# Patient Record
Sex: FEMALE | Race: BLACK OR AFRICAN AMERICAN | NOT HISPANIC OR LATINO | Employment: FULL TIME | URBAN - METROPOLITAN AREA
[De-identification: names, ages, dates, MRNs, and addresses within clinical notes are randomized per-mention and may not be internally consistent; named-entity substitution may affect disease eponyms.]

---

## 2019-12-23 ENCOUNTER — TELEPHONE (OUTPATIENT)
Dept: OBSTETRICS AND GYNECOLOGY | Facility: CLINIC | Age: 39
End: 2019-12-23

## 2019-12-23 DIAGNOSIS — Z36.89 ESTABLISH GESTATIONAL AGE, ULTRASOUND: Primary | ICD-10-CM

## 2019-12-23 NOTE — TELEPHONE ENCOUNTER
Patient stated that she was trying to see if she can be seen sooner due to brown discharge. Patient stated that she is at the urgent care right now and will call if there is anything that she needs.

## 2019-12-23 NOTE — TELEPHONE ENCOUNTER
----- Message from Willa Menchaca sent at 12/23/2019  9:44 AM CST -----  Contact: self  Patient is needing a dating order. LMP:11/10.

## 2019-12-23 NOTE — TELEPHONE ENCOUNTER
----- Message from Willa Menchaca sent at 12/23/2019  9:47 AM CST -----  Contact: self  Patient is currently 6 weeks pregnant. LMP:11/10. She states she is having brown discharge. The patient is scheduled on 01/07 for confirmation but is concerned. The patient can be reached at [phone].

## 2020-03-17 ENCOUNTER — TELEPHONE (OUTPATIENT)
Dept: GASTROENTEROLOGY | Facility: CLINIC | Age: 40
End: 2020-03-17

## 2020-03-17 NOTE — TELEPHONE ENCOUNTER
No answer 1st attempt  MA left detail message appt will be cancelled after 2nd attempt ( offer video visit ) per Dr. Terrazas

## 2020-03-18 ENCOUNTER — TELEPHONE (OUTPATIENT)
Dept: GASTROENTEROLOGY | Facility: CLINIC | Age: 40
End: 2020-03-18

## 2020-03-18 NOTE — TELEPHONE ENCOUNTER
Spoke to pt and informed her that in order to do the video visit she will need a smart phone or tablet, have to be in the [facility] during the visit, and will bill her insurance for the visit but if the insurance don't cover the visit then she will be billed for a $49 fee along with any non-covered fees such as copays. Pt expressed understanding and said she will still like to do the video visit.       Instructions on how to access the video visit was given          ----- Message from Neal Kwong sent at 3/18/2020 11:34 AM CDT -----  Contact: pt: [phone]  Pt returning call to clinic re appt, would like to be rescheduled to video visit       Please contact pt: [phone]

## 2020-03-20 ENCOUNTER — PATIENT MESSAGE (OUTPATIENT)
Dept: GASTROENTEROLOGY | Facility: CLINIC | Age: 40
End: 2020-03-20

## 2020-03-20 ENCOUNTER — TELEPHONE (OUTPATIENT)
Dept: GASTROENTEROLOGY | Facility: CLINIC | Age: 40
End: 2020-03-20

## 2020-03-20 ENCOUNTER — OFFICE VISIT (OUTPATIENT)
Dept: GASTROENTEROLOGY | Facility: CLINIC | Age: 40
End: 2020-03-20
Payer: COMMERCIAL

## 2020-03-20 DIAGNOSIS — A04.8 HELICOBACTER PYLORI (H. PYLORI) INFECTION: ICD-10-CM

## 2020-03-20 DIAGNOSIS — O98.912: ICD-10-CM

## 2020-03-20 DIAGNOSIS — K59.02 CONSTIPATION DUE TO OUTLET DYSFUNCTION: ICD-10-CM

## 2020-03-20 DIAGNOSIS — K21.9 GASTROESOPHAGEAL REFLUX DISEASE, ESOPHAGITIS PRESENCE NOT SPECIFIED: Primary | ICD-10-CM

## 2020-03-20 PROCEDURE — 99204 PR OFFICE/OUTPT VISIT, NEW, LEVL IV, 45-59 MIN: ICD-10-PCS | Mod: 95,,, | Performed by: INTERNAL MEDICINE

## 2020-03-20 PROCEDURE — 99204 OFFICE O/P NEW MOD 45 MIN: CPT | Mod: 95,,, | Performed by: INTERNAL MEDICINE

## 2020-03-20 RX ORDER — PANTOPRAZOLE SODIUM 40 MG/1
40 TABLET, DELAYED RELEASE ORAL DAILY
Qty: 30 TABLET | Refills: 3 | Status: SHIPPED | OUTPATIENT
Start: 2020-03-20 | End: 2020-09-04 | Stop reason: SDUPTHER

## 2020-03-20 NOTE — PROGRESS NOTES
[facility] Consultation Note    Reason for Consult:  The primary encounter diagnosis was Gastroesophageal reflux disease, esophagitis presence not specified. Diagnoses of Constipation due to outlet dysfunction, Pregnancy and infectious disease in second trimester, and Helicobacter pylori (H. pylori) infection were also pertinent to this visit.    PCP:   Primary Doctor No       Referring MD:  No referring provider defined for this encounter.    Initial History of Present Illness (HPI):  This is a 39 y.o. female here for evaluation of     2018 - living in [place] and saw Gi there who did an EGD and found an ulcer caused by H pylori. Took medications. Followup EGD showed gastritis. Was treated with omeprazole. Got worse with ibuprofen. Currently 4 months pregnant.    Taking tums and prilosec    Abdominal pain - feels like a consistent burn on left side of abdomen  Reflux - no  Dysphagia - no   Bowel habits - normal  GI bleeding - none  NSAID usage - none        ROS:  Constitutional: No fevers, chills, No weight loss  ENT: No allergies  CV: No chest pain  Pulm: No cough, No shortness of breath  Ophtho: No vision changes  GI: see HPI  Derm: No rash  Heme: No lymphadenopathy, No bruising  MSK: No arthritis  : No dysuria, No hematuria  Endo: No hot or cold intolerance  Neuro: No syncope, No seizure  Psych: No anxiety, No depression    Answers for HPI/ROS submitted by the patient on 3/20/2020   fever: No  chills: No  weight loss: No  eye pain: No  eye redness: No  trouble swallowing: No  chest pain: No  shortness of breath: No  cough: No  rash: No  abdominal pain: Yes  nausea: No  vomiting: Yes  Feeling depressed?: No  nervous/ anxious: No  heartburn: Yes  Joint pain? : No  dysuria: No  hematuria: No  back pain: No      Medical History:  has a past medical history of Normal IUP (intrauterine pregnancy) on prenatal ultrasound, second trimester and Umbilical hernia.    Surgical History:  has no past  surgical history on file.    Family History: family history is not on file..     Social History:  reports that she quit smoking about 3 months ago. Her smoking use included cigarettes. She does not have any smokeless tobacco history on file.    Review of patient's allergies indicates:  No Known Allergies      Objective Findings:  Video visit      Labs:  No results found for: WBC, HGB, HCT, PLT, CHOL, TRIG, HDL, LDLDIRECT, ALT, AST, NA, K, CL, CREATININE, BUN, CO2, TSH, PSA, INR, GLUF, HGBA1C, MICROALBUR    No results found for: HPYLORINTERP  No results found for: HPYLORIANTIG        Imaging:    Endoscopy:    EGD 2018 - ulcer followed by gastritis  Colon 2018 - wnl      Assessment:  1. Gastroesophageal reflux disease, esophagitis presence not specified    2. Constipation due to outlet dysfunction    3. Pregnancy and infectious disease in second trimester    4. Helicobacter pylori (H. pylori) infection           Recommendations:  1. Protonix for gastritis. Prilosec is Class C so would stop that  2. Pepcid OTC qhs  3. Check stool sample for H pylori at the end of pregnancy to try to avoid antibiotics during if we can  4. Colace daily for constipation    RTC after pregnancy completed      Order summary:  Orders Placed This Encounter    pantoprazole (PROTONIX) 40 MG tablet         Thank you so much for allowing me to participate in the care of Puja Terrazas MD

## 2020-05-08 ENCOUNTER — TELEPHONE (OUTPATIENT)
Dept: GASTROENTEROLOGY | Facility: CLINIC | Age: 40
End: 2020-05-08

## 2020-05-08 ENCOUNTER — PATIENT MESSAGE (OUTPATIENT)
Dept: GASTROENTEROLOGY | Facility: CLINIC | Age: 40
End: 2020-05-08

## 2020-05-08 NOTE — TELEPHONE ENCOUNTER
MA spoke to pt,     Pt in person appt r/s to virtual visit on 5/25/20 at 3:30 pm. Pt is aware we need your current insurance information before visit. Pt stated she will send picture of card to portal. Pt confirmed appt/terms and thank MA

## 2020-05-13 ENCOUNTER — PATIENT MESSAGE (OUTPATIENT)
Dept: GASTROENTEROLOGY | Facility: CLINIC | Age: 40
End: 2020-05-13

## 2020-05-21 ENCOUNTER — TELEPHONE (OUTPATIENT)
Dept: GASTROENTEROLOGY | Facility: CLINIC | Age: 40
End: 2020-05-21

## 2020-05-21 NOTE — TELEPHONE ENCOUNTER
MA spoke to pt,     Pt stated she would like to schedule change virtual visit on 5/25/20 to clinic visit instead.     Message sent to Delmy to schedule appt

## 2020-05-25 ENCOUNTER — OFFICE VISIT (OUTPATIENT)
Dept: GASTROENTEROLOGY | Facility: CLINIC | Age: 40
End: 2020-05-25
Payer: COMMERCIAL

## 2020-05-25 ENCOUNTER — TELEPHONE (OUTPATIENT)
Dept: GASTROENTEROLOGY | Facility: CLINIC | Age: 40
End: 2020-05-25

## 2020-05-25 VITALS
HEART RATE: 72 BPM | HEIGHT: 67 IN | BODY MASS INDEX: 30.38 KG/M2 | DIASTOLIC BLOOD PRESSURE: 69 MMHG | WEIGHT: 193.56 LBS | SYSTOLIC BLOOD PRESSURE: 116 MMHG

## 2020-05-25 DIAGNOSIS — K59.00 CONSTIPATION, UNSPECIFIED CONSTIPATION TYPE: ICD-10-CM

## 2020-05-25 DIAGNOSIS — A04.8 HELICOBACTER PYLORI (H. PYLORI) INFECTION: ICD-10-CM

## 2020-05-25 DIAGNOSIS — R10.13 ABDOMINAL PAIN, EPIGASTRIC: Primary | ICD-10-CM

## 2020-05-25 PROCEDURE — 3008F PR BODY MASS INDEX (BMI) DOCUMENTED: ICD-10-PCS | Mod: CPTII,S$GLB,, | Performed by: INTERNAL MEDICINE

## 2020-05-25 PROCEDURE — 99999 PR PBB SHADOW E&M-EST. PATIENT-LVL III: ICD-10-PCS | Mod: PBBFAC,,, | Performed by: INTERNAL MEDICINE

## 2020-05-25 PROCEDURE — 3008F BODY MASS INDEX DOCD: CPT | Mod: CPTII,S$GLB,, | Performed by: INTERNAL MEDICINE

## 2020-05-25 PROCEDURE — 99214 OFFICE O/P EST MOD 30 MIN: CPT | Mod: S$GLB,,, | Performed by: INTERNAL MEDICINE

## 2020-05-25 PROCEDURE — 99999 PR PBB SHADOW E&M-EST. PATIENT-LVL III: CPT | Mod: PBBFAC,,, | Performed by: INTERNAL MEDICINE

## 2020-05-25 PROCEDURE — 99214 PR OFFICE/OUTPT VISIT, EST, LEVL IV, 30-39 MIN: ICD-10-PCS | Mod: S$GLB,,, | Performed by: INTERNAL MEDICINE

## 2020-05-25 RX ORDER — AMOXICILLIN 250 MG
1 CAPSULE ORAL DAILY
COMMUNITY
End: 2020-10-27

## 2020-05-25 RX ORDER — DICYCLOMINE HYDROCHLORIDE 10 MG/1
10 CAPSULE ORAL
Qty: 90 CAPSULE | Refills: 3 | Status: ON HOLD | OUTPATIENT
Start: 2020-05-25 | End: 2020-10-09 | Stop reason: CLARIF

## 2020-05-25 RX ORDER — FAMOTIDINE 20 MG/1
20 TABLET, FILM COATED ORAL DAILY PRN
Status: ON HOLD | COMMUNITY
Start: 2020-03-20 | End: 2020-10-09 | Stop reason: CLARIF

## 2020-05-25 NOTE — PATIENT INSTRUCTIONS
"Diaphragmatic Breathing for Belching or Rumination Syndrome    Demonstration Video:  [email or web address]      The technique that I use for these patients, which I'd like to describe to you here, is called diaphragmatic breathing. It addresses the abdominal wall compression that induces some of the precipitating events for rumination. It's a technique that patients who know yoga will identify with very quickly.    I tell these patients that I want to do some breathing exercises with them where they put one hand on their chest and the other on their belly button. This allows them to get an abdominal reach with the open fist and a chest wall reach with their upper arm. Then I ask them to take a deep breath, which allows for the shoulders to go up and to have thoracic expansion. The chest rises, and you feel the hand move as a response to that. You can see it as I breathe in here. This is something you can do with your patients.    Now tell your patients to keep their hand on their chest wall so that it doesn't move, and then have them do what I call "belly breathing." They can take a deep breath, trying to expand their abdomen but doing it in such a way that their chest wall, or their hand upon it, doesn't move at all. The abdomen expands out as they're breathing down, using this "belly breathing" technique. This is a concept that requires practice. I ask them to do this over about 3 seconds, with slow pursed lip breathing on the same exit breath. So the technique goes: belly breathing in, hand on the upper chest not moving, and then belly breathing out.    I ask my patients with rumination syndrome to do this about 5 minutes into their meal. I also ask them to practice this several times before they get to their meal and to do it repetitively during the course of any rumination symptom onset, as well as briefly following a meal, given that rumination episodes are always meal " related.    I ask them to practice for 5 minutes, break, do it again for 5 minutes, break, and do it again for 5 minutes. The practice is meant to ultimately create desensitization of the abdominal wall contraction precipitating this upward pressure to the lower esophageal sphincter, with rumination syndrome evolving as a consequence.

## 2020-05-25 NOTE — TELEPHONE ENCOUNTER
"Printed 1 labels which include the pt's full name, , & MRN. Verified correct pt by having them state & spell their full name & ; compared against labels & lab requisition. Gathered the following supplies and placed in a bag for the pt's convenience: biohazard bag w/ pt's lab requisition in pocket, teal container w/ verified pt label placed on container's side, sterile tongue depressor, and clear collection "hat". Placed verified label onto teal topped container. Hand delivered the bag to pt. Explained that pt is to have a BM in the clear "hat" provided which is to placed inside the toilet. FORMED STOOL: (Using the wooden spatula provided, break of a piece of stool & place in the teal topped container.)  Securely fasten the top and place in the biohazard bag provided. Instructed pt to document date & time of stool collection on the stool collection slip. Stool sample is to be hand delivered to an [facility] lab appt desk for check-in. Explained that once the results are received, we will be in touch w/ the results.     Pt verbalize understanding and thank MA     Verify by 2nd eye Anoop      "

## 2020-05-25 NOTE — PROGRESS NOTES
[facility] Consultation Note    Reason for Consult:  The primary encounter diagnosis was Abdominal pain, epigastric. Diagnoses of Helicobacter pylori (H. pylori) infection and Constipation, unspecified constipation type were also pertinent to this visit.    PCP:   Primary Doctor No       Referring MD:  Aaareferral Self  No address on file    Initial History of Present Illness (HPI):  This is a 39 y.o. female here for evaluation of     2018 - living in [place] and saw Gi there who did an EGD and found an ulcer caused by H pylori. Took medications. Followup EGD showed gastritis. Was treated with omeprazole. Got worse with ibuprofen. Currently 4 months pregnant.    Taking tums and prilosec    Abdominal pain - feels like a consistent burn on left side of abdomen  Reflux - no  Dysphagia - no   Bowel habits - normal  GI bleeding - none  NSAID usage - none    Interval History 05/25/2020:  Currently at 28 weeks. This is her first pregnancy.  Drinking cold water in the morning makes her have a spasm and vomit  Some early satiety as well      ROS:  Answers for HPI/ROS submitted by the patient on 5/21/2020   fever: No  chills: No  weight loss: No  eye pain: No  eye redness: No  trouble swallowing: No  chest pain: No  shortness of breath: No  cough: No  rash: No  abdominal pain: No  nausea: Yes  vomiting: Yes  Feeling depressed?: No  nervous/ anxious: No  heartburn: No  Joint pain? : No  dysuria: No  hematuria: No  back pain: No      Medical History:  has a past medical history of Normal IUP (intrauterine pregnancy) on prenatal ultrasound, second trimester and Umbilical hernia.    Surgical History:  has no past surgical history on file.       Review of patient's allergies indicates:  No Known Allergies      Objective Findings:  Video visit      Labs:  No results found for: WBC, HGB, HCT, PLT, CHOL, TRIG, HDL, LDLDIRECT, ALT, AST, NA, K, CL, CREATININE, BUN, CO2, TSH, PSA, INR, GLUF, HGBA1C,  MICROALBUR    No results found for: HPYLORINTERP  No results found for: HPYLORIANTIG        Imaging:    Endoscopy:    EGD 2018 - ulcer followed by gastritis  Colon 2018 - wnl      Assessment:  1. Abdominal pain, epigastric    2. Helicobacter pylori (H. pylori) infection    3. Constipation, unspecified constipation type           Recommendations:  1. Protonix for gastritis. Hold prior to H pylori stool testing  2. Pepcid OTC qhs  3. Check stool sample for H pylori   4. Repeat EGD after August (after due date)      Order summary:  Orders Placed This Encounter    H. pylori antigen, stool    dicyclomine (BENTYL) 10 MG capsule         Thank you so much for allowing me to participate in the care of Puja Terrazas MD

## 2020-06-22 ENCOUNTER — LAB VISIT (OUTPATIENT)
Dept: LAB | Facility: HOSPITAL | Age: 40
End: 2020-06-22
Attending: INTERNAL MEDICINE
Payer: COMMERCIAL

## 2020-06-22 DIAGNOSIS — A04.8 HELICOBACTER PYLORI (H. PYLORI) INFECTION: ICD-10-CM

## 2020-06-22 PROCEDURE — 87338 HPYLORI STOOL AG IA: CPT

## 2020-06-27 LAB — H PYLORI AG STL QL IA: NOT DETECTED

## 2020-06-30 ENCOUNTER — TELEPHONE (OUTPATIENT)
Dept: GASTROENTEROLOGY | Facility: CLINIC | Age: 40
End: 2020-06-30

## 2020-06-30 NOTE — TELEPHONE ENCOUNTER
----- Message from Socrates Terrazas MD sent at 6/30/2020  1:45 PM CDT -----  Please let her know her stool sample is negative for H pylori

## 2020-08-18 ENCOUNTER — TELEPHONE (OUTPATIENT)
Dept: GASTROENTEROLOGY | Facility: CLINIC | Age: 40
End: 2020-08-18

## 2020-08-18 NOTE — TELEPHONE ENCOUNTER
MA spoke to pt,     Pt is requesting to change appt time for appt on 8/20 instead of 11 am need later time.     MA contact 1:30 pm pt to switch appt time. Pt's verbalize understanding, confirmed appt/terms and thank MA       ----- Message from Senait Cooper sent at 8/18/2020  9:42 AM CDT -----  Regarding: Appointment  Contact: [phone]  Calling to speak with nurse to reschedule virtual appointment to later that day, or within a month. Please call and advise

## 2020-08-20 ENCOUNTER — OFFICE VISIT (OUTPATIENT)
Dept: GASTROENTEROLOGY | Facility: CLINIC | Age: 40
End: 2020-08-20
Payer: COMMERCIAL

## 2020-08-20 ENCOUNTER — PATIENT MESSAGE (OUTPATIENT)
Dept: GASTROENTEROLOGY | Facility: CLINIC | Age: 40
End: 2020-08-20

## 2020-08-20 ENCOUNTER — TELEPHONE (OUTPATIENT)
Dept: GASTROENTEROLOGY | Facility: CLINIC | Age: 40
End: 2020-08-20

## 2020-08-20 VITALS — WEIGHT: 170 LBS | HEIGHT: 67 IN | BODY MASS INDEX: 26.68 KG/M2

## 2020-08-20 DIAGNOSIS — K21.9 GASTROESOPHAGEAL REFLUX DISEASE, ESOPHAGITIS PRESENCE NOT SPECIFIED: Primary | ICD-10-CM

## 2020-08-20 PROCEDURE — 99214 PR OFFICE/OUTPT VISIT, EST, LEVL IV, 30-39 MIN: ICD-10-PCS | Mod: 95,,, | Performed by: INTERNAL MEDICINE

## 2020-08-20 PROCEDURE — 99214 OFFICE O/P EST MOD 30 MIN: CPT | Mod: 95,,, | Performed by: INTERNAL MEDICINE

## 2020-08-20 PROCEDURE — 3008F PR BODY MASS INDEX (BMI) DOCUMENTED: ICD-10-PCS | Mod: CPTII,,, | Performed by: INTERNAL MEDICINE

## 2020-08-20 PROCEDURE — 3008F BODY MASS INDEX DOCD: CPT | Mod: CPTII,,, | Performed by: INTERNAL MEDICINE

## 2020-08-20 RX ORDER — HYDROGEN PEROXIDE 3 %
20 SOLUTION, NON-ORAL MISCELLANEOUS DAILY
Status: ON HOLD | COMMUNITY
End: 2020-09-28 | Stop reason: HOSPADM

## 2020-08-20 RX ORDER — ACETAMINOPHEN 500 MG
500 TABLET ORAL EVERY 6 HOURS PRN
Status: ON HOLD | COMMUNITY
End: 2022-06-20 | Stop reason: HOSPADM

## 2020-08-20 RX ORDER — NIFEDIPINE 30 MG/1
30 TABLET, EXTENDED RELEASE ORAL
COMMUNITY
Start: 2020-08-14 | End: 2020-10-06

## 2020-08-20 NOTE — TELEPHONE ENCOUNTER
Discharge summary: MA attempted to contact patient per Dr. Terrazas recommendations:  1. Repeat EGD   2. She will sign a release form to get records from her GI in [place], Dr. Ramírez who did some gallbladder testing.     Pt didn't answer left detail message to return call to our office.

## 2020-08-20 NOTE — PROGRESS NOTES
"    [facility] Consultation Note        The patient location is: home  The chief complaint leading to consultation is:   Visit type: audiovisual  Total time spent with patient:   minutes  Each patient to whom he or she provides medical services by telemedicine is:  (1) informed of the relationship between the physician and patient and the respective role of any other health care provider with respect to management of the patient; and (2) notified that he or she may decline to receive medical services by telemedicine and may withdraw from such care at any time.    Notes: none      Reason for Consult:  The encounter diagnosis was Gastroesophageal reflux disease, esophagitis presence not specified.    PCP:   Primary Doctor No       Referring MD:  No referring provider defined for this encounter.    Initial History of Present Illness (HPI):  This is a 39 y.o. female here for evaluation of     2018 - living in [place] and saw Gi there who did an EGD and found an ulcer caused by H pylori. Took medications. Followup EGD showed gastritis. Was treated with omeprazole. Got worse with ibuprofen. Currently 4 months pregnant.    Taking tums and prilosec    Abdominal pain - feels like a consistent burn on left side of abdomen  Reflux - no  Dysphagia - no   Bowel habits - normal  GI bleeding - none  NSAID usage - none    Interval History 08/20/2020:  Had baby 1 week ago, doing well. Did lose some weight during pregnancy  Still having some heartburn. Had a hard time getting off the protonix to turn in H pylori  Discomfort always under her left side  Apparently was told her gallbladder "was sluggish" in [place]      ROS:  Answers for HPI/ROS submitted by the patient on 5/21/2020   fever: No  chills: No  weight loss: No  eye pain: No  eye redness: No  trouble swallowing: No  chest pain: No  shortness of breath: No  cough: No  rash: No  abdominal pain: No  nausea: Yes  vomiting: Yes  Feeling depressed?: " No  nervous/ anxious: No  heartburn: No  Joint pain? : No  dysuria: No  hematuria: No  back pain: No      Medical History:  has a past medical history of Pregnancy (08/12/2020) and Umbilical hernia.    Surgical History:  has no past surgical history on file.       Review of patient's allergies indicates:  No Known Allergies      Objective Findings:  Video visit      Labs:  No results found for: WBC, HGB, HCT, PLT, CHOL, TRIG, HDL, LDLDIRECT, ALT, AST, NA, K, CL, CREATININE, BUN, CO2, TSH, PSA, INR, GLUF, HGBA1C, MICROALBUR    No results found for: HPYLORINTERP  Lab Results   Component Value Date    HPYLORIANTIG Not detected 06/22/2020           Imaging:    Endoscopy:    EGD 2018 - ulcer followed by gastritis  Colon 2018 - wnl      Assessment:  1. Gastroesophageal reflux disease, esophagitis presence not specified           Recommendations:  1. Protonix for gastritis.   2. Pepcid OTC qhs  3. Repeat EGD   4. Can repeat HIDA scan if EGD unrevealing  5. She will sign a release form to get records from her GI in [place], Dr. Ramírez who did some gallbladder testing    Order summary:  Orders Placed This Encounter    Case request GI: ESOPHAGOGASTRODUODENOSCOPY (EGD)         Thank you so much for allowing me to participate in the care of Puja Terrazas MD

## 2020-08-21 RX ORDER — HYDROCORTISONE 25 MG/G
CREAM TOPICAL 2 TIMES DAILY
Qty: 30 G | Refills: 1 | Status: SHIPPED | OUTPATIENT
Start: 2020-08-21 | End: 2021-01-27

## 2020-08-28 ENCOUNTER — TELEPHONE (OUTPATIENT)
Dept: GASTROENTEROLOGY | Facility: CLINIC | Age: 40
End: 2020-08-28

## 2020-08-28 NOTE — TELEPHONE ENCOUNTER
MA spoke to pt informed pt record release form w/o signature will be fax to Dr. Stewart Ramírez office per Dr. Terrazas for gallbladder report and if they require a sign form I will contact her.    Pt verbalize understanding and thank MA    ----- Message from Gwen Fan sent at 8/28/2020 12:23 PM CDT -----  Pt is calling to speak with Jesse and would like for Jesse to give her a call back [phone]

## 2020-09-16 ENCOUNTER — TELEPHONE (OUTPATIENT)
Dept: ENDOSCOPY | Facility: HOSPITAL | Age: 40
End: 2020-09-16

## 2020-09-16 DIAGNOSIS — K21.9 GASTROESOPHAGEAL REFLUX DISEASE, ESOPHAGITIS PRESENCE NOT SPECIFIED: Primary | ICD-10-CM

## 2020-09-24 ENCOUNTER — ANESTHESIA (OUTPATIENT)
Dept: SURGERY | Facility: HOSPITAL | Age: 40
DRG: 328 | End: 2020-09-24
Payer: COMMERCIAL

## 2020-09-24 ENCOUNTER — HOSPITAL ENCOUNTER (INPATIENT)
Facility: HOSPITAL | Age: 40
LOS: 4 days | Discharge: HOME | DRG: 328 | End: 2020-09-28
Attending: EMERGENCY MEDICINE | Admitting: SURGERY
Payer: COMMERCIAL

## 2020-09-24 ENCOUNTER — TELEPHONE (OUTPATIENT)
Dept: SURGERY | Facility: CLINIC | Age: 40
End: 2020-09-24

## 2020-09-24 ENCOUNTER — ANESTHESIA EVENT (OUTPATIENT)
Dept: SURGERY | Facility: HOSPITAL | Age: 40
DRG: 328 | End: 2020-09-24
Payer: COMMERCIAL

## 2020-09-24 DIAGNOSIS — R19.8 PERFORATED ABDOMINAL VISCUS: ICD-10-CM

## 2020-09-24 DIAGNOSIS — R10.9 ABDOMINAL PAIN: ICD-10-CM

## 2020-09-24 DIAGNOSIS — R10.9 ABDOMINAL PAIN, UNSPECIFIED ABDOMINAL LOCATION: Primary | ICD-10-CM

## 2020-09-24 DIAGNOSIS — R11.2 NAUSEA AND VOMITING, INTRACTABILITY OF VOMITING NOT SPECIFIED, UNSPECIFIED VOMITING TYPE: ICD-10-CM

## 2020-09-24 LAB
ABO + RH BLD: NORMAL
ALBUMIN SERPL BCP-MCNC: 3.7 G/DL (ref 3.5–5.2)
ALP SERPL-CCNC: 81 U/L (ref 55–135)
ALT SERPL W/O P-5'-P-CCNC: 7 U/L (ref 10–44)
ANION GAP SERPL CALC-SCNC: 12 MMOL/L (ref 8–16)
AST SERPL-CCNC: 15 U/L (ref 10–40)
B-HCG UR QL: NEGATIVE
BACTERIA #/AREA URNS HPF: ABNORMAL /HPF
BASOPHILS # BLD AUTO: 0.01 K/UL (ref 0–0.2)
BASOPHILS NFR BLD: 0.2 % (ref 0–1.9)
BILIRUB SERPL-MCNC: 0.7 MG/DL (ref 0.1–1)
BILIRUB UR QL STRIP: NEGATIVE
BLD GP AB SCN CELLS X3 SERPL QL: NORMAL
BUN SERPL-MCNC: 6 MG/DL (ref 6–20)
CALCIUM SERPL-MCNC: 9.1 MG/DL (ref 8.7–10.5)
CHLORIDE SERPL-SCNC: 105 MMOL/L (ref 95–110)
CLARITY UR: CLEAR
CO2 SERPL-SCNC: 26 MMOL/L (ref 23–29)
COLOR UR: YELLOW
CREAT SERPL-MCNC: 0.8 MG/DL (ref 0.5–1.4)
CTP QC/QA: YES
CTP QC/QA: YES
D DIMER PPP IA.FEU-MCNC: 0.55 MG/L FEU
DIFFERENTIAL METHOD: ABNORMAL
EOSINOPHIL # BLD AUTO: 0.1 K/UL (ref 0–0.5)
EOSINOPHIL NFR BLD: 1.9 % (ref 0–8)
ERYTHROCYTE [DISTWIDTH] IN BLOOD BY AUTOMATED COUNT: 13.1 % (ref 11.5–14.5)
EST. GFR  (AFRICAN AMERICAN): >60 ML/MIN/1.73 M^2
EST. GFR  (NON AFRICAN AMERICAN): >60 ML/MIN/1.73 M^2
GLUCOSE SERPL-MCNC: 158 MG/DL (ref 70–110)
GLUCOSE UR QL STRIP: NEGATIVE
HCG INTACT+B SERPL-ACNC: <1.2 MIU/ML
HCT VFR BLD AUTO: 39.5 % (ref 37–48.5)
HGB BLD-MCNC: 12.3 G/DL (ref 12–16)
HGB UR QL STRIP: NEGATIVE
HYALINE CASTS #/AREA URNS LPF: 0 /LPF
IMM GRANULOCYTES # BLD AUTO: 0.01 K/UL (ref 0–0.04)
IMM GRANULOCYTES NFR BLD AUTO: 0.2 % (ref 0–0.5)
KETONES UR QL STRIP: ABNORMAL
LEUKOCYTE ESTERASE UR QL STRIP: NEGATIVE
LIPASE SERPL-CCNC: 118 U/L (ref 4–60)
LYMPHOCYTES # BLD AUTO: 1.4 K/UL (ref 1–4.8)
LYMPHOCYTES NFR BLD: 31.4 % (ref 18–48)
MCH RBC QN AUTO: 28.9 PG (ref 27–31)
MCHC RBC AUTO-ENTMCNC: 31.1 G/DL (ref 32–36)
MCV RBC AUTO: 93 FL (ref 82–98)
MICROSCOPIC COMMENT: ABNORMAL
MONOCYTES # BLD AUTO: 0.2 K/UL (ref 0.3–1)
MONOCYTES NFR BLD: 4.9 % (ref 4–15)
NEUTROPHILS # BLD AUTO: 2.6 K/UL (ref 1.8–7.7)
NEUTROPHILS NFR BLD: 61.4 % (ref 38–73)
NITRITE UR QL STRIP: NEGATIVE
NRBC BLD-RTO: 0 /100 WBC
PH UR STRIP: 6 [PH] (ref 5–8)
PLATELET # BLD AUTO: 225 K/UL (ref 150–350)
PMV BLD AUTO: 9.5 FL (ref 9.2–12.9)
POTASSIUM SERPL-SCNC: 3 MMOL/L (ref 3.5–5.1)
PROT SERPL-MCNC: 6.9 G/DL (ref 6–8.4)
PROT UR QL STRIP: ABNORMAL
RBC # BLD AUTO: 4.26 M/UL (ref 4–5.4)
RBC #/AREA URNS HPF: 2 /HPF (ref 0–4)
SARS-COV-2 RDRP RESP QL NAA+PROBE: NEGATIVE
SODIUM SERPL-SCNC: 143 MMOL/L (ref 136–145)
SP GR UR STRIP: 1.02 (ref 1–1.03)
SQUAMOUS #/AREA URNS HPF: 6 /HPF
TROPONIN I SERPL DL<=0.01 NG/ML-MCNC: <0.006 NG/ML (ref 0–0.03)
URN SPEC COLLECT METH UR: ABNORMAL
UROBILINOGEN UR STRIP-ACNC: NEGATIVE EU/DL
WBC # BLD AUTO: 4.3 K/UL (ref 3.9–12.7)
WBC #/AREA URNS HPF: 3 /HPF (ref 0–5)

## 2020-09-24 PROCEDURE — 43840 GSTRRPHY SUTR DUOL/GSTR ULCR: CPT | Mod: ,,, | Performed by: SURGERY

## 2020-09-24 PROCEDURE — 36000706: Performed by: SURGERY

## 2020-09-24 PROCEDURE — 71000039 HC RECOVERY, EACH ADD'L HOUR: Performed by: SURGERY

## 2020-09-24 PROCEDURE — D9220A PRA ANESTHESIA: ICD-10-PCS | Mod: ANES,,, | Performed by: ANESTHESIOLOGY

## 2020-09-24 PROCEDURE — U0002 COVID-19 LAB TEST NON-CDC: HCPCS | Performed by: EMERGENCY MEDICINE

## 2020-09-24 PROCEDURE — 27201423 OPTIME MED/SURG SUP & DEVICES STERILE SUPPLY: Performed by: SURGERY

## 2020-09-24 PROCEDURE — C1729 CATH, DRAINAGE: HCPCS | Performed by: SURGERY

## 2020-09-24 PROCEDURE — 96376 TX/PRO/DX INJ SAME DRUG ADON: CPT

## 2020-09-24 PROCEDURE — D9220A PRA ANESTHESIA: Mod: CRNA,,, | Performed by: STUDENT IN AN ORGANIZED HEALTH CARE EDUCATION/TRAINING PROGRAM

## 2020-09-24 PROCEDURE — C9113 INJ PANTOPRAZOLE SODIUM, VIA: HCPCS | Performed by: STUDENT IN AN ORGANIZED HEALTH CARE EDUCATION/TRAINING PROGRAM

## 2020-09-24 PROCEDURE — 63600175 PHARM REV CODE 636 W HCPCS: Performed by: STUDENT IN AN ORGANIZED HEALTH CARE EDUCATION/TRAINING PROGRAM

## 2020-09-24 PROCEDURE — D9220A PRA ANESTHESIA: Mod: ANES,,, | Performed by: ANESTHESIOLOGY

## 2020-09-24 PROCEDURE — 25000003 PHARM REV CODE 250: Performed by: STUDENT IN AN ORGANIZED HEALTH CARE EDUCATION/TRAINING PROGRAM

## 2020-09-24 PROCEDURE — 99285 EMERGENCY DEPT VISIT HI MDM: CPT | Mod: 25

## 2020-09-24 PROCEDURE — 93005 ELECTROCARDIOGRAM TRACING: CPT

## 2020-09-24 PROCEDURE — 81025 URINE PREGNANCY TEST: CPT | Performed by: PHYSICIAN ASSISTANT

## 2020-09-24 PROCEDURE — 84702 CHORIONIC GONADOTROPIN TEST: CPT

## 2020-09-24 PROCEDURE — 63600175 PHARM REV CODE 636 W HCPCS: Performed by: EMERGENCY MEDICINE

## 2020-09-24 PROCEDURE — D9220A PRA ANESTHESIA: ICD-10-PCS | Mod: CRNA,,, | Performed by: STUDENT IN AN ORGANIZED HEALTH CARE EDUCATION/TRAINING PROGRAM

## 2020-09-24 PROCEDURE — 96361 HYDRATE IV INFUSION ADD-ON: CPT

## 2020-09-24 PROCEDURE — S0028 INJECTION, FAMOTIDINE, 20 MG: HCPCS | Performed by: EMERGENCY MEDICINE

## 2020-09-24 PROCEDURE — 37000008 HC ANESTHESIA 1ST 15 MINUTES: Performed by: SURGERY

## 2020-09-24 PROCEDURE — 80053 COMPREHEN METABOLIC PANEL: CPT

## 2020-09-24 PROCEDURE — 93010 ELECTROCARDIOGRAM REPORT: CPT | Mod: ,,, | Performed by: INTERNAL MEDICINE

## 2020-09-24 PROCEDURE — 96365 THER/PROPH/DIAG IV INF INIT: CPT

## 2020-09-24 PROCEDURE — 21400001 HC TELEMETRY ROOM

## 2020-09-24 PROCEDURE — 83690 ASSAY OF LIPASE: CPT

## 2020-09-24 PROCEDURE — 86901 BLOOD TYPING SEROLOGIC RH(D): CPT

## 2020-09-24 PROCEDURE — 81000 URINALYSIS NONAUTO W/SCOPE: CPT

## 2020-09-24 PROCEDURE — 25000003 PHARM REV CODE 250: Performed by: EMERGENCY MEDICINE

## 2020-09-24 PROCEDURE — 96374 THER/PROPH/DIAG INJ IV PUSH: CPT

## 2020-09-24 PROCEDURE — 63600175 PHARM REV CODE 636 W HCPCS: Performed by: ANESTHESIOLOGY

## 2020-09-24 PROCEDURE — 85379 FIBRIN DEGRADATION QUANT: CPT

## 2020-09-24 PROCEDURE — 36000707: Performed by: SURGERY

## 2020-09-24 PROCEDURE — 84484 ASSAY OF TROPONIN QUANT: CPT

## 2020-09-24 PROCEDURE — 96375 TX/PRO/DX INJ NEW DRUG ADDON: CPT

## 2020-09-24 PROCEDURE — 71000033 HC RECOVERY, INTIAL HOUR: Performed by: SURGERY

## 2020-09-24 PROCEDURE — 85025 COMPLETE CBC W/AUTO DIFF WBC: CPT

## 2020-09-24 PROCEDURE — 93010 EKG 12-LEAD: ICD-10-PCS | Mod: ,,, | Performed by: INTERNAL MEDICINE

## 2020-09-24 PROCEDURE — 43840 PR REPAIR, PERF DUOD/GAST ULC-WND/INJ: ICD-10-PCS | Mod: ,,, | Performed by: SURGERY

## 2020-09-24 PROCEDURE — 36415 COLL VENOUS BLD VENIPUNCTURE: CPT

## 2020-09-24 PROCEDURE — 37000009 HC ANESTHESIA EA ADD 15 MINS: Performed by: SURGERY

## 2020-09-24 PROCEDURE — 25500020 PHARM REV CODE 255: Performed by: EMERGENCY MEDICINE

## 2020-09-24 RX ORDER — SODIUM CHLORIDE, SODIUM LACTATE, POTASSIUM CHLORIDE, CALCIUM CHLORIDE 600; 310; 30; 20 MG/100ML; MG/100ML; MG/100ML; MG/100ML
INJECTION, SOLUTION INTRAVENOUS CONTINUOUS
Status: DISCONTINUED | OUTPATIENT
Start: 2020-09-24 | End: 2020-09-27

## 2020-09-24 RX ORDER — MIDAZOLAM HYDROCHLORIDE 1 MG/ML
INJECTION, SOLUTION INTRAMUSCULAR; INTRAVENOUS
Status: DISCONTINUED | OUTPATIENT
Start: 2020-09-24 | End: 2020-09-24

## 2020-09-24 RX ORDER — FENTANYL CITRATE 50 UG/ML
25 INJECTION, SOLUTION INTRAMUSCULAR; INTRAVENOUS EVERY 5 MIN PRN
Status: DISCONTINUED | OUTPATIENT
Start: 2020-09-24 | End: 2020-09-27

## 2020-09-24 RX ORDER — HYDROMORPHONE HYDROCHLORIDE 2 MG/ML
0.2 INJECTION, SOLUTION INTRAMUSCULAR; INTRAVENOUS; SUBCUTANEOUS EVERY 5 MIN PRN
Status: DISCONTINUED | OUTPATIENT
Start: 2020-09-24 | End: 2020-09-27

## 2020-09-24 RX ORDER — HYDROMORPHONE HYDROCHLORIDE 2 MG/ML
1 INJECTION, SOLUTION INTRAMUSCULAR; INTRAVENOUS; SUBCUTANEOUS
Status: COMPLETED | OUTPATIENT
Start: 2020-09-24 | End: 2020-09-24

## 2020-09-24 RX ORDER — PROPOFOL 10 MG/ML
VIAL (ML) INTRAVENOUS
Status: DISCONTINUED | OUTPATIENT
Start: 2020-09-24 | End: 2020-09-24

## 2020-09-24 RX ORDER — PHENYLEPHRINE HYDROCHLORIDE 10 MG/ML
INJECTION INTRAVENOUS
Status: DISCONTINUED | OUTPATIENT
Start: 2020-09-24 | End: 2020-09-24

## 2020-09-24 RX ORDER — MORPHINE SULFATE 10 MG/ML
6 INJECTION INTRAMUSCULAR; INTRAVENOUS; SUBCUTANEOUS
Status: COMPLETED | OUTPATIENT
Start: 2020-09-24 | End: 2020-09-24

## 2020-09-24 RX ORDER — ONDANSETRON 2 MG/ML
4 INJECTION INTRAMUSCULAR; INTRAVENOUS
Status: COMPLETED | OUTPATIENT
Start: 2020-09-24 | End: 2020-09-24

## 2020-09-24 RX ORDER — FENTANYL CITRATE 50 UG/ML
INJECTION, SOLUTION INTRAMUSCULAR; INTRAVENOUS
Status: DISCONTINUED | OUTPATIENT
Start: 2020-09-24 | End: 2020-09-24

## 2020-09-24 RX ORDER — SODIUM CHLORIDE, SODIUM LACTATE, POTASSIUM CHLORIDE, CALCIUM CHLORIDE 600; 310; 30; 20 MG/100ML; MG/100ML; MG/100ML; MG/100ML
INJECTION, SOLUTION INTRAVENOUS CONTINUOUS PRN
Status: DISCONTINUED | OUTPATIENT
Start: 2020-09-24 | End: 2020-09-24

## 2020-09-24 RX ORDER — FAMOTIDINE 10 MG/ML
20 INJECTION INTRAVENOUS
Status: COMPLETED | OUTPATIENT
Start: 2020-09-24 | End: 2020-09-24

## 2020-09-24 RX ORDER — ONDANSETRON 2 MG/ML
4 INJECTION INTRAMUSCULAR; INTRAVENOUS EVERY 4 HOURS
Status: DISCONTINUED | OUTPATIENT
Start: 2020-09-24 | End: 2020-09-28 | Stop reason: HOSPADM

## 2020-09-24 RX ORDER — SODIUM CHLORIDE 0.9 % (FLUSH) 0.9 %
3 SYRINGE (ML) INJECTION
Status: DISCONTINUED | OUTPATIENT
Start: 2020-09-24 | End: 2020-09-28 | Stop reason: HOSPADM

## 2020-09-24 RX ORDER — SUCCINYLCHOLINE CHLORIDE 20 MG/ML
INJECTION INTRAMUSCULAR; INTRAVENOUS
Status: DISCONTINUED | OUTPATIENT
Start: 2020-09-24 | End: 2020-09-24

## 2020-09-24 RX ORDER — DEXAMETHASONE SODIUM PHOSPHATE 4 MG/ML
INJECTION, SOLUTION INTRA-ARTICULAR; INTRALESIONAL; INTRAMUSCULAR; INTRAVENOUS; SOFT TISSUE
Status: DISCONTINUED | OUTPATIENT
Start: 2020-09-24 | End: 2020-09-24

## 2020-09-24 RX ORDER — NEOSTIGMINE METHYLSULFATE 1 MG/ML
INJECTION, SOLUTION INTRAVENOUS
Status: DISCONTINUED | OUTPATIENT
Start: 2020-09-24 | End: 2020-09-24

## 2020-09-24 RX ORDER — FLUCONAZOLE 2 MG/ML
100 INJECTION, SOLUTION INTRAVENOUS
Status: DISCONTINUED | OUTPATIENT
Start: 2020-09-24 | End: 2020-09-28

## 2020-09-24 RX ORDER — LIDOCAINE HYDROCHLORIDE 20 MG/ML
10 SOLUTION OROPHARYNGEAL
Status: COMPLETED | OUTPATIENT
Start: 2020-09-24 | End: 2020-09-24

## 2020-09-24 RX ORDER — ONDANSETRON 2 MG/ML
INJECTION INTRAMUSCULAR; INTRAVENOUS
Status: DISCONTINUED | OUTPATIENT
Start: 2020-09-24 | End: 2020-09-24

## 2020-09-24 RX ORDER — SODIUM CHLORIDE 0.9 % (FLUSH) 0.9 %
10 SYRINGE (ML) INJECTION
Status: DISCONTINUED | OUTPATIENT
Start: 2020-09-24 | End: 2020-09-28 | Stop reason: HOSPADM

## 2020-09-24 RX ORDER — HYDROMORPHONE HYDROCHLORIDE 2 MG/ML
1 INJECTION, SOLUTION INTRAMUSCULAR; INTRAVENOUS; SUBCUTANEOUS EVERY 4 HOURS PRN
Status: DISCONTINUED | OUTPATIENT
Start: 2020-09-24 | End: 2020-09-28 | Stop reason: HOSPADM

## 2020-09-24 RX ORDER — LIDOCAINE HYDROCHLORIDE 20 MG/ML
INJECTION INTRAVENOUS
Status: DISCONTINUED | OUTPATIENT
Start: 2020-09-24 | End: 2020-09-24

## 2020-09-24 RX ORDER — ACETAMINOPHEN 10 MG/ML
1000 INJECTION, SOLUTION INTRAVENOUS ONCE
Status: COMPLETED | OUTPATIENT
Start: 2020-09-24 | End: 2020-09-24

## 2020-09-24 RX ORDER — ROCURONIUM BROMIDE 10 MG/ML
INJECTION, SOLUTION INTRAVENOUS
Status: DISCONTINUED | OUTPATIENT
Start: 2020-09-24 | End: 2020-09-24

## 2020-09-24 RX ORDER — PANTOPRAZOLE SODIUM 40 MG/10ML
40 INJECTION, POWDER, LYOPHILIZED, FOR SOLUTION INTRAVENOUS 2 TIMES DAILY
Status: DISCONTINUED | OUTPATIENT
Start: 2020-09-24 | End: 2020-09-28

## 2020-09-24 RX ORDER — MAG HYDROX/ALUMINUM HYD/SIMETH 200-200-20
30 SUSPENSION, ORAL (FINAL DOSE FORM) ORAL
Status: COMPLETED | OUTPATIENT
Start: 2020-09-24 | End: 2020-09-24

## 2020-09-24 RX ADMIN — MIDAZOLAM HYDROCHLORIDE 2 MG: 1 INJECTION, SOLUTION INTRAMUSCULAR; INTRAVENOUS at 12:09

## 2020-09-24 RX ADMIN — PIPERACILLIN AND TAZOBACTAM 4.5 G: 4; .5 INJECTION, POWDER, LYOPHILIZED, FOR SOLUTION INTRAVENOUS; PARENTERAL at 05:09

## 2020-09-24 RX ADMIN — PHENYLEPHRINE HYDROCHLORIDE 100 MCG: 10 INJECTION INTRAVENOUS at 12:09

## 2020-09-24 RX ADMIN — SODIUM CHLORIDE, SODIUM LACTATE, POTASSIUM CHLORIDE, AND CALCIUM CHLORIDE: .6; .31; .03; .02 INJECTION, SOLUTION INTRAVENOUS at 12:09

## 2020-09-24 RX ADMIN — HYDROMORPHONE HYDROCHLORIDE 0.2 MG: 2 INJECTION, SOLUTION INTRAMUSCULAR; INTRAVENOUS; SUBCUTANEOUS at 02:09

## 2020-09-24 RX ADMIN — FENTANYL CITRATE 25 MCG: 50 INJECTION INTRAMUSCULAR; INTRAVENOUS at 12:09

## 2020-09-24 RX ADMIN — FENTANYL CITRATE 50 MCG: 50 INJECTION INTRAMUSCULAR; INTRAVENOUS at 12:09

## 2020-09-24 RX ADMIN — SODIUM CHLORIDE, SODIUM LACTATE, POTASSIUM CHLORIDE, AND CALCIUM CHLORIDE: .6; .31; .03; .02 INJECTION, SOLUTION INTRAVENOUS at 10:09

## 2020-09-24 RX ADMIN — HYDROMORPHONE HYDROCHLORIDE 1 MG: 2 INJECTION INTRAMUSCULAR; INTRAVENOUS; SUBCUTANEOUS at 11:09

## 2020-09-24 RX ADMIN — SODIUM CHLORIDE 1000 ML: 9 INJECTION, SOLUTION INTRAVENOUS at 04:09

## 2020-09-24 RX ADMIN — PANTOPRAZOLE SODIUM 40 MG: 40 INJECTION, POWDER, FOR SOLUTION INTRAVENOUS at 10:09

## 2020-09-24 RX ADMIN — SODIUM CHLORIDE, SODIUM LACTATE, POTASSIUM CHLORIDE, AND CALCIUM CHLORIDE: .6; .31; .03; .02 INJECTION, SOLUTION INTRAVENOUS at 01:09

## 2020-09-24 RX ADMIN — PROPOFOL 30 MG: 10 INJECTION, EMULSION INTRAVENOUS at 12:09

## 2020-09-24 RX ADMIN — HYDROMORPHONE HYDROCHLORIDE 1 MG: 2 INJECTION, SOLUTION INTRAMUSCULAR; INTRAVENOUS; SUBCUTANEOUS at 08:09

## 2020-09-24 RX ADMIN — Medication 80 MG: at 12:09

## 2020-09-24 RX ADMIN — FAMOTIDINE 20 MG: 10 INJECTION INTRAVENOUS at 07:09

## 2020-09-24 RX ADMIN — ALUMINUM HYDROXIDE, MAGNESIUM HYDROXIDE, AND SIMETHICONE 30 ML: 200; 200; 20 SUSPENSION ORAL at 07:09

## 2020-09-24 RX ADMIN — ACETAMINOPHEN 1000 MG: 10 INJECTION, SOLUTION INTRAVENOUS at 02:09

## 2020-09-24 RX ADMIN — PIPERACILLIN AND TAZOBACTAM 4.5 G: 4; .5 INJECTION, POWDER, FOR SOLUTION INTRAVENOUS at 08:09

## 2020-09-24 RX ADMIN — PHENYLEPHRINE HYDROCHLORIDE 100 MCG: 10 INJECTION INTRAVENOUS at 01:09

## 2020-09-24 RX ADMIN — DEXAMETHASONE SODIUM PHOSPHATE 4 MG: 4 INJECTION, SOLUTION INTRAMUSCULAR; INTRAVENOUS at 12:09

## 2020-09-24 RX ADMIN — NEOSTIGMINE METHYLSULFATE 5 MG: 1 INJECTION INTRAVENOUS at 01:09

## 2020-09-24 RX ADMIN — PROPOFOL 170 MG: 10 INJECTION, EMULSION INTRAVENOUS at 12:09

## 2020-09-24 RX ADMIN — FLUCONAZOLE 100 MG: 2 INJECTION INTRAVENOUS at 10:09

## 2020-09-24 RX ADMIN — ONDANSETRON 4 MG: 2 INJECTION INTRAMUSCULAR; INTRAVENOUS at 10:09

## 2020-09-24 RX ADMIN — PROPOFOL 50 MG: 10 INJECTION, EMULSION INTRAVENOUS at 01:09

## 2020-09-24 RX ADMIN — SUCCINYLCHOLINE CHLORIDE 80 MG: 20 INJECTION, SOLUTION INTRAMUSCULAR; INTRAVENOUS at 12:09

## 2020-09-24 RX ADMIN — DEXAMETHASONE SODIUM PHOSPHATE 8 MG: 4 INJECTION, SOLUTION INTRAMUSCULAR; INTRAVENOUS at 01:09

## 2020-09-24 RX ADMIN — MORPHINE SULFATE 6 MG: 10 INJECTION, SOLUTION INTRAMUSCULAR; INTRAVENOUS at 04:09

## 2020-09-24 RX ADMIN — HYDROMORPHONE HYDROCHLORIDE 0.2 MG: 2 INJECTION, SOLUTION INTRAMUSCULAR; INTRAVENOUS; SUBCUTANEOUS at 03:09

## 2020-09-24 RX ADMIN — HYDROMORPHONE HYDROCHLORIDE 1 MG: 2 INJECTION, SOLUTION INTRAMUSCULAR; INTRAVENOUS; SUBCUTANEOUS at 06:09

## 2020-09-24 RX ADMIN — ESMOLOL HYDROCHLORIDE 10 MG: 10 INJECTION INTRAVENOUS at 01:09

## 2020-09-24 RX ADMIN — ONDANSETRON 4 MG: 2 INJECTION, SOLUTION INTRAMUSCULAR; INTRAVENOUS at 12:09

## 2020-09-24 RX ADMIN — IOHEXOL 100 ML: 350 INJECTION, SOLUTION INTRAVENOUS at 07:09

## 2020-09-24 RX ADMIN — ROCURONIUM BROMIDE 20 MG: 10 INJECTION, SOLUTION INTRAVENOUS at 12:09

## 2020-09-24 RX ADMIN — ONDANSETRON 4 MG: 2 INJECTION INTRAMUSCULAR; INTRAVENOUS at 03:09

## 2020-09-24 RX ADMIN — LIDOCAINE HYDROCHLORIDE 10 ML: 20 SOLUTION ORAL; TOPICAL at 07:09

## 2020-09-24 RX ADMIN — ONDANSETRON 4 MG: 2 SOLUTION INTRAMUSCULAR; INTRAVENOUS at 04:09

## 2020-09-24 RX ADMIN — GLYCOPYRROLATE 0.3 MG: 0.2 INJECTION, SOLUTION INTRAMUSCULAR; INTRAVITREAL at 01:09

## 2020-09-24 NOTE — ANESTHESIA PREPROCEDURE EVALUATION
09/24/2020  Puja Quigley is a 39 y.o., female.    Anesthesia Evaluation     I have reviewed the Nursing Notes.       Review of Systems  Anesthesia Hx:  No problems with previous Anesthesia   Social:  Former Smoker    Cardiovascular:  Cardiovascular Normal Exercise tolerance: good     Pulmonary:  Pulmonary Normal    Renal/:  Renal/ Normal     Hepatic/GI:   No Bowel Prep. Denies Liver Disease. Denies Hepatitis. Perforated viscous   Neurological:  Neurology Normal    Endocrine:  Endocrine Normal        Physical Exam  General:  Well nourished    Airway/Jaw/Neck:  AIRWAY FINDINGS: Normal      Chest/Lungs:  Chest/Lungs Clear    Heart/Vascular:  Heart Findings: Normal       Mental Status:  Mental Status Findings: Normal        Anesthesia Plan  Type of Anesthesia, risks & benefits discussed:  Anesthesia Type:  general  Patient's Preference:   Intra-op Monitoring Plan: standard ASA monitors  Intra-op Monitoring Plan Comments:   Post Op Pain Control Plan:   Post Op Pain Control Plan Comments:   Induction:   IV  Beta Blocker:  Patient is not currently on a Beta-Blocker (No further documentation required).       Informed Consent: Patient understands risks and agrees with Anesthesia plan.  Questions answered. Anesthesia consent signed with patient.  ASA Score: 3  emergent   Day of Surgery Review of History & Physical:  There are no significant changes.  H&P update referred to the provider.         Ready For Surgery From Anesthesia Perspective.

## 2020-09-24 NOTE — ANESTHESIA POSTPROCEDURE EVALUATION
Anesthesia Post Evaluation    Patient: Puja Quigley    Procedure(s) Performed: Procedure(s) (LRB):  LAPAROTOMY, EXPLORATORY (N/A)    Final Anesthesia Type: general    Patient location during evaluation: PACU  Patient participation: Yes- Able to Participate  Level of consciousness: awake and alert and oriented  Post-procedure vital signs: reviewed and stable  Pain management: adequate  Airway patency: patent    PONV status at discharge: No PONV  Anesthetic complications: no      Cardiovascular status: blood pressure returned to baseline, hemodynamically stable and stable  Respiratory status: unassisted, spontaneous ventilation and room air  Hydration status: euvolemic  Follow-up not needed.          Vitals Value Taken Time   /86 09/24/20 1446   Temp 36.8 °C (98.2 °F) 09/24/20 1426   Pulse 72 09/24/20 1451   Resp 16 09/24/20 1451   SpO2 100 % 09/24/20 1451   Vitals shown include unvalidated device data.      No case tracking events are documented in the log.      Pain/Cristel Score: Pain Rating Prior to Med Admin: 6 (9/24/2020  2:35 PM)  Cristel Score: 7 (9/24/2020  2:20 PM)

## 2020-09-24 NOTE — FIRST PROVIDER EVALUATION
Emergency Department TeleTriage Encounter Note      CHIEF COMPLAINT    Chief Complaint   Patient presents with    Abdominal Pain     Patient reports that she was sleeping when having a sudden onset of abd pain, that she thought was gas, that radiated all the way into her shoulders.  S/P vaginal delivery x 6 weeks.  Denies chest pain, sob, n/v, constipation, or diarrhea.       VITAL SIGNS   Initial Vitals [09/24/20 0340]   BP Pulse Resp Temp SpO2   130/79 86 20 98.5 °F (36.9 °C) 98 %      MAP       --            ALLERGIES    Review of patient's allergies indicates:  No Known Allergies    PROVIDER TRIAGE NOTE  This is a teletriage evaluation of a 39 y.o. female presenting to the ED with c/o upper abdominal pain that started tonight. Initial orders will be placed and care will be transferred to an alternate provider when patient is roomed for a full evaluation. Any additional orders and the final disposition will be determined by that provider.         ORDERS  Labs Reviewed   CBC W/ AUTO DIFFERENTIAL   COMPREHENSIVE METABOLIC PANEL   LIPASE   URINALYSIS, REFLEX TO URINE CULTURE   POCT URINE PREGNANCY       ED Orders (720h ago, onward)    Start Ordered     Status Ordering Provider    09/24/20 0344 09/24/20 0343  Vital signs  Every 2 hours      Ordered CANDACE CHERY    09/24/20 0344 09/24/20 0343  Diet NPO  Diet effective now      Ordered CANDACE CHERY    09/24/20 0344 09/24/20 0343  Insert peripheral IV  Once      Ordered CANDACE CHERY    09/24/20 0344 09/24/20 0343  CBC W/ AUTO DIFFERENTIAL  STAT  Collect    Ordered CANDACE CHERY    09/24/20 0344 09/24/20 0343  Comp. Metabolic Panel  STAT  Collect    Ordered CANDACE CHERY    09/24/20 0344 09/24/20 0343  Lipase  STAT  Collect    Ordered CANDACE CHERY    09/24/20 0344 09/24/20 0343  Urinalysis, Reflex to Urine Culture Urine, Clean Catch  STAT      Ordered CANDACE CHERY    09/24/20 0344 09/24/20 0343  POCT urine pregnancy  Once      Ordered CANDACE CHERY             Virtual Visit Note: The provider triage portion of this emergency department evaluation and documentation was performed via [employer], a HIPAA-compliant telemedicine application, in concert with a tele-presenter in the room. A face to face patient evaluation with one of my colleagues will occur once the patient is placed in an emergency department room.      DISCLAIMER: This note was prepared with [employer] voice recognition transcription software. Garbled syntax, mangled pronouns, and other bizarre constructions may be attributed to that software system.

## 2020-09-24 NOTE — PLAN OF CARE
S/p ex lap. Cristel 8/10. Patient easily arouses to voice and her vitals are WNL. Incision site and JEAN CLAUDE drain dressings are dry and intact. Pain is controlled and she denies nausea at this time. Recovery care is complete.

## 2020-09-24 NOTE — ED NOTES
Pt states dilaudid helped a little but is still hurting. VSS, family at bs. No other needs at this time.

## 2020-09-24 NOTE — NURSING
Pt arrive to the unit by stretcher accompanied by transport.   Assisted pt to bed. Admit assessment initiated.  Pt is AAOx4.  NO distress noted.  Pt stating 10/10 abd pain.

## 2020-09-24 NOTE — ED PROVIDER NOTES
"Encounter Date: 2020       History     Chief Complaint   Patient presents with    Abdominal Pain     Patient reports that she was sleeping when having a sudden onset of abd pain, that she thought was gas, that radiated all the way into her shoulders.  S/P vaginal delivery x 6 weeks.  Denies chest pain, sob, n/v, constipation, or diarrhea.     39-year-old female, recently postpartum from spontaneous vaginal delivery (20), presents via  with acute severe constant upper abdominal pain radiating to left shoulder associated with nausea and belching.  Patient reports having intermittent vomiting as well as "heartburn" throughout her pregnancy.  Earlier tonight, around 8 or 9:00 p.m., she ate shrimp and okra.  Patient then developed nausea and vomiting as well as heartburn around 10:30 p.m..  She vomited up her supper.  Subsequently she went to sleep but this pain woke her around an hour ago.  Patient states the heartburn is a substernal burning is different than the pain she is currently experiencing, though she also is experiencing some heartburn as well.  No meds prior to arrival.    Patient had repair of septate uterus. No other abdominal surgeries.     PMH: umbilical hernia        Review of patient's allergies indicates:  No Known Allergies  Past Medical History:   Diagnosis Date    Pregnancy 2020    delivered on 2020    Umbilical hernia      History reviewed. No pertinent surgical history.  Family History   Problem Relation Age of Onset    Colon cancer Neg Hx     Esophageal cancer Neg Hx      Social History     Tobacco Use    Smoking status: Former Smoker     Types: Cigarettes     Quit date: 2019     Years since quittin.8   Substance Use Topics    Alcohol use: Yes    Drug use: Not Currently     Review of Systems   Constitutional: Negative for fever.   HENT: Negative for sore throat.    Eyes: Negative for photophobia.   Respiratory: Positive for shortness of breath.  "   Cardiovascular: Positive for chest pain (lower).   Gastrointestinal: Positive for abdominal pain, nausea and vomiting.   Genitourinary: Negative for dysuria.   Musculoskeletal: Positive for back pain (left upper). Negative for neck stiffness.   Skin: Negative for rash.   Neurological: Negative for syncope.       Physical Exam     Initial Vitals [09/24/20 0340]   BP Pulse Resp Temp SpO2   130/79 86 20 98.5 °F (36.9 °C) 98 %      MAP       --         Physical Exam    Nursing note and vitals reviewed.  Constitutional: She appears well-developed and well-nourished. She is not diaphoretic.   Awake, alert, nontoxic, speaking in complete sentences.   HENT:   Head: Normocephalic and atraumatic.   Eyes: Conjunctivae and EOM are normal. Pupils are equal, round, and reactive to light.   Neck: Normal range of motion. Neck supple.   Cardiovascular: Normal rate, regular rhythm, normal heart sounds and intact distal pulses.   No murmur heard.  Pulmonary/Chest: Breath sounds normal. No respiratory distress. She has no wheezes. She has no rhonchi. She has no rales.   Abdominal: Soft. There is abdominal tenderness. There is no rebound and no guarding.   Diffuse tenderness, exam limited by patient discomfort. Epigastrum most TTP.   Musculoskeletal: Normal range of motion. No tenderness or edema.   Neurological: She is alert and oriented to person, place, and time. She has normal strength.   Moving all extremities.   Skin: Skin is warm and dry. No erythema. No pallor.   Psychiatric: She has a normal mood and affect.         ED Course   Procedures  Labs Reviewed   CBC W/ AUTO DIFFERENTIAL - Abnormal; Notable for the following components:       Result Value    Mean Corpuscular Hemoglobin Conc 31.1 (*)     Mono # 0.2 (*)     All other components within normal limits   COMPREHENSIVE METABOLIC PANEL - Abnormal; Notable for the following components:    Potassium 3.0 (*)     Glucose 158 (*)     ALT 7 (*)     All other components within  normal limits   LIPASE - Abnormal; Notable for the following components:    Lipase 118 (*)     All other components within normal limits   URINALYSIS, REFLEX TO URINE CULTURE - Abnormal; Notable for the following components:    Protein, UA 1+ (*)     Ketones, UA 1+ (*)     All other components within normal limits    Narrative:     Specimen Source->Urine   D DIMER, QUANTITATIVE - Abnormal; Notable for the following components:    D-Dimer 0.55 (*)     All other components within normal limits   URINALYSIS MICROSCOPIC - Abnormal; Notable for the following components:    Bacteria Few (*)     All other components within normal limits    Narrative:     Specimen Source->Urine   HCG, QUANTITATIVE, PREGNANCY   TROPONIN I   POCT URINE PREGNANCY   SARS-COV-2 RDRP GENE     EKG Readings: (Independently Interpreted)   04:21: NSR, HR 85. Normal axis. No ectopy. No STEMI.        Imaging Results           CTA Chest Non-Coronary - PE Study (Final result)  Result time 09/24/20 08:34:03    Final result by Dennis Guerra DO (09/24/20 08:34:03)                 Impression:      1. Free intraperitoneal air with gastric wall thickening, predominantly in the region of the gastric antrum.  Overall findings are concerning for perforated gastric ulcer.  2. CTA chest: Examination is limited by poor contrast bolus timing and streak artifact.  No large central or lobar pulmonary embolism.  3. Multiple too small to characterize hepatic hypodensities, likely simple cysts.  4. Indeterminate hypodensity within the left kidney superior pole measuring 1.3 cm.  Consider further evaluation with nonemergent renal ultrasound.  This report was flagged in Epic as abnormal.    Dr. Guerra discussed critical findings with Dr. Majano by telephone at 08:31 on 09/24/2020.      Electronically signed by: Dennis Guerra  Date:    09/24/2020  Time:    08:34             Narrative:    EXAMINATION:  CT ABDOMEN PELVIS WITH CONTRAST; CTA CHEST NON CORONARY    CLINICAL  HISTORY:  Abdominal pain, acute, nonlocalized;; Chest pain, PE suspected, low/intermediate prob, positive D-dimer.    TECHNIQUE:  Axial CT images with sagittal and coronal reformats were obtained of the chest after the administration of 100 mL Omnipaque 350, with contrast timed for evaluation of the pulmonary arteries.  Additionally, postcontrast axial CT images with sagittal and coronal reformats were obtained of the abdomen and pelvis from the hemidiaphragms through the symphysis pubis, with contrast in the portal venous phase.    COMPARISON:  None available.    FINDINGS:  CTA chest:    Pulmonary vasculature: Examination is limited due to contrast bolus timing and streak artifact.  There is no large central or lobar pulmonary embolism.  The segmental to subsegmental pulmonary arteries are not well evaluated.    Aorta: Left-sided aortic arch.  No aneurysm and no significant atherosclerosis.    Base of Neck: No significant abnormality.    Thoracic soft tissues: Normal.    Heart: Normal size. No effusion.    Heather/Mediastinum: No pathologic gerry enlargement.    Airways: The large airways are patent. No foci of endobronchial filling.    Lungs/Pleura: There is mild subsegmental dependent atelectasis of the bilateral lower lobes.  No pleural effusion or thickening.    Esophagus: Normal.    Abdomen:    Liver: There are multiple too small to characterize hypodensities within the right and left lobes of the liver, likely simple cysts.  The portal vasculature is patent.    Biliary tract: No intrahepatic or extrahepatic biliary ductal dilatation.    Gallbladder: No radiodense gallstone. No wall thickening.  There is mild pericholecystic fluid, likely related to ascites.    Pancreas: Normal. No pancreatic ductal dilatation.    Spleen: Normal.    Adrenals: Normal.    Kidneys and urinary collecting systems: There is an indeterminate hypodensity within the left kidney superior pole measuring 1.3 cm (series 3, image 41),  statistically likely to be a hemorrhagic or proteinaceous cyst.  Too small to characterize hypodensity within the right kidney midpole.  No hydronephrosis or urolithiasis.    Lymph nodes: None enlarged.    Stomach and bowel: There is thickening of the stomach, most significant within the region of the gastric antrum and pylorus.  There is a probable perforated gastric ulcer (sagittal series 606, image 91).  Loops of small and large bowel are normal in caliber without evidence for inflammation or obstruction.    Peritoneum and mesentery: There is free intraperitoneal air, predominantly within the epigastric region.  There are several loculated foci of air within a umbilical hernia.  There is mild abdominal and pelvic ascites.    Vasculature: Normal.    Pelvis:    Urinary bladder: Normal.    Reproductive organs: The uterus is unremarkable.  There is no adnexal mass.    Body wall: There is a fat and air containing umbilical hernia.    Musculoskeletal: No aggressive osseous lesion.                                CT Abdomen Pelvis With Contrast (Final result)  Result time 09/24/20 08:34:03    Final result by Dennis Guerra DO (09/24/20 08:34:03)                 Impression:      1. Free intraperitoneal air with gastric wall thickening, predominantly in the region of the gastric antrum.  Overall findings are concerning for perforated gastric ulcer.  2. CTA chest: Examination is limited by poor contrast bolus timing and streak artifact.  No large central or lobar pulmonary embolism.  3. Multiple too small to characterize hepatic hypodensities, likely simple cysts.  4. Indeterminate hypodensity within the left kidney superior pole measuring 1.3 cm.  Consider further evaluation with nonemergent renal ultrasound.  This report was flagged in Epic as abnormal.    Dr. Guerra discussed critical findings with Dr. Majano by telephone at 08:31 on 09/24/2020.      Electronically signed by: Dennis  Mari  Date:    09/24/2020  Time:    08:34             Narrative:    EXAMINATION:  CT ABDOMEN PELVIS WITH CONTRAST; CTA CHEST NON CORONARY    CLINICAL HISTORY:  Abdominal pain, acute, nonlocalized;; Chest pain, PE suspected, low/intermediate prob, positive D-dimer.    TECHNIQUE:  Axial CT images with sagittal and coronal reformats were obtained of the chest after the administration of 100 mL Omnipaque 350, with contrast timed for evaluation of the pulmonary arteries.  Additionally, postcontrast axial CT images with sagittal and coronal reformats were obtained of the abdomen and pelvis from the hemidiaphragms through the symphysis pubis, with contrast in the portal venous phase.    COMPARISON:  None available.    FINDINGS:  CTA chest:    Pulmonary vasculature: Examination is limited due to contrast bolus timing and streak artifact.  There is no large central or lobar pulmonary embolism.  The segmental to subsegmental pulmonary arteries are not well evaluated.    Aorta: Left-sided aortic arch.  No aneurysm and no significant atherosclerosis.    Base of Neck: No significant abnormality.    Thoracic soft tissues: Normal.    Heart: Normal size. No effusion.    Heather/Mediastinum: No pathologic gerry enlargement.    Airways: The large airways are patent. No foci of endobronchial filling.    Lungs/Pleura: There is mild subsegmental dependent atelectasis of the bilateral lower lobes.  No pleural effusion or thickening.    Esophagus: Normal.    Abdomen:    Liver: There are multiple too small to characterize hypodensities within the right and left lobes of the liver, likely simple cysts.  The portal vasculature is patent.    Biliary tract: No intrahepatic or extrahepatic biliary ductal dilatation.    Gallbladder: No radiodense gallstone. No wall thickening.  There is mild pericholecystic fluid, likely related to ascites.    Pancreas: Normal. No pancreatic ductal dilatation.    Spleen: Normal.    Adrenals: Normal.    Kidneys  and urinary collecting systems: There is an indeterminate hypodensity within the left kidney superior pole measuring 1.3 cm (series 3, image 41), statistically likely to be a hemorrhagic or proteinaceous cyst.  Too small to characterize hypodensity within the right kidney midpole.  No hydronephrosis or urolithiasis.    Lymph nodes: None enlarged.    Stomach and bowel: There is thickening of the stomach, most significant within the region of the gastric antrum and pylorus.  There is a probable perforated gastric ulcer (sagittal series 606, image 91).  Loops of small and large bowel are normal in caliber without evidence for inflammation or obstruction.    Peritoneum and mesentery: There is free intraperitoneal air, predominantly within the epigastric region.  There are several loculated foci of air within a umbilical hernia.  There is mild abdominal and pelvic ascites.    Vasculature: Normal.    Pelvis:    Urinary bladder: Normal.    Reproductive organs: The uterus is unremarkable.  There is no adnexal mass.    Body wall: There is a fat and air containing umbilical hernia.    Musculoskeletal: No aggressive osseous lesion.                               US Abdomen Limited (Final result)  Result time 09/24/20 06:38:02    Final result by Dennis Guerra DO (09/24/20 06:38:02)                 Impression:      1. Gallbladder sludge.  No cholelithiasis or evidence of acute cholecystitis.  2. Mild abdominal ascites.      Electronically signed by: Dennis Guerra  Date:    09/24/2020  Time:    06:38             Narrative:    EXAMINATION:  US ABDOMEN LIMITED    CLINICAL HISTORY:  RUQ pain.    TECHNIQUE:  Limited ultrasound of the right upper quadrant of the abdomen (including pancreas, liver, gallbladder, common bile duct, and spleen) was performed.    COMPARISON:  None.    FINDINGS:  Liver: Normal in size, measuring 14.5 cm. The liver demonstrates homogeneous echotexture.  No focal hepatic lesions.    Gallbladder: There is  "gallbladder sludge.  No calculi, wall thickening, or pericholecystic fluid.  No sonographic Bledsoe's sign.    Biliary system: The common duct is upper limits of normal, measuring 6 mm.  No intrahepatic ductal dilatation.    Spleen: Normal in size and echotexture, measuring 8.4 x 3.4 cm.    Miscellaneous: There is mild abdominal ascites.                                 Medical Decision Making:   History:   Old Medical Records: I decided to obtain old medical records.  Old Records Summarized: records from clinic visits.  Initial Assessment:   39 y.o. female with upper abdominal pain radiating to L shoulder associated with nausea, vomiting, shortness of breath.   Differential Diagnosis:   Ddx includes GERD, PUD, biliary pathology (more likely postpartum), ACS, postpartum cardiomyopathy, other.  Independently Interpreted Test(s):   I have ordered and independently interpreted X-rays - see prior notes.  I have ordered and independently interpreted EKG Reading(s) - see prior notes  Clinical Tests:   Lab Tests: Ordered and Reviewed  Radiological Study: Ordered and Reviewed  Medical Tests: Ordered and Reviewed  ED Management:  EKG no STEMI.    CBC, CMP reassuring. Lipase 118, mildly elevated. HCG < 1.2. Troponin normal. Ddimer 0.55.    COVID-19 negative.    Patient had NS 1L bolus, morphine 6mg IV, zofran 4mg IV, then dilaudid 1mg IV for pain.     Ultrasound:  "1. Gallbladder sludge.  No cholelithiasis or evidence of acute cholecystitis.   2. Mild abdominal ascites."    Patient is pending UA and reevaluation. I have signed out her care to day shift Dr. Nadia Majano who will reassess and f/u UA.     Barb Ledesma  6:50 AM    Other:   I have discussed this case with another health care provider.              Dr. Majano Addendum:  Patient care taken over from Dr. Ledesma pending urinalysis and reassessment.  Patient presents with complaint of generalized abdominal pain beginning after eating shrimp and O group.  Vital " signs within acceptable ranges.  Patient is not toxic appearing.  She has generalized abdominal tenderness not have apparent rebound or guarding.  Labs reviewed and do not have leukocytosis or granulocytosis.  Troponin is negative.  D-dimer is mildly positive.  Patient with persistent pain and tenderness after analgesia.  Patient sent for imaging.  CTA does not demonstrate PE.  CT of the abdomen pelvis shows free intraperitoneal air with thickening of the gastric antrum suggesting perforated gastric ulcer.  Patient started on Zosyn in the emergency department.  She is to be admitted to General surgery for surgical intervention and further management perforated gastric ulcer  This chart was completed using dictation software, as a result there may be some transcription errors.                Clinical Impression:     ICD-10-CM ICD-9-CM   1. Abdominal pain, unspecified abdominal location  R10.9 789.00   2. Nausea and vomiting, intractability of vomiting not specified, unspecified vomiting type  R11.2 787.01   3. Abdominal pain  R10.9 789.00   4. Perforated abdominal viscus  R19.8 799.89                      Disposition:   Disposition: Admitted  Condition: Serious     ED Disposition Condition    Admit                             Barb Ledesma MD  09/24/20 0650       Nadia Majano MD  09/24/20 9470

## 2020-09-24 NOTE — NURSING
Patient returned to unit from scheduled procedure. Patient awake, alert, oriented. No apparent distress noted.  Incision to abd clean dry, and intact.  JEAN CLAUDE drain dressing clean, dry, and intact.  Will continue to monitor.

## 2020-09-24 NOTE — H&P
[facility]  History & Physical   Surgery    Subjective:     Chief Complaint/Reason for Admission: abdominal pain     History of Present Illness:  Patient is a 39 y.o. female presents with abdominal pain. Patient reports that the pain started yesterday evening and is diffuse. Pain continued to worsen requiring patient to present to the ED. She reports a long history of gastritis and a gastric ulcer. She had 2 EGDs performed previously in [place] and has one scheduled in October w/ her new GI doctor. Her first EGD revealed a gastric ulcer and she was started on carafate and protonix. Still takes protonix 40mg daily. Her last EGD revealed a healed ulcer. She reports symptoms of nausea and reflux w/ emesis after some meals. Last BM was yesterday after using a suppository. Still passing gas. Denies fever, chills, CP, SOB. Of note patient is 6 weeks post-partum from a vaginal delivery.       Patient Active Problem List    Diagnosis Date Noted    Abdominal pain 2020     Past Medical History:   Diagnosis Date    Pregnancy 2020    delivered on 2020    Umbilical hernia       History reviewed. No pertinent surgical history.   (Not in a hospital admission)    Review of patient's allergies indicates:  No Known Allergies   Social History     Tobacco Use    Smoking status: Former Smoker     Types: Cigarettes     Quit date: 2019     Years since quittin.8   Substance Use Topics    Alcohol use: Yes      Family History   Problem Relation Age of Onset    Colon cancer Neg Hx     Esophageal cancer Neg Hx         Review of Systems  Review of Systems   Constitutional: Negative for chills and fever.   HENT: Negative for hearing loss and sore throat.    Eyes: Negative for blurred vision and double vision.   Respiratory: Negative for cough and shortness of breath.    Cardiovascular: Negative for chest pain and palpitations.   Gastrointestinal: Positive for abdominal pain, constipation and  nausea. Negative for vomiting.   Genitourinary: Negative for dysuria and hematuria.   Musculoskeletal: Negative for myalgias and neck pain.   Skin: Negative for itching and rash.   Neurological: Negative for dizziness and headaches.   Endo/Heme/Allergies: Negative for environmental allergies. Does not bruise/bleed easily.         OBJECTIVE:     Vital signs in last 24 hours:  Temp:  [98.5 °F (36.9 °C)] 98.5 °F (36.9 °C)  Pulse:  [] 101  Resp:  [17-25] 18  SpO2:  [96 %-99 %] 96 %  BP: (119-152)/(75-93) 144/89    Physical Exam   Constitutional: She is oriented to person, place, and time and well-developed, well-nourished, and in no distress.   HENT:   Head: Normocephalic and atraumatic.   Eyes: Pupils are equal, round, and reactive to light. Conjunctivae are normal.   Neck: Normal range of motion. Neck supple.   Cardiovascular: Normal rate and regular rhythm.   Pulmonary/Chest: Effort normal. No respiratory distress.   Abdominal: Soft. She exhibits no distension. There is abdominal tenderness. There is rebound and guarding.   Musculoskeletal: Normal range of motion.   Neurological: She is alert and oriented to person, place, and time.   Skin: Skin is warm and dry.         Data Review:  CBC:   Recent Labs   Lab 09/24/20 0432   WBC 4.30   RBC 4.26   HGB 12.3   HCT 39.5      MCV 93   MCH 28.9   MCHC 31.1*     BMP:   Recent Labs   Lab 09/24/20  0432   *      K 3.0*      CO2 26   BUN 6   CREATININE 0.8   CALCIUM 9.1     CMP:   Recent Labs   Lab 09/24/20  0432   *   CALCIUM 9.1   ALBUMIN 3.7   PROT 6.9      K 3.0*   CO2 26      BUN 6   CREATININE 0.8   ALKPHOS 81   ALT 7*   AST 15   BILITOT 0.7     Coagulation: No results for input(s): LABPROT, INR, APTT in the last 168 hours.    ASSESSMENT/PLAN:     40 yo F presents w/ acute onset abdominal pain. She is HDS, no leukocytosis. CT scan revealing free air, gastric thickening, likely perforated gastric ulcer.     - Admit to  general surgery   - NPO, IVFs  - Zosyn   - Will take patient to the OR for an ex lap and repair of gastric perforation. Risks and benefits discussed w/ patient and her  and all questions answered   - Pain meds prn     Puja Coyne MD PGY5  General Surgery

## 2020-09-24 NOTE — OP NOTE
[facility]  Operative Note    SUMMARY     Surgery Date: 9/24/2020     Surgeon(s) and Role:     * Willie Magallon MD - Primary     * Puja Coyne MD - Resident - Assisting        Pre-op Diagnosis:  Perforated abdominal viscus [R19.8]    Post-op Diagnosis:  Post-Op Diagnosis Codes:     * Perforated abdominal viscus [R19.8]    Procedure(s) (LRB):  LAPAROTOMY, EXPLORATORY (N/A)   Linden Patch    Anesthesia: General    Indication for procedure: Puja Quigley is 39 y.o. female with perforated viscus. After discussion of disease process, we discussed options and have elected for operation to explore and repair perforation.    Description of Procedure: After consent was obtained, patient was taken to the OR. [place] was placed. The abdomen was prepped and draped in a standard sterile fashion after general anesthesia was started. Time out was performed. Antibiotics were started with zosyn. We began the procedure by making a midline incision, including an umbilical hernia. We found large amount of fluid and air in the peritoneum. We entered the fascia and peritoneum and washed out a large amount of gastric fluid which leaked from an anterior antral ulcer, this fluid had some turbulence and was foul smelling. We attempted to place an NGT but unable to do so despite the glidescope being used (be anesthesia). We placed a large piece of omentum over the ulcer which was approximately 1 cm and secured it with 3 separate 3-0 silk sutures in a Linden patch fashion. We placed a JEAN CLAUDE drain in the area through the RUQ through the skin. We then closed the fascia with #1 PDS and repaired the umbilical hernia with this closure. We closed the skin with 3-0 vicryl and 4-0 monocryl suture. This was covered with sterile dressing.  All counts were correct x2. Patient was awakened from anesthesia and taken to the recovery room in a stable condition having suffered no issues at this time.    Description of the findings of  the procedure: gastric perforation    Estimated Blood Loss: * No values recorded between 9/24/2020 12:48 PM and 9/24/2020  2:19 PM *         Specimens:   Specimen (12h ago, onward)    None          Drains/Implants: 10 Flat kusum drain

## 2020-09-24 NOTE — NURSING
Patient to scheduled procedure as ordered.  Patient awake, alert, oriented. No apparent distress noted.

## 2020-09-24 NOTE — TELEPHONE ENCOUNTER
Consult given to       ----- Message from Risa Bowman sent at 9/24/2020 12:17 PM CDT -----  Regarding: Brittany/ / [phone]  Consult    MRN:[id number]    Room#:  317    Reason:  Perforated Viscous     Doctor:  Dr. Majano    Name:  Rhonda

## 2020-09-24 NOTE — TRANSFER OF CARE
"Anesthesia Transfer of Care Note    Patient: Puja Quigley    Procedure(s) Performed: Procedure(s) (LRB):  LAPAROTOMY, EXPLORATORY (N/A)    Patient location: PACU    Anesthesia Type: general    Transport from OR: Transported from OR on room air with adequate spontaneous ventilation    Post pain: adequate analgesia    Post assessment: no apparent anesthetic complications and tolerated procedure well    Post vital signs: stable    Level of consciousness: awake    Nausea/Vomiting: no nausea/vomiting    Complications: none    Transfer of care protocol was followed      Last vitals:   Visit Vitals  /84 (BP Location: Left arm)   Pulse 93   Temp 36.8 °C (98.2 °F) (Oral)   Resp 20   Ht 5' 7" (1.702 m)   Wt 79.4 kg (175 lb)   SpO2 98%   Breastfeeding Yes   BMI 27.41 kg/m²     "

## 2020-09-25 LAB
ANION GAP SERPL CALC-SCNC: 6 MMOL/L (ref 8–16)
BASOPHILS # BLD AUTO: ABNORMAL K/UL (ref 0–0.2)
BASOPHILS NFR BLD: 0 % (ref 0–1.9)
BUN SERPL-MCNC: 9 MG/DL (ref 6–20)
CALCIUM SERPL-MCNC: 8.7 MG/DL (ref 8.7–10.5)
CHLORIDE SERPL-SCNC: 104 MMOL/L (ref 95–110)
CO2 SERPL-SCNC: 32 MMOL/L (ref 23–29)
CREAT SERPL-MCNC: 0.9 MG/DL (ref 0.5–1.4)
DIFFERENTIAL METHOD: ABNORMAL
EOSINOPHIL # BLD AUTO: ABNORMAL K/UL (ref 0–0.5)
EOSINOPHIL NFR BLD: 0 % (ref 0–8)
ERYTHROCYTE [DISTWIDTH] IN BLOOD BY AUTOMATED COUNT: 13 % (ref 11.5–14.5)
EST. GFR  (AFRICAN AMERICAN): >60 ML/MIN/1.73 M^2
EST. GFR  (NON AFRICAN AMERICAN): >60 ML/MIN/1.73 M^2
GLUCOSE SERPL-MCNC: 101 MG/DL (ref 70–110)
HCT VFR BLD AUTO: 35.6 % (ref 37–48.5)
HGB BLD-MCNC: 11.4 G/DL (ref 12–16)
IMM GRANULOCYTES # BLD AUTO: ABNORMAL K/UL (ref 0–0.04)
IMM GRANULOCYTES NFR BLD AUTO: ABNORMAL % (ref 0–0.5)
LYMPHOCYTES # BLD AUTO: ABNORMAL K/UL (ref 1–4.8)
LYMPHOCYTES NFR BLD: 7 % (ref 18–48)
MCH RBC QN AUTO: 28.9 PG (ref 27–31)
MCHC RBC AUTO-ENTMCNC: 32 G/DL (ref 32–36)
MCV RBC AUTO: 90 FL (ref 82–98)
MONOCYTES # BLD AUTO: ABNORMAL K/UL (ref 0.3–1)
MONOCYTES NFR BLD: 8 % (ref 4–15)
NEUTROPHILS NFR BLD: 59 % (ref 38–73)
NEUTS BAND NFR BLD MANUAL: 26 %
NRBC BLD-RTO: 0 /100 WBC
PLATELET # BLD AUTO: 197 K/UL (ref 150–350)
PMV BLD AUTO: 9.8 FL (ref 9.2–12.9)
POCT GLUCOSE: 85 MG/DL (ref 70–110)
POTASSIUM SERPL-SCNC: 4.5 MMOL/L (ref 3.5–5.1)
RBC # BLD AUTO: 3.95 M/UL (ref 4–5.4)
SODIUM SERPL-SCNC: 142 MMOL/L (ref 136–145)
WBC # BLD AUTO: 10.96 K/UL (ref 3.9–12.7)

## 2020-09-25 PROCEDURE — C9113 INJ PANTOPRAZOLE SODIUM, VIA: HCPCS | Performed by: STUDENT IN AN ORGANIZED HEALTH CARE EDUCATION/TRAINING PROGRAM

## 2020-09-25 PROCEDURE — 36415 COLL VENOUS BLD VENIPUNCTURE: CPT

## 2020-09-25 PROCEDURE — 80048 BASIC METABOLIC PNL TOTAL CA: CPT

## 2020-09-25 PROCEDURE — 63600175 PHARM REV CODE 636 W HCPCS: Performed by: STUDENT IN AN ORGANIZED HEALTH CARE EDUCATION/TRAINING PROGRAM

## 2020-09-25 PROCEDURE — 85027 COMPLETE CBC AUTOMATED: CPT

## 2020-09-25 PROCEDURE — 25000003 PHARM REV CODE 250: Performed by: STUDENT IN AN ORGANIZED HEALTH CARE EDUCATION/TRAINING PROGRAM

## 2020-09-25 PROCEDURE — 85007 BL SMEAR W/DIFF WBC COUNT: CPT

## 2020-09-25 PROCEDURE — 21400001 HC TELEMETRY ROOM

## 2020-09-25 PROCEDURE — 63600175 PHARM REV CODE 636 W HCPCS: Performed by: SURGERY

## 2020-09-25 RX ORDER — ENOXAPARIN SODIUM 100 MG/ML
40 INJECTION SUBCUTANEOUS EVERY 24 HOURS
Status: DISCONTINUED | OUTPATIENT
Start: 2020-09-25 | End: 2020-09-28 | Stop reason: HOSPADM

## 2020-09-25 RX ADMIN — PANTOPRAZOLE SODIUM 40 MG: 40 INJECTION, POWDER, FOR SOLUTION INTRAVENOUS at 09:09

## 2020-09-25 RX ADMIN — ONDANSETRON 4 MG: 2 INJECTION INTRAMUSCULAR; INTRAVENOUS at 06:09

## 2020-09-25 RX ADMIN — HYDROMORPHONE HYDROCHLORIDE 1 MG: 2 INJECTION INTRAMUSCULAR; INTRAVENOUS; SUBCUTANEOUS at 07:09

## 2020-09-25 RX ADMIN — PIPERACILLIN AND TAZOBACTAM 4.5 G: 4; .5 INJECTION, POWDER, LYOPHILIZED, FOR SOLUTION INTRAVENOUS; PARENTERAL at 09:09

## 2020-09-25 RX ADMIN — ONDANSETRON 4 MG: 2 INJECTION INTRAMUSCULAR; INTRAVENOUS at 02:09

## 2020-09-25 RX ADMIN — ONDANSETRON 4 MG: 2 INJECTION INTRAMUSCULAR; INTRAVENOUS at 11:09

## 2020-09-25 RX ADMIN — FLUCONAZOLE 100 MG: 2 INJECTION INTRAVENOUS at 09:09

## 2020-09-25 RX ADMIN — ONDANSETRON 4 MG: 2 INJECTION INTRAMUSCULAR; INTRAVENOUS at 09:09

## 2020-09-25 RX ADMIN — ENOXAPARIN SODIUM 40 MG: 40 INJECTION SUBCUTANEOUS at 06:09

## 2020-09-25 RX ADMIN — ONDANSETRON 4 MG: 2 INJECTION INTRAMUSCULAR; INTRAVENOUS at 01:09

## 2020-09-25 RX ADMIN — HYDROMORPHONE HYDROCHLORIDE 1 MG: 2 INJECTION INTRAMUSCULAR; INTRAVENOUS; SUBCUTANEOUS at 02:09

## 2020-09-25 RX ADMIN — PIPERACILLIN AND TAZOBACTAM 4.5 G: 4; .5 INJECTION, POWDER, LYOPHILIZED, FOR SOLUTION INTRAVENOUS; PARENTERAL at 06:09

## 2020-09-25 RX ADMIN — PIPERACILLIN AND TAZOBACTAM 4.5 G: 4; .5 INJECTION, POWDER, LYOPHILIZED, FOR SOLUTION INTRAVENOUS; PARENTERAL at 01:09

## 2020-09-25 RX ADMIN — HYDROMORPHONE HYDROCHLORIDE 1 MG: 2 INJECTION INTRAMUSCULAR; INTRAVENOUS; SUBCUTANEOUS at 09:09

## 2020-09-25 NOTE — PLAN OF CARE
Problem: Skin Injury Risk Increased  Goal: Skin Health and Integrity  Intervention: Optimize Skin Protection  Flowsheets (Taken 9/25/2020 5346)  Pressure Reduction Techniques: frequent weight shift encouraged  Pressure Reduction Devices: heel offloading device utilized

## 2020-09-25 NOTE — PLAN OF CARE
TN spoke with patient concerning discharge planning assessment. Patient lives with spouse Jesus. Patient states that her spouse Jesus [phone] helps her to manage her care. Pt's states that she does not have any equipment at home. Patient denies taking coumadin or receiving dialysis.        09/25/20 1534   Discharge Assessment   Assessment Type Discharge Planning Assessment   Confirmed/corrected address and phone number on facesheet? Yes   Assessment information obtained from? Patient   Expected Length of Stay (days) 2   Communicated expected length of stay with patient/caregiver yes   Prior to hospitilization cognitive status: Alert/Oriented   Prior to hospitalization functional status: Independent   Current cognitive status: Alert/Oriented   Current Functional Status: Independent   Lives With spouse   Able to Return to Prior Arrangements yes   Is patient able to care for self after discharge? Yes   Patient's perception of discharge disposition home or selfcare   Readmission Within the Last 30 Days no previous admission in last 30 days   Patient currently being followed by outpatient case management? No   Patient currently receives any other outside agency services? No   Equipment Currently Used at Home none   Do you have any problems affording any of your prescribed medications? No   Is the patient taking medications as prescribed? yes   Does the patient have transportation home? Yes   Transportation Anticipated family or friend will provide   Does the patient receive services at the [facility]? No   Discharge Plan A Home   DME Needed Upon Discharge  none   Patient/Family in Agreement with Plan yes

## 2020-09-25 NOTE — PROGRESS NOTES
"Surgery Progress Note    Puja Quigley is a 39 y.o. year old female in hospital for perforated gastric ulcer, POD#1 Linden Patch  Feels much better. No flatus. No n/v. JEAN CLAUDE drain with minimal serous output. Good uop via Kimbrough. She is eager to ambulate.  No f/c/n/v/sob/cp    ROS:  Negative except above    PE:  Vitals:    09/24/20 2303 09/25/20 0008 09/25/20 0512 09/25/20 0717   BP:  115/72 114/79 120/77   BP Location:  Left arm Left arm    Patient Position:  Lying Lying    Pulse:  68 81 79   Resp: 18 17 17 17   Temp:  98.4 °F (36.9 °C) 98.9 °F (37.2 °C) 98.6 °F (37 °C)   TempSrc:  Oral Axillary    SpO2:  96% 99% 99%   Weight:    82.9 kg (182 lb 12.2 oz)   Height:    5' 7" (1.702 m)     NAD  Ambulates well  No belabored breathing  No skin changes  Abd soft nd. Appropriately tender at midline incision  Incisions c/d/I    A/P:  Puja Quigley is a 39 y.o. year old female  with perforated gastric ulcer s/p laparotomy and Linden patch    -npo  -ivf  -contrast study tomorrow prior to advancing diet  -prn pain meds  -ambulate  -remove kimbrough  -daily labs  -lovenox    Willie Magallon  General Surgery - [facility]  9/25/2020  9:01 AM        "

## 2020-09-25 NOTE — ANESTHESIA POSTPROCEDURE EVALUATION
Anesthesia Post Evaluation    Patient: Puja Quigley    Procedure(s) Performed: Procedure(s) (LRB):  LAPAROTOMY, EXPLORATORY (N/A)    Final Anesthesia Type: general    Patient location during evaluation: PACU  Patient participation: Yes- Able to Participate  Level of consciousness: awake and alert and oriented  Post-procedure vital signs: reviewed and stable  Pain management: adequate  Airway patency: patent    PONV status at discharge: No PONV  Anesthetic complications: no      Cardiovascular status: blood pressure returned to baseline, hemodynamically stable and stable  Respiratory status: unassisted, spontaneous ventilation and room air  Hydration status: euvolemic  Follow-up not needed.          Vitals Value Taken Time   /89 09/25/20 1101   Temp 36.9 °C (98.5 °F) 09/25/20 1101   Pulse 91 09/25/20 1101   Resp 18 09/25/20 1101   SpO2 100 % 09/25/20 1101         Event Time   Out of Recovery 16:01:00         Pain/Cristel Score: Pain Rating Prior to Med Admin: 8 (9/25/2020  9:33 AM)  Pain Rating Post Med Admin: 0 (9/25/2020 12:00 AM)  Cristel Score: 8 (9/24/2020  4:00 PM)

## 2020-09-25 NOTE — PROGRESS NOTES
Received report from Brittany VILLARREAL RN. Patient AAOx4, resting quietly in bed, telemetry monitoring in place, no distress noted. Safety maintained, call light in reach.

## 2020-09-25 NOTE — LACTATION NOTE
Pt seen this Am -finishing pumping with double electric breast pump -discussed concerns that milk supply seems decreased and plan increased pumping today now that she is feeling better -at least 8 time sin 24 hours -milk to refrigerator now -has phone number to call as needed

## 2020-09-25 NOTE — NURSING
Report received from off going nurse, DEANA Dozier. Patient AAO. No signs of distress noted. Call light in reach. Bed low and locked. Will continue to monitor.

## 2020-09-25 NOTE — PROGRESS NOTES
"To patient's room to discuss patient managing her care at home.      TN Role Explained.  Patient identified by using 2 identifiers:  Name and date of birth    Patient stated that  Jesus her spouse WILL HELP AT HOME WITH her RECOVERY.       TN reviewed with patient contents of "[employer] Packet".      TN name and contact info placed on the communication board along with disposition, approximate date of DC, next steps and emergency contact    Preferred Pharmacy:  [id number] - [place]  [address] [facility] [place] [id number] [facility] [place] [address]  Phone: [phone] Fax: [phone]      Preferred appointment time: evening  "

## 2020-09-26 PROCEDURE — 21400001 HC TELEMETRY ROOM

## 2020-09-26 PROCEDURE — 63600175 PHARM REV CODE 636 W HCPCS: Performed by: SURGERY

## 2020-09-26 PROCEDURE — 94761 N-INVAS EAR/PLS OXIMETRY MLT: CPT

## 2020-09-26 PROCEDURE — 63600175 PHARM REV CODE 636 W HCPCS: Performed by: STUDENT IN AN ORGANIZED HEALTH CARE EDUCATION/TRAINING PROGRAM

## 2020-09-26 PROCEDURE — 25000003 PHARM REV CODE 250: Performed by: STUDENT IN AN ORGANIZED HEALTH CARE EDUCATION/TRAINING PROGRAM

## 2020-09-26 PROCEDURE — 25500020 PHARM REV CODE 255: Performed by: SURGERY

## 2020-09-26 PROCEDURE — C9113 INJ PANTOPRAZOLE SODIUM, VIA: HCPCS | Performed by: STUDENT IN AN ORGANIZED HEALTH CARE EDUCATION/TRAINING PROGRAM

## 2020-09-26 RX ORDER — DIPHENHYDRAMINE HYDROCHLORIDE 50 MG/ML
12.5 INJECTION INTRAMUSCULAR; INTRAVENOUS EVERY 6 HOURS PRN
Status: DISCONTINUED | OUTPATIENT
Start: 2020-09-26 | End: 2020-09-28 | Stop reason: HOSPADM

## 2020-09-26 RX ADMIN — DIATRIZOATE MEGLUMINE AND DIATRIZOATE SODIUM 120 ML: 660; 100 LIQUID ORAL; RECTAL at 12:09

## 2020-09-26 RX ADMIN — ONDANSETRON 4 MG: 2 INJECTION INTRAMUSCULAR; INTRAVENOUS at 06:09

## 2020-09-26 RX ADMIN — ENOXAPARIN SODIUM 40 MG: 40 INJECTION SUBCUTANEOUS at 04:09

## 2020-09-26 RX ADMIN — DIPHENHYDRAMINE HYDROCHLORIDE 12.5 MG: 50 INJECTION INTRAMUSCULAR; INTRAVENOUS at 11:09

## 2020-09-26 RX ADMIN — FLUCONAZOLE 100 MG: 2 INJECTION INTRAVENOUS at 10:09

## 2020-09-26 RX ADMIN — SODIUM CHLORIDE, SODIUM LACTATE, POTASSIUM CHLORIDE, AND CALCIUM CHLORIDE: .6; .31; .03; .02 INJECTION, SOLUTION INTRAVENOUS at 06:09

## 2020-09-26 RX ADMIN — HYDROMORPHONE HYDROCHLORIDE 1 MG: 2 INJECTION INTRAMUSCULAR; INTRAVENOUS; SUBCUTANEOUS at 06:09

## 2020-09-26 RX ADMIN — HYDROMORPHONE HYDROCHLORIDE 1 MG: 2 INJECTION INTRAMUSCULAR; INTRAVENOUS; SUBCUTANEOUS at 12:09

## 2020-09-26 RX ADMIN — HYDROMORPHONE HYDROCHLORIDE 1 MG: 2 INJECTION INTRAMUSCULAR; INTRAVENOUS; SUBCUTANEOUS at 02:09

## 2020-09-26 RX ADMIN — ONDANSETRON 4 MG: 2 INJECTION INTRAMUSCULAR; INTRAVENOUS at 02:09

## 2020-09-26 RX ADMIN — ONDANSETRON 4 MG: 2 INJECTION INTRAMUSCULAR; INTRAVENOUS at 09:09

## 2020-09-26 RX ADMIN — PANTOPRAZOLE SODIUM 40 MG: 40 INJECTION, POWDER, FOR SOLUTION INTRAVENOUS at 10:09

## 2020-09-26 RX ADMIN — PIPERACILLIN AND TAZOBACTAM 4.5 G: 4; .5 INJECTION, POWDER, LYOPHILIZED, FOR SOLUTION INTRAVENOUS; PARENTERAL at 04:09

## 2020-09-26 RX ADMIN — PIPERACILLIN AND TAZOBACTAM 4.5 G: 4; .5 INJECTION, POWDER, LYOPHILIZED, FOR SOLUTION INTRAVENOUS; PARENTERAL at 09:09

## 2020-09-26 RX ADMIN — ONDANSETRON 4 MG: 2 INJECTION INTRAMUSCULAR; INTRAVENOUS at 10:09

## 2020-09-26 RX ADMIN — DIPHENHYDRAMINE HYDROCHLORIDE 12.5 MG: 50 INJECTION INTRAMUSCULAR; INTRAVENOUS at 06:09

## 2020-09-26 RX ADMIN — PIPERACILLIN AND TAZOBACTAM 4.5 G: 4; .5 INJECTION, POWDER, LYOPHILIZED, FOR SOLUTION INTRAVENOUS; PARENTERAL at 12:09

## 2020-09-26 RX ADMIN — PANTOPRAZOLE SODIUM 40 MG: 40 INJECTION, POWDER, FOR SOLUTION INTRAVENOUS at 09:09

## 2020-09-26 RX ADMIN — HYDROMORPHONE HYDROCHLORIDE 1 MG: 2 INJECTION INTRAMUSCULAR; INTRAVENOUS; SUBCUTANEOUS at 10:09

## 2020-09-26 RX ADMIN — HYDROMORPHONE HYDROCHLORIDE 1 MG: 2 INJECTION INTRAMUSCULAR; INTRAVENOUS; SUBCUTANEOUS at 05:09

## 2020-09-26 NOTE — PROGRESS NOTES
[facility]  General Surgery  Progress Note    Subjective:     Interval History:   NAEO.   AF, VSS   Pain well controlled. Feels hungry.     Post-Op Info:  Procedure(s) (LRB):  LAPAROTOMY, EXPLORATORY (N/A)   2 Days Post-Op      Medications:  Continuous Infusions:   lactated ringers 100 mL/hr at 09/26/20 0605     Scheduled Meds:   enoxaparin  40 mg Subcutaneous Q24H    fluconazole (DIFLUCAN) IVPB  100 mg Intravenous Q24H    ondansetron  4 mg Intravenous Q4H    pantoprazole  40 mg Intravenous BID    piperacillin-tazobactam 4.5 g in sodium chloride 0.9% 100 mL IVPB (ready to mix system)  4.5 g Intravenous Q8H     PRN Meds:fentaNYL, HYDROmorphone, HYDROmorphone, sodium chloride 0.9%, sodium chloride 0.9%     Objective:     Vital Signs (Most Recent):  Temp: 96.3 °F (35.7 °C) (09/26/20 1115)  Pulse: 110 (09/26/20 1115)  Resp: 19 (09/26/20 1115)  BP: (!) 138/95 (09/26/20 1115)  SpO2: 97 % (09/26/20 1115) Vital Signs (24h Range):  Temp:  [96.3 °F (35.7 °C)-98.7 °F (37.1 °C)] 96.3 °F (35.7 °C)  Pulse:  [] 110  Resp:  [16-19] 19  SpO2:  [95 %-100 %] 97 %  BP: (124-142)/(78-95) 138/95       Intake/Output Summary (Last 24 hours) at 9/26/2020 1117  Last data filed at 9/26/2020 0419  Gross per 24 hour   Intake 180 ml   Output 50 ml   Net 130 ml       Physical Exam  Constitutional:       Appearance: Normal appearance.   Cardiovascular:      Rate and Rhythm: Normal rate and regular rhythm.   Pulmonary:      Effort: Pulmonary effort is normal. No respiratory distress.   Abdominal:      General: Abdomen is flat. There is no distension.      Palpations: Abdomen is soft.      Comments: Incision c/d/i  Appropriate TTP   JEAN CLAUDE w/ SS drainge      Neurological:      Mental Status: She is alert.         Significant Labs:  BMP:   Recent Labs   Lab 09/25/20  0648         K 4.5      CO2 32*   BUN 9   CREATININE 0.9   CALCIUM 8.7     CBC:   Recent Labs   Lab 09/25/20  0648   WBC 10.96   RBC 3.95*    HGB 11.4*   HCT 35.6*      MCV 90   MCH 28.9   MCHC 32.0     CMP:   Recent Labs   Lab 09/25/20  0648      CALCIUM 8.7      K 4.5   CO2 32*      BUN 9   CREATININE 0.9       Significant Diagnostics:  None    Assessment/Plan:     Active Diagnoses:    Diagnosis Date Noted POA    PRINCIPAL PROBLEM:  Abdominal pain [R10.9] 09/24/2020 Yes      Problems Resolved During this Admission:     38 yo F w/ perforated gastric ulcer s/p Linden patch 9/24/20.     - Plan for upper GI study today to eval for leak. If no leak seen, will start CLD   - NPO for now, will increase IVFs   - PRN pain meds   - PT eval and treat OOB, ambulate   - Lovenox     Puja Coyne MD  General Surgery  [facility] [place]

## 2020-09-26 NOTE — LACTATION NOTE
"Pt reports pumping well this am getting 2 oz of EBM without complication.  EBM transported to EBM refrigerator in [facility] on [address].  Pump parts sanitized with Medela microsteam bag.  Pt has concerns with decreased milk supply.  Encouraged to continue to pump breasts 8 - 10 times in 24 hours, only 1 power pumping session daily due to sore nipples and hand expression after pumping.  Discussed self care and recovery R/T milk supply.  Also discussed concerns with baby resuming breastfeeding, encouraged to continue to maintain milk supply now and will discuss tips for resuming breastfeeding when appropriate.  All parts and supplies available at bedside.  Telephone number on white board, encouraged to call for assist prn.  States "understand".  RN at bedside during discussion of plan of care.  "

## 2020-09-26 NOTE — PROGRESS NOTES
Received report from DENNY Latif. Patient AAOx4, resting quietly in bed, 2L O2 NC, no distress noted. Safety maintained, call light in reach.

## 2020-09-26 NOTE — NURSING
Patient awake, alert, oriented resting comfortably. No signs of distress observed. bed low and locked. Call light in reach. Report given to oncoming nurse, DEANA Dozier. 12hour chart check complete.

## 2020-09-26 NOTE — LACTATION NOTE
"Observed pt pumping with good technique.  Switched from 30mm flanges to 27 mm due to areola rubbing and tenderness.  Lanolin in use, also instructed to use EBM on nipples.  Pumped EBM brought to refrigerator in [place].  Questions answered and encouraged to call for assist prn.  States "understand".  "

## 2020-09-27 ENCOUNTER — PATIENT MESSAGE (OUTPATIENT)
Dept: ENDOSCOPY | Facility: HOSPITAL | Age: 40
End: 2020-09-27

## 2020-09-27 PROCEDURE — 94761 N-INVAS EAR/PLS OXIMETRY MLT: CPT

## 2020-09-27 PROCEDURE — 21400001 HC TELEMETRY ROOM

## 2020-09-27 PROCEDURE — 97530 THERAPEUTIC ACTIVITIES: CPT

## 2020-09-27 PROCEDURE — 97110 THERAPEUTIC EXERCISES: CPT

## 2020-09-27 PROCEDURE — 63600175 PHARM REV CODE 636 W HCPCS: Performed by: STUDENT IN AN ORGANIZED HEALTH CARE EDUCATION/TRAINING PROGRAM

## 2020-09-27 PROCEDURE — C9113 INJ PANTOPRAZOLE SODIUM, VIA: HCPCS | Performed by: STUDENT IN AN ORGANIZED HEALTH CARE EDUCATION/TRAINING PROGRAM

## 2020-09-27 PROCEDURE — S5010 5% DEXTROSE AND 0.45% SALINE: HCPCS | Performed by: STUDENT IN AN ORGANIZED HEALTH CARE EDUCATION/TRAINING PROGRAM

## 2020-09-27 PROCEDURE — 97161 PT EVAL LOW COMPLEX 20 MIN: CPT

## 2020-09-27 PROCEDURE — 25000003 PHARM REV CODE 250: Performed by: STUDENT IN AN ORGANIZED HEALTH CARE EDUCATION/TRAINING PROGRAM

## 2020-09-27 RX ORDER — DEXTROSE MONOHYDRATE AND SODIUM CHLORIDE 5; .45 G/100ML; G/100ML
INJECTION, SOLUTION INTRAVENOUS CONTINUOUS
Status: DISCONTINUED | OUTPATIENT
Start: 2020-09-27 | End: 2020-09-28

## 2020-09-27 RX ORDER — METOCLOPRAMIDE HYDROCHLORIDE 5 MG/ML
10 INJECTION INTRAMUSCULAR; INTRAVENOUS EVERY 6 HOURS
Status: DISCONTINUED | OUTPATIENT
Start: 2020-09-27 | End: 2020-09-28 | Stop reason: HOSPADM

## 2020-09-27 RX ADMIN — DIPHENHYDRAMINE HYDROCHLORIDE 12.5 MG: 50 INJECTION INTRAMUSCULAR; INTRAVENOUS at 05:09

## 2020-09-27 RX ADMIN — HYDROMORPHONE HYDROCHLORIDE 1 MG: 2 INJECTION INTRAMUSCULAR; INTRAVENOUS; SUBCUTANEOUS at 06:09

## 2020-09-27 RX ADMIN — PIPERACILLIN AND TAZOBACTAM 4.5 G: 4; .5 INJECTION, POWDER, LYOPHILIZED, FOR SOLUTION INTRAVENOUS; PARENTERAL at 06:09

## 2020-09-27 RX ADMIN — DEXTROSE AND SODIUM CHLORIDE: 5; .45 INJECTION, SOLUTION INTRAVENOUS at 01:09

## 2020-09-27 RX ADMIN — FLUCONAZOLE 100 MG: 2 INJECTION INTRAVENOUS at 10:09

## 2020-09-27 RX ADMIN — ONDANSETRON 4 MG: 2 INJECTION INTRAMUSCULAR; INTRAVENOUS at 05:09

## 2020-09-27 RX ADMIN — HYDROMORPHONE HYDROCHLORIDE 1 MG: 2 INJECTION INTRAMUSCULAR; INTRAVENOUS; SUBCUTANEOUS at 09:09

## 2020-09-27 RX ADMIN — HYDROMORPHONE HYDROCHLORIDE 1 MG: 2 INJECTION INTRAMUSCULAR; INTRAVENOUS; SUBCUTANEOUS at 02:09

## 2020-09-27 RX ADMIN — ONDANSETRON 4 MG: 2 INJECTION INTRAMUSCULAR; INTRAVENOUS at 06:09

## 2020-09-27 RX ADMIN — ONDANSETRON 4 MG: 2 INJECTION INTRAMUSCULAR; INTRAVENOUS at 10:09

## 2020-09-27 RX ADMIN — PIPERACILLIN AND TAZOBACTAM 4.5 G: 4; .5 INJECTION, POWDER, LYOPHILIZED, FOR SOLUTION INTRAVENOUS; PARENTERAL at 01:09

## 2020-09-27 RX ADMIN — ONDANSETRON 4 MG: 2 INJECTION INTRAMUSCULAR; INTRAVENOUS at 01:09

## 2020-09-27 RX ADMIN — HYDROMORPHONE HYDROCHLORIDE 1 MG: 2 INJECTION INTRAMUSCULAR; INTRAVENOUS; SUBCUTANEOUS at 05:09

## 2020-09-27 RX ADMIN — PANTOPRAZOLE SODIUM 40 MG: 40 INJECTION, POWDER, FOR SOLUTION INTRAVENOUS at 10:09

## 2020-09-27 RX ADMIN — METOCLOPRAMIDE 10 MG: 5 INJECTION, SOLUTION INTRAMUSCULAR; INTRAVENOUS at 06:09

## 2020-09-27 RX ADMIN — METOCLOPRAMIDE 10 MG: 5 INJECTION, SOLUTION INTRAMUSCULAR; INTRAVENOUS at 01:09

## 2020-09-27 RX ADMIN — HYDROMORPHONE HYDROCHLORIDE 1 MG: 2 INJECTION INTRAMUSCULAR; INTRAVENOUS; SUBCUTANEOUS at 11:09

## 2020-09-27 RX ADMIN — METOCLOPRAMIDE 10 MG: 5 INJECTION, SOLUTION INTRAMUSCULAR; INTRAVENOUS at 11:09

## 2020-09-27 RX ADMIN — PIPERACILLIN AND TAZOBACTAM 4.5 G: 4; .5 INJECTION, POWDER, LYOPHILIZED, FOR SOLUTION INTRAVENOUS; PARENTERAL at 09:09

## 2020-09-27 RX ADMIN — PANTOPRAZOLE SODIUM 40 MG: 40 INJECTION, POWDER, FOR SOLUTION INTRAVENOUS at 09:09

## 2020-09-27 RX ADMIN — ONDANSETRON 4 MG: 2 INJECTION INTRAMUSCULAR; INTRAVENOUS at 09:09

## 2020-09-27 NOTE — LACTATION NOTE
"   09/27/20 1140   Pain/Comfort/Sleep   Pain Body Location - Side Bilateral   Pain Body Location breast   Pain Rating (0-10): Activity 0   Breasts WDL   Breast WDL WDL   Breast Pumping   Breast Pumping Interventions frequent pumping encouraged   Maternal Feeding Assessment   Maternal Emotional State relaxed;independent     Spoke with pt at bedside.  States that she is only pumping drops today.  Has been using drops topically to nipples for tenderness.  Did not pump at all throughout the night.  Reinstructed on pumping 8 -10 times in 24 hours for optimal milk production.  Discussed risks of not pumping and decreasing milk supply.  Encouraged to continue pumping even with minimal results.  Denies any other c/o or concerns.  Supplies and telephone number at bedside.  Pump parts sanitized with Medela microsteam bag.  States "understand".  "

## 2020-09-27 NOTE — PT/OT/SLP EVAL
Physical Therapy Evaluation and Discharge Note    Patient Name:  Puja Quigley   MRN:  [id number]    Recommendations:     Discharge Recommendations:  home   Discharge Equipment Recommendations: none   Barriers to discharge: None    Assessment:     Puja Quigley is a 39 y.o. female admitted with a medical diagnosis of Abdominal pain.  At this time, patient is functioning at their prior level of function and does not require further acute PT services.     Recent Surgery: Procedure(s) (LRB):  LAPAROTOMY, EXPLORATORY (N/A) 3 Days Post-Op; Pt also s/p 6 weeks postpartum, currently lactating with breast pump available in the room.      Plan:     During this hospitalization, patient does not require further acute PT services.  Please re-consult if situation changes.      Subjective     Chief Complaint: minimal weakness from being in the bed  Patient/Family Comments/goals: Pt agreeable to therapy.  Pt reported that another PT did attempted to eval pt yesterday, however pt had to go to radiology for a test.     Pain/Comfort:  · Pain Rating 1: (Pt c/o minimal abdominal pain.)  · Pain Addressed 1: Pre-medicate for activity    Living Environment:  Pt lives with spouse and 6 weeks old infant in a [facility] with no concerns.  Prior to admission, patients level of function was independent, working, and driving.  Equipment used at home: none.  Upon discharge, patient will have assistance from spouse, mother, and brother.    Objective:     Patient found HOB elevated with bed alarm, peripheral IV, telemetry, and 1 JEAN CLAUDE drain upon PT entry to room.    General Precautions: Standard, fall   Orthopedic Precautions:(abdominal precautions)   Braces: N/A     Exams:  · Cognitive Exam:  Patient was able to follow multiple commands.   · Gross Motor Coordination:  WFL  · Postural Exam:  Patient presented with the following abnormalities:    · -       No postural abnormalities identified  · Sensation:    · -       Intact  light/touch  BUE/BLE  · Skin Integrity/Edema:      · -       Skin integrity: abdominal incision intact  · -       Edema: Mild abdomen  · BLE ROM: WNL  · BLE Strength: WNL    Functional Mobility:  · Bed Mobility:     · Rolling Left:  modified independence using bed rail  · Scooting: modified independence  · Supine to Sit: modified independence with HOB elevated   · Transfers:     · Sit to Stand:  modified independence with no AD  · Bed to Chair: modified independence with  no AD  using  Step Transfer  · Toilet transfer: mod I pushing IV pole to ambulate to the bathroom; Pt was independent with toileting and self-care at the sink.    · Gait: Pt ambulated ~500 ft x2 trials with mod I pushing IV pole.  Pt with decreased zachariah 2* minimal abdominal pain, otherwise no major gait deviations.    · Balance: Pt with fair+ dynamic standing balance.     AM-PAC 6 CLICK MOBILITY  Total Score:24       Therapeutic Activities and Exercises:  Pt encouraged to be OOB>chair for meals, ambulate to the bathroom, and ambulate in the hallway with face mask with nursing supervision while in the hospital.  Pt verbalized good understanding.      Pt also educated on abdominal precautions: log roll for bed mobility and no heavy lifting >5-10 lbs.  Handout for log roll attached to pt's chart.  Pt verbalized good understanding.        Patient left up in chair with all lines intact, call button in reach and nurse Cheyenne notified.  Lunch tray set-up for pt.     GOALS:   Multidisciplinary Problems     Physical Therapy Goals     Not on file          Multidisciplinary Problems (Resolved)        Problem: Physical Therapy Goal    Goal Priority Disciplines Outcome Goal Variances Interventions   Physical Therapy Goal   (Resolved)     PT, PT/OT Met                     History:     Past Medical History:   Diagnosis Date    Pregnancy 08/12/2020    delivered on 8/12/2020    Umbilical hernia        History reviewed. No pertinent surgical history.    Time Tracking:     PT  Received On: 09/27/20  PT Start Time: 1105     PT Stop Time: 1147  PT Total Time (min): 42 min     Billable Minutes: Evaluation 15 min, Therapeutic Activity 12 min and Therapeutic Exercise 15 min      Rosalinda Carlos, PT  09/27/2020

## 2020-09-27 NOTE — PLAN OF CARE
Encouraged pumping 8 - 12 times in 24 hours with Symphony pump.  Discussed appropriate labeling and storage of EBM noted.  Encouraged to call for assist prn.

## 2020-09-27 NOTE — PROGRESS NOTES
[facility]  General Surgery  Progress Note    Subjective:     Interval History:   NAEO.   AF, VSS   Upper GI study yesterday w/out evidence of leak. Did not tolerate CLD well. Had nausea and several episodes of emesis.     Post-Op Info:  Procedure(s) (LRB):  LAPAROTOMY, EXPLORATORY (N/A)   3 Days Post-Op      Medications:  Continuous Infusions:   lactated ringers 125 mL/hr at 09/26/20 1128     Scheduled Meds:   enoxaparin  40 mg Subcutaneous Q24H    fluconazole (DIFLUCAN) IVPB  100 mg Intravenous Q24H    metoclopramide HCl  10 mg Intravenous Q6H    ondansetron  4 mg Intravenous Q4H    pantoprazole  40 mg Intravenous BID    piperacillin-tazobactam 4.5 g in sodium chloride 0.9% 100 mL IVPB (ready to mix system)  4.5 g Intravenous Q8H     PRN Meds:diphenhydrAMINE, fentaNYL, HYDROmorphone, HYDROmorphone, sodium chloride 0.9%, sodium chloride 0.9%     Objective:     Vital Signs (Most Recent):  Temp: 97.8 °F (36.6 °C) (09/27/20 0858)  Pulse: 84 (09/27/20 0858)  Resp: 18 (09/27/20 0858)  BP: (!) 136/97 (09/27/20 0858)  SpO2: 99 % (09/27/20 0858) Vital Signs (24h Range):  Temp:  [96.3 °F (35.7 °C)-99 °F (37.2 °C)] 97.8 °F (36.6 °C)  Pulse:  [] 84  Resp:  [16-19] 18  SpO2:  [96 %-99 %] 99 %  BP: (135-152)/() 136/97       Intake/Output Summary (Last 24 hours) at 9/27/2020 0932  Last data filed at 9/27/2020 0116  Gross per 24 hour   Intake --   Output 175 ml   Net -175 ml       Physical Exam  Constitutional:       Appearance: Normal appearance.   Cardiovascular:      Rate and Rhythm: Normal rate and regular rhythm.   Pulmonary:      Effort: Pulmonary effort is normal. No respiratory distress.   Abdominal:      General: Abdomen is flat. There is no distension.      Palpations: Abdomen is soft.      Comments: Incision c/d/i  Appropriate TTP   JEAN CLAUDE w/ SS drainge      Neurological:      Mental Status: She is alert.         Significant Labs:  BMP:   No results for input(s): GLU, NA, K, CL, CO2,  BUN, CREATININE, CALCIUM, MG in the last 48 hours.  CBC:   No results for input(s): WBC, RBC, HGB, HCT, PLT, MCV, MCH, MCHC in the last 48 hours.  CMP:   No results for input(s): GLU, CALCIUM, ALBUMIN, PROT, NA, K, CO2, CL, BUN, CREATININE, ALKPHOS, ALT, AST, BILITOT in the last 48 hours.    Significant Diagnostics:  None    Assessment/Plan:     Active Diagnoses:    Diagnosis Date Noted POA    PRINCIPAL PROBLEM:  Abdominal pain [R10.9] 09/24/2020 Yes      Problems Resolved During this Admission:     38 yo F w/ perforated gastric ulcer s/p Linden patch 9/24/20. Upper GI study 9/26 w/o evidence of leak.     - Did not tolerate CLD yesterday. Will keep on clears today and patient encouraged to only take sips. Will start Reglan.    - Continue IVFs for now    - PRN pain meds   - PT eval and treat OOB, ambulate   - Lovenox     Puja Coyne MD  General Surgery  [facility]

## 2020-09-27 NOTE — NURSING
Report received from DEANA Dozier. Patient sitting up in bed, surgical drain in place to right mid abdomen. Gauze island dressing in place over incision. Patient currently lactating, all pump parts in reach. No acute distress noted. Will continue to monitor.

## 2020-09-27 NOTE — PROGRESS NOTES
Received report from DEANA Ray.Patient AAOx4, resting quietly in bed, 2L O2 NC & telemetry monitoring in place, no distress noted. Safety maintained, call light in reach.

## 2020-09-27 NOTE — PLAN OF CARE
Problem: Physical Therapy Goal  Goal: Physical Therapy Goal  Outcome: Met    Pt ambulated ~500 ft x2 trials mod I by pushing IV pole in the hallway.  Pt required no further acute skilled PT services.  Pt to continue ambulating in the hallway with nursing supervision while in the hospital.

## 2020-09-28 ENCOUNTER — TELEPHONE (OUTPATIENT)
Dept: ENDOSCOPY | Facility: HOSPITAL | Age: 40
End: 2020-09-28

## 2020-09-28 VITALS
OXYGEN SATURATION: 92 % | DIASTOLIC BLOOD PRESSURE: 94 MMHG | WEIGHT: 174.19 LBS | TEMPERATURE: 100 F | RESPIRATION RATE: 17 BRPM | BODY MASS INDEX: 27.34 KG/M2 | HEIGHT: 67 IN | HEART RATE: 110 BPM | SYSTOLIC BLOOD PRESSURE: 144 MMHG

## 2020-09-28 LAB
ANION GAP SERPL CALC-SCNC: 8 MMOL/L (ref 8–16)
BASOPHILS # BLD AUTO: 0.01 K/UL (ref 0–0.2)
BASOPHILS NFR BLD: 0.1 % (ref 0–1.9)
BUN SERPL-MCNC: 4 MG/DL (ref 6–20)
CALCIUM SERPL-MCNC: 8.1 MG/DL (ref 8.7–10.5)
CHLORIDE SERPL-SCNC: 103 MMOL/L (ref 95–110)
CO2 SERPL-SCNC: 25 MMOL/L (ref 23–29)
CREAT SERPL-MCNC: 0.6 MG/DL (ref 0.5–1.4)
DIFFERENTIAL METHOD: ABNORMAL
EOSINOPHIL # BLD AUTO: 0.1 K/UL (ref 0–0.5)
EOSINOPHIL NFR BLD: 0.7 % (ref 0–8)
ERYTHROCYTE [DISTWIDTH] IN BLOOD BY AUTOMATED COUNT: 13 % (ref 11.5–14.5)
EST. GFR  (AFRICAN AMERICAN): >60 ML/MIN/1.73 M^2
EST. GFR  (NON AFRICAN AMERICAN): >60 ML/MIN/1.73 M^2
GLUCOSE SERPL-MCNC: 103 MG/DL (ref 70–110)
HCT VFR BLD AUTO: 33 % (ref 37–48.5)
HGB BLD-MCNC: 10.5 G/DL (ref 12–16)
IMM GRANULOCYTES # BLD AUTO: 0.05 K/UL (ref 0–0.04)
IMM GRANULOCYTES NFR BLD AUTO: 0.7 % (ref 0–0.5)
LYMPHOCYTES # BLD AUTO: 0.7 K/UL (ref 1–4.8)
LYMPHOCYTES NFR BLD: 9.3 % (ref 18–48)
MCH RBC QN AUTO: 28.4 PG (ref 27–31)
MCHC RBC AUTO-ENTMCNC: 31.8 G/DL (ref 32–36)
MCV RBC AUTO: 89 FL (ref 82–98)
MONOCYTES # BLD AUTO: 0.8 K/UL (ref 0.3–1)
MONOCYTES NFR BLD: 10.1 % (ref 4–15)
NEUTROPHILS # BLD AUTO: 6 K/UL (ref 1.8–7.7)
NEUTROPHILS NFR BLD: 79.1 % (ref 38–73)
NRBC BLD-RTO: 0 /100 WBC
PLATELET # BLD AUTO: 170 K/UL (ref 150–350)
PMV BLD AUTO: 8.9 FL (ref 9.2–12.9)
POTASSIUM SERPL-SCNC: 2.9 MMOL/L (ref 3.5–5.1)
RBC # BLD AUTO: 3.7 M/UL (ref 4–5.4)
SODIUM SERPL-SCNC: 136 MMOL/L (ref 136–145)
WBC # BLD AUTO: 7.55 K/UL (ref 3.9–12.7)

## 2020-09-28 PROCEDURE — S5010 5% DEXTROSE AND 0.45% SALINE: HCPCS | Performed by: STUDENT IN AN ORGANIZED HEALTH CARE EDUCATION/TRAINING PROGRAM

## 2020-09-28 PROCEDURE — 63600175 PHARM REV CODE 636 W HCPCS: Performed by: STUDENT IN AN ORGANIZED HEALTH CARE EDUCATION/TRAINING PROGRAM

## 2020-09-28 PROCEDURE — 80048 BASIC METABOLIC PNL TOTAL CA: CPT

## 2020-09-28 PROCEDURE — 25000003 PHARM REV CODE 250: Performed by: STUDENT IN AN ORGANIZED HEALTH CARE EDUCATION/TRAINING PROGRAM

## 2020-09-28 PROCEDURE — 36415 COLL VENOUS BLD VENIPUNCTURE: CPT

## 2020-09-28 PROCEDURE — 85025 COMPLETE CBC W/AUTO DIFF WBC: CPT

## 2020-09-28 RX ORDER — AMOXICILLIN 250 MG/1
1000 CAPSULE ORAL EVERY 12 HOURS
Status: DISCONTINUED | OUTPATIENT
Start: 2020-09-28 | End: 2020-09-28 | Stop reason: HOSPADM

## 2020-09-28 RX ORDER — AMOXICILLIN 500 MG/1
1000 CAPSULE ORAL EVERY 12 HOURS
Qty: 56 CAPSULE | Refills: 0 | Status: ON HOLD | OUTPATIENT
Start: 2020-09-28 | End: 2020-10-21 | Stop reason: HOSPADM

## 2020-09-28 RX ORDER — CLARITHROMYCIN 250 MG/1
500 TABLET, FILM COATED ORAL EVERY 12 HOURS
Status: DISCONTINUED | OUTPATIENT
Start: 2020-09-28 | End: 2020-09-28 | Stop reason: HOSPADM

## 2020-09-28 RX ORDER — OXYCODONE AND ACETAMINOPHEN 5; 325 MG/1; MG/1
1 TABLET ORAL EVERY 4 HOURS PRN
Qty: 28 TABLET | Refills: 0 | Status: ON HOLD | OUTPATIENT
Start: 2020-09-28 | End: 2020-10-21 | Stop reason: HOSPADM

## 2020-09-28 RX ORDER — ONDANSETRON 4 MG/1
8 TABLET, ORALLY DISINTEGRATING ORAL EVERY 6 HOURS PRN
Qty: 30 TABLET | Refills: 0 | Status: SHIPPED | OUTPATIENT
Start: 2020-09-28 | End: 2020-10-27

## 2020-09-28 RX ORDER — CLARITHROMYCIN 500 MG/1
500 TABLET, FILM COATED ORAL EVERY 12 HOURS
Qty: 28 TABLET | Refills: 0 | Status: ON HOLD | OUTPATIENT
Start: 2020-09-28 | End: 2020-10-21 | Stop reason: HOSPADM

## 2020-09-28 RX ORDER — PANTOPRAZOLE SODIUM 40 MG/1
40 TABLET, DELAYED RELEASE ORAL 2 TIMES DAILY
Status: DISCONTINUED | OUTPATIENT
Start: 2020-09-28 | End: 2020-09-28 | Stop reason: HOSPADM

## 2020-09-28 RX ORDER — PANTOPRAZOLE SODIUM 40 MG/1
40 TABLET, DELAYED RELEASE ORAL 2 TIMES DAILY
Qty: 60 TABLET | Refills: 11 | Status: SHIPPED | OUTPATIENT
Start: 2020-09-28 | End: 2021-11-29

## 2020-09-28 RX ADMIN — HYDROMORPHONE HYDROCHLORIDE 1 MG: 2 INJECTION INTRAMUSCULAR; INTRAVENOUS; SUBCUTANEOUS at 06:09

## 2020-09-28 RX ADMIN — DEXTROSE AND SODIUM CHLORIDE: 5; .45 INJECTION, SOLUTION INTRAVENOUS at 02:09

## 2020-09-28 RX ADMIN — ONDANSETRON 4 MG: 2 INJECTION INTRAMUSCULAR; INTRAVENOUS at 02:09

## 2020-09-28 RX ADMIN — AMOXICILLIN 1000 MG: 250 CAPSULE ORAL at 09:09

## 2020-09-28 RX ADMIN — PANTOPRAZOLE SODIUM 40 MG: 40 TABLET, DELAYED RELEASE ORAL at 09:09

## 2020-09-28 RX ADMIN — METOCLOPRAMIDE 10 MG: 5 INJECTION, SOLUTION INTRAMUSCULAR; INTRAVENOUS at 11:09

## 2020-09-28 RX ADMIN — PIPERACILLIN AND TAZOBACTAM 4.5 G: 4; .5 INJECTION, POWDER, LYOPHILIZED, FOR SOLUTION INTRAVENOUS; PARENTERAL at 02:09

## 2020-09-28 RX ADMIN — CLARITHROMYCIN 500 MG: 250 TABLET, FILM COATED ORAL at 09:09

## 2020-09-28 RX ADMIN — ONDANSETRON 4 MG: 2 INJECTION INTRAMUSCULAR; INTRAVENOUS at 11:09

## 2020-09-28 RX ADMIN — METOCLOPRAMIDE 10 MG: 5 INJECTION, SOLUTION INTRAMUSCULAR; INTRAVENOUS at 06:09

## 2020-09-28 RX ADMIN — HYDROMORPHONE HYDROCHLORIDE 1 MG: 2 INJECTION INTRAMUSCULAR; INTRAVENOUS; SUBCUTANEOUS at 11:09

## 2020-09-28 RX ADMIN — ONDANSETRON 4 MG: 2 INJECTION INTRAMUSCULAR; INTRAVENOUS at 06:09

## 2020-09-28 NOTE — NURSING
Discharge instructions given to patient prior to discharge. Patient states understanding of information and scripts provided. IV removed. Tolerated well. All patient belongings with patient at the time of discharge. Transported to personal vehicle via hospital transport wheelchair.

## 2020-09-28 NOTE — DISCHARGE SUMMARY
[facility]  General Surgery  Discharge Summary      Patient Name: Puja Quigley  MRN: [id number]  Admission Date: 9/24/2020  Hospital Length of Stay: 4 days  Discharge Date and Time:  09/28/2020 1:49 PM  Attending Physician: Willie Magallon MD   Discharging Provider: Puja Coyne MD  Primary Care Provider: Primary Doctor Chacha     HPI: 38 yo F presented w/ abdominal pain and found to have a perforated gastric ulcer. She was admitted to the general surgery service and taken urgently to the OR for an exploratory laparotomy w/ Linden patch repair 9/24/20. Patient did well post-op and had improvement in her abdominal pain. On POD2 a swallow study was performed w/ no evidence of leak. She was started on a CLD. She had some emesis overnight and remained on a CLD. On POD 4 she was tolerating a regular diet, her pain was controlled, she was ambulating, and ready for discharge home. She has a follow-up appointment w/ GI already scheduled in mid October. She will be empirically treated for h pylori w/ triple therapy.     Procedure(s) (LRB):  LAPAROTOMY, EXPLORATORY (N/A)     Hospital Course: See above HPI     Consults:   Consults (From admission, onward)        Status Ordering Provider     Inpatient consult to General surgery  Once     Provider:  Willie Magallon MD    Completed PUJA COYNE          Significant Diagnostic Studies: CT abd/pelvis: perforated gastric ulcer w/ pneumoperitoneum     Pending Diagnostic Studies:     None        Final Active Diagnoses:    Diagnosis Date Noted POA    PRINCIPAL PROBLEM:  Abdominal pain [R10.9] 09/24/2020 Yes      Problems Resolved During this Admission:      Discharged Condition: good    Disposition: Home or Self Care    Follow Up:  Follow-up Information     Willie Magallon MD In 2 weeks.    Specialties: General Surgery, Surgery  Contact information:  [address]  [place] [address]  [phone]                 Patient Instructions:      Diet Adult Regular      Lifting restrictions   Order Comments: Do not lift > 10 lbs for 6 weeks     Notify your health care provider if you experience any of the following:  temperature >100.4     Notify your health care provider if you experience any of the following:  persistent nausea and vomiting or diarrhea     Notify your health care provider if you experience any of the following:  severe uncontrolled pain     Notify your health care provider if you experience any of the following:  redness, tenderness, or signs of infection (pain, swelling, redness, odor or green/yellow discharge around incision site)     No dressing needed   Order Comments: Ok to shower. Let regular soapy water run over incision. Do not scrub. Do not take baths or submerge incision in water for 2 weeks.     Medications:  Reconciled Home Medications:      Medication List      START taking these medications    amoxicillin 500 MG capsule  Commonly known as: AMOXIL  Take 2 capsules (1,000 mg total) by mouth every 12 (twelve) hours. for 14 days     clarithromycin 500 MG tablet  Commonly known as: BIAXIN  Take 1 tablet (500 mg total) by mouth every 12 (twelve) hours. for 14 days     ondansetron 4 MG Tbdl  Commonly known as: ZOFRAN-ODT  Take 2 tablets (8 mg total) by mouth every 6 (six) hours as needed.     oxyCODONE-acetaminophen 5-325 mg per tablet  Commonly known as: PERCOCET  Take 1 tablet by mouth every 4 (four) hours as needed for Pain.        CHANGE how you take these medications    pantoprazole 40 MG tablet  Commonly known as: PROTONIX  Take 1 tablet (40 mg total) by mouth 2 (two) times daily.  What changed: when to take this        CONTINUE taking these medications    acetaminophen 500 MG tablet  Commonly known as: TYLENOL  Take 500 mg by mouth every 6 (six) hours as needed for Pain.     dicyclomine 10 MG capsule  Commonly known as: BENTYL  Take 1 capsule (10 mg total) by mouth before meals as needed (TID PRN).     famotidine 20 MG tablet  Commonly known as:  PEPCID  Take 20 mg by mouth daily as needed.     hydrocortisone 2.5 % rectal cream  Commonly known as: ANUSOL-HC  Place rectally 2 (two) times daily.     NIFEdipine 30 MG (OSM) 24 hr tablet  Commonly known as: PROCARDIA-XL  Take 30 mg by mouth.     PNV,calcium 72-iron-folic acid 27 mg iron- 1 mg Tab  Commonly known as: PRENATAL VITAMIN PLUS LOW IRON  Take 1 tablet by mouth once daily.     senna-docusate 8.6-50 mg 8.6-50 mg per tablet  Commonly known as: PERICOLACE  Take 1 tablet by mouth once daily.        STOP taking these medications    esomeprazole 20 MG capsule  Commonly known as: NEXIUM            Puja Coyne MD  General Surgery  [facility] - [place], [place]

## 2020-09-28 NOTE — LACTATION NOTE
"   09/28/20 1013   Pain/Comfort/Sleep   Pain Body Location - Side Bilateral   Pain Body Location breast   Pain Rating (0-10): Activity 2   Breasts WDL   Breast WDL WDL   Maternal Feeding Assessment   Maternal Emotional State relaxed;independent   Reproductive Interventions   Breastfeeding Support encouragement provided;lactation counseling provided     Reports only pumping 1 time last night and pumped 1.5 oz.  Has not pumped this am.  Again discussed pumping 8 -10 times in 24 hours for maintaining milk supply.  States that she anticipates DC today and will resume breastfeeding.  Discussed decrease in milk supply and may need to supplement prn.  Pump parts sanitized with [employer] microsteam bag.  Breastpumping supplies and telephone # left at bedside.  Encouraged to call for assist prn.  States "understand".  "

## 2020-09-28 NOTE — TELEPHONE ENCOUNTER
MA spoke to pt informed pt per Dr. Terrazas - Please schedule clinic appt for ulcer followup. Offered pt appt at the [place] [facility] location on 10/23/20 at 10:30 am.    Pr verbalize understanding, confirmed appt and thank MA

## 2020-09-28 NOTE — PLAN OF CARE
Problem: Fall Injury Risk  Goal: Absence of Fall and Fall-Related Injury  Outcome: Ongoing, Progressing  Intervention: Identify and Manage Contributors to Fall Injury Risk  Flowsheets (Taken 9/27/2020 2017)  Self-Care Promotion: BADL personal objects within reach  Medication Review/Management: medications reviewed  Intervention: Promote Injury-Free Environment  Flowsheets (Taken 9/27/2020 2017)  Safety Promotion/Fall Prevention:   assistive device/personal item within reach   bed alarm set   Fall Risk reviewed with patient/family   lighting adjusted   medications reviewed   side rails raised x 2  Environmental Safety Modification:   assistive device/personal items within reach   clutter free environment maintained   lighting adjusted   room organization consistent   room near unit station

## 2020-09-28 NOTE — NURSING
Report given to DEANA Garza. Patient resting comfortably in bed, IV antibiotics infusing to left hand IV. Patient with 6/10 abdominal pain, PRN medication administered. No acute distress noted. Patient able to ambulate in hallway with standby assistance. Did have two watery bowel movements. Bulb suction drain in place put out 50ml serosanguinous fluid throughout shift. Incision to abdomen remains intact, no redness or swelling noted. Safety maintained throughout shift. No acute distress noted.

## 2020-09-28 NOTE — PROGRESS NOTES
[facility] CASE MANAGEMENT                  WRITTEN DISCHARGE INFORMATION      APPOINTMENTS AND RESOURCES TO HELP YOU MANAGE YOUR CARE AT HOME BASED ON YOUR PREFERENCES:  Follow-up Information     Willie Magallon MD On 10/13/2020.    Specialties: General Surgery, Surgery  Why: Outpatient Services: Hospital F/u with Surgeon at 10:15 am.   Contact information:  [address] [place] [address]  [phone]                       Healthy Living Instructions to HELP MANAGE YOUR CARE AT HOME:  Things You are responsible for:  1.    Getting your prescriptions filled   2.    Taking your medications as directed, DO NOT MISS ANY DOSES!  3.    Following the diet and exercise recommended by your doctor  4.    Going to your follow-up doctor appointment. This is important because it allows the doctor to monitor your progress and determine if any changes need to made to your treatment plan.  5. If you have any questions about MANAGING YOUR CARE AT HOME Call the Nurse Care Line for 24/7 Assistance [phone]       Please answer any calls you may receive from [facility]. We want to continue to support you as you manage your healthcare needs. [facility] is happy to have the opportunity to serve you.      Thank you for choosing [facility] for your healthcare needs!  Your [facility] Case Management Team,    Anastacia Hutchinson   II  Care Management  [phone]

## 2020-09-28 NOTE — DISCHARGE INSTRUCTIONS
Preparation and Hygiene:  1. Shower daily.  2. Wear a clean bra each day and wash daily in warm soapy water.  3. Change wet or moist breast pads frequently.  Moist pads can promote growth of germs.  Actively wash your hands, paying close attention to the area around and under your fingernails, thoroughly with soap and water for 15 seconds before pumping or handling your milk.  Re-wash your hands if you touch anything (scratching your nose, answering the phone, etc) while pumping or handling your milk.   4. Before pumping your breasts, assemble the pump collection kit and have ready the sterile container and labels.  Place these items on a clean surface next to the breast pump.  5. Each time after you have finished pumping, take apart all of the parts of the breast pump collection kit and place them in a separate cleaning container (do not place them in the sink).  Be sure to remove the yellow valve from the breast shield and separate the white membrane from the yellow valve.  Rinse all of these parts with cool water.  Then use a new sponge and/or bottle brush and dishwashing detergent to clean the parts.  Rinse off the soapy water with cool water and air dry on a clean towel covered with a clean cloth.  All parts may also be washed after each use in the top rack of a .  6. Once each day, sanitize all of the parts of the breast pump collection kit.  This can be done by boiling the kit parts for 10 minutes or by using a Quick Clean Micro-Steam Bag made by [employer]  7. If condensation appears in the tubing, continue to run the pump with the tubing attached for 1-2 minutes or until the tubing is dry.   8. Notify your babys nurse or doctor if you become ill or need to take any medication, even over-the-counter medicines.  Collection and Storage of Expressed Breast milk:         1. Pump your breasts at least 8 or more times every 24 hours.  Double pump (both breasts at the same time) for at least 15-20  minutes using the most suction that is comfortable.    2. Write the date and time of pumping and the name of any medications you are taking on the babys pre-printed hospital identification label.   3.    Do not touch the inside of the storage containers or lids.  4.        Tightly screw the lid onto the container and place immediately into the refrigerator for daily use.  5.    Expressed breast milk should be refrigerated or frozen within 4 hours of pumping.  6.        Do not store expressed breast milk on the door of your refrigerator or freezer             where the temperature is warmer.   7.        Refrigerated milk may be stored for up to 7 days.  At this point it can be moved to the freezer for 6 -12 months.  8.        Thaw frozen breast milk in overnight in the refrigerator.  Once milk is thawed it must be used within 24 hours.  9.        Refrigerated breast milk needs to be warmed to room temperature.  Warm by leaving unrefrigerated until it reached room temperature, or place sealed bottle into a cup of warm (not boiling) water or use a bottle warmer.               Never warm breast milk in a microwave or boiling water.    For any questions or concerns call the Lactation Department at [phone]

## 2020-09-28 NOTE — PLAN OF CARE
Problem: Adult Inpatient Plan of Care  Goal: Plan of Care Review  9/28/2020 0631 by Kayla Mart RN  Outcome: Ongoing, Not Progressing  9/28/2020 0630 by Kayla Mart RN  Outcome: Ongoing, Not Progressing  Goal: Patient-Specific Goal (Individualization)  9/28/2020 0631 by Kayla Mart RN  Outcome: Ongoing, Not Progressing  9/28/2020 0630 by Kayla Mart RN  Outcome: Ongoing, Not Progressing  Goal: Absence of Hospital-Acquired Illness or Injury  9/28/2020 0631 by Kayla Mart RN  Outcome: Ongoing, Not Progressing  9/28/2020 0630 by Kayla Mart RN  Outcome: Ongoing, Not Progressing  Goal: Optimal Comfort and Wellbeing  9/28/2020 0631 by Kayla Mart RN  Outcome: Ongoing, Not Progressing  9/28/2020 0630 by Kayla Mart RN  Outcome: Ongoing, Not Progressing  Goal: Readiness for Transition of Care  9/28/2020 0631 by Kayla Mart RN  Outcome: Ongoing, Not Progressing  9/28/2020 0630 by Kayla Mart RN  Outcome: Ongoing, Not Progressing  Goal: Rounds/Family Conference  Outcome: Ongoing, Not Progressing     Problem: Pain Acute  Goal: Optimal Pain Control  Outcome: Ongoing, Not Progressing  Intervention: Develop Pain Management Plan  Flowsheets (Taken 9/28/2020 0631)  Pain Management Interventions:   awakened for pain meds per patient request   care clustered   medication offered but refused   pillow support provided   quiet environment facilitated   prescribed exercises encouraged   breathing exercises   biofeedback utilized   around-the-clock dosing utilized  Intervention: Prevent or Manage Pain  Flowsheets (Taken 9/28/2020 0631)  Sleep/Rest Enhancement:   awakenings minimized   regular sleep/rest pattern promoted   relaxation techniques promoted

## 2020-09-28 NOTE — LACTATION NOTE
EBM from refrigerator packaged in transport bag and brought to mother's room.  DC to home at this time.

## 2020-09-28 NOTE — NURSING
Report received from DEANA Quinn. Patient resting comfortably in bed, awake and alert, oriented x4, no acute distress noted at this time. Abdominal wound clean intact, no dressing, JEAN CLAUDE drain at R upper abdomen.  Plan of care reviewed with patient. Instructed patient to call for assistance whenever needed, side rails up x2, bed alarm set, call light in reach, non skid socks in use. IV fluids infusing to Peripheral IV site, no redness or swelling noted. Patient verbalized understanding of instructions. Will continue to monitor.

## 2020-09-28 NOTE — PLAN OF CARE
"EDUCATION:  SW provided patient with educational information on Post Op.  Information was reviewed and placed in "My Healthcare Packet" to be brought home for use as a resource after discharge.  Information included: signs and symptoms to look for and call the doctor if experiencing, and symptoms that may indicate a medical emergency: CALL 911.  All questions answered.  Teach back method used. Patient stated that he/she will remember the following signs and symptoms: incision separates or comes apart SW gave patient follow up appointments for Surgeon. SALENA spoke with nurse Amor and informed her that patient was ready for discharge from  standpoint.        09/28/20 1436   Final Note   Assessment Type Final Discharge Note   Anticipated Discharge Disposition Home   What phone number can be called within the next 1-3 days to see how you are doing after discharge? [phone]   Hospital Follow Up  Appt(s) scheduled? Yes   Discharge plans and expectations educations in teach back method with documentation complete? Yes   Right Care Referral Info   Post Acute Recommendation No Care   Post-Acute Status   Post-Acute Authorization Other   Other Status No Post-Acute Service Needs   Discharge Delays None known at this time                                                 "

## 2020-09-30 ENCOUNTER — PATIENT OUTREACH (OUTPATIENT)
Dept: ADMINISTRATIVE | Facility: CLINIC | Age: 40
End: 2020-09-30

## 2020-09-30 DIAGNOSIS — R10.9 ABDOMINAL PAIN, UNSPECIFIED ABDOMINAL LOCATION: Primary | ICD-10-CM

## 2020-09-30 DIAGNOSIS — Z98.890 S/P EXPLORATORY LAPAROTOMY: ICD-10-CM

## 2020-09-30 NOTE — PATIENT INSTRUCTIONS
Exploratory Laparotomy     This is an example of the kind of incision the surgeon may use to perform exploratory laparotomy.   Exploratory laparotomy is surgery to open up the belly area (abdomen). This surgery is done to find the cause of problems (such as belly pain or bleeding) that testing could not diagnose. It is also used when an abdominal injury needs emergency medical care. This surgery uses one large cut (incision). The provider can then see and check the organs inside the abdomen. If the cause of the problem is found during the procedure, then treatment is often done at the same time. In some cases, a minimally invasive surgery called exploratory laparoscopy may be used instead. That method uses a tiny camera and several small incisions. But in many cases an exploratory laparotomy is preferred. Read on to learn more about this procedure.  Reasons for the surgery  Organs that may be looked at during exploratory laparotomy include:  · Liver  · Gallbladder  · Spleen  · Pancreas  · Kidneys  · Stomach  · Small intestine (small bowel)  · Large intestine (colon or large bowel)  · Appendix  · Ovaries, fallopian tubes, and uterus (in women)  · Lymph nodes   · Abdominal blood vessels   · Membranes that line the abdominal cavity  Getting ready for the surgery  The surgery takes place in a hospital. It is done by a surgeon. You will likely stay in the hospital for a few days or longer. To prepare for the surgery, do the following:  · Tell your provider about any medicines youre taking. This includes over-the-counter medicines, prescription medicines, herbs, street drugs, herbs, vitamins, and other supplements. You may need to stop taking some or all of them for a time before the surgery.  · Tell your provider if you drink alcohol. This is very important if you are a heavy drinker. Alcohol withdrawal can be life-threatening, so be honest with your provider.  · Also tell your provider if you have any allergies or  other health problems. This includes recent illnesses, especially any bleeding problems.  · Follow any directions you are given for not eating or drinking before surgery.  The day of the surgery  · Most exploratory laparotomies are done as emergency surgery after an injury or accident.   · You will be checked for risks of heart, lung, or other problems during surgery.   · You may need to change into a hospital gown.  · Before the surgery begins, an IV (intravenous) line is put into a vein in your arm or hand. This line supplies fluids and medicines.  · You will be given medicine (general anesthesia) to keep you free of pain. This medicine puts you in a state like deep sleep during the surgery.   · A tube may be placed through your mouth and into your throat to help with breathing. Also, monitors are attached to your body. These record your vital signs, such as heart rate and blood pressure, during the surgery.   · A thin tube (catheter) is placed into your bladder. This tube drains urine from your bladder during the surgery.  During the surgery  · The skin over your belly is cleaned.  · An incision is made in your belly.  · The tissue, blood vessels, and organs in your belly are carefully looked at and checked for problems.  · Tissue samples (biopsy) may be removed and sent to a lab for study.  · If the cause of the problem is found, treatment may be done then, if needed.  · When the surgery is done, the incision is closed with stitches (sutures) or staples. A drain may be placed in the abdomen to remove any extra fluids.  After the surgery  · You will be taken to a room to rest until you have recovered from the anesthesia. Nurses will closely watch your condition. When you are more awake and alert, you will be moved to another room.  · Medicines are given to help prevent infection and to manage pain, if needed.  · You will not be given food or drink until your bowels start to work normally again. This may take a  few days.  · You will need to get up and walk around as soon as you are able. This helps to prevent blood clots.  · Also, you may be given breathing exercises to do. These help prevent pneumonia.  · The tube to drain urine is usually removed within a few days.  · If a drain was used for your incision, this is also removed.  · You will be able to go home when the provider says there are no issues of concern.  · Arrange for an adult family member or friend to drive you home.  · Before leaving, make sure you have all the prescriptions and home care instructions you will need. Also make sure you have a contact number for your provider or the hospital. This is in case you have problems or questions after the surgery.  · Do not lift anything heavier than 5 pounds for about 6 weeks. This gives tissue time to heal, and can prevent a hernia.  When to call your provider  After you get home, call your healthcare provider if you have:  · Fever of 100.4°F (38.0°C) or higher, or as advised by your provider  · Increased pain, redness, swelling, bleeding, or drainage at the incision site  · Pain that cannot be controlled with medicines  · Swollen belly  · Diarrhea or constipation that does not get better within 2 days  · Bloody or black, tarry stools  · Problems or pain with urination  · Chest pain, shortness of breath, or cough that wont go away  · Nausea and vomiting  · Dizziness or fainting  · Swelling or pain in the leg   Follow-up  Recovery time will vary for each person. It may take as long as 4 to 6 weeks. You will need to see your provider for follow-up. This is to remove any sutures or staples and to check your healing progress.  Risks and possible complications  These vary depending on the reason for the surgery. The most common risks and possible complications include:  · Bleeding  · Infection  · Can't find the cause of the problem, so more surgery or other treatments may be needed  · Incision doesn't heal  well  · Damage, injury, or problems with the bowels  · Risks of anesthesia   Date Last Reviewed: 7/1/2016  © 2843-4921 [employer]. [address] [place] [address], [place] [address]. All rights reserved. This information is not intended as a substitute for professional medical care. Always follow your healthcare professional's instructions.

## 2020-10-01 ENCOUNTER — PATIENT MESSAGE (OUTPATIENT)
Dept: SURGERY | Facility: CLINIC | Age: 40
End: 2020-10-01

## 2020-10-06 ENCOUNTER — OFFICE VISIT (OUTPATIENT)
Dept: SURGERY | Facility: CLINIC | Age: 40
DRG: 856 | End: 2020-10-06
Payer: COMMERCIAL

## 2020-10-06 VITALS — HEART RATE: 108 BPM | DIASTOLIC BLOOD PRESSURE: 90 MMHG | SYSTOLIC BLOOD PRESSURE: 157 MMHG

## 2020-10-06 DIAGNOSIS — K25.1 ACUTE GASTRIC ULCER WITH PERFORATION: Primary | ICD-10-CM

## 2020-10-06 PROCEDURE — 99024 PR POST-OP FOLLOW-UP VISIT: ICD-10-PCS | Mod: S$GLB,,, | Performed by: SURGERY

## 2020-10-06 PROCEDURE — 99024 POSTOP FOLLOW-UP VISIT: CPT | Mod: S$GLB,,, | Performed by: SURGERY

## 2020-10-06 PROCEDURE — 99999 PR PBB SHADOW E&M-EST. PATIENT-LVL III: ICD-10-PCS | Mod: PBBFAC,,, | Performed by: SURGERY

## 2020-10-06 PROCEDURE — 99999 PR PBB SHADOW E&M-EST. PATIENT-LVL III: CPT | Mod: PBBFAC,,, | Performed by: SURGERY

## 2020-10-06 RX ORDER — OXYCODONE HYDROCHLORIDE 5 MG/1
5 TABLET ORAL EVERY 4 HOURS PRN
Qty: 20 TABLET | Refills: 0 | Status: ON HOLD | OUTPATIENT
Start: 2020-10-06 | End: 2020-10-21 | Stop reason: HOSPADM

## 2020-10-06 RX ORDER — SIMETHICONE
40 LIQUID (ML) MISCELLANEOUS 3 TIMES DAILY
Qty: 100 ML | Refills: 3 | Status: SHIPPED | OUTPATIENT
Start: 2020-10-06 | End: 2021-01-27

## 2020-10-06 RX ORDER — CYCLOBENZAPRINE HCL 5 MG
5 TABLET ORAL 3 TIMES DAILY PRN
Qty: 20 TABLET | Refills: 0 | Status: ON HOLD | OUTPATIENT
Start: 2020-10-06 | End: 2020-10-21

## 2020-10-06 NOTE — PROGRESS NOTES
Surgery Clinic Note    Puja Quigley is a 40 y.o. year old female in clinic today for follow up of perforated gastric ulcer s/p Linden patch 2 wk ago. Today follows up with some complaints of LUQ pain and 'muscle spasms' also with recent bilateral flank 'spasm' which is better. Eating and drinking well. Had vomited once but relieved with zofran. Passing flatus with normal BM. No fevers.  No f/c/sob/cp    ROS:  Negative except above    PE:  Vitals:    10/06/20 0951   BP: (!) 157/90   Pulse: 108     Appears uncomfortable ambulating  No belabored breathing  Midline upper abdominal incision and RUQ drain site: well healed. No erythema.  ttp in LUQ. She has some voluntary guarding. When distracted, tenderness minimal on exam. No peritonitis    A/P:  Puja Quigley is a 40 y.o. year old female s/p Linden Patch for perforated gastric ulcer    -continue empiric tx of presumptive H Pylori. Labs in hospital did not get sent.  -f/u with GI. Already scheduled, will need eventual EGD  -flexeril for spasms. Simethicone for 'gas pain'. Refill norco for bedtime, use sparingly  -rtc 2 wks    Willie Magallon  General Surgery - [facility]  10/6/2020  10:16 AM

## 2020-10-07 ENCOUNTER — PATIENT MESSAGE (OUTPATIENT)
Dept: SURGERY | Facility: CLINIC | Age: 40
End: 2020-10-07

## 2020-10-08 ENCOUNTER — HOSPITAL ENCOUNTER (INPATIENT)
Facility: HOSPITAL | Age: 40
LOS: 13 days | Discharge: HOME | DRG: 856 | End: 2020-10-21
Attending: EMERGENCY MEDICINE | Admitting: EMERGENCY MEDICINE
Payer: COMMERCIAL

## 2020-10-08 ENCOUNTER — TELEPHONE (OUTPATIENT)
Dept: SURGERY | Facility: CLINIC | Age: 40
End: 2020-10-08

## 2020-10-08 DIAGNOSIS — J18.9 HOSPITAL-ACQUIRED PNEUMONIA: ICD-10-CM

## 2020-10-08 DIAGNOSIS — K27.5 PERFORATED ULCER: ICD-10-CM

## 2020-10-08 DIAGNOSIS — R06.02 SOB (SHORTNESS OF BREATH): ICD-10-CM
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
## 2020-10-08 DIAGNOSIS — K91.89 GASTRIC LEAK: Primary | ICD-10-CM

## 2020-10-08 DIAGNOSIS — E87.6 HYPOKALEMIA: ICD-10-CM

## 2020-10-08 DIAGNOSIS — K65.1 INTRA-ABDOMINAL ABSCESS: ICD-10-CM

## 2020-10-08 DIAGNOSIS — R00.0 TACHYCARDIA: ICD-10-CM

## 2020-10-08 DIAGNOSIS — A41.9 SEPSIS, DUE TO UNSPECIFIED ORGANISM, UNSPECIFIED WHETHER ACUTE ORGAN DYSFUNCTION PRESENT: ICD-10-CM

## 2020-10-08 LAB
ALBUMIN SERPL BCP-MCNC: 1.8 G/DL (ref 3.5–5.2)
ALP SERPL-CCNC: 252 U/L (ref 55–135)
ALT SERPL W/O P-5'-P-CCNC: 30 U/L (ref 10–44)
ANION GAP SERPL CALC-SCNC: 13 MMOL/L (ref 8–16)
AST SERPL-CCNC: 45 U/L (ref 10–40)
B-HCG UR QL: NEGATIVE
BACTERIA #/AREA URNS HPF: ABNORMAL /HPF
BASOPHILS # BLD AUTO: 0.03 K/UL (ref 0–0.2)
BASOPHILS NFR BLD: 0.3 % (ref 0–1.9)
BILIRUB SERPL-MCNC: 0.6 MG/DL (ref 0.1–1)
BILIRUB UR QL STRIP: NEGATIVE
BNP SERPL-MCNC: 11 PG/ML (ref 0–99)
BUN SERPL-MCNC: 3 MG/DL (ref 6–20)
CALCIUM SERPL-MCNC: 8.4 MG/DL (ref 8.7–10.5)
CHLORIDE SERPL-SCNC: 95 MMOL/L (ref 95–110)
CLARITY UR: CLEAR
CO2 SERPL-SCNC: 29 MMOL/L (ref 23–29)
COLOR UR: YELLOW
CREAT SERPL-MCNC: 0.6 MG/DL (ref 0.5–1.4)
CTP QC/QA: YES
CTP QC/QA: YES
D DIMER PPP IA.FEU-MCNC: 5.44 MG/L FEU
DIFFERENTIAL METHOD: ABNORMAL
EOSINOPHIL # BLD AUTO: 0 K/UL (ref 0–0.5)
EOSINOPHIL NFR BLD: 0.1 % (ref 0–8)
ERYTHROCYTE [DISTWIDTH] IN BLOOD BY AUTOMATED COUNT: 14.8 % (ref 11.5–14.5)
EST. GFR  (AFRICAN AMERICAN): >60 ML/MIN/1.73 M^2
EST. GFR  (NON AFRICAN AMERICAN): >60 ML/MIN/1.73 M^2
GLUCOSE SERPL-MCNC: 104 MG/DL (ref 70–110)
GLUCOSE UR QL STRIP: NEGATIVE
HCT VFR BLD AUTO: 29.4 % (ref 37–48.5)
HGB BLD-MCNC: 9.5 G/DL (ref 12–16)
HGB UR QL STRIP: NEGATIVE
HYALINE CASTS #/AREA URNS LPF: 0 /LPF
IMM GRANULOCYTES # BLD AUTO: 0.22 K/UL (ref 0–0.04)
IMM GRANULOCYTES NFR BLD AUTO: 2.5 % (ref 0–0.5)
KETONES UR QL STRIP: NEGATIVE
LACTATE SERPL-SCNC: 1.3 MMOL/L (ref 0.5–2.2)
LEUKOCYTE ESTERASE UR QL STRIP: ABNORMAL
LIPASE SERPL-CCNC: 32 U/L (ref 4–60)
LYMPHOCYTES # BLD AUTO: 1.2 K/UL (ref 1–4.8)
LYMPHOCYTES NFR BLD: 13 % (ref 18–48)
MAGNESIUM SERPL-MCNC: 1.5 MG/DL (ref 1.6–2.6)
MCH RBC QN AUTO: 27.5 PG (ref 27–31)
MCHC RBC AUTO-ENTMCNC: 32.3 G/DL (ref 32–36)
MCV RBC AUTO: 85 FL (ref 82–98)
MICROSCOPIC COMMENT: ABNORMAL
MONOCYTES # BLD AUTO: 0.9 K/UL (ref 0.3–1)
MONOCYTES NFR BLD: 10.2 % (ref 4–15)
NEUTROPHILS # BLD AUTO: 6.5 K/UL (ref 1.8–7.7)
NEUTROPHILS NFR BLD: 73.9 % (ref 38–73)
NITRITE UR QL STRIP: NEGATIVE
NRBC BLD-RTO: 0 /100 WBC
PH UR STRIP: 7 [PH] (ref 5–8)
PHOSPHATE SERPL-MCNC: 2.6 MG/DL (ref 2.7–4.5)
PLATELET # BLD AUTO: 367 K/UL (ref 150–350)
PMV BLD AUTO: 8.4 FL (ref 9.2–12.9)
POTASSIUM SERPL-SCNC: 2.7 MMOL/L (ref 3.5–5.1)
PROT SERPL-MCNC: 6.6 G/DL (ref 6–8.4)
PROT UR QL STRIP: ABNORMAL
RBC # BLD AUTO: 3.46 M/UL (ref 4–5.4)
RBC #/AREA URNS HPF: 1 /HPF (ref 0–4)
SARS-COV-2 RDRP RESP QL NAA+PROBE: NEGATIVE
SODIUM SERPL-SCNC: 137 MMOL/L (ref 136–145)
SP GR UR STRIP: 1.01 (ref 1–1.03)
SQUAMOUS #/AREA URNS HPF: 4 /HPF
TROPONIN I SERPL DL<=0.01 NG/ML-MCNC: <0.006 NG/ML (ref 0–0.03)
URN SPEC COLLECT METH UR: ABNORMAL
UROBILINOGEN UR STRIP-ACNC: ABNORMAL EU/DL
WBC # BLD AUTO: 8.82 K/UL (ref 3.9–12.7)
WBC #/AREA URNS HPF: 20 /HPF (ref 0–5)

## 2020-10-08 PROCEDURE — 25000003 PHARM REV CODE 250: Performed by: EMERGENCY MEDICINE

## 2020-10-08 PROCEDURE — 63600175 PHARM REV CODE 636 W HCPCS: Performed by: STUDENT IN AN ORGANIZED HEALTH CARE EDUCATION/TRAINING PROGRAM

## 2020-10-08 PROCEDURE — 87040 BLOOD CULTURE FOR BACTERIA: CPT | Mod: 59

## 2020-10-08 PROCEDURE — 87186 SC STD MICRODIL/AGAR DIL: CPT | Mod: 59

## 2020-10-08 PROCEDURE — 25000003 PHARM REV CODE 250: Performed by: STUDENT IN AN ORGANIZED HEALTH CARE EDUCATION/TRAINING PROGRAM

## 2020-10-08 PROCEDURE — 96367 TX/PROPH/DG ADDL SEQ IV INF: CPT

## 2020-10-08 PROCEDURE — 21400001 HC TELEMETRY ROOM

## 2020-10-08 PROCEDURE — 93005 ELECTROCARDIOGRAM TRACING: CPT

## 2020-10-08 PROCEDURE — 87076 CULTURE ANAEROBE IDENT EACH: CPT

## 2020-10-08 PROCEDURE — 83880 ASSAY OF NATRIURETIC PEPTIDE: CPT

## 2020-10-08 PROCEDURE — 85379 FIBRIN DEGRADATION QUANT: CPT

## 2020-10-08 PROCEDURE — U0002 COVID-19 LAB TEST NON-CDC: HCPCS | Performed by: EMERGENCY MEDICINE

## 2020-10-08 PROCEDURE — 96375 TX/PRO/DX INJ NEW DRUG ADDON: CPT

## 2020-10-08 PROCEDURE — 87077 CULTURE AEROBIC IDENTIFY: CPT

## 2020-10-08 PROCEDURE — 25000003 PHARM REV CODE 250: Performed by: PHYSICIAN ASSISTANT

## 2020-10-08 PROCEDURE — 93010 ELECTROCARDIOGRAM REPORT: CPT | Mod: ,,, | Performed by: INTERNAL MEDICINE

## 2020-10-08 PROCEDURE — 96365 THER/PROPH/DIAG IV INF INIT: CPT

## 2020-10-08 PROCEDURE — 83605 ASSAY OF LACTIC ACID: CPT

## 2020-10-08 PROCEDURE — 93010 EKG 12-LEAD: ICD-10-PCS | Mod: ,,, | Performed by: INTERNAL MEDICINE

## 2020-10-08 PROCEDURE — 81025 URINE PREGNANCY TEST: CPT | Performed by: EMERGENCY MEDICINE

## 2020-10-08 PROCEDURE — 63600175 PHARM REV CODE 636 W HCPCS: Performed by: EMERGENCY MEDICINE

## 2020-10-08 PROCEDURE — 85025 COMPLETE CBC W/AUTO DIFF WBC: CPT

## 2020-10-08 PROCEDURE — 63600175 PHARM REV CODE 636 W HCPCS: Performed by: PHYSICIAN ASSISTANT

## 2020-10-08 PROCEDURE — 84484 ASSAY OF TROPONIN QUANT: CPT

## 2020-10-08 PROCEDURE — 99285 EMERGENCY DEPT VISIT HI MDM: CPT | Mod: 25

## 2020-10-08 PROCEDURE — 87086 URINE CULTURE/COLONY COUNT: CPT

## 2020-10-08 PROCEDURE — 80053 COMPREHEN METABOLIC PANEL: CPT

## 2020-10-08 PROCEDURE — 87088 URINE BACTERIA CULTURE: CPT

## 2020-10-08 PROCEDURE — 25500020 PHARM REV CODE 255: Performed by: EMERGENCY MEDICINE

## 2020-10-08 PROCEDURE — 87491 CHLMYD TRACH DNA AMP PROBE: CPT

## 2020-10-08 PROCEDURE — 83690 ASSAY OF LIPASE: CPT

## 2020-10-08 PROCEDURE — 81000 URINALYSIS NONAUTO W/SCOPE: CPT

## 2020-10-08 PROCEDURE — 83735 ASSAY OF MAGNESIUM: CPT

## 2020-10-08 PROCEDURE — 96366 THER/PROPH/DIAG IV INF ADDON: CPT

## 2020-10-08 PROCEDURE — 84100 ASSAY OF PHOSPHORUS: CPT

## 2020-10-08 RX ORDER — SODIUM CHLORIDE, SODIUM LACTATE, POTASSIUM CHLORIDE, CALCIUM CHLORIDE 600; 310; 30; 20 MG/100ML; MG/100ML; MG/100ML; MG/100ML
INJECTION, SOLUTION INTRAVENOUS CONTINUOUS
Status: DISCONTINUED | OUTPATIENT
Start: 2020-10-08 | End: 2020-10-10

## 2020-10-08 RX ORDER — ACETAMINOPHEN 500 MG
1000 TABLET ORAL
Status: COMPLETED | OUTPATIENT
Start: 2020-10-08 | End: 2020-10-08

## 2020-10-08 RX ORDER — LIDOCAINE HYDROCHLORIDE 10 MG/ML
1 INJECTION, SOLUTION EPIDURAL; INFILTRATION; INTRACAUDAL; PERINEURAL ONCE
Status: DISCONTINUED | OUTPATIENT
Start: 2020-10-08 | End: 2020-10-21 | Stop reason: HOSPADM

## 2020-10-08 RX ORDER — FLUCONAZOLE 2 MG/ML
800 INJECTION, SOLUTION INTRAVENOUS ONCE
Status: COMPLETED | OUTPATIENT
Start: 2020-10-08 | End: 2020-10-09

## 2020-10-08 RX ORDER — MORPHINE SULFATE 4 MG/ML
4 INJECTION, SOLUTION INTRAMUSCULAR; INTRAVENOUS
Status: COMPLETED | OUTPATIENT
Start: 2020-10-08 | End: 2020-10-08

## 2020-10-08 RX ORDER — SODIUM CHLORIDE 0.9 % (FLUSH) 0.9 %
10 SYRINGE (ML) INJECTION
Status: DISCONTINUED | OUTPATIENT
Start: 2020-10-08 | End: 2020-10-21 | Stop reason: HOSPADM

## 2020-10-08 RX ORDER — OXYCODONE HYDROCHLORIDE 5 MG/1
5 TABLET ORAL EVERY 4 HOURS PRN
Status: DISCONTINUED | OUTPATIENT
Start: 2020-10-08 | End: 2020-10-21 | Stop reason: HOSPADM

## 2020-10-08 RX ORDER — MORPHINE SULFATE 4 MG/ML
2 INJECTION, SOLUTION INTRAMUSCULAR; INTRAVENOUS EVERY 4 HOURS PRN
Status: DISCONTINUED | OUTPATIENT
Start: 2020-10-08 | End: 2020-10-21 | Stop reason: HOSPADM

## 2020-10-08 RX ORDER — POTASSIUM CHLORIDE 1.5 G/1.58G
60 POWDER, FOR SOLUTION ORAL
Status: COMPLETED | OUTPATIENT
Start: 2020-10-08 | End: 2020-10-08

## 2020-10-08 RX ORDER — MAGNESIUM SULFATE 1 G/100ML
1 INJECTION INTRAVENOUS
Status: COMPLETED | OUTPATIENT
Start: 2020-10-08 | End: 2020-10-08

## 2020-10-08 RX ORDER — FLUCONAZOLE 2 MG/ML
400 INJECTION, SOLUTION INTRAVENOUS
Status: COMPLETED | OUTPATIENT
Start: 2020-10-09 | End: 2020-10-13

## 2020-10-08 RX ORDER — ONDANSETRON 2 MG/ML
4 INJECTION INTRAMUSCULAR; INTRAVENOUS
Status: COMPLETED | OUTPATIENT
Start: 2020-10-08 | End: 2020-10-08

## 2020-10-08 RX ORDER — ONDANSETRON 8 MG/1
8 TABLET, ORALLY DISINTEGRATING ORAL EVERY 8 HOURS PRN
Status: DISCONTINUED | OUTPATIENT
Start: 2020-10-08 | End: 2020-10-21 | Stop reason: HOSPADM

## 2020-10-08 RX ORDER — ACETAMINOPHEN 325 MG/1
650 TABLET ORAL EVERY 8 HOURS PRN
Status: DISCONTINUED | OUTPATIENT
Start: 2020-10-08 | End: 2020-10-21 | Stop reason: HOSPADM

## 2020-10-08 RX ORDER — MORPHINE SULFATE 4 MG/ML
6 INJECTION, SOLUTION INTRAMUSCULAR; INTRAVENOUS
Status: COMPLETED | OUTPATIENT
Start: 2020-10-08 | End: 2020-10-08

## 2020-10-08 RX ADMIN — ACETAMINOPHEN 1000 MG: 500 TABLET ORAL at 03:10

## 2020-10-08 RX ADMIN — OXYCODONE 5 MG: 5 TABLET ORAL at 08:10

## 2020-10-08 RX ADMIN — MAGNESIUM SULFATE 1 G: 1 INJECTION INTRAVENOUS at 05:10

## 2020-10-08 RX ADMIN — SODIUM CHLORIDE, SODIUM LACTATE, POTASSIUM CHLORIDE, AND CALCIUM CHLORIDE: .6; .31; .03; .02 INJECTION, SOLUTION INTRAVENOUS at 08:10

## 2020-10-08 RX ADMIN — POTASSIUM CHLORIDE 60 MEQ: 1.5 POWDER, FOR SOLUTION ORAL at 04:10

## 2020-10-08 RX ADMIN — PIPERACILLIN SODIUM AND TAZOBACTAM SODIUM 4.5 G: 4; .5 INJECTION, POWDER, LYOPHILIZED, FOR SOLUTION INTRAVENOUS at 05:10

## 2020-10-08 RX ADMIN — MORPHINE SULFATE 6 MG: 4 INJECTION INTRAVENOUS at 06:10

## 2020-10-08 RX ADMIN — MORPHINE SULFATE 2 MG: 4 INJECTION INTRAVENOUS at 11:10

## 2020-10-08 RX ADMIN — FLUCONAZOLE 800 MG: 2 INJECTION, SOLUTION INTRAVENOUS at 09:10

## 2020-10-08 RX ADMIN — IOHEXOL 100 ML: 350 INJECTION, SOLUTION INTRAVENOUS at 04:10

## 2020-10-08 RX ADMIN — SODIUM CHLORIDE 2300 ML: 0.9 INJECTION, SOLUTION INTRAVENOUS at 03:10

## 2020-10-08 RX ADMIN — MORPHINE SULFATE 4 MG: 4 INJECTION INTRAVENOUS at 03:10

## 2020-10-08 RX ADMIN — VANCOMYCIN HYDROCHLORIDE 1500 MG: 1.5 INJECTION, POWDER, LYOPHILIZED, FOR SOLUTION INTRAVENOUS at 03:10

## 2020-10-08 RX ADMIN — ONDANSETRON 4 MG: 2 INJECTION INTRAMUSCULAR; INTRAVENOUS at 03:10

## 2020-10-08 NOTE — PROGRESS NOTES
Pharmacokinetic Initial Assessment: IV Vancomycin    Assessment/Plan:    Initiate intravenous vancomycin with loading dose of 1500 mg once followed by a maintenance dose of vancomycin 1250mg IV every 12 hours  Desired empiric serum trough concentration is 10 to 20 mcg/mL  Draw vancomycin trough level 60 min prior to fourth dose on 10/10 at approximately 02:30  Pharmacy will continue to follow and monitor vancomycin.      Please contact pharmacy at extension [phone] with any questions regarding this assessment.     Thank you for the consult,   Monique Whitten       Patient brief summary:  Puja Quigley is a 40 y.o. female initiated on antimicrobial therapy with IV Vancomycin for treatment of suspected intra-abdominal infection    Drug Allergies:   Review of patient's allergies indicates:  No Known Allergies    Actual Body Weight:   74.8 kg    Renal Function:   Estimated Creatinine Clearance: 134.3 mL/min (based on SCr of 0.6 mg/dL).,     Dialysis Method (if applicable):  N/A    CBC (last 72 hours):  Recent Labs   Lab Result Units 10/08/20  1440   WBC K/uL 8.82   Hemoglobin g/dL 9.5*   Hematocrit % 29.4*   Platelets K/uL 367*   Gran% % 73.9*   Lymph% % 13.0*   Mono% % 10.2   Eosinophil% % 0.1   Basophil% % 0.3   Differential Method  Automated       Metabolic Panel (last 72 hours):  Recent Labs   Lab Result Units 10/08/20  1440 10/08/20  1509   Sodium mmol/L 137  --    Potassium mmol/L 2.7*  --    Chloride mmol/L 95  --    CO2 mmol/L 29  --    Glucose mg/dL 104  --    Glucose, UA   --  Negative   BUN, Bld mg/dL 3*  --    Creatinine mg/dL 0.6  --    Albumin g/dL 1.8*  --    Total Bilirubin mg/dL 0.6  --    Alkaline Phosphatase U/L 252*  --    AST U/L 45*  --    ALT U/L 30  --    Magnesium mg/dL 1.5*  --    Phosphorus mg/dL 2.6*  --        Drug levels (last 3 results):  No results for input(s): VANCOMYCINRA, VANCOMYCINPE, VANCOMYCINTR in the last 72 hours.    Microbiologic Results:  Microbiology Results (last 7 days)        Procedure Component Value Units Date/Time    Urine culture [[id number]] Collected: 10/08/20 1509    Order Status: No result Specimen: Urine Updated: 10/08/20 1617    C. trachomatis/N. gonorrhoeae by CHRIS LONGORIA [facility]; Cervix [[id number]] Collected: 10/08/20 1559    Order Status: Sent Specimen: Genital Updated: 10/08/20 1612    Blood culture #1 **CANNOT BE ORDERED STAT** [[id number]] Collected: 10/08/20 1440    Order Status: Sent Specimen: Blood from Peripheral, Antecubital, Left Updated: 10/08/20 1504    Blood culture #2 **CANNOT BE ORDERED STAT** [[id number]] Collected: 10/08/20 1451    Order Status: Sent Specimen: Blood from Peripheral, Antecubital, Right Updated: 10/08/20 1451

## 2020-10-08 NOTE — ED PROVIDER NOTES
Encounter Date: 10/8/2020       History     Chief Complaint   Patient presents with    Abdominal Pain     s/p abd surgery 9-24    Back Pain     middle/lower back pain chronic x 1 week    Spasms     started  x 1 week     CC: Abdominal Pain; Back Pain    HPI:   41 y/o female with no pertinent past medical history 14 days s/p ex-lap guerrero patch for perforated gastric ulcer (Dr. Magallon) and 8 weeks s/p uncomplicated  at [facility] (Dr. Reyes Lowry) presenting for evaluation of 1 week history of severe, 10/10 stabbing bilateral flank/lumbar back spasms L>R worse with movement and inspiration. She reports she is having chills and SOB that is worse with exertion. Has not had bowel movement in 3 days. Previously was having bowel movements after hospital discharge. Has been having RUQ and LUQ abdominal pain since the surgery that is worse after eating. She states she noticed some swelling to the LUQ. She discussed this with her surgeon and has been compliant with oxycodone and flexeril without relief of symptoms. Denies fever, chest pain, recent travel, hemoptysis, HRT, lower extremity pain or swelling, history of DVT/PE. She did not have an epidural during her delivery. Denies bowel or bladder incontinence, saddle anesthesia. She was diagnosed with postpartum pre-eclampsia and was compliant with Nifedipine until 2 weeks ago when she was taken off meds by her OBGYN. Denies HA, visual disturbance, dizziness, lightheadedness.         Review of patient's allergies indicates:  No Known Allergies  Past Medical History:   Diagnosis Date    Pregnancy 2020    delivered on 2020    Umbilical hernia      Past Surgical History:   Procedure Laterality Date    CLOSURE OF PERFORATED ULCER OF DUODENUM USING OMENTAL PATCH       Family History   Problem Relation Age of Onset    Colon cancer Neg Hx     Esophageal cancer Neg Hx      Social History     Tobacco Use    Smoking status: Former Smoker     Types: Cigarettes      Quit date: 2019     Years since quittin.8   Substance Use Topics    Alcohol use: Yes    Drug use: Not Currently     Review of Systems   Constitutional: Positive for chills. Negative for fever.   HENT: Negative for congestion, ear pain, rhinorrhea, sore throat and trouble swallowing.    Eyes: Negative for redness.   Respiratory: Positive for shortness of breath. Negative for cough and stridor.    Cardiovascular: Negative for chest pain.   Gastrointestinal: Positive for abdominal pain and constipation. Negative for blood in stool, diarrhea, nausea and vomiting.   Genitourinary: Negative for difficulty urinating, dysuria, frequency, hematuria and urgency.   Musculoskeletal: Positive for back pain. Negative for neck pain.   Skin: Negative for rash.   Neurological: Negative for dizziness, speech difficulty, weakness, light-headedness, numbness and headaches.   Hematological: Does not bruise/bleed easily.   Psychiatric/Behavioral: Negative for confusion.       Physical Exam     Initial Vitals [10/08/20 1415]   BP Pulse Resp Temp SpO2   (!) 143/93 (S) (!) 125 18 99.9 °F (37.7 °C) 96 %      MAP       --         Physical Exam    Nursing note and vitals reviewed.  Constitutional: She appears well-developed and well-nourished.   HENT:   Head: Normocephalic.   Right Ear: External ear normal.   Left Ear: External ear normal.   Nose: Nose normal.   Eyes: Conjunctivae are normal.   Cardiovascular: Normal rate and regular rhythm. Exam reveals no gallop and no friction rub.    No murmur heard.  Pulmonary/Chest: No respiratory distress. She has decreased breath sounds in the left lower field. She has no wheezes. She has no rhonchi. She has no rales.   Abdominal: Soft. Bowel sounds are normal. She exhibits no distension. There is no hepatosplenomegaly, splenomegaly or hepatomegaly. There is abdominal tenderness in the right upper quadrant, epigastric area and left upper quadrant. There is guarding. There is no rigidity, no  rebound, no CVA tenderness, no tenderness at McBurney's point and negative Bledsoe's sign.   Genitourinary:    Genitourinary Comments: Pelvic Exam chaperoned by Dyia Carbajal RN:   Scant amount of thin white discharge in vaginal vault. Scant amount of bleeding from os. No blood in vaginal vault. No CMT or adnexal ttp or masses.      Musculoskeletal: Normal range of motion.      Comments: TTP of thoracic paraspinal musculature bilaterally. Pain worse with movement      Neurological: She is alert. She has normal strength. No cranial nerve deficit or sensory deficit.   Skin: Skin is warm and dry.   No lower extremity swelling      Psychiatric: She has a normal mood and affect.         ED Course   Procedures  Labs Reviewed   CBC W/ AUTO DIFFERENTIAL - Abnormal; Notable for the following components:       Result Value    RBC 3.46 (*)     Hemoglobin 9.5 (*)     Hematocrit 29.4 (*)     RDW 14.8 (*)     Platelets 367 (*)     MPV 8.4 (*)     Immature Granulocytes 2.5 (*)     Immature Grans (Abs) 0.22 (*)     Gran% 73.9 (*)     Lymph% 13.0 (*)     All other components within normal limits   COMPREHENSIVE METABOLIC PANEL - Abnormal; Notable for the following components:    Potassium 2.7 (*)     BUN, Bld 3 (*)     Calcium 8.4 (*)     Albumin 1.8 (*)     Alkaline Phosphatase 252 (*)     AST 45 (*)     All other components within normal limits    Narrative:     K   critical result(s) called and verbal readback obtained from DANUTA PERALTA. by LM1 10/08/2020 15:48   CULTURE, BLOOD   CULTURE, BLOOD   C. TRACHOMATIS/N. GONORRHOEAE BY AMP DNA   LIPASE   LACTIC ACID, PLASMA   TROPONIN I   B-TYPE NATRIURETIC PEPTIDE   MAGNESIUM   PHOSPHORUS   URINALYSIS, REFLEX TO URINE CULTURE   D DIMER, QUANTITATIVE   URINALYSIS MICROSCOPIC   VAGINAL SCREEN   MAGNESIUM   PHOSPHORUS   SARS-COV-2 RDRP GENE   POCT URINE PREGNANCY          Imaging Results           X-Ray Chest AP Portable (Final result)  Result time 10/08/20 15:00:21    Final result by Corky  AMILCAR Buck MD (10/08/20 15:00:21)                 Impression:      Findings suggesting small left pleural effusion and probable left basilar atelectasis/infiltrate.    Borderline enlarged cardiac silhouette without evidence of failure, which may be magnified by AP portable technique.    This report was flagged in Epic as abnormal.      Electronically signed by: Corky Buck MD  Date:    10/08/2020  Time:    15:00             Narrative:    EXAMINATION:  XR CHEST AP PORTABLE    CLINICAL HISTORY:  Shortness of breath    TECHNIQUE:  Single frontal view of the chest was performed.    COMPARISON:  CT abdomen and pelvis 2020    FINDINGS:  Cardiac silhouette is upper limits of normal in size.  Upper mediastinal contours are within normal limits.  Pulmonary vasculature and hilar contours are within normal limits.  Interval opacification of the left lung base obscuring the diaphragm and costophrenic angle suggesting combination of pleural effusion and probable atelectasis/infiltrate.  Left upper lung zone and right hemithorax are otherwise well expanded and clear.  No pneumothorax.  Trachea is midline.  Osseous structures are intact.  Included upper abdomen is within normal limits.                                 Medical Decision Making:   Initial Assessment:   41 y/o female 14 days s/p ex lap with guerrero patch for perforated ulcer and 8 weeks s/p  presenting for 1 week of chills and L>R bilateral flank pain, worse with movement and inspiration with associated SOB. Additionally reports having 2 week history of upper abdominal pain since surgery. Has not had bowel movement in 3 days. is passing flatus.  Differential Diagnosis:   PNA, PTX, PE, postpartum cardiomyopathy, COVID-19, respiratory failure, ACS, CHF, acute abdomen, peritonitis, intraabdominal abscess, perforation, cervicitis, PID, UTI, sepsis among others  ED Management:  Initially pt with temp 99.F and tachycardic in 120s. Not hypoxic. Sepsis fluid bolus and  orders placed. Tylenol PO ordered for pain and possible developing fever. Blood cultures ordered and pending. Vancomycin started in ED.     UPT negative. COVID 19 negative.   CXR with findings consistent with pneumonia, likely hospital acquired due to recent 4 day admission.   CMP with hypokalemia 2.7. PO potassium ordered. Mg and Phos ordered and pending. ALK PHOS and AST mildly elevated, likely post-op. Lipase normal.      1525- developed fever in ED. concern for sepsis. Additional antibiotic, Zosyn ordered.     Troponin and BNP negative. Lactic normal.     Pelvic exam without evidence of PID, TOA or concerning findings for pelvic etiology of fever or symptoms.   CTA and CT abdomen pelvis ordered and pending.   Discussed pt with Dr. Witt at shift change who will follow up final results and final disposition of pt. He evaluated pt face to face and he agrees with assessment and plan.                             Clinical Impression:       ICD-10-CM ICD-9-CM   1. Sepsis, due to unspecified organism, unspecified whether acute organ dysfunction present  A41.9 038.9     995.91   2. SOB (shortness of breath)  R06.02 786.05   3. Tachycardia  R00.0 785.0   4. Hypokalemia  E87.6 276.8   5. Hospital-acquired pneumonia  J18.9 486    Y95                                                Willa Mcclain PA-C  10/08/20 1616       Willa Mcclain PA-C  10/08/20 1618

## 2020-10-09 LAB
ANION GAP SERPL CALC-SCNC: 12 MMOL/L (ref 8–16)
APTT BLDCRRT: 42.9 SEC (ref 21–32)
BASOPHILS # BLD AUTO: ABNORMAL K/UL (ref 0–0.2)
BASOPHILS NFR BLD: 0 % (ref 0–1.9)
BUN SERPL-MCNC: 3 MG/DL (ref 6–20)
CALCIUM SERPL-MCNC: 7.6 MG/DL (ref 8.7–10.5)
CHLORIDE SERPL-SCNC: 101 MMOL/L (ref 95–110)
CO2 SERPL-SCNC: 27 MMOL/L (ref 23–29)
CREAT SERPL-MCNC: 0.6 MG/DL (ref 0.5–1.4)
DIFFERENTIAL METHOD: ABNORMAL
EOSINOPHIL # BLD AUTO: ABNORMAL K/UL (ref 0–0.5)
EOSINOPHIL NFR BLD: 0 % (ref 0–8)
ERYTHROCYTE [DISTWIDTH] IN BLOOD BY AUTOMATED COUNT: 15.2 % (ref 11.5–14.5)
EST. GFR  (AFRICAN AMERICAN): >60 ML/MIN/1.73 M^2
EST. GFR  (NON AFRICAN AMERICAN): >60 ML/MIN/1.73 M^2
GLUCOSE SERPL-MCNC: 84 MG/DL (ref 70–110)
HCT VFR BLD AUTO: 27.4 % (ref 37–48.5)
HGB BLD-MCNC: 8.7 G/DL (ref 12–16)
IMM GRANULOCYTES # BLD AUTO: ABNORMAL K/UL (ref 0–0.04)
IMM GRANULOCYTES NFR BLD AUTO: ABNORMAL % (ref 0–0.5)
INR PPP: 1.4 (ref 0.8–1.2)
LYMPHOCYTES # BLD AUTO: ABNORMAL K/UL (ref 1–4.8)
LYMPHOCYTES NFR BLD: 14 % (ref 18–48)
MAGNESIUM SERPL-MCNC: 1.7 MG/DL (ref 1.6–2.6)
MCH RBC QN AUTO: 27.7 PG (ref 27–31)
MCHC RBC AUTO-ENTMCNC: 31.8 G/DL (ref 32–36)
MCV RBC AUTO: 87 FL (ref 82–98)
MONOCYTES # BLD AUTO: ABNORMAL K/UL (ref 0.3–1)
MONOCYTES NFR BLD: 11 % (ref 4–15)
NEUTROPHILS NFR BLD: 56 % (ref 38–73)
NEUTS BAND NFR BLD MANUAL: 19 %
NRBC BLD-RTO: 0 /100 WBC
PHOSPHATE SERPL-MCNC: 3.1 MG/DL (ref 2.7–4.5)
PLATELET # BLD AUTO: 309 K/UL (ref 150–350)
PMV BLD AUTO: 8.7 FL (ref 9.2–12.9)
POTASSIUM SERPL-SCNC: 2.9 MMOL/L (ref 3.5–5.1)
PROTHROMBIN TIME: 15.4 SEC (ref 9–12.5)
RBC # BLD AUTO: 3.14 M/UL (ref 4–5.4)
SODIUM SERPL-SCNC: 140 MMOL/L (ref 136–145)
WBC # BLD AUTO: 7.66 K/UL (ref 3.9–12.7)

## 2020-10-09 PROCEDURE — 87070 CULTURE OTHR SPECIMN AEROBIC: CPT

## 2020-10-09 PROCEDURE — 63600175 PHARM REV CODE 636 W HCPCS: Performed by: STUDENT IN AN ORGANIZED HEALTH CARE EDUCATION/TRAINING PROGRAM

## 2020-10-09 PROCEDURE — 85610 PROTHROMBIN TIME: CPT

## 2020-10-09 PROCEDURE — 21400001 HC TELEMETRY ROOM

## 2020-10-09 PROCEDURE — 84100 ASSAY OF PHOSPHORUS: CPT

## 2020-10-09 PROCEDURE — 87205 SMEAR GRAM STAIN: CPT

## 2020-10-09 PROCEDURE — 87075 CULTR BACTERIA EXCEPT BLOOD: CPT

## 2020-10-09 PROCEDURE — 99000 SPECIMEN HANDLING OFFICE-LAB: CPT

## 2020-10-09 PROCEDURE — 63600175 PHARM REV CODE 636 W HCPCS: Performed by: EMERGENCY MEDICINE

## 2020-10-09 PROCEDURE — 25000003 PHARM REV CODE 250: Performed by: PHYSICIAN ASSISTANT

## 2020-10-09 PROCEDURE — 87102 FUNGUS ISOLATION CULTURE: CPT

## 2020-10-09 PROCEDURE — 83735 ASSAY OF MAGNESIUM: CPT

## 2020-10-09 PROCEDURE — 85730 THROMBOPLASTIN TIME PARTIAL: CPT

## 2020-10-09 PROCEDURE — 63600175 PHARM REV CODE 636 W HCPCS: Performed by: PHYSICIAN ASSISTANT

## 2020-10-09 PROCEDURE — 25000003 PHARM REV CODE 250: Performed by: EMERGENCY MEDICINE

## 2020-10-09 PROCEDURE — 36415 COLL VENOUS BLD VENIPUNCTURE: CPT

## 2020-10-09 PROCEDURE — 85027 COMPLETE CBC AUTOMATED: CPT

## 2020-10-09 PROCEDURE — 87076 CULTURE ANAEROBE IDENT EACH: CPT

## 2020-10-09 PROCEDURE — 87186 SC STD MICRODIL/AGAR DIL: CPT

## 2020-10-09 PROCEDURE — 85007 BL SMEAR W/DIFF WBC COUNT: CPT

## 2020-10-09 PROCEDURE — 63600175 PHARM REV CODE 636 W HCPCS: Performed by: RADIOLOGY

## 2020-10-09 PROCEDURE — 80048 BASIC METABOLIC PNL TOTAL CA: CPT

## 2020-10-09 PROCEDURE — 87077 CULTURE AEROBIC IDENTIFY: CPT

## 2020-10-09 PROCEDURE — 25000003 PHARM REV CODE 250: Performed by: STUDENT IN AN ORGANIZED HEALTH CARE EDUCATION/TRAINING PROGRAM

## 2020-10-09 PROCEDURE — 63600175 PHARM REV CODE 636 W HCPCS: Performed by: SURGERY

## 2020-10-09 RX ORDER — MIDAZOLAM HYDROCHLORIDE 1 MG/ML
INJECTION INTRAMUSCULAR; INTRAVENOUS CODE/TRAUMA/SEDATION MEDICATION
Status: COMPLETED | OUTPATIENT
Start: 2020-10-09 | End: 2020-10-09

## 2020-10-09 RX ORDER — FENTANYL CITRATE 50 UG/ML
INJECTION, SOLUTION INTRAMUSCULAR; INTRAVENOUS CODE/TRAUMA/SEDATION MEDICATION
Status: COMPLETED | OUTPATIENT
Start: 2020-10-09 | End: 2020-10-09

## 2020-10-09 RX ORDER — POTASSIUM CHLORIDE 14.9 MG/ML
40 INJECTION INTRAVENOUS ONCE
Status: DISCONTINUED | OUTPATIENT
Start: 2020-10-09 | End: 2020-10-09

## 2020-10-09 RX ORDER — POTASSIUM CHLORIDE 7.45 MG/ML
10 INJECTION INTRAVENOUS
Status: COMPLETED | OUTPATIENT
Start: 2020-10-09 | End: 2020-10-09

## 2020-10-09 RX ADMIN — POTASSIUM CHLORIDE 10 MEQ: 7.46 INJECTION, SOLUTION INTRAVENOUS at 02:10

## 2020-10-09 RX ADMIN — FENTANYL CITRATE 50 MCG: 50 INJECTION INTRAMUSCULAR; INTRAVENOUS at 09:10

## 2020-10-09 RX ADMIN — OXYCODONE 5 MG: 5 TABLET ORAL at 07:10

## 2020-10-09 RX ADMIN — POTASSIUM CHLORIDE 10 MEQ: 7.46 INJECTION, SOLUTION INTRAVENOUS at 03:10

## 2020-10-09 RX ADMIN — POTASSIUM CHLORIDE 10 MEQ: 7.46 INJECTION, SOLUTION INTRAVENOUS at 01:10

## 2020-10-09 RX ADMIN — PIPERACILLIN SODIUM AND TAZOBACTAM SODIUM 4.5 G: 4; .5 INJECTION, POWDER, LYOPHILIZED, FOR SOLUTION INTRAVENOUS at 05:10

## 2020-10-09 RX ADMIN — PIPERACILLIN SODIUM AND TAZOBACTAM SODIUM 4.5 G: 4; .5 INJECTION, POWDER, LYOPHILIZED, FOR SOLUTION INTRAVENOUS at 02:10

## 2020-10-09 RX ADMIN — SODIUM CHLORIDE, SODIUM LACTATE, POTASSIUM CHLORIDE, AND CALCIUM CHLORIDE 1000 ML: .6; .31; .03; .02 INJECTION, SOLUTION INTRAVENOUS at 02:10

## 2020-10-09 RX ADMIN — OXYCODONE 5 MG: 5 TABLET ORAL at 02:10

## 2020-10-09 RX ADMIN — MORPHINE SULFATE 2 MG: 4 INJECTION INTRAVENOUS at 11:10

## 2020-10-09 RX ADMIN — PIPERACILLIN SODIUM AND TAZOBACTAM SODIUM 4.5 G: 4; .5 INJECTION, POWDER, LYOPHILIZED, FOR SOLUTION INTRAVENOUS at 11:10

## 2020-10-09 RX ADMIN — SODIUM CHLORIDE, SODIUM LACTATE, POTASSIUM CHLORIDE, AND CALCIUM CHLORIDE: .6; .31; .03; .02 INJECTION, SOLUTION INTRAVENOUS at 06:10

## 2020-10-09 RX ADMIN — OXYCODONE 5 MG: 5 TABLET ORAL at 11:10

## 2020-10-09 RX ADMIN — ACETAMINOPHEN 650 MG: 325 TABLET ORAL at 10:10

## 2020-10-09 RX ADMIN — POTASSIUM CHLORIDE 10 MEQ: 7.46 INJECTION, SOLUTION INTRAVENOUS at 12:10

## 2020-10-09 RX ADMIN — FLUCONAZOLE 400 MG: 2 INJECTION, SOLUTION INTRAVENOUS at 08:10

## 2020-10-09 RX ADMIN — MIDAZOLAM HYDROCHLORIDE 1 MG: 1 INJECTION, SOLUTION INTRAMUSCULAR; INTRAVENOUS at 09:10

## 2020-10-09 RX ADMIN — ACETAMINOPHEN 650 MG: 325 TABLET ORAL at 11:10

## 2020-10-09 RX ADMIN — SODIUM CHLORIDE, SODIUM LACTATE, POTASSIUM CHLORIDE, AND CALCIUM CHLORIDE: .6; .31; .03; .02 INJECTION, SOLUTION INTRAVENOUS at 11:10

## 2020-10-09 RX ADMIN — OXYCODONE 5 MG: 5 TABLET ORAL at 06:10

## 2020-10-09 RX ADMIN — VANCOMYCIN HYDROCHLORIDE 1250 MG: 1.25 INJECTION, POWDER, LYOPHILIZED, FOR SOLUTION INTRAVENOUS at 02:10

## 2020-10-09 RX ADMIN — VANCOMYCIN HYDROCHLORIDE 1250 MG: 1.25 INJECTION, POWDER, LYOPHILIZED, FOR SOLUTION INTRAVENOUS at 03:10

## 2020-10-09 RX ADMIN — ACETAMINOPHEN 650 MG: 325 TABLET ORAL at 02:10

## 2020-10-09 NOTE — CONSULTS
Pt seen this morning -states has been breastfeeding some at home but is in so much pain at this time she does not wish to pump -states breast are comfortable and will call after surgery to have a pump brought in as needed

## 2020-10-09 NOTE — NURSING
Received report from DENNY Diaz. Pt AAOx4, RR even and unlabored, PERRL with c/o 10/10 aching pain w/ tightness. Telemetry monitor in place. Saline lock in place to site is clear. Pt  Admission assessment in progress, pt informed of md orders, oriented to environment and safety maintained with bed low side rails up x2 with nurse call bell within reach w/ family at bedside

## 2020-10-09 NOTE — CONSULTS
Inpatient Radiology Pre-procedure Note    History of Present Illness:  Puja Quigley is a 40 y.o. female with pertinent PMHx of perforated gastric ulcer s/p ex lap and guerrero patch repair 9/24/20 c/b large abscess in the gastrohepatic space requiring urgent decompression and fluid sampling/analysis for symptom relief and treatment planning.    A new inpatient IR consult received for CT-guided placement of a percutaneous intra-peritoneal abscess drainage catheter.    Admission H&P reviewed.  Past Medical History:   Diagnosis Date    Pregnancy 08/12/2020    delivered on 8/12/2020    Umbilical hernia      Past Surgical History:   Procedure Laterality Date    CLOSURE OF PERFORATED ULCER OF DUODENUM USING OMENTAL PATCH       Review of Systems:   As documented in primary team H&P    Home Meds:   Prior to Admission medications    Medication Sig Start Date End Date Taking? Authorizing Provider   amoxicillin (AMOXIL) 500 MG capsule Take 2 capsules (1,000 mg total) by mouth every 12 (twelve) hours. for 14 days 9/28/20 10/12/20 Yes Puja Coyne MD   clarithromycin (BIAXIN) 500 MG tablet Take 1 tablet (500 mg total) by mouth every 12 (twelve) hours. for 14 days 9/28/20 10/12/20 Yes Puja Coyne MD   cyclobenzaprine (FLEXERIL) 5 MG tablet Take 1 tablet (5 mg total) by mouth 3 (three) times daily as needed for Muscle spasms. 10/6/20 10/16/20 Yes Willie Magallon MD   hydrocortisone (ANUSOL-HC) 2.5 % rectal cream Place rectally 2 (two) times daily. 8/21/20  Yes Socrates Terrazas MD   oxyCODONE (ROXICODONE) 5 MG immediate release tablet Take 1 tablet (5 mg total) by mouth every 4 (four) hours as needed for Pain. 10/6/20  Yes Willie Magallon MD   oxyCODONE-acetaminophen (PERCOCET) 5-325 mg per tablet Take 1 tablet by mouth every 4 (four) hours as needed for Pain. 9/28/20  Yes Puja Coyne MD   pantoprazole (PROTONIX) 40 MG tablet Take 1 tablet (40 mg total) by mouth 2 (two) times daily. 9/28/20 9/28/21 Yes Puja Coyne MD    senna-docusate 8.6-50 mg (PERICOLACE) 8.6-50 mg per tablet Take 1 tablet by mouth once daily.   Yes Historical Provider   simethicone, bulk, Liqd 40 mg by Misc.(Non-Drug; Combo Route) route 3 (three) times daily. 10/6/20  Yes Willie Magallon MD   acetaminophen (TYLENOL) 500 MG tablet Take 500 mg by mouth every 6 (six) hours as needed for Pain.    Historical Provider   ondansetron (ZOFRAN-ODT) 4 MG TbDL Take 2 tablets (8 mg total) by mouth every 6 (six) hours as needed. 9/28/20   Puja Coyne MD   dicyclomine (BENTYL) 10 MG capsule Take 1 capsule (10 mg total) by mouth before meals as needed (TID PRN). 5/25/20 10/9/20  Socrates Terrazas MD   famotidine (PEPCID) 20 MG tablet Take 20 mg by mouth daily as needed. 3/20/20 10/9/20  Historical Provider     Scheduled Meds:    fluconazole (DIFLUCAN) IVPB  400 mg Intravenous Q24H    lidocaine (PF) 10 mg/ml (1%)  1 mL Other Once    piperacillin-tazobactam (ZOSYN) IVPB  4.5 g Intravenous Q8H    vancomycin (VANCOCIN) IVPB  1,250 mg Intravenous Q12H     Continuous Infusions:    lactated ringers 125 mL/hr at 10/09/20 0641     PRN Meds:acetaminophen, morphine, ondansetron, oxyCODONE, sodium chloride 0.9%, Pharmacy to dose Vancomycin consult **AND** vancomycin - pharmacy to dose     Anticoagulants/Antiplatelets: no anticoagulation    Allergies: Review of patient's allergies indicates:  No Known Allergies     Sedation Hx: have not been any systemic reactions    Labs:  Recent Labs   Lab 10/09/20  0525   INR 1.4*       Recent Labs   Lab 10/09/20  0525   WBC 7.66   HGB 8.7*   HCT 27.4*   MCV 87         Recent Labs   Lab 10/08/20  1440 10/09/20  0525    84    140   K 2.7* 2.9*   CL 95 101   CO2 29 27   BUN 3* 3*   CREATININE 0.6 0.6   CALCIUM 8.4* 7.6*   MG 1.5* 1.7   ALT 30  --    AST 45*  --    ALBUMIN 1.8*  --    BILITOT 0.6  --      Physical Exam:  ASA: III  Mallampati: II    General: no acute distress  Mental Status: alert and oriented to person, place and  time  HEENT: normocephalic, atraumatic  Chest: unlabored breathing  Heart: regular heart rate  Abdomen: +distended, TTP and guarding.  Extremity: moves all extremities    Vitals:  Temp: 99.3 °F (37.4 °C) (10/09/20 0341)  Pulse: 108 (10/09/20 0341)  Resp: 18 (10/09/20 0638)  BP: 133/80 (10/09/20 0341)  SpO2: 96 % (10/09/20 0341)     A/P:  40 y.o. female with large abscess in the gastrohepatic space requiring urgent decompression and fluid sampling/analysis for symptom relief and treatment planning.    1. Gastrohepatic space abscess - Will attempt CT-guided placement of a percutaneous intra-peritoneal abscess drainage catheter under moderate conscious sedation at approximately 9:30AM today.    Risks (including, but not limited to, pain, bleeding, infection, damage to nearby structures, failure to obtain sufficient material for a diagnosis, the need for additional procedures, and death), benefits, and alternatives were discussed with the patient. All questions were answered to the best of my abilities. The patient wishes to proceed with the procedure. Written informed consent was obtained.    Thank you for considering IR for the care of your patient.     Castro Bermudez MD  Interventional Radiology

## 2020-10-09 NOTE — H&P
[facility]  History & Physical   Surgery    Subjective:     Chief Complaint/Reason for Admission: abdominal pain/post-op abscess     History of Present Illness:  Patient is a 40 y.o. female w/ h/o perforated gastric ulcer s/p ex lap and guerrero patch repair 9/24/20. Patient did well post-op and on POD2 a swallow study was performed w/ no evidence of leak. Patient was seen in clinic a few days ago w/ complaint of muscle spasms. She re-presented today w/ continued complaint of b/l upper quadrant abdominal pain and muscle spasms. Denies fever, nausea, emesis. Reports constipation, and SOB on exertion.       Patient Active Problem List    Diagnosis Date Noted    Sepsis 10/08/2020    Abdominal pain 09/24/2020     Past Medical History:   Diagnosis Date    Pregnancy 08/12/2020    delivered on 8/12/2020    Umbilical hernia       Past Surgical History:   Procedure Laterality Date    CLOSURE OF PERFORATED ULCER OF DUODENUM USING OMENTAL PATCH        Medications Prior to Admission   Medication Sig Dispense Refill Last Dose    amoxicillin (AMOXIL) 500 MG capsule Take 2 capsules (1,000 mg total) by mouth every 12 (twelve) hours. for 14 days 56 capsule 0 Past Week at Unknown time    clarithromycin (BIAXIN) 500 MG tablet Take 1 tablet (500 mg total) by mouth every 12 (twelve) hours. for 14 days 28 tablet 0 Past Week at Unknown time    cyclobenzaprine (FLEXERIL) 5 MG tablet Take 1 tablet (5 mg total) by mouth 3 (three) times daily as needed for Muscle spasms. 20 tablet 0 10/8/2020 at Unknown time    hydrocortisone (ANUSOL-HC) 2.5 % rectal cream Place rectally 2 (two) times daily. 30 g 1 Past Week at Unknown time    oxyCODONE (ROXICODONE) 5 MG immediate release tablet Take 1 tablet (5 mg total) by mouth every 4 (four) hours as needed for Pain. 20 tablet 0 10/8/2020 at Unknown time    oxyCODONE-acetaminophen (PERCOCET) 5-325 mg per tablet Take 1 tablet by mouth every 4 (four) hours as needed for Pain. 28  tablet 0 Past Week at Unknown time    pantoprazole (PROTONIX) 40 MG tablet Take 1 tablet (40 mg total) by mouth 2 (two) times daily. 60 tablet 11 Past Week at Unknown time    senna-docusate 8.6-50 mg (PERICOLACE) 8.6-50 mg per tablet Take 1 tablet by mouth once daily.   Past Month at Unknown time    simethicone, bulk, Liqd 40 mg by Misc.(Non-Drug; Combo Route) route 3 (three) times daily. 100 mL 3 Past Week at Unknown time    acetaminophen (TYLENOL) 500 MG tablet Take 500 mg by mouth every 6 (six) hours as needed for Pain.       ondansetron (ZOFRAN-ODT) 4 MG TbDL Take 2 tablets (8 mg total) by mouth every 6 (six) hours as needed. 30 tablet 0      Review of patient's allergies indicates:  No Known Allergies   Social History     Tobacco Use    Smoking status: Former Smoker     Types: Cigarettes     Quit date: 2019     Years since quittin.8   Substance Use Topics    Alcohol use: Yes      Family History   Problem Relation Age of Onset    Colon cancer Neg Hx     Esophageal cancer Neg Hx         Review of Systems  Review of Systems   Constitutional: Positive for chills. Negative for fever.   HENT: Negative for hearing loss and sore throat.    Eyes: Negative for blurred vision and double vision.   Respiratory: Positive for shortness of breath. Negative for cough.    Cardiovascular: Negative for chest pain and palpitations.   Gastrointestinal: Positive for abdominal pain and constipation. Negative for nausea and vomiting.   Genitourinary: Negative for dysuria and hematuria.   Musculoskeletal: Negative for joint pain and myalgias.   Skin: Negative for itching and rash.   Neurological: Negative for dizziness and headaches.         OBJECTIVE:     Vital signs in last 24 hours:  Temp:  [98.7 °F (37.1 °C)-102.1 °F (38.9 °C)] 99.3 °F (37.4 °C)  Pulse:  [105-129] 108  Resp:  [13-35] 18  SpO2:  [95 %-99 %] 96 %  BP: (133-158)/() 133/80    Physical Exam   Constitutional: She is oriented to person, place, and  time and well-developed, well-nourished, and in no distress. No distress.   HENT:   Head: Normocephalic and atraumatic.   Eyes: Pupils are equal, round, and reactive to light. Conjunctivae are normal.   Neck: Normal range of motion. Neck supple.   Cardiovascular: Regular rhythm.   Tachycardic     Pulmonary/Chest: Effort normal. No respiratory distress.   Abdominal: Soft. She exhibits distension. There is abdominal tenderness. There is guarding. There is no rebound.   Musculoskeletal: Normal range of motion.         General: No edema.   Neurological: She is alert and oriented to person, place, and time.   Skin: Skin is warm and dry.   Psychiatric: Mood and affect normal.         Data Review:  CBC:   Recent Labs   Lab 10/08/20  1440   WBC 8.82   RBC 3.46*   HGB 9.5*   HCT 29.4*   *   MCV 85   MCH 27.5   MCHC 32.3     BMP:   Recent Labs   Lab 10/09/20  0525   GLU 84      K 2.9*      CO2 27   BUN 3*   CREATININE 0.6   CALCIUM 7.6*   MG 1.7     CMP:   Recent Labs   Lab 10/08/20  1440 10/09/20  0525    84   CALCIUM 8.4* 7.6*   ALBUMIN 1.8*  --    PROT 6.6  --     140   K 2.7* 2.9*   CO2 29 27   CL 95 101   BUN 3* 3*   CREATININE 0.6 0.6   ALKPHOS 252*  --    ALT 30  --    AST 45*  --    BILITOT 0.6  --      LFTs:   Recent Labs   Lab 10/08/20  1440   ALT 30   AST 45*   ALKPHOS 252*   BILITOT 0.6   PROT 6.6   ALBUMIN 1.8*     Coagulation: No results for input(s): LABPROT, INR, APTT in the last 168 hours.  Cardiac markers:   Recent Labs   Lab 10/08/20  1440   TROPONINI <0.006       Imaging: Large 17.7cm abscess in the gastrohepatic space     ASSESSMENT/PLAN:     41yo F w/ h/o perforated gastric ulcer s/p ex lap and guerrero patch repair 9/24/20 presents with large abscess in the gastrohepatic space.     - Admit to general surgery   - NPO, IVFs   - Broad spectrum antibiotics with vanc/zosyn/diflucan   - Tylenol prn fever   - Pain meds prn   - Will consult IR for drainage of large intraabdominal  abscess   - Daily labs   - IS   - Ppx: SCDs, holding chemical DVT ppx for IR procedure   - Dispo: inpatient mgmt     Puja Coyne MD PGY5  General Surgery

## 2020-10-09 NOTE — NURSING
Received report from nurse Migel RN.  Patient alert, oriented and voiced no complaint of pain or discomfort.  Patient left for IR remove fluid.

## 2020-10-09 NOTE — PLAN OF CARE
SW utilized medical record to complete discharge planning assessment. Patient off of the floor with I&R for drainage of abscess.       [id number] - [place] - [address] AT [facility] [address]  [address] [place] [address]  Phone: [phone] Fax: [phone]    Extended Emergency Contact Information  Primary Emergency Contact: Alvino Lee   [place]  Mobile Phone: [phone]  Relation: Significant other       10/09/20 0946   Discharge Assessment   Assessment Type Discharge Planning Assessment   Assessment information obtained from? Medical Record   Prior to hospitilization cognitive status: Alert/Oriented   Prior to hospitalization functional status: Independent   Current cognitive status: Alert/Oriented   Current Functional Status: Independent   Facility Arrived From: Home   Lives With significant other  (Alvino)   Able to Return to Prior Arrangements yes   Is patient able to care for self after discharge? Yes   Patient's perception of discharge disposition home or selfcare   Readmission Within the Last 30 Days no previous admission in last 30 days   Patient currently being followed by outpatient case management? No   Patient currently receives any other outside agency services? No   Equipment Currently Used at Home none   Do you have any problems affording any of your prescribed medications? No   Is the patient taking medications as prescribed? yes   Does the patient have transportation home? Yes   Transportation Anticipated family or friend will provide   Dialysis Name and Scheduled days N/A   Does the patient receive services at the Coumadin Clinic? No   Discharge Plan A Home   Discharge Plan B Home   DME Needed Upon Discharge  none   Patient/Family in Agreement with Plan yes

## 2020-10-09 NOTE — PROGRESS NOTES
MEWS Monitoring: Chart check completed, abnormal VS noted, pt previously febrile, temp improved, post IR procedure.  bedside RN, Carlita at bedside assisting pt during active rounds no concerns verbalized at this time, instructed to call [phone] for further concerns or assistance..

## 2020-10-09 NOTE — PLAN OF CARE
Problem: Adult Inpatient Plan of Care  Goal: Plan of Care Review  Outcome: Ongoing, Progressing  Goal: Patient-Specific Goal (Individualization)  Outcome: Ongoing, Progressing  Goal: Absence of Hospital-Acquired Illness or Injury  Outcome: Ongoing, Progressing  Goal: Optimal Comfort and Wellbeing  Outcome: Ongoing, Progressing  Goal: Readiness for Transition of Care  Outcome: Ongoing, Progressing  Goal: Rounds/Family Conference  Outcome: Ongoing, Progressing     Problem: Adjustment to Illness (Sepsis/Septic Shock)  Goal: Optimal Coping  Outcome: Ongoing, Progressing     Problem: Bleeding (Sepsis/Septic Shock)  Goal: Absence of Bleeding  Outcome: Ongoing, Progressing     Problem: Glycemic Control Impaired (Sepsis/Septic Shock)  Goal: Blood Glucose Level Within Desired Range  Outcome: Ongoing, Progressing     Problem: Hemodynamic Instability (Sepsis/Septic Shock)  Goal: Effective Tissue Perfusion  Outcome: Ongoing, Progressing     Problem: Infection (Sepsis/Septic Shock)  Goal: Absence of Infection Signs/Symptoms  Outcome: Ongoing, Progressing     Problem: Nutrition Impaired (Sepsis/Septic Shock)  Goal: Optimal Nutrition Intake  Outcome: Ongoing, Progressing     Problem: Respiratory Compromise (Sepsis/Septic Shock)  Goal: Effective Oxygenation and Ventilation  Outcome: Ongoing, Progressing

## 2020-10-09 NOTE — NURSING
Noticed that pt HR increased to 130's on monitor. Entered pt room and observed pt sitting up w/ c/o back spasms and abdominal pain. Pt informed that MD will be called d/t pt increased HR and will get pain med as well. Informed by Rapid Response Nurse that pt also was febrile. MD paged regarding pt ST on monitor w/ HR up 140's w/ activity as well as febrile. MD ordered 1L Bolus of LR and to give pt Tylenol.

## 2020-10-09 NOTE — NURSING
[facility]-  RN Proactive Rounding Note    Date of Visit: 10/09/2020  Time of Visit: 0205    Admit Date: 10/8/2020  LOS: 1    Code Status: Full Code     Attending Physician: Willie Magallon MD    TRIGGER:    Reason for Round:  RN concerned    ASSESSMENT:     Abnormal Vital Signs: 99.5, 95% RA, 18, 129, 144/94  Clinical Issues: Circulatory  MEWS Score: 3     INTERVENTIONS/ RECOMMENDATIONS:     This RN made bedside rounds due to . Upon my arrival to the room the patient's heart rate was 123 and Temp 101.1. She is also complaining of pain. Primary Nurse spoke w/ General Surgery who ordered Tylenol and LR 1 liter bolus. Respirations even and unlabored. Answers questions appropriately.  Will continue to monitor.    Discussed plan of care with RNErica.    PHYSICIAN ESCALATION:     Yes/No  yes    Orders received and case discussed with Dr. Pennington .    Disposition: Remain in room 333.    FOLLOW-UP/CONTINGENCY:     Call back the Rapid Response Nurse at [phone] for additional questions or concerns.

## 2020-10-09 NOTE — NURSING
[employer] Flexima APDL locking pigtail drainage catheter system [id number]  REF [id number]  LOT [id number]  GTIN [id number]  Placed in midline abdomen by Dr. Bermudez.

## 2020-10-09 NOTE — BRIEF OP NOTE
Radiology Post-Procedure Note    Pre Op Diagnosis: Gastrohepatic space abscess  Post Op Diagnosis: Same    Procedure: CT-guided placement of a 10-Fr percutaneous epigastric/trans-abdominal-approach intra-peritoneal abscess drainage catheter    Procedure performed by: Castro Bermudez MD    Written Informed Consent Obtained: Yes  Specimen Removed: YES, 800-cc of frankly purulent, mildly thick fluid  Estimated Blood Loss: none    Findings:   40 y.o. female with abscess in the gastrohepatic space abscess s/p successful CT-guided placement of a 10-Fr percutaneous epigastric/trans-abdominal-approach intra-peritoneal abscess drainage catheter with approx. 800-cc of purulent fluid removed and sample sent for analysis. Small residual fluid and air remain in the abscess cavity. Patient tolerated the procedure well. No immediate post-procedural complications noted. Moderate sedation was used.    Drain must be flushed with 10-cc of sterile NS BID to prevent clogging and assist with drainage.     Thank you for considering IR for the care of your patient.     Castro Bermudez MD  Interventional Radiology

## 2020-10-09 NOTE — H&P
[facility]  General Surgery  History & Physical    Patient Name: Puja Quigley  MRN: [id number]  Admission Date: 10/8/2020  Attending Physician: Willie Magallon MD   Primary Care Provider: Primary Doctor No    Patient information was obtained from patient and ER records.     Subjective:     Chief Complaint/Reason for Admission: abdominal pain    History of Present Illness:  Patient is a 40 y.o. female presents with PUD and recent admission for perforated gastric ulcer, w Linden patch for repair, who presents to ED after having worsening pain, nausea. She was seen in my surgery clinic this week with some pain and 'muscle spasm' but has not improved, and called clinic saying she feels worse, recommended to present to ED. Onset of symptoms was a few days ago, has vaguely been worsening since surgery and discharge.  Currently, nauseated, appetite is down.    No current facility-administered medications on file prior to encounter.      Current Outpatient Medications on File Prior to Encounter   Medication Sig    amoxicillin (AMOXIL) 500 MG capsule Take 2 capsules (1,000 mg total) by mouth every 12 (twelve) hours. for 14 days    clarithromycin (BIAXIN) 500 MG tablet Take 1 tablet (500 mg total) by mouth every 12 (twelve) hours. for 14 days    cyclobenzaprine (FLEXERIL) 5 MG tablet Take 1 tablet (5 mg total) by mouth 3 (three) times daily as needed for Muscle spasms.    hydrocortisone (ANUSOL-HC) 2.5 % rectal cream Place rectally 2 (two) times daily.    oxyCODONE (ROXICODONE) 5 MG immediate release tablet Take 1 tablet (5 mg total) by mouth every 4 (four) hours as needed for Pain.    oxyCODONE-acetaminophen (PERCOCET) 5-325 mg per tablet Take 1 tablet by mouth every 4 (four) hours as needed for Pain.    pantoprazole (PROTONIX) 40 MG tablet Take 1 tablet (40 mg total) by mouth 2 (two) times daily.    senna-docusate 8.6-50 mg (PERICOLACE) 8.6-50 mg per tablet Take 1 tablet by mouth once daily.     simethicone, bulk, Liqd 40 mg by Misc.(Non-Drug; Combo Route) route 3 (three) times daily.    acetaminophen (TYLENOL) 500 MG tablet Take 500 mg by mouth every 6 (six) hours as needed for Pain.    ondansetron (ZOFRAN-ODT) 4 MG TbDL Take 2 tablets (8 mg total) by mouth every 6 (six) hours as needed.    [DISCONTINUED] dicyclomine (BENTYL) 10 MG capsule Take 1 capsule (10 mg total) by mouth before meals as needed (TID PRN).    [DISCONTINUED] famotidine (PEPCID) 20 MG tablet Take 20 mg by mouth daily as needed.       Review of patient's allergies indicates:  No Known Allergies    Past Medical History:   Diagnosis Date    Pregnancy 2020    delivered on 2020    Umbilical hernia      Past Surgical History:   Procedure Laterality Date    CLOSURE OF PERFORATED ULCER OF DUODENUM USING OMENTAL PATCH       Family History     None        Tobacco Use    Smoking status: Former Smoker     Types: Cigarettes     Quit date: 2019     Years since quittin.8   Substance and Sexual Activity    Alcohol use: Yes    Drug use: Not Currently    Sexual activity: Yes     Review of Systems   Constitutional: Positive for activity change, appetite change, fatigue and fever. Negative for chills.   HENT: Negative.    Eyes: Negative.    Respiratory: Negative for cough, chest tightness and shortness of breath.    Cardiovascular: Negative.    Gastrointestinal: Positive for abdominal pain and nausea. Negative for blood in stool, constipation, diarrhea and vomiting.   Endocrine: Negative for cold intolerance and heat intolerance.   Genitourinary: Negative.    Musculoskeletal: Negative.    Skin: Negative.  Negative for rash.   Neurological: Negative for dizziness, syncope and light-headedness.   Psychiatric/Behavioral: Negative for agitation, confusion and hallucinations.     Objective:     Vital Signs (Most Recent):  Temp: 99.3 °F (37.4 °C) (10/09/20 0341)  Pulse: 108 (10/09/20 0341)  Resp: 18 (10/09/20 06)  BP: 133/80  (10/09/20 0341)  SpO2: 96 % (10/09/20 0341) Vital Signs (24h Range):  Temp:  [98.7 °F (37.1 °C)-102.1 °F (38.9 °C)] 99.3 °F (37.4 °C)  Pulse:  [105-129] 108  Resp:  [13-35] 18  SpO2:  [95 %-99 %] 96 %  BP: (133-158)/() 133/80     Weight: 80.5 kg (177 lb 7.5 oz)  Body mass index is 27.8 kg/m².    Physical Exam  Constitutional:       General: She is not in acute distress.     Appearance: She is well-developed. She is not diaphoretic.   HENT:      Head: Normocephalic and atraumatic.   Eyes:      Conjunctiva/sclera: Conjunctivae normal.      Pupils: Pupils are equal, round, and reactive to light.   Neck:      Musculoskeletal: Normal range of motion and neck supple.   Cardiovascular:      Rate and Rhythm: Normal rate and regular rhythm.      Heart sounds: Normal heart sounds. No murmur. No friction rub. No gallop.    Pulmonary:      Effort: Pulmonary effort is normal. No respiratory distress.      Breath sounds: Normal breath sounds. No stridor. No wheezing.   Abdominal:      General: Bowel sounds are normal. There is no distension.      Palpations: Abdomen is soft.      Tenderness: There is abdominal tenderness (no peritonitis).   Musculoskeletal: Normal range of motion.   Skin:     General: Skin is warm and dry.      Findings: No rash.   Neurological:      Mental Status: She is alert and oriented to person, place, and time.      Cranial Nerves: No cranial nerve deficit.   Psychiatric:         Behavior: Behavior normal.         Significant Labs:  reviewed. hypokalemic. normal wbc    Significant Diagnostics:  CT: I have reviewed all pertinent results/findings within the past 24 hours and my personal findings are:  large abscess    Assessment/Plan:     Active Diagnoses:    Diagnosis Date Noted POA    Sepsis [A41.9] 10/08/2020 Yes      Problems Resolved During this Admission:     VTE Risk Mitigation (From admission, onward)         Ordered     IP VTE HIGH RISK PATIENT  Once      10/08/20 1810     [facility] sequential  compression device  Until discontinued      10/08/20 1810              40 yr old AAF s/p guerrero patch now with post op abscess  -admit  -npo  -ivf  -broadspectrum antibiotics  -IR consult for drainage  -prn pain medications  -scd  -activity as tolerated  -daily labs  -davon VO and agusto Magallon MD  General Surgery  [facility]

## 2020-10-09 NOTE — PROGRESS NOTES
Vancomycin consult follow-up:    Patient reviewed, renal function stable, no new levels, continue current therapy; Next levels due: trough due 10/10/2020 at 0230

## 2020-10-09 NOTE — PLAN OF CARE
Problem: Adult Inpatient Plan of Care  Goal: Plan of Care Review  Outcome: Ongoing, Progressing  Goal: Patient-Specific Goal (Individualization)  Outcome: Ongoing, Progressing  Goal: Absence of Hospital-Acquired Illness or Injury  Outcome: Ongoing, Progressing  Goal: Optimal Comfort and Wellbeing  Outcome: Ongoing, Progressing  Goal: Readiness for Transition of Care  Outcome: Ongoing, Progressing  Goal: Rounds/Family Conference  Outcome: Ongoing, Progressing     Problem: Adjustment to Illness (Sepsis/Septic Shock)  Goal: Optimal Coping  Outcome: Ongoing, Progressing     Problem: Bleeding (Sepsis/Septic Shock)  Goal: Absence of Bleeding  Outcome: Ongoing, Progressing     Problem: Glycemic Control Impaired (Sepsis/Septic Shock)  Goal: Blood Glucose Level Within Desired Range  Outcome: Ongoing, Progressing     Problem: Hemodynamic Instability (Sepsis/Septic Shock)  Goal: Effective Tissue Perfusion  Outcome: Ongoing, Progressing     Problem: Infection (Sepsis/Septic Shock)  Goal: Absence of Infection Signs/Symptoms  Outcome: Ongoing, Progressing     Problem: Nutrition Impaired (Sepsis/Septic Shock)  Goal: Optimal Nutrition Intake  Outcome: Ongoing, Progressing     Problem: Respiratory Compromise (Sepsis/Septic Shock)  Goal: Effective Oxygenation and Ventilation  Outcome: Ongoing, Progressing    Pt BP was elevated earlier in the shift and spiked a temp during the night. Pt was tachycardic during the shift w/ activity. MD was informed and pt was given bolus. Pt had continued c/o pain that did not go away, but improved. Pt also stated that the back spasms are the worst. Pt free of falls this shift nd rested comfortably at bedside

## 2020-10-09 NOTE — LACTATION NOTE
Pt asking for breast pump at this time   [employer] Symphony pump, tubing, collections containers and labels brought to bedside.  Discussed proper pump setting of initiation phase.  Instructed on proper usage of pump and to adjust suction according to maximum comfort level.  Verified appropriate flange fit.  Educated on the frequency and duration of pumping in order to promote and maintain a full milk supply.  Hands on pumping technique reviewed.  Encouraged hand expression after pumping.  Instructed on cleaning of breast pump parts.  Written instructions also given.  Pt verbalized understanding and appropriate recall for proper milk handling, collection, labeling, storage and transportation.  Currently being treated with Piperacillin(L2) and Vancomycin (L1)

## 2020-10-10 LAB
ALBUMIN SERPL BCP-MCNC: 1.5 G/DL (ref 3.5–5.2)
ALP SERPL-CCNC: 198 U/L (ref 55–135)
ALT SERPL W/O P-5'-P-CCNC: 21 U/L (ref 10–44)
ANION GAP SERPL CALC-SCNC: 12 MMOL/L (ref 8–16)
AST SERPL-CCNC: 28 U/L (ref 10–40)
BACTERIA UR CULT: ABNORMAL
BACTERIA UR CULT: ABNORMAL
BASOPHILS # BLD AUTO: 0.03 K/UL (ref 0–0.2)
BASOPHILS NFR BLD: 0.4 % (ref 0–1.9)
BILIRUB SERPL-MCNC: 0.5 MG/DL (ref 0.1–1)
BUN SERPL-MCNC: 5 MG/DL (ref 6–20)
CALCIUM SERPL-MCNC: 7.9 MG/DL (ref 8.7–10.5)
CHLORIDE SERPL-SCNC: 103 MMOL/L (ref 95–110)
CO2 SERPL-SCNC: 27 MMOL/L (ref 23–29)
CREAT SERPL-MCNC: 0.8 MG/DL (ref 0.5–1.4)
DIFFERENTIAL METHOD: ABNORMAL
EOSINOPHIL # BLD AUTO: 0 K/UL (ref 0–0.5)
EOSINOPHIL NFR BLD: 0.3 % (ref 0–8)
ERYTHROCYTE [DISTWIDTH] IN BLOOD BY AUTOMATED COUNT: 14.9 % (ref 11.5–14.5)
EST. GFR  (AFRICAN AMERICAN): >60 ML/MIN/1.73 M^2
EST. GFR  (NON AFRICAN AMERICAN): >60 ML/MIN/1.73 M^2
GLUCOSE SERPL-MCNC: 96 MG/DL (ref 70–110)
HCT VFR BLD AUTO: 26.3 % (ref 37–48.5)
HGB BLD-MCNC: 8.5 G/DL (ref 12–16)
IMM GRANULOCYTES # BLD AUTO: 0.15 K/UL (ref 0–0.04)
IMM GRANULOCYTES NFR BLD AUTO: 1.9 % (ref 0–0.5)
LYMPHOCYTES # BLD AUTO: 1.3 K/UL (ref 1–4.8)
LYMPHOCYTES NFR BLD: 17.4 % (ref 18–48)
MAGNESIUM SERPL-MCNC: 1.7 MG/DL (ref 1.6–2.6)
MCH RBC QN AUTO: 27.6 PG (ref 27–31)
MCHC RBC AUTO-ENTMCNC: 32.3 G/DL (ref 32–36)
MCV RBC AUTO: 85 FL (ref 82–98)
MONOCYTES # BLD AUTO: 0.7 K/UL (ref 0.3–1)
MONOCYTES NFR BLD: 9.6 % (ref 4–15)
NEUTROPHILS # BLD AUTO: 5.4 K/UL (ref 1.8–7.7)
NEUTROPHILS NFR BLD: 70.4 % (ref 38–73)
NRBC BLD-RTO: 0 /100 WBC
PHOSPHATE SERPL-MCNC: 2.7 MG/DL (ref 2.7–4.5)
PLATELET # BLD AUTO: 270 K/UL (ref 150–350)
PMV BLD AUTO: 8.5 FL (ref 9.2–12.9)
POTASSIUM SERPL-SCNC: 3.1 MMOL/L (ref 3.5–5.1)
PROT SERPL-MCNC: 5.5 G/DL (ref 6–8.4)
RBC # BLD AUTO: 3.08 M/UL (ref 4–5.4)
SODIUM SERPL-SCNC: 142 MMOL/L (ref 136–145)
VANCOMYCIN TROUGH SERPL-MCNC: 12.5 UG/ML (ref 10–22)
WBC # BLD AUTO: 7.72 K/UL (ref 3.9–12.7)

## 2020-10-10 PROCEDURE — 63600175 PHARM REV CODE 636 W HCPCS: Performed by: STUDENT IN AN ORGANIZED HEALTH CARE EDUCATION/TRAINING PROGRAM

## 2020-10-10 PROCEDURE — 83735 ASSAY OF MAGNESIUM: CPT

## 2020-10-10 PROCEDURE — 99900035 HC TECH TIME PER 15 MIN (STAT)

## 2020-10-10 PROCEDURE — 94799 UNLISTED PULMONARY SVC/PX: CPT

## 2020-10-10 PROCEDURE — 80202 ASSAY OF VANCOMYCIN: CPT

## 2020-10-10 PROCEDURE — 63600175 PHARM REV CODE 636 W HCPCS: Performed by: EMERGENCY MEDICINE

## 2020-10-10 PROCEDURE — 21400001 HC TELEMETRY ROOM

## 2020-10-10 PROCEDURE — 25000003 PHARM REV CODE 250: Performed by: PHYSICIAN ASSISTANT

## 2020-10-10 PROCEDURE — 84100 ASSAY OF PHOSPHORUS: CPT

## 2020-10-10 PROCEDURE — 25000003 PHARM REV CODE 250: Performed by: EMERGENCY MEDICINE

## 2020-10-10 PROCEDURE — 25000003 PHARM REV CODE 250: Performed by: STUDENT IN AN ORGANIZED HEALTH CARE EDUCATION/TRAINING PROGRAM

## 2020-10-10 PROCEDURE — 63600175 PHARM REV CODE 636 W HCPCS: Performed by: PHYSICIAN ASSISTANT

## 2020-10-10 PROCEDURE — 85025 COMPLETE CBC W/AUTO DIFF WBC: CPT

## 2020-10-10 PROCEDURE — 80053 COMPREHEN METABOLIC PANEL: CPT

## 2020-10-10 PROCEDURE — 36415 COLL VENOUS BLD VENIPUNCTURE: CPT

## 2020-10-10 PROCEDURE — 94761 N-INVAS EAR/PLS OXIMETRY MLT: CPT

## 2020-10-10 RX ORDER — DEXTROSE MONOHYDRATE, SODIUM CHLORIDE, AND POTASSIUM CHLORIDE 50; 1.49; 4.5 G/1000ML; G/1000ML; G/1000ML
INJECTION, SOLUTION INTRAVENOUS CONTINUOUS
Status: DISCONTINUED | OUTPATIENT
Start: 2020-10-10 | End: 2020-10-14

## 2020-10-10 RX ORDER — ENOXAPARIN SODIUM 100 MG/ML
40 INJECTION SUBCUTANEOUS EVERY 24 HOURS
Status: DISCONTINUED | OUTPATIENT
Start: 2020-10-10 | End: 2020-10-21 | Stop reason: HOSPADM

## 2020-10-10 RX ADMIN — VANCOMYCIN HYDROCHLORIDE 1250 MG: 1.25 INJECTION, POWDER, LYOPHILIZED, FOR SOLUTION INTRAVENOUS at 04:10

## 2020-10-10 RX ADMIN — VANCOMYCIN HYDROCHLORIDE 1250 MG: 1.25 INJECTION, POWDER, LYOPHILIZED, FOR SOLUTION INTRAVENOUS at 03:10

## 2020-10-10 RX ADMIN — PIPERACILLIN SODIUM AND TAZOBACTAM SODIUM 4.5 G: 4; .5 INJECTION, POWDER, LYOPHILIZED, FOR SOLUTION INTRAVENOUS at 10:10

## 2020-10-10 RX ADMIN — FLUCONAZOLE 400 MG: 2 INJECTION, SOLUTION INTRAVENOUS at 08:10

## 2020-10-10 RX ADMIN — OXYCODONE 5 MG: 5 TABLET ORAL at 05:10

## 2020-10-10 RX ADMIN — OXYCODONE 5 MG: 5 TABLET ORAL at 09:10

## 2020-10-10 RX ADMIN — PIPERACILLIN SODIUM AND TAZOBACTAM SODIUM 4.5 G: 4; .5 INJECTION, POWDER, LYOPHILIZED, FOR SOLUTION INTRAVENOUS at 01:10

## 2020-10-10 RX ADMIN — OXYCODONE 5 MG: 5 TABLET ORAL at 08:10

## 2020-10-10 RX ADMIN — PIPERACILLIN SODIUM AND TAZOBACTAM SODIUM 4.5 G: 4; .5 INJECTION, POWDER, LYOPHILIZED, FOR SOLUTION INTRAVENOUS at 06:10

## 2020-10-10 RX ADMIN — ENOXAPARIN SODIUM 40 MG: 40 INJECTION SUBCUTANEOUS at 04:10

## 2020-10-10 RX ADMIN — OXYCODONE 5 MG: 5 TABLET ORAL at 04:10

## 2020-10-10 RX ADMIN — POTASSIUM CHLORIDE, DEXTROSE MONOHYDRATE AND SODIUM CHLORIDE: 150; 5; 450 INJECTION, SOLUTION INTRAVENOUS at 09:10

## 2020-10-10 NOTE — NURSING
Patient left unit for IR return with pigtail drainage catheter placed in midline of abdomen per Dr. Bermudez.   Writer assessed for pain and drainage characteristic noted 450  Frankly purulent, mildly thick fluid upon returning from procedure.  Patient was medicated with 5 mg of oxycodone and reposition in bed.  Provider order 10   MEq of IV potassium chloride  Every four hours.  Patient received potassium as ordered..  Writer empty 250 at the end of the shift totaling 700 cc of frankly purulent drainage,  Incision was irrigated as ordered.  Report off to nurse Aminta RN

## 2020-10-10 NOTE — PLAN OF CARE
Problem: Adult Inpatient Plan of Care  Goal: Plan of Care Review  Outcome: Ongoing, Progressing  Goal: Patient-Specific Goal (Individualization)  Outcome: Ongoing, Progressing  Goal: Absence of Hospital-Acquired Illness or Injury  Outcome: Ongoing, Progressing  Goal: Optimal Comfort and Wellbeing  Outcome: Ongoing, Progressing  Goal: Readiness for Transition of Care  Outcome: Ongoing, Progressing  Goal: Rounds/Family Conference  Outcome: Ongoing, Progressing     Problem: Adjustment to Illness (Sepsis/Septic Shock)  Goal: Optimal Coping  Outcome: Ongoing, Progressing     Problem: Bleeding (Sepsis/Septic Shock)  Goal: Absence of Bleeding  Outcome: Ongoing, Progressing     Problem: Glycemic Control Impaired (Sepsis/Septic Shock)  Goal: Blood Glucose Level Within Desired Range  Outcome: Ongoing, Progressing     Problem: Hemodynamic Instability (Sepsis/Septic Shock)  Goal: Effective Tissue Perfusion  Outcome: Ongoing, Progressing     Problem: Infection (Sepsis/Septic Shock)  Goal: Absence of Infection Signs/Symptoms  Outcome: Ongoing, Progressing     Problem: Nutrition Impaired (Sepsis/Septic Shock)  Goal: Optimal Nutrition Intake  Outcome: Ongoing, Progressing     Problem: Respiratory Compromise (Sepsis/Septic Shock)  Goal: Effective Oxygenation and Ventilation  Outcome: Ongoing, Progressing     Problem: Fall Injury Risk  Goal: Absence of Fall and Fall-Related Injury  Outcome: Ongoing, Progressing

## 2020-10-10 NOTE — PROGRESS NOTES
[facility]  General Surgery  Progress Note    Subjective:     Interval History:   NAEON. AF, VSS.   IR drain placed yesterday w/ 1L purulent drainage. Patient reports feeling much better since drain placement w/ improvement in her pain and abdominal spasms.     Post-Op Info:  * No surgery found *          Medications:  Continuous Infusions:   dextrose 5 % and 0.45 % NaCl with KCl 20 mEq       Scheduled Meds:   fluconazole (DIFLUCAN) IVPB  400 mg Intravenous Q24H    lidocaine (PF) 10 mg/ml (1%)  1 mL Other Once    piperacillin-tazobactam (ZOSYN) IVPB  4.5 g Intravenous Q8H    vancomycin (VANCOCIN) IVPB  1,250 mg Intravenous Q12H     PRN Meds:acetaminophen, morphine, ondansetron, oxyCODONE, sodium chloride 0.9%, Pharmacy to dose Vancomycin consult **AND** vancomycin - pharmacy to dose     Objective:     Vital Signs (Most Recent):  Temp: 98.5 °F (36.9 °C) (10/10/20 0518)  Pulse: 92 (10/10/20 0518)  Resp: 18 (10/10/20 0518)  BP: 136/89 (10/10/20 0518)  SpO2: 95 % (10/10/20 0518) Vital Signs (24h Range):  Temp:  [98 °F (36.7 °C)-100.8 °F (38.2 °C)] 98.5 °F (36.9 °C)  Pulse:  [] 92  Resp:  [16-32] 18  SpO2:  [95 %-97 %] 95 %  BP: (124-167)/() 136/89       Intake/Output Summary (Last 24 hours) at 10/10/2020 0816  Last data filed at 10/10/2020 0617  Gross per 24 hour   Intake 300 ml   Output 3400 ml   Net -3100 ml       Physical Exam  Constitutional:       Appearance: Normal appearance.   HENT:      Head: Normocephalic.      Mouth/Throat:      Mouth: Mucous membranes are moist.   Cardiovascular:      Rate and Rhythm: Normal rate and regular rhythm.   Pulmonary:      Effort: Pulmonary effort is normal. No respiratory distress.   Abdominal:      General: There is no distension.      Palpations: Abdomen is soft.      Tenderness: There is abdominal tenderness (improved).      Comments: IR drain w/ purulent drainage in bag     Skin:     General: Skin is warm and dry.   Neurological:       Mental Status: She is alert.         Significant Labs:  BMP:   Recent Labs   Lab 10/10/20  0248   GLU 96      K 3.1*      CO2 27   BUN 5*   CREATININE 0.8   CALCIUM 7.9*   MG 1.7     CBC:   Recent Labs   Lab 10/10/20  0248   WBC 7.72   RBC 3.08*   HGB 8.5*   HCT 26.3*      MCV 85   MCH 27.6   MCHC 32.3     CMP:   Recent Labs   Lab 10/10/20  0248   GLU 96   CALCIUM 7.9*   ALBUMIN 1.5*   PROT 5.5*      K 3.1*   CO2 27      BUN 5*   CREATININE 0.8   ALKPHOS 198*   ALT 21   AST 28   BILITOT 0.5       Significant Diagnostics:  None    Assessment/Plan:     Active Diagnoses:    Diagnosis Date Noted POA    Sepsis [A41.9] 10/08/2020 Yes      Problems Resolved During this Admission:     41yo F w/ h/o perforated gastric ulcer s/p ex lap and guerrero patch repair 9/24/20 presents with large abscess in the gastrohepatic space. Now s/p IR drain placement 10/9.      - Continue NPO w/ IVFs. Will change fluids to D5 1/2NS w/ 20mEq K   - Broad spectrum antibiotics with vanc/zosyn/diflucan. Await culture results to de-escalate   - Tylenol prn fever   - Pain meds prn   - Drain mgmt    - IS   - Encourage OOB, ambulation   - Ppx: SCDs, Lovenox   - Dispo: inpatient mgmt     Puja Coyne MD  General Surgery  [facility]

## 2020-10-10 NOTE — PROGRESS NOTES
Pharmacokinetic Assessment Follow Up: IV Vancomycin    Vancomycin serum concentration assessment(s):    The trough level was drawn correctly and can be used to guide therapy at this time. The measurement is within the desired definitive target range of 10 to 20 mcg/mL.    Vancomycin Regimen Plan:    Continue regimen to Vancomycin 1250 mg IV every 12 hours with next serum trough concentration measured at 1430 prior to 4th dose on 10/11    Drug levels (last 3 results):  Recent Labs   Lab Result Units 10/10/20  0248   Vancomycin-Trough ug/mL 12.5       Pharmacy will continue to follow and monitor vancomycin.    Please contact pharmacy at extension [phone] for questions regarding this assessment.    Thank you for the consult,   Martin Taylor       Patient brief summary:  Puja Quigley is a 40 y.o. female initiated on antimicrobial therapy with IV Vancomycin for treatment of intra-abdominal infection    The patient's current regimen is 1250 mg q12h    Drug Allergies:   Review of patient's allergies indicates:  No Known Allergies    Actual Body Weight:   80.5 kg    Renal Function:   Estimated Creatinine Clearance: 104.2 mL/min (based on SCr of 0.8 mg/dL).,     Dialysis Method (if applicable):  N/A    CBC (last 72 hours):  Recent Labs   Lab Result Units 10/08/20  1440 10/09/20  0525 10/10/20  0248   WBC K/uL 8.82 7.66 7.72   Hemoglobin g/dL 9.5* 8.7* 8.5*   Hematocrit % 29.4* 27.4* 26.3*   Platelets K/uL 367* 309 270   Gran% % 73.9* 56.0 70.4   Lymph% % 13.0* 14.0* 17.4*   Mono% % 10.2 11.0 9.6   Eosinophil% % 0.1 0.0 0.3   Basophil% % 0.3 0.0 0.4   Differential Method  Automated Manual Automated       Metabolic Panel (last 72 hours):  Recent Labs   Lab Result Units 10/08/20  1440 10/08/20  1509 10/09/20  0525 10/10/20  0248   Sodium mmol/L 137  --  140 142   Potassium mmol/L 2.7*  --  2.9* 3.1*   Chloride mmol/L 95  --  101 103   CO2 mmol/L 29  --  27 27   Glucose mg/dL 104  --  84 96   Glucose, UA   --  Negative   --   --    BUN, Bld mg/dL 3*  --  3* 5*   Creatinine mg/dL 0.6  --  0.6 0.8   Albumin g/dL 1.8*  --   --  1.5*   Total Bilirubin mg/dL 0.6  --   --  0.5   Alkaline Phosphatase U/L 252*  --   --  198*   AST U/L 45*  --   --  28   ALT U/L 30  --   --  21   Magnesium mg/dL 1.5*  --  1.7  --    Phosphorus mg/dL 2.6*  --  3.1  --        Vancomycin Administrations:  vancomycin given in the last 96 hours                     vancomycin 1.25 g in dextrose 5% 250 mL IVPB (ready to mix) (mg) 1,250 mg New Bag 10/10/20 0323     1,250 mg New Bag 10/09/20 1530     1,250 mg New Bag  0239    vancomycin 1.5 g in dextrose 5 % 250 mL IVPB (ready to mix) (mg) 1,500 mg New Bag 10/08/20 1531                    Microbiologic Results:  Microbiology Results (last 7 days)       Procedure Component Value Units Date/Time    Blood culture #1 **CANNOT BE ORDERED STAT** [[id number]] Collected: 10/08/20 1440    Order Status: Completed Specimen: Blood from Peripheral, Antecubital, Left Updated: 10/09/20 1703     Blood Culture, Routine No Growth to date      No Growth to date    Blood culture #2 **CANNOT BE ORDERED STAT** [[id number]] Collected: 10/08/20 1451    Order Status: Completed Specimen: Blood from Peripheral, Antecubital, Right Updated: 10/09/20 1703     Blood Culture, Routine No Growth to date      No Growth to date    Culture, Body Fluid (Aerobic) w/ GS [[id number]] Collected: 10/09/20 1011    Order Status: Completed Specimen: Body Fluid from Peritoneal Fluid Updated: 10/09/20 1410     Gram Stain Result Cytospin indicates:      Many Gram negative rods      Few WBC's    Narrative:      Abdominal abscess drainage    Urine culture [[id number]]  (Abnormal) Collected: 10/08/20 1509    Order Status: Completed Specimen: Urine Updated: 10/09/20 1056     Urine Culture, Routine ENTEROCOCCUS SPECIES  >100,000 cfu/ml  Identification and susceptibility pending        GRAM NEGATIVE ELVIS  50,000 - 99,999 cfu/ml  Identification and susceptibility pending       Narrative:      Specimen Source->Urine    Fungus culture [[id number]] Collected: 10/09/20 1011    Order Status: Sent Specimen: Body Fluid from Peritoneal Fluid Updated: 10/09/20 1019    Gram stain [[id number]] Collected: 10/09/20 1005    Order Status: Canceled Specimen: Abscess from Abdomen     Culture, Anaerobe [[id number]] Collected: 10/09/20 1005    Order Status: Canceled Specimen: Abscess from Abdomen     Aerobic culture [[id number]] Collected: 10/09/20 1005    Order Status: Canceled Specimen: Abscess from Abdomen     C. trachomatis/N. gonorrhoeae by AMP DNA [facility]; Cervix [[id number]] Collected: 10/08/20 1559    Order Status: Sent Specimen: Genital Updated: 10/09/20 0923

## 2020-10-11 ENCOUNTER — PATIENT MESSAGE (OUTPATIENT)
Dept: SURGERY | Facility: CLINIC | Age: 40
End: 2020-10-11

## 2020-10-11 LAB
ALBUMIN SERPL BCP-MCNC: 1.5 G/DL (ref 3.5–5.2)
ALP SERPL-CCNC: 183 U/L (ref 55–135)
ALT SERPL W/O P-5'-P-CCNC: 16 U/L (ref 10–44)
ANION GAP SERPL CALC-SCNC: 10 MMOL/L (ref 8–16)
AST SERPL-CCNC: 22 U/L (ref 10–40)
BACTERIA FLD AEROBE CULT: ABNORMAL
BASOPHILS # BLD AUTO: 0.02 K/UL (ref 0–0.2)
BASOPHILS NFR BLD: 0.2 % (ref 0–1.9)
BILIRUB SERPL-MCNC: 0.3 MG/DL (ref 0.1–1)
BUN SERPL-MCNC: 6 MG/DL (ref 6–20)
CALCIUM SERPL-MCNC: 7.7 MG/DL (ref 8.7–10.5)
CHLORIDE SERPL-SCNC: 109 MMOL/L (ref 95–110)
CO2 SERPL-SCNC: 26 MMOL/L (ref 23–29)
CREAT SERPL-MCNC: 1.1 MG/DL (ref 0.5–1.4)
DIFFERENTIAL METHOD: ABNORMAL
EOSINOPHIL # BLD AUTO: 0 K/UL (ref 0–0.5)
EOSINOPHIL NFR BLD: 0.5 % (ref 0–8)
ERYTHROCYTE [DISTWIDTH] IN BLOOD BY AUTOMATED COUNT: 15.3 % (ref 11.5–14.5)
EST. GFR  (AFRICAN AMERICAN): >60 ML/MIN/1.73 M^2
EST. GFR  (NON AFRICAN AMERICAN): >60 ML/MIN/1.73 M^2
GLUCOSE SERPL-MCNC: 97 MG/DL (ref 70–110)
GRAM STN SPEC: ABNORMAL
HCT VFR BLD AUTO: 28.6 % (ref 37–48.5)
HGB BLD-MCNC: 8.8 G/DL (ref 12–16)
IMM GRANULOCYTES # BLD AUTO: 0.21 K/UL (ref 0–0.04)
IMM GRANULOCYTES NFR BLD AUTO: 2.5 % (ref 0–0.5)
LYMPHOCYTES # BLD AUTO: 1.7 K/UL (ref 1–4.8)
LYMPHOCYTES NFR BLD: 20.1 % (ref 18–48)
MAGNESIUM SERPL-MCNC: 1.9 MG/DL (ref 1.6–2.6)
MCH RBC QN AUTO: 27.6 PG (ref 27–31)
MCHC RBC AUTO-ENTMCNC: 30.8 G/DL (ref 32–36)
MCV RBC AUTO: 90 FL (ref 82–98)
MONOCYTES # BLD AUTO: 0.8 K/UL (ref 0.3–1)
MONOCYTES NFR BLD: 9.8 % (ref 4–15)
NEUTROPHILS # BLD AUTO: 5.6 K/UL (ref 1.8–7.7)
NEUTROPHILS NFR BLD: 66.9 % (ref 38–73)
NRBC BLD-RTO: 0 /100 WBC
PHOSPHATE SERPL-MCNC: 3.3 MG/DL (ref 2.7–4.5)
PLATELET # BLD AUTO: 275 K/UL (ref 150–350)
PMV BLD AUTO: 8.9 FL (ref 9.2–12.9)
POTASSIUM SERPL-SCNC: 2.9 MMOL/L (ref 3.5–5.1)
PROT SERPL-MCNC: 5.5 G/DL (ref 6–8.4)
RBC # BLD AUTO: 3.19 M/UL (ref 4–5.4)
SODIUM SERPL-SCNC: 145 MMOL/L (ref 136–145)
VANCOMYCIN TROUGH SERPL-MCNC: 25 UG/ML (ref 10–22)
WBC # BLD AUTO: 8.44 K/UL (ref 3.9–12.7)

## 2020-10-11 PROCEDURE — 25000003 PHARM REV CODE 250: Performed by: STUDENT IN AN ORGANIZED HEALTH CARE EDUCATION/TRAINING PROGRAM

## 2020-10-11 PROCEDURE — 63600175 PHARM REV CODE 636 W HCPCS: Performed by: PHYSICIAN ASSISTANT

## 2020-10-11 PROCEDURE — 85025 COMPLETE CBC W/AUTO DIFF WBC: CPT

## 2020-10-11 PROCEDURE — 36415 COLL VENOUS BLD VENIPUNCTURE: CPT

## 2020-10-11 PROCEDURE — 25000003 PHARM REV CODE 250: Performed by: PHYSICIAN ASSISTANT

## 2020-10-11 PROCEDURE — 94799 UNLISTED PULMONARY SVC/PX: CPT

## 2020-10-11 PROCEDURE — 63600175 PHARM REV CODE 636 W HCPCS: Performed by: EMERGENCY MEDICINE

## 2020-10-11 PROCEDURE — 94761 N-INVAS EAR/PLS OXIMETRY MLT: CPT

## 2020-10-11 PROCEDURE — 84100 ASSAY OF PHOSPHORUS: CPT

## 2020-10-11 PROCEDURE — 83735 ASSAY OF MAGNESIUM: CPT

## 2020-10-11 PROCEDURE — 21400001 HC TELEMETRY ROOM

## 2020-10-11 PROCEDURE — 63600175 PHARM REV CODE 636 W HCPCS: Performed by: STUDENT IN AN ORGANIZED HEALTH CARE EDUCATION/TRAINING PROGRAM

## 2020-10-11 PROCEDURE — 80053 COMPREHEN METABOLIC PANEL: CPT

## 2020-10-11 PROCEDURE — 80202 ASSAY OF VANCOMYCIN: CPT

## 2020-10-11 PROCEDURE — 99900035 HC TECH TIME PER 15 MIN (STAT)

## 2020-10-11 PROCEDURE — C9113 INJ PANTOPRAZOLE SODIUM, VIA: HCPCS | Performed by: STUDENT IN AN ORGANIZED HEALTH CARE EDUCATION/TRAINING PROGRAM

## 2020-10-11 PROCEDURE — 25000003 PHARM REV CODE 250: Performed by: EMERGENCY MEDICINE

## 2020-10-11 RX ORDER — PANTOPRAZOLE SODIUM 40 MG/10ML
40 INJECTION, POWDER, LYOPHILIZED, FOR SOLUTION INTRAVENOUS 2 TIMES DAILY
Status: DISCONTINUED | OUTPATIENT
Start: 2020-10-11 | End: 2020-10-21 | Stop reason: HOSPADM

## 2020-10-11 RX ORDER — POTASSIUM CHLORIDE 7.45 MG/ML
10 INJECTION INTRAVENOUS EVERY 4 HOURS
Status: COMPLETED | OUTPATIENT
Start: 2020-10-11 | End: 2020-10-11

## 2020-10-11 RX ADMIN — PANTOPRAZOLE SODIUM 40 MG: 40 INJECTION, POWDER, FOR SOLUTION INTRAVENOUS at 08:10

## 2020-10-11 RX ADMIN — OXYCODONE 5 MG: 5 TABLET ORAL at 01:10

## 2020-10-11 RX ADMIN — PANTOPRAZOLE SODIUM 40 MG: 40 INJECTION, POWDER, FOR SOLUTION INTRAVENOUS at 01:10

## 2020-10-11 RX ADMIN — PIPERACILLIN SODIUM AND TAZOBACTAM SODIUM 4.5 G: 4; .5 INJECTION, POWDER, LYOPHILIZED, FOR SOLUTION INTRAVENOUS at 08:10

## 2020-10-11 RX ADMIN — POTASSIUM CHLORIDE 10 MEQ: 7.46 INJECTION, SOLUTION INTRAVENOUS at 06:10

## 2020-10-11 RX ADMIN — VANCOMYCIN HYDROCHLORIDE 1250 MG: 1.25 INJECTION, POWDER, LYOPHILIZED, FOR SOLUTION INTRAVENOUS at 03:10

## 2020-10-11 RX ADMIN — POTASSIUM CHLORIDE, DEXTROSE MONOHYDRATE AND SODIUM CHLORIDE: 150; 5; 450 INJECTION, SOLUTION INTRAVENOUS at 04:10

## 2020-10-11 RX ADMIN — POTASSIUM CHLORIDE 10 MEQ: 7.46 INJECTION, SOLUTION INTRAVENOUS at 01:10

## 2020-10-11 RX ADMIN — PIPERACILLIN SODIUM AND TAZOBACTAM SODIUM 4.5 G: 4; .5 INJECTION, POWDER, LYOPHILIZED, FOR SOLUTION INTRAVENOUS at 01:10

## 2020-10-11 RX ADMIN — FLUCONAZOLE 400 MG: 2 INJECTION, SOLUTION INTRAVENOUS at 08:10

## 2020-10-11 RX ADMIN — ENOXAPARIN SODIUM 40 MG: 40 INJECTION SUBCUTANEOUS at 06:10

## 2020-10-11 RX ADMIN — OXYCODONE 5 MG: 5 TABLET ORAL at 08:10

## 2020-10-11 RX ADMIN — POTASSIUM CHLORIDE 10 MEQ: 7.46 INJECTION, SOLUTION INTRAVENOUS at 09:10

## 2020-10-11 RX ADMIN — OXYCODONE 5 MG: 5 TABLET ORAL at 09:10

## 2020-10-11 RX ADMIN — PIPERACILLIN SODIUM AND TAZOBACTAM SODIUM 4.5 G: 4; .5 INJECTION, POWDER, LYOPHILIZED, FOR SOLUTION INTRAVENOUS at 09:10

## 2020-10-11 RX ADMIN — ONDANSETRON 8 MG: 8 TABLET, ORALLY DISINTEGRATING ORAL at 04:10

## 2020-10-11 RX ADMIN — POTASSIUM CHLORIDE, DEXTROSE MONOHYDRATE AND SODIUM CHLORIDE: 150; 5; 450 INJECTION, SOLUTION INTRAVENOUS at 10:10

## 2020-10-11 RX ADMIN — POTASSIUM CHLORIDE, DEXTROSE MONOHYDRATE AND SODIUM CHLORIDE: 150; 5; 450 INJECTION, SOLUTION INTRAVENOUS at 01:10

## 2020-10-11 NOTE — PROGRESS NOTES
[facility]  General Surgery  Progress Note    Subjective:     Interval History:   NAEON. AF, VSS.   Patient drank a lot of water yesterday despite NPO order and noted to have increased drainage w/ change in the output. Output is now more clear and thin. Now strict NPO.   Some improvement in abdominal pain and spasms.     Post-Op Info:  * No surgery found *          Medications:  Continuous Infusions:   dextrose 5 % and 0.45 % NaCl with KCl 20 mEq 125 mL/hr at 10/11/20 0450     Scheduled Meds:   enoxaparin  40 mg Subcutaneous Q24H    fluconazole (DIFLUCAN) IVPB  400 mg Intravenous Q24H    lidocaine (PF) 10 mg/ml (1%)  1 mL Other Once    piperacillin-tazobactam (ZOSYN) IVPB  4.5 g Intravenous Q8H    vancomycin (VANCOCIN) IVPB  1,250 mg Intravenous Q12H     PRN Meds:acetaminophen, morphine, ondansetron, oxyCODONE, sodium chloride 0.9%, Pharmacy to dose Vancomycin consult **AND** vancomycin - pharmacy to dose     Objective:     Vital Signs (Most Recent):  Temp: 98.4 °F (36.9 °C) (10/11/20 0731)  Pulse: 80 (10/11/20 0731)  Resp: 18 (10/11/20 0731)  BP: (!) 140/92 (10/11/20 0731)  SpO2: 98 % (10/11/20 0731) Vital Signs (24h Range):  Temp:  [98.1 °F (36.7 °C)-98.6 °F (37 °C)] 98.4 °F (36.9 °C)  Pulse:  [78-96] 80  Resp:  [16-18] 18  SpO2:  [95 %-100 %] 98 %  BP: (131-175)/() 140/92       Intake/Output Summary (Last 24 hours) at 10/11/2020 0809  Last data filed at 10/11/2020 0551  Gross per 24 hour   Intake 650 ml   Output 2250 ml   Net -1600 ml       Physical Exam  Constitutional:       Appearance: Normal appearance.   HENT:      Head: Normocephalic.      Mouth/Throat:      Mouth: Mucous membranes are moist.   Cardiovascular:      Rate and Rhythm: Normal rate and regular rhythm.   Pulmonary:      Effort: Pulmonary effort is normal. No respiratory distress.   Abdominal:      General: There is no distension.      Palpations: Abdomen is soft.      Tenderness: There is abdominal tenderness  (improved).      Comments: IR drain w/ thin clear drainage in bag     Skin:     General: Skin is warm and dry.   Neurological:      Mental Status: She is alert.         Significant Labs:  BMP:   Recent Labs   Lab 10/11/20  0458   GLU 97      K 2.9*      CO2 26   BUN 6   CREATININE 1.1   CALCIUM 7.7*   MG 1.9     CBC:   Recent Labs   Lab 10/11/20  0458   WBC 8.44   RBC 3.19*   HGB 8.8*   HCT 28.6*      MCV 90   MCH 27.6   MCHC 30.8*     CMP:   Recent Labs   Lab 10/11/20  0458   GLU 97   CALCIUM 7.7*   ALBUMIN 1.5*   PROT 5.5*      K 2.9*   CO2 26      BUN 6   CREATININE 1.1   ALKPHOS 183*   ALT 16   AST 22   BILITOT 0.3       Significant Diagnostics:  None    Assessment/Plan:     Active Diagnoses:    Diagnosis Date Noted POA    Sepsis [A41.9] 10/08/2020 Yes      Problems Resolved During this Admission:     41yo F w/ h/o perforated gastric ulcer s/p ex lap and guerrero patch repair 9/24/20 presents with large abscess in the gastrohepatic space. Now s/p IR drain placement 10/9.     - Increased drainage and change in output after patient had increased water intake yesterday. Will plan for upper GI study tomorrow to evaluate for possible leak.    - Continue NPO w/ IVFs. Will change fluids to D5 1/2NS w/ 20mEq K   - Still hypokalemic. Will continue to replete.   - Continue vanc/zosyn/diflucan. Drainage cultures w/ pan sensitive to Klebsiella and UCx w/ pan sensitive Klebsiella and Enterococcus faecalis sensitive to Vanc   - Tylenol prn fever   - Pain meds prn   - Drain mgmt    - IS   - Encourage OOB, ambulation   - Ppx: SCDs, Lovenox   - Dispo: inpatient mgmt     uPja Coyne MD  General Surgery  [facility] [place]

## 2020-10-11 NOTE — PLAN OF CARE
Problem: Fall Injury Risk  Goal: Absence of Fall and Fall-Related Injury  Outcome: Ongoing, Progressing  Intervention: Identify and Manage Contributors to Fall Injury Risk  Flowsheets (Taken 10/10/2020 1954)  Self-Care Promotion:   BADL personal objects within reach   safe use of adaptive equipment encouraged  Medication Review/Management:   medications reviewed   high risk medications identified  Intervention: Promote Injury-Free Environment  Flowsheets (Taken 10/10/2020 1954)  Safety Promotion/Fall Prevention:   assistive device/personal item within reach   medications reviewed   high risk medications identified   Fall Risk reviewed with patient/family

## 2020-10-11 NOTE — PROGRESS NOTES
Pharmacokinetic Assessment Follow Up: IV Vancomycin    Vancomycin serum concentration assessment(s):    The trough level was drawn correctly and can be used to guide therapy at this time. The measurement is above the desired definitive target range of 10 to 20 mcg/mL.    Vancomycin Regimen Plan:    Discontinue the scheduled vancomycin regimen and re-dose when the random level is less than 20 mcg/mL, next level to be drawn at 0300 on 10/12/2020.    Drug levels (last 3 results):  Recent Labs   Lab Result Units 10/10/20  0248 10/11/20  1427   Vancomycin-Trough ug/mL 12.5 25.0*       Pharmacy will continue to follow and monitor vancomycin.    Please contact pharmacy at extension [phone] for questions regarding this assessment.    Thank you for the consult,   Coby Eng       Patient brief summary:  Puja Quigley is a 40 y.o. female initiated on antimicrobial therapy with IV Vancomycin for treatment of intra-abdominal infection      Drug Allergies:   Review of patient's allergies indicates:  No Known Allergies    Actual Body Weight:   78.2 kg    Renal Function:   Estimated Creatinine Clearance: 74.7 mL/min (based on SCr of 1.1 mg/dL).,     Dialysis Method (if applicable):  N/A    CBC (last 72 hours):  Recent Labs   Lab Result Units 10/09/20  0525 10/10/20  0248 10/11/20  0458   WBC K/uL 7.66 7.72 8.44   Hemoglobin g/dL 8.7* 8.5* 8.8*   Hematocrit % 27.4* 26.3* 28.6*   Platelets K/uL 309 270 275   Gran% % 56.0 70.4 66.9   Lymph% % 14.0* 17.4* 20.1   Mono% % 11.0 9.6 9.8   Eosinophil% % 0.0 0.3 0.5   Basophil% % 0.0 0.4 0.2   Differential Method  Manual Automated Automated       Metabolic Panel (last 72 hours):  Recent Labs   Lab Result Units 10/09/20  0525 10/10/20  0248 10/11/20  0458   Sodium mmol/L 140 142 145   Potassium mmol/L 2.9* 3.1* 2.9*   Chloride mmol/L 101 103 109   CO2 mmol/L 27 27 26   Glucose mg/dL 84 96 97   BUN, Bld mg/dL 3* 5* 6   Creatinine mg/dL 0.6 0.8 1.1   Albumin g/dL  --  1.5* 1.5*   Total  Bilirubin mg/dL  --  0.5 0.3   Alkaline Phosphatase U/L  --  198* 183*   AST U/L  --  28 22   ALT U/L  --  21 16   Magnesium mg/dL 1.7 1.7 1.9   Phosphorus mg/dL 3.1 2.7 3.3       Vancomycin Administrations:  vancomycin given in the last 96 hours                     vancomycin 1.25 g in dextrose 5% 250 mL IVPB (ready to mix) (mg) 1,250 mg New Bag 10/11/20 0305     1,250 mg New Bag 10/10/20 1617     1,250 mg New Bag  0323     1,250 mg New Bag 10/09/20 1530     1,250 mg New Bag  0239    vancomycin 1.5 g in dextrose 5 % 250 mL IVPB (ready to mix) (mg) 1,500 mg New Bag 10/08/20 1531                    Microbiologic Results:  Microbiology Results (last 7 days)       Procedure Component Value Units Date/Time    Culture, Anaerobic [[id number]] Collected: 10/09/20 1005    Order Status: Completed Specimen: Abscess from Abdomen Updated: 10/11/20 1135     Anaerobic Culture Culture in progress    Blood culture #2 **CANNOT BE ORDERED STAT** [[id number]]  (Abnormal) Collected: 10/08/20 1451    Order Status: Completed Specimen: Blood from Peripheral, Antecubital, Right Updated: 10/11/20 1036     Blood Culture, Routine Gram stain karen bottle: Gram positive cocci in clusters resembling Staph      10/10/2020  05:17       Results called to and read back by: Kaley Pennington RN      COAGULASE-NEGATIVE STAPHYLOCOCCUS SPECIES  Identification and susceptibility pending      Culture, Body Fluid (Aerobic) w/ GS [[id number]]  (Abnormal)  (Susceptibility) Collected: 10/09/20 1011    Order Status: Completed Specimen: Body Fluid from Peritoneal Fluid Updated: 10/11/20 0833     AEROBIC CULTURE - FLUID KLEBSIELLA PNEUMONIAE  Many       Gram Stain Result Cytospin indicates:      Many Gram negative rods      Few WBC's    Narrative:      Abdominal abscess drainage    Blood culture #1 **CANNOT BE ORDERED STAT** [[id number]] Collected: 10/08/20 1440    Order Status: Completed Specimen: Blood from Peripheral, Antecubital, Left Updated: 10/11/20 0040      Blood Culture, Routine Gram stain karen bottle: possible gram negative cocci       Gram stain review to be done by Micro       Results called to and read back by: Aminta Sullivan 3W    Urine culture [[id number]]  (Abnormal)  (Susceptibility) Collected: 10/08/20 1509    Order Status: Completed Specimen: Urine Updated: 10/10/20 1230     Urine Culture, Routine ENTEROCOCCUS FAECALIS  >100,000 cfu/ml        KLEBSIELLA PNEUMONIAE  50,000 - 99,999 cfu/ml      Narrative:      Specimen Source->Urine    Fungus culture [[id number]] Collected: 10/09/20 1011    Order Status: Sent Specimen: Body Fluid from Peritoneal Fluid Updated: 10/09/20 1019    Gram stain [[id number]] Collected: 10/09/20 1005    Order Status: Canceled Specimen: Abscess from Abdomen     Culture, Anaerobe [[id number]] Collected: 10/09/20 1005    Order Status: Canceled Specimen: Abscess from Abdomen     Aerobic culture [[id number]] Collected: 10/09/20 1005    Order Status: Canceled Specimen: Abscess from Abdomen     C. trachomatis/N. gonorrhoeae by AMP DNA [facility]; Cervix [[id number]] Collected: 10/08/20 1559    Order Status: Sent Specimen: Genital Updated: 10/09/20 0973

## 2020-10-11 NOTE — LACTATION NOTE
10/11/20 1000   Maternal Assessment   Breast Density Bilateral:;soft   Equipment Type   Breast Pump Type double electric, hospital grade   Breast Pump Flange Type hard   Breast Pump Flange Size other (see comments)  (30 mm)   Breast Pumping   Breast Pumping Interventions frequent pumping encouraged   Breast Pumping double electric breast pump utilized     Patient's milk supply has decreased drastically due to illness and hospitalization. Patient would like to be able to re establish milk in order to return to breastfeeding when discharged from hospital. Encouraged patient to pump eight or more times in 24 hours for at least 20 minutes in order to stimulate milk production. Assisted patient to pump at this time. Patient pumped 1-2 ml EBM from both breasts. Cleaned and sterilized parts and encouraged patient to call me for any further pumping related assistance. Patient verbalizes understanding of all instructions with good recall.

## 2020-10-11 NOTE — NURSING
Report received from DEANA Lemos. Patient resting in bed, no complaints, no acte distress noted. Will continue to monitor.

## 2020-10-12 LAB
ALBUMIN SERPL BCP-MCNC: 1.5 G/DL (ref 3.5–5.2)
ALP SERPL-CCNC: 179 U/L (ref 55–135)
ALT SERPL W/O P-5'-P-CCNC: 16 U/L (ref 10–44)
ANION GAP SERPL CALC-SCNC: 11 MMOL/L (ref 8–16)
ANISOCYTOSIS BLD QL SMEAR: SLIGHT
AST SERPL-CCNC: 19 U/L (ref 10–40)
BACTERIA BLD CULT: ABNORMAL
BACTERIA SPEC ANAEROBE CULT: ABNORMAL
BASOPHILS # BLD AUTO: 0.02 K/UL (ref 0–0.2)
BASOPHILS NFR BLD: 0.3 % (ref 0–1.9)
BILIRUB SERPL-MCNC: 0.4 MG/DL (ref 0.1–1)
BUN SERPL-MCNC: 4 MG/DL (ref 6–20)
CALCIUM SERPL-MCNC: 8 MG/DL (ref 8.7–10.5)
CHLORIDE SERPL-SCNC: 110 MMOL/L (ref 95–110)
CO2 SERPL-SCNC: 26 MMOL/L (ref 23–29)
CREAT SERPL-MCNC: 1.3 MG/DL (ref 0.5–1.4)
DIFFERENTIAL METHOD: ABNORMAL
EOSINOPHIL # BLD AUTO: 0 K/UL (ref 0–0.5)
EOSINOPHIL NFR BLD: 0.7 % (ref 0–8)
ERYTHROCYTE [DISTWIDTH] IN BLOOD BY AUTOMATED COUNT: 15.2 % (ref 11.5–14.5)
EST. GFR  (AFRICAN AMERICAN): >60 ML/MIN/1.73 M^2
EST. GFR  (NON AFRICAN AMERICAN): 52 ML/MIN/1.73 M^2
GLUCOSE SERPL-MCNC: 98 MG/DL (ref 70–110)
HCT VFR BLD AUTO: 28.1 % (ref 37–48.5)
HGB BLD-MCNC: 8.5 G/DL (ref 12–16)
HYPOCHROMIA BLD QL SMEAR: ABNORMAL
IMM GRANULOCYTES # BLD AUTO: 0.09 K/UL (ref 0–0.04)
IMM GRANULOCYTES NFR BLD AUTO: 1.5 % (ref 0–0.5)
LYMPHOCYTES # BLD AUTO: 1.6 K/UL (ref 1–4.8)
LYMPHOCYTES NFR BLD: 26.7 % (ref 18–48)
MAGNESIUM SERPL-MCNC: 1.7 MG/DL (ref 1.6–2.6)
MCH RBC QN AUTO: 27.2 PG (ref 27–31)
MCHC RBC AUTO-ENTMCNC: 30.2 G/DL (ref 32–36)
MCV RBC AUTO: 90 FL (ref 82–98)
MONOCYTES # BLD AUTO: 0.6 K/UL (ref 0.3–1)
MONOCYTES NFR BLD: 10.6 % (ref 4–15)
NEUTROPHILS # BLD AUTO: 3.6 K/UL (ref 1.8–7.7)
NEUTROPHILS NFR BLD: 60.2 % (ref 38–73)
NRBC BLD-RTO: 0 /100 WBC
PHOSPHATE SERPL-MCNC: 3.4 MG/DL (ref 2.7–4.5)
PLATELET # BLD AUTO: 264 K/UL (ref 150–350)
PLATELET BLD QL SMEAR: ABNORMAL
PMV BLD AUTO: 8.9 FL (ref 9.2–12.9)
POCT GLUCOSE: 89 MG/DL (ref 70–110)
POLYCHROMASIA BLD QL SMEAR: ABNORMAL
POTASSIUM SERPL-SCNC: 3.2 MMOL/L (ref 3.5–5.1)
PROT SERPL-MCNC: 5.5 G/DL (ref 6–8.4)
RBC # BLD AUTO: 3.13 M/UL (ref 4–5.4)
SODIUM SERPL-SCNC: 147 MMOL/L (ref 136–145)
VANCOMYCIN SERPL-MCNC: 15.1 UG/ML
WBC # BLD AUTO: 5.96 K/UL (ref 3.9–12.7)

## 2020-10-12 PROCEDURE — 63600175 PHARM REV CODE 636 W HCPCS: Performed by: STUDENT IN AN ORGANIZED HEALTH CARE EDUCATION/TRAINING PROGRAM

## 2020-10-12 PROCEDURE — 63600175 PHARM REV CODE 636 W HCPCS: Performed by: PHYSICIAN ASSISTANT

## 2020-10-12 PROCEDURE — 80053 COMPREHEN METABOLIC PANEL: CPT

## 2020-10-12 PROCEDURE — 83735 ASSAY OF MAGNESIUM: CPT

## 2020-10-12 PROCEDURE — 25000003 PHARM REV CODE 250: Performed by: STUDENT IN AN ORGANIZED HEALTH CARE EDUCATION/TRAINING PROGRAM

## 2020-10-12 PROCEDURE — 36415 COLL VENOUS BLD VENIPUNCTURE: CPT

## 2020-10-12 PROCEDURE — U0003 INFECTIOUS AGENT DETECTION BY NUCLEIC ACID (DNA OR RNA); SEVERE ACUTE RESPIRATORY SYNDROME CORONAVIRUS 2 (SARS-COV-2) (CORONAVIRUS DISEASE [COVID-19]), AMPLIFIED PROBE TECHNIQUE, MAKING USE OF HIGH THROUGHPUT TECHNOLOGIES AS DESCRIBED BY CMS-2020-01-R: HCPCS

## 2020-10-12 PROCEDURE — 21400001 HC TELEMETRY ROOM

## 2020-10-12 PROCEDURE — 25500020 PHARM REV CODE 255: Performed by: SURGERY

## 2020-10-12 PROCEDURE — 80202 ASSAY OF VANCOMYCIN: CPT

## 2020-10-12 PROCEDURE — 97802 MEDICAL NUTRITION INDIV IN: CPT

## 2020-10-12 PROCEDURE — 85025 COMPLETE CBC W/AUTO DIFF WBC: CPT

## 2020-10-12 PROCEDURE — 25000003 PHARM REV CODE 250: Performed by: PHYSICIAN ASSISTANT

## 2020-10-12 PROCEDURE — C9113 INJ PANTOPRAZOLE SODIUM, VIA: HCPCS | Performed by: STUDENT IN AN ORGANIZED HEALTH CARE EDUCATION/TRAINING PROGRAM

## 2020-10-12 PROCEDURE — 84100 ASSAY OF PHOSPHORUS: CPT

## 2020-10-12 PROCEDURE — 63600175 PHARM REV CODE 636 W HCPCS: Performed by: SURGERY

## 2020-10-12 RX ORDER — OXYCODONE AND ACETAMINOPHEN 7.5; 325 MG/1; MG/1
1 TABLET ORAL EVERY 6 HOURS PRN
Status: DISCONTINUED | OUTPATIENT
Start: 2020-10-12 | End: 2020-10-21 | Stop reason: HOSPADM

## 2020-10-12 RX ORDER — BISACODYL 10 MG
10 SUPPOSITORY, RECTAL RECTAL DAILY PRN
Status: DISCONTINUED | OUTPATIENT
Start: 2020-10-12 | End: 2020-10-21 | Stop reason: HOSPADM

## 2020-10-12 RX ADMIN — IOHEXOL 100 ML: 350 INJECTION, SOLUTION INTRAVENOUS at 09:10

## 2020-10-12 RX ADMIN — OXYCODONE 5 MG: 5 TABLET ORAL at 09:10

## 2020-10-12 RX ADMIN — OXYCODONE 5 MG: 5 TABLET ORAL at 04:10

## 2020-10-12 RX ADMIN — PANTOPRAZOLE SODIUM 40 MG: 40 INJECTION, POWDER, FOR SOLUTION INTRAVENOUS at 09:10

## 2020-10-12 RX ADMIN — PIPERACILLIN SODIUM AND TAZOBACTAM SODIUM 4.5 G: 4; .5 INJECTION, POWDER, LYOPHILIZED, FOR SOLUTION INTRAVENOUS at 08:10

## 2020-10-12 RX ADMIN — PIPERACILLIN SODIUM AND TAZOBACTAM SODIUM 4.5 G: 4; .5 INJECTION, POWDER, LYOPHILIZED, FOR SOLUTION INTRAVENOUS at 04:10

## 2020-10-12 RX ADMIN — PIPERACILLIN SODIUM AND TAZOBACTAM SODIUM 4.5 G: 4; .5 INJECTION, POWDER, LYOPHILIZED, FOR SOLUTION INTRAVENOUS at 11:10

## 2020-10-12 RX ADMIN — PANTOPRAZOLE SODIUM 40 MG: 40 INJECTION, POWDER, FOR SOLUTION INTRAVENOUS at 08:10

## 2020-10-12 RX ADMIN — POTASSIUM CHLORIDE, DEXTROSE MONOHYDRATE AND SODIUM CHLORIDE: 150; 5; 450 INJECTION, SOLUTION INTRAVENOUS at 07:10

## 2020-10-12 RX ADMIN — POTASSIUM CHLORIDE, DEXTROSE MONOHYDRATE AND SODIUM CHLORIDE: 150; 5; 450 INJECTION, SOLUTION INTRAVENOUS at 04:10

## 2020-10-12 RX ADMIN — VANCOMYCIN HYDROCHLORIDE 1250 MG: 1.25 INJECTION, POWDER, LYOPHILIZED, FOR SOLUTION INTRAVENOUS at 06:10

## 2020-10-12 RX ADMIN — OXYCODONE AND ACETAMINOPHEN 1 TABLET: 7.5; 325 TABLET ORAL at 04:10

## 2020-10-12 RX ADMIN — FLUCONAZOLE 400 MG: 2 INJECTION, SOLUTION INTRAVENOUS at 09:10

## 2020-10-12 NOTE — NURSING
Report given to DEANA Dozier. Patient resting comfortably in bed, no complaints, no acute distress noted. Safety maintained throughout shift.

## 2020-10-12 NOTE — PROGRESS NOTES
[facility] [place]  Adult Nutrition  Progress Note    SUMMARY       Recommendations    1. Initiate Clinimix 5/15 @ 65 ml/hr + standard lipids.    This provides 1608 kcal, 78g protein, GIR 2.07   2. When medically able order clear liquid diet advance as tolerated to low fiber/residue diet.   3. Weigh pt weekly.    Malnutrition Assessment:   Patient meets criteria for severe protein-calorie malnutrition 2/2 inadequate energy intake and weight loss.     Goals: 1. Initiate diet order by next RD visit.  Nutrition Goal Status: new  Communication of RD Recs: (plan of care, sticky notes)    Reason for Assessment    Reason For Assessment: NPO/clear liquids x 5 days  Diagnosis: surgery/postoperative complications(post op abcess)  Relevant Medical History: umbilical hernia, pregnancy (8/2020)  Interdisciplinary Rounds: did not attend    General Information Comments: 40 y.o. female presented to the ED with c/o severe stabbing pain to bilateral flack/lumber back spasma, chills and SOB and worsening pain after eating. Pt was sitting in bed awake at visit. Pt reoprts she is still hvaing abdominal pain but it is improving since the drain was placed. Pt is NPO day 5, pt stated she is starting to get fustrated at the fact that she can't eat. She states she is hungry and feels she has hunger pains. Explained to pt the importence of NPO and that I will put in rec's for PN. Pt stated she has lost alot of weight even during her pregenancy due to gastritis, GERD, and other GI issues. Last BM 10/5, no reports of GI distress. NFPE was not performed, pt appeared well-nourished but will attempt at follow-up.    Nutrition Discharge Planning: pending medical course    Nutrition Risk Screen    Nutrition Risk Screen: no indicators present    Nutrition/Diet History    Patient Reported Diet/Restrictions/Preferences: general  Typical Food/Fluid Intake: was on clears a few weeks ago but has advance to solids and was tolerating well prior  "to admit  Spiritual, Cultural Beliefs, Mandaeism Practices, Values that Affect Care: no  Food Allergies: NKFA  Factors Affecting Nutritional Intake: NPO, altered gastrointestinal function    Anthropometrics    Temp: 98.2 °F (36.8 °C)  Height Method: Stated  Height: 5' 7" (170.2 cm)  Height (inches): 67 in  Weight Method: Bed Scale  Weight: 78.2 kg (172 lb 6.4 oz)  Weight (lb): 172.4 lb  Ideal Body Weight (IBW), Female: 135 lb  % Ideal Body Weight, Female (lb): 128.52 %  BMI (Calculated): 27  BMI Grade: 25 - 29.9 - overweight  Weight Loss: unintentional  Usual Body Weight (UBW), k.8 kg(20 87.8 kg)  % Usual Body Weight: 89.25  % Weight Change From Usual Weight: -10.93 %     Lab/Procedures/Meds    Pertinent Labs Reviewed: reviewed  Pertinent Labs Comments: H/H 8.5/28.1, Na 143, K 3.2, BUN 4, cA 8.0, Alk phos 179, pro tot 5.5, Alb 1.5  Pertinent Medications Reviewed: reviewed  Pertinent Medications Comments: pantoprazole, IV lfuids    Estimated/Assessed Needs    Weight Used For Calorie Calculations: 78.2 kg (172 lb 6.4 oz)  Energy Calorie Requirements (kcal): 1955 kcal +340 kcal for breastfeeding  Energy Need Method: Kcal/kg  Protein Requirements: 78g (1.0 g/kg)  Weight Used For Protein Calculations: 78.2 kg (172 lb 6.4 oz)  Fluid Requirements (mL): 1 ml/kcal or per MD  Estimated Fluid Requirement Method: RDA Method  RDA Method (mL):   CHO Requirement: 245 g daily    Nutrition Prescription Ordered    Current Diet Order: NPO day 5    Evaluation of Received Nutrient/Fluid Intake    I/O: 220/1725  Energy Calories Required: not meeting needs  Protein Required: not meeting needs  Fluid Required: meeting needs  Comments: Last BM 10/5  Tolerance: tolerating  % Intake of Estimated Energy Needs: 0 %  % Meal Intake: NPO    Nutrition Risk    Level of Risk/Frequency of Follow-up: moderate - high(2x/week)     Assessment and Plan  Nutrition Problem  Severe malnutrition     Related to (etiology):   GI conditions "     Signs and Symptoms (as evidenced by):   NPO day 5, 10% weight loss in 5 months    Interventions (treatment strategy):  Parenteral nutrition  Collaboration with other providers    Nutrition Diagnosis Status:   New    Monitor and Evaluation    Food and Nutrient Intake: energy intake, parenteral nutrition intake  Food and Nutrient Adminstration: enteral and parenteral nutrition administration  Knowledge/Beliefs/Attitudes: food and nutrition knowledge/skill  Physical Activity and Function: breastfeeding  Anthropometric Measurements: weight, weight change  Biochemical Data, Medical Tests and Procedures: electrolyte and renal panel  Nutrition-Focused Physical Findings: overall appearance, skin     Malnutrition Assessment: Patient meets criteria for severe protein-calorie malnutrition 2/2 inadequate energy intake and weight loss.   Energy Intake: severe energy intake(NPO day 5)  Skin (Micronutrient): (miguel score-19, incision umbilical area, abdominal midline)  Teeth (Micronutrient): (WDL)       Weight Loss (Malnutrition): greater than 10% in 6 months  5/25/20 87.8 kg   10/12/20 78.2 kg - 10% weight loss in about 5 months  Energy Intake (Malnutrition): less than or equal to 50% for greater than or equal to 5 days       Edema (Fluid Accumulation): 0-->no edema present        Nutrition Follow-Up    RD Follow-up?: Yes

## 2020-10-12 NOTE — NURSING
6719 - Spoke with Dr. Coyne in regards to pt's request for different pain medication because the pt stated that the current pain medication she feels like is only making her sleep rather than easing the pain, needing a stool softner because she feels like she needs to have a BM and attempted but with no success, and if she could have ice chips. Dr. Coyne ordered Percocet 7.5 mg Q6hr PRN for pain, she could have a couple of ice chips every hour a nursing communication order was placed, and dulcolax suppository.     8943 - Spoke with Dr. Coyne in regards to holding lovenox for upper endoscopic ultrasound. She ordered to hold medication for today. Pt is to be on strict NPO after Midnight no ice chips.

## 2020-10-12 NOTE — PHYSICIAN QUERY
PT Name: Puja Quigley  MR #: [id number]     Documentation Clarification      CDS: Brandy Capley, RN  Email: [email or web address]    This form is a permanent document in the medical record.     Query Date: October 12, 2020    By submitting this query, we are merely seeking further clarification of documentation. Please utilize your independent clinical judgment when addressing the question(s) below.    The Medical Record reflects the following:    Supporting Clinical Findings Location in Medical Record     Specimen UA: Urine, Clean Catch  Color, UA: Yellow  Appearance, UA: Clear  Specific Gravity, UA: 1.010  pH, UA: 7.0  Protein, UA: 1+ (A)  Glucose, UA: Negative  Ketones, UA: Negative  Occult Blood UA: Negative  NITRITE UA: Negative  UROBILINOGEN UA: 2.0-3.0 (A)  Bilirubin (UA): Negative  Leukocytes, UA: 1+ (A)  RBC, UA: 1  WBC, UA: 20 (H)  Bacteria, UA: Occasional  Squam Epithel, UA: 4  Hyaline Casts, UA: 0  Microscopic Comment: SEE COMMENT    ENTEROCOCCUS FAECALIS  >100,000 cfu/ml    KLEBSIELLA PNEUMONIAE 50,000 - 99,999 cfu/ml     Labs 10/8/2020 15:09                                        Urine culture 10/8     Broad spectrum antibiotics with vanc/zosyn/diflucan      H&P 10/8, filed 10/9                                                                            Provider, please provide diagnosis or diagnoses associated with above clinical findings.    [ x  ] UTI     [   ] Other (please specify): ____________     [  ] Clinically undetermined

## 2020-10-12 NOTE — PLAN OF CARE
10/12/20 1529   Discharge Reassessment   Assessment Type Discharge Planning Reassessment   Do you have any problems affording any of your prescribed medications? No   Discharge Plan A Home   Discharge Plan B Home   DME Needed Upon Discharge  none   Patient choice form signed by patient/caregiver N/A   Anticipated Discharge Disposition Home   Can the patient/caregiver answer the patient profile reliably? Yes, cognitively intact   How does the patient rate their overall health at the present time? Fair   Describe the patient's ability to walk at the present time. No restrictions   How often would a person be available to care for the patient? Whenever needed   Number of comorbid conditions (as recorded on the chart) One   During the past month, has the patient often been bothered by feeling down, depressed or hopeless? Yes   During the past month, has the patient often been bothered by little interest or pleasure in doing things? Yes   Post-Acute Status   Post-Acute Authorization Other   Other Status No Post-Acute Service Needs   Discharge Delays None known at this time

## 2020-10-12 NOTE — PHYSICIAN QUERY
"PT Name: Puja Quigley  MR #: [id number]    Nutrition Clarification     CDS: Brandy Capley, RN  Email: [email or web address]    This form is a permanent document in the medical record.     Query Date: 2020    By submitting this query, we are merely seeking further clarification of documentation.. Please utilize your independent clinical judgment when addressing the question(s) below.    The medical record contains the following:   Indicators  Supporting Clinical Findings Location in Medical Record   X % of Estimated Energy Intake over a time frame from p.o., TF, or TPN Energy Intake (Malnutrition): less than or equal to 50% for greater than or equal to 5 days  Nutrition progress notes 10/12   X Weight Status over a time frame Weight Loss (Malnutrition): greater than 10% in 6 months  20 87.8 kg   10/12/20 78.2 kg - 10% weight loss in about 5 months Nutrition progress notes 10/12    Subcutaneous Fat and/or Muscle Loss      Fluid Accumulation or Edema     X Wt/BMI/Usual Body Weight Height: 5' 7" (170.2 cm)  Height (inches): 67 in  Weight Method: Bed Scale  Weight: 78.2 kg (172 lb 6.4 oz)  Weight (lb): 172.4 lb  Ideal Body Weight (IBW), Female: 135 lb  % Ideal Body Weight, Female (lb): 128.52 %  BMI (Calculated): 27  BMI Grade: 25 - 29.9 - overweight  Weight Loss: unintentional  Usual Body Weight (UBW), k.8 kg(20 87.8 kg)  % Usual Body Weight: 89.25  % Weight Change From Usual Weight: -10.93 % Nutrition progress notes 10/12    Delayed Wound Healing/Failure to Thrive     X Acute or Chronic Illness 41yo F w/ h/o perforated gastric ulcer s/p ex lap and guerrero patch repair 20 presents with large abscess in the gastrohepatic space.      Sepsis                 Procedure: CT-guided placement of a 10-Fr percutaneous epigastric/trans-abdominal-approach intra-peritoneal abscess drainage catheter     Specimen Removed: YES, 800-cc of frankly purulent, mildly thick fluid H&P 10/8, filed " 10/9          Brief op note 10/9    Medication      Treatment     X Other Patient meets criteria for severe protein-calorie malnutrition 2/2 inadequate energy intake and weight loss.     Reason For Assessment: NPO/clear liquids x 5 days  Diagnosis: surgery/postoperative complications(post op abcess)  Relevant Medical History: umbilical hernia, pregnancy (8/2020)    General Information Comments:   40 y.o. female presented to the ED with c/o severe stabbing pain to bilateral flack/lumber back spasma, chills and SOB and worsening pain after eating. Pt was sitting in bed awake at visit. Pt reoprts she is still hvaing abdominal pain but it is improving since the drain was placed. Pt is NPO day 5, pt stated she is starting to get fustrated at the fact that she can't eat. She states she is hungry and feels she has hunger pains. Explained to pt the importence of NPO and that I will put in rec's for PN. Pt stated she has lost alot of weight even during her pregenancy due to gastritis, GERD, and other GI issues. Last BM 10/5, no reports of GI distress. NFPE was not performed, pt appeared well-nourished but will attempt at follow-up.    Severe malnutrition   Related to (etiology): GI conditions   Signs and Symptoms (as evidenced by):  NPO day 5, 10% weight loss in 5 months   Nutrition progress notes 10/12     AND / ASPEN Clinical Characteristics (October 2011)  A minimum of two characteristics is recommended for diagnosing either moderate or severe malnutrition   Mild Malnutrition Moderate Malnutrition Severe Malnutrition   Energy Intake from p.o., TF or TPN. < 75% intake of estimated energy needs for less than 7 days < 75% intake of estimated energy needs for greater than 7 days < 50% intake of estimated energy needs for > 5 days   Weight Loss 1-2% in 1 month  5% in 3 months  7.5% in 6 months  10% in 1 year 1-2 % in 1 week  5% in 1 month  7.5% in 3 months  10% in 6 months  20% in 1 year > 2% in 1 week  > 5% in 1 month  > 7.5%  in 3 months  > 10% in 6 months  > 20% in 1 year   Physical Findings     None *Mild subcutaneous fat and/or muscle loss  *Mild fluid accumulation  *Stage II decubitus  *Surgical wound or non-healing wound *Mod/severe subcutaneous fat and/or muscle loss  *Mod/severe fluid accumulation  *Stage III or IV decubitus  *Non-healing surgical wound     Provider, please specify diagnosis or diagnoses associated with above clinical findings.    [  ] Mild Protein-Calorie Malnutrition   [ x ] Moderate Protein-Calorie Malnutrition   [  ] Severe Protein-Calorie Malnutrition   [  ] Abnormal Weight Loss   [  ] Other Nutritional Diagnosis (please specify): _______   [  ] Clinically Undetermined         Please document in your progress notes daily for the duration of treatment until resolved and include in your discharge summary.

## 2020-10-12 NOTE — PLAN OF CARE
Problem: Adult Inpatient Plan of Care  Goal: Plan of Care Review  Outcome: Ongoing, Progressing     Problem: Fall Injury Risk  Goal: Absence of Fall and Fall-Related Injury  Outcome: Ongoing, Progressing  Intervention: Identify and Manage Contributors to Fall Injury Risk  Flowsheets (Taken 10/11/2020 1946)  Self-Care Promotion:   independence encouraged   BADL personal objects within reach   safe use of adaptive equipment encouraged  Medication Review/Management: medications reviewed  Intervention: Promote Injury-Free Environment  Flowsheets (Taken 10/11/2020 1946)  Safety Promotion/Fall Prevention:   assistive device/personal item within reach   bed alarm set   commode/urinal/bedpan at bedside   Fall Risk reviewed with patient/family   lighting adjusted   medications reviewed   nonskid shoes/socks when out of bed   room near unit station   side rails raised x 2   instructed to call staff for mobility  Environmental Safety Modification:   assistive device/personal items within reach   clutter free environment maintained   room near unit station   lighting adjusted   room organization consistent

## 2020-10-12 NOTE — NURSING
0856 - Patient is leaving the floor to radiology for scheduled GI study via wheelchair. NAD noted. No O2 IVF & ABX.     0955 - pt is back to room 333 from radiology.

## 2020-10-12 NOTE — PROGRESS NOTES
Pharmacokinetic Assessment Follow Up: IV Vancomycin    Vancomycin serum concentration assessment(s):    The random level was drawn correctly and can be used to guide therapy at this time. The measurement is within the desired definitive target range of 10 to 20 mcg/mL.    Vancomycin Regimen Plan:    Change regimen to Vancomycin 1250 mg IV every 24 hours with next serum trough concentration measured at 0600 prior to 3rd dose on 10/14    Drug levels (last 3 results):  Recent Labs   Lab Result Units 10/10/20  0248 10/11/20  1427 10/12/20  0407   Vancomycin, Random ug/mL  --   --  15.1   Vancomycin-Trough ug/mL 12.5 25.0*  --        Pharmacy will continue to follow and monitor vancomycin.    Please contact pharmacy at extension [phone] for questions regarding this assessment.    Thank you for the consult,   Martin Taylor       Patient brief summary:  Puja Quigley is a 40 y.o. female initiated on antimicrobial therapy with IV Vancomycin for treatment of intra-abdominal infection    The patient's current regimen is 1250 mg q24h    Drug Allergies:   Review of patient's allergies indicates:  No Known Allergies    Actual Body Weight:   78.2 kg    Renal Function:   Estimated Creatinine Clearance: 63.2 mL/min (based on SCr of 1.3 mg/dL).,     Dialysis Method (if applicable):  N/A    CBC (last 72 hours):  Recent Labs   Lab Result Units 10/10/20  0248 10/11/20  0458 10/12/20  0407   WBC K/uL 7.72 8.44 5.96   Hemoglobin g/dL 8.5* 8.8* 8.5*   Hematocrit % 26.3* 28.6* 28.1*   Platelets K/uL 270 275 264   Gran% % 70.4 66.9 60.2   Lymph% % 17.4* 20.1 26.7   Mono% % 9.6 9.8 10.6   Eosinophil% % 0.3 0.5 0.7   Basophil% % 0.4 0.2 0.3   Differential Method  Automated Automated Automated       Metabolic Panel (last 72 hours):  Recent Labs   Lab Result Units 10/10/20  0248 10/11/20  0458 10/12/20  0407   Sodium mmol/L 142 145 147*   Potassium mmol/L 3.1* 2.9* 3.2*   Chloride mmol/L 103 109 110   CO2 mmol/L 27 26 26   Glucose  mg/dL 96 97 98   BUN, Bld mg/dL 5* 6 4*   Creatinine mg/dL 0.8 1.1 1.3   Albumin g/dL 1.5* 1.5* 1.5*   Total Bilirubin mg/dL 0.5 0.3 0.4   Alkaline Phosphatase U/L 198* 183* 179*   AST U/L 28 22 19   ALT U/L 21 16 16   Magnesium mg/dL 1.7 1.9 1.7   Phosphorus mg/dL 2.7 3.3 3.4       Vancomycin Administrations:  vancomycin given in the last 96 hours                     vancomycin 1.25 g in dextrose 5% 250 mL IVPB (ready to mix) (mg) 1,250 mg New Bag 10/11/20 0305     1,250 mg New Bag 10/10/20 1617     1,250 mg New Bag  0323     1,250 mg New Bag 10/09/20 1530     1,250 mg New Bag  0239    vancomycin 1.5 g in dextrose 5 % 250 mL IVPB (ready to mix) (mg) 1,500 mg New Bag 10/08/20 1531                    Microbiologic Results:  Microbiology Results (last 7 days)       Procedure Component Value Units Date/Time    Culture, Anaerobic [[id number]] Collected: 10/09/20 1005    Order Status: Completed Specimen: Abscess from Abdomen Updated: 10/11/20 1135     Anaerobic Culture Culture in progress    Blood culture #2 **CANNOT BE ORDERED STAT** [[id number]]  (Abnormal) Collected: 10/08/20 1451    Order Status: Completed Specimen: Blood from Peripheral, Antecubital, Right Updated: 10/11/20 1036     Blood Culture, Routine Gram stain karen bottle: Gram positive cocci in clusters resembling Staph      10/10/2020  05:17       Results called to and read back by: Kaley Pennington RN      COAGULASE-NEGATIVE STAPHYLOCOCCUS SPECIES  Identification and susceptibility pending      Culture, Body Fluid (Aerobic) w/ GS [[id number]]  (Abnormal)  (Susceptibility) Collected: 10/09/20 1011    Order Status: Completed Specimen: Body Fluid from Peritoneal Fluid Updated: 10/11/20 0833     AEROBIC CULTURE - FLUID KLEBSIELLA PNEUMONIAE  Many       Gram Stain Result Cytospin indicates:      Many Gram negative rods      Few WBC's    Narrative:      Abdominal abscess drainage    Blood culture #1 **CANNOT BE ORDERED STAT** [[id number]] Collected: 10/08/20 1440     Order Status: Completed Specimen: Blood from Peripheral, Antecubital, Left Updated: 10/11/20 0040     Blood Culture, Routine Gram stain karen bottle: possible gram negative cocci       Gram stain review to be done by Micro       Results called to and read back by: Aminta Sullivan 3W    Urine culture [[id number]]  (Abnormal)  (Susceptibility) Collected: 10/08/20 1509    Order Status: Completed Specimen: Urine Updated: 10/10/20 1230     Urine Culture, Routine ENTEROCOCCUS FAECALIS  >100,000 cfu/ml        KLEBSIELLA PNEUMONIAE  50,000 - 99,999 cfu/ml      Narrative:      Specimen Source->Urine    Fungus culture [[id number]] Collected: 10/09/20 1011    Order Status: Sent Specimen: Body Fluid from Peritoneal Fluid Updated: 10/09/20 1019    Gram stain [[id number]] Collected: 10/09/20 1005    Order Status: Canceled Specimen: Abscess from Abdomen     Culture, Anaerobe [[id number]] Collected: 10/09/20 1005    Order Status: Canceled Specimen: Abscess from Abdomen     Aerobic culture [[id number]] Collected: 10/09/20 1005    Order Status: Canceled Specimen: Abscess from Abdomen     C. trachomatis/N. gonorrhoeae by AMP DNA [facility]; Cervix [[id number]] Collected: 10/08/20 1559    Order Status: Sent Specimen: Genital Updated: 10/09/20 0976

## 2020-10-12 NOTE — NURSING
Bedside shift report received from DEANA Dozier. Communication board has been updated. NAD noted. Will continue to monitor.

## 2020-10-12 NOTE — PROGRESS NOTES
Received report from DEANA Quinn. Patient AAOx4, resting quietly in bed, telemetry monitoring in place, no distress noted. Safety maintained, call light in reach.

## 2020-10-12 NOTE — CONSULTS
Spoke to Dr. Magallon. Concern for gastric leak/fistula. Will plan on EGD with attempted closure of leak site using endoscopic suturing system. Procedure will need to be done at main campus due to complexity and need for specialized endoscopy equipment. Per Dr. Magallon, patient is in agreement to proceed. Will plan for procedure tomorrow.

## 2020-10-12 NOTE — PLAN OF CARE
Problem: Fall Injury Risk  Goal: Absence of Fall and Fall-Related Injury  Outcome: Ongoing, Progressing  Intervention: Identify and Manage Contributors to Fall Injury Risk  Flowsheets (Taken 10/12/2020 2080)  Self-Care Promotion: independence encouraged

## 2020-10-12 NOTE — PHYSICIAN QUERY
PT Name: Puja Quigley  MR #: [id number]     SYSTEMIC INFECTIOUS PROCESS CLARIFICATION     CDS: Brandy Capley, RN  Email: [email or web address]    This form is a permanent document in the medical record.     Query Date: October 12, 2020    By submitting this query, we are merely seeking further clarification of documentation.  Please utilize your independent clinical judgment when addressing the question(s) below.  The Medical Record contains the following:  Indicators Supporting Clinical Findings Location in Medical Record   X HR         RR          BP        Temp Vital signs in last 24 hours:  Temp:  [98.7 °F (37.1 °C)-102.1 °F (38.9 °C)] 99.3 °F (37.4 °C)  Pulse:  [105-129] 108  Resp:  [13-35] 18  SpO2:  [95 %-99 %] 96 %  BP: (133-158)/() 133/80   H&P 10/8, filed 10/9   X Lactic Acid          Procalcitonin    10/8/2020 14:40   Lactate, Vincent 1.3      Labs 10/8   X WBC           Bands          CRP     10/8/2020 14:40 10/9/2020 05:25 10/10/2020 02:48 10/11/2020 04:58 10/12/2020 04:07   WBC 8.82 7.66 7.72 8.44 5.96   Gran # (ANC) 6.5  5.4 5.6 3.6   BANDS  19.0           Labs 10/8-10/12   X Culture(s) PREVOTELLA (B.) BIVIA    STAPHYLOCOCCUS HOMINIS SUBSP. HOMINIS     ENTEROCOCCUS FAECALIS  >100,000 cfu/ml    KLEBSIELLA PNEUMONIAE 50,000 - 99,999 cfu/ml    PREVOTELLA (B.) BIVIA  Many  Specimen Type: Abscess  Specimen Source: Abdomen    KLEBSIELLA PNEUMONIAE Many  Specimen Type: Body Fluid  Specimen Source: Peritoneal Fluid   Blood culture 10/8 1440    Blood culture 10/8 1441    Urine culture 10/8      Culture 10/9    AMS, Confusion, LOC, etc.      Organ Dysfunction/Failure     X Bacteremia or Sepsis / Septic Patient Active Problem List   Sepsis  date noted:  10/08/2020   H&P 10/8, filed 10/9   X Known or Suspected Source of Infection documented 39yo F w/ h/o perforated gastric ulcer s/p ex lap and guerrero patch repair 9/24/20 presents with large abscess in the gastrohepatic space H&P 10/8, filed 10/9     (Failed) Outpatient Treatment     X Medication Broad spectrum antibiotics with vanc/zosyn/diflucan    H&P 10/8, filed 10/9    Treatment     X Other Procedure: CT-guided placement of a 10-Fr percutaneous epigastric/trans-abdominal-approach intra-peritoneal abscess drainage catheter     Specimen Removed: YES, 800-cc of frankly purulent, mildly thick fluid Brief op note 10/9        Provider, please specify diagnosis or diagnoses associated with above clinical findings.    [   ] Sepsis (please specify contributing organism/s):  [   ] Prevotella (B.) Bivia, Staph Hominis subsp. Hominis, enterococcus Faecalis, and Klebsiella Pneumoniae  [   ] Prevotella (B.) Bivia  [   ] Staph Hominis subsp. Hominis  [   ] enterococcus Faecalis  [   ] Klebsiella Pneumoniae  [   ] Other (please specify):  ______________  [   ] Clinically undetermined     [ x  ] Bacteremia without Sepsis     [   ] SIRS with infection but without Sepsis     [   ] Other Infectious Disease (please specify): __________     [   ] Sepsis Ruled Out     [  ] Clinically Undetermined       Please document in your progress notes daily for the duration of treatment until resolved and include in your discharge summary.

## 2020-10-12 NOTE — PLAN OF CARE
Recommendations    1. Initiate Clinimix 5/15 @ 65 ml/hr + standard lipids.    This provides 1608 kcal, 78g protein, GIR 2.07   2. When medically able order clear liquid diet advance as tolerated to low fiber/residue diet.   3. Weigh pt weekly.    Malnutrition Assessment:   Patient meets criteria for severe protein-calorie malnutrition 2/2 inadequate energy intake and weight loss.     Goals: 1. Initiate diet order by next RD visit.  Nutrition Goal Status: new  Communication of RD Recs: (plan of care, sticky notes)

## 2020-10-12 NOTE — PROGRESS NOTES
[facility]  General Surgery  Progress Note    Subjective:     Interval History:   NAEON. AF, VSS.   Continues to have improvement in abdominal pain. No acute changes.       Post-Op Info:  * No surgery found *          Medications:  Continuous Infusions:   dextrose 5 % and 0.45 % NaCl with KCl 20 mEq 125 mL/hr at 10/11/20 2236     Scheduled Meds:   enoxaparin  40 mg Subcutaneous Q24H    fluconazole (DIFLUCAN) IVPB  400 mg Intravenous Q24H    lidocaine (PF) 10 mg/ml (1%)  1 mL Other Once    pantoprazole  40 mg Intravenous BID    piperacillin-tazobactam (ZOSYN) IVPB  4.5 g Intravenous Q8H    vancomycin (VANCOCIN) IVPB  1,250 mg Intravenous Q24H     PRN Meds:acetaminophen, morphine, ondansetron, oxyCODONE, sodium chloride 0.9%     Objective:     Vital Signs (Most Recent):  Temp: 98.1 °F (36.7 °C) (10/12/20 0621)  Pulse: 70 (10/12/20 0621)  Resp: 18 (10/12/20 0621)  BP: 137/87 (10/12/20 0621)  SpO2: 98 % (10/12/20 0621) Vital Signs (24h Range):  Temp:  [97.8 °F (36.6 °C)-98.6 °F (37 °C)] 98.1 °F (36.7 °C)  Pulse:  [64-81] 70  Resp:  [18] 18  SpO2:  [97 %-100 %] 98 %  BP: (137-151)/(87-97) 137/87       Intake/Output Summary (Last 24 hours) at 10/12/2020 0655  Last data filed at 10/11/2020 1800  Gross per 24 hour   Intake 220 ml   Output 1725 ml   Net -1505 ml       Physical Exam  Constitutional:       Appearance: Normal appearance.   HENT:      Head: Normocephalic.      Mouth/Throat:      Mouth: Mucous membranes are moist.   Cardiovascular:      Rate and Rhythm: Normal rate and regular rhythm.   Pulmonary:      Effort: Pulmonary effort is normal. No respiratory distress.   Abdominal:      General: There is no distension.      Palpations: Abdomen is soft.      Tenderness: There is abdominal tenderness (improved).      Comments: IR drain w/ thin clear drainage in bag     Skin:     General: Skin is warm and dry.   Neurological:      Mental Status: She is alert.         Significant Labs:  BMP:    Recent Labs   Lab 10/12/20  0407   GLU 98   *   K 3.2*      CO2 26   BUN 4*   CREATININE 1.3   CALCIUM 8.0*   MG 1.7     CBC:   Recent Labs   Lab 10/12/20  0407   WBC 5.96   RBC 3.13*   HGB 8.5*   HCT 28.1*      MCV 90   MCH 27.2   MCHC 30.2*     CMP:   Recent Labs   Lab 10/12/20  0407   GLU 98   CALCIUM 8.0*   ALBUMIN 1.5*   PROT 5.5*   *   K 3.2*   CO2 26      BUN 4*   CREATININE 1.3   ALKPHOS 179*   ALT 16   AST 19   BILITOT 0.4       Significant Diagnostics:  None    Assessment/Plan:     Active Diagnoses:    Diagnosis Date Noted POA    Sepsis [A41.9] 10/08/2020 Yes      Problems Resolved During this Admission:     41yo F w/ h/o perforated gastric ulcer s/p ex lap and guerrero patch repair 9/24/20 presents with large abscess in the gastrohepatic space. Now s/p IR drain placement 10/9.     - Upper GI study today to evaluate for possible leak   - Continue NPO w/ IVFs   - Continue vanc/zosyn/diflucan. Drainage cultures w/ pan sensitive to Klebsiella and UCx w/ pan sensitive Klebsiella and Enterococcus faecalis sensitive to Vanc   - Tylenol prn fever   - Pain meds prn   - Drain mgmt    - IS   - Encourage OOB, ambulation   - Ppx: SCDs, Lovenox   - Dispo: inpatient mgmt     Puja Coyne MD  General Surgery  [facility] - [place], [place]

## 2020-10-13 ENCOUNTER — TELEPHONE (OUTPATIENT)
Dept: SURGERY | Facility: CLINIC | Age: 40
End: 2020-10-13

## 2020-10-13 ENCOUNTER — ANESTHESIA EVENT (OUTPATIENT)
Dept: ENDOSCOPY | Facility: HOSPITAL | Age: 40
DRG: 856 | End: 2020-10-13
Payer: COMMERCIAL

## 2020-10-13 ENCOUNTER — ANESTHESIA (OUTPATIENT)
Dept: ENDOSCOPY | Facility: HOSPITAL | Age: 40
DRG: 856 | End: 2020-10-13
Payer: COMMERCIAL

## 2020-10-13 LAB
ALBUMIN SERPL BCP-MCNC: 1.6 G/DL (ref 3.5–5.2)
ALP SERPL-CCNC: 180 U/L (ref 55–135)
ALT SERPL W/O P-5'-P-CCNC: 15 U/L (ref 10–44)
ANION GAP SERPL CALC-SCNC: 9 MMOL/L (ref 8–16)
AST SERPL-CCNC: 21 U/L (ref 10–40)
BASOPHILS # BLD AUTO: 0.02 K/UL (ref 0–0.2)
BASOPHILS NFR BLD: 0.3 % (ref 0–1.9)
BILIRUB SERPL-MCNC: 0.4 MG/DL (ref 0.1–1)
BUN SERPL-MCNC: 4 MG/DL (ref 6–20)
CALCIUM SERPL-MCNC: 8 MG/DL (ref 8.7–10.5)
CHLORIDE SERPL-SCNC: 111 MMOL/L (ref 95–110)
CO2 SERPL-SCNC: 26 MMOL/L (ref 23–29)
CREAT SERPL-MCNC: 1.2 MG/DL (ref 0.5–1.4)
DIFFERENTIAL METHOD: ABNORMAL
EOSINOPHIL # BLD AUTO: 0.1 K/UL (ref 0–0.5)
EOSINOPHIL NFR BLD: 1.1 % (ref 0–8)
ERYTHROCYTE [DISTWIDTH] IN BLOOD BY AUTOMATED COUNT: 15.3 % (ref 11.5–14.5)
EST. GFR  (AFRICAN AMERICAN): >60 ML/MIN/1.73 M^2
EST. GFR  (NON AFRICAN AMERICAN): 57 ML/MIN/1.73 M^2
GLUCOSE SERPL-MCNC: 98 MG/DL (ref 70–110)
HCT VFR BLD AUTO: 29.9 % (ref 37–48.5)
HGB BLD-MCNC: 9.3 G/DL (ref 12–16)
IMM GRANULOCYTES # BLD AUTO: 0.09 K/UL (ref 0–0.04)
IMM GRANULOCYTES NFR BLD AUTO: 1.2 % (ref 0–0.5)
LYMPHOCYTES # BLD AUTO: 1.7 K/UL (ref 1–4.8)
LYMPHOCYTES NFR BLD: 22.9 % (ref 18–48)
MAGNESIUM SERPL-MCNC: 1.8 MG/DL (ref 1.6–2.6)
MCH RBC QN AUTO: 27.6 PG (ref 27–31)
MCHC RBC AUTO-ENTMCNC: 31.1 G/DL (ref 32–36)
MCV RBC AUTO: 89 FL (ref 82–98)
MONOCYTES # BLD AUTO: 0.6 K/UL (ref 0.3–1)
MONOCYTES NFR BLD: 8.3 % (ref 4–15)
NEUTROPHILS # BLD AUTO: 4.9 K/UL (ref 1.8–7.7)
NEUTROPHILS NFR BLD: 66.2 % (ref 38–73)
NRBC BLD-RTO: 0 /100 WBC
PHOSPHATE SERPL-MCNC: 3.5 MG/DL (ref 2.7–4.5)
PLATELET # BLD AUTO: 306 K/UL (ref 150–350)
PMV BLD AUTO: 8.7 FL (ref 9.2–12.9)
POCT GLUCOSE: 83 MG/DL (ref 70–110)
POTASSIUM SERPL-SCNC: 3.4 MMOL/L (ref 3.5–5.1)
PROT SERPL-MCNC: 6.1 G/DL (ref 6–8.4)
RBC # BLD AUTO: 3.37 M/UL (ref 4–5.4)
SARS-COV-2 RDRP RESP QL NAA+PROBE: NEGATIVE
SARS-COV-2 RNA RESP QL NAA+PROBE: NOT DETECTED
SODIUM SERPL-SCNC: 146 MMOL/L (ref 136–145)
WBC # BLD AUTO: 7.33 K/UL (ref 3.9–12.7)

## 2020-10-13 PROCEDURE — 25000003 PHARM REV CODE 250: Performed by: STUDENT IN AN ORGANIZED HEALTH CARE EDUCATION/TRAINING PROGRAM

## 2020-10-13 PROCEDURE — 83735 ASSAY OF MAGNESIUM: CPT

## 2020-10-13 PROCEDURE — D9220A PRA ANESTHESIA: ICD-10-PCS | Mod: CRNA,,, | Performed by: NURSE ANESTHETIST, CERTIFIED REGISTERED

## 2020-10-13 PROCEDURE — D9220A PRA ANESTHESIA: Mod: CRNA,,, | Performed by: NURSE ANESTHETIST, CERTIFIED REGISTERED

## 2020-10-13 PROCEDURE — 85025 COMPLETE CBC W/AUTO DIFF WBC: CPT

## 2020-10-13 PROCEDURE — 63600175 PHARM REV CODE 636 W HCPCS: Performed by: NURSE ANESTHETIST, CERTIFIED REGISTERED

## 2020-10-13 PROCEDURE — 25000003 PHARM REV CODE 250: Performed by: NURSE ANESTHETIST, CERTIFIED REGISTERED

## 2020-10-13 PROCEDURE — C1751 CATH, INF, PER/CENT/MIDLINE: HCPCS

## 2020-10-13 PROCEDURE — 43235 EGD DIAGNOSTIC BRUSH WASH: CPT | Performed by: INTERNAL MEDICINE

## 2020-10-13 PROCEDURE — 63600175 PHARM REV CODE 636 W HCPCS: Performed by: PHYSICIAN ASSISTANT

## 2020-10-13 PROCEDURE — 63600175 PHARM REV CODE 636 W HCPCS: Performed by: EMERGENCY MEDICINE

## 2020-10-13 PROCEDURE — 25000003 PHARM REV CODE 250: Performed by: EMERGENCY MEDICINE

## 2020-10-13 PROCEDURE — 84100 ASSAY OF PHOSPHORUS: CPT

## 2020-10-13 PROCEDURE — 27201691: Performed by: INTERNAL MEDICINE

## 2020-10-13 PROCEDURE — 43999 PR ENDOSCOPIC FISTULA SUTURE REPAIR, STOMACH: ICD-10-PCS | Mod: ,,, | Performed by: INTERNAL MEDICINE

## 2020-10-13 PROCEDURE — 21400001 HC TELEMETRY ROOM

## 2020-10-13 PROCEDURE — 80053 COMPREHEN METABOLIC PANEL: CPT

## 2020-10-13 PROCEDURE — C9113 INJ PANTOPRAZOLE SODIUM, VIA: HCPCS | Performed by: STUDENT IN AN ORGANIZED HEALTH CARE EDUCATION/TRAINING PROGRAM

## 2020-10-13 PROCEDURE — 63600175 PHARM REV CODE 636 W HCPCS: Performed by: SURGERY

## 2020-10-13 PROCEDURE — 37000008 HC ANESTHESIA 1ST 15 MINUTES: Performed by: INTERNAL MEDICINE

## 2020-10-13 PROCEDURE — 36415 COLL VENOUS BLD VENIPUNCTURE: CPT

## 2020-10-13 PROCEDURE — 37000009 HC ANESTHESIA EA ADD 15 MINS: Performed by: INTERNAL MEDICINE

## 2020-10-13 PROCEDURE — 43999 UNLISTED PROCEDURE STOMACH: CPT | Mod: ,,, | Performed by: INTERNAL MEDICINE

## 2020-10-13 PROCEDURE — 63600175 PHARM REV CODE 636 W HCPCS: Performed by: STUDENT IN AN ORGANIZED HEALTH CARE EDUCATION/TRAINING PROGRAM

## 2020-10-13 PROCEDURE — 43235 EGD DIAGNOSTIC BRUSH WASH: CPT | Mod: ,,, | Performed by: INTERNAL MEDICINE

## 2020-10-13 PROCEDURE — 27201692: Performed by: INTERNAL MEDICINE

## 2020-10-13 PROCEDURE — D9220A PRA ANESTHESIA: ICD-10-PCS | Mod: ANES,,, | Performed by: ANESTHESIOLOGY

## 2020-10-13 PROCEDURE — 36569 INSJ PICC 5 YR+ W/O IMAGING: CPT

## 2020-10-13 PROCEDURE — D9220A PRA ANESTHESIA: Mod: ANES,,, | Performed by: ANESTHESIOLOGY

## 2020-10-13 PROCEDURE — 25000003 PHARM REV CODE 250: Performed by: PHYSICIAN ASSISTANT

## 2020-10-13 PROCEDURE — U0002 COVID-19 LAB TEST NON-CDC: HCPCS

## 2020-10-13 PROCEDURE — 43235 PR EGD, FLEX, DIAGNOSTIC: ICD-10-PCS | Mod: ,,, | Performed by: INTERNAL MEDICINE

## 2020-10-13 PROCEDURE — 27201690: Performed by: INTERNAL MEDICINE

## 2020-10-13 RX ORDER — MUPIROCIN 20 MG/G
OINTMENT TOPICAL 2 TIMES DAILY
Status: COMPLETED | OUTPATIENT
Start: 2020-10-13 | End: 2020-10-18

## 2020-10-13 RX ORDER — FENTANYL CITRATE 50 UG/ML
INJECTION, SOLUTION INTRAMUSCULAR; INTRAVENOUS
Status: DISCONTINUED | OUTPATIENT
Start: 2020-10-13 | End: 2020-10-13

## 2020-10-13 RX ORDER — SODIUM CHLORIDE 0.9 % (FLUSH) 0.9 %
10 SYRINGE (ML) INJECTION EVERY 6 HOURS
Status: DISCONTINUED | OUTPATIENT
Start: 2020-10-14 | End: 2020-10-21 | Stop reason: HOSPADM

## 2020-10-13 RX ORDER — LIDOCAINE HYDROCHLORIDE 20 MG/ML
INJECTION INTRAVENOUS
Status: DISCONTINUED | OUTPATIENT
Start: 2020-10-13 | End: 2020-10-13

## 2020-10-13 RX ORDER — PROPOFOL 10 MG/ML
VIAL (ML) INTRAVENOUS
Status: DISCONTINUED | OUTPATIENT
Start: 2020-10-13 | End: 2020-10-13

## 2020-10-13 RX ORDER — ROCURONIUM BROMIDE 10 MG/ML
INJECTION, SOLUTION INTRAVENOUS
Status: DISCONTINUED | OUTPATIENT
Start: 2020-10-13 | End: 2020-10-13

## 2020-10-13 RX ORDER — SODIUM CHLORIDE 9 MG/ML
INJECTION, SOLUTION INTRAVENOUS CONTINUOUS PRN
Status: DISCONTINUED | OUTPATIENT
Start: 2020-10-13 | End: 2020-10-13

## 2020-10-13 RX ORDER — SODIUM CHLORIDE 0.9 % (FLUSH) 0.9 %
10 SYRINGE (ML) INJECTION
Status: DISCONTINUED | OUTPATIENT
Start: 2020-10-13 | End: 2020-10-13

## 2020-10-13 RX ORDER — ONDANSETRON 2 MG/ML
INJECTION INTRAMUSCULAR; INTRAVENOUS
Status: DISCONTINUED | OUTPATIENT
Start: 2020-10-13 | End: 2020-10-13

## 2020-10-13 RX ORDER — LIDOCAINE HYDROCHLORIDE 10 MG/ML
1 INJECTION INFILTRATION; PERINEURAL ONCE AS NEEDED
Status: DISCONTINUED | OUTPATIENT
Start: 2020-10-13 | End: 2020-10-21 | Stop reason: HOSPADM

## 2020-10-13 RX ORDER — SUCCINYLCHOLINE CHLORIDE 20 MG/ML
INJECTION INTRAMUSCULAR; INTRAVENOUS
Status: DISCONTINUED | OUTPATIENT
Start: 2020-10-13 | End: 2020-10-13

## 2020-10-13 RX ORDER — SODIUM CHLORIDE 0.9 % (FLUSH) 0.9 %
10 SYRINGE (ML) INJECTION
Status: DISCONTINUED | OUTPATIENT
Start: 2020-10-13 | End: 2020-10-21 | Stop reason: HOSPADM

## 2020-10-13 RX ADMIN — OXYCODONE 5 MG: 5 TABLET ORAL at 09:10

## 2020-10-13 RX ADMIN — ROCURONIUM BROMIDE 5 MG: 10 INJECTION, SOLUTION INTRAVENOUS at 01:10

## 2020-10-13 RX ADMIN — POTASSIUM CHLORIDE, DEXTROSE MONOHYDRATE AND SODIUM CHLORIDE: 150; 5; 450 INJECTION, SOLUTION INTRAVENOUS at 12:10

## 2020-10-13 RX ADMIN — FENTANYL CITRATE 50 MCG: 50 INJECTION, SOLUTION INTRAMUSCULAR; INTRAVENOUS at 02:10

## 2020-10-13 RX ADMIN — PANTOPRAZOLE SODIUM 40 MG: 40 INJECTION, POWDER, FOR SOLUTION INTRAVENOUS at 09:10

## 2020-10-13 RX ADMIN — SUCCINYLCHOLINE CHLORIDE 120 MG: 20 INJECTION, SOLUTION INTRAMUSCULAR; INTRAVENOUS at 01:10

## 2020-10-13 RX ADMIN — OXYCODONE AND ACETAMINOPHEN 1 TABLET: 7.5; 325 TABLET ORAL at 04:10

## 2020-10-13 RX ADMIN — VANCOMYCIN HYDROCHLORIDE 1250 MG: 1.25 INJECTION, POWDER, LYOPHILIZED, FOR SOLUTION INTRAVENOUS at 07:10

## 2020-10-13 RX ADMIN — FENTANYL CITRATE 50 MCG: 50 INJECTION, SOLUTION INTRAMUSCULAR; INTRAVENOUS at 01:10

## 2020-10-13 RX ADMIN — LIDOCAINE HYDROCHLORIDE 80 MG: 20 INJECTION, SOLUTION INTRAVENOUS at 01:10

## 2020-10-13 RX ADMIN — BISACODYL 10 MG: 10 SUPPOSITORY RECTAL at 03:10

## 2020-10-13 RX ADMIN — PIPERACILLIN SODIUM AND TAZOBACTAM SODIUM 4.5 G: 4; .5 INJECTION, POWDER, LYOPHILIZED, FOR SOLUTION INTRAVENOUS at 10:10

## 2020-10-13 RX ADMIN — PROPOFOL 200 MG: 10 INJECTION, EMULSION INTRAVENOUS at 01:10

## 2020-10-13 RX ADMIN — POTASSIUM CHLORIDE, DEXTROSE MONOHYDRATE AND SODIUM CHLORIDE: 150; 5; 450 INJECTION, SOLUTION INTRAVENOUS at 06:10

## 2020-10-13 RX ADMIN — FLUCONAZOLE 400 MG: 2 INJECTION, SOLUTION INTRAVENOUS at 09:10

## 2020-10-13 RX ADMIN — PIPERACILLIN SODIUM AND TAZOBACTAM SODIUM 4.5 G: 4; .5 INJECTION, POWDER, LYOPHILIZED, FOR SOLUTION INTRAVENOUS at 06:10

## 2020-10-13 RX ADMIN — SODIUM CHLORIDE: 0.9 INJECTION, SOLUTION INTRAVENOUS at 01:10

## 2020-10-13 RX ADMIN — ONDANSETRON 4 MG: 2 INJECTION, SOLUTION INTRAMUSCULAR; INTRAVENOUS at 01:10

## 2020-10-13 RX ADMIN — POTASSIUM CHLORIDE, DEXTROSE MONOHYDRATE AND SODIUM CHLORIDE: 150; 5; 450 INJECTION, SOLUTION INTRAVENOUS at 08:10

## 2020-10-13 NOTE — PLAN OF CARE
Assisted to room via stretcher. POC initiated. Pt verbalized understanding. No c/o. No distress noted.

## 2020-10-13 NOTE — PROGRESS NOTES
Received report from DEANA Brooks.Patient AAOx4, resting quietly in bed, telemetry monitoring in place, no distress noted. Safety maintained, call light in reach.

## 2020-10-13 NOTE — NURSING
Report received from DEANA Ortiz at [facility] PACU. Waiting on patient's arrival to unit.

## 2020-10-13 NOTE — ANESTHESIA POSTPROCEDURE EVALUATION
Anesthesia Post Evaluation    Patient: Puja Quigley    Procedure(s) Performed: Procedure(s) (LRB):  EGD (ESOPHAGOGASTRODUODENOSCOPY) (N/A)    Final Anesthesia Type: general    Patient location during evaluation: PACU  Patient participation: Yes- Able to Participate  Level of consciousness: awake and alert and oriented  Post-procedure vital signs: reviewed and stable  Pain management: adequate  Airway patency: patent    PONV status at discharge: No PONV  Anesthetic complications: no      Cardiovascular status: blood pressure returned to baseline, hemodynamically stable and stable  Respiratory status: unassisted, room air and spontaneous ventilation  Hydration status: euvolemic  Follow-up not needed.          Vitals Value Taken Time   /89 10/13/20 1502   Temp 36.6 °C (97.9 °F) 10/13/20 1417   Pulse 60 10/13/20 1509   Resp 10 10/13/20 1509   SpO2 100 % 10/13/20 1509   Vitals shown include unvalidated device data.      No case tracking events are documented in the log.      Pain/Cristel Score: Pain Rating Prior to Med Admin: 8 (10/13/2020  6:00 AM)  Pain Rating Post Med Admin: 0 (10/13/2020  6:00 AM)  Cristel Score: 6 (10/13/2020  2:17 PM)

## 2020-10-13 NOTE — NURSING TRANSFER
Nursing Transfer Note      10/13/2020     Transfer To:  room 333    Transfer via [facility]    Transfer with IV, CHart    Transported by [facility]    Medicines sent: 20meq Potassium fluids    Chart send with patient: Yes    Notified: floor    Patient reassessed at: 1715, 10/13/2020      Called transfer center to transfer pt in system. Pt AOX4, denies pain, CP, SOB. Resp unlabored and even.

## 2020-10-13 NOTE — PROGRESS NOTES
[facility]  General Surgery  Progress Note    Subjective:     Interval History:   NAEON. AF, VSS.   Upper GI study yesterday w/ evidence of leak. Plan for GI endoscopic intervention today at [facility].   Had some diarrhea yesterday.       Post-Op Info:  * No surgery found *          Medications:  Continuous Infusions:   dextrose 5 % and 0.45 % NaCl with KCl 20 mEq 125 mL/hr at 10/13/20 0028     Scheduled Meds:   enoxaparin  40 mg Subcutaneous Q24H    fluconazole (DIFLUCAN) IVPB  400 mg Intravenous Q24H    lidocaine (PF) 10 mg/ml (1%)  1 mL Other Once    pantoprazole  40 mg Intravenous BID    piperacillin-tazobactam (ZOSYN) IVPB  4.5 g Intravenous Q8H    vancomycin (VANCOCIN) IVPB  1,250 mg Intravenous Q24H     PRN Meds:acetaminophen, bisacodyL, morphine, ondansetron, oxyCODONE, oxyCODONE-acetaminophen, sodium chloride 0.9%     Objective:     Vital Signs (Most Recent):  Temp: 98.1 °F (36.7 °C) (10/13/20 0517)  Pulse: 72 (10/13/20 0517)  Resp: 20 (10/13/20 0517)  BP: (!) 165/91 (10/13/20 0517)  SpO2: 96 % (10/13/20 0517) Vital Signs (24h Range):  Temp:  [97.8 °F (36.6 °C)-98.3 °F (36.8 °C)] 98.1 °F (36.7 °C)  Pulse:  [59-79] 72  Resp:  [16-20] 20  SpO2:  [96 %-100 %] 96 %  BP: (145-165)/() 165/91       Intake/Output Summary (Last 24 hours) at 10/13/2020 0715  Last data filed at 10/13/2020 0600  Gross per 24 hour   Intake --   Output 1225 ml   Net -1225 ml       Physical Exam  Constitutional:       Appearance: Normal appearance.   HENT:      Head: Normocephalic.      Mouth/Throat:      Mouth: Mucous membranes are moist.   Cardiovascular:      Rate and Rhythm: Normal rate and regular rhythm.   Pulmonary:      Effort: Pulmonary effort is normal. No respiratory distress.   Abdominal:      General: There is no distension.      Palpations: Abdomen is soft.      Tenderness: There is abdominal tenderness (improved).      Comments: IR drain w/ thin clear drainage in bag     Skin:      General: Skin is warm and dry.   Neurological:      Mental Status: She is alert.         Significant Labs:  BMP:   Recent Labs   Lab 10/13/20  0507   GLU 98   *   K 3.4*   *   CO2 26   BUN 4*   CREATININE 1.2   CALCIUM 8.0*   MG 1.8     CBC:   Recent Labs   Lab 10/13/20  0507   WBC 7.33   RBC 3.37*   HGB 9.3*   HCT 29.9*      MCV 89   MCH 27.6   MCHC 31.1*     CMP:   Recent Labs   Lab 10/13/20  0507   GLU 98   CALCIUM 8.0*   ALBUMIN 1.6*   PROT 6.1   *   K 3.4*   CO2 26   *   BUN 4*   CREATININE 1.2   ALKPHOS 180*   ALT 15   AST 21   BILITOT 0.4       Significant Diagnostics:  None    Assessment/Plan:     Active Diagnoses:    Diagnosis Date Noted POA    Sepsis [A41.9] 10/08/2020 Yes      Problems Resolved During this Admission:     41yo F w/ h/o perforated gastric ulcer s/p ex lap and guerrero patch repair 9/24/20 presents with large abscess in the gastrohepatic space. Now s/p IR drain placement 10/9.     - Upper GI study 10/12 w/ evidence of leak. Discussed w/ GI about endoscopic intervention. Plan to be sent to [facility] today for the procedure and will return after.    - Continue NPO w/ IVFs. Will f/u on GI intervention today. Depending on how long it is anticipated that patient will be NPO, may have to place PICC line and start TPN.   - Continue vanc/zosyn/diflucan. Drainage cultures w/ pan sensitive to Klebsiella and UCx w/ pan sensitive Klebsiella and Enterococcus faecalis sensitive to Vanc   - Tylenol prn fever   - Pain meds prn   - Drain mgmt    - IS   - Encourage OOB, ambulation   - Ppx: SCDs, Lovenox (held today for GI procedure)   - Dispo: inpatient mgmt     Puja Coyne MD  General Surgery  [facility] [place]

## 2020-10-13 NOTE — TELEPHONE ENCOUNTER
Left VM for  to contact office.      ----- Message from Ingrid Richardson sent at 10/13/2020 12:08 PM CDT -----  Regarding: Patient call back  Who called: josue (     What is the request in detail: Patient is requesting a call back. She states she would like a voice mail with a good call back number. She states she also set back medical request forms. She would like to know if the patient's 9/24 hernia surgery was related to her pregnancy. She would like to further discuss.  Please advise.    Can the clinic reply by [facility]? No    Best call back number: [phone]     Additional Information: N/A

## 2020-10-13 NOTE — PROGRESS NOTES
Called [facility]anes[facility] for transport back to . Spoke to Toni. He has placed transport order but could not tell me how long it would take.     1550: Report called to DEANA Melgar at . She took report for Cecilia who was busy.

## 2020-10-13 NOTE — PROGRESS NOTES
Vancomycin consult follow-up:    Patient reviewed, renal function stable, no new levels, continue current therapy; Next levels due: trough due 10/14/2020 at 0600

## 2020-10-13 NOTE — NURSING
1115 - Pt is leaving the hospital via EMS to [facility]. Lisa at [facility] endoscopy was notified that the pt is leaving the hospital. Pt lost IV access but EMS was able to start a 20g to right anterior FA. LDA placed. Pt is AAOx4. Drain in place to Right Mid Upper [place]. IVF D5 & 1/2 NS with 20meq KCl infusing along with Zosyn. Heart monitor removed and pt placed on EMS heart monitor.     1835 - pt is back to room 333 from [facility] endoscopy.

## 2020-10-13 NOTE — ANESTHESIA PREPROCEDURE EVALUATION
10/13/2020  Pre-operative evaluation for Procedure(s) (LRB):  EGD (ESOPHAGOGASTRODUODENOSCOPY) (N/A)    Puja Quigley is a 40 y.o. female     Patient Active Problem List   Diagnosis    Abdominal pain    Sepsis       Review of patient's allergies indicates:  No Known Allergies    No current facility-administered medications on file prior to encounter.      Current Outpatient Medications on File Prior to Encounter   Medication Sig Dispense Refill    cyclobenzaprine (FLEXERIL) 5 MG tablet Take 1 tablet (5 mg total) by mouth 3 (three) times daily as needed for Muscle spasms. 20 tablet 0    hydrocortisone (ANUSOL-HC) 2.5 % rectal cream Place rectally 2 (two) times daily. 30 g 1    oxyCODONE (ROXICODONE) 5 MG immediate release tablet Take 1 tablet (5 mg total) by mouth every 4 (four) hours as needed for Pain. 20 tablet 0    oxyCODONE-acetaminophen (PERCOCET) 5-325 mg per tablet Take 1 tablet by mouth every 4 (four) hours as needed for Pain. 28 tablet 0    pantoprazole (PROTONIX) 40 MG tablet Take 1 tablet (40 mg total) by mouth 2 (two) times daily. 60 tablet 11    senna-docusate 8.6-50 mg (PERICOLACE) 8.6-50 mg per tablet Take 1 tablet by mouth once daily.      simethicone, bulk, Liqd 40 mg by Misc.(Non-Drug; Combo Route) route 3 (three) times daily. 100 mL 3    acetaminophen (TYLENOL) 500 MG tablet Take 500 mg by mouth every 6 (six) hours as needed for Pain.      ondansetron (ZOFRAN-ODT) 4 MG TbDL Take 2 tablets (8 mg total) by mouth every 6 (six) hours as needed. 30 tablet 0       Past Surgical History:   Procedure Laterality Date    CLOSURE OF PERFORATED ULCER OF DUODENUM USING OMENTAL PATCH         Social History     Socioeconomic History    Marital status: Single     Spouse name: Not on file    Number of children: Not on file    Years of education: Not on file    Highest education level:  Not on file   Occupational History    Not on file   Social Needs    Financial resource strain: Not on file    Food insecurity     Worry: Not on file     Inability: Not on file    Transportation needs     Medical: Not on file     Non-medical: Not on file   Tobacco Use    Smoking status: Former Smoker     Types: Cigarettes     Quit date: 2019     Years since quittin.8   Substance and Sexual Activity    Alcohol use: Yes    Drug use: Not Currently    Sexual activity: Yes   Lifestyle    Physical activity     Days per week: Not on file     Minutes per session: Not on file    Stress: Not on file   Relationships    Social connections     Talks on phone: Not on file     Gets together: Not on file     Attends Hinduism service: Not on file     Active member of club or organization: Not on file     Attends meetings of clubs or organizations: Not on file     Relationship status: Not on file   Other Topics Concern    Not on file   Social History Narrative    Not on file         CBC:   Recent Labs     10/12/20  0407 10/13/20  0507   WBC 5.96 7.33   RBC 3.13* 3.37*   HGB 8.5* 9.3*   HCT 28.1* 29.9*    306   MCV 90 89   MCH 27.2 27.6   MCHC 30.2* 31.1*       CMP:   Recent Labs     10/12/20  0407 10/13/20  0507   * 146*   K 3.2* 3.4*    111*   CO2 26 26   BUN 4* 4*   CREATININE 1.3 1.2   GLU 98 98   MG 1.7 1.8   PHOS 3.4 3.5   CALCIUM 8.0* 8.0*   ALBUMIN 1.5* 1.6*   PROT 5.5* 6.1   ALKPHOS 179* 180*   ALT 16 15   AST 19 21   BILITOT 0.4 0.4       INR  No results for input(s): PT, INR, PROTIME, APTT in the last 72 hours.        Diagnostic Studies:      EKG:  Sinus tachycardia   Rightward axis   Borderline Abnormal ECG   When compared with ECG of 24-SEP-2020 04:21,   Significant changes have occurred   Confirmed by Amandeep Partida MD ([id number]) on 10/10/2020 8:12:31 PM     2D Echo:  No results found for this or any previous visit.      Anesthesia Evaluation    I have reviewed the Patient Summary  Reports.    I have reviewed the Nursing Notes. I have reviewed the NPO Status.      Review of Systems  Anesthesia Hx:  No problems with previous Anesthesia  History of prior surgery of interest to airway management or planning: Denies Family Hx of Anesthesia complications.   Denies Personal Hx of Anesthesia complications.   Hematology/Oncology:         -- Denies Anemia:   Cardiovascular:   Exercise tolerance: good ECG has been reviewed.    Pulmonary:   Denies COPD.  Denies Asthma.  Denies Sleep Apnea.    Renal/:   Denies Chronic Renal Disease.     Hepatic/GI:   S/p gastric ulcer patch repair   Neurological:   Denies CVA. Denies Seizures.    Endocrine:   Denies Diabetes.        Physical Exam  General:  Well nourished    Airway/Jaw/Neck:  Airway Findings: Tongue: Normal General Airway Assessment: Adult  Mallampati: III  Improves to II with phonation.  TM Distance: Normal, at least 6 cm  Jaw/Neck Findings:  Neck ROM: Normal ROM      Dental:  Dental Findings: In tact   Chest/Lungs:  Chest/Lungs Findings: Clear to auscultation, Normal Respiratory Rate     Heart/Vascular:  Heart Findings: Rate: Normal  Rhythm: Regular Rhythm  Sounds: Normal        Mental Status:  Mental Status Findings:  Cooperative, Alert and Oriented         Anesthesia Plan  Type of Anesthesia, risks & benefits discussed:  Anesthesia Type:  general  Patient's Preference:   Intra-op Monitoring Plan: standard ASA monitors  Intra-op Monitoring Plan Comments:   Post Op Pain Control Plan: per primary service following discharge from PACU  Post Op Pain Control Plan Comments:   Induction:   IV  Beta Blocker:  Patient is not currently on a Beta-Blocker (No further documentation required).       Informed Consent: Patient understands risks and agrees with Anesthesia plan.  Questions answered. Anesthesia consent signed with patient.  ASA Score: 2     Day of Surgery Review of History & Physical:    H&P update referred to the surgeon.         Ready For Surgery  From Anesthesia Perspective.

## 2020-10-13 NOTE — ANESTHESIA PROCEDURE NOTES
Intubation  Performed by: Nav Desir CRNA  Authorized by: Christopher Ramirez Jr., MD     Intubation:     Induction:  Intravenous    Intubated:  Postinduction    Mask Ventilation:  N/a    Attempts:  1    Attempted By:  CRNA    Method of Intubation:  Video laryngoscopy    Blade:  [place] 3    Laryngeal View Grade: Grade I - full view of chords      Difficult Airway Encountered?: No      Complications:  None    Airway Device Size:  7.0    Style/Cuff Inflation:  Cuffed    Inflation Amount (mL):  4    Tube secured:  21    Secured at:  The lips    Placement Verified By:  Capnometry    Complicating Factors:  Anterior larynx and small mouth    Findings Post-Intubation:  BS equal bilateral and atraumatic/condition of teeth unchanged

## 2020-10-13 NOTE — TRANSFER OF CARE
"Anesthesia Transfer of Care Note    Patient: Puja Quigley    Procedure(s) Performed: Procedure(s) (LRB):  EGD (ESOPHAGOGASTRODUODENOSCOPY) (N/A)    Patient location: PACU    Anesthesia Type: general    Transport from OR: Transported from OR on 6-10 L/min O2 by face mask with adequate spontaneous ventilation    Post pain: adequate analgesia    Post assessment: no apparent anesthetic complications and tolerated procedure well    Post vital signs: stable    Level of consciousness: awake and responds to stimulation    Nausea/Vomiting: no nausea/vomiting    Complications: none    Transfer of care protocol was followed      Last vitals:   Visit Vitals  BP (!) 177/100 (BP Location: Left arm, Patient Position: Lying)   Pulse 68   Temp 36.6 °C (97.9 °F) (Temporal)   Resp 14   Ht 5' 7" (1.702 m)   Wt 77.1 kg (170 lb)   LMP  (LMP Unknown)   SpO2 99%   Breastfeeding No   BMI 26.63 kg/m²     "

## 2020-10-13 NOTE — NURSING
Bedside shift report received from DEANA Dozier. Communication board has been updated. NAD noted. Will continue to monitor. Accordion Drain to Right Mid line Upper [place] draining well. IVF infusing. Pt is NPO.

## 2020-10-14 ENCOUNTER — PATIENT MESSAGE (OUTPATIENT)
Dept: SURGERY | Facility: CLINIC | Age: 40
End: 2020-10-14

## 2020-10-14 LAB
ALBUMIN SERPL BCP-MCNC: 1.4 G/DL (ref 3.5–5.2)
ALP SERPL-CCNC: 146 U/L (ref 55–135)
ALT SERPL W/O P-5'-P-CCNC: 12 U/L (ref 10–44)
ANION GAP SERPL CALC-SCNC: 6 MMOL/L (ref 8–16)
AST SERPL-CCNC: 17 U/L (ref 10–40)
BASOPHILS # BLD AUTO: 0.02 K/UL (ref 0–0.2)
BASOPHILS NFR BLD: 0.3 % (ref 0–1.9)
BILIRUB SERPL-MCNC: 0.3 MG/DL (ref 0.1–1)
BUN SERPL-MCNC: 3 MG/DL (ref 6–20)
CALCIUM SERPL-MCNC: 7.4 MG/DL (ref 8.7–10.5)
CHLORIDE SERPL-SCNC: 116 MMOL/L (ref 95–110)
CO2 SERPL-SCNC: 27 MMOL/L (ref 23–29)
CREAT SERPL-MCNC: 1.2 MG/DL (ref 0.5–1.4)
DIFFERENTIAL METHOD: ABNORMAL
EOSINOPHIL # BLD AUTO: 0.1 K/UL (ref 0–0.5)
EOSINOPHIL NFR BLD: 1.3 % (ref 0–8)
ERYTHROCYTE [DISTWIDTH] IN BLOOD BY AUTOMATED COUNT: 15.3 % (ref 11.5–14.5)
EST. GFR  (AFRICAN AMERICAN): >60 ML/MIN/1.73 M^2
EST. GFR  (NON AFRICAN AMERICAN): 57 ML/MIN/1.73 M^2
GLUCOSE SERPL-MCNC: 90 MG/DL (ref 70–110)
HCT VFR BLD AUTO: 26.9 % (ref 37–48.5)
HGB BLD-MCNC: 8.2 G/DL (ref 12–16)
IMM GRANULOCYTES # BLD AUTO: 0.09 K/UL (ref 0–0.04)
IMM GRANULOCYTES NFR BLD AUTO: 1.3 % (ref 0–0.5)
LYMPHOCYTES # BLD AUTO: 1.5 K/UL (ref 1–4.8)
LYMPHOCYTES NFR BLD: 21.9 % (ref 18–48)
MAGNESIUM SERPL-MCNC: 1.6 MG/DL (ref 1.6–2.6)
MCH RBC QN AUTO: 27.2 PG (ref 27–31)
MCHC RBC AUTO-ENTMCNC: 30.5 G/DL (ref 32–36)
MCV RBC AUTO: 89 FL (ref 82–98)
MONOCYTES # BLD AUTO: 0.5 K/UL (ref 0.3–1)
MONOCYTES NFR BLD: 7.8 % (ref 4–15)
NEUTROPHILS # BLD AUTO: 4.6 K/UL (ref 1.8–7.7)
NEUTROPHILS NFR BLD: 67.4 % (ref 38–73)
NRBC BLD-RTO: 0 /100 WBC
PHOSPHATE SERPL-MCNC: 3.6 MG/DL (ref 2.7–4.5)
PLATELET # BLD AUTO: 321 K/UL (ref 150–350)
PMV BLD AUTO: 9.1 FL (ref 9.2–12.9)
POCT GLUCOSE: 120 MG/DL (ref 70–110)
POTASSIUM SERPL-SCNC: 3.1 MMOL/L (ref 3.5–5.1)
PREALB SERPL-MCNC: 9 MG/DL (ref 20–43)
PROT SERPL-MCNC: 5.3 G/DL (ref 6–8.4)
RBC # BLD AUTO: 3.01 M/UL (ref 4–5.4)
SODIUM SERPL-SCNC: 149 MMOL/L (ref 136–145)
WBC # BLD AUTO: 6.8 K/UL (ref 3.9–12.7)

## 2020-10-14 PROCEDURE — B4185 PARENTERAL SOL 10 GM LIPIDS: HCPCS | Performed by: STUDENT IN AN ORGANIZED HEALTH CARE EDUCATION/TRAINING PROGRAM

## 2020-10-14 PROCEDURE — 25000003 PHARM REV CODE 250: Performed by: EMERGENCY MEDICINE

## 2020-10-14 PROCEDURE — 63600175 PHARM REV CODE 636 W HCPCS: Performed by: STUDENT IN AN ORGANIZED HEALTH CARE EDUCATION/TRAINING PROGRAM

## 2020-10-14 PROCEDURE — 21400001 HC TELEMETRY ROOM

## 2020-10-14 PROCEDURE — C9113 INJ PANTOPRAZOLE SODIUM, VIA: HCPCS | Performed by: STUDENT IN AN ORGANIZED HEALTH CARE EDUCATION/TRAINING PROGRAM

## 2020-10-14 PROCEDURE — 25000003 PHARM REV CODE 250: Performed by: STUDENT IN AN ORGANIZED HEALTH CARE EDUCATION/TRAINING PROGRAM

## 2020-10-14 PROCEDURE — 80053 COMPREHEN METABOLIC PANEL: CPT

## 2020-10-14 PROCEDURE — 83735 ASSAY OF MAGNESIUM: CPT

## 2020-10-14 PROCEDURE — 63600175 PHARM REV CODE 636 W HCPCS: Performed by: EMERGENCY MEDICINE

## 2020-10-14 PROCEDURE — 25500020 PHARM REV CODE 255: Performed by: SURGERY

## 2020-10-14 PROCEDURE — 84100 ASSAY OF PHOSPHORUS: CPT

## 2020-10-14 PROCEDURE — 84134 ASSAY OF PREALBUMIN: CPT

## 2020-10-14 PROCEDURE — A4216 STERILE WATER/SALINE, 10 ML: HCPCS | Performed by: STUDENT IN AN ORGANIZED HEALTH CARE EDUCATION/TRAINING PROGRAM

## 2020-10-14 PROCEDURE — 85025 COMPLETE CBC W/AUTO DIFF WBC: CPT

## 2020-10-14 PROCEDURE — 25000003 PHARM REV CODE 250: Performed by: SURGERY

## 2020-10-14 PROCEDURE — 36415 COLL VENOUS BLD VENIPUNCTURE: CPT

## 2020-10-14 RX ORDER — SODIUM CHLORIDE 0.9 % (FLUSH) 0.9 %
10 SYRINGE (ML) INJECTION
Status: DISCONTINUED | OUTPATIENT
Start: 2020-10-14 | End: 2020-10-21 | Stop reason: HOSPADM

## 2020-10-14 RX ORDER — SODIUM CHLORIDE 9 MG/ML
INJECTION, SOLUTION INTRAVENOUS CONTINUOUS
Status: DISCONTINUED | OUTPATIENT
Start: 2020-10-14 | End: 2020-10-14

## 2020-10-14 RX ADMIN — Medication 10 ML: at 11:10

## 2020-10-14 RX ADMIN — MUPIROCIN: 20 OINTMENT TOPICAL at 10:10

## 2020-10-14 RX ADMIN — SOYBEAN OIL 250 ML: 20 INJECTION, SOLUTION INTRAVENOUS at 11:10

## 2020-10-14 RX ADMIN — IOHEXOL 75 ML: 350 INJECTION, SOLUTION INTRAVENOUS at 10:10

## 2020-10-14 RX ADMIN — POTASSIUM CHLORIDE, DEXTROSE MONOHYDRATE AND SODIUM CHLORIDE: 150; 5; 450 INJECTION, SOLUTION INTRAVENOUS at 02:10

## 2020-10-14 RX ADMIN — OXYCODONE AND ACETAMINOPHEN 1 TABLET: 7.5; 325 TABLET ORAL at 10:10

## 2020-10-14 RX ADMIN — PIPERACILLIN SODIUM AND TAZOBACTAM SODIUM 4.5 G: 4; .5 INJECTION, POWDER, LYOPHILIZED, FOR SOLUTION INTRAVENOUS at 04:10

## 2020-10-14 RX ADMIN — MUPIROCIN: 20 OINTMENT TOPICAL at 09:10

## 2020-10-14 RX ADMIN — ONDANSETRON 8 MG: 8 TABLET, ORALLY DISINTEGRATING ORAL at 08:10

## 2020-10-14 RX ADMIN — RETINOL, ERGOCALCIFEROL, .ALPHA.-TOCOPHEROL ACETATE, DL-, PHYTONADIONE, ASCORBIC ACID, NIACINAMIDE, RIBOFLAVIN 5-PHOSPHATE SODIUM, THIAMINE HYDROCHLORIDE, PYRIDOXINE HYDROCHLORIDE, DEXPANTHENOL, BIOTIN, FOLIC ACID, AND CYANOCOBALAMIN: KIT at 11:10

## 2020-10-14 RX ADMIN — OXYCODONE AND ACETAMINOPHEN 1 TABLET: 7.5; 325 TABLET ORAL at 02:10

## 2020-10-14 RX ADMIN — ASCORBIC ACID, VITAMIN A PALMITATE, CHOLECALCIFEROL, THIAMINE HYDROCHLORIDE, RIBOFLAVIN-5 PHOSPHATE SODIUM, PYRIDOXINE HYDROCHLORIDE, NIACINAMIDE, DEXPANTHENOL, ALPHA-TOCOPHEROL ACETATE, VITAMIN K1, FOLIC ACID, BIOTIN, CYANOCOBALAMIN: 200; 3300; 200; 6; 3.6; 6; 40; 15; 10; 150; 600; 60; 5 INJECTION, SOLUTION INTRAVENOUS at 05:10

## 2020-10-14 RX ADMIN — PIPERACILLIN SODIUM AND TAZOBACTAM SODIUM 4.5 G: 4; .5 INJECTION, POWDER, LYOPHILIZED, FOR SOLUTION INTRAVENOUS at 12:10

## 2020-10-14 RX ADMIN — PIPERACILLIN SODIUM AND TAZOBACTAM SODIUM 4.5 G: 4; .5 INJECTION, POWDER, LYOPHILIZED, FOR SOLUTION INTRAVENOUS at 11:10

## 2020-10-14 RX ADMIN — ENOXAPARIN SODIUM 40 MG: 40 INJECTION SUBCUTANEOUS at 04:10

## 2020-10-14 RX ADMIN — Medication 10 ML: at 07:10

## 2020-10-14 RX ADMIN — PANTOPRAZOLE SODIUM 40 MG: 40 INJECTION, POWDER, FOR SOLUTION INTRAVENOUS at 08:10

## 2020-10-14 RX ADMIN — OXYCODONE AND ACETAMINOPHEN 1 TABLET: 7.5; 325 TABLET ORAL at 03:10

## 2020-10-14 RX ADMIN — PANTOPRAZOLE SODIUM 40 MG: 40 INJECTION, POWDER, FOR SOLUTION INTRAVENOUS at 10:10

## 2020-10-14 RX ADMIN — Medication 10 ML: at 12:10

## 2020-10-14 RX ADMIN — PIPERACILLIN SODIUM AND TAZOBACTAM SODIUM 4.5 G: 4; .5 INJECTION, POWDER, LYOPHILIZED, FOR SOLUTION INTRAVENOUS at 09:10

## 2020-10-14 RX ADMIN — Medication 10 ML: at 05:10

## 2020-10-14 RX ADMIN — MUPIROCIN: 20 OINTMENT TOPICAL at 12:10

## 2020-10-14 RX ADMIN — SOYBEAN OIL 250 ML: 20 INJECTION, SOLUTION INTRAVENOUS at 05:10

## 2020-10-14 NOTE — [employer] PATIENT INSTRUCTIONS
Discharge Summary/Instructions after an Endoscopic Procedure  Patient Name: Puja Quigley  Patient MRN: [id number]  Patient YOB: 1980 Tuesday, October 13, 2020  Rosio Marion MD  RESTRICTIONS:  During your procedure today, you received medications for sedation.  These   medications may affect your judgment, balance and coordination.  Therefore,   for 24 hours, you have the following restrictions:   - DO NOT drive a car, operate machinery, make legal/financial decisions,   sign important papers or drink alcohol.    ACTIVITY:  Today: no heavy lifting, straining or running due to procedural   sedation/anesthesia.  The following day: return to full activity including work.  DIET:  Eat and drink normally unless instructed otherwise.     TREATMENT FOR COMMON SIDE EFFECTS:  - Mild abdominal pain, nausea, belching, bloating or excessive gas:  rest,   eat lightly and use a heating pad.  - Sore Throat: treat with throat lozenges and/or gargle with warm salt   water.  - Because air was used during the procedure, expelling large amounts of air   from your rectum or belching is normal.  - If a bowel prep was taken, you may not have a bowel movement for 1-3 days.    This is normal.  SYMPTOMS TO WATCH FOR AND REPORT TO YOUR PHYSICIAN:  1. Abdominal pain or bloating, other than gas cramps.  2. Chest pain.  3. Back pain.  4. Signs of infection such as: chills or fever occurring within 24 hours   after the procedure.  5. Rectal bleeding, which would show as bright red, maroon, or black stools.   (A tablespoon of blood from the rectum is not serious, especially if   hemorrhoids are present.)  6. Vomiting.  7. Weakness or dizziness.  GO DIRECTLY TO THE NEAREST EMERGENCY ROOM IF YOU HAVE ANY OF THE FOLLOWING:      Difficulty breathing              Chills and/or fever over 101 F   Persistent vomiting and/or vomiting blood   Severe abdominal pain   Severe chest pain   Black, tarry stools   Bleeding- more than one  tablespoon   Any other symptom or condition that you feel may need urgent attention  Your doctor recommends these additional instructions:  If any biopsies were taken, your doctors clinic will contact you in 1 to 2   weeks with any results.  - Return patient to referring hospital for ongoing care.   - NPO.   - Recommend upper GI series tomorrow. If negative for extravasation then   recommend full liquid diet thereafter. Will discuss further nutrition with   referring surgeon.   - Continue present medications.   - Return to referring physician.   - Patient has a contact number available for emergencies.  The signs and   symptoms of potential delayed complications were discussed with the   patient.  Return to normal activities tomorrow.  Written discharge   instructions were provided to the patient.  For questions, problems or results please call your physician - Rosio Marion MD at Work:  [phone].  [facility] [place], EMERGENCY ROOM PHONE NUMBER: [phone]  IF A COMPLICATION OR EMERGENCY SITUATION ARISES AND YOU ARE UNABLE TO REACH   YOUR PHYSICIAN - GO DIRECTLY TO THE EMERGENCY ROOM.  Rosio Marion MD  10/13/2020 2:57:48 PM  This report has been verified and signed electronically.  [employer]

## 2020-10-14 NOTE — PLAN OF CARE
Problem: Adult Inpatient Plan of Care  Goal: Plan of Care Review  Outcome: Ongoing, Progressing     Problem: Infection (Sepsis/Septic Shock)  Goal: Absence of Infection Signs/Symptoms  Outcome: Ongoing, Progressing     Problem: Infection  Goal: Infection Symptom Resolution  Intervention: Prevent or Manage Infection  Flowsheets (Taken 10/14/2020 2408)  Infection Management: aseptic technique maintained  Isolation Precautions: protective environment maintained

## 2020-10-14 NOTE — PROGRESS NOTES
[facility]  General Surgery  Progress Note    Subjective:     Interval History:   NAEON. AF, VSS.   Reports some abdominal spasms, but overall pain improved.   Drain w/ decreasing output.       Post-Op Info:  Procedure(s) (LRB):  EGD (ESOPHAGOGASTRODUODENOSCOPY) (N/A)   1 Day Post-Op      Medications:  Continuous Infusions:   Amino acid 5% - dextrose 20% (CLINIMIX-E) solution with additives (2L  Provides 100 gm AA, 400 gm CHO (1360 kcal from dextrose), Na 70, K 60, Mg 10, Ca 9, Acetate 160, Cl 78, Phos 30) 65 mL/hr at 10/14/20 0535     Scheduled Meds:   enoxaparin  40 mg Subcutaneous Q24H    fat emulsion  250 mL Intravenous Daily    lidocaine (PF) 10 mg/ml (1%)  1 mL Other Once    mupirocin   Nasal BID    pantoprazole  40 mg Intravenous BID    piperacillin-tazobactam (ZOSYN) IVPB  4.5 g Intravenous Q8H    sodium chloride 0.9%  10 mL Intravenous Q6H     PRN Meds:acetaminophen, bisacodyL, lidocaine HCL 10 mg/ml (1%), morphine, ondansetron, oxyCODONE, oxyCODONE-acetaminophen, sodium chloride 0.9%, sodium chloride 0.9%, Flushing PICC Protocol **AND** sodium chloride 0.9% **AND** sodium chloride 0.9%     Objective:     Vital Signs (Most Recent):  Temp: 97.6 °F (36.4 °C) (10/14/20 0543)  Pulse: 77 (10/14/20 0543)  Resp: 17 (10/14/20 0543)  BP: (!) 158/89 (10/14/20 0543)  SpO2: 97 % (10/14/20 0543) Vital Signs (24h Range):  Temp:  [97.6 °F (36.4 °C)-98.3 °F (36.8 °C)] 97.6 °F (36.4 °C)  Pulse:  [54-87] 77  Resp:  [14-22] 17  SpO2:  [97 %-100 %] 97 %  BP: (138-177)/() 158/89       Intake/Output Summary (Last 24 hours) at 10/14/2020 0740  Last data filed at 10/14/2020 0600  Gross per 24 hour   Intake 5447.91 ml   Output 3270 ml   Net 2177.91 ml       Physical Exam  Constitutional:       Appearance: Normal appearance.   HENT:      Head: Normocephalic.      Mouth/Throat:      Mouth: Mucous membranes are moist.   Cardiovascular:      Rate and Rhythm: Normal rate and regular rhythm.   Pulmonary:       Effort: Pulmonary effort is normal. No respiratory distress.   Abdominal:      General: There is no distension.      Palpations: Abdomen is soft.      Tenderness: There is abdominal tenderness (improved).      Comments: IR drain w/ thin clear drainage in bag     Skin:     General: Skin is warm and dry.   Neurological:      Mental Status: She is alert.         Significant Labs:  BMP:   Recent Labs   Lab 10/13/20  0507   GLU 98   *   K 3.4*   *   CO2 26   BUN 4*   CREATININE 1.2   CALCIUM 8.0*   MG 1.8     CBC:   Recent Labs   Lab 10/14/20  0439   WBC 6.80   RBC 3.01*   HGB 8.2*   HCT 26.9*      MCV 89   MCH 27.2   MCHC 30.5*     CMP:   Recent Labs   Lab 10/13/20  0507   GLU 98   CALCIUM 8.0*   ALBUMIN 1.6*   PROT 6.1   *   K 3.4*   CO2 26   *   BUN 4*   CREATININE 1.2   ALKPHOS 180*   ALT 15   AST 21   BILITOT 0.4       Significant Diagnostics:  None    Assessment/Plan:     Active Diagnoses:    Diagnosis Date Noted POA    Sepsis [A41.9] 10/08/2020 Yes      Problems Resolved During this Admission:     41yo F w/ h/o perforated gastric ulcer s/p ex lap and guerrero patch repair 9/24/20 presents with large abscess in the gastrohepatic space. Now s/p IR drain placement 10/9. Upper GI study 10/12 w/ evidence of leak. GI performed EGD w/ over sewing of the ulcer 10/13 at [facility].     - Will get upper GI study again today. If no evidence of leak, will start po again   - Prealbumin 9. PICC line placed overnight and TPN initiated. Appreciated nutrition recs   - Drainage cultures w/ pan sensitive to Klebsiella, will continue Zosyn. UCx w/ pan sensitive Klebsiella and Enterococcus faecalis, completed course of Vanc. Continue Diflucan for now.    - Tylenol prn fever   - Pain meds prn   - Drain mgmt    - IS   - Encourage OOB, ambulation   - Ppx: SCDs, Lovenox   - Dispo: inpatient mgmt     Puja Coyne MD  General Surgery  [facility] [place]

## 2020-10-14 NOTE — PROCEDURES
"Puja Quigley is a 40 y.o. female patient.    Temp: 98.2 °F (36.8 °C) (10/13/20 1940)  Pulse: 74 (10/13/20 1940)  Resp: 18 (10/13/20 2140)  BP: (!) 175/104 (10/13/20 1940)  SpO2: 99 % (10/13/20 1940)  Weight: 77.1 kg (170 lb) (10/13/20 1241)  Height: 5' 7" (170.2 cm) (10/13/20 1241)    PICC  Date/Time: 10/13/2020 10:00 PM  Performed by: Rj Lomax RN  Consent Done: Yes  Time out: Immediately prior to procedure a time out was called to verify the correct patient, procedure, equipment, support staff and site/side marked as required  Indications: med administration  Anesthesia: local infiltration  Local anesthetic: lidocaine 1% without epinephrine  Anesthetic Total (mL): 1  Preparation: skin prepped with ChloraPrep  Skin prep agent dried: skin prep agent completely dried prior to procedure  Sterile barriers: all five maximum sterile barriers used - cap, mask, sterile gown, sterile gloves, and large sterile sheet  Hand hygiene: hand hygiene performed prior to central venous catheter insertion  Location details: right basilic  Catheter type: double lumen  Catheter size: 5 Fr  Catheter Length: 39cm    Ultrasound guidance: yes  Vessel Caliber: medium and large and patent, compressibility normal  Needle advanced into vessel with real time Ultrasound guidance.  Guidewire confirmed in vessel.  Sterile sheath used.  Number of attempts: 1  Post-procedure: blood return through all ports, chlorhexidine patch and sterile dressing applied  Estimated blood loss (mL): 0            Rj Lomax  10/13/2020  "

## 2020-10-14 NOTE — PLAN OF CARE
10/14/20 1233   Discharge Reassessment   Assessment Type Discharge Planning Reassessment   Do you have any problems affording any of your prescribed medications? No   Discharge Plan A Home   Discharge Plan B Home   DME Needed Upon Discharge  none   Patient choice form signed by patient/caregiver N/A   Anticipated Discharge Disposition Home   Can the patient/caregiver answer the patient profile reliably? Yes, cognitively intact   How does the patient rate their overall health at the present time? Fair   Describe the patient's ability to walk at the present time. No restrictions   How often would a person be available to care for the patient? Whenever needed   Number of comorbid conditions (as recorded on the chart) One   During the past month, has the patient often been bothered by feeling down, depressed or hopeless? Yes   During the past month, has the patient often been bothered by little interest or pleasure in doing things? Yes   Post-Acute Status   Post-Acute Authorization Other   Other Status No Post-Acute Service Needs   Discharge Delays None known at this time

## 2020-10-14 NOTE — NURSING
Report received from DEANA Reyes. Visualized patient and assessed patient's overall condition and appearance. No acute distress noted. Will continue to monitor

## 2020-10-15 LAB
ALBUMIN SERPL BCP-MCNC: 1.5 G/DL (ref 3.5–5.2)
ALP SERPL-CCNC: 150 U/L (ref 55–135)
ALT SERPL W/O P-5'-P-CCNC: 11 U/L (ref 10–44)
ANION GAP SERPL CALC-SCNC: 7 MMOL/L (ref 8–16)
AST SERPL-CCNC: 15 U/L (ref 10–40)
BASOPHILS # BLD AUTO: 0.01 K/UL (ref 0–0.2)
BASOPHILS NFR BLD: 0.2 % (ref 0–1.9)
BILIRUB SERPL-MCNC: 0.3 MG/DL (ref 0.1–1)
BUN SERPL-MCNC: 8 MG/DL (ref 6–20)
CALCIUM SERPL-MCNC: 7.7 MG/DL (ref 8.7–10.5)
CHLORIDE SERPL-SCNC: 105 MMOL/L (ref 95–110)
CO2 SERPL-SCNC: 32 MMOL/L (ref 23–29)
CREAT SERPL-MCNC: 1.1 MG/DL (ref 0.5–1.4)
DIFFERENTIAL METHOD: ABNORMAL
EOSINOPHIL # BLD AUTO: 0.1 K/UL (ref 0–0.5)
EOSINOPHIL NFR BLD: 2 % (ref 0–8)
ERYTHROCYTE [DISTWIDTH] IN BLOOD BY AUTOMATED COUNT: 15 % (ref 11.5–14.5)
EST. GFR  (AFRICAN AMERICAN): >60 ML/MIN/1.73 M^2
EST. GFR  (NON AFRICAN AMERICAN): >60 ML/MIN/1.73 M^2
GLUCOSE SERPL-MCNC: 79 MG/DL (ref 70–110)
HCT VFR BLD AUTO: 25.9 % (ref 37–48.5)
HGB BLD-MCNC: 8.1 G/DL (ref 12–16)
IMM GRANULOCYTES # BLD AUTO: 0.1 K/UL (ref 0–0.04)
IMM GRANULOCYTES NFR BLD AUTO: 1.5 % (ref 0–0.5)
LYMPHOCYTES # BLD AUTO: 1.5 K/UL (ref 1–4.8)
LYMPHOCYTES NFR BLD: 22.7 % (ref 18–48)
MAGNESIUM SERPL-MCNC: 1.5 MG/DL (ref 1.6–2.6)
MCH RBC QN AUTO: 27.8 PG (ref 27–31)
MCHC RBC AUTO-ENTMCNC: 31.3 G/DL (ref 32–36)
MCV RBC AUTO: 89 FL (ref 82–98)
MONOCYTES # BLD AUTO: 0.5 K/UL (ref 0.3–1)
MONOCYTES NFR BLD: 7.4 % (ref 4–15)
NEUTROPHILS # BLD AUTO: 4.4 K/UL (ref 1.8–7.7)
NEUTROPHILS NFR BLD: 66.2 % (ref 38–73)
NRBC BLD-RTO: 0 /100 WBC
PHOSPHATE SERPL-MCNC: 3.2 MG/DL (ref 2.7–4.5)
PLATELET # BLD AUTO: 336 K/UL (ref 150–350)
PMV BLD AUTO: 9 FL (ref 9.2–12.9)
POCT GLUCOSE: 100 MG/DL (ref 70–110)
POTASSIUM SERPL-SCNC: 2.7 MMOL/L (ref 3.5–5.1)
PROT SERPL-MCNC: 5.4 G/DL (ref 6–8.4)
RBC # BLD AUTO: 2.91 M/UL (ref 4–5.4)
SODIUM SERPL-SCNC: 144 MMOL/L (ref 136–145)
WBC # BLD AUTO: 6.62 K/UL (ref 3.9–12.7)

## 2020-10-15 PROCEDURE — 63600175 PHARM REV CODE 636 W HCPCS: Performed by: STUDENT IN AN ORGANIZED HEALTH CARE EDUCATION/TRAINING PROGRAM

## 2020-10-15 PROCEDURE — 25500020 PHARM REV CODE 255: Performed by: SURGERY

## 2020-10-15 PROCEDURE — 25000003 PHARM REV CODE 250: Performed by: STUDENT IN AN ORGANIZED HEALTH CARE EDUCATION/TRAINING PROGRAM

## 2020-10-15 PROCEDURE — 85025 COMPLETE CBC W/AUTO DIFF WBC: CPT

## 2020-10-15 PROCEDURE — 83735 ASSAY OF MAGNESIUM: CPT

## 2020-10-15 PROCEDURE — 25000003 PHARM REV CODE 250: Performed by: EMERGENCY MEDICINE

## 2020-10-15 PROCEDURE — 21400001 HC TELEMETRY ROOM

## 2020-10-15 PROCEDURE — A4216 STERILE WATER/SALINE, 10 ML: HCPCS | Performed by: STUDENT IN AN ORGANIZED HEALTH CARE EDUCATION/TRAINING PROGRAM

## 2020-10-15 PROCEDURE — C9113 INJ PANTOPRAZOLE SODIUM, VIA: HCPCS | Performed by: STUDENT IN AN ORGANIZED HEALTH CARE EDUCATION/TRAINING PROGRAM

## 2020-10-15 PROCEDURE — B4185 PARENTERAL SOL 10 GM LIPIDS: HCPCS | Performed by: STUDENT IN AN ORGANIZED HEALTH CARE EDUCATION/TRAINING PROGRAM

## 2020-10-15 PROCEDURE — 84100 ASSAY OF PHOSPHORUS: CPT

## 2020-10-15 PROCEDURE — 97803 MED NUTRITION INDIV SUBSEQ: CPT

## 2020-10-15 PROCEDURE — 63600175 PHARM REV CODE 636 W HCPCS: Performed by: EMERGENCY MEDICINE

## 2020-10-15 PROCEDURE — 80053 COMPREHEN METABOLIC PANEL: CPT

## 2020-10-15 RX ORDER — POTASSIUM CHLORIDE 7.45 MG/ML
10 INJECTION INTRAVENOUS
Status: COMPLETED | OUTPATIENT
Start: 2020-10-15 | End: 2020-10-15

## 2020-10-15 RX ADMIN — OXYCODONE AND ACETAMINOPHEN 1 TABLET: 7.5; 325 TABLET ORAL at 06:10

## 2020-10-15 RX ADMIN — POTASSIUM CHLORIDE 10 MEQ: 7.46 INJECTION, SOLUTION INTRAVENOUS at 09:10

## 2020-10-15 RX ADMIN — PANTOPRAZOLE SODIUM 40 MG: 40 INJECTION, POWDER, FOR SOLUTION INTRAVENOUS at 10:10

## 2020-10-15 RX ADMIN — SOYBEAN OIL 250 ML: 20 INJECTION, SOLUTION INTRAVENOUS at 10:10

## 2020-10-15 RX ADMIN — POTASSIUM CHLORIDE 10 MEQ: 7.46 INJECTION, SOLUTION INTRAVENOUS at 10:10

## 2020-10-15 RX ADMIN — ONDANSETRON 8 MG: 8 TABLET, ORALLY DISINTEGRATING ORAL at 06:10

## 2020-10-15 RX ADMIN — Medication 10 ML: at 05:10

## 2020-10-15 RX ADMIN — PIPERACILLIN SODIUM AND TAZOBACTAM SODIUM 4.5 G: 4; .5 INJECTION, POWDER, LYOPHILIZED, FOR SOLUTION INTRAVENOUS at 08:10

## 2020-10-15 RX ADMIN — POTASSIUM CHLORIDE 10 MEQ: 7.46 INJECTION, SOLUTION INTRAVENOUS at 11:10

## 2020-10-15 RX ADMIN — RETINOL, ERGOCALCIFEROL, .ALPHA.-TOCOPHEROL ACETATE, DL-, PHYTONADIONE, ASCORBIC ACID, NIACINAMIDE, RIBOFLAVIN 5-PHOSPHATE SODIUM, THIAMINE HYDROCHLORIDE, PYRIDOXINE HYDROCHLORIDE, DEXPANTHENOL, BIOTIN, FOLIC ACID, AND CYANOCOBALAMIN: KIT at 10:10

## 2020-10-15 RX ADMIN — MUPIROCIN: 20 OINTMENT TOPICAL at 10:10

## 2020-10-15 RX ADMIN — MORPHINE SULFATE 2 MG: 4 INJECTION INTRAVENOUS at 08:10

## 2020-10-15 RX ADMIN — PIPERACILLIN SODIUM AND TAZOBACTAM SODIUM 4.5 G: 4; .5 INJECTION, POWDER, LYOPHILIZED, FOR SOLUTION INTRAVENOUS at 03:10

## 2020-10-15 RX ADMIN — ENOXAPARIN SODIUM 40 MG: 40 INJECTION SUBCUTANEOUS at 04:10

## 2020-10-15 RX ADMIN — PANTOPRAZOLE SODIUM 40 MG: 40 INJECTION, POWDER, FOR SOLUTION INTRAVENOUS at 08:10

## 2020-10-15 RX ADMIN — IOHEXOL 75 ML: 350 INJECTION, SOLUTION INTRAVENOUS at 08:10

## 2020-10-15 RX ADMIN — MUPIROCIN: 20 OINTMENT TOPICAL at 08:10

## 2020-10-15 RX ADMIN — Medication 10 ML: at 11:10

## 2020-10-15 RX ADMIN — POTASSIUM CHLORIDE 10 MEQ: 7.46 INJECTION, SOLUTION INTRAVENOUS at 12:10

## 2020-10-15 NOTE — NURSING
Patient arrived back to floor in stable condition. Visualized patient and assessed patient's overall condition and appearance. No complaints. NAD noted. Will continue to monitor.

## 2020-10-15 NOTE — PROGRESS NOTES
"[facility] [place]  Adult Nutrition  Progress Note    SUMMARY       Recommendations    1. Continue to advance as tolerated to regular diet + Boost Plus TID.   2. Continue Clinimix 5/20 @ 65 + lipids.   3. Once pt consumes 50% of meals + supplements, d/c TPN and lipids.   4. Weigh pt weekly.    Goals:   1. Initiate diet order by next RD visit.   2. Consume >/=50% of meal and supplements by next RD visit.  Nutrition Goal Status: new  Communication of RD Recs: (plan of care)    Reason for Assessment    Reason For Assessment: RD follow-up  Diagnosis: surgery/postoperative complications(post op abcess)  Relevant Medical History: umbilical hernia, pregnancy (8/2020)  Interdisciplinary Rounds: did not attend    General Information Comments: Pt awake in bed at visit. Pt on full liquids, tolerating well. Pt reports some cramping when drinking cold items. Pt reports drinking boost. Pt reports drinking 1/2 soup, apple juice and powerade yesterday. TPN + lipids running at goal. Last BM 10/15, no reports of GI distress. NFPE performed 10/15, noted mild wasting but otherwise well nourished. Pt no longer meets for PCM.    Nutrition Discharge Planning: Adequate intake on low fiber diet    Nutrition Risk Screen    Nutrition Risk Screen: no indicators present    Nutrition/Diet History    Patient Reported Diet/Restrictions/Preferences: general  Typical Food/Fluid Intake: was on clears a few weeks ago but has advance to solids and was tolerating well prior to admit  Spiritual, Cultural Beliefs, Voodoo Practices, Values that Affect Care: no  Food Allergies: NKFA  Factors Affecting Nutritional Intake: abdominal pain    Anthropometrics    Temp: 97 °F (36.1 °C)  Height Method: Stated  Height: 5' 7" (170.2 cm)  Height (inches): 67 in  Weight Method: Bed Scale  Weight: 73.6 kg (162 lb 4.1 oz)  Weight (lb): 162.26 lb  Ideal Body Weight (IBW), Female: 135 lb  % Ideal Body Weight, Female (lb): 125.93 %  BMI (Calculated): 25.4  BMI " Grade: 25 - 29.9 - overweight  Weight Loss: unintentional  Usual Body Weight (UBW), k.8 kg(20 87.8 kg)  % Usual Body Weight: 89.25  % Weight Change From Usual Weight: -10.93 %    Lab/Procedures/Meds    Pertinent Labs Reviewed: reviewed  Pertinent Labs Comments: H/H 8.1/15.9, K 2.7, Mg 1.5, Alk phos 150, Ca 7.7, Alb 1.5  Pertinent Medications Reviewed: reviewed  Pertinent Medications Comments: pantoprazole, Na Cl flush    Estimated/Assessed Needs    Weight Used For Calorie Calculations: 73.6 kg (162 lb 4.1 oz)  Energy Calorie Requirements (kcal): 1840 kcal +340 for breastfeeding  Energy Need Method: Kcal/kg  Protein Requirements: 73g (1.0 g/kg)  Weight Used For Protein Calculations: 73.6 kg (162 lb 4.1 oz)  Fluid Requirements (mL): 1 ml/kcal or per MD  Estimated Fluid Requirement Method: RDA Method  RDA Method (mL): 1840  CHO Requirement: 270 g daily    Nutrition Prescription Ordered    Current Diet Order: Full liquid  Current Nutrition Support Formula Ordered: Clinimix E (+ 20% lipids)  Current Nutrition Support Rate Ordered: 65 (ml)  Oral Nutrition Supplement: Boost Plus TID    Evaluation of Received Nutrient/Fluid Intake    Parenteral Calories (kcal): 1355  Parenteral Protein (gm): 78  Lipid Calories (kcals): 500 kcals  GIR (Glucose Infusion Rate) (mg/kg/min): 2.9 mg/kg/min  Total Calories (kcal): 1855  % Kcal Needs: 85  % Protein Needs: 93  I/O: 2387.3/750 x4 stool  Energy Calories Required: meeting needs  Protein Required: meeting needs  Fluid Required: meeting needs  Comments: Last BM 10/15  Tolerance: tolerating  % Intake of Estimated Energy Needs: 75 - 100 %  % Meal Intake: 25 - 50 %    Nutrition Risk    Level of Risk/Frequency of Follow-up: moderate - high(2x/week)     Assessment and Plan    Nutrition Problem  Severe malnutrition      Related to (etiology):   GI conditions      Signs and Symptoms (as evidenced by):   NPO day 5, 10% weight loss in 5 months     Interventions (treatment  strategy):  Parenteral nutrition- Clinimix 5/20 @ 65 + lipids  Full liquid diet  Commercial beverage- Boost plus TID  Collaboration with other providers     Nutrition Diagnosis Status:   Resolved- pt meeting energy needs    Monitor and Evaluation    Food and Nutrient Intake: energy intake, food and beverage intake, parenteral nutrition intake  Food and Nutrient Adminstration: diet order  Knowledge/Beliefs/Attitudes: food and nutrition knowledge/skill  Physical Activity and Function: breastfeeding  Anthropometric Measurements: weight, weight change  Biochemical Data, Medical Tests and Procedures: electrolyte and renal panel  Nutrition-Focused Physical Findings: overall appearance, skin     Malnutrition Assessment  Energy Intake: severe energy intake(10/8-10/13: NPO 10/14-10/15: Pt meeting needs)  Skin (Micronutrient): (miguel score-22, incision umbilical area, abdominal midline)  Nails (Micronutrient): none  Hair/Scalp (Micronutrient): none  Extraoral (Micronutrient): none  Gums (Micronutrient): none  Lips/Mucous Membranes (Micronutrient): none  Teeth (Micronutrient): none  Tongue (Micronutrient): none  Neck/Chest (Micronutrient): none  Musculoskeletal/Lower Extremities: none       Weight Loss (Malnutrition): greater than 10% in 6 months  5/25/20 87.8 kg   10/12/20 78.2 kg   10/15/20 73.6 kg 16% weight loss in about 5 months    Energy Intake (Malnutrition): less than or equal to 50% for greater than or equal to 5 days   Orbital Region (Subcutaneous Fat Loss): well nourished  Upper Arm Region (Subcutaneous Fat Loss): mild depletion  Thoracic and Lumbar Region: well nourished   [place] Region (Muscle Loss): mild depletion  Clavicle Bone Region (Muscle Loss): mild depletion  Clavicle and Acromion Bone Region (Muscle Loss): mild depletion  Scapular Bone Region (Muscle Loss): mild depletion  Dorsal Hand (Muscle Loss): well nourished  Patellar Region (Muscle Loss): well nourished  Anterior Thigh Region (Muscle Loss): well  nourished  Posterior Calf Region (Muscle Loss): well nourished   Edema (Fluid Accumulation): 0-->no edema present   Subcutaneous Fat Loss (Final Summary): mild protein-calorie malnutrition  Muscle Loss Evaluation (Final Summary): well nourished       Nutrition Follow-Up    RD Follow-up?: Yes

## 2020-10-15 NOTE — NURSING
Received call from lab for critical Potassium of 2.7. Notified Dr. Magallon. Awaiting orders. No c/o chest pain or SOB. Will continue to monitor pt.

## 2020-10-15 NOTE — NURSING
Bedside Report given to DEANA Martinez. Walking rounds completed. Visualized and assessed patient NAD noted. Safety precautions maintained and call light within reach.     Chart check completed.

## 2020-10-15 NOTE — PLAN OF CARE
Recommendations    1. Continue to advance as tolerated to regular diet + Boost Plus TID.   2. Continue Clinimix 5/20 @ 65 + lipids.   3. Once pt consumes 50% of meals + supplements, d/c TPN and lipids.   4. Weigh pt weekly.    Goals:   1. Initiate diet order by next RD visit.   2. Consume >/=50% of meal and supplements by next RD visit.  Nutrition Goal Status: new  Communication of RD Recs: (plan of care)

## 2020-10-15 NOTE — NURSING
Report received from DEANA Martinez. Visualized patient and assessed patient's overall condition and appearance. No acute distress noted. Will continue to monitor

## 2020-10-15 NOTE — PROGRESS NOTES
[facility]  General Surgery  Progress Note    Subjective:     Interval History:   NAEON. AF, VSS.   Advanced to full liquids yesterday. Patient reports noting increased output from her drain, but not as much as she is consuming. Tried some purple Powerade yesterday and thinks she saw a purple tinge in her drainage bag.   Still having spasms in the LUQ.     Post-Op Info:  Procedure(s) (LRB):  EGD (ESOPHAGOGASTRODUODENOSCOPY) (N/A)   2 Days Post-Op      Medications:  Continuous Infusions:   Amino acid 5% - dextrose 20% (CLINIMIX-E) solution with additives (2L  Provides 100 gm AA, 400 gm CHO (1360 kcal from dextrose), Na 70, K 60, Mg 10, Ca 9, Acetate 160, Cl 78, Phos 30) 65 mL/hr at 10/14/20 2316     Scheduled Meds:   enoxaparin  40 mg Subcutaneous Q24H    fat emulsion  250 mL Intravenous Daily    lidocaine (PF) 10 mg/ml (1%)  1 mL Other Once    mupirocin   Nasal BID    pantoprazole  40 mg Intravenous BID    piperacillin-tazobactam (ZOSYN) IVPB  4.5 g Intravenous Q8H    sodium chloride 0.9%  10 mL Intravenous Q6H     PRN Meds:acetaminophen, bisacodyL, lidocaine HCL 10 mg/ml (1%), morphine, ondansetron, oxyCODONE, oxyCODONE-acetaminophen, sodium chloride 0.9%, sodium chloride 0.9%, Flushing PICC Protocol **AND** sodium chloride 0.9% **AND** sodium chloride 0.9%     Objective:     Vital Signs (Most Recent):  Temp: 97.1 °F (36.2 °C) (10/15/20 0451)  Pulse: 65 (10/15/20 0451)  Resp: 16 (10/15/20 0633)  BP: (!) 160/102(nurse notified) (10/15/20 0451)  SpO2: 96 % (10/15/20 0451) Vital Signs (24h Range):  Temp:  [97.1 °F (36.2 °C)-98.3 °F (36.8 °C)] 97.1 °F (36.2 °C)  Pulse:  [63-83] 65  Resp:  [16-20] 16  SpO2:  [93 %-99 %] 96 %  BP: (148-164)/() 160/102       Intake/Output Summary (Last 24 hours) at 10/15/2020 0758  Last data filed at 10/15/2020 0600  Gross per 24 hour   Intake 2387.33 ml   Output 750 ml   Net 1637.33 ml       Physical Exam  Constitutional:       Appearance: Normal  appearance.   HENT:      Head: Normocephalic.      Mouth/Throat:      Mouth: Mucous membranes are moist.   Cardiovascular:      Rate and Rhythm: Normal rate and regular rhythm.   Pulmonary:      Effort: Pulmonary effort is normal. No respiratory distress.   Abdominal:      General: There is no distension.      Palpations: Abdomen is soft.      Tenderness: There is abdominal tenderness (improved).      Comments: IR drain w/ thin clear drainage in bag     Skin:     General: Skin is warm and dry.   Neurological:      Mental Status: She is alert.         Significant Labs:  BMP:   Recent Labs   Lab 10/14/20  0439   GLU 90   *   K 3.1*   *   CO2 27   BUN 3*   CREATININE 1.2   CALCIUM 7.4*   MG 1.6     CBC:   Recent Labs   Lab 10/15/20  0607   WBC 6.62   RBC 2.91*   HGB 8.1*   HCT 25.9*      MCV 89   MCH 27.8   MCHC 31.3*     CMP:   Recent Labs   Lab 10/14/20  0439   GLU 90   CALCIUM 7.4*   ALBUMIN 1.4*   PROT 5.3*   *   K 3.1*   CO2 27   *   BUN 3*   CREATININE 1.2   ALKPHOS 146*   ALT 12   AST 17   BILITOT 0.3       Significant Diagnostics:  None    Assessment/Plan:     Active Diagnoses:    Diagnosis Date Noted POA    Sepsis [A41.9] 10/08/2020 Yes      Problems Resolved During this Admission:     39yo F w/ h/o perforated gastric ulcer s/p ex lap and guerrero patch repair 9/24/20 presents with large abscess in the gastrohepatic space. Now s/p IR drain placement 10/9. Upper GI study 10/12 w/ evidence of leak. GI performed EGD w/ over sewing of the ulcer 10/13 at [facility]. PICC line placed and TPN initiated 10/14. Underwent upper GI study 10/14 w/ continued evidence of leak.     - Will order CT scan today to evaluate for any new fluids collections and drain placement.   - Prealbumin 9. PICC line in place, continue TPN initiated. Appreciated nutrition recs. Continue full liquid diet w/ Boost supplements.   - Drainage cultures w/ pan sensitive to Klebsiella, will continue Zosyn. UCx w/  pan sensitive Klebsiella and Enterococcus faecalis, completed course of Vanc. Continue Diflucan for now.    - Tylenol prn fever   - Pain meds prn   - Drain mgmt    - IS   - Encourage OOB, ambulation   - Ppx: SCDs, Lovenox   - Dispo: inpatient mgmt     Puja Coyne MD  General Surgery  [facility] [place]

## 2020-10-16 LAB
ALBUMIN SERPL BCP-MCNC: 1.6 G/DL (ref 3.5–5.2)
ALP SERPL-CCNC: 153 U/L (ref 55–135)
ALT SERPL W/O P-5'-P-CCNC: 12 U/L (ref 10–44)
ANION GAP SERPL CALC-SCNC: 4 MMOL/L (ref 8–16)
AST SERPL-CCNC: 16 U/L (ref 10–40)
BILIRUB SERPL-MCNC: 0.2 MG/DL (ref 0.1–1)
BUN SERPL-MCNC: 11 MG/DL (ref 6–20)
CALCIUM SERPL-MCNC: 7.7 MG/DL (ref 8.7–10.5)
CHLORIDE SERPL-SCNC: 106 MMOL/L (ref 95–110)
CO2 SERPL-SCNC: 31 MMOL/L (ref 23–29)
CREAT SERPL-MCNC: 1.1 MG/DL (ref 0.5–1.4)
EST. GFR  (AFRICAN AMERICAN): >60 ML/MIN/1.73 M^2
EST. GFR  (NON AFRICAN AMERICAN): >60 ML/MIN/1.73 M^2
GLUCOSE SERPL-MCNC: 78 MG/DL (ref 70–110)
INR PPP: 1.1 (ref 0.8–1.2)
POCT GLUCOSE: 124 MG/DL (ref 70–110)
POCT GLUCOSE: 96 MG/DL (ref 70–110)
POTASSIUM SERPL-SCNC: 2.8 MMOL/L (ref 3.5–5.1)
PROT SERPL-MCNC: 5.6 G/DL (ref 6–8.4)
PROTHROMBIN TIME: 11.8 SEC (ref 9–12.5)
SODIUM SERPL-SCNC: 141 MMOL/L (ref 136–145)

## 2020-10-16 PROCEDURE — 21400001 HC TELEMETRY ROOM

## 2020-10-16 PROCEDURE — B4185 PARENTERAL SOL 10 GM LIPIDS: HCPCS | Performed by: STUDENT IN AN ORGANIZED HEALTH CARE EDUCATION/TRAINING PROGRAM

## 2020-10-16 PROCEDURE — 25000003 PHARM REV CODE 250: Performed by: STUDENT IN AN ORGANIZED HEALTH CARE EDUCATION/TRAINING PROGRAM

## 2020-10-16 PROCEDURE — 25000003 PHARM REV CODE 250: Performed by: EMERGENCY MEDICINE

## 2020-10-16 PROCEDURE — 63600175 PHARM REV CODE 636 W HCPCS: Performed by: SURGERY

## 2020-10-16 PROCEDURE — A4217 STERILE WATER/SALINE, 500 ML: HCPCS | Performed by: SURGERY

## 2020-10-16 PROCEDURE — A4216 STERILE WATER/SALINE, 10 ML: HCPCS | Performed by: STUDENT IN AN ORGANIZED HEALTH CARE EDUCATION/TRAINING PROGRAM

## 2020-10-16 PROCEDURE — 85610 PROTHROMBIN TIME: CPT

## 2020-10-16 PROCEDURE — 63600175 PHARM REV CODE 636 W HCPCS: Performed by: STUDENT IN AN ORGANIZED HEALTH CARE EDUCATION/TRAINING PROGRAM

## 2020-10-16 PROCEDURE — 80053 COMPREHEN METABOLIC PANEL: CPT

## 2020-10-16 PROCEDURE — C9113 INJ PANTOPRAZOLE SODIUM, VIA: HCPCS | Performed by: STUDENT IN AN ORGANIZED HEALTH CARE EDUCATION/TRAINING PROGRAM

## 2020-10-16 PROCEDURE — 25000003 PHARM REV CODE 250: Performed by: SURGERY

## 2020-10-16 PROCEDURE — 94761 N-INVAS EAR/PLS OXIMETRY MLT: CPT

## 2020-10-16 PROCEDURE — 63600175 PHARM REV CODE 636 W HCPCS: Performed by: EMERGENCY MEDICINE

## 2020-10-16 RX ORDER — SUCRALFATE 1 G/10ML
1 SUSPENSION ORAL EVERY 6 HOURS
Status: DISCONTINUED | OUTPATIENT
Start: 2020-10-16 | End: 2020-10-21 | Stop reason: HOSPADM

## 2020-10-16 RX ORDER — POTASSIUM CHLORIDE 14.9 MG/ML
20 INJECTION INTRAVENOUS
Status: COMPLETED | OUTPATIENT
Start: 2020-10-16 | End: 2020-10-16

## 2020-10-16 RX ADMIN — Medication 10 ML: at 07:10

## 2020-10-16 RX ADMIN — OXYCODONE AND ACETAMINOPHEN 1 TABLET: 7.5; 325 TABLET ORAL at 05:10

## 2020-10-16 RX ADMIN — MAGNESIUM SULFATE HEPTAHYDRATE: 500 INJECTION, SOLUTION INTRAMUSCULAR; INTRAVENOUS at 10:10

## 2020-10-16 RX ADMIN — SUCRALFATE 1 G: 1 SUSPENSION ORAL at 05:10

## 2020-10-16 RX ADMIN — SUCRALFATE 1 G: 1 SUSPENSION ORAL at 11:10

## 2020-10-16 RX ADMIN — MUPIROCIN: 20 OINTMENT TOPICAL at 10:10

## 2020-10-16 RX ADMIN — POTASSIUM CHLORIDE 20 MEQ: 14.9 INJECTION, SOLUTION INTRAVENOUS at 11:10

## 2020-10-16 RX ADMIN — OXYCODONE AND ACETAMINOPHEN 1 TABLET: 7.5; 325 TABLET ORAL at 01:10

## 2020-10-16 RX ADMIN — OXYCODONE AND ACETAMINOPHEN 1 TABLET: 7.5; 325 TABLET ORAL at 11:10

## 2020-10-16 RX ADMIN — SOYBEAN OIL 250 ML: 20 INJECTION, SOLUTION INTRAVENOUS at 10:10

## 2020-10-16 RX ADMIN — PANTOPRAZOLE SODIUM 40 MG: 40 INJECTION, POWDER, FOR SOLUTION INTRAVENOUS at 09:10

## 2020-10-16 RX ADMIN — POTASSIUM CHLORIDE 20 MEQ: 14.9 INJECTION, SOLUTION INTRAVENOUS at 02:10

## 2020-10-16 RX ADMIN — MUPIROCIN: 20 OINTMENT TOPICAL at 09:10

## 2020-10-16 RX ADMIN — Medication 10 ML: at 12:10

## 2020-10-16 RX ADMIN — PIPERACILLIN SODIUM AND TAZOBACTAM SODIUM 4.5 G: 4; .5 INJECTION, POWDER, LYOPHILIZED, FOR SOLUTION INTRAVENOUS at 09:10

## 2020-10-16 RX ADMIN — ENOXAPARIN SODIUM 40 MG: 40 INJECTION SUBCUTANEOUS at 05:10

## 2020-10-16 RX ADMIN — PANTOPRAZOLE SODIUM 40 MG: 40 INJECTION, POWDER, FOR SOLUTION INTRAVENOUS at 10:10

## 2020-10-16 RX ADMIN — PIPERACILLIN SODIUM AND TAZOBACTAM SODIUM 4.5 G: 4; .5 INJECTION, POWDER, LYOPHILIZED, FOR SOLUTION INTRAVENOUS at 05:10

## 2020-10-16 RX ADMIN — PIPERACILLIN SODIUM AND TAZOBACTAM SODIUM 4.5 G: 4; .5 INJECTION, POWDER, LYOPHILIZED, FOR SOLUTION INTRAVENOUS at 12:10

## 2020-10-16 RX ADMIN — Medication 10 ML: at 05:10

## 2020-10-16 NOTE — BRIEF OP NOTE
Radiology Post-Procedure Note    Pre Op Diagnosis: CP with recent imaging showing a left-sided pleural  Post Op Diagnosis: Same    Procedure: US-guided therapeutic left-sided thoracentesis    Procedure performed by: Castro Bermudez MD    Written Informed Consent Obtained: Yes  Specimen Removed: YES, 600-cc of thin, straw-colored fluid  Estimated Blood Loss: none    Findings:   Successful US-guided therapeutic left-sided thoracentesis with local anesthetic only. Patient tolerated the procedure well. No immediate post-procedural complications noted.     CXR immediately and 2 hours post op to assess for potential immediate or delayed complications.     Thank you for considering IR for the care of your patient.     Castro Bermudez MD  Interventional Radiology

## 2020-10-16 NOTE — PROGRESS NOTES
[facility]  General Surgery  Progress Note    Subjective:     Interval History:   NAEON. AF, VSS.   Reports approx 250cc output from drain overnight. Tolerating CLD. Reports continued pain at drain site and LUQ pain.       Post-Op Info:  Procedure(s) (LRB):  EGD (ESOPHAGOGASTRODUODENOSCOPY) (N/A)   3 Days Post-Op      Medications:  Continuous Infusions:   Amino acid 5% - dextrose 20% (CLINIMIX-E) solution with additives (2L  Provides 100 gm AA, 400 gm CHO (1360 kcal from dextrose), Na 70, K 60, Mg 10, Ca 9, Acetate 160, Cl 78, Phos 30) 65 mL/hr at 10/15/20 2233     Scheduled Meds:   enoxaparin  40 mg Subcutaneous Q24H    lidocaine (PF) 10 mg/ml (1%)  1 mL Other Once    mupirocin   Nasal BID    pantoprazole  40 mg Intravenous BID    piperacillin-tazobactam (ZOSYN) IVPB  4.5 g Intravenous Q8H    potassium chloride in water  20 mEq Intravenous Q2H    sodium chloride 0.9%  10 mL Intravenous Q6H    sucralfate  1 g Oral Q6H     PRN Meds:acetaminophen, bisacodyL, lidocaine HCL 10 mg/ml (1%), morphine, ondansetron, oxyCODONE, oxyCODONE-acetaminophen, sodium chloride 0.9%, sodium chloride 0.9%, Flushing PICC Protocol **AND** sodium chloride 0.9% **AND** sodium chloride 0.9%     Objective:     Vital Signs (Most Recent):  Temp: 97.1 °F (36.2 °C) (10/16/20 0845)  Pulse: 82 (10/16/20 0845)  Resp: 16 (10/16/20 0845)  BP: (!) 158/97 (10/16/20 0845)  SpO2: (!) 93 % (10/16/20 0845) Vital Signs (24h Range):  Temp:  [97.1 °F (36.2 °C)-98.2 °F (36.8 °C)] 97.1 °F (36.2 °C)  Pulse:  [74-95] 82  Resp:  [16-18] 16  SpO2:  [93 %-100 %] 93 %  BP: (148-167)/() 158/97       Intake/Output Summary (Last 24 hours) at 10/16/2020 1111  Last data filed at 10/16/2020 0600  Gross per 24 hour   Intake 738.52 ml   Output 750 ml   Net -11.48 ml       Physical Exam  Constitutional:       Appearance: Normal appearance.   HENT:      Head: Normocephalic.      Mouth/Throat:      Mouth: Mucous membranes are moist.    Cardiovascular:      Rate and Rhythm: Normal rate and regular rhythm.   Pulmonary:      Effort: Pulmonary effort is normal. No respiratory distress.   Abdominal:      General: There is no distension.      Palpations: Abdomen is soft.      Tenderness: There is abdominal tenderness (improved).      Comments: IR drain w/ thin clear drainage in bag     Skin:     General: Skin is warm and dry.   Neurological:      Mental Status: She is alert.         Significant Labs:  BMP:   Recent Labs   Lab 10/15/20  0607 10/16/20  0425   GLU  --  78   NA  --  141   K  --  2.8*   CL  --  106   CO2  --  31*   BUN  --  11   CREATININE  --  1.1   CALCIUM  --  7.7*   MG 1.5*  --      CBC:   Recent Labs   Lab 10/15/20  0607   WBC 6.62   RBC 2.91*   HGB 8.1*   HCT 25.9*      MCV 89   MCH 27.8   MCHC 31.3*     CMP:   Recent Labs   Lab 10/16/20  0425   GLU 78   CALCIUM 7.7*   ALBUMIN 1.6*   PROT 5.6*      K 2.8*   CO2 31*      BUN 11   CREATININE 1.1   ALKPHOS 153*   ALT 12   AST 16   BILITOT 0.2       Significant Diagnostics:  None    Assessment/Plan:     Active Diagnoses:    Diagnosis Date Noted POA    Sepsis [A41.9] 10/08/2020 Yes      Problems Resolved During this Admission:     41yo F w/ h/o perforated gastric ulcer s/p ex lap and guerrero patch repair 9/24/20 presents with large abscess in the gastrohepatic space. Now s/p IR drain placement 10/9. Upper GI study 10/12 w/ evidence of leak. GI performed EGD w/ over sewing of the ulcer 10/13 at [facility]. PICC line placed and TPN initiated 10/14. Underwent upper GI study 10/14 w/ continued evidence of leak. CT scan 10/15 w/ no new fluid collections and drain in proper location.     - Continue to have left sided pain and left pleural effusion seen on CT scan. Will consult IR for possible drainage.   - Discussed w/ patient further management options. Patient is not interested in another surgery at this time. She would like to pursue less invasive measures.  Discussed w/ GI and they are planning to repeat EGD and possible over sewing of the ulcer on Tuesday, 10/20. If upper GI the next day continues to reveal a leak, will plan for G-J tube with GI on Wed, 10/21, versus discussing additional options with patient.   - Prealbumin 9. PICC line in place, continue TPN. Appreciated nutrition recs. Will start regular diet today w/ supplements.   - Drainage cultures w/ pan sensitive to Klebsiella, will continue Zosyn. UCx w/ pan sensitive Klebsiella and Enterococcus faecalis, completed course of Vanc. Continue Diflucan for now.    - Tylenol prn fever   - Pain meds prn   - Drain mgmt    - IS   - Encourage OOB, ambulation   - Ppx: SCDs, Lovenox   - Dispo: inpatient mgmt     Puja Coyne MD  General Surgery  [facility]

## 2020-10-16 NOTE — CONSULTS
Inpatient Radiology Pre-procedure Note    History of Present Illness:  Puja Quigley is a 40 y.o. female with new-onset left-sided CP with recent imaging showing a left-sided pleural effusion likely contributing to pts symptoms.     A new inpatient IR consult received for US-guided therapeutic left-sided thoracentesis.     Admission H&P reviewed.  Past Medical History:   Diagnosis Date    Pregnancy 08/12/2020    delivered on 8/12/2020    Umbilical hernia      Past Surgical History:   Procedure Laterality Date    CLOSURE OF PERFORATED ULCER OF DUODENUM USING OMENTAL PATCH      ESOPHAGOGASTRODUODENOSCOPY N/A 10/13/2020    Procedure: EGD (ESOPHAGOGASTRODUODENOSCOPY);  Surgeon: Rosio Marion MD;  Location: [facility] ([address] [place] [address]);  Service: Endoscopy;  Laterality: N/A;     Review of Systems:   As documented in primary team H&P    Home Meds:   Prior to Admission medications    Medication Sig Start Date End Date Taking? Authorizing Provider   cyclobenzaprine (FLEXERIL) 5 MG tablet Take 1 tablet (5 mg total) by mouth 3 (three) times daily as needed for Muscle spasms. 10/6/20 10/16/20 Yes Willie Magallon MD   hydrocortisone (ANUSOL-HC) 2.5 % rectal cream Place rectally 2 (two) times daily. 8/21/20  Yes Socrates Terrazas MD   oxyCODONE (ROXICODONE) 5 MG immediate release tablet Take 1 tablet (5 mg total) by mouth every 4 (four) hours as needed for Pain. 10/6/20  Yes Willie Magallon MD   oxyCODONE-acetaminophen (PERCOCET) 5-325 mg per tablet Take 1 tablet by mouth every 4 (four) hours as needed for Pain. 9/28/20  Yes Puja Coyne MD   pantoprazole (PROTONIX) 40 MG tablet Take 1 tablet (40 mg total) by mouth 2 (two) times daily. 9/28/20 9/28/21 Yes Puja Coyne MD   senna-docusate 8.6-50 mg (PERICOLACE) 8.6-50 mg per tablet Take 1 tablet by mouth once daily.   Yes Historical Provider   simethicone, bulk, Liqd 40 mg by Misc.(Non-Drug; Combo Route) route 3 (three) times daily. 10/6/20  Yes Willei Magallon MD    acetaminophen (TYLENOL) 500 MG tablet Take 500 mg by mouth every 6 (six) hours as needed for Pain.    Historical Provider   ondansetron (ZOFRAN-ODT) 4 MG TbDL Take 2 tablets (8 mg total) by mouth every 6 (six) hours as needed. 9/28/20   Puja Coyne MD     Scheduled Meds:    enoxaparin  40 mg Subcutaneous Q24H    fat emulsion  250 mL Intravenous Daily    lidocaine (PF) 10 mg/ml (1%)  1 mL Other Once    mupirocin   Nasal BID    pantoprazole  40 mg Intravenous BID    piperacillin-tazobactam (ZOSYN) IVPB  4.5 g Intravenous Q8H    potassium chloride in water  20 mEq Intravenous Q2H    sodium chloride 0.9%  10 mL Intravenous Q6H    sucralfate  1 g Oral Q6H     Continuous Infusions:    Amino acid 5% - dextrose 20% (CLINIMIX-E) solution with additives (2L  Provides 100 gm AA, 400 gm CHO (1360 kcal from dextrose), Na 70, K 60, Mg 10, Ca 9, Acetate 160, Cl 78, Phos 30) 65 mL/hr at 10/15/20 2233    TPN ADULT CENTRAL LINE CUSTOM       PRN Meds:acetaminophen, bisacodyL, lidocaine HCL 10 mg/ml (1%), morphine, ondansetron, oxyCODONE, oxyCODONE-acetaminophen, sodium chloride 0.9%, sodium chloride 0.9%, Flushing PICC Protocol **AND** sodium chloride 0.9% **AND** sodium chloride 0.9%     Anticoagulants/Antiplatelets: Lovenox    Allergies: Review of patient's allergies indicates:  No Known Allergies     Sedation Hx: have not been any systemic reactions    Labs:  No results for input(s): INR in the last 168 hours.    Invalid input(s):  PT,  PTT    Recent Labs   Lab 10/15/20  0607   WBC 6.62   HGB 8.1*   HCT 25.9*   MCV 89         Recent Labs   Lab 10/15/20  0607 10/16/20  0425   GLU  --  78   NA  --  141   K  --  2.8*   CL  --  106   CO2  --  31*   BUN  --  11   CREATININE  --  1.1   CALCIUM  --  7.7*   MG 1.5*  --    ALT  --  12   AST  --  16   ALBUMIN  --  1.6*   BILITOT  --  0.2     Physical Exam:  General: no acute distress  Mental Status: alert and oriented to person, place and time  HEENT: normocephalic,  atraumatic  Chest: unlabored breathing  Heart: regular heart rate  Abdomen: nondistended  Extremity: moves all extremities    Vitals:  Temp: 97.1 °F (36.2 °C) (10/16/20 1138)  Pulse: 92 (10/16/20 1138)  Resp: 16 (10/16/20 1138)  BP: (!) 155/92 (10/16/20 1138)  SpO2: 96 % (10/16/20 1138)     A/P:  40 y.o. female with new-onset left-sided CP with recent imaging showing a left-sided pleural effusion likely contributing to pts symptoms.     1. Left CP with left pleural effusion - Will attempt US-guided therapeutic left-sided thoracentesis with local anesthetic only later today.    Risks (including, but not limited to, pain, bleeding, infection, damage to nearby structures, failure to obtain sufficient material for a diagnosis, the need for additional procedures, and death), benefits, and alternatives were discussed with the patient. All questions were answered to the best of my abilities. The patient wishes to proceed with the procedure. Written informed consent was obtained.    Thank you for considering IR for the care of your patient.     Castro Bermudez MD  Interventional Radiology

## 2020-10-16 NOTE — PLAN OF CARE
Problem: Adult Inpatient Plan of Care  Goal: Plan of Care Review  Outcome: Ongoing, Progressing  Goal: Patient-Specific Goal (Individualization)  Outcome: Ongoing, Progressing  Goal: Absence of Hospital-Acquired Illness or Injury  Outcome: Ongoing, Progressing  Goal: Optimal Comfort and Wellbeing  Outcome: Ongoing, Progressing  Goal: Readiness for Transition of Care  Outcome: Ongoing, Progressing  Goal: Rounds/Family Conference  Outcome: Ongoing, Progressing     Problem: Adjustment to Illness (Sepsis/Septic Shock)  Goal: Optimal Coping  Outcome: Ongoing, Progressing     Problem: Bleeding (Sepsis/Septic Shock)  Goal: Absence of Bleeding  Outcome: Ongoing, Progressing     Problem: Glycemic Control Impaired (Sepsis/Septic Shock)  Goal: Blood Glucose Level Within Desired Range  Outcome: Ongoing, Progressing     Problem: Hemodynamic Instability (Sepsis/Septic Shock)  Goal: Effective Tissue Perfusion  Outcome: Ongoing, Progressing     Problem: Infection (Sepsis/Septic Shock)  Goal: Absence of Infection Signs/Symptoms  Outcome: Ongoing, Progressing     Problem: Nutrition Impaired (Sepsis/Septic Shock)  Goal: Optimal Nutrition Intake  Outcome: Ongoing, Progressing     Problem: Respiratory Compromise (Sepsis/Septic Shock)  Goal: Effective Oxygenation and Ventilation  Outcome: Ongoing, Progressing     Problem: Fall Injury Risk  Goal: Absence of Fall and Fall-Related Injury  Outcome: Ongoing, Progressing     Problem: Infection  Goal: Infection Symptom Resolution  Outcome: Ongoing, Progressing

## 2020-10-16 NOTE — NURSING
Patient returned to unit from scheduled procedure. Patient awake, alert and oriented. Patient without c/o discomfort. Tele monitoring in progress. No apparent distress noted. Patient up to the chair at bedside.

## 2020-10-17 LAB
ALBUMIN SERPL BCP-MCNC: 1.6 G/DL (ref 3.5–5.2)
ALP SERPL-CCNC: 165 U/L (ref 55–135)
ALT SERPL W/O P-5'-P-CCNC: 15 U/L (ref 10–44)
ANION GAP SERPL CALC-SCNC: 8 MMOL/L (ref 8–16)
AST SERPL-CCNC: 23 U/L (ref 10–40)
BASOPHILS # BLD AUTO: 0.01 K/UL (ref 0–0.2)
BASOPHILS NFR BLD: 0.1 % (ref 0–1.9)
BILIRUB SERPL-MCNC: 0.3 MG/DL (ref 0.1–1)
BILIRUB UR QL STRIP: NEGATIVE
BUN SERPL-MCNC: 12 MG/DL (ref 6–20)
CALCIUM SERPL-MCNC: 8.1 MG/DL (ref 8.7–10.5)
CHLORIDE SERPL-SCNC: 103 MMOL/L (ref 95–110)
CLARITY UR: CLEAR
CO2 SERPL-SCNC: 30 MMOL/L (ref 23–29)
COLOR UR: NORMAL
CREAT SERPL-MCNC: 1.1 MG/DL (ref 0.5–1.4)
DIFFERENTIAL METHOD: ABNORMAL
EOSINOPHIL # BLD AUTO: 0.1 K/UL (ref 0–0.5)
EOSINOPHIL NFR BLD: 0.6 % (ref 0–8)
ERYTHROCYTE [DISTWIDTH] IN BLOOD BY AUTOMATED COUNT: 15.4 % (ref 11.5–14.5)
EST. GFR  (AFRICAN AMERICAN): >60 ML/MIN/1.73 M^2
EST. GFR  (NON AFRICAN AMERICAN): >60 ML/MIN/1.73 M^2
GLUCOSE SERPL-MCNC: 97 MG/DL (ref 70–110)
GLUCOSE UR QL STRIP: NEGATIVE
HCT VFR BLD AUTO: 26.7 % (ref 37–48.5)
HGB BLD-MCNC: 8.4 G/DL (ref 12–16)
HGB UR QL STRIP: NEGATIVE
IMM GRANULOCYTES # BLD AUTO: 0.07 K/UL (ref 0–0.04)
IMM GRANULOCYTES NFR BLD AUTO: 0.7 % (ref 0–0.5)
KETONES UR QL STRIP: NEGATIVE
LEUKOCYTE ESTERASE UR QL STRIP: NEGATIVE
LYMPHOCYTES # BLD AUTO: 0.5 K/UL (ref 1–4.8)
LYMPHOCYTES NFR BLD: 4.2 % (ref 18–48)
MCH RBC QN AUTO: 27.9 PG (ref 27–31)
MCHC RBC AUTO-ENTMCNC: 31.5 G/DL (ref 32–36)
MCV RBC AUTO: 89 FL (ref 82–98)
MONOCYTES # BLD AUTO: 0.1 K/UL (ref 0.3–1)
MONOCYTES NFR BLD: 1.3 % (ref 4–15)
NEUTROPHILS # BLD AUTO: 9.9 K/UL (ref 1.8–7.7)
NEUTROPHILS NFR BLD: 93.1 % (ref 38–73)
NITRITE UR QL STRIP: NEGATIVE
NRBC BLD-RTO: 0 /100 WBC
PH UR STRIP: 8 [PH] (ref 5–8)
PLATELET # BLD AUTO: 346 K/UL (ref 150–350)
PMV BLD AUTO: 8.9 FL (ref 9.2–12.9)
POCT GLUCOSE: 103 MG/DL (ref 70–110)
POCT GLUCOSE: 121 MG/DL (ref 70–110)
POCT GLUCOSE: 128 MG/DL (ref 70–110)
POTASSIUM SERPL-SCNC: 3.3 MMOL/L (ref 3.5–5.1)
PROT SERPL-MCNC: 5.9 G/DL (ref 6–8.4)
PROT UR QL STRIP: NEGATIVE
RBC # BLD AUTO: 3.01 M/UL (ref 4–5.4)
SODIUM SERPL-SCNC: 141 MMOL/L (ref 136–145)
SP GR UR STRIP: 1 (ref 1–1.03)
URN SPEC COLLECT METH UR: NORMAL
UROBILINOGEN UR STRIP-ACNC: NEGATIVE EU/DL
WBC # BLD AUTO: 10.61 K/UL (ref 3.9–12.7)

## 2020-10-17 PROCEDURE — 87040 BLOOD CULTURE FOR BACTERIA: CPT

## 2020-10-17 PROCEDURE — C9113 INJ PANTOPRAZOLE SODIUM, VIA: HCPCS | Performed by: STUDENT IN AN ORGANIZED HEALTH CARE EDUCATION/TRAINING PROGRAM

## 2020-10-17 PROCEDURE — 81003 URINALYSIS AUTO W/O SCOPE: CPT

## 2020-10-17 PROCEDURE — 21400001 HC TELEMETRY ROOM

## 2020-10-17 PROCEDURE — 25000003 PHARM REV CODE 250: Performed by: STUDENT IN AN ORGANIZED HEALTH CARE EDUCATION/TRAINING PROGRAM

## 2020-10-17 PROCEDURE — 63600175 PHARM REV CODE 636 W HCPCS: Performed by: EMERGENCY MEDICINE

## 2020-10-17 PROCEDURE — 85025 COMPLETE CBC W/AUTO DIFF WBC: CPT

## 2020-10-17 PROCEDURE — 63600175 PHARM REV CODE 636 W HCPCS: Performed by: STUDENT IN AN ORGANIZED HEALTH CARE EDUCATION/TRAINING PROGRAM

## 2020-10-17 PROCEDURE — 80053 COMPREHEN METABOLIC PANEL: CPT

## 2020-10-17 PROCEDURE — 25000003 PHARM REV CODE 250: Performed by: EMERGENCY MEDICINE

## 2020-10-17 PROCEDURE — 87103 BLOOD FUNGUS CULTURE: CPT

## 2020-10-17 PROCEDURE — B4185 PARENTERAL SOL 10 GM LIPIDS: HCPCS | Performed by: STUDENT IN AN ORGANIZED HEALTH CARE EDUCATION/TRAINING PROGRAM

## 2020-10-17 PROCEDURE — 25000003 PHARM REV CODE 250: Performed by: SURGERY

## 2020-10-17 PROCEDURE — A4217 STERILE WATER/SALINE, 500 ML: HCPCS | Performed by: STUDENT IN AN ORGANIZED HEALTH CARE EDUCATION/TRAINING PROGRAM

## 2020-10-17 PROCEDURE — A4216 STERILE WATER/SALINE, 10 ML: HCPCS | Performed by: STUDENT IN AN ORGANIZED HEALTH CARE EDUCATION/TRAINING PROGRAM

## 2020-10-17 RX ADMIN — PANTOPRAZOLE SODIUM 40 MG: 40 INJECTION, POWDER, FOR SOLUTION INTRAVENOUS at 09:10

## 2020-10-17 RX ADMIN — OXYCODONE 5 MG: 5 TABLET ORAL at 09:10

## 2020-10-17 RX ADMIN — Medication 10 ML: at 01:10

## 2020-10-17 RX ADMIN — PIPERACILLIN SODIUM AND TAZOBACTAM SODIUM 4.5 G: 4; .5 INJECTION, POWDER, LYOPHILIZED, FOR SOLUTION INTRAVENOUS at 03:10

## 2020-10-17 RX ADMIN — PIPERACILLIN SODIUM AND TAZOBACTAM SODIUM 4.5 G: 4; .5 INJECTION, POWDER, LYOPHILIZED, FOR SOLUTION INTRAVENOUS at 09:10

## 2020-10-17 RX ADMIN — SUCRALFATE 1 G: 1 SUSPENSION ORAL at 05:10

## 2020-10-17 RX ADMIN — OXYCODONE AND ACETAMINOPHEN 1 TABLET: 7.5; 325 TABLET ORAL at 12:10

## 2020-10-17 RX ADMIN — OXYCODONE 5 MG: 5 TABLET ORAL at 03:10

## 2020-10-17 RX ADMIN — SUCRALFATE 1 G: 1 SUSPENSION ORAL at 12:10

## 2020-10-17 RX ADMIN — MAGNESIUM SULFATE HEPTAHYDRATE: 500 INJECTION, SOLUTION INTRAMUSCULAR; INTRAVENOUS at 09:10

## 2020-10-17 RX ADMIN — Medication 10 ML: at 12:10

## 2020-10-17 RX ADMIN — Medication 10 ML: at 05:10

## 2020-10-17 RX ADMIN — MUPIROCIN: 20 OINTMENT TOPICAL at 09:10

## 2020-10-17 RX ADMIN — MORPHINE SULFATE 2 MG: 4 INJECTION INTRAVENOUS at 04:10

## 2020-10-17 RX ADMIN — SOYBEAN OIL 250 ML: 20 INJECTION, SOLUTION INTRAVENOUS at 09:10

## 2020-10-17 RX ADMIN — Medication 10 ML: at 07:10

## 2020-10-17 RX ADMIN — PIPERACILLIN SODIUM AND TAZOBACTAM SODIUM 4.5 G: 4; .5 INJECTION, POWDER, LYOPHILIZED, FOR SOLUTION INTRAVENOUS at 12:10

## 2020-10-17 RX ADMIN — ENOXAPARIN SODIUM 40 MG: 40 INJECTION SUBCUTANEOUS at 04:10

## 2020-10-17 RX ADMIN — ACETAMINOPHEN 650 MG: 325 TABLET ORAL at 06:10

## 2020-10-17 NOTE — NURSING
[facility] [place]-  RN Proactive Rounding Note    Date of Visit: 10/17/2020  Time of Visit: 0828    Admit Date: 10/8/2020  LOS: 9    Code Status: Full Code     Attending Physician: Willie Magallon MD    TRIGGER:    Reason for Round:  MEWS Score 4 or greater    ASSESSMENT:     Abnormal Vital Signs: HR 120s  Clinical Issues: Circulatory  MEWS Score: 4      INTERVENTIONS/ RECOMMENDATIONS:     Bedside assessment complete. PT sitting up in bed awake and alert. TPN infusing. Sats 97% . PT complains of mild pain with deep breaths. PT now NPO. Running elevated temp over night. Updated pt on role of RRN and will continue to follow.     Discussed plan of care with RN, .    PHYSICIAN ESCALATION:     Yes/No  no    Orders received and case discussed with Dr. MORIN/JESUS .    Disposition: Remain in room 333.    FOLLOW-UP/CONTINGENCY:     Call back the Rapid Response Nurse at [phone] for additional questions or concerns.

## 2020-10-17 NOTE — PLAN OF CARE
Problem: Adult Inpatient Plan of Care  Goal: Plan of Care Review  Outcome: Ongoing, Progressing     Problem: Adjustment to Illness (Sepsis/Septic Shock)  Goal: Optimal Coping  Outcome: Ongoing, Progressing     Problem: Bleeding (Sepsis/Septic Shock)  Goal: Absence of Bleeding  Outcome: Ongoing, Progressing     Problem: Glycemic Control Impaired (Sepsis/Septic Shock)  Goal: Blood Glucose Level Within Desired Range  Outcome: Ongoing, Progressing     Problem: Hemodynamic Instability (Sepsis/Septic Shock)  Goal: Effective Tissue Perfusion  Outcome: Ongoing, Progressing     Problem: Infection (Sepsis/Septic Shock)  Goal: Absence of Infection Signs/Symptoms  Outcome: Ongoing, Progressing     Problem: Nutrition Impaired (Sepsis/Septic Shock)  Goal: Optimal Nutrition Intake  Outcome: Ongoing, Progressing     Problem: Respiratory Compromise (Sepsis/Septic Shock)  Goal: Effective Oxygenation and Ventilation  Outcome: Ongoing, Progressing     Problem: Fall Injury Risk  Goal: Absence of Fall and Fall-Related Injury  Outcome: Ongoing, Progressing     Problem: Infection  Goal: Infection Symptom Resolution  Outcome: Ongoing, Progressing

## 2020-10-17 NOTE — PLAN OF CARE
Problem: Infection (Sepsis/Septic Shock)  Goal: Absence of Infection Signs/Symptoms  Outcome: Ongoing, Progressing  Intervention: Prevent or Manage Infection Progression  Flowsheets (Taken 10/17/2020 2463)  Infection Prevention:   environmental surveillance performed   rest/sleep promoted   single patient room provided  Fever Reduction/Comfort Measures:   fluid intake increased   lightweight bedding   lightweight clothing   medication administered  Infection Management: cultures obtained and sent to lab

## 2020-10-17 NOTE — NURSING
Dr Magallon ordered NPO diet and deferred care to on call MD Dr Pennington, patient made aware. Sent message to Dr José Miguel Pennington for further orders. Possible blood culture? Informed day nurse Claudia of changes.

## 2020-10-17 NOTE — PROGRESS NOTES
[facility]  General Surgery  Progress Note    Subjective:     Interval History:   This morning febrile to 103 with tachycardic response. Epigastric abdominal pain remains unchanged. Underwent thoracentesis with IR yesterday with 600 ml fluid removed, no drain left. Denies current nausea/vomiting. Still with some left sided pain.    Post-Op Info:  Procedure(s) (LRB):  EGD (ESOPHAGOGASTRODUODENOSCOPY) (N/A)   4 Days Post-Op      Medications:  Continuous Infusions:   TPN ADULT CENTRAL LINE CUSTOM 65 mL/hr at 10/16/20 2220     Scheduled Meds:   enoxaparin  40 mg Subcutaneous Q24H    fat emulsion  250 mL Intravenous Daily    lidocaine (PF) 10 mg/ml (1%)  1 mL Other Once    mupirocin   Nasal BID    pantoprazole  40 mg Intravenous BID    piperacillin-tazobactam (ZOSYN) IVPB  4.5 g Intravenous Q8H    sodium chloride 0.9%  10 mL Intravenous Q6H    sucralfate  1 g Oral Q6H     PRN Meds:acetaminophen, bisacodyL, lidocaine HCL 10 mg/ml (1%), morphine, ondansetron, oxyCODONE, oxyCODONE-acetaminophen, sodium chloride 0.9%, sodium chloride 0.9%, Flushing PICC Protocol **AND** sodium chloride 0.9% **AND** sodium chloride 0.9%     Objective:     Vital Signs (Most Recent):  Temp: (!) 101 °F (38.3 °C) (10/17/20 0810)  Pulse: (!) 125 (10/17/20 0810)  Resp: 16 (10/17/20 0941)  BP: (!) 165/99 (10/17/20 0421)  SpO2: 98 % (10/17/20 0421) Vital Signs (24h Range):  Temp:  [97.1 °F (36.2 °C)-103 °F (39.4 °C)] 101 °F (38.3 °C)  Pulse:  [] 125  Resp:  [16-20] 16  SpO2:  [96 %-100 %] 98 %  BP: (155-165)/(92-99) 165/99       Intake/Output Summary (Last 24 hours) at 10/17/2020 1022  Last data filed at 10/17/2020 0651  Gross per 24 hour   Intake 1180 ml   Output 2915 ml   Net -1735 ml       Physical Exam  Constitutional:       General: She is not in acute distress.     Appearance: Normal appearance.   Eyes:      General: No scleral icterus.     Extraocular Movements: Extraocular movements intact.   Cardiovascular:       Rate and Rhythm: Regular rhythm. Tachycardia present.   Pulmonary:      Effort: Pulmonary effort is normal. No respiratory distress.   Abdominal:      Comments: Accordion drain in place with 115 ml output, ascitic appearance. Abdomen soft, mild epigastric tenderness   Neurological:      Mental Status: She is alert.         Significant Labs:  CBC:   Recent Labs   Lab 10/17/20  0606   WBC 10.61   RBC 3.01*   HGB 8.4*   HCT 26.7*      MCV 89   MCH 27.9   MCHC 31.5*     CMP:   Recent Labs   Lab 10/17/20  0606   GLU 97   CALCIUM 8.1*   ALBUMIN 1.6*   PROT 5.9*      K 3.3*   CO2 30*      BUN 12   CREATININE 1.1   ALKPHOS 165*   ALT 15   AST 23   BILITOT 0.3       Significant Diagnostics:  None    Assessment/Plan:     Active Diagnoses:    Diagnosis Date Noted POA    Sepsis [A41.9] 10/08/2020 Yes      Problems Resolved During this Admission:     Puja Quigley is a 41 y/o F w/ PMH of perforated gastric ulcer s/p ex lap and guerrero patch repair 9/24/20 presents with large abscess in the gastrohepatic space. Now s/p IR drain placement 10/9. Upper GI study 10/12 w/ evidence of leak. GI performed EGD w/ over sewing of the ulcer 10/13 at [facility]. PICC line placed and TPN initiated 10/14. Underwent upper GI study 10/14 w/ continued evidence of leak. CT scan 10/15 w/ no new fluid collections and drain in proper location. S/p L thoracentesis with IR 10/16 600 ml fluid drained. Febrile to 103 this morning with tachycardic response, no leukocytosis, abdominal exam unchanged.    - Obtain CXR, Blood/Fungal Cx, UA w/ reflex culture  - If recurrent fever will plan for stat CT A/P with IV contrast  - Continue Zosyn, diflucan   - Back down to clear liquid diet, continue TPN. Nutrition consulted, appreciate assistance/rec's. Will require more K in TPN.  - Continue MM pain regimen PRN, Tylenol for fevers  - Continue accordion drain, daily drain care/mgmt  - OOB/ambulating, IS    DVT ppx: Lovenox,  SCDs  Dispo: Continue inpatient care at this time      Stephen Pennington MD  General Surgery  [facility]

## 2020-10-17 NOTE — NURSING
Patient HR noted to be in 120's-130's sustained. Took another oral temp and was 103. Tylenol admin. RR nurse made aware.

## 2020-10-18 LAB
ALBUMIN SERPL BCP-MCNC: 1.5 G/DL (ref 3.5–5.2)
ALP SERPL-CCNC: 133 U/L (ref 55–135)
ALT SERPL W/O P-5'-P-CCNC: 11 U/L (ref 10–44)
ANION GAP SERPL CALC-SCNC: 7 MMOL/L (ref 8–16)
AST SERPL-CCNC: 16 U/L (ref 10–40)
BASOPHILS # BLD AUTO: 0.01 K/UL (ref 0–0.2)
BASOPHILS NFR BLD: 0.1 % (ref 0–1.9)
BILIRUB SERPL-MCNC: 0.2 MG/DL (ref 0.1–1)
BUN SERPL-MCNC: 10 MG/DL (ref 6–20)
CALCIUM SERPL-MCNC: 7.8 MG/DL (ref 8.7–10.5)
CHLORIDE SERPL-SCNC: 103 MMOL/L (ref 95–110)
CO2 SERPL-SCNC: 30 MMOL/L (ref 23–29)
CREAT SERPL-MCNC: 1 MG/DL (ref 0.5–1.4)
DIFFERENTIAL METHOD: ABNORMAL
EOSINOPHIL # BLD AUTO: 0.2 K/UL (ref 0–0.5)
EOSINOPHIL NFR BLD: 1.6 % (ref 0–8)
ERYTHROCYTE [DISTWIDTH] IN BLOOD BY AUTOMATED COUNT: 15.5 % (ref 11.5–14.5)
EST. GFR  (AFRICAN AMERICAN): >60 ML/MIN/1.73 M^2
EST. GFR  (NON AFRICAN AMERICAN): >60 ML/MIN/1.73 M^2
GLUCOSE SERPL-MCNC: 92 MG/DL (ref 70–110)
HCT VFR BLD AUTO: 23.5 % (ref 37–48.5)
HGB BLD-MCNC: 7.3 G/DL (ref 12–16)
IMM GRANULOCYTES # BLD AUTO: 0.07 K/UL (ref 0–0.04)
IMM GRANULOCYTES NFR BLD AUTO: 0.8 % (ref 0–0.5)
LYMPHOCYTES # BLD AUTO: 1.3 K/UL (ref 1–4.8)
LYMPHOCYTES NFR BLD: 13.7 % (ref 18–48)
MCH RBC QN AUTO: 27.2 PG (ref 27–31)
MCHC RBC AUTO-ENTMCNC: 31.1 G/DL (ref 32–36)
MCV RBC AUTO: 88 FL (ref 82–98)
MONOCYTES # BLD AUTO: 0.4 K/UL (ref 0.3–1)
MONOCYTES NFR BLD: 4.7 % (ref 4–15)
NEUTROPHILS # BLD AUTO: 7.4 K/UL (ref 1.8–7.7)
NEUTROPHILS NFR BLD: 79.1 % (ref 38–73)
NRBC BLD-RTO: 0 /100 WBC
PLATELET # BLD AUTO: 333 K/UL (ref 150–350)
PMV BLD AUTO: 9.1 FL (ref 9.2–12.9)
POCT GLUCOSE: 103 MG/DL (ref 70–110)
POCT GLUCOSE: 122 MG/DL (ref 70–110)
POCT GLUCOSE: 95 MG/DL (ref 70–110)
POTASSIUM SERPL-SCNC: 3.1 MMOL/L (ref 3.5–5.1)
PROT SERPL-MCNC: 5.6 G/DL (ref 6–8.4)
RBC # BLD AUTO: 2.68 M/UL (ref 4–5.4)
SODIUM SERPL-SCNC: 140 MMOL/L (ref 136–145)
WBC # BLD AUTO: 9.3 K/UL (ref 3.9–12.7)

## 2020-10-18 PROCEDURE — B4185 PARENTERAL SOL 10 GM LIPIDS: HCPCS | Performed by: STUDENT IN AN ORGANIZED HEALTH CARE EDUCATION/TRAINING PROGRAM

## 2020-10-18 PROCEDURE — 21400001 HC TELEMETRY ROOM

## 2020-10-18 PROCEDURE — 80053 COMPREHEN METABOLIC PANEL: CPT

## 2020-10-18 PROCEDURE — A4216 STERILE WATER/SALINE, 10 ML: HCPCS | Performed by: STUDENT IN AN ORGANIZED HEALTH CARE EDUCATION/TRAINING PROGRAM

## 2020-10-18 PROCEDURE — 63600175 PHARM REV CODE 636 W HCPCS: Performed by: STUDENT IN AN ORGANIZED HEALTH CARE EDUCATION/TRAINING PROGRAM

## 2020-10-18 PROCEDURE — 25000003 PHARM REV CODE 250: Performed by: STUDENT IN AN ORGANIZED HEALTH CARE EDUCATION/TRAINING PROGRAM

## 2020-10-18 PROCEDURE — 85025 COMPLETE CBC W/AUTO DIFF WBC: CPT

## 2020-10-18 PROCEDURE — 63600175 PHARM REV CODE 636 W HCPCS: Performed by: EMERGENCY MEDICINE

## 2020-10-18 PROCEDURE — A4217 STERILE WATER/SALINE, 500 ML: HCPCS | Performed by: STUDENT IN AN ORGANIZED HEALTH CARE EDUCATION/TRAINING PROGRAM

## 2020-10-18 PROCEDURE — 25000003 PHARM REV CODE 250: Performed by: SURGERY

## 2020-10-18 PROCEDURE — C9113 INJ PANTOPRAZOLE SODIUM, VIA: HCPCS | Performed by: STUDENT IN AN ORGANIZED HEALTH CARE EDUCATION/TRAINING PROGRAM

## 2020-10-18 PROCEDURE — 25000003 PHARM REV CODE 250: Performed by: EMERGENCY MEDICINE

## 2020-10-18 RX ADMIN — SUCRALFATE 1 G: 1 SUSPENSION ORAL at 05:10

## 2020-10-18 RX ADMIN — SOYBEAN OIL 250 ML: 20 INJECTION, SOLUTION INTRAVENOUS at 10:10

## 2020-10-18 RX ADMIN — PANTOPRAZOLE SODIUM 40 MG: 40 INJECTION, POWDER, FOR SOLUTION INTRAVENOUS at 09:10

## 2020-10-18 RX ADMIN — Medication 10 ML: at 01:10

## 2020-10-18 RX ADMIN — PIPERACILLIN SODIUM AND TAZOBACTAM SODIUM 4.5 G: 4; .5 INJECTION, POWDER, LYOPHILIZED, FOR SOLUTION INTRAVENOUS at 03:10

## 2020-10-18 RX ADMIN — OXYCODONE 5 MG: 5 TABLET ORAL at 03:10

## 2020-10-18 RX ADMIN — PIPERACILLIN SODIUM AND TAZOBACTAM SODIUM 4.5 G: 4; .5 INJECTION, POWDER, LYOPHILIZED, FOR SOLUTION INTRAVENOUS at 12:10

## 2020-10-18 RX ADMIN — SUCRALFATE 1 G: 1 SUSPENSION ORAL at 06:10

## 2020-10-18 RX ADMIN — PANTOPRAZOLE SODIUM 40 MG: 40 INJECTION, POWDER, FOR SOLUTION INTRAVENOUS at 08:10

## 2020-10-18 RX ADMIN — Medication 10 ML: at 05:10

## 2020-10-18 RX ADMIN — PIPERACILLIN SODIUM AND TAZOBACTAM SODIUM 4.5 G: 4; .5 INJECTION, POWDER, LYOPHILIZED, FOR SOLUTION INTRAVENOUS at 09:10

## 2020-10-18 RX ADMIN — SUCRALFATE 1 G: 1 SUSPENSION ORAL at 12:10

## 2020-10-18 RX ADMIN — MAGNESIUM SULFATE HEPTAHYDRATE: 500 INJECTION, SOLUTION INTRAMUSCULAR; INTRAVENOUS at 10:10

## 2020-10-18 RX ADMIN — Medication 10 ML: at 12:10

## 2020-10-18 RX ADMIN — OXYCODONE AND ACETAMINOPHEN 1 TABLET: 7.5; 325 TABLET ORAL at 03:10

## 2020-10-18 RX ADMIN — MUPIROCIN: 20 OINTMENT TOPICAL at 09:10

## 2020-10-18 RX ADMIN — OXYCODONE AND ACETAMINOPHEN 1 TABLET: 7.5; 325 TABLET ORAL at 08:10

## 2020-10-18 RX ADMIN — ENOXAPARIN SODIUM 40 MG: 40 INJECTION SUBCUTANEOUS at 06:10

## 2020-10-18 RX ADMIN — Medication 10 ML: at 06:10

## 2020-10-18 RX ADMIN — OXYCODONE 5 MG: 5 TABLET ORAL at 09:10

## 2020-10-18 NOTE — PLAN OF CARE
10/18/20 0826   Discharge Reassessment   Assessment Type Discharge Planning Reassessment   Do you have any problems affording any of your prescribed medications? No   Discharge Plan A Home   Discharge Plan B Home   DME Needed Upon Discharge  none   Patient choice form signed by patient/caregiver N/A   Anticipated Discharge Disposition Home   Can the patient/caregiver answer the patient profile reliably? Yes, cognitively intact   How does the patient rate their overall health at the present time? Fair   Describe the patient's ability to walk at the present time. No restrictions   How often would a person be available to care for the patient? Whenever needed   Number of comorbid conditions (as recorded on the chart) One   During the past month, has the patient often been bothered by feeling down, depressed or hopeless? Yes   During the past month, has the patient often been bothered by little interest or pleasure in doing things? Yes   Post-Acute Status   Post-Acute Authorization Other   Other Status No Post-Acute Service Needs   Discharge Delays None known at this time

## 2020-10-18 NOTE — PROGRESS NOTES
[facility]  General Surgery  Progress Note    Subjective:     Interval History:   NAEON. No recurrent fevers/chills. Reports minimal abdominal pain, mostly at drain site, reports L flank/chest pain is improving. Denies nausea, vomiting, (+) flatus/diarrhea without hematochezia or melena.    Post-Op Info:  Procedure(s) (LRB):  EGD (ESOPHAGOGASTRODUODENOSCOPY) (N/A)   5 Days Post-Op      Medications:  Continuous Infusions:   TPN ADULT CENTRAL LINE CUSTOM 65 mL/hr at 10/17/20 2157     Scheduled Meds:   enoxaparin  40 mg Subcutaneous Q24H    fat emulsion  250 mL Intravenous Daily    lidocaine (PF) 10 mg/ml (1%)  1 mL Other Once    mupirocin   Nasal BID    pantoprazole  40 mg Intravenous BID    piperacillin-tazobactam (ZOSYN) IVPB  4.5 g Intravenous Q8H    sodium chloride 0.9%  10 mL Intravenous Q6H    sucralfate  1 g Oral Q6H     PRN Meds:acetaminophen, bisacodyL, lidocaine HCL 10 mg/ml (1%), morphine, ondansetron, oxyCODONE, oxyCODONE-acetaminophen, sodium chloride 0.9%, sodium chloride 0.9%, Flushing PICC Protocol **AND** sodium chloride 0.9% **AND** sodium chloride 0.9%     Objective:     Vital Signs (Most Recent):  Temp: 98.8 °F (37.1 °C) (10/18/20 0558)  Pulse: 86 (10/18/20 0558)  Resp: 18 (10/18/20 0558)  BP: (!) 158/84 (10/18/20 0558)  SpO2: 98 % (10/18/20 0558) Vital Signs (24h Range):  Temp:  [97.3 °F (36.3 °C)-99.7 °F (37.6 °C)] 98.8 °F (37.1 °C)  Pulse:  [] 86  Resp:  [16-18] 18  SpO2:  [98 %-99 %] 98 %  BP: (140-158)/() 158/84       Intake/Output Summary (Last 24 hours) at 10/18/2020 0817  Last data filed at 10/18/2020 0600  Gross per 24 hour   Intake 240 ml   Output 1100 ml   Net -860 ml       Physical Exam  Constitutional:       Appearance: Normal appearance.   Eyes:      General: No scleral icterus.     Extraocular Movements: Extraocular movements intact.   Neck:      Musculoskeletal: Normal range of motion and neck supple.   Cardiovascular:      Rate and Rhythm:  Normal rate and regular rhythm.   Pulmonary:      Effort: Pulmonary effort is normal.   Abdominal:      Comments: Accordion drain in place, 40 ml ascitic output. Abdomen soft, non-distended, NTTP.    Skin:     General: Skin is warm and dry.   Neurological:      Mental Status: She is alert.         Significant Labs:  CBC:   Recent Labs   Lab 10/18/20  0553   WBC 9.30   RBC 2.68*   HGB 7.3*   HCT 23.5*      MCV 88   MCH 27.2   MCHC 31.1*     CMP:   Recent Labs   Lab 10/18/20  0552   GLU 92   CALCIUM 7.8*   ALBUMIN 1.5*   PROT 5.6*      K 3.1*   CO2 30*      BUN 10   CREATININE 1.0   ALKPHOS 133   ALT 11   AST 16   BILITOT 0.2       Significant Diagnostics:  None    Assessment/Plan:     Active Diagnoses:    Diagnosis Date Noted POA    Sepsis [A41.9] 10/08/2020 Yes      Problems Resolved During this Admission:     Puja Quigley is a 41 y/o F w/ PMH of perforated gastric ulcer s/p ex lap and guerrero patch repair 9/24/20 presents with large abscess in the gastrohepatic space. Now s/p IR drain placement 10/9. Upper GI study 10/12 w/ evidence of leak. GI performed EGD w/ over sewing of the ulcer 10/13 at [facility]. PICC line placed and TPN initiated 10/14. Underwent upper GI study 10/14 w/ continued evidence of leak. CT scan 10/15 w/ no new fluid collections and drain in proper location. S/p L thoracentesis with IR 10/16 600 ml fluid drained. AVSS, fever/tachycardia resolved, tolerating CLD, UA negative, CXR negative for infiltrate, blood cx pending, wbc normal.    - If recurrent fever will plan for stat CT A/P with IV contrast. If none, will discuss possible repeat CT A/P tomorrow to evaluate fluid collection  - Continue Zosyn, diflucan   -Continue CLD, continue TPN. Nutrition consulted, appreciate assistance/rec's. Increase K in TPN today to 100 mEq given persistent hypokalemia  - Continue MM pain regimen PRN, Tylenol for fevers  - Continue accordion drain, daily drain care/mgmt  -  OOB/ambulating, IS     DVT ppx: Lovenox, OBIs  Dispo: Continue inpatient care at this time      Stephen Pennington MD  General Surgery  [facility]

## 2020-10-18 NOTE — PLAN OF CARE
Problem: Fall Injury Risk  Goal: Absence of Fall and Fall-Related Injury  Outcome: Ongoing, Progressing  Intervention: Identify and Manage Contributors to Fall Injury Risk  Flowsheets (Taken 10/18/2020 0433)  Self-Care Promotion:   independence encouraged   BADL personal objects within reach   BADL personal routines maintained  Medication Review/Management:   medications reviewed   high risk medications identified  Intervention: Promote Injury-Free Environment  Flowsheets (Taken 10/18/2020 0433)  Safety Promotion/Fall Prevention:   commode/urinal/bedpan at bedside   nonskid shoes/socks when out of bed   Fall Risk reviewed with patient/family   side rails raised x 2  Environmental Safety Modification:   assistive device/personal items within reach   clutter free environment maintained   lighting adjusted

## 2020-10-19 LAB
ALBUMIN SERPL BCP-MCNC: 1.6 G/DL (ref 3.5–5.2)
ALP SERPL-CCNC: 151 U/L (ref 55–135)
ALT SERPL W/O P-5'-P-CCNC: 32 U/L (ref 10–44)
ANION GAP SERPL CALC-SCNC: 8 MMOL/L (ref 8–16)
AST SERPL-CCNC: 52 U/L (ref 10–40)
BASOPHILS # BLD AUTO: 0.02 K/UL (ref 0–0.2)
BASOPHILS NFR BLD: 0.3 % (ref 0–1.9)
BILIRUB SERPL-MCNC: 0.2 MG/DL (ref 0.1–1)
BUN SERPL-MCNC: 9 MG/DL (ref 6–20)
CALCIUM SERPL-MCNC: 8 MG/DL (ref 8.7–10.5)
CHLORIDE SERPL-SCNC: 104 MMOL/L (ref 95–110)
CO2 SERPL-SCNC: 29 MMOL/L (ref 23–29)
CREAT SERPL-MCNC: 0.9 MG/DL (ref 0.5–1.4)
DIFFERENTIAL METHOD: ABNORMAL
EOSINOPHIL # BLD AUTO: 0.1 K/UL (ref 0–0.5)
EOSINOPHIL NFR BLD: 2.1 % (ref 0–8)
ERYTHROCYTE [DISTWIDTH] IN BLOOD BY AUTOMATED COUNT: 15.6 % (ref 11.5–14.5)
EST. GFR  (AFRICAN AMERICAN): >60 ML/MIN/1.73 M^2
EST. GFR  (NON AFRICAN AMERICAN): >60 ML/MIN/1.73 M^2
GLUCOSE SERPL-MCNC: 87 MG/DL (ref 70–110)
HCT VFR BLD AUTO: 23.9 % (ref 37–48.5)
HGB BLD-MCNC: 7.4 G/DL (ref 12–16)
IMM GRANULOCYTES # BLD AUTO: 0.06 K/UL (ref 0–0.04)
IMM GRANULOCYTES NFR BLD AUTO: 0.9 % (ref 0–0.5)
LYMPHOCYTES # BLD AUTO: 1.5 K/UL (ref 1–4.8)
LYMPHOCYTES NFR BLD: 22.6 % (ref 18–48)
MCH RBC QN AUTO: 27.2 PG (ref 27–31)
MCHC RBC AUTO-ENTMCNC: 31 G/DL (ref 32–36)
MCV RBC AUTO: 88 FL (ref 82–98)
MONOCYTES # BLD AUTO: 0.5 K/UL (ref 0.3–1)
MONOCYTES NFR BLD: 7.8 % (ref 4–15)
NEUTROPHILS # BLD AUTO: 4.5 K/UL (ref 1.8–7.7)
NEUTROPHILS NFR BLD: 66.3 % (ref 38–73)
NRBC BLD-RTO: 0 /100 WBC
PLATELET # BLD AUTO: 353 K/UL (ref 150–350)
PMV BLD AUTO: 9.2 FL (ref 9.2–12.9)
POCT GLUCOSE: 100 MG/DL (ref 70–110)
POCT GLUCOSE: 82 MG/DL (ref 70–110)
POCT GLUCOSE: 89 MG/DL (ref 70–110)
POCT GLUCOSE: 94 MG/DL (ref 70–110)
POTASSIUM SERPL-SCNC: 3.4 MMOL/L (ref 3.5–5.1)
PREALB SERPL-MCNC: 17 MG/DL (ref 20–43)
PROT SERPL-MCNC: 5.7 G/DL (ref 6–8.4)
RBC # BLD AUTO: 2.72 M/UL (ref 4–5.4)
SODIUM SERPL-SCNC: 141 MMOL/L (ref 136–145)
WBC # BLD AUTO: 6.82 K/UL (ref 3.9–12.7)

## 2020-10-19 PROCEDURE — 25000003 PHARM REV CODE 250: Performed by: STUDENT IN AN ORGANIZED HEALTH CARE EDUCATION/TRAINING PROGRAM

## 2020-10-19 PROCEDURE — 63600175 PHARM REV CODE 636 W HCPCS: Performed by: SURGERY

## 2020-10-19 PROCEDURE — 84134 ASSAY OF PREALBUMIN: CPT

## 2020-10-19 PROCEDURE — 85025 COMPLETE CBC W/AUTO DIFF WBC: CPT

## 2020-10-19 PROCEDURE — A4216 STERILE WATER/SALINE, 10 ML: HCPCS | Performed by: STUDENT IN AN ORGANIZED HEALTH CARE EDUCATION/TRAINING PROGRAM

## 2020-10-19 PROCEDURE — 21400001 HC TELEMETRY ROOM

## 2020-10-19 PROCEDURE — 94761 N-INVAS EAR/PLS OXIMETRY MLT: CPT

## 2020-10-19 PROCEDURE — 80053 COMPREHEN METABOLIC PANEL: CPT

## 2020-10-19 PROCEDURE — C9113 INJ PANTOPRAZOLE SODIUM, VIA: HCPCS | Performed by: STUDENT IN AN ORGANIZED HEALTH CARE EDUCATION/TRAINING PROGRAM

## 2020-10-19 PROCEDURE — 25000003 PHARM REV CODE 250: Performed by: EMERGENCY MEDICINE

## 2020-10-19 PROCEDURE — A4217 STERILE WATER/SALINE, 500 ML: HCPCS | Performed by: STUDENT IN AN ORGANIZED HEALTH CARE EDUCATION/TRAINING PROGRAM

## 2020-10-19 PROCEDURE — 25000003 PHARM REV CODE 250: Performed by: SURGERY

## 2020-10-19 PROCEDURE — 63600175 PHARM REV CODE 636 W HCPCS: Performed by: STUDENT IN AN ORGANIZED HEALTH CARE EDUCATION/TRAINING PROGRAM

## 2020-10-19 PROCEDURE — 25500020 PHARM REV CODE 255: Performed by: SURGERY

## 2020-10-19 PROCEDURE — 63600175 PHARM REV CODE 636 W HCPCS: Performed by: EMERGENCY MEDICINE

## 2020-10-19 PROCEDURE — B4185 PARENTERAL SOL 10 GM LIPIDS: HCPCS | Performed by: SURGERY

## 2020-10-19 RX ADMIN — SUCRALFATE 1 G: 1 SUSPENSION ORAL at 05:10

## 2020-10-19 RX ADMIN — I.V. FAT EMULSION 250 ML: 20 EMULSION INTRAVENOUS at 11:10

## 2020-10-19 RX ADMIN — PIPERACILLIN SODIUM AND TAZOBACTAM SODIUM 4.5 G: 4; .5 INJECTION, POWDER, LYOPHILIZED, FOR SOLUTION INTRAVENOUS at 12:10

## 2020-10-19 RX ADMIN — ENOXAPARIN SODIUM 40 MG: 40 INJECTION SUBCUTANEOUS at 05:10

## 2020-10-19 RX ADMIN — SUCRALFATE 1 G: 1 SUSPENSION ORAL at 11:10

## 2020-10-19 RX ADMIN — MAGNESIUM SULFATE HEPTAHYDRATE: 500 INJECTION, SOLUTION INTRAMUSCULAR; INTRAVENOUS at 11:10

## 2020-10-19 RX ADMIN — OXYCODONE AND ACETAMINOPHEN 1 TABLET: 7.5; 325 TABLET ORAL at 11:10

## 2020-10-19 RX ADMIN — DIATRIZOATE MEGLUMINE AND DIATRIZOATE SODIUM 75 ML: 660; 100 LIQUID ORAL; RECTAL at 10:10

## 2020-10-19 RX ADMIN — SUCRALFATE 1 G: 1 SUSPENSION ORAL at 12:10

## 2020-10-19 RX ADMIN — PANTOPRAZOLE SODIUM 40 MG: 40 INJECTION, POWDER, FOR SOLUTION INTRAVENOUS at 09:10

## 2020-10-19 RX ADMIN — Medication 10 ML: at 12:10

## 2020-10-19 RX ADMIN — Medication 10 ML: at 05:10

## 2020-10-19 RX ADMIN — MORPHINE SULFATE 2 MG: 4 INJECTION INTRAVENOUS at 05:10

## 2020-10-19 RX ADMIN — OXYCODONE AND ACETAMINOPHEN 1 TABLET: 7.5; 325 TABLET ORAL at 09:10

## 2020-10-19 RX ADMIN — SUCRALFATE 1 G: 1 SUSPENSION ORAL at 01:10

## 2020-10-19 RX ADMIN — PANTOPRAZOLE SODIUM 40 MG: 40 INJECTION, POWDER, FOR SOLUTION INTRAVENOUS at 11:10

## 2020-10-19 RX ADMIN — PIPERACILLIN SODIUM AND TAZOBACTAM SODIUM 4.5 G: 4; .5 INJECTION, POWDER, LYOPHILIZED, FOR SOLUTION INTRAVENOUS at 09:10

## 2020-10-19 RX ADMIN — OXYCODONE AND ACETAMINOPHEN 1 TABLET: 7.5; 325 TABLET ORAL at 05:10

## 2020-10-19 RX ADMIN — MORPHINE SULFATE 2 MG: 4 INJECTION INTRAVENOUS at 12:10

## 2020-10-19 NOTE — PROGRESS NOTES
[facility]  General Surgery  Progress Note    Subjective:     Interval History:   NAEON. AF, VSS.   Abdominal pain stable. Reports some diarrhea overnight.   Tolerated CLD. Notes decreased drain output.       Post-Op Info:  Procedure(s) (LRB):  EGD (ESOPHAGOGASTRODUODENOSCOPY) (N/A)   6 Days Post-Op      Medications:  Continuous Infusions:   TPN ADULT CENTRAL LINE CUSTOM 65 mL/hr at 10/18/20 2242     Scheduled Meds:   enoxaparin  40 mg Subcutaneous Q24H    fat emulsion  250 mL Intravenous Daily    lidocaine (PF) 10 mg/ml (1%)  1 mL Other Once    pantoprazole  40 mg Intravenous BID    piperacillin-tazobactam (ZOSYN) IVPB  4.5 g Intravenous Q8H    sodium chloride 0.9%  10 mL Intravenous Q6H    sucralfate  1 g Oral Q6H     PRN Meds:acetaminophen, bisacodyL, lidocaine HCL 10 mg/ml (1%), morphine, ondansetron, oxyCODONE, oxyCODONE-acetaminophen, sodium chloride 0.9%, sodium chloride 0.9%, Flushing PICC Protocol **AND** sodium chloride 0.9% **AND** sodium chloride 0.9%     Objective:     Vital Signs (Most Recent):  Temp: 98.6 °F (37 °C) (10/19/20 0533)  Pulse: 92 (10/19/20 0533)  Resp: 15 (10/19/20 0533)  BP: (!) 160/93 (10/19/20 0533)  SpO2: 98 % (10/19/20 0533) Vital Signs (24h Range):  Temp:  [98.1 °F (36.7 °C)-98.6 °F (37 °C)] 98.6 °F (37 °C)  Pulse:  [79-92] 92  Resp:  [15-20] 15  SpO2:  [94 %-100 %] 98 %  BP: (136-169)/(81-98) 160/93       Intake/Output Summary (Last 24 hours) at 10/19/2020 0724  Last data filed at 10/19/2020 0630  Gross per 24 hour   Intake 4130.33 ml   Output 1050 ml   Net 3080.33 ml       Physical Exam  Constitutional:       Appearance: Normal appearance.   Eyes:      General: No scleral icterus.     Extraocular Movements: Extraocular movements intact.   Neck:      Musculoskeletal: Normal range of motion and neck supple.   Cardiovascular:      Rate and Rhythm: Normal rate and regular rhythm.   Pulmonary:      Effort: Pulmonary effort is normal.   Abdominal:       Comments: Accordion drain in place, minimal ascitic output. Abdomen soft, non-distended, NTTP.    Skin:     General: Skin is warm and dry.   Neurological:      Mental Status: She is alert.         Significant Labs:  CBC:   Recent Labs   Lab 10/19/20  0422   WBC 6.82   RBC 2.72*   HGB 7.4*   HCT 23.9*   *   MCV 88   MCH 27.2   MCHC 31.0*     CMP:   Recent Labs   Lab 10/19/20  0422   GLU 87   CALCIUM 8.0*   ALBUMIN 1.6*   PROT 5.7*      K 3.4*   CO2 29      BUN 9   CREATININE 0.9   ALKPHOS 151*   ALT 32   AST 52*   BILITOT 0.2       Significant Diagnostics:  None    Assessment/Plan:     Active Diagnoses:    Diagnosis Date Noted POA    Sepsis [A41.9] 10/08/2020 Yes      Problems Resolved During this Admission:     Puja Quigley is a 41 y/o F w/ PMH of perforated gastric ulcer s/p ex lap and guerrero patch repair 9/24/20 presents with large abscess in the gastrohepatic space. Now s/p IR drain placement 10/9. Upper GI study 10/12 w/ evidence of leak. GI performed EGD w/ over sewing of the ulcer 10/13 at [facility]. PICC line placed and TPN initiated 10/14. Underwent upper GI study 10/14 w/ continued evidence of leak. CT scan 10/15 w/ no new fluid collections and drain in proper location. S/p L thoracentesis with IR 10/16 600 ml fluid drained. 10/17 patient had a fever/tachycardia- vitals normalized, UA negative, CXR negative for infiltrate, blood cx negative, wbc wnl.    - No recent fever, blood cultures (10/17) NGTD. If spikes another fever, will plan for repeat CT scan to evaluate fluid collection  - Will discuss w/ GI about possible repeat EGD w/ intervention this week   - Continue Zosyn, diflucan   - Continue CLD, continue TPN. Nutrition consulted, appreciate assistance/rec's. Continue to replete K in TPN given persistent hypokalemia. Prealbumin 17 today   - Continue MM pain regimen PRN, Tylenol for fevers  - Continue accordion drain, daily drain care/mgmt  - OOB/ambulating, IS     DVT ppx:  Lovenox, OBIs  Dispo: Continue inpatient mgmt       Puja Coyne MD  General Surgery  [facility]

## 2020-10-19 NOTE — NURSING
PER handoff received from ZACHARY Rock RN. Responds spontaneous and is AAO x4. Pt reports having pain to right upper abdomen at drain insertion site, however pt appears in no current distress at current time. TPN infusing to PICC to right upper arm. Assessment completed per Doc Flowsheets; reference if needed. Fall and safety precautions maintained. Bed locked in lowest position, with side rails up x2 and bedside commode at pt's bedside. Call bell and personal items within reach. Will continue to monitor pt for any changes.

## 2020-10-19 NOTE — PLAN OF CARE
Problem: Adult Inpatient Plan of Care  Goal: Plan of Care Review  Outcome: Ongoing, Progressing  Pt remained free of falls during current shift. Reported having pain to drain insertion site and received multiple prn pain medications. IV antibiotics were administered per MD order. Pt had continuous TPN infusing and continued to consume full liquids without any episodes of nausea. Plan of care and fall precautions reviewed with pt and verbalized understanding. Bed locked, lowered, SR up x2 and call light placed within reach.

## 2020-10-20 ENCOUNTER — PATIENT MESSAGE (OUTPATIENT)
Dept: GASTROENTEROLOGY | Facility: CLINIC | Age: 40
End: 2020-10-20

## 2020-10-20 LAB
ALBUMIN SERPL BCP-MCNC: 1.8 G/DL (ref 3.5–5.2)
ALP SERPL-CCNC: 153 U/L (ref 55–135)
ALT SERPL W/O P-5'-P-CCNC: 34 U/L (ref 10–44)
ANION GAP SERPL CALC-SCNC: 8 MMOL/L (ref 8–16)
AST SERPL-CCNC: 41 U/L (ref 10–40)
BASOPHILS # BLD AUTO: 0.02 K/UL (ref 0–0.2)
BASOPHILS NFR BLD: 0.3 % (ref 0–1.9)
BILIRUB SERPL-MCNC: 0.2 MG/DL (ref 0.1–1)
BUN SERPL-MCNC: 9 MG/DL (ref 6–20)
CALCIUM SERPL-MCNC: 8.2 MG/DL (ref 8.7–10.5)
CHLORIDE SERPL-SCNC: 105 MMOL/L (ref 95–110)
CO2 SERPL-SCNC: 28 MMOL/L (ref 23–29)
CREAT SERPL-MCNC: 0.9 MG/DL (ref 0.5–1.4)
DIFFERENTIAL METHOD: ABNORMAL
EOSINOPHIL # BLD AUTO: 0.1 K/UL (ref 0–0.5)
EOSINOPHIL NFR BLD: 2 % (ref 0–8)
ERYTHROCYTE [DISTWIDTH] IN BLOOD BY AUTOMATED COUNT: 15.7 % (ref 11.5–14.5)
EST. GFR  (AFRICAN AMERICAN): >60 ML/MIN/1.73 M^2
EST. GFR  (NON AFRICAN AMERICAN): >60 ML/MIN/1.73 M^2
GLUCOSE SERPL-MCNC: 82 MG/DL (ref 70–110)
HCT VFR BLD AUTO: 25.1 % (ref 37–48.5)
HGB BLD-MCNC: 7.8 G/DL (ref 12–16)
IMM GRANULOCYTES # BLD AUTO: 0.05 K/UL (ref 0–0.04)
IMM GRANULOCYTES NFR BLD AUTO: 0.8 % (ref 0–0.5)
LYMPHOCYTES # BLD AUTO: 1.6 K/UL (ref 1–4.8)
LYMPHOCYTES NFR BLD: 26 % (ref 18–48)
MAGNESIUM SERPL-MCNC: 1.8 MG/DL (ref 1.6–2.6)
MCH RBC QN AUTO: 27.4 PG (ref 27–31)
MCHC RBC AUTO-ENTMCNC: 31.1 G/DL (ref 32–36)
MCV RBC AUTO: 88 FL (ref 82–98)
MONOCYTES # BLD AUTO: 0.4 K/UL (ref 0.3–1)
MONOCYTES NFR BLD: 7.2 % (ref 4–15)
NEUTROPHILS # BLD AUTO: 3.8 K/UL (ref 1.8–7.7)
NEUTROPHILS NFR BLD: 63.7 % (ref 38–73)
NRBC BLD-RTO: 0 /100 WBC
PHOSPHATE SERPL-MCNC: 3.6 MG/DL (ref 2.7–4.5)
PLATELET # BLD AUTO: 351 K/UL (ref 150–350)
PMV BLD AUTO: 9.1 FL (ref 9.2–12.9)
POCT GLUCOSE: 100 MG/DL (ref 70–110)
POCT GLUCOSE: 109 MG/DL (ref 70–110)
POCT GLUCOSE: 88 MG/DL (ref 70–110)
POTASSIUM SERPL-SCNC: 4 MMOL/L (ref 3.5–5.1)
PROT SERPL-MCNC: 6.2 G/DL (ref 6–8.4)
RBC # BLD AUTO: 2.85 M/UL (ref 4–5.4)
SODIUM SERPL-SCNC: 141 MMOL/L (ref 136–145)
WBC # BLD AUTO: 6 K/UL (ref 3.9–12.7)

## 2020-10-20 PROCEDURE — 94761 N-INVAS EAR/PLS OXIMETRY MLT: CPT

## 2020-10-20 PROCEDURE — 63600175 PHARM REV CODE 636 W HCPCS: Performed by: STUDENT IN AN ORGANIZED HEALTH CARE EDUCATION/TRAINING PROGRAM

## 2020-10-20 PROCEDURE — 25000003 PHARM REV CODE 250: Performed by: STUDENT IN AN ORGANIZED HEALTH CARE EDUCATION/TRAINING PROGRAM

## 2020-10-20 PROCEDURE — 80053 COMPREHEN METABOLIC PANEL: CPT

## 2020-10-20 PROCEDURE — 85025 COMPLETE CBC W/AUTO DIFF WBC: CPT

## 2020-10-20 PROCEDURE — 97803 MED NUTRITION INDIV SUBSEQ: CPT

## 2020-10-20 PROCEDURE — 84100 ASSAY OF PHOSPHORUS: CPT

## 2020-10-20 PROCEDURE — 25000003 PHARM REV CODE 250: Performed by: SURGERY

## 2020-10-20 PROCEDURE — C9113 INJ PANTOPRAZOLE SODIUM, VIA: HCPCS | Performed by: STUDENT IN AN ORGANIZED HEALTH CARE EDUCATION/TRAINING PROGRAM

## 2020-10-20 PROCEDURE — 21400001 HC TELEMETRY ROOM

## 2020-10-20 PROCEDURE — 83735 ASSAY OF MAGNESIUM: CPT

## 2020-10-20 PROCEDURE — A4216 STERILE WATER/SALINE, 10 ML: HCPCS | Performed by: STUDENT IN AN ORGANIZED HEALTH CARE EDUCATION/TRAINING PROGRAM

## 2020-10-20 RX ADMIN — OXYCODONE AND ACETAMINOPHEN 1 TABLET: 7.5; 325 TABLET ORAL at 01:10

## 2020-10-20 RX ADMIN — PANTOPRAZOLE SODIUM 40 MG: 40 INJECTION, POWDER, FOR SOLUTION INTRAVENOUS at 09:10

## 2020-10-20 RX ADMIN — Medication 10 ML: at 05:10

## 2020-10-20 RX ADMIN — ENOXAPARIN SODIUM 40 MG: 40 INJECTION SUBCUTANEOUS at 05:10

## 2020-10-20 RX ADMIN — Medication 10 ML: at 12:10

## 2020-10-20 RX ADMIN — SUCRALFATE 1 G: 1 SUSPENSION ORAL at 01:10

## 2020-10-20 RX ADMIN — OXYCODONE AND ACETAMINOPHEN 1 TABLET: 7.5; 325 TABLET ORAL at 05:10

## 2020-10-20 RX ADMIN — SUCRALFATE 1 G: 1 SUSPENSION ORAL at 05:10

## 2020-10-20 RX ADMIN — OXYCODONE AND ACETAMINOPHEN 1 TABLET: 7.5; 325 TABLET ORAL at 09:10

## 2020-10-20 NOTE — PROGRESS NOTES
[facility]  General Surgery  Progress Note    Subjective:     Interval History:   NAEON. AF, VSS.   Upper GI yesterday with no evidence of leak. Drain removed. Still tolerating CLD. Denies any changes in abdominal pain. Reports a small amount of diarrhea overnight.       Post-Op Info:  Procedure(s) (LRB):  EGD (ESOPHAGOGASTRODUODENOSCOPY) (N/A)   7 Days Post-Op      Medications:  Continuous Infusions:   TPN ADULT CENTRAL LINE CUSTOM 65 mL/hr at 10/19/20 2300     Scheduled Meds:   enoxaparin  40 mg Subcutaneous Q24H    fat emulsion  250 mL Intravenous Daily    lidocaine (PF) 10 mg/ml (1%)  1 mL Other Once    pantoprazole  40 mg Intravenous BID    sodium chloride 0.9%  10 mL Intravenous Q6H    sucralfate  1 g Oral Q6H     PRN Meds:acetaminophen, bisacodyL, lidocaine HCL 10 mg/ml (1%), morphine, ondansetron, oxyCODONE, oxyCODONE-acetaminophen, sodium chloride 0.9%, sodium chloride 0.9%, Flushing PICC Protocol **AND** sodium chloride 0.9% **AND** sodium chloride 0.9%     Objective:     Vital Signs (Most Recent):  Temp: 99.1 °F (37.3 °C) (10/20/20 0510)  Pulse: 91 (10/20/20 0510)  Resp: 18 (10/20/20 0543)  BP: (!) 145/89 (10/20/20 0510)  SpO2: 98 % (10/20/20 0510) Vital Signs (24h Range):  Temp:  [97.6 °F (36.4 °C)-99.1 °F (37.3 °C)] 99.1 °F (37.3 °C)  Pulse:  [70-94] 91  Resp:  [17-20] 18  SpO2:  [94 %-100 %] 98 %  BP: (140-158)/() 145/89       Intake/Output Summary (Last 24 hours) at 10/20/2020 0707  Last data filed at 10/19/2020 1800  Gross per 24 hour   Intake 1160 ml   Output 725 ml   Net 435 ml       Physical Exam  Constitutional:       Appearance: Normal appearance.   Eyes:      General: No scleral icterus.     Extraocular Movements: Extraocular movements intact.   Neck:      Musculoskeletal: Normal range of motion and neck supple.   Cardiovascular:      Rate and Rhythm: Normal rate and regular rhythm.   Pulmonary:      Effort: Pulmonary effort is normal.   Abdominal:      Comments:  Abdomen soft, non-distended, NTTP. Incision and drain sites c/d/i   Skin:     General: Skin is warm and dry.   Neurological:      Mental Status: She is alert.         Significant Labs:  CBC:   Recent Labs   Lab 10/20/20  0406   WBC 6.00   RBC 2.85*   HGB 7.8*   HCT 25.1*   *   MCV 88   MCH 27.4   MCHC 31.1*     CMP:   Recent Labs   Lab 10/20/20  0406   GLU 82   CALCIUM 8.2*   ALBUMIN 1.8*   PROT 6.2      K 4.0   CO2 28      BUN 9   CREATININE 0.9   ALKPHOS 153*   ALT 34   AST 41*   BILITOT 0.2       Significant Diagnostics:  None    Assessment/Plan:     Active Diagnoses:    Diagnosis Date Noted POA    Sepsis [A41.9] 10/08/2020 Yes      Problems Resolved During this Admission:     Puja Quigley is a 41 y/o F w/ PMH of perforated gastric ulcer s/p ex lap and ugerrero patch repair 9/24/20 presents with large abscess in the gastrohepatic space. Now s/p IR drain placement 10/9. Upper GI study 10/12 w/ evidence of leak. GI performed EGD w/ over sewing of the ulcer 10/13 at [facility]. PICC line placed and TPN initiated 10/14. Underwent upper GI study 10/14 w/ continued evidence of leak. CT scan 10/15 w/ no new fluid collections and drain in proper location. S/p L thoracentesis with IR 10/16 600 ml fluid drained. 10/17 patient had a fever/tachycardia- vitals normalized, UA negative, CXR negative for infiltrate, blood cx negative, wbc wnl. 10/19- upper GI  with no evidence of leak and drain removed at bedside and antibiotics discontinued.    - Upper GI w/ no leak and now drain removed. No fevers or changes in abdominal pain overnight. Will advance diet today.   - No recent fever, blood cultures (10/17) NGTD. If spikes another fever, will plan for repeat CT scan to evaluate fluid collection  - All antibiotics discontinued 10/19   - Finish current bag of TPN. Nutrition consulted, appreciate assistance/rec's. Prealbumin 17 today   - Continue MM pain regimen PRN, Tylenol for fevers  - OOB/ambulating,  IS     DVT ppx: Lovenox, SCDs  Dispo: Continue inpatient mgmt, plan for likely discharge home this week       Puja Coyne MD  General Surgery  [facility]

## 2020-10-20 NOTE — PLAN OF CARE
10/20/20 0854   Discharge Reassessment   Assessment Type Discharge Planning Reassessment   Do you have any problems affording any of your prescribed medications? No   Discharge Plan A Home   Discharge Plan B Home   DME Needed Upon Discharge  none   Patient choice form signed by patient/caregiver N/A   Anticipated Discharge Disposition Home   Can the patient/caregiver answer the patient profile reliably? Yes, cognitively intact   How does the patient rate their overall health at the present time? Fair   Describe the patient's ability to walk at the present time. No restrictions   How often would a person be available to care for the patient? Whenever needed   Number of comorbid conditions (as recorded on the chart) One   During the past month, has the patient often been bothered by little interest or pleasure in doing things? Yes   Post-Acute Status   Post-Acute Authorization Other   Other Status No Post-Acute Service Needs   Discharge Delays None known at this time

## 2020-10-20 NOTE — PLAN OF CARE
Recommendations    1. Continue regular diet.   2. Discontinue TPN/lipids pt improving in meal intake.   3. RD to d/c supplements, pt not drinking them.   4. Weigh pt weekly.    Goals: 1. Consume >/=75% of meals by discharge  Nutrition Goal Status: progressing towards goal  Communication of RD Recs: (plan of care)

## 2020-10-20 NOTE — PLAN OF CARE
Problem: Adult Inpatient Plan of Care  Goal: Plan of Care Review  Outcome: Ongoing, Progressing  Goal: Patient-Specific Goal (Individualization)  Outcome: Ongoing, Progressing  Goal: Absence of Hospital-Acquired Illness or Injury  Outcome: Ongoing, Progressing  Goal: Optimal Comfort and Wellbeing  Outcome: Ongoing, Progressing  Goal: Readiness for Transition of Care  Outcome: Ongoing, Progressing  Goal: Rounds/Family Conference  Outcome: Ongoing, Progressing     Problem: Adjustment to Illness (Sepsis/Septic Shock)  Goal: Optimal Coping  Outcome: Ongoing, Progressing     Problem: Bleeding (Sepsis/Septic Shock)  Goal: Absence of Bleeding  Outcome: Ongoing, Progressing     Problem: Glycemic Control Impaired (Sepsis/Septic Shock)  Goal: Blood Glucose Level Within Desired Range  Outcome: Ongoing, Progressing     Problem: Hemodynamic Instability (Sepsis/Septic Shock)  Goal: Effective Tissue Perfusion  Outcome: Ongoing, Progressing     Problem: Infection (Sepsis/Septic Shock)  Goal: Absence of Infection Signs/Symptoms  Outcome: Ongoing, Progressing     Problem: Nutrition Impaired (Sepsis/Septic Shock)  Goal: Optimal Nutrition Intake  Outcome: Ongoing, Progressing     Problem: Respiratory Compromise (Sepsis/Septic Shock)  Goal: Effective Oxygenation and Ventilation  Outcome: Ongoing, Progressing     Problem: Fall Injury Risk  Goal: Absence of Fall and Fall-Related Injury  Outcome: Ongoing, Progressing     Problem: Infection  Goal: Infection Symptom Resolution  Outcome: Ongoing, Progressing     Problem: Pain Acute  Goal: Optimal Pain Control  Outcome: Ongoing, Progressing

## 2020-10-20 NOTE — PROGRESS NOTES
"[facility]  Adult Nutrition  Progress Note    SUMMARY       Recommendations    1. Continue regular diet.   2. Discontinue TPN/lipids pt improving in meal intake.   3. RD to d/c supplements, pt not drinking them.   4. Weigh pt weekly.    Goals: 1. Consume >/=75% of meals by discharge  Nutrition Goal Status: progressing towards goal  Communication of RD Recs: (plan of care)    Reason for Assessment    Reason For Assessment: RD follow-up  Diagnosis: surgery/postoperative complications(post op abcess)  Relevant Medical History: umbilical hernia, pregnancy (8/2020)  Interdisciplinary Rounds: did not attend    General Information Comments: Pt was walking around in room at visit. Pt reports doing well and having a good appetite. Pt now on solids as off this morning and pt states she ate about 65% of her meal and tolerated it well. Pt denies and N/V abdominal pain. Pt is not drinking any of the supplements; she states she doesn't like them. TPN/lipids running at 65 ml/hr. Discussed with plant based protein options and protein needs per pt request. Last BM 10/18. NFPE performed 10/15, pt no longer meets for PCM.    Nutrition Discharge Planning: Adequate intake on regular diet    Nutrition Risk Screen    Nutrition Risk Screen: no indicators present    Nutrition/Diet History    Patient Reported Diet/Restrictions/Preferences: general  Typical Food/Fluid Intake: was on clears a few weeks ago but has advance to solids and was tolerating well prior to admit  Spiritual, Cultural Beliefs, Rastafari Practices, Values that Affect Care: (Pt preferes to not eat milk products, beef or pork.)  Food Allergies: NKFA  Factors Affecting Nutritional Intake: None identified at this time    Anthropometrics    Temp: 98.6 °F (37 °C)  Height Method: Stated  Height: 5' 7" (170.2 cm)  Height (inches): 67 in  Weight Method: Bed Scale  Weight: 73.6 kg (162 lb 4.1 oz)  Weight (lb): 162.26 lb  Ideal Body Weight (IBW), Female: 135 lb  % " Ideal Body Weight, Female (lb): 125.93 %  BMI (Calculated): 25.4  BMI Grade: 25 - 29.9 - overweight  Weight Loss: unintentional  Usual Body Weight (UBW), k.8 kg(20 87.8 kg)  % Usual Body Weight: 89.25  % Weight Change From Usual Weight: -10.93 %    Lab/Procedures/Meds    Pertinent Labs Reviewed: reviewed  Pertinent Labs Comments: H/H 7.8/25.1, Ca 8.2, Alk phos 153, Alb 1.8, AST 41  Pertinent Medications Reviewed: reviewed  Pertinent Medications Comments: pantoprazole, 20% fat emulsion, Clinimix /    Estimated/Assessed Needs    Weight Used For Calorie Calculations: 73.6 kg (162 lb 4.1 oz)  Energy Calorie Requirements (kcal): 1840 kcal +340 for breastfeeding  Energy Need Method: Kcal/kg  Protein Requirements: 73g (1.0 g/kg)  Weight Used For Protein Calculations: 73.6 kg (162 lb 4.1 oz)  Fluid Requirements (mL): 1 ml/kcal or per MD  Estimated Fluid Requirement Method: RDA Method  RDA Method (mL): 1840  CHO Requirement: 270 g daily    Nutrition Prescription Ordered    Current Diet Order: Regular  Current Nutrition Support Formula Ordered: Clinimix E (+ 20% lipids)  Current Nutrition Support Rate Ordered: 65 (ml)  Oral Nutrition Supplement: Boost plus TID, Boost bereeze TID    Evaluation of Received Nutrient/Fluid Intake    Parenteral Calories (kcal): 1355  Parenteral Protein (gm): 78  Lipid Calories (kcals): 500 kcals  GIR (Glucose Infusion Rate) (mg/kg/min): 2.9 mg/kg/min  Total Calories (kcal): 1855  % Kcal Needs: 85  % Protein Needs: 93  I/O: 1160/725  Energy Calories Required: meeting needs  Protein Required: meeting needs  Fluid Required: meeting needs  Comments: Last BM 10/18  Tolerance: tolerating  % Intake of Estimated Energy Needs: 100 %  % Meal Intake: 50 - 75 %    Nutrition Risk    Level of Risk/Frequency of Follow-up: low(1x/week)     Assessment and Plan  Nutrition Problem  Severe malnutrition      Related to (etiology):   GI conditions      Signs and Symptoms (as evidenced by):   NPO day 5,  10% weight loss in 5 months     Interventions (treatment strategy):  General healthful diet  Collaboration with other providers     Nutrition Diagnosis Status:   Resolved- pt meeting energy needs    Monitor and Evaluation    Food and Nutrient Intake: energy intake, food and beverage intake, parenteral nutrition intake  Food and Nutrient Adminstration: diet order  Knowledge/Beliefs/Attitudes: food and nutrition knowledge/skill  Physical Activity and Function: breastfeeding  Anthropometric Measurements: weight, weight change  Biochemical Data, Medical Tests and Procedures: electrolyte and renal panel  Nutrition-Focused Physical Findings: overall appearance, skin     Malnutrition Assessment  Energy Intake: (10/8-10/13: NPO 10/14-10/20: Pt meeting needs)  Skin (Micronutrient): (miguel score-22, incision umbilical area, abdominal midline)  Nails (Micronutrient): none  Hair/Scalp (Micronutrient): none  Extraoral (Micronutrient): none  Gums (Micronutrient): none  Lips/Mucous Membranes (Micronutrient): none  Teeth (Micronutrient): none  Tongue (Micronutrient): none  Neck/Chest (Micronutrient): none  Musculoskeletal/Lower Extremities: none       Weight Loss (Malnutrition): greater than 10% in 6 months  5/25/20 87.8 kg   10/12/20 78.2 kg   10/15/20- 16% weight loss in about 5 months    Energy Intake (Malnutrition): less than or equal to 50% for greater than or equal to 5 days   Orbital Region (Subcutaneous Fat Loss): well nourished  Upper Arm Region (Subcutaneous Fat Loss): mild depletion  Thoracic and Lumbar Region: well nourished   [place] Region (Muscle Loss): mild depletion  Clavicle Bone Region (Muscle Loss): mild depletion  Clavicle and Acromion Bone Region (Muscle Loss): mild depletion  Scapular Bone Region (Muscle Loss): mild depletion  Dorsal Hand (Muscle Loss): well nourished  Patellar Region (Muscle Loss): well nourished  Anterior Thigh Region (Muscle Loss): well nourished  Posterior Calf Region (Muscle Loss): well  nourished   Edema (Fluid Accumulation): 0-->no edema present   Subcutaneous Fat Loss (Final Summary): mild protein-calorie malnutrition  Muscle Loss Evaluation (Final Summary): well nourished       Nutrition Follow-Up    RD Follow-up?: Yes

## 2020-10-21 VITALS
RESPIRATION RATE: 18 BRPM | HEART RATE: 93 BPM | DIASTOLIC BLOOD PRESSURE: 89 MMHG | HEIGHT: 67 IN | WEIGHT: 162.25 LBS | BODY MASS INDEX: 25.47 KG/M2 | OXYGEN SATURATION: 97 % | SYSTOLIC BLOOD PRESSURE: 132 MMHG | TEMPERATURE: 99 F

## 2020-10-21 PROBLEM — K91.89 GASTRIC LEAK: Status: ACTIVE | Noted: 2020-10-21

## 2020-10-21 LAB
ALBUMIN SERPL BCP-MCNC: 1.9 G/DL (ref 3.5–5.2)
ALP SERPL-CCNC: 160 U/L (ref 55–135)
ALT SERPL W/O P-5'-P-CCNC: 35 U/L (ref 10–44)
ANION GAP SERPL CALC-SCNC: 9 MMOL/L (ref 8–16)
AST SERPL-CCNC: 39 U/L (ref 10–40)
BASOPHILS # BLD AUTO: 0.03 K/UL (ref 0–0.2)
BASOPHILS NFR BLD: 0.5 % (ref 0–1.9)
BILIRUB SERPL-MCNC: 0.3 MG/DL (ref 0.1–1)
BUN SERPL-MCNC: 14 MG/DL (ref 6–20)
CALCIUM SERPL-MCNC: 8.4 MG/DL (ref 8.7–10.5)
CHLORIDE SERPL-SCNC: 104 MMOL/L (ref 95–110)
CO2 SERPL-SCNC: 25 MMOL/L (ref 23–29)
CREAT SERPL-MCNC: 0.9 MG/DL (ref 0.5–1.4)
DIFFERENTIAL METHOD: ABNORMAL
EOSINOPHIL # BLD AUTO: 0.1 K/UL (ref 0–0.5)
EOSINOPHIL NFR BLD: 2.1 % (ref 0–8)
ERYTHROCYTE [DISTWIDTH] IN BLOOD BY AUTOMATED COUNT: 15.7 % (ref 11.5–14.5)
EST. GFR  (AFRICAN AMERICAN): >60 ML/MIN/1.73 M^2
EST. GFR  (NON AFRICAN AMERICAN): >60 ML/MIN/1.73 M^2
GLUCOSE SERPL-MCNC: 70 MG/DL (ref 70–110)
HCT VFR BLD AUTO: 24.8 % (ref 37–48.5)
HGB BLD-MCNC: 7.8 G/DL (ref 12–16)
IMM GRANULOCYTES # BLD AUTO: 0.05 K/UL (ref 0–0.04)
IMM GRANULOCYTES NFR BLD AUTO: 0.8 % (ref 0–0.5)
LYMPHOCYTES # BLD AUTO: 1.7 K/UL (ref 1–4.8)
LYMPHOCYTES NFR BLD: 27 % (ref 18–48)
MCH RBC QN AUTO: 27 PG (ref 27–31)
MCHC RBC AUTO-ENTMCNC: 31.5 G/DL (ref 32–36)
MCV RBC AUTO: 86 FL (ref 82–98)
MONOCYTES # BLD AUTO: 0.5 K/UL (ref 0.3–1)
MONOCYTES NFR BLD: 7.9 % (ref 4–15)
NEUTROPHILS # BLD AUTO: 3.8 K/UL (ref 1.8–7.7)
NEUTROPHILS NFR BLD: 61.7 % (ref 38–73)
NRBC BLD-RTO: 0 /100 WBC
PHOSPHATE SERPL-MCNC: 3.7 MG/DL (ref 2.7–4.5)
PLATELET # BLD AUTO: 375 K/UL (ref 150–350)
PMV BLD AUTO: 9.7 FL (ref 9.2–12.9)
POCT GLUCOSE: 73 MG/DL (ref 70–110)
POTASSIUM SERPL-SCNC: 4.3 MMOL/L (ref 3.5–5.1)
PROT SERPL-MCNC: 6.5 G/DL (ref 6–8.4)
RBC # BLD AUTO: 2.89 M/UL (ref 4–5.4)
SODIUM SERPL-SCNC: 138 MMOL/L (ref 136–145)
WBC # BLD AUTO: 6.19 K/UL (ref 3.9–12.7)

## 2020-10-21 PROCEDURE — 84100 ASSAY OF PHOSPHORUS: CPT

## 2020-10-21 PROCEDURE — 80053 COMPREHEN METABOLIC PANEL: CPT

## 2020-10-21 PROCEDURE — C9113 INJ PANTOPRAZOLE SODIUM, VIA: HCPCS | Performed by: STUDENT IN AN ORGANIZED HEALTH CARE EDUCATION/TRAINING PROGRAM

## 2020-10-21 PROCEDURE — 93005 ELECTROCARDIOGRAM TRACING: CPT

## 2020-10-21 PROCEDURE — 63600175 PHARM REV CODE 636 W HCPCS: Performed by: STUDENT IN AN ORGANIZED HEALTH CARE EDUCATION/TRAINING PROGRAM

## 2020-10-21 PROCEDURE — 93010 EKG 12-LEAD: ICD-10-PCS | Mod: ,,, | Performed by: INTERNAL MEDICINE

## 2020-10-21 PROCEDURE — 36415 COLL VENOUS BLD VENIPUNCTURE: CPT

## 2020-10-21 PROCEDURE — 85025 COMPLETE CBC W/AUTO DIFF WBC: CPT

## 2020-10-21 PROCEDURE — 25000003 PHARM REV CODE 250: Performed by: SURGERY

## 2020-10-21 PROCEDURE — A4216 STERILE WATER/SALINE, 10 ML: HCPCS | Performed by: STUDENT IN AN ORGANIZED HEALTH CARE EDUCATION/TRAINING PROGRAM

## 2020-10-21 PROCEDURE — 25000003 PHARM REV CODE 250: Performed by: STUDENT IN AN ORGANIZED HEALTH CARE EDUCATION/TRAINING PROGRAM

## 2020-10-21 PROCEDURE — 93010 ELECTROCARDIOGRAM REPORT: CPT | Mod: ,,, | Performed by: INTERNAL MEDICINE

## 2020-10-21 RX ORDER — CYCLOBENZAPRINE HCL 5 MG
5 TABLET ORAL 3 TIMES DAILY PRN
Qty: 30 TABLET | Refills: 0 | Status: SHIPPED | OUTPATIENT
Start: 2020-10-21 | End: 2020-10-31

## 2020-10-21 RX ORDER — SUCRALFATE 1 G/10ML
1 SUSPENSION ORAL EVERY 6 HOURS
Qty: 560 ML | Refills: 0 | Status: SHIPPED | OUTPATIENT
Start: 2020-10-21 | End: 2020-11-04

## 2020-10-21 RX ORDER — POTASSIUM CHLORIDE 1.5 G/1.58G
40 POWDER, FOR SOLUTION ORAL DAILY
Qty: 14 PACKET | Refills: 1 | Status: SHIPPED | OUTPATIENT
Start: 2020-10-21 | End: 2021-01-27

## 2020-10-21 RX ORDER — OXYCODONE AND ACETAMINOPHEN 7.5; 325 MG/1; MG/1
1 TABLET ORAL EVERY 6 HOURS PRN
Qty: 28 TABLET | Refills: 0 | Status: SHIPPED | OUTPATIENT
Start: 2020-10-21 | End: 2021-01-27

## 2020-10-21 RX ADMIN — Medication 10 ML: at 06:10

## 2020-10-21 RX ADMIN — Medication 10 ML: at 12:10

## 2020-10-21 RX ADMIN — SUCRALFATE 1 G: 1 SUSPENSION ORAL at 05:10

## 2020-10-21 RX ADMIN — OXYCODONE AND ACETAMINOPHEN 1 TABLET: 7.5; 325 TABLET ORAL at 12:10

## 2020-10-21 RX ADMIN — PANTOPRAZOLE SODIUM 40 MG: 40 INJECTION, POWDER, FOR SOLUTION INTRAVENOUS at 10:10

## 2020-10-21 RX ADMIN — SUCRALFATE 1 G: 1 SUSPENSION ORAL at 01:10

## 2020-10-21 RX ADMIN — SUCRALFATE 1 G: 1 SUSPENSION ORAL at 12:10

## 2020-10-21 RX ADMIN — OXYCODONE AND ACETAMINOPHEN 1 TABLET: 7.5; 325 TABLET ORAL at 06:10

## 2020-10-21 NOTE — PLAN OF CARE
EDUCATION:  SW provided patient with educational information on Post Op.  SW discussed  signs and symptoms to look for and call the doctor if experiencing, and symptoms that may indicate a medical emergency: CALL 911.  All questions answered.  Teach back method used. Patient stated that he/she will remember the following signs and symptoms: constipation or bright red or dark black stools. SW gave patient follow up appointments for Gen. Surgeon and GI. SALENA spoke with nurse Amor and informed her that patient was ready for discharge from  standpoint.             10/21/20 1152   Final Note   Assessment Type Final Discharge Note   Anticipated Discharge Disposition Home   What phone number can be called within the next 1-3 days to see how you are doing after discharge? [phone]   Hospital Follow Up  Appt(s) scheduled? Yes   Discharge plans and expectations educations in teach back method with documentation complete? Yes   Right Care Referral Info   Post Acute Recommendation No Care   Post-Acute Status   Post-Acute Authorization Other   Other Status No Post-Acute Service Needs   Discharge Delays None known at this time

## 2020-10-21 NOTE — NURSING
Discharge instructions and scripts given to patient prior to discharge. Patient states understanding of information provided. PICC discontinued by DEANA Melgar. Tele removed. Tolerated well. All personal belongings with patent at the time of discharge. Walked with DEANA Melgar to personal vehicle.

## 2020-10-21 NOTE — NURSING
Pt went to bathroom to wash up and HR increased as high as 141. Went to check on pt and asked her is she was ok. Pt was ambulating in room retrieved a temp 98.7. MD notified regarding pt increased HR w/ activity and VS.

## 2020-10-21 NOTE — PLAN OF CARE
Problem: Adult Inpatient Plan of Care  Goal: Plan of Care Review  Outcome: Ongoing, Progressing  Goal: Patient-Specific Goal (Individualization)  Outcome: Ongoing, Progressing  Goal: Absence of Hospital-Acquired Illness or Injury  Outcome: Ongoing, Progressing  Goal: Optimal Comfort and Wellbeing  Outcome: Ongoing, Progressing  Goal: Readiness for Transition of Care  Outcome: Ongoing, Progressing  Goal: Rounds/Family Conference  Outcome: Ongoing, Progressing     Problem: Adjustment to Illness (Sepsis/Septic Shock)  Goal: Optimal Coping  Outcome: Ongoing, Progressing     Problem: Bleeding (Sepsis/Septic Shock)  Goal: Absence of Bleeding  Outcome: Ongoing, Progressing     Problem: Glycemic Control Impaired (Sepsis/Septic Shock)  Goal: Blood Glucose Level Within Desired Range  Outcome: Ongoing, Progressing     Problem: Hemodynamic Instability (Sepsis/Septic Shock)  Goal: Effective Tissue Perfusion  Outcome: Ongoing, Progressing     Problem: Infection (Sepsis/Septic Shock)  Goal: Absence of Infection Signs/Symptoms  Outcome: Ongoing, Progressing     Problem: Nutrition Impaired (Sepsis/Septic Shock)  Goal: Optimal Nutrition Intake  Outcome: Ongoing, Progressing     Problem: Respiratory Compromise (Sepsis/Septic Shock)  Goal: Effective Oxygenation and Ventilation  Outcome: Ongoing, Progressing     Problem: Infection  Goal: Infection Symptom Resolution  Outcome: Ongoing, Progressing     Problem: Fall Injury Risk  Goal: Absence of Fall and Fall-Related Injury  Outcome: Ongoing, Progressing     Problem: Pain Acute  Goal: Optimal Pain Control  Outcome: Ongoing, Progressing     Problem: Skin Injury Risk Increased  Goal: Skin Health and Integrity  Outcome: Ongoing, Progressing     Pt remained with continued complaints of pain to left ribs and abdomen. Pt midline abdominal incision intact with no drainage or dressing. Pt incision RLQ had minimal drainage. Pt TPN discontinued and PICC line dressing remains intact. Pt free of  falls this shift and rested comfortably at bedside. Pt was ST to NSR on monitor

## 2020-10-21 NOTE — DISCHARGE SUMMARY
[facility]  General Surgery  Discharge Summary      Patient Name: Puja Quigley  MRN: [id number]  Admission Date: 10/8/2020  Hospital Length of Stay: 13 days  Discharge Date and Time:  10/21/2020 11:14 AM  Attending Physician: Willie Magallon MD   Discharging Provider: Puja Coyne MD  Primary Care Provider: Primary Doctor No     HPI:   Patient is a 40 y.o. female w/ h/o perforated gastric ulcer s/p ex lap and guerrero patch repair 9/24/20. Patient did well post-op and on POD2 a swallow study was performed w/ no evidence of leak. Patient was seen in clinic a few days prior to admission w/ complaint of muscle spasms. She re-presented 10/8 w/ continued complaint of b/l upper quadrant abdominal pain and muscle spasms. Denies fever, nausea, emesis. Reports constipation, and SOB on exertion. She was afebrile with no leukocytosis. A CT scan was performed revealing a large abscess in the gastrohepatic space. She was admitted to the general surgery service for management.     Procedure(s) (LRB):  EGD (ESOPHAGOGASTRODUODENOSCOPY) (N/A)     Hospital Course:   Puja Quigley is a 39 y/o F w/ PMH of perforated gastric ulcer s/p ex lap and guerrero patch repair 9/24/20 presents with large abscess in the gastrohepatic space. Now s/p IR drain placement 10/9. Upper GI study 10/12 w/ evidence of leak. GI performed EGD w/ over sewing of the ulcer 10/13 at [facility]. PICC line placed and TPN initiated 10/14. Underwent upper GI study 10/14 w/ continued evidence of leak. CT scan 10/15 w/ no new fluid collections and drain in proper location. S/p L thoracentesis with IR 10/16 600 ml fluid drained. 10/17 patient had a fever/tachycardia- vitals normalized, UA negative, CXR negative for infiltrate, blood cx negative, wbc wnl. 10/19- upper GI  with no evidence of leak and drain removed at bedside and antibiotics discontinued. She continued to do well, was tolerating a regular diet, remained afebrile w/ no  leukocytosis. She was discharged home 10/21/20.       Consults:   Consults (From admission, onward)        Status Ordering Provider     Inpatient consult to Gastroenterology  Once     Provider:  Jaspal Padilla MD    Completed CRISTIANO MAGALLON     Inpatient consult to Interventional Radiology  Once     Provider:  Castro Bermudez MD    Completed PATRICIA COVINGTON     Inpatient consult to Interventional Radiology  Once     Provider:  (Not yet assigned)    Completed CRISTIANO MAGALLON     Inpatient consult to Lactation  Once     Provider:  (Not yet assigned)    Completed OTONIEL PABLO     Inpatient consult to PICC team (NIAS)  Once     Provider:  (Not yet assigned)    Completed LUKAS GROVES          Significant Diagnostic Studies: Labs: BMP: No results for input(s): GLU, NA, K, CL, CO2, BUN, CREATININE, CALCIUM, MG in the last 48 hours., CMP No results for input(s): NA, K, CL, CO2, GLU, BUN, CREATININE, CALCIUM, PROT, ALBUMIN, BILITOT, ALKPHOS, AST, ALT, ANIONGAP, ESTGFRAFRICA, EGFRNONAA in the last 48 hours. and CBC No results for input(s): WBC, HGB, HCT, PLT in the last 48 hours.    Pending Diagnostic Studies:     Procedure Component Value Units Date/Time    EKG 12-lead [[id number]]     Order Status: Sent Lab Status: No result     Freeze and Hold, [facility] [[id number]] Collected: 10/09/20 1011    Order Status: Sent Lab Status: No result     Specimen: Body Fluid from Drainage     IR Thoracentesis with Imaging [[id number]] Resulted: 10/16/20 1620    Order Status: Sent Lab Status: In process Updated: 10/16/20 1654        Final Active Diagnoses:    Diagnosis Date Noted POA    PRINCIPAL PROBLEM:  Gastric leak [K91.89] 10/21/2020 Unknown    Sepsis [A41.9] 10/08/2020 Yes      Problems Resolved During this Admission:      Discharged Condition: good    Disposition: Home or Self Care    Follow Up:  Follow-up Information     Cristiano Magallon MD. Go on 11/3/2020.    Specialties: General Surgery, Surgery  Why: Outpatient Services:  Hospita F/U with Surgeon at 10:00 am.   Contact information:  [address] [place] [id number] [phone]                 Patient Instructions:      Diet Adult Regular     Lifting restrictions   Order Comments: Do not lift > 10 lbs until seen in clinic     Notify your health care provider if you experience any of the following:  temperature >100.4     Notify your health care provider if you experience any of the following:  persistent nausea and vomiting or diarrhea     Notify your health care provider if you experience any of the following:  severe uncontrolled pain     Notify your health care provider if you experience any of the following:  redness, tenderness, or signs of infection (pain, swelling, redness, odor or green/yellow discharge around incision site)     Notify your health care provider if you experience any of the following:  difficulty breathing or increased cough     No dressing needed     Medications:  Reconciled Home Medications:      Medication List      START taking these medications    oxyCODONE-acetaminophen 7.5-325 mg per tablet  Commonly known as: PERCOCET  Take 1 tablet by mouth every 6 (six) hours as needed.  Replaces: oxyCODONE-acetaminophen 5-325 mg per tablet     potassium chloride 20 mEq Pack  Commonly known as: KLOR-CON  Take 40 mEq by mouth once daily.     sucralfate 100 mg/mL suspension  Commonly known as: CARAFATE  Take 10 mLs (1 g total) by mouth every 6 (six) hours. for 14 days        CONTINUE taking these medications    acetaminophen 500 MG tablet  Commonly known as: TYLENOL  Take 500 mg by mouth every 6 (six) hours as needed for Pain.     cyclobenzaprine 5 MG tablet  Commonly known as: FLEXERIL  Take 1 tablet (5 mg total) by mouth 3 (three) times daily as needed for Muscle spasms.     hydrocortisone 2.5 % rectal cream  Commonly known as: ANUSOL-HC  Place rectally 2 (two) times daily.     ondansetron 4 MG Tbdl  Commonly known as: ZOFRAN-ODT  Take 2 tablets (8 mg total) by  mouth every 6 (six) hours as needed.     pantoprazole 40 MG tablet  Commonly known as: PROTONIX  Take 1 tablet (40 mg total) by mouth 2 (two) times daily.     senna-docusate 8.6-50 mg 8.6-50 mg per tablet  Commonly known as: PERICOLACE  Take 1 tablet by mouth once daily.     simethicone (bulk) Liqd  40 mg by Misc.(Non-Drug; Combo Route) route 3 (three) times daily.        STOP taking these medications    amoxicillin 500 MG capsule  Commonly known as: AMOXIL     clarithromycin 500 MG tablet  Commonly known as: BIAXIN     oxyCODONE 5 MG immediate release tablet  Commonly known as: ROXICODONE     oxyCODONE-acetaminophen 5-325 mg per tablet  Commonly known as: PERCOCET  Replaced by: oxyCODONE-acetaminophen 7.5-325 mg per tablet            Puja Coyne MD  General Surgery  [facility]

## 2020-10-21 NOTE — NURSING
Received report handoff from FER Patton RN. Pt AAOx4, RR even and unlabored, PERRL with no c/o pain. Telemetry monitor in place. IV infusing and to clear. Pt informed of md orders, oriented to environment and safety maintained with bed low side rails up x2 with nurse call bell within reach

## 2020-10-21 NOTE — PROGRESS NOTES
[facility] CASE MANAGEMENT                  WRITTEN DISCHARGE INFORMATION      APPOINTMENTS AND RESOURCES TO HELP YOU MANAGE YOUR CARE AT HOME BASED ON YOUR PREFERENCES:  Follow-up Information     Willie Magallon MD. Go on 10/27/2020.    Specialties: General Surgery, Surgery  Why: Outpatient Services: Hospita F/U with Surgeon at 9:45 am.  Contact information:  [address] [place] [address]  [phone]             Socrates Terrazas MD. Go on 10/23/2020.    Specialty: Gastroenterology  Why: Outpatient Services: Hospital F/U with GI at 10:30 am.   Contact information:  [address]  [facility] [address]  [phone]                   Healthy Living Instructions to HELP MANAGE YOUR CARE AT HOME:  Things You are responsible for:  1.    Getting your prescriptions filled   2.    Taking your medications as directed, DO NOT MISS ANY DOSES!  3.    Following the diet and exercise recommended by your doctor  4.    Going to your follow-up doctor appointment. This is important because it allows the doctor to monitor your progress and determine if any changes need to made to your treatment plan.  5. If you have any questions about MANAGING YOUR CARE AT HOME Call the Nurse Care Line for 24/7 Assistance [phone]       Please answer any calls you may receive from [facility]. We want to continue to support you as you manage your healthcare needs. [facility] is happy to have the opportunity to serve you.      Thank you for choosing [facility] for your healthcare needs!  Your [facility] Case Management Team,    Anastacia Hutchinson   II  Care Management  [phone]

## 2020-10-22 ENCOUNTER — NURSE TRIAGE (OUTPATIENT)
Dept: ADMINISTRATIVE | Facility: CLINIC | Age: 40
End: 2020-10-22

## 2020-10-22 ENCOUNTER — PATIENT OUTREACH (OUTPATIENT)
Dept: ADMINISTRATIVE | Facility: CLINIC | Age: 40
End: 2020-10-22

## 2020-10-22 LAB — BACTERIA BLD CULT: NORMAL

## 2020-10-22 NOTE — TELEPHONE ENCOUNTER
Reason for Disposition   No answer.  First attempt to contact caller.  Follow-up call scheduled within 15 minutes.    Protocols used: NO CONTACT OR DUPLICATE CONTACT CALL-A-AH

## 2020-10-22 NOTE — TELEPHONE ENCOUNTER
C3 nurse attempted to contact patient. The following occurred:   C3 nurse attempted to contact Puja Quigley  for a TCC post hospital discharge follow up call. The patient is unable to conduct the call @ this time. The patient requested a callback.    The patient does not have a scheduled HOSFU appointment within 7-14 days post hospital discharge date 10/21/20.

## 2020-10-23 ENCOUNTER — OFFICE VISIT (OUTPATIENT)
Dept: GASTROENTEROLOGY | Facility: CLINIC | Age: 40
End: 2020-10-23
Payer: COMMERCIAL

## 2020-10-23 VITALS
DIASTOLIC BLOOD PRESSURE: 92 MMHG | SYSTOLIC BLOOD PRESSURE: 134 MMHG | BODY MASS INDEX: 23.11 KG/M2 | HEART RATE: 89 BPM | WEIGHT: 147.25 LBS | HEIGHT: 67 IN

## 2020-10-23 DIAGNOSIS — R10.13 EPIGASTRIC ABDOMINAL PAIN: Primary | ICD-10-CM

## 2020-10-23 PROCEDURE — 99214 PR OFFICE/OUTPT VISIT, EST, LEVL IV, 30-39 MIN: ICD-10-PCS | Mod: S$GLB,,, | Performed by: INTERNAL MEDICINE

## 2020-10-23 PROCEDURE — 3008F BODY MASS INDEX DOCD: CPT | Mod: CPTII,S$GLB,, | Performed by: INTERNAL MEDICINE

## 2020-10-23 PROCEDURE — 99999 PR PBB SHADOW E&M-EST. PATIENT-LVL III: CPT | Mod: PBBFAC,,, | Performed by: INTERNAL MEDICINE

## 2020-10-23 PROCEDURE — 99214 OFFICE O/P EST MOD 30 MIN: CPT | Mod: S$GLB,,, | Performed by: INTERNAL MEDICINE

## 2020-10-23 PROCEDURE — 99999 PR PBB SHADOW E&M-EST. PATIENT-LVL III: ICD-10-PCS | Mod: PBBFAC,,, | Performed by: INTERNAL MEDICINE

## 2020-10-23 PROCEDURE — 3008F PR BODY MASS INDEX (BMI) DOCUMENTED: ICD-10-PCS | Mod: CPTII,S$GLB,, | Performed by: INTERNAL MEDICINE

## 2020-10-23 RX ORDER — ONDANSETRON 4 MG/1
4 TABLET, FILM COATED ORAL EVERY 8 HOURS PRN
Qty: 30 TABLET | Refills: 1 | Status: SHIPPED | OUTPATIENT
Start: 2020-10-23 | End: 2021-01-06 | Stop reason: SDUPTHER

## 2020-10-23 NOTE — PROGRESS NOTES
[facility] Consultation Note    Reason for Consult:  The encounter diagnosis was Epigastric abdominal pain.    PCP:   Primary Doctor No       Referring MD:  No referring provider defined for this encounter.    Initial History of Present Illness (HPI):  This is a 40 y.o. female here for evaluation of     2018 - living in [place] and saw Gi there who did an EGD and found an ulcer caused by H pylori. Took medications. Followup EGD showed gastritis. Was treated with omeprazole. Got worse with ibuprofen. Currently 4 months pregnant.    Taking tums and prilosec    Abdominal pain - feels like a consistent burn on left side of abdomen  Reflux - no  Dysphagia - no   Bowel habits - normal  GI bleeding - none  NSAID usage - none    Interval History 10/23/2020:  Had perforated ulcer 6 weeks post partum  She was vomiting a lot during the pregnancy  She did have sludge in the gallbladder incidentally found    Her leak has cleared up now and she was on TPN temporarily  Tolerating solids with some nausea  Taking PPI bid, carafate, never really took bentyl. Taking zofran prn      ROS:  Answers for HPI/ROS submitted by the patient on 5/21/2020   fever: No  chills: No  weight loss: No  eye pain: No  eye redness: No  trouble swallowing: No  chest pain: No  shortness of breath: No  cough: No  rash: No  abdominal pain: No  nausea: Yes  vomiting: Yes  Feeling depressed?: No  nervous/ anxious: No  heartburn: No  Joint pain? : No  dysuria: No  hematuria: No  back pain: No      Medical History:  has a past medical history of Pregnancy (08/12/2020) and Umbilical hernia.    Surgical History:  has a past surgical history that includes Closure of perforated ulcer of duodenum using omental patch and Esophagogastroduodenoscopy (N/A, 10/13/2020).       Review of patient's allergies indicates:  No Known Allergies      Objective Findings:  Video visit      Labs:  Lab Results   Component Value Date    WBC 6.19 10/21/2020     HGB 7.8 (L) 10/21/2020    HCT 24.8 (L) 10/21/2020     (H) 10/21/2020    ALT 35 10/21/2020    AST 39 10/21/2020     10/21/2020    K 4.3 10/21/2020     10/21/2020    CREATININE 0.9 10/21/2020    BUN 14 10/21/2020    CO2 25 10/21/2020    INR 1.1 10/16/2020       No results found for: HPYLORINTERP  Lab Results   Component Value Date    HPYLORIANTIG Not detected 06/22/2020           Imaging:    Endoscopy:    EGD 2018 - ulcer followed by gastritis  Colon 2018 - wnl      Assessment:  1. Epigastric abdominal pain           Recommendations:  1. Continue Protonix bid and carafate   2. Refilled zofran  3. Consider repeat EGD in about 3 months, will confer wit Dr Tim Marion. If safe to come off ppi then, can consider checking gastrin level off PPI. No sign of multiple post bulbar ulcers to make suspicious for Zollinger Ellison. H pylori is eradicated  4. Reattempt records from [place]    Order summary:  Orders Placed This Encounter    ondansetron (ZOFRAN) 4 MG tablet         Thank you so much for allowing me to participate in the care of Puaj Terrazas MD

## 2020-10-27 ENCOUNTER — OFFICE VISIT (OUTPATIENT)
Dept: SURGERY | Facility: CLINIC | Age: 40
End: 2020-10-27
Payer: COMMERCIAL

## 2020-10-27 ENCOUNTER — PATIENT MESSAGE (OUTPATIENT)
Dept: SURGERY | Facility: CLINIC | Age: 40
End: 2020-10-27

## 2020-10-27 VITALS
DIASTOLIC BLOOD PRESSURE: 107 MMHG | WEIGHT: 147 LBS | HEART RATE: 87 BPM | HEIGHT: 67 IN | SYSTOLIC BLOOD PRESSURE: 142 MMHG | BODY MASS INDEX: 23.07 KG/M2

## 2020-10-27 DIAGNOSIS — K25.1 ACUTE GASTRIC ULCER WITH PERFORATION: Primary | ICD-10-CM

## 2020-10-27 PROCEDURE — 99999 PR PBB SHADOW E&M-EST. PATIENT-LVL III: CPT | Mod: PBBFAC,,, | Performed by: SURGERY

## 2020-10-27 PROCEDURE — 99024 POSTOP FOLLOW-UP VISIT: CPT | Mod: S$GLB,,, | Performed by: SURGERY

## 2020-10-27 PROCEDURE — 3008F PR BODY MASS INDEX (BMI) DOCUMENTED: ICD-10-PCS | Mod: CPTII,S$GLB,, | Performed by: SURGERY

## 2020-10-27 PROCEDURE — 99024 PR POST-OP FOLLOW-UP VISIT: ICD-10-PCS | Mod: S$GLB,,, | Performed by: SURGERY

## 2020-10-27 PROCEDURE — 3008F BODY MASS INDEX DOCD: CPT | Mod: CPTII,S$GLB,, | Performed by: SURGERY

## 2020-10-27 PROCEDURE — 99999 PR PBB SHADOW E&M-EST. PATIENT-LVL III: ICD-10-PCS | Mod: PBBFAC,,, | Performed by: SURGERY

## 2020-10-27 NOTE — PROGRESS NOTES
Surgery Clinic Note    Puja Quigley is a 40 y.o. year old female in clinic today for follow up of perforated gastric ulcer which leaked after a Linden Patch. She is doing well. Has been home after discharge for over a week. No abdominal pain. Eating well.  No f/c/n/v/sob/cp    ROS:  Negative except above    PE:  Vitals:    10/27/20 0947   BP: (!) 142/107   Pulse: 87       NAD  Ambulates well  No belabored breathing  No skin changes  Abd soft nt nd  Incisions c/d/I    A/P:  Puja Quigley is a 40 y.o. year old female s/p post-Linden patch leak after having perforated gastric ulcer, now resolved after IR drainage and GI endoscopic suturing    -keep on PPI and carafate  -will f/u with GI, possible repeat EGD and will test for gastrinoma  -will be in touch with GI, RTC PRN to my clinic. Call with recurrent issues or evidence of worsening of ulcer pain    Willie Magallon  General Surgery - [facility]  10/27/2020  11:47 AM

## 2020-11-06 ENCOUNTER — PATIENT MESSAGE (OUTPATIENT)
Dept: SURGERY | Facility: CLINIC | Age: 40
End: 2020-11-06

## 2020-11-09 LAB
FUNGUS SPEC CULT: NORMAL
FUNGUS SPEC CULT: NORMAL

## 2020-11-10 ENCOUNTER — PATIENT MESSAGE (OUTPATIENT)
Dept: SURGERY | Facility: CLINIC | Age: 40
End: 2020-11-10

## 2020-11-16 LAB — FUNGUS BLD CULT: NORMAL

## 2020-11-17 ENCOUNTER — TELEPHONE (OUTPATIENT)
Dept: ENDOSCOPY | Facility: HOSPITAL | Age: 40
End: 2020-11-17

## 2020-11-17 ENCOUNTER — TELEPHONE (OUTPATIENT)
Dept: GASTROENTEROLOGY | Facility: CLINIC | Age: 40
End: 2020-11-17

## 2020-11-17 DIAGNOSIS — K31.6 GASTRIC FISTULA: Primary | ICD-10-CM

## 2020-11-17 NOTE — TELEPHONE ENCOUNTER
MA spoke to pt,     Pt would like to know if it's ok to schedule EGD in December instead of next year due to insurance.     Routed to Dr. Terrazas     ----- Message from Talia Interiano sent at 11/17/2020  9:58 AM CST -----  Contact: self @ [phone]  Pt would like to schedule the EDG in December in possible because it will be cheaper with her insurance.  Pls call.

## 2020-11-17 NOTE — TELEPHONE ENCOUNTER
I seen that but she said the EGD would be done by you Dr. Terrazas. Maybe she was confused. I'll have Kaylee contact her. Thanks.    Kaylee- Please contact patient. Thanks

## 2020-11-17 NOTE — TELEPHONE ENCOUNTER
MD Socrates Messina MD; Vlad Rodriguez MA; Christin Mariscal MA   Caller: Unspecified (Today, 10:12 AM)             Yes of course. I would like to do her EGD.      Please sign order

## 2020-11-19 ENCOUNTER — TELEPHONE (OUTPATIENT)
Dept: ENDOSCOPY | Facility: HOSPITAL | Age: 40
End: 2020-11-19

## 2020-11-20 ENCOUNTER — PATIENT MESSAGE (OUTPATIENT)
Dept: GASTROENTEROLOGY | Facility: CLINIC | Age: 40
End: 2020-11-20

## 2020-11-20 NOTE — TELEPHONE ENCOUNTER
----- Message from Rosio Marion MD sent at 11/19/2020 10:20 PM CST -----  Not urgent  ----- Message -----  From: Christin Mariscal MA  Sent: 11/19/2020   2:43 PM CST  To: Rosio Marion MD    When is the f/u EGD do?  Kaylee

## 2020-11-23 ENCOUNTER — TELEPHONE (OUTPATIENT)
Dept: GASTROENTEROLOGY | Facility: CLINIC | Age: 40
End: 2020-11-23

## 2020-11-23 NOTE — TELEPHONE ENCOUNTER
MA spoke to patient,     Patient stated she would like to schedule an appt with Dr. Terrazas regarding a knot she have from surgery, egd questions, abd pain, gas and bloated. Patient stated no one never call her to follow up.    MA informed patient that sent her message to Kaylee to contact her regarding the messages Dr. Terrazas and I did received. MA informed pt that I will send a message to Kaylee again and schedule a follow up appt with Dr. Terrazas.     Routed to Kaylee   ----- Message from Preethi Higgins sent at 11/23/2020 12:59 PM CST -----  Contact: pt  Pt calling to schedule appt         Call back :[phone] (

## 2020-12-02 ENCOUNTER — OFFICE VISIT (OUTPATIENT)
Dept: GASTROENTEROLOGY | Facility: CLINIC | Age: 40
End: 2020-12-02
Payer: COMMERCIAL

## 2020-12-02 ENCOUNTER — TELEPHONE (OUTPATIENT)
Dept: GASTROENTEROLOGY | Facility: HOSPITAL | Age: 40
End: 2020-12-02

## 2020-12-02 VITALS
BODY MASS INDEX: 23.24 KG/M2 | WEIGHT: 148.06 LBS | DIASTOLIC BLOOD PRESSURE: 94 MMHG | HEIGHT: 67 IN | HEART RATE: 63 BPM | SYSTOLIC BLOOD PRESSURE: 139 MMHG

## 2020-12-02 DIAGNOSIS — R11.2 NON-INTRACTABLE VOMITING WITH NAUSEA, UNSPECIFIED VOMITING TYPE: Primary | ICD-10-CM

## 2020-12-02 PROCEDURE — 1126F AMNT PAIN NOTED NONE PRSNT: CPT | Mod: S$GLB,,, | Performed by: INTERNAL MEDICINE

## 2020-12-02 PROCEDURE — 99999 PR PBB SHADOW E&M-EST. PATIENT-LVL III: CPT | Mod: PBBFAC,,, | Performed by: INTERNAL MEDICINE

## 2020-12-02 PROCEDURE — 99214 PR OFFICE/OUTPT VISIT, EST, LEVL IV, 30-39 MIN: ICD-10-PCS | Mod: S$GLB,,, | Performed by: INTERNAL MEDICINE

## 2020-12-02 PROCEDURE — 1126F PR PAIN SEVERITY QUANTIFIED, NO PAIN PRESENT: ICD-10-PCS | Mod: S$GLB,,, | Performed by: INTERNAL MEDICINE

## 2020-12-02 PROCEDURE — 99999 PR PBB SHADOW E&M-EST. PATIENT-LVL III: ICD-10-PCS | Mod: PBBFAC,,, | Performed by: INTERNAL MEDICINE

## 2020-12-02 PROCEDURE — 3008F BODY MASS INDEX DOCD: CPT | Mod: CPTII,S$GLB,, | Performed by: INTERNAL MEDICINE

## 2020-12-02 PROCEDURE — 99214 OFFICE O/P EST MOD 30 MIN: CPT | Mod: S$GLB,,, | Performed by: INTERNAL MEDICINE

## 2020-12-02 PROCEDURE — 3008F PR BODY MASS INDEX (BMI) DOCUMENTED: ICD-10-PCS | Mod: CPTII,S$GLB,, | Performed by: INTERNAL MEDICINE

## 2020-12-02 NOTE — TELEPHONE ENCOUNTER
MA attempted to contact pt per Dr. Terrazas - We just saw her like a month ago and I think she just needs her scope with dr fausto garcia.   May not need to see her again today...     Pt didn't answer left detail message to return call to our office.

## 2020-12-02 NOTE — PROGRESS NOTES
[facility] Consultation Note    Reason for Consult:  The encounter diagnosis was Non-intractable vomiting with nausea, unspecified vomiting type.    PCP:   Primary Doctor No       Referring MD:  No referring provider defined for this encounter.    Initial History of Present Illness (HPI):  This is a 40 y.o. female here for evaluation of     2018 - living in [place] and saw Gi there who did an EGD and found an ulcer caused by H pylori. Took medications. Followup EGD showed gastritis. Was treated with omeprazole. Got worse with ibuprofen. Currently 4 months pregnant.    Taking tums and prilosec    Abdominal pain - feels like a consistent burn on left side of abdomen  Reflux - no  Dysphagia - no   Bowel habits - normal  GI bleeding - none  NSAID usage - none    Interval History 12/02/2020:  Having nausea with eating and even if she doesn't eat  She feels like has to eat all the time to get rid of nausea feeling  Sometimes vomiting when her stomach is empty    ROS:  Answers for HPI/ROS submitted by the patient on 5/21/2020   fever: No  chills: No  weight loss: No  eye pain: No  eye redness: No  trouble swallowing: No  chest pain: No  shortness of breath: No  cough: No  rash: No  abdominal pain: No  nausea: Yes  vomiting: Yes  Feeling depressed?: No  nervous/ anxious: No  heartburn: No  Joint pain? : No  dysuria: No  hematuria: No  back pain: No      Medical History:  has a past medical history of Pregnancy (08/12/2020) and Umbilical hernia.    Surgical History:  has a past surgical history that includes Closure of perforated ulcer of duodenum using omental patch and Esophagogastroduodenoscopy (N/A, 10/13/2020).       Review of patient's allergies indicates:  No Known Allergies      Objective Findings:  Video visit      Labs:  Lab Results   Component Value Date    WBC 6.19 10/21/2020    HGB 7.8 (L) 10/21/2020    HCT 24.8 (L) 10/21/2020     (H) 10/21/2020    ALT 35 10/21/2020    AST 39  10/21/2020     10/21/2020    K 4.3 10/21/2020     10/21/2020    CREATININE 0.9 10/21/2020    BUN 14 10/21/2020    CO2 25 10/21/2020    INR 1.1 10/16/2020       No results found for: HPYLORINTERP  Lab Results   Component Value Date    HPYLORIANTIG Not detected 06/22/2020           Imaging:    Endoscopy:    EGD 2018 - ulcer followed by gastritis  Colon 2018 - wnl      Assessment:  1. Non-intractable vomiting with nausea, unspecified vomiting type           Recommendations:  1. Continue Protonix bid. She has stopped carafate and she can trial it.   2. Get gastric emptying study in case she has developed some post operative gastroparesis  3. Repeat EGD ordered with Dr Tim Marion. If safe to come off ppi then, can consider checking gastrin level off PPI. No sign of multiple post bulbar ulcers to make suspicious for Zollinger Knox. H pylori is eradicated  4. Reattempt records from [place]  5. Can recheck gallbladder function if all of the above normal and still symptomatic    Order summary:  Orders Placed This Encounter    NM Gastric Emptying         Thank you so much for allowing me to participate in the care of Puja Terrazas MD

## 2020-12-04 ENCOUNTER — TELEPHONE (OUTPATIENT)
Dept: ENDOSCOPY | Facility: HOSPITAL | Age: 40
End: 2020-12-04

## 2020-12-06 ENCOUNTER — PATIENT MESSAGE (OUTPATIENT)
Dept: ENDOSCOPY | Facility: HOSPITAL | Age: 40
End: 2020-12-06

## 2020-12-07 ENCOUNTER — HOSPITAL ENCOUNTER (OUTPATIENT)
Dept: RADIOLOGY | Facility: HOSPITAL | Age: 40
Discharge: HOME | End: 2020-12-07
Attending: INTERNAL MEDICINE
Payer: COMMERCIAL

## 2020-12-07 DIAGNOSIS — R11.2 NON-INTRACTABLE VOMITING WITH NAUSEA, UNSPECIFIED VOMITING TYPE: ICD-10-CM

## 2020-12-07 PROCEDURE — 78264 GASTRIC EMPTYING IMG STUDY: CPT | Mod: TC

## 2020-12-07 PROCEDURE — 78264 GASTRIC EMPTYING IMG STUDY: CPT | Mod: 26,,, | Performed by: RADIOLOGY

## 2020-12-07 PROCEDURE — 78264 NM GASTRIC EMPTYING: ICD-10-PCS | Mod: 26,,, | Performed by: RADIOLOGY

## 2020-12-07 PROCEDURE — A9541 TC99M SULFUR COLLOID: HCPCS

## 2020-12-08 ENCOUNTER — PATIENT MESSAGE (OUTPATIENT)
Dept: ENDOSCOPY | Facility: HOSPITAL | Age: 40
End: 2020-12-08

## 2020-12-08 ENCOUNTER — TELEPHONE (OUTPATIENT)
Dept: ENDOSCOPY | Facility: HOSPITAL | Age: 40
End: 2020-12-08

## 2020-12-08 ENCOUNTER — LAB VISIT (OUTPATIENT)
Dept: FAMILY MEDICINE | Facility: CLINIC | Age: 40
End: 2020-12-08
Payer: COMMERCIAL

## 2020-12-08 DIAGNOSIS — K31.6 GASTRIC FISTULA: ICD-10-CM

## 2020-12-08 PROCEDURE — U0003 INFECTIOUS AGENT DETECTION BY NUCLEIC ACID (DNA OR RNA); SEVERE ACUTE RESPIRATORY SYNDROME CORONAVIRUS 2 (SARS-COV-2) (CORONAVIRUS DISEASE [COVID-19]), AMPLIFIED PROBE TECHNIQUE, MAKING USE OF HIGH THROUGHPUT TECHNOLOGIES AS DESCRIBED BY CMS-2020-01-R: HCPCS

## 2020-12-08 NOTE — TELEPHONE ENCOUNTER
Spoke with patient about instructions for EGD scheduled 12/11/20 at 1130 and covid-19 test today at 1340 at [facility].  Instructions sent by [facility] message.

## 2020-12-09 LAB — SARS-COV-2 RNA RESP QL NAA+PROBE: NOT DETECTED

## 2020-12-11 ENCOUNTER — TELEPHONE (OUTPATIENT)
Dept: ENDOSCOPY | Facility: HOSPITAL | Age: 40
End: 2020-12-11

## 2020-12-11 ENCOUNTER — ANESTHESIA EVENT (OUTPATIENT)
Dept: ENDOSCOPY | Facility: HOSPITAL | Age: 40
End: 2020-12-11
Payer: COMMERCIAL

## 2020-12-11 ENCOUNTER — ANESTHESIA (OUTPATIENT)
Dept: ENDOSCOPY | Facility: HOSPITAL | Age: 40
End: 2020-12-11
Payer: COMMERCIAL

## 2020-12-11 ENCOUNTER — HOSPITAL ENCOUNTER (OUTPATIENT)
Facility: HOSPITAL | Age: 40
Discharge: HOME | End: 2020-12-11
Attending: INTERNAL MEDICINE | Admitting: INTERNAL MEDICINE
Payer: COMMERCIAL

## 2020-12-11 VITALS
WEIGHT: 147 LBS | HEIGHT: 67 IN | HEART RATE: 65 BPM | SYSTOLIC BLOOD PRESSURE: 125 MMHG | BODY MASS INDEX: 23.07 KG/M2 | TEMPERATURE: 98 F | RESPIRATION RATE: 16 BRPM | DIASTOLIC BLOOD PRESSURE: 102 MMHG | OXYGEN SATURATION: 100 %

## 2020-12-11 DIAGNOSIS — K91.89 GASTRIC LEAK: Primary | ICD-10-CM

## 2020-12-11 DIAGNOSIS — K25.9 GASTRIC ULCER, UNSPECIFIED CHRONICITY, UNSPECIFIED WHETHER GASTRIC ULCER HEMORRHAGE OR PERFORATION PRESENT: Primary | ICD-10-CM

## 2020-12-11 DIAGNOSIS — K31.6 GASTRIC FISTULA: ICD-10-CM

## 2020-12-11 LAB
B-HCG UR QL: NEGATIVE
CTP QC/QA: YES

## 2020-12-11 PROCEDURE — 25000003 PHARM REV CODE 250: Performed by: INTERNAL MEDICINE

## 2020-12-11 PROCEDURE — 43235 EGD DIAGNOSTIC BRUSH WASH: CPT | Mod: ,,, | Performed by: INTERNAL MEDICINE

## 2020-12-11 PROCEDURE — 43235 EGD DIAGNOSTIC BRUSH WASH: CPT | Performed by: INTERNAL MEDICINE

## 2020-12-11 PROCEDURE — 63600175 PHARM REV CODE 636 W HCPCS: Performed by: STUDENT IN AN ORGANIZED HEALTH CARE EDUCATION/TRAINING PROGRAM

## 2020-12-11 PROCEDURE — D9220A PRA ANESTHESIA: Mod: ,,, | Performed by: ANESTHESIOLOGY

## 2020-12-11 PROCEDURE — 43235 PR EGD, FLEX, DIAGNOSTIC: ICD-10-PCS | Mod: ,,, | Performed by: INTERNAL MEDICINE

## 2020-12-11 PROCEDURE — 37000009 HC ANESTHESIA EA ADD 15 MINS: Performed by: INTERNAL MEDICINE

## 2020-12-11 PROCEDURE — 81025 URINE PREGNANCY TEST: CPT | Performed by: INTERNAL MEDICINE

## 2020-12-11 PROCEDURE — D9220A PRA ANESTHESIA: Mod: ,,, | Performed by: STUDENT IN AN ORGANIZED HEALTH CARE EDUCATION/TRAINING PROGRAM

## 2020-12-11 PROCEDURE — D9220A PRA ANESTHESIA: ICD-10-PCS | Mod: ,,, | Performed by: ANESTHESIOLOGY

## 2020-12-11 PROCEDURE — 37000008 HC ANESTHESIA 1ST 15 MINUTES: Performed by: INTERNAL MEDICINE

## 2020-12-11 PROCEDURE — 25000003 PHARM REV CODE 250: Performed by: STUDENT IN AN ORGANIZED HEALTH CARE EDUCATION/TRAINING PROGRAM

## 2020-12-11 PROCEDURE — D9220A PRA ANESTHESIA: ICD-10-PCS | Mod: ,,, | Performed by: STUDENT IN AN ORGANIZED HEALTH CARE EDUCATION/TRAINING PROGRAM

## 2020-12-11 RX ORDER — PROPOFOL 10 MG/ML
VIAL (ML) INTRAVENOUS CONTINUOUS PRN
Status: DISCONTINUED | OUTPATIENT
Start: 2020-12-11 | End: 2020-12-11

## 2020-12-11 RX ORDER — LIDOCAINE HYDROCHLORIDE 20 MG/ML
INJECTION INTRAVENOUS
Status: DISCONTINUED | OUTPATIENT
Start: 2020-12-11 | End: 2020-12-11

## 2020-12-11 RX ORDER — SUCRALFATE 1 G/1
1 TABLET ORAL 4 TIMES DAILY
Qty: 56 TABLET | Refills: 0 | Status: SHIPPED | OUTPATIENT
Start: 2020-12-11 | End: 2020-12-24

## 2020-12-11 RX ORDER — SODIUM CHLORIDE 9 MG/ML
INJECTION, SOLUTION INTRAVENOUS CONTINUOUS
Status: DISCONTINUED | OUTPATIENT
Start: 2020-12-11 | End: 2020-12-11 | Stop reason: HOSPADM

## 2020-12-11 RX ORDER — ONDANSETRON 2 MG/ML
INJECTION INTRAMUSCULAR; INTRAVENOUS
Status: DISCONTINUED | OUTPATIENT
Start: 2020-12-11 | End: 2020-12-11

## 2020-12-11 RX ORDER — SODIUM CHLORIDE 0.9 % (FLUSH) 0.9 %
3 SYRINGE (ML) INJECTION
Status: DISCONTINUED | OUTPATIENT
Start: 2020-12-11 | End: 2020-12-11 | Stop reason: HOSPADM

## 2020-12-11 RX ORDER — SODIUM CHLORIDE 0.9 % (FLUSH) 0.9 %
10 SYRINGE (ML) INJECTION
Status: DISCONTINUED | OUTPATIENT
Start: 2020-12-11 | End: 2020-12-11 | Stop reason: HOSPADM

## 2020-12-11 RX ORDER — FENTANYL CITRATE 50 UG/ML
INJECTION, SOLUTION INTRAMUSCULAR; INTRAVENOUS
Status: DISCONTINUED | OUTPATIENT
Start: 2020-12-11 | End: 2020-12-11

## 2020-12-11 RX ORDER — ONDANSETRON 2 MG/ML
4 INJECTION INTRAMUSCULAR; INTRAVENOUS ONCE AS NEEDED
Status: DISCONTINUED | OUTPATIENT
Start: 2020-12-11 | End: 2020-12-11 | Stop reason: HOSPADM

## 2020-12-11 RX ORDER — PROPOFOL 10 MG/ML
VIAL (ML) INTRAVENOUS
Status: DISCONTINUED | OUTPATIENT
Start: 2020-12-11 | End: 2020-12-11

## 2020-12-11 RX ORDER — MIDAZOLAM HYDROCHLORIDE 1 MG/ML
INJECTION INTRAMUSCULAR; INTRAVENOUS
Status: DISCONTINUED | OUTPATIENT
Start: 2020-12-11 | End: 2020-12-11

## 2020-12-11 RX ADMIN — LIDOCAINE HYDROCHLORIDE 50 MG: 20 INJECTION, SOLUTION INTRAVENOUS at 11:12

## 2020-12-11 RX ADMIN — ONDANSETRON 4 MG: 2 INJECTION, SOLUTION INTRAMUSCULAR; INTRAVENOUS at 12:12

## 2020-12-11 RX ADMIN — PROPOFOL 150 MG: 10 INJECTION, EMULSION INTRAVENOUS at 11:12

## 2020-12-11 RX ADMIN — FENTANYL CITRATE 50 MCG: 50 INJECTION, SOLUTION INTRAMUSCULAR; INTRAVENOUS at 12:12

## 2020-12-11 RX ADMIN — SODIUM CHLORIDE 10 ML/HR: 0.9 INJECTION, SOLUTION INTRAVENOUS at 11:12

## 2020-12-11 RX ADMIN — MIDAZOLAM HYDROCHLORIDE 2 MG: 1 INJECTION, SOLUTION INTRAMUSCULAR; INTRAVENOUS at 11:12

## 2020-12-11 RX ADMIN — SODIUM CHLORIDE: 0.9 INJECTION, SOLUTION INTRAVENOUS at 11:12

## 2020-12-11 RX ADMIN — FENTANYL CITRATE 50 MCG: 50 INJECTION, SOLUTION INTRAMUSCULAR; INTRAVENOUS at 11:12

## 2020-12-11 RX ADMIN — PROPOFOL 100 MCG/KG/MIN: 10 INJECTION, EMULSION INTRAVENOUS at 11:12

## 2020-12-11 NOTE — [employer] PATIENT INSTRUCTIONS
Discharge Summary/Instructions after an Endoscopic Procedure  Patient Name: Puja Quigley  Patient MRN: [id number]  Patient YOB: 1980 Friday, December 11, 2020  Rosio Marion MD  RESTRICTIONS:  During your procedure today, you received medications for sedation.  These   medications may affect your judgment, balance and coordination.  Therefore,   for 24 hours, you have the following restrictions:   - DO NOT drive a car, operate machinery, make legal/financial decisions,   sign important papers or drink alcohol.    ACTIVITY:  Today: no heavy lifting, straining or running due to procedural   sedation/anesthesia.  The following day: return to full activity including work.  DIET:  Eat and drink normally unless instructed otherwise.     TREATMENT FOR COMMON SIDE EFFECTS:  - Mild abdominal pain, nausea, belching, bloating or excessive gas:  rest,   eat lightly and use a heating pad.  - Sore Throat: treat with throat lozenges and/or gargle with warm salt   water.  - Because air was used during the procedure, expelling large amounts of air   from your rectum or belching is normal.  - If a bowel prep was taken, you may not have a bowel movement for 1-3 days.    This is normal.  SYMPTOMS TO WATCH FOR AND REPORT TO YOUR PHYSICIAN:  1. Abdominal pain or bloating, other than gas cramps.  2. Chest pain.  3. Back pain.  4. Signs of infection such as: chills or fever occurring within 24 hours   after the procedure.  5. Rectal bleeding, which would show as bright red, maroon, or black stools.   (A tablespoon of blood from the rectum is not serious, especially if   hemorrhoids are present.)  6. Vomiting.  7. Weakness or dizziness.  GO DIRECTLY TO THE NEAREST EMERGENCY ROOM IF YOU HAVE ANY OF THE FOLLOWING:      Difficulty breathing              Chills and/or fever over 101 F   Persistent vomiting and/or vomiting blood   Severe abdominal pain   Severe chest pain   Black, tarry stools   Bleeding- more than one  tablespoon   Any other symptom or condition that you feel may need urgent attention  Your doctor recommends these additional instructions:  If any biopsies were taken, your doctors clinic will contact you in 1 to 2   weeks with any results.  - Discharge patient to home.   - Resume previous diet.   - Continue present medications.   - Repeat upper endoscopy in 2 months for surveillance.   - Patient has a contact number available for emergencies.  The signs and   symptoms of potential delayed complications were discussed with the   patient.  Return to normal activities tomorrow.  Written discharge   instructions were provided to the patient.  For questions, problems or results please call your physician - Rosio Marion MD at Work:  [phone].  [facility], EMERGENCY ROOM PHONE NUMBER: [phone]  IF A COMPLICATION OR EMERGENCY SITUATION ARISES AND YOU ARE UNABLE TO REACH   YOUR PHYSICIAN - GO DIRECTLY TO THE EMERGENCY ROOM.  Rosio Marion MD  12/11/2020 12:21:47 PM  This report has been verified and signed electronically.  [employer]

## 2020-12-11 NOTE — PLAN OF CARE
Discharge instructions given to and explained to patient. All questions answered and pt verbalized understanding. IV discontinued with cannula intact. Vital signs stable and pt AOx4.

## 2020-12-11 NOTE — ANESTHESIA PREPROCEDURE EVALUATION
12/11/2020    Pre-operative evaluation for Procedure(s) (LRB):  EGD (ESOPHAGOGASTRODUODENOSCOPY) (N/A)    Puja Quigley is a 40 y.o. female     Patient Active Problem List   Diagnosis    Abdominal pain    Sepsis    Gastric leak       Review of patient's allergies indicates:  No Known Allergies    No current facility-administered medications on file prior to encounter.      Current Outpatient Medications on File Prior to Encounter   Medication Sig Dispense Refill    acetaminophen (TYLENOL) 500 MG tablet Take 500 mg by mouth every 6 (six) hours as needed for Pain.      hydrocortisone (ANUSOL-HC) 2.5 % rectal cream Place rectally 2 (two) times daily. (Patient not taking: Reported on 12/2/2020) 30 g 1    ondansetron (ZOFRAN) 4 MG tablet Take 1 tablet (4 mg total) by mouth every 8 (eight) hours as needed for Nausea. (Patient not taking: Reported on 12/2/2020) 30 tablet 1    oxyCODONE-acetaminophen (PERCOCET) 7.5-325 mg per tablet Take 1 tablet by mouth every 6 (six) hours as needed. (Patient not taking: Reported on 12/2/2020) 28 tablet 0    pantoprazole (PROTONIX) 40 MG tablet Take 1 tablet (40 mg total) by mouth 2 (two) times daily. 60 tablet 11    potassium chloride (KLOR-CON) 20 mEq Pack Take 40 mEq by mouth once daily. (Patient not taking: Reported on 12/2/2020) 14 packet 1    simethicone, bulk, Liqd 40 mg by Misc.(Non-Drug; Combo Route) route 3 (three) times daily. (Patient not taking: Reported on 12/2/2020) 100 mL 3       Past Surgical History:   Procedure Laterality Date    CLOSURE OF PERFORATED ULCER OF DUODENUM USING OMENTAL PATCH      ESOPHAGOGASTRODUODENOSCOPY N/A 10/13/2020    Procedure: EGD (ESOPHAGOGASTRODUODENOSCOPY);  Surgeon: Rosio Marion MD;  Location: [address]);  Service: Endoscopy;  Laterality: N/A;       Social History     Socioeconomic History    Marital  status: Single     Spouse name: Not on file    Number of children: Not on file    Years of education: Not on file    Highest education level: Not on file   Occupational History    Not on file   Social Needs    Financial resource strain: Not on file    Food insecurity     Worry: Not on file     Inability: Not on file    Transportation needs     Medical: Not on file     Non-medical: Not on file   Tobacco Use    Smoking status: Former Smoker     Types: Cigarettes     Quit date: 2019     Years since quittin.0   Substance and Sexual Activity    Alcohol use: Yes    Drug use: Not Currently    Sexual activity: Yes   Lifestyle    Physical activity     Days per week: Not on file     Minutes per session: Not on file    Stress: Not on file   Relationships    Social connections     Talks on phone: Not on file     Gets together: Not on file     Attends Hindu service: Not on file     Active member of club or organization: Not on file     Attends meetings of clubs or organizations: Not on file     Relationship status: Not on file   Other Topics Concern    Not on file   Social History Narrative    Not on file       EKG:    Normal sinus rhythm   Rightward axis   Right ventricular conduction delay   Borderline Abnormal ECG   When compared with ECG of 08-OCT-2020 15:07,   Significant changes have occurred   Confirmed by Vernon FUENTES, Terry SJoaquin ([id number]) on 10/21/2020 10:08:42 PM     2D Echo:  No results found for this or any previous visit.      Anesthesia Evaluation    I have reviewed the Patient Summary Reports.    I have reviewed the Nursing Notes. I have reviewed the NPO Status.   I have reviewed the Medications.     Review of Systems  Anesthesia Hx:  Denies Family Hx of Anesthesia complications.   Denies Personal Hx of Anesthesia complications.   Cardiovascular:   Exercise tolerance: good Denies Hypertension.  Denies CABG/stent.  Denies Dysrhythmias.   Denies Angina.    Pulmonary:   Denies COPD.  Denies Asthma.     Renal/:   Denies Chronic Renal Disease.     Hepatic/GI:   Hepatitis: Hx of gastric ulcer 2/2 H pylori, complicated by perforation and ex lap.    Neurological:   Denies CVA. Denies Seizures.    Endocrine:   Denies Diabetes.        Physical Exam  General:  Well nourished    Airway/Jaw/Neck:  Airway Findings: Tongue: Normal General Airway Assessment: Adult  Mallampati: III  Improves to II with phonation.  TM Distance: Normal, at least 6 cm  Jaw/Neck Findings:  Neck ROM: Normal ROM      Dental:  Dental Findings: In tact   Chest/Lungs:  Chest/Lungs Findings: Clear to auscultation, Normal Respiratory Rate     Heart/Vascular:  Heart Findings: Rate: Normal  Rhythm: Regular Rhythm  Sounds: Normal        Mental Status:  Mental Status Findings:  Cooperative, Alert and Oriented         Anesthesia Plan  Type of Anesthesia, risks & benefits discussed:  Anesthesia Type:  general  Patient's Preference:   Intra-op Monitoring Plan: standard ASA monitors  Intra-op Monitoring Plan Comments:   Post Op Pain Control Plan: multimodal analgesia and per primary service following discharge from PACU  Post Op Pain Control Plan Comments:   Induction:   IV  Beta Blocker:  Patient is not currently on a Beta-Blocker (No further documentation required).       Informed Consent: Patient understands risks and agrees with Anesthesia plan.  Questions answered. Anesthesia consent signed with patient.  ASA Score: 2     Day of Surgery Review of History & Physical:    H&P update referred to the surgeon.         Ready For Surgery From Anesthesia Perspective.

## 2020-12-11 NOTE — DISCHARGE INSTRUCTIONS

## 2020-12-11 NOTE — TRANSFER OF CARE
"Anesthesia Transfer of Care Note    Patient: Puja Quigley    Procedure(s) Performed: Procedure(s) (LRB):  EGD (ESOPHAGOGASTRODUODENOSCOPY) (N/A)    Patient location: PACU    Anesthesia Type: general    Transport from OR: Transported from OR on 2-3 L/min O2 by NC with adequate spontaneous ventilation    Post pain: adequate analgesia    Post assessment: no apparent anesthetic complications    Post vital signs: stable    Level of consciousness: sedated and responds to stimulation    Nausea/Vomiting: no nausea/vomiting    Complications: none    Transfer of care protocol was followed      Last vitals:   Visit Vitals  BP (!) 162/99 (Patient Position: Lying)   Pulse (P) 66   Temp (P) 36.7 °C (98 °F) (Temporal)   Resp (P) 11   Ht 5' 7" (1.702 m)   Wt 66.7 kg (147 lb)   SpO2 (P) 100%   Breastfeeding No   BMI 23.02 kg/m²     "

## 2020-12-11 NOTE — H&P
History & Physical - Short Stay  Gastroenterology      SUBJECTIVE:     Procedure: EGD    Chief Complaint/Indication for Procedure: gastric leak    History of Present Illness:  Patient is a 40 y.o. female with perforated gastric ulcer s/p surgery complicated by leak closed with endoscopic suturing coming for follow up.     PTA Medications   Medication Sig    pantoprazole (PROTONIX) 40 MG tablet Take 1 tablet (40 mg total) by mouth 2 (two) times daily.    acetaminophen (TYLENOL) 500 MG tablet Take 500 mg by mouth every 6 (six) hours as needed for Pain.    hydrocortisone (ANUSOL-HC) 2.5 % rectal cream Place rectally 2 (two) times daily. (Patient not taking: Reported on 12/2/2020)    ondansetron (ZOFRAN) 4 MG tablet Take 1 tablet (4 mg total) by mouth every 8 (eight) hours as needed for Nausea. (Patient not taking: Reported on 12/2/2020)    oxyCODONE-acetaminophen (PERCOCET) 7.5-325 mg per tablet Take 1 tablet by mouth every 6 (six) hours as needed. (Patient not taking: Reported on 12/2/2020)    potassium chloride (KLOR-CON) 20 mEq Pack Take 40 mEq by mouth once daily. (Patient not taking: Reported on 12/2/2020)    simethicone, bulk, Liqd 40 mg by Misc.(Non-Drug; Combo Route) route 3 (three) times daily. (Patient not taking: Reported on 12/2/2020)       Review of patient's allergies indicates:  No Known Allergies     Past Medical History:   Diagnosis Date    Gastric perforation     Pregnancy 08/12/2020    delivered on 8/12/2020    Umbilical hernia      Past Surgical History:   Procedure Laterality Date    CLOSURE OF PERFORATED ULCER OF DUODENUM USING OMENTAL PATCH      ESOPHAGOGASTRODUODENOSCOPY N/A 10/13/2020    Procedure: EGD (ESOPHAGOGASTRODUODENOSCOPY);  Surgeon: Rosio Marion MD;  Location: [address]);  Service: Endoscopy;  Laterality: N/A;     Family History   Problem Relation Age of Onset    Colon cancer Neg Hx     Esophageal cancer Neg Hx      Social History     Tobacco Use     Smoking status: Former Smoker     Types: Cigarettes     Quit date: 2019     Years since quittin.0    Smokeless tobacco: Never Used   Substance Use Topics    Alcohol use: Yes    Drug use: Not Currently            OBJECTIVE:     Vital Signs (Most Recent)  Temp: 97.5 °F (36.4 °C) (20 1103)  Pulse: 96 (20)  Resp: 14 (20)  BP: (!) 162/99 (20)  SpO2: 100 % (20)            ASSESSMENT/PLAN:     Patient is a 40 y.o. female with perforated gastric ulcer s/p surgery complicated by leak closed with endoscopic suturing coming for follow up.     Plan: EGD    Anesthesia Plan: Moderate Sedation    ASA Grade: ASA 2 - Patient with mild systemic disease with no functional limitations

## 2020-12-14 NOTE — ANESTHESIA POSTPROCEDURE EVALUATION
Anesthesia Post Evaluation    Patient: Puja Quigley    Procedure(s) Performed: Procedure(s) (LRB):  EGD (ESOPHAGOGASTRODUODENOSCOPY) (N/A)    Final Anesthesia Type: general    Patient location during evaluation: PACU  Patient participation: Yes- Able to Participate  Level of consciousness: awake and alert and oriented  Post-procedure vital signs: reviewed and stable  Pain management: adequate  Airway patency: patent    PONV status at discharge: No PONV  Anesthetic complications: no      Cardiovascular status: blood pressure returned to baseline and hemodynamically stable  Respiratory status: unassisted, room air and spontaneous ventilation  Hydration status: euvolemic  Follow-up not needed.          Vitals Value Taken Time   /102 12/11/20 1245   Temp 36.7 °C (98.1 °F) 12/11/20 1245   Pulse 65 12/11/20 1245   Resp 16 12/11/20 1245   SpO2 100 % 12/11/20 1245         No case tracking events are documented in the log.      Pain/Cristel Score: No data recorded

## 2020-12-24 RX ORDER — SUCRALFATE 1 G/1
TABLET ORAL
Qty: 56 TABLET | Refills: 0 | Status: SHIPPED | OUTPATIENT
Start: 2020-12-24 | End: 2021-01-04

## 2021-01-05 ENCOUNTER — PATIENT MESSAGE (OUTPATIENT)
Dept: GASTROENTEROLOGY | Facility: CLINIC | Age: 41
End: 2021-01-05

## 2021-01-05 ENCOUNTER — PATIENT MESSAGE (OUTPATIENT)
Dept: ENDOSCOPY | Facility: HOSPITAL | Age: 41
End: 2021-01-05

## 2021-01-06 RX ORDER — ONDANSETRON 4 MG/1
4 TABLET, FILM COATED ORAL EVERY 8 HOURS PRN
Qty: 30 TABLET | Refills: 1 | Status: ON HOLD | OUTPATIENT
Start: 2021-01-06 | End: 2021-05-26 | Stop reason: HOSPADM

## 2021-01-27 ENCOUNTER — OFFICE VISIT (OUTPATIENT)
Dept: GASTROENTEROLOGY | Facility: CLINIC | Age: 41
End: 2021-01-27
Payer: COMMERCIAL

## 2021-01-27 VITALS
BODY MASS INDEX: 21.84 KG/M2 | WEIGHT: 139.13 LBS | HEIGHT: 67 IN | DIASTOLIC BLOOD PRESSURE: 84 MMHG | SYSTOLIC BLOOD PRESSURE: 139 MMHG | HEART RATE: 68 BPM

## 2021-01-27 DIAGNOSIS — K27.9 PUD (PEPTIC ULCER DISEASE): Primary | ICD-10-CM

## 2021-01-27 PROCEDURE — 1126F PR PAIN SEVERITY QUANTIFIED, NO PAIN PRESENT: ICD-10-PCS | Mod: S$GLB,,, | Performed by: INTERNAL MEDICINE

## 2021-01-27 PROCEDURE — 99213 OFFICE O/P EST LOW 20 MIN: CPT | Mod: S$GLB,,, | Performed by: INTERNAL MEDICINE

## 2021-01-27 PROCEDURE — 1126F AMNT PAIN NOTED NONE PRSNT: CPT | Mod: S$GLB,,, | Performed by: INTERNAL MEDICINE

## 2021-01-27 PROCEDURE — 3008F PR BODY MASS INDEX (BMI) DOCUMENTED: ICD-10-PCS | Mod: CPTII,S$GLB,, | Performed by: INTERNAL MEDICINE

## 2021-01-27 PROCEDURE — 99999 PR PBB SHADOW E&M-EST. PATIENT-LVL III: ICD-10-PCS | Mod: PBBFAC,,, | Performed by: INTERNAL MEDICINE

## 2021-01-27 PROCEDURE — 99999 PR PBB SHADOW E&M-EST. PATIENT-LVL III: CPT | Mod: PBBFAC,,, | Performed by: INTERNAL MEDICINE

## 2021-01-27 PROCEDURE — 3008F BODY MASS INDEX DOCD: CPT | Mod: CPTII,S$GLB,, | Performed by: INTERNAL MEDICINE

## 2021-01-27 PROCEDURE — 99213 PR OFFICE/OUTPT VISIT, EST, LEVL III, 20-29 MIN: ICD-10-PCS | Mod: S$GLB,,, | Performed by: INTERNAL MEDICINE

## 2021-01-27 RX ORDER — AMITRIPTYLINE HYDROCHLORIDE 10 MG/1
10 TABLET, FILM COATED ORAL NIGHTLY
Qty: 30 TABLET | Refills: 3 | Status: SHIPPED | OUTPATIENT
Start: 2021-01-27 | End: 2021-05-13 | Stop reason: SDUPTHER

## 2021-02-04 ENCOUNTER — TELEPHONE (OUTPATIENT)
Dept: ENDOSCOPY | Facility: HOSPITAL | Age: 41
End: 2021-02-04

## 2021-02-24 ENCOUNTER — TELEPHONE (OUTPATIENT)
Dept: ENDOSCOPY | Facility: HOSPITAL | Age: 41
End: 2021-02-24

## 2021-03-03 ENCOUNTER — TELEPHONE (OUTPATIENT)
Dept: ENDOSCOPY | Facility: HOSPITAL | Age: 41
End: 2021-03-03

## 2021-03-03 ENCOUNTER — PATIENT MESSAGE (OUTPATIENT)
Dept: ENDOSCOPY | Facility: HOSPITAL | Age: 41
End: 2021-03-03

## 2021-03-03 DIAGNOSIS — Z01.818 PRE-OP TESTING: ICD-10-CM

## 2021-03-08 ENCOUNTER — PATIENT MESSAGE (OUTPATIENT)
Dept: GASTROENTEROLOGY | Facility: CLINIC | Age: 41
End: 2021-03-08

## 2021-03-09 ENCOUNTER — HOSPITAL ENCOUNTER (EMERGENCY)
Facility: HOSPITAL | Age: 41
Discharge: HOME | End: 2021-03-09
Attending: EMERGENCY MEDICINE
Payer: COMMERCIAL

## 2021-03-09 ENCOUNTER — HOSPITAL ENCOUNTER (OUTPATIENT)
Dept: PREADMISSION TESTING | Facility: OTHER | Age: 41
Discharge: HOME | End: 2021-03-09
Attending: ANESTHESIOLOGY
Payer: COMMERCIAL

## 2021-03-09 VITALS
HEART RATE: 81 BPM | WEIGHT: 140 LBS | DIASTOLIC BLOOD PRESSURE: 89 MMHG | SYSTOLIC BLOOD PRESSURE: 137 MMHG | RESPIRATION RATE: 20 BRPM | BODY MASS INDEX: 21.97 KG/M2 | TEMPERATURE: 99 F | OXYGEN SATURATION: 100 % | HEIGHT: 67 IN

## 2021-03-09 DIAGNOSIS — R10.13 EPIGASTRIC PAIN: ICD-10-CM

## 2021-03-09 DIAGNOSIS — R11.2 NAUSEA AND VOMITING, INTRACTABILITY OF VOMITING NOT SPECIFIED, UNSPECIFIED VOMITING TYPE: Primary | ICD-10-CM

## 2021-03-09 DIAGNOSIS — Z01.818 PRE-OP TESTING: ICD-10-CM

## 2021-03-09 LAB
ABO + RH BLD: NORMAL
ALBUMIN SERPL BCP-MCNC: 3.4 G/DL (ref 3.5–5.2)
ALP SERPL-CCNC: 86 U/L (ref 55–135)
ALT SERPL W/O P-5'-P-CCNC: 8 U/L (ref 10–44)
ANION GAP SERPL CALC-SCNC: 7 MMOL/L (ref 8–16)
APTT BLDCRRT: 24.1 SEC (ref 21–32)
AST SERPL-CCNC: 12 U/L (ref 10–40)
B-HCG UR QL: NEGATIVE
BACTERIA #/AREA URNS AUTO: ABNORMAL /HPF
BASOPHILS # BLD AUTO: 0.03 K/UL (ref 0–0.2)
BASOPHILS NFR BLD: 0.5 % (ref 0–1.9)
BILIRUB SERPL-MCNC: 0.7 MG/DL (ref 0.1–1)
BILIRUB UR QL STRIP: NEGATIVE
BLD GP AB SCN CELLS X3 SERPL QL: NORMAL
BUN SERPL-MCNC: 10 MG/DL (ref 6–20)
CALCIUM SERPL-MCNC: 9.1 MG/DL (ref 8.7–10.5)
CHLORIDE SERPL-SCNC: 107 MMOL/L (ref 95–110)
CLARITY UR REFRACT.AUTO: ABNORMAL
CO2 SERPL-SCNC: 28 MMOL/L (ref 23–29)
COLOR UR AUTO: ABNORMAL
CREAT SERPL-MCNC: 0.7 MG/DL (ref 0.5–1.4)
CTP QC/QA: YES
DIFFERENTIAL METHOD: ABNORMAL
EOSINOPHIL # BLD AUTO: 0.1 K/UL (ref 0–0.5)
EOSINOPHIL NFR BLD: 1.4 % (ref 0–8)
ERYTHROCYTE [DISTWIDTH] IN BLOOD BY AUTOMATED COUNT: 15.2 % (ref 11.5–14.5)
EST. GFR  (AFRICAN AMERICAN): >60 ML/MIN/1.73 M^2
EST. GFR  (NON AFRICAN AMERICAN): >60 ML/MIN/1.73 M^2
GLUCOSE SERPL-MCNC: 96 MG/DL (ref 70–110)
GLUCOSE UR QL STRIP: NEGATIVE
HCT VFR BLD AUTO: 31 % (ref 37–48.5)
HGB BLD-MCNC: 9.6 G/DL (ref 12–16)
HGB UR QL STRIP: ABNORMAL
HYALINE CASTS UR QL AUTO: 0 /LPF
IMM GRANULOCYTES # BLD AUTO: 0.01 K/UL (ref 0–0.04)
IMM GRANULOCYTES NFR BLD AUTO: 0.2 % (ref 0–0.5)
INR PPP: 1 (ref 0.8–1.2)
KETONES UR QL STRIP: ABNORMAL
LEUKOCYTE ESTERASE UR QL STRIP: ABNORMAL
LIPASE SERPL-CCNC: 19 U/L (ref 4–60)
LYMPHOCYTES # BLD AUTO: 1.5 K/UL (ref 1–4.8)
LYMPHOCYTES NFR BLD: 25.3 % (ref 18–48)
MCH RBC QN AUTO: 26.2 PG (ref 27–31)
MCHC RBC AUTO-ENTMCNC: 31 G/DL (ref 32–36)
MCV RBC AUTO: 85 FL (ref 82–98)
MICROSCOPIC COMMENT: ABNORMAL
MONOCYTES # BLD AUTO: 0.4 K/UL (ref 0.3–1)
MONOCYTES NFR BLD: 6.6 % (ref 4–15)
NEUTROPHILS # BLD AUTO: 3.8 K/UL (ref 1.8–7.7)
NEUTROPHILS NFR BLD: 66 % (ref 38–73)
NITRITE UR QL STRIP: NEGATIVE
NRBC BLD-RTO: 0 /100 WBC
PH UR STRIP: 7 [PH] (ref 5–8)
PLATELET # BLD AUTO: 248 K/UL (ref 150–350)
PMV BLD AUTO: 9.6 FL (ref 9.2–12.9)
POTASSIUM SERPL-SCNC: 3.5 MMOL/L (ref 3.5–5.1)
PROT SERPL-MCNC: 7.1 G/DL (ref 6–8.4)
PROT UR QL STRIP: ABNORMAL
PROTHROMBIN TIME: 11.3 SEC (ref 9–12.5)
RBC # BLD AUTO: 3.67 M/UL (ref 4–5.4)
RBC #/AREA URNS AUTO: >100 /HPF (ref 0–4)
SODIUM SERPL-SCNC: 142 MMOL/L (ref 136–145)
SP GR UR STRIP: 1.03 (ref 1–1.03)
TROPONIN I SERPL DL<=0.01 NG/ML-MCNC: <0.006 NG/ML (ref 0–0.03)
URN SPEC COLLECT METH UR: ABNORMAL
WBC # BLD AUTO: 5.77 K/UL (ref 3.9–12.7)
WBC #/AREA URNS AUTO: 0 /HPF (ref 0–5)

## 2021-03-09 PROCEDURE — 85610 PROTHROMBIN TIME: CPT | Performed by: PHYSICIAN ASSISTANT

## 2021-03-09 PROCEDURE — 93010 EKG 12-LEAD: ICD-10-PCS | Mod: ,,, | Performed by: INTERNAL MEDICINE

## 2021-03-09 PROCEDURE — 85730 THROMBOPLASTIN TIME PARTIAL: CPT | Performed by: PHYSICIAN ASSISTANT

## 2021-03-09 PROCEDURE — 84484 ASSAY OF TROPONIN QUANT: CPT | Performed by: PHYSICIAN ASSISTANT

## 2021-03-09 PROCEDURE — 83690 ASSAY OF LIPASE: CPT | Performed by: PHYSICIAN ASSISTANT

## 2021-03-09 PROCEDURE — 80053 COMPREHEN METABOLIC PANEL: CPT | Performed by: PHYSICIAN ASSISTANT

## 2021-03-09 PROCEDURE — 93005 ELECTROCARDIOGRAM TRACING: CPT

## 2021-03-09 PROCEDURE — 86900 BLOOD TYPING SEROLOGIC ABO: CPT | Performed by: PHYSICIAN ASSISTANT

## 2021-03-09 PROCEDURE — 96375 TX/PRO/DX INJ NEW DRUG ADDON: CPT

## 2021-03-09 PROCEDURE — 96374 THER/PROPH/DIAG INJ IV PUSH: CPT

## 2021-03-09 PROCEDURE — U0005 INFEC AGEN DETEC AMPLI PROBE: HCPCS | Performed by: FAMILY MEDICINE

## 2021-03-09 PROCEDURE — 99285 EMERGENCY DEPT VISIT HI MDM: CPT | Mod: 25

## 2021-03-09 PROCEDURE — 81025 URINE PREGNANCY TEST: CPT | Performed by: PHYSICIAN ASSISTANT

## 2021-03-09 PROCEDURE — U0003 INFECTIOUS AGENT DETECTION BY NUCLEIC ACID (DNA OR RNA); SEVERE ACUTE RESPIRATORY SYNDROME CORONAVIRUS 2 (SARS-COV-2) (CORONAVIRUS DISEASE [COVID-19]), AMPLIFIED PROBE TECHNIQUE, MAKING USE OF HIGH THROUGHPUT TECHNOLOGIES AS DESCRIBED BY CMS-2020-01-R: HCPCS | Performed by: FAMILY MEDICINE

## 2021-03-09 PROCEDURE — C9113 INJ PANTOPRAZOLE SODIUM, VIA: HCPCS | Performed by: PHYSICIAN ASSISTANT

## 2021-03-09 PROCEDURE — 81001 URINALYSIS AUTO W/SCOPE: CPT | Performed by: PHYSICIAN ASSISTANT

## 2021-03-09 PROCEDURE — 63600175 PHARM REV CODE 636 W HCPCS: Performed by: PHYSICIAN ASSISTANT

## 2021-03-09 PROCEDURE — 85025 COMPLETE CBC W/AUTO DIFF WBC: CPT | Performed by: PHYSICIAN ASSISTANT

## 2021-03-09 PROCEDURE — 99284 PR EMERGENCY DEPT VISIT,LEVEL IV: ICD-10-PCS | Mod: ,,, | Performed by: PHYSICIAN ASSISTANT

## 2021-03-09 PROCEDURE — 99284 EMERGENCY DEPT VISIT MOD MDM: CPT | Mod: ,,, | Performed by: PHYSICIAN ASSISTANT

## 2021-03-09 PROCEDURE — 25000003 PHARM REV CODE 250: Performed by: PHYSICIAN ASSISTANT

## 2021-03-09 PROCEDURE — 93010 ELECTROCARDIOGRAM REPORT: CPT | Mod: ,,, | Performed by: INTERNAL MEDICINE

## 2021-03-09 RX ORDER — SUCRALFATE 1 G/10ML
1 SUSPENSION ORAL
Status: COMPLETED | OUTPATIENT
Start: 2021-03-09 | End: 2021-03-09

## 2021-03-09 RX ORDER — PANTOPRAZOLE SODIUM 40 MG/10ML
80 INJECTION, POWDER, LYOPHILIZED, FOR SOLUTION INTRAVENOUS
Status: COMPLETED | OUTPATIENT
Start: 2021-03-09 | End: 2021-03-09

## 2021-03-09 RX ORDER — FAMOTIDINE 10 MG/ML
20 INJECTION INTRAVENOUS
Status: COMPLETED | OUTPATIENT
Start: 2021-03-09 | End: 2021-03-09

## 2021-03-09 RX ADMIN — PANTOPRAZOLE SODIUM 80 MG: 40 INJECTION, POWDER, FOR SOLUTION INTRAVENOUS at 07:03

## 2021-03-09 RX ADMIN — SUCRALFATE 1 G: 1 SUSPENSION ORAL at 07:03

## 2021-03-09 RX ADMIN — FAMOTIDINE 20 MG: 10 INJECTION INTRAVENOUS at 07:03

## 2021-03-10 LAB — SARS-COV-2 RNA RESP QL NAA+PROBE: NOT DETECTED

## 2021-03-11 ENCOUNTER — PATIENT MESSAGE (OUTPATIENT)
Dept: ENDOSCOPY | Facility: HOSPITAL | Age: 41
End: 2021-03-11

## 2021-03-12 ENCOUNTER — ANESTHESIA (OUTPATIENT)
Dept: ENDOSCOPY | Facility: HOSPITAL | Age: 41
End: 2021-03-12
Payer: COMMERCIAL

## 2021-03-12 ENCOUNTER — ANESTHESIA EVENT (OUTPATIENT)
Dept: ENDOSCOPY | Facility: HOSPITAL | Age: 41
End: 2021-03-12
Payer: COMMERCIAL

## 2021-03-12 ENCOUNTER — HOSPITAL ENCOUNTER (OUTPATIENT)
Facility: HOSPITAL | Age: 41
Discharge: HOME | End: 2021-03-12
Attending: INTERNAL MEDICINE | Admitting: INTERNAL MEDICINE
Payer: COMMERCIAL

## 2021-03-12 VITALS
WEIGHT: 130 LBS | HEIGHT: 67 IN | DIASTOLIC BLOOD PRESSURE: 77 MMHG | OXYGEN SATURATION: 100 % | HEART RATE: 93 BPM | BODY MASS INDEX: 20.4 KG/M2 | SYSTOLIC BLOOD PRESSURE: 117 MMHG | RESPIRATION RATE: 19 BRPM | TEMPERATURE: 99 F

## 2021-03-12 DIAGNOSIS — R93.3 ABNORMAL ENDOSCOPY OF UPPER GASTROINTESTINAL TRACT: ICD-10-CM

## 2021-03-12 LAB
B-HCG UR QL: NEGATIVE
CTP QC/QA: YES

## 2021-03-12 PROCEDURE — D9220A PRA ANESTHESIA: Mod: ,,, | Performed by: NURSE ANESTHETIST, CERTIFIED REGISTERED

## 2021-03-12 PROCEDURE — D9220A PRA ANESTHESIA: ICD-10-PCS | Mod: ,,, | Performed by: NURSE ANESTHETIST, CERTIFIED REGISTERED

## 2021-03-12 PROCEDURE — D9220A PRA ANESTHESIA: Mod: ,,, | Performed by: ANESTHESIOLOGY

## 2021-03-12 PROCEDURE — 37000008 HC ANESTHESIA 1ST 15 MINUTES: Performed by: INTERNAL MEDICINE

## 2021-03-12 PROCEDURE — 63600175 PHARM REV CODE 636 W HCPCS: Performed by: NURSE ANESTHETIST, CERTIFIED REGISTERED

## 2021-03-12 PROCEDURE — D9220A PRA ANESTHESIA: ICD-10-PCS | Mod: ,,, | Performed by: ANESTHESIOLOGY

## 2021-03-12 PROCEDURE — 81025 URINE PREGNANCY TEST: CPT | Performed by: INTERNAL MEDICINE

## 2021-03-12 PROCEDURE — 25000003 PHARM REV CODE 250: Performed by: NURSE ANESTHETIST, CERTIFIED REGISTERED

## 2021-03-12 PROCEDURE — 43235 EGD DIAGNOSTIC BRUSH WASH: CPT | Mod: ,,, | Performed by: INTERNAL MEDICINE

## 2021-03-12 PROCEDURE — 37000009 HC ANESTHESIA EA ADD 15 MINS: Performed by: INTERNAL MEDICINE

## 2021-03-12 PROCEDURE — 43235 PR EGD, FLEX, DIAGNOSTIC: ICD-10-PCS | Mod: ,,, | Performed by: INTERNAL MEDICINE

## 2021-03-12 PROCEDURE — 25000003 PHARM REV CODE 250: Performed by: INTERNAL MEDICINE

## 2021-03-12 PROCEDURE — 43235 EGD DIAGNOSTIC BRUSH WASH: CPT | Performed by: INTERNAL MEDICINE

## 2021-03-12 RX ORDER — FENTANYL CITRATE 50 UG/ML
25 INJECTION, SOLUTION INTRAMUSCULAR; INTRAVENOUS EVERY 5 MIN PRN
Status: DISCONTINUED | OUTPATIENT
Start: 2021-03-12 | End: 2021-03-12 | Stop reason: HOSPADM

## 2021-03-12 RX ORDER — MIDAZOLAM HYDROCHLORIDE 1 MG/ML
INJECTION, SOLUTION INTRAMUSCULAR; INTRAVENOUS
Status: DISCONTINUED | OUTPATIENT
Start: 2021-03-12 | End: 2021-03-12

## 2021-03-12 RX ORDER — PROPOFOL 10 MG/ML
VIAL (ML) INTRAVENOUS
Status: DISCONTINUED | OUTPATIENT
Start: 2021-03-12 | End: 2021-03-12

## 2021-03-12 RX ORDER — DIPHENHYDRAMINE HYDROCHLORIDE 50 MG/ML
25 INJECTION INTRAMUSCULAR; INTRAVENOUS EVERY 6 HOURS PRN
Status: DISCONTINUED | OUTPATIENT
Start: 2021-03-12 | End: 2021-03-12 | Stop reason: HOSPADM

## 2021-03-12 RX ORDER — PROPOFOL 10 MG/ML
VIAL (ML) INTRAVENOUS CONTINUOUS PRN
Status: DISCONTINUED | OUTPATIENT
Start: 2021-03-12 | End: 2021-03-12

## 2021-03-12 RX ORDER — HYDROMORPHONE HYDROCHLORIDE 1 MG/ML
0.2 INJECTION, SOLUTION INTRAMUSCULAR; INTRAVENOUS; SUBCUTANEOUS EVERY 5 MIN PRN
Status: DISCONTINUED | OUTPATIENT
Start: 2021-03-12 | End: 2021-03-12 | Stop reason: HOSPADM

## 2021-03-12 RX ORDER — SODIUM CHLORIDE 9 MG/ML
INJECTION, SOLUTION INTRAVENOUS CONTINUOUS
Status: DISCONTINUED | OUTPATIENT
Start: 2021-03-12 | End: 2021-03-12 | Stop reason: HOSPADM

## 2021-03-12 RX ORDER — LIDOCAINE HYDROCHLORIDE 20 MG/ML
INJECTION INTRAVENOUS
Status: DISCONTINUED | OUTPATIENT
Start: 2021-03-12 | End: 2021-03-12

## 2021-03-12 RX ORDER — ONDANSETRON 2 MG/ML
4 INJECTION INTRAMUSCULAR; INTRAVENOUS ONCE AS NEEDED
Status: DISCONTINUED | OUTPATIENT
Start: 2021-03-12 | End: 2021-03-12 | Stop reason: HOSPADM

## 2021-03-12 RX ADMIN — MIDAZOLAM HYDROCHLORIDE 2 MG: 1 INJECTION, SOLUTION INTRAMUSCULAR; INTRAVENOUS at 02:03

## 2021-03-12 RX ADMIN — LIDOCAINE HYDROCHLORIDE 75 MG: 20 INJECTION, SOLUTION INTRAVENOUS at 02:03

## 2021-03-12 RX ADMIN — PROPOFOL 60 MG: 10 INJECTION, EMULSION INTRAVENOUS at 02:03

## 2021-03-12 RX ADMIN — SODIUM CHLORIDE: 0.9 INJECTION, SOLUTION INTRAVENOUS at 02:03

## 2021-03-12 RX ADMIN — PROPOFOL 175 MCG/KG/MIN: 10 INJECTION, EMULSION INTRAVENOUS at 02:03

## 2021-03-12 RX ADMIN — PROPOFOL 20 MG: 10 INJECTION, EMULSION INTRAVENOUS at 02:03

## 2021-03-13 ENCOUNTER — TELEPHONE (OUTPATIENT)
Dept: ENDOSCOPY | Facility: HOSPITAL | Age: 41
End: 2021-03-13

## 2021-03-13 DIAGNOSIS — K31.7 GASTRIC POLYP: Primary | ICD-10-CM

## 2021-03-18 ENCOUNTER — TELEPHONE (OUTPATIENT)
Dept: ENDOSCOPY | Facility: HOSPITAL | Age: 41
End: 2021-03-18

## 2021-03-23 ENCOUNTER — PATIENT MESSAGE (OUTPATIENT)
Dept: GASTROENTEROLOGY | Facility: CLINIC | Age: 41
End: 2021-03-23

## 2021-03-24 ENCOUNTER — PATIENT MESSAGE (OUTPATIENT)
Dept: GASTROENTEROLOGY | Facility: CLINIC | Age: 41
End: 2021-03-24

## 2021-03-24 DIAGNOSIS — R10.11 RIGHT UPPER QUADRANT PAIN: ICD-10-CM

## 2021-04-06 ENCOUNTER — PATIENT MESSAGE (OUTPATIENT)
Dept: GASTROENTEROLOGY | Facility: CLINIC | Age: 41
End: 2021-04-06

## 2021-04-15 ENCOUNTER — HOSPITAL ENCOUNTER (OUTPATIENT)
Dept: RADIOLOGY | Facility: HOSPITAL | Age: 41
Discharge: HOME | End: 2021-04-15
Attending: INTERNAL MEDICINE
Payer: COMMERCIAL

## 2021-04-15 ENCOUNTER — PATIENT MESSAGE (OUTPATIENT)
Dept: RESEARCH | Facility: HOSPITAL | Age: 41
End: 2021-04-15

## 2021-04-15 VITALS — RESPIRATION RATE: 14 BRPM

## 2021-04-15 DIAGNOSIS — K31.6 GASTRIC FISTULA: Primary | ICD-10-CM

## 2021-04-15 DIAGNOSIS — R93.3 ABNORMAL ENDOSCOPY OF UPPER GASTROINTESTINAL TRACT: ICD-10-CM

## 2021-04-15 DIAGNOSIS — R10.11 RIGHT UPPER QUADRANT PAIN: ICD-10-CM

## 2021-04-15 PROCEDURE — 78227 HEPATOBIL SYST IMAGE W/DRUG: CPT | Mod: 26,,, | Performed by: RADIOLOGY

## 2021-04-15 PROCEDURE — 81025 URINE PREGNANCY TEST: CPT

## 2021-04-15 PROCEDURE — 78227 HEPATOBIL SYST IMAGE W/DRUG: CPT | Mod: TC

## 2021-04-15 PROCEDURE — 63600175 PHARM REV CODE 636 W HCPCS: Performed by: RADIOLOGY

## 2021-04-15 PROCEDURE — 78227 NM HEPATOBILIARY(HIDA) WITH PHARM AND EF: ICD-10-PCS | Mod: 26,,, | Performed by: RADIOLOGY

## 2021-04-15 RX ORDER — MORPHINE SULFATE 4 MG/ML
2.4 INJECTION, SOLUTION INTRAMUSCULAR; INTRAVENOUS ONCE
Status: COMPLETED | OUTPATIENT
Start: 2021-04-15 | End: 2021-04-15

## 2021-04-15 RX ADMIN — MORPHINE SULFATE 2.4 MG: 4 INJECTION INTRAVENOUS at 02:04

## 2021-04-20 ENCOUNTER — PATIENT MESSAGE (OUTPATIENT)
Dept: GASTROENTEROLOGY | Facility: CLINIC | Age: 41
End: 2021-04-20

## 2021-04-21 ENCOUNTER — PATIENT MESSAGE (OUTPATIENT)
Dept: GASTROENTEROLOGY | Facility: CLINIC | Age: 41
End: 2021-04-21

## 2021-04-21 ENCOUNTER — TELEPHONE (OUTPATIENT)
Dept: GASTROENTEROLOGY | Facility: CLINIC | Age: 41
End: 2021-04-21

## 2021-04-22 ENCOUNTER — PATIENT MESSAGE (OUTPATIENT)
Dept: GASTROENTEROLOGY | Facility: CLINIC | Age: 41
End: 2021-04-22

## 2021-05-03 ENCOUNTER — TELEPHONE (OUTPATIENT)
Dept: GASTROENTEROLOGY | Facility: CLINIC | Age: 41
End: 2021-05-03

## 2021-05-03 RX ORDER — METRONIDAZOLE 250 MG/1
250 TABLET ORAL EVERY 8 HOURS
Qty: 30 TABLET | Refills: 0 | Status: SHIPPED | OUTPATIENT
Start: 2021-05-03 | End: 2021-05-13 | Stop reason: SDUPTHER

## 2021-05-10 ENCOUNTER — PATIENT MESSAGE (OUTPATIENT)
Dept: GASTROENTEROLOGY | Facility: CLINIC | Age: 41
End: 2021-05-10

## 2021-05-12 ENCOUNTER — PATIENT MESSAGE (OUTPATIENT)
Dept: GASTROENTEROLOGY | Facility: CLINIC | Age: 41
End: 2021-05-12

## 2021-05-14 ENCOUNTER — PATIENT MESSAGE (OUTPATIENT)
Dept: ENDOSCOPY | Facility: HOSPITAL | Age: 41
End: 2021-05-14

## 2021-05-14 ENCOUNTER — TELEPHONE (OUTPATIENT)
Dept: ENDOSCOPY | Facility: HOSPITAL | Age: 41
End: 2021-05-14

## 2021-05-14 DIAGNOSIS — Z01.818 PRE-OP TESTING: ICD-10-CM

## 2021-05-15 ENCOUNTER — LAB VISIT (OUTPATIENT)
Dept: INTERNAL MEDICINE | Facility: CLINIC | Age: 41
End: 2021-05-15
Payer: COMMERCIAL

## 2021-05-15 DIAGNOSIS — Z01.818 PRE-OP TESTING: ICD-10-CM

## 2021-05-15 PROCEDURE — U0003 INFECTIOUS AGENT DETECTION BY NUCLEIC ACID (DNA OR RNA); SEVERE ACUTE RESPIRATORY SYNDROME CORONAVIRUS 2 (SARS-COV-2) (CORONAVIRUS DISEASE [COVID-19]), AMPLIFIED PROBE TECHNIQUE, MAKING USE OF HIGH THROUGHPUT TECHNOLOGIES AS DESCRIBED BY CMS-2020-01-R: HCPCS | Performed by: FAMILY MEDICINE

## 2021-05-15 PROCEDURE — U0005 INFEC AGEN DETEC AMPLI PROBE: HCPCS | Performed by: FAMILY MEDICINE

## 2021-05-16 RX ORDER — SIMETHICONE 180 MG
250 CAPSULE ORAL 2 TIMES DAILY
Qty: 60 CAPSULE | Refills: 3 | Status: ON HOLD | OUTPATIENT
Start: 2021-05-16 | End: 2021-05-22

## 2021-05-17 ENCOUNTER — PATIENT MESSAGE (OUTPATIENT)
Dept: GASTROENTEROLOGY | Facility: CLINIC | Age: 41
End: 2021-05-17

## 2021-05-17 LAB — SARS-COV-2 RNA RESP QL NAA+PROBE: NOT DETECTED

## 2021-05-18 ENCOUNTER — ANESTHESIA (OUTPATIENT)
Dept: ENDOSCOPY | Facility: HOSPITAL | Age: 41
End: 2021-05-18
Payer: COMMERCIAL

## 2021-05-18 ENCOUNTER — ANESTHESIA EVENT (OUTPATIENT)
Dept: ENDOSCOPY | Facility: HOSPITAL | Age: 41
End: 2021-05-18
Payer: COMMERCIAL

## 2021-05-18 ENCOUNTER — HOSPITAL ENCOUNTER (OUTPATIENT)
Facility: HOSPITAL | Age: 41
Discharge: HOME | End: 2021-05-18
Attending: INTERNAL MEDICINE | Admitting: INTERNAL MEDICINE
Payer: COMMERCIAL

## 2021-05-18 VITALS
HEIGHT: 67 IN | WEIGHT: 135 LBS | OXYGEN SATURATION: 96 % | TEMPERATURE: 97 F | RESPIRATION RATE: 18 BRPM | SYSTOLIC BLOOD PRESSURE: 129 MMHG | BODY MASS INDEX: 21.19 KG/M2 | HEART RATE: 65 BPM | DIASTOLIC BLOOD PRESSURE: 89 MMHG

## 2021-05-18 DIAGNOSIS — K25.9 GASTRIC ULCER: ICD-10-CM

## 2021-05-18 LAB
B-HCG UR QL: NEGATIVE
CTP QC/QA: YES

## 2021-05-18 PROCEDURE — D9220A PRA ANESTHESIA: Mod: ,,, | Performed by: ANESTHESIOLOGY

## 2021-05-18 PROCEDURE — 43235 EGD DIAGNOSTIC BRUSH WASH: CPT | Mod: ,,, | Performed by: INTERNAL MEDICINE

## 2021-05-18 PROCEDURE — D9220A PRA ANESTHESIA: ICD-10-PCS | Mod: ,,, | Performed by: NURSE ANESTHETIST, CERTIFIED REGISTERED

## 2021-05-18 PROCEDURE — 43235 PR EGD, FLEX, DIAGNOSTIC: ICD-10-PCS | Mod: ,,, | Performed by: INTERNAL MEDICINE

## 2021-05-18 PROCEDURE — 43235 EGD DIAGNOSTIC BRUSH WASH: CPT | Performed by: INTERNAL MEDICINE

## 2021-05-18 PROCEDURE — 37000009 HC ANESTHESIA EA ADD 15 MINS: Performed by: INTERNAL MEDICINE

## 2021-05-18 PROCEDURE — 37000008 HC ANESTHESIA 1ST 15 MINUTES: Performed by: INTERNAL MEDICINE

## 2021-05-18 PROCEDURE — 25000003 PHARM REV CODE 250: Performed by: INTERNAL MEDICINE

## 2021-05-18 PROCEDURE — 63600175 PHARM REV CODE 636 W HCPCS: Performed by: NURSE ANESTHETIST, CERTIFIED REGISTERED

## 2021-05-18 PROCEDURE — D9220A PRA ANESTHESIA: ICD-10-PCS | Mod: ,,, | Performed by: ANESTHESIOLOGY

## 2021-05-18 PROCEDURE — 25000003 PHARM REV CODE 250: Performed by: NURSE ANESTHETIST, CERTIFIED REGISTERED

## 2021-05-18 PROCEDURE — 81025 URINE PREGNANCY TEST: CPT | Performed by: INTERNAL MEDICINE

## 2021-05-18 PROCEDURE — D9220A PRA ANESTHESIA: Mod: ,,, | Performed by: NURSE ANESTHETIST, CERTIFIED REGISTERED

## 2021-05-18 RX ORDER — LIDOCAINE HYDROCHLORIDE 20 MG/ML
INJECTION INTRAVENOUS
Status: DISCONTINUED | OUTPATIENT
Start: 2021-05-18 | End: 2021-05-18

## 2021-05-18 RX ORDER — FENTANYL CITRATE 50 UG/ML
25 INJECTION, SOLUTION INTRAMUSCULAR; INTRAVENOUS EVERY 5 MIN PRN
Status: DISCONTINUED | OUTPATIENT
Start: 2021-05-18 | End: 2021-05-18 | Stop reason: HOSPADM

## 2021-05-18 RX ORDER — SODIUM CHLORIDE 9 MG/ML
INJECTION, SOLUTION INTRAVENOUS CONTINUOUS
Status: DISCONTINUED | OUTPATIENT
Start: 2021-05-18 | End: 2021-05-18 | Stop reason: HOSPADM

## 2021-05-18 RX ORDER — ONDANSETRON 2 MG/ML
4 INJECTION INTRAMUSCULAR; INTRAVENOUS ONCE AS NEEDED
Status: DISCONTINUED | OUTPATIENT
Start: 2021-05-18 | End: 2021-05-18 | Stop reason: HOSPADM

## 2021-05-18 RX ORDER — SODIUM CHLORIDE 0.9 % (FLUSH) 0.9 %
2 SYRINGE (ML) INJECTION
Status: DISCONTINUED | OUTPATIENT
Start: 2021-05-18 | End: 2021-05-18 | Stop reason: HOSPADM

## 2021-05-18 RX ORDER — SODIUM CHLORIDE 0.9 % (FLUSH) 0.9 %
10 SYRINGE (ML) INJECTION
Status: DISCONTINUED | OUTPATIENT
Start: 2021-05-18 | End: 2021-05-18 | Stop reason: HOSPADM

## 2021-05-18 RX ORDER — PROPOFOL 10 MG/ML
VIAL (ML) INTRAVENOUS
Status: DISCONTINUED | OUTPATIENT
Start: 2021-05-18 | End: 2021-05-18

## 2021-05-18 RX ORDER — FENTANYL CITRATE 50 UG/ML
INJECTION, SOLUTION INTRAMUSCULAR; INTRAVENOUS
Status: DISCONTINUED | OUTPATIENT
Start: 2021-05-18 | End: 2021-05-18

## 2021-05-18 RX ORDER — PROPOFOL 10 MG/ML
VIAL (ML) INTRAVENOUS CONTINUOUS PRN
Status: DISCONTINUED | OUTPATIENT
Start: 2021-05-18 | End: 2021-05-18

## 2021-05-18 RX ADMIN — Medication 200 MCG/KG/MIN: at 10:05

## 2021-05-18 RX ADMIN — FENTANYL CITRATE 50 MCG: 50 INJECTION INTRAMUSCULAR; INTRAVENOUS at 10:05

## 2021-05-18 RX ADMIN — SODIUM CHLORIDE: 0.9 INJECTION, SOLUTION INTRAVENOUS at 09:05

## 2021-05-18 RX ADMIN — LIDOCAINE HYDROCHLORIDE 60 MG: 20 INJECTION, SOLUTION INTRAVENOUS at 10:05

## 2021-05-18 RX ADMIN — PROPOFOL 50 MG: 10 INJECTION, EMULSION INTRAVENOUS at 10:05

## 2021-05-19 ENCOUNTER — TELEPHONE (OUTPATIENT)
Dept: INTERNAL MEDICINE | Facility: CLINIC | Age: 41
End: 2021-05-19

## 2021-05-19 ENCOUNTER — PATIENT MESSAGE (OUTPATIENT)
Dept: GASTROENTEROLOGY | Facility: CLINIC | Age: 41
End: 2021-05-19

## 2021-05-19 DIAGNOSIS — R14.0 ABDOMINAL DISTENSION (GASEOUS): ICD-10-CM

## 2021-05-21 ENCOUNTER — HOSPITAL ENCOUNTER (OUTPATIENT)
Dept: RADIOLOGY | Facility: HOSPITAL | Age: 41
Discharge: HOME | DRG: 374 | End: 2021-05-21
Attending: INTERNAL MEDICINE
Payer: COMMERCIAL

## 2021-05-21 ENCOUNTER — PATIENT MESSAGE (OUTPATIENT)
Dept: GASTROENTEROLOGY | Facility: CLINIC | Age: 41
End: 2021-05-21

## 2021-05-21 ENCOUNTER — TELEPHONE (OUTPATIENT)
Dept: GASTROENTEROLOGY | Facility: CLINIC | Age: 41
End: 2021-05-21

## 2021-05-21 DIAGNOSIS — R14.0 ABDOMINAL DISTENSION (GASEOUS): ICD-10-CM

## 2021-05-21 DIAGNOSIS — R18.8 OTHER ASCITES: Primary | ICD-10-CM

## 2021-05-21 PROCEDURE — 74177 CT ABDOMEN PELVIS WITH CONTRAST: ICD-10-PCS | Mod: 26,,, | Performed by: RADIOLOGY

## 2021-05-21 PROCEDURE — 74177 CT ABD & PELVIS W/CONTRAST: CPT | Mod: 26,,, | Performed by: RADIOLOGY

## 2021-05-21 PROCEDURE — 25500020 PHARM REV CODE 255: Performed by: INTERNAL MEDICINE

## 2021-05-21 PROCEDURE — 74177 CT ABD & PELVIS W/CONTRAST: CPT | Mod: TC

## 2021-05-21 RX ADMIN — IOHEXOL 75 ML: 350 INJECTION, SOLUTION INTRAVENOUS at 11:05

## 2021-05-21 RX ADMIN — IOHEXOL 15 ML: 300 INJECTION, SOLUTION INTRAVENOUS at 11:05

## 2021-05-22 ENCOUNTER — HOSPITAL ENCOUNTER (OUTPATIENT)
Facility: HOSPITAL | Age: 41
Discharge: HOME | DRG: 374 | End: 2021-05-26
Attending: EMERGENCY MEDICINE | Admitting: EMERGENCY MEDICINE
Payer: COMMERCIAL

## 2021-05-22 DIAGNOSIS — D50.9 MICROCYTIC ANEMIA: ICD-10-CM

## 2021-05-22 DIAGNOSIS — K25.5 CHRONIC GASTRIC ULCER WITH PERFORATION: Chronic | ICD-10-CM

## 2021-05-22 DIAGNOSIS — R18.8 OTHER ASCITES: Primary | ICD-10-CM

## 2021-05-22 DIAGNOSIS — R14.0 ABDOMINAL DISTENTION: ICD-10-CM

## 2021-05-22 PROBLEM — K25.9 GASTRIC ULCER: Chronic | Status: ACTIVE | Noted: 2021-05-18

## 2021-05-22 PROBLEM — R10.9 ABDOMINAL PAIN: Status: RESOLVED | Noted: 2020-09-24 | Resolved: 2021-05-22

## 2021-05-22 PROBLEM — A41.9 SEPSIS: Status: RESOLVED | Noted: 2020-10-08 | Resolved: 2021-05-22

## 2021-05-22 LAB
ALBUMIN SERPL BCP-MCNC: 3.2 G/DL (ref 3.5–5.2)
ALP SERPL-CCNC: 79 U/L (ref 55–135)
ALT SERPL W/O P-5'-P-CCNC: 12 U/L (ref 10–44)
ANION GAP SERPL CALC-SCNC: 7 MMOL/L (ref 8–16)
AST SERPL-CCNC: 17 U/L (ref 10–40)
B-HCG UR QL: NEGATIVE
BACTERIA #/AREA URNS AUTO: NORMAL /HPF
BASOPHILS # BLD AUTO: 0.02 K/UL (ref 0–0.2)
BASOPHILS NFR BLD: 0.6 % (ref 0–1.9)
BILIRUB SERPL-MCNC: 0.5 MG/DL (ref 0.1–1)
BILIRUB UR QL STRIP: ABNORMAL
BUN SERPL-MCNC: 8 MG/DL (ref 6–20)
CALCIUM SERPL-MCNC: 9.1 MG/DL (ref 8.7–10.5)
CHLORIDE SERPL-SCNC: 106 MMOL/L (ref 95–110)
CLARITY UR REFRACT.AUTO: ABNORMAL
CO2 SERPL-SCNC: 26 MMOL/L (ref 23–29)
COLOR UR AUTO: ABNORMAL
CREAT SERPL-MCNC: 0.9 MG/DL (ref 0.5–1.4)
CTP QC/QA: YES
CTP QC/QA: YES
DIFFERENTIAL METHOD: ABNORMAL
EOSINOPHIL # BLD AUTO: 0.1 K/UL (ref 0–0.5)
EOSINOPHIL NFR BLD: 1.5 % (ref 0–8)
ERYTHROCYTE [DISTWIDTH] IN BLOOD BY AUTOMATED COUNT: 15.2 % (ref 11.5–14.5)
EST. GFR  (AFRICAN AMERICAN): >60 ML/MIN/1.73 M^2
EST. GFR  (NON AFRICAN AMERICAN): >60 ML/MIN/1.73 M^2
GLUCOSE SERPL-MCNC: 90 MG/DL (ref 70–110)
GLUCOSE UR QL STRIP: NEGATIVE
HCT VFR BLD AUTO: 30.1 % (ref 37–48.5)
HGB BLD-MCNC: 8.9 G/DL (ref 12–16)
HGB UR QL STRIP: NEGATIVE
HYALINE CASTS UR QL AUTO: 0 /LPF
IMM GRANULOCYTES # BLD AUTO: 0.01 K/UL (ref 0–0.04)
IMM GRANULOCYTES NFR BLD AUTO: 0.3 % (ref 0–0.5)
INR PPP: 1 (ref 0.8–1.2)
KETONES UR QL STRIP: NEGATIVE
LEUKOCYTE ESTERASE UR QL STRIP: NEGATIVE
LIPASE SERPL-CCNC: 33 U/L (ref 4–60)
LYMPHOCYTES # BLD AUTO: 1.3 K/UL (ref 1–4.8)
LYMPHOCYTES NFR BLD: 39.5 % (ref 18–48)
MCH RBC QN AUTO: 24.3 PG (ref 27–31)
MCHC RBC AUTO-ENTMCNC: 29.6 G/DL (ref 32–36)
MCV RBC AUTO: 82 FL (ref 82–98)
MICROSCOPIC COMMENT: NORMAL
MONOCYTES # BLD AUTO: 0.3 K/UL (ref 0.3–1)
MONOCYTES NFR BLD: 7.8 % (ref 4–15)
NEUTROPHILS # BLD AUTO: 1.7 K/UL (ref 1.8–7.7)
NEUTROPHILS NFR BLD: 50.3 % (ref 38–73)
NITRITE UR QL STRIP: NEGATIVE
NRBC BLD-RTO: 0 /100 WBC
PH UR STRIP: 5 [PH] (ref 5–8)
PLATELET # BLD AUTO: 226 K/UL (ref 150–450)
PMV BLD AUTO: 9.1 FL (ref 9.2–12.9)
POTASSIUM SERPL-SCNC: 3.5 MMOL/L (ref 3.5–5.1)
PROT SERPL-MCNC: 6.3 G/DL (ref 6–8.4)
PROT UR QL STRIP: ABNORMAL
PROTHROMBIN TIME: 11.4 SEC (ref 9–12.5)
RBC # BLD AUTO: 3.67 M/UL (ref 4–5.4)
RBC #/AREA URNS AUTO: 3 /HPF (ref 0–4)
SARS-COV-2 RDRP RESP QL NAA+PROBE: NEGATIVE
SODIUM SERPL-SCNC: 139 MMOL/L (ref 136–145)
SP GR UR STRIP: >=1.03 (ref 1–1.03)
SQUAMOUS #/AREA URNS AUTO: 2 /HPF
URN SPEC COLLECT METH UR: ABNORMAL
WBC # BLD AUTO: 3.34 K/UL (ref 3.9–12.7)
WBC #/AREA URNS AUTO: 2 /HPF (ref 0–5)

## 2021-05-22 PROCEDURE — U0002 COVID-19 LAB TEST NON-CDC: HCPCS | Performed by: PHYSICIAN ASSISTANT

## 2021-05-22 PROCEDURE — 99223 1ST HOSP IP/OBS HIGH 75: CPT | Mod: ,,, | Performed by: PHYSICIAN ASSISTANT

## 2021-05-22 PROCEDURE — 11000001 HC ACUTE MED/SURG PRIVATE ROOM

## 2021-05-22 PROCEDURE — 99285 EMERGENCY DEPT VISIT HI MDM: CPT | Mod: 25

## 2021-05-22 PROCEDURE — G0378 HOSPITAL OBSERVATION PER HR: HCPCS

## 2021-05-22 PROCEDURE — 80053 COMPREHEN METABOLIC PANEL: CPT | Performed by: PHYSICIAN ASSISTANT

## 2021-05-22 PROCEDURE — 81001 URINALYSIS AUTO W/SCOPE: CPT | Performed by: PHYSICIAN ASSISTANT

## 2021-05-22 PROCEDURE — 85025 COMPLETE CBC W/AUTO DIFF WBC: CPT | Performed by: PHYSICIAN ASSISTANT

## 2021-05-22 PROCEDURE — 80074 ACUTE HEPATITIS PANEL: CPT | Performed by: PHYSICIAN ASSISTANT

## 2021-05-22 PROCEDURE — 99285 EMERGENCY DEPT VISIT HI MDM: CPT | Mod: CS,,, | Performed by: PHYSICIAN ASSISTANT

## 2021-05-22 PROCEDURE — 81025 URINE PREGNANCY TEST: CPT | Performed by: PHYSICIAN ASSISTANT

## 2021-05-22 PROCEDURE — 99285 PR EMERGENCY DEPT VISIT,LEVEL V: ICD-10-PCS | Mod: CS,,, | Performed by: PHYSICIAN ASSISTANT

## 2021-05-22 PROCEDURE — 85610 PROTHROMBIN TIME: CPT | Performed by: PHYSICIAN ASSISTANT

## 2021-05-22 PROCEDURE — 99223 PR INITIAL HOSPITAL CARE,LEVL III: ICD-10-PCS | Mod: ,,, | Performed by: PHYSICIAN ASSISTANT

## 2021-05-22 PROCEDURE — 83690 ASSAY OF LIPASE: CPT | Performed by: PHYSICIAN ASSISTANT

## 2021-05-22 RX ORDER — SODIUM CHLORIDE 0.9 % (FLUSH) 0.9 %
5 SYRINGE (ML) INJECTION
Status: DISCONTINUED | OUTPATIENT
Start: 2021-05-23 | End: 2021-05-26 | Stop reason: HOSPADM

## 2021-05-22 RX ORDER — AMITRIPTYLINE HYDROCHLORIDE 10 MG/1
10 TABLET, FILM COATED ORAL NIGHTLY
Status: DISCONTINUED | OUTPATIENT
Start: 2021-05-23 | End: 2021-05-26 | Stop reason: HOSPADM

## 2021-05-22 RX ORDER — MAG HYDROX/ALUMINUM HYD/SIMETH 200-200-20
30 SUSPENSION, ORAL (FINAL DOSE FORM) ORAL EVERY 6 HOURS PRN
Status: DISCONTINUED | OUTPATIENT
Start: 2021-05-23 | End: 2021-05-26 | Stop reason: HOSPADM

## 2021-05-22 RX ORDER — PANTOPRAZOLE SODIUM 40 MG/1
80 TABLET, DELAYED RELEASE ORAL DAILY
Status: DISCONTINUED | OUTPATIENT
Start: 2021-05-23 | End: 2021-05-26 | Stop reason: HOSPADM

## 2021-05-22 RX ORDER — SUCRALFATE 1 G/1
1 TABLET ORAL
Status: DISCONTINUED | OUTPATIENT
Start: 2021-05-23 | End: 2021-05-23

## 2021-05-22 RX ORDER — ONDANSETRON 2 MG/ML
4 INJECTION INTRAMUSCULAR; INTRAVENOUS EVERY 8 HOURS PRN
Status: DISCONTINUED | OUTPATIENT
Start: 2021-05-22 | End: 2021-05-26 | Stop reason: HOSPADM

## 2021-05-22 RX ORDER — ACETAMINOPHEN 325 MG/1
650 TABLET ORAL EVERY 4 HOURS PRN
Status: DISCONTINUED | OUTPATIENT
Start: 2021-05-23 | End: 2021-05-26 | Stop reason: HOSPADM

## 2021-05-22 RX ORDER — SODIUM CHLORIDE 0.9 % (FLUSH) 0.9 %
10 SYRINGE (ML) INJECTION
Status: DISCONTINUED | OUTPATIENT
Start: 2021-05-22 | End: 2021-05-26 | Stop reason: HOSPADM

## 2021-05-22 RX ORDER — GLUCAGON 1 MG
1 KIT INJECTION
Status: DISCONTINUED | OUTPATIENT
Start: 2021-05-23 | End: 2021-05-26 | Stop reason: HOSPADM

## 2021-05-22 RX ORDER — TALC
6 POWDER (GRAM) TOPICAL NIGHTLY PRN
Status: CANCELLED | OUTPATIENT
Start: 2021-05-22

## 2021-05-22 RX ORDER — TALC
6 POWDER (GRAM) TOPICAL NIGHTLY PRN
Status: DISCONTINUED | OUTPATIENT
Start: 2021-05-23 | End: 2021-05-26 | Stop reason: HOSPADM

## 2021-05-22 RX ORDER — IBUPROFEN 200 MG
16 TABLET ORAL
Status: DISCONTINUED | OUTPATIENT
Start: 2021-05-23 | End: 2021-05-26 | Stop reason: HOSPADM

## 2021-05-22 RX ORDER — IPRATROPIUM BROMIDE AND ALBUTEROL SULFATE 2.5; .5 MG/3ML; MG/3ML
3 SOLUTION RESPIRATORY (INHALATION) EVERY 4 HOURS PRN
Status: DISCONTINUED | OUTPATIENT
Start: 2021-05-23 | End: 2021-05-26 | Stop reason: HOSPADM

## 2021-05-22 RX ORDER — AMOXICILLIN 250 MG
1 CAPSULE ORAL 2 TIMES DAILY
Status: DISCONTINUED | OUTPATIENT
Start: 2021-05-22 | End: 2021-05-26 | Stop reason: HOSPADM

## 2021-05-22 RX ORDER — OXYCODONE HYDROCHLORIDE 5 MG/1
5 TABLET ORAL EVERY 6 HOURS PRN
Status: DISCONTINUED | OUTPATIENT
Start: 2021-05-23 | End: 2021-05-26 | Stop reason: HOSPADM

## 2021-05-22 RX ORDER — IBUPROFEN 200 MG
24 TABLET ORAL
Status: DISCONTINUED | OUTPATIENT
Start: 2021-05-23 | End: 2021-05-26 | Stop reason: HOSPADM

## 2021-05-23 LAB
ALBUMIN SERPL BCP-MCNC: 2.6 G/DL (ref 3.5–5.2)
ALP SERPL-CCNC: 69 U/L (ref 55–135)
ALT SERPL W/O P-5'-P-CCNC: 10 U/L (ref 10–44)
ANION GAP SERPL CALC-SCNC: 6 MMOL/L (ref 8–16)
AST SERPL-CCNC: 15 U/L (ref 10–40)
BASOPHILS # BLD AUTO: 0.02 K/UL (ref 0–0.2)
BASOPHILS NFR BLD: 0.8 % (ref 0–1.9)
BILIRUB SERPL-MCNC: 0.5 MG/DL (ref 0.1–1)
BUN SERPL-MCNC: 7 MG/DL (ref 6–20)
CALCIUM SERPL-MCNC: 8.6 MG/DL (ref 8.7–10.5)
CHLORIDE SERPL-SCNC: 109 MMOL/L (ref 95–110)
CO2 SERPL-SCNC: 26 MMOL/L (ref 23–29)
CREAT SERPL-MCNC: 0.7 MG/DL (ref 0.5–1.4)
DIFFERENTIAL METHOD: ABNORMAL
EOSINOPHIL # BLD AUTO: 0.1 K/UL (ref 0–0.5)
EOSINOPHIL NFR BLD: 2.6 % (ref 0–8)
ERYTHROCYTE [DISTWIDTH] IN BLOOD BY AUTOMATED COUNT: 15.2 % (ref 11.5–14.5)
EST. GFR  (AFRICAN AMERICAN): >60 ML/MIN/1.73 M^2
EST. GFR  (NON AFRICAN AMERICAN): >60 ML/MIN/1.73 M^2
ESTIMATED AVG GLUCOSE: 94 MG/DL (ref 68–131)
GLUCOSE SERPL-MCNC: 76 MG/DL (ref 70–110)
HBA1C MFR BLD: 4.9 % (ref 4–5.6)
HCT VFR BLD AUTO: 29 % (ref 37–48.5)
HGB BLD-MCNC: 8.8 G/DL (ref 12–16)
IMM GRANULOCYTES # BLD AUTO: 0 K/UL (ref 0–0.04)
IMM GRANULOCYTES NFR BLD AUTO: 0 % (ref 0–0.5)
LYMPHOCYTES # BLD AUTO: 1.2 K/UL (ref 1–4.8)
LYMPHOCYTES NFR BLD: 44.7 % (ref 18–48)
MAGNESIUM SERPL-MCNC: 1.8 MG/DL (ref 1.6–2.6)
MCH RBC QN AUTO: 24.4 PG (ref 27–31)
MCHC RBC AUTO-ENTMCNC: 30.3 G/DL (ref 32–36)
MCV RBC AUTO: 80 FL (ref 82–98)
MONOCYTES # BLD AUTO: 0.3 K/UL (ref 0.3–1)
MONOCYTES NFR BLD: 11.3 % (ref 4–15)
NEUTROPHILS # BLD AUTO: 1.1 K/UL (ref 1.8–7.7)
NEUTROPHILS NFR BLD: 40.6 % (ref 38–73)
NRBC BLD-RTO: 0 /100 WBC
PHOSPHATE SERPL-MCNC: 3.6 MG/DL (ref 2.7–4.5)
PLATELET # BLD AUTO: 203 K/UL (ref 150–450)
PMV BLD AUTO: 9.5 FL (ref 9.2–12.9)
POTASSIUM SERPL-SCNC: 3.7 MMOL/L (ref 3.5–5.1)
PROT SERPL-MCNC: 5.2 G/DL (ref 6–8.4)
RBC # BLD AUTO: 3.61 M/UL (ref 4–5.4)
SODIUM SERPL-SCNC: 141 MMOL/L (ref 136–145)
WBC # BLD AUTO: 2.66 K/UL (ref 3.9–12.7)

## 2021-05-23 PROCEDURE — 63600175 PHARM REV CODE 636 W HCPCS: Performed by: PHYSICIAN ASSISTANT

## 2021-05-23 PROCEDURE — 85025 COMPLETE CBC W/AUTO DIFF WBC: CPT | Performed by: PHYSICIAN ASSISTANT

## 2021-05-23 PROCEDURE — 36415 COLL VENOUS BLD VENIPUNCTURE: CPT | Performed by: PHYSICIAN ASSISTANT

## 2021-05-23 PROCEDURE — 25000003 PHARM REV CODE 250: Performed by: INTERNAL MEDICINE

## 2021-05-23 PROCEDURE — 83036 HEMOGLOBIN GLYCOSYLATED A1C: CPT | Performed by: PHYSICIAN ASSISTANT

## 2021-05-23 PROCEDURE — 11000001 HC ACUTE MED/SURG PRIVATE ROOM

## 2021-05-23 PROCEDURE — 83735 ASSAY OF MAGNESIUM: CPT | Performed by: PHYSICIAN ASSISTANT

## 2021-05-23 PROCEDURE — 99222 1ST HOSP IP/OBS MODERATE 55: CPT | Mod: ,,, | Performed by: INTERNAL MEDICINE

## 2021-05-23 PROCEDURE — G0378 HOSPITAL OBSERVATION PER HR: HCPCS

## 2021-05-23 PROCEDURE — 99222 PR INITIAL HOSPITAL CARE,LEVL II: ICD-10-PCS | Mod: ,,, | Performed by: INTERNAL MEDICINE

## 2021-05-23 PROCEDURE — 99232 PR SUBSEQUENT HOSPITAL CARE,LEVL II: ICD-10-PCS | Mod: ,,, | Performed by: INTERNAL MEDICINE

## 2021-05-23 PROCEDURE — 99232 SBSQ HOSP IP/OBS MODERATE 35: CPT | Mod: ,,, | Performed by: INTERNAL MEDICINE

## 2021-05-23 PROCEDURE — 80053 COMPREHEN METABOLIC PANEL: CPT | Performed by: PHYSICIAN ASSISTANT

## 2021-05-23 PROCEDURE — 84100 ASSAY OF PHOSPHORUS: CPT | Performed by: PHYSICIAN ASSISTANT

## 2021-05-23 PROCEDURE — 25000003 PHARM REV CODE 250: Performed by: PHYSICIAN ASSISTANT

## 2021-05-23 RX ORDER — SUCRALFATE 1 G/1
1 TABLET ORAL ONCE
Status: COMPLETED | OUTPATIENT
Start: 2021-05-23 | End: 2021-05-23

## 2021-05-23 RX ORDER — MORPHINE SULFATE 4 MG/ML
4 INJECTION, SOLUTION INTRAMUSCULAR; INTRAVENOUS EVERY 4 HOURS PRN
Status: DISCONTINUED | OUTPATIENT
Start: 2021-05-23 | End: 2021-05-26 | Stop reason: HOSPADM

## 2021-05-23 RX ORDER — SUCRALFATE 1 G/1
1 TABLET ORAL
Status: DISCONTINUED | OUTPATIENT
Start: 2021-05-25 | End: 2021-05-24

## 2021-05-23 RX ADMIN — OXYCODONE HYDROCHLORIDE 5 MG: 5 TABLET ORAL at 08:05

## 2021-05-23 RX ADMIN — SUCRALFATE 1 G: 1 TABLET ORAL at 04:05

## 2021-05-23 RX ADMIN — AMITRIPTYLINE HYDROCHLORIDE 10 MG: 10 TABLET, FILM COATED ORAL at 08:05

## 2021-05-23 RX ADMIN — SUCRALFATE 1 G: 1 TABLET ORAL at 12:05

## 2021-05-23 RX ADMIN — DOCUSATE SODIUM 50MG AND SENNOSIDES 8.6MG 1 TABLET: 8.6; 5 TABLET, FILM COATED ORAL at 08:05

## 2021-05-23 RX ADMIN — PANTOPRAZOLE SODIUM 80 MG: 40 TABLET, DELAYED RELEASE ORAL at 08:05

## 2021-05-23 RX ADMIN — PROMETHAZINE HYDROCHLORIDE 12.5 MG: 25 INJECTION INTRAMUSCULAR; INTRAVENOUS at 04:05

## 2021-05-23 RX ADMIN — OXYCODONE HYDROCHLORIDE 5 MG: 5 TABLET ORAL at 12:05

## 2021-05-23 RX ADMIN — OXYCODONE HYDROCHLORIDE 5 MG: 5 TABLET ORAL at 04:05

## 2021-05-23 RX ADMIN — SUCRALFATE 1 G: 1 TABLET ORAL at 08:05

## 2021-05-23 RX ADMIN — ACETAMINOPHEN 650 MG: 325 TABLET ORAL at 12:05

## 2021-05-24 LAB
ALBUMIN FLD-MCNC: 2.3 G/DL
ALBUMIN SERPL BCP-MCNC: 2.5 G/DL (ref 3.5–5.2)
ALP SERPL-CCNC: 62 U/L (ref 55–135)
ALT SERPL W/O P-5'-P-CCNC: 9 U/L (ref 10–44)
AMYLASE, BODY FLUID: 61 U/L
ANION GAP SERPL CALC-SCNC: 8 MMOL/L (ref 8–16)
APPEARANCE FLD: CLEAR
APTT BLDCRRT: 25.4 SEC (ref 21–32)
AST SERPL-CCNC: 13 U/L (ref 10–40)
BASOPHILS # BLD AUTO: 0.02 K/UL (ref 0–0.2)
BASOPHILS NFR BLD: 0.8 % (ref 0–1.9)
BILIRUB SERPL-MCNC: 0.5 MG/DL (ref 0.1–1)
BODY FLD TYPE: NORMAL
BODY FLUID SOURCE AMYLASE: NORMAL
BUN SERPL-MCNC: 6 MG/DL (ref 6–20)
CALCIUM SERPL-MCNC: 8.4 MG/DL (ref 8.7–10.5)
CHLORIDE SERPL-SCNC: 107 MMOL/L (ref 95–110)
CO2 SERPL-SCNC: 23 MMOL/L (ref 23–29)
COLOR FLD: YELLOW
CREAT SERPL-MCNC: 0.7 MG/DL (ref 0.5–1.4)
DIFFERENTIAL METHOD: ABNORMAL
EOSINOPHIL # BLD AUTO: 0.1 K/UL (ref 0–0.5)
EOSINOPHIL NFR BLD: 2.8 % (ref 0–8)
ERYTHROCYTE [DISTWIDTH] IN BLOOD BY AUTOMATED COUNT: 15 % (ref 11.5–14.5)
EST. GFR  (AFRICAN AMERICAN): >60 ML/MIN/1.73 M^2
EST. GFR  (NON AFRICAN AMERICAN): >60 ML/MIN/1.73 M^2
GLUCOSE SERPL-MCNC: 67 MG/DL (ref 70–110)
HAV IGM SERPL QL IA: NEGATIVE
HBV CORE IGM SERPL QL IA: NEGATIVE
HBV SURFACE AG SERPL QL IA: NEGATIVE
HCT VFR BLD AUTO: 27 % (ref 37–48.5)
HCV AB SERPL QL IA: NEGATIVE
HGB BLD-MCNC: 8.1 G/DL (ref 12–16)
IMM GRANULOCYTES # BLD AUTO: 0 K/UL (ref 0–0.04)
IMM GRANULOCYTES NFR BLD AUTO: 0 % (ref 0–0.5)
INR PPP: 1.1 (ref 0.8–1.2)
LYMPHOCYTES # BLD AUTO: 0.9 K/UL (ref 1–4.8)
LYMPHOCYTES NFR BLD: 37.1 % (ref 18–48)
LYMPHOCYTES NFR FLD MANUAL: 56 %
MAGNESIUM SERPL-MCNC: 1.6 MG/DL (ref 1.6–2.6)
MCH RBC QN AUTO: 23.8 PG (ref 27–31)
MCHC RBC AUTO-ENTMCNC: 30 G/DL (ref 32–36)
MCV RBC AUTO: 79 FL (ref 82–98)
MESOTHL CELL NFR FLD MANUAL: 6 %
MONOCYTES # BLD AUTO: 0.3 K/UL (ref 0.3–1)
MONOCYTES NFR BLD: 10.9 % (ref 4–15)
MONOS+MACROS NFR FLD MANUAL: 34 %
NEUTROPHILS # BLD AUTO: 1.2 K/UL (ref 1.8–7.7)
NEUTROPHILS NFR BLD: 48.4 % (ref 38–73)
NEUTROPHILS NFR FLD MANUAL: 4 %
NRBC BLD-RTO: 0 /100 WBC
PLATELET # BLD AUTO: 197 K/UL (ref 150–450)
PMV BLD AUTO: 9.8 FL (ref 9.2–12.9)
POTASSIUM SERPL-SCNC: 3.5 MMOL/L (ref 3.5–5.1)
PROT FLD-MCNC: 3.5 G/DL
PROT SERPL-MCNC: 5 G/DL (ref 6–8.4)
PROTHROMBIN TIME: 11.9 SEC (ref 9–12.5)
RBC # BLD AUTO: 3.4 M/UL (ref 4–5.4)
SODIUM SERPL-SCNC: 138 MMOL/L (ref 136–145)
SPECIMEN SOURCE: NORMAL
SPECIMEN SOURCE: NORMAL
WBC # BLD AUTO: 2.48 K/UL (ref 3.9–12.7)
WBC # FLD: 43 /CU MM

## 2021-05-24 PROCEDURE — 25000003 PHARM REV CODE 250: Performed by: PHYSICIAN ASSISTANT

## 2021-05-24 PROCEDURE — 88112 CYTOPATH CELL ENHANCE TECH: CPT | Mod: 26,,, | Performed by: PATHOLOGY

## 2021-05-24 PROCEDURE — 63600175 PHARM REV CODE 636 W HCPCS: Performed by: PHYSICIAN ASSISTANT

## 2021-05-24 PROCEDURE — 88341 IMHCHEM/IMCYTCHM EA ADD ANTB: CPT | Mod: 26,,, | Performed by: PATHOLOGY

## 2021-05-24 PROCEDURE — 87075 CULTR BACTERIA EXCEPT BLOOD: CPT | Performed by: INTERNAL MEDICINE

## 2021-05-24 PROCEDURE — 84311 SPECTROPHOTOMETRY: CPT | Performed by: INTERNAL MEDICINE

## 2021-05-24 PROCEDURE — 80053 COMPREHEN METABOLIC PANEL: CPT | Performed by: PHYSICIAN ASSISTANT

## 2021-05-24 PROCEDURE — 88341 IMHCHEM/IMCYTCHM EA ADD ANTB: CPT | Mod: 59 | Performed by: PATHOLOGY

## 2021-05-24 PROCEDURE — G0378 HOSPITAL OBSERVATION PER HR: HCPCS

## 2021-05-24 PROCEDURE — 99233 PR SUBSEQUENT HOSPITAL CARE,LEVL III: ICD-10-PCS | Mod: ,,, | Performed by: INTERNAL MEDICINE

## 2021-05-24 PROCEDURE — 82042 OTHER SOURCE ALBUMIN QUAN EA: CPT | Performed by: INTERNAL MEDICINE

## 2021-05-24 PROCEDURE — P9047 ALBUMIN (HUMAN), 25%, 50ML: HCPCS | Mod: JG | Performed by: INTERNAL MEDICINE

## 2021-05-24 PROCEDURE — 99233 SBSQ HOSP IP/OBS HIGH 50: CPT | Mod: ,,, | Performed by: INTERNAL MEDICINE

## 2021-05-24 PROCEDURE — 88342 IMHCHEM/IMCYTCHM 1ST ANTB: CPT | Mod: 26,,, | Performed by: PATHOLOGY

## 2021-05-24 PROCEDURE — 83735 ASSAY OF MAGNESIUM: CPT | Performed by: PHYSICIAN ASSISTANT

## 2021-05-24 PROCEDURE — 89051 BODY FLUID CELL COUNT: CPT | Performed by: INTERNAL MEDICINE

## 2021-05-24 PROCEDURE — 84157 ASSAY OF PROTEIN OTHER: CPT | Performed by: INTERNAL MEDICINE

## 2021-05-24 PROCEDURE — 88341 PR IHC OR ICC EACH ADD'L SINGLE ANTIBODY  STAINPR: ICD-10-PCS | Mod: 26,,, | Performed by: PATHOLOGY

## 2021-05-24 PROCEDURE — 88305 TISSUE EXAM BY PATHOLOGIST: CPT | Performed by: PATHOLOGY

## 2021-05-24 PROCEDURE — 85610 PROTHROMBIN TIME: CPT | Performed by: INTERNAL MEDICINE

## 2021-05-24 PROCEDURE — 11000001 HC ACUTE MED/SURG PRIVATE ROOM

## 2021-05-24 PROCEDURE — 82150 ASSAY OF AMYLASE: CPT | Performed by: INTERNAL MEDICINE

## 2021-05-24 PROCEDURE — 88112 PR  CYTOPATH, CELL ENHANCE TECH: ICD-10-PCS | Mod: 26,,, | Performed by: PATHOLOGY

## 2021-05-24 PROCEDURE — 85025 COMPLETE CBC W/AUTO DIFF WBC: CPT | Performed by: PHYSICIAN ASSISTANT

## 2021-05-24 PROCEDURE — 88305 TISSUE EXAM BY PATHOLOGIST: ICD-10-PCS | Mod: 26,,, | Performed by: PATHOLOGY

## 2021-05-24 PROCEDURE — 87070 CULTURE OTHR SPECIMN AEROBIC: CPT | Performed by: INTERNAL MEDICINE

## 2021-05-24 PROCEDURE — 36415 COLL VENOUS BLD VENIPUNCTURE: CPT | Performed by: INTERNAL MEDICINE

## 2021-05-24 PROCEDURE — 88112 CYTOPATH CELL ENHANCE TECH: CPT | Performed by: PATHOLOGY

## 2021-05-24 PROCEDURE — 88342 IMHCHEM/IMCYTCHM 1ST ANTB: CPT | Performed by: PATHOLOGY

## 2021-05-24 PROCEDURE — 63600175 PHARM REV CODE 636 W HCPCS: Mod: JG | Performed by: INTERNAL MEDICINE

## 2021-05-24 PROCEDURE — 88342 CHG IMMUNOCYTOCHEMISTRY: ICD-10-PCS | Mod: 26,,, | Performed by: PATHOLOGY

## 2021-05-24 PROCEDURE — 88305 TISSUE EXAM BY PATHOLOGIST: CPT | Mod: 26,,, | Performed by: PATHOLOGY

## 2021-05-24 PROCEDURE — 85730 THROMBOPLASTIN TIME PARTIAL: CPT | Performed by: INTERNAL MEDICINE

## 2021-05-24 RX ORDER — ALBUMIN HUMAN 250 G/1000ML
50 SOLUTION INTRAVENOUS ONCE
Status: DISCONTINUED | OUTPATIENT
Start: 2021-05-24 | End: 2021-05-24

## 2021-05-24 RX ORDER — SUCRALFATE 1 G/1
1 TABLET ORAL
Status: DISCONTINUED | OUTPATIENT
Start: 2021-05-26 | End: 2021-05-25

## 2021-05-24 RX ORDER — ALBUMIN HUMAN 250 G/1000ML
25 SOLUTION INTRAVENOUS ONCE
Status: COMPLETED | OUTPATIENT
Start: 2021-05-24 | End: 2021-05-24

## 2021-05-24 RX ORDER — ALBUMIN HUMAN 250 G/1000ML
12.5 SOLUTION INTRAVENOUS ONCE
Status: DISCONTINUED | OUTPATIENT
Start: 2021-05-24 | End: 2021-05-26 | Stop reason: HOSPADM

## 2021-05-24 RX ADMIN — OXYCODONE HYDROCHLORIDE 5 MG: 5 TABLET ORAL at 07:05

## 2021-05-24 RX ADMIN — MORPHINE SULFATE 4 MG: 4 INJECTION INTRAVENOUS at 09:05

## 2021-05-24 RX ADMIN — PANTOPRAZOLE SODIUM 80 MG: 40 TABLET, DELAYED RELEASE ORAL at 09:05

## 2021-05-24 RX ADMIN — AMITRIPTYLINE HYDROCHLORIDE 10 MG: 10 TABLET, FILM COATED ORAL at 09:05

## 2021-05-24 RX ADMIN — ONDANSETRON 4 MG: 2 INJECTION INTRAMUSCULAR; INTRAVENOUS at 09:05

## 2021-05-24 RX ADMIN — ALBUMIN (HUMAN) 25 G: 25 SOLUTION INTRAVENOUS at 11:05

## 2021-05-24 RX ADMIN — OXYCODONE HYDROCHLORIDE 5 MG: 5 TABLET ORAL at 04:05

## 2021-05-25 ENCOUNTER — ANESTHESIA EVENT (OUTPATIENT)
Dept: ENDOSCOPY | Facility: HOSPITAL | Age: 41
DRG: 374 | End: 2021-05-25
Payer: COMMERCIAL

## 2021-05-25 LAB
ALBUMIN SERPL BCP-MCNC: 2.9 G/DL (ref 3.5–5.2)
ALP SERPL-CCNC: 65 U/L (ref 55–135)
ALT SERPL W/O P-5'-P-CCNC: 6 U/L (ref 10–44)
ANION GAP SERPL CALC-SCNC: 9 MMOL/L (ref 8–16)
AST SERPL-CCNC: 12 U/L (ref 10–40)
BASOPHILS # BLD AUTO: 0.01 K/UL (ref 0–0.2)
BASOPHILS NFR BLD: 0.4 % (ref 0–1.9)
BILIRUB SERPL-MCNC: 0.7 MG/DL (ref 0.1–1)
BUN SERPL-MCNC: 6 MG/DL (ref 6–20)
CALCIUM SERPL-MCNC: 8.8 MG/DL (ref 8.7–10.5)
CHLORIDE SERPL-SCNC: 104 MMOL/L (ref 95–110)
CO2 SERPL-SCNC: 24 MMOL/L (ref 23–29)
CREAT SERPL-MCNC: 0.7 MG/DL (ref 0.5–1.4)
DIFFERENTIAL METHOD: ABNORMAL
EOSINOPHIL # BLD AUTO: 0.1 K/UL (ref 0–0.5)
EOSINOPHIL NFR BLD: 3.6 % (ref 0–8)
ERYTHROCYTE [DISTWIDTH] IN BLOOD BY AUTOMATED COUNT: 14.6 % (ref 11.5–14.5)
EST. GFR  (AFRICAN AMERICAN): >60 ML/MIN/1.73 M^2
EST. GFR  (NON AFRICAN AMERICAN): >60 ML/MIN/1.73 M^2
GLUCOSE SERPL-MCNC: 67 MG/DL (ref 70–110)
HCT VFR BLD AUTO: 28.2 % (ref 37–48.5)
HGB BLD-MCNC: 8.6 G/DL (ref 12–16)
IMM GRANULOCYTES # BLD AUTO: 0 K/UL (ref 0–0.04)
IMM GRANULOCYTES NFR BLD AUTO: 0 % (ref 0–0.5)
LYMPHOCYTES # BLD AUTO: 0.9 K/UL (ref 1–4.8)
LYMPHOCYTES NFR BLD: 33.6 % (ref 18–48)
MAGNESIUM SERPL-MCNC: 1.6 MG/DL (ref 1.6–2.6)
MCH RBC QN AUTO: 24.2 PG (ref 27–31)
MCHC RBC AUTO-ENTMCNC: 30.5 G/DL (ref 32–36)
MCV RBC AUTO: 79 FL (ref 82–98)
MONOCYTES # BLD AUTO: 0.3 K/UL (ref 0.3–1)
MONOCYTES NFR BLD: 9.7 % (ref 4–15)
NEUTROPHILS # BLD AUTO: 1.5 K/UL (ref 1.8–7.7)
NEUTROPHILS NFR BLD: 52.7 % (ref 38–73)
NRBC BLD-RTO: 0 /100 WBC
PLATELET # BLD AUTO: 191 K/UL (ref 150–450)
PMV BLD AUTO: 9.6 FL (ref 9.2–12.9)
POTASSIUM SERPL-SCNC: 4.1 MMOL/L (ref 3.5–5.1)
PROT SERPL-MCNC: 5.5 G/DL (ref 6–8.4)
RBC # BLD AUTO: 3.55 M/UL (ref 4–5.4)
SARS-COV-2 RDRP RESP QL NAA+PROBE: NEGATIVE
SODIUM SERPL-SCNC: 137 MMOL/L (ref 136–145)
WBC # BLD AUTO: 2.77 K/UL (ref 3.9–12.7)

## 2021-05-25 PROCEDURE — 99232 PR SUBSEQUENT HOSPITAL CARE,LEVL II: ICD-10-PCS | Mod: GT,,, | Performed by: INTERNAL MEDICINE

## 2021-05-25 PROCEDURE — 83735 ASSAY OF MAGNESIUM: CPT | Performed by: PHYSICIAN ASSISTANT

## 2021-05-25 PROCEDURE — 85025 COMPLETE CBC W/AUTO DIFF WBC: CPT | Performed by: PHYSICIAN ASSISTANT

## 2021-05-25 PROCEDURE — 25000003 PHARM REV CODE 250: Performed by: PHYSICIAN ASSISTANT

## 2021-05-25 PROCEDURE — U0002 COVID-19 LAB TEST NON-CDC: HCPCS | Performed by: INTERNAL MEDICINE

## 2021-05-25 PROCEDURE — 11000001 HC ACUTE MED/SURG PRIVATE ROOM

## 2021-05-25 PROCEDURE — 63600175 PHARM REV CODE 636 W HCPCS: Performed by: INTERNAL MEDICINE

## 2021-05-25 PROCEDURE — 99232 SBSQ HOSP IP/OBS MODERATE 35: CPT | Mod: GT,,, | Performed by: INTERNAL MEDICINE

## 2021-05-25 PROCEDURE — 36415 COLL VENOUS BLD VENIPUNCTURE: CPT | Performed by: PHYSICIAN ASSISTANT

## 2021-05-25 PROCEDURE — G0378 HOSPITAL OBSERVATION PER HR: HCPCS

## 2021-05-25 PROCEDURE — 80053 COMPREHEN METABOLIC PANEL: CPT | Performed by: PHYSICIAN ASSISTANT

## 2021-05-25 RX ORDER — SUCRALFATE 1 G/1
1 TABLET ORAL
Status: DISCONTINUED | OUTPATIENT
Start: 2021-05-27 | End: 2021-05-26 | Stop reason: HOSPADM

## 2021-05-25 RX ORDER — DEXTROSE MONOHYDRATE AND SODIUM CHLORIDE 5; .9 G/100ML; G/100ML
INJECTION, SOLUTION INTRAVENOUS CONTINUOUS
Status: ACTIVE | OUTPATIENT
Start: 2021-05-25 | End: 2021-05-25

## 2021-05-25 RX ADMIN — DOCUSATE SODIUM 50MG AND SENNOSIDES 8.6MG 1 TABLET: 8.6; 5 TABLET, FILM COATED ORAL at 08:05

## 2021-05-25 RX ADMIN — ALUMINUM HYDROXIDE, MAGNESIUM HYDROXIDE, AND SIMETHICONE 30 ML: 200; 200; 20 SUSPENSION ORAL at 08:05

## 2021-05-25 RX ADMIN — MORPHINE SULFATE 4 MG: 4 INJECTION INTRAVENOUS at 10:05

## 2021-05-25 RX ADMIN — PANTOPRAZOLE SODIUM 80 MG: 40 TABLET, DELAYED RELEASE ORAL at 08:05

## 2021-05-25 RX ADMIN — MORPHINE SULFATE 4 MG: 4 INJECTION INTRAVENOUS at 08:05

## 2021-05-25 RX ADMIN — OXYCODONE HYDROCHLORIDE 5 MG: 5 TABLET ORAL at 08:05

## 2021-05-25 RX ADMIN — AMITRIPTYLINE HYDROCHLORIDE 10 MG: 10 TABLET, FILM COATED ORAL at 08:05

## 2021-05-26 ENCOUNTER — PATIENT MESSAGE (OUTPATIENT)
Dept: ENDOSCOPY | Facility: HOSPITAL | Age: 41
End: 2021-05-26

## 2021-05-26 ENCOUNTER — ANESTHESIA (OUTPATIENT)
Dept: ENDOSCOPY | Facility: HOSPITAL | Age: 41
DRG: 374 | End: 2021-05-26
Payer: COMMERCIAL

## 2021-05-26 VITALS
WEIGHT: 122.56 LBS | OXYGEN SATURATION: 99 % | DIASTOLIC BLOOD PRESSURE: 87 MMHG | RESPIRATION RATE: 15 BRPM | TEMPERATURE: 97 F | SYSTOLIC BLOOD PRESSURE: 128 MMHG | HEART RATE: 70 BPM | HEIGHT: 66 IN | BODY MASS INDEX: 19.7 KG/M2

## 2021-05-26 LAB
ADENOSINE DEAMINASE PRT-CCNC: 6 U/L (ref 0–30)
ALBUMIN SERPL BCP-MCNC: 2.8 G/DL (ref 3.5–5.2)
ALP SERPL-CCNC: 63 U/L (ref 55–135)
ALT SERPL W/O P-5'-P-CCNC: 8 U/L (ref 10–44)
ANION GAP SERPL CALC-SCNC: 10 MMOL/L (ref 8–16)
AST SERPL-CCNC: 12 U/L (ref 10–40)
BASOPHILS # BLD AUTO: 0.01 K/UL (ref 0–0.2)
BASOPHILS NFR BLD: 0.4 % (ref 0–1.9)
BILIRUB SERPL-MCNC: 0.4 MG/DL (ref 0.1–1)
BUN SERPL-MCNC: 6 MG/DL (ref 6–20)
CALCIUM SERPL-MCNC: 8.7 MG/DL (ref 8.7–10.5)
CHLORIDE SERPL-SCNC: 103 MMOL/L (ref 95–110)
CO2 SERPL-SCNC: 24 MMOL/L (ref 23–29)
CREAT SERPL-MCNC: 0.7 MG/DL (ref 0.5–1.4)
DIFFERENTIAL METHOD: ABNORMAL
EOSINOPHIL # BLD AUTO: 0.1 K/UL (ref 0–0.5)
EOSINOPHIL NFR BLD: 3.2 % (ref 0–8)
ERYTHROCYTE [DISTWIDTH] IN BLOOD BY AUTOMATED COUNT: 14.7 % (ref 11.5–14.5)
EST. GFR  (AFRICAN AMERICAN): >60 ML/MIN/1.73 M^2
EST. GFR  (NON AFRICAN AMERICAN): >60 ML/MIN/1.73 M^2
FERRITIN SERPL-MCNC: 10 NG/ML (ref 20–300)
GLUCOSE SERPL-MCNC: 73 MG/DL (ref 70–110)
HCT VFR BLD AUTO: 27.5 % (ref 37–48.5)
HGB BLD-MCNC: 8.6 G/DL (ref 12–16)
IMM GRANULOCYTES # BLD AUTO: 0 K/UL (ref 0–0.04)
IMM GRANULOCYTES NFR BLD AUTO: 0 % (ref 0–0.5)
IRON SERPL-MCNC: 13 UG/DL (ref 30–160)
LYMPHOCYTES # BLD AUTO: 1 K/UL (ref 1–4.8)
LYMPHOCYTES NFR BLD: 34.6 % (ref 18–48)
MAGNESIUM SERPL-MCNC: 1.7 MG/DL (ref 1.6–2.6)
MCH RBC QN AUTO: 24.6 PG (ref 27–31)
MCHC RBC AUTO-ENTMCNC: 31.3 G/DL (ref 32–36)
MCV RBC AUTO: 79 FL (ref 82–98)
MONOCYTES # BLD AUTO: 0.3 K/UL (ref 0.3–1)
MONOCYTES NFR BLD: 11.1 % (ref 4–15)
NEUTROPHILS # BLD AUTO: 1.4 K/UL (ref 1.8–7.7)
NEUTROPHILS NFR BLD: 50.7 % (ref 38–73)
NRBC BLD-RTO: 0 /100 WBC
PLATELET # BLD AUTO: 200 K/UL (ref 150–450)
PMV BLD AUTO: 10 FL (ref 9.2–12.9)
POTASSIUM SERPL-SCNC: 3.7 MMOL/L (ref 3.5–5.1)
PROT SERPL-MCNC: 5.2 G/DL (ref 6–8.4)
RBC # BLD AUTO: 3.49 M/UL (ref 4–5.4)
SATURATED IRON: 5 % (ref 20–50)
SODIUM SERPL-SCNC: 137 MMOL/L (ref 136–145)
TOTAL IRON BINDING CAPACITY: 258 UG/DL (ref 250–450)
TRANSFERRIN SERPL-MCNC: 174 MG/DL (ref 200–375)
WBC # BLD AUTO: 2.8 K/UL (ref 3.9–12.7)

## 2021-05-26 PROCEDURE — 88360 TUMOR IMMUNOHISTOCHEM/MANUAL: CPT | Mod: 91 | Performed by: PATHOLOGY

## 2021-05-26 PROCEDURE — 94761 N-INVAS EAR/PLS OXIMETRY MLT: CPT

## 2021-05-26 PROCEDURE — 82728 ASSAY OF FERRITIN: CPT | Performed by: INTERNAL MEDICINE

## 2021-05-26 PROCEDURE — 27201012 HC FORCEPS, HOT/COLD, DISP: Performed by: INTERNAL MEDICINE

## 2021-05-26 PROCEDURE — G0378 HOSPITAL OBSERVATION PER HR: HCPCS

## 2021-05-26 PROCEDURE — 83540 ASSAY OF IRON: CPT | Performed by: INTERNAL MEDICINE

## 2021-05-26 PROCEDURE — 43239 EGD BIOPSY SINGLE/MULTIPLE: CPT | Mod: ,,, | Performed by: INTERNAL MEDICINE

## 2021-05-26 PROCEDURE — 88342 CHG IMMUNOCYTOCHEMISTRY: ICD-10-PCS | Mod: 26,,, | Performed by: PATHOLOGY

## 2021-05-26 PROCEDURE — D9220A PRA ANESTHESIA: ICD-10-PCS | Mod: ,,, | Performed by: NURSE ANESTHETIST, CERTIFIED REGISTERED

## 2021-05-26 PROCEDURE — D9220A PRA ANESTHESIA: Mod: ,,, | Performed by: ANESTHESIOLOGY

## 2021-05-26 PROCEDURE — 25000003 PHARM REV CODE 250: Performed by: PHYSICIAN ASSISTANT

## 2021-05-26 PROCEDURE — 85025 COMPLETE CBC W/AUTO DIFF WBC: CPT | Performed by: PHYSICIAN ASSISTANT

## 2021-05-26 PROCEDURE — 88342 IMHCHEM/IMCYTCHM 1ST ANTB: CPT | Performed by: PATHOLOGY

## 2021-05-26 PROCEDURE — 99239 PR HOSPITAL DISCHARGE DAY,>30 MIN: ICD-10-PCS | Mod: GT,,, | Performed by: INTERNAL MEDICINE

## 2021-05-26 PROCEDURE — 88377 M/PHMTRC ALYS ISHQUANT/SEMIQ: CPT | Mod: 91 | Performed by: PATHOLOGY

## 2021-05-26 PROCEDURE — 88305 TISSUE EXAM BY PATHOLOGIST: CPT | Performed by: PATHOLOGY

## 2021-05-26 PROCEDURE — D9220A PRA ANESTHESIA: ICD-10-PCS | Mod: ,,, | Performed by: ANESTHESIOLOGY

## 2021-05-26 PROCEDURE — 88341 IMHCHEM/IMCYTCHM EA ADD ANTB: CPT | Performed by: PATHOLOGY

## 2021-05-26 PROCEDURE — 83735 ASSAY OF MAGNESIUM: CPT | Performed by: PHYSICIAN ASSISTANT

## 2021-05-26 PROCEDURE — 88341 IMHCHEM/IMCYTCHM EA ADD ANTB: CPT | Mod: 26,,, | Performed by: PATHOLOGY

## 2021-05-26 PROCEDURE — 88341 PR IHC OR ICC EACH ADD'L SINGLE ANTIBODY  STAINPR: ICD-10-PCS | Mod: 26,,, | Performed by: PATHOLOGY

## 2021-05-26 PROCEDURE — 43239 PR EGD, FLEX, W/BIOPSY, SGL/MULTI: ICD-10-PCS | Mod: ,,, | Performed by: INTERNAL MEDICINE

## 2021-05-26 PROCEDURE — 11000001 HC ACUTE MED/SURG PRIVATE ROOM

## 2021-05-26 PROCEDURE — 80053 COMPREHEN METABOLIC PANEL: CPT | Performed by: PHYSICIAN ASSISTANT

## 2021-05-26 PROCEDURE — 43239 EGD BIOPSY SINGLE/MULTIPLE: CPT | Performed by: INTERNAL MEDICINE

## 2021-05-26 PROCEDURE — 37000009 HC ANESTHESIA EA ADD 15 MINS: Performed by: INTERNAL MEDICINE

## 2021-05-26 PROCEDURE — 88360 TUMOR IMMUNOHISTOCHEM/MANUAL: CPT | Mod: 59 | Performed by: PATHOLOGY

## 2021-05-26 PROCEDURE — 37000008 HC ANESTHESIA 1ST 15 MINUTES: Performed by: INTERNAL MEDICINE

## 2021-05-26 PROCEDURE — 88342 IMHCHEM/IMCYTCHM 1ST ANTB: CPT | Mod: 26,,, | Performed by: PATHOLOGY

## 2021-05-26 PROCEDURE — D9220A PRA ANESTHESIA: Mod: ,,, | Performed by: NURSE ANESTHETIST, CERTIFIED REGISTERED

## 2021-05-26 PROCEDURE — 36415 COLL VENOUS BLD VENIPUNCTURE: CPT | Performed by: PHYSICIAN ASSISTANT

## 2021-05-26 PROCEDURE — 88360 TUMOR IMMUNOHISTOCHEM/MANUAL: CPT | Performed by: PATHOLOGY

## 2021-05-26 PROCEDURE — 63600175 PHARM REV CODE 636 W HCPCS: Performed by: NURSE ANESTHETIST, CERTIFIED REGISTERED

## 2021-05-26 PROCEDURE — 88305 TISSUE EXAM BY PATHOLOGIST: ICD-10-PCS | Mod: 26,,, | Performed by: PATHOLOGY

## 2021-05-26 PROCEDURE — 88305 TISSUE EXAM BY PATHOLOGIST: CPT | Mod: 26,,, | Performed by: PATHOLOGY

## 2021-05-26 PROCEDURE — 25000003 PHARM REV CODE 250: Performed by: NURSE ANESTHETIST, CERTIFIED REGISTERED

## 2021-05-26 PROCEDURE — 99239 HOSP IP/OBS DSCHRG MGMT >30: CPT | Mod: GT,,, | Performed by: INTERNAL MEDICINE

## 2021-05-26 RX ORDER — FENTANYL CITRATE 50 UG/ML
25 INJECTION, SOLUTION INTRAMUSCULAR; INTRAVENOUS EVERY 5 MIN PRN
Status: DISCONTINUED | OUTPATIENT
Start: 2021-05-26 | End: 2021-05-26 | Stop reason: HOSPADM

## 2021-05-26 RX ORDER — FENTANYL CITRATE 50 UG/ML
INJECTION, SOLUTION INTRAMUSCULAR; INTRAVENOUS
Status: DISCONTINUED | OUTPATIENT
Start: 2021-05-26 | End: 2021-05-26

## 2021-05-26 RX ORDER — SUCRALFATE 1 G/1
1 TABLET ORAL 4 TIMES DAILY PRN
Qty: 56 TABLET | Refills: 0
Start: 2021-05-26 | End: 2021-10-09

## 2021-05-26 RX ORDER — OXYCODONE HYDROCHLORIDE 5 MG/1
5 TABLET ORAL
Status: DISCONTINUED | OUTPATIENT
Start: 2021-05-26 | End: 2021-05-26 | Stop reason: HOSPADM

## 2021-05-26 RX ORDER — LIDOCAINE HYDROCHLORIDE 20 MG/ML
INJECTION INTRAVENOUS
Status: DISCONTINUED | OUTPATIENT
Start: 2021-05-26 | End: 2021-05-26

## 2021-05-26 RX ORDER — FUROSEMIDE 40 MG/1
20 TABLET ORAL DAILY PRN
Qty: 30 TABLET | Refills: 1 | Status: SHIPPED | OUTPATIENT
Start: 2021-05-26 | End: 2022-05-26

## 2021-05-26 RX ORDER — FERROUS SULFATE 325(65) MG
TABLET ORAL
Qty: 60 TABLET | Refills: 10 | Status: SHIPPED | OUTPATIENT
Start: 2021-05-26 | End: 2022-05-26

## 2021-05-26 RX ORDER — AMOXICILLIN 250 MG
1 CAPSULE ORAL 2 TIMES DAILY PRN
COMMUNITY
Start: 2021-05-26

## 2021-05-26 RX ORDER — PROCHLORPERAZINE EDISYLATE 5 MG/ML
5 INJECTION INTRAMUSCULAR; INTRAVENOUS EVERY 30 MIN PRN
Status: DISCONTINUED | OUTPATIENT
Start: 2021-05-26 | End: 2021-05-26 | Stop reason: HOSPADM

## 2021-05-26 RX ORDER — PROPOFOL 10 MG/ML
VIAL (ML) INTRAVENOUS CONTINUOUS PRN
Status: DISCONTINUED | OUTPATIENT
Start: 2021-05-26 | End: 2021-05-26

## 2021-05-26 RX ORDER — POTASSIUM CHLORIDE 20 MEQ/1
20 TABLET, EXTENDED RELEASE ORAL DAILY PRN
Qty: 30 TABLET | Refills: 1 | Status: SHIPPED | OUTPATIENT
Start: 2021-05-26 | End: 2021-12-02

## 2021-05-26 RX ORDER — PROPOFOL 10 MG/ML
VIAL (ML) INTRAVENOUS
Status: DISCONTINUED | OUTPATIENT
Start: 2021-05-26 | End: 2021-05-26

## 2021-05-26 RX ORDER — ONDANSETRON 8 MG/1
8 TABLET, ORALLY DISINTEGRATING ORAL EVERY 6 HOURS PRN
Qty: 30 TABLET | Refills: 1 | Status: SHIPPED | OUTPATIENT
Start: 2021-05-26 | End: 2021-07-26

## 2021-05-26 RX ORDER — FUROSEMIDE 40 MG/1
40 TABLET ORAL DAILY PRN
Qty: 30 TABLET | Refills: 1 | Status: SHIPPED | OUTPATIENT
Start: 2021-05-26 | End: 2021-05-26 | Stop reason: SDUPTHER

## 2021-05-26 RX ORDER — HYDROMORPHONE HYDROCHLORIDE 1 MG/ML
0.2 INJECTION, SOLUTION INTRAMUSCULAR; INTRAVENOUS; SUBCUTANEOUS EVERY 5 MIN PRN
Status: DISCONTINUED | OUTPATIENT
Start: 2021-05-26 | End: 2021-05-26 | Stop reason: HOSPADM

## 2021-05-26 RX ADMIN — OXYCODONE HYDROCHLORIDE 5 MG: 5 TABLET ORAL at 04:05

## 2021-05-26 RX ADMIN — Medication 125 MCG/KG/MIN: at 10:05

## 2021-05-26 RX ADMIN — DOCUSATE SODIUM 50MG AND SENNOSIDES 8.6MG 1 TABLET: 8.6; 5 TABLET, FILM COATED ORAL at 01:05

## 2021-05-26 RX ADMIN — LIDOCAINE HYDROCHLORIDE 50 MG: 20 INJECTION, SOLUTION INTRAVENOUS at 10:05

## 2021-05-26 RX ADMIN — FENTANYL CITRATE 50 MCG: 50 INJECTION, SOLUTION INTRAMUSCULAR; INTRAVENOUS at 10:05

## 2021-05-26 RX ADMIN — PROPOFOL 10 MG: 10 INJECTION, EMULSION INTRAVENOUS at 10:05

## 2021-05-26 RX ADMIN — SODIUM CHLORIDE: 0.9 INJECTION, SOLUTION INTRAVENOUS at 10:05

## 2021-05-26 RX ADMIN — PROPOFOL 30 MG: 10 INJECTION, EMULSION INTRAVENOUS at 10:05

## 2021-05-26 RX ADMIN — PANTOPRAZOLE SODIUM 80 MG: 40 TABLET, DELAYED RELEASE ORAL at 01:05

## 2021-05-27 LAB
BACTERIA SPEC AEROBE CULT: NO GROWTH
FINAL PATHOLOGIC DIAGNOSIS: ABNORMAL
Lab: ABNORMAL

## 2021-05-28 ENCOUNTER — PATIENT MESSAGE (OUTPATIENT)
Dept: GASTROENTEROLOGY | Facility: CLINIC | Age: 41
End: 2021-05-28

## 2021-05-28 ENCOUNTER — TELEPHONE (OUTPATIENT)
Dept: GASTROENTEROLOGY | Facility: CLINIC | Age: 41
End: 2021-05-28

## 2021-05-28 DIAGNOSIS — C80.1 SIGNET RING CELL ADENOCARCINOMA: Primary | ICD-10-CM

## 2021-05-29 ENCOUNTER — NURSE TRIAGE (OUTPATIENT)
Dept: ADMINISTRATIVE | Facility: CLINIC | Age: 41
End: 2021-05-29

## 2021-05-31 ENCOUNTER — TELEPHONE (OUTPATIENT)
Dept: SURGERY | Facility: CLINIC | Age: 41
End: 2021-05-31

## 2021-05-31 ENCOUNTER — TELEPHONE (OUTPATIENT)
Dept: HEMATOLOGY/ONCOLOGY | Facility: CLINIC | Age: 41
End: 2021-05-31

## 2021-05-31 ENCOUNTER — TELEPHONE (OUTPATIENT)
Dept: GASTROENTEROLOGY | Facility: CLINIC | Age: 41
End: 2021-05-31

## 2021-05-31 LAB — BACTERIA SPEC ANAEROBE CULT: NORMAL

## 2021-06-03 ENCOUNTER — LAB VISIT (OUTPATIENT)
Dept: LAB | Facility: HOSPITAL | Age: 41
End: 2021-06-03
Payer: COMMERCIAL

## 2021-06-03 ENCOUNTER — OFFICE VISIT (OUTPATIENT)
Dept: HEMATOLOGY/ONCOLOGY | Facility: CLINIC | Age: 41
End: 2021-06-03
Payer: COMMERCIAL

## 2021-06-03 VITALS
BODY MASS INDEX: 19.08 KG/M2 | TEMPERATURE: 98 F | OXYGEN SATURATION: 100 % | RESPIRATION RATE: 18 BRPM | HEIGHT: 66 IN | SYSTOLIC BLOOD PRESSURE: 118 MMHG | DIASTOLIC BLOOD PRESSURE: 81 MMHG | WEIGHT: 118.69 LBS | HEART RATE: 73 BPM

## 2021-06-03 DIAGNOSIS — C80.1 SIGNET RING CELL ADENOCARCINOMA: Primary | ICD-10-CM

## 2021-06-03 DIAGNOSIS — C80.1 SIGNET RING CELL ADENOCARCINOMA: ICD-10-CM

## 2021-06-03 DIAGNOSIS — K25.5 CHRONIC GASTRIC ULCER WITH PERFORATION: ICD-10-CM

## 2021-06-03 DIAGNOSIS — D50.9 MICROCYTIC ANEMIA: ICD-10-CM

## 2021-06-03 DIAGNOSIS — R18.8 OTHER ASCITES: ICD-10-CM

## 2021-06-03 DIAGNOSIS — R11.2 NON-INTRACTABLE VOMITING WITH NAUSEA, UNSPECIFIED VOMITING TYPE: ICD-10-CM

## 2021-06-03 LAB
ALBUMIN SERPL BCP-MCNC: 3.3 G/DL (ref 3.5–5.2)
ALP SERPL-CCNC: 77 U/L (ref 55–135)
ALT SERPL W/O P-5'-P-CCNC: 15 U/L (ref 10–44)
ANION GAP SERPL CALC-SCNC: 7 MMOL/L (ref 8–16)
AST SERPL-CCNC: 16 U/L (ref 10–40)
BASOPHILS # BLD AUTO: 0.02 K/UL (ref 0–0.2)
BASOPHILS NFR BLD: 0.6 % (ref 0–1.9)
BILIRUB SERPL-MCNC: 0.5 MG/DL (ref 0.1–1)
BUN SERPL-MCNC: 9 MG/DL (ref 6–20)
CALCIUM SERPL-MCNC: 9.2 MG/DL (ref 8.7–10.5)
CANCER AG125 SERPL-ACNC: 21 U/ML (ref 0–30)
CEA SERPL-MCNC: 6.4 NG/ML (ref 0–5)
CHLORIDE SERPL-SCNC: 105 MMOL/L (ref 95–110)
CO2 SERPL-SCNC: 27 MMOL/L (ref 23–29)
CREAT SERPL-MCNC: 0.8 MG/DL (ref 0.5–1.4)
DIFFERENTIAL METHOD: ABNORMAL
EOSINOPHIL # BLD AUTO: 0.1 K/UL (ref 0–0.5)
EOSINOPHIL NFR BLD: 2.9 % (ref 0–8)
ERYTHROCYTE [DISTWIDTH] IN BLOOD BY AUTOMATED COUNT: 15.8 % (ref 11.5–14.5)
EST. GFR  (AFRICAN AMERICAN): >60 ML/MIN/1.73 M^2
EST. GFR  (NON AFRICAN AMERICAN): >60 ML/MIN/1.73 M^2
GLUCOSE SERPL-MCNC: 86 MG/DL (ref 70–110)
HCT VFR BLD AUTO: 33.9 % (ref 37–48.5)
HGB BLD-MCNC: 10.1 G/DL (ref 12–16)
IMM GRANULOCYTES # BLD AUTO: 0 K/UL (ref 0–0.04)
IMM GRANULOCYTES NFR BLD AUTO: 0 % (ref 0–0.5)
LYMPHOCYTES # BLD AUTO: 1.1 K/UL (ref 1–4.8)
LYMPHOCYTES NFR BLD: 36.5 % (ref 18–48)
MCH RBC QN AUTO: 24.8 PG (ref 27–31)
MCHC RBC AUTO-ENTMCNC: 29.8 G/DL (ref 32–36)
MCV RBC AUTO: 83 FL (ref 82–98)
MONOCYTES # BLD AUTO: 0.3 K/UL (ref 0.3–1)
MONOCYTES NFR BLD: 8.4 % (ref 4–15)
NEUTROPHILS # BLD AUTO: 1.6 K/UL (ref 1.8–7.7)
NEUTROPHILS NFR BLD: 51.6 % (ref 38–73)
NRBC BLD-RTO: 0 /100 WBC
PLATELET # BLD AUTO: 287 K/UL (ref 150–450)
PMV BLD AUTO: 9.3 FL (ref 9.2–12.9)
POTASSIUM SERPL-SCNC: 4.2 MMOL/L (ref 3.5–5.1)
PROT SERPL-MCNC: 6.5 G/DL (ref 6–8.4)
RBC # BLD AUTO: 4.07 M/UL (ref 4–5.4)
SODIUM SERPL-SCNC: 139 MMOL/L (ref 136–145)
WBC # BLD AUTO: 3.1 K/UL (ref 3.9–12.7)

## 2021-06-03 PROCEDURE — 86304 IMMUNOASSAY TUMOR CA 125: CPT | Performed by: INTERNAL MEDICINE

## 2021-06-03 PROCEDURE — 99205 OFFICE O/P NEW HI 60 MIN: CPT | Mod: S$GLB,,, | Performed by: INTERNAL MEDICINE

## 2021-06-03 PROCEDURE — 1111F PR DISCHARGE MEDS RECONCILED W/ CURRENT OUTPATIENT MED LIST: ICD-10-PCS | Mod: CPTII,S$GLB,, | Performed by: INTERNAL MEDICINE

## 2021-06-03 PROCEDURE — 99999 PR PBB SHADOW E&M-EST. PATIENT-LVL IV: CPT | Mod: PBBFAC,,, | Performed by: INTERNAL MEDICINE

## 2021-06-03 PROCEDURE — 1125F AMNT PAIN NOTED PAIN PRSNT: CPT | Mod: S$GLB,,, | Performed by: INTERNAL MEDICINE

## 2021-06-03 PROCEDURE — 99999 PR PBB SHADOW E&M-EST. PATIENT-LVL IV: ICD-10-PCS | Mod: PBBFAC,,, | Performed by: INTERNAL MEDICINE

## 2021-06-03 PROCEDURE — 3008F PR BODY MASS INDEX (BMI) DOCUMENTED: ICD-10-PCS | Mod: CPTII,S$GLB,, | Performed by: INTERNAL MEDICINE

## 2021-06-03 PROCEDURE — 3008F BODY MASS INDEX DOCD: CPT | Mod: CPTII,S$GLB,, | Performed by: INTERNAL MEDICINE

## 2021-06-03 PROCEDURE — 36415 COLL VENOUS BLD VENIPUNCTURE: CPT | Performed by: INTERNAL MEDICINE

## 2021-06-03 PROCEDURE — 99205 PR OFFICE/OUTPT VISIT, NEW, LEVL V, 60-74 MIN: ICD-10-PCS | Mod: S$GLB,,, | Performed by: INTERNAL MEDICINE

## 2021-06-03 PROCEDURE — 85025 COMPLETE CBC W/AUTO DIFF WBC: CPT | Performed by: INTERNAL MEDICINE

## 2021-06-03 PROCEDURE — 1125F PR PAIN SEVERITY QUANTIFIED, PAIN PRESENT: ICD-10-PCS | Mod: S$GLB,,, | Performed by: INTERNAL MEDICINE

## 2021-06-03 PROCEDURE — 1111F DSCHRG MED/CURRENT MED MERGE: CPT | Mod: CPTII,S$GLB,, | Performed by: INTERNAL MEDICINE

## 2021-06-03 PROCEDURE — 80053 COMPREHEN METABOLIC PANEL: CPT | Performed by: INTERNAL MEDICINE

## 2021-06-03 PROCEDURE — 82378 CARCINOEMBRYONIC ANTIGEN: CPT | Performed by: INTERNAL MEDICINE

## 2021-06-03 RX ORDER — SODIUM CHLORIDE 0.9 % (FLUSH) 0.9 %
10 SYRINGE (ML) INJECTION
Status: CANCELLED | OUTPATIENT
Start: 2021-06-15

## 2021-06-03 RX ORDER — DIPHENHYDRAMINE HYDROCHLORIDE 50 MG/ML
50 INJECTION INTRAMUSCULAR; INTRAVENOUS ONCE AS NEEDED
Status: CANCELLED | OUTPATIENT
Start: 2021-06-15

## 2021-06-03 RX ORDER — EPINEPHRINE 0.3 MG/.3ML
0.3 INJECTION SUBCUTANEOUS ONCE AS NEEDED
Status: CANCELLED | OUTPATIENT
Start: 2021-06-15

## 2021-06-03 RX ORDER — HEPARIN 100 UNIT/ML
5 SYRINGE INTRAVENOUS
Status: CANCELLED | OUTPATIENT
Start: 2021-06-15

## 2021-06-03 RX ORDER — METHYLPREDNISOLONE SOD SUCC 125 MG
125 VIAL (EA) INJECTION ONCE AS NEEDED
Status: CANCELLED | OUTPATIENT
Start: 2021-06-15

## 2021-06-03 RX ORDER — SODIUM CHLORIDE 9 MG/ML
INJECTION, SOLUTION INTRAVENOUS CONTINUOUS
Status: CANCELLED | OUTPATIENT
Start: 2021-06-15

## 2021-06-03 RX ORDER — PROCHLORPERAZINE MALEATE 10 MG
10 TABLET ORAL EVERY 6 HOURS PRN
Qty: 90 TABLET | Refills: 2 | Status: SHIPPED | OUTPATIENT
Start: 2021-06-03 | End: 2021-08-07 | Stop reason: SDUPTHER

## 2021-06-04 DIAGNOSIS — C80.1 SIGNET RING CELL ADENOCARCINOMA: Primary | ICD-10-CM

## 2021-06-04 RX ORDER — CEFAZOLIN SODIUM 2 G/50ML
2 SOLUTION INTRAVENOUS
Status: CANCELLED | OUTPATIENT
Start: 2021-06-14

## 2021-06-04 RX ORDER — LIDOCAINE HYDROCHLORIDE 10 MG/ML
1 INJECTION, SOLUTION EPIDURAL; INFILTRATION; INTRACAUDAL; PERINEURAL ONCE
Status: CANCELLED | OUTPATIENT
Start: 2021-06-04 | End: 2021-06-04

## 2021-06-04 RX ORDER — SODIUM CHLORIDE 9 MG/ML
INJECTION, SOLUTION INTRAVENOUS CONTINUOUS
Status: CANCELLED | OUTPATIENT
Start: 2021-06-04

## 2021-06-06 ENCOUNTER — PATIENT MESSAGE (OUTPATIENT)
Dept: HEMATOLOGY/ONCOLOGY | Facility: CLINIC | Age: 41
End: 2021-06-06

## 2021-06-06 DIAGNOSIS — R10.9 ABDOMINAL SPASMS: Primary | ICD-10-CM

## 2021-06-07 ENCOUNTER — TELEPHONE (OUTPATIENT)
Dept: HEMATOLOGY/ONCOLOGY | Facility: CLINIC | Age: 41
End: 2021-06-07

## 2021-06-07 ENCOUNTER — CLINICAL SUPPORT (OUTPATIENT)
Dept: GASTROENTEROLOGY | Facility: CLINIC | Age: 41
End: 2021-06-07
Payer: COMMERCIAL

## 2021-06-07 ENCOUNTER — PATIENT MESSAGE (OUTPATIENT)
Dept: HEMATOLOGY/ONCOLOGY | Facility: CLINIC | Age: 41
End: 2021-06-07

## 2021-06-07 ENCOUNTER — TELEPHONE (OUTPATIENT)
Dept: SURGERY | Facility: CLINIC | Age: 41
End: 2021-06-07

## 2021-06-07 ENCOUNTER — PATIENT MESSAGE (OUTPATIENT)
Dept: GASTROENTEROLOGY | Facility: CLINIC | Age: 41
End: 2021-06-07

## 2021-06-07 DIAGNOSIS — K31.6 GASTRIC FISTULA: ICD-10-CM

## 2021-06-07 DIAGNOSIS — E43 SEVERE MALNUTRITION: ICD-10-CM

## 2021-06-07 DIAGNOSIS — K91.89 GASTRIC LEAK: Primary | ICD-10-CM

## 2021-06-07 DIAGNOSIS — R18.0 MALIGNANT ASCITES: ICD-10-CM

## 2021-06-07 DIAGNOSIS — R18.8 OTHER ASCITES: ICD-10-CM

## 2021-06-07 DIAGNOSIS — C16.9 GASTRIC ADENOCARCINOMA: ICD-10-CM

## 2021-06-07 DIAGNOSIS — K25.5 CHRONIC GASTRIC ULCER WITH PERFORATION: ICD-10-CM

## 2021-06-07 DIAGNOSIS — C80.1 SIGNET RING CELL ADENOCARCINOMA: ICD-10-CM

## 2021-06-07 PROCEDURE — 99499 NO LOS: ICD-10-PCS | Mod: 95,,, | Performed by: DIETITIAN, REGISTERED

## 2021-06-07 PROCEDURE — 99499 UNLISTED E&M SERVICE: CPT | Mod: 95,,, | Performed by: DIETITIAN, REGISTERED

## 2021-06-07 RX ORDER — DICYCLOMINE HYDROCHLORIDE 10 MG/1
10 CAPSULE ORAL 4 TIMES DAILY
Qty: 120 CAPSULE | Refills: 0 | Status: SHIPPED | OUTPATIENT
Start: 2021-06-07 | End: 2021-09-27

## 2021-06-10 ENCOUNTER — PATIENT MESSAGE (OUTPATIENT)
Dept: HEMATOLOGY/ONCOLOGY | Facility: CLINIC | Age: 41
End: 2021-06-10

## 2021-06-10 ENCOUNTER — PATIENT OUTREACH (OUTPATIENT)
Dept: EMERGENCY MEDICINE | Facility: HOSPITAL | Age: 41
End: 2021-06-10

## 2021-06-10 ENCOUNTER — TELEPHONE (OUTPATIENT)
Dept: HEMATOLOGY/ONCOLOGY | Facility: CLINIC | Age: 41
End: 2021-06-10

## 2021-06-10 ENCOUNTER — HOSPITAL ENCOUNTER (OUTPATIENT)
Facility: HOSPITAL | Age: 41
Discharge: HOME | End: 2021-06-11
Attending: EMERGENCY MEDICINE | Admitting: HOSPITALIST
Payer: COMMERCIAL

## 2021-06-10 DIAGNOSIS — C16.9 MALIGNANT NEOPLASM OF STOMACH, UNSPECIFIED LOCATION: ICD-10-CM

## 2021-06-10 DIAGNOSIS — R10.13 EPIGASTRIC PAIN: ICD-10-CM

## 2021-06-10 DIAGNOSIS — K92.0 HEMATEMESIS WITH NAUSEA: Primary | ICD-10-CM

## 2021-06-10 DIAGNOSIS — K25.9 GASTRIC ULCER, UNSPECIFIED CHRONICITY, UNSPECIFIED WHETHER GASTRIC ULCER HEMORRHAGE OR PERFORATION PRESENT: ICD-10-CM

## 2021-06-10 PROBLEM — D64.9 NORMOCYTIC ANEMIA: Status: ACTIVE | Noted: 2021-06-10

## 2021-06-10 PROBLEM — R18.0 MALIGNANT ASCITES: Status: ACTIVE | Noted: 2021-05-22

## 2021-06-10 PROBLEM — K25.7 CHRONIC GASTRIC ULCER: Status: ACTIVE | Noted: 2021-05-18

## 2021-06-10 LAB
ABO + RH BLD: NORMAL
ALBUMIN SERPL BCP-MCNC: 3.3 G/DL (ref 3.5–5.2)
ALP SERPL-CCNC: 78 U/L (ref 55–135)
ALT SERPL W/O P-5'-P-CCNC: 11 U/L (ref 10–44)
ANION GAP SERPL CALC-SCNC: 11 MMOL/L (ref 8–16)
APTT BLDCRRT: 24 SEC (ref 21–32)
AST SERPL-CCNC: 14 U/L (ref 10–40)
B-HCG UR QL: NEGATIVE
B-HCG UR QL: NEGATIVE
BASOPHILS # BLD AUTO: 0.02 K/UL (ref 0–0.2)
BASOPHILS # BLD AUTO: 0.02 K/UL (ref 0–0.2)
BASOPHILS NFR BLD: 0.4 % (ref 0–1.9)
BASOPHILS NFR BLD: 0.5 % (ref 0–1.9)
BILIRUB SERPL-MCNC: 0.7 MG/DL (ref 0.1–1)
BLD GP AB SCN CELLS X3 SERPL QL: NORMAL
BUN SERPL-MCNC: 16 MG/DL (ref 6–20)
CALCIUM SERPL-MCNC: 9.5 MG/DL (ref 8.7–10.5)
CHLORIDE SERPL-SCNC: 103 MMOL/L (ref 95–110)
CO2 SERPL-SCNC: 24 MMOL/L (ref 23–29)
CREAT SERPL-MCNC: 0.7 MG/DL (ref 0.5–1.4)
CTP QC/QA: YES
DIFFERENTIAL METHOD: ABNORMAL
DIFFERENTIAL METHOD: ABNORMAL
EOSINOPHIL # BLD AUTO: 0 K/UL (ref 0–0.5)
EOSINOPHIL # BLD AUTO: 0 K/UL (ref 0–0.5)
EOSINOPHIL NFR BLD: 0.2 % (ref 0–8)
EOSINOPHIL NFR BLD: 0.7 % (ref 0–8)
ERYTHROCYTE [DISTWIDTH] IN BLOOD BY AUTOMATED COUNT: 16 % (ref 11.5–14.5)
ERYTHROCYTE [DISTWIDTH] IN BLOOD BY AUTOMATED COUNT: 16.2 % (ref 11.5–14.5)
EST. GFR  (AFRICAN AMERICAN): >60 ML/MIN/1.73 M^2
EST. GFR  (NON AFRICAN AMERICAN): >60 ML/MIN/1.73 M^2
GLUCOSE SERPL-MCNC: 88 MG/DL (ref 70–110)
HCT VFR BLD AUTO: 29.3 % (ref 37–48.5)
HCT VFR BLD AUTO: 32.8 % (ref 37–48.5)
HGB BLD-MCNC: 8.7 G/DL (ref 12–16)
HGB BLD-MCNC: 9.9 G/DL (ref 12–16)
HIV 1+2 AB+HIV1 P24 AG SERPL QL IA: NEGATIVE
IMM GRANULOCYTES # BLD AUTO: 0 K/UL (ref 0–0.04)
IMM GRANULOCYTES # BLD AUTO: 0.01 K/UL (ref 0–0.04)
IMM GRANULOCYTES NFR BLD AUTO: 0 % (ref 0–0.5)
IMM GRANULOCYTES NFR BLD AUTO: 0.2 % (ref 0–0.5)
INR PPP: 1 (ref 0.8–1.2)
LIPASE SERPL-CCNC: 25 U/L (ref 4–60)
LYMPHOCYTES # BLD AUTO: 0.7 K/UL (ref 1–4.8)
LYMPHOCYTES # BLD AUTO: 0.9 K/UL (ref 1–4.8)
LYMPHOCYTES NFR BLD: 16.6 % (ref 18–48)
LYMPHOCYTES NFR BLD: 20.5 % (ref 18–48)
MCH RBC QN AUTO: 24.3 PG (ref 27–31)
MCH RBC QN AUTO: 24.4 PG (ref 27–31)
MCHC RBC AUTO-ENTMCNC: 29.7 G/DL (ref 32–36)
MCHC RBC AUTO-ENTMCNC: 30.2 G/DL (ref 32–36)
MCV RBC AUTO: 81 FL (ref 82–98)
MCV RBC AUTO: 82 FL (ref 82–98)
MONOCYTES # BLD AUTO: 0.2 K/UL (ref 0.3–1)
MONOCYTES # BLD AUTO: 0.4 K/UL (ref 0.3–1)
MONOCYTES NFR BLD: 5.2 % (ref 4–15)
MONOCYTES NFR BLD: 8.9 % (ref 4–15)
NEUTROPHILS # BLD AUTO: 3.1 K/UL (ref 1.8–7.7)
NEUTROPHILS # BLD AUTO: 3.4 K/UL (ref 1.8–7.7)
NEUTROPHILS NFR BLD: 69.5 % (ref 38–73)
NEUTROPHILS NFR BLD: 77.3 % (ref 38–73)
NRBC BLD-RTO: 0 /100 WBC
NRBC BLD-RTO: 0 /100 WBC
PLATELET # BLD AUTO: 243 K/UL (ref 150–450)
PLATELET # BLD AUTO: 252 K/UL (ref 150–450)
PMV BLD AUTO: 9.8 FL (ref 9.2–12.9)
PMV BLD AUTO: 9.9 FL (ref 9.2–12.9)
POTASSIUM SERPL-SCNC: 3.6 MMOL/L (ref 3.5–5.1)
PROT SERPL-MCNC: 6.7 G/DL (ref 6–8.4)
PROTHROMBIN TIME: 11.2 SEC (ref 9–12.5)
RBC # BLD AUTO: 3.58 M/UL (ref 4–5.4)
RBC # BLD AUTO: 4.06 M/UL (ref 4–5.4)
SARS-COV-2 RDRP RESP QL NAA+PROBE: NEGATIVE
SODIUM SERPL-SCNC: 138 MMOL/L (ref 136–145)
WBC # BLD AUTO: 4.39 K/UL (ref 3.9–12.7)
WBC # BLD AUTO: 4.48 K/UL (ref 3.9–12.7)

## 2021-06-10 PROCEDURE — 99284 EMERGENCY DEPT VISIT MOD MDM: CPT | Mod: CS,,, | Performed by: PHYSICIAN ASSISTANT

## 2021-06-10 PROCEDURE — 96374 THER/PROPH/DIAG INJ IV PUSH: CPT

## 2021-06-10 PROCEDURE — 93010 EKG 12-LEAD: ICD-10-PCS | Mod: ,,, | Performed by: INTERNAL MEDICINE

## 2021-06-10 PROCEDURE — 85025 COMPLETE CBC W/AUTO DIFF WBC: CPT | Performed by: PHYSICIAN ASSISTANT

## 2021-06-10 PROCEDURE — 99214 OFFICE O/P EST MOD 30 MIN: CPT | Mod: ,,, | Performed by: STUDENT IN AN ORGANIZED HEALTH CARE EDUCATION/TRAINING PROGRAM

## 2021-06-10 PROCEDURE — C9113 INJ PANTOPRAZOLE SODIUM, VIA: HCPCS | Performed by: PHYSICIAN ASSISTANT

## 2021-06-10 PROCEDURE — 96361 HYDRATE IV INFUSION ADD-ON: CPT

## 2021-06-10 PROCEDURE — 80053 COMPREHEN METABOLIC PANEL: CPT | Performed by: PHYSICIAN ASSISTANT

## 2021-06-10 PROCEDURE — 85610 PROTHROMBIN TIME: CPT | Performed by: PHYSICIAN ASSISTANT

## 2021-06-10 PROCEDURE — 99285 EMERGENCY DEPT VISIT HI MDM: CPT | Mod: 25

## 2021-06-10 PROCEDURE — 85025 COMPLETE CBC W/AUTO DIFF WBC: CPT | Mod: 91 | Performed by: STUDENT IN AN ORGANIZED HEALTH CARE EDUCATION/TRAINING PROGRAM

## 2021-06-10 PROCEDURE — 25000003 PHARM REV CODE 250: Performed by: STUDENT IN AN ORGANIZED HEALTH CARE EDUCATION/TRAINING PROGRAM

## 2021-06-10 PROCEDURE — 85730 THROMBOPLASTIN TIME PARTIAL: CPT | Performed by: PHYSICIAN ASSISTANT

## 2021-06-10 PROCEDURE — 63600175 PHARM REV CODE 636 W HCPCS: Performed by: PHYSICIAN ASSISTANT

## 2021-06-10 PROCEDURE — 93005 ELECTROCARDIOGRAM TRACING: CPT

## 2021-06-10 PROCEDURE — C9113 INJ PANTOPRAZOLE SODIUM, VIA: HCPCS | Performed by: STUDENT IN AN ORGANIZED HEALTH CARE EDUCATION/TRAINING PROGRAM

## 2021-06-10 PROCEDURE — 93010 ELECTROCARDIOGRAM REPORT: CPT | Mod: ,,, | Performed by: INTERNAL MEDICINE

## 2021-06-10 PROCEDURE — 63600175 PHARM REV CODE 636 W HCPCS: Performed by: STUDENT IN AN ORGANIZED HEALTH CARE EDUCATION/TRAINING PROGRAM

## 2021-06-10 PROCEDURE — G0378 HOSPITAL OBSERVATION PER HR: HCPCS

## 2021-06-10 PROCEDURE — 99214 PR OFFICE/OUTPT VISIT, EST, LEVL IV, 30-39 MIN: ICD-10-PCS | Mod: ,,, | Performed by: STUDENT IN AN ORGANIZED HEALTH CARE EDUCATION/TRAINING PROGRAM

## 2021-06-10 PROCEDURE — 25000003 PHARM REV CODE 250: Performed by: INTERNAL MEDICINE

## 2021-06-10 PROCEDURE — 81025 URINE PREGNANCY TEST: CPT | Performed by: PHYSICIAN ASSISTANT

## 2021-06-10 PROCEDURE — 96376 TX/PRO/DX INJ SAME DRUG ADON: CPT

## 2021-06-10 PROCEDURE — 83690 ASSAY OF LIPASE: CPT | Performed by: PHYSICIAN ASSISTANT

## 2021-06-10 PROCEDURE — 99284 PR EMERGENCY DEPT VISIT,LEVEL IV: ICD-10-PCS | Mod: CS,,, | Performed by: PHYSICIAN ASSISTANT

## 2021-06-10 PROCEDURE — 86900 BLOOD TYPING SEROLOGIC ABO: CPT | Performed by: PHYSICIAN ASSISTANT

## 2021-06-10 PROCEDURE — 86703 HIV-1/HIV-2 1 RESULT ANTBDY: CPT | Performed by: EMERGENCY MEDICINE

## 2021-06-10 PROCEDURE — 99220 PR INITIAL OBSERVATION CARE,LEVL III: CPT | Mod: ,,, | Performed by: HOSPITALIST

## 2021-06-10 PROCEDURE — 96375 TX/PRO/DX INJ NEW DRUG ADDON: CPT

## 2021-06-10 PROCEDURE — 99220 PR INITIAL OBSERVATION CARE,LEVL III: ICD-10-PCS | Mod: ,,, | Performed by: HOSPITALIST

## 2021-06-10 PROCEDURE — U0002 COVID-19 LAB TEST NON-CDC: HCPCS | Performed by: PHYSICIAN ASSISTANT

## 2021-06-10 PROCEDURE — 36415 COLL VENOUS BLD VENIPUNCTURE: CPT | Performed by: STUDENT IN AN ORGANIZED HEALTH CARE EDUCATION/TRAINING PROGRAM

## 2021-06-10 RX ORDER — PANTOPRAZOLE SODIUM 40 MG/10ML
40 INJECTION, POWDER, LYOPHILIZED, FOR SOLUTION INTRAVENOUS 2 TIMES DAILY
Status: DISCONTINUED | OUTPATIENT
Start: 2021-06-10 | End: 2021-06-11

## 2021-06-10 RX ORDER — GLUCAGON 1 MG
1 KIT INJECTION
Status: DISCONTINUED | OUTPATIENT
Start: 2021-06-10 | End: 2021-06-11 | Stop reason: HOSPADM

## 2021-06-10 RX ORDER — OXYCODONE HYDROCHLORIDE 5 MG/1
5 TABLET ORAL ONCE AS NEEDED
Status: COMPLETED | OUTPATIENT
Start: 2021-06-10 | End: 2021-06-10

## 2021-06-10 RX ORDER — ONDANSETRON 2 MG/ML
4 INJECTION INTRAMUSCULAR; INTRAVENOUS
Status: COMPLETED | OUTPATIENT
Start: 2021-06-10 | End: 2021-06-10

## 2021-06-10 RX ORDER — IBUPROFEN 200 MG
24 TABLET ORAL
Status: DISCONTINUED | OUTPATIENT
Start: 2021-06-10 | End: 2021-06-11 | Stop reason: HOSPADM

## 2021-06-10 RX ORDER — DICYCLOMINE HYDROCHLORIDE 10 MG/1
10 CAPSULE ORAL 4 TIMES DAILY
Status: DISCONTINUED | OUTPATIENT
Start: 2021-06-10 | End: 2021-06-11 | Stop reason: HOSPADM

## 2021-06-10 RX ORDER — ONDANSETRON 8 MG/1
8 TABLET, ORALLY DISINTEGRATING ORAL EVERY 8 HOURS PRN
Status: DISCONTINUED | OUTPATIENT
Start: 2021-06-10 | End: 2021-06-11 | Stop reason: HOSPADM

## 2021-06-10 RX ORDER — IBUPROFEN 200 MG
16 TABLET ORAL
Status: DISCONTINUED | OUTPATIENT
Start: 2021-06-10 | End: 2021-06-11 | Stop reason: HOSPADM

## 2021-06-10 RX ORDER — SODIUM CHLORIDE 0.9 % (FLUSH) 0.9 %
10 SYRINGE (ML) INJECTION
Status: DISCONTINUED | OUTPATIENT
Start: 2021-06-10 | End: 2021-06-11 | Stop reason: HOSPADM

## 2021-06-10 RX ORDER — ACETAMINOPHEN 325 MG/1
650 TABLET ORAL EVERY 6 HOURS PRN
Status: DISCONTINUED | OUTPATIENT
Start: 2021-06-10 | End: 2021-06-11 | Stop reason: HOSPADM

## 2021-06-10 RX ORDER — PANTOPRAZOLE SODIUM 40 MG/10ML
80 INJECTION, POWDER, LYOPHILIZED, FOR SOLUTION INTRAVENOUS
Status: COMPLETED | OUTPATIENT
Start: 2021-06-10 | End: 2021-06-10

## 2021-06-10 RX ADMIN — DICYCLOMINE HYDROCHLORIDE 10 MG: 10 CAPSULE ORAL at 05:06

## 2021-06-10 RX ADMIN — ACETAMINOPHEN 650 MG: 325 TABLET ORAL at 08:06

## 2021-06-10 RX ADMIN — OXYCODONE 5 MG: 5 TABLET ORAL at 11:06

## 2021-06-10 RX ADMIN — DICYCLOMINE HYDROCHLORIDE 10 MG: 10 CAPSULE ORAL at 01:06

## 2021-06-10 RX ADMIN — PANTOPRAZOLE SODIUM 80 MG: 40 INJECTION, POWDER, FOR SOLUTION INTRAVENOUS at 10:06

## 2021-06-10 RX ADMIN — ONDANSETRON 4 MG: 2 INJECTION INTRAMUSCULAR; INTRAVENOUS at 10:06

## 2021-06-10 RX ADMIN — DICYCLOMINE HYDROCHLORIDE 10 MG: 10 CAPSULE ORAL at 08:06

## 2021-06-10 RX ADMIN — PANTOPRAZOLE SODIUM 40 MG: 40 INJECTION, POWDER, FOR SOLUTION INTRAVENOUS at 08:06

## 2021-06-11 ENCOUNTER — TELEPHONE (OUTPATIENT)
Dept: HEMATOLOGY/ONCOLOGY | Facility: CLINIC | Age: 41
End: 2021-06-11

## 2021-06-11 ENCOUNTER — TELEPHONE (OUTPATIENT)
Dept: SURGERY | Facility: CLINIC | Age: 41
End: 2021-06-11

## 2021-06-11 VITALS
DIASTOLIC BLOOD PRESSURE: 83 MMHG | TEMPERATURE: 98 F | WEIGHT: 114.88 LBS | OXYGEN SATURATION: 100 % | HEIGHT: 67 IN | SYSTOLIC BLOOD PRESSURE: 123 MMHG | RESPIRATION RATE: 18 BRPM | BODY MASS INDEX: 18.03 KG/M2 | HEART RATE: 73 BPM

## 2021-06-11 PROBLEM — E43 SEVERE MALNUTRITION: Status: ACTIVE | Noted: 2021-06-11

## 2021-06-11 LAB
ALBUMIN SERPL BCP-MCNC: 2.6 G/DL (ref 3.5–5.2)
ALP SERPL-CCNC: 67 U/L (ref 55–135)
ALT SERPL W/O P-5'-P-CCNC: 7 U/L (ref 10–44)
ANION GAP SERPL CALC-SCNC: 6 MMOL/L (ref 8–16)
AST SERPL-CCNC: 11 U/L (ref 10–40)
BASOPHILS # BLD AUTO: 0.02 K/UL (ref 0–0.2)
BASOPHILS NFR BLD: 0.6 % (ref 0–1.9)
BILIRUB SERPL-MCNC: 0.5 MG/DL (ref 0.1–1)
BUN SERPL-MCNC: 9 MG/DL (ref 6–20)
CALCIUM SERPL-MCNC: 8.8 MG/DL (ref 8.7–10.5)
CHLORIDE SERPL-SCNC: 107 MMOL/L (ref 95–110)
CO2 SERPL-SCNC: 26 MMOL/L (ref 23–29)
CREAT SERPL-MCNC: 0.8 MG/DL (ref 0.5–1.4)
DIFFERENTIAL METHOD: ABNORMAL
EOSINOPHIL # BLD AUTO: 0.1 K/UL (ref 0–0.5)
EOSINOPHIL NFR BLD: 1.7 % (ref 0–8)
ERYTHROCYTE [DISTWIDTH] IN BLOOD BY AUTOMATED COUNT: 16.2 % (ref 11.5–14.5)
EST. GFR  (AFRICAN AMERICAN): >60 ML/MIN/1.73 M^2
EST. GFR  (NON AFRICAN AMERICAN): >60 ML/MIN/1.73 M^2
FOLATE SERPL-MCNC: 5.5 NG/ML (ref 4–24)
GLUCOSE SERPL-MCNC: 77 MG/DL (ref 70–110)
HCT VFR BLD AUTO: 32.3 % (ref 37–48.5)
HGB BLD-MCNC: 9.7 G/DL (ref 12–16)
IMM GRANULOCYTES # BLD AUTO: 0.01 K/UL (ref 0–0.04)
IMM GRANULOCYTES NFR BLD AUTO: 0.3 % (ref 0–0.5)
LYMPHOCYTES # BLD AUTO: 1 K/UL (ref 1–4.8)
LYMPHOCYTES NFR BLD: 29.3 % (ref 18–48)
MCH RBC QN AUTO: 24.3 PG (ref 27–31)
MCHC RBC AUTO-ENTMCNC: 30 G/DL (ref 32–36)
MCV RBC AUTO: 81 FL (ref 82–98)
MONOCYTES # BLD AUTO: 0.4 K/UL (ref 0.3–1)
MONOCYTES NFR BLD: 10.3 % (ref 4–15)
NEUTROPHILS # BLD AUTO: 2 K/UL (ref 1.8–7.7)
NEUTROPHILS NFR BLD: 57.8 % (ref 38–73)
NRBC BLD-RTO: 0 /100 WBC
PLATELET # BLD AUTO: 247 K/UL (ref 150–450)
PMV BLD AUTO: 9.7 FL (ref 9.2–12.9)
POTASSIUM SERPL-SCNC: 3.9 MMOL/L (ref 3.5–5.1)
PROT SERPL-MCNC: 5.1 G/DL (ref 6–8.4)
RBC # BLD AUTO: 3.99 M/UL (ref 4–5.4)
SODIUM SERPL-SCNC: 139 MMOL/L (ref 136–145)
VIT B12 SERPL-MCNC: 289 PG/ML (ref 210–950)
WBC # BLD AUTO: 3.51 K/UL (ref 3.9–12.7)

## 2021-06-11 PROCEDURE — G0378 HOSPITAL OBSERVATION PER HR: HCPCS

## 2021-06-11 PROCEDURE — 84591 ASSAY OF NOS VITAMIN: CPT | Mod: 59 | Performed by: STUDENT IN AN ORGANIZED HEALTH CARE EDUCATION/TRAINING PROGRAM

## 2021-06-11 PROCEDURE — C9113 INJ PANTOPRAZOLE SODIUM, VIA: HCPCS | Performed by: STUDENT IN AN ORGANIZED HEALTH CARE EDUCATION/TRAINING PROGRAM

## 2021-06-11 PROCEDURE — 99213 OFFICE O/P EST LOW 20 MIN: CPT | Mod: GT,,, | Performed by: NURSE PRACTITIONER

## 2021-06-11 PROCEDURE — 36415 COLL VENOUS BLD VENIPUNCTURE: CPT | Performed by: STUDENT IN AN ORGANIZED HEALTH CARE EDUCATION/TRAINING PROGRAM

## 2021-06-11 PROCEDURE — 36415 COLL VENOUS BLD VENIPUNCTURE: CPT | Performed by: HOSPITALIST

## 2021-06-11 PROCEDURE — 84207 ASSAY OF VITAMIN B-6: CPT | Performed by: STUDENT IN AN ORGANIZED HEALTH CARE EDUCATION/TRAINING PROGRAM

## 2021-06-11 PROCEDURE — 82607 VITAMIN B-12: CPT | Performed by: STUDENT IN AN ORGANIZED HEALTH CARE EDUCATION/TRAINING PROGRAM

## 2021-06-11 PROCEDURE — 84630 ASSAY OF ZINC: CPT | Performed by: HOSPITALIST

## 2021-06-11 PROCEDURE — 82746 ASSAY OF FOLIC ACID SERUM: CPT | Performed by: STUDENT IN AN ORGANIZED HEALTH CARE EDUCATION/TRAINING PROGRAM

## 2021-06-11 PROCEDURE — 96376 TX/PRO/DX INJ SAME DRUG ADON: CPT

## 2021-06-11 PROCEDURE — 99217 PR OBSERVATION CARE DISCHARGE: CPT | Mod: ,,, | Performed by: HOSPITALIST

## 2021-06-11 PROCEDURE — 63600175 PHARM REV CODE 636 W HCPCS: Performed by: STUDENT IN AN ORGANIZED HEALTH CARE EDUCATION/TRAINING PROGRAM

## 2021-06-11 PROCEDURE — 80053 COMPREHEN METABOLIC PANEL: CPT | Performed by: STUDENT IN AN ORGANIZED HEALTH CARE EDUCATION/TRAINING PROGRAM

## 2021-06-11 PROCEDURE — 82525 ASSAY OF COPPER: CPT | Performed by: STUDENT IN AN ORGANIZED HEALTH CARE EDUCATION/TRAINING PROGRAM

## 2021-06-11 PROCEDURE — 83921 ORGANIC ACID SINGLE QUANT: CPT | Performed by: STUDENT IN AN ORGANIZED HEALTH CARE EDUCATION/TRAINING PROGRAM

## 2021-06-11 PROCEDURE — 85025 COMPLETE CBC W/AUTO DIFF WBC: CPT | Performed by: STUDENT IN AN ORGANIZED HEALTH CARE EDUCATION/TRAINING PROGRAM

## 2021-06-11 PROCEDURE — 99213 PR OFFICE/OUTPT VISIT, EST, LEVL III, 20-29 MIN: ICD-10-PCS | Mod: GT,,, | Performed by: NURSE PRACTITIONER

## 2021-06-11 PROCEDURE — 99217 PR OBSERVATION CARE DISCHARGE: ICD-10-PCS | Mod: ,,, | Performed by: HOSPITALIST

## 2021-06-11 PROCEDURE — 25000003 PHARM REV CODE 250: Performed by: STUDENT IN AN ORGANIZED HEALTH CARE EDUCATION/TRAINING PROGRAM

## 2021-06-11 RX ORDER — PANTOPRAZOLE SODIUM 40 MG/1
40 TABLET, DELAYED RELEASE ORAL
Status: DISCONTINUED | OUTPATIENT
Start: 2021-06-11 | End: 2021-06-11 | Stop reason: HOSPADM

## 2021-06-11 RX ORDER — PANTOPRAZOLE SODIUM 40 MG/1
40 TABLET, DELAYED RELEASE ORAL 2 TIMES DAILY
Status: DISCONTINUED | OUTPATIENT
Start: 2021-06-11 | End: 2021-06-11

## 2021-06-11 RX ORDER — METOCLOPRAMIDE 5 MG/1
5 TABLET ORAL 3 TIMES DAILY PRN
Qty: 21 TABLET | Refills: 0 | Status: SHIPPED | OUTPATIENT
Start: 2021-06-11 | End: 2021-06-18

## 2021-06-11 RX ORDER — METOCLOPRAMIDE 5 MG/1
5 TABLET ORAL 3 TIMES DAILY PRN
Status: DISCONTINUED | OUTPATIENT
Start: 2021-06-11 | End: 2021-06-11 | Stop reason: HOSPADM

## 2021-06-11 RX ADMIN — PANTOPRAZOLE SODIUM 40 MG: 40 INJECTION, POWDER, FOR SOLUTION INTRAVENOUS at 08:06

## 2021-06-11 RX ADMIN — DICYCLOMINE HYDROCHLORIDE 10 MG: 10 CAPSULE ORAL at 08:06

## 2021-06-11 RX ADMIN — ONDANSETRON 8 MG: 8 TABLET, ORALLY DISINTEGRATING ORAL at 12:06

## 2021-06-14 LAB
COPPER SERPL-MCNC: 1030 UG/L (ref 810–1990)
ZINC SERPL-MCNC: 54 UG/DL (ref 60–130)

## 2021-06-15 LAB
GUARDANT 360: NORMAL
METHYLMALONATE SERPL-SCNC: 0.11 UMOL/L
PYRIDOXAL SERPL-MCNC: 3 UG/L (ref 5–50)

## 2021-06-21 ENCOUNTER — PATIENT MESSAGE (OUTPATIENT)
Dept: HEMATOLOGY/ONCOLOGY | Facility: CLINIC | Age: 41
End: 2021-06-21

## 2021-06-25 LAB — PANTOTHENATE SERPLBLD-MCNC: 123.6 UG/L

## 2021-07-01 LAB
COMMENT: ABNORMAL
FINAL PATHOLOGIC DIAGNOSIS: ABNORMAL
GROSS: ABNORMAL
Lab: ABNORMAL
MICROSCOPIC EXAM: ABNORMAL
SUPPLEMENTAL DIAGNOSIS: ABNORMAL

## 2021-07-07 ENCOUNTER — DOCUMENTATION ONLY (OUTPATIENT)
Dept: HEMATOLOGY/ONCOLOGY | Facility: CLINIC | Age: 41
End: 2021-07-07

## 2021-07-12 ENCOUNTER — PATIENT MESSAGE (OUTPATIENT)
Dept: HEMATOLOGY/ONCOLOGY | Facility: CLINIC | Age: 41
End: 2021-07-12

## 2021-07-14 ENCOUNTER — TELEPHONE (OUTPATIENT)
Dept: HEMATOLOGY/ONCOLOGY | Facility: CLINIC | Age: 41
End: 2021-07-14

## 2021-07-20 ENCOUNTER — TELEPHONE (OUTPATIENT)
Dept: HEMATOLOGY/ONCOLOGY | Facility: CLINIC | Age: 41
End: 2021-07-20

## 2021-07-21 ENCOUNTER — TELEPHONE (OUTPATIENT)
Dept: HEMATOLOGY/ONCOLOGY | Facility: CLINIC | Age: 41
End: 2021-07-21

## 2021-07-22 ENCOUNTER — DOCUMENTATION ONLY (OUTPATIENT)
Dept: HEMATOLOGY/ONCOLOGY | Facility: CLINIC | Age: 41
End: 2021-07-22

## 2021-07-23 ENCOUNTER — PATIENT MESSAGE (OUTPATIENT)
Dept: HEMATOLOGY/ONCOLOGY | Facility: CLINIC | Age: 41
End: 2021-07-23

## 2021-07-23 ENCOUNTER — OFFICE VISIT (OUTPATIENT)
Dept: HEMATOLOGY/ONCOLOGY | Facility: CLINIC | Age: 41
End: 2021-07-23
Payer: COMMERCIAL

## 2021-07-23 DIAGNOSIS — Z71.83 ENCOUNTER FOR NONPROCREATIVE GENETIC COUNSELING: Primary | ICD-10-CM

## 2021-07-23 DIAGNOSIS — C16.9 GASTRIC ADENOCARCINOMA: ICD-10-CM

## 2021-07-23 PROCEDURE — 99215 OFFICE O/P EST HI 40 MIN: CPT | Mod: 95,,, | Performed by: NURSE PRACTITIONER

## 2021-07-23 PROCEDURE — 99215 PR OFFICE/OUTPT VISIT, EST, LEVL V, 40-54 MIN: ICD-10-PCS | Mod: 95,,, | Performed by: NURSE PRACTITIONER

## 2021-07-26 ENCOUNTER — INFUSION (OUTPATIENT)
Dept: INFUSION THERAPY | Facility: HOSPITAL | Age: 41
End: 2021-07-26
Payer: COMMERCIAL

## 2021-07-26 ENCOUNTER — OFFICE VISIT (OUTPATIENT)
Dept: HEMATOLOGY/ONCOLOGY | Facility: CLINIC | Age: 41
End: 2021-07-26
Payer: COMMERCIAL

## 2021-07-26 ENCOUNTER — LAB VISIT (OUTPATIENT)
Dept: LAB | Facility: HOSPITAL | Age: 41
End: 2021-07-26
Attending: INTERNAL MEDICINE
Payer: COMMERCIAL

## 2021-07-26 VITALS
TEMPERATURE: 98 F | RESPIRATION RATE: 18 BRPM | HEIGHT: 67 IN | OXYGEN SATURATION: 100 % | BODY MASS INDEX: 18.5 KG/M2 | WEIGHT: 117.88 LBS | DIASTOLIC BLOOD PRESSURE: 72 MMHG | SYSTOLIC BLOOD PRESSURE: 102 MMHG | HEART RATE: 103 BPM

## 2021-07-26 VITALS
HEIGHT: 67 IN | TEMPERATURE: 98 F | DIASTOLIC BLOOD PRESSURE: 77 MMHG | RESPIRATION RATE: 18 BRPM | SYSTOLIC BLOOD PRESSURE: 112 MMHG | HEART RATE: 92 BPM | BODY MASS INDEX: 18.48 KG/M2 | WEIGHT: 117.75 LBS

## 2021-07-26 DIAGNOSIS — C16.9 GASTRIC ADENOCARCINOMA: Primary | ICD-10-CM

## 2021-07-26 DIAGNOSIS — R11.2 NON-INTRACTABLE VOMITING WITH NAUSEA, UNSPECIFIED VOMITING TYPE: ICD-10-CM

## 2021-07-26 DIAGNOSIS — K25.5 CHRONIC GASTRIC ULCER WITH PERFORATION: ICD-10-CM

## 2021-07-26 DIAGNOSIS — R18.8 OTHER ASCITES: ICD-10-CM

## 2021-07-26 DIAGNOSIS — D50.9 MICROCYTIC ANEMIA: ICD-10-CM

## 2021-07-26 DIAGNOSIS — C80.1 SIGNET RING CELL ADENOCARCINOMA: ICD-10-CM

## 2021-07-26 DIAGNOSIS — C16.9 GASTRIC ADENOCARCINOMA: ICD-10-CM

## 2021-07-26 DIAGNOSIS — C78.6 PERITONEAL CARCINOMATOSIS: ICD-10-CM

## 2021-07-26 LAB
ALBUMIN SERPL BCP-MCNC: 2.7 G/DL (ref 3.5–5.2)
ALP SERPL-CCNC: 90 U/L (ref 55–135)
ALT SERPL W/O P-5'-P-CCNC: 13 U/L (ref 10–44)
ANION GAP SERPL CALC-SCNC: 3 MMOL/L (ref 8–16)
AST SERPL-CCNC: 17 U/L (ref 10–40)
BILIRUB SERPL-MCNC: 0.4 MG/DL (ref 0.1–1)
BUN SERPL-MCNC: 11 MG/DL (ref 6–20)
CALCIUM SERPL-MCNC: 9.2 MG/DL (ref 8.7–10.5)
CHLORIDE SERPL-SCNC: 107 MMOL/L (ref 95–110)
CO2 SERPL-SCNC: 29 MMOL/L (ref 23–29)
CREAT SERPL-MCNC: 0.7 MG/DL (ref 0.5–1.4)
ERYTHROCYTE [DISTWIDTH] IN BLOOD BY AUTOMATED COUNT: 15.4 % (ref 11.5–14.5)
EST. GFR  (AFRICAN AMERICAN): >60 ML/MIN/1.73 M^2
EST. GFR  (NON AFRICAN AMERICAN): >60 ML/MIN/1.73 M^2
GLUCOSE SERPL-MCNC: 83 MG/DL (ref 70–110)
HCT VFR BLD AUTO: 27.8 % (ref 37–48.5)
HGB BLD-MCNC: 8.1 G/DL (ref 12–16)
IMM GRANULOCYTES # BLD AUTO: 0.01 K/UL (ref 0–0.04)
MCH RBC QN AUTO: 23.3 PG (ref 27–31)
MCHC RBC AUTO-ENTMCNC: 29.1 G/DL (ref 32–36)
MCV RBC AUTO: 80 FL (ref 82–98)
NEUTROPHILS # BLD AUTO: 1.8 K/UL (ref 1.8–7.7)
PLATELET # BLD AUTO: 293 K/UL (ref 150–450)
PMV BLD AUTO: 9 FL (ref 9.2–12.9)
POTASSIUM SERPL-SCNC: 4.2 MMOL/L (ref 3.5–5.1)
PROT SERPL-MCNC: 6.2 G/DL (ref 6–8.4)
RBC # BLD AUTO: 3.48 M/UL (ref 4–5.4)
SODIUM SERPL-SCNC: 139 MMOL/L (ref 136–145)
WBC # BLD AUTO: 3.28 K/UL (ref 3.9–12.7)

## 2021-07-26 PROCEDURE — 36415 COLL VENOUS BLD VENIPUNCTURE: CPT | Performed by: INTERNAL MEDICINE

## 2021-07-26 PROCEDURE — 96415 CHEMO IV INFUSION ADDL HR: CPT

## 2021-07-26 PROCEDURE — 99215 PR OFFICE/OUTPT VISIT, EST, LEVL V, 40-54 MIN: ICD-10-PCS | Mod: S$GLB,,, | Performed by: INTERNAL MEDICINE

## 2021-07-26 PROCEDURE — 3008F PR BODY MASS INDEX (BMI) DOCUMENTED: ICD-10-PCS | Mod: CPTII,S$GLB,, | Performed by: INTERNAL MEDICINE

## 2021-07-26 PROCEDURE — 99999 PR PBB SHADOW E&M-EST. PATIENT-LVL IV: ICD-10-PCS | Mod: PBBFAC,,, | Performed by: INTERNAL MEDICINE

## 2021-07-26 PROCEDURE — 3008F BODY MASS INDEX DOCD: CPT | Mod: CPTII,S$GLB,, | Performed by: INTERNAL MEDICINE

## 2021-07-26 PROCEDURE — 80053 COMPREHEN METABOLIC PANEL: CPT | Performed by: INTERNAL MEDICINE

## 2021-07-26 PROCEDURE — 96367 TX/PROPH/DG ADDL SEQ IV INF: CPT

## 2021-07-26 PROCEDURE — 25000003 PHARM REV CODE 250: Performed by: INTERNAL MEDICINE

## 2021-07-26 PROCEDURE — 99215 OFFICE O/P EST HI 40 MIN: CPT | Mod: S$GLB,,, | Performed by: INTERNAL MEDICINE

## 2021-07-26 PROCEDURE — 1126F PR PAIN SEVERITY QUANTIFIED, NO PAIN PRESENT: ICD-10-PCS | Mod: CPTII,S$GLB,, | Performed by: INTERNAL MEDICINE

## 2021-07-26 PROCEDURE — 63600175 PHARM REV CODE 636 W HCPCS: Performed by: INTERNAL MEDICINE

## 2021-07-26 PROCEDURE — 96413 CHEMO IV INFUSION 1 HR: CPT

## 2021-07-26 PROCEDURE — 1126F AMNT PAIN NOTED NONE PRSNT: CPT | Mod: CPTII,S$GLB,, | Performed by: INTERNAL MEDICINE

## 2021-07-26 PROCEDURE — 85027 COMPLETE CBC AUTOMATED: CPT | Performed by: INTERNAL MEDICINE

## 2021-07-26 PROCEDURE — 99999 PR PBB SHADOW E&M-EST. PATIENT-LVL IV: CPT | Mod: PBBFAC,,, | Performed by: INTERNAL MEDICINE

## 2021-07-26 PROCEDURE — 96416 CHEMO PROLONG INFUSE W/PUMP: CPT

## 2021-07-26 RX ORDER — HEPARIN 100 UNIT/ML
500 SYRINGE INTRAVENOUS
Status: DISCONTINUED | OUTPATIENT
Start: 2021-07-26 | End: 2021-07-26 | Stop reason: HOSPADM

## 2021-07-26 RX ORDER — HEPARIN 100 UNIT/ML
500 SYRINGE INTRAVENOUS
Status: CANCELLED | OUTPATIENT
Start: 2021-07-28

## 2021-07-26 RX ORDER — EPINEPHRINE 0.3 MG/.3ML
0.3 INJECTION SUBCUTANEOUS ONCE AS NEEDED
Status: CANCELLED | OUTPATIENT
Start: 2021-07-26

## 2021-07-26 RX ORDER — SODIUM CHLORIDE 0.9 % (FLUSH) 0.9 %
10 SYRINGE (ML) INJECTION
Status: CANCELLED | OUTPATIENT
Start: 2021-07-28

## 2021-07-26 RX ORDER — EPINEPHRINE 0.3 MG/.3ML
0.3 INJECTION SUBCUTANEOUS ONCE AS NEEDED
Status: DISCONTINUED | OUTPATIENT
Start: 2021-07-26 | End: 2021-07-26 | Stop reason: HOSPADM

## 2021-07-26 RX ORDER — HEPARIN 100 UNIT/ML
500 SYRINGE INTRAVENOUS
Status: CANCELLED | OUTPATIENT
Start: 2021-07-26

## 2021-07-26 RX ORDER — ONDANSETRON HYDROCHLORIDE 8 MG/1
8 TABLET, FILM COATED ORAL EVERY 8 HOURS PRN
Qty: 60 TABLET | Refills: 2 | Status: SHIPPED | OUTPATIENT
Start: 2021-07-26 | End: 2022-08-29

## 2021-07-26 RX ORDER — DIPHENHYDRAMINE HYDROCHLORIDE 50 MG/ML
50 INJECTION INTRAMUSCULAR; INTRAVENOUS ONCE AS NEEDED
Status: DISCONTINUED | OUTPATIENT
Start: 2021-07-26 | End: 2021-07-26 | Stop reason: HOSPADM

## 2021-07-26 RX ORDER — SODIUM CHLORIDE 0.9 % (FLUSH) 0.9 %
10 SYRINGE (ML) INJECTION
Status: DISCONTINUED | OUTPATIENT
Start: 2021-07-26 | End: 2021-07-26 | Stop reason: HOSPADM

## 2021-07-26 RX ORDER — SODIUM CHLORIDE 0.9 % (FLUSH) 0.9 %
10 SYRINGE (ML) INJECTION
Status: CANCELLED | OUTPATIENT
Start: 2021-07-26

## 2021-07-26 RX ORDER — DIPHENHYDRAMINE HYDROCHLORIDE 50 MG/ML
50 INJECTION INTRAMUSCULAR; INTRAVENOUS ONCE AS NEEDED
Status: CANCELLED | OUTPATIENT
Start: 2021-07-26

## 2021-07-26 RX ADMIN — FLUOROURACIL 3770 MG: 50 INJECTION, SOLUTION INTRAVENOUS at 02:07

## 2021-07-26 RX ADMIN — OXALIPLATIN 133 MG: 5 INJECTION, SOLUTION, CONCENTRATE INTRAVENOUS at 11:07

## 2021-07-26 RX ADMIN — PALONOSETRON HYDROCHLORIDE 0.25 MG: 0.25 INJECTION, SOLUTION INTRAVENOUS at 10:07

## 2021-07-27 ENCOUNTER — PATIENT MESSAGE (OUTPATIENT)
Dept: HEMATOLOGY/ONCOLOGY | Facility: CLINIC | Age: 41
End: 2021-07-27

## 2021-07-28 ENCOUNTER — INFUSION (OUTPATIENT)
Dept: INFUSION THERAPY | Facility: HOSPITAL | Age: 41
End: 2021-07-28
Payer: COMMERCIAL

## 2021-07-28 VITALS
HEART RATE: 84 BPM | RESPIRATION RATE: 18 BRPM | DIASTOLIC BLOOD PRESSURE: 74 MMHG | SYSTOLIC BLOOD PRESSURE: 109 MMHG | TEMPERATURE: 98 F

## 2021-07-28 DIAGNOSIS — C16.9 GASTRIC ADENOCARCINOMA: Primary | ICD-10-CM

## 2021-07-28 PROCEDURE — A4216 STERILE WATER/SALINE, 10 ML: HCPCS | Performed by: INTERNAL MEDICINE

## 2021-07-28 PROCEDURE — 25000003 PHARM REV CODE 250: Performed by: INTERNAL MEDICINE

## 2021-07-28 PROCEDURE — 63600175 PHARM REV CODE 636 W HCPCS: Performed by: INTERNAL MEDICINE

## 2021-07-28 RX ORDER — HEPARIN 100 UNIT/ML
500 SYRINGE INTRAVENOUS
Status: DISCONTINUED | OUTPATIENT
Start: 2021-07-28 | End: 2021-07-28 | Stop reason: HOSPADM

## 2021-07-28 RX ORDER — SODIUM CHLORIDE 0.9 % (FLUSH) 0.9 %
10 SYRINGE (ML) INJECTION
Status: DISCONTINUED | OUTPATIENT
Start: 2021-07-28 | End: 2021-07-28 | Stop reason: HOSPADM

## 2021-07-28 RX ADMIN — HEPARIN 500 UNITS: 100 SYRINGE at 01:07

## 2021-07-28 RX ADMIN — Medication 10 ML: at 01:07

## 2021-08-02 ENCOUNTER — INFUSION (OUTPATIENT)
Dept: INFUSION THERAPY | Facility: HOSPITAL | Age: 41
End: 2021-08-02
Payer: COMMERCIAL

## 2021-08-02 VITALS
RESPIRATION RATE: 18 BRPM | HEART RATE: 84 BPM | TEMPERATURE: 98 F | SYSTOLIC BLOOD PRESSURE: 117 MMHG | DIASTOLIC BLOOD PRESSURE: 79 MMHG

## 2021-08-02 DIAGNOSIS — D50.9 MICROCYTIC ANEMIA: Primary | ICD-10-CM

## 2021-08-02 PROCEDURE — 96374 THER/PROPH/DIAG INJ IV PUSH: CPT

## 2021-08-02 PROCEDURE — A4216 STERILE WATER/SALINE, 10 ML: HCPCS | Performed by: INTERNAL MEDICINE

## 2021-08-02 PROCEDURE — 25000003 PHARM REV CODE 250: Performed by: INTERNAL MEDICINE

## 2021-08-02 PROCEDURE — 63600175 PHARM REV CODE 636 W HCPCS: Performed by: INTERNAL MEDICINE

## 2021-08-02 RX ORDER — EPINEPHRINE 0.3 MG/.3ML
0.3 INJECTION SUBCUTANEOUS ONCE AS NEEDED
Status: DISCONTINUED | OUTPATIENT
Start: 2021-08-02 | End: 2021-08-02 | Stop reason: HOSPADM

## 2021-08-02 RX ORDER — DIPHENHYDRAMINE HYDROCHLORIDE 50 MG/ML
50 INJECTION INTRAMUSCULAR; INTRAVENOUS ONCE AS NEEDED
Status: DISCONTINUED | OUTPATIENT
Start: 2021-08-02 | End: 2021-08-02 | Stop reason: HOSPADM

## 2021-08-02 RX ORDER — SODIUM CHLORIDE 9 MG/ML
INJECTION, SOLUTION INTRAVENOUS CONTINUOUS
Status: DISCONTINUED | OUTPATIENT
Start: 2021-08-02 | End: 2021-08-02 | Stop reason: HOSPADM

## 2021-08-02 RX ORDER — METHYLPREDNISOLONE SOD SUCC 125 MG
125 VIAL (EA) INJECTION ONCE AS NEEDED
Status: CANCELLED | OUTPATIENT
Start: 2021-08-09

## 2021-08-02 RX ORDER — SODIUM CHLORIDE 0.9 % (FLUSH) 0.9 %
10 SYRINGE (ML) INJECTION
Status: DISCONTINUED | OUTPATIENT
Start: 2021-08-02 | End: 2021-08-02 | Stop reason: HOSPADM

## 2021-08-02 RX ORDER — HEPARIN 100 UNIT/ML
5 SYRINGE INTRAVENOUS
Status: CANCELLED | OUTPATIENT
Start: 2021-08-09

## 2021-08-02 RX ORDER — EPINEPHRINE 0.3 MG/.3ML
0.3 INJECTION SUBCUTANEOUS ONCE AS NEEDED
Status: CANCELLED | OUTPATIENT
Start: 2021-08-09

## 2021-08-02 RX ORDER — SODIUM CHLORIDE 9 MG/ML
INJECTION, SOLUTION INTRAVENOUS CONTINUOUS
Status: CANCELLED | OUTPATIENT
Start: 2021-08-09

## 2021-08-02 RX ORDER — METHYLPREDNISOLONE SOD SUCC 125 MG
125 VIAL (EA) INJECTION ONCE AS NEEDED
Status: DISCONTINUED | OUTPATIENT
Start: 2021-08-02 | End: 2021-08-02 | Stop reason: HOSPADM

## 2021-08-02 RX ORDER — DIPHENHYDRAMINE HYDROCHLORIDE 50 MG/ML
50 INJECTION INTRAMUSCULAR; INTRAVENOUS ONCE AS NEEDED
Status: CANCELLED | OUTPATIENT
Start: 2021-08-09

## 2021-08-02 RX ORDER — SODIUM CHLORIDE 0.9 % (FLUSH) 0.9 %
10 SYRINGE (ML) INJECTION
Status: CANCELLED | OUTPATIENT
Start: 2021-08-09

## 2021-08-02 RX ORDER — HEPARIN 100 UNIT/ML
5 SYRINGE INTRAVENOUS
Status: DISCONTINUED | OUTPATIENT
Start: 2021-08-02 | End: 2021-08-02 | Stop reason: HOSPADM

## 2021-08-02 RX ADMIN — SODIUM CHLORIDE: 9 INJECTION, SOLUTION INTRAVENOUS at 01:08

## 2021-08-02 RX ADMIN — FERRIC CARBOXYMALTOSE INJECTION 750 MG: 50 INJECTION, SOLUTION INTRAVENOUS at 01:08

## 2021-08-02 RX ADMIN — HEPARIN 500 UNITS: 100 SYRINGE at 02:08

## 2021-08-02 RX ADMIN — SODIUM CHLORIDE, PRESERVATIVE FREE 10 ML: 5 INJECTION INTRAVENOUS at 02:08

## 2021-08-03 ENCOUNTER — HOSPITAL ENCOUNTER (OUTPATIENT)
Dept: RADIOLOGY | Facility: HOSPITAL | Age: 41
Discharge: HOME | End: 2021-08-03
Attending: INTERNAL MEDICINE
Payer: COMMERCIAL

## 2021-08-03 DIAGNOSIS — C16.9 GASTRIC ADENOCARCINOMA: ICD-10-CM

## 2021-08-03 PROCEDURE — 71260 CT THORAX DX C+: CPT | Mod: TC

## 2021-08-03 PROCEDURE — 71260 CT THORAX DX C+: CPT | Mod: 26,,, | Performed by: RADIOLOGY

## 2021-08-03 PROCEDURE — 74177 CT ABD & PELVIS W/CONTRAST: CPT | Mod: TC

## 2021-08-03 PROCEDURE — 74177 CT CHEST ABDOMEN PELVIS WITH CONTRAST (XPD): ICD-10-PCS | Mod: 26,,, | Performed by: RADIOLOGY

## 2021-08-03 PROCEDURE — 25500020 PHARM REV CODE 255: Performed by: INTERNAL MEDICINE

## 2021-08-03 PROCEDURE — 71260 CT CHEST ABDOMEN PELVIS WITH CONTRAST (XPD): ICD-10-PCS | Mod: 26,,, | Performed by: RADIOLOGY

## 2021-08-03 PROCEDURE — 74177 CT ABD & PELVIS W/CONTRAST: CPT | Mod: 26,,, | Performed by: RADIOLOGY

## 2021-08-03 RX ADMIN — IOHEXOL 75 ML: 350 INJECTION, SOLUTION INTRAVENOUS at 10:08

## 2021-08-03 RX ADMIN — IOHEXOL 15 ML: 300 INJECTION, SOLUTION INTRAVENOUS at 08:08

## 2021-08-09 ENCOUNTER — OFFICE VISIT (OUTPATIENT)
Dept: HEMATOLOGY/ONCOLOGY | Facility: CLINIC | Age: 41
End: 2021-08-09
Payer: COMMERCIAL

## 2021-08-09 ENCOUNTER — INFUSION (OUTPATIENT)
Dept: INFUSION THERAPY | Facility: HOSPITAL | Age: 41
End: 2021-08-09
Payer: COMMERCIAL

## 2021-08-09 ENCOUNTER — LAB VISIT (OUTPATIENT)
Dept: LAB | Facility: HOSPITAL | Age: 41
End: 2021-08-09
Payer: COMMERCIAL

## 2021-08-09 VITALS
HEIGHT: 67 IN | HEART RATE: 108 BPM | RESPIRATION RATE: 18 BRPM | WEIGHT: 118 LBS | SYSTOLIC BLOOD PRESSURE: 101 MMHG | TEMPERATURE: 98 F | BODY MASS INDEX: 18.52 KG/M2 | DIASTOLIC BLOOD PRESSURE: 63 MMHG | OXYGEN SATURATION: 99 %

## 2021-08-09 DIAGNOSIS — D50.9 MICROCYTIC ANEMIA: ICD-10-CM

## 2021-08-09 DIAGNOSIS — T45.1X5A IMMUNODEFICIENCY DUE TO CHEMOTHERAPY: ICD-10-CM

## 2021-08-09 DIAGNOSIS — R11.2 NON-INTRACTABLE VOMITING WITH NAUSEA, UNSPECIFIED VOMITING TYPE: ICD-10-CM

## 2021-08-09 DIAGNOSIS — C16.9 GASTRIC ADENOCARCINOMA: Primary | ICD-10-CM

## 2021-08-09 DIAGNOSIS — C80.1 SIGNET RING CELL ADENOCARCINOMA: ICD-10-CM

## 2021-08-09 DIAGNOSIS — C78.6 PERITONEAL CARCINOMATOSIS: ICD-10-CM

## 2021-08-09 DIAGNOSIS — Z79.899 IMMUNODEFICIENCY DUE TO CHEMOTHERAPY: ICD-10-CM

## 2021-08-09 DIAGNOSIS — K25.5 CHRONIC GASTRIC ULCER WITH PERFORATION: ICD-10-CM

## 2021-08-09 DIAGNOSIS — D84.821 IMMUNODEFICIENCY DUE TO CHEMOTHERAPY: ICD-10-CM

## 2021-08-09 DIAGNOSIS — R18.8 OTHER ASCITES: ICD-10-CM

## 2021-08-09 LAB
ALBUMIN SERPL BCP-MCNC: 2.8 G/DL (ref 3.5–5.2)
ALP SERPL-CCNC: 110 U/L (ref 55–135)
ALT SERPL W/O P-5'-P-CCNC: 29 U/L (ref 10–44)
ANION GAP SERPL CALC-SCNC: 5 MMOL/L (ref 8–16)
AST SERPL-CCNC: 31 U/L (ref 10–40)
BASOPHILS # BLD AUTO: 0.02 K/UL (ref 0–0.2)
BASOPHILS NFR BLD: 0.5 % (ref 0–1.9)
BILIRUB SERPL-MCNC: 0.5 MG/DL (ref 0.1–1)
BUN SERPL-MCNC: 8 MG/DL (ref 6–20)
CALCIUM SERPL-MCNC: 8.5 MG/DL (ref 8.7–10.5)
CHLORIDE SERPL-SCNC: 107 MMOL/L (ref 95–110)
CO2 SERPL-SCNC: 28 MMOL/L (ref 23–29)
CREAT SERPL-MCNC: 0.6 MG/DL (ref 0.5–1.4)
DIFFERENTIAL METHOD: ABNORMAL
EOSINOPHIL # BLD AUTO: 0.1 K/UL (ref 0–0.5)
EOSINOPHIL NFR BLD: 1.9 % (ref 0–8)
ERYTHROCYTE [DISTWIDTH] IN BLOOD BY AUTOMATED COUNT: 21.7 % (ref 11.5–14.5)
EST. GFR  (AFRICAN AMERICAN): >60 ML/MIN/1.73 M^2
EST. GFR  (NON AFRICAN AMERICAN): >60 ML/MIN/1.73 M^2
GLUCOSE SERPL-MCNC: 86 MG/DL (ref 70–110)
HCT VFR BLD AUTO: 31.6 % (ref 37–48.5)
HGB BLD-MCNC: 9.1 G/DL (ref 12–16)
IMM GRANULOCYTES # BLD AUTO: 0.01 K/UL (ref 0–0.04)
IMM GRANULOCYTES NFR BLD AUTO: 0.3 % (ref 0–0.5)
LYMPHOCYTES # BLD AUTO: 1.1 K/UL (ref 1–4.8)
LYMPHOCYTES NFR BLD: 29.1 % (ref 18–48)
MCH RBC QN AUTO: 24.3 PG (ref 27–31)
MCHC RBC AUTO-ENTMCNC: 28.8 G/DL (ref 32–36)
MCV RBC AUTO: 84 FL (ref 82–98)
MONOCYTES # BLD AUTO: 0.3 K/UL (ref 0.3–1)
MONOCYTES NFR BLD: 7.8 % (ref 4–15)
NEUTROPHILS # BLD AUTO: 2.3 K/UL (ref 1.8–7.7)
NEUTROPHILS NFR BLD: 60.4 % (ref 38–73)
NRBC BLD-RTO: 0 /100 WBC
PLATELET # BLD AUTO: 248 K/UL (ref 150–450)
PMV BLD AUTO: 9.1 FL (ref 9.2–12.9)
POTASSIUM SERPL-SCNC: 4.4 MMOL/L (ref 3.5–5.1)
PROT SERPL-MCNC: 5.8 G/DL (ref 6–8.4)
RBC # BLD AUTO: 3.75 M/UL (ref 4–5.4)
SODIUM SERPL-SCNC: 140 MMOL/L (ref 136–145)
WBC # BLD AUTO: 3.74 K/UL (ref 3.9–12.7)

## 2021-08-09 PROCEDURE — 1159F PR MEDICATION LIST DOCUMENTED IN MEDICAL RECORD: ICD-10-PCS | Mod: CPTII,S$GLB,, | Performed by: INTERNAL MEDICINE

## 2021-08-09 PROCEDURE — 1159F MED LIST DOCD IN RCRD: CPT | Mod: CPTII,S$GLB,, | Performed by: INTERNAL MEDICINE

## 2021-08-09 PROCEDURE — 3044F PR MOST RECENT HEMOGLOBIN A1C LEVEL <7.0%: ICD-10-PCS | Mod: CPTII,S$GLB,, | Performed by: INTERNAL MEDICINE

## 2021-08-09 PROCEDURE — 3074F SYST BP LT 130 MM HG: CPT | Mod: CPTII,S$GLB,, | Performed by: INTERNAL MEDICINE

## 2021-08-09 PROCEDURE — 96367 TX/PROPH/DG ADDL SEQ IV INF: CPT

## 2021-08-09 PROCEDURE — 99999 PR PBB SHADOW E&M-EST. PATIENT-LVL IV: ICD-10-PCS | Mod: PBBFAC,,, | Performed by: INTERNAL MEDICINE

## 2021-08-09 PROCEDURE — 85025 COMPLETE CBC W/AUTO DIFF WBC: CPT | Performed by: INTERNAL MEDICINE

## 2021-08-09 PROCEDURE — 99999 PR PBB SHADOW E&M-EST. PATIENT-LVL IV: CPT | Mod: PBBFAC,,, | Performed by: INTERNAL MEDICINE

## 2021-08-09 PROCEDURE — 80053 COMPREHEN METABOLIC PANEL: CPT | Performed by: INTERNAL MEDICINE

## 2021-08-09 PROCEDURE — 1125F PR PAIN SEVERITY QUANTIFIED, PAIN PRESENT: ICD-10-PCS | Mod: CPTII,S$GLB,, | Performed by: INTERNAL MEDICINE

## 2021-08-09 PROCEDURE — 96416 CHEMO PROLONG INFUSE W/PUMP: CPT

## 2021-08-09 PROCEDURE — 25000003 PHARM REV CODE 250: Performed by: INTERNAL MEDICINE

## 2021-08-09 PROCEDURE — 3008F PR BODY MASS INDEX (BMI) DOCUMENTED: ICD-10-PCS | Mod: CPTII,S$GLB,, | Performed by: INTERNAL MEDICINE

## 2021-08-09 PROCEDURE — 3044F HG A1C LEVEL LT 7.0%: CPT | Mod: CPTII,S$GLB,, | Performed by: INTERNAL MEDICINE

## 2021-08-09 PROCEDURE — 63600175 PHARM REV CODE 636 W HCPCS: Mod: JG | Performed by: INTERNAL MEDICINE

## 2021-08-09 PROCEDURE — 1125F AMNT PAIN NOTED PAIN PRSNT: CPT | Mod: CPTII,S$GLB,, | Performed by: INTERNAL MEDICINE

## 2021-08-09 PROCEDURE — 96413 CHEMO IV INFUSION 1 HR: CPT

## 2021-08-09 PROCEDURE — 3074F PR MOST RECENT SYSTOLIC BLOOD PRESSURE < 130 MM HG: ICD-10-PCS | Mod: CPTII,S$GLB,, | Performed by: INTERNAL MEDICINE

## 2021-08-09 PROCEDURE — 99215 PR OFFICE/OUTPT VISIT, EST, LEVL V, 40-54 MIN: ICD-10-PCS | Mod: S$GLB,,, | Performed by: INTERNAL MEDICINE

## 2021-08-09 PROCEDURE — 99215 OFFICE O/P EST HI 40 MIN: CPT | Mod: S$GLB,,, | Performed by: INTERNAL MEDICINE

## 2021-08-09 PROCEDURE — 3078F DIAST BP <80 MM HG: CPT | Mod: CPTII,S$GLB,, | Performed by: INTERNAL MEDICINE

## 2021-08-09 PROCEDURE — 3078F PR MOST RECENT DIASTOLIC BLOOD PRESSURE < 80 MM HG: ICD-10-PCS | Mod: CPTII,S$GLB,, | Performed by: INTERNAL MEDICINE

## 2021-08-09 PROCEDURE — 36415 COLL VENOUS BLD VENIPUNCTURE: CPT | Performed by: INTERNAL MEDICINE

## 2021-08-09 PROCEDURE — 3008F BODY MASS INDEX DOCD: CPT | Mod: CPTII,S$GLB,, | Performed by: INTERNAL MEDICINE

## 2021-08-09 RX ORDER — EPINEPHRINE 0.3 MG/.3ML
0.3 INJECTION SUBCUTANEOUS ONCE AS NEEDED
Status: CANCELLED | OUTPATIENT
Start: 2021-08-09

## 2021-08-09 RX ORDER — SODIUM CHLORIDE 9 MG/ML
INJECTION, SOLUTION INTRAVENOUS CONTINUOUS
Status: CANCELLED | OUTPATIENT
Start: 2021-08-09

## 2021-08-09 RX ORDER — SODIUM CHLORIDE 0.9 % (FLUSH) 0.9 %
10 SYRINGE (ML) INJECTION
Status: CANCELLED | OUTPATIENT
Start: 2021-08-11

## 2021-08-09 RX ORDER — HEPARIN 100 UNIT/ML
500 SYRINGE INTRAVENOUS
Status: DISCONTINUED | OUTPATIENT
Start: 2021-08-09 | End: 2021-08-09 | Stop reason: HOSPADM

## 2021-08-09 RX ORDER — DIPHENHYDRAMINE HYDROCHLORIDE 50 MG/ML
50 INJECTION INTRAMUSCULAR; INTRAVENOUS ONCE AS NEEDED
Status: DISCONTINUED | OUTPATIENT
Start: 2021-08-09 | End: 2021-08-09 | Stop reason: HOSPADM

## 2021-08-09 RX ORDER — HEPARIN 100 UNIT/ML
5 SYRINGE INTRAVENOUS
Status: DISCONTINUED | OUTPATIENT
Start: 2021-08-09 | End: 2021-08-09 | Stop reason: HOSPADM

## 2021-08-09 RX ORDER — SODIUM CHLORIDE 0.9 % (FLUSH) 0.9 %
10 SYRINGE (ML) INJECTION
Status: DISCONTINUED | OUTPATIENT
Start: 2021-08-09 | End: 2021-08-09 | Stop reason: HOSPADM

## 2021-08-09 RX ORDER — DIPHENHYDRAMINE HYDROCHLORIDE 50 MG/ML
50 INJECTION INTRAMUSCULAR; INTRAVENOUS ONCE AS NEEDED
Status: CANCELLED | OUTPATIENT
Start: 2021-08-09

## 2021-08-09 RX ORDER — HEPARIN 100 UNIT/ML
500 SYRINGE INTRAVENOUS
Status: CANCELLED | OUTPATIENT
Start: 2021-08-11

## 2021-08-09 RX ORDER — HEPARIN 100 UNIT/ML
5 SYRINGE INTRAVENOUS
Status: CANCELLED | OUTPATIENT
Start: 2021-08-09

## 2021-08-09 RX ORDER — SODIUM CHLORIDE 0.9 % (FLUSH) 0.9 %
10 SYRINGE (ML) INJECTION
Status: CANCELLED | OUTPATIENT
Start: 2021-08-09

## 2021-08-09 RX ORDER — SODIUM CHLORIDE 9 MG/ML
INJECTION, SOLUTION INTRAVENOUS CONTINUOUS
Status: DISCONTINUED | OUTPATIENT
Start: 2021-08-09 | End: 2021-08-09 | Stop reason: HOSPADM

## 2021-08-09 RX ORDER — METHYLPREDNISOLONE SOD SUCC 125 MG
125 VIAL (EA) INJECTION ONCE AS NEEDED
Status: CANCELLED | OUTPATIENT
Start: 2021-08-09

## 2021-08-09 RX ORDER — HEPARIN 100 UNIT/ML
500 SYRINGE INTRAVENOUS
Status: CANCELLED | OUTPATIENT
Start: 2021-08-09

## 2021-08-09 RX ORDER — EPINEPHRINE 0.3 MG/.3ML
0.3 INJECTION SUBCUTANEOUS ONCE AS NEEDED
Status: DISCONTINUED | OUTPATIENT
Start: 2021-08-09 | End: 2021-08-09 | Stop reason: HOSPADM

## 2021-08-09 RX ADMIN — FERRIC CARBOXYMALTOSE INJECTION 750 MG: 50 INJECTION, SOLUTION INTRAVENOUS at 12:08

## 2021-08-09 RX ADMIN — PALONOSETRON HYDROCHLORIDE 0.25 MG: 0.25 INJECTION, SOLUTION INTRAVENOUS at 12:08

## 2021-08-09 RX ADMIN — DEXTROSE MONOHYDRATE: 5 INJECTION, SOLUTION INTRAVENOUS at 01:08

## 2021-08-09 RX ADMIN — OXALIPLATIN 133 MG: 5 INJECTION, SOLUTION INTRAVENOUS at 01:08

## 2021-08-09 RX ADMIN — FLUOROURACIL 3770 MG: 50 INJECTION, SOLUTION INTRAVENOUS at 03:08

## 2021-08-10 ENCOUNTER — PATIENT MESSAGE (OUTPATIENT)
Dept: HEMATOLOGY/ONCOLOGY | Facility: CLINIC | Age: 41
End: 2021-08-10

## 2021-08-11 ENCOUNTER — INFUSION (OUTPATIENT)
Dept: INFUSION THERAPY | Facility: HOSPITAL | Age: 41
End: 2021-08-11
Payer: COMMERCIAL

## 2021-08-11 VITALS
TEMPERATURE: 99 F | RESPIRATION RATE: 18 BRPM | DIASTOLIC BLOOD PRESSURE: 67 MMHG | HEART RATE: 67 BPM | SYSTOLIC BLOOD PRESSURE: 100 MMHG | OXYGEN SATURATION: 99 %

## 2021-08-11 DIAGNOSIS — C16.9 GASTRIC ADENOCARCINOMA: Primary | ICD-10-CM

## 2021-08-11 PROCEDURE — 63600175 PHARM REV CODE 636 W HCPCS: Performed by: INTERNAL MEDICINE

## 2021-08-11 PROCEDURE — 25000003 PHARM REV CODE 250: Performed by: INTERNAL MEDICINE

## 2021-08-11 PROCEDURE — A4216 STERILE WATER/SALINE, 10 ML: HCPCS | Performed by: INTERNAL MEDICINE

## 2021-08-11 RX ORDER — HEPARIN 100 UNIT/ML
500 SYRINGE INTRAVENOUS
Status: DISCONTINUED | OUTPATIENT
Start: 2021-08-11 | End: 2021-08-11 | Stop reason: HOSPADM

## 2021-08-11 RX ORDER — SODIUM CHLORIDE 0.9 % (FLUSH) 0.9 %
10 SYRINGE (ML) INJECTION
Status: DISCONTINUED | OUTPATIENT
Start: 2021-08-11 | End: 2021-08-11 | Stop reason: HOSPADM

## 2021-08-11 RX ADMIN — Medication 10 ML: at 01:08

## 2021-08-11 RX ADMIN — HEPARIN 500 UNITS: 100 SYRINGE at 01:08

## 2021-08-20 ENCOUNTER — OFFICE VISIT (OUTPATIENT)
Dept: GASTROENTEROLOGY | Facility: CLINIC | Age: 41
End: 2021-08-20
Payer: COMMERCIAL

## 2021-08-20 VITALS
DIASTOLIC BLOOD PRESSURE: 56 MMHG | BODY MASS INDEX: 18.85 KG/M2 | HEIGHT: 67 IN | WEIGHT: 120.13 LBS | SYSTOLIC BLOOD PRESSURE: 100 MMHG

## 2021-08-20 DIAGNOSIS — C16.9 GASTRIC ADENOCARCINOMA: Primary | ICD-10-CM

## 2021-08-20 PROCEDURE — 99214 PR OFFICE/OUTPT VISIT, EST, LEVL IV, 30-39 MIN: ICD-10-PCS | Mod: S$GLB,,, | Performed by: INTERNAL MEDICINE

## 2021-08-20 PROCEDURE — 1159F MED LIST DOCD IN RCRD: CPT | Mod: CPTII,S$GLB,, | Performed by: INTERNAL MEDICINE

## 2021-08-20 PROCEDURE — 99999 PR PBB SHADOW E&M-EST. PATIENT-LVL III: ICD-10-PCS | Mod: PBBFAC,,, | Performed by: INTERNAL MEDICINE

## 2021-08-20 PROCEDURE — 3078F DIAST BP <80 MM HG: CPT | Mod: CPTII,S$GLB,, | Performed by: INTERNAL MEDICINE

## 2021-08-20 PROCEDURE — 99999 PR PBB SHADOW E&M-EST. PATIENT-LVL III: CPT | Mod: PBBFAC,,, | Performed by: INTERNAL MEDICINE

## 2021-08-20 PROCEDURE — 3078F PR MOST RECENT DIASTOLIC BLOOD PRESSURE < 80 MM HG: ICD-10-PCS | Mod: CPTII,S$GLB,, | Performed by: INTERNAL MEDICINE

## 2021-08-20 PROCEDURE — 3044F HG A1C LEVEL LT 7.0%: CPT | Mod: CPTII,S$GLB,, | Performed by: INTERNAL MEDICINE

## 2021-08-20 PROCEDURE — 99214 OFFICE O/P EST MOD 30 MIN: CPT | Mod: S$GLB,,, | Performed by: INTERNAL MEDICINE

## 2021-08-20 PROCEDURE — 1126F PR PAIN SEVERITY QUANTIFIED, NO PAIN PRESENT: ICD-10-PCS | Mod: CPTII,S$GLB,, | Performed by: INTERNAL MEDICINE

## 2021-08-20 PROCEDURE — 3074F PR MOST RECENT SYSTOLIC BLOOD PRESSURE < 130 MM HG: ICD-10-PCS | Mod: CPTII,S$GLB,, | Performed by: INTERNAL MEDICINE

## 2021-08-20 PROCEDURE — 1126F AMNT PAIN NOTED NONE PRSNT: CPT | Mod: CPTII,S$GLB,, | Performed by: INTERNAL MEDICINE

## 2021-08-20 PROCEDURE — 1159F PR MEDICATION LIST DOCUMENTED IN MEDICAL RECORD: ICD-10-PCS | Mod: CPTII,S$GLB,, | Performed by: INTERNAL MEDICINE

## 2021-08-20 PROCEDURE — 3008F PR BODY MASS INDEX (BMI) DOCUMENTED: ICD-10-PCS | Mod: CPTII,S$GLB,, | Performed by: INTERNAL MEDICINE

## 2021-08-20 PROCEDURE — 3008F BODY MASS INDEX DOCD: CPT | Mod: CPTII,S$GLB,, | Performed by: INTERNAL MEDICINE

## 2021-08-20 PROCEDURE — 3074F SYST BP LT 130 MM HG: CPT | Mod: CPTII,S$GLB,, | Performed by: INTERNAL MEDICINE

## 2021-08-20 PROCEDURE — 3044F PR MOST RECENT HEMOGLOBIN A1C LEVEL <7.0%: ICD-10-PCS | Mod: CPTII,S$GLB,, | Performed by: INTERNAL MEDICINE

## 2021-08-23 ENCOUNTER — OFFICE VISIT (OUTPATIENT)
Dept: HEMATOLOGY/ONCOLOGY | Facility: CLINIC | Age: 41
End: 2021-08-23
Payer: COMMERCIAL

## 2021-08-23 ENCOUNTER — LAB VISIT (OUTPATIENT)
Dept: LAB | Facility: HOSPITAL | Age: 41
End: 2021-08-23
Payer: COMMERCIAL

## 2021-08-23 ENCOUNTER — INFUSION (OUTPATIENT)
Dept: INFUSION THERAPY | Facility: HOSPITAL | Age: 41
End: 2021-08-23
Attending: INTERNAL MEDICINE
Payer: COMMERCIAL

## 2021-08-23 VITALS
TEMPERATURE: 98 F | RESPIRATION RATE: 18 BRPM | HEIGHT: 67 IN | WEIGHT: 115.75 LBS | OXYGEN SATURATION: 98 % | SYSTOLIC BLOOD PRESSURE: 97 MMHG | DIASTOLIC BLOOD PRESSURE: 63 MMHG | HEART RATE: 76 BPM | BODY MASS INDEX: 18.17 KG/M2

## 2021-08-23 VITALS
SYSTOLIC BLOOD PRESSURE: 105 MMHG | TEMPERATURE: 99 F | RESPIRATION RATE: 18 BRPM | DIASTOLIC BLOOD PRESSURE: 64 MMHG | HEART RATE: 75 BPM

## 2021-08-23 DIAGNOSIS — R11.2 NON-INTRACTABLE VOMITING WITH NAUSEA, UNSPECIFIED VOMITING TYPE: ICD-10-CM

## 2021-08-23 DIAGNOSIS — C16.9 GASTRIC ADENOCARCINOMA: Primary | ICD-10-CM

## 2021-08-23 DIAGNOSIS — R18.8 OTHER ASCITES: ICD-10-CM

## 2021-08-23 DIAGNOSIS — K25.5 CHRONIC GASTRIC ULCER WITH PERFORATION: ICD-10-CM

## 2021-08-23 DIAGNOSIS — C80.1 SIGNET RING CELL ADENOCARCINOMA: ICD-10-CM

## 2021-08-23 DIAGNOSIS — D50.9 MICROCYTIC ANEMIA: ICD-10-CM

## 2021-08-23 DIAGNOSIS — T45.1X5A IMMUNODEFICIENCY DUE TO CHEMOTHERAPY: ICD-10-CM

## 2021-08-23 DIAGNOSIS — Z79.899 IMMUNODEFICIENCY DUE TO CHEMOTHERAPY: ICD-10-CM

## 2021-08-23 DIAGNOSIS — C78.6 PERITONEAL CARCINOMATOSIS: ICD-10-CM

## 2021-08-23 DIAGNOSIS — D84.821 IMMUNODEFICIENCY DUE TO CHEMOTHERAPY: ICD-10-CM

## 2021-08-23 LAB
ALBUMIN SERPL BCP-MCNC: 2.5 G/DL (ref 3.5–5.2)
ALP SERPL-CCNC: 91 U/L (ref 55–135)
ALT SERPL W/O P-5'-P-CCNC: 18 U/L (ref 10–44)
ANION GAP SERPL CALC-SCNC: 5 MMOL/L (ref 8–16)
AST SERPL-CCNC: 17 U/L (ref 10–40)
BASOPHILS # BLD AUTO: 0.02 K/UL (ref 0–0.2)
BASOPHILS NFR BLD: 0.4 % (ref 0–1.9)
BILIRUB SERPL-MCNC: 0.7 MG/DL (ref 0.1–1)
BUN SERPL-MCNC: 9 MG/DL (ref 6–20)
CALCIUM SERPL-MCNC: 8.1 MG/DL (ref 8.7–10.5)
CHLORIDE SERPL-SCNC: 103 MMOL/L (ref 95–110)
CO2 SERPL-SCNC: 26 MMOL/L (ref 23–29)
CREAT SERPL-MCNC: 0.7 MG/DL (ref 0.5–1.4)
DIFFERENTIAL METHOD: ABNORMAL
EOSINOPHIL # BLD AUTO: 0.1 K/UL (ref 0–0.5)
EOSINOPHIL NFR BLD: 1.4 % (ref 0–8)
ERYTHROCYTE [DISTWIDTH] IN BLOOD BY AUTOMATED COUNT: ABNORMAL % (ref 11.5–14.5)
EST. GFR  (AFRICAN AMERICAN): >60 ML/MIN/1.73 M^2
EST. GFR  (NON AFRICAN AMERICAN): >60 ML/MIN/1.73 M^2
GLUCOSE SERPL-MCNC: 75 MG/DL (ref 70–110)
HCT VFR BLD AUTO: 31 % (ref 37–48.5)
HGB BLD-MCNC: 9.2 G/DL (ref 12–16)
IMM GRANULOCYTES # BLD AUTO: 0.02 K/UL (ref 0–0.04)
IMM GRANULOCYTES NFR BLD AUTO: 0.4 % (ref 0–0.5)
LYMPHOCYTES # BLD AUTO: 1.2 K/UL (ref 1–4.8)
LYMPHOCYTES NFR BLD: 22.2 % (ref 18–48)
MCH RBC QN AUTO: 26.7 PG (ref 27–31)
MCHC RBC AUTO-ENTMCNC: 29.7 G/DL (ref 32–36)
MCV RBC AUTO: 90 FL (ref 82–98)
MONOCYTES # BLD AUTO: 0.6 K/UL (ref 0.3–1)
MONOCYTES NFR BLD: 10.6 % (ref 4–15)
NEUTROPHILS # BLD AUTO: 3.6 K/UL (ref 1.8–7.7)
NEUTROPHILS NFR BLD: 65 % (ref 38–73)
NRBC BLD-RTO: 0 /100 WBC
PLATELET # BLD AUTO: 196 K/UL (ref 150–450)
PMV BLD AUTO: 9.6 FL (ref 9.2–12.9)
POTASSIUM SERPL-SCNC: 3.7 MMOL/L (ref 3.5–5.1)
PROT SERPL-MCNC: 5.5 G/DL (ref 6–8.4)
RBC # BLD AUTO: 3.44 M/UL (ref 4–5.4)
SODIUM SERPL-SCNC: 134 MMOL/L (ref 136–145)
WBC # BLD AUTO: 5.54 K/UL (ref 3.9–12.7)

## 2021-08-23 PROCEDURE — 96417 CHEMO IV INFUS EACH ADDL SEQ: CPT

## 2021-08-23 PROCEDURE — 3044F HG A1C LEVEL LT 7.0%: CPT | Mod: CPTII,S$GLB,, | Performed by: NURSE PRACTITIONER

## 2021-08-23 PROCEDURE — 3008F BODY MASS INDEX DOCD: CPT | Mod: CPTII,S$GLB,, | Performed by: NURSE PRACTITIONER

## 2021-08-23 PROCEDURE — 1159F PR MEDICATION LIST DOCUMENTED IN MEDICAL RECORD: ICD-10-PCS | Mod: CPTII,S$GLB,, | Performed by: NURSE PRACTITIONER

## 2021-08-23 PROCEDURE — 3074F SYST BP LT 130 MM HG: CPT | Mod: CPTII,S$GLB,, | Performed by: NURSE PRACTITIONER

## 2021-08-23 PROCEDURE — 1125F AMNT PAIN NOTED PAIN PRSNT: CPT | Mod: CPTII,S$GLB,, | Performed by: NURSE PRACTITIONER

## 2021-08-23 PROCEDURE — 63600175 PHARM REV CODE 636 W HCPCS: Performed by: INTERNAL MEDICINE

## 2021-08-23 PROCEDURE — 1160F PR REVIEW ALL MEDS BY PRESCRIBER/CLIN PHARMACIST DOCUMENTED: ICD-10-PCS | Mod: CPTII,S$GLB,, | Performed by: NURSE PRACTITIONER

## 2021-08-23 PROCEDURE — 96416 CHEMO PROLONG INFUSE W/PUMP: CPT

## 2021-08-23 PROCEDURE — 3078F PR MOST RECENT DIASTOLIC BLOOD PRESSURE < 80 MM HG: ICD-10-PCS | Mod: CPTII,S$GLB,, | Performed by: NURSE PRACTITIONER

## 2021-08-23 PROCEDURE — 99215 PR OFFICE/OUTPT VISIT, EST, LEVL V, 40-54 MIN: ICD-10-PCS | Mod: S$GLB,,, | Performed by: NURSE PRACTITIONER

## 2021-08-23 PROCEDURE — 3044F PR MOST RECENT HEMOGLOBIN A1C LEVEL <7.0%: ICD-10-PCS | Mod: CPTII,S$GLB,, | Performed by: NURSE PRACTITIONER

## 2021-08-23 PROCEDURE — 85025 COMPLETE CBC W/AUTO DIFF WBC: CPT | Performed by: INTERNAL MEDICINE

## 2021-08-23 PROCEDURE — 99999 PR PBB SHADOW E&M-EST. PATIENT-LVL IV: CPT | Mod: PBBFAC,,, | Performed by: NURSE PRACTITIONER

## 2021-08-23 PROCEDURE — 3074F PR MOST RECENT SYSTOLIC BLOOD PRESSURE < 130 MM HG: ICD-10-PCS | Mod: CPTII,S$GLB,, | Performed by: NURSE PRACTITIONER

## 2021-08-23 PROCEDURE — 96367 TX/PROPH/DG ADDL SEQ IV INF: CPT

## 2021-08-23 PROCEDURE — 99999 PR PBB SHADOW E&M-EST. PATIENT-LVL IV: ICD-10-PCS | Mod: PBBFAC,,, | Performed by: NURSE PRACTITIONER

## 2021-08-23 PROCEDURE — 1159F MED LIST DOCD IN RCRD: CPT | Mod: CPTII,S$GLB,, | Performed by: NURSE PRACTITIONER

## 2021-08-23 PROCEDURE — 3008F PR BODY MASS INDEX (BMI) DOCUMENTED: ICD-10-PCS | Mod: CPTII,S$GLB,, | Performed by: NURSE PRACTITIONER

## 2021-08-23 PROCEDURE — 1125F PR PAIN SEVERITY QUANTIFIED, PAIN PRESENT: ICD-10-PCS | Mod: CPTII,S$GLB,, | Performed by: NURSE PRACTITIONER

## 2021-08-23 PROCEDURE — 36415 COLL VENOUS BLD VENIPUNCTURE: CPT | Performed by: INTERNAL MEDICINE

## 2021-08-23 PROCEDURE — 96415 CHEMO IV INFUSION ADDL HR: CPT

## 2021-08-23 PROCEDURE — 25000003 PHARM REV CODE 250: Performed by: INTERNAL MEDICINE

## 2021-08-23 PROCEDURE — 1160F RVW MEDS BY RX/DR IN RCRD: CPT | Mod: CPTII,S$GLB,, | Performed by: NURSE PRACTITIONER

## 2021-08-23 PROCEDURE — 3078F DIAST BP <80 MM HG: CPT | Mod: CPTII,S$GLB,, | Performed by: NURSE PRACTITIONER

## 2021-08-23 PROCEDURE — 99215 OFFICE O/P EST HI 40 MIN: CPT | Mod: S$GLB,,, | Performed by: NURSE PRACTITIONER

## 2021-08-23 PROCEDURE — 80053 COMPREHEN METABOLIC PANEL: CPT | Performed by: INTERNAL MEDICINE

## 2021-08-23 PROCEDURE — 96411 CHEMO IV PUSH ADDL DRUG: CPT

## 2021-08-23 PROCEDURE — 96413 CHEMO IV INFUSION 1 HR: CPT

## 2021-08-23 RX ORDER — HEPARIN 100 UNIT/ML
500 SYRINGE INTRAVENOUS
Status: DISCONTINUED | OUTPATIENT
Start: 2021-08-23 | End: 2021-08-23 | Stop reason: HOSPADM

## 2021-08-23 RX ORDER — SODIUM CHLORIDE 0.9 % (FLUSH) 0.9 %
10 SYRINGE (ML) INJECTION
Status: CANCELLED | OUTPATIENT
Start: 2021-08-23

## 2021-08-23 RX ORDER — HEPARIN 100 UNIT/ML
500 SYRINGE INTRAVENOUS
Status: CANCELLED | OUTPATIENT
Start: 2021-08-23

## 2021-08-23 RX ORDER — DIPHENHYDRAMINE HYDROCHLORIDE 50 MG/ML
50 INJECTION INTRAMUSCULAR; INTRAVENOUS ONCE AS NEEDED
Status: DISCONTINUED | OUTPATIENT
Start: 2021-08-23 | End: 2021-08-23 | Stop reason: HOSPADM

## 2021-08-23 RX ORDER — EPINEPHRINE 0.3 MG/.3ML
0.3 INJECTION SUBCUTANEOUS ONCE AS NEEDED
Status: CANCELLED | OUTPATIENT
Start: 2021-08-23

## 2021-08-23 RX ORDER — DIPHENHYDRAMINE HYDROCHLORIDE 50 MG/ML
50 INJECTION INTRAMUSCULAR; INTRAVENOUS ONCE AS NEEDED
Status: CANCELLED | OUTPATIENT
Start: 2021-08-23

## 2021-08-23 RX ORDER — HEPARIN 100 UNIT/ML
500 SYRINGE INTRAVENOUS
Status: CANCELLED | OUTPATIENT
Start: 2021-08-25

## 2021-08-23 RX ORDER — EPINEPHRINE 0.3 MG/.3ML
0.3 INJECTION SUBCUTANEOUS ONCE AS NEEDED
Status: DISCONTINUED | OUTPATIENT
Start: 2021-08-23 | End: 2021-08-23 | Stop reason: HOSPADM

## 2021-08-23 RX ORDER — SODIUM CHLORIDE 0.9 % (FLUSH) 0.9 %
10 SYRINGE (ML) INJECTION
Status: CANCELLED | OUTPATIENT
Start: 2021-08-25

## 2021-08-23 RX ORDER — SODIUM CHLORIDE 0.9 % (FLUSH) 0.9 %
10 SYRINGE (ML) INJECTION
Status: DISCONTINUED | OUTPATIENT
Start: 2021-08-23 | End: 2021-08-23 | Stop reason: HOSPADM

## 2021-08-23 RX ADMIN — FLUOROURACIL 3770 MG: 50 INJECTION, SOLUTION INTRAVENOUS at 03:08

## 2021-08-23 RX ADMIN — OXALIPLATIN 133 MG: 5 INJECTION, SOLUTION INTRAVENOUS at 01:08

## 2021-08-23 RX ADMIN — PALONOSETRON HYDROCHLORIDE: 0.25 INJECTION, SOLUTION INTRAVENOUS at 12:08

## 2021-08-23 RX ADMIN — DEXTROSE: 50 INJECTION, SOLUTION INTRAVENOUS at 12:08

## 2021-08-24 ENCOUNTER — PATIENT MESSAGE (OUTPATIENT)
Dept: HEMATOLOGY/ONCOLOGY | Facility: CLINIC | Age: 41
End: 2021-08-24

## 2021-08-25 ENCOUNTER — INFUSION (OUTPATIENT)
Dept: INFUSION THERAPY | Facility: HOSPITAL | Age: 41
End: 2021-08-25
Payer: COMMERCIAL

## 2021-08-25 VITALS
HEART RATE: 99 BPM | RESPIRATION RATE: 18 BRPM | SYSTOLIC BLOOD PRESSURE: 108 MMHG | TEMPERATURE: 99 F | DIASTOLIC BLOOD PRESSURE: 76 MMHG

## 2021-08-25 DIAGNOSIS — C16.9 GASTRIC ADENOCARCINOMA: Primary | ICD-10-CM

## 2021-08-25 PROCEDURE — 63600175 PHARM REV CODE 636 W HCPCS: Performed by: INTERNAL MEDICINE

## 2021-08-25 PROCEDURE — 25000003 PHARM REV CODE 250: Performed by: INTERNAL MEDICINE

## 2021-08-25 PROCEDURE — A4216 STERILE WATER/SALINE, 10 ML: HCPCS | Performed by: INTERNAL MEDICINE

## 2021-08-25 RX ORDER — HEPARIN 100 UNIT/ML
500 SYRINGE INTRAVENOUS
Status: DISCONTINUED | OUTPATIENT
Start: 2021-08-25 | End: 2021-08-25 | Stop reason: HOSPADM

## 2021-08-25 RX ORDER — SODIUM CHLORIDE 0.9 % (FLUSH) 0.9 %
10 SYRINGE (ML) INJECTION
Status: DISCONTINUED | OUTPATIENT
Start: 2021-08-25 | End: 2021-08-25 | Stop reason: HOSPADM

## 2021-08-25 RX ADMIN — HEPARIN 500 UNITS: 100 SYRINGE at 02:08

## 2021-08-25 RX ADMIN — Medication 10 ML: at 02:08

## 2021-09-03 ENCOUNTER — TELEPHONE (OUTPATIENT)
Dept: INFUSION THERAPY | Facility: HOSPITAL | Age: 41
End: 2021-09-03

## 2021-09-07 ENCOUNTER — OFFICE VISIT (OUTPATIENT)
Dept: HEMATOLOGY/ONCOLOGY | Facility: CLINIC | Age: 41
End: 2021-09-07
Payer: COMMERCIAL

## 2021-09-07 ENCOUNTER — LAB VISIT (OUTPATIENT)
Dept: LAB | Facility: HOSPITAL | Age: 41
End: 2021-09-07
Attending: INTERNAL MEDICINE
Payer: COMMERCIAL

## 2021-09-07 ENCOUNTER — INFUSION (OUTPATIENT)
Dept: INFUSION THERAPY | Facility: HOSPITAL | Age: 41
End: 2021-09-07
Payer: COMMERCIAL

## 2021-09-07 ENCOUNTER — LAB VISIT (OUTPATIENT)
Dept: PRIMARY CARE CLINIC | Facility: OTHER | Age: 41
End: 2021-09-07
Payer: COMMERCIAL

## 2021-09-07 VITALS
DIASTOLIC BLOOD PRESSURE: 67 MMHG | HEART RATE: 104 BPM | TEMPERATURE: 98 F | OXYGEN SATURATION: 100 % | RESPIRATION RATE: 18 BRPM | SYSTOLIC BLOOD PRESSURE: 103 MMHG | HEIGHT: 67 IN | BODY MASS INDEX: 17.85 KG/M2 | WEIGHT: 113.75 LBS

## 2021-09-07 VITALS
DIASTOLIC BLOOD PRESSURE: 65 MMHG | SYSTOLIC BLOOD PRESSURE: 111 MMHG | HEIGHT: 67 IN | BODY MASS INDEX: 17.99 KG/M2 | WEIGHT: 114.63 LBS | TEMPERATURE: 98 F | HEART RATE: 88 BPM

## 2021-09-07 DIAGNOSIS — C16.9 GASTRIC ADENOCARCINOMA: Primary | ICD-10-CM

## 2021-09-07 DIAGNOSIS — C80.1 SIGNET RING CELL ADENOCARCINOMA: ICD-10-CM

## 2021-09-07 DIAGNOSIS — E43 SEVERE MALNUTRITION: ICD-10-CM

## 2021-09-07 DIAGNOSIS — Z20.822 ENCOUNTER FOR LABORATORY TESTING FOR COVID-19 VIRUS: ICD-10-CM

## 2021-09-07 DIAGNOSIS — D50.9 MICROCYTIC ANEMIA: ICD-10-CM

## 2021-09-07 LAB
ALBUMIN SERPL BCP-MCNC: 2.5 G/DL (ref 3.5–5.2)
ALP SERPL-CCNC: 149 U/L (ref 55–135)
ALT SERPL W/O P-5'-P-CCNC: 26 U/L (ref 10–44)
ANION GAP SERPL CALC-SCNC: 10 MMOL/L (ref 8–16)
AST SERPL-CCNC: 20 U/L (ref 10–40)
BASOPHILS # BLD AUTO: 0.02 K/UL (ref 0–0.2)
BASOPHILS NFR BLD: 0.1 % (ref 0–1.9)
BILIRUB SERPL-MCNC: 0.6 MG/DL (ref 0.1–1)
BUN SERPL-MCNC: 6 MG/DL (ref 6–20)
CALCIUM SERPL-MCNC: 8.6 MG/DL (ref 8.7–10.5)
CHLORIDE SERPL-SCNC: 100 MMOL/L (ref 95–110)
CO2 SERPL-SCNC: 25 MMOL/L (ref 23–29)
CREAT SERPL-MCNC: 0.7 MG/DL (ref 0.5–1.4)
DIFFERENTIAL METHOD: ABNORMAL
EOSINOPHIL # BLD AUTO: 0 K/UL (ref 0–0.5)
EOSINOPHIL NFR BLD: 0 % (ref 0–8)
ERYTHROCYTE [DISTWIDTH] IN BLOOD BY AUTOMATED COUNT: 22.1 % (ref 11.5–14.5)
EST. GFR  (AFRICAN AMERICAN): >60 ML/MIN/1.73 M^2
EST. GFR  (NON AFRICAN AMERICAN): >60 ML/MIN/1.73 M^2
GLUCOSE SERPL-MCNC: 91 MG/DL (ref 70–110)
HCT VFR BLD AUTO: 33.4 % (ref 37–48.5)
HGB BLD-MCNC: 10.7 G/DL (ref 12–16)
IMM GRANULOCYTES # BLD AUTO: 0.14 K/UL (ref 0–0.04)
IMM GRANULOCYTES NFR BLD AUTO: 1 % (ref 0–0.5)
LYMPHOCYTES # BLD AUTO: 0.6 K/UL (ref 1–4.8)
LYMPHOCYTES NFR BLD: 4.3 % (ref 18–48)
MCH RBC QN AUTO: 27.3 PG (ref 27–31)
MCHC RBC AUTO-ENTMCNC: 32 G/DL (ref 32–36)
MCV RBC AUTO: 85 FL (ref 82–98)
MONOCYTES # BLD AUTO: 0.6 K/UL (ref 0.3–1)
MONOCYTES NFR BLD: 4.3 % (ref 4–15)
NEUTROPHILS # BLD AUTO: 12.9 K/UL (ref 1.8–7.7)
NEUTROPHILS NFR BLD: 90.3 % (ref 38–73)
NRBC BLD-RTO: 0 /100 WBC
PLATELET # BLD AUTO: 273 K/UL (ref 150–450)
PMV BLD AUTO: 9.1 FL (ref 9.2–12.9)
POTASSIUM SERPL-SCNC: 3.9 MMOL/L (ref 3.5–5.1)
PROT SERPL-MCNC: 6.6 G/DL (ref 6–8.4)
RBC # BLD AUTO: 3.92 M/UL (ref 4–5.4)
SODIUM SERPL-SCNC: 135 MMOL/L (ref 136–145)
WBC # BLD AUTO: 14.26 K/UL (ref 3.9–12.7)

## 2021-09-07 PROCEDURE — 3008F BODY MASS INDEX DOCD: CPT | Mod: CPTII,S$GLB,, | Performed by: PHYSICIAN ASSISTANT

## 2021-09-07 PROCEDURE — 3074F SYST BP LT 130 MM HG: CPT | Mod: CPTII,S$GLB,, | Performed by: PHYSICIAN ASSISTANT

## 2021-09-07 PROCEDURE — 3074F PR MOST RECENT SYSTOLIC BLOOD PRESSURE < 130 MM HG: ICD-10-PCS | Mod: CPTII,S$GLB,, | Performed by: PHYSICIAN ASSISTANT

## 2021-09-07 PROCEDURE — 36415 COLL VENOUS BLD VENIPUNCTURE: CPT | Performed by: INTERNAL MEDICINE

## 2021-09-07 PROCEDURE — 85025 COMPLETE CBC W/AUTO DIFF WBC: CPT | Performed by: INTERNAL MEDICINE

## 2021-09-07 PROCEDURE — 3078F PR MOST RECENT DIASTOLIC BLOOD PRESSURE < 80 MM HG: ICD-10-PCS | Mod: CPTII,S$GLB,, | Performed by: PHYSICIAN ASSISTANT

## 2021-09-07 PROCEDURE — 63600175 PHARM REV CODE 636 W HCPCS: Performed by: STUDENT IN AN ORGANIZED HEALTH CARE EDUCATION/TRAINING PROGRAM

## 2021-09-07 PROCEDURE — 80053 COMPREHEN METABOLIC PANEL: CPT | Performed by: INTERNAL MEDICINE

## 2021-09-07 PROCEDURE — 3044F PR MOST RECENT HEMOGLOBIN A1C LEVEL <7.0%: ICD-10-PCS | Mod: CPTII,S$GLB,, | Performed by: PHYSICIAN ASSISTANT

## 2021-09-07 PROCEDURE — 99215 OFFICE O/P EST HI 40 MIN: CPT | Mod: S$GLB,,, | Performed by: PHYSICIAN ASSISTANT

## 2021-09-07 PROCEDURE — 1160F RVW MEDS BY RX/DR IN RCRD: CPT | Mod: CPTII,S$GLB,, | Performed by: PHYSICIAN ASSISTANT

## 2021-09-07 PROCEDURE — 25000003 PHARM REV CODE 250: Performed by: STUDENT IN AN ORGANIZED HEALTH CARE EDUCATION/TRAINING PROGRAM

## 2021-09-07 PROCEDURE — 3044F HG A1C LEVEL LT 7.0%: CPT | Mod: CPTII,S$GLB,, | Performed by: PHYSICIAN ASSISTANT

## 2021-09-07 PROCEDURE — 3078F DIAST BP <80 MM HG: CPT | Mod: CPTII,S$GLB,, | Performed by: PHYSICIAN ASSISTANT

## 2021-09-07 PROCEDURE — 96415 CHEMO IV INFUSION ADDL HR: CPT

## 2021-09-07 PROCEDURE — 1159F MED LIST DOCD IN RCRD: CPT | Mod: CPTII,S$GLB,, | Performed by: PHYSICIAN ASSISTANT

## 2021-09-07 PROCEDURE — 99999 PR PBB SHADOW E&M-EST. PATIENT-LVL III: CPT | Mod: PBBFAC,,, | Performed by: PHYSICIAN ASSISTANT

## 2021-09-07 PROCEDURE — 96416 CHEMO PROLONG INFUSE W/PUMP: CPT

## 2021-09-07 PROCEDURE — 99215 PR OFFICE/OUTPT VISIT, EST, LEVL V, 40-54 MIN: ICD-10-PCS | Mod: S$GLB,,, | Performed by: PHYSICIAN ASSISTANT

## 2021-09-07 PROCEDURE — 96413 CHEMO IV INFUSION 1 HR: CPT

## 2021-09-07 PROCEDURE — 3008F PR BODY MASS INDEX (BMI) DOCUMENTED: ICD-10-PCS | Mod: CPTII,S$GLB,, | Performed by: PHYSICIAN ASSISTANT

## 2021-09-07 PROCEDURE — 99999 PR PBB SHADOW E&M-EST. PATIENT-LVL III: ICD-10-PCS | Mod: PBBFAC,,, | Performed by: PHYSICIAN ASSISTANT

## 2021-09-07 PROCEDURE — 1160F PR REVIEW ALL MEDS BY PRESCRIBER/CLIN PHARMACIST DOCUMENTED: ICD-10-PCS | Mod: CPTII,S$GLB,, | Performed by: PHYSICIAN ASSISTANT

## 2021-09-07 PROCEDURE — 96367 TX/PROPH/DG ADDL SEQ IV INF: CPT

## 2021-09-07 PROCEDURE — U0003 INFECTIOUS AGENT DETECTION BY NUCLEIC ACID (DNA OR RNA); SEVERE ACUTE RESPIRATORY SYNDROME CORONAVIRUS 2 (SARS-COV-2) (CORONAVIRUS DISEASE [COVID-19]), AMPLIFIED PROBE TECHNIQUE, MAKING USE OF HIGH THROUGHPUT TECHNOLOGIES AS DESCRIBED BY CMS-2020-01-R: HCPCS | Performed by: INTERNAL MEDICINE

## 2021-09-07 PROCEDURE — 1159F PR MEDICATION LIST DOCUMENTED IN MEDICAL RECORD: ICD-10-PCS | Mod: CPTII,S$GLB,, | Performed by: PHYSICIAN ASSISTANT

## 2021-09-07 RX ORDER — HEPARIN 100 UNIT/ML
500 SYRINGE INTRAVENOUS
Status: CANCELLED | OUTPATIENT
Start: 2021-09-09

## 2021-09-07 RX ORDER — SODIUM CHLORIDE 0.9 % (FLUSH) 0.9 %
10 SYRINGE (ML) INJECTION
Status: CANCELLED | OUTPATIENT
Start: 2021-09-09

## 2021-09-07 RX ORDER — DIPHENHYDRAMINE HYDROCHLORIDE 50 MG/ML
50 INJECTION INTRAMUSCULAR; INTRAVENOUS ONCE AS NEEDED
Status: DISCONTINUED | OUTPATIENT
Start: 2021-09-07 | End: 2021-09-07 | Stop reason: HOSPADM

## 2021-09-07 RX ORDER — EPINEPHRINE 0.3 MG/.3ML
0.3 INJECTION SUBCUTANEOUS ONCE AS NEEDED
Status: DISCONTINUED | OUTPATIENT
Start: 2021-09-07 | End: 2021-09-07 | Stop reason: HOSPADM

## 2021-09-07 RX ORDER — HEPARIN 100 UNIT/ML
500 SYRINGE INTRAVENOUS
Status: DISCONTINUED | OUTPATIENT
Start: 2021-09-07 | End: 2021-09-07 | Stop reason: HOSPADM

## 2021-09-07 RX ORDER — SODIUM CHLORIDE 0.9 % (FLUSH) 0.9 %
10 SYRINGE (ML) INJECTION
Status: DISCONTINUED | OUTPATIENT
Start: 2021-09-07 | End: 2021-09-07 | Stop reason: HOSPADM

## 2021-09-07 RX ADMIN — PALONOSETRON HYDROCHLORIDE: 0.25 INJECTION, SOLUTION INTRAVENOUS at 12:09

## 2021-09-07 RX ADMIN — DEXTROSE MONOHYDRATE: 5 INJECTION, SOLUTION INTRAVENOUS at 12:09

## 2021-09-07 RX ADMIN — FLUOROURACIL 3770 MG: 50 INJECTION, SOLUTION INTRAVENOUS at 02:09

## 2021-09-07 RX ADMIN — OXALIPLATIN 133 MG: 5 INJECTION, SOLUTION INTRAVENOUS at 12:09

## 2021-09-08 ENCOUNTER — PATIENT MESSAGE (OUTPATIENT)
Dept: HEMATOLOGY/ONCOLOGY | Facility: CLINIC | Age: 41
End: 2021-09-08

## 2021-09-08 LAB
SARS-COV-2 RNA RESP QL NAA+PROBE: NOT DETECTED
SARS-COV-2- CYCLE NUMBER: NORMAL

## 2021-09-09 ENCOUNTER — INFUSION (OUTPATIENT)
Dept: INFUSION THERAPY | Facility: HOSPITAL | Age: 41
End: 2021-09-09
Payer: COMMERCIAL

## 2021-09-09 VITALS
HEIGHT: 67 IN | TEMPERATURE: 98 F | BODY MASS INDEX: 17.99 KG/M2 | SYSTOLIC BLOOD PRESSURE: 123 MMHG | WEIGHT: 114.63 LBS | RESPIRATION RATE: 18 BRPM | DIASTOLIC BLOOD PRESSURE: 84 MMHG | HEART RATE: 63 BPM

## 2021-09-09 DIAGNOSIS — C16.9 GASTRIC ADENOCARCINOMA: Primary | ICD-10-CM

## 2021-09-09 PROCEDURE — 63600175 PHARM REV CODE 636 W HCPCS: Performed by: STUDENT IN AN ORGANIZED HEALTH CARE EDUCATION/TRAINING PROGRAM

## 2021-09-09 PROCEDURE — 25000003 PHARM REV CODE 250: Performed by: STUDENT IN AN ORGANIZED HEALTH CARE EDUCATION/TRAINING PROGRAM

## 2021-09-09 PROCEDURE — A4216 STERILE WATER/SALINE, 10 ML: HCPCS | Performed by: STUDENT IN AN ORGANIZED HEALTH CARE EDUCATION/TRAINING PROGRAM

## 2021-09-09 RX ORDER — HEPARIN 100 UNIT/ML
500 SYRINGE INTRAVENOUS
Status: DISCONTINUED | OUTPATIENT
Start: 2021-09-09 | End: 2021-09-09 | Stop reason: HOSPADM

## 2021-09-09 RX ORDER — SODIUM CHLORIDE 0.9 % (FLUSH) 0.9 %
10 SYRINGE (ML) INJECTION
Status: DISCONTINUED | OUTPATIENT
Start: 2021-09-09 | End: 2021-09-09 | Stop reason: HOSPADM

## 2021-09-09 RX ADMIN — Medication 10 ML: at 01:09

## 2021-09-09 RX ADMIN — HEPARIN 500 UNITS: 100 SYRINGE at 01:09

## 2021-09-21 ENCOUNTER — INFUSION (OUTPATIENT)
Dept: INFUSION THERAPY | Facility: HOSPITAL | Age: 41
End: 2021-09-21
Payer: COMMERCIAL

## 2021-09-21 ENCOUNTER — OFFICE VISIT (OUTPATIENT)
Dept: HEMATOLOGY/ONCOLOGY | Facility: CLINIC | Age: 41
End: 2021-09-21
Payer: COMMERCIAL

## 2021-09-21 ENCOUNTER — LAB VISIT (OUTPATIENT)
Dept: LAB | Facility: HOSPITAL | Age: 41
End: 2021-09-21
Attending: INTERNAL MEDICINE
Payer: COMMERCIAL

## 2021-09-21 VITALS
BODY MASS INDEX: 19.29 KG/M2 | OXYGEN SATURATION: 98 % | TEMPERATURE: 98 F | HEART RATE: 75 BPM | WEIGHT: 122.94 LBS | SYSTOLIC BLOOD PRESSURE: 108 MMHG | HEIGHT: 67 IN | DIASTOLIC BLOOD PRESSURE: 66 MMHG | RESPIRATION RATE: 18 BRPM

## 2021-09-21 VITALS
HEIGHT: 67 IN | BODY MASS INDEX: 19.28 KG/M2 | RESPIRATION RATE: 18 BRPM | HEART RATE: 75 BPM | WEIGHT: 122.81 LBS | TEMPERATURE: 98 F | DIASTOLIC BLOOD PRESSURE: 75 MMHG | SYSTOLIC BLOOD PRESSURE: 121 MMHG

## 2021-09-21 DIAGNOSIS — C16.9 GASTRIC ADENOCARCINOMA: ICD-10-CM

## 2021-09-21 DIAGNOSIS — C78.6 PERITONEAL CARCINOMATOSIS: ICD-10-CM

## 2021-09-21 DIAGNOSIS — R11.2 NON-INTRACTABLE VOMITING WITH NAUSEA, UNSPECIFIED VOMITING TYPE: ICD-10-CM

## 2021-09-21 DIAGNOSIS — C16.9 GASTRIC ADENOCARCINOMA: Primary | ICD-10-CM

## 2021-09-21 DIAGNOSIS — Z79.899 IMMUNODEFICIENCY DUE TO CHEMOTHERAPY: ICD-10-CM

## 2021-09-21 DIAGNOSIS — T45.1X5A IMMUNODEFICIENCY DUE TO CHEMOTHERAPY: ICD-10-CM

## 2021-09-21 DIAGNOSIS — R18.8 OTHER ASCITES: ICD-10-CM

## 2021-09-21 DIAGNOSIS — D84.821 IMMUNODEFICIENCY DUE TO CHEMOTHERAPY: ICD-10-CM

## 2021-09-21 DIAGNOSIS — K25.5 CHRONIC GASTRIC ULCER WITH PERFORATION: ICD-10-CM

## 2021-09-21 DIAGNOSIS — D50.9 MICROCYTIC ANEMIA: ICD-10-CM

## 2021-09-21 LAB
ALBUMIN SERPL BCP-MCNC: 2.8 G/DL (ref 3.5–5.2)
ALP SERPL-CCNC: 122 U/L (ref 55–135)
ALT SERPL W/O P-5'-P-CCNC: 33 U/L (ref 10–44)
ANION GAP SERPL CALC-SCNC: 8 MMOL/L (ref 8–16)
AST SERPL-CCNC: 28 U/L (ref 10–40)
BILIRUB SERPL-MCNC: 0.4 MG/DL (ref 0.1–1)
BUN SERPL-MCNC: 7 MG/DL (ref 6–20)
CALCIUM SERPL-MCNC: 8.7 MG/DL (ref 8.7–10.5)
CHLORIDE SERPL-SCNC: 107 MMOL/L (ref 95–110)
CO2 SERPL-SCNC: 28 MMOL/L (ref 23–29)
CREAT SERPL-MCNC: 0.7 MG/DL (ref 0.5–1.4)
ERYTHROCYTE [DISTWIDTH] IN BLOOD BY AUTOMATED COUNT: 23.2 % (ref 11.5–14.5)
EST. GFR  (AFRICAN AMERICAN): >60 ML/MIN/1.73 M^2
EST. GFR  (NON AFRICAN AMERICAN): >60 ML/MIN/1.73 M^2
GLUCOSE SERPL-MCNC: 78 MG/DL (ref 70–110)
HCT VFR BLD AUTO: 34.1 % (ref 37–48.5)
HGB BLD-MCNC: 10.8 G/DL (ref 12–16)
IMM GRANULOCYTES # BLD AUTO: 0.01 K/UL (ref 0–0.04)
MCH RBC QN AUTO: 27.9 PG (ref 27–31)
MCHC RBC AUTO-ENTMCNC: 31.7 G/DL (ref 32–36)
MCV RBC AUTO: 88 FL (ref 82–98)
NEUTROPHILS # BLD AUTO: 1.1 K/UL (ref 1.8–7.7)
PLATELET # BLD AUTO: 201 K/UL (ref 150–450)
PMV BLD AUTO: 9.2 FL (ref 9.2–12.9)
POTASSIUM SERPL-SCNC: 3.9 MMOL/L (ref 3.5–5.1)
PROT SERPL-MCNC: 6.1 G/DL (ref 6–8.4)
RBC # BLD AUTO: 3.87 M/UL (ref 4–5.4)
SODIUM SERPL-SCNC: 143 MMOL/L (ref 136–145)
WBC # BLD AUTO: 2.7 K/UL (ref 3.9–12.7)

## 2021-09-21 PROCEDURE — 85027 COMPLETE CBC AUTOMATED: CPT | Performed by: NURSE PRACTITIONER

## 2021-09-21 PROCEDURE — 96367 TX/PROPH/DG ADDL SEQ IV INF: CPT

## 2021-09-21 PROCEDURE — 63600175 PHARM REV CODE 636 W HCPCS: Performed by: INTERNAL MEDICINE

## 2021-09-21 PROCEDURE — 99215 OFFICE O/P EST HI 40 MIN: CPT | Mod: S$GLB,,, | Performed by: PHYSICIAN ASSISTANT

## 2021-09-21 PROCEDURE — 96413 CHEMO IV INFUSION 1 HR: CPT

## 2021-09-21 PROCEDURE — 36415 COLL VENOUS BLD VENIPUNCTURE: CPT | Performed by: NURSE PRACTITIONER

## 2021-09-21 PROCEDURE — 25000003 PHARM REV CODE 250: Performed by: INTERNAL MEDICINE

## 2021-09-21 PROCEDURE — 96415 CHEMO IV INFUSION ADDL HR: CPT

## 2021-09-21 PROCEDURE — 99999 PR PBB SHADOW E&M-EST. PATIENT-LVL V: CPT | Mod: PBBFAC,,, | Performed by: PHYSICIAN ASSISTANT

## 2021-09-21 PROCEDURE — 80053 COMPREHEN METABOLIC PANEL: CPT | Performed by: NURSE PRACTITIONER

## 2021-09-21 PROCEDURE — 99215 PR OFFICE/OUTPT VISIT, EST, LEVL V, 40-54 MIN: ICD-10-PCS | Mod: S$GLB,,, | Performed by: PHYSICIAN ASSISTANT

## 2021-09-21 PROCEDURE — 96416 CHEMO PROLONG INFUSE W/PUMP: CPT

## 2021-09-21 PROCEDURE — 99999 PR PBB SHADOW E&M-EST. PATIENT-LVL V: ICD-10-PCS | Mod: PBBFAC,,, | Performed by: PHYSICIAN ASSISTANT

## 2021-09-21 RX ORDER — HEPARIN 100 UNIT/ML
500 SYRINGE INTRAVENOUS
Status: CANCELLED | OUTPATIENT
Start: 2021-09-23

## 2021-09-21 RX ORDER — SODIUM CHLORIDE 0.9 % (FLUSH) 0.9 %
10 SYRINGE (ML) INJECTION
Status: DISCONTINUED | OUTPATIENT
Start: 2021-09-21 | End: 2021-09-21 | Stop reason: HOSPADM

## 2021-09-21 RX ORDER — EPINEPHRINE 0.3 MG/.3ML
0.3 INJECTION SUBCUTANEOUS ONCE AS NEEDED
Status: CANCELLED | OUTPATIENT
Start: 2021-09-21

## 2021-09-21 RX ORDER — HEPARIN 100 UNIT/ML
500 SYRINGE INTRAVENOUS
Status: DISCONTINUED | OUTPATIENT
Start: 2021-09-21 | End: 2021-09-21 | Stop reason: HOSPADM

## 2021-09-21 RX ORDER — SODIUM CHLORIDE 0.9 % (FLUSH) 0.9 %
10 SYRINGE (ML) INJECTION
Status: CANCELLED | OUTPATIENT
Start: 2021-09-21

## 2021-09-21 RX ORDER — DIPHENHYDRAMINE HYDROCHLORIDE 50 MG/ML
50 INJECTION INTRAMUSCULAR; INTRAVENOUS ONCE AS NEEDED
Status: DISCONTINUED | OUTPATIENT
Start: 2021-09-21 | End: 2021-09-21 | Stop reason: HOSPADM

## 2021-09-21 RX ORDER — SODIUM CHLORIDE 0.9 % (FLUSH) 0.9 %
10 SYRINGE (ML) INJECTION
Status: CANCELLED | OUTPATIENT
Start: 2021-09-23

## 2021-09-21 RX ORDER — DIPHENHYDRAMINE HYDROCHLORIDE 50 MG/ML
50 INJECTION INTRAMUSCULAR; INTRAVENOUS ONCE AS NEEDED
Status: CANCELLED | OUTPATIENT
Start: 2021-09-21

## 2021-09-21 RX ORDER — HEPARIN 100 UNIT/ML
500 SYRINGE INTRAVENOUS
Status: CANCELLED | OUTPATIENT
Start: 2021-09-21

## 2021-09-21 RX ORDER — EPINEPHRINE 0.3 MG/.3ML
0.3 INJECTION SUBCUTANEOUS ONCE AS NEEDED
Status: DISCONTINUED | OUTPATIENT
Start: 2021-09-21 | End: 2021-09-21 | Stop reason: HOSPADM

## 2021-09-21 RX ADMIN — DEXTROSE: 50 INJECTION, SOLUTION INTRAVENOUS at 03:09

## 2021-09-21 RX ADMIN — FLUOROURACIL 3770 MG: 50 INJECTION, SOLUTION INTRAVENOUS at 05:09

## 2021-09-21 RX ADMIN — SODIUM CHLORIDE: 9 INJECTION, SOLUTION INTRAVENOUS at 02:09

## 2021-09-21 RX ADMIN — OXALIPLATIN 133 MG: 5 INJECTION, SOLUTION INTRAVENOUS at 03:09

## 2021-09-21 RX ADMIN — PALONOSETRON HYDROCHLORIDE: 0.25 INJECTION, SOLUTION INTRAVENOUS at 03:09

## 2021-09-22 PROBLEM — C78.6 PERITONEAL CARCINOMATOSIS: Status: ACTIVE | Noted: 2021-09-22

## 2021-09-22 PROBLEM — R11.2 NON-INTRACTABLE VOMITING WITH NAUSEA: Status: ACTIVE | Noted: 2021-09-22

## 2021-09-22 PROBLEM — K25.5: Status: ACTIVE | Noted: 2021-05-18

## 2021-09-23 ENCOUNTER — INFUSION (OUTPATIENT)
Dept: INFUSION THERAPY | Facility: HOSPITAL | Age: 41
End: 2021-09-23
Payer: COMMERCIAL

## 2021-09-23 VITALS
RESPIRATION RATE: 18 BRPM | TEMPERATURE: 99 F | DIASTOLIC BLOOD PRESSURE: 70 MMHG | HEART RATE: 61 BPM | SYSTOLIC BLOOD PRESSURE: 107 MMHG

## 2021-09-23 DIAGNOSIS — C16.9 GASTRIC ADENOCARCINOMA: Primary | ICD-10-CM

## 2021-09-23 PROCEDURE — 25000003 PHARM REV CODE 250: Performed by: INTERNAL MEDICINE

## 2021-09-23 PROCEDURE — 63600175 PHARM REV CODE 636 W HCPCS: Performed by: INTERNAL MEDICINE

## 2021-09-23 PROCEDURE — A4216 STERILE WATER/SALINE, 10 ML: HCPCS | Performed by: INTERNAL MEDICINE

## 2021-09-23 RX ORDER — SODIUM CHLORIDE 0.9 % (FLUSH) 0.9 %
10 SYRINGE (ML) INJECTION
Status: DISCONTINUED | OUTPATIENT
Start: 2021-09-23 | End: 2021-09-23 | Stop reason: HOSPADM

## 2021-09-23 RX ORDER — HEPARIN 100 UNIT/ML
500 SYRINGE INTRAVENOUS
Status: DISCONTINUED | OUTPATIENT
Start: 2021-09-23 | End: 2021-09-23 | Stop reason: HOSPADM

## 2021-09-23 RX ADMIN — Medication 10 ML: at 04:09

## 2021-09-23 RX ADMIN — HEPARIN 500 UNITS: 100 SYRINGE at 04:09

## 2021-09-29 ENCOUNTER — LAB VISIT (OUTPATIENT)
Dept: PRIMARY CARE CLINIC | Facility: OTHER | Age: 41
End: 2021-09-29
Attending: INTERNAL MEDICINE
Payer: COMMERCIAL

## 2021-09-29 DIAGNOSIS — Z20.822 ENCOUNTER FOR LABORATORY TESTING FOR COVID-19 VIRUS: ICD-10-CM

## 2021-09-29 LAB
SARS-COV-2 RNA RESP QL NAA+PROBE: NOT DETECTED
SARS-COV-2- CYCLE NUMBER: NORMAL

## 2021-09-29 PROCEDURE — U0003 INFECTIOUS AGENT DETECTION BY NUCLEIC ACID (DNA OR RNA); SEVERE ACUTE RESPIRATORY SYNDROME CORONAVIRUS 2 (SARS-COV-2) (CORONAVIRUS DISEASE [COVID-19]), AMPLIFIED PROBE TECHNIQUE, MAKING USE OF HIGH THROUGHPUT TECHNOLOGIES AS DESCRIBED BY CMS-2020-01-R: HCPCS | Performed by: INTERNAL MEDICINE

## 2021-10-04 ENCOUNTER — PATIENT MESSAGE (OUTPATIENT)
Dept: HEMATOLOGY/ONCOLOGY | Facility: CLINIC | Age: 41
End: 2021-10-04

## 2021-10-05 ENCOUNTER — OFFICE VISIT (OUTPATIENT)
Dept: HEMATOLOGY/ONCOLOGY | Facility: CLINIC | Age: 41
End: 2021-10-05
Payer: COMMERCIAL

## 2021-10-05 ENCOUNTER — INFUSION (OUTPATIENT)
Dept: INFUSION THERAPY | Facility: HOSPITAL | Age: 41
End: 2021-10-05
Payer: MEDICAID

## 2021-10-05 ENCOUNTER — LAB VISIT (OUTPATIENT)
Dept: LAB | Facility: HOSPITAL | Age: 41
End: 2021-10-05
Payer: MEDICAID

## 2021-10-05 ENCOUNTER — TELEPHONE (OUTPATIENT)
Dept: HEMATOLOGY/ONCOLOGY | Facility: CLINIC | Age: 41
End: 2021-10-05

## 2021-10-05 VITALS
RESPIRATION RATE: 16 BRPM | SYSTOLIC BLOOD PRESSURE: 129 MMHG | OXYGEN SATURATION: 100 % | WEIGHT: 120.13 LBS | DIASTOLIC BLOOD PRESSURE: 82 MMHG | TEMPERATURE: 98 F | HEIGHT: 67 IN | BODY MASS INDEX: 18.85 KG/M2 | HEART RATE: 63 BPM

## 2021-10-05 VITALS
RESPIRATION RATE: 18 BRPM | OXYGEN SATURATION: 100 % | HEART RATE: 67 BPM | DIASTOLIC BLOOD PRESSURE: 74 MMHG | SYSTOLIC BLOOD PRESSURE: 114 MMHG | WEIGHT: 120.13 LBS | BODY MASS INDEX: 18.82 KG/M2

## 2021-10-05 DIAGNOSIS — R18.8 OTHER ASCITES: ICD-10-CM

## 2021-10-05 DIAGNOSIS — Z12.89 ENCOUNTER FOR SCREENING FOR MALIGNANT NEOPLASM OF OTHER SITES: ICD-10-CM

## 2021-10-05 DIAGNOSIS — C16.9 GASTRIC ADENOCARCINOMA: ICD-10-CM

## 2021-10-05 DIAGNOSIS — Z79.899 IMMUNODEFICIENCY DUE TO CHEMOTHERAPY: ICD-10-CM

## 2021-10-05 DIAGNOSIS — K25.5 CHRONIC GASTRIC ULCER WITH PERFORATION: ICD-10-CM

## 2021-10-05 DIAGNOSIS — C16.9 GASTRIC ADENOCARCINOMA: Primary | ICD-10-CM

## 2021-10-05 DIAGNOSIS — T45.1X5A IMMUNODEFICIENCY DUE TO CHEMOTHERAPY: ICD-10-CM

## 2021-10-05 DIAGNOSIS — D84.821 IMMUNODEFICIENCY DUE TO CHEMOTHERAPY: ICD-10-CM

## 2021-10-05 DIAGNOSIS — C78.6 PERITONEAL CARCINOMATOSIS: ICD-10-CM

## 2021-10-05 DIAGNOSIS — R11.2 NON-INTRACTABLE VOMITING WITH NAUSEA, UNSPECIFIED VOMITING TYPE: ICD-10-CM

## 2021-10-05 DIAGNOSIS — D50.9 MICROCYTIC ANEMIA: ICD-10-CM

## 2021-10-05 LAB
ALBUMIN SERPL BCP-MCNC: 3 G/DL (ref 3.5–5.2)
ALP SERPL-CCNC: 123 U/L (ref 55–135)
ALT SERPL W/O P-5'-P-CCNC: 30 U/L (ref 10–44)
ANION GAP SERPL CALC-SCNC: 8 MMOL/L (ref 8–16)
AST SERPL-CCNC: 22 U/L (ref 10–40)
BILIRUB SERPL-MCNC: 0.4 MG/DL (ref 0.1–1)
BUN SERPL-MCNC: 9 MG/DL (ref 6–20)
CALCIUM SERPL-MCNC: 9.3 MG/DL (ref 8.7–10.5)
CHLORIDE SERPL-SCNC: 103 MMOL/L (ref 95–110)
CO2 SERPL-SCNC: 26 MMOL/L (ref 23–29)
CREAT SERPL-MCNC: 0.7 MG/DL (ref 0.5–1.4)
ERYTHROCYTE [DISTWIDTH] IN BLOOD BY AUTOMATED COUNT: 21.9 % (ref 11.5–14.5)
EST. GFR  (AFRICAN AMERICAN): >60 ML/MIN/1.73 M^2
EST. GFR  (NON AFRICAN AMERICAN): >60 ML/MIN/1.73 M^2
GLUCOSE SERPL-MCNC: 70 MG/DL (ref 70–110)
HCT VFR BLD AUTO: 37.4 % (ref 37–48.5)
HGB BLD-MCNC: 11.8 G/DL (ref 12–16)
IMM GRANULOCYTES # BLD AUTO: 0.02 K/UL (ref 0–0.04)
MCH RBC QN AUTO: 28.3 PG (ref 27–31)
MCHC RBC AUTO-ENTMCNC: 31.6 G/DL (ref 32–36)
MCV RBC AUTO: 90 FL (ref 82–98)
NEUTROPHILS # BLD AUTO: 2.4 K/UL (ref 1.8–7.7)
PLATELET # BLD AUTO: 187 K/UL (ref 150–450)
PMV BLD AUTO: 9.3 FL (ref 9.2–12.9)
POTASSIUM SERPL-SCNC: 4.2 MMOL/L (ref 3.5–5.1)
PROT SERPL-MCNC: 6.5 G/DL (ref 6–8.4)
RBC # BLD AUTO: 4.17 M/UL (ref 4–5.4)
SODIUM SERPL-SCNC: 137 MMOL/L (ref 136–145)
WBC # BLD AUTO: 4.48 K/UL (ref 3.9–12.7)

## 2021-10-05 PROCEDURE — 99999 PR PBB SHADOW E&M-EST. PATIENT-LVL III: CPT | Mod: PBBFAC,,, | Performed by: PHYSICIAN ASSISTANT

## 2021-10-05 PROCEDURE — 63600175 PHARM REV CODE 636 W HCPCS: Performed by: INTERNAL MEDICINE

## 2021-10-05 PROCEDURE — 36415 COLL VENOUS BLD VENIPUNCTURE: CPT | Performed by: PHYSICIAN ASSISTANT

## 2021-10-05 PROCEDURE — 96415 CHEMO IV INFUSION ADDL HR: CPT

## 2021-10-05 PROCEDURE — 96413 CHEMO IV INFUSION 1 HR: CPT

## 2021-10-05 PROCEDURE — 85027 COMPLETE CBC AUTOMATED: CPT | Performed by: PHYSICIAN ASSISTANT

## 2021-10-05 PROCEDURE — 99215 OFFICE O/P EST HI 40 MIN: CPT | Mod: S$GLB,,, | Performed by: PHYSICIAN ASSISTANT

## 2021-10-05 PROCEDURE — 96367 TX/PROPH/DG ADDL SEQ IV INF: CPT

## 2021-10-05 PROCEDURE — 25000003 PHARM REV CODE 250: Performed by: INTERNAL MEDICINE

## 2021-10-05 PROCEDURE — 99999 PR PBB SHADOW E&M-EST. PATIENT-LVL III: ICD-10-PCS | Mod: PBBFAC,,, | Performed by: PHYSICIAN ASSISTANT

## 2021-10-05 PROCEDURE — 99215 PR OFFICE/OUTPT VISIT, EST, LEVL V, 40-54 MIN: ICD-10-PCS | Mod: S$GLB,,, | Performed by: PHYSICIAN ASSISTANT

## 2021-10-05 PROCEDURE — 96416 CHEMO PROLONG INFUSE W/PUMP: CPT

## 2021-10-05 PROCEDURE — 80053 COMPREHEN METABOLIC PANEL: CPT | Performed by: PHYSICIAN ASSISTANT

## 2021-10-05 RX ORDER — SODIUM CHLORIDE 0.9 % (FLUSH) 0.9 %
10 SYRINGE (ML) INJECTION
Status: DISCONTINUED | OUTPATIENT
Start: 2021-10-05 | End: 2021-10-05 | Stop reason: HOSPADM

## 2021-10-05 RX ORDER — DIPHENHYDRAMINE HYDROCHLORIDE 50 MG/ML
50 INJECTION INTRAMUSCULAR; INTRAVENOUS ONCE AS NEEDED
Status: DISCONTINUED | OUTPATIENT
Start: 2021-10-05 | End: 2021-10-05 | Stop reason: HOSPADM

## 2021-10-05 RX ORDER — HEPARIN 100 UNIT/ML
500 SYRINGE INTRAVENOUS
Status: CANCELLED | OUTPATIENT
Start: 2021-10-07

## 2021-10-05 RX ORDER — HEPARIN 100 UNIT/ML
500 SYRINGE INTRAVENOUS
Status: DISCONTINUED | OUTPATIENT
Start: 2021-10-05 | End: 2021-10-05 | Stop reason: HOSPADM

## 2021-10-05 RX ORDER — DIPHENHYDRAMINE HYDROCHLORIDE 50 MG/ML
50 INJECTION INTRAMUSCULAR; INTRAVENOUS ONCE AS NEEDED
Status: CANCELLED | OUTPATIENT
Start: 2021-10-05

## 2021-10-05 RX ORDER — EPINEPHRINE 0.3 MG/.3ML
0.3 INJECTION SUBCUTANEOUS ONCE AS NEEDED
Status: CANCELLED | OUTPATIENT
Start: 2021-10-05

## 2021-10-05 RX ORDER — SODIUM CHLORIDE 0.9 % (FLUSH) 0.9 %
10 SYRINGE (ML) INJECTION
Status: CANCELLED | OUTPATIENT
Start: 2021-10-07

## 2021-10-05 RX ORDER — EPINEPHRINE 0.3 MG/.3ML
0.3 INJECTION SUBCUTANEOUS ONCE AS NEEDED
Status: DISCONTINUED | OUTPATIENT
Start: 2021-10-05 | End: 2021-10-05 | Stop reason: HOSPADM

## 2021-10-05 RX ORDER — SODIUM CHLORIDE 0.9 % (FLUSH) 0.9 %
10 SYRINGE (ML) INJECTION
Status: CANCELLED | OUTPATIENT
Start: 2021-10-05

## 2021-10-05 RX ORDER — HEPARIN 100 UNIT/ML
500 SYRINGE INTRAVENOUS
Status: CANCELLED | OUTPATIENT
Start: 2021-10-05

## 2021-10-05 RX ADMIN — FLUOROURACIL 3770 MG: 50 INJECTION, SOLUTION INTRAVENOUS at 07:10

## 2021-10-05 RX ADMIN — DEXTROSE: 50 INJECTION, SOLUTION INTRAVENOUS at 04:10

## 2021-10-05 RX ADMIN — DEXAMETHASONE SODIUM PHOSPHATE 0.25 MG: 4 INJECTION, SOLUTION INTRA-ARTICULAR; INTRALESIONAL; INTRAMUSCULAR; INTRAVENOUS; SOFT TISSUE at 04:10

## 2021-10-05 RX ADMIN — SODIUM CHLORIDE: 0.9 INJECTION, SOLUTION INTRAVENOUS at 04:10

## 2021-10-05 RX ADMIN — OXALIPLATIN 133 MG: 5 INJECTION, SOLUTION INTRAVENOUS at 04:10

## 2021-10-07 ENCOUNTER — INFUSION (OUTPATIENT)
Dept: INFUSION THERAPY | Facility: HOSPITAL | Age: 41
End: 2021-10-07
Payer: MEDICAID

## 2021-10-07 VITALS
TEMPERATURE: 99 F | RESPIRATION RATE: 18 BRPM | DIASTOLIC BLOOD PRESSURE: 87 MMHG | SYSTOLIC BLOOD PRESSURE: 139 MMHG | HEART RATE: 65 BPM

## 2021-10-07 DIAGNOSIS — C16.9 GASTRIC ADENOCARCINOMA: Primary | ICD-10-CM

## 2021-10-07 PROCEDURE — 25000003 PHARM REV CODE 250: Performed by: INTERNAL MEDICINE

## 2021-10-07 PROCEDURE — 63600175 PHARM REV CODE 636 W HCPCS: Performed by: INTERNAL MEDICINE

## 2021-10-07 PROCEDURE — A4216 STERILE WATER/SALINE, 10 ML: HCPCS | Performed by: INTERNAL MEDICINE

## 2021-10-07 RX ORDER — HEPARIN 100 UNIT/ML
500 SYRINGE INTRAVENOUS
Status: DISCONTINUED | OUTPATIENT
Start: 2021-10-07 | End: 2021-10-07 | Stop reason: HOSPADM

## 2021-10-07 RX ORDER — SODIUM CHLORIDE 0.9 % (FLUSH) 0.9 %
10 SYRINGE (ML) INJECTION
Status: DISCONTINUED | OUTPATIENT
Start: 2021-10-07 | End: 2021-10-07 | Stop reason: HOSPADM

## 2021-10-07 RX ADMIN — HEPARIN 500 UNITS: 100 SYRINGE at 05:10

## 2021-10-07 RX ADMIN — Medication 10 ML: at 05:10

## 2021-10-08 ENCOUNTER — PATIENT MESSAGE (OUTPATIENT)
Dept: HEMATOLOGY/ONCOLOGY | Facility: CLINIC | Age: 41
End: 2021-10-08

## 2021-10-08 ENCOUNTER — HOSPITAL ENCOUNTER (OUTPATIENT)
Dept: RADIOLOGY | Facility: HOSPITAL | Age: 41
Discharge: HOME | End: 2021-10-08
Attending: PHYSICIAN ASSISTANT
Payer: COMMERCIAL

## 2021-10-08 DIAGNOSIS — C16.9 GASTRIC ADENOCARCINOMA: ICD-10-CM

## 2021-10-08 DIAGNOSIS — Z12.89 ENCOUNTER FOR SCREENING FOR MALIGNANT NEOPLASM OF OTHER SITES: ICD-10-CM

## 2021-10-08 PROCEDURE — 74177 CT CHEST ABDOMEN PELVIS WITH CONTRAST (XPD): ICD-10-PCS | Mod: 26,,, | Performed by: RADIOLOGY

## 2021-10-08 PROCEDURE — 25500020 PHARM REV CODE 255: Performed by: PHYSICIAN ASSISTANT

## 2021-10-08 PROCEDURE — 74177 CT ABD & PELVIS W/CONTRAST: CPT | Mod: 26,,, | Performed by: RADIOLOGY

## 2021-10-08 PROCEDURE — 71260 CT THORAX DX C+: CPT | Mod: 26,,, | Performed by: RADIOLOGY

## 2021-10-08 PROCEDURE — 71260 CT THORAX DX C+: CPT | Mod: TC

## 2021-10-08 PROCEDURE — 71260 CT CHEST ABDOMEN PELVIS WITH CONTRAST (XPD): ICD-10-PCS | Mod: 26,,, | Performed by: RADIOLOGY

## 2021-10-08 PROCEDURE — 74177 CT ABD & PELVIS W/CONTRAST: CPT | Mod: TC

## 2021-10-08 RX ADMIN — IOHEXOL 75 ML: 350 INJECTION, SOLUTION INTRAVENOUS at 10:10

## 2021-10-08 RX ADMIN — IOHEXOL 15 ML: 300 INJECTION, SOLUTION INTRAVENOUS at 08:10

## 2021-10-16 ENCOUNTER — LAB VISIT (OUTPATIENT)
Dept: PRIMARY CARE CLINIC | Facility: OTHER | Age: 41
End: 2021-10-16
Payer: MEDICAID

## 2021-10-16 DIAGNOSIS — Z20.822 ENCOUNTER FOR LABORATORY TESTING FOR COVID-19 VIRUS: ICD-10-CM

## 2021-10-16 LAB
SARS-COV-2 RNA RESP QL NAA+PROBE: NOT DETECTED
SARS-COV-2- CYCLE NUMBER: NORMAL

## 2021-10-16 PROCEDURE — U0003 INFECTIOUS AGENT DETECTION BY NUCLEIC ACID (DNA OR RNA); SEVERE ACUTE RESPIRATORY SYNDROME CORONAVIRUS 2 (SARS-COV-2) (CORONAVIRUS DISEASE [COVID-19]), AMPLIFIED PROBE TECHNIQUE, MAKING USE OF HIGH THROUGHPUT TECHNOLOGIES AS DESCRIBED BY CMS-2020-01-R: HCPCS | Performed by: INTERNAL MEDICINE

## 2021-10-19 ENCOUNTER — LAB VISIT (OUTPATIENT)
Dept: LAB | Facility: HOSPITAL | Age: 41
End: 2021-10-19
Payer: MEDICAID

## 2021-10-19 ENCOUNTER — INFUSION (OUTPATIENT)
Dept: INFUSION THERAPY | Facility: HOSPITAL | Age: 41
End: 2021-10-19
Payer: MEDICAID

## 2021-10-19 ENCOUNTER — OFFICE VISIT (OUTPATIENT)
Dept: HEMATOLOGY/ONCOLOGY | Facility: CLINIC | Age: 41
End: 2021-10-19
Payer: MEDICAID

## 2021-10-19 VITALS
DIASTOLIC BLOOD PRESSURE: 66 MMHG | SYSTOLIC BLOOD PRESSURE: 112 MMHG | HEIGHT: 67 IN | RESPIRATION RATE: 16 BRPM | OXYGEN SATURATION: 100 % | BODY MASS INDEX: 18.24 KG/M2 | TEMPERATURE: 98 F | RESPIRATION RATE: 18 BRPM | SYSTOLIC BLOOD PRESSURE: 101 MMHG | WEIGHT: 116.19 LBS | BODY MASS INDEX: 18.24 KG/M2 | TEMPERATURE: 98 F | DIASTOLIC BLOOD PRESSURE: 67 MMHG | HEART RATE: 70 BPM | OXYGEN SATURATION: 100 % | WEIGHT: 116.19 LBS | HEART RATE: 70 BPM | HEIGHT: 67 IN

## 2021-10-19 DIAGNOSIS — C16.9 GASTRIC ADENOCARCINOMA: Primary | ICD-10-CM

## 2021-10-19 DIAGNOSIS — R11.2 NON-INTRACTABLE VOMITING WITH NAUSEA, UNSPECIFIED VOMITING TYPE: ICD-10-CM

## 2021-10-19 DIAGNOSIS — C16.9 GASTRIC ADENOCARCINOMA: ICD-10-CM

## 2021-10-19 DIAGNOSIS — Z49.02: ICD-10-CM

## 2021-10-19 DIAGNOSIS — T45.1X5A IMMUNODEFICIENCY DUE TO CHEMOTHERAPY: ICD-10-CM

## 2021-10-19 DIAGNOSIS — D84.821 IMMUNODEFICIENCY DUE TO CHEMOTHERAPY: ICD-10-CM

## 2021-10-19 DIAGNOSIS — R18.8 OTHER ASCITES: ICD-10-CM

## 2021-10-19 DIAGNOSIS — D50.9 MICROCYTIC ANEMIA: ICD-10-CM

## 2021-10-19 DIAGNOSIS — C78.6 PERITONEAL CARCINOMATOSIS: ICD-10-CM

## 2021-10-19 DIAGNOSIS — Z79.899 IMMUNODEFICIENCY DUE TO CHEMOTHERAPY: ICD-10-CM

## 2021-10-19 DIAGNOSIS — K25.5 CHRONIC GASTRIC ULCER WITH PERFORATION: ICD-10-CM

## 2021-10-19 LAB
ALBUMIN SERPL BCP-MCNC: 3.1 G/DL (ref 3.5–5.2)
ALP SERPL-CCNC: 120 U/L (ref 55–135)
ALT SERPL W/O P-5'-P-CCNC: 48 U/L (ref 10–44)
ANION GAP SERPL CALC-SCNC: 8 MMOL/L (ref 8–16)
AST SERPL-CCNC: 37 U/L (ref 10–40)
BILIRUB SERPL-MCNC: 0.5 MG/DL (ref 0.1–1)
BUN SERPL-MCNC: 9 MG/DL (ref 6–20)
CALCIUM SERPL-MCNC: 9.3 MG/DL (ref 8.7–10.5)
CHLORIDE SERPL-SCNC: 106 MMOL/L (ref 95–110)
CO2 SERPL-SCNC: 26 MMOL/L (ref 23–29)
CREAT SERPL-MCNC: 0.7 MG/DL (ref 0.5–1.4)
ERYTHROCYTE [DISTWIDTH] IN BLOOD BY AUTOMATED COUNT: 20.6 % (ref 11.5–14.5)
EST. GFR  (AFRICAN AMERICAN): >60 ML/MIN/1.73 M^2
EST. GFR  (NON AFRICAN AMERICAN): >60 ML/MIN/1.73 M^2
GLUCOSE SERPL-MCNC: 75 MG/DL (ref 70–110)
HCT VFR BLD AUTO: 37.4 % (ref 37–48.5)
HGB BLD-MCNC: 11.7 G/DL (ref 12–16)
IMM GRANULOCYTES # BLD AUTO: 0.01 K/UL (ref 0–0.04)
MCH RBC QN AUTO: 28.5 PG (ref 27–31)
MCHC RBC AUTO-ENTMCNC: 31.3 G/DL (ref 32–36)
MCV RBC AUTO: 91 FL (ref 82–98)
NEUTROPHILS # BLD AUTO: 2 K/UL (ref 1.8–7.7)
PLATELET # BLD AUTO: 174 K/UL (ref 150–450)
PMV BLD AUTO: 9.3 FL (ref 9.2–12.9)
POTASSIUM SERPL-SCNC: 4.4 MMOL/L (ref 3.5–5.1)
PROT SERPL-MCNC: 6.5 G/DL (ref 6–8.4)
RBC # BLD AUTO: 4.11 M/UL (ref 4–5.4)
SODIUM SERPL-SCNC: 140 MMOL/L (ref 136–145)
WBC # BLD AUTO: 3.9 K/UL (ref 3.9–12.7)

## 2021-10-19 PROCEDURE — 99215 PR OFFICE/OUTPT VISIT, EST, LEVL V, 40-54 MIN: ICD-10-PCS | Mod: S$PBB,,, | Performed by: PHYSICIAN ASSISTANT

## 2021-10-19 PROCEDURE — 99214 OFFICE O/P EST MOD 30 MIN: CPT | Mod: PBBFAC,25 | Performed by: PHYSICIAN ASSISTANT

## 2021-10-19 PROCEDURE — 96367 TX/PROPH/DG ADDL SEQ IV INF: CPT

## 2021-10-19 PROCEDURE — 25000003 PHARM REV CODE 250: Performed by: INTERNAL MEDICINE

## 2021-10-19 PROCEDURE — 36415 COLL VENOUS BLD VENIPUNCTURE: CPT | Performed by: PHYSICIAN ASSISTANT

## 2021-10-19 PROCEDURE — 99999 PR PBB SHADOW E&M-EST. PATIENT-LVL IV: ICD-10-PCS | Mod: PBBFAC,,, | Performed by: PHYSICIAN ASSISTANT

## 2021-10-19 PROCEDURE — 96415 CHEMO IV INFUSION ADDL HR: CPT

## 2021-10-19 PROCEDURE — 85027 COMPLETE CBC AUTOMATED: CPT | Performed by: PHYSICIAN ASSISTANT

## 2021-10-19 PROCEDURE — 96413 CHEMO IV INFUSION 1 HR: CPT

## 2021-10-19 PROCEDURE — 96365 THER/PROPH/DIAG IV INF INIT: CPT

## 2021-10-19 PROCEDURE — 99215 OFFICE O/P EST HI 40 MIN: CPT | Mod: S$PBB,,, | Performed by: PHYSICIAN ASSISTANT

## 2021-10-19 PROCEDURE — 99999 PR PBB SHADOW E&M-EST. PATIENT-LVL IV: CPT | Mod: PBBFAC,,, | Performed by: PHYSICIAN ASSISTANT

## 2021-10-19 PROCEDURE — 63600175 PHARM REV CODE 636 W HCPCS: Performed by: INTERNAL MEDICINE

## 2021-10-19 PROCEDURE — 80053 COMPREHEN METABOLIC PANEL: CPT | Performed by: PHYSICIAN ASSISTANT

## 2021-10-19 PROCEDURE — 96416 CHEMO PROLONG INFUSE W/PUMP: CPT

## 2021-10-19 RX ORDER — EPINEPHRINE 0.3 MG/.3ML
0.3 INJECTION SUBCUTANEOUS ONCE AS NEEDED
Status: CANCELLED | OUTPATIENT
Start: 2021-10-19

## 2021-10-19 RX ORDER — DIPHENHYDRAMINE HYDROCHLORIDE 50 MG/ML
50 INJECTION INTRAMUSCULAR; INTRAVENOUS ONCE AS NEEDED
Status: CANCELLED | OUTPATIENT
Start: 2021-10-19

## 2021-10-19 RX ORDER — SODIUM CHLORIDE 0.9 % (FLUSH) 0.9 %
10 SYRINGE (ML) INJECTION
Status: CANCELLED | OUTPATIENT
Start: 2021-10-21

## 2021-10-19 RX ORDER — EPINEPHRINE 0.3 MG/.3ML
0.3 INJECTION SUBCUTANEOUS ONCE AS NEEDED
Status: DISCONTINUED | OUTPATIENT
Start: 2021-10-19 | End: 2021-10-19 | Stop reason: HOSPADM

## 2021-10-19 RX ORDER — SODIUM CHLORIDE 0.9 % (FLUSH) 0.9 %
10 SYRINGE (ML) INJECTION
Status: CANCELLED | OUTPATIENT
Start: 2021-10-19

## 2021-10-19 RX ORDER — HEPARIN 100 UNIT/ML
500 SYRINGE INTRAVENOUS
Status: CANCELLED | OUTPATIENT
Start: 2021-10-19

## 2021-10-19 RX ORDER — HEPARIN 100 UNIT/ML
500 SYRINGE INTRAVENOUS
Status: DISCONTINUED | OUTPATIENT
Start: 2021-10-19 | End: 2021-10-19 | Stop reason: HOSPADM

## 2021-10-19 RX ORDER — SODIUM CHLORIDE 0.9 % (FLUSH) 0.9 %
10 SYRINGE (ML) INJECTION
Status: DISCONTINUED | OUTPATIENT
Start: 2021-10-19 | End: 2021-10-19 | Stop reason: HOSPADM

## 2021-10-19 RX ORDER — HEPARIN 100 UNIT/ML
500 SYRINGE INTRAVENOUS
Status: CANCELLED | OUTPATIENT
Start: 2021-10-21

## 2021-10-19 RX ORDER — DIPHENHYDRAMINE HYDROCHLORIDE 50 MG/ML
50 INJECTION INTRAMUSCULAR; INTRAVENOUS ONCE AS NEEDED
Status: DISCONTINUED | OUTPATIENT
Start: 2021-10-19 | End: 2021-10-19 | Stop reason: HOSPADM

## 2021-10-19 RX ADMIN — FLUOROURACIL 3770 MG: 50 INJECTION, SOLUTION INTRAVENOUS at 01:10

## 2021-10-19 RX ADMIN — DEXTROSE: 50 INJECTION, SOLUTION INTRAVENOUS at 10:10

## 2021-10-19 RX ADMIN — PALONOSETRON HYDROCHLORIDE 0.25 MG: 0.25 INJECTION, SOLUTION INTRAVENOUS at 10:10

## 2021-10-19 RX ADMIN — OXALIPLATIN 133 MG: 5 INJECTION, SOLUTION INTRAVENOUS at 11:10

## 2021-10-20 ENCOUNTER — PATIENT MESSAGE (OUTPATIENT)
Dept: HEMATOLOGY/ONCOLOGY | Facility: CLINIC | Age: 41
End: 2021-10-20
Payer: COMMERCIAL

## 2021-10-21 ENCOUNTER — INFUSION (OUTPATIENT)
Dept: INFUSION THERAPY | Facility: HOSPITAL | Age: 41
End: 2021-10-21
Payer: MEDICAID

## 2021-10-21 VITALS
RESPIRATION RATE: 18 BRPM | DIASTOLIC BLOOD PRESSURE: 91 MMHG | TEMPERATURE: 98 F | HEART RATE: 68 BPM | SYSTOLIC BLOOD PRESSURE: 125 MMHG

## 2021-10-21 DIAGNOSIS — C16.9 GASTRIC ADENOCARCINOMA: Primary | ICD-10-CM

## 2021-10-21 PROCEDURE — 25000003 PHARM REV CODE 250: Performed by: INTERNAL MEDICINE

## 2021-10-21 PROCEDURE — A4216 STERILE WATER/SALINE, 10 ML: HCPCS | Performed by: INTERNAL MEDICINE

## 2021-10-21 PROCEDURE — 63600175 PHARM REV CODE 636 W HCPCS: Performed by: INTERNAL MEDICINE

## 2021-10-21 RX ORDER — SODIUM CHLORIDE 0.9 % (FLUSH) 0.9 %
10 SYRINGE (ML) INJECTION
Status: DISCONTINUED | OUTPATIENT
Start: 2021-10-21 | End: 2021-10-21 | Stop reason: HOSPADM

## 2021-10-21 RX ORDER — HEPARIN 100 UNIT/ML
500 SYRINGE INTRAVENOUS
Status: DISCONTINUED | OUTPATIENT
Start: 2021-10-21 | End: 2021-10-21 | Stop reason: HOSPADM

## 2021-10-21 RX ADMIN — HEPARIN 500 UNITS: 100 SYRINGE at 12:10

## 2021-10-21 RX ADMIN — Medication 10 ML: at 12:10

## 2021-10-29 ENCOUNTER — HOSPITAL ENCOUNTER (OUTPATIENT)
Dept: INTERVENTIONAL RADIOLOGY/VASCULAR | Facility: HOSPITAL | Age: 41
Discharge: HOME | End: 2021-10-29
Attending: PHYSICIAN ASSISTANT
Payer: MEDICAID

## 2021-10-29 DIAGNOSIS — Z49.02 ENCOUNTER FOR FITTING AND ADJUSTMENT OF PERITONEAL DIALYSIS CATHETER: ICD-10-CM

## 2021-10-29 PROCEDURE — 49424 IR ABSCESS TUBE CHECK (XPD): ICD-10-PCS | Mod: ,,, | Performed by: RADIOLOGY

## 2021-10-29 PROCEDURE — 76080 IR ABSCESS TUBE CHECK (XPD): ICD-10-PCS | Mod: 26,,, | Performed by: RADIOLOGY

## 2021-10-29 PROCEDURE — 76080 X-RAY EXAM OF FISTULA: CPT | Mod: TC | Performed by: RADIOLOGY

## 2021-10-29 PROCEDURE — 49424 ASSESS CYST CONTRAST INJECT: CPT | Mod: ,,, | Performed by: RADIOLOGY

## 2021-10-29 PROCEDURE — 25500020 PHARM REV CODE 255: Performed by: PHYSICIAN ASSISTANT

## 2021-10-29 RX ADMIN — IOHEXOL 8 ML: 300 INJECTION, SOLUTION INTRAVENOUS at 03:10

## 2021-11-01 ENCOUNTER — OFFICE VISIT (OUTPATIENT)
Dept: HEMATOLOGY/ONCOLOGY | Facility: CLINIC | Age: 41
End: 2021-11-01
Payer: MEDICAID

## 2021-11-01 VITALS
DIASTOLIC BLOOD PRESSURE: 82 MMHG | SYSTOLIC BLOOD PRESSURE: 119 MMHG | OXYGEN SATURATION: 100 % | TEMPERATURE: 98 F | BODY MASS INDEX: 18.7 KG/M2 | RESPIRATION RATE: 18 BRPM | HEIGHT: 67 IN | WEIGHT: 119.13 LBS | HEART RATE: 72 BPM

## 2021-11-01 DIAGNOSIS — R18.8 OTHER ASCITES: ICD-10-CM

## 2021-11-01 DIAGNOSIS — Z79.899 IMMUNODEFICIENCY DUE TO CHEMOTHERAPY: ICD-10-CM

## 2021-11-01 DIAGNOSIS — J34.89 RHINORRHEA: ICD-10-CM

## 2021-11-01 DIAGNOSIS — T45.1X5A IMMUNODEFICIENCY DUE TO CHEMOTHERAPY: ICD-10-CM

## 2021-11-01 DIAGNOSIS — D50.9 MICROCYTIC ANEMIA: ICD-10-CM

## 2021-11-01 DIAGNOSIS — K25.5 CHRONIC GASTRIC ULCER WITH PERFORATION: ICD-10-CM

## 2021-11-01 DIAGNOSIS — C78.6 PERITONEAL CARCINOMATOSIS: ICD-10-CM

## 2021-11-01 DIAGNOSIS — C16.9 GASTRIC ADENOCARCINOMA: Primary | ICD-10-CM

## 2021-11-01 DIAGNOSIS — D84.821 IMMUNODEFICIENCY DUE TO CHEMOTHERAPY: ICD-10-CM

## 2021-11-01 DIAGNOSIS — R11.2 NON-INTRACTABLE VOMITING WITH NAUSEA, UNSPECIFIED VOMITING TYPE: ICD-10-CM

## 2021-11-01 PROCEDURE — 3074F SYST BP LT 130 MM HG: CPT | Mod: CPTII,,, | Performed by: INTERNAL MEDICINE

## 2021-11-01 PROCEDURE — 3079F PR MOST RECENT DIASTOLIC BLOOD PRESSURE 80-89 MM HG: ICD-10-PCS | Mod: CPTII,,, | Performed by: INTERNAL MEDICINE

## 2021-11-01 PROCEDURE — 3074F PR MOST RECENT SYSTOLIC BLOOD PRESSURE < 130 MM HG: ICD-10-PCS | Mod: CPTII,,, | Performed by: INTERNAL MEDICINE

## 2021-11-01 PROCEDURE — 3044F HG A1C LEVEL LT 7.0%: CPT | Mod: CPTII,,, | Performed by: INTERNAL MEDICINE

## 2021-11-01 PROCEDURE — 3008F PR BODY MASS INDEX (BMI) DOCUMENTED: ICD-10-PCS | Mod: CPTII,,, | Performed by: INTERNAL MEDICINE

## 2021-11-01 PROCEDURE — 1159F PR MEDICATION LIST DOCUMENTED IN MEDICAL RECORD: ICD-10-PCS | Mod: CPTII,,, | Performed by: INTERNAL MEDICINE

## 2021-11-01 PROCEDURE — 99214 OFFICE O/P EST MOD 30 MIN: CPT | Mod: PBBFAC | Performed by: INTERNAL MEDICINE

## 2021-11-01 PROCEDURE — 99215 OFFICE O/P EST HI 40 MIN: CPT | Mod: S$PBB,,, | Performed by: INTERNAL MEDICINE

## 2021-11-01 PROCEDURE — 99999 PR PBB SHADOW E&M-EST. PATIENT-LVL IV: ICD-10-PCS | Mod: PBBFAC,,, | Performed by: INTERNAL MEDICINE

## 2021-11-01 PROCEDURE — 99215 PR OFFICE/OUTPT VISIT, EST, LEVL V, 40-54 MIN: ICD-10-PCS | Mod: S$PBB,,, | Performed by: INTERNAL MEDICINE

## 2021-11-01 PROCEDURE — 3044F PR MOST RECENT HEMOGLOBIN A1C LEVEL <7.0%: ICD-10-PCS | Mod: CPTII,,, | Performed by: INTERNAL MEDICINE

## 2021-11-01 PROCEDURE — 1159F MED LIST DOCD IN RCRD: CPT | Mod: CPTII,,, | Performed by: INTERNAL MEDICINE

## 2021-11-01 PROCEDURE — 99999 PR PBB SHADOW E&M-EST. PATIENT-LVL IV: CPT | Mod: PBBFAC,,, | Performed by: INTERNAL MEDICINE

## 2021-11-01 PROCEDURE — 3079F DIAST BP 80-89 MM HG: CPT | Mod: CPTII,,, | Performed by: INTERNAL MEDICINE

## 2021-11-01 PROCEDURE — 3008F BODY MASS INDEX DOCD: CPT | Mod: CPTII,,, | Performed by: INTERNAL MEDICINE

## 2021-11-01 RX ORDER — DIPHENHYDRAMINE HYDROCHLORIDE 50 MG/ML
50 INJECTION INTRAMUSCULAR; INTRAVENOUS ONCE AS NEEDED
Status: CANCELLED | OUTPATIENT
Start: 2021-11-02

## 2021-11-01 RX ORDER — HEPARIN 100 UNIT/ML
500 SYRINGE INTRAVENOUS
Status: CANCELLED | OUTPATIENT
Start: 2021-11-04

## 2021-11-01 RX ORDER — SODIUM CHLORIDE 0.9 % (FLUSH) 0.9 %
10 SYRINGE (ML) INJECTION
Status: CANCELLED | OUTPATIENT
Start: 2021-11-04

## 2021-11-01 RX ORDER — HEPARIN 100 UNIT/ML
500 SYRINGE INTRAVENOUS
Status: CANCELLED | OUTPATIENT
Start: 2021-11-02

## 2021-11-01 RX ORDER — EPINEPHRINE 0.3 MG/.3ML
0.3 INJECTION SUBCUTANEOUS ONCE AS NEEDED
Status: CANCELLED | OUTPATIENT
Start: 2021-11-02

## 2021-11-01 RX ORDER — SODIUM CHLORIDE 0.9 % (FLUSH) 0.9 %
10 SYRINGE (ML) INJECTION
Status: CANCELLED | OUTPATIENT
Start: 2021-11-02

## 2021-11-02 ENCOUNTER — INFUSION (OUTPATIENT)
Dept: INFUSION THERAPY | Facility: HOSPITAL | Age: 41
End: 2021-11-02
Payer: MEDICAID

## 2021-11-02 VITALS
SYSTOLIC BLOOD PRESSURE: 134 MMHG | HEART RATE: 63 BPM | WEIGHT: 119.06 LBS | DIASTOLIC BLOOD PRESSURE: 93 MMHG | BODY MASS INDEX: 18.69 KG/M2 | RESPIRATION RATE: 18 BRPM | HEIGHT: 67 IN

## 2021-11-02 DIAGNOSIS — C16.9 GASTRIC ADENOCARCINOMA: Primary | ICD-10-CM

## 2021-11-02 PROCEDURE — 96367 TX/PROPH/DG ADDL SEQ IV INF: CPT

## 2021-11-02 PROCEDURE — 63600175 PHARM REV CODE 636 W HCPCS: Performed by: INTERNAL MEDICINE

## 2021-11-02 PROCEDURE — 96521 REFILL/MAINT PORTABLE PUMP: CPT | Mod: 59

## 2021-11-02 PROCEDURE — 25000003 PHARM REV CODE 250: Performed by: INTERNAL MEDICINE

## 2021-11-02 PROCEDURE — 96413 CHEMO IV INFUSION 1 HR: CPT

## 2021-11-02 PROCEDURE — 96415 CHEMO IV INFUSION ADDL HR: CPT

## 2021-11-02 RX ORDER — HEPARIN 100 UNIT/ML
500 SYRINGE INTRAVENOUS
Status: DISCONTINUED | OUTPATIENT
Start: 2021-11-02 | End: 2021-11-02 | Stop reason: HOSPADM

## 2021-11-02 RX ORDER — SODIUM CHLORIDE 0.9 % (FLUSH) 0.9 %
10 SYRINGE (ML) INJECTION
Status: DISCONTINUED | OUTPATIENT
Start: 2021-11-02 | End: 2021-11-02 | Stop reason: HOSPADM

## 2021-11-02 RX ORDER — EPINEPHRINE 0.3 MG/.3ML
0.3 INJECTION SUBCUTANEOUS ONCE AS NEEDED
Status: DISCONTINUED | OUTPATIENT
Start: 2021-11-02 | End: 2021-11-02 | Stop reason: HOSPADM

## 2021-11-02 RX ORDER — DIPHENHYDRAMINE HYDROCHLORIDE 50 MG/ML
50 INJECTION INTRAMUSCULAR; INTRAVENOUS ONCE AS NEEDED
Status: DISCONTINUED | OUTPATIENT
Start: 2021-11-02 | End: 2021-11-02 | Stop reason: HOSPADM

## 2021-11-02 RX ADMIN — SODIUM CHLORIDE: 9 INJECTION, SOLUTION INTRAVENOUS at 03:11

## 2021-11-02 RX ADMIN — OXALIPLATIN 133 MG: 5 INJECTION, SOLUTION INTRAVENOUS at 04:11

## 2021-11-02 RX ADMIN — PALONOSETRON HYDROCHLORIDE 0.25 MG: 0.25 INJECTION, SOLUTION INTRAVENOUS at 03:11

## 2021-11-02 RX ADMIN — FLUOROURACIL 3770 MG: 50 INJECTION, SOLUTION INTRAVENOUS at 06:11

## 2021-11-02 RX ADMIN — DEXTROSE: 50 INJECTION, SOLUTION INTRAVENOUS at 04:11

## 2021-11-03 ENCOUNTER — PATIENT MESSAGE (OUTPATIENT)
Dept: HEMATOLOGY/ONCOLOGY | Facility: CLINIC | Age: 41
End: 2021-11-03
Payer: COMMERCIAL

## 2021-11-04 ENCOUNTER — INFUSION (OUTPATIENT)
Dept: INFUSION THERAPY | Facility: HOSPITAL | Age: 41
End: 2021-11-04
Payer: MEDICAID

## 2021-11-04 VITALS
RESPIRATION RATE: 18 BRPM | TEMPERATURE: 98 F | DIASTOLIC BLOOD PRESSURE: 88 MMHG | SYSTOLIC BLOOD PRESSURE: 136 MMHG | HEART RATE: 60 BPM

## 2021-11-04 DIAGNOSIS — C16.9 GASTRIC ADENOCARCINOMA: Primary | ICD-10-CM

## 2021-11-04 PROCEDURE — 63600175 PHARM REV CODE 636 W HCPCS: Performed by: INTERNAL MEDICINE

## 2021-11-04 PROCEDURE — 25000003 PHARM REV CODE 250: Performed by: INTERNAL MEDICINE

## 2021-11-04 PROCEDURE — A4216 STERILE WATER/SALINE, 10 ML: HCPCS | Performed by: INTERNAL MEDICINE

## 2021-11-04 RX ORDER — SODIUM CHLORIDE 0.9 % (FLUSH) 0.9 %
10 SYRINGE (ML) INJECTION
Status: DISCONTINUED | OUTPATIENT
Start: 2021-11-04 | End: 2021-11-04 | Stop reason: HOSPADM

## 2021-11-04 RX ORDER — HEPARIN 100 UNIT/ML
500 SYRINGE INTRAVENOUS
Status: DISCONTINUED | OUTPATIENT
Start: 2021-11-04 | End: 2021-11-04 | Stop reason: HOSPADM

## 2021-11-04 RX ADMIN — Medication 10 ML: at 04:11

## 2021-11-04 RX ADMIN — HEPARIN 500 UNITS: 100 SYRINGE at 04:11

## 2021-11-15 ENCOUNTER — OFFICE VISIT (OUTPATIENT)
Dept: HEMATOLOGY/ONCOLOGY | Facility: CLINIC | Age: 41
End: 2021-11-15
Payer: COMMERCIAL

## 2021-11-15 ENCOUNTER — INFUSION (OUTPATIENT)
Dept: INFUSION THERAPY | Facility: HOSPITAL | Age: 41
End: 2021-11-15
Attending: INTERNAL MEDICINE
Payer: MEDICAID

## 2021-11-15 ENCOUNTER — LAB VISIT (OUTPATIENT)
Dept: LAB | Facility: HOSPITAL | Age: 41
End: 2021-11-15
Attending: INTERNAL MEDICINE
Payer: MEDICAID

## 2021-11-15 VITALS
TEMPERATURE: 100 F | RESPIRATION RATE: 16 BRPM | DIASTOLIC BLOOD PRESSURE: 66 MMHG | SYSTOLIC BLOOD PRESSURE: 127 MMHG | HEART RATE: 76 BPM | OXYGEN SATURATION: 98 %

## 2021-11-15 VITALS
RESPIRATION RATE: 16 BRPM | BODY MASS INDEX: 18.97 KG/M2 | HEART RATE: 63 BPM | OXYGEN SATURATION: 100 % | WEIGHT: 120.88 LBS | HEIGHT: 67 IN | TEMPERATURE: 98 F | SYSTOLIC BLOOD PRESSURE: 121 MMHG | DIASTOLIC BLOOD PRESSURE: 90 MMHG

## 2021-11-15 DIAGNOSIS — R18.8 OTHER ASCITES: ICD-10-CM

## 2021-11-15 DIAGNOSIS — K25.5 CHRONIC GASTRIC ULCER WITH PERFORATION: ICD-10-CM

## 2021-11-15 DIAGNOSIS — C80.1 SIGNET RING CELL ADENOCARCINOMA: ICD-10-CM

## 2021-11-15 DIAGNOSIS — T45.1X5A IMMUNODEFICIENCY DUE TO CHEMOTHERAPY: ICD-10-CM

## 2021-11-15 DIAGNOSIS — C16.9 GASTRIC ADENOCARCINOMA: Primary | ICD-10-CM

## 2021-11-15 DIAGNOSIS — J34.89 RHINORRHEA: ICD-10-CM

## 2021-11-15 DIAGNOSIS — D84.821 IMMUNODEFICIENCY DUE TO CHEMOTHERAPY: ICD-10-CM

## 2021-11-15 DIAGNOSIS — D50.9 MICROCYTIC ANEMIA: ICD-10-CM

## 2021-11-15 DIAGNOSIS — Z79.899 IMMUNODEFICIENCY DUE TO CHEMOTHERAPY: ICD-10-CM

## 2021-11-15 DIAGNOSIS — R11.2 NON-INTRACTABLE VOMITING WITH NAUSEA, UNSPECIFIED VOMITING TYPE: ICD-10-CM

## 2021-11-15 DIAGNOSIS — C78.6 PERITONEAL CARCINOMATOSIS: ICD-10-CM

## 2021-11-15 LAB
ALBUMIN SERPL BCP-MCNC: 3.2 G/DL (ref 3.5–5.2)
ALP SERPL-CCNC: 106 U/L (ref 55–135)
ALT SERPL W/O P-5'-P-CCNC: 20 U/L (ref 10–44)
ANION GAP SERPL CALC-SCNC: 6 MMOL/L (ref 8–16)
AST SERPL-CCNC: 21 U/L (ref 10–40)
BASOPHILS # BLD AUTO: 0.02 K/UL (ref 0–0.2)
BASOPHILS NFR BLD: 0.5 % (ref 0–1.9)
BILIRUB SERPL-MCNC: 0.6 MG/DL (ref 0.1–1)
BUN SERPL-MCNC: 9 MG/DL (ref 6–20)
CALCIUM SERPL-MCNC: 9 MG/DL (ref 8.7–10.5)
CHLORIDE SERPL-SCNC: 106 MMOL/L (ref 95–110)
CO2 SERPL-SCNC: 29 MMOL/L (ref 23–29)
CREAT SERPL-MCNC: 0.6 MG/DL (ref 0.5–1.4)
DIFFERENTIAL METHOD: ABNORMAL
EOSINOPHIL # BLD AUTO: 0.1 K/UL (ref 0–0.5)
EOSINOPHIL NFR BLD: 1.8 % (ref 0–8)
ERYTHROCYTE [DISTWIDTH] IN BLOOD BY AUTOMATED COUNT: 17.6 % (ref 11.5–14.5)
EST. GFR  (AFRICAN AMERICAN): >60 ML/MIN/1.73 M^2
EST. GFR  (NON AFRICAN AMERICAN): >60 ML/MIN/1.73 M^2
GLUCOSE SERPL-MCNC: 79 MG/DL (ref 70–110)
HCT VFR BLD AUTO: 36.8 % (ref 37–48.5)
HGB BLD-MCNC: 11.6 G/DL (ref 12–16)
IMM GRANULOCYTES # BLD AUTO: 0.01 K/UL (ref 0–0.04)
IMM GRANULOCYTES NFR BLD AUTO: 0.3 % (ref 0–0.5)
LYMPHOCYTES # BLD AUTO: 1.5 K/UL (ref 1–4.8)
LYMPHOCYTES NFR BLD: 38.6 % (ref 18–48)
MCH RBC QN AUTO: 28.9 PG (ref 27–31)
MCHC RBC AUTO-ENTMCNC: 31.5 G/DL (ref 32–36)
MCV RBC AUTO: 92 FL (ref 82–98)
MONOCYTES # BLD AUTO: 0.5 K/UL (ref 0.3–1)
MONOCYTES NFR BLD: 11.8 % (ref 4–15)
NEUTROPHILS # BLD AUTO: 1.8 K/UL (ref 1.8–7.7)
NEUTROPHILS NFR BLD: 47 % (ref 38–73)
NRBC BLD-RTO: 0 /100 WBC
PLATELET # BLD AUTO: 172 K/UL (ref 150–450)
PMV BLD AUTO: 9.7 FL (ref 9.2–12.9)
POTASSIUM SERPL-SCNC: 5 MMOL/L (ref 3.5–5.1)
PROT SERPL-MCNC: 6.4 G/DL (ref 6–8.4)
RBC # BLD AUTO: 4.01 M/UL (ref 4–5.4)
SODIUM SERPL-SCNC: 141 MMOL/L (ref 136–145)
WBC # BLD AUTO: 3.81 K/UL (ref 3.9–12.7)

## 2021-11-15 PROCEDURE — 99999 PR PBB SHADOW E&M-EST. PATIENT-LVL IV: CPT | Mod: PBBFAC,,, | Performed by: INTERNAL MEDICINE

## 2021-11-15 PROCEDURE — 63600175 PHARM REV CODE 636 W HCPCS: Performed by: INTERNAL MEDICINE

## 2021-11-15 PROCEDURE — 96415 CHEMO IV INFUSION ADDL HR: CPT

## 2021-11-15 PROCEDURE — 96416 CHEMO PROLONG INFUSE W/PUMP: CPT

## 2021-11-15 PROCEDURE — 99999 PR PBB SHADOW E&M-EST. PATIENT-LVL IV: ICD-10-PCS | Mod: PBBFAC,,, | Performed by: INTERNAL MEDICINE

## 2021-11-15 PROCEDURE — 99214 OFFICE O/P EST MOD 30 MIN: CPT | Mod: PBBFAC,25 | Performed by: INTERNAL MEDICINE

## 2021-11-15 PROCEDURE — 99215 PR OFFICE/OUTPT VISIT, EST, LEVL V, 40-54 MIN: ICD-10-PCS | Mod: S$PBB,,, | Performed by: INTERNAL MEDICINE

## 2021-11-15 PROCEDURE — 96367 TX/PROPH/DG ADDL SEQ IV INF: CPT

## 2021-11-15 PROCEDURE — 99215 OFFICE O/P EST HI 40 MIN: CPT | Mod: S$PBB,,, | Performed by: INTERNAL MEDICINE

## 2021-11-15 PROCEDURE — 25000003 PHARM REV CODE 250: Performed by: INTERNAL MEDICINE

## 2021-11-15 PROCEDURE — 85025 COMPLETE CBC W/AUTO DIFF WBC: CPT | Performed by: INTERNAL MEDICINE

## 2021-11-15 PROCEDURE — 36415 COLL VENOUS BLD VENIPUNCTURE: CPT | Performed by: INTERNAL MEDICINE

## 2021-11-15 PROCEDURE — 96413 CHEMO IV INFUSION 1 HR: CPT

## 2021-11-15 PROCEDURE — 80053 COMPREHEN METABOLIC PANEL: CPT | Performed by: INTERNAL MEDICINE

## 2021-11-15 RX ORDER — EPINEPHRINE 0.3 MG/.3ML
0.3 INJECTION SUBCUTANEOUS ONCE AS NEEDED
Status: CANCELLED | OUTPATIENT
Start: 2021-11-16

## 2021-11-15 RX ORDER — HEPARIN 100 UNIT/ML
500 SYRINGE INTRAVENOUS
Status: CANCELLED | OUTPATIENT
Start: 2021-11-18

## 2021-11-15 RX ORDER — HEPARIN 100 UNIT/ML
500 SYRINGE INTRAVENOUS
Status: CANCELLED | OUTPATIENT
Start: 2021-11-16

## 2021-11-15 RX ORDER — DIPHENHYDRAMINE HYDROCHLORIDE 50 MG/ML
50 INJECTION INTRAMUSCULAR; INTRAVENOUS ONCE AS NEEDED
Status: CANCELLED | OUTPATIENT
Start: 2021-11-16

## 2021-11-15 RX ORDER — SODIUM CHLORIDE 0.9 % (FLUSH) 0.9 %
10 SYRINGE (ML) INJECTION
Status: CANCELLED | OUTPATIENT
Start: 2021-11-16

## 2021-11-15 RX ORDER — SODIUM CHLORIDE 0.9 % (FLUSH) 0.9 %
10 SYRINGE (ML) INJECTION
Status: CANCELLED | OUTPATIENT
Start: 2021-11-18

## 2021-11-15 RX ADMIN — DEXAMETHASONE SODIUM PHOSPHATE: 10 INJECTION, SOLUTION INTRAMUSCULAR; INTRAVENOUS at 01:11

## 2021-11-15 RX ADMIN — OXALIPLATIN 133 MG: 5 INJECTION, SOLUTION INTRAVENOUS at 01:11

## 2021-11-15 RX ADMIN — FLUOROURACIL 3770 MG: 50 INJECTION, SOLUTION INTRAVENOUS at 04:11

## 2021-11-17 ENCOUNTER — INFUSION (OUTPATIENT)
Dept: INFUSION THERAPY | Facility: HOSPITAL | Age: 41
End: 2021-11-17
Attending: INTERNAL MEDICINE
Payer: MEDICAID

## 2021-11-17 VITALS
TEMPERATURE: 98 F | RESPIRATION RATE: 16 BRPM | SYSTOLIC BLOOD PRESSURE: 139 MMHG | DIASTOLIC BLOOD PRESSURE: 81 MMHG | HEART RATE: 70 BPM | OXYGEN SATURATION: 99 %

## 2021-11-17 DIAGNOSIS — C16.9 GASTRIC ADENOCARCINOMA: Primary | ICD-10-CM

## 2021-11-17 PROCEDURE — 25000003 PHARM REV CODE 250: Performed by: INTERNAL MEDICINE

## 2021-11-17 PROCEDURE — A4216 STERILE WATER/SALINE, 10 ML: HCPCS | Performed by: INTERNAL MEDICINE

## 2021-11-17 PROCEDURE — 96523 IRRIG DRUG DELIVERY DEVICE: CPT

## 2021-11-17 PROCEDURE — 63600175 PHARM REV CODE 636 W HCPCS: Performed by: INTERNAL MEDICINE

## 2021-11-17 RX ORDER — SODIUM CHLORIDE 0.9 % (FLUSH) 0.9 %
10 SYRINGE (ML) INJECTION
Status: DISCONTINUED | OUTPATIENT
Start: 2021-11-17 | End: 2021-11-18 | Stop reason: HOSPADM

## 2021-11-17 RX ORDER — HEPARIN 100 UNIT/ML
500 SYRINGE INTRAVENOUS
Status: DISCONTINUED | OUTPATIENT
Start: 2021-11-17 | End: 2021-11-18 | Stop reason: HOSPADM

## 2021-11-17 RX ADMIN — Medication 10 ML: at 02:11

## 2021-11-17 RX ADMIN — HEPARIN 500 UNITS: 100 SYRINGE at 02:11

## 2021-11-29 ENCOUNTER — LAB VISIT (OUTPATIENT)
Dept: LAB | Facility: HOSPITAL | Age: 41
End: 2021-11-29
Attending: INTERNAL MEDICINE
Payer: MEDICAID

## 2021-11-29 ENCOUNTER — OFFICE VISIT (OUTPATIENT)
Dept: HEMATOLOGY/ONCOLOGY | Facility: CLINIC | Age: 41
End: 2021-11-29
Payer: MEDICAID

## 2021-11-29 VITALS
HEIGHT: 67 IN | TEMPERATURE: 98 F | HEART RATE: 60 BPM | SYSTOLIC BLOOD PRESSURE: 126 MMHG | BODY MASS INDEX: 19.25 KG/M2 | DIASTOLIC BLOOD PRESSURE: 80 MMHG | WEIGHT: 122.63 LBS | OXYGEN SATURATION: 100 %

## 2021-11-29 DIAGNOSIS — D50.9 MICROCYTIC ANEMIA: ICD-10-CM

## 2021-11-29 DIAGNOSIS — C16.9 GASTRIC ADENOCARCINOMA: Primary | ICD-10-CM

## 2021-11-29 DIAGNOSIS — D84.821 IMMUNODEFICIENCY DUE TO CHEMOTHERAPY: ICD-10-CM

## 2021-11-29 DIAGNOSIS — K25.5 CHRONIC GASTRIC ULCER WITH PERFORATION: ICD-10-CM

## 2021-11-29 DIAGNOSIS — Z79.899 IMMUNODEFICIENCY DUE TO CHEMOTHERAPY: ICD-10-CM

## 2021-11-29 DIAGNOSIS — J34.89 RHINORRHEA: ICD-10-CM

## 2021-11-29 DIAGNOSIS — R18.8 OTHER ASCITES: ICD-10-CM

## 2021-11-29 DIAGNOSIS — T45.1X5A IMMUNODEFICIENCY DUE TO CHEMOTHERAPY: ICD-10-CM

## 2021-11-29 DIAGNOSIS — R11.2 NON-INTRACTABLE VOMITING WITH NAUSEA, UNSPECIFIED VOMITING TYPE: ICD-10-CM

## 2021-11-29 DIAGNOSIS — C80.1 SIGNET RING CELL ADENOCARCINOMA: ICD-10-CM

## 2021-11-29 DIAGNOSIS — C78.6 PERITONEAL CARCINOMATOSIS: ICD-10-CM

## 2021-11-29 LAB
ALBUMIN SERPL BCP-MCNC: 3.5 G/DL (ref 3.5–5.2)
ALP SERPL-CCNC: 112 U/L (ref 55–135)
ALT SERPL W/O P-5'-P-CCNC: 19 U/L (ref 10–44)
ANION GAP SERPL CALC-SCNC: 5 MMOL/L (ref 8–16)
AST SERPL-CCNC: 21 U/L (ref 10–40)
BASOPHILS # BLD AUTO: 0.02 K/UL (ref 0–0.2)
BASOPHILS NFR BLD: 0.5 % (ref 0–1.9)
BILIRUB SERPL-MCNC: 0.6 MG/DL (ref 0.1–1)
BUN SERPL-MCNC: 10 MG/DL (ref 6–20)
CALCIUM SERPL-MCNC: 9.2 MG/DL (ref 8.7–10.5)
CHLORIDE SERPL-SCNC: 105 MMOL/L (ref 95–110)
CO2 SERPL-SCNC: 29 MMOL/L (ref 23–29)
CREAT SERPL-MCNC: 0.7 MG/DL (ref 0.5–1.4)
DIFFERENTIAL METHOD: ABNORMAL
EOSINOPHIL # BLD AUTO: 0.1 K/UL (ref 0–0.5)
EOSINOPHIL NFR BLD: 1.7 % (ref 0–8)
ERYTHROCYTE [DISTWIDTH] IN BLOOD BY AUTOMATED COUNT: 17 % (ref 11.5–14.5)
EST. GFR  (AFRICAN AMERICAN): >60 ML/MIN/1.73 M^2
EST. GFR  (NON AFRICAN AMERICAN): >60 ML/MIN/1.73 M^2
GLUCOSE SERPL-MCNC: 71 MG/DL (ref 70–110)
HCT VFR BLD AUTO: 37.6 % (ref 37–48.5)
HGB BLD-MCNC: 12.3 G/DL (ref 12–16)
IMM GRANULOCYTES # BLD AUTO: 0.02 K/UL (ref 0–0.04)
IMM GRANULOCYTES NFR BLD AUTO: 0.5 % (ref 0–0.5)
LYMPHOCYTES # BLD AUTO: 1.5 K/UL (ref 1–4.8)
LYMPHOCYTES NFR BLD: 37 % (ref 18–48)
MCH RBC QN AUTO: 30.6 PG (ref 27–31)
MCHC RBC AUTO-ENTMCNC: 32.7 G/DL (ref 32–36)
MCV RBC AUTO: 94 FL (ref 82–98)
MONOCYTES # BLD AUTO: 0.5 K/UL (ref 0.3–1)
MONOCYTES NFR BLD: 11.1 % (ref 4–15)
NEUTROPHILS # BLD AUTO: 2 K/UL (ref 1.8–7.7)
NEUTROPHILS NFR BLD: 49.2 % (ref 38–73)
NRBC BLD-RTO: 0 /100 WBC
PLATELET # BLD AUTO: 148 K/UL (ref 150–450)
PMV BLD AUTO: 9.9 FL (ref 9.2–12.9)
POTASSIUM SERPL-SCNC: 4.6 MMOL/L (ref 3.5–5.1)
PROT SERPL-MCNC: 6.7 G/DL (ref 6–8.4)
RBC # BLD AUTO: 4.02 M/UL (ref 4–5.4)
SODIUM SERPL-SCNC: 139 MMOL/L (ref 136–145)
WBC # BLD AUTO: 4.05 K/UL (ref 3.9–12.7)

## 2021-11-29 PROCEDURE — 99999 PR PBB SHADOW E&M-EST. PATIENT-LVL IV: ICD-10-PCS | Mod: PBBFAC,,, | Performed by: INTERNAL MEDICINE

## 2021-11-29 PROCEDURE — 99215 PR OFFICE/OUTPT VISIT, EST, LEVL V, 40-54 MIN: ICD-10-PCS | Mod: S$PBB,,, | Performed by: INTERNAL MEDICINE

## 2021-11-29 PROCEDURE — 85025 COMPLETE CBC W/AUTO DIFF WBC: CPT | Performed by: INTERNAL MEDICINE

## 2021-11-29 PROCEDURE — 36415 COLL VENOUS BLD VENIPUNCTURE: CPT | Performed by: INTERNAL MEDICINE

## 2021-11-29 PROCEDURE — 99999 PR PBB SHADOW E&M-EST. PATIENT-LVL IV: CPT | Mod: PBBFAC,,, | Performed by: INTERNAL MEDICINE

## 2021-11-29 PROCEDURE — 80053 COMPREHEN METABOLIC PANEL: CPT | Performed by: INTERNAL MEDICINE

## 2021-11-29 PROCEDURE — 99214 OFFICE O/P EST MOD 30 MIN: CPT | Mod: PBBFAC | Performed by: INTERNAL MEDICINE

## 2021-11-29 PROCEDURE — 99215 OFFICE O/P EST HI 40 MIN: CPT | Mod: S$PBB,,, | Performed by: INTERNAL MEDICINE

## 2021-11-29 RX ORDER — HEPARIN 100 UNIT/ML
500 SYRINGE INTRAVENOUS
Status: CANCELLED | OUTPATIENT
Start: 2021-12-02

## 2021-11-29 RX ORDER — HEPARIN 100 UNIT/ML
500 SYRINGE INTRAVENOUS
Status: CANCELLED | OUTPATIENT
Start: 2021-11-30

## 2021-11-29 RX ORDER — SODIUM CHLORIDE 0.9 % (FLUSH) 0.9 %
10 SYRINGE (ML) INJECTION
Status: CANCELLED | OUTPATIENT
Start: 2021-12-02

## 2021-11-29 RX ORDER — SODIUM CHLORIDE 0.9 % (FLUSH) 0.9 %
10 SYRINGE (ML) INJECTION
Status: CANCELLED | OUTPATIENT
Start: 2021-11-30

## 2021-11-29 RX ORDER — EPINEPHRINE 0.3 MG/.3ML
0.3 INJECTION SUBCUTANEOUS ONCE AS NEEDED
Status: CANCELLED | OUTPATIENT
Start: 2021-11-30

## 2021-11-29 RX ORDER — DIPHENHYDRAMINE HYDROCHLORIDE 50 MG/ML
50 INJECTION INTRAMUSCULAR; INTRAVENOUS ONCE AS NEEDED
Status: CANCELLED | OUTPATIENT
Start: 2021-11-30

## 2021-11-30 ENCOUNTER — INFUSION (OUTPATIENT)
Dept: INFUSION THERAPY | Facility: HOSPITAL | Age: 41
End: 2021-11-30
Payer: MEDICAID

## 2021-11-30 VITALS
HEART RATE: 73 BPM | TEMPERATURE: 98 F | RESPIRATION RATE: 18 BRPM | SYSTOLIC BLOOD PRESSURE: 113 MMHG | DIASTOLIC BLOOD PRESSURE: 79 MMHG

## 2021-11-30 DIAGNOSIS — C16.9 GASTRIC ADENOCARCINOMA: Primary | ICD-10-CM

## 2021-11-30 PROCEDURE — 63600175 PHARM REV CODE 636 W HCPCS: Performed by: INTERNAL MEDICINE

## 2021-11-30 PROCEDURE — 25000003 PHARM REV CODE 250: Performed by: INTERNAL MEDICINE

## 2021-11-30 PROCEDURE — 96367 TX/PROPH/DG ADDL SEQ IV INF: CPT

## 2021-11-30 PROCEDURE — 96416 CHEMO PROLONG INFUSE W/PUMP: CPT

## 2021-11-30 RX ORDER — HEPARIN 100 UNIT/ML
500 SYRINGE INTRAVENOUS
Status: DISCONTINUED | OUTPATIENT
Start: 2021-11-30 | End: 2021-11-30 | Stop reason: HOSPADM

## 2021-11-30 RX ORDER — EPINEPHRINE 0.3 MG/.3ML
0.3 INJECTION SUBCUTANEOUS ONCE AS NEEDED
Status: DISCONTINUED | OUTPATIENT
Start: 2021-11-30 | End: 2021-11-30 | Stop reason: HOSPADM

## 2021-11-30 RX ORDER — SODIUM CHLORIDE 0.9 % (FLUSH) 0.9 %
10 SYRINGE (ML) INJECTION
Status: DISCONTINUED | OUTPATIENT
Start: 2021-11-30 | End: 2021-11-30 | Stop reason: HOSPADM

## 2021-11-30 RX ORDER — DIPHENHYDRAMINE HYDROCHLORIDE 50 MG/ML
50 INJECTION INTRAMUSCULAR; INTRAVENOUS ONCE AS NEEDED
Status: DISCONTINUED | OUTPATIENT
Start: 2021-11-30 | End: 2021-11-30 | Stop reason: HOSPADM

## 2021-11-30 RX ADMIN — SODIUM CHLORIDE: 9 INJECTION, SOLUTION INTRAVENOUS at 08:11

## 2021-11-30 RX ADMIN — FLUOROURACIL 3770 MG: 50 INJECTION, SOLUTION INTRAVENOUS at 10:11

## 2021-11-30 RX ADMIN — PALONOSETRON HYDROCHLORIDE 0.25 MG: 0.25 INJECTION, SOLUTION INTRAVENOUS at 08:11

## 2021-12-02 ENCOUNTER — INFUSION (OUTPATIENT)
Dept: INFUSION THERAPY | Facility: HOSPITAL | Age: 41
End: 2021-12-02
Payer: MEDICAID

## 2021-12-02 VITALS
DIASTOLIC BLOOD PRESSURE: 78 MMHG | RESPIRATION RATE: 18 BRPM | SYSTOLIC BLOOD PRESSURE: 127 MMHG | TEMPERATURE: 98 F | HEART RATE: 60 BPM

## 2021-12-02 DIAGNOSIS — C16.9 GASTRIC ADENOCARCINOMA: Primary | ICD-10-CM

## 2021-12-02 PROCEDURE — 25000003 PHARM REV CODE 250: Performed by: INTERNAL MEDICINE

## 2021-12-02 PROCEDURE — A4216 STERILE WATER/SALINE, 10 ML: HCPCS | Performed by: INTERNAL MEDICINE

## 2021-12-02 PROCEDURE — 63600175 PHARM REV CODE 636 W HCPCS: Performed by: INTERNAL MEDICINE

## 2021-12-02 PROCEDURE — 99211 OFF/OP EST MAY X REQ PHY/QHP: CPT

## 2021-12-02 RX ORDER — HYDROCODONE BITARTRATE AND ACETAMINOPHEN 5; 325 MG/1; MG/1
1 TABLET ORAL 3 TIMES DAILY PRN
COMMUNITY
Start: 2021-07-09 | End: 2022-05-25 | Stop reason: SDUPTHER

## 2021-12-02 RX ORDER — HEPARIN 100 UNIT/ML
500 SYRINGE INTRAVENOUS
Status: DISCONTINUED | OUTPATIENT
Start: 2021-12-02 | End: 2021-12-02 | Stop reason: HOSPADM

## 2021-12-02 RX ORDER — METOCLOPRAMIDE 5 MG/1
5 TABLET ORAL
Status: ON HOLD | COMMUNITY
Start: 2021-06-11 | End: 2022-10-14 | Stop reason: HOSPADM

## 2021-12-02 RX ORDER — SUCRALFATE 1 G/1
TABLET ORAL
Status: ON HOLD | COMMUNITY
Start: 2021-04-08 | End: 2022-10-14 | Stop reason: HOSPADM

## 2021-12-02 RX ORDER — HYDROCODONE BITARTRATE AND ACETAMINOPHEN 5; 325 MG/1; MG/1
1 TABLET ORAL
Status: ON HOLD | COMMUNITY
Start: 2021-07-09 | End: 2022-06-20 | Stop reason: HOSPADM

## 2021-12-02 RX ORDER — FERROUS SULFATE 325(65) MG
TABLET ORAL
COMMUNITY
Start: 2021-05-26 | End: 2022-05-26

## 2021-12-02 RX ORDER — POTASSIUM CHLORIDE 20 MEQ/1
20 TABLET, EXTENDED RELEASE ORAL
COMMUNITY
Start: 2021-05-26 | End: 2022-04-04

## 2021-12-02 RX ORDER — PANTOPRAZOLE SODIUM 40 MG/1
40 TABLET, DELAYED RELEASE ORAL
Status: ON HOLD | COMMUNITY
Start: 2021-06-02 | End: 2022-06-20 | Stop reason: HOSPADM

## 2021-12-02 RX ORDER — PROCHLORPERAZINE MALEATE 10 MG
TABLET ORAL
Status: ON HOLD | COMMUNITY
Start: 2021-06-03 | End: 2022-06-20 | Stop reason: HOSPADM

## 2021-12-02 RX ORDER — LIDOCAINE AND PRILOCAINE 25; 25 MG/G; MG/G
CREAM TOPICAL
Status: ON HOLD | COMMUNITY
Start: 2021-06-25 | End: 2022-06-20 | Stop reason: HOSPADM

## 2021-12-02 RX ORDER — TRAMADOL HYDROCHLORIDE 50 MG/1
TABLET ORAL
Status: ON HOLD | COMMUNITY
Start: 2021-06-25 | End: 2022-06-20 | Stop reason: HOSPADM

## 2021-12-02 RX ORDER — TRAMADOL HYDROCHLORIDE 50 MG/1
50 TABLET ORAL
Status: ON HOLD | COMMUNITY
Start: 2021-06-25 | End: 2022-10-14 | Stop reason: HOSPADM

## 2021-12-02 RX ORDER — AMITRIPTYLINE HYDROCHLORIDE 10 MG/1
10 TABLET, FILM COATED ORAL
Status: ON HOLD | COMMUNITY
Start: 2021-05-13 | End: 2022-06-20 | Stop reason: HOSPADM

## 2021-12-02 RX ORDER — LIDOCAINE AND PRILOCAINE 25; 25 MG/G; MG/G
CREAM TOPICAL
COMMUNITY
Start: 2021-06-25 | End: 2022-08-17 | Stop reason: SDUPTHER

## 2021-12-02 RX ORDER — FUROSEMIDE 40 MG/1
TABLET ORAL
Status: ON HOLD | COMMUNITY
Start: 2021-05-26 | End: 2022-06-20 | Stop reason: HOSPADM

## 2021-12-02 RX ORDER — SODIUM CHLORIDE 0.9 % (FLUSH) 0.9 %
10 SYRINGE (ML) INJECTION
Status: DISCONTINUED | OUTPATIENT
Start: 2021-12-02 | End: 2021-12-02 | Stop reason: HOSPADM

## 2021-12-02 RX ORDER — FUROSEMIDE 20 MG/1
10 TABLET ORAL
COMMUNITY
End: 2022-07-06 | Stop reason: SDUPTHER

## 2021-12-02 RX ORDER — MULTIVITAMIN
1 TABLET ORAL DAILY
Status: ON HOLD | COMMUNITY
End: 2022-10-14 | Stop reason: HOSPADM

## 2021-12-02 RX ORDER — AMOXICILLIN 250 MG
1 CAPSULE ORAL
Status: ON HOLD | COMMUNITY
Start: 2021-05-26 | End: 2022-06-20 | Stop reason: HOSPADM

## 2021-12-02 RX ADMIN — Medication 10 ML: at 07:12

## 2021-12-02 RX ADMIN — HEPARIN 500 UNITS: 100 SYRINGE at 07:12

## 2021-12-08 ENCOUNTER — PATIENT MESSAGE (OUTPATIENT)
Dept: HEMATOLOGY/ONCOLOGY | Facility: CLINIC | Age: 41
End: 2021-12-08
Payer: COMMERCIAL

## 2021-12-09 ENCOUNTER — HOSPITAL ENCOUNTER (OUTPATIENT)
Dept: RADIOLOGY | Facility: HOSPITAL | Age: 41
Discharge: HOME | End: 2021-12-09
Attending: INTERNAL MEDICINE
Payer: MEDICAID

## 2021-12-09 DIAGNOSIS — C16.9 GASTRIC ADENOCARCINOMA: ICD-10-CM

## 2021-12-09 PROCEDURE — 74177 CT ABD & PELVIS W/CONTRAST: CPT | Mod: 26,,, | Performed by: RADIOLOGY

## 2021-12-09 PROCEDURE — 71260 CT THORAX DX C+: CPT | Mod: TC

## 2021-12-09 PROCEDURE — 25500020 PHARM REV CODE 255: Performed by: INTERNAL MEDICINE

## 2021-12-09 PROCEDURE — 71260 CT CHEST ABDOMEN PELVIS WITH CONTRAST (XPD): ICD-10-PCS | Mod: 26,,, | Performed by: RADIOLOGY

## 2021-12-09 PROCEDURE — 74177 CT CHEST ABDOMEN PELVIS WITH CONTRAST (XPD): ICD-10-PCS | Mod: 26,,, | Performed by: RADIOLOGY

## 2021-12-09 PROCEDURE — 71260 CT THORAX DX C+: CPT | Mod: 26,,, | Performed by: RADIOLOGY

## 2021-12-09 PROCEDURE — 74177 CT ABD & PELVIS W/CONTRAST: CPT | Mod: TC

## 2021-12-09 RX ADMIN — IOHEXOL 75 ML: 350 INJECTION, SOLUTION INTRAVENOUS at 11:12

## 2021-12-09 RX ADMIN — IOHEXOL 15 ML: 300 INJECTION, SOLUTION INTRAVENOUS at 10:12

## 2021-12-13 ENCOUNTER — INFUSION (OUTPATIENT)
Dept: INFUSION THERAPY | Facility: HOSPITAL | Age: 41
End: 2021-12-13
Payer: MEDICAID

## 2021-12-13 ENCOUNTER — OFFICE VISIT (OUTPATIENT)
Dept: HEMATOLOGY/ONCOLOGY | Facility: CLINIC | Age: 41
End: 2021-12-13
Payer: MEDICAID

## 2021-12-13 VITALS
OXYGEN SATURATION: 98 % | BODY MASS INDEX: 19.52 KG/M2 | TEMPERATURE: 98 F | RESPIRATION RATE: 18 BRPM | HEART RATE: 55 BPM | SYSTOLIC BLOOD PRESSURE: 143 MMHG | HEIGHT: 67 IN | WEIGHT: 124.38 LBS | DIASTOLIC BLOOD PRESSURE: 92 MMHG

## 2021-12-13 VITALS
HEART RATE: 50 BPM | DIASTOLIC BLOOD PRESSURE: 90 MMHG | RESPIRATION RATE: 16 BRPM | SYSTOLIC BLOOD PRESSURE: 137 MMHG | TEMPERATURE: 98 F

## 2021-12-13 DIAGNOSIS — C78.6 PERITONEAL CARCINOMATOSIS: ICD-10-CM

## 2021-12-13 DIAGNOSIS — R18.8 OTHER ASCITES: ICD-10-CM

## 2021-12-13 DIAGNOSIS — C16.9 GASTRIC ADENOCARCINOMA: Primary | ICD-10-CM

## 2021-12-13 DIAGNOSIS — Z79.899 IMMUNODEFICIENCY DUE TO CHEMOTHERAPY: ICD-10-CM

## 2021-12-13 DIAGNOSIS — D84.821 IMMUNODEFICIENCY DUE TO CHEMOTHERAPY: ICD-10-CM

## 2021-12-13 DIAGNOSIS — K25.5 CHRONIC GASTRIC ULCER WITH PERFORATION: ICD-10-CM

## 2021-12-13 DIAGNOSIS — T45.1X5A IMMUNODEFICIENCY DUE TO CHEMOTHERAPY: ICD-10-CM

## 2021-12-13 DIAGNOSIS — R11.2 NON-INTRACTABLE VOMITING WITH NAUSEA, UNSPECIFIED VOMITING TYPE: ICD-10-CM

## 2021-12-13 DIAGNOSIS — D50.9 MICROCYTIC ANEMIA: ICD-10-CM

## 2021-12-13 PROCEDURE — 99215 OFFICE O/P EST HI 40 MIN: CPT | Mod: S$PBB,,, | Performed by: INTERNAL MEDICINE

## 2021-12-13 PROCEDURE — 25000003 PHARM REV CODE 250: Performed by: INTERNAL MEDICINE

## 2021-12-13 PROCEDURE — 99215 PR OFFICE/OUTPT VISIT, EST, LEVL V, 40-54 MIN: ICD-10-PCS | Mod: S$PBB,,, | Performed by: INTERNAL MEDICINE

## 2021-12-13 PROCEDURE — 96375 TX/PRO/DX INJ NEW DRUG ADDON: CPT

## 2021-12-13 PROCEDURE — 96365 THER/PROPH/DIAG IV INF INIT: CPT | Mod: 59

## 2021-12-13 PROCEDURE — 63600175 PHARM REV CODE 636 W HCPCS: Performed by: INTERNAL MEDICINE

## 2021-12-13 PROCEDURE — 96416 CHEMO PROLONG INFUSE W/PUMP: CPT

## 2021-12-13 PROCEDURE — 99213 OFFICE O/P EST LOW 20 MIN: CPT | Mod: PBBFAC,25 | Performed by: INTERNAL MEDICINE

## 2021-12-13 PROCEDURE — 99999 PR PBB SHADOW E&M-EST. PATIENT-LVL III: CPT | Mod: PBBFAC,,, | Performed by: INTERNAL MEDICINE

## 2021-12-13 PROCEDURE — 99999 PR PBB SHADOW E&M-EST. PATIENT-LVL III: ICD-10-PCS | Mod: PBBFAC,,, | Performed by: INTERNAL MEDICINE

## 2021-12-13 RX ORDER — SODIUM CHLORIDE 0.9 % (FLUSH) 0.9 %
10 SYRINGE (ML) INJECTION
Status: CANCELLED | OUTPATIENT
Start: 2021-12-14

## 2021-12-13 RX ORDER — HEPARIN 100 UNIT/ML
500 SYRINGE INTRAVENOUS
Status: CANCELLED | OUTPATIENT
Start: 2021-12-16

## 2021-12-13 RX ORDER — EPINEPHRINE 0.3 MG/.3ML
0.3 INJECTION SUBCUTANEOUS ONCE AS NEEDED
Status: CANCELLED | OUTPATIENT
Start: 2021-12-14

## 2021-12-13 RX ORDER — HEPARIN 100 UNIT/ML
500 SYRINGE INTRAVENOUS
Status: CANCELLED | OUTPATIENT
Start: 2021-12-14

## 2021-12-13 RX ORDER — EPINEPHRINE 0.3 MG/.3ML
0.3 INJECTION SUBCUTANEOUS ONCE AS NEEDED
Status: DISCONTINUED | OUTPATIENT
Start: 2021-12-13 | End: 2021-12-13 | Stop reason: HOSPADM

## 2021-12-13 RX ORDER — DIPHENHYDRAMINE HYDROCHLORIDE 50 MG/ML
50 INJECTION INTRAMUSCULAR; INTRAVENOUS ONCE AS NEEDED
Status: DISCONTINUED | OUTPATIENT
Start: 2021-12-13 | End: 2021-12-13 | Stop reason: HOSPADM

## 2021-12-13 RX ORDER — SODIUM CHLORIDE 0.9 % (FLUSH) 0.9 %
10 SYRINGE (ML) INJECTION
Status: CANCELLED | OUTPATIENT
Start: 2021-12-16

## 2021-12-13 RX ORDER — HEPARIN 100 UNIT/ML
500 SYRINGE INTRAVENOUS
Status: DISCONTINUED | OUTPATIENT
Start: 2021-12-13 | End: 2021-12-13 | Stop reason: HOSPADM

## 2021-12-13 RX ORDER — DIPHENHYDRAMINE HYDROCHLORIDE 50 MG/ML
50 INJECTION INTRAMUSCULAR; INTRAVENOUS ONCE AS NEEDED
Status: CANCELLED | OUTPATIENT
Start: 2021-12-14

## 2021-12-13 RX ORDER — SODIUM CHLORIDE 0.9 % (FLUSH) 0.9 %
10 SYRINGE (ML) INJECTION
Status: DISCONTINUED | OUTPATIENT
Start: 2021-12-13 | End: 2021-12-13 | Stop reason: HOSPADM

## 2021-12-13 RX ADMIN — FLUOROURACIL 3770 MG: 50 INJECTION, SOLUTION INTRAVENOUS at 02:12

## 2021-12-13 RX ADMIN — SODIUM CHLORIDE: 9 INJECTION, SOLUTION INTRAVENOUS at 02:12

## 2021-12-13 RX ADMIN — PALONOSETRON HYDROCHLORIDE 0.25 MG: 0.25 INJECTION, SOLUTION INTRAVENOUS at 02:12

## 2021-12-14 ENCOUNTER — PATIENT MESSAGE (OUTPATIENT)
Dept: HEMATOLOGY/ONCOLOGY | Facility: CLINIC | Age: 41
End: 2021-12-14
Payer: COMMERCIAL

## 2021-12-14 DIAGNOSIS — C16.9 GASTRIC ADENOCARCINOMA: Primary | ICD-10-CM

## 2021-12-15 ENCOUNTER — INFUSION (OUTPATIENT)
Dept: INFUSION THERAPY | Facility: HOSPITAL | Age: 41
End: 2021-12-15
Payer: MEDICAID

## 2021-12-15 VITALS
TEMPERATURE: 98 F | OXYGEN SATURATION: 99 % | RESPIRATION RATE: 18 BRPM | SYSTOLIC BLOOD PRESSURE: 120 MMHG | HEART RATE: 60 BPM | DIASTOLIC BLOOD PRESSURE: 75 MMHG

## 2021-12-15 DIAGNOSIS — C16.9 GASTRIC ADENOCARCINOMA: Primary | ICD-10-CM

## 2021-12-15 PROCEDURE — 25000003 PHARM REV CODE 250: Performed by: INTERNAL MEDICINE

## 2021-12-15 PROCEDURE — 63600175 PHARM REV CODE 636 W HCPCS: Performed by: INTERNAL MEDICINE

## 2021-12-15 PROCEDURE — A4216 STERILE WATER/SALINE, 10 ML: HCPCS | Performed by: INTERNAL MEDICINE

## 2021-12-15 RX ORDER — SODIUM CHLORIDE 0.9 % (FLUSH) 0.9 %
10 SYRINGE (ML) INJECTION
Status: DISCONTINUED | OUTPATIENT
Start: 2021-12-15 | End: 2021-12-15 | Stop reason: HOSPADM

## 2021-12-15 RX ORDER — HEPARIN 100 UNIT/ML
500 SYRINGE INTRAVENOUS
Status: DISCONTINUED | OUTPATIENT
Start: 2021-12-15 | End: 2021-12-15 | Stop reason: HOSPADM

## 2021-12-15 RX ADMIN — Medication 10 ML: at 01:12

## 2021-12-15 RX ADMIN — HEPARIN 500 UNITS: 100 SYRINGE at 01:12

## 2021-12-27 ENCOUNTER — OFFICE VISIT (OUTPATIENT)
Dept: HEMATOLOGY/ONCOLOGY | Facility: CLINIC | Age: 41
End: 2021-12-27
Payer: MEDICAID

## 2021-12-27 ENCOUNTER — INFUSION (OUTPATIENT)
Dept: INFUSION THERAPY | Facility: HOSPITAL | Age: 41
End: 2021-12-27
Attending: INTERNAL MEDICINE
Payer: MEDICAID

## 2021-12-27 VITALS
DIASTOLIC BLOOD PRESSURE: 75 MMHG | OXYGEN SATURATION: 100 % | WEIGHT: 131.19 LBS | BODY MASS INDEX: 20.59 KG/M2 | RESPIRATION RATE: 18 BRPM | SYSTOLIC BLOOD PRESSURE: 113 MMHG | HEART RATE: 64 BPM | TEMPERATURE: 98 F | HEIGHT: 67 IN

## 2021-12-27 VITALS
HEART RATE: 94 BPM | RESPIRATION RATE: 18 BRPM | DIASTOLIC BLOOD PRESSURE: 70 MMHG | SYSTOLIC BLOOD PRESSURE: 111 MMHG | TEMPERATURE: 98 F

## 2021-12-27 DIAGNOSIS — Z79.899 IMMUNODEFICIENCY DUE TO CHEMOTHERAPY: ICD-10-CM

## 2021-12-27 DIAGNOSIS — C16.9 GASTRIC ADENOCARCINOMA: Primary | ICD-10-CM

## 2021-12-27 DIAGNOSIS — R11.2 NON-INTRACTABLE VOMITING WITH NAUSEA, UNSPECIFIED VOMITING TYPE: ICD-10-CM

## 2021-12-27 DIAGNOSIS — T45.1X5A IMMUNODEFICIENCY DUE TO CHEMOTHERAPY: ICD-10-CM

## 2021-12-27 DIAGNOSIS — R18.8 OTHER ASCITES: ICD-10-CM

## 2021-12-27 DIAGNOSIS — C78.6 PERITONEAL CARCINOMATOSIS: ICD-10-CM

## 2021-12-27 DIAGNOSIS — F41.1 ANXIETY ASSOCIATED WITH CANCER DIAGNOSIS: ICD-10-CM

## 2021-12-27 DIAGNOSIS — K25.5 CHRONIC GASTRIC ULCER WITH PERFORATION: ICD-10-CM

## 2021-12-27 DIAGNOSIS — D50.9 MICROCYTIC ANEMIA: ICD-10-CM

## 2021-12-27 DIAGNOSIS — D84.821 IMMUNODEFICIENCY DUE TO CHEMOTHERAPY: ICD-10-CM

## 2021-12-27 DIAGNOSIS — C80.1 ANXIETY ASSOCIATED WITH CANCER DIAGNOSIS: ICD-10-CM

## 2021-12-27 PROCEDURE — 99999 PR PBB SHADOW E&M-EST. PATIENT-LVL V: ICD-10-PCS | Mod: PBBFAC,,, | Performed by: PHYSICIAN ASSISTANT

## 2021-12-27 PROCEDURE — 63600175 PHARM REV CODE 636 W HCPCS: Performed by: INTERNAL MEDICINE

## 2021-12-27 PROCEDURE — 99215 PR OFFICE/OUTPT VISIT, EST, LEVL V, 40-54 MIN: ICD-10-PCS | Mod: S$PBB,,, | Performed by: PHYSICIAN ASSISTANT

## 2021-12-27 PROCEDURE — 96367 TX/PROPH/DG ADDL SEQ IV INF: CPT

## 2021-12-27 PROCEDURE — 99215 OFFICE O/P EST HI 40 MIN: CPT | Mod: PBBFAC,25 | Performed by: PHYSICIAN ASSISTANT

## 2021-12-27 PROCEDURE — 25000003 PHARM REV CODE 250: Performed by: INTERNAL MEDICINE

## 2021-12-27 PROCEDURE — 96416 CHEMO PROLONG INFUSE W/PUMP: CPT

## 2021-12-27 PROCEDURE — 99999 PR PBB SHADOW E&M-EST. PATIENT-LVL V: CPT | Mod: PBBFAC,,, | Performed by: PHYSICIAN ASSISTANT

## 2021-12-27 PROCEDURE — 99215 OFFICE O/P EST HI 40 MIN: CPT | Mod: S$PBB,,, | Performed by: PHYSICIAN ASSISTANT

## 2021-12-27 RX ORDER — DIPHENHYDRAMINE HYDROCHLORIDE 50 MG/ML
50 INJECTION INTRAMUSCULAR; INTRAVENOUS ONCE AS NEEDED
Status: DISCONTINUED | OUTPATIENT
Start: 2021-12-27 | End: 2021-12-27 | Stop reason: HOSPADM

## 2021-12-27 RX ORDER — DIPHENHYDRAMINE HYDROCHLORIDE 50 MG/ML
50 INJECTION INTRAMUSCULAR; INTRAVENOUS ONCE AS NEEDED
Status: CANCELLED | OUTPATIENT
Start: 2021-12-28

## 2021-12-27 RX ORDER — HEPARIN 100 UNIT/ML
500 SYRINGE INTRAVENOUS
Status: DISCONTINUED | OUTPATIENT
Start: 2021-12-27 | End: 2021-12-27 | Stop reason: HOSPADM

## 2021-12-27 RX ORDER — EPINEPHRINE 0.3 MG/.3ML
0.3 INJECTION SUBCUTANEOUS ONCE AS NEEDED
Status: DISCONTINUED | OUTPATIENT
Start: 2021-12-27 | End: 2021-12-27 | Stop reason: HOSPADM

## 2021-12-27 RX ORDER — SODIUM CHLORIDE 0.9 % (FLUSH) 0.9 %
10 SYRINGE (ML) INJECTION
Status: CANCELLED | OUTPATIENT
Start: 2021-12-30

## 2021-12-27 RX ORDER — SODIUM CHLORIDE 0.9 % (FLUSH) 0.9 %
10 SYRINGE (ML) INJECTION
Status: CANCELLED | OUTPATIENT
Start: 2021-12-28

## 2021-12-27 RX ORDER — HEPARIN 100 UNIT/ML
500 SYRINGE INTRAVENOUS
Status: CANCELLED | OUTPATIENT
Start: 2021-12-28

## 2021-12-27 RX ORDER — HEPARIN 100 UNIT/ML
500 SYRINGE INTRAVENOUS
Status: CANCELLED | OUTPATIENT
Start: 2021-12-30

## 2021-12-27 RX ORDER — EPINEPHRINE 0.3 MG/.3ML
0.3 INJECTION SUBCUTANEOUS ONCE AS NEEDED
Status: CANCELLED | OUTPATIENT
Start: 2021-12-28

## 2021-12-27 RX ORDER — SODIUM CHLORIDE 0.9 % (FLUSH) 0.9 %
10 SYRINGE (ML) INJECTION
Status: DISCONTINUED | OUTPATIENT
Start: 2021-12-27 | End: 2021-12-27 | Stop reason: HOSPADM

## 2021-12-27 RX ADMIN — PALONOSETRON HYDROCHLORIDE 0.25 MG: 0.25 INJECTION, SOLUTION INTRAVENOUS at 02:12

## 2021-12-27 RX ADMIN — FLUOROURACIL 4000 MG: 50 INJECTION, SOLUTION INTRAVENOUS at 03:12

## 2021-12-27 RX ADMIN — SODIUM CHLORIDE: 9 INJECTION, SOLUTION INTRAVENOUS at 02:12

## 2021-12-29 ENCOUNTER — INFUSION (OUTPATIENT)
Dept: INFUSION THERAPY | Facility: HOSPITAL | Age: 41
End: 2021-12-29
Payer: COMMERCIAL

## 2021-12-29 VITALS — DIASTOLIC BLOOD PRESSURE: 76 MMHG | SYSTOLIC BLOOD PRESSURE: 121 MMHG | HEART RATE: 72 BPM | TEMPERATURE: 98 F

## 2021-12-29 DIAGNOSIS — C16.9 GASTRIC ADENOCARCINOMA: Primary | ICD-10-CM

## 2021-12-29 PROCEDURE — 63600175 PHARM REV CODE 636 W HCPCS: Performed by: INTERNAL MEDICINE

## 2021-12-29 PROCEDURE — A4216 STERILE WATER/SALINE, 10 ML: HCPCS | Performed by: INTERNAL MEDICINE

## 2021-12-29 PROCEDURE — 25000003 PHARM REV CODE 250: Performed by: INTERNAL MEDICINE

## 2021-12-29 RX ORDER — SODIUM CHLORIDE 0.9 % (FLUSH) 0.9 %
10 SYRINGE (ML) INJECTION
Status: DISCONTINUED | OUTPATIENT
Start: 2021-12-29 | End: 2021-12-29 | Stop reason: HOSPADM

## 2021-12-29 RX ORDER — HEPARIN 100 UNIT/ML
500 SYRINGE INTRAVENOUS
Status: DISCONTINUED | OUTPATIENT
Start: 2021-12-29 | End: 2021-12-29 | Stop reason: HOSPADM

## 2021-12-29 RX ADMIN — Medication 10 ML: at 01:12

## 2021-12-29 RX ADMIN — HEPARIN SODIUM (PORCINE) LOCK FLUSH IV SOLN 100 UNIT/ML 500 UNITS: 100 SOLUTION at 01:12

## 2022-01-07 NOTE — PROGRESS NOTES
MEDICAL ONCOLOGY - ESTABLISHED PATIENT    Reason for visit: Gastric cancer     Best Contact Phone Number(s): [phone] (home)      Cancer/Stage/TNM:   Cancer Staging  No matching staging information was found for the patient.     Oncology History   Gastric adenocarcinoma   6/10/2021 Initial Diagnosis    Gastric adenocarcinoma     7/26/2021 -  Chemotherapy    Treatment Summary   Plan Name: OP FOLFOX 6 Q2W  Treatment Goal: Palliative  Status: Active  Start Date: 7/26/2021  End Date: 2/10/2022 (Planned)  Provider: Fer Ashraf MD  Chemotherapy: fluorouraciL 2,400 mg/m2 = 3,770 mg in sodium chloride 0.9% 100 mL chemo infusion, 2,400 mg/m2 = 3,770 mg, Intravenous, Over 46 hours, 12 of 15 cycles  Administration: 3,770 mg (7/26/2021), 3,770 mg (8/9/2021), 3,770 mg (8/23/2021), 3,770 mg (9/7/2021), 3,770 mg (9/21/2021), 3,770 mg (10/5/2021), 3,770 mg (10/19/2021), 3,770 mg (11/2/2021), 3,770 mg (11/15/2021), 3,770 mg (11/30/2021), 3,770 mg (12/13/2021), 4,000 mg (12/27/2021)  oxaliplatin (ELOXATIN) 85 mg/m2 = 133 mg in dextrose 5 % 500 mL chemo infusion, 85 mg/m2 = 133 mg, Intravenous, Clinic/HOD 1 time, 9 of 9 cycles  Administration: 133 mg (7/26/2021), 133 mg (8/9/2021), 133 mg (8/23/2021), 133 mg (9/7/2021), 133 mg (9/21/2021), 133 mg (10/5/2021), 133 mg (10/19/2021), 133 mg (11/2/2021), 133 mg (11/15/2021)          Interim History:  41 y.o. female with metastatic gastric cancer who presents for follow-up prior to cycle 13 of FOLFOX, now on maintenance 5-FU.  Her paresthesias in her fingers are a bit better but she is feeling it more in her feet. She has not fallen.  Denies any significant abdominal pain.  Feels better when she eats and has a good appetite.  No other new concerns. She has actually gained weight since her previous visit. No fever, chills, constipation, diarrhea, n/v. No mouth sores.    ECOG status is 1. Presents alone    Review of Systems   Constitutional: Negative for activity change, appetite  change, chills, fatigue, fever and unexpected weight change.   HENT: Negative for ear pain, facial swelling, hearing loss, mouth sores, nosebleeds, sore throat and trouble swallowing.    Eyes: Negative for pain, discharge, redness and visual disturbance.   Respiratory: Negative for cough, chest tightness and shortness of breath.    Cardiovascular: Negative for chest pain, palpitations and leg swelling.   Gastrointestinal: Positive for abdominal distention, nausea and reflux. Negative for abdominal pain, blood in stool, constipation, diarrhea and vomiting.   Endocrine: Negative for cold intolerance and heat intolerance.   Genitourinary: Negative for decreased urine volume, difficulty urinating, dysuria, frequency, hematuria, hot flashes, urgency and vaginal bleeding.   Musculoskeletal: Negative for arthralgias, gait problem, leg pain and myalgias.   Integumentary:  Negative for pallor, rash and wound.   Allergic/Immunologic: Negative for immunocompromised state.   Neurological: Positive for numbness. Negative for dizziness, tremors, weakness, light-headedness and headaches.   Hematological: Negative for adenopathy. Does not bruise/bleed easily.   Psychiatric/Behavioral: Negative for agitation, confusion, dysphoric mood and sleep disturbance. The patient is not nervous/anxious.            Past Medical History:   Past Medical History:   Diagnosis Date    Gastric cancer     Gastric ulcer     Pregnancy 08/12/2020    delivered on 8/12/2020    Umbilical hernia         Past Surgical History:   Past Surgical History:   Procedure Laterality Date    CLOSURE OF PERFORATED ULCER OF DUODENUM USING OMENTAL PATCH      ESOPHAGOGASTRODUODENOSCOPY N/A 10/13/2020    Procedure: EGD (ESOPHAGOGASTRODUODENOSCOPY);  Surgeon: Rosio Marion MD;  Location: [address];  Service: Endoscopy;  Laterality: N/A;    ESOPHAGOGASTRODUODENOSCOPY N/A 12/11/2020    Procedure: EGD (ESOPHAGOGASTRODUODENOSCOPY);  Surgeon: Rosio Dumont  "MD Doni;  Location: [facility] ENDO (2ND FLR);  Service: Endoscopy;  Laterality: N/A;  Covid-19 test 20 at [facility] -     ESOPHAGOGASTRODUODENOSCOPY N/A 3/12/2021    Procedure: EGD (ESOPHAGOGASTRODUODENOSCOPY);  Surgeon: Nav Omer MD;  Location: [facility] ENDO (2ND FLR);  Service: Endoscopy;  Laterality: N/A;  COVID at [facility] 3/9 ttr    ESOPHAGOGASTRODUODENOSCOPY N/A 2021    Procedure: EGD (ESOPHAGOGASTRODUODENOSCOPY);  Surgeon: Rosio Marion MD;  Location: [facility] ENDO (2ND FLR);  Service: Endoscopy;  Laterality: N/A;  5/15-covid pcw-inst portal-tb    ESOPHAGOGASTRODUODENOSCOPY N/A 2021    Procedure: EGD (ESOPHAGOGASTRODUODENOSCOPY);  Surgeon: Socrates Terrazas MD;  Location: [facility] ENDO (2ND FLR);  Service: Endoscopy;  Laterality: N/A;        Family History:   Family History   Problem Relation Age of Onset    Cancer Mother 67        lymphoma (type? "not active," not on treatment; "related to her blood")    Cancer Father         lung (dx age?) (smoker?)    No Known Problems Son     Breast cancer Other 73        unilat    Prostate cancer Paternal Uncle         (dx 50s/60? no chemo?)    Breast cancer Paternal Cousin         (dx age?)    Colon cancer Neg Hx     Esophageal cancer Neg Hx         Social History:   Social History     Tobacco Use    Smoking status: Former Smoker     Types: Cigarettes     Quit date: 2019     Years since quittin.1    Smokeless tobacco: Never Used   Substance Use Topics    Alcohol use: Yes        I have reviewed and updated the patient's past medical, surgical, family and social histories.    Allergies:   Review of patient's allergies indicates:  No Known Allergies     Medications:   Current Outpatient Medications   Medication Sig Dispense Refill    acetaminophen (TYLENOL) 500 MG tablet Take 500 mg by mouth every 6 (six) hours as needed for Pain.      amitriptyline (ELAVIL) 10 MG tablet Take 1 tablet (10 mg total) by mouth every evening. 30 tablet 3 "    amitriptyline (ELAVIL) 10 MG tablet Take 10 mg by mouth.      dicyclomine (BENTYL) 10 MG capsule TAKE ONE CAPSULE BY MOUTH FOUR TIMES DAILY 120 capsule 0    ferrous sulfate (FEOSOL) 325 mg (65 mg iron) Tab tablet Take 1 tablet (325 mg total) by mouth once daily for 7 days, THEN 1 tablet (325 mg total) 2 (two) times daily. 60 tablet 10    ferrous sulfate (FEOSOL) 325 mg (65 mg iron) Tab tablet Take by mouth.      furosemide (LASIX) 20 MG tablet Take 10 mg by mouth.      furosemide (LASIX) 40 MG tablet Take 0.5 tablets (20 mg total) by mouth daily as needed (leg swelling or abdominal swelling). 30 tablet 1    furosemide (LASIX) 40 MG tablet       HYDROcodone-acetaminophen (NORCO) 5-325 mg per tablet Take 1 tablet by mouth.      HYDROcodone-acetaminophen (NORCO) 5-325 mg per tablet Take 1 tablet by mouth 3 (three) times daily as needed.      LIDOcaine-prilocaine (EMLA) cream Apply to Port-A-Cath area 30 to 45 minutes prior to port access as directed (topical anesthetic use to the anterior chest only).      LIDOcaine-prilocaine (EMLA) cream Apply topically.      metoclopramide HCl (REGLAN) 5 MG tablet 5 mg.      multivitamin with folic acid 400 mcg Tab Take 1 tablet by mouth once daily.      ondansetron (ZOFRAN) 8 MG tablet Take 1 tablet (8 mg total) by mouth every 8 (eight) hours as needed for Nausea. 60 tablet 2    pantoprazole (PROTONIX) 40 MG tablet TAKE 1 TABLET BY MOUTH TWICE DAILY 60 tablet 11    pantoprazole (PROTONIX) 40 MG tablet Take 40 mg by mouth.      potassium chloride SA (K-DUR,KLOR-CON) 20 MEQ tablet 20 mEq.      prochlorperazine (COMPAZINE) 10 MG tablet TAKE 1 TABLET(10 MG) BY MOUTH EVERY 6 HOURS AS NEEDED FOR NAUSEA 90 tablet 2    prochlorperazine (COMPAZINE) 10 MG tablet as needed.      senna-docusate 8.6-50 mg (PERICOLACE) 8.6-50 mg per tablet Take 1 tablet by mouth.      senna-docusate 8.6-50 mg (SENNA WITH DOCUSATE SODIUM) 8.6-50 mg per tablet Take 1 tablet by mouth 2 (two)  times daily as needed for Constipation.      sucralfate (CARAFATE) 1 gram tablet TAKE 1 TABLET(1 GRAM) BY MOUTH FOUR TIMES DAILY FOR 14 DAYS 56 tablet 0    sucralfate (CARAFATE) 1 gram tablet       traMADoL (ULTRAM) 50 mg tablet Take 50 mg by mouth.      traMADoL (ULTRAM) 50 mg tablet Take by mouth.       No current facility-administered medications for this visit.        Physical Exam:   There were no vitals taken for this visit.     ECOG Performance status: 1            Physical Exam  Vitals reviewed.   Constitutional:       Appearance: Normal appearance. She is not ill-appearing or toxic-appearing.      Comments: Thin   HENT:      Head: Normocephalic and atraumatic.      Mouth/Throat:      Mouth: Mucous membranes are moist.      Pharynx: Oropharynx is clear.   Eyes:      General: No scleral icterus.     Extraocular Movements: Extraocular movements intact.      Conjunctiva/sclera: Conjunctivae normal.   Cardiovascular:      Rate and Rhythm: Normal rate and regular rhythm.      Pulses: Normal pulses.      Heart sounds: Normal heart sounds.   Pulmonary:      Effort: Pulmonary effort is normal.      Breath sounds: Normal breath sounds.   Abdominal:      General: Bowel sounds are normal. There is distension (mild).      Palpations: Abdomen is soft.      Tenderness: There is no abdominal tenderness.   Musculoskeletal:         General: No swelling or tenderness. Normal range of motion.   Skin:     General: Skin is warm.      Findings: No rash.   Neurological:      General: No focal deficit present.      Mental Status: She is alert. Mental status is at baseline.   Psychiatric:         Mood and Affect: Mood normal.         Behavior: Behavior normal.             Labs:   No results found for this or any previous visit (from the past 48 hour(s)).     I have reviewed the pertinent labs from today which are acceptable for treatment.  Albumin mild decrease    Imaging:       We have personally reviewed the most recent  imaging from 10/8/2021, which is displays overall stability of disease. There is a L sided thyroid nodule that is indeterminate at this point. Decreased amount of ascites.      Path:   5/26/21:  Positive for malignancy.   Poorly differentiated adenocarcinoma growing with signet ring features   Immunostains for Bruce-EP4 and CEA are positive.  The controls stain   appropriately.   This case was reviewed by Dr Sebastian who concurs with the diagnosis       Assessment:       No diagnosis found.      Plan:             # Gastric adenocarcinoma with peritoneal metastases, immunodeficiency due to chemotherapy  HER-2 negative by FISH.  PD-L1 < 1%.  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  I previously explained the implications of a stage IV diagnosis to her and potential treatment options.  She is now s/p port placement. She sought a second opinion at [facility] for zolbetuximab trial targeting CLDN protein but she did not test positive for this biomarker. I recommended proceeding with SOC FOLFOX, with which the [facility] team agreed. Because of the negative PD-L1 I do not think the benefit of adding nivolumab outweighs the potential toxicities.       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  She opted not to get genetic testing done at her appt with Phi. We recommended that she reconsider that decision in light of a very likely germline mutation in SAMUEL which has clinical consequences.      Tolerating chemotherapy well thus far.  She has experienced improvement in many of her GI symptoms since starting treatment.    CT CAP after 6 cycles showed improvement in ascites, probable hepatic, thyroid and bone metastases.  Given improvement in symptoms and delay between baseline imaging and cycle 1, suspect she is having benefit from chemotherapy.  She was in agreement with this.    We dropped oxaliplatin starting this cycle with cycle 10 due to grade 1 neuropathy and desire to keep it from worsening. Doing well overall, but has noticed a slight increase in neuropathy in the feet. Otherwise, tolerating treatment good.   CT CAP after cycle 10 shows stable disease. Will repeat CT scan after cycle 14.     Proceed with cycle 13 of chemotherapy today, 5-FU maintenance.    #Ascites  Drainage decreased considerably from peritoneal catheter with chemotherapy. Stable.   Now s/p removal on 10/29/21 by IR.     # Nausea, gastric ulcer  Stable. No vomiting.   Not taking Carafate. Continue Protonix.  Continue Compazine PRN.  Continue dietary modifications to prevent indigestion.    # Anemia  Stable.    Iron deficiency due to chronic blood loss.  S/p 2 doses of Injectafer.    # Anxiety with cancer diagnosis  Referral placed to Psychology Oncology    Follow up: Will return in 2 weeks for lab work, clinic visit, and cycle 14 of chemo.    Fer Ashraf MD  Hematology/Oncology  [facility]

## 2022-01-10 ENCOUNTER — OFFICE VISIT (OUTPATIENT)
Dept: HEMATOLOGY/ONCOLOGY | Facility: CLINIC | Age: 42
End: 2022-01-10
Payer: MEDICAID

## 2022-01-10 ENCOUNTER — INFUSION (OUTPATIENT)
Dept: INFUSION THERAPY | Facility: HOSPITAL | Age: 42
End: 2022-01-10
Payer: COMMERCIAL

## 2022-01-10 VITALS
HEART RATE: 65 BPM | OXYGEN SATURATION: 100 % | DIASTOLIC BLOOD PRESSURE: 79 MMHG | SYSTOLIC BLOOD PRESSURE: 119 MMHG | WEIGHT: 126.63 LBS | BODY MASS INDEX: 19.88 KG/M2 | RESPIRATION RATE: 18 BRPM | HEIGHT: 67 IN | TEMPERATURE: 98 F

## 2022-01-10 VITALS
SYSTOLIC BLOOD PRESSURE: 154 MMHG | HEART RATE: 54 BPM | WEIGHT: 126.63 LBS | HEIGHT: 67 IN | DIASTOLIC BLOOD PRESSURE: 94 MMHG | TEMPERATURE: 98 F | BODY MASS INDEX: 19.88 KG/M2 | RESPIRATION RATE: 18 BRPM

## 2022-01-10 DIAGNOSIS — D50.9 MICROCYTIC ANEMIA: ICD-10-CM

## 2022-01-10 DIAGNOSIS — C80.1 ANXIETY ASSOCIATED WITH CANCER DIAGNOSIS: ICD-10-CM

## 2022-01-10 DIAGNOSIS — R11.2 NON-INTRACTABLE VOMITING WITH NAUSEA, UNSPECIFIED VOMITING TYPE: ICD-10-CM

## 2022-01-10 DIAGNOSIS — C16.9 GASTRIC ADENOCARCINOMA: Primary | ICD-10-CM

## 2022-01-10 DIAGNOSIS — C78.6 PERITONEAL CARCINOMATOSIS: ICD-10-CM

## 2022-01-10 DIAGNOSIS — K25.5 CHRONIC GASTRIC ULCER WITH PERFORATION: ICD-10-CM

## 2022-01-10 DIAGNOSIS — Z79.899 IMMUNODEFICIENCY DUE TO CHEMOTHERAPY: ICD-10-CM

## 2022-01-10 DIAGNOSIS — R18.8 OTHER ASCITES: ICD-10-CM

## 2022-01-10 DIAGNOSIS — F41.1 ANXIETY ASSOCIATED WITH CANCER DIAGNOSIS: ICD-10-CM

## 2022-01-10 DIAGNOSIS — T45.1X5A IMMUNODEFICIENCY DUE TO CHEMOTHERAPY: ICD-10-CM

## 2022-01-10 DIAGNOSIS — D84.821 IMMUNODEFICIENCY DUE TO CHEMOTHERAPY: ICD-10-CM

## 2022-01-10 PROCEDURE — 99215 OFFICE O/P EST HI 40 MIN: CPT | Mod: S$PBB,,, | Performed by: INTERNAL MEDICINE

## 2022-01-10 PROCEDURE — 3078F DIAST BP <80 MM HG: CPT | Mod: CPTII,,, | Performed by: INTERNAL MEDICINE

## 2022-01-10 PROCEDURE — 96521 REFILL/MAINT PORTABLE PUMP: CPT

## 2022-01-10 PROCEDURE — 96367 TX/PROPH/DG ADDL SEQ IV INF: CPT

## 2022-01-10 PROCEDURE — 3008F BODY MASS INDEX DOCD: CPT | Mod: CPTII,,, | Performed by: INTERNAL MEDICINE

## 2022-01-10 PROCEDURE — 96365 THER/PROPH/DIAG IV INF INIT: CPT

## 2022-01-10 PROCEDURE — 99215 PR OFFICE/OUTPT VISIT, EST, LEVL V, 40-54 MIN: ICD-10-PCS | Mod: S$PBB,,, | Performed by: INTERNAL MEDICINE

## 2022-01-10 PROCEDURE — 99999 PR PBB SHADOW E&M-EST. PATIENT-LVL V: CPT | Mod: PBBFAC,,, | Performed by: INTERNAL MEDICINE

## 2022-01-10 PROCEDURE — 3074F PR MOST RECENT SYSTOLIC BLOOD PRESSURE < 130 MM HG: ICD-10-PCS | Mod: CPTII,,, | Performed by: INTERNAL MEDICINE

## 2022-01-10 PROCEDURE — 25000003 PHARM REV CODE 250: Performed by: INTERNAL MEDICINE

## 2022-01-10 PROCEDURE — 96416 CHEMO PROLONG INFUSE W/PUMP: CPT

## 2022-01-10 PROCEDURE — 63600175 PHARM REV CODE 636 W HCPCS: Performed by: INTERNAL MEDICINE

## 2022-01-10 PROCEDURE — 3078F PR MOST RECENT DIASTOLIC BLOOD PRESSURE < 80 MM HG: ICD-10-PCS | Mod: CPTII,,, | Performed by: INTERNAL MEDICINE

## 2022-01-10 PROCEDURE — 99215 OFFICE O/P EST HI 40 MIN: CPT | Mod: PBBFAC,25 | Performed by: INTERNAL MEDICINE

## 2022-01-10 PROCEDURE — 99999 PR PBB SHADOW E&M-EST. PATIENT-LVL V: ICD-10-PCS | Mod: PBBFAC,,, | Performed by: INTERNAL MEDICINE

## 2022-01-10 PROCEDURE — 3008F PR BODY MASS INDEX (BMI) DOCUMENTED: ICD-10-PCS | Mod: CPTII,,, | Performed by: INTERNAL MEDICINE

## 2022-01-10 PROCEDURE — 3074F SYST BP LT 130 MM HG: CPT | Mod: CPTII,,, | Performed by: INTERNAL MEDICINE

## 2022-01-10 RX ORDER — HEPARIN 100 UNIT/ML
500 SYRINGE INTRAVENOUS
Status: DISCONTINUED | OUTPATIENT
Start: 2022-01-10 | End: 2022-01-10 | Stop reason: HOSPADM

## 2022-01-10 RX ORDER — HEPARIN 100 UNIT/ML
500 SYRINGE INTRAVENOUS
Status: CANCELLED | OUTPATIENT
Start: 2022-01-11

## 2022-01-10 RX ORDER — SODIUM CHLORIDE 0.9 % (FLUSH) 0.9 %
10 SYRINGE (ML) INJECTION
Status: CANCELLED | OUTPATIENT
Start: 2022-01-11

## 2022-01-10 RX ORDER — HEPARIN 100 UNIT/ML
500 SYRINGE INTRAVENOUS
Status: CANCELLED | OUTPATIENT
Start: 2022-01-13

## 2022-01-10 RX ORDER — EPINEPHRINE 0.3 MG/.3ML
0.3 INJECTION SUBCUTANEOUS ONCE AS NEEDED
Status: DISCONTINUED | OUTPATIENT
Start: 2022-01-10 | End: 2022-01-10 | Stop reason: HOSPADM

## 2022-01-10 RX ORDER — EPINEPHRINE 0.3 MG/.3ML
0.3 INJECTION SUBCUTANEOUS ONCE AS NEEDED
Status: CANCELLED | OUTPATIENT
Start: 2022-01-11

## 2022-01-10 RX ORDER — SODIUM CHLORIDE 0.9 % (FLUSH) 0.9 %
10 SYRINGE (ML) INJECTION
Status: CANCELLED | OUTPATIENT
Start: 2022-01-13

## 2022-01-10 RX ORDER — DIPHENHYDRAMINE HYDROCHLORIDE 50 MG/ML
50 INJECTION INTRAMUSCULAR; INTRAVENOUS ONCE AS NEEDED
Status: CANCELLED | OUTPATIENT
Start: 2022-01-11

## 2022-01-10 RX ORDER — SODIUM CHLORIDE 0.9 % (FLUSH) 0.9 %
10 SYRINGE (ML) INJECTION
Status: DISCONTINUED | OUTPATIENT
Start: 2022-01-10 | End: 2022-01-10 | Stop reason: HOSPADM

## 2022-01-10 RX ORDER — DIPHENHYDRAMINE HYDROCHLORIDE 50 MG/ML
50 INJECTION INTRAMUSCULAR; INTRAVENOUS ONCE AS NEEDED
Status: DISCONTINUED | OUTPATIENT
Start: 2022-01-10 | End: 2022-01-10 | Stop reason: HOSPADM

## 2022-01-10 RX ADMIN — SODIUM CHLORIDE: 0.9 INJECTION, SOLUTION INTRAVENOUS at 09:01

## 2022-01-10 RX ADMIN — FLUOROURACIL 4030 MG: 50 INJECTION, SOLUTION INTRAVENOUS at 10:01

## 2022-01-10 RX ADMIN — PALONOSETRON HYDROCHLORIDE 0.25 MG: 0.25 INJECTION, SOLUTION INTRAVENOUS at 09:01

## 2022-01-10 NOTE — NURSING
0856  Pt here for 5FU pump, no new complaints or concerns at present, reports tolerating treatment; discussed treatment plan for today, all questions answered and pt agrees to proceed

## 2022-01-10 NOTE — PROGRESS NOTES
MEDICAL ONCOLOGY - ESTABLISHED PATIENT    Reason for visit: Gastric cancer     Best Contact Phone Number(s): [phone] (home)      Cancer/Stage/TNM:   Cancer Staging  No matching staging information was found for the patient.     Oncology History   Gastric adenocarcinoma   6/10/2021 Initial Diagnosis    Gastric adenocarcinoma     7/26/2021 -  Chemotherapy    Treatment Summary   Plan Name: OP FOLFOX 6 Q2W  Treatment Goal: Palliative  Status: Active  Start Date: 7/26/2021  End Date: 2/10/2022 (Planned)  Provider: Fer Ashraf MD  Chemotherapy: fluorouraciL 2,400 mg/m2 = 3,770 mg in sodium chloride 0.9% 100 mL chemo infusion, 2,400 mg/m2 = 3,770 mg, Intravenous, Over 46 hours, 12 of 15 cycles  Administration: 3,770 mg (7/26/2021), 3,770 mg (8/9/2021), 3,770 mg (8/23/2021), 3,770 mg (9/7/2021), 3,770 mg (9/21/2021), 3,770 mg (10/5/2021), 3,770 mg (10/19/2021), 3,770 mg (11/2/2021), 3,770 mg (11/15/2021), 3,770 mg (11/30/2021), 3,770 mg (12/13/2021), 4,000 mg (12/27/2021)  oxaliplatin (ELOXATIN) 85 mg/m2 = 133 mg in dextrose 5 % 500 mL chemo infusion, 85 mg/m2 = 133 mg, Intravenous, Clinic/HOD 1 time, 9 of 9 cycles  Administration: 133 mg (7/26/2021), 133 mg (8/9/2021), 133 mg (8/23/2021), 133 mg (9/7/2021), 133 mg (9/21/2021), 133 mg (10/5/2021), 133 mg (10/19/2021), 133 mg (11/2/2021), 133 mg (11/15/2021)          Interim History:  41 y.o. female with metastatic gastric cancer who presents for follow-up prior to cycle 13 of FOLFOX, now on maintenance 5-FU.     Her paresthesias in her fingers are a bit better but she is feeling it more in her feet, which is her main concern today.  No reported falls.     Denies any significant abdominal pain. She has a fair appetite.   No other new concerns.  No fever, chills, constipation, diarrhea, n/v. No mouth sores.         ECOG status is 1. Presents alone    Review of Systems   Constitutional: Negative for activity change, appetite change, chills, fatigue, fever and  unexpected weight change.   HENT: Negative for ear pain, facial swelling, hearing loss, mouth sores, nosebleeds, sore throat and trouble swallowing.    Eyes: Negative for pain, discharge, redness and visual disturbance.   Respiratory: Negative for cough, chest tightness and shortness of breath.    Cardiovascular: Negative for chest pain, palpitations and leg swelling.   Gastrointestinal: Negative for abdominal distention, abdominal pain, blood in stool, constipation, diarrhea, nausea, vomiting and reflux.   Endocrine: Negative for cold intolerance and heat intolerance.   Genitourinary: Negative for decreased urine volume, difficulty urinating, dysuria, frequency, hematuria, hot flashes, urgency and vaginal bleeding.   Musculoskeletal: Negative for arthralgias, gait problem, leg pain and myalgias.   Integumentary:  Negative for pallor, rash and wound.   Allergic/Immunologic: Negative for immunocompromised state.   Neurological: Positive for numbness. Negative for dizziness, tremors, weakness, light-headedness and headaches.   Hematological: Negative for adenopathy. Does not bruise/bleed easily.   Psychiatric/Behavioral: Negative for agitation, confusion, dysphoric mood and sleep disturbance. The patient is not nervous/anxious.            Past Medical History:   Past Medical History:   Diagnosis Date    Gastric cancer     Gastric ulcer     Pregnancy 08/12/2020    delivered on 8/12/2020    Umbilical hernia         Past Surgical History:   Past Surgical History:   Procedure Laterality Date    CLOSURE OF PERFORATED ULCER OF DUODENUM USING OMENTAL PATCH      ESOPHAGOGASTRODUODENOSCOPY N/A 10/13/2020    Procedure: EGD (ESOPHAGOGASTRODUODENOSCOPY);  Surgeon: Rosio Marion MD;  Location: [address];  Service: Endoscopy;  Laterality: N/A;    ESOPHAGOGASTRODUODENOSCOPY N/A 12/11/2020    Procedure: EGD (ESOPHAGOGASTRODUODENOSCOPY);  Surgeon: Rosio Marion MD;  Location: [address];  Service:  "Endoscopy;  Laterality: N/A;  [facility]    ESOPHAGOGASTRODUODENOSCOPY N/A 3/12/2021    Procedure: EGD (ESOPHAGOGASTRODUODENOSCOPY);  Surgeon: Nav Omer MD;  Location: [facility] ENDO (2ND FLR);  Service: Endoscopy;  Laterality: N/A;  COVID at [facility] 3/9 ttr    ESOPHAGOGASTRODUODENOSCOPY N/A 2021    Procedure: EGD (ESOPHAGOGASTRODUODENOSCOPY);  Surgeon: Rosio Marion MD;  Location: [facility] ENDO (2ND FLR);  Service: Endoscopy;  Laterality: N/A;  5/15-covid pcw-inst portal-tb    ESOPHAGOGASTRODUODENOSCOPY N/A 2021    Procedure: EGD (ESOPHAGOGASTRODUODENOSCOPY);  Surgeon: Socrates Terrazas MD;  Location: [facility] ENDO (2ND FLR);  Service: Endoscopy;  Laterality: N/A;        Family History:   Family History   Problem Relation Age of Onset    Cancer Mother 67        lymphoma (type? "not active," not on treatment; "related to her blood")    Cancer Father         lung (dx age?) (smoker?)    No Known Problems Son     Breast cancer Other 73        unilat    Prostate cancer Paternal Uncle         (dx 50s/60? no chemo?)    Breast cancer Paternal Cousin         (dx age?)    Colon cancer Neg Hx     Esophageal cancer Neg Hx         Social History:   Social History     Tobacco Use    Smoking status: Former Smoker     Types: Cigarettes     Quit date: 2019     Years since quittin.1    Smokeless tobacco: Never Used   Substance Use Topics    Alcohol use: Yes        I have reviewed and updated the patient's past medical, surgical, family and social histories.    Allergies:   Review of patient's allergies indicates:  No Known Allergies     Medications:   Current Outpatient Medications   Medication Sig Dispense Refill    acetaminophen (TYLENOL) 500 MG tablet Take 500 mg by mouth every 6 (six) hours as needed for Pain.      amitriptyline (ELAVIL) 10 MG tablet Take 1 tablet (10 mg total) by mouth every evening. 30 tablet 3    amitriptyline (ELAVIL) 10 MG tablet Take 10 mg by " mouth.      dicyclomine (BENTYL) 10 MG capsule TAKE ONE CAPSULE BY MOUTH FOUR TIMES DAILY 120 capsule 0    ferrous sulfate (FEOSOL) 325 mg (65 mg iron) Tab tablet Take 1 tablet (325 mg total) by mouth once daily for 7 days, THEN 1 tablet (325 mg total) 2 (two) times daily. 60 tablet 10    ferrous sulfate (FEOSOL) 325 mg (65 mg iron) Tab tablet Take by mouth.      furosemide (LASIX) 20 MG tablet Take 10 mg by mouth.      furosemide (LASIX) 40 MG tablet Take 0.5 tablets (20 mg total) by mouth daily as needed (leg swelling or abdominal swelling). 30 tablet 1    furosemide (LASIX) 40 MG tablet       HYDROcodone-acetaminophen (NORCO) 5-325 mg per tablet Take 1 tablet by mouth.      HYDROcodone-acetaminophen (NORCO) 5-325 mg per tablet Take 1 tablet by mouth 3 (three) times daily as needed.      LIDOcaine-prilocaine (EMLA) cream Apply to Port-A-Cath area 30 to 45 minutes prior to port access as directed (topical anesthetic use to the anterior chest only).      LIDOcaine-prilocaine (EMLA) cream Apply topically.      metoclopramide HCl (REGLAN) 5 MG tablet 5 mg.      multivitamin with folic acid 400 mcg Tab Take 1 tablet by mouth once daily.      ondansetron (ZOFRAN) 8 MG tablet Take 1 tablet (8 mg total) by mouth every 8 (eight) hours as needed for Nausea. 60 tablet 2    pantoprazole (PROTONIX) 40 MG tablet TAKE 1 TABLET BY MOUTH TWICE DAILY 60 tablet 11    pantoprazole (PROTONIX) 40 MG tablet Take 40 mg by mouth.      potassium chloride SA (K-DUR,KLOR-CON) 20 MEQ tablet 20 mEq.      prochlorperazine (COMPAZINE) 10 MG tablet TAKE 1 TABLET(10 MG) BY MOUTH EVERY 6 HOURS AS NEEDED FOR NAUSEA 90 tablet 2    prochlorperazine (COMPAZINE) 10 MG tablet as needed.      senna-docusate 8.6-50 mg (PERICOLACE) 8.6-50 mg per tablet Take 1 tablet by mouth.      senna-docusate 8.6-50 mg (SENNA WITH DOCUSATE SODIUM) 8.6-50 mg per tablet Take 1 tablet by mouth 2 (two) times daily as needed for Constipation.       "sucralfate (CARAFATE) 1 gram tablet TAKE 1 TABLET(1 GRAM) BY MOUTH FOUR TIMES DAILY FOR 14 DAYS 56 tablet 0    sucralfate (CARAFATE) 1 gram tablet       traMADoL (ULTRAM) 50 mg tablet Take 50 mg by mouth.      traMADoL (ULTRAM) 50 mg tablet Take by mouth.       No current facility-administered medications for this visit.        Physical Exam:   /79 (BP Location: Left arm, Patient Position: Sitting, BP Method: Small (Automatic))   Pulse 65   Temp 97.9 °F (36.6 °C) (Oral)   Resp 18   Ht 5' 7" (1.702 m)   Wt 57.4 kg (126 lb 9.6 oz)   SpO2 100%   BMI 19.83 kg/m²      ECOG Performance status: 1            Physical Exam  Vitals reviewed.   Constitutional:       Appearance: Normal appearance. She is not ill-appearing or toxic-appearing.      Comments: Thin   HENT:      Head: Normocephalic and atraumatic.      Mouth/Throat:      Mouth: Mucous membranes are moist.      Pharynx: Oropharynx is clear.   Eyes:      General: No scleral icterus.     Extraocular Movements: Extraocular movements intact.      Conjunctiva/sclera: Conjunctivae normal.   Cardiovascular:      Rate and Rhythm: Normal rate and regular rhythm.      Pulses: Normal pulses.      Heart sounds: Normal heart sounds.   Pulmonary:      Effort: Pulmonary effort is normal.      Breath sounds: Normal breath sounds.   Abdominal:      General: Bowel sounds are normal. There is no distension.      Palpations: Abdomen is soft.      Tenderness: There is no abdominal tenderness.   Musculoskeletal:         General: No swelling or tenderness. Normal range of motion.   Skin:     General: Skin is warm.      Findings: No rash.   Neurological:      General: No focal deficit present.      Mental Status: She is alert. Mental status is at baseline.   Psychiatric:         Mood and Affect: Mood normal.         Behavior: Behavior normal.             Labs:   Recent Results (from the past 48 hour(s))   CBC Oncology    Collection Time: 01/10/22  7:30 AM   Result Value Ref " Range    WBC 3.54 (L) 3.90 - 12.70 K/uL    RBC 4.16 4.00 - 5.40 M/uL    Hemoglobin 12.9 12.0 - 16.0 g/dL    Hematocrit 40.2 37.0 - 48.5 %    MCV 97 82 - 98 fL    MCH 31.0 27.0 - 31.0 pg    MCHC 32.1 32.0 - 36.0 g/dL    RDW 15.1 (H) 11.5 - 14.5 %    Platelets 152 150 - 450 K/uL    MPV 9.6 9.2 - 12.9 fL   Comprehensive Metabolic Panel    Collection Time: 01/10/22  7:30 AM   Result Value Ref Range    Sodium 140 136 - 145 mmol/L    Potassium 4.2 3.5 - 5.1 mmol/L    Chloride 106 95 - 110 mmol/L    CO2 29 23 - 29 mmol/L    Glucose 87 70 - 110 mg/dL    BUN 10 6 - 20 mg/dL    Creatinine 0.7 0.5 - 1.4 mg/dL    Calcium 9.0 8.7 - 10.5 mg/dL    Total Protein 6.6 6.0 - 8.4 g/dL    Albumin 3.5 3.5 - 5.2 g/dL    Total Bilirubin 0.5 0.1 - 1.0 mg/dL    Alkaline Phosphatase 113 55 - 135 U/L    AST 23 10 - 40 U/L    ALT 23 10 - 44 U/L    Anion Gap 5 (L) 8 - 16 mmol/L    eGFR if African American >60.0 >60 mL/min/1.73 m^2    eGFR if non African American >60.0 >60 mL/min/1.73 m^2   HCG, Quantitative    Collection Time: 01/10/22  7:30 AM   Result Value Ref Range    HCG Quant <2.4 See Text mIU/mL        I have reviewed the pertinent labs from today which are acceptable for treatment.  Albumin mild decrease    Imaging:       We have personally reviewed the most recent imaging from 10/8/2021, which is displays overall stability of disease. There is a L sided thyroid nodule that is indeterminate at this point. Decreased amount of ascites.      Path:   5/26/21:  Positive for malignancy.   Poorly differentiated adenocarcinoma growing with signet ring features   Immunostains for Bruce-EP4 and CEA are positive.  The controls stain   appropriately.   This case was reviewed by Dr Sebastian who concurs with the diagnosis       Assessment:       1. Gastric adenocarcinoma    2. Peritoneal carcinomatosis    3. Immunodeficiency due to chemotherapy    4. Other ascites    5. Non-intractable vomiting with nausea, unspecified vomiting type    6. Chronic gastric  ulcer with perforation    7. Microcytic anemia    8. Anxiety associated with cancer diagnosis          Plan:   # Gastric adenocarcinoma with peritoneal metastases, immunodeficiency due to chemotherapy  HER-2 negative by FISH.  PD-L1 < 1%.  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  I previously explained the implications of a stage IV diagnosis to her and potential treatment options.  She is now s/p port placement. She sought a second opinion at [facility] for zolbetuximab trial targeting CLDN protein but she did not test positive for this biomarker. I recommended proceeding with SOC FOLFOX, with which the [facility] team agreed. Because of the negative PD-L1 I do not think the benefit of adding nivolumab outweighs the potential toxicities.       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  She opted not to get genetic testing done at her appt with Phi. We recommended that she reconsider that decision in light of a very likely germline mutation in SAMUEL which has clinical consequences.      Tolerating chemotherapy well thus far.  She has experienced improvement in many of her GI symptoms since starting treatment.    CT CAP after 6 cycles showed improvement in ascites, probable hepatic, thyroid and bone metastases.  Given improvement in symptoms and delay between baseline imaging and cycle 1, suspect she is having benefit from chemotherapy. She was in agreement with this.    We dropped oxaliplatin starting this cycle with cycle 10 due to grade 1 neuropathy and desire to keep it from worsening. Doing well overall, but has noticed a slight increase in neuropathy in the feet. Otherwise, tolerating treatment good.   CT CAP after cycle 10 shows stable disease. Will repeat CT scan after cycle 14.     Proceed with cycle 13 of chemotherapy today,  5-FU maintenance.    #Ascites  Drainage decreased considerably from peritoneal catheter with chemotherapy. Stable.   Now s/p removal on 10/29/21 by IR.     # Nausea, gastric ulcer  Stable. No vomiting.   Not taking Carafate. Continue Protonix.  Continue Compazine PRN.  Continue dietary modifications to prevent indigestion.    # Anemia.    Iron deficiency due to chronic blood loss.  S/p 2 doses of Injectafer.  Resolved     # Anxiety with cancer diagnosis  Will place referral to Psychology Oncology today.     Follow up: Will return in 2 weeks for lab work, clinic visit, and cycle 14 of chemo.    Pt  displayed understanding of the above encounter and treatment plan. All thoughtful questions were answered to their satisfaction. Pte was advised to notify the care team or proceed to the ER if signs and symptoms worsen.       Lamont Henley MD  Hematology/Oncology fellow. PGY V

## 2022-01-10 NOTE — Clinical Note
Please schedule 5-FU pump d/c on 1/12.  Please have patient RTC 1/24 with CBC, CMP for 5-FU and pump d/c day 3

## 2022-01-12 ENCOUNTER — INFUSION (OUTPATIENT)
Dept: INFUSION THERAPY | Facility: HOSPITAL | Age: 42
End: 2022-01-12
Payer: COMMERCIAL

## 2022-01-12 VITALS
SYSTOLIC BLOOD PRESSURE: 138 MMHG | HEART RATE: 59 BPM | TEMPERATURE: 99 F | RESPIRATION RATE: 18 BRPM | DIASTOLIC BLOOD PRESSURE: 84 MMHG

## 2022-01-12 DIAGNOSIS — C16.9 GASTRIC ADENOCARCINOMA: Primary | ICD-10-CM

## 2022-01-12 PROCEDURE — A4216 STERILE WATER/SALINE, 10 ML: HCPCS | Performed by: INTERNAL MEDICINE

## 2022-01-12 PROCEDURE — 63600175 PHARM REV CODE 636 W HCPCS: Performed by: INTERNAL MEDICINE

## 2022-01-12 PROCEDURE — 99211 OFF/OP EST MAY X REQ PHY/QHP: CPT

## 2022-01-12 PROCEDURE — 25000003 PHARM REV CODE 250: Performed by: INTERNAL MEDICINE

## 2022-01-12 RX ORDER — HEPARIN 100 UNIT/ML
500 SYRINGE INTRAVENOUS
Status: DISCONTINUED | OUTPATIENT
Start: 2022-01-12 | End: 2022-01-12 | Stop reason: HOSPADM

## 2022-01-12 RX ORDER — SODIUM CHLORIDE 0.9 % (FLUSH) 0.9 %
10 SYRINGE (ML) INJECTION
Status: DISCONTINUED | OUTPATIENT
Start: 2022-01-12 | End: 2022-01-12 | Stop reason: HOSPADM

## 2022-01-12 RX ADMIN — HEPARIN 500 UNITS: 100 SYRINGE at 08:01

## 2022-01-12 RX ADMIN — Medication 10 ML: at 08:01

## 2022-01-12 NOTE — PLAN OF CARE
0905 pt here for pump d/c infusion complete, port flushed per policy andd eaccessed without issue, pt to rtc 1/26/22, no distress noted upon d/c to home

## 2022-01-17 ENCOUNTER — PATIENT MESSAGE (OUTPATIENT)
Dept: ADMINISTRATIVE | Facility: OTHER | Age: 42
End: 2022-01-17
Payer: COMMERCIAL

## 2022-01-24 ENCOUNTER — LAB VISIT (OUTPATIENT)
Dept: LAB | Facility: HOSPITAL | Age: 42
End: 2022-01-24
Attending: INTERNAL MEDICINE
Payer: COMMERCIAL

## 2022-01-24 ENCOUNTER — OFFICE VISIT (OUTPATIENT)
Dept: HEMATOLOGY/ONCOLOGY | Facility: CLINIC | Age: 42
End: 2022-01-24
Payer: MEDICAID

## 2022-01-24 ENCOUNTER — INFUSION (OUTPATIENT)
Dept: INFUSION THERAPY | Facility: HOSPITAL | Age: 42
End: 2022-01-24
Attending: INTERNAL MEDICINE
Payer: MEDICAID

## 2022-01-24 VITALS
TEMPERATURE: 98 F | SYSTOLIC BLOOD PRESSURE: 130 MMHG | DIASTOLIC BLOOD PRESSURE: 87 MMHG | WEIGHT: 132.38 LBS | BODY MASS INDEX: 20.78 KG/M2 | HEART RATE: 87 BPM | OXYGEN SATURATION: 100 % | RESPIRATION RATE: 18 BRPM | HEIGHT: 67 IN

## 2022-01-24 VITALS — DIASTOLIC BLOOD PRESSURE: 86 MMHG | HEART RATE: 86 BPM | RESPIRATION RATE: 18 BRPM | SYSTOLIC BLOOD PRESSURE: 128 MMHG

## 2022-01-24 DIAGNOSIS — R18.8 OTHER ASCITES: ICD-10-CM

## 2022-01-24 DIAGNOSIS — T45.1X5A IMMUNODEFICIENCY DUE TO CHEMOTHERAPY: ICD-10-CM

## 2022-01-24 DIAGNOSIS — F41.1 ANXIETY ASSOCIATED WITH CANCER DIAGNOSIS: ICD-10-CM

## 2022-01-24 DIAGNOSIS — D50.9 MICROCYTIC ANEMIA: ICD-10-CM

## 2022-01-24 DIAGNOSIS — C78.6 PERITONEAL CARCINOMATOSIS: ICD-10-CM

## 2022-01-24 DIAGNOSIS — C16.9 GASTRIC ADENOCARCINOMA: Primary | ICD-10-CM

## 2022-01-24 DIAGNOSIS — D84.821 IMMUNODEFICIENCY DUE TO CHEMOTHERAPY: ICD-10-CM

## 2022-01-24 DIAGNOSIS — R11.2 NON-INTRACTABLE VOMITING WITH NAUSEA, UNSPECIFIED VOMITING TYPE: ICD-10-CM

## 2022-01-24 DIAGNOSIS — K25.5 CHRONIC GASTRIC ULCER WITH PERFORATION: ICD-10-CM

## 2022-01-24 DIAGNOSIS — Z79.899 IMMUNODEFICIENCY DUE TO CHEMOTHERAPY: ICD-10-CM

## 2022-01-24 DIAGNOSIS — C80.1 ANXIETY ASSOCIATED WITH CANCER DIAGNOSIS: ICD-10-CM

## 2022-01-24 DIAGNOSIS — C16.9 GASTRIC ADENOCARCINOMA: ICD-10-CM

## 2022-01-24 LAB
ALBUMIN SERPL BCP-MCNC: 3.3 G/DL (ref 3.5–5.2)
ALP SERPL-CCNC: 110 U/L (ref 55–135)
ALT SERPL W/O P-5'-P-CCNC: 24 U/L (ref 10–44)
ANION GAP SERPL CALC-SCNC: 4 MMOL/L (ref 8–16)
AST SERPL-CCNC: 23 U/L (ref 10–40)
BILIRUB SERPL-MCNC: 0.7 MG/DL (ref 0.1–1)
BUN SERPL-MCNC: 7 MG/DL (ref 6–20)
CALCIUM SERPL-MCNC: 9 MG/DL (ref 8.7–10.5)
CHLORIDE SERPL-SCNC: 108 MMOL/L (ref 95–110)
CO2 SERPL-SCNC: 29 MMOL/L (ref 23–29)
CREAT SERPL-MCNC: 0.7 MG/DL (ref 0.5–1.4)
ERYTHROCYTE [DISTWIDTH] IN BLOOD BY AUTOMATED COUNT: 14.5 % (ref 11.5–14.5)
EST. GFR  (AFRICAN AMERICAN): >60 ML/MIN/1.73 M^2
EST. GFR  (NON AFRICAN AMERICAN): >60 ML/MIN/1.73 M^2
GLUCOSE SERPL-MCNC: 66 MG/DL (ref 70–110)
HCT VFR BLD AUTO: 39.2 % (ref 37–48.5)
HGB BLD-MCNC: 12.5 G/DL (ref 12–16)
IMM GRANULOCYTES # BLD AUTO: 0.01 K/UL (ref 0–0.04)
MCH RBC QN AUTO: 31.6 PG (ref 27–31)
MCHC RBC AUTO-ENTMCNC: 31.9 G/DL (ref 32–36)
MCV RBC AUTO: 99 FL (ref 82–98)
NEUTROPHILS # BLD AUTO: 2.1 K/UL (ref 1.8–7.7)
PLATELET # BLD AUTO: 178 K/UL (ref 150–450)
PMV BLD AUTO: 10 FL (ref 9.2–12.9)
POTASSIUM SERPL-SCNC: 4.3 MMOL/L (ref 3.5–5.1)
PROT SERPL-MCNC: 6.4 G/DL (ref 6–8.4)
RBC # BLD AUTO: 3.96 M/UL (ref 4–5.4)
SODIUM SERPL-SCNC: 141 MMOL/L (ref 136–145)
WBC # BLD AUTO: 4.08 K/UL (ref 3.9–12.7)

## 2022-01-24 PROCEDURE — 96416 CHEMO PROLONG INFUSE W/PUMP: CPT

## 2022-01-24 PROCEDURE — 96367 TX/PROPH/DG ADDL SEQ IV INF: CPT

## 2022-01-24 PROCEDURE — 99215 OFFICE O/P EST HI 40 MIN: CPT | Mod: PBBFAC,25 | Performed by: PHYSICIAN ASSISTANT

## 2022-01-24 PROCEDURE — 63600175 PHARM REV CODE 636 W HCPCS: Performed by: INTERNAL MEDICINE

## 2022-01-24 PROCEDURE — 99215 PR OFFICE/OUTPT VISIT, EST, LEVL V, 40-54 MIN: ICD-10-PCS | Mod: S$PBB,,, | Performed by: PHYSICIAN ASSISTANT

## 2022-01-24 PROCEDURE — 99215 OFFICE O/P EST HI 40 MIN: CPT | Mod: S$PBB,,, | Performed by: PHYSICIAN ASSISTANT

## 2022-01-24 PROCEDURE — 80053 COMPREHEN METABOLIC PANEL: CPT | Performed by: INTERNAL MEDICINE

## 2022-01-24 PROCEDURE — 99999 PR PBB SHADOW E&M-EST. PATIENT-LVL V: ICD-10-PCS | Mod: PBBFAC,,, | Performed by: PHYSICIAN ASSISTANT

## 2022-01-24 PROCEDURE — 99999 PR PBB SHADOW E&M-EST. PATIENT-LVL V: CPT | Mod: PBBFAC,,, | Performed by: PHYSICIAN ASSISTANT

## 2022-01-24 PROCEDURE — 36415 COLL VENOUS BLD VENIPUNCTURE: CPT | Performed by: INTERNAL MEDICINE

## 2022-01-24 PROCEDURE — 25000003 PHARM REV CODE 250: Performed by: INTERNAL MEDICINE

## 2022-01-24 PROCEDURE — 85027 COMPLETE CBC AUTOMATED: CPT | Performed by: INTERNAL MEDICINE

## 2022-01-24 RX ORDER — SODIUM CHLORIDE 0.9 % (FLUSH) 0.9 %
10 SYRINGE (ML) INJECTION
Status: CANCELLED | OUTPATIENT
Start: 2022-01-25

## 2022-01-24 RX ORDER — SODIUM CHLORIDE 0.9 % (FLUSH) 0.9 %
10 SYRINGE (ML) INJECTION
Status: DISCONTINUED | OUTPATIENT
Start: 2022-01-24 | End: 2022-01-24 | Stop reason: HOSPADM

## 2022-01-24 RX ORDER — SODIUM CHLORIDE 0.9 % (FLUSH) 0.9 %
10 SYRINGE (ML) INJECTION
Status: CANCELLED | OUTPATIENT
Start: 2022-01-27

## 2022-01-24 RX ORDER — HEPARIN 100 UNIT/ML
500 SYRINGE INTRAVENOUS
Status: DISCONTINUED | OUTPATIENT
Start: 2022-01-24 | End: 2022-01-24 | Stop reason: HOSPADM

## 2022-01-24 RX ORDER — EPINEPHRINE 0.3 MG/.3ML
0.3 INJECTION SUBCUTANEOUS ONCE AS NEEDED
Status: DISCONTINUED | OUTPATIENT
Start: 2022-01-24 | End: 2022-01-24 | Stop reason: HOSPADM

## 2022-01-24 RX ORDER — DIPHENHYDRAMINE HYDROCHLORIDE 50 MG/ML
50 INJECTION INTRAMUSCULAR; INTRAVENOUS ONCE AS NEEDED
Status: CANCELLED | OUTPATIENT
Start: 2022-01-25

## 2022-01-24 RX ORDER — HEPARIN 100 UNIT/ML
500 SYRINGE INTRAVENOUS
Status: CANCELLED | OUTPATIENT
Start: 2022-01-25

## 2022-01-24 RX ORDER — DIPHENHYDRAMINE HYDROCHLORIDE 50 MG/ML
50 INJECTION INTRAMUSCULAR; INTRAVENOUS ONCE AS NEEDED
Status: DISCONTINUED | OUTPATIENT
Start: 2022-01-24 | End: 2022-01-24 | Stop reason: HOSPADM

## 2022-01-24 RX ORDER — HEPARIN 100 UNIT/ML
500 SYRINGE INTRAVENOUS
Status: CANCELLED | OUTPATIENT
Start: 2022-01-27

## 2022-01-24 RX ORDER — EPINEPHRINE 0.3 MG/.3ML
0.3 INJECTION SUBCUTANEOUS ONCE AS NEEDED
Status: CANCELLED | OUTPATIENT
Start: 2022-01-25

## 2022-01-24 RX ADMIN — PALONOSETRON HYDROCHLORIDE 0.25 MG: 0.25 INJECTION, SOLUTION INTRAVENOUS at 12:01

## 2022-01-24 RX ADMIN — FLUOROURACIL 4030 MG: 50 INJECTION, SOLUTION INTRAVENOUS at 12:01

## 2022-01-24 RX ADMIN — SODIUM CHLORIDE: 9 INJECTION, SOLUTION INTRAVENOUS at 12:01

## 2022-01-24 NOTE — PROGRESS NOTES
MEDICAL ONCOLOGY - ESTABLISHED PATIENT    Reason for visit: Gastric cancer     Best Contact Phone Number(s): [phone] (home)      Cancer/Stage/TNM:   Cancer Staging  No matching staging information was found for the patient.     Oncology History   Gastric adenocarcinoma   6/10/2021 Initial Diagnosis    Gastric adenocarcinoma     7/26/2021 -  Chemotherapy    Treatment Summary   Plan Name: OP FOLFOX 6 Q2W  Treatment Goal: Palliative  Status: Active  Start Date: 7/26/2021  End Date: 2/10/2022 (Planned)  Provider: Fer Ashraf MD  Chemotherapy: fluorouraciL 2,400 mg/m2 = 3,770 mg in sodium chloride 0.9% 100 mL chemo infusion, 2,400 mg/m2 = 3,770 mg, Intravenous, Over 46 hours, 13 of 15 cycles  Administration: 3,770 mg (7/26/2021), 3,770 mg (8/9/2021), 3,770 mg (8/23/2021), 3,770 mg (9/7/2021), 3,770 mg (9/21/2021), 3,770 mg (10/5/2021), 3,770 mg (10/19/2021), 3,770 mg (11/2/2021), 3,770 mg (11/15/2021), 3,770 mg (11/30/2021), 3,770 mg (12/13/2021), 4,000 mg (12/27/2021), 4,030 mg (1/10/2022)  oxaliplatin (ELOXATIN) 85 mg/m2 = 133 mg in dextrose 5 % 500 mL chemo infusion, 85 mg/m2 = 133 mg, Intravenous, Clinic/HOD 1 time, 9 of 9 cycles  Administration: 133 mg (7/26/2021), 133 mg (8/9/2021), 133 mg (8/23/2021), 133 mg (9/7/2021), 133 mg (9/21/2021), 133 mg (10/5/2021), 133 mg (10/19/2021), 133 mg (11/2/2021), 133 mg (11/15/2021)          Interim History:  41 y.o. female with metastatic gastric cancer who presents for follow-up prior to cycle 14 of chemotherapy, now on maintenance 5-FU.  Her paresthesias in her fingers are a bit better but she is feeling it more in her feet. This is stable. She has not fallen.  Denies any significant abdominal pain.  Feels better when she eats and has a good appetite.  No other new concerns. She has actually gained weight since her previous visit. No fever, chills, constipation, diarrhea, n/v. No mouth sores. Mild dry mouth.     ECOG status is 1. Presents alone    Review of  Systems   Constitutional: Negative for activity change, appetite change, chills, fatigue, fever and unexpected weight change.   HENT: Negative for ear pain, facial swelling, hearing loss, mouth sores, nosebleeds, sore throat and trouble swallowing.    Eyes: Negative for pain, discharge, redness and visual disturbance.   Respiratory: Negative for cough, chest tightness and shortness of breath.    Cardiovascular: Negative for chest pain, palpitations and leg swelling.   Gastrointestinal: Positive for abdominal distention, nausea and reflux. Negative for abdominal pain, blood in stool, constipation, diarrhea and vomiting.   Endocrine: Negative for cold intolerance and heat intolerance.   Genitourinary: Negative for decreased urine volume, difficulty urinating, dysuria, frequency, hematuria, hot flashes, urgency and vaginal bleeding.   Musculoskeletal: Negative for arthralgias, gait problem, leg pain and myalgias.   Integumentary:  Negative for pallor, rash and wound.   Allergic/Immunologic: Negative for immunocompromised state.   Neurological: Positive for numbness. Negative for dizziness, tremors, weakness, light-headedness and headaches.   Hematological: Negative for adenopathy. Does not bruise/bleed easily.   Psychiatric/Behavioral: Negative for agitation, confusion, dysphoric mood and sleep disturbance. The patient is not nervous/anxious.            Past Medical History:   Past Medical History:   Diagnosis Date    Gastric cancer     Gastric ulcer     Pregnancy 08/12/2020    delivered on 8/12/2020    Umbilical hernia         Past Surgical History:   Past Surgical History:   Procedure Laterality Date    CLOSURE OF PERFORATED ULCER OF DUODENUM USING OMENTAL PATCH      ESOPHAGOGASTRODUODENOSCOPY N/A 10/13/2020    Procedure: EGD (ESOPHAGOGASTRODUODENOSCOPY);  Surgeon: Rosio Marion MD;  Location: [address];  Service: Endoscopy;  Laterality: N/A;    ESOPHAGOGASTRODUODENOSCOPY N/A 12/11/2020     "Procedure: EGD (ESOPHAGOGASTRODUODENOSCOPY);  Surgeon: Rosio Marion MD;  Location: [facility] ENDO (2ND FLR);  Service: Endoscopy;  Laterality: N/A;  Covid-19 test 20 at [facility] - pg    ESOPHAGOGASTRODUODENOSCOPY N/A 3/12/2021    Procedure: EGD (ESOPHAGOGASTRODUODENOSCOPY);  Surgeon: Nav Omer MD;  Location: [facility] ENDO (2ND FLR);  Service: Endoscopy;  Laterality: N/A;  COVID at [facility] 3/9 ttr    ESOPHAGOGASTRODUODENOSCOPY N/A 2021    Procedure: EGD (ESOPHAGOGASTRODUODENOSCOPY);  Surgeon: Rosio Marion MD;  Location: [facility] ENDO (2ND FLR);  Service: Endoscopy;  Laterality: N/A;  5/15-covid pcw-inst portal-tb    ESOPHAGOGASTRODUODENOSCOPY N/A 2021    Procedure: EGD (ESOPHAGOGASTRODUODENOSCOPY);  Surgeon: Socrates Terrazas MD;  Location: [facility] ENDO (2ND FLR);  Service: Endoscopy;  Laterality: N/A;        Family History:   Family History   Problem Relation Age of Onset    Cancer Mother 67        lymphoma (type? "not active," not on treatment; "related to her blood")    Cancer Father         lung (dx age?) (smoker?)    No Known Problems Son     Breast cancer Other 73        unilat    Prostate cancer Paternal Uncle         (dx 50s/60? no chemo?)    Breast cancer Paternal Cousin         (dx age?)    Colon cancer Neg Hx     Esophageal cancer Neg Hx         Social History:   Social History     Tobacco Use    Smoking status: Former Smoker     Types: Cigarettes     Quit date: 2019     Years since quittin.1    Smokeless tobacco: Never Used   Substance Use Topics    Alcohol use: Yes        I have reviewed and updated the patient's past medical, surgical, family and social histories.    Allergies:   Review of patient's allergies indicates:  No Known Allergies     Medications:   Current Outpatient Medications   Medication Sig Dispense Refill    acetaminophen (TYLENOL) 500 MG tablet Take 500 mg by mouth every 6 (six) hours as needed for Pain.      amitriptyline (ELAVIL) 10 MG " tablet Take 1 tablet (10 mg total) by mouth every evening. 30 tablet 3    amitriptyline (ELAVIL) 10 MG tablet Take 10 mg by mouth.      dicyclomine (BENTYL) 10 MG capsule TAKE ONE CAPSULE BY MOUTH FOUR TIMES DAILY 120 capsule 0    ferrous sulfate (FEOSOL) 325 mg (65 mg iron) Tab tablet Take 1 tablet (325 mg total) by mouth once daily for 7 days, THEN 1 tablet (325 mg total) 2 (two) times daily. 60 tablet 10    ferrous sulfate (FEOSOL) 325 mg (65 mg iron) Tab tablet Take by mouth.      furosemide (LASIX) 20 MG tablet Take 10 mg by mouth.      furosemide (LASIX) 40 MG tablet Take 0.5 tablets (20 mg total) by mouth daily as needed (leg swelling or abdominal swelling). 30 tablet 1    furosemide (LASIX) 40 MG tablet       HYDROcodone-acetaminophen (NORCO) 5-325 mg per tablet Take 1 tablet by mouth.      HYDROcodone-acetaminophen (NORCO) 5-325 mg per tablet Take 1 tablet by mouth 3 (three) times daily as needed.      LIDOcaine-prilocaine (EMLA) cream Apply to Port-A-Cath area 30 to 45 minutes prior to port access as directed (topical anesthetic use to the anterior chest only).      LIDOcaine-prilocaine (EMLA) cream Apply topically.      metoclopramide HCl (REGLAN) 5 MG tablet 5 mg.      multivitamin with folic acid 400 mcg Tab Take 1 tablet by mouth once daily.      ondansetron (ZOFRAN) 8 MG tablet Take 1 tablet (8 mg total) by mouth every 8 (eight) hours as needed for Nausea. 60 tablet 2    pantoprazole (PROTONIX) 40 MG tablet TAKE 1 TABLET BY MOUTH TWICE DAILY 60 tablet 11    pantoprazole (PROTONIX) 40 MG tablet Take 40 mg by mouth.      potassium chloride SA (K-DUR,KLOR-CON) 20 MEQ tablet 20 mEq.      prochlorperazine (COMPAZINE) 10 MG tablet TAKE 1 TABLET(10 MG) BY MOUTH EVERY 6 HOURS AS NEEDED FOR NAUSEA 90 tablet 2    prochlorperazine (COMPAZINE) 10 MG tablet as needed.      senna-docusate 8.6-50 mg (PERICOLACE) 8.6-50 mg per tablet Take 1 tablet by mouth.      senna-docusate 8.6-50 mg (SENNA  "WITH DOCUSATE SODIUM) 8.6-50 mg per tablet Take 1 tablet by mouth 2 (two) times daily as needed for Constipation.      sucralfate (CARAFATE) 1 gram tablet TAKE 1 TABLET(1 GRAM) BY MOUTH FOUR TIMES DAILY FOR 14 DAYS 56 tablet 0    sucralfate (CARAFATE) 1 gram tablet       traMADoL (ULTRAM) 50 mg tablet Take 50 mg by mouth.      traMADoL (ULTRAM) 50 mg tablet Take by mouth.       No current facility-administered medications for this visit.        Physical Exam:   /87   Pulse 87   Temp 98.1 °F (36.7 °C) (Oral)   Resp 18   Ht 5' 7" (1.702 m)   Wt 60.1 kg (132 lb 6.4 oz)   SpO2 100%   BMI 20.74 kg/m²      ECOG Performance status: 1            Physical Exam  Vitals reviewed.   Constitutional:       Appearance: Normal appearance. She is not ill-appearing or toxic-appearing.      Comments: Thin   HENT:      Head: Normocephalic and atraumatic.      Mouth/Throat:      Mouth: Mucous membranes are moist.      Pharynx: Oropharynx is clear.   Eyes:      General: No scleral icterus.     Extraocular Movements: Extraocular movements intact.      Conjunctiva/sclera: Conjunctivae normal.   Cardiovascular:      Rate and Rhythm: Normal rate and regular rhythm.      Pulses: Normal pulses.      Heart sounds: Normal heart sounds.   Pulmonary:      Effort: Pulmonary effort is normal.      Breath sounds: Normal breath sounds.   Abdominal:      General: Bowel sounds are normal. There is distension (mild).      Palpations: Abdomen is soft.      Tenderness: There is no abdominal tenderness.   Musculoskeletal:         General: No swelling or tenderness. Normal range of motion.   Skin:     General: Skin is warm.      Findings: No rash.   Neurological:      General: No focal deficit present.      Mental Status: She is alert. Mental status is at baseline.   Psychiatric:         Mood and Affect: Mood normal.         Behavior: Behavior normal.             Labs:   Recent Results (from the past 48 hour(s))   CBC Oncology    Collection " Time: 01/24/22 10:22 AM   Result Value Ref Range    WBC 4.08 3.90 - 12.70 K/uL    RBC 3.96 (L) 4.00 - 5.40 M/uL    Hemoglobin 12.5 12.0 - 16.0 g/dL    Hematocrit 39.2 37.0 - 48.5 %    MCV 99 (H) 82 - 98 fL    MCH 31.6 (H) 27.0 - 31.0 pg    MCHC 31.9 (L) 32.0 - 36.0 g/dL    RDW 14.5 11.5 - 14.5 %    Platelets 178 150 - 450 K/uL    MPV 10.0 9.2 - 12.9 fL    Gran # (ANC) 2.1 1.8 - 7.7 K/uL    Immature Grans (Abs) 0.01 0.00 - 0.04 K/uL   Comprehensive Metabolic Panel    Collection Time: 01/24/22 10:22 AM   Result Value Ref Range    Sodium 141 136 - 145 mmol/L    Potassium 4.3 3.5 - 5.1 mmol/L    Chloride 108 95 - 110 mmol/L    CO2 29 23 - 29 mmol/L    Glucose 66 (L) 70 - 110 mg/dL    BUN 7 6 - 20 mg/dL    Creatinine 0.7 0.5 - 1.4 mg/dL    Calcium 9.0 8.7 - 10.5 mg/dL    Total Protein 6.4 6.0 - 8.4 g/dL    Albumin 3.3 (L) 3.5 - 5.2 g/dL    Total Bilirubin 0.7 0.1 - 1.0 mg/dL    Alkaline Phosphatase 110 55 - 135 U/L    AST 23 10 - 40 U/L    ALT 24 10 - 44 U/L    Anion Gap 4 (L) 8 - 16 mmol/L    eGFR if African American >60.0 >60 mL/min/1.73 m^2    eGFR if non African American >60.0 >60 mL/min/1.73 m^2        I have reviewed the pertinent labs from today which are acceptable for treatment.  Albumin mild decrease    Imaging:       We have personally reviewed the most recent imaging from 10/8/2021, which is displays overall stability of disease. There is a L sided thyroid nodule that is indeterminate at this point. Decreased amount of ascites.      Path:   5/26/21:  Positive for malignancy.   Poorly differentiated adenocarcinoma growing with signet ring features   Immunostains for Bruce-EP4 and CEA are positive.  The controls stain   appropriately.   This case was reviewed by Dr Sebastian who concurs with the diagnosis       Assessment:       1. Gastric adenocarcinoma    2. Peritoneal carcinomatosis    3. Immunodeficiency due to chemotherapy    4. Other ascites    5. Non-intractable vomiting with nausea, unspecified vomiting type     6. Chronic gastric ulcer with perforation    7. Microcytic anemia    8. Anxiety associated with cancer diagnosis          Plan:             # Gastric adenocarcinoma with peritoneal metastases, immunodeficiency due to chemotherapy  HER-2 negative by FISH.  PD-L1 < 1%.  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  I previously explained the implications of a stage IV diagnosis to her and potential treatment options.  She is now s/p port placement. She sought a second opinion at [facility] for zolbetuximab trial targeting CLDN protein but she did not test positive for this biomarker. I recommended proceeding with SOC FOLFOX, with which the [facility] team agreed. Because of the negative PD-L1 I do not think the benefit of adding nivolumab outweighs the potential toxicities.       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  She opted not to get genetic testing done at her appt with Phi. We recommended that she reconsider that decision in light of a very likely germline mutation in SAMUEL which has clinical consequences.      Tolerating chemotherapy well thus far.  She has experienced improvement in many of her GI symptoms since starting treatment.    CT CAP after 6 cycles showed improvement in ascites, probable hepatic, thyroid and bone metastases.  Given improvement in symptoms and delay between baseline imaging and cycle 1, suspect she is having benefit from chemotherapy. She was in agreement with this.    We dropped oxaliplatin starting this cycle with cycle 10 due to grade 1 neuropathy and desire to keep it from worsening. Doing well overall. Stable neuropathy in the feet. Otherwise, tolerating treatment good.   CT CAP after cycle 10 shows stable disease. Will repeat CT scan in 4 weeks. Pt is interested in other potential treatment  options based on the scan results.     Proceed with cycle 14 of chemotherapy today, 5-FU maintenance. RTC in 2 weeks, for cycle 15    #Ascites  Drainage decreased considerably from peritoneal catheter with chemotherapy. Stable.   Now s/p removal on 10/29/21 by IR.     # Nausea, gastric ulcer  Stable. No vomiting.   Not taking Carafate. Continue Protonix.  Continue Compazine PRN.  Continue dietary modifications to prevent indigestion.    # Anemia  Hgb normal. Doing well.    Iron deficiency due to chronic blood loss.  S/p 2 doses of Injectafer.    # Anxiety with cancer diagnosis  Will place referral to Psychology [facility]  Feeling well.     Follow up: Will return in 2 weeks for lab work, clinic visit, and cycle 15 of chemo. Will schedule CT scan in 4 weeks.     Pte and family members displayed understanding of the above encounter and treatment plan. All thoughtful questions were answered to their satisfaction. Pte was advised to notify the care team or proceed to the ER if signs and symptoms worsen.     JHONATAN Kramer, PA-C  Physician Assistant Certified  Dept of Hematology/Oncology  PA-C to Dr. Owens, Dr. Ashraf and Dr. Julius Ashraf MD  Hematology/Oncology  [facility]

## 2022-01-24 NOTE — Clinical Note
Please schedule cbc, cmp, CT CAP, clinic visit with Dr. Ashraf and cycle 15 of 5-FU maintenance in 2 weeks, 2/7/2022 with pump d/c on day 3.   Patient is currently scheduled to see April, please reschedule with Dr. Ashraf to review scans. Thank you

## 2022-01-24 NOTE — PLAN OF CARE
1249-Infusion completed, pt tolerated well; CADD pump connected,site secured, infusing w/out issue; pt instructed to remain well hydrated; discussed pump operation, troubleshooting, when to contact MD, when to report to ER; pt verbalized understanding of all discussed and to report for pump d/c on Wed 1/26/22 at 1045. Pt ambulated off unit, NAD.

## 2022-01-24 NOTE — Clinical Note
Please schedule consult with Psychology Oncology at earliest availability. Referral has been placed. Thank you

## 2022-01-26 ENCOUNTER — INFUSION (OUTPATIENT)
Dept: INFUSION THERAPY | Facility: HOSPITAL | Age: 42
End: 2022-01-26
Payer: MEDICAID

## 2022-01-26 VITALS
DIASTOLIC BLOOD PRESSURE: 65 MMHG | TEMPERATURE: 99 F | SYSTOLIC BLOOD PRESSURE: 113 MMHG | RESPIRATION RATE: 18 BRPM | HEART RATE: 78 BPM

## 2022-01-26 DIAGNOSIS — C16.9 GASTRIC ADENOCARCINOMA: Primary | ICD-10-CM

## 2022-01-26 PROCEDURE — 25000003 PHARM REV CODE 250: Performed by: INTERNAL MEDICINE

## 2022-01-26 PROCEDURE — 63600175 PHARM REV CODE 636 W HCPCS: Performed by: INTERNAL MEDICINE

## 2022-01-26 PROCEDURE — 99211 OFF/OP EST MAY X REQ PHY/QHP: CPT

## 2022-01-26 PROCEDURE — A4216 STERILE WATER/SALINE, 10 ML: HCPCS | Performed by: INTERNAL MEDICINE

## 2022-01-26 RX ORDER — SODIUM CHLORIDE 0.9 % (FLUSH) 0.9 %
10 SYRINGE (ML) INJECTION
Status: DISCONTINUED | OUTPATIENT
Start: 2022-01-26 | End: 2022-01-26 | Stop reason: HOSPADM

## 2022-01-26 RX ORDER — HEPARIN 100 UNIT/ML
500 SYRINGE INTRAVENOUS
Status: DISCONTINUED | OUTPATIENT
Start: 2022-01-26 | End: 2022-01-26 | Stop reason: HOSPADM

## 2022-01-26 RX ADMIN — Medication 10 ML: at 10:01

## 2022-01-26 RX ADMIN — HEPARIN 500 UNITS: 100 SYRINGE at 10:01

## 2022-02-07 ENCOUNTER — INFUSION (OUTPATIENT)
Dept: INFUSION THERAPY | Facility: HOSPITAL | Age: 42
End: 2022-02-07
Payer: COMMERCIAL

## 2022-02-07 ENCOUNTER — OFFICE VISIT (OUTPATIENT)
Dept: HEMATOLOGY/ONCOLOGY | Facility: CLINIC | Age: 42
End: 2022-02-07
Payer: MEDICAID

## 2022-02-07 VITALS
RESPIRATION RATE: 18 BRPM | SYSTOLIC BLOOD PRESSURE: 118 MMHG | WEIGHT: 131.69 LBS | BODY MASS INDEX: 20.62 KG/M2 | TEMPERATURE: 98 F | DIASTOLIC BLOOD PRESSURE: 72 MMHG | HEART RATE: 62 BPM

## 2022-02-07 VITALS
HEART RATE: 64 BPM | OXYGEN SATURATION: 100 % | RESPIRATION RATE: 18 BRPM | SYSTOLIC BLOOD PRESSURE: 111 MMHG | WEIGHT: 131.69 LBS | DIASTOLIC BLOOD PRESSURE: 73 MMHG | BODY MASS INDEX: 20.67 KG/M2 | HEIGHT: 67 IN | TEMPERATURE: 98 F

## 2022-02-07 DIAGNOSIS — C80.1 ANXIETY ASSOCIATED WITH CANCER DIAGNOSIS: ICD-10-CM

## 2022-02-07 DIAGNOSIS — C16.9 GASTRIC ADENOCARCINOMA: Primary | ICD-10-CM

## 2022-02-07 DIAGNOSIS — G62.0 NEUROPATHY DUE TO CHEMOTHERAPEUTIC DRUG: ICD-10-CM

## 2022-02-07 DIAGNOSIS — Z79.899 IMMUNODEFICIENCY DUE TO CHEMOTHERAPY: ICD-10-CM

## 2022-02-07 DIAGNOSIS — T45.1X5A IMMUNODEFICIENCY DUE TO CHEMOTHERAPY: ICD-10-CM

## 2022-02-07 DIAGNOSIS — F41.1 ANXIETY ASSOCIATED WITH CANCER DIAGNOSIS: ICD-10-CM

## 2022-02-07 DIAGNOSIS — R18.8 OTHER ASCITES: ICD-10-CM

## 2022-02-07 DIAGNOSIS — R59.0 INGUINAL ADENOPATHY: ICD-10-CM

## 2022-02-07 DIAGNOSIS — R11.2 NON-INTRACTABLE VOMITING WITH NAUSEA, UNSPECIFIED VOMITING TYPE: ICD-10-CM

## 2022-02-07 DIAGNOSIS — T45.1X5A NEUROPATHY DUE TO CHEMOTHERAPEUTIC DRUG: ICD-10-CM

## 2022-02-07 DIAGNOSIS — C78.6 PERITONEAL CARCINOMATOSIS: ICD-10-CM

## 2022-02-07 DIAGNOSIS — D50.9 MICROCYTIC ANEMIA: ICD-10-CM

## 2022-02-07 DIAGNOSIS — D84.821 IMMUNODEFICIENCY DUE TO CHEMOTHERAPY: ICD-10-CM

## 2022-02-07 DIAGNOSIS — K25.5 CHRONIC GASTRIC ULCER WITH PERFORATION: ICD-10-CM

## 2022-02-07 PROCEDURE — 99215 OFFICE O/P EST HI 40 MIN: CPT | Mod: S$PBB,,, | Performed by: PHYSICIAN ASSISTANT

## 2022-02-07 PROCEDURE — 25000003 PHARM REV CODE 250: Performed by: INTERNAL MEDICINE

## 2022-02-07 PROCEDURE — 63600175 PHARM REV CODE 636 W HCPCS: Performed by: INTERNAL MEDICINE

## 2022-02-07 PROCEDURE — 99215 PR OFFICE/OUTPT VISIT, EST, LEVL V, 40-54 MIN: ICD-10-PCS | Mod: S$PBB,,, | Performed by: PHYSICIAN ASSISTANT

## 2022-02-07 PROCEDURE — 96416 CHEMO PROLONG INFUSE W/PUMP: CPT

## 2022-02-07 PROCEDURE — 99999 PR PBB SHADOW E&M-EST. PATIENT-LVL V: CPT | Mod: PBBFAC,,, | Performed by: PHYSICIAN ASSISTANT

## 2022-02-07 PROCEDURE — 99215 OFFICE O/P EST HI 40 MIN: CPT | Mod: PBBFAC,25 | Performed by: PHYSICIAN ASSISTANT

## 2022-02-07 PROCEDURE — 96367 TX/PROPH/DG ADDL SEQ IV INF: CPT

## 2022-02-07 PROCEDURE — 99999 PR PBB SHADOW E&M-EST. PATIENT-LVL V: ICD-10-PCS | Mod: PBBFAC,,, | Performed by: PHYSICIAN ASSISTANT

## 2022-02-07 RX ORDER — SODIUM CHLORIDE 0.9 % (FLUSH) 0.9 %
10 SYRINGE (ML) INJECTION
Status: CANCELLED | OUTPATIENT
Start: 2022-02-08

## 2022-02-07 RX ORDER — HEPARIN 100 UNIT/ML
500 SYRINGE INTRAVENOUS
Status: CANCELLED | OUTPATIENT
Start: 2022-02-08

## 2022-02-07 RX ORDER — SODIUM CHLORIDE 0.9 % (FLUSH) 0.9 %
10 SYRINGE (ML) INJECTION
Status: CANCELLED | OUTPATIENT
Start: 2022-02-10

## 2022-02-07 RX ORDER — EPINEPHRINE 0.3 MG/.3ML
0.3 INJECTION SUBCUTANEOUS ONCE AS NEEDED
Status: DISCONTINUED | OUTPATIENT
Start: 2022-02-07 | End: 2022-02-07 | Stop reason: HOSPADM

## 2022-02-07 RX ORDER — HEPARIN 100 UNIT/ML
500 SYRINGE INTRAVENOUS
Status: CANCELLED | OUTPATIENT
Start: 2022-02-10

## 2022-02-07 RX ORDER — DIPHENHYDRAMINE HYDROCHLORIDE 50 MG/ML
50 INJECTION INTRAMUSCULAR; INTRAVENOUS ONCE AS NEEDED
Status: CANCELLED | OUTPATIENT
Start: 2022-02-08

## 2022-02-07 RX ORDER — DIPHENHYDRAMINE HYDROCHLORIDE 50 MG/ML
50 INJECTION INTRAMUSCULAR; INTRAVENOUS ONCE AS NEEDED
Status: DISCONTINUED | OUTPATIENT
Start: 2022-02-07 | End: 2022-02-07 | Stop reason: HOSPADM

## 2022-02-07 RX ORDER — EPINEPHRINE 0.3 MG/.3ML
0.3 INJECTION SUBCUTANEOUS ONCE AS NEEDED
Status: CANCELLED | OUTPATIENT
Start: 2022-02-08

## 2022-02-07 RX ORDER — GABAPENTIN 300 MG/1
300 CAPSULE ORAL 3 TIMES DAILY
Qty: 90 CAPSULE | Refills: 11 | Status: SHIPPED | OUTPATIENT
Start: 2022-02-07 | End: 2022-08-22 | Stop reason: SDUPTHER

## 2022-02-07 RX ORDER — HEPARIN 100 UNIT/ML
500 SYRINGE INTRAVENOUS
Status: DISCONTINUED | OUTPATIENT
Start: 2022-02-07 | End: 2022-02-07 | Stop reason: HOSPADM

## 2022-02-07 RX ORDER — SODIUM CHLORIDE 0.9 % (FLUSH) 0.9 %
10 SYRINGE (ML) INJECTION
Status: DISCONTINUED | OUTPATIENT
Start: 2022-02-07 | End: 2022-02-07 | Stop reason: HOSPADM

## 2022-02-07 RX ADMIN — FLUOROURACIL 4000 MG: 50 INJECTION, SOLUTION INTRAVENOUS at 12:02

## 2022-02-07 RX ADMIN — PALONOSETRON HYDROCHLORIDE 0.25 MG: 0.25 INJECTION, SOLUTION INTRAVENOUS at 11:02

## 2022-02-07 NOTE — Clinical Note
Please schedule a pelvic US and ascites screen at earliest availability. Orders have been placed.   Please schedule cbc, cmp, CT CAP, clinic visit with Dr. Ashraf and next cycle of 5FU in 2 weeks, 2/21. Pt would like to have chemotherapy scheduled on the same day, 2/21 after he clinic visit. Pt is ok to receive chemotherapy on the WB as well, if there is an opening. Thank you

## 2022-02-07 NOTE — PROGRESS NOTES
MEDICAL ONCOLOGY - ESTABLISHED PATIENT    Reason for visit: Gastric cancer     Best Contact Phone Number(s): [phone] (home)      Cancer/Stage/TNM:   Cancer Staging  No matching staging information was found for the patient.     Oncology History   Gastric adenocarcinoma   6/10/2021 Initial Diagnosis    Gastric adenocarcinoma     7/26/2021 -  Chemotherapy    Treatment Summary   Plan Name: OP FOLFOX 6 Q2W  Treatment Goal: Palliative  Status: Active  Start Date: 7/26/2021  End Date: 2/10/2022 (Planned)  Provider: Fer Ashraf MD  Chemotherapy: fluorouraciL 2,400 mg/m2 = 3,770 mg in sodium chloride 0.9% 100 mL chemo infusion, 2,400 mg/m2 = 3,770 mg, Intravenous, Over 46 hours, 14 of 15 cycles  Administration: 3,770 mg (7/26/2021), 3,770 mg (8/9/2021), 3,770 mg (8/23/2021), 3,770 mg (9/7/2021), 3,770 mg (9/21/2021), 3,770 mg (10/5/2021), 3,770 mg (10/19/2021), 3,770 mg (11/2/2021), 3,770 mg (11/15/2021), 3,770 mg (11/30/2021), 3,770 mg (12/13/2021), 4,000 mg (12/27/2021), 4,030 mg (1/10/2022), 4,030 mg (1/24/2022)  oxaliplatin (ELOXATIN) 85 mg/m2 = 133 mg in dextrose 5 % 500 mL chemo infusion, 85 mg/m2 = 133 mg, Intravenous, Clinic/HOD 1 time, 9 of 9 cycles  Administration: 133 mg (7/26/2021), 133 mg (8/9/2021), 133 mg (8/23/2021), 133 mg (9/7/2021), 133 mg (9/21/2021), 133 mg (10/5/2021), 133 mg (10/19/2021), 133 mg (11/2/2021), 133 mg (11/15/2021)          Interim History:  41 y.o. female with metastatic gastric cancer who presents for follow-up prior to cycle 15 of chemotherapy, now on maintenance 5-FU.  Her paresthesias in her fingers are stable but she continues to feel tingling more in her feet. She has not fallen.  Denies any significant abdominal pain.  Feels better when she eats and has a good appetite. She does feel that her abdomen is more distended this visit. No shortness of breath or chest pain. She also has noticed swelling of of the L groin, mildly tender. No sore throat or painful urination.  She has actually gained weight since her previous visit. No fever, chills, constipation, diarrhea, n/v. No mouth sores. Mild dry mouth.     ECOG status is 1. Presents alone    Review of Systems   Constitutional: Negative for activity change, appetite change, chills, fatigue, fever and unexpected weight change.   HENT: Negative for ear pain, facial swelling, hearing loss, mouth sores, nosebleeds, sore throat and trouble swallowing.    Eyes: Negative for pain, discharge, redness and visual disturbance.   Respiratory: Negative for cough, chest tightness and shortness of breath.    Cardiovascular: Negative for chest pain, palpitations and leg swelling.   Gastrointestinal: Positive for abdominal distention, nausea and reflux. Negative for abdominal pain, blood in stool, constipation, diarrhea and vomiting.   Endocrine: Negative for cold intolerance and heat intolerance.   Genitourinary: Negative for decreased urine volume, difficulty urinating, dysuria, frequency, hematuria, hot flashes, urgency and vaginal bleeding.   Musculoskeletal: Negative for arthralgias, gait problem, leg pain and myalgias.   Integumentary:  Negative for pallor, rash and wound.   Allergic/Immunologic: Negative for immunocompromised state.   Neurological: Positive for numbness. Negative for dizziness, tremors, weakness, light-headedness and headaches.   Hematological: Negative for adenopathy. Does not bruise/bleed easily.   Psychiatric/Behavioral: Negative for agitation, confusion, dysphoric mood and sleep disturbance. The patient is not nervous/anxious.            Past Medical History:   Past Medical History:   Diagnosis Date    Gastric cancer     Gastric ulcer     Pregnancy 08/12/2020    delivered on 8/12/2020    Umbilical hernia         Past Surgical History:   Past Surgical History:   Procedure Laterality Date    CLOSURE OF PERFORATED ULCER OF DUODENUM USING OMENTAL PATCH      ESOPHAGOGASTRODUODENOSCOPY N/A 10/13/2020    Procedure: EGD  "(ESOPHAGOGASTRODUODENOSCOPY);  Surgeon: Rosio Marion MD;  Location: [facility] (2ND FLR);  Service: Endoscopy;  Laterality: N/A;    ESOPHAGOGASTRODUODENOSCOPY N/A 2020    Procedure: EGD (ESOPHAGOGASTRODUODENOSCOPY);  Surgeon: Rosio Marion MD;  Location: [facility] (2ND FLR);  Service: Endoscopy;  Laterality: N/A;  Covid-19 test 20 at [facility]    ESOPHAGOGASTRODUODENOSCOPY N/A 3/12/2021    Procedure: EGD (ESOPHAGOGASTRODUODENOSCOPY);  Surgeon: Nav Omer MD;  Location: [facility] END[facility] (2ND FLR);  Service: Endoscopy;  Laterality: N/A;  COVID at [facility] 3/9 ttr    ESOPHAGOGASTRODUODENOSCOPY N/A 2021    Procedure: EGD (ESOPHAGOGASTRODUODENOSCOPY);  Surgeon: Rosio Marion MD;  Location: [facility] END[facility] (2ND FLR);  Service: Endoscopy;  Laterality: N/A;  5/15-covid pcw-inst portal-tb    ESOPHAGOGASTRODUODENOSCOPY N/A 2021    Procedure: EGD (ESOPHAGOGASTRODUODENOSCOPY);  Surgeon: Socrates Terrazas MD;  Location: [facility] (2ND FLR);  Service: Endoscopy;  Laterality: N/A;        Family History:   Family History   Problem Relation Age of Onset    Cancer Mother 67        lymphoma (type? "not active," not on treatment; "related to her blood")    Cancer Father         lung (dx age?) (smoker?)    No Known Problems Son     Breast cancer Other 73        unilat    Prostate cancer Paternal Uncle         (dx 50s/60? no chemo?)    Breast cancer Paternal Cousin         (dx age?)    Colon cancer Neg Hx     Esophageal cancer Neg Hx         Social History:   Social History     Tobacco Use    Smoking status: Former Smoker     Types: Cigarettes     Quit date: 2019     Years since quittin.2    Smokeless tobacco: Never Used   Substance Use Topics    Alcohol use: Yes        I have reviewed and updated the patient's past medical, surgical, family and social histories.    Allergies:   Review of patient's allergies indicates:  No Known Allergies     Medications:   Current Outpatient " Medications   Medication Sig Dispense Refill    acetaminophen (TYLENOL) 500 MG tablet Take 500 mg by mouth every 6 (six) hours as needed for Pain.      amitriptyline (ELAVIL) 10 MG tablet Take 1 tablet (10 mg total) by mouth every evening. 30 tablet 3    amitriptyline (ELAVIL) 10 MG tablet Take 10 mg by mouth.      dicyclomine (BENTYL) 10 MG capsule TAKE ONE CAPSULE BY MOUTH FOUR TIMES DAILY 120 capsule 0    ferrous sulfate (FEOSOL) 325 mg (65 mg iron) Tab tablet Take 1 tablet (325 mg total) by mouth once daily for 7 days, THEN 1 tablet (325 mg total) 2 (two) times daily. 60 tablet 10    ferrous sulfate (FEOSOL) 325 mg (65 mg iron) Tab tablet Take by mouth.      furosemide (LASIX) 20 MG tablet Take 10 mg by mouth.      furosemide (LASIX) 40 MG tablet Take 0.5 tablets (20 mg total) by mouth daily as needed (leg swelling or abdominal swelling). 30 tablet 1    furosemide (LASIX) 40 MG tablet       HYDROcodone-acetaminophen (NORCO) 5-325 mg per tablet Take 1 tablet by mouth.      HYDROcodone-acetaminophen (NORCO) 5-325 mg per tablet Take 1 tablet by mouth 3 (three) times daily as needed.      LIDOcaine-prilocaine (EMLA) cream Apply to Port-A-Cath area 30 to 45 minutes prior to port access as directed (topical anesthetic use to the anterior chest only).      LIDOcaine-prilocaine (EMLA) cream Apply topically.      metoclopramide HCl (REGLAN) 5 MG tablet 5 mg.      multivitamin with folic acid 400 mcg Tab Take 1 tablet by mouth once daily.      ondansetron (ZOFRAN) 8 MG tablet Take 1 tablet (8 mg total) by mouth every 8 (eight) hours as needed for Nausea. 60 tablet 2    pantoprazole (PROTONIX) 40 MG tablet Take 40 mg by mouth.      pantoprazole (PROTONIX) 40 MG tablet Take 1 tablet (40 mg total) by mouth 2 (two) times daily. 60 tablet 11    potassium chloride SA (K-DUR,KLOR-CON) 20 MEQ tablet 20 mEq.      prochlorperazine (COMPAZINE) 10 MG tablet TAKE 1 TABLET(10 MG) BY MOUTH EVERY 6 HOURS AS NEEDED FOR  "NAUSEA 90 tablet 2    prochlorperazine (COMPAZINE) 10 MG tablet as needed.      senna-docusate 8.6-50 mg (PERICOLACE) 8.6-50 mg per tablet Take 1 tablet by mouth.      senna-docusate 8.6-50 mg (SENNA WITH DOCUSATE SODIUM) 8.6-50 mg per tablet Take 1 tablet by mouth 2 (two) times daily as needed for Constipation.      sucralfate (CARAFATE) 1 gram tablet TAKE 1 TABLET(1 GRAM) BY MOUTH FOUR TIMES DAILY FOR 14 DAYS 56 tablet 0    sucralfate (CARAFATE) 1 gram tablet       traMADoL (ULTRAM) 50 mg tablet Take 50 mg by mouth.      traMADoL (ULTRAM) 50 mg tablet Take by mouth.       No current facility-administered medications for this visit.        Physical Exam:   /73 (BP Location: Right arm, Patient Position: Sitting, BP Method: Small (Automatic))   Pulse 64   Temp 97.8 °F (36.6 °C) (Oral)   Resp 18   Ht 5' 7" (1.702 m)   Wt 59.7 kg (131 lb 11.2 oz)   SpO2 100%   BMI 20.63 kg/m²      ECOG Performance status: 1            Physical Exam  Vitals reviewed.   Constitutional:       Appearance: Normal appearance. She is not ill-appearing or toxic-appearing.      Comments: Thin   HENT:      Head: Normocephalic and atraumatic.      Nose: Nose normal.      Mouth/Throat:      Mouth: Mucous membranes are moist.      Pharynx: Oropharynx is clear.   Eyes:      General: No scleral icterus.     Extraocular Movements: Extraocular movements intact.      Conjunctiva/sclera: Conjunctivae normal.   Cardiovascular:      Rate and Rhythm: Normal rate and regular rhythm.      Pulses: Normal pulses.      Heart sounds: Normal heart sounds.   Pulmonary:      Effort: Pulmonary effort is normal.      Breath sounds: Normal breath sounds.   Abdominal:      General: Bowel sounds are normal. There is distension (moderate).      Palpations: Abdomen is soft.      Tenderness: There is no abdominal tenderness. There is no guarding.      Comments: Mild enlargement of L inguinal lymph node. Mildly tender   Musculoskeletal:         General: " No swelling or tenderness. Normal range of motion.      Cervical back: Normal range of motion and neck supple.      Right lower leg: No edema.      Left lower leg: No edema.   Skin:     General: Skin is warm.      Findings: No rash.   Neurological:      General: No focal deficit present.      Mental Status: She is alert. Mental status is at baseline.   Psychiatric:         Mood and Affect: Mood normal.         Behavior: Behavior normal.             Labs:   Recent Results (from the past 48 hour(s))   CBC Oncology    Collection Time: 02/07/22  9:29 AM   Result Value Ref Range    WBC 3.63 (L) 3.90 - 12.70 K/uL    RBC 3.93 (L) 4.00 - 5.40 M/uL    Hemoglobin 13.1 12.0 - 16.0 g/dL    Hematocrit 38.7 37.0 - 48.5 %    MCV 99 (H) 82 - 98 fL    MCH 33.3 (H) 27.0 - 31.0 pg    MCHC 33.9 32.0 - 36.0 g/dL    RDW 14.0 11.5 - 14.5 %    Platelets 155 150 - 450 K/uL    MPV 9.9 9.2 - 12.9 fL    Gran # (ANC) 1.9 1.8 - 7.7 K/uL    Immature Grans (Abs) 0.01 0.00 - 0.04 K/uL   Comprehensive Metabolic Panel    Collection Time: 02/07/22  9:29 AM   Result Value Ref Range    Sodium 139 136 - 145 mmol/L    Potassium 4.2 3.5 - 5.1 mmol/L    Chloride 106 95 - 110 mmol/L    CO2 28 23 - 29 mmol/L    Glucose 75 70 - 110 mg/dL    BUN 11 6 - 20 mg/dL    Creatinine 0.7 0.5 - 1.4 mg/dL    Calcium 9.2 8.7 - 10.5 mg/dL    Total Protein 6.8 6.0 - 8.4 g/dL    Albumin 3.4 (L) 3.5 - 5.2 g/dL    Total Bilirubin 0.7 0.1 - 1.0 mg/dL    Alkaline Phosphatase 115 55 - 135 U/L    AST 18 10 - 40 U/L    ALT 14 10 - 44 U/L    Anion Gap 5 (L) 8 - 16 mmol/L    eGFR if African American >60.0 >60 mL/min/1.73 m^2    eGFR if non African American >60.0 >60 mL/min/1.73 m^2        I have reviewed the pertinent labs from today which are acceptable for treatment.  Albumin mild decrease    Imaging:       We have personally reviewed the most recent imaging from 10/8/2021, which is displays overall stability of disease. There is a L sided thyroid nodule that is indeterminate at  this point. Decreased amount of ascites.      Path:   5/26/21:  Positive for malignancy.   Poorly differentiated adenocarcinoma growing with signet ring features   Immunostains for Bruce-EP4 and CEA are positive.  The controls stain   appropriately.   This case was reviewed by Dr Sebastian who concurs with the diagnosis       Assessment:       1. Gastric adenocarcinoma    2. Peritoneal carcinomatosis    3. Immunodeficiency due to chemotherapy    4. Other ascites    5. Non-intractable vomiting with nausea, unspecified vomiting type    6. Chronic gastric ulcer with perforation    7. Microcytic anemia    8. Anxiety associated with cancer diagnosis    9. Inguinal adenopathy    10. Neuropathy due to chemotherapeutic drug          Plan:             # Gastric adenocarcinoma with peritoneal metastases, immunodeficiency due to chemotherapy  HER-2 negative by FISH.  PD-L1 < 1%.  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  I previously explained the implications of a stage IV diagnosis to her and potential treatment options.  She is now s/p port placement. She sought a second opinion at [facility] for zolbetuximab trial targeting CLDN protein but she did not test positive for this biomarker. I recommended proceeding with SOC FOLFOX, with which the [facility] team agreed. Because of the negative PD-L1 I do not think the benefit of adding nivolumab outweighs the potential toxicities.       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  She opted not to get genetic testing done at her appt with Phi. We recommended that she reconsider that decision in light of a very likely germline mutation in SAMUEL which has clinical consequences.      Tolerating chemotherapy well thus far.  She has experienced improvement in many of her GI symptoms since starting treatment.     CT CAP after 6 cycles showed improvement in ascites, probable hepatic, thyroid and bone metastases.  Given improvement in symptoms and delay between baseline imaging and cycle 1, suspect she is having benefit from chemotherapy. She was in agreement with this.    We dropped oxaliplatin starting this cycle with cycle 10 due to grade 1 neuropathy and desire to keep it from worsening. Doing well overall. Stable neuropathy in the feet. Otherwise, tolerating treatment good.   CT CAP after cycle 10 shows stable disease. Will repeat CT scan in 2 weeks. Pt is interested in other potential treatment options based on the scan results.     Proceed with cycle 15 of chemotherapy today, 5-FU maintenance. RTC in 2 weeks, for cycle 16    #Ascites  Abdominal distension increased. No dyspnea or worsening abdominal pain  Will order ascites screen.   Drainage decreased considerably from peritoneal catheter with chemotherapy.   Now s/p removal on 10/29/21 by IR.     # Nausea, gastric ulcer  Stable. No vomiting.   Not taking Carafate. Continue Protonix.  Continue Compazine PRN.  Continue dietary modifications to prevent indigestion.    # Anemia  Hgb normal. Doing well.    Iron deficiency due to chronic blood loss.  S/p 2 doses of Injectafer.    # Anxiety with cancer diagnosis  Will place referral to Psychology Oncology  Feeling well.     # lymphadenopathy of L groin  Will order pelvic US    # chemotherapy induced neuropathy  2/2 previous chemotherapy with Oxaliplatin  Will have her try Gabapentin 300mg qhs     Follow up: Will return in 2 weeks for lab work, clinic visit, and cycle 16 of chemo. Will schedule CT scan in 2 weeks.     Pte and family members displayed understanding of the above encounter and treatment plan. All thoughtful questions were answered to their satisfaction. Pte was advised to notify the care team or proceed to the ER if signs and symptoms worsen.     JHONATAN Kramer, PA-C  Physician Assistant  Certified  Dept of Hematology/Oncology  PA-C to Dr. Owens, Dr. Ashraf and Dr. Jluius Ashraf MD  Hematology/Oncology  [facility]

## 2022-02-07 NOTE — PLAN OF CARE
1130 pt here for 5 FU pump placement D1C15, labs, hx, meds, allergies reviewed, pt with no new complaints , will start neurontin for neuropathy, pt andrew quinn chair, continue to monitor

## 2022-02-07 NOTE — PLAN OF CARE
1256 pt with CADD pump infusing at 2.2 ml/hr without issue prior to d/c, pt to rtc wed around 1100 for pump d/c, pt aware, no distress noted upon d/c to home

## 2022-02-08 ENCOUNTER — PATIENT MESSAGE (OUTPATIENT)
Dept: HEMATOLOGY/ONCOLOGY | Facility: CLINIC | Age: 42
End: 2022-02-08
Payer: COMMERCIAL

## 2022-02-08 ENCOUNTER — TELEPHONE (OUTPATIENT)
Dept: INFUSION THERAPY | Facility: HOSPITAL | Age: 42
End: 2022-02-08
Payer: COMMERCIAL

## 2022-02-08 DIAGNOSIS — R18.8 OTHER ASCITES: Primary | ICD-10-CM

## 2022-02-09 ENCOUNTER — INFUSION (OUTPATIENT)
Dept: INFUSION THERAPY | Facility: HOSPITAL | Age: 42
End: 2022-02-09
Payer: COMMERCIAL

## 2022-02-09 VITALS
DIASTOLIC BLOOD PRESSURE: 65 MMHG | TEMPERATURE: 98 F | HEART RATE: 72 BPM | SYSTOLIC BLOOD PRESSURE: 99 MMHG | RESPIRATION RATE: 18 BRPM

## 2022-02-09 DIAGNOSIS — C16.9 GASTRIC ADENOCARCINOMA: Primary | ICD-10-CM

## 2022-02-09 PROCEDURE — A4216 STERILE WATER/SALINE, 10 ML: HCPCS | Performed by: INTERNAL MEDICINE

## 2022-02-09 PROCEDURE — 25000003 PHARM REV CODE 250: Performed by: INTERNAL MEDICINE

## 2022-02-09 PROCEDURE — 99211 OFF/OP EST MAY X REQ PHY/QHP: CPT

## 2022-02-09 PROCEDURE — 63600175 PHARM REV CODE 636 W HCPCS: Performed by: INTERNAL MEDICINE

## 2022-02-09 RX ORDER — SODIUM CHLORIDE 0.9 % (FLUSH) 0.9 %
10 SYRINGE (ML) INJECTION
Status: DISCONTINUED | OUTPATIENT
Start: 2022-02-09 | End: 2022-02-09 | Stop reason: HOSPADM

## 2022-02-09 RX ORDER — HEPARIN 100 UNIT/ML
500 SYRINGE INTRAVENOUS
Status: DISCONTINUED | OUTPATIENT
Start: 2022-02-09 | End: 2022-02-09 | Stop reason: HOSPADM

## 2022-02-09 RX ADMIN — Medication 10 ML: at 10:02

## 2022-02-09 RX ADMIN — HEPARIN 500 UNITS: 100 SYRINGE at 10:02

## 2022-02-10 ENCOUNTER — HOSPITAL ENCOUNTER (OUTPATIENT)
Dept: RADIOLOGY | Facility: HOSPITAL | Age: 42
Discharge: HOME | End: 2022-02-10
Attending: PHYSICIAN ASSISTANT
Payer: MEDICAID

## 2022-02-10 DIAGNOSIS — R59.0 INGUINAL ADENOPATHY: ICD-10-CM

## 2022-02-10 DIAGNOSIS — R18.8 OTHER ASCITES: ICD-10-CM

## 2022-02-10 DIAGNOSIS — C16.9 GASTRIC ADENOCARCINOMA: ICD-10-CM

## 2022-02-10 PROCEDURE — 76705 ECHO EXAM OF ABDOMEN: CPT | Mod: 26,,, | Performed by: RADIOLOGY

## 2022-02-10 PROCEDURE — 76705 ECHO EXAM OF ABDOMEN: CPT | Mod: TC

## 2022-02-10 PROCEDURE — 74177 CT ABD & PELVIS W/CONTRAST: CPT | Mod: 26,,, | Performed by: RADIOLOGY

## 2022-02-10 PROCEDURE — 74177 CT ABD & PELVIS W/CONTRAST: CPT | Mod: TC

## 2022-02-10 PROCEDURE — 76705 US ABDOMEN LIMITED_HERNIA: ICD-10-PCS | Mod: 26,76,, | Performed by: RADIOLOGY

## 2022-02-10 PROCEDURE — 76705 ECHO EXAM OF ABDOMEN: CPT | Mod: 26,76,, | Performed by: RADIOLOGY

## 2022-02-10 PROCEDURE — 74177 CT CHEST ABDOMEN PELVIS WITH CONTRAST (XPD): ICD-10-PCS | Mod: 26,,, | Performed by: RADIOLOGY

## 2022-02-10 PROCEDURE — 71260 CT THORAX DX C+: CPT | Mod: 26,,, | Performed by: RADIOLOGY

## 2022-02-10 PROCEDURE — 71260 CT CHEST ABDOMEN PELVIS WITH CONTRAST (XPD): ICD-10-PCS | Mod: 26,,, | Performed by: RADIOLOGY

## 2022-02-10 PROCEDURE — 25500020 PHARM REV CODE 255: Performed by: PHYSICIAN ASSISTANT

## 2022-02-10 PROCEDURE — 71260 CT THORAX DX C+: CPT | Mod: TC

## 2022-02-10 PROCEDURE — 76705 US ABDOMEN LIMITED_FOR ASCITES: ICD-10-PCS | Mod: 26,,, | Performed by: RADIOLOGY

## 2022-02-10 RX ADMIN — IOHEXOL 15 ML: 300 INJECTION, SOLUTION INTRAVENOUS at 08:02

## 2022-02-10 RX ADMIN — IOHEXOL 75 ML: 350 INJECTION, SOLUTION INTRAVENOUS at 09:02

## 2022-02-11 ENCOUNTER — TELEPHONE (OUTPATIENT)
Dept: INTERVENTIONAL RADIOLOGY/VASCULAR | Facility: CLINIC | Age: 42
End: 2022-02-11
Payer: COMMERCIAL

## 2022-02-11 NOTE — TELEPHONE ENCOUNTER
Left message for pt to return my call. Need to schedule IR para. Please forward call to [phone]. Thanks

## 2022-02-14 ENCOUNTER — PATIENT MESSAGE (OUTPATIENT)
Dept: HEMATOLOGY/ONCOLOGY | Facility: CLINIC | Age: 42
End: 2022-02-14

## 2022-02-14 ENCOUNTER — OFFICE VISIT (OUTPATIENT)
Dept: HEMATOLOGY/ONCOLOGY | Facility: CLINIC | Age: 42
End: 2022-02-14
Payer: MEDICAID

## 2022-02-14 ENCOUNTER — OFFICE VISIT (OUTPATIENT)
Dept: HEMATOLOGY/ONCOLOGY | Facility: CLINIC | Age: 42
End: 2022-02-14
Payer: COMMERCIAL

## 2022-02-14 VITALS — BODY MASS INDEX: 20.66 KG/M2 | HEIGHT: 67 IN | WEIGHT: 131.63 LBS

## 2022-02-14 DIAGNOSIS — M54.9 CHRONIC BACK PAIN, UNSPECIFIED BACK LOCATION, UNSPECIFIED BACK PAIN LATERALITY: ICD-10-CM

## 2022-02-14 DIAGNOSIS — C16.9 MALIGNANT NEOPLASM OF STOMACH, UNSPECIFIED LOCATION: ICD-10-CM

## 2022-02-14 DIAGNOSIS — G89.29 CHRONIC BACK PAIN, UNSPECIFIED BACK LOCATION, UNSPECIFIED BACK PAIN LATERALITY: ICD-10-CM

## 2022-02-14 DIAGNOSIS — Z71.83 ENCOUNTER FOR NONPROCREATIVE GENETIC COUNSELING: Primary | ICD-10-CM

## 2022-02-14 DIAGNOSIS — G62.9 NEUROPATHY: ICD-10-CM

## 2022-02-14 DIAGNOSIS — C16.9 MALIGNANT NEOPLASM OF STOMACH, UNSPECIFIED LOCATION: Primary | ICD-10-CM

## 2022-02-14 PROCEDURE — 99204 OFFICE O/P NEW MOD 45 MIN: CPT | Mod: S$PBB,,, | Performed by: PHYSICIAN ASSISTANT

## 2022-02-14 PROCEDURE — 99999 PR PBB SHADOW E&M-EST. PATIENT-LVL II: ICD-10-PCS | Mod: PBBFAC,,, | Performed by: NURSE PRACTITIONER

## 2022-02-14 PROCEDURE — 3008F PR BODY MASS INDEX (BMI) DOCUMENTED: ICD-10-PCS | Mod: CPTII,,, | Performed by: PHYSICIAN ASSISTANT

## 2022-02-14 PROCEDURE — 3008F BODY MASS INDEX DOCD: CPT | Mod: CPTII,,, | Performed by: PHYSICIAN ASSISTANT

## 2022-02-14 PROCEDURE — 99999 PR PBB SHADOW E&M-EST. PATIENT-LVL III: CPT | Mod: PBBFAC,,, | Performed by: PHYSICIAN ASSISTANT

## 2022-02-14 PROCEDURE — 99999 PR PBB SHADOW E&M-EST. PATIENT-LVL III: ICD-10-PCS | Mod: PBBFAC,,, | Performed by: PHYSICIAN ASSISTANT

## 2022-02-14 PROCEDURE — 1160F PR REVIEW ALL MEDS BY PRESCRIBER/CLIN PHARMACIST DOCUMENTED: ICD-10-PCS | Mod: CPTII,,, | Performed by: PHYSICIAN ASSISTANT

## 2022-02-14 PROCEDURE — 99204 PR OFFICE/OUTPT VISIT, NEW, LEVL IV, 45-59 MIN: ICD-10-PCS | Mod: S$PBB,,, | Performed by: PHYSICIAN ASSISTANT

## 2022-02-14 PROCEDURE — 99214 PR OFFICE/OUTPT VISIT, EST, LEVL IV, 30-39 MIN: ICD-10-PCS | Mod: S$PBB,,, | Performed by: NURSE PRACTITIONER

## 2022-02-14 PROCEDURE — 99214 OFFICE O/P EST MOD 30 MIN: CPT | Mod: S$PBB,,, | Performed by: NURSE PRACTITIONER

## 2022-02-14 PROCEDURE — 1159F MED LIST DOCD IN RCRD: CPT | Mod: CPTII,,, | Performed by: PHYSICIAN ASSISTANT

## 2022-02-14 PROCEDURE — 99999 PR PBB SHADOW E&M-EST. PATIENT-LVL II: CPT | Mod: PBBFAC,,, | Performed by: NURSE PRACTITIONER

## 2022-02-14 PROCEDURE — 99213 OFFICE O/P EST LOW 20 MIN: CPT | Mod: PBBFAC | Performed by: PHYSICIAN ASSISTANT

## 2022-02-14 PROCEDURE — 99212 OFFICE O/P EST SF 10 MIN: CPT | Mod: PBBFAC,27 | Performed by: NURSE PRACTITIONER

## 2022-02-14 PROCEDURE — 1159F PR MEDICATION LIST DOCUMENTED IN MEDICAL RECORD: ICD-10-PCS | Mod: CPTII,,, | Performed by: PHYSICIAN ASSISTANT

## 2022-02-14 PROCEDURE — 1160F RVW MEDS BY RX/DR IN RCRD: CPT | Mod: CPTII,,, | Performed by: PHYSICIAN ASSISTANT

## 2022-02-14 NOTE — PROGRESS NOTES
"Subjective:      Puja Quigley is a 41 y.o. female who presents for integrative oncology consult. History of metastatic gastric cancer currently on chemotherapy. Reports peripheral neuropathy that started in her bilateral feet in 1/2022. Has progressed into bilateral hands but worse in her feet. Describes as "tingling." Was given gabapentin to try last week but has not taken consistently to see if is helpful. She is interested in acupuncture.  Reports chronic back pain from working on the computer all day. Getting a massage tomorrow to help. This was approved by her oncologist.  Eats mostly lean proteins, avoiding beef and pork. Also avoids dairy. Has met with nutrition multiple times in the past that was helpful. Does use medical marijuana that helps maintain her appetite. Feels better when she eats regular meals.   Her mood has improved. Has good weeks and bad weeks but feels like she is doing better. Scheduled to meet with Dr. Berumen. Does meditate and interested in meditation class.  Met with  to start exercising again. Wants to build muscle. Also interested in yoga, was also doing this prior to cancer diagnosis.   She is sleeping well no complaints. Denies menopausal or hormonal side effects or symptoms.    Supplements: None regular      Past Medical History:   Diagnosis Date    Gastric cancer     Gastric ulcer     Pregnancy 08/12/2020    delivered on 8/12/2020    Umbilical hernia      Past Surgical History:   Procedure Laterality Date    CLOSURE OF PERFORATED ULCER OF DUODENUM USING OMENTAL PATCH      ESOPHAGOGASTRODUODENOSCOPY N/A 10/13/2020    Procedure: EGD (ESOPHAGOGASTRODUODENOSCOPY);  Surgeon: Rosio Marion MD;  Location: [address]);  Service: Endoscopy;  Laterality: N/A;    ESOPHAGOGASTRODUODENOSCOPY N/A 12/11/2020    Procedure: EGD (ESOPHAGOGASTRODUODENOSCOPY);  Surgeon: Rosio Marion MD;  Location: [facility] ([address] [place] [address]);  Service: Endoscopy;  Laterality: " "N/A;  Covid-19 test 20 at [facility] - pg    ESOPHAGOGASTRODUODENOSCOPY N/A 3/12/2021    Procedure: EGD (ESOPHAGOGASTRODUODENOSCOPY);  Surgeon: Nav Omer MD;  Location: [facility] ENDO (2ND FLR);  Service: Endoscopy;  Laterality: N/A;  COVID at [facility] [place], operated by [facility] 3/9 ttr    ESOPHAGOGASTRODUODENOSCOPY N/A 2021    Procedure: EGD (ESOPHAGOGASTRODUODENOSCOPY);  Surgeon: Rosio Marion MD;  Location: [facility] ENDO (2ND FLR);  Service: Endoscopy;  Laterality: N/A;  5/15-covid pcw-inst portal-tb    ESOPHAGOGASTRODUODENOSCOPY N/A 2021    Procedure: EGD (ESOPHAGOGASTRODUODENOSCOPY);  Surgeon: Socrates Terrazas MD;  Location: [facility] ENDO (2ND FLR);  Service: Endoscopy;  Laterality: N/A;     Social History     Tobacco Use    Smoking status: Former Smoker     Types: Cigarettes     Quit date: 2019     Years since quittin.2    Smokeless tobacco: Never Used   Substance Use Topics    Alcohol use: Yes    Drug use: Not Currently     Family History   Problem Relation Age of Onset    Cancer Mother 67        lymphoma (type? "not active," not on tx; "related to her blood")    Lung cancer Father 48        type? h/o smoking    No Known Problems Son     Breast cancer Other 73        unilat; stage I, but aggressive    Prostate cancer Paternal Uncle         (dx 50s/60? no chemo?)    Breast cancer Paternal Cousin         (dx age?) ductal    Colon cancer Neg Hx     Esophageal cancer Neg Hx      OB History    Para Term  AB Living   1             SAB IAB Ectopic Multiple Live Births                  # Outcome Date GA Lbr Darrian/2nd Weight Sex Delivery Anes PTL Lv   1                 Current Outpatient Medications:     acetaminophen (TYLENOL) 500 MG tablet, Take 500 mg by mouth every 6 (six) hours as needed for Pain., Disp: , Rfl:     amitriptyline (ELAVIL) 10 MG tablet, Take 1 tablet (10 mg total) by mouth every evening., Disp: 30 tablet, Rfl: 3    amitriptyline (ELAVIL) 10 MG tablet, Take 10 mg by " mouth., Disp: , Rfl:     dicyclomine (BENTYL) 10 MG capsule, TAKE ONE CAPSULE BY MOUTH FOUR TIMES DAILY, Disp: 120 capsule, Rfl: 0    ferrous sulfate (FEOSOL) 325 mg (65 mg iron) Tab tablet, Take 1 tablet (325 mg total) by mouth once daily for 7 days, THEN 1 tablet (325 mg total) 2 (two) times daily., Disp: 60 tablet, Rfl: 10    ferrous sulfate (FEOSOL) 325 mg (65 mg iron) Tab tablet, Take by mouth., Disp: , Rfl:     furosemide (LASIX) 20 MG tablet, Take 10 mg by mouth., Disp: , Rfl:     furosemide (LASIX) 40 MG tablet, Take 0.5 tablets (20 mg total) by mouth daily as needed (leg swelling or abdominal swelling)., Disp: 30 tablet, Rfl: 1    furosemide (LASIX) 40 MG tablet, , Disp: , Rfl:     gabapentin (NEURONTIN) 300 MG capsule, Take 1 capsule (300 mg total) by mouth 3 (three) times daily., Disp: 90 capsule, Rfl: 11    HYDROcodone-acetaminophen (NORCO) 5-325 mg per tablet, Take 1 tablet by mouth., Disp: , Rfl:     HYDROcodone-acetaminophen (NORCO) 5-325 mg per tablet, Take 1 tablet by mouth 3 (three) times daily as needed., Disp: , Rfl:     LIDOcaine-prilocaine (EMLA) cream, Apply to Port-A-Cath area 30 to 45 minutes prior to port access as directed (topical anesthetic use to the anterior chest only)., Disp: , Rfl:     LIDOcaine-prilocaine (EMLA) cream, Apply topically., Disp: , Rfl:     metoclopramide HCl (REGLAN) 5 MG tablet, 5 mg., Disp: , Rfl:     multivitamin with folic acid 400 mcg Tab, Take 1 tablet by mouth once daily., Disp: , Rfl:     ondansetron (ZOFRAN) 8 MG tablet, Take 1 tablet (8 mg total) by mouth every 8 (eight) hours as needed for Nausea., Disp: 60 tablet, Rfl: 2    pantoprazole (PROTONIX) 40 MG tablet, Take 40 mg by mouth., Disp: , Rfl:     pantoprazole (PROTONIX) 40 MG tablet, Take 1 tablet (40 mg total) by mouth 2 (two) times daily., Disp: 60 tablet, Rfl: 11    potassium chloride SA (K-DUR,KLOR-CON) 20 MEQ tablet, 20 mEq., Disp: , Rfl:     prochlorperazine (COMPAZINE) 10 MG  "tablet, TAKE 1 TABLET(10 MG) BY MOUTH EVERY 6 HOURS AS NEEDED FOR NAUSEA, Disp: 90 tablet, Rfl: 2    prochlorperazine (COMPAZINE) 10 MG tablet, as needed., Disp: , Rfl:     senna-docusate 8.6-50 mg (PERICOLACE) 8.6-50 mg per tablet, Take 1 tablet by mouth., Disp: , Rfl:     senna-docusate 8.6-50 mg (SENNA WITH DOCUSATE SODIUM) 8.6-50 mg per tablet, Take 1 tablet by mouth 2 (two) times daily as needed for Constipation., Disp: , Rfl:     sucralfate (CARAFATE) 1 gram tablet, TAKE 1 TABLET(1 GRAM) BY MOUTH FOUR TIMES DAILY FOR 14 DAYS, Disp: 56 tablet, Rfl: 0    sucralfate (CARAFATE) 1 gram tablet, , Disp: , Rfl:     traMADoL (ULTRAM) 50 mg tablet, Take 50 mg by mouth., Disp: , Rfl:     traMADoL (ULTRAM) 50 mg tablet, Take by mouth., Disp: , Rfl:     The ASCVD Risk score (Shasta PAULETTE Jr., et al., 2013) failed to calculate for the following reasons:    The 2013 ASCVD risk score is only valid for ages 40 to 79    Review of Systems:  General: No fever, chills.  Chest: No chest pain, shortness of breath, or palpitations.  Breast: No pain, masses, or nipple discharge.  Vulva: No pain, lesions, or itching.  Vagina: No relaxation, itching, discharge, or lesions.  Urinary: No incontinence, nocturia, frequency, or dysuria.  Extremities:  No leg cramps, edema, or calf pain.  Neurologic: No headaches, dizziness, or visual changes.    Objective:     Vitals:    02/14/22 1257   Weight: 59.7 kg (131 lb 9.8 oz)   Height: 5' 7" (1.702 m)   PainSc: 0-No pain     Body mass index is 20.61 kg/m².    PHYSICAL EXAM:  APPEARANCE: Well nourished, well developed, in no acute distress.  AFFECT: WNL, alert and oriented x 3      Assessment:      Malignant neoplasm of stomach, unspecified location  -     Ambulatory referral/consult to Internal Medicine    Neuropathy  -     Ambulatory referral/consult to Internal Medicine  -     Acupuncture; Future    Chronic back pain, unspecified back location, unspecified back pain laterality  -     " Acupuncture; Future      Plan:   Acupuncture for peripheral neuropathy and chronic back pain.  Reviewed essential oil soaks.  Meditation class at cancer center.  Add to list for yoga with Tamarin when available.  Follow up in 3 months.    Instructed patient to call if she experiences any side effects or has any questions.    I spent a total of 35 minutes on the day of the visit.This includes face to face time and non-face to face time preparing to see the patient (eg, review of tests), obtaining and/or reviewing separately obtained history, documenting clinical information in the electronic or other health record, independently interpreting results and communicating results to the patient/family/caregiver, or care coordinator.

## 2022-02-14 NOTE — Clinical Note
Breana,  Please call pt to schedule nurse visit for next week for just prior to already scheduled lab appt for other provider.  Thank you, Phi

## 2022-02-14 NOTE — PROGRESS NOTES
"Cancer Genetics  Hereditary and High-Risk Clinic  Department of Hematology and Oncology  [facility]    Date of Service:  22  Provider:  Phi French DNP    Patient Information  Name:  Puja Quigley  :  1980  MRN:  [id number]     Referring Provider    Fer Ashraf MD  [address] [place] [address]    SUBJECTIVE      Chief Complaint: Other and Re-discuss germline cancer genetic testing    History of Present Illness (HPI):  Puja Quigley ("Puja"), a 41 y.o. female, returns today to re-discuss germline cancer genetic testing.    Cancer Age at Diagnosis Tumor Testing Treatment   Metastatic gastric adenocarcinoma 40 · MMR protein testing by IHC:  Intact expression of MLH1, MSH2, MSH6, and PMS2    · Guardant 360:  § Somatic alterations/biomarkers:  § SAMUEL P4030N (50%)  § CTNNB1 W383C (9.7%)  § SAMUEL Splice Site SNV (8.0%)  § FGFR2 amplification (medium, ++)  § CDH1 L436fs (2.8%)  § Variants of uncertain significance:  § CDH1 T438K (2.7%)  SMO R485W (0.5%)  § Synonymous alterations:  § NTRK1 S419S (0.2%)  § MSI-high:  Not detected Chemotherapy     Focused Medical History    Germline cancer-genetic testing:  No   Benign tumor:  No   Pancreatitis:  No   Reproductive organs intact    Ancestry   Ashkenazi Zoroastrian ancestry:  No    Focused Family History   Consanguinity in ancestors:  No   Germline cancer-genetic testing in blood relatives:  No    Family Cancer Pedigree      No known cancer in maternal first cousins, maternal distant relatives, paternal first cousins, or paternal distant relatives other than noted in pedigree.    Family History   Problem Relation Age of Onset    Cancer Mother 67        lymphoma (type? "not active," not on tx; "related to her blood")    Lung cancer Father 48        type? h/o smoking    No Known Problems Son     Breast cancer Other 73        unilat; stage I, but aggressive    Prostate cancer Paternal Uncle         (dx 50s/60? no " chemo?)    Breast cancer Paternal Cousin         (dx age?) ductal    Colon cancer Neg Hx     Esophageal cancer Neg Hx       Review of Systems   See HPI.     Patient reports peripheral neuropathy, is interested in trying acupuncture.   Patient's Distress Score today was 4/10 (on a scale of 0-10 with 10 being the worst).  She states she is fine and that she reported a 4 only due to day-to-day stressors.  She denies experiencing suicidal or homicidal ideations.  She has an upcoming appointment with [facility] psychologist, Dr. Giselle Berumen, and would like a call from [facility] to have her social work needs assessed.    OBJECTIVE     Past Medical History:   Diagnosis Date    Gastric cancer     Gastric ulcer     Pregnancy 08/12/2020    delivered on 8/12/2020    Umbilical hernia      Patient Active Problem List   Diagnosis    Gastric leak    Gastric fistula    Abnormal endoscopy of upper gastrointestinal tract    Chronic gastric ulcer with perforation    Other ascites    Microcytic anemia    Hematemesis with nausea    Gastric adenocarcinoma    Normocytic anemia    Severe malnutrition    Immunodeficiency due to chemotherapy    Peritoneal carcinomatosis    Non-intractable vomiting with nausea    Anxiety associated with cancer diagnosis    Inguinal adenopathy    Neuropathy due to chemotherapeutic drug     Physical Exam  Significantly limited secondary to the inherent nature of a virtual visit.  Very pleasant patient.  Constitutional       Appearance:  She appears to be in no distress.   Neurological     Mental Status:  She is alert and oriented.  Psychiatric        Mood and Affect:  She has a normal mood and affect.     Thought Content:  Thought content is normal.         Speech:  Speech is normal.     Behavior:  Behavior is normal.     Judgment:  Judgment is normal.   Genetics-specific     It is my assessment that the patient is ready to proceed with cancer-genetic testing  from a psychosocial perspective.    COUNSELING      Causes of cancer    Germline cancer genetic testing is the testing of genes associated with cancer, known as cancer susceptibility genes.  Just as these genes are inherited from parents, mutations in these genes can be inherited, as well.  A mutation in a cancer susceptibility gene adversely affects the gene's ability to prevent cancer; therefore, carriers of cancer susceptibility gene mutations may be at increased risk for certain cancers.    Only a small percentage of cancers are caused by a cancer susceptibility gene mutation, meaning the cancer is genetic/hereditary; rather, most cancers are sporadic.  Causes of sporadic cancers may include environmental risk factors, lifestyle risk factors, and non-modifiable risk factors.    Potential results of genetic testing, and their implications    Potential results of genetic testing include positive, negative, and variant of unknown significance (VUS).     A positive result indicates the presence of at least one clinically significant mutation, and the patient's associated cancer risks vary depending upon the cancer susceptibility gene(s) in which there is/are a mutation(s).  With a positive result, in some cases, depending upon the specific result and the patient's clinical history, modified risk management may be recommended, including measures for risk reduction and/or surveillance; however, modified management is not always an option.     A negative result indicates that no clinically significant mutations were identified in the gene(s) tested.     A VUS indicates that there is not presently enough data for the laboratory to make a determination as to whether the variant is clinically significant.  VUSs are not typically acted upon clinically.      The ability to interpret the meaning of a negative genetic testing result in genes associated with cancer with which the patient has not personally been affected,  when done prior to testing the appropriate affected relative(s), is significantly limited.  A negative result in the patient does not indicate that she cannot develop the associated cancer.    Sometimes, depending upon the genetic testing result and the cancer diagnosis, additional/modified treatments may be an option, though this is quite rare.    Genetic mutation inheritance    If Puja tests positive for a mutation, his first-degree relatives would each have a 50% chance of having the same mutation, and other, more distantly related blood-relatives would also be at risk of having the same mutation.       Genetic discrimination    The Genetic Information Nondiscrimination Act (AUDI) is [place] federal legislation that provides some protections against use of an individual's genetic information by their health insurer and by their employer.  Title I of AUDI prohibits most health insurers from utilizing an individual's genetic information to make decisions regarding insurance eligibility or premium charges.  Title II of AUDI prohibits covered entities, including employers, from requesting the genetic information of employees and applicants.  AUDI does not protect individuals from genetic discrimination toward health insurance obtained through a job with the  or through the Federal Employees Health Benefits Plan; from genetic discrimination by employers with fewer than 15 employees or if employed by the ; or from genetic discrimination by any other type of policies/entities, including but not limited to life insurance, disability insurance, long-term care insurance,  benefits, and [facility] benefits.     Genetic testing logistics    An outside laboratory would perform the testing after a blood sample is collected here at the [facility] or a saliva sample is collected by the patient at home.  With genetic testing, there is a potential for the patient to incur out-of-pocket  costs.  Post-test genetic counseling can be conducted once the genetic testing results are available.     Assessment/Plan    From a clinical perspective, I do recommend germline cancer genetic testing for Puja given her age at gastric cancer diagnosis and the finding in SAMUEL on her tumor testing.    Offered Puja germline cancer genetic testing at this time versus deferring testing at this time or declining testing altogether.  Puja desires to proceed with germline cancer genetic testing next week and has verbally provided her informed consent to proceed.  Various test panel options were discussed, and Puja will let me know with which test she desires to proceed.  Stressed to Puja that she needs to meet with this clinic briefly prior to her blood work next week.    Will refer Puja to [facility] for consideration of acupuncture.    Questions were encouraged and answered to the patient's satisfaction, and she verbalized understanding of information and agreement with the plan.       ASSESSMENT/PLAN      Puja was seen today for other and re-discuss germline cancer genetic testing.    Diagnoses and all orders for this visit:    Encounter for nonprocreative genetic counseling        - Pre-test cancer genetic counseling was conducted.  Offered Puja germline cancer genetic testing at this time versus deferring testing at this time or declining testing altogether.  Puja desires to proceed with germline cancer genetic testing next week and has verbally provided her informed consent to proceed.  Various test panel options were discussed, and Puja will let me know with which test she desires to proceed.    Malignant neoplasm of stomach, unspecified location  -     Ambulatory referral/consult to Internal Medicine; Future        - Ambulatory referral/consult to Oncology Social Work; Future    Neuropathy  -     Ambulatory referral/consult to Internal Medicine; Future     Follow-up:  Follow up  in about 1 week (around 2/21/2022) for genetic test consent-signing and sample collection.    Approximately 27 minutes were spent face-to-face with the patient.  Approximately 33 minutes in total were spent on this encounter, which includes face-to-face time and non-face-to-face time preparing to see the patient (e.g., review of tests), obtaining and/or reviewing separately obtained history, documenting clinical information in the electronic or other health record, independently interpreting results (not separately reported) and communicating results to the patient/family/caregiver, or care coordination (not separately reported).     Signed,    Phi French, DNP, APRN, FNP-BC, AOCNP  Nurse Practitioner, Hereditary/High-Risk Clinic  Hematology/Oncology, [facility]

## 2022-02-17 ENCOUNTER — DOCUMENTATION ONLY (OUTPATIENT)
Dept: HEMATOLOGY/ONCOLOGY | Facility: CLINIC | Age: 42
End: 2022-02-17
Payer: COMMERCIAL

## 2022-02-17 NOTE — PROGRESS NOTES
SW received referral from Kamaljit Alan and Puja Albarran, Both LCSW's. Pt needed call due to high score on recent anxiety test. SW spoke to patient  [phone] who explained it was just general anxiety due to CA. She said she is doing fine over all however. SW provided direct line and requested she call if services were needed. SW responded to Cory and Puja.

## 2022-02-18 ENCOUNTER — TELEPHONE (OUTPATIENT)
Dept: HEMATOLOGY/ONCOLOGY | Facility: CLINIC | Age: 42
End: 2022-02-18
Payer: COMMERCIAL

## 2022-02-18 NOTE — TELEPHONE ENCOUNTER
Called to set up nurse visit for genetic testing. She would like to hold off on genetic testing at this time. She is still working through different things with her insurance. Recall set for July of 2023, she would like to re-visit genetic testing at that time.     ----- Message from Phi French DNP sent at 2/14/2022  3:30 PM CST -----  Breana,    Please call pt to schedule nurse visit for next week for just prior to already scheduled lab appt for other provider.    Thank you,  Phi

## 2022-02-22 ENCOUNTER — OFFICE VISIT (OUTPATIENT)
Dept: HEMATOLOGY/ONCOLOGY | Facility: CLINIC | Age: 42
End: 2022-02-22
Payer: MEDICAID

## 2022-02-22 ENCOUNTER — INFUSION (OUTPATIENT)
Dept: INFUSION THERAPY | Facility: HOSPITAL | Age: 42
End: 2022-02-22
Payer: COMMERCIAL

## 2022-02-22 ENCOUNTER — HOSPITAL ENCOUNTER (OUTPATIENT)
Dept: INTERVENTIONAL RADIOLOGY/VASCULAR | Facility: HOSPITAL | Age: 42
Discharge: HOME | End: 2022-02-22
Attending: PHYSICIAN ASSISTANT
Payer: MEDICAID

## 2022-02-22 VITALS
HEART RATE: 68 BPM | WEIGHT: 132.88 LBS | OXYGEN SATURATION: 100 % | SYSTOLIC BLOOD PRESSURE: 135 MMHG | RESPIRATION RATE: 18 BRPM | BODY MASS INDEX: 20.85 KG/M2 | DIASTOLIC BLOOD PRESSURE: 80 MMHG | TEMPERATURE: 98 F | HEIGHT: 67 IN

## 2022-02-22 VITALS
HEART RATE: 75 BPM | SYSTOLIC BLOOD PRESSURE: 125 MMHG | OXYGEN SATURATION: 97 % | DIASTOLIC BLOOD PRESSURE: 90 MMHG | RESPIRATION RATE: 16 BRPM

## 2022-02-22 VITALS
TEMPERATURE: 98 F | SYSTOLIC BLOOD PRESSURE: 133 MMHG | HEART RATE: 50 BPM | DIASTOLIC BLOOD PRESSURE: 81 MMHG | RESPIRATION RATE: 18 BRPM

## 2022-02-22 DIAGNOSIS — R11.2 NON-INTRACTABLE VOMITING WITH NAUSEA, UNSPECIFIED VOMITING TYPE: ICD-10-CM

## 2022-02-22 DIAGNOSIS — D84.821 IMMUNODEFICIENCY DUE TO CHEMOTHERAPY: ICD-10-CM

## 2022-02-22 DIAGNOSIS — F41.1 ANXIETY ASSOCIATED WITH CANCER DIAGNOSIS: ICD-10-CM

## 2022-02-22 DIAGNOSIS — Z79.899 IMMUNODEFICIENCY DUE TO CHEMOTHERAPY: ICD-10-CM

## 2022-02-22 DIAGNOSIS — C80.1 ANXIETY ASSOCIATED WITH CANCER DIAGNOSIS: ICD-10-CM

## 2022-02-22 DIAGNOSIS — C78.6 PERITONEAL CARCINOMATOSIS: ICD-10-CM

## 2022-02-22 DIAGNOSIS — D50.9 MICROCYTIC ANEMIA: ICD-10-CM

## 2022-02-22 DIAGNOSIS — C79.51 BONE METASTASES: ICD-10-CM

## 2022-02-22 DIAGNOSIS — G62.0 NEUROPATHY DUE TO CHEMOTHERAPEUTIC DRUG: ICD-10-CM

## 2022-02-22 DIAGNOSIS — C16.9 MALIGNANT NEOPLASM OF STOMACH, UNSPECIFIED LOCATION: Primary | ICD-10-CM

## 2022-02-22 DIAGNOSIS — K25.5 CHRONIC GASTRIC ULCER WITH PERFORATION: ICD-10-CM

## 2022-02-22 DIAGNOSIS — T45.1X5A IMMUNODEFICIENCY DUE TO CHEMOTHERAPY: ICD-10-CM

## 2022-02-22 DIAGNOSIS — T45.1X5A NEUROPATHY DUE TO CHEMOTHERAPEUTIC DRUG: ICD-10-CM

## 2022-02-22 DIAGNOSIS — R18.8 OTHER ASCITES: ICD-10-CM

## 2022-02-22 DIAGNOSIS — C16.9 GASTRIC ADENOCARCINOMA: Primary | ICD-10-CM

## 2022-02-22 DIAGNOSIS — R59.0 INGUINAL ADENOPATHY: ICD-10-CM

## 2022-02-22 LAB
B-HCG UR QL: NEGATIVE
CTP QC/QA: YES

## 2022-02-22 PROCEDURE — 96367 TX/PROPH/DG ADDL SEQ IV INF: CPT

## 2022-02-22 PROCEDURE — 96416 CHEMO PROLONG INFUSE W/PUMP: CPT

## 2022-02-22 PROCEDURE — 1159F MED LIST DOCD IN RCRD: CPT | Mod: CPTII,,, | Performed by: PHYSICIAN ASSISTANT

## 2022-02-22 PROCEDURE — 99999 PR PBB SHADOW E&M-EST. PATIENT-LVL III: CPT | Mod: PBBFAC,,, | Performed by: PHYSICIAN ASSISTANT

## 2022-02-22 PROCEDURE — 99213 OFFICE O/P EST LOW 20 MIN: CPT | Mod: PBBFAC,25 | Performed by: PHYSICIAN ASSISTANT

## 2022-02-22 PROCEDURE — 99215 PR OFFICE/OUTPT VISIT, EST, LEVL V, 40-54 MIN: ICD-10-PCS | Mod: S$PBB,,, | Performed by: PHYSICIAN ASSISTANT

## 2022-02-22 PROCEDURE — 25000003 PHARM REV CODE 250: Performed by: INTERNAL MEDICINE

## 2022-02-22 PROCEDURE — 76705 ECHO EXAM OF ABDOMEN: CPT | Mod: 26,,, | Performed by: PHYSICIAN ASSISTANT

## 2022-02-22 PROCEDURE — 1159F PR MEDICATION LIST DOCUMENTED IN MEDICAL RECORD: ICD-10-PCS | Mod: CPTII,,, | Performed by: PHYSICIAN ASSISTANT

## 2022-02-22 PROCEDURE — 99999 PR PBB SHADOW E&M-EST. PATIENT-LVL III: ICD-10-PCS | Mod: PBBFAC,,, | Performed by: PHYSICIAN ASSISTANT

## 2022-02-22 PROCEDURE — 76705 IR US ABDOMEN LIMITED: ICD-10-PCS | Mod: 26,,, | Performed by: PHYSICIAN ASSISTANT

## 2022-02-22 PROCEDURE — 3075F SYST BP GE 130 - 139MM HG: CPT | Mod: CPTII,,, | Performed by: PHYSICIAN ASSISTANT

## 2022-02-22 PROCEDURE — 3008F PR BODY MASS INDEX (BMI) DOCUMENTED: ICD-10-PCS | Mod: CPTII,,, | Performed by: PHYSICIAN ASSISTANT

## 2022-02-22 PROCEDURE — 76705 ECHO EXAM OF ABDOMEN: CPT | Mod: TC | Performed by: RADIOLOGY

## 2022-02-22 PROCEDURE — 3079F PR MOST RECENT DIASTOLIC BLOOD PRESSURE 80-89 MM HG: ICD-10-PCS | Mod: CPTII,,, | Performed by: PHYSICIAN ASSISTANT

## 2022-02-22 PROCEDURE — 1160F RVW MEDS BY RX/DR IN RCRD: CPT | Mod: CPTII,,, | Performed by: PHYSICIAN ASSISTANT

## 2022-02-22 PROCEDURE — 3079F DIAST BP 80-89 MM HG: CPT | Mod: CPTII,,, | Performed by: PHYSICIAN ASSISTANT

## 2022-02-22 PROCEDURE — 1160F PR REVIEW ALL MEDS BY PRESCRIBER/CLIN PHARMACIST DOCUMENTED: ICD-10-PCS | Mod: CPTII,,, | Performed by: PHYSICIAN ASSISTANT

## 2022-02-22 PROCEDURE — 3008F BODY MASS INDEX DOCD: CPT | Mod: CPTII,,, | Performed by: PHYSICIAN ASSISTANT

## 2022-02-22 PROCEDURE — 3075F PR MOST RECENT SYSTOLIC BLOOD PRESS GE 130-139MM HG: ICD-10-PCS | Mod: CPTII,,, | Performed by: PHYSICIAN ASSISTANT

## 2022-02-22 PROCEDURE — 63600175 PHARM REV CODE 636 W HCPCS: Performed by: INTERNAL MEDICINE

## 2022-02-22 PROCEDURE — 99215 OFFICE O/P EST HI 40 MIN: CPT | Mod: S$PBB,,, | Performed by: PHYSICIAN ASSISTANT

## 2022-02-22 RX ORDER — DIPHENHYDRAMINE HYDROCHLORIDE 50 MG/ML
50 INJECTION INTRAMUSCULAR; INTRAVENOUS ONCE AS NEEDED
Status: CANCELLED | OUTPATIENT
Start: 2022-02-23 | End: 2033-07-21

## 2022-02-22 RX ORDER — HEPARIN 100 UNIT/ML
500 SYRINGE INTRAVENOUS
Status: DISCONTINUED | OUTPATIENT
Start: 2022-02-22 | End: 2022-02-22 | Stop reason: HOSPADM

## 2022-02-22 RX ORDER — DIPHENHYDRAMINE HYDROCHLORIDE 50 MG/ML
50 INJECTION INTRAMUSCULAR; INTRAVENOUS ONCE AS NEEDED
Status: DISCONTINUED | OUTPATIENT
Start: 2022-02-22 | End: 2022-02-22 | Stop reason: HOSPADM

## 2022-02-22 RX ORDER — HEPARIN 100 UNIT/ML
500 SYRINGE INTRAVENOUS
Status: CANCELLED | OUTPATIENT
Start: 2022-02-23

## 2022-02-22 RX ORDER — SODIUM CHLORIDE 0.9 % (FLUSH) 0.9 %
10 SYRINGE (ML) INJECTION
Status: CANCELLED | OUTPATIENT
Start: 2022-02-23

## 2022-02-22 RX ORDER — SODIUM CHLORIDE 0.9 % (FLUSH) 0.9 %
10 SYRINGE (ML) INJECTION
Status: CANCELLED | OUTPATIENT
Start: 2022-02-25

## 2022-02-22 RX ORDER — HEPARIN 100 UNIT/ML
500 SYRINGE INTRAVENOUS
Status: CANCELLED | OUTPATIENT
Start: 2022-02-25

## 2022-02-22 RX ORDER — EPINEPHRINE 0.3 MG/.3ML
0.3 INJECTION SUBCUTANEOUS ONCE AS NEEDED
Status: CANCELLED | OUTPATIENT
Start: 2022-02-23 | End: 2033-07-21

## 2022-02-22 RX ORDER — EPINEPHRINE 0.3 MG/.3ML
0.3 INJECTION SUBCUTANEOUS ONCE AS NEEDED
Status: DISCONTINUED | OUTPATIENT
Start: 2022-02-22 | End: 2022-02-22 | Stop reason: HOSPADM

## 2022-02-22 RX ORDER — SODIUM CHLORIDE 0.9 % (FLUSH) 0.9 %
10 SYRINGE (ML) INJECTION
Status: DISCONTINUED | OUTPATIENT
Start: 2022-02-22 | End: 2022-02-22 | Stop reason: HOSPADM

## 2022-02-22 RX ADMIN — FLUOROURACIL 4000 MG: 50 INJECTION, SOLUTION INTRAVENOUS at 10:02

## 2022-02-22 RX ADMIN — PALONOSETRON HYDROCHLORIDE 0.25 MG: 0.25 INJECTION, SOLUTION INTRAVENOUS at 09:02

## 2022-02-22 RX ADMIN — SODIUM CHLORIDE: 9 INJECTION, SOLUTION INTRAVENOUS at 09:02

## 2022-02-22 NOTE — PROGRESS NOTES
MEDICAL ONCOLOGY - ESTABLISHED PATIENT    Reason for visit: Gastric cancer     Best Contact Phone Number(s): [phone] (home)      Cancer/Stage/TNM:   Cancer Staging  No matching staging information was found for the patient.     Oncology History   Gastric adenocarcinoma   6/10/2021 Initial Diagnosis    Gastric adenocarcinoma     7/26/2021 -  Chemotherapy    Treatment Summary   Plan Name: OP FOLFOX 6 Q2W  Treatment Goal: Palliative  Status: Active  Start Date: 7/26/2021  End Date: 3/12/2022 (Planned)  Provider: Fer Ashraf MD  Chemotherapy: fluorouraciL 2,400 mg/m2 = 3,770 mg in sodium chloride 0.9% 100 mL chemo infusion, 2,400 mg/m2 = 3,770 mg, Intravenous, Over 46 hours, 16 of 17 cycles  Administration: 3,770 mg (7/26/2021), 3,770 mg (8/9/2021), 3,770 mg (8/23/2021), 3,770 mg (9/7/2021), 3,770 mg (9/21/2021), 3,770 mg (10/5/2021), 3,770 mg (10/19/2021), 3,770 mg (11/2/2021), 3,770 mg (11/15/2021), 3,770 mg (11/30/2021), 3,770 mg (12/13/2021), 4,000 mg (12/27/2021), 4,030 mg (1/10/2022), 4,030 mg (1/24/2022), 4,000 mg (2/7/2022)  oxaliplatin (ELOXATIN) 85 mg/m2 = 133 mg in dextrose 5 % 500 mL chemo infusion, 85 mg/m2 = 133 mg, Intravenous, Clinic/HOD 1 time, 9 of 9 cycles  Administration: 133 mg (7/26/2021), 133 mg (8/9/2021), 133 mg (8/23/2021), 133 mg (9/7/2021), 133 mg (9/21/2021), 133 mg (10/5/2021), 133 mg (10/19/2021), 133 mg (11/2/2021), 133 mg (11/15/2021)          Interim History:  41 y.o. female with metastatic gastric cancer who presents for follow-up prior to cycle 16 of chemotherapy, now on maintenance 5-FU. Continues to have moderate neuropathy to the hands and feet. In addition, she reports a shock-like sensation of the leg that occurs intermittently. She tried the Gabapentin but only for a few days. Did not notice much improvement. She is willing to try acupuncture. Has noticed increased distension of her stomach, scheduled to have a paracentesis today. Otherwise, she is doing fairly  well. No fever, chills, n/v, abdominal pain, diarrhea or constipation. She is eating and has a fair appetite. Weight is stable today.     Had a CT CAP on 2/10/2022 with abdominal ultrasound as well.     ECOG status is 1. Presents alone    Review of Systems   Constitutional: Negative for activity change, appetite change, chills, fatigue, fever and unexpected weight change.   HENT: Negative for ear pain, facial swelling, hearing loss, mouth sores, nosebleeds, sore throat and trouble swallowing.    Eyes: Negative for pain, discharge, redness and visual disturbance.   Respiratory: Negative for cough, chest tightness and shortness of breath.    Cardiovascular: Negative for chest pain, palpitations and leg swelling.   Gastrointestinal: Positive for abdominal distention and reflux (controlled with medication). Negative for abdominal pain, blood in stool, constipation, diarrhea, nausea and vomiting.   Endocrine: Negative for cold intolerance and heat intolerance.   Genitourinary: Negative for decreased urine volume, difficulty urinating, dysuria, frequency, hematuria, hot flashes, urgency and vaginal bleeding.   Musculoskeletal: Negative for arthralgias, gait problem, leg pain and myalgias.   Integumentary:  Negative for pallor, rash and wound.   Allergic/Immunologic: Negative for immunocompromised state.   Neurological: Positive for numbness. Negative for dizziness, tremors, weakness, light-headedness and headaches.   Hematological: Negative for adenopathy. Does not bruise/bleed easily.   Psychiatric/Behavioral: Negative for agitation, confusion, dysphoric mood and sleep disturbance. The patient is not nervous/anxious.            Past Medical History:   Past Medical History:   Diagnosis Date    Gastric cancer     Gastric ulcer     Pregnancy 08/12/2020    delivered on 8/12/2020    Umbilical hernia         Past Surgical History:   Past Surgical History:   Procedure Laterality Date    CLOSURE OF PERFORATED ULCER OF  "DUODENUM USING OMENTAL PATCH      ESOPHAGOGASTRODUODENOSCOPY N/A 10/13/2020    Procedure: EGD (ESOPHAGOGASTRODUODENOSCOPY);  Surgeon: Rosio Marion MD;  Location: [facility] ENDO (2ND FLR);  Service: Endoscopy;  Laterality: N/A;    ESOPHAGOGASTRODUODENOSCOPY N/A 2020    Procedure: EGD (ESOPHAGOGASTRODUODENOSCOPY);  Surgeon: Rosio Marion MD;  Location: [facility] ENDO (2ND FLR);  Service: Endoscopy;  Laterality: N/A;  Covid-19 test 20 at [facility] -     ESOPHAGOGASTRODUODENOSCOPY N/A 3/12/2021    Procedure: EGD (ESOPHAGOGASTRODUODENOSCOPY);  Surgeon: Nav Omer MD;  Location: [facility] ENDO (2ND FLR);  Service: Endoscopy;  Laterality: N/A;  COVID at [facility] 3/9 ttr    ESOPHAGOGASTRODUODENOSCOPY N/A 2021    Procedure: EGD (ESOPHAGOGASTRODUODENOSCOPY);  Surgeon: Rosio Marion MD;  Location: [facility] ENDO (2ND FLR);  Service: Endoscopy;  Laterality: N/A;  5/15-covid pcw-inst portal-tb    ESOPHAGOGASTRODUODENOSCOPY N/A 2021    Procedure: EGD (ESOPHAGOGASTRODUODENOSCOPY);  Surgeon: Socrates Terrazas MD;  Location: [facility] ENDO (2ND FLR);  Service: Endoscopy;  Laterality: N/A;        Family History:   Family History   Problem Relation Age of Onset    Cancer Mother 67        lymphoma (type? "not active," not on tx; "related to her blood")    Lung cancer Father 48        type? h/o smoking    No Known Problems Son     Breast cancer Other 73        unilat; stage I, but aggressive    Prostate cancer Paternal Uncle         (dx 50s/60? no chemo?)    Breast cancer Paternal Cousin         (dx age?) ductal    Colon cancer Neg Hx     Esophageal cancer Neg Hx         Social History:   Social History     Tobacco Use    Smoking status: Former Smoker     Types: Cigarettes     Quit date: 2019     Years since quittin.2    Smokeless tobacco: Never Used   Substance Use Topics    Alcohol use: Yes        I have reviewed and updated the patient's past medical, surgical, family and social " histories.    Allergies:   Review of patient's allergies indicates:  No Known Allergies     Medications:   Current Outpatient Medications   Medication Sig Dispense Refill    acetaminophen (TYLENOL) 500 MG tablet Take 500 mg by mouth every 6 (six) hours as needed for Pain.      amitriptyline (ELAVIL) 10 MG tablet Take 1 tablet (10 mg total) by mouth every evening. 30 tablet 3    amitriptyline (ELAVIL) 10 MG tablet Take 10 mg by mouth.      dicyclomine (BENTYL) 10 MG capsule TAKE ONE CAPSULE BY MOUTH FOUR TIMES DAILY 120 capsule 0    ferrous sulfate (FEOSOL) 325 mg (65 mg iron) Tab tablet Take 1 tablet (325 mg total) by mouth once daily for 7 days, THEN 1 tablet (325 mg total) 2 (two) times daily. 60 tablet 10    ferrous sulfate (FEOSOL) 325 mg (65 mg iron) Tab tablet Take by mouth.      furosemide (LASIX) 20 MG tablet Take 10 mg by mouth.      furosemide (LASIX) 40 MG tablet Take 0.5 tablets (20 mg total) by mouth daily as needed (leg swelling or abdominal swelling). 30 tablet 1    furosemide (LASIX) 40 MG tablet       gabapentin (NEURONTIN) 300 MG capsule Take 1 capsule (300 mg total) by mouth 3 (three) times daily. 90 capsule 11    HYDROcodone-acetaminophen (NORCO) 5-325 mg per tablet Take 1 tablet by mouth.      HYDROcodone-acetaminophen (NORCO) 5-325 mg per tablet Take 1 tablet by mouth 3 (three) times daily as needed.      LIDOcaine-prilocaine (EMLA) cream Apply to Port-A-Cath area 30 to 45 minutes prior to port access as directed (topical anesthetic use to the anterior chest only).      LIDOcaine-prilocaine (EMLA) cream Apply topically.      metoclopramide HCl (REGLAN) 5 MG tablet 5 mg.      multivitamin with folic acid 400 mcg Tab Take 1 tablet by mouth once daily.      ondansetron (ZOFRAN) 8 MG tablet Take 1 tablet (8 mg total) by mouth every 8 (eight) hours as needed for Nausea. 60 tablet 2    pantoprazole (PROTONIX) 40 MG tablet Take 40 mg by mouth.      pantoprazole (PROTONIX) 40 MG  tablet Take 1 tablet (40 mg total) by mouth 2 (two) times daily. 60 tablet 11    potassium chloride SA (K-DUR,KLOR-CON) 20 MEQ tablet 20 mEq.      prochlorperazine (COMPAZINE) 10 MG tablet TAKE 1 TABLET(10 MG) BY MOUTH EVERY 6 HOURS AS NEEDED FOR NAUSEA 90 tablet 2    prochlorperazine (COMPAZINE) 10 MG tablet as needed.      senna-docusate 8.6-50 mg (PERICOLACE) 8.6-50 mg per tablet Take 1 tablet by mouth.      senna-docusate 8.6-50 mg (SENNA WITH DOCUSATE SODIUM) 8.6-50 mg per tablet Take 1 tablet by mouth 2 (two) times daily as needed for Constipation.      sucralfate (CARAFATE) 1 gram tablet TAKE 1 TABLET(1 GRAM) BY MOUTH FOUR TIMES DAILY FOR 14 DAYS 56 tablet 0    sucralfate (CARAFATE) 1 gram tablet       traMADoL (ULTRAM) 50 mg tablet Take 50 mg by mouth.      traMADoL (ULTRAM) 50 mg tablet Take by mouth.       No current facility-administered medications for this visit.     Facility-Administered Medications Ordered in Other Visits   Medication Dose Route Frequency Provider Last Rate Last Admin    alteplase injection 2 mg  2 mg Intra-Catheter PRN Fer Ashraf MD        dextrose 5 % 250 mL flush bag   Intravenous 1 time in Clinic/HOD Fer Ashraf MD        diphenhydrAMINE injection 50 mg  50 mg Intravenous Once PRN Fer Ashraf MD        EPINEPHrine (EPIPEN) 0.3 mg/0.3 mL pen injection 0.3 mg  0.3 mg Intramuscular Once PRN Fer Ashraf MD        fluorouracil (ADRUCIL) 4,000 mg in sodium chloride 0.9% 100 mL chemo infusion  4,000 mg Intravenous over 46 hr Fer Ashraf MD        heparin, porcine (PF) 100 unit/mL injection flush 500 Units  500 Units Intravenous PRN Fer Ashraf MD        hydrocortisone sodium succinate injection 100 mg  100 mg Intravenous Once PRN Fer Ashraf MD        sodium chloride 0.9% flush 10 mL  10 mL Intravenous PRN Fer Ashraf MD            Physical Exam:   /80 (BP Location: Left arm, Patient Position:  "Sitting, BP Method: Small (Automatic))   Pulse 68   Temp 97.7 °F (36.5 °C) (Oral)   Resp 18   Ht 5' 7" (1.702 m)   Wt 60.3 kg (132 lb 14.4 oz)   SpO2 100%   BMI 20.82 kg/m²      ECOG Performance status: 1            Physical Exam  Vitals reviewed.   Constitutional:       Appearance: Normal appearance. She is not ill-appearing or toxic-appearing.      Comments: Thin   HENT:      Head: Normocephalic and atraumatic.      Nose: Nose normal.      Mouth/Throat:      Mouth: Mucous membranes are moist.      Pharynx: Oropharynx is clear.   Eyes:      General: No scleral icterus.     Extraocular Movements: Extraocular movements intact.      Conjunctiva/sclera: Conjunctivae normal.   Cardiovascular:      Rate and Rhythm: Normal rate and regular rhythm.      Pulses: Normal pulses.      Heart sounds: Normal heart sounds.   Pulmonary:      Effort: Pulmonary effort is normal.      Breath sounds: Normal breath sounds.   Abdominal:      General: Bowel sounds are normal. There is distension (moderate).      Palpations: Abdomen is soft.      Tenderness: There is no abdominal tenderness. There is no guarding.      Comments: Mild enlargement of L inguinal lymph node. Mildly tender   Musculoskeletal:         General: No swelling or tenderness. Normal range of motion.      Cervical back: Normal range of motion and neck supple.      Right lower leg: No edema.      Left lower leg: No edema.   Skin:     General: Skin is warm.      Findings: No rash.   Neurological:      General: No focal deficit present.      Mental Status: She is alert. Mental status is at baseline.   Psychiatric:         Mood and Affect: Mood normal.         Behavior: Behavior normal.             Labs:   Recent Results (from the past 48 hour(s))   CBC Oncology    Collection Time: 02/21/22 10:29 AM   Result Value Ref Range    WBC 3.43 (L) 3.90 - 12.70 K/uL    RBC 3.80 (L) 4.00 - 5.40 M/uL    Hemoglobin 12.4 12.0 - 16.0 g/dL    Hematocrit 38.0 37.0 - 48.5 %     " (H) 82 - 98 fL    MCH 32.6 (H) 27.0 - 31.0 pg    MCHC 32.6 32.0 - 36.0 g/dL    RDW 13.4 11.5 - 14.5 %    Platelets 166 150 - 450 K/uL    MPV 9.2 9.2 - 12.9 fL    Gran # (ANC) 1.9 1.8 - 7.7 K/uL    Immature Grans (Abs) 0.01 0.00 - 0.04 K/uL   Comprehensive Metabolic Panel    Collection Time: 02/21/22 10:29 AM   Result Value Ref Range    Sodium 139 136 - 145 mmol/L    Potassium 4.1 3.5 - 5.1 mmol/L    Chloride 109 95 - 110 mmol/L    CO2 28 23 - 29 mmol/L    Glucose 103 70 - 110 mg/dL    BUN 9 6 - 20 mg/dL    Creatinine 0.7 0.5 - 1.4 mg/dL    Calcium 9.2 8.7 - 10.5 mg/dL    Total Protein 6.6 6.0 - 8.4 g/dL    Albumin 3.4 (L) 3.5 - 5.2 g/dL    Total Bilirubin 0.8 0.1 - 1.0 mg/dL    Alkaline Phosphatase 92 55 - 135 U/L    AST 18 10 - 40 U/L    ALT 16 10 - 44 U/L    Anion Gap 2 (L) 8 - 16 mmol/L    eGFR if African American >60 >60 mL/min/1.73 m^2    eGFR if non African American >60 >60 mL/min/1.73 m^2        I have reviewed the pertinent labs from today which are acceptable for treatment.  Albumin mild decrease    Imaging:    CT CAP: 2/10/2022    Impression:     Persistent gastric wall thickening involving the distal stomach, similar to the prior examination.     Numerous hepatic hypodensities some of which likely represent cysts, however there is a more solid-appearing soft tissue density liver lesion within the posteromedial right lobe, unchanged.     Subtle sclerotic bone lesions within the thoracic spine remain stable.     Small to moderate left pleural effusion, increased when compared to the prior study.     Moderate ascites, increased when compared to the prior study.     Probable necrotic enlarged left inguinal lymph node, similar to the prior examination.     Nodular enhancement within the anterior peritoneum anterior to the stomach, possibly peritoneal carcinomatosis.     Probable left lobe thyroid nodule, stable compared to 10/08/2021.     We have personally reviewed the most recent imaging from 10/8/2021,  which is displays overall stability of disease. There is a L sided thyroid nodule that is indeterminate at this point. Decreased amount of ascites.      Abdominal US 2/10/2022:     FINDINGS:  There is moderate, anechoic abdominal ascites, most prominent within the lower quadrants.     Impression:     Moderate, anechoic abdominal ascites.  Left inguinal hernia containing fat and fluid.    Path:   5/26/21:  Positive for malignancy.   Poorly differentiated adenocarcinoma growing with signet ring features   Immunostains for Bruce-EP4 and CEA are positive.  The controls stain   appropriately.   This case was reviewed by Dr Sebastian who concurs with the diagnosis       Assessment:       1. Malignant neoplasm of stomach, unspecified location    2. Peritoneal carcinomatosis    3. Immunodeficiency due to chemotherapy    4. Bone metastases    5. Other ascites    6. Non-intractable vomiting with nausea, unspecified vomiting type    7. Chronic gastric ulcer with perforation    8. Microcytic anemia    9. Anxiety associated with cancer diagnosis    10. Inguinal adenopathy    11. Neuropathy due to chemotherapeutic drug          Plan:             # Gastric adenocarcinoma with peritoneal metastases, immunodeficiency due to chemotherapy, bone metastases  HER-2 negative by FISH.  PD-L1 < 1%.  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  We previously explained the implications of a stage IV diagnosis to her and potential treatment options.  She is now s/p port placement. She sought a second opinion at [facility] for zolbetuximab trial targeting CLDN protein but she did not test positive for this biomarker. We recommended proceeding with SOC FOLFOX, with which the [facility] team agreed. Because of the negative PD-L1 I do not think the benefit of adding nivolumab outweighs the potential toxicities.       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  She opted not to get genetic testing done at her appt with Phi. We recommended that she reconsider that decision in light of a very likely germline mutation in SAMUEL which has clinical consequences.      Tolerating chemotherapy well thus far.  She has experienced improvement in many of her GI symptoms since starting treatment.    CT CAP after 6 cycles showed improvement in ascites, probable hepatic, thyroid and bone metastases.  Given improvement in symptoms and delay between baseline imaging and cycle 1, suspect she is having benefit from chemotherapy. She was in agreement with this.    We dropped oxaliplatin starting with cycle 10 due to grade 1 neuropathy and desire to keep it from worsening. Doing well overall. Stable neuropathy in the feet. Otherwise, tolerating treatment good.   CT CAP after cycle 10 shows stable disease.  CT scan after cycle 15 continues to display overall stability of disease within the gastric wall, peritoneum, spine and liver. She did have a moderate amount of ascites noted on CT and is scheduled to have a paracentesis performed today. Although increase ascites can represent progression of disease, her CT suggests overall stability within the liver and gastric wall, so we recc to continue with treatment at this time. Pt is in agreement.     Proceed with cycle 16 of chemotherapy today, 5-FU maintenance. RTC in 2 weeks, for cycle 17. Will repeat a CT scan in approx. 2 mos    #Ascites  Abdominal distension increased. No dyspnea or worsening abdominal pain  Scheduled for paracentesis today   Drainage decreased considerably from peritoneal catheter with chemotherapy.   Now s/p removal on 10/29/21 by IR.     # Nausea, gastric ulcer  Stable. No vomiting.   Not taking Carafate. Continue Protonix.  Continue Compazine PRN.  Continue dietary modifications to prevent indigestion.    # Anemia  Hgb normal. Doing  well.    Iron deficiency due to chronic blood loss.  S/p 2 doses of Injectafer.    # Anxiety with cancer diagnosis  Feeling well today.   Scheduled to see Psych Onch on 3/18 with Dr. Sanders    # lymphadenopathy of L groin  L inguinal hernia was noted on US. Not strangulated or incarcerated at this time.   Will continue to monitor. Advised not lift greater than 15lbs.   Does not require surgery at this time.     # chemotherapy induced neuropathy  2/2 previous chemotherapy with Oxaliplatin  She is open to trying acupuncture. Tried Gabapentin 300mg qhs but did not take it regularly. Didn't find much relief with the medication.   Will continue to monitor.     Follow up: Will return in 2 weeks for lab work, clinic visit, and cycle 17 of chemo.    Route Chart for Scheduling    Med Onc Chart Routing      Follow up with physician    Follow up with ISHMAEL 2 weeks. Please schedule cbc, cmp, clinic visit with April and cycle 17 of maintenance 5-FU with pump d/c on day 3 in 2 weeks. Pt would like all appoinments on Mondays if possible and is willing to receive treatment at the [facility]. Thank you   Labs CBC and CMP   Lab interval:     Imaging    Pharmacy appointment    Other referrals          Treatment Plan Information   OP FOLFOX 6 Q2W   Fer Ashraf MD   Upcoming Treatment Dates - OP FOLFOX 6 Q2W    3/10/2022       Chemotherapy       fluorouracil (ADRUCIL) 4,000 mg in sodium chloride 0.9% 100 mL chemo infusion    Therapy Plan Information  EPINEPHrine (EPIPEN) 0.3 mg/0.3 mL pen injection 0.3 mg  0.3 mg, Intramuscular, PRN  diphenhydrAMINE injection 50 mg  50 mg, Intravenous, PRN  methylPREDNISolone sodium succinate injection 125 mg  125 mg, Intravenous, PRN  sodium chloride 0.9% bolus 1,000 mL  1,000 mL, Intravenous, PRN      Pte and family members displayed understanding of the above encounter and treatment plan. All thoughtful questions were answered to their satisfaction. Pte was advised to notify the care team or  proceed to the ER if signs and symptoms worsen. Pt was staffed with Dr. Ashraf today.    JHONATAN Kramer, PA-C  Physician Assistant Certified  Dept of Hematology/Oncology  PA-C to Dr. Owens, Dr. Ashraf and Dr. Julius Ashraf MD  Hematology/Oncology  [facility]

## 2022-02-22 NOTE — PROCEDURES
During ultrasound evaluation, insufficient ascites identified for safe paracentesis. No paracentesis performed.     Radha Mcelroy PA-C  Interventional Radiology  Clinic [phone]

## 2022-02-22 NOTE — PLAN OF CARE
Pt tolerated 5FU CAD pump connection today. NAD. Port flushed + blood return present, flushed. 5FU CADD pump infusing at a rate of 2.2ml/hr. Verified settings with 2RNs. Infusion decreasing. Pt to RTC on Thursday at 9am for pump d/c per pt request. declined AVS. Uses my [facility]. Discharged. Ambulated independently.   Problem: Adult Inpatient Plan of Care  Goal: Optimal Comfort and Wellbeing  Intervention: Provide Person-Centered Care  Flowsheets (Taken 2/22/2022 1209)  Trust Relationship/Rapport:   care explained   thoughts/feelings acknowledged   choices provided   emotional support provided   empathic listening provided   questions answered   questions encouraged   reassurance provided

## 2022-02-22 NOTE — H&P
Radiology History & Physical      SUBJECTIVE:     Chief Complaint: abdominal distention    History of Present Illness:  Puja Quigley is a 41 y.o. female who presents for ultrasound guided paracentesis  Past Medical History:   Diagnosis Date    Gastric cancer     Gastric ulcer     Pregnancy 08/12/2020    delivered on 8/12/2020    Umbilical hernia      Past Surgical History:   Procedure Laterality Date    CLOSURE OF PERFORATED ULCER OF DUODENUM USING OMENTAL PATCH      ESOPHAGOGASTRODUODENOSCOPY N/A 10/13/2020    Procedure: EGD (ESOPHAGOGASTRODUODENOSCOPY);  Surgeon: Rosio Marion MD;  Location: [facility] (2ND FLR);  Service: Endoscopy;  Laterality: N/A;    ESOPHAGOGASTRODUODENOSCOPY N/A 12/11/2020    Procedure: EGD (ESOPHAGOGASTRODUODENOSCOPY);  Surgeon: Rosio Marion MD;  Location: [facility] ENDO (2ND FLR);  Service: Endoscopy;  Laterality: N/A;  Covid-19 test 12/8/20 at [facility]    ESOPHAGOGASTRODUODENOSCOPY N/A 3/12/2021    Procedure: EGD (ESOPHAGOGASTRODUODENOSCOPY);  Surgeon: Nav Omer MD;  Location: [facility] (2ND FLR);  Service: Endoscopy;  Laterality: N/A;  COVID at [facility] 3/9 [facility]    ESOPHAGOGASTRODUODENOSCOPY N/A 5/18/2021    Procedure: EGD (ESOPHAGOGASTRODUODENOSCOPY);  Surgeon: Rosio Marion MD;  Location: [facility] (2ND FLR);  Service: Endoscopy;  Laterality: N/A;  5/15-covid pcw-inst portal-tb    ESOPHAGOGASTRODUODENOSCOPY N/A 5/26/2021    Procedure: EGD (ESOPHAGOGASTRODUODENOSCOPY);  Surgeon: Socrates Terrazas MD;  Location: [facility] ENDO (2ND FLR);  Service: Endoscopy;  Laterality: N/A;       Home Meds:   Prior to Admission medications    Medication Sig Start Date End Date Taking? Authorizing Provider   acetaminophen (TYLENOL) 500 MG tablet Take 500 mg by mouth every 6 (six) hours as needed for Pain.    Historical Provider   amitriptyline (ELAVIL) 10 MG tablet Take 1 tablet (10 mg total) by mouth every evening. 5/13/21   Socrates Terrazas MD   amitriptyline (ELAVIL) 10 MG  tablet Take 10 mg by mouth. 5/13/21   Historical Provider   dicyclomine (BENTYL) 10 MG capsule TAKE ONE CAPSULE BY MOUTH FOUR TIMES DAILY 9/27/21   Kamille Hooks PA-C   ferrous sulfate (FEOSOL) 325 mg (65 mg iron) Tab tablet Take 1 tablet (325 mg total) by mouth once daily for 7 days, THEN 1 tablet (325 mg total) 2 (two) times daily. 5/26/21 5/26/22  Reshma Wilson MD   ferrous sulfate (FEOSOL) 325 mg (65 mg iron) Tab tablet Take by mouth. 5/26/21 5/26/22  Historical Provider   furosemide (LASIX) 20 MG tablet Take 10 mg by mouth.    Historical Provider   furosemide (LASIX) 40 MG tablet Take 0.5 tablets (20 mg total) by mouth daily as needed (leg swelling or abdominal swelling). 5/26/21 5/26/22  Reshma Wilson MD   furosemide (LASIX) 40 MG tablet  5/26/21   Historical Provider   gabapentin (NEURONTIN) 300 MG capsule Take 1 capsule (300 mg total) by mouth 3 (three) times daily. 2/7/22 2/7/23  Kamille Hooks PA-C   HYDROcodone-acetaminophen (NORCO) 5-325 mg per tablet Take 1 tablet by mouth. 7/9/21   Historical Provider   HYDROcodone-acetaminophen (NORCO) 5-325 mg per tablet Take 1 tablet by mouth 3 (three) times daily as needed. 7/9/21   Historical Provider   LIDOcaine-prilocaine (EMLA) cream Apply to Port-A-Cath area 30 to 45 minutes prior to port access as directed (topical anesthetic use to the anterior chest only). 6/25/21   Historical Provider   LIDOcaine-prilocaine (EMLA) cream Apply topically. 6/25/21   Historical Provider   metoclopramide HCl (REGLAN) 5 MG tablet 5 mg. 6/11/21   Historical Provider   multivitamin with folic acid 400 mcg Tab Take 1 tablet by mouth once daily.    Historical Provider   ondansetron (ZOFRAN) 8 MG tablet Take 1 tablet (8 mg total) by mouth every 8 (eight) hours as needed for Nausea. 7/26/21   Fer Ashraf MD   pantoprazole (PROTONIX) 40 MG tablet Take 40 mg by mouth. 6/2/21   Historical Provider   pantoprazole (PROTONIX) 40 MG tablet Take 1 tablet (40 mg total) by  mouth 2 (two) times daily. 2/1/22   Fer Ashraf MD   potassium chloride SA (K-DUR,KLOR-CON) 20 MEQ tablet 20 mEq. 5/26/21   Historical Provider   prochlorperazine (COMPAZINE) 10 MG tablet TAKE 1 TABLET(10 MG) BY MOUTH EVERY 6 HOURS AS NEEDED FOR NAUSEA 8/7/21   Fer Ashraf MD   prochlorperazine (COMPAZINE) 10 MG tablet as needed. 6/3/21   Historical Provider   senna-docusate 8.6-50 mg (PERICOLACE) 8.6-50 mg per tablet Take 1 tablet by mouth. 5/26/21   Historical Provider   senna-docusate 8.6-50 mg (SENNA WITH DOCUSATE SODIUM) 8.6-50 mg per tablet Take 1 tablet by mouth 2 (two) times daily as needed for Constipation. 5/26/21   Reshma Wilson MD   sucralfate (CARAFATE) 1 gram tablet TAKE 1 TABLET(1 GRAM) BY MOUTH FOUR TIMES DAILY FOR 14 DAYS 10/20/21   Rosio Marion MD   sucralfate (CARAFATE) 1 gram tablet  4/8/21   Historical Provider   traMADoL (ULTRAM) 50 mg tablet Take 50 mg by mouth. 6/25/21   Historical Provider   traMADoL (ULTRAM) 50 mg tablet Take by mouth. 6/25/21   Historical Provider     Anticoagulants/Antiplatelets: no anticoagulation    Allergies: Review of patient's allergies indicates:  No Known Allergies  Sedation History:  no adverse reactions    Review of Systems:   Hematological: no known coagulopathies  Respiratory: no shortness of breath  Cardiovascular: no chest pain  Gastrointestinal: no abdominal pain  Genito-Urinary: no dysuria  Musculoskeletal: negative  Neurological: no TIA or stroke symptoms         OBJECTIVE:     Vital Signs (Most Recent)       Physical Exam:  ASA: 2  Mallampati: n/a    General: no acute distress  Mental Status: alert and oriented to person, place and time  HEENT: normocephalic, atraumatic  Chest: unlabored breathing  Heart: regular heart rate  Abdomen: nondistended  Extremity: moves all extremities    ASSESSMENT/PLAN:     Sedation Plan: local  Patient will undergo ultrasound guided paracentesis.    Radha Mcelroy PA-C  Interventional  Radiology  Clinic [phone]

## 2022-02-22 NOTE — PROGRESS NOTES
PT in MPU for paracentesis. No acute distress noted. Labs and orders reviewed on chart. Awaiting consent. Pregnancy test obtained

## 2022-02-23 ENCOUNTER — PATIENT MESSAGE (OUTPATIENT)
Dept: HEMATOLOGY/ONCOLOGY | Facility: CLINIC | Age: 42
End: 2022-02-23
Payer: COMMERCIAL

## 2022-02-23 DIAGNOSIS — K25.5 CHRONIC GASTRIC ULCER WITH PERFORATION: Primary | ICD-10-CM

## 2022-02-23 RX ORDER — OMEPRAZOLE 40 MG/1
40 CAPSULE, DELAYED RELEASE ORAL DAILY
Qty: 30 CAPSULE | Refills: 11 | Status: SHIPPED | OUTPATIENT
Start: 2022-02-23 | End: 2022-05-25 | Stop reason: SDUPTHER

## 2022-02-23 RX ORDER — FAMOTIDINE 20 MG/1
20 TABLET, FILM COATED ORAL NIGHTLY PRN
Qty: 60 TABLET | Refills: 1 | Status: SHIPPED | OUTPATIENT
Start: 2022-02-23 | End: 2023-02-23

## 2022-02-24 ENCOUNTER — INFUSION (OUTPATIENT)
Dept: INFUSION THERAPY | Facility: HOSPITAL | Age: 42
End: 2022-02-24
Payer: MEDICAID

## 2022-02-24 VITALS
SYSTOLIC BLOOD PRESSURE: 125 MMHG | HEART RATE: 60 BPM | DIASTOLIC BLOOD PRESSURE: 76 MMHG | RESPIRATION RATE: 18 BRPM | TEMPERATURE: 98 F

## 2022-02-24 DIAGNOSIS — C16.9 MALIGNANT NEOPLASM OF STOMACH, UNSPECIFIED LOCATION: Primary | ICD-10-CM

## 2022-02-24 PROCEDURE — A4216 STERILE WATER/SALINE, 10 ML: HCPCS | Performed by: INTERNAL MEDICINE

## 2022-02-24 PROCEDURE — 25000003 PHARM REV CODE 250: Performed by: INTERNAL MEDICINE

## 2022-02-24 PROCEDURE — 99211 OFF/OP EST MAY X REQ PHY/QHP: CPT

## 2022-02-24 PROCEDURE — 63600175 PHARM REV CODE 636 W HCPCS: Performed by: INTERNAL MEDICINE

## 2022-02-24 RX ORDER — HEPARIN 100 UNIT/ML
500 SYRINGE INTRAVENOUS
Status: DISCONTINUED | OUTPATIENT
Start: 2022-02-24 | End: 2022-02-24 | Stop reason: HOSPADM

## 2022-02-24 RX ORDER — SODIUM CHLORIDE 0.9 % (FLUSH) 0.9 %
10 SYRINGE (ML) INJECTION
Status: DISCONTINUED | OUTPATIENT
Start: 2022-02-24 | End: 2022-02-24 | Stop reason: HOSPADM

## 2022-02-24 RX ADMIN — HEPARIN 500 UNITS: 100 SYRINGE at 09:02

## 2022-02-24 RX ADMIN — Medication 10 ML: at 09:02

## 2022-03-02 ENCOUNTER — PATIENT MESSAGE (OUTPATIENT)
Dept: HEMATOLOGY/ONCOLOGY | Facility: CLINIC | Age: 42
End: 2022-03-02
Payer: COMMERCIAL

## 2022-03-02 NOTE — TELEPHONE ENCOUNTER
2 weeks. Please schedule cbc, cmp, clinic visit with April and cycle 17 of maintenance 5-FU with pump d/c on day 3 in 2 weeks. Pt would like all appoinments on Mondays if possible and is willing to receive treatment at the [facility]. Thank you

## 2022-03-04 ENCOUNTER — OFFICE VISIT (OUTPATIENT)
Dept: HEMATOLOGY/ONCOLOGY | Facility: CLINIC | Age: 42
End: 2022-03-04
Payer: MEDICAID

## 2022-03-04 ENCOUNTER — TELEPHONE (OUTPATIENT)
Dept: HEMATOLOGY/ONCOLOGY | Facility: CLINIC | Age: 42
End: 2022-03-04
Payer: COMMERCIAL

## 2022-03-04 ENCOUNTER — LAB VISIT (OUTPATIENT)
Dept: LAB | Facility: HOSPITAL | Age: 42
End: 2022-03-04
Payer: COMMERCIAL

## 2022-03-04 ENCOUNTER — PATIENT MESSAGE (OUTPATIENT)
Dept: HEMATOLOGY/ONCOLOGY | Facility: CLINIC | Age: 42
End: 2022-03-04
Payer: COMMERCIAL

## 2022-03-04 VITALS
OXYGEN SATURATION: 100 % | SYSTOLIC BLOOD PRESSURE: 123 MMHG | DIASTOLIC BLOOD PRESSURE: 76 MMHG | RESPIRATION RATE: 18 BRPM | WEIGHT: 137.38 LBS | HEART RATE: 67 BPM | HEIGHT: 67 IN | BODY MASS INDEX: 21.56 KG/M2 | TEMPERATURE: 99 F

## 2022-03-04 DIAGNOSIS — C16.9 MALIGNANT NEOPLASM OF STOMACH, UNSPECIFIED LOCATION: ICD-10-CM

## 2022-03-04 DIAGNOSIS — F41.1 ANXIETY ASSOCIATED WITH CANCER DIAGNOSIS: ICD-10-CM

## 2022-03-04 DIAGNOSIS — D50.9 MICROCYTIC ANEMIA: ICD-10-CM

## 2022-03-04 DIAGNOSIS — C79.51 BONE METASTASES: ICD-10-CM

## 2022-03-04 DIAGNOSIS — Z79.899 IMMUNODEFICIENCY DUE TO CHEMOTHERAPY: ICD-10-CM

## 2022-03-04 DIAGNOSIS — T45.1X5A IMMUNODEFICIENCY DUE TO CHEMOTHERAPY: ICD-10-CM

## 2022-03-04 DIAGNOSIS — C78.6 PERITONEAL CARCINOMATOSIS: Primary | ICD-10-CM

## 2022-03-04 DIAGNOSIS — R11.2 NON-INTRACTABLE VOMITING WITH NAUSEA, UNSPECIFIED VOMITING TYPE: ICD-10-CM

## 2022-03-04 DIAGNOSIS — R18.8 OTHER ASCITES: ICD-10-CM

## 2022-03-04 DIAGNOSIS — C16.9 MALIGNANT NEOPLASM OF STOMACH, UNSPECIFIED LOCATION: Primary | ICD-10-CM

## 2022-03-04 DIAGNOSIS — T45.1X5A NEUROPATHY DUE TO CHEMOTHERAPEUTIC DRUG: ICD-10-CM

## 2022-03-04 DIAGNOSIS — C80.1 ANXIETY ASSOCIATED WITH CANCER DIAGNOSIS: ICD-10-CM

## 2022-03-04 DIAGNOSIS — C78.6 PERITONEAL CARCINOMATOSIS: ICD-10-CM

## 2022-03-04 DIAGNOSIS — D84.821 IMMUNODEFICIENCY DUE TO CHEMOTHERAPY: ICD-10-CM

## 2022-03-04 DIAGNOSIS — R59.0 INGUINAL ADENOPATHY: ICD-10-CM

## 2022-03-04 DIAGNOSIS — K25.5 CHRONIC GASTRIC ULCER WITH PERFORATION: ICD-10-CM

## 2022-03-04 DIAGNOSIS — G62.0 NEUROPATHY DUE TO CHEMOTHERAPEUTIC DRUG: ICD-10-CM

## 2022-03-04 LAB
ALBUMIN SERPL BCP-MCNC: 3.1 G/DL (ref 3.5–5.2)
ALP SERPL-CCNC: 96 U/L (ref 55–135)
ALT SERPL W/O P-5'-P-CCNC: 15 U/L (ref 10–44)
ANION GAP SERPL CALC-SCNC: 8 MMOL/L (ref 8–16)
AST SERPL-CCNC: 18 U/L (ref 10–40)
BILIRUB SERPL-MCNC: 0.4 MG/DL (ref 0.1–1)
BUN SERPL-MCNC: 7 MG/DL (ref 6–20)
CALCIUM SERPL-MCNC: 8.7 MG/DL (ref 8.7–10.5)
CHLORIDE SERPL-SCNC: 104 MMOL/L (ref 95–110)
CO2 SERPL-SCNC: 26 MMOL/L (ref 23–29)
CREAT SERPL-MCNC: 0.6 MG/DL (ref 0.5–1.4)
ERYTHROCYTE [DISTWIDTH] IN BLOOD BY AUTOMATED COUNT: 13.2 % (ref 11.5–14.5)
EST. GFR  (AFRICAN AMERICAN): >60 ML/MIN/1.73 M^2
EST. GFR  (NON AFRICAN AMERICAN): >60 ML/MIN/1.73 M^2
GLUCOSE SERPL-MCNC: 81 MG/DL (ref 70–110)
HCT VFR BLD AUTO: 36.3 % (ref 37–48.5)
HGB BLD-MCNC: 12.1 G/DL (ref 12–16)
IMM GRANULOCYTES # BLD AUTO: 0.01 K/UL (ref 0–0.04)
MCH RBC QN AUTO: 32.8 PG (ref 27–31)
MCHC RBC AUTO-ENTMCNC: 33.3 G/DL (ref 32–36)
MCV RBC AUTO: 98 FL (ref 82–98)
NEUTROPHILS # BLD AUTO: 1.5 K/UL (ref 1.8–7.7)
PLATELET # BLD AUTO: 194 K/UL (ref 150–450)
PMV BLD AUTO: 9.2 FL (ref 9.2–12.9)
POTASSIUM SERPL-SCNC: 3.9 MMOL/L (ref 3.5–5.1)
PROT SERPL-MCNC: 6.2 G/DL (ref 6–8.4)
RBC # BLD AUTO: 3.69 M/UL (ref 4–5.4)
SODIUM SERPL-SCNC: 138 MMOL/L (ref 136–145)
WBC # BLD AUTO: 3.59 K/UL (ref 3.9–12.7)

## 2022-03-04 PROCEDURE — 99215 OFFICE O/P EST HI 40 MIN: CPT | Mod: PBBFAC | Performed by: PHYSICIAN ASSISTANT

## 2022-03-04 PROCEDURE — 99215 PR OFFICE/OUTPT VISIT, EST, LEVL V, 40-54 MIN: ICD-10-PCS | Mod: S$PBB,,, | Performed by: PHYSICIAN ASSISTANT

## 2022-03-04 PROCEDURE — 1160F RVW MEDS BY RX/DR IN RCRD: CPT | Mod: CPTII,,, | Performed by: PHYSICIAN ASSISTANT

## 2022-03-04 PROCEDURE — 99999 PR PBB SHADOW E&M-EST. PATIENT-LVL V: CPT | Mod: PBBFAC,,, | Performed by: PHYSICIAN ASSISTANT

## 2022-03-04 PROCEDURE — 3078F DIAST BP <80 MM HG: CPT | Mod: CPTII,,, | Performed by: PHYSICIAN ASSISTANT

## 2022-03-04 PROCEDURE — 1159F MED LIST DOCD IN RCRD: CPT | Mod: CPTII,,, | Performed by: PHYSICIAN ASSISTANT

## 2022-03-04 PROCEDURE — 3078F PR MOST RECENT DIASTOLIC BLOOD PRESSURE < 80 MM HG: ICD-10-PCS | Mod: CPTII,,, | Performed by: PHYSICIAN ASSISTANT

## 2022-03-04 PROCEDURE — 80053 COMPREHEN METABOLIC PANEL: CPT | Performed by: PHYSICIAN ASSISTANT

## 2022-03-04 PROCEDURE — 99215 OFFICE O/P EST HI 40 MIN: CPT | Mod: S$PBB,,, | Performed by: PHYSICIAN ASSISTANT

## 2022-03-04 PROCEDURE — 3008F BODY MASS INDEX DOCD: CPT | Mod: CPTII,,, | Performed by: PHYSICIAN ASSISTANT

## 2022-03-04 PROCEDURE — 99999 PR PBB SHADOW E&M-EST. PATIENT-LVL V: ICD-10-PCS | Mod: PBBFAC,,, | Performed by: PHYSICIAN ASSISTANT

## 2022-03-04 PROCEDURE — 85027 COMPLETE CBC AUTOMATED: CPT | Performed by: PHYSICIAN ASSISTANT

## 2022-03-04 PROCEDURE — 3074F PR MOST RECENT SYSTOLIC BLOOD PRESSURE < 130 MM HG: ICD-10-PCS | Mod: CPTII,,, | Performed by: PHYSICIAN ASSISTANT

## 2022-03-04 PROCEDURE — 3074F SYST BP LT 130 MM HG: CPT | Mod: CPTII,,, | Performed by: PHYSICIAN ASSISTANT

## 2022-03-04 PROCEDURE — 1159F PR MEDICATION LIST DOCUMENTED IN MEDICAL RECORD: ICD-10-PCS | Mod: CPTII,,, | Performed by: PHYSICIAN ASSISTANT

## 2022-03-04 PROCEDURE — 1160F PR REVIEW ALL MEDS BY PRESCRIBER/CLIN PHARMACIST DOCUMENTED: ICD-10-PCS | Mod: CPTII,,, | Performed by: PHYSICIAN ASSISTANT

## 2022-03-04 PROCEDURE — 36415 COLL VENOUS BLD VENIPUNCTURE: CPT | Performed by: PHYSICIAN ASSISTANT

## 2022-03-04 PROCEDURE — 3008F PR BODY MASS INDEX (BMI) DOCUMENTED: ICD-10-PCS | Mod: CPTII,,, | Performed by: PHYSICIAN ASSISTANT

## 2022-03-04 RX ORDER — SODIUM CHLORIDE 0.9 % (FLUSH) 0.9 %
10 SYRINGE (ML) INJECTION
Status: CANCELLED | OUTPATIENT
Start: 2022-03-10

## 2022-03-04 RX ORDER — DIPHENHYDRAMINE HYDROCHLORIDE 50 MG/ML
50 INJECTION INTRAMUSCULAR; INTRAVENOUS ONCE AS NEEDED
Status: CANCELLED | OUTPATIENT
Start: 2022-03-10

## 2022-03-04 RX ORDER — SODIUM CHLORIDE 0.9 % (FLUSH) 0.9 %
10 SYRINGE (ML) INJECTION
Status: CANCELLED | OUTPATIENT
Start: 2022-03-12

## 2022-03-04 RX ORDER — EPINEPHRINE 0.3 MG/.3ML
0.3 INJECTION SUBCUTANEOUS ONCE AS NEEDED
Status: CANCELLED | OUTPATIENT
Start: 2022-03-10

## 2022-03-04 RX ORDER — HEPARIN 100 UNIT/ML
500 SYRINGE INTRAVENOUS
Status: CANCELLED | OUTPATIENT
Start: 2022-03-12

## 2022-03-04 RX ORDER — HEPARIN 100 UNIT/ML
500 SYRINGE INTRAVENOUS
Status: CANCELLED | OUTPATIENT
Start: 2022-03-10

## 2022-03-04 NOTE — PROGRESS NOTES
MEDICAL ONCOLOGY - ESTABLISHED PATIENT    Reason for visit: Gastric cancer     Best Contact Phone Number(s): [phone] (home)      Cancer/Stage/TNM:   Cancer Staging  No matching staging information was found for the patient.     Oncology History   Malignant neoplasm of stomach   6/10/2021 Initial Diagnosis    Gastric adenocarcinoma     7/26/2021 -  Chemotherapy    Treatment Summary   Plan Name: OP FOLFOX 6 Q2W  Treatment Goal: Palliative  Status: Active  Start Date: 7/26/2021  End Date: 3/12/2022 (Planned)  Provider: Fer Ashraf MD  Chemotherapy: fluorouraciL 2,400 mg/m2 = 3,770 mg in sodium chloride 0.9% 100 mL chemo infusion, 2,400 mg/m2 = 3,770 mg, Intravenous, Over 46 hours, 16 of 17 cycles  Administration: 3,770 mg (7/26/2021), 3,770 mg (8/9/2021), 3,770 mg (8/23/2021), 3,770 mg (9/7/2021), 3,770 mg (9/21/2021), 3,770 mg (10/5/2021), 3,770 mg (10/19/2021), 3,770 mg (11/2/2021), 3,770 mg (11/15/2021), 3,770 mg (11/30/2021), 3,770 mg (12/13/2021), 4,000 mg (12/27/2021), 4,030 mg (1/10/2022), 4,030 mg (1/24/2022), 4,000 mg (2/7/2022), 4,000 mg (2/22/2022)  oxaliplatin (ELOXATIN) 85 mg/m2 = 133 mg in dextrose 5 % 500 mL chemo infusion, 85 mg/m2 = 133 mg, Intravenous, Clinic/\[facility]\"" 1 time, 9 of 9 cycles  Administration: 133 mg (7/26/2021), 133 mg (8/9/2021), 133 mg (8/23/2021), 133 mg (9/7/2021), 133 mg (9/21/2021), 133 mg (10/5/2021), 133 mg (10/19/2021), 133 mg (11/2/2021), 133 mg (11/15/2021)          Interim History:  41 y.o. female with metastatic gastric cancer who presents for follow-up prior to cycle 17 of chemotherapy, now on maintenance 5-FU. Continues to have moderate neuropathy to the hands and feet. In addition, she reports a shock-like sensation of the leg that occurs intermittently. She tried the Gabapentin but only for a few days. Did not notice much improvement. She is willing to try acupuncture. Has noticed increased distension of her stomach. Was evaluated for possible paracentesis but  there was not enough fluid present to perform the procedure at that time. She feels that the distension has increased some. She also has acid reflux that she feels is worsening due to the distension. She is taking medication that helps. Otherwise, she is doing fairly well. No fever, chills, n/v, abdominal pain, diarrhea or constipation. She is eating and has a fair appetite. Weight is stable today.     Had a CT CAP on 2/10/2022 with abdominal ultrasound as well.     ECOG status is 1. Presents alone    Review of Systems   Constitutional: Negative for activity change, appetite change, chills, fatigue, fever and unexpected weight change.   HENT: Negative for ear pain, facial swelling, hearing loss, mouth sores, nosebleeds, sore throat and trouble swallowing.    Eyes: Negative for pain, discharge, redness and visual disturbance.   Respiratory: Negative for cough, chest tightness and shortness of breath.    Cardiovascular: Negative for chest pain, palpitations and leg swelling.   Gastrointestinal: Positive for abdominal distention and reflux (controlled with medication). Negative for abdominal pain, blood in stool, constipation, diarrhea, nausea and vomiting.   Endocrine: Negative for cold intolerance and heat intolerance.   Genitourinary: Negative for decreased urine volume, difficulty urinating, dysuria, frequency, hematuria, hot flashes, urgency and vaginal bleeding.   Musculoskeletal: Negative for arthralgias, gait problem, leg pain and myalgias.   Integumentary:  Negative for pallor, rash and wound.   Allergic/Immunologic: Negative for immunocompromised state.   Neurological: Positive for numbness. Negative for dizziness, tremors, weakness, light-headedness and headaches.   Hematological: Negative for adenopathy. Does not bruise/bleed easily.   Psychiatric/Behavioral: Negative for agitation, confusion, dysphoric mood and sleep disturbance. The patient is not nervous/anxious.            Past Medical History:   Past  "Medical History:   Diagnosis Date    Gastric cancer     Gastric ulcer     Pregnancy 08/12/2020    delivered on 8/12/2020    Umbilical hernia         Past Surgical History:   Past Surgical History:   Procedure Laterality Date    CLOSURE OF PERFORATED ULCER OF DUODENUM USING OMENTAL PATCH      ESOPHAGOGASTRODUODENOSCOPY N/A 10/13/2020    Procedure: EGD (ESOPHAGOGASTRODUODENOSCOPY);  Surgeon: Rosio Marion MD;  Location: [facility] ENDO (2ND FLR);  Service: Endoscopy;  Laterality: N/A;    ESOPHAGOGASTRODUODENOSCOPY N/A 12/11/2020    Procedure: EGD (ESOPHAGOGASTRODUODENOSCOPY);  Surgeon: Rosio Marion MD;  Location: [facility] ENDO (2ND FLR);  Service: Endoscopy;  Laterality: N/A;  Covid-19 test 12/8/20 at [facility]    ESOPHAGOGASTRODUODENOSCOPY N/A 3/12/2021    Procedure: EGD (ESOPHAGOGASTRODUODENOSCOPY);  Surgeon: Nav Omer MD;  Location: [facility] ENDO (2ND FLR);  Service: Endoscopy;  Laterality: N/A;  COVID at [facility] [place], operated by [facility] 3/9 ttr    ESOPHAGOGASTRODUODENOSCOPY N/A 5/18/2021    Procedure: EGD (ESOPHAGOGASTRODUODENOSCOPY);  Surgeon: Rosio Marion MD;  Location: [facility] ENDO (2ND FLR);  Service: Endoscopy;  Laterality: N/A;  5/15-covid pcw-inst portal-tb    ESOPHAGOGASTRODUODENOSCOPY N/A 5/26/2021    Procedure: EGD (ESOPHAGOGASTRODUODENOSCOPY);  Surgeon: Socrates Terrazas MD;  Location: [facility] ENDO (2ND FLR);  Service: Endoscopy;  Laterality: N/A;        Family History:   Family History   Problem Relation Age of Onset    Cancer Mother 67        lymphoma (type? "not active," not on tx; "related to her blood")    Lung cancer Father 48        type? h/o smoking    No Known Problems Son     Breast cancer Other 73        unilat; stage I, but aggressive    Prostate cancer Paternal Uncle         (dx 50s/60? no chemo?)    Breast cancer Paternal Cousin         (dx age?) ductal    Colon cancer Neg Hx     Esophageal cancer Neg Hx         Social History:   Social History     Tobacco Use    Smoking status: Former " Smoker     Types: Cigarettes     Quit date: 2019     Years since quittin.2    Smokeless tobacco: Never Used   Substance Use Topics    Alcohol use: Yes        I have reviewed and updated the patient's past medical, surgical, family and social histories.    Allergies:   Review of patient's allergies indicates:  No Known Allergies     Medications:   Current Outpatient Medications   Medication Sig Dispense Refill    acetaminophen (TYLENOL) 500 MG tablet Take 500 mg by mouth every 6 (six) hours as needed for Pain.      amitriptyline (ELAVIL) 10 MG tablet Take 1 tablet (10 mg total) by mouth every evening. 30 tablet 3    amitriptyline (ELAVIL) 10 MG tablet Take 10 mg by mouth.      dicyclomine (BENTYL) 10 MG capsule TAKE ONE CAPSULE BY MOUTH FOUR TIMES DAILY 120 capsule 0    famotidine (PEPCID) 20 MG tablet Take 1 tablet (20 mg total) by mouth nightly as needed for Heartburn. 60 tablet 1    ferrous sulfate (FEOSOL) 325 mg (65 mg iron) Tab tablet Take 1 tablet (325 mg total) by mouth once daily for 7 days, THEN 1 tablet (325 mg total) 2 (two) times daily. 60 tablet 10    ferrous sulfate (FEOSOL) 325 mg (65 mg iron) Tab tablet Take by mouth.      furosemide (LASIX) 20 MG tablet Take 10 mg by mouth.      furosemide (LASIX) 40 MG tablet Take 0.5 tablets (20 mg total) by mouth daily as needed (leg swelling or abdominal swelling). 30 tablet 1    furosemide (LASIX) 40 MG tablet       gabapentin (NEURONTIN) 300 MG capsule Take 1 capsule (300 mg total) by mouth 3 (three) times daily. 90 capsule 11    HYDROcodone-acetaminophen (NORCO) 5-325 mg per tablet Take 1 tablet by mouth.      HYDROcodone-acetaminophen (NORCO) 5-325 mg per tablet Take 1 tablet by mouth 3 (three) times daily as needed.      LIDOcaine-prilocaine (EMLA) cream Apply to Port-A-Cath area 30 to 45 minutes prior to port access as directed (topical anesthetic use to the anterior chest only).      LIDOcaine-prilocaine (EMLA) cream Apply  "topically.      metoclopramide HCl (REGLAN) 5 MG tablet 5 mg.      multivitamin with folic acid 400 mcg Tab Take 1 tablet by mouth once daily.      omeprazole (PRILOSEC) 40 MG capsule Take 1 capsule (40 mg total) by mouth once daily. 30 capsule 11    ondansetron (ZOFRAN) 8 MG tablet Take 1 tablet (8 mg total) by mouth every 8 (eight) hours as needed for Nausea. 60 tablet 2    pantoprazole (PROTONIX) 40 MG tablet Take 40 mg by mouth.      pantoprazole (PROTONIX) 40 MG tablet Take 1 tablet (40 mg total) by mouth 2 (two) times daily. 60 tablet 11    potassium chloride SA (K-DUR,KLOR-CON) 20 MEQ tablet 20 mEq.      prochlorperazine (COMPAZINE) 10 MG tablet TAKE 1 TABLET(10 MG) BY MOUTH EVERY 6 HOURS AS NEEDED FOR NAUSEA 90 tablet 2    prochlorperazine (COMPAZINE) 10 MG tablet as needed.      senna-docusate 8.6-50 mg (PERICOLACE) 8.6-50 mg per tablet Take 1 tablet by mouth.      senna-docusate 8.6-50 mg (SENNA WITH DOCUSATE SODIUM) 8.6-50 mg per tablet Take 1 tablet by mouth 2 (two) times daily as needed for Constipation.      sucralfate (CARAFATE) 1 gram tablet TAKE 1 TABLET(1 GRAM) BY MOUTH FOUR TIMES DAILY FOR 14 DAYS 56 tablet 0    sucralfate (CARAFATE) 1 gram tablet       traMADoL (ULTRAM) 50 mg tablet Take 50 mg by mouth.      traMADoL (ULTRAM) 50 mg tablet Take by mouth.       No current facility-administered medications for this visit.        Physical Exam:   /76 (BP Location: Right arm, Patient Position: Sitting, BP Method: Small (Automatic))   Pulse 67   Temp 98.8 °F (37.1 °C) (Oral)   Resp 18   Ht 5' 7" (1.702 m)   Wt 62.3 kg (137 lb 6.4 oz)   SpO2 100%   BMI 21.52 kg/m²      ECOG Performance status: 1            Physical Exam  Vitals reviewed.   Constitutional:       Appearance: Normal appearance. She is not ill-appearing or toxic-appearing.      Comments: Thin   HENT:      Head: Normocephalic and atraumatic.      Nose: Nose normal.      Mouth/Throat:      Mouth: Mucous membranes " are moist.      Pharynx: Oropharynx is clear.   Eyes:      General: No scleral icterus.     Extraocular Movements: Extraocular movements intact.      Conjunctiva/sclera: Conjunctivae normal.   Cardiovascular:      Rate and Rhythm: Normal rate and regular rhythm.      Pulses: Normal pulses.      Heart sounds: Normal heart sounds.   Pulmonary:      Effort: Pulmonary effort is normal.      Breath sounds: Normal breath sounds.   Abdominal:      General: Bowel sounds are normal. There is distension (moderate).      Palpations: Abdomen is soft.      Tenderness: There is no abdominal tenderness. There is no guarding.      Comments: Mild enlargement of L inguinal lymph node. Mildly tender   Musculoskeletal:         General: No swelling or tenderness. Normal range of motion.      Cervical back: Normal range of motion and neck supple.      Right lower leg: No edema.      Left lower leg: No edema.   Skin:     General: Skin is warm.      Findings: No rash.   Neurological:      General: No focal deficit present.      Mental Status: She is alert. Mental status is at baseline.   Psychiatric:         Mood and Affect: Mood normal.         Behavior: Behavior normal.             Labs:   Recent Results (from the past 48 hour(s))   CBC Oncology    Collection Time: 03/04/22  2:29 PM   Result Value Ref Range    WBC 3.59 (L) 3.90 - 12.70 K/uL    RBC 3.69 (L) 4.00 - 5.40 M/uL    Hemoglobin 12.1 12.0 - 16.0 g/dL    Hematocrit 36.3 (L) 37.0 - 48.5 %    MCV 98 82 - 98 fL    MCH 32.8 (H) 27.0 - 31.0 pg    MCHC 33.3 32.0 - 36.0 g/dL    RDW 13.2 11.5 - 14.5 %    Platelets 194 150 - 450 K/uL    MPV 9.2 9.2 - 12.9 fL    Gran # (ANC) 1.5 (L) 1.8 - 7.7 K/uL    Immature Grans (Abs) 0.01 0.00 - 0.04 K/uL   Comprehensive Metabolic Panel    Collection Time: 03/04/22  2:29 PM   Result Value Ref Range    Sodium 138 136 - 145 mmol/L    Potassium 3.9 3.5 - 5.1 mmol/L    Chloride 104 95 - 110 mmol/L    CO2 26 23 - 29 mmol/L    Glucose 81 70 - 110 mg/dL    BUN  7 6 - 20 mg/dL    Creatinine 0.6 0.5 - 1.4 mg/dL    Calcium 8.7 8.7 - 10.5 mg/dL    Total Protein 6.2 6.0 - 8.4 g/dL    Albumin 3.1 (L) 3.5 - 5.2 g/dL    Total Bilirubin 0.4 0.1 - 1.0 mg/dL    Alkaline Phosphatase 96 55 - 135 U/L    AST 18 10 - 40 U/L    ALT 15 10 - 44 U/L    Anion Gap 8 8 - 16 mmol/L    eGFR if African American >60.0 >60 mL/min/1.73 m^2    eGFR if non African American >60.0 >60 mL/min/1.73 m^2        I have reviewed the pertinent labs from today which are acceptable for treatment.  Albumin mild decrease    Imaging:    CT CAP: 2/10/2022    Impression:     Persistent gastric wall thickening involving the distal stomach, similar to the prior examination.     Numerous hepatic hypodensities some of which likely represent cysts, however there is a more solid-appearing soft tissue density liver lesion within the posteromedial right lobe, unchanged.     Subtle sclerotic bone lesions within the thoracic spine remain stable.     Small to moderate left pleural effusion, increased when compared to the prior study.     Moderate ascites, increased when compared to the prior study.     Probable necrotic enlarged left inguinal lymph node, similar to the prior examination.     Nodular enhancement within the anterior peritoneum anterior to the stomach, possibly peritoneal carcinomatosis.     Probable left lobe thyroid nodule, stable compared to 10/08/2021.     We have personally reviewed the most recent imaging from 10/8/2021, which is displays overall stability of disease. There is a L sided thyroid nodule that is indeterminate at this point. Decreased amount of ascites.      Abdominal US 2/10/2022:     FINDINGS:  There is moderate, anechoic abdominal ascites, most prominent within the lower quadrants.     Impression:     Moderate, anechoic abdominal ascites.  Left inguinal hernia containing fat and fluid.    Path:   5/26/21:  Positive for malignancy.   Poorly differentiated adenocarcinoma growing with signet ring  features   Immunostains for Bruce-EP4 and CEA are positive.  The controls stain   appropriately.   This case was reviewed by Dr Sebastian who concurs with the diagnosis       Assessment:       1. Malignant neoplasm of stomach, unspecified location    2. Peritoneal carcinomatosis    3. Immunodeficiency due to chemotherapy    4. Bone metastases    5. Other ascites    6. Chronic gastric ulcer with perforation    7. Non-intractable vomiting with nausea, unspecified vomiting type    8. Microcytic anemia    9. Anxiety associated with cancer diagnosis    10. Inguinal adenopathy    11. Neuropathy due to chemotherapeutic drug          Plan:             # Gastric adenocarcinoma with peritoneal metastases, immunodeficiency due to chemotherapy, bone metastases  HER-2 negative by FISH.  PD-L1 < 1%.  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  We previously explained the implications of a stage IV diagnosis to her and potential treatment options.  She is now s/p port placement. She sought a second opinion at [facility] for zolbetuximab trial targeting CLDN protein but she did not test positive for this biomarker. We recommended proceeding with SOC FOLFOX, with which the [facility] team agreed. Because of the negative PD-L1 I do not think the benefit of adding nivolumab outweighs the potential toxicities.       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  She opted not to get genetic testing done at her appt with Phi. We recommended that she reconsider that decision in light of a very likely germline mutation in SAMUEL which has clinical consequences.      Tolerating chemotherapy well thus far.  She has experienced improvement in many of her GI symptoms since starting treatment.    CT CAP after 6 cycles showed improvement in ascites, probable hepatic, thyroid  and bone metastases.  Given improvement in symptoms and delay between baseline imaging and cycle 1, suspect she is having benefit from chemotherapy. She was in agreement with this.    We dropped oxaliplatin starting with cycle 10 due to grade 1 neuropathy and desire to keep it from worsening. Doing well overall. Stable neuropathy in the feet. Otherwise, tolerating treatment good.   CT CAP after cycle 10 shows stable disease.  CT scan after cycle 15 continues to display overall stability of disease within the gastric wall, peritoneum, spine and liver. She did have a moderate amount of ascites noted on CT and is scheduled to have a paracentesis performed today. Although increase ascites can represent progression of disease, her CT suggests overall stability within the liver and gastric wall, so we recc to continue with treatment at this time. Pt is in agreement.   Clinically, she is doing fairly. She does feel that she has more distension of the abdomen but otherwise is doing well.   Proceed with cycle 17 of chemotherapy today, 5-FU maintenance.   RTC in 2 weeks, for cycle 18. Will repeat a CT scan in approx. 2 mos    #Ascites  Abdominal distension increased. No dyspnea or worsening abdominal pain  Will request another ascites screen for possible paracentesis  Drainage decreased considerably from peritoneal catheter with chemotherapy.   Now s/p removal on 10/29/21 by IR.     # Nausea, gastric ulcer  Stable. No vomiting.   Not taking Carafate. Continue Protonix.  Continue Compazine PRN.  Continue dietary modifications to prevent indigestion.    # Anemia  Hgb normal. Doing well.    Iron deficiency due to chronic blood loss.  S/p 2 doses of Injectafer.    # Anxiety with cancer diagnosis  Feeling well today.   Scheduled to see Psych Onch on 3/18 with Dr. Sanders    # lymphadenopathy of L groin  L inguinal hernia was noted on US. Not strangulated or incarcerated at this time.   Will continue to monitor. Advised not lift  greater than 15lbs.   Does not require surgery at this time.     # chemotherapy induced neuropathy  2/2 previous chemotherapy with Oxaliplatin  She is open to trying acupuncture. Tried Gabapentin 300mg qhs but did not take it regularly. Will try it again and increase the dose to TID  Will continue to monitor.     Follow up: Will return in 2 weeks for lab work, clinic visit, and cycle 18 of chemo.    Route Chart for Scheduling    Med Onc Chart Routing      Follow up with physician 2 weeks. Please schedule cbc, cmp, clinic visit with Pascale and cycle 18 of maintenance 5-FU with pump d/c on day 3 in 2 weeks. Pt would like all appoinments on Mondays if possible and is willing to receive treatment at the [facility]. Thank you   Follow up with ISHMAEL    Labs CBC and CMP   Lab interval: every 2 weeks     Imaging    Pharmacy appointment    Other referrals          Treatment Plan Information   OP FOLFOX 6 Q2W   Fer Ashraf MD   Upcoming Treatment Dates - OP FOLFOX 6 Q2W    3/10/2022       Chemotherapy       fluorouracil (ADRUCIL) 4,000 mg in sodium chloride 0.9% 100 mL chemo infusion    Therapy Plan Information  EPINEPHrine (EPIPEN) 0.3 mg/0.3 mL pen injection 0.3 mg  0.3 mg, Intramuscular, PRN  diphenhydrAMINE injection 50 mg  50 mg, Intravenous, PRN  methylPREDNISolone sodium succinate injection 125 mg  125 mg, Intravenous, PRN  sodium chloride 0.9% bolus 1,000 mL  1,000 mL, Intravenous, PRN      Pte and family members displayed understanding of the above encounter and treatment plan. All thoughtful questions were answered to their satisfaction. Pte was advised to notify the care team or proceed to the ER if signs and symptoms worsen. Pt was staffed with Dr. Ashraf today.    JHONATAN Kramer, PA-C  Physician Assistant Certified  Dept of Hematology/Oncology  PA-C to Dr. Owens, Dr. Ashraf and Dr. Julius Ashraf MD  Hematology/Oncology  [facility]

## 2022-03-07 ENCOUNTER — INFUSION (OUTPATIENT)
Dept: INFUSION THERAPY | Facility: HOSPITAL | Age: 42
End: 2022-03-07
Attending: INTERNAL MEDICINE
Payer: MEDICAID

## 2022-03-07 VITALS
RESPIRATION RATE: 16 BRPM | DIASTOLIC BLOOD PRESSURE: 62 MMHG | TEMPERATURE: 98 F | SYSTOLIC BLOOD PRESSURE: 110 MMHG | HEART RATE: 71 BPM | OXYGEN SATURATION: 100 %

## 2022-03-07 DIAGNOSIS — C16.9 MALIGNANT NEOPLASM OF STOMACH, UNSPECIFIED LOCATION: Primary | ICD-10-CM

## 2022-03-07 PROCEDURE — 96416 CHEMO PROLONG INFUSE W/PUMP: CPT

## 2022-03-07 PROCEDURE — 25000003 PHARM REV CODE 250: Performed by: INTERNAL MEDICINE

## 2022-03-07 PROCEDURE — 63600175 PHARM REV CODE 636 W HCPCS: Performed by: INTERNAL MEDICINE

## 2022-03-07 PROCEDURE — 96367 TX/PROPH/DG ADDL SEQ IV INF: CPT

## 2022-03-07 RX ADMIN — DEXAMETHASONE SODIUM PHOSPHATE: 10 INJECTION, SOLUTION INTRAMUSCULAR; INTRAVENOUS at 02:03

## 2022-03-07 RX ADMIN — FLUOROURACIL 4000 MG: 50 INJECTION, SOLUTION INTRAVENOUS at 02:03

## 2022-03-07 NOTE — PLAN OF CARE
Patient arrived to unit for 5FU pump infusion. Pt reports nausea and need to eat frequently to help nausea. Pt also c/o peripheral neuropathy to feet. Labs reviewed and pt cleared for chemo per Dr. Ashraf. Plan of care reviewed, patient agreeable to plan. Aloxi with Dex administered prior to pump. Patient tolerated infusion well. 5FU pump with new batteries, connected, confirmed running. VSS. Discharge instructions reviewed, patient instructed to return 3/9 for pump d/c. Patient ambulated off unit unassisted by self. Patient in NAD at time of discharge.

## 2022-03-08 ENCOUNTER — TELEPHONE (OUTPATIENT)
Dept: HEMATOLOGY/ONCOLOGY | Facility: CLINIC | Age: 42
End: 2022-03-08
Payer: COMMERCIAL

## 2022-03-09 ENCOUNTER — INFUSION (OUTPATIENT)
Dept: INFUSION THERAPY | Facility: HOSPITAL | Age: 42
End: 2022-03-09
Attending: INTERNAL MEDICINE
Payer: MEDICAID

## 2022-03-09 VITALS
SYSTOLIC BLOOD PRESSURE: 137 MMHG | TEMPERATURE: 98 F | RESPIRATION RATE: 16 BRPM | HEART RATE: 75 BPM | DIASTOLIC BLOOD PRESSURE: 87 MMHG | OXYGEN SATURATION: 100 %

## 2022-03-09 DIAGNOSIS — C16.9 MALIGNANT NEOPLASM OF STOMACH, UNSPECIFIED LOCATION: Primary | ICD-10-CM

## 2022-03-09 PROCEDURE — 25000003 PHARM REV CODE 250: Performed by: INTERNAL MEDICINE

## 2022-03-09 PROCEDURE — 63600175 PHARM REV CODE 636 W HCPCS: Performed by: INTERNAL MEDICINE

## 2022-03-09 PROCEDURE — A4216 STERILE WATER/SALINE, 10 ML: HCPCS | Performed by: INTERNAL MEDICINE

## 2022-03-09 PROCEDURE — 99211 OFF/OP EST MAY X REQ PHY/QHP: CPT

## 2022-03-09 RX ORDER — SODIUM CHLORIDE 0.9 % (FLUSH) 0.9 %
10 SYRINGE (ML) INJECTION
Status: DISCONTINUED | OUTPATIENT
Start: 2022-03-09 | End: 2022-03-09 | Stop reason: HOSPADM

## 2022-03-09 RX ORDER — HEPARIN 100 UNIT/ML
500 SYRINGE INTRAVENOUS
Status: DISCONTINUED | OUTPATIENT
Start: 2022-03-09 | End: 2022-03-09 | Stop reason: HOSPADM

## 2022-03-09 RX ADMIN — Medication 10 ML: at 02:03

## 2022-03-09 RX ADMIN — HEPARIN 500 UNITS: 100 SYRINGE at 02:03

## 2022-03-09 NOTE — PLAN OF CARE
Patient arrived to unit for 5FU pump d/c. Pt reports fatigue and abdominal gas pains. States she was unable to tolerate oral intake today. Plan of care reviewed, patient agreeable to plan.Pt stayed for 1L NS today. Snacked and had gingerale to calm stomach.5FU pump completed and disconnected. Port flushed easily, + blood return noted, and Heparin administered to dwell. VSS. Discharge instructions reviewed, patient instructed to return 3/22 at [facility] location for C18D1. Patient ambulated off unit unassisted by self. Patient in NAD at time of discharge.

## 2022-03-10 ENCOUNTER — TELEPHONE (OUTPATIENT)
Dept: HEMATOLOGY/ONCOLOGY | Facility: CLINIC | Age: 42
End: 2022-03-10
Payer: COMMERCIAL

## 2022-03-10 DIAGNOSIS — C16.9 MALIGNANT NEOPLASM OF STOMACH, UNSPECIFIED LOCATION: ICD-10-CM

## 2022-03-10 DIAGNOSIS — C78.6 PERITONEAL CARCINOMATOSIS: Primary | ICD-10-CM

## 2022-03-11 ENCOUNTER — HOSPITAL ENCOUNTER (OUTPATIENT)
Dept: RADIOLOGY | Facility: HOSPITAL | Age: 42
Discharge: HOME | End: 2022-03-11
Attending: PHYSICIAN ASSISTANT
Payer: MEDICAID

## 2022-03-11 ENCOUNTER — CLINICAL SUPPORT (OUTPATIENT)
Dept: REHABILITATION | Facility: HOSPITAL | Age: 42
End: 2022-03-11
Payer: MEDICAID

## 2022-03-11 DIAGNOSIS — C80.1 ANXIETY ASSOCIATED WITH CANCER DIAGNOSIS: Primary | ICD-10-CM

## 2022-03-11 DIAGNOSIS — C78.6 PERITONEAL CARCINOMATOSIS: ICD-10-CM

## 2022-03-11 DIAGNOSIS — R52 PAIN AGGRAVATED BY ACTIVITIES OF DAILY LIVING: ICD-10-CM

## 2022-03-11 DIAGNOSIS — C16.9 MALIGNANT NEOPLASM OF STOMACH, UNSPECIFIED LOCATION: ICD-10-CM

## 2022-03-11 DIAGNOSIS — F41.1 ANXIETY ASSOCIATED WITH CANCER DIAGNOSIS: Primary | ICD-10-CM

## 2022-03-11 DIAGNOSIS — R18.8 OTHER ASCITES: ICD-10-CM

## 2022-03-11 PROCEDURE — 76705 ECHO EXAM OF ABDOMEN: CPT | Mod: 26,,, | Performed by: RADIOLOGY

## 2022-03-11 PROCEDURE — 76705 ECHO EXAM OF ABDOMEN: CPT | Mod: TC

## 2022-03-11 PROCEDURE — 97165 OT EVAL LOW COMPLEX 30 MIN: CPT

## 2022-03-11 PROCEDURE — 76705 US ABDOMEN LIMITED_FOR ASCITES: ICD-10-PCS | Mod: 26,,, | Performed by: RADIOLOGY

## 2022-03-11 PROCEDURE — 97110 THERAPEUTIC EXERCISES: CPT

## 2022-03-11 NOTE — PLAN OF CARE
[facility]   Occupational Therapy Initial Evaluation - Therapeutic Yoga    Date: 3/11/2022   Name: Puja Quigley  Clinic Number: [id number]    Therapy Diagnosis:   Encounter Diagnoses   Name Primary?    Peritoneal carcinomatosis     Malignant neoplasm of stomach, unspecified location     Pain aggravated by activities of daily living     Anxiety associated with cancer diagnosis Yes     Physician: Tanika Sarabia PA-C    Physician Orders: Eval and Treat   Medical Diagnosis from Referral:   C78.6 (ICD-10-CM) - Peritoneal carcinomatosis   C16.9 (ICD-10-CM) - Malignant neoplasm of stomach, unspecified location       Evaluation Date: 3/11/2022  Authorization Period Expiration: 3/10/23  Plan of Care Expiration: 4/10/23  Progress Note Due: 4/10/23  Visit # / Visits authorized: 1/ 1     Precautions: Standard     Time In: 1:15  Time Out: 2:15  Total Appointment Time (timed & untimed codes): 60 minutes      SUBJECTIVE     Date of onset:     History of current condition: Puja reports: 6/10/21    Falls: 0    Prior Therapy: none  Social History:  lives with their partnerand 18 month old son  Occupation: .  Pt previously worked as  at a [employer]. 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  Prior Level of Function: independent  Current Level of Function: impaired for home maintenance and work    Pain:  Current 8/10, worst 10/10, best 5/10   Location:abdomen abdomen  Description: Dull, Grabbing and Deep  Aggravating Factors: Laying, Coughing/Sneezing and Eating  Easing Factors: relaxation, bowel movement and elevation    Patients goals: to be able to do yoga so I can relax and lower my pain; learn ways to stay healthy and prevent recurrence; return to work;     Medical History:   Past Medical History:   Diagnosis Date    Gastric cancer     Gastric ulcer     Pregnancy  08/12/2020    delivered on 8/12/2020    Umbilical hernia        Surgical History:   Puja Quigley  has a past surgical history that includes Closure of perforated ulcer of duodenum using omental patch; Esophagogastroduodenoscopy (N/A, 10/13/2020); Esophagogastroduodenoscopy (N/A, 12/11/2020); Esophagogastroduodenoscopy (N/A, 3/12/2021); Esophagogastroduodenoscopy (N/A, 5/18/2021); and Esophagogastroduodenoscopy (N/A, 5/26/2021).    Medications:   Puja has a current medication list which includes the following prescription(s): acetaminophen, amitriptyline, amitriptyline, dicyclomine, famotidine, ferrous sulfate, ferrous sulfate, furosemide, furosemide, furosemide, gabapentin, hydrocodone-acetaminophen, hydrocodone-acetaminophen, lidocaine-prilocaine, lidocaine-prilocaine, metoclopramide hcl, multivitamin with folic acid, omeprazole, ondansetron, pantoprazole, pantoprazole, potassium chloride sa, prochlorperazine, prochlorperazine, senna-docusate 8.6-50 mg, senna-docusate 8.6-50 mg, sucralfate, sucralfate, tramadol, and tramadol.    Allergies:   Review of patient's allergies indicates:  No Known Allergies       OBJECTIVE     Fatigue: moderate.  Impairs ability to exercise, clean house and work     Activity Pacing: Pt. reports that she overdoes activity to distract herself from pain.  This increases her pain and fatigue.    Endurance: fair    Sleep Hygiene: no problems with sleep    Stress Management Profile:     Emotional Stress Response: fearful, anxious, angry, sad   Physical Stress Response: increased pain, shallow breathing, muscles tighten in my shoulders and stomach   Behavioral Stress Response: stay busy and push myself    Self-Care: independent.    Work:  Unable to work due to pain and chemo induced neuropathy    Patient Specific Functional Scale: <insert grid>    Lifestyle Questionnaire:  <insert grid>      TREATMENT     Total Treatment time (time-based codes) separate from Evaluation: 30 minutes       Puja received the treatments listed below:      therapeutic exercises to develop endurance for 15 minutes including: standing shoulder stretches, bilateral low lunge, seated twist, seated hip/groin stretch    Self Care:  Modified supine restorative relaxation with autogenics:  15 minutes.      PATIENT EDUCATION AND HOME EXERCISES     Education provided: relationship of relaxation/yoga to immune health  -   ASSESSMENT     Puja is a 41 y.o. female referred to outpatient Occupational Therapy with a medical diagnosis of gastric cancer: Patient presents with the following impairments:       Self-Care Limitations, Deconditioning, Activity Pacing and Poor Stress Coping Skills    Following medical record review, it is determined that Puja will benefit from skilled outpatient Occupational Therapy to address the impairments stated above and in the chart below in order to maximize functional activities. The following goals were discussed with the patient and patient is in agreement with them as addressed in the treatment plan. The patient's rehab potential is Excellent.       Plan of care discussed with patient: Yes  Patient's spiritual, cultural and educational needs considered and patient is agreeable to the plan of care and goals as stated below:     Anticipated Barriers for therapy: none    Medical Necessity is demonstrated by the following    Profile and History Assessment of Occupational Performance     Occupational Profile:   Puja Quigley is a 41 y.o. female who lives with their family and is on disability. Puja has difficulty with  ADLs and IADLs as listed previously, which  Affecting herdaily functional abilities.      Comorbidities:    has a past medical history of Gastric cancer, Gastric ulcer, Pregnancy, and Umbilical hernia.    Medical and Therapy History Review:   Brief               Performance Deficits    Physical:  Muscle Endurance  Pain    Cognitive:  No Deficits    Psychosocial:    No  Deficits             Goals:  Short Term Goals: 4 weeks     Goal # Goal Status   1 Patient will be independent with Home Exercise Program to increase endurance and manage stress. Not Progressing   2 Patient will demonstrate independence with diaphragmatic breathing in sit and supine. Not Progressing   3 Patient will identify 2 new stress coping skills for stress management/immune health. Not Progressing   4 Patient will identify activity pacing problems.  Patient will then implement new plan for daily activity to increase endurance for ADL's. Not Progressing     Long Term Goals: 12 weeks     Goal # Goal Status   1 Patient will demonstrate independence with yoga Home Exercise Program to increase strength and endurance for ADL's. Not Progressing   2 Patient will demonstrate independence with relaxation techniques to manage stress for immune health. Not Progressing   3 Patient will verbalize good understanding of stress/immune health relationship.  Not Progressing   4         PLAN   Plan of care Certification: 3/11/2022 to 6/11/22.    Outpatient Occupational Therapy 2 times weekly for 3  And then 1x week for 9 weeks to include the following interventions: Patient Education, Self Care, Therapeutic Activities and Therapeutic Exercise.     Jamie Ray, DAYLIN      I

## 2022-03-11 NOTE — PROGRESS NOTES
Chau Lockwood. Before I try to send her back to you guys for a possible paracentesis, would you be able to look at her US to determine if you feel that there is enough fluid to drain from her abdomen? Thank you so much     April

## 2022-03-14 ENCOUNTER — TELEPHONE (OUTPATIENT)
Dept: INFUSION THERAPY | Facility: HOSPITAL | Age: 42
End: 2022-03-14
Payer: COMMERCIAL

## 2022-03-14 ENCOUNTER — PATIENT MESSAGE (OUTPATIENT)
Dept: HEMATOLOGY/ONCOLOGY | Facility: CLINIC | Age: 42
End: 2022-03-14
Payer: COMMERCIAL

## 2022-03-14 ENCOUNTER — CLINICAL SUPPORT (OUTPATIENT)
Dept: HEMATOLOGY/ONCOLOGY | Facility: CLINIC | Age: 42
End: 2022-03-14
Payer: MEDICAID

## 2022-03-14 ENCOUNTER — PATIENT MESSAGE (OUTPATIENT)
Dept: PSYCHIATRY | Facility: CLINIC | Age: 42
End: 2022-03-14
Payer: COMMERCIAL

## 2022-03-14 DIAGNOSIS — R18.8 OTHER ASCITES: Primary | ICD-10-CM

## 2022-03-14 DIAGNOSIS — T45.1X5A NEUROPATHY DUE TO CHEMOTHERAPEUTIC DRUG: Primary | ICD-10-CM

## 2022-03-14 DIAGNOSIS — G62.0 NEUROPATHY DUE TO CHEMOTHERAPEUTIC DRUG: Primary | ICD-10-CM

## 2022-03-14 DIAGNOSIS — M54.9 CHRONIC BACK PAIN, UNSPECIFIED BACK LOCATION, UNSPECIFIED BACK PAIN LATERALITY: ICD-10-CM

## 2022-03-14 DIAGNOSIS — G62.9 NEUROPATHY: ICD-10-CM

## 2022-03-14 DIAGNOSIS — G89.29 CHRONIC BACK PAIN, UNSPECIFIED BACK LOCATION, UNSPECIFIED BACK PAIN LATERALITY: ICD-10-CM

## 2022-03-14 PROCEDURE — 97814 PR ACUPUNCT W/ ELEC STIMUL ADDL 15M: ICD-10-PCS | Mod: ,,, | Performed by: ACUPUNCTURIST

## 2022-03-14 PROCEDURE — 97813 PR ACUPUNCT W/ ELEC STIMUL 15 MIN: ICD-10-PCS | Mod: ,,, | Performed by: ACUPUNCTURIST

## 2022-03-14 PROCEDURE — 97814 ACUP 1/> W/ESTIM EA ADDL 15: CPT | Mod: ,,, | Performed by: ACUPUNCTURIST

## 2022-03-14 PROCEDURE — 97813 ACUP 1/> W/ESTIM 1ST 15 MIN: CPT | Mod: ,,, | Performed by: ACUPUNCTURIST

## 2022-03-14 NOTE — PROGRESS NOTES
Subjective:       Patient ID: Puja Quigley is a 41 y.o. female.    Chief Complaint: Pain (CIPN)    New patient establishing care for CIPN and cLBP. Patient states her CIPN symptoms are severe in feet, and at tips of fingers. Patient in active treatment. CIPN is worse when hands and feet are cold and states they feel better in warm water. Patient has numbness at ball of feet and toes.     Review of Systems   Musculoskeletal: Positive for arthralgias and back pain.   Neurological: Positive for numbness.         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    Neuropathy due to chemotherapeutic drug Yes       Acupuncture points used:  4 MONTANEZ, FAUSTINA WEST, FAUSTINA FAITH , Gb34, Li11, Sp10, Sp6, Sp9, St36, St40 and YIN RICKETTS  +KD1  STIM USED @ FAUSTINA WEST      NEEDLES IN: 22  NEEDLES OUT: 22    NEEDLES W/ STIM  AT: 1:45 PM  NEEDLES W/ STIM REMOVED AT: 2:15 PM

## 2022-03-15 ENCOUNTER — CLINICAL SUPPORT (OUTPATIENT)
Dept: REHABILITATION | Facility: HOSPITAL | Age: 42
End: 2022-03-15
Payer: COMMERCIAL

## 2022-03-15 ENCOUNTER — PATIENT MESSAGE (OUTPATIENT)
Dept: HEMATOLOGY/ONCOLOGY | Facility: CLINIC | Age: 42
End: 2022-03-15
Payer: COMMERCIAL

## 2022-03-15 DIAGNOSIS — R18.8 OTHER ASCITES: Primary | ICD-10-CM

## 2022-03-15 DIAGNOSIS — C80.1 ANXIETY ASSOCIATED WITH CANCER DIAGNOSIS: ICD-10-CM

## 2022-03-15 DIAGNOSIS — F41.1 ANXIETY ASSOCIATED WITH CANCER DIAGNOSIS: ICD-10-CM

## 2022-03-15 DIAGNOSIS — R52 PAIN AGGRAVATED BY ACTIVITIES OF DAILY LIVING: Primary | ICD-10-CM

## 2022-03-15 PROCEDURE — 97535 SELF CARE MNGMENT TRAINING: CPT

## 2022-03-15 PROCEDURE — 97110 THERAPEUTIC EXERCISES: CPT

## 2022-03-15 NOTE — PROGRESS NOTES
[facility]  Occupational Therapy Treatment Note - Therapeutic Yoga Progam    Date: 3/15/2022  Name: Puja Quigley  Clinic Number: [id number]    Therapy Diagnosis:   Encounter Diagnoses   Name Primary?    Pain aggravated by activities of daily living Yes    Anxiety associated with cancer diagnosis      Physician: Tanika Sarabia PA-C  Physician: Tanika Sarabia PA-C     Physician Orders: Eval and Treat   Medical Diagnosis from Referral:   C78.6 (ICD-10-CM) - Peritoneal carcinomatosis   C16.9 (ICD-10-CM) - Malignant neoplasm of stomach, unspecified location         Evaluation Date: 3/11/2022  Authorization Period Expiration: 3/10/23  Plan of Care Expiration: 4/10/23  Progress Note Due: 4/10/23  Visit # / Visits authorized: 1/ 1      Precautions: Standard           Precautions:  Standard    Time In: 9:10  Time Out: 10:10  Total Billable Time:   60 minutes    SUBJECTIVE     Pt reports: No problems or increased symptoms after last session.     She was compliant with home exercise program given last session.   Response to previous treatment:good    Pain: 1/10  Location: bilateral abdomen     OBJECTIVE     Objective Measures updated at progress report unless specified.    Treatment     Puja received the treatments listed below:       Date 3/15/22        Therapeutic Yoga Exercises - 84735 Therapeutic Ex 50 minutes  minutes  minutes  minutes  minutes  minutes    Seated Yoga   One legged hip/LE/back and back stretch; cobblers x4; twist;          Quadruped Cat-cow rolls;         Supine          Prone          Restorative          Breathing Techniques Victory breath                 Relaxation Techniques (10 minutes) - 87073 Self-Care/Home Management  10 minutes  vipassana meditation(facial triangle) sitting up on block.                                    Activity Pacing ( minutes) -   minutes  minutes  minutes  minutes  minutes  minutes                     Stress  Management/Education ( minutes) -   minutes  minutes  minutes  minutes  minutes  minutes                Patient Education and Home Exercises      Education provided:   - alignment of yoga poses, seated meditation  - Progress towards goals     Written Home Exercises Provided: to be provided in upcoming sessions.  Exercises were reviewed and Puja was able to demonstrate them prior to the end of the session.  Puja demonstrated good  understanding of the HEP provided. See EMR under Patient Instructions for exercises provided during therapy sessions.       Assessment     Pt would continue to benefit from skilled Occupational Therapy.      Puja is  progressing well towards her goals and there are no updates to goals at this time. Pt prognosis is Excellent.     Pt will continue to benefit from skilled outpatient occupational therapy to address the deficits listed in the problem list on initial evaluation provide pt/family education and to maximize pt's level of independence in the home and community environment.     Pt's spiritual, cultural and educational needs considered and pt agreeable to plan of care and goals.    Anticipated barriers to occupational therapy: gastric symptoms     Goals:        Goals:  Short Term Goals: 4 weeks      Goal # Goal Status   1 Patient will be independent with Home Exercise Program to increase endurance and manage stress.  Progressing   2 Patient will demonstrate independence with diaphragmatic breathing in sit and supine.  Progressing   3 Patient will identify 2 new stress coping skills for stress management/immune health. not Progressing   4 Patient will identify activity pacing problems.  Patient will then implement new plan for daily activity to increase endurance for ADL's. Not Progressing      Long Term Goals: 12 weeks      Goal # Goal Status   1 Patient will demonstrate independence with yoga Home Exercise Program to increase strength and endurance for ADL's. Progressing   2  Patient will demonstrate independence with relaxation techniques to manage stress for immune health.  Progressing   3 Patient will verbalize good understanding of stress/immune health relationship.   Progressing   4               PLAN          Outpatient Occupational Therapy 2 times weekly for 3  And then 1x week for 9 weeks to include the following interventions: Patient Education, Self Care, Therapeutic Activities and Therapeutic Exercise      Jamie Ray OT

## 2022-03-15 NOTE — TELEPHONE ENCOUNTER
Good morning, Mandie. I apologize. I thought I submitted it for her. I just placed the order. Thank you so much. She is trying to get this done before Friday, if you guys are available. Thank you so much.

## 2022-03-15 NOTE — TELEPHONE ENCOUNTER
Oh, ok. I will place it. I saw that Dr. Augustine placed it for scheduling. Do we know the date? Thank you.

## 2022-03-15 NOTE — TELEPHONE ENCOUNTER
Chau Garcia. I placed the order for an US guided paracentesis. Is this not correct? Sorry about that.

## 2022-03-17 ENCOUNTER — HOSPITAL ENCOUNTER (OUTPATIENT)
Dept: INTERVENTIONAL RADIOLOGY/VASCULAR | Facility: HOSPITAL | Age: 42
Discharge: HOME | End: 2022-03-17
Attending: PHYSICIAN ASSISTANT
Payer: MEDICAID

## 2022-03-17 VITALS — DIASTOLIC BLOOD PRESSURE: 79 MMHG | SYSTOLIC BLOOD PRESSURE: 121 MMHG

## 2022-03-17 DIAGNOSIS — R18.8 OTHER ASCITES: ICD-10-CM

## 2022-03-17 PROCEDURE — 49083 IR PARACENTESIS WITH IMAGING: ICD-10-PCS | Mod: ,,, | Performed by: RADIOLOGY

## 2022-03-17 PROCEDURE — C1729 CATH, DRAINAGE: HCPCS

## 2022-03-17 PROCEDURE — 49083 ABD PARACENTESIS W/IMAGING: CPT | Performed by: RADIOLOGY

## 2022-03-17 NOTE — DISCHARGE SUMMARY
Radiology Discharge Summary      Hospital Course: No complications    Admit Date: 3/17/2022  Discharge Date: 03/17/2022     Instructions Given to Patient: Yes  Diet: Resume prior diet  Activity: activity as tolerated    Description of Condition on Discharge: Stable  Vital Signs (Most Recent): BP: 121/79 (03/17/22 1529)    Discharge Disposition: Home    Discharge Diagnosis: gastric cancer, ascites s/p paracentesis     Follow-up: periodic paracentesis as needed    Martin Ivory MD  Staff Radiologist  Department of Radiology  Pager: [phone]

## 2022-03-17 NOTE — PROCEDURES
Radiology Post-Procedure Note    Pre Op Diagnosis: Ascites, gastric cancer  Post Op Diagnosis: Same    Procedure: Paracentesis    Procedure performed by: Martin Ivory MD    Written Informed Consent Obtained: Yes  Specimen Removed: YES thin renuka fluid  Estimated Blood Loss: Minimal    Findings:   Successful paracentesis.  Albumin administered PRN per protocol.    Patient tolerated procedure well.    Martin Iovry MD  Staff Radiologist  Department of Radiology  Pager: [phone]

## 2022-03-17 NOTE — H&P
Radiology History & Physical      SUBJECTIVE:     Chief Complaint: gastric cancer, recurrent ascites    History of Present Illness:  Puja Quigley is a 41 y.o. female who presents for paracentesis  Past Medical History:   Diagnosis Date    Gastric cancer     Gastric ulcer     Pregnancy 08/12/2020    delivered on 8/12/2020    Umbilical hernia      Past Surgical History:   Procedure Laterality Date    CLOSURE OF PERFORATED ULCER OF DUODENUM USING OMENTAL PATCH      ESOPHAGOGASTRODUODENOSCOPY N/A 10/13/2020    Procedure: EGD (ESOPHAGOGASTRODUODENOSCOPY);  Surgeon: Rosio Marion MD;  Location: [facility] (2ND FLR);  Service: Endoscopy;  Laterality: N/A;    ESOPHAGOGASTRODUODENOSCOPY N/A 12/11/2020    Procedure: EGD (ESOPHAGOGASTRODUODENOSCOPY);  Surgeon: Rosio Marion MD;  Location: [facility] (2ND FLR);  Service: Endoscopy;  Laterality: N/A;  Covid-19 test 12/8/20 at [facility]    ESOPHAGOGASTRODUODENOSCOPY N/A 3/12/2021    Procedure: EGD (ESOPHAGOGASTRODUODENOSCOPY);  Surgeon: Nav Omer MD;  Location: [facility] (2ND FLR);  Service: Endoscopy;  Laterality: N/A;  COVID [facility] 3/9 [facility]    ESOPHAGOGASTRODUODENOSCOPY N/A 5/18/2021    Procedure: EGD (ESOPHAGOGASTRODUODENOSCOPY);  Surgeon: Rosio Marion MD;  Location: [facility] (2ND FLR);  Service: Endoscopy;  Laterality: N/A;  5/15-covid pcw-inst portal-tb    ESOPHAGOGASTRODUODENOSCOPY N/A 5/26/2021    Procedure: EGD (ESOPHAGOGASTRODUODENOSCOPY);  Surgeon: Socrates Terrazas MD;  Location: [facility] (2ND FLR);  Service: Endoscopy;  Laterality: N/A;       Home Meds:   Prior to Admission medications    Medication Sig Start Date End Date Taking? Authorizing Provider   acetaminophen (TYLENOL) 500 MG tablet Take 500 mg by mouth every 6 (six) hours as needed for Pain.    Historical Provider   amitriptyline (ELAVIL) 10 MG tablet Take 1 tablet (10 mg total) by mouth every evening. 5/13/21   Socrates Terrazas MD   amitriptyline (ELAVIL) 10 MG  tablet Take 10 mg by mouth. 5/13/21   Historical Provider   dicyclomine (BENTYL) 10 MG capsule TAKE ONE CAPSULE BY MOUTH FOUR TIMES DAILY 9/27/21   Kamille Hooks PA-C   famotidine (PEPCID) 20 MG tablet Take 1 tablet (20 mg total) by mouth nightly as needed for Heartburn. 2/23/22 2/23/23  Kamille Hooks PA-C   ferrous sulfate (FEOSOL) 325 mg (65 mg iron) Tab tablet Take 1 tablet (325 mg total) by mouth once daily for 7 days, THEN 1 tablet (325 mg total) 2 (two) times daily. 5/26/21 5/26/22  Reshma Wilson MD   ferrous sulfate (FEOSOL) 325 mg (65 mg iron) Tab tablet Take by mouth. 5/26/21 5/26/22  Historical Provider   furosemide (LASIX) 20 MG tablet Take 10 mg by mouth.    Historical Provider   furosemide (LASIX) 40 MG tablet Take 0.5 tablets (20 mg total) by mouth daily as needed (leg swelling or abdominal swelling). 5/26/21 5/26/22  Reshma Wilson MD   furosemide (LASIX) 40 MG tablet  5/26/21   Historical Provider   gabapentin (NEURONTIN) 300 MG capsule Take 1 capsule (300 mg total) by mouth 3 (three) times daily. 2/7/22 2/7/23  Kamille Hooks PA-C   HYDROcodone-acetaminophen (NORCO) 5-325 mg per tablet Take 1 tablet by mouth. 7/9/21   Historical Provider   HYDROcodone-acetaminophen (NORCO) 5-325 mg per tablet Take 1 tablet by mouth 3 (three) times daily as needed. 7/9/21   Historical Provider   LIDOcaine-prilocaine (EMLA) cream Apply to Port-A-Cath area 30 to 45 minutes prior to port access as directed (topical anesthetic use to the anterior chest only). 6/25/21   Historical Provider   LIDOcaine-prilocaine (EMLA) cream Apply topically. 6/25/21   Historical Provider   metoclopramide HCl (REGLAN) 5 MG tablet 5 mg. 6/11/21   Historical Provider   multivitamin with folic acid 400 mcg Tab Take 1 tablet by mouth once daily.    Historical Provider   omeprazole (PRILOSEC) 40 MG capsule Take 1 capsule (40 mg total) by mouth once daily. 2/23/22 2/23/23  Kamille Hooks PA-C   ondansetron (ZOFRAN) 8 MG tablet Take  1 tablet (8 mg total) by mouth every 8 (eight) hours as needed for Nausea. 7/26/21   Fer Ashraf MD   pantoprazole (PROTONIX) 40 MG tablet Take 40 mg by mouth. 6/2/21   Historical Provider   pantoprazole (PROTONIX) 40 MG tablet Take 1 tablet (40 mg total) by mouth 2 (two) times daily. 2/1/22   Fer Ashraf MD   potassium chloride SA (K-DUR,KLOR-CON) 20 MEQ tablet 20 mEq. 5/26/21   Historical Provider   prochlorperazine (COMPAZINE) 10 MG tablet TAKE 1 TABLET(10 MG) BY MOUTH EVERY 6 HOURS AS NEEDED FOR NAUSEA 8/7/21   Fer Ashraf MD   prochlorperazine (COMPAZINE) 10 MG tablet as needed. 6/3/21   Historical Provider   senna-docusate 8.6-50 mg (PERICOLACE) 8.6-50 mg per tablet Take 1 tablet by mouth. 5/26/21   Historical Provider   senna-docusate 8.6-50 mg (SENNA WITH DOCUSATE SODIUM) 8.6-50 mg per tablet Take 1 tablet by mouth 2 (two) times daily as needed for Constipation. 5/26/21   Reshma Wilson MD   sucralfate (CARAFATE) 1 gram tablet TAKE 1 TABLET(1 GRAM) BY MOUTH FOUR TIMES DAILY FOR 14 DAYS 10/20/21   Rosio Marion MD   sucralfate (CARAFATE) 1 gram tablet  4/8/21   Historical Provider   traMADoL (ULTRAM) 50 mg tablet Take 50 mg by mouth. 6/25/21   Historical Provider   traMADoL (ULTRAM) 50 mg tablet Take by mouth. 6/25/21   Historical Provider     Anticoagulants/Antiplatelets: no anticoagulation    Allergies: Review of patient's allergies indicates:  No Known Allergies  Sedation History:  no adverse reactions    Review of Systems:   Hematological: no known coagulopathies  Respiratory: no shortness of breath  Cardiovascular: no chest pain  Gastrointestinal: no abdominal pain  Genito-Urinary: no dysuria  Musculoskeletal: negative  Neurological: no TIA or stroke symptoms         OBJECTIVE:     Vital Signs (Most Recent)  BP: 128/80 (03/17/22 1458)    Physical Exam:  ASA: 2  Mallampati: N/A    General: no acute distress  Mental Status: alert and oriented to person, place and  time  HEENT: normocephalic, atraumatic  Chest: unlabored breathing  Heart: regular heart rate  Abdomen: distended  Extremity: moves all extremities    Laboratory  Lab Results   Component Value Date    INR 1.0 06/10/2021       Lab Results   Component Value Date    WBC 3.59 (L) 03/04/2022    HGB 12.1 03/04/2022    HCT 36.3 (L) 03/04/2022    MCV 98 03/04/2022     03/04/2022      Lab Results   Component Value Date    GLU 81 03/04/2022     03/04/2022    K 3.9 03/04/2022     03/04/2022    CO2 26 03/04/2022    BUN 7 03/04/2022    CREATININE 0.6 03/04/2022    CALCIUM 8.7 03/04/2022    MG 1.7 05/26/2021    ALT 15 03/04/2022    AST 18 03/04/2022    ALBUMIN 3.1 (L) 03/04/2022    BILITOT 0.4 03/04/2022       ASSESSMENT/PLAN:     Sedation Plan: local anesthesia  Patient will undergo paracentesis.    Martin Ivory MD  Staff Radiologist  Department of Radiology  Pager: [phone]

## 2022-03-22 ENCOUNTER — OFFICE VISIT (OUTPATIENT)
Dept: HEMATOLOGY/ONCOLOGY | Facility: CLINIC | Age: 42
End: 2022-03-22
Payer: MEDICAID

## 2022-03-22 ENCOUNTER — INFUSION (OUTPATIENT)
Dept: INFUSION THERAPY | Facility: HOSPITAL | Age: 42
End: 2022-03-22
Payer: COMMERCIAL

## 2022-03-22 VITALS
SYSTOLIC BLOOD PRESSURE: 128 MMHG | OXYGEN SATURATION: 100 % | DIASTOLIC BLOOD PRESSURE: 81 MMHG | WEIGHT: 133.13 LBS | BODY MASS INDEX: 20.9 KG/M2 | HEIGHT: 67 IN | TEMPERATURE: 98 F | HEART RATE: 66 BPM

## 2022-03-22 VITALS — HEART RATE: 78 BPM | DIASTOLIC BLOOD PRESSURE: 86 MMHG | SYSTOLIC BLOOD PRESSURE: 134 MMHG | RESPIRATION RATE: 18 BRPM

## 2022-03-22 DIAGNOSIS — R18.8 OTHER ASCITES: ICD-10-CM

## 2022-03-22 DIAGNOSIS — R11.2 NON-INTRACTABLE VOMITING WITH NAUSEA, UNSPECIFIED VOMITING TYPE: ICD-10-CM

## 2022-03-22 DIAGNOSIS — T45.1X5A IMMUNODEFICIENCY DUE TO CHEMOTHERAPY: ICD-10-CM

## 2022-03-22 DIAGNOSIS — Z79.899 IMMUNODEFICIENCY DUE TO CHEMOTHERAPY: ICD-10-CM

## 2022-03-22 DIAGNOSIS — C16.9 MALIGNANT NEOPLASM OF STOMACH, UNSPECIFIED LOCATION: Primary | ICD-10-CM

## 2022-03-22 DIAGNOSIS — F41.1 ANXIETY ASSOCIATED WITH CANCER DIAGNOSIS: ICD-10-CM

## 2022-03-22 DIAGNOSIS — C78.6 PERITONEAL CARCINOMATOSIS: ICD-10-CM

## 2022-03-22 DIAGNOSIS — D50.9 MICROCYTIC ANEMIA: ICD-10-CM

## 2022-03-22 DIAGNOSIS — C79.51 BONE METASTASES: ICD-10-CM

## 2022-03-22 DIAGNOSIS — G62.0 NEUROPATHY DUE TO CHEMOTHERAPEUTIC DRUG: ICD-10-CM

## 2022-03-22 DIAGNOSIS — T45.1X5A NEUROPATHY DUE TO CHEMOTHERAPEUTIC DRUG: ICD-10-CM

## 2022-03-22 DIAGNOSIS — C80.1 ANXIETY ASSOCIATED WITH CANCER DIAGNOSIS: ICD-10-CM

## 2022-03-22 DIAGNOSIS — D84.821 IMMUNODEFICIENCY DUE TO CHEMOTHERAPY: ICD-10-CM

## 2022-03-22 PROCEDURE — 99215 OFFICE O/P EST HI 40 MIN: CPT | Mod: PBBFAC,25 | Performed by: INTERNAL MEDICINE

## 2022-03-22 PROCEDURE — 99999 PR PBB SHADOW E&M-EST. PATIENT-LVL V: ICD-10-PCS | Mod: PBBFAC,,, | Performed by: INTERNAL MEDICINE

## 2022-03-22 PROCEDURE — 63600175 PHARM REV CODE 636 W HCPCS: Performed by: INTERNAL MEDICINE

## 2022-03-22 PROCEDURE — 25000003 PHARM REV CODE 250: Performed by: INTERNAL MEDICINE

## 2022-03-22 PROCEDURE — 96416 CHEMO PROLONG INFUSE W/PUMP: CPT

## 2022-03-22 PROCEDURE — 99215 PR OFFICE/OUTPT VISIT, EST, LEVL V, 40-54 MIN: ICD-10-PCS | Mod: S$PBB,,, | Performed by: INTERNAL MEDICINE

## 2022-03-22 PROCEDURE — 99999 PR PBB SHADOW E&M-EST. PATIENT-LVL V: CPT | Mod: PBBFAC,,, | Performed by: INTERNAL MEDICINE

## 2022-03-22 PROCEDURE — 3008F BODY MASS INDEX DOCD: CPT | Mod: CPTII,,, | Performed by: INTERNAL MEDICINE

## 2022-03-22 PROCEDURE — 3074F SYST BP LT 130 MM HG: CPT | Mod: CPTII,,, | Performed by: INTERNAL MEDICINE

## 2022-03-22 PROCEDURE — 3079F DIAST BP 80-89 MM HG: CPT | Mod: CPTII,,, | Performed by: INTERNAL MEDICINE

## 2022-03-22 PROCEDURE — 3074F PR MOST RECENT SYSTOLIC BLOOD PRESSURE < 130 MM HG: ICD-10-PCS | Mod: CPTII,,, | Performed by: INTERNAL MEDICINE

## 2022-03-22 PROCEDURE — 3008F PR BODY MASS INDEX (BMI) DOCUMENTED: ICD-10-PCS | Mod: CPTII,,, | Performed by: INTERNAL MEDICINE

## 2022-03-22 PROCEDURE — 99215 OFFICE O/P EST HI 40 MIN: CPT | Mod: S$PBB,,, | Performed by: INTERNAL MEDICINE

## 2022-03-22 PROCEDURE — 96367 TX/PROPH/DG ADDL SEQ IV INF: CPT

## 2022-03-22 PROCEDURE — 3079F PR MOST RECENT DIASTOLIC BLOOD PRESSURE 80-89 MM HG: ICD-10-PCS | Mod: CPTII,,, | Performed by: INTERNAL MEDICINE

## 2022-03-22 RX ORDER — HEPARIN 100 UNIT/ML
500 SYRINGE INTRAVENOUS
Status: CANCELLED | OUTPATIENT
Start: 2022-03-27

## 2022-03-22 RX ORDER — SODIUM CHLORIDE 0.9 % (FLUSH) 0.9 %
10 SYRINGE (ML) INJECTION
Status: DISCONTINUED | OUTPATIENT
Start: 2022-03-22 | End: 2022-03-22 | Stop reason: HOSPADM

## 2022-03-22 RX ORDER — HEPARIN 100 UNIT/ML
500 SYRINGE INTRAVENOUS
Status: DISCONTINUED | OUTPATIENT
Start: 2022-03-22 | End: 2022-03-22 | Stop reason: HOSPADM

## 2022-03-22 RX ORDER — EPINEPHRINE 0.3 MG/.3ML
0.3 INJECTION SUBCUTANEOUS ONCE AS NEEDED
Status: CANCELLED | OUTPATIENT
Start: 2022-03-27

## 2022-03-22 RX ORDER — SODIUM CHLORIDE 0.9 % (FLUSH) 0.9 %
10 SYRINGE (ML) INJECTION
Status: CANCELLED | OUTPATIENT
Start: 2022-03-29

## 2022-03-22 RX ORDER — SODIUM CHLORIDE 0.9 % (FLUSH) 0.9 %
10 SYRINGE (ML) INJECTION
Status: CANCELLED | OUTPATIENT
Start: 2022-03-27

## 2022-03-22 RX ORDER — DIPHENHYDRAMINE HYDROCHLORIDE 50 MG/ML
50 INJECTION INTRAMUSCULAR; INTRAVENOUS ONCE AS NEEDED
Status: CANCELLED | OUTPATIENT
Start: 2022-03-27

## 2022-03-22 RX ORDER — HEPARIN 100 UNIT/ML
500 SYRINGE INTRAVENOUS
Status: CANCELLED | OUTPATIENT
Start: 2022-03-29

## 2022-03-22 RX ORDER — EPINEPHRINE 0.3 MG/.3ML
0.3 INJECTION SUBCUTANEOUS ONCE AS NEEDED
Status: DISCONTINUED | OUTPATIENT
Start: 2022-03-22 | End: 2022-03-22 | Stop reason: HOSPADM

## 2022-03-22 RX ORDER — DIPHENHYDRAMINE HYDROCHLORIDE 50 MG/ML
50 INJECTION INTRAMUSCULAR; INTRAVENOUS ONCE AS NEEDED
Status: DISCONTINUED | OUTPATIENT
Start: 2022-03-22 | End: 2022-03-22 | Stop reason: HOSPADM

## 2022-03-22 RX ADMIN — FLUOROURACIL 4000 MG: 50 INJECTION, SOLUTION INTRAVENOUS at 11:03

## 2022-03-22 RX ADMIN — PALONOSETRON HYDROCHLORIDE 0.25 MG: 0.25 INJECTION, SOLUTION INTRAVENOUS at 10:03

## 2022-03-22 RX ADMIN — SODIUM CHLORIDE: 9 INJECTION, SOLUTION INTRAVENOUS at 10:03

## 2022-03-22 NOTE — PLAN OF CARE
1030 pt here for D1C18 5 FU pump placement, labs, hx, meds, allergies reviewed, pt with no new complaints at this time, reclined in chair, continue to monitor

## 2022-03-22 NOTE — PROGRESS NOTES
MEDICAL ONCOLOGY - ESTABLISHED PATIENT    Reason for visit: Gastric cancer     Best Contact Phone Number(s): [phone] (home)      Cancer/Stage/TNM:   Cancer Staging  No matching staging information was found for the patient.     Oncology History   Malignant neoplasm of stomach   6/10/2021 Initial Diagnosis    Gastric adenocarcinoma     7/26/2021 -  Chemotherapy    Treatment Summary   Plan Name: OP FOLFOX 6 Q2W  Treatment Goal: Palliative  Status: Active  Start Date: 7/26/2021  End Date: 5/2/2022 (Planned)  Provider: Fer Ashraf MD  Chemotherapy: fluorouraciL 2,400 mg/m2 = 3,770 mg in sodium chloride 0.9% 100 mL chemo infusion, 2,400 mg/m2 = 3,770 mg, Intravenous, Over 46 hours, 18 of 20 cycles  Administration: 3,770 mg (7/26/2021), 3,770 mg (8/9/2021), 3,770 mg (8/23/2021), 3,770 mg (9/7/2021), 3,770 mg (9/21/2021), 3,770 mg (10/5/2021), 3,770 mg (10/19/2021), 3,770 mg (11/2/2021), 3,770 mg (11/15/2021), 3,770 mg (11/30/2021), 3,770 mg (12/13/2021), 4,000 mg (12/27/2021), 4,030 mg (1/10/2022), 4,030 mg (1/24/2022), 4,000 mg (2/7/2022), 4,000 mg (2/22/2022), 4,000 mg (3/7/2022), 4,000 mg (3/22/2022)  oxaliplatin (ELOXATIN) 85 mg/m2 = 133 mg in dextrose 5 % 500 mL chemo infusion, 85 mg/m2 = 133 mg, Intravenous, Clinic/[facility] 1 time, 9 of 9 cycles  Administration: 133 mg (7/26/2021), 133 mg (8/9/2021), 133 mg (8/23/2021), 133 mg (9/7/2021), 133 mg (9/21/2021), 133 mg (10/5/2021), 133 mg (10/19/2021), 133 mg (11/2/2021), 133 mg (11/15/2021)          Interim History:  41 y.o. female with metastatic gastric cancer who presents for follow-up prior to cycle 18 of chemotherapy, now on maintenance 5-FU. Continues to have moderate neuropathy to the hands and feet and has recently started acupuncture.  She is otherwise tolerating chemotherapy well.  She has been experiencing some worsening of her ascites.  Had a paracentesis last week on 3/17 with removal of 3 L of fluid.  Feels intermittent indigestion but omeprazole  does help.  She is not sure if the fluid is starting to build back up again.    ECOG status is 1. Presents alone.    Review of Systems   Constitutional: Negative for activity change, appetite change, chills, fatigue, fever and unexpected weight change.   HENT: Negative for ear pain, facial swelling, hearing loss, mouth sores, nosebleeds, sore throat and trouble swallowing.    Eyes: Negative for pain, discharge, redness and visual disturbance.   Respiratory: Negative for cough, chest tightness and shortness of breath.    Cardiovascular: Negative for chest pain, palpitations and leg swelling.   Gastrointestinal: Positive for abdominal distention and reflux. Negative for abdominal pain, blood in stool, constipation, diarrhea, nausea and vomiting.   Endocrine: Negative for cold intolerance and heat intolerance.   Genitourinary: Negative for decreased urine volume, difficulty urinating, dysuria, frequency, hematuria, hot flashes, urgency and vaginal bleeding.   Musculoskeletal: Negative for arthralgias, gait problem, leg pain and myalgias.   Integumentary:  Negative for pallor, rash and wound.   Allergic/Immunologic: Negative for immunocompromised state.   Neurological: Positive for numbness. Negative for dizziness, tremors, weakness, light-headedness and headaches.   Hematological: Negative for adenopathy. Does not bruise/bleed easily.   Psychiatric/Behavioral: Negative for agitation, confusion, dysphoric mood and sleep disturbance. The patient is not nervous/anxious.            Past Medical History:   Past Medical History:   Diagnosis Date    Gastric cancer     Gastric ulcer     Pregnancy 08/12/2020    delivered on 8/12/2020    Umbilical hernia         Past Surgical History:   Past Surgical History:   Procedure Laterality Date    CLOSURE OF PERFORATED ULCER OF DUODENUM USING OMENTAL PATCH      ESOPHAGOGASTRODUODENOSCOPY N/A 10/13/2020    Procedure: EGD (ESOPHAGOGASTRODUODENOSCOPY);  Surgeon: Rosio Marion,  "MD;  Location: [facility] ENDO (2ND FLR);  Service: Endoscopy;  Laterality: N/A;    ESOPHAGOGASTRODUODENOSCOPY N/A 2020    Procedure: EGD (ESOPHAGOGASTRODUODENOSCOPY);  Surgeon: Rosio Marion MD;  Location: [facility] ENDO (2ND FLR);  Service: Endoscopy;  Laterality: N/A;  Covid-19 test 20 at [facility]    ESOPHAGOGASTRODUODENOSCOPY N/A 3/12/2021    Procedure: EGD (ESOPHAGOGASTRODUODENOSCOPY);  Surgeon: Nav Omer MD;  Location: [facility] ENDO (2ND FLR);  Service: Endoscopy;  Laterality: N/A;  COVID at [facility] 3/9 ttr    ESOPHAGOGASTRODUODENOSCOPY N/A 2021    Procedure: EGD (ESOPHAGOGASTRODUODENOSCOPY);  Surgeon: Rosio Marion MD;  Location: [facility] ENDO (2ND FLR);  Service: Endoscopy;  Laterality: N/A;  5/15-covid pcw-inst portal-tb    ESOPHAGOGASTRODUODENOSCOPY N/A 2021    Procedure: EGD (ESOPHAGOGASTRODUODENOSCOPY);  Surgeon: Socrates Terrazas MD;  Location: [facility] ENDO (2ND FLR);  Service: Endoscopy;  Laterality: N/A;        Family History:   Family History   Problem Relation Age of Onset    Cancer Mother 67        lymphoma (type? "not active," not on tx; "related to her blood")    Lung cancer Father 48        type? h/o smoking    No Known Problems Son     Breast cancer Other 73        unilat; stage I, but aggressive    Prostate cancer Paternal Uncle         (dx 50s/60? no chemo?)    Breast cancer Paternal Cousin         (dx age?) ductal    Colon cancer Neg Hx     Esophageal cancer Neg Hx         Social History:   Social History     Tobacco Use    Smoking status: Former Smoker     Types: Cigarettes     Quit date: 2019     Years since quittin.3    Smokeless tobacco: Never Used   Substance Use Topics    Alcohol use: Yes        I have reviewed and updated the patient's past medical, surgical, family and social histories.    Allergies:   Review of patient's allergies indicates:  No Known Allergies     Medications:   Current Outpatient Medications   Medication Sig Dispense " Refill    acetaminophen (TYLENOL) 500 MG tablet Take 500 mg by mouth every 6 (six) hours as needed for Pain.      amitriptyline (ELAVIL) 10 MG tablet Take 1 tablet (10 mg total) by mouth every evening. 30 tablet 3    amitriptyline (ELAVIL) 10 MG tablet Take 10 mg by mouth.      dicyclomine (BENTYL) 10 MG capsule TAKE ONE CAPSULE BY MOUTH FOUR TIMES DAILY 120 capsule 0    famotidine (PEPCID) 20 MG tablet Take 1 tablet (20 mg total) by mouth nightly as needed for Heartburn. 60 tablet 1    ferrous sulfate (FEOSOL) 325 mg (65 mg iron) Tab tablet Take 1 tablet (325 mg total) by mouth once daily for 7 days, THEN 1 tablet (325 mg total) 2 (two) times daily. 60 tablet 10    ferrous sulfate (FEOSOL) 325 mg (65 mg iron) Tab tablet Take by mouth.      furosemide (LASIX) 20 MG tablet Take 10 mg by mouth.      furosemide (LASIX) 40 MG tablet Take 0.5 tablets (20 mg total) by mouth daily as needed (leg swelling or abdominal swelling). 30 tablet 1    furosemide (LASIX) 40 MG tablet       gabapentin (NEURONTIN) 300 MG capsule Take 1 capsule (300 mg total) by mouth 3 (three) times daily. 90 capsule 11    HYDROcodone-acetaminophen (NORCO) 5-325 mg per tablet Take 1 tablet by mouth.      HYDROcodone-acetaminophen (NORCO) 5-325 mg per tablet Take 1 tablet by mouth 3 (three) times daily as needed.      LIDOcaine-prilocaine (EMLA) cream Apply to Port-A-Cath area 30 to 45 minutes prior to port access as directed (topical anesthetic use to the anterior chest only).      LIDOcaine-prilocaine (EMLA) cream Apply topically.      metoclopramide HCl (REGLAN) 5 MG tablet 5 mg.      multivitamin with folic acid 400 mcg Tab Take 1 tablet by mouth once daily.      omeprazole (PRILOSEC) 40 MG capsule Take 1 capsule (40 mg total) by mouth once daily. 30 capsule 11    ondansetron (ZOFRAN) 8 MG tablet Take 1 tablet (8 mg total) by mouth every 8 (eight) hours as needed for Nausea. 60 tablet 2    pantoprazole (PROTONIX) 40 MG tablet  "Take 40 mg by mouth.      pantoprazole (PROTONIX) 40 MG tablet Take 1 tablet (40 mg total) by mouth 2 (two) times daily. 60 tablet 11    potassium chloride SA (K-DUR,KLOR-CON) 20 MEQ tablet 20 mEq.      prochlorperazine (COMPAZINE) 10 MG tablet TAKE 1 TABLET(10 MG) BY MOUTH EVERY 6 HOURS AS NEEDED FOR NAUSEA 90 tablet 2    prochlorperazine (COMPAZINE) 10 MG tablet as needed.      senna-docusate 8.6-50 mg (PERICOLACE) 8.6-50 mg per tablet Take 1 tablet by mouth.      senna-docusate 8.6-50 mg (SENNA WITH DOCUSATE SODIUM) 8.6-50 mg per tablet Take 1 tablet by mouth 2 (two) times daily as needed for Constipation.      sucralfate (CARAFATE) 1 gram tablet TAKE 1 TABLET(1 GRAM) BY MOUTH FOUR TIMES DAILY FOR 14 DAYS 56 tablet 0    sucralfate (CARAFATE) 1 gram tablet       traMADoL (ULTRAM) 50 mg tablet Take 50 mg by mouth.      traMADoL (ULTRAM) 50 mg tablet Take by mouth.       No current facility-administered medications for this visit.        Physical Exam:   /81   Pulse 66   Temp 98.1 °F (36.7 °C) (Oral)   Ht 5' 7" (1.702 m)   Wt 60.4 kg (133 lb 1.6 oz)   SpO2 100%   BMI 20.85 kg/m²      ECOG Performance status: 1            Physical Exam  Vitals reviewed.   Constitutional:       Appearance: Normal appearance. She is not ill-appearing or toxic-appearing.      Comments: Thin   HENT:      Head: Normocephalic and atraumatic.      Nose: Nose normal.      Mouth/Throat:      Mouth: Mucous membranes are moist.      Pharynx: Oropharynx is clear.   Eyes:      General: No scleral icterus.     Extraocular Movements: Extraocular movements intact.      Conjunctiva/sclera: Conjunctivae normal.   Cardiovascular:      Rate and Rhythm: Normal rate and regular rhythm.      Pulses: Normal pulses.      Heart sounds: Normal heart sounds.   Pulmonary:      Effort: Pulmonary effort is normal.      Breath sounds: Normal breath sounds.   Abdominal:      General: Bowel sounds are normal. There is distension.      " Palpations: Abdomen is soft.      Tenderness: There is no abdominal tenderness. There is no guarding.   Musculoskeletal:         General: No swelling or tenderness. Normal range of motion.      Cervical back: Normal range of motion and neck supple.      Right lower leg: No edema.      Left lower leg: No edema.   Skin:     General: Skin is warm.      Findings: No rash.   Neurological:      General: No focal deficit present.      Mental Status: She is alert. Mental status is at baseline.   Psychiatric:         Mood and Affect: Mood normal.         Behavior: Behavior normal.             Labs:   Recent Results (from the past 48 hour(s))   CBC Oncology    Collection Time: 03/22/22  8:43 AM   Result Value Ref Range    WBC 3.57 (L) 3.90 - 12.70 K/uL    RBC 3.88 (L) 4.00 - 5.40 M/uL    Hemoglobin 12.7 12.0 - 16.0 g/dL    Hematocrit 42.3 37.0 - 48.5 %     (H) 82 - 98 fL    MCH 32.7 (H) 27.0 - 31.0 pg    MCHC 30.0 (L) 32.0 - 36.0 g/dL    RDW 13.4 11.5 - 14.5 %    Platelets 165 150 - 450 K/uL    MPV 9.7 9.2 - 12.9 fL    Gran # (ANC) 2.1 1.8 - 7.7 K/uL    Immature Grans (Abs) 0.00 0.00 - 0.04 K/uL   Comprehensive Metabolic Panel    Collection Time: 03/22/22  8:43 AM   Result Value Ref Range    Sodium 141 136 - 145 mmol/L    Potassium 4.2 3.5 - 5.1 mmol/L    Chloride 109 95 - 110 mmol/L    CO2 26 23 - 29 mmol/L    Glucose 75 70 - 110 mg/dL    BUN 6 6 - 20 mg/dL    Creatinine 0.7 0.5 - 1.4 mg/dL    Calcium 9.0 8.7 - 10.5 mg/dL    Total Protein 5.8 (L) 6.0 - 8.4 g/dL    Albumin 3.0 (L) 3.5 - 5.2 g/dL    Total Bilirubin 0.9 0.1 - 1.0 mg/dL    Alkaline Phosphatase 86 55 - 135 U/L    AST 20 10 - 40 U/L    ALT 15 10 - 44 U/L    Anion Gap 6 (L) 8 - 16 mmol/L    eGFR if African American >60.0 >60 mL/min/1.73 m^2    eGFR if non African American >60.0 >60 mL/min/1.73 m^2        I have reviewed the pertinent labs from today which are acceptable for treatment.  Mild leukopenia.  Albumin 3.0.    Imaging:    CT CAP:  2/10/2022    Impression:     Persistent gastric wall thickening involving the distal stomach, similar to the prior examination.     Numerous hepatic hypodensities some of which likely represent cysts, however there is a more solid-appearing soft tissue density liver lesion within the posteromedial right lobe, unchanged.     Subtle sclerotic bone lesions within the thoracic spine remain stable.     Small to moderate left pleural effusion, increased when compared to the prior study.     Moderate ascites, increased when compared to the prior study.     Probable necrotic enlarged left inguinal lymph node, similar to the prior examination.     Nodular enhancement within the anterior peritoneum anterior to the stomach, possibly peritoneal carcinomatosis.     Probable left lobe thyroid nodule, stable compared to 10/08/2021.     We have personally reviewed the most recent imaging from 10/8/2021, which is displays overall stability of disease. There is a L sided thyroid nodule that is indeterminate at this point. Decreased amount of ascites.      Abdominal US 2/10/2022:     FINDINGS:  There is moderate, anechoic abdominal ascites, most prominent within the lower quadrants.     Impression:     Moderate, anechoic abdominal ascites.  Left inguinal hernia containing fat and fluid.    Path:   5/26/21:  Positive for malignancy.   Poorly differentiated adenocarcinoma growing with signet ring features   Immunostains for Burce-EP4 and CEA are positive.  The controls stain   appropriately.   This case was reviewed by Dr Sebastian who concurs with the diagnosis       Assessment:       1. Malignant neoplasm of stomach, unspecified location    2. Bone metastases    3. Peritoneal carcinomatosis    4. Other ascites    5. Immunodeficiency due to chemotherapy    6. Non-intractable vomiting with nausea, unspecified vomiting type    7. Microcytic anemia    8. Anxiety associated with cancer diagnosis    9. Neuropathy due to chemotherapeutic drug           Plan:             # Gastric adenocarcinoma with peritoneal metastases, immunodeficiency due to chemotherapy, bone metastases  HER-2 negative by FISH.  PD-L1 < 1%.  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  We previously explained the implications of a stage IV diagnosis to her and potential treatment options.  She is now s/p port placement. She sought a second opinion at [facility] for zolbetuximab trial targeting CLDN protein but she did not test positive for this biomarker. We recommended proceeding with SOC FOLFOX, with which the [facility] team agreed. Because of the negative PD-L1 I do not think the benefit of adding nivolumab outweighs the potential toxicities.       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  She opted not to get genetic testing done at her appt with Phi. We recommended that she reconsider that decision in light of a very likely germline mutation in SAMUEL which has clinical consequences.      Tolerating chemotherapy well thus far.  She has experienced improvement in many of her GI symptoms since starting treatment.    CT CAP after 6 cycles showed improvement in ascites, probable hepatic, thyroid and bone metastases.  Given improvement in symptoms and delay between baseline imaging and cycle 1, suspect she was having benefit from chemotherapy. She was in agreement with this.    We dropped oxaliplatin starting with cycle 10 due to grade 1 neuropathy and desire to keep it from worsening. Doing well overall. Stable neuropathy in the feet. Otherwise, tolerating treatment good.     CT CAP after cycle 10 showed stable disease.  CT scan after cycle 15 continued to display overall stability of disease within the gastric wall, peritoneum, spine and liver. Although increase ascites on imaging and clinically can  represent progression of disease, her CT suggested overall stability within the liver and gastric wall, so we recc to continue with treatment at this time.     Proceed with cycle 18 of chemotherapy today, 5-FU maintenance.   RTC in 2 weeks, for cycle 19. Will repeat a CT scan after cycle 19.    #Ascites  Initially had peritoneal catheter after diagnosis, output slowed so was removed in October 2021 by IR.  Now recent reaccumulation of ascites.  S/p paracentesis 3/17 with 3L removed, good relief.  Placed standing orders for IR para.  Will repeat imaging in one month to rule out progression.     # Nausea, gastric ulcer  Stable. No vomiting.   Continue Protonix.  Continue Compazine PRN.  Continue dietary modifications to prevent indigestion.    # Anemia  Hgb normal. Doing well.    Iron deficiency due to chronic blood loss.  S/p 2 doses of Injectafer.    # Anxiety  Feeling well today.   Continue f/u with psych onc w/ Dr. Berumen    # chemotherapy induced neuropathy  2/2 previous chemotherapy with Oxaliplatin  Continue acupuncture.  Continue gabapentin.  Will continue to monitor.     Follow up: Will return in 2 weeks for lab work, clinic visit, and cycle 19 of chemo.    Route Chart for Scheduling    Med Onc Chart Routing      Follow up with physician 4 weeks.   Follow up with ISHMAEL 2 weeks. For 5-FU infusion   Labs CBC and CMP   Lab interval: every 2 weeks     Imaging    Pharmacy appointment    Other referrals          Treatment Plan Information   OP FOLFOX 6 Q2W   Fer Ashraf MD   Upcoming Treatment Dates - OP FOLFOX 6 Q2W    4/13/2022       Chemotherapy       fluorouracil (ADRUCIL) 4,000 mg in sodium chloride 0.9% 240 mL chemo infusion  4/30/2022       Chemotherapy       fluorouracil (ADRUCIL) 4,000 mg in sodium chloride 0.9% 240 mL chemo infusion    Therapy Plan Information  EPINEPHrine (EPIPEN) 0.3 mg/0.3 mL pen injection 0.3 mg  0.3 mg, Intramuscular, PRN  diphenhydrAMINE injection 50 mg  50 mg, Intravenous,  PRN  methylPREDNISolone sodium succinate injection 125 mg  125 mg, Intravenous, PRN  sodium chloride 0.9% bolus 1,000 mL  1,000 mL, Intravenous, PRN      Fer Ashraf MD  Hematology/Oncology  [facility]

## 2022-03-22 NOTE — PLAN OF CARE
1150 pt with CADD pump infusing at 2.2 ml/hr to right port without issue prior to d/c, pt to rtc Thursday around 0930 for pump d/c, pt aware, no distress noted upon d/c to home

## 2022-03-24 ENCOUNTER — CLINICAL SUPPORT (OUTPATIENT)
Dept: HEMATOLOGY/ONCOLOGY | Facility: CLINIC | Age: 42
End: 2022-03-24
Payer: MEDICAID

## 2022-03-24 ENCOUNTER — INFUSION (OUTPATIENT)
Dept: INFUSION THERAPY | Facility: HOSPITAL | Age: 42
End: 2022-03-24
Attending: PHYSICIAN ASSISTANT
Payer: COMMERCIAL

## 2022-03-24 VITALS
OXYGEN SATURATION: 98 % | SYSTOLIC BLOOD PRESSURE: 119 MMHG | TEMPERATURE: 98 F | DIASTOLIC BLOOD PRESSURE: 79 MMHG | RESPIRATION RATE: 18 BRPM | HEART RATE: 69 BPM

## 2022-03-24 DIAGNOSIS — G62.9 NEUROPATHY: ICD-10-CM

## 2022-03-24 DIAGNOSIS — C79.51 BONE METASTASES: ICD-10-CM

## 2022-03-24 DIAGNOSIS — C16.9 MALIGNANT NEOPLASM OF STOMACH, UNSPECIFIED LOCATION: Primary | ICD-10-CM

## 2022-03-24 PROCEDURE — 99999 PR PBB SHADOW E&M-EST. PATIENT-LVL I: ICD-10-PCS | Mod: PBBFAC,,, | Performed by: ACUPUNCTURIST

## 2022-03-24 PROCEDURE — 97814 PR ACUPUNCT W/ ELEC STIMUL ADDL 15M: ICD-10-PCS | Mod: CSM,S$GLB,, | Performed by: ACUPUNCTURIST

## 2022-03-24 PROCEDURE — 99211 OFF/OP EST MAY X REQ PHY/QHP: CPT

## 2022-03-24 PROCEDURE — 63600175 PHARM REV CODE 636 W HCPCS: Performed by: INTERNAL MEDICINE

## 2022-03-24 PROCEDURE — 99211 OFF/OP EST MAY X REQ PHY/QHP: CPT | Mod: PBBFAC,27 | Performed by: ACUPUNCTURIST

## 2022-03-24 PROCEDURE — A4216 STERILE WATER/SALINE, 10 ML: HCPCS | Performed by: INTERNAL MEDICINE

## 2022-03-24 PROCEDURE — 99999 PR PBB SHADOW E&M-EST. PATIENT-LVL I: CPT | Mod: PBBFAC,,, | Performed by: ACUPUNCTURIST

## 2022-03-24 PROCEDURE — 97813 PR ACUPUNCT W/ ELEC STIMUL 15 MIN: ICD-10-PCS | Mod: CSM,S$GLB,, | Performed by: ACUPUNCTURIST

## 2022-03-24 PROCEDURE — 25000003 PHARM REV CODE 250: Performed by: INTERNAL MEDICINE

## 2022-03-24 PROCEDURE — 97814 ACUP 1/> W/ESTIM EA ADDL 15: CPT | Mod: CSM,S$GLB,, | Performed by: ACUPUNCTURIST

## 2022-03-24 PROCEDURE — 97813 ACUP 1/> W/ESTIM 1ST 15 MIN: CPT | Mod: CSM,S$GLB,, | Performed by: ACUPUNCTURIST

## 2022-03-24 RX ORDER — HEPARIN 100 UNIT/ML
500 SYRINGE INTRAVENOUS
Status: DISCONTINUED | OUTPATIENT
Start: 2022-03-24 | End: 2022-03-24 | Stop reason: HOSPADM

## 2022-03-24 RX ORDER — SODIUM CHLORIDE 0.9 % (FLUSH) 0.9 %
10 SYRINGE (ML) INJECTION
Status: DISCONTINUED | OUTPATIENT
Start: 2022-03-24 | End: 2022-03-24 | Stop reason: HOSPADM

## 2022-03-24 RX ADMIN — Medication 10 ML: at 10:03

## 2022-03-24 RX ADMIN — HEPARIN 500 UNITS: 100 SYRINGE at 10:03

## 2022-03-24 NOTE — PLAN OF CARE
1000-Pt ambulatory to clinic today alone for pump d/c. Denies any complaints. Pump completed prior to arrival. Port deaccessed after flushing. Ambulatory from clinic in [facility].

## 2022-03-24 NOTE — PROGRESS NOTES
Subjective:       Patient ID: Puja Quigley is a 41 y.o. female.    Chief Complaint: Pain (CIPN)    Patient states no change after initial treatment, continue and evaluate.     Review of Systems   Neurological: Positive for numbness.         Objective:      Physical Exam    Assessment:       Problem List Items Addressed This Visit        Oncology    Malignant neoplasm of stomach - Primary    Bone metastases      Other Visit Diagnoses     Neuropathy              Plan:       1x a week*         Pre-Symptom Score: NA  Post-Symptom Score: NA     Pain (CIPN)       Encounter Diagnoses   Name Primary?    Malignant neoplasm of stomach, unspecified location Yes    Bone metastases     Neuropathy        Acupuncture points used:  4 MONTANEZ, BA JENNA, BA MARCELL , Du20, Gb34, Li11, WANG MEN, Sp10, Sp6, Sp9, St36 and YIN RICKETTS    STIM USED @ [facility]      NEEDLES IN: 26  NEEDLES OUT: 26    NEEDLES W/ STIM  AT: 10:15 AM  NEEDLES W/ STIM REMOVED AT: 10:45 AM

## 2022-03-25 ENCOUNTER — CLINICAL SUPPORT (OUTPATIENT)
Dept: REHABILITATION | Facility: HOSPITAL | Age: 42
End: 2022-03-25
Payer: MEDICAID

## 2022-03-25 DIAGNOSIS — F41.1 ANXIETY ASSOCIATED WITH CANCER DIAGNOSIS: ICD-10-CM

## 2022-03-25 DIAGNOSIS — R52 PAIN AGGRAVATED BY ACTIVITIES OF DAILY LIVING: Primary | ICD-10-CM

## 2022-03-25 DIAGNOSIS — C80.1 ANXIETY ASSOCIATED WITH CANCER DIAGNOSIS: ICD-10-CM

## 2022-03-25 PROCEDURE — 97535 SELF CARE MNGMENT TRAINING: CPT

## 2022-03-25 PROCEDURE — 97110 THERAPEUTIC EXERCISES: CPT

## 2022-03-25 NOTE — PROGRESS NOTES
[facility]  Occupational Therapy Treatment Note - Therapeutic Yoga Progam    Date: 3/25/2022  Name: Puja Quigley  Clinic Number: [id number]    Therapy Diagnosis:   Encounter Diagnoses   Name Primary?    Pain aggravated by activities of daily living Yes    Anxiety associated with cancer diagnosis      Physician: Tanika Sarabia PA-C  Physician: Tanika Sarabia PA-C     Physician Orders: Eval and Treat   Medical Diagnosis from Referral:   C78.6 (ICD-10-CM) - Peritoneal carcinomatosis   C16.9 (ICD-10-CM) - Malignant neoplasm of stomach, unspecified location         Evaluation Date: 3/11/2022  Authorization Period Expiration: 3/10/23  Plan of Care Expiration: 4/10/23  Progress Note Due: 4/10/23  Visit # / Visits authorized: 2/15     Precautions: Standard           Precautions:  Standard    Time In: 10:00am  Time Out: 11:00am  Total Billable Time:   60 minutes    SUBJECTIVE     Pt reports: She felt good after previous session.  She subsequently began to experiencean  increase in abdominal fluid and is  not feeling comfortable today.  She reports she wanted to come anyway.  Response to previous treatment:good    Pain: 4/10  Location: bilateral abdomen     OBJECTIVE     Objective Measures updated at progress report unless specified.    Treatment     Puja received the treatments listed below:       Date 3/15/22   3/25/22       Therapeutic Yoga Exercises - 26248 Therapeutic Ex 50 minutes  30 minutes  minutes  minutes  minutes  minutes    Seated Yoga   One legged hip/LE/back and back stretch; cobblers x4; twist;  One legged hip/LE/back and back stretch; cobblers,; twist x4        Quadruped Cat-cow rolls;         Supine  Elevated supine-knees to chest, twist,modified bridge        Prone          Restorative          Breathing Techniques Victory breath                 Relaxation Techniques (30 minutes) - 50654 Self-Care/Home Management  10 minutes  vipassana meditation(facial  triangle) sitting up on block.  Body scan  viloma breath                                  Activity Pacing ( minutes) -   minutes  minutes  minutes  minutes  minutes  minutes                     Stress Management/Education ( minutes) -   minutes  minutes  minutes  minutes  minutes  minutes                Patient Education and Home Exercises      Education provided:   - alignment of yoga poses, seated meditation  - Progress towards goals     Written Home Exercises Provided: to be provided in upcoming sessions.  Exercises were reviewed and Puja was able to demonstrate them prior to the end of the session.  Puja demonstrated good  understanding of the HEP provided. See EMR under Patient Instructions for exercises provided during therapy sessions.       Assessment     Pt would continue to benefit from skilled Occupational Therapy.      Puja is  progressing well towards her goals and there are no updates to goals at this time. Pt prognosis is Excellent.     Pt will continue to benefit from skilled outpatient occupational therapy to address the deficits listed in the problem list on initial evaluation provide pt/family education and to maximize pt's level of independence in the home and community environment.     Pt's spiritual, cultural and educational needs considered and pt agreeable to plan of care and goals.    Anticipated barriers to occupational therapy: gastric symptoms     Goals:        Goals:  Short Term Goals: 4 weeks      Goal # Goal Status   1 Patient will be independent with Home Exercise Program to increase endurance and manage stress.  Progressing   2 Patient will demonstrate independence with diaphragmatic breathing in sit and supine.  Progressing   3 Patient will identify 2 new stress coping skills for stress management/immune health. not Progressing   4 Patient will identify activity pacing problems.  Patient will then implement new plan for daily activity to increase endurance for ADL's. Not  Progressing      Long Term Goals: 12 weeks      Goal # Goal Status   1 Patient will demonstrate independence with yoga Home Exercise Program to increase strength and endurance for ADL's. Progressing   2 Patient will demonstrate independence with relaxation techniques to manage stress for immune health.  Progressing   3 Patient will verbalize good understanding of stress/immune health relationship.   Progressing   4               PLAN          Outpatient Occupational Therapy 2 times weekly for 3  And then 1x week for 9 weeks to include the following interventions: Patient Education, Self Care, Therapeutic Activities and Therapeutic Exercise      Jamie Ray OT

## 2022-03-28 ENCOUNTER — PATIENT MESSAGE (OUTPATIENT)
Dept: HEMATOLOGY/ONCOLOGY | Facility: CLINIC | Age: 42
End: 2022-03-28
Payer: COMMERCIAL

## 2022-03-29 ENCOUNTER — PATIENT MESSAGE (OUTPATIENT)
Dept: HEMATOLOGY/ONCOLOGY | Facility: CLINIC | Age: 42
End: 2022-03-29
Payer: COMMERCIAL

## 2022-03-30 ENCOUNTER — PATIENT MESSAGE (OUTPATIENT)
Dept: HEMATOLOGY/ONCOLOGY | Facility: CLINIC | Age: 42
End: 2022-03-30
Payer: COMMERCIAL

## 2022-03-30 ENCOUNTER — PATIENT MESSAGE (OUTPATIENT)
Dept: HEMATOLOGY/ONCOLOGY | Facility: CLINIC | Age: 42
End: 2022-03-30

## 2022-03-30 ENCOUNTER — HOSPITAL ENCOUNTER (OUTPATIENT)
Dept: INTERVENTIONAL RADIOLOGY/VASCULAR | Facility: HOSPITAL | Age: 42
Discharge: HOME | End: 2022-03-30
Attending: INTERNAL MEDICINE
Payer: MEDICAID

## 2022-03-30 VITALS — SYSTOLIC BLOOD PRESSURE: 144 MMHG | DIASTOLIC BLOOD PRESSURE: 79 MMHG

## 2022-03-30 DIAGNOSIS — R18.8 OTHER ASCITES: ICD-10-CM

## 2022-03-30 PROCEDURE — 49083 ABD PARACENTESIS W/IMAGING: CPT | Performed by: RADIOLOGY

## 2022-03-30 PROCEDURE — C1729 CATH, DRAINAGE: HCPCS

## 2022-03-30 PROCEDURE — 25000003 PHARM REV CODE 250: Performed by: RADIOLOGY

## 2022-03-30 PROCEDURE — 49083 IR PARACENTESIS WITH IMAGING: ICD-10-PCS | Mod: ,,, | Performed by: RADIOLOGY

## 2022-03-30 RX ORDER — LIDOCAINE HYDROCHLORIDE 20 MG/ML
INJECTION, SOLUTION INFILTRATION; PERINEURAL CODE/TRAUMA/SEDATION MEDICATION
Status: COMPLETED | OUTPATIENT
Start: 2022-03-30 | End: 2022-03-30

## 2022-03-30 RX ADMIN — LIDOCAINE HYDROCHLORIDE 10 ML: 20 INJECTION, SOLUTION INFILTRATION; PERINEURAL at 10:03

## 2022-03-30 NOTE — DISCHARGE SUMMARY
Radiology Discharge Summary      Hospital Course: No complications    Admit Date: 3/30/2022  Discharge Date: 03/30/2022     Instructions Given to Patient: Yes  Diet: Resume prior diet  Activity: activity as tolerated    Description of Condition on Discharge: Stable  Vital Signs (Most Recent): BP: (!) 144/79 (03/30/22 1107)    Discharge Disposition: Home    Discharge Diagnosis:  41 y.o. female with recurrent painful, tense ascites s/p successful US-guided percutaneous RUQ-approach therapeutic LVP with local anesthetic only and albumin infusion post PRN as indicated per institutional protocol. Patient tolerated the procedure well. No immediate post-procedural complications noted.     Thank you for considering IR for the care of your patient.     Castro Bermudez MD  Interventional Radiology

## 2022-03-30 NOTE — BRIEF OP NOTE
Radiology Post-Procedure Note    Pre Op Diagnosis: Recurrent painful, tense ascites  Post Op Diagnosis: Same    Procedure: 1. US-guided percutaneous RUQ-approach therapeutic LVP    Procedure performed by: Castro Bermudez MD    Written Informed Consent Obtained: Yes  Specimen Removed: YES, 3,700-cc of thin, serosanguinous ascitic fluid  Estimated Blood Loss: none    Findings:   Successful US-guided percutaneous RUQ-approach therapeutic LVP with local anesthetic only and albumin infusion post PRN as indicated per institutional protocol. Patient tolerated the procedure well. No immediate post-procedural complications noted.     Thank you for considering IR for the care of your patient.     Castro Bermudez MD  Interventional Radiology

## 2022-03-30 NOTE — H&P
Radiology History & Physical      SUBJECTIVE:     Chief Complaint: Recurrent painful, tense ascites    History of Present Illness:  Puja Quigley is a 41 y.o. female with pertinent PMHx of gastric adenocarcinoma with osseous, hepatic and peritoneal metastases complicated by recurrent abdominal distension and pain 2/2 recurrent painful, tense ascites requiring frequent decompression for symptom relief.    A new outpatient IR consult received for US-guided percutaneous RUQ-approach therapeutic large-volume paracentesis.    Past Medical History:   Diagnosis Date    Gastric cancer     Gastric ulcer     Pregnancy 08/12/2020    delivered on 8/12/2020    Umbilical hernia      Past Surgical History:   Procedure Laterality Date    CLOSURE OF PERFORATED ULCER OF DUODENUM USING OMENTAL PATCH      ESOPHAGOGASTRODUODENOSCOPY N/A 10/13/2020    Procedure: EGD (ESOPHAGOGASTRODUODENOSCOPY);  Surgeon: Rosio Marion MD;  Location: [facility] (2ND FLR);  Service: Endoscopy;  Laterality: N/A;    ESOPHAGOGASTRODUODENOSCOPY N/A 12/11/2020    Procedure: EGD (ESOPHAGOGASTRODUODENOSCOPY);  Surgeon: Rosio Marion MD;  Location: [facility] (2ND FLR);  Service: Endoscopy;  Laterality: N/A;  Covid-19 test 12/8/20 at [facility] pg    ESOPHAGOGASTRODUODENOSCOPY N/A 3/12/2021    Procedure: EGD (ESOPHAGOGASTRODUODENOSCOPY);  Surgeon: Nav Omer MD;  Location: [facility] (2ND FLR);  Service: Endoscopy;  Laterality: N/A;  COVID at [facility] 3/9 ttr    ESOPHAGOGASTRODUODENOSCOPY N/A 5/18/2021    Procedure: EGD (ESOPHAGOGASTRODUODENOSCOPY);  Surgeon: Rosio Marion MD;  Location: [facility] (2ND FLR);  Service: Endoscopy;  Laterality: N/A;  5/15-covid pcw-inst portal-tb    ESOPHAGOGASTRODUODENOSCOPY N/A 5/26/2021    Procedure: EGD (ESOPHAGOGASTRODUODENOSCOPY);  Surgeon: Socrates Terrazas MD;  Location: [facility] (2ND FLR);  Service: Endoscopy;  Laterality: N/A;     Home Meds:   Prior to Admission medications    Medication  Sig Start Date End Date Taking? Authorizing Provider   acetaminophen (TYLENOL) 500 MG tablet Take 500 mg by mouth every 6 (six) hours as needed for Pain.    Historical Provider   amitriptyline (ELAVIL) 10 MG tablet Take 1 tablet (10 mg total) by mouth every evening. 5/13/21   Socrates Terrazas MD   amitriptyline (ELAVIL) 10 MG tablet Take 10 mg by mouth. 5/13/21   Historical Provider   dicyclomine (BENTYL) 10 MG capsule TAKE ONE CAPSULE BY MOUTH FOUR TIMES DAILY 9/27/21   Kamille Hooks PA-C   famotidine (PEPCID) 20 MG tablet Take 1 tablet (20 mg total) by mouth nightly as needed for Heartburn. 2/23/22 2/23/23  Kamille Hooks PA-C   ferrous sulfate (FEOSOL) 325 mg (65 mg iron) Tab tablet Take 1 tablet (325 mg total) by mouth once daily for 7 days, THEN 1 tablet (325 mg total) 2 (two) times daily. 5/26/21 5/26/22  Reshma Wilson MD   ferrous sulfate (FEOSOL) 325 mg (65 mg iron) Tab tablet Take by mouth. 5/26/21 5/26/22  Historical Provider   furosemide (LASIX) 20 MG tablet Take 10 mg by mouth.    Historical Provider   furosemide (LASIX) 40 MG tablet Take 0.5 tablets (20 mg total) by mouth daily as needed (leg swelling or abdominal swelling). 5/26/21 5/26/22  Reshma Wilson MD   furosemide (LASIX) 40 MG tablet  5/26/21   Historical Provider   gabapentin (NEURONTIN) 300 MG capsule Take 1 capsule (300 mg total) by mouth 3 (three) times daily. 2/7/22 2/7/23  Kamille Hooks PA-C   HYDROcodone-acetaminophen (NORCO) 5-325 mg per tablet Take 1 tablet by mouth. 7/9/21   Historical Provider   HYDROcodone-acetaminophen (NORCO) 5-325 mg per tablet Take 1 tablet by mouth 3 (three) times daily as needed. 7/9/21   Historical Provider   LIDOcaine-prilocaine (EMLA) cream Apply to Port-A-Cath area 30 to 45 minutes prior to port access as directed (topical anesthetic use to the anterior chest only). 6/25/21   Historical Provider   LIDOcaine-prilocaine (EMLA) cream Apply topically. 6/25/21   Historical Provider   metoclopramide HCl  (REGLAN) 5 MG tablet 5 mg. 6/11/21   Historical Provider   multivitamin with folic acid 400 mcg Tab Take 1 tablet by mouth once daily.    Historical Provider   omeprazole (PRILOSEC) 40 MG capsule Take 1 capsule (40 mg total) by mouth once daily. 2/23/22 2/23/23  Kamille Hooks PA-C   ondansetron (ZOFRAN) 8 MG tablet Take 1 tablet (8 mg total) by mouth every 8 (eight) hours as needed for Nausea. 7/26/21   Fer Ashraf MD   pantoprazole (PROTONIX) 40 MG tablet Take 40 mg by mouth. 6/2/21   Historical Provider   pantoprazole (PROTONIX) 40 MG tablet Take 1 tablet (40 mg total) by mouth 2 (two) times daily. 2/1/22   Fre Ashraf MD   potassium chloride SA (K-DUR,KLOR-CON) 20 MEQ tablet 20 mEq. 5/26/21   Historical Provider   prochlorperazine (COMPAZINE) 10 MG tablet TAKE 1 TABLET(10 MG) BY MOUTH EVERY 6 HOURS AS NEEDED FOR NAUSEA 8/7/21   Fer Ashraf MD   prochlorperazine (COMPAZINE) 10 MG tablet as needed. 6/3/21   Historical Provider   senna-docusate 8.6-50 mg (PERICOLACE) 8.6-50 mg per tablet Take 1 tablet by mouth. 5/26/21   Historical Provider   senna-docusate 8.6-50 mg (SENNA WITH DOCUSATE SODIUM) 8.6-50 mg per tablet Take 1 tablet by mouth 2 (two) times daily as needed for Constipation. 5/26/21   Reshma Wilson MD   sucralfate (CARAFATE) 1 gram tablet TAKE 1 TABLET(1 GRAM) BY MOUTH FOUR TIMES DAILY FOR 14 DAYS 10/20/21   Rosio Marion MD   sucralfate (CARAFATE) 1 gram tablet  4/8/21   Historical Provider   traMADoL (ULTRAM) 50 mg tablet Take 50 mg by mouth. 6/25/21   Historical Provider   traMADoL (ULTRAM) 50 mg tablet Take by mouth. 6/25/21   Historical Provider     Anticoagulants/Antiplatelets: no anticoagulation    Allergies: Review of patient's allergies indicates:  No Known Allergies     Sedation History:  no adverse reactions    Review of Systems:   Hematological: no known coagulopathies  Respiratory: no cough, shortness of breath, or wheezing  Cardiovascular: no chest pain  or dyspnea on exertion  Gastrointestinal: positive for - abdominal pain and distension  Genito-Urinary: no dysuria, trouble voiding, or hematuria  Musculoskeletal: negative  Neurological: no TIA or stroke symptoms       OBJECTIVE:     Vital Signs (Most Recent)       Physical Exam:  General: no acute distress  Mental Status: alert and oriented to person, place and time  HEENT: normocephalic, atraumatic  Chest: unlabored breathing  Heart: regular heart rate  Abdomen: +distended with +fluid wave. No TTP/r/g.  Extremity: moves all extremities    Laboratory  Lab Results   Component Value Date    INR 1.0 06/10/2021       Lab Results   Component Value Date    WBC 3.57 (L) 03/22/2022    HGB 12.7 03/22/2022    HCT 42.3 03/22/2022     (H) 03/22/2022     03/22/2022      Lab Results   Component Value Date    GLU 75 03/22/2022     03/22/2022    K 4.2 03/22/2022     03/22/2022    CO2 26 03/22/2022    BUN 6 03/22/2022    CREATININE 0.7 03/22/2022    CALCIUM 9.0 03/22/2022    MG 1.7 05/26/2021    ALT 15 03/22/2022    AST 20 03/22/2022    ALBUMIN 3.0 (L) 03/22/2022    BILITOT 0.9 03/22/2022     ASSESSMENT/PLAN:     41 y.o. female with pertinent PMHx of gastric adenocarcinoma with osseous, hepatic and peritoneal metastases complicated by recurrent abdominal distension and pain 2/2 recurrent painful, tense ascites requiring frequent decompression for symptom relief.    1. Recurrent painful, tense ascites - Will attempt US-guided percutaneous RUQ-approach therapeutic large-volume paracentesis with local anesthetic only and albumin infusion post PRN as indicated per institutional protocol.    Risks (including, but not limited to, pain, bleeding, infection, damage to nearby structures, failure to obtain sufficient material for a diagnosis, the need for additional procedures, and death), benefits, and alternatives were discussed with the patient. All questions were answered to the best of my abilities. The  patient wishes to proceed with the procedure. Written informed consent was obtained.    Thank you for considering IR for the care of your patient.     Castro Bermudez MD  Interventional Radiology

## 2022-04-01 ENCOUNTER — PATIENT MESSAGE (OUTPATIENT)
Dept: HEMATOLOGY/ONCOLOGY | Facility: CLINIC | Age: 42
End: 2022-04-01
Payer: COMMERCIAL

## 2022-04-04 ENCOUNTER — LAB VISIT (OUTPATIENT)
Dept: LAB | Facility: HOSPITAL | Age: 42
End: 2022-04-04
Payer: MEDICAID

## 2022-04-04 ENCOUNTER — INFUSION (OUTPATIENT)
Dept: INFUSION THERAPY | Facility: HOSPITAL | Age: 42
End: 2022-04-04
Payer: MEDICAID

## 2022-04-04 ENCOUNTER — OFFICE VISIT (OUTPATIENT)
Dept: HEMATOLOGY/ONCOLOGY | Facility: CLINIC | Age: 42
End: 2022-04-04
Payer: MEDICAID

## 2022-04-04 VITALS
BODY MASS INDEX: 20.36 KG/M2 | OXYGEN SATURATION: 100 % | HEIGHT: 67 IN | DIASTOLIC BLOOD PRESSURE: 94 MMHG | TEMPERATURE: 99 F | HEART RATE: 74 BPM | SYSTOLIC BLOOD PRESSURE: 136 MMHG | WEIGHT: 129.75 LBS

## 2022-04-04 VITALS
HEART RATE: 70 BPM | TEMPERATURE: 99 F | DIASTOLIC BLOOD PRESSURE: 80 MMHG | SYSTOLIC BLOOD PRESSURE: 130 MMHG | RESPIRATION RATE: 18 BRPM

## 2022-04-04 DIAGNOSIS — F41.1 ANXIETY ASSOCIATED WITH CANCER DIAGNOSIS: ICD-10-CM

## 2022-04-04 DIAGNOSIS — R18.8 OTHER ASCITES: ICD-10-CM

## 2022-04-04 DIAGNOSIS — C16.9 MALIGNANT NEOPLASM OF STOMACH, UNSPECIFIED LOCATION: Primary | ICD-10-CM

## 2022-04-04 DIAGNOSIS — D84.821 IMMUNODEFICIENCY DUE TO CHEMOTHERAPY: ICD-10-CM

## 2022-04-04 DIAGNOSIS — T45.1X5A IMMUNODEFICIENCY DUE TO CHEMOTHERAPY: ICD-10-CM

## 2022-04-04 DIAGNOSIS — D50.9 MICROCYTIC ANEMIA: ICD-10-CM

## 2022-04-04 DIAGNOSIS — Z79.899 IMMUNODEFICIENCY DUE TO CHEMOTHERAPY: ICD-10-CM

## 2022-04-04 DIAGNOSIS — R11.2 NON-INTRACTABLE VOMITING WITH NAUSEA, UNSPECIFIED VOMITING TYPE: ICD-10-CM

## 2022-04-04 DIAGNOSIS — C80.1 ANXIETY ASSOCIATED WITH CANCER DIAGNOSIS: ICD-10-CM

## 2022-04-04 DIAGNOSIS — G62.9 NEUROPATHY: ICD-10-CM

## 2022-04-04 DIAGNOSIS — C79.51 BONE METASTASES: ICD-10-CM

## 2022-04-04 DIAGNOSIS — C78.6 PERITONEAL CARCINOMATOSIS: ICD-10-CM

## 2022-04-04 DIAGNOSIS — C16.9 GASTRIC ADENOCARCINOMA: ICD-10-CM

## 2022-04-04 LAB
ALBUMIN SERPL BCP-MCNC: 2.6 G/DL (ref 3.5–5.2)
ALP SERPL-CCNC: 89 U/L (ref 55–135)
ALT SERPL W/O P-5'-P-CCNC: 9 U/L (ref 10–44)
ANION GAP SERPL CALC-SCNC: 6 MMOL/L (ref 8–16)
AST SERPL-CCNC: 18 U/L (ref 10–40)
BILIRUB SERPL-MCNC: 0.6 MG/DL (ref 0.1–1)
BUN SERPL-MCNC: 6 MG/DL (ref 6–20)
CALCIUM SERPL-MCNC: 9.2 MG/DL (ref 8.7–10.5)
CHLORIDE SERPL-SCNC: 106 MMOL/L (ref 95–110)
CO2 SERPL-SCNC: 30 MMOL/L (ref 23–29)
CREAT SERPL-MCNC: 0.7 MG/DL (ref 0.5–1.4)
ERYTHROCYTE [DISTWIDTH] IN BLOOD BY AUTOMATED COUNT: 12.6 % (ref 11.5–14.5)
EST. GFR  (AFRICAN AMERICAN): >60 ML/MIN/1.73 M^2
EST. GFR  (NON AFRICAN AMERICAN): >60 ML/MIN/1.73 M^2
GLUCOSE SERPL-MCNC: 83 MG/DL (ref 70–110)
HCT VFR BLD AUTO: 42.5 % (ref 37–48.5)
HGB BLD-MCNC: 14.2 G/DL (ref 12–16)
IMM GRANULOCYTES # BLD AUTO: 0.01 K/UL (ref 0–0.04)
MCH RBC QN AUTO: 32.5 PG (ref 27–31)
MCHC RBC AUTO-ENTMCNC: 33.4 G/DL (ref 32–36)
MCV RBC AUTO: 97 FL (ref 82–98)
NEUTROPHILS # BLD AUTO: 2.7 K/UL (ref 1.8–7.7)
PLATELET # BLD AUTO: 253 K/UL (ref 150–450)
PMV BLD AUTO: 9 FL (ref 9.2–12.9)
POTASSIUM SERPL-SCNC: 4.2 MMOL/L (ref 3.5–5.1)
PROT SERPL-MCNC: 6.2 G/DL (ref 6–8.4)
RBC # BLD AUTO: 4.37 M/UL (ref 4–5.4)
SODIUM SERPL-SCNC: 142 MMOL/L (ref 136–145)
WBC # BLD AUTO: 4.27 K/UL (ref 3.9–12.7)

## 2022-04-04 PROCEDURE — 3008F PR BODY MASS INDEX (BMI) DOCUMENTED: ICD-10-PCS | Mod: CPTII,S$GLB,, | Performed by: INTERNAL MEDICINE

## 2022-04-04 PROCEDURE — A4216 STERILE WATER/SALINE, 10 ML: HCPCS | Performed by: INTERNAL MEDICINE

## 2022-04-04 PROCEDURE — 3080F PR MOST RECENT DIASTOLIC BLOOD PRESSURE >= 90 MM HG: ICD-10-PCS | Mod: CPTII,S$GLB,, | Performed by: INTERNAL MEDICINE

## 2022-04-04 PROCEDURE — 3008F BODY MASS INDEX DOCD: CPT | Mod: CPTII,S$GLB,, | Performed by: INTERNAL MEDICINE

## 2022-04-04 PROCEDURE — 96416 CHEMO PROLONG INFUSE W/PUMP: CPT

## 2022-04-04 PROCEDURE — 99215 PR OFFICE/OUTPT VISIT, EST, LEVL V, 40-54 MIN: ICD-10-PCS | Mod: S$GLB,,, | Performed by: INTERNAL MEDICINE

## 2022-04-04 PROCEDURE — 80053 COMPREHEN METABOLIC PANEL: CPT | Performed by: PHYSICIAN ASSISTANT

## 2022-04-04 PROCEDURE — 3080F DIAST BP >= 90 MM HG: CPT | Mod: CPTII,S$GLB,, | Performed by: INTERNAL MEDICINE

## 2022-04-04 PROCEDURE — 99215 OFFICE O/P EST HI 40 MIN: CPT | Mod: PBBFAC | Performed by: INTERNAL MEDICINE

## 2022-04-04 PROCEDURE — 99999 PR PBB SHADOW E&M-EST. PATIENT-LVL V: CPT | Mod: PBBFAC,,, | Performed by: INTERNAL MEDICINE

## 2022-04-04 PROCEDURE — 85027 COMPLETE CBC AUTOMATED: CPT | Performed by: PHYSICIAN ASSISTANT

## 2022-04-04 PROCEDURE — 36415 COLL VENOUS BLD VENIPUNCTURE: CPT | Performed by: PHYSICIAN ASSISTANT

## 2022-04-04 PROCEDURE — 3075F SYST BP GE 130 - 139MM HG: CPT | Mod: CPTII,S$GLB,, | Performed by: INTERNAL MEDICINE

## 2022-04-04 PROCEDURE — 99215 OFFICE O/P EST HI 40 MIN: CPT | Mod: S$GLB,,, | Performed by: INTERNAL MEDICINE

## 2022-04-04 PROCEDURE — 1159F PR MEDICATION LIST DOCUMENTED IN MEDICAL RECORD: ICD-10-PCS | Mod: CPTII,S$GLB,, | Performed by: INTERNAL MEDICINE

## 2022-04-04 PROCEDURE — 25000003 PHARM REV CODE 250: Performed by: INTERNAL MEDICINE

## 2022-04-04 PROCEDURE — 99999 PR PBB SHADOW E&M-EST. PATIENT-LVL V: ICD-10-PCS | Mod: PBBFAC,,, | Performed by: INTERNAL MEDICINE

## 2022-04-04 PROCEDURE — 96367 TX/PROPH/DG ADDL SEQ IV INF: CPT

## 2022-04-04 PROCEDURE — 3075F PR MOST RECENT SYSTOLIC BLOOD PRESS GE 130-139MM HG: ICD-10-PCS | Mod: CPTII,S$GLB,, | Performed by: INTERNAL MEDICINE

## 2022-04-04 PROCEDURE — 1159F MED LIST DOCD IN RCRD: CPT | Mod: CPTII,S$GLB,, | Performed by: INTERNAL MEDICINE

## 2022-04-04 PROCEDURE — 63600175 PHARM REV CODE 636 W HCPCS: Performed by: INTERNAL MEDICINE

## 2022-04-04 RX ORDER — DIPHENHYDRAMINE HYDROCHLORIDE 50 MG/ML
50 INJECTION INTRAMUSCULAR; INTRAVENOUS ONCE AS NEEDED
Status: CANCELLED | OUTPATIENT
Start: 2022-04-04

## 2022-04-04 RX ORDER — SODIUM CHLORIDE 0.9 % (FLUSH) 0.9 %
10 SYRINGE (ML) INJECTION
Status: CANCELLED | OUTPATIENT
Start: 2022-04-06

## 2022-04-04 RX ORDER — EPINEPHRINE 0.3 MG/.3ML
0.3 INJECTION SUBCUTANEOUS ONCE AS NEEDED
Status: CANCELLED | OUTPATIENT
Start: 2022-04-04

## 2022-04-04 RX ORDER — HEPARIN 100 UNIT/ML
500 SYRINGE INTRAVENOUS
Status: CANCELLED | OUTPATIENT
Start: 2022-04-04

## 2022-04-04 RX ORDER — EPINEPHRINE 0.3 MG/.3ML
0.3 INJECTION SUBCUTANEOUS ONCE AS NEEDED
Status: DISCONTINUED | OUTPATIENT
Start: 2022-04-04 | End: 2022-04-04 | Stop reason: HOSPADM

## 2022-04-04 RX ORDER — SODIUM CHLORIDE 0.9 % (FLUSH) 0.9 %
10 SYRINGE (ML) INJECTION
Status: CANCELLED | OUTPATIENT
Start: 2022-04-04

## 2022-04-04 RX ORDER — HEPARIN 100 UNIT/ML
500 SYRINGE INTRAVENOUS
Status: CANCELLED | OUTPATIENT
Start: 2022-04-06

## 2022-04-04 RX ORDER — SODIUM CHLORIDE 0.9 % (FLUSH) 0.9 %
10 SYRINGE (ML) INJECTION
Status: DISCONTINUED | OUTPATIENT
Start: 2022-04-04 | End: 2022-04-04 | Stop reason: HOSPADM

## 2022-04-04 RX ORDER — DIPHENHYDRAMINE HYDROCHLORIDE 50 MG/ML
50 INJECTION INTRAMUSCULAR; INTRAVENOUS ONCE AS NEEDED
Status: DISCONTINUED | OUTPATIENT
Start: 2022-04-04 | End: 2022-04-04 | Stop reason: HOSPADM

## 2022-04-04 RX ADMIN — Medication 10 ML: at 11:04

## 2022-04-04 RX ADMIN — SODIUM CHLORIDE: 9 INJECTION, SOLUTION INTRAVENOUS at 11:04

## 2022-04-04 RX ADMIN — FLUOROURACIL 4000 MG: 50 INJECTION, SOLUTION INTRAVENOUS at 01:04

## 2022-04-04 RX ADMIN — PALONOSETRON HYDROCHLORIDE 0.25 MG: 0.25 INJECTION, SOLUTION INTRAVENOUS at 12:04

## 2022-04-04 NOTE — PLAN OF CARE
Pt arrived for 5 FU pump following MD appt.  Treatment plan reviewed with pt, states understanding.  Port accessed with good blood return.  Pt tolerated premed and then 5 FU connected and running at 2.2 ml/hr.  Instructed pt to RTC on Wednesday for about 1110.  Pt discharged to home..

## 2022-04-04 NOTE — PROGRESS NOTES
MEDICAL ONCOLOGY - ESTABLISHED PATIENT    Reason for visit: Gastric cancer     Best Contact Phone Number(s): [phone] (home)      Cancer/Stage/TNM:   Cancer Staging  No matching staging information was found for the patient.     Oncology History   Malignant neoplasm of stomach   6/10/2021 Initial Diagnosis    Gastric adenocarcinoma     7/26/2021 -  Chemotherapy    Treatment Summary   Plan Name: OP FOLFOX 6 Q2W  Treatment Goal: Palliative  Status: Active  Start Date: 7/26/2021  End Date: 4/20/2022 (Planned)  Provider: Fer Ashraf MD  Chemotherapy: fluorouraciL 2,400 mg/m2 = 3,770 mg in sodium chloride 0.9% 100 mL chemo infusion, 2,400 mg/m2 = 3,770 mg, Intravenous, Over 46 hours, 18 of 20 cycles  Administration: 3,770 mg (7/26/2021), 3,770 mg (8/9/2021), 3,770 mg (8/23/2021), 3,770 mg (9/7/2021), 3,770 mg (9/21/2021), 3,770 mg (10/5/2021), 3,770 mg (10/19/2021), 3,770 mg (11/2/2021), 3,770 mg (11/15/2021), 3,770 mg (11/30/2021), 3,770 mg (12/13/2021), 4,000 mg (12/27/2021), 4,030 mg (1/10/2022), 4,030 mg (1/24/2022), 4,000 mg (2/7/2022), 4,000 mg (2/22/2022), 4,000 mg (3/7/2022), 4,000 mg (3/22/2022)  oxaliplatin (ELOXATIN) 85 mg/m2 = 133 mg in dextrose 5 % 500 mL chemo infusion, 85 mg/m2 = 133 mg, Intravenous, Clinic/[facility] 1 time, 9 of 9 cycles  Administration: 133 mg (7/26/2021), 133 mg (8/9/2021), 133 mg (8/23/2021), 133 mg (9/7/2021), 133 mg (9/21/2021), 133 mg (10/5/2021), 133 mg (10/19/2021), 133 mg (11/2/2021), 133 mg (11/15/2021)          Interim History:  41 y.o. female with metastatic gastric cancer who presents for follow-up prior to cycle 19 of chemotherapy, now on maintenance 5-FU.  Had paracentesis on 3/30 - removed 3.7 L of fluid.  She had some lower abdominal discomfort that worsened following her para.  It has since resolved and she if feeling better.  Her abdomen is becoming distended again and is scheduled for repeat para on 4/11.  Eating fine, breathing well.  Neuropathy is  stable.    ECOG status is 1. Presents alone.    Review of Systems   Constitutional: Negative for activity change, appetite change, chills, fatigue, fever and unexpected weight change.   HENT: Negative for ear pain, facial swelling, hearing loss, mouth sores, nosebleeds, sore throat and trouble swallowing.    Eyes: Negative for pain, discharge, redness and visual disturbance.   Respiratory: Negative for cough, chest tightness and shortness of breath.    Cardiovascular: Negative for chest pain, palpitations and leg swelling.   Gastrointestinal: Positive for abdominal distention. Negative for abdominal pain, blood in stool, constipation, diarrhea, nausea, vomiting and reflux.   Endocrine: Negative for cold intolerance and heat intolerance.   Genitourinary: Negative for decreased urine volume, difficulty urinating, dysuria, frequency, hematuria, hot flashes, urgency and vaginal bleeding.   Musculoskeletal: Negative for arthralgias, gait problem, leg pain and myalgias.   Integumentary:  Negative for pallor, rash and wound.   Allergic/Immunologic: Negative for immunocompromised state.   Neurological: Positive for numbness. Negative for dizziness, tremors, weakness, light-headedness and headaches.   Hematological: Negative for adenopathy. Does not bruise/bleed easily.   Psychiatric/Behavioral: Negative for agitation, confusion, dysphoric mood and sleep disturbance. The patient is not nervous/anxious.            Past Medical History:   Past Medical History:   Diagnosis Date    Gastric cancer     Gastric ulcer     Pregnancy 08/12/2020    delivered on 8/12/2020    Umbilical hernia         Past Surgical History:   Past Surgical History:   Procedure Laterality Date    CLOSURE OF PERFORATED ULCER OF DUODENUM USING OMENTAL PATCH      ESOPHAGOGASTRODUODENOSCOPY N/A 10/13/2020    Procedure: EGD (ESOPHAGOGASTRODUODENOSCOPY);  Surgeon: Rosio Marion MD;  Location: [address];  Service: Endoscopy;  Laterality:  "N/A;    ESOPHAGOGASTRODUODENOSCOPY N/A 2020    Procedure: EGD (ESOPHAGOGASTRODUODENOSCOPY);  Surgeon: Rosio Marion MD;  Location: [facility] ENDO (2ND FLR);  Service: Endoscopy;  Laterality: N/A;  Covid-19 test 20 at [facility] -     ESOPHAGOGASTRODUODENOSCOPY N/A 3/12/2021    Procedure: EGD (ESOPHAGOGASTRODUODENOSCOPY);  Surgeon: Nav Omer MD;  Location: [facility] ENDO (2ND FLR);  Service: Endoscopy;  Laterality: N/A;  COVID at [facility] [place] by [facility] 3/9 ttr    ESOPHAGOGASTRODUODENOSCOPY N/A 2021    Procedure: EGD (ESOPHAGOGASTRODUODENOSCOPY);  Surgeon: Rosio Marion MD;  Location: [facility] ENDO (2ND FLR);  Service: Endoscopy;  Laterality: N/A;  5/15-covid pcw-inst portal-tb    ESOPHAGOGASTRODUODENOSCOPY N/A 2021    Procedure: EGD (ESOPHAGOGASTRODUODENOSCOPY);  Surgeon: Socrates Terrazas MD;  Location: [facility] ENDO (2ND FLR);  Service: Endoscopy;  Laterality: N/A;        Family History:   Family History   Problem Relation Age of Onset    Cancer Mother 67        lymphoma (type? "not active," not on tx; "related to her blood")    Lung cancer Father 48        type? h/o smoking    No Known Problems Son     Breast cancer Other 73        unilat; stage I, but aggressive    Prostate cancer Paternal Uncle         (dx 50s/60? no chemo?)    Breast cancer Paternal Cousin         (dx age?) ductal    Colon cancer Neg Hx     Esophageal cancer Neg Hx         Social History:   Social History     Tobacco Use    Smoking status: Former Smoker     Types: Cigarettes     Quit date: 2019     Years since quittin.3    Smokeless tobacco: Never Used   Substance Use Topics    Alcohol use: Yes        I have reviewed and updated the patient's past medical, surgical, family and social histories.    Allergies:   Review of patient's allergies indicates:  No Known Allergies     Medications:   Current Outpatient Medications   Medication Sig Dispense Refill    acetaminophen (TYLENOL) 500 MG tablet Take 500 mg by mouth " every 6 (six) hours as needed for Pain.      amitriptyline (ELAVIL) 10 MG tablet Take 1 tablet (10 mg total) by mouth every evening. 30 tablet 3    amitriptyline (ELAVIL) 10 MG tablet Take 10 mg by mouth.      dicyclomine (BENTYL) 10 MG capsule TAKE ONE CAPSULE BY MOUTH FOUR TIMES DAILY 120 capsule 0    famotidine (PEPCID) 20 MG tablet Take 1 tablet (20 mg total) by mouth nightly as needed for Heartburn. 60 tablet 1    ferrous sulfate (FEOSOL) 325 mg (65 mg iron) Tab tablet Take 1 tablet (325 mg total) by mouth once daily for 7 days, THEN 1 tablet (325 mg total) 2 (two) times daily. 60 tablet 10    ferrous sulfate (FEOSOL) 325 mg (65 mg iron) Tab tablet Take by mouth.      furosemide (LASIX) 20 MG tablet Take 10 mg by mouth.      furosemide (LASIX) 40 MG tablet Take 0.5 tablets (20 mg total) by mouth daily as needed (leg swelling or abdominal swelling). 30 tablet 1    furosemide (LASIX) 40 MG tablet       gabapentin (NEURONTIN) 300 MG capsule Take 1 capsule (300 mg total) by mouth 3 (three) times daily. 90 capsule 11    HYDROcodone-acetaminophen (NORCO) 5-325 mg per tablet Take 1 tablet by mouth.      HYDROcodone-acetaminophen (NORCO) 5-325 mg per tablet Take 1 tablet by mouth 3 (three) times daily as needed.      LIDOcaine-prilocaine (EMLA) cream Apply to Port-A-Cath area 30 to 45 minutes prior to port access as directed (topical anesthetic use to the anterior chest only).      LIDOcaine-prilocaine (EMLA) cream Apply topically.      metoclopramide HCl (REGLAN) 5 MG tablet 5 mg.      multivitamin with folic acid 400 mcg Tab Take 1 tablet by mouth once daily.      omeprazole (PRILOSEC) 40 MG capsule Take 1 capsule (40 mg total) by mouth once daily. 30 capsule 11    ondansetron (ZOFRAN) 8 MG tablet Take 1 tablet (8 mg total) by mouth every 8 (eight) hours as needed for Nausea. 60 tablet 2    pantoprazole (PROTONIX) 40 MG tablet Take 40 mg by mouth.      pantoprazole (PROTONIX) 40 MG tablet Take 1  "tablet (40 mg total) by mouth 2 (two) times daily. 60 tablet 11    potassium chloride SA (K-DUR,KLOR-CON) 20 MEQ tablet 20 mEq.      prochlorperazine (COMPAZINE) 10 MG tablet TAKE 1 TABLET(10 MG) BY MOUTH EVERY 6 HOURS AS NEEDED FOR NAUSEA 90 tablet 2    prochlorperazine (COMPAZINE) 10 MG tablet as needed.      senna-docusate 8.6-50 mg (PERICOLACE) 8.6-50 mg per tablet Take 1 tablet by mouth.      senna-docusate 8.6-50 mg (SENNA WITH DOCUSATE SODIUM) 8.6-50 mg per tablet Take 1 tablet by mouth 2 (two) times daily as needed for Constipation.      sucralfate (CARAFATE) 1 gram tablet TAKE 1 TABLET(1 GRAM) BY MOUTH FOUR TIMES DAILY FOR 14 DAYS 56 tablet 0    sucralfate (CARAFATE) 1 gram tablet       traMADoL (ULTRAM) 50 mg tablet Take 50 mg by mouth.      traMADoL (ULTRAM) 50 mg tablet Take by mouth.       No current facility-administered medications for this visit.        Physical Exam:   BP (!) 136/94   Pulse 74   Temp 98.6 °F (37 °C) (Oral)   Ht 5' 7" (1.702 m)   Wt 58.8 kg (129 lb 11.9 oz)   SpO2 100%   BMI 20.32 kg/m²      ECOG Performance status: 1            Physical Exam  Vitals reviewed.   Constitutional:       Appearance: Normal appearance. She is not ill-appearing or toxic-appearing.      Comments: Thin   HENT:      Head: Normocephalic and atraumatic.      Nose: Nose normal.      Mouth/Throat:      Mouth: Mucous membranes are moist.      Pharynx: Oropharynx is clear.   Eyes:      General: No scleral icterus.     Extraocular Movements: Extraocular movements intact.      Conjunctiva/sclera: Conjunctivae normal.   Cardiovascular:      Rate and Rhythm: Normal rate and regular rhythm.      Pulses: Normal pulses.      Heart sounds: Normal heart sounds.   Pulmonary:      Effort: Pulmonary effort is normal.      Breath sounds: Normal breath sounds.   Abdominal:      General: Bowel sounds are normal. There is distension.      Palpations: Abdomen is soft.      Tenderness: There is no abdominal " tenderness. There is no guarding.   Musculoskeletal:         General: No swelling or tenderness. Normal range of motion.      Cervical back: Normal range of motion and neck supple.      Right lower leg: No edema.      Left lower leg: No edema.   Skin:     General: Skin is warm.      Findings: No rash.   Neurological:      General: No focal deficit present.      Mental Status: She is alert. Mental status is at baseline.   Psychiatric:         Mood and Affect: Mood normal.         Behavior: Behavior normal.             Labs:   Recent Results (from the past 48 hour(s))   CBC Oncology    Collection Time: 04/04/22  9:59 AM   Result Value Ref Range    WBC 4.27 3.90 - 12.70 K/uL    RBC 4.37 4.00 - 5.40 M/uL    Hemoglobin 14.2 12.0 - 16.0 g/dL    Hematocrit 42.5 37.0 - 48.5 %    MCV 97 82 - 98 fL    MCH 32.5 (H) 27.0 - 31.0 pg    MCHC 33.4 32.0 - 36.0 g/dL    RDW 12.6 11.5 - 14.5 %    Platelets 253 150 - 450 K/uL    MPV 9.0 (L) 9.2 - 12.9 fL    Gran # (ANC) 2.7 1.8 - 7.7 K/uL    Immature Grans (Abs) 0.01 0.00 - 0.04 K/uL   Comprehensive Metabolic Panel    Collection Time: 04/04/22  9:59 AM   Result Value Ref Range    Sodium 142 136 - 145 mmol/L    Potassium 4.2 3.5 - 5.1 mmol/L    Chloride 106 95 - 110 mmol/L    CO2 30 (H) 23 - 29 mmol/L    Glucose 83 70 - 110 mg/dL    BUN 6 6 - 20 mg/dL    Creatinine 0.7 0.5 - 1.4 mg/dL    Calcium 9.2 8.7 - 10.5 mg/dL    Total Protein 6.2 6.0 - 8.4 g/dL    Albumin 2.6 (L) 3.5 - 5.2 g/dL    Total Bilirubin 0.6 0.1 - 1.0 mg/dL    Alkaline Phosphatase 89 55 - 135 U/L    AST 18 10 - 40 U/L    ALT 9 (L) 10 - 44 U/L    Anion Gap 6 (L) 8 - 16 mmol/L    eGFR if African American >60.0 >60 mL/min/1.73 m^2    eGFR if non African American >60.0 >60 mL/min/1.73 m^2        I have reviewed the pertinent labs from today which show normal blood counts.  CMP pending.    Imaging:    CT CAP: 2/10/2022    Impression:     Persistent gastric wall thickening involving the distal stomach, similar to the prior  examination.     Numerous hepatic hypodensities some of which likely represent cysts, however there is a more solid-appearing soft tissue density liver lesion within the posteromedial right lobe, unchanged.     Subtle sclerotic bone lesions within the thoracic spine remain stable.     Small to moderate left pleural effusion, increased when compared to the prior study.     Moderate ascites, increased when compared to the prior study.     Probable necrotic enlarged left inguinal lymph node, similar to the prior examination.     Nodular enhancement within the anterior peritoneum anterior to the stomach, possibly peritoneal carcinomatosis.     Probable left lobe thyroid nodule, stable compared to 10/08/2021.     We have personally reviewed the most recent imaging from 10/8/2021, which is displays overall stability of disease. There is a L sided thyroid nodule that is indeterminate at this point. Decreased amount of ascites.      Abdominal US 2/10/2022:     FINDINGS:  There is moderate, anechoic abdominal ascites, most prominent within the lower quadrants.     Impression:     Moderate, anechoic abdominal ascites.  Left inguinal hernia containing fat and fluid.    Path:   5/26/21:  Positive for malignancy.   Poorly differentiated adenocarcinoma growing with signet ring features   Immunostains for Bruce-EP4 and CEA are positive.  The controls stain   appropriately.   This case was reviewed by Dr Sebastian who concurs with the diagnosis       Assessment:       1. Malignant neoplasm of stomach, unspecified location    2. Bone metastases    3. Peritoneal carcinomatosis    4. Neuropathy    5. 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 Non-intractable vomiting with nausea, unspecified vomiting type    8. Microcytic anemia    9. Anxiety associated with cancer diagnosis          Plan:             # Gastric adenocarcinoma with peritoneal metastases, immunodeficiency due to chemotherapy, bone metastases  HER-2  negative by FISH.  PD-L1 < 1%.  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  We previously explained the implications of a stage IV diagnosis to her and potential treatment options.  She is now s/p port placement. She sought a second opinion at [facility] for zolbetuximab trial targeting CLDN protein but she did not test positive for this biomarker. We recommended proceeding with SOC FOLFOX, with which the [facility] team agreed. Because of the negative PD-L1 I do not think the benefit of adding nivolumab outweighs the potential toxicities.       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  She opted not to get genetic testing done at her appt with Phi. We recommended that she reconsider that decision in light of a very likely germline mutation in SAMUEL which has clinical consequences.      Tolerating chemotherapy well thus far.  She has experienced improvement in many of her GI symptoms since starting treatment.    CT CAP after 6 cycles showed improvement in ascites, probable hepatic, thyroid and bone metastases.  Given improvement in symptoms and delay between baseline imaging and cycle 1, suspect she was having benefit from chemotherapy. She was in agreement with this.    We dropped oxaliplatin starting with cycle 10 due to grade 1 neuropathy and desire to keep it from worsening. Doing well overall. Stable neuropathy in the feet. Otherwise, tolerating treatment good.     CT CAP after cycle 10 showed stable disease.  CT scan after cycle 15 continued to display overall stability of disease within the gastric wall, peritoneum, spine and liver. Although increase ascites on imaging and clinically can represent progression of disease, her CT suggested overall stability within the liver and gastric wall, so we recc to continue with treatment  at this time.     Proceed with cycle 19 of chemotherapy today, 5-FU maintenance.   RTC in 2 weeks, for cycle 19. Will repeat a CT scan prior to cycle 20.  Concerning that worsening ascites may be due to disease progression.  Would favor use of FOLFIRI as second-line rather than Taxol/Claudio due to ongoing neuropathy.    #Ascites  Initially had peritoneal catheter after diagnosis, output slowed so was removed in October 2021 by IR.  Now recent reaccumulation of ascites.  S/p paracentesis 3/17 with 3L removed, good relief.  Repeat 3/30 with 3.7L removed.  Repeat scheduled for 4/11.  Placed standing orders for IR para.  Concern for progression as above.  Repeat CT pending.     # Nausea, gastric ulcer  Stable. No vomiting.   Continue Protonix.  Continue Compazine PRN.  Continue dietary modifications to prevent indigestion.    # Anemia  Hgb normal. Doing well.    Iron deficiency due to chronic blood loss.  S/p 2 doses of Injectafer.    # Anxiety  Feeling well today.   Continue f/u with psych onc w/ Dr. Berumen    # chemotherapy induced neuropathy  2/2 previous chemotherapy with Oxaliplatin  Continue acupuncture.  Continue gabapentin.  Will continue to monitor.     Follow up: Will return in 2 weeks for lab work, clinic visit, and cycle 20 of chemo.    Route Chart for Scheduling    Med Onc Chart Routing      Follow up with physician 2 weeks. For 5-FU infusion   Follow up with [facility] 4 weeks.   Labs CBC and CMP   Lab interval: every 2 weeks     Imaging CT chest abdomen pelvis   Prior to f/u in 2 weeks   Pharmacy appointment    Other referrals          Treatment Plan Information   OP FOLFOX 6 Q2W   Fer Ashraf MD   Upcoming Treatment Dates - OP FOLFOX 6 Q2W    4/4/2022       Chemotherapy       fluorouracil (ADRUCIL) 4,000 mg in sodium chloride 0.9% 240 mL chemo infusion  4/18/2022       Chemotherapy       fluorouracil (ADRUCIL) 4,000 mg in sodium chloride 0.9% 240 mL chemo infusion    Therapy Plan Information  EPINEPHrine  (EPIPEN) 0.3 mg/0.3 mL pen injection 0.3 mg  0.3 mg, Intramuscular, PRN  diphenhydrAMINE injection 50 mg  50 mg, Intravenous, PRN  methylPREDNISolone sodium succinate injection 125 mg  125 mg, Intravenous, PRN  sodium chloride 0.9% bolus 1,000 mL  1,000 mL, Intravenous, PRN      Fer Ashraf MD  Hematology/Oncology  [facility]

## 2022-04-05 ENCOUNTER — PATIENT MESSAGE (OUTPATIENT)
Dept: HEMATOLOGY/ONCOLOGY | Facility: CLINIC | Age: 42
End: 2022-04-05
Payer: COMMERCIAL

## 2022-04-06 ENCOUNTER — INFUSION (OUTPATIENT)
Dept: INFUSION THERAPY | Facility: HOSPITAL | Age: 42
End: 2022-04-06
Attending: PHYSICIAN ASSISTANT
Payer: MEDICAID

## 2022-04-06 VITALS
HEART RATE: 60 BPM | RESPIRATION RATE: 16 BRPM | TEMPERATURE: 98 F | SYSTOLIC BLOOD PRESSURE: 127 MMHG | DIASTOLIC BLOOD PRESSURE: 85 MMHG

## 2022-04-06 DIAGNOSIS — C16.9 MALIGNANT NEOPLASM OF STOMACH, UNSPECIFIED LOCATION: Primary | ICD-10-CM

## 2022-04-06 RX ORDER — SODIUM CHLORIDE 0.9 % (FLUSH) 0.9 %
10 SYRINGE (ML) INJECTION
Status: DISCONTINUED | OUTPATIENT
Start: 2022-04-06 | End: 2022-04-06 | Stop reason: HOSPADM

## 2022-04-06 RX ORDER — HEPARIN 100 UNIT/ML
500 SYRINGE INTRAVENOUS
Status: DISCONTINUED | OUTPATIENT
Start: 2022-04-06 | End: 2022-04-06 | Stop reason: HOSPADM

## 2022-04-06 NOTE — NURSING
Pt here for pump d/c. Pump d/c'd, port flushed and hep locked. Pt tolerated procedure without difficulty.

## 2022-04-11 ENCOUNTER — HOSPITAL ENCOUNTER (OUTPATIENT)
Dept: INTERVENTIONAL RADIOLOGY/VASCULAR | Facility: HOSPITAL | Age: 42
Discharge: HOME | End: 2022-04-11
Attending: INTERNAL MEDICINE
Payer: MEDICAID

## 2022-04-11 ENCOUNTER — PATIENT MESSAGE (OUTPATIENT)
Dept: HEMATOLOGY/ONCOLOGY | Facility: CLINIC | Age: 42
End: 2022-04-11
Payer: COMMERCIAL

## 2022-04-11 VITALS
RESPIRATION RATE: 18 BRPM | TEMPERATURE: 98 F | HEART RATE: 85 BPM | SYSTOLIC BLOOD PRESSURE: 152 MMHG | OXYGEN SATURATION: 100 % | DIASTOLIC BLOOD PRESSURE: 88 MMHG

## 2022-04-11 DIAGNOSIS — R18.8 OTHER ASCITES: ICD-10-CM

## 2022-04-11 PROCEDURE — 25000003 PHARM REV CODE 250: Performed by: RADIOLOGY

## 2022-04-11 PROCEDURE — 63600175 PHARM REV CODE 636 W HCPCS: Mod: JG | Performed by: RADIOLOGY

## 2022-04-11 PROCEDURE — P9047 ALBUMIN (HUMAN), 25%, 50ML: HCPCS | Mod: JG | Performed by: RADIOLOGY

## 2022-04-11 PROCEDURE — C1729 CATH, DRAINAGE: HCPCS

## 2022-04-11 PROCEDURE — 49083 ABD PARACENTESIS W/IMAGING: CPT | Performed by: RADIOLOGY

## 2022-04-11 PROCEDURE — 49083 IR PARACENTESIS WITH IMAGING: ICD-10-PCS | Mod: ,,, | Performed by: RADIOLOGY

## 2022-04-11 RX ORDER — LIDOCAINE HYDROCHLORIDE 20 MG/ML
INJECTION, SOLUTION INFILTRATION; PERINEURAL CODE/TRAUMA/SEDATION MEDICATION
Status: COMPLETED | OUTPATIENT
Start: 2022-04-11 | End: 2022-04-11

## 2022-04-11 RX ORDER — ALBUMIN HUMAN 250 G/1000ML
50 SOLUTION INTRAVENOUS ONCE
Status: COMPLETED | OUTPATIENT
Start: 2022-04-11 | End: 2022-04-11

## 2022-04-11 RX ADMIN — LIDOCAINE HYDROCHLORIDE 10 ML: 20 INJECTION, SOLUTION INFILTRATION; PERINEURAL at 08:04

## 2022-04-11 RX ADMIN — ALBUMIN (HUMAN) 50 G: 12.5 SOLUTION INTRAVENOUS at 09:04

## 2022-04-11 NOTE — H&P
Radiology History & Physical      SUBJECTIVE:     Chief Complaint: gastric cancer, recurrent ascites    History of Present Illness:  Puja Quigley is a 41 y.o. female who presents for paracentesis  Past Medical History:   Diagnosis Date    Gastric cancer     Gastric ulcer     Pregnancy 08/12/2020    delivered on 8/12/2020    Umbilical hernia      Past Surgical History:   Procedure Laterality Date    CLOSURE OF PERFORATED ULCER OF DUODENUM USING OMENTAL PATCH      ESOPHAGOGASTRODUODENOSCOPY N/A 10/13/2020    Procedure: EGD (ESOPHAGOGASTRODUODENOSCOPY);  Surgeon: Rosio Marion MD;  Location: [facility] (2ND FLR);  Service: Endoscopy;  Laterality: N/A;    ESOPHAGOGASTRODUODENOSCOPY N/A 12/11/2020    Procedure: EGD (ESOPHAGOGASTRODUODENOSCOPY);  Surgeon: Rosio Marion MD;  Location: [facility] ENDO (2ND FLR);  Service: Endoscopy;  Laterality: N/A;  Covid-19 test 12/8/20 at [facility]    ESOPHAGOGASTRODUODENOSCOPY N/A 3/12/2021    Procedure: EGD (ESOPHAGOGASTRODUODENOSCOPY);  Surgeon: Nav Omer MD;  Location: [facility] (2ND FLR);  Service: Endoscopy;  Laterality: N/A;  COVID at [facility] 3/9 [facility]    ESOPHAGOGASTRODUODENOSCOPY N/A 5/18/2021    Procedure: EGD (ESOPHAGOGASTRODUODENOSCOPY);  Surgeon: Rosio Marion MD;  Location: [facility] (2ND FLR);  Service: Endoscopy;  Laterality: N/A;  5/15-covid [facility]-inst portal-tb    ESOPHAGOGASTRODUODENOSCOPY N/A 5/26/2021    Procedure: EGD (ESOPHAGOGASTRODUODENOSCOPY);  Surgeon: Socrates Terrazas MD;  Location: [facility] ENDO (2ND FLR);  Service: Endoscopy;  Laterality: N/A;       Home Meds:   Prior to Admission medications    Medication Sig Start Date End Date Taking? Authorizing Provider   acetaminophen (TYLENOL) 500 MG tablet Take 500 mg by mouth every 6 (six) hours as needed for Pain.    Historical Provider   amitriptyline (ELAVIL) 10 MG tablet Take 1 tablet (10 mg total) by mouth every evening. 5/13/21   Socrates Terrazas MD   amitriptyline (ELAVIL) 10 MG  tablet Take 10 mg by mouth. 5/13/21   Historical Provider   dicyclomine (BENTYL) 10 MG capsule TAKE ONE CAPSULE BY MOUTH FOUR TIMES DAILY 9/27/21   Kamille Hooks PA-C   famotidine (PEPCID) 20 MG tablet Take 1 tablet (20 mg total) by mouth nightly as needed for Heartburn. 2/23/22 2/23/23  Kamille Hooks PA-C   ferrous sulfate (FEOSOL) 325 mg (65 mg iron) Tab tablet Take 1 tablet (325 mg total) by mouth once daily for 7 days, THEN 1 tablet (325 mg total) 2 (two) times daily. 5/26/21 5/26/22  Reshma Wilson MD   ferrous sulfate (FEOSOL) 325 mg (65 mg iron) Tab tablet Take by mouth. 5/26/21 5/26/22  Historical Provider   furosemide (LASIX) 20 MG tablet Take 10 mg by mouth.    Historical Provider   furosemide (LASIX) 40 MG tablet Take 0.5 tablets (20 mg total) by mouth daily as needed (leg swelling or abdominal swelling). 5/26/21 5/26/22  Reshma Wilson MD   furosemide (LASIX) 40 MG tablet  5/26/21   Historical Provider   gabapentin (NEURONTIN) 300 MG capsule Take 1 capsule (300 mg total) by mouth 3 (three) times daily. 2/7/22 2/7/23  Kamille Hooks PA-C   HYDROcodone-acetaminophen (NORCO) 5-325 mg per tablet Take 1 tablet by mouth. 7/9/21   Historical Provider   HYDROcodone-acetaminophen (NORCO) 5-325 mg per tablet Take 1 tablet by mouth 3 (three) times daily as needed. 7/9/21   Historical Provider   LIDOcaine-prilocaine (EMLA) cream Apply to Port-A-Cath area 30 to 45 minutes prior to port access as directed (topical anesthetic use to the anterior chest only). 6/25/21   Historical Provider   LIDOcaine-prilocaine (EMLA) cream Apply topically. 6/25/21   Historical Provider   metoclopramide HCl (REGLAN) 5 MG tablet 5 mg. 6/11/21   Historical Provider   multivitamin with folic acid 400 mcg Tab Take 1 tablet by mouth once daily.    Historical Provider   omeprazole (PRILOSEC) 40 MG capsule Take 1 capsule (40 mg total) by mouth once daily. 2/23/22 2/23/23  Kamille Hooks PA-C   ondansetron (ZOFRAN) 8 MG tablet Take  1 tablet (8 mg total) by mouth every 8 (eight) hours as needed for Nausea. 7/26/21   Fer Ashraf MD   pantoprazole (PROTONIX) 40 MG tablet Take 40 mg by mouth. 6/2/21   Historical Provider   pantoprazole (PROTONIX) 40 MG tablet Take 1 tablet (40 mg total) by mouth 2 (two) times daily. 2/1/22   Fer Ashraf MD   prochlorperazine (COMPAZINE) 10 MG tablet TAKE 1 TABLET(10 MG) BY MOUTH EVERY 6 HOURS AS NEEDED FOR NAUSEA 8/7/21   Fer Ashraf MD   prochlorperazine (COMPAZINE) 10 MG tablet as needed. 6/3/21   Historical Provider   senna-docusate 8.6-50 mg (PERICOLACE) 8.6-50 mg per tablet Take 1 tablet by mouth. 5/26/21   Historical Provider   senna-docusate 8.6-50 mg (SENNA WITH DOCUSATE SODIUM) 8.6-50 mg per tablet Take 1 tablet by mouth 2 (two) times daily as needed for Constipation. 5/26/21   Reshma Wilson MD   sucralfate (CARAFATE) 1 gram tablet TAKE 1 TABLET(1 GRAM) BY MOUTH FOUR TIMES DAILY FOR 14 DAYS 10/20/21   Rosio Marion MD   sucralfate (CARAFATE) 1 gram tablet  4/8/21   Historical Provider   traMADoL (ULTRAM) 50 mg tablet Take 50 mg by mouth. 6/25/21   Historical Provider   traMADoL (ULTRAM) 50 mg tablet Take by mouth. 6/25/21   Historical Provider     Anticoagulants/Antiplatelets: no anticoagulation    Allergies: Review of patient's allergies indicates:  No Known Allergies  Sedation History:  no adverse reactions    Review of Systems:   Hematological: no known coagulopathies  Respiratory: no shortness of breath  Cardiovascular: no chest pain  Gastrointestinal: mild lower abdominal pain due to ascites  Genito-Urinary: no dysuria  Musculoskeletal: negative  Neurological: no TIA or stroke symptoms         OBJECTIVE:     Vital Signs (Most Recent)       Physical Exam:  ASA: 2  Mallampati: N/A    General: no acute distress  Mental Status: alert and oriented to person, place and time  HEENT: normocephalic, atraumatic  Chest: unlabored breathing  Heart: regular heart rate  Abdomen:  distended  Extremity: moves all extremities    Laboratory  Lab Results   Component Value Date    INR 1.0 06/10/2021       Lab Results   Component Value Date    WBC 4.27 04/04/2022    HGB 14.2 04/04/2022    HCT 42.5 04/04/2022    MCV 97 04/04/2022     04/04/2022      Lab Results   Component Value Date    GLU 83 04/04/2022     04/04/2022    K 4.2 04/04/2022     04/04/2022    CO2 30 (H) 04/04/2022    BUN 6 04/04/2022    CREATININE 0.7 04/04/2022    CALCIUM 9.2 04/04/2022    MG 1.7 05/26/2021    ALT 9 (L) 04/04/2022    AST 18 04/04/2022    ALBUMIN 2.6 (L) 04/04/2022    BILITOT 0.6 04/04/2022       ASSESSMENT/PLAN:     Sedation Plan: local anesthesia only  Patient will undergo paracentesis.    Martin Ivory MD  Staff Radiologist  Department of Radiology  Pager: [phone]

## 2022-04-11 NOTE — DISCHARGE INSTRUCTIONS
BATHING:  You may shower tomorrow.  DRESSING:  Remove dressing tomorrow.        ACTIVITY LEVEL: If you have received sedation or an anesthetic, you may feel sleepy for several hours. Rest until you are more awake. Gradually resume your normal activities    Do not drive, drink alcohol, or sign legal documents for 24 hours, or if taking narcotic pain medication.      DIET: You may resume your home diet. If nausea is present, increase your diet gradually with fluids and bland foods.    Medications: Pain medication should be taken only if needed and as directed. If antibiotics are prescribed, the medication should be taken until completed. You will be given an updated list of you medications.  No driving, alcoholic beverages or signing legal documents for next 24 hours if you have had sedation, or while taking pain medication    CALL THE DOCTOR:   For any obvious bleeding (some dried blood over the incision is normal).     Redness, swelling, foul smell around incision or fever over 101.  Shortness of breath.  Persistent pain or nausea not relieved by medication.  Call  [phone]     to speak with an Interventional Radiologist    If any unusual problems or difficulties occur contact your doctor. If you cannot contact your doctor but feel your signs and symptoms warrant a physicians attention return to the emergency room.

## 2022-04-11 NOTE — PROCEDURES
Radiology Post-Procedure Note    Pre Op Diagnosis: Ascites, gastric cancer with hepatic and peritoneal metastases  Post Op Diagnosis: Same    Procedure: Paracentesis    Procedure performed by: Martin Ivory MD    Written Informed Consent Obtained: Yes  Specimen Removed: YES thin lang/renuka fluid  Estimated Blood Loss: Minimal    Findings:   Successful paracentesis.  Albumin administered PRN per protocol.    Patient tolerated procedure well.    Martin Ivory MD  Staff Radiologist  Department of Radiology  Pager: [phone]

## 2022-04-11 NOTE — DISCHARGE SUMMARY
Radiology Discharge Summary      Hospital Course: No complications    Admit Date: 4/11/2022   Discharge Date: 04/11/2022     Instructions Given to Patient: Yes  Diet: Resume prior diet  Activity: activity as tolerated    Description of Condition on Discharge: Stable  Vital Signs (Most Recent): Temp: 97.7 °F (36.5 °C) (04/11/22 1012)  Pulse: 85 (04/11/22 1012)  Resp: 18 (04/11/22 1012)  BP: (!) 152/88 (04/11/22 1012)  SpO2: 100 % (04/11/22 1012)    Discharge Disposition: Home    Discharge Diagnosis: gastric cancer s/p paracentesis     Follow-up: periodic paracentesis as needed, f/u with oncology as needed    Martin Ivory MD  Staff Radiologist  Department of Radiology  Pager: [phone]

## 2022-04-12 ENCOUNTER — CLINICAL SUPPORT (OUTPATIENT)
Dept: HEMATOLOGY/ONCOLOGY | Facility: CLINIC | Age: 42
End: 2022-04-12
Payer: MEDICAID

## 2022-04-12 DIAGNOSIS — C79.51 BONE METASTASES: ICD-10-CM

## 2022-04-12 DIAGNOSIS — G62.9 NEUROPATHY: ICD-10-CM

## 2022-04-12 DIAGNOSIS — C16.9 MALIGNANT NEOPLASM OF STOMACH, UNSPECIFIED LOCATION: Primary | ICD-10-CM

## 2022-04-12 PROCEDURE — 97814 PR ACUPUNCT W/ ELEC STIMUL ADDL 15M: ICD-10-PCS | Mod: ,,, | Performed by: ACUPUNCTURIST

## 2022-04-12 PROCEDURE — 97813 ACUP 1/> W/ESTIM 1ST 15 MIN: CPT | Mod: ,,, | Performed by: ACUPUNCTURIST

## 2022-04-12 PROCEDURE — 97813 PR ACUPUNCT W/ ELEC STIMUL 15 MIN: ICD-10-PCS | Mod: ,,, | Performed by: ACUPUNCTURIST

## 2022-04-12 PROCEDURE — 97814 ACUP 1/> W/ESTIM EA ADDL 15: CPT | Mod: ,,, | Performed by: ACUPUNCTURIST

## 2022-04-14 ENCOUNTER — HOSPITAL ENCOUNTER (OUTPATIENT)
Dept: RADIOLOGY | Facility: HOSPITAL | Age: 42
Discharge: HOME | End: 2022-04-14
Attending: INTERNAL MEDICINE
Payer: MEDICAID

## 2022-04-14 DIAGNOSIS — C16.9 MALIGNANT NEOPLASM OF STOMACH, UNSPECIFIED LOCATION: ICD-10-CM

## 2022-04-14 DIAGNOSIS — C16.9 MALIGNANT NEOPLASM OF STOMACH, UNSPECIFIED LOCATION: Primary | ICD-10-CM

## 2022-04-14 PROCEDURE — 74177 CT ABD & PELVIS W/CONTRAST: CPT | Mod: 26,,, | Performed by: RADIOLOGY

## 2022-04-14 PROCEDURE — 71260 CT CHEST ABDOMEN PELVIS WITH CONTRAST (XPD): ICD-10-PCS | Mod: 26,,, | Performed by: RADIOLOGY

## 2022-04-14 PROCEDURE — 74177 CT ABD & PELVIS W/CONTRAST: CPT | Mod: TC

## 2022-04-14 PROCEDURE — 25500020 PHARM REV CODE 255: Performed by: INTERNAL MEDICINE

## 2022-04-14 PROCEDURE — 71260 CT THORAX DX C+: CPT | Mod: TC

## 2022-04-14 PROCEDURE — 74177 CT CHEST ABDOMEN PELVIS WITH CONTRAST (XPD): ICD-10-PCS | Mod: 26,,, | Performed by: RADIOLOGY

## 2022-04-14 PROCEDURE — 71260 CT THORAX DX C+: CPT | Mod: 26,,, | Performed by: RADIOLOGY

## 2022-04-14 RX ADMIN — IOHEXOL 75 ML: 350 INJECTION, SOLUTION INTRAVENOUS at 10:04

## 2022-04-14 RX ADMIN — IOHEXOL 15 ML: 300 INJECTION, SOLUTION INTRAVENOUS at 10:04

## 2022-04-14 NOTE — PROGRESS NOTES
Subjective:       Patient ID: Puja Quigley is a 41 y.o. female.    Chief Complaint: Pain (cLBP, CIPN)    Patient continuing care for CIPN symptoms and CLBP symptoms. Patient notices slight decrease but no significant changes. Continue and evaluate.     Review of Systems   Neurological: Positive for numbness.         Objective:      Physical Exam    Assessment:       Problem List Items Addressed This Visit        Oncology    Malignant neoplasm of stomach - Primary    Bone metastases      Other Visit Diagnoses     Neuropathy              Plan:       1x a week*         Pre-Symptom Score: NA  Post-Symptom Score: NA     Pain (cLBP, CIPN)       Encounter Diagnoses   Name Primary?    Malignant neoplasm of stomach, unspecified location Yes    Bone metastases     Neuropathy      Acupuncture points used:  4 MONTANEZ, BA JENNA, Du20, Gb34, Li11, Sp10, Sp6, Sp9, St36 and YIN RICKETTS    STIM USED @ BA JENNA      NEEDLES IN: 18  NEEDLES OUT: 18    NEEDLES W/ STIM  AT: 9:15 AM  NEEDLES W/ STIM REMOVED AT: 9:45 AM

## 2022-04-18 ENCOUNTER — HOSPITAL ENCOUNTER (OUTPATIENT)
Dept: INTERVENTIONAL RADIOLOGY/VASCULAR | Facility: HOSPITAL | Age: 42
Discharge: HOME | End: 2022-04-18
Attending: INTERNAL MEDICINE
Payer: MEDICAID

## 2022-04-18 ENCOUNTER — TELEPHONE (OUTPATIENT)
Dept: HEMATOLOGY/ONCOLOGY | Facility: CLINIC | Age: 42
End: 2022-04-18
Payer: COMMERCIAL

## 2022-04-18 VITALS — DIASTOLIC BLOOD PRESSURE: 93 MMHG | SYSTOLIC BLOOD PRESSURE: 128 MMHG

## 2022-04-18 DIAGNOSIS — R18.8 OTHER ASCITES: ICD-10-CM

## 2022-04-18 PROCEDURE — 49083 IR PARACENTESIS WITH IMAGING: ICD-10-PCS | Mod: ,,, | Performed by: RADIOLOGY

## 2022-04-18 PROCEDURE — 49083 ABD PARACENTESIS W/IMAGING: CPT | Performed by: RADIOLOGY

## 2022-04-18 PROCEDURE — C1729 CATH, DRAINAGE: HCPCS

## 2022-04-18 NOTE — DISCHARGE SUMMARY
Interventional Radiology Short Stay Discharge Summary      Admit Date: 4/18/2022  Discharge Date: 04/18/2022     Hospital Course: Uneventful    Discharge Diagnosis: Malig ascites s/p paracentesis today    Discharge Condition: Stable    Discharge Disposition: Home    Diet: Resume prior diet    Activity: Activity as tolerated    Follow-up: With referring provider      Maninder White MD  [facility]  Pager [phone]

## 2022-04-18 NOTE — PROCEDURES
Interventional Radiology Immediate Post-Procedure Note    Pre-Op Diagnosis: Ascites  Post-Op Diagnosis: Same    Procedure: US-guided paracentesis    Procedure performed by: Maninder White MD  Assistants: None    Estimated Blood Loss: Minimal  Specimen Removed: No: NA    Findings/description of procedure:  Large ascites. 3000 mL renuka fluid removed from the RIGHT lower quadrant under real-time US guidance.    No immediate complications. Patient tolerated procedure well. Please see full dictated procedure report for additional details and recommendations.      Maninder White MD  [facility]  Pager [phone]

## 2022-04-18 NOTE — TELEPHONE ENCOUNTER
"----- Message from Dennis Tad sent at 4/18/2022  1:33 PM CDT -----  Consult/Advisory:          Name Of Caller: Self      Contact Preference?: [phone]       Provider Name: Pascale      Does patient feel the need to be seen today? No      What is the nature of the call?: Calling to inform office that she'll be running a little late for today's 1:40 pm Lab appt due to prior appt she has @ the [facility] beforehand.          Additional Notes:  "Thank you for all that you do for our patients"      "

## 2022-04-18 NOTE — H&P
Interventional Radiology Pre-Procedure History & Physical      Chief Complaint/Reason for Referral: Ascites    History of Present Illness:  Puja Quigley is a 41 y.o. female who presents with ascites in the setting of gastric ca. Here for routine paracentesis.    Past Medical History:   Diagnosis Date    Gastric cancer     Gastric ulcer     Pregnancy 08/12/2020    delivered on 8/12/2020    Umbilical hernia      Past Surgical History:   Procedure Laterality Date    CLOSURE OF PERFORATED ULCER OF DUODENUM USING OMENTAL PATCH      ESOPHAGOGASTRODUODENOSCOPY N/A 10/13/2020    Procedure: EGD (ESOPHAGOGASTRODUODENOSCOPY);  Surgeon: Rosio Marion MD;  Location: [facility] (2ND FLR);  Service: Endoscopy;  Laterality: N/A;    ESOPHAGOGASTRODUODENOSCOPY N/A 12/11/2020    Procedure: EGD (ESOPHAGOGASTRODUODENOSCOPY);  Surgeon: Rosio Marion MD;  Location: [facility] (2ND FLR);  Service: Endoscopy;  Laterality: N/A;  Covid-19 test 12/8/20 at [facility]    ESOPHAGOGASTRODUODENOSCOPY N/A 3/12/2021    Procedure: EGD (ESOPHAGOGASTRODUODENOSCOPY);  Surgeon: Nav Omer MD;  Location: [facility] (2ND FLR);  Service: Endoscopy;  Laterality: N/A;  [facility] [place] [facility]    ESOPHAGOGASTRODUODENOSCOPY N/A 5/18/2021    Procedure: EGD (ESOPHAGOGASTRODUODENOSCOPY);  Surgeon: Rosio Marion MD;  Location: [facility] ENDO (2ND FLR);  Service: Endoscopy;  Laterality: N/A;  5/15-covid [facility]-inst portal-tb    ESOPHAGOGASTRODUODENOSCOPY N/A 5/26/2021    Procedure: EGD (ESOPHAGOGASTRODUODENOSCOPY);  Surgeon: Socrates Terrazas MD;  Location: [facility] ENDO (2ND FLR);  Service: Endoscopy;  Laterality: N/A;       Allergies:   Review of patient's allergies indicates:  No Known Allergies     Home Meds:   Prior to Admission medications    Medication Sig Start Date End Date Taking? Authorizing Provider   acetaminophen (TYLENOL) 500 MG tablet Take 500 mg by mouth every 6 (six) hours as needed for Pain.    Historical Provider    amitriptyline (ELAVIL) 10 MG tablet Take 1 tablet (10 mg total) by mouth every evening. 5/13/21   Socrates Terrazas MD   amitriptyline (ELAVIL) 10 MG tablet Take 10 mg by mouth. 5/13/21   Historical Provider   dicyclomine (BENTYL) 10 MG capsule TAKE ONE CAPSULE BY MOUTH FOUR TIMES DAILY 9/27/21   Kamille Hooks PA-C   famotidine (PEPCID) 20 MG tablet Take 1 tablet (20 mg total) by mouth nightly as needed for Heartburn. 2/23/22 2/23/23  Kamille Hooks PA-C   ferrous sulfate (FEOSOL) 325 mg (65 mg iron) Tab tablet Take 1 tablet (325 mg total) by mouth once daily for 7 days, THEN 1 tablet (325 mg total) 2 (two) times daily. 5/26/21 5/26/22  Reshma Wilson MD   ferrous sulfate (FEOSOL) 325 mg (65 mg iron) Tab tablet Take by mouth. 5/26/21 5/26/22  Historical Provider   furosemide (LASIX) 20 MG tablet Take 10 mg by mouth.    Historical Provider   furosemide (LASIX) 40 MG tablet Take 0.5 tablets (20 mg total) by mouth daily as needed (leg swelling or abdominal swelling). 5/26/21 5/26/22  Reshma Wilson MD   furosemide (LASIX) 40 MG tablet  5/26/21   Historical Provider   gabapentin (NEURONTIN) 300 MG capsule Take 1 capsule (300 mg total) by mouth 3 (three) times daily. 2/7/22 2/7/23  Kamille Hooks PA-C   HYDROcodone-acetaminophen (NORCO) 5-325 mg per tablet Take 1 tablet by mouth. 7/9/21   Historical Provider   HYDROcodone-acetaminophen (NORCO) 5-325 mg per tablet Take 1 tablet by mouth 3 (three) times daily as needed. 7/9/21   Historical Provider   LIDOcaine-prilocaine (EMLA) cream Apply to Port-A-Cath area 30 to 45 minutes prior to port access as directed (topical anesthetic use to the anterior chest only). 6/25/21   Historical Provider   LIDOcaine-prilocaine (EMLA) cream Apply topically. 6/25/21   Historical Provider   metoclopramide HCl (REGLAN) 5 MG tablet 5 mg. 6/11/21   Historical Provider   multivitamin with folic acid 400 mcg Tab Take 1 tablet by mouth once daily.    Historical Provider   omeprazole  (PRILOSEC) 40 MG capsule Take 1 capsule (40 mg total) by mouth once daily. 2/23/22 2/23/23  Kamille Hooks PA-C   ondansetron (ZOFRAN) 8 MG tablet Take 1 tablet (8 mg total) by mouth every 8 (eight) hours as needed for Nausea. 7/26/21   Fer Ashraf MD   pantoprazole (PROTONIX) 40 MG tablet Take 40 mg by mouth. 6/2/21   Historical Provider   pantoprazole (PROTONIX) 40 MG tablet Take 1 tablet (40 mg total) by mouth 2 (two) times daily. 2/1/22   Fer Ashraf MD   prochlorperazine (COMPAZINE) 10 MG tablet TAKE 1 TABLET(10 MG) BY MOUTH EVERY 6 HOURS AS NEEDED FOR NAUSEA 8/7/21   Fer Ashraf MD   prochlorperazine (COMPAZINE) 10 MG tablet as needed. 6/3/21   Historical Provider   senna-docusate 8.6-50 mg (PERICOLACE) 8.6-50 mg per tablet Take 1 tablet by mouth. 5/26/21   Historical Provider   senna-docusate 8.6-50 mg (SENNA WITH DOCUSATE SODIUM) 8.6-50 mg per tablet Take 1 tablet by mouth 2 (two) times daily as needed for Constipation. 5/26/21   Reshma Wilson MD   sucralfate (CARAFATE) 1 gram tablet TAKE 1 TABLET(1 GRAM) BY MOUTH FOUR TIMES DAILY FOR 14 DAYS 10/20/21   Rosio Marion MD   sucralfate (CARAFATE) 1 gram tablet  4/8/21   Historical Provider   traMADoL (ULTRAM) 50 mg tablet Take 50 mg by mouth. 6/25/21   Historical Provider   traMADoL (ULTRAM) 50 mg tablet Take by mouth. 6/25/21   Historical Provider       Anticoagulation/Antiplatelet Meds: no anticoagulation    Review of Systems:   Hematological: no known coagulopathies  Respiratory: no shortness of breath  Cardiovascular: no chest pain  Gastrointestinal: no abdominal pain  Genitourinary: no dysuria  Musculoskeletal: negative  Neurological: no TIA or stroke symptoms     Physical Exam:  BP: (!) 128/93 (04/18/22 1416)    General: NAD  HEENT: Normocephalic, sclera anicteric, oropharynx clear  Heart: RRR  Lungs: Symmetric excursions, breathing unlabored  Abd: Moderately distended, soft, NT  Extremities: LORENZANA  Neuro: Gross  nonfocal    Laboratory:  Lab Results   Component Value Date    INR 1.0 06/10/2021       Lab Results   Component Value Date    WBC 4.27 04/04/2022    HGB 14.2 04/04/2022    HCT 42.5 04/04/2022    MCV 97 04/04/2022     04/04/2022      Lab Results   Component Value Date    GLU 83 04/04/2022     04/04/2022    K 4.2 04/04/2022     04/04/2022    CO2 30 (H) 04/04/2022    BUN 6 04/04/2022    CREATININE 0.7 04/04/2022    CALCIUM 9.2 04/04/2022    MG 1.7 05/26/2021    ALT 9 (L) 04/04/2022    AST 18 04/04/2022    ALBUMIN 2.6 (L) 04/04/2022    BILITOT 0.6 04/04/2022       Assessment/Plan:  41 y.o. female with ascitexs. Will undergo paracentesis today.    Sedation: None    Risks (including, but not limited to, pain, bleeding, infection, damage to nearby structures, treatment failure/recurrence, and the need for additional procedures), potential benefits, and alternatives were discussed with the patient. All questions were answered to the best of my abilities. The patient wishes to proceed. Written informed consent was obtained.      Andrew Marsala MD [facility]  Pager [phone]

## 2022-04-19 ENCOUNTER — PATIENT MESSAGE (OUTPATIENT)
Dept: HEMATOLOGY/ONCOLOGY | Facility: CLINIC | Age: 42
End: 2022-04-19
Payer: COMMERCIAL

## 2022-04-20 ENCOUNTER — PATIENT MESSAGE (OUTPATIENT)
Dept: HEMATOLOGY/ONCOLOGY | Facility: CLINIC | Age: 42
End: 2022-04-20
Payer: COMMERCIAL

## 2022-04-22 ENCOUNTER — OFFICE VISIT (OUTPATIENT)
Dept: HEMATOLOGY/ONCOLOGY | Facility: CLINIC | Age: 42
End: 2022-04-22
Payer: COMMERCIAL

## 2022-04-22 ENCOUNTER — LAB VISIT (OUTPATIENT)
Dept: LAB | Facility: HOSPITAL | Age: 42
End: 2022-04-22
Attending: INTERNAL MEDICINE
Payer: MEDICAID

## 2022-04-22 ENCOUNTER — OFFICE VISIT (OUTPATIENT)
Dept: PSYCHIATRY | Facility: CLINIC | Age: 42
End: 2022-04-22
Payer: MEDICAID

## 2022-04-22 ENCOUNTER — DOCUMENTATION ONLY (OUTPATIENT)
Dept: HEMATOLOGY/ONCOLOGY | Facility: CLINIC | Age: 42
End: 2022-04-22
Payer: COMMERCIAL

## 2022-04-22 ENCOUNTER — CLINICAL SUPPORT (OUTPATIENT)
Dept: HEMATOLOGY/ONCOLOGY | Facility: CLINIC | Age: 42
End: 2022-04-22
Payer: MEDICAID

## 2022-04-22 ENCOUNTER — CLINICAL SUPPORT (OUTPATIENT)
Dept: REHABILITATION | Facility: HOSPITAL | Age: 42
End: 2022-04-22
Payer: MEDICAID

## 2022-04-22 VITALS
WEIGHT: 136.81 LBS | BODY MASS INDEX: 21.47 KG/M2 | HEIGHT: 67 IN | TEMPERATURE: 98 F | SYSTOLIC BLOOD PRESSURE: 129 MMHG | RESPIRATION RATE: 18 BRPM | DIASTOLIC BLOOD PRESSURE: 84 MMHG | OXYGEN SATURATION: 98 % | HEART RATE: 79 BPM

## 2022-04-22 DIAGNOSIS — K25.5 CHRONIC GASTRIC ULCER WITH PERFORATION: ICD-10-CM

## 2022-04-22 DIAGNOSIS — D50.9 MICROCYTIC ANEMIA: ICD-10-CM

## 2022-04-22 DIAGNOSIS — T45.1X5A IMMUNODEFICIENCY DUE TO CHEMOTHERAPY: ICD-10-CM

## 2022-04-22 DIAGNOSIS — F41.1 ANXIETY ASSOCIATED WITH CANCER DIAGNOSIS: ICD-10-CM

## 2022-04-22 DIAGNOSIS — R52 PAIN AGGRAVATED BY ACTIVITIES OF DAILY LIVING: Primary | ICD-10-CM

## 2022-04-22 DIAGNOSIS — G62.0 NEUROPATHY DUE TO CHEMOTHERAPEUTIC DRUG: ICD-10-CM

## 2022-04-22 DIAGNOSIS — D84.821 IMMUNODEFICIENCY DUE TO CHEMOTHERAPY: ICD-10-CM

## 2022-04-22 DIAGNOSIS — R18.8 OTHER ASCITES: ICD-10-CM

## 2022-04-22 DIAGNOSIS — T45.1X5A NEUROPATHY DUE TO CHEMOTHERAPEUTIC DRUG: ICD-10-CM

## 2022-04-22 DIAGNOSIS — R60.0 EDEMA OF BOTH LOWER EXTREMITIES: ICD-10-CM

## 2022-04-22 DIAGNOSIS — C78.6 PERITONEAL CARCINOMATOSIS: ICD-10-CM

## 2022-04-22 DIAGNOSIS — C16.9 GASTRIC ADENOCARCINOMA: ICD-10-CM

## 2022-04-22 DIAGNOSIS — R11.2 NON-INTRACTABLE VOMITING WITH NAUSEA, UNSPECIFIED VOMITING TYPE: ICD-10-CM

## 2022-04-22 DIAGNOSIS — C16.9 MALIGNANT NEOPLASM OF STOMACH, UNSPECIFIED LOCATION: Primary | ICD-10-CM

## 2022-04-22 DIAGNOSIS — C79.51 BONE METASTASES: ICD-10-CM

## 2022-04-22 DIAGNOSIS — C80.1 ANXIETY ASSOCIATED WITH CANCER DIAGNOSIS: ICD-10-CM

## 2022-04-22 DIAGNOSIS — C79.60: ICD-10-CM

## 2022-04-22 DIAGNOSIS — G62.9 NEUROPATHY: ICD-10-CM

## 2022-04-22 DIAGNOSIS — Z79.899 IMMUNODEFICIENCY DUE TO CHEMOTHERAPY: ICD-10-CM

## 2022-04-22 LAB
ALBUMIN SERPL BCP-MCNC: 2.4 G/DL (ref 3.5–5.2)
ALP SERPL-CCNC: 87 U/L (ref 55–135)
ALT SERPL W/O P-5'-P-CCNC: 21 U/L (ref 10–44)
ANION GAP SERPL CALC-SCNC: 5 MMOL/L (ref 8–16)
AST SERPL-CCNC: 25 U/L (ref 10–40)
BILIRUB SERPL-MCNC: 0.5 MG/DL (ref 0.1–1)
BUN SERPL-MCNC: 9 MG/DL (ref 6–20)
CALCIUM SERPL-MCNC: 8.8 MG/DL (ref 8.7–10.5)
CHLORIDE SERPL-SCNC: 108 MMOL/L (ref 95–110)
CO2 SERPL-SCNC: 31 MMOL/L (ref 23–29)
CREAT SERPL-MCNC: 0.7 MG/DL (ref 0.5–1.4)
ERYTHROCYTE [DISTWIDTH] IN BLOOD BY AUTOMATED COUNT: 13.2 % (ref 11.5–14.5)
EST. GFR  (AFRICAN AMERICAN): >60 ML/MIN/1.73 M^2
EST. GFR  (NON AFRICAN AMERICAN): >60 ML/MIN/1.73 M^2
GLUCOSE SERPL-MCNC: 98 MG/DL (ref 70–110)
HCT VFR BLD AUTO: 39.9 % (ref 37–48.5)
HGB BLD-MCNC: 12.9 G/DL (ref 12–16)
IMM GRANULOCYTES # BLD AUTO: 0.02 K/UL (ref 0–0.04)
MCH RBC QN AUTO: 31.9 PG (ref 27–31)
MCHC RBC AUTO-ENTMCNC: 32.3 G/DL (ref 32–36)
MCV RBC AUTO: 99 FL (ref 82–98)
NEUTROPHILS # BLD AUTO: 3.5 K/UL (ref 1.8–7.7)
PLATELET # BLD AUTO: 233 K/UL (ref 150–450)
PMV BLD AUTO: 9.1 FL (ref 9.2–12.9)
POTASSIUM SERPL-SCNC: 4.3 MMOL/L (ref 3.5–5.1)
PROT SERPL-MCNC: 5.4 G/DL (ref 6–8.4)
RBC # BLD AUTO: 4.05 M/UL (ref 4–5.4)
SODIUM SERPL-SCNC: 144 MMOL/L (ref 136–145)
WBC # BLD AUTO: 4.92 K/UL (ref 3.9–12.7)

## 2022-04-22 PROCEDURE — 3079F PR MOST RECENT DIASTOLIC BLOOD PRESSURE 80-89 MM HG: ICD-10-PCS | Mod: CPTII,S$GLB,, | Performed by: PHYSICIAN ASSISTANT

## 2022-04-22 PROCEDURE — 3074F SYST BP LT 130 MM HG: CPT | Mod: CPTII,S$GLB,, | Performed by: PHYSICIAN ASSISTANT

## 2022-04-22 PROCEDURE — 97814 PR ACUPUNCT W/ ELEC STIMUL ADDL 15M: ICD-10-PCS | Mod: ,,, | Performed by: ACUPUNCTURIST

## 2022-04-22 PROCEDURE — 97535 SELF CARE MNGMENT TRAINING: CPT

## 2022-04-22 PROCEDURE — 36415 COLL VENOUS BLD VENIPUNCTURE: CPT | Performed by: INTERNAL MEDICINE

## 2022-04-22 PROCEDURE — 1159F MED LIST DOCD IN RCRD: CPT | Mod: CPTII,AH,HB, | Performed by: PSYCHOLOGIST

## 2022-04-22 PROCEDURE — 99212 OFFICE O/P EST SF 10 MIN: CPT | Mod: 25,27,PBBFAC | Performed by: PSYCHOLOGIST

## 2022-04-22 PROCEDURE — 1159F MED LIST DOCD IN RCRD: CPT | Mod: CPTII,S$GLB,, | Performed by: PHYSICIAN ASSISTANT

## 2022-04-22 PROCEDURE — 99215 PR OFFICE/OUTPT VISIT, EST, LEVL V, 40-54 MIN: ICD-10-PCS | Mod: S$GLB,,, | Performed by: PHYSICIAN ASSISTANT

## 2022-04-22 PROCEDURE — 97814 ACUP 1/> W/ESTIM EA ADDL 15: CPT | Mod: ,,, | Performed by: ACUPUNCTURIST

## 2022-04-22 PROCEDURE — 1160F PR REVIEW ALL MEDS BY PRESCRIBER/CLIN PHARMACIST DOCUMENTED: ICD-10-PCS | Mod: CPTII,S$GLB,, | Performed by: PHYSICIAN ASSISTANT

## 2022-04-22 PROCEDURE — 3008F BODY MASS INDEX DOCD: CPT | Mod: CPTII,S$GLB,, | Performed by: PHYSICIAN ASSISTANT

## 2022-04-22 PROCEDURE — 3079F DIAST BP 80-89 MM HG: CPT | Mod: CPTII,S$GLB,, | Performed by: PHYSICIAN ASSISTANT

## 2022-04-22 PROCEDURE — 3074F PR MOST RECENT SYSTOLIC BLOOD PRESSURE < 130 MM HG: ICD-10-PCS | Mod: CPTII,S$GLB,, | Performed by: PHYSICIAN ASSISTANT

## 2022-04-22 PROCEDURE — 99213 OFFICE O/P EST LOW 20 MIN: CPT | Mod: PBBFAC,27 | Performed by: PHYSICIAN ASSISTANT

## 2022-04-22 PROCEDURE — 80053 COMPREHEN METABOLIC PANEL: CPT | Performed by: INTERNAL MEDICINE

## 2022-04-22 PROCEDURE — 85027 COMPLETE CBC AUTOMATED: CPT | Performed by: INTERNAL MEDICINE

## 2022-04-22 PROCEDURE — 3008F PR BODY MASS INDEX (BMI) DOCUMENTED: ICD-10-PCS | Mod: CPTII,S$GLB,, | Performed by: PHYSICIAN ASSISTANT

## 2022-04-22 PROCEDURE — 97813 PR ACUPUNCT W/ ELEC STIMUL 15 MIN: ICD-10-PCS | Mod: ,,, | Performed by: ACUPUNCTURIST

## 2022-04-22 PROCEDURE — 99215 OFFICE O/P EST HI 40 MIN: CPT | Mod: S$GLB,,, | Performed by: PHYSICIAN ASSISTANT

## 2022-04-22 PROCEDURE — 99999 PR PBB SHADOW E&M-EST. PATIENT-LVL III: CPT | Mod: PBBFAC,,, | Performed by: PHYSICIAN ASSISTANT

## 2022-04-22 PROCEDURE — 1159F PR MEDICATION LIST DOCUMENTED IN MEDICAL RECORD: ICD-10-PCS | Mod: CPTII,S$GLB,, | Performed by: PHYSICIAN ASSISTANT

## 2022-04-22 PROCEDURE — 99999 PR PBB SHADOW E&M-EST. PATIENT-LVL II: ICD-10-PCS | Mod: PBBFAC,HB,, | Performed by: PSYCHOLOGIST

## 2022-04-22 PROCEDURE — 99999 PR PBB SHADOW E&M-EST. PATIENT-LVL II: CPT | Mod: PBBFAC,HB,, | Performed by: PSYCHOLOGIST

## 2022-04-22 PROCEDURE — 1159F PR MEDICATION LIST DOCUMENTED IN MEDICAL RECORD: ICD-10-PCS | Mod: CPTII,AH,HB, | Performed by: PSYCHOLOGIST

## 2022-04-22 PROCEDURE — 97813 ACUP 1/> W/ESTIM 1ST 15 MIN: CPT | Mod: ,,, | Performed by: ACUPUNCTURIST

## 2022-04-22 PROCEDURE — 90791 PSYCH DIAGNOSTIC EVALUATION: CPT | Mod: AH,HB,, | Performed by: PSYCHOLOGIST

## 2022-04-22 PROCEDURE — 90791 PR PSYCHIATRIC DIAGNOSTIC EVALUATION: ICD-10-PCS | Mod: AH,HB,, | Performed by: PSYCHOLOGIST

## 2022-04-22 PROCEDURE — 99999 PR PBB SHADOW E&M-EST. PATIENT-LVL III: ICD-10-PCS | Mod: PBBFAC,,, | Performed by: PHYSICIAN ASSISTANT

## 2022-04-22 PROCEDURE — 1160F RVW MEDS BY RX/DR IN RCRD: CPT | Mod: CPTII,S$GLB,, | Performed by: PHYSICIAN ASSISTANT

## 2022-04-22 NOTE — PROGRESS NOTES
INFORMED CONSENT: Puja Quigley   is known to this provider and identity was confirmed via NAME and .  The patient has been informed of the risks and benefits associated with engaging in psychotherapy, the handling of protected health information, the rights of privacy and the limits of confidentiality. The patient has also been informed of the importance of reporting any suicidal or homicidal ideation to this or any provider to ensure safety of all parties, and the Puja Quigley expressed understanding. The patient was agreeable to these terms and freely participates in individual psychotherapy.    PSYCHO-ONCOLOGY INTAKE    Diagnostic Interview - CPT 26997    Date: 2022  Site: [facility]     Evaluation Length (direct face-to-face time):  1 hour     Referral Source: Fer Ashraf MD   Oncologist:   PCP: Primary Doctor No    Clinical status of patient: Outpatient    Puja Quigley, a 41 y.o. female, seen for initial evaluation visit.  Met with patient.    Chief complaint/reason for encounter: adjustment to illness, Psychological Evaluation and treatment recommendations    Medical/Surgical History:    Patient Active Problem List   Diagnosis    Gastric leak    Gastric fistula    Abnormal endoscopy of upper gastrointestinal tract    Chronic gastric ulcer with perforation    Other ascites    Microcytic anemia    Hematemesis with nausea    Malignant neoplasm of stomach    Normocytic anemia    Severe malnutrition    Immunodeficiency due to chemotherapy    Peritoneal carcinomatosis    Non-intractable vomiting with nausea    Anxiety associated with cancer diagnosis    Inguinal adenopathy    Neuropathy due to chemotherapeutic drug    Bone metastases    Pain aggravated by activities of daily living       Health Behaviors:       ETOH Use: No (past social)       Tobacco Use: No   Illicit Drug Use:  No     Prescription Misuse:No   Caffeine: minimal   Exercise:The  patient engages in intermittent activity (yoga walking)   Firearms:  No   Advanced directives:No     Family History:   Psychiatric illness: Yes Mother with schizophrenia, brother with depression    Alcohol/Drug Abuse: No     Suicide: No      Past Psychiatric History:   Inpatient treatment: No     Outpatient treatment: No     Prior substance abuse treatment: No     Suicide Attempts: No     Psychotropic Medications:  Current: Elavil - not currently taking      Past: none    Current medications as per below, allergies reviewed in chart.    Current Outpatient Medications   Medication    acetaminophen (TYLENOL) 500 MG tablet    amitriptyline (ELAVIL) 10 MG tablet    amitriptyline (ELAVIL) 10 MG tablet    dicyclomine (BENTYL) 10 MG capsule    famotidine (PEPCID) 20 MG tablet    ferrous sulfate (FEOSOL) 325 mg (65 mg iron) Tab tablet    ferrous sulfate (FEOSOL) 325 mg (65 mg iron) Tab tablet    furosemide (LASIX) 20 MG tablet    furosemide (LASIX) 40 MG tablet    furosemide (LASIX) 40 MG tablet    gabapentin (NEURONTIN) 300 MG capsule    HYDROcodone-acetaminophen (NORCO) 5-325 mg per tablet    HYDROcodone-acetaminophen (NORCO) 5-325 mg per tablet    LIDOcaine-prilocaine (EMLA) cream    LIDOcaine-prilocaine (EMLA) cream    metoclopramide HCl (REGLAN) 5 MG tablet    multivitamin with folic acid 400 mcg Tab    omeprazole (PRILOSEC) 40 MG capsule    ondansetron (ZOFRAN) 8 MG tablet    pantoprazole (PROTONIX) 40 MG tablet    pantoprazole (PROTONIX) 40 MG tablet    prochlorperazine (COMPAZINE) 10 MG tablet    prochlorperazine (COMPAZINE) 10 MG tablet    senna-docusate 8.6-50 mg (PERICOLACE) 8.6-50 mg per tablet    senna-docusate 8.6-50 mg (SENNA WITH DOCUSATE SODIUM) 8.6-50 mg per tablet    sucralfate (CARAFATE) 1 gram tablet    sucralfate (CARAFATE) 1 gram tablet    traMADoL (ULTRAM) 50 mg tablet    traMADoL (ULTRAM) 50 mg tablet     No current facility-administered medications for this visit.           Social situation/Stressors: Puja Quigley lives with son in [place].  She is a full-time /- but is not working due to illness.  She has been in her job for several years.    Puja Quigley has never been  and has 1 son children (20 months).  She is in a relationship with a man- he is supportive.   The patient reports goo social support. Puja Quigley is spiritual, but not Jewish.  Puja Quigley's hobbies include real estate- time with family friends.    Additional stressors:      Strengths:Setting and pursuing goals, hopes, dreams, aspirations, Resources - social, interpersonal, monetary, Vocational interests, hobbies and/or talents, Interpersonal relationships and supports available - family, relatives, friends and Cultural/spiritual/Jewish and community involvement  Liabilities: Complicated medical illness and Financial strain    Current Evaluation:     Mental Status Exam: Puja Quigley arrived late for the assessment session.  The patient was fully cooperative throughout the interview and was an adequate historian   Appearance: age appropriate, appropriately  dressed, adequately  groomed  Behavior/Cooperation: friendly and cooperative  Speech: normal in rate, volume, and tone and appropriate quality, quantity and organization of sentences  Mood: euthymic  Affect: mood congruent  Thought Process: goal-directed, logical  Thought Content: normal, no suicidality, no homicidality, delusions, or paranoia;did not appear to be responding to internal stimuli during the interview.   Orientation: grossly intact  Memory: Grossly intact  Attention Span/Concentration: Attends to interview without distraction; reports no difficulty  Fund of Knowledge: average  Estimate of Intelligence: average from verbal skills and history  Cognition: grossly intact  Insight: patient has awareness of illness; good insight into own behavior and behavior of  others  Judgment: the patient's behavior is adequate to circumstances      Distress Score    Distress Score: 5        Practical Problems Physical Problems   : No Appearance: Yes   Housing: Yes Bathing / Dressing: No   Insurance / Financial: Yes Breathing: No    Transportation: No  Changes in Urination: Yes    Work / School: No  Constipation: Yes   Treatment Decisions: Yes  Diarrhea: No     Eating: No    Family Problems Fatigue: No    Dealing with Children: No Feeling Swollen: No    Dealing with Partner: No Fevers: No    Ability to Have Children: No  Getting Around: Yes    Family Health Issues: No  Indigestion: Yes     Memory / Concentration: No   Emotional Problems Mouth Sores: No    Depression: No  Nausea: Yes    Fears: No  Nose Dry / Congested: No    Nervousness: No  Pain: Yes    Sadness: No Sexual: Yes    Worry: No Skin Dry / Itchy: No    Loss of Interest in Usual Activities: No Sleep: No     Substance Abuse: No    Spiritual/Religions Concerns Tingling in Hands / Feet: Yes   Spritual / Caodaism Concerns: No         Other Problems              Patient Health Questionnaire-9 (PHQ-9)     1.  Little interest or pleasure in doing things: Not at all                       = 0   2.  Feeling down, depressed or hopeless: Not at all                       = 0   3.  Trouble falling or staying asleep, or sleeping too much: Not at all                       = 0   4.  Feeling tired or having little energy: Not at all                       = 0   5.  Poor appetite or overeating: Not at all                       = 0   6.  Feeling bad about yourself - or that you are a failure or have let yourself or your family down: Not at all                       = 0   7.  Trouble concentrating on things, such as reading the newspaper or watching television: Not at all                       = 0   8.  Moving or speaking so slowly that other people could have noticed.  Or the opposite - being fidgety or restless that you have been moving  around a lot more than usual: Not at all                       = 0   9.  Thoughts that you would be better off dead, or of hurting yourself: Not at all                       = 0    PHQ-9 Total:  0     Generalized Anxiety Disorder Questionnaire-7 (GARCIA-7)  The patient completed the General Anxiety Disorder, 7 Items (GAD7)and received a score of 4, indicating mild anxiety symptoms presently impacting current functioning.     Pain:   Pain catastrophizin; PCS total score, helplessness, magnification, and rumination    History of present illness:    Oncology History   Malignant neoplasm of stomach   6/10/2021 Initial Diagnosis    Gastric adenocarcinoma     2021 - 2022 Chemotherapy    Treatment Summary   Plan Name: OP FOLFOX 6 Q2W  Treatment Goal: Palliative  Status: Inactive  Start Date: 2021  End Date: 2022  Provider: Fer Ashraf MD  Chemotherapy: fluorouraciL 2,400 mg/m2 = 3,770 mg in sodium chloride 0.9% 100 mL chemo infusion, 2,400 mg/m2 = 3,770 mg, Intravenous, Over 46 hours, 19 of 20 cycles  Administration: 3,770 mg (2021), 3,770 mg (2021), 3,770 mg (2021), 3,770 mg (2021), 3,770 mg (2021), 3,770 mg (10/5/2021), 3,770 mg (10/19/2021), 3,770 mg (2021), 3,770 mg (11/15/2021), 3,770 mg (2021), 3,770 mg (2021), 4,000 mg (2021), 4,030 mg (1/10/2022), 4,030 mg (2022), 4,000 mg (2022), 4,000 mg (2022), 4,000 mg (3/7/2022), 4,000 mg (3/22/2022), 4,000 mg (2022)  oxaliplatin (ELOXATIN) 85 mg/m2 = 133 mg in dextrose 5 % 500 mL chemo infusion, 85 mg/m2 = 133 mg, Intravenous, [facility] [place] [facility], 9 of 9 cycles  Administration: 133 mg (2021), 133 mg (2021), 133 mg (2021), 133 mg (2021), 133 mg (2021), 133 mg (10/5/2021), 133 mg (10/19/2021), 133 mg (2021), 133 mg (11/15/2021)     2022 -  Chemotherapy    Treatment Summary   Plan Name: OP FOLFIRI + RAMUCIRUMAB Q2WK  Treatment Goal: Palliative  Status:  Active  Start Date: 4/21/2022 (Planned)  End Date: 3/11/2023 (Planned)  Provider: Fer Ashraf MD  Chemotherapy: dexAMETHasone (DECADRON) 4 MG Tab, 8 mg, Oral, Daily, 0 of 1 cycle, Start date: --, End date: --  irinotecan (CAMPTOSAR) 125 mg/m2 = 208 mg in sodium chloride 0.9% 500 mL chemo infusion, 125 mg/m2 = 208 mg (original dose ), Intravenous, Clinic/[facility] 1 time, 0 of 24 cycles  Dose modification: 125 mg/m2 (Cycle 1)  ramucirumab (CYRAMZA) 471 mg in sodium chloride 0.9% 250 mL chemo infusion, 8 mg/kg, Intravenous, Clinic/[facility] 1 time, 0 of 24 cycles       Patient with gastric cancer- PALL intent. Progressive- poor prognosis, Also follows with [facility]- enrolled in study yesterday. Referred for anxiety. Patient feel that she is adjusting well with her diagnosis- has been torn with care at [facility] in the beginning due to being diagnosed.      Puja Quigley has adjusted to illness fairly well primarily through active coping strategies. She has engaged in appropriate information gathering.  The patient has satisfactory family/friend support.  Her support system is coping well with the diagnosis/treatment/prognosis. Illness-related psychosocial stressors include  difficulties, financial strain , absence from work, difficulty meeting family responsibilities, changes in ability to engage in leisure activities and absence from home.  The patient has a satisfactory partnership with her [facility] oncology treatment team. The patient reports the following barriers to cancer care:financial limitations and insurance coverage.       Patient Reported Cancer Treatment Symptoms:  abdominal bloating, abdominal pain, appetite changes, constipation, diarrhea, fatigue, nausea, neuropathies - feet, vaginal bleeding, vomiting and weight loss    Behavioral Health Symptoms:   · Mood: Depression: denied;  no prior; no SI/HI  · Radha: Denies  · Psychosis: Denies   · Anxiety: Feeling nervous, anxious, or on edge, Uncontrollable  worry (about next steps in care), Excessive worry (interfering with nothing) and Fear of unknown; prior anxiety:approrpiate anxiety  · Generalized anxiety: Denies    · Panic Disorder: Denies  · Social/specific phobia: Denies   · OCD: Denies  · Trauma: Denies  · Sexual Dysfunction:  Denies  · Substance abuse: denied  · Cognitive functioning: denied  · Health behaviors: noncontributory  · Sleep: interrupted sleep due to gastric issues (indegestion), no sleep onset difficulty , no EDS  and no reported apneic events, no sleep hygiene considerations , no use of OTC/melatonin/hypnotics/benzodiazepines    · Pain: Ms. Quigley reports CINP. Symptoms interfere with daily activity, sleeping and work.   · CAM Therapies: THC- occasionally- appetite stimulation, pain cramping has Med THC card; acupuncture, meditation, yoga        Assessment - Diagnosis - Goals:       ICD-10-CM ICD-9-CM   1. Malignant neoplasm of stomach, unspecified location  C16.9 151.9   2. Anxiety associated with cancer diagnosis  F41.1 300.09    C80.1    3. Peritoneal carcinomatosis  C78.6 197.6     Plan:individual psychotherapy and medication management by physician    Summary and Recommendations  Puja Quigley is a 41 y.o. female referred by Fer Ashraf MD for psychological evaluation and treatment.  Ms. Quigley appears to be coping well with her diagnosis and proposed treatment course.  Patient has good support system and good relationship with spouse or significant other. She is interested in CBT to address depression/anxiety/insomnia and will follow up with me for that purpose. Mood protective strategies during cancer treatment were discussed. .     GOALS:   Increase exercise  Pleasant events scheduling  Discuss psychotropic medication options with oncologist for sleep and neuropathy      Giselle Berumen, PhD  Clinical Psychologist  [place] License #[id number]  [place] License #[id number]

## 2022-04-22 NOTE — PROGRESS NOTES
[facility]  Occupational Therapy Treatment Note - Therapeutic Yoga Progam    Date: 4/22/2022  Name: Puja Quigley  Clinic Number: [id number]    Therapy Diagnosis:   Encounter Diagnoses   Name Primary?    Pain aggravated by activities of daily living Yes    Anxiety associated with cancer diagnosis      Physician: Tanika Sarabia PA-C  Physician: Tanika Sarabia PA-C     Physician Orders: Eval and Treat   Medical Diagnosis from Referral:   C78.6 (ICD-10-CM) - Peritoneal carcinomatosis   C16.9 (ICD-10-CM) - Malignant neoplasm of stomach, unspecified location         Evaluation Date: 3/11/2022  Authorization Period Expiration: 3/10/23  Plan of Care Expiration: 4/10/23  Progress Note Due: 4/10/23  Visit # / Visits authorized: 2/15     Precautions: Standard           Precautions:  Standard    Time In: 2:00pm  Time Out: 3:00pm  Total Billable Time:   30 minutes    SUBJECTIVE     Pt reports: Pt. Re[orted that she had heartburn and did not feel good.  Response to previous treatment:good    Pain: 7/10  Location: bilateral abdomen     OBJECTIVE     Objective Measures updated at progress report unless specified.    Treatment     Puja received the treatments listed below:       Date 3/15/22   3/25/22 4/22/22      Therapeutic Yoga Exercises - 55189 Therapeutic Ex 50 minutes  30 minutes   0 minutes  minutes  minutes  minutes    Seated Yoga   One legged hip/LE/back and back stretch; cobblers x4; twist;  One legged hip/LE/back and back stretch; cobblers,; twist x4        Quadruped Cat-cow rolls;         Supine  Elevated supine-knees to chest, twist,modified bridge        Prone          Restorative          Breathing Techniques Victory breath                 Relaxation Techniques (30 minutes) - 60655 Self-Care/Home Management  10 minutes  vipassana meditation(facial triangle) sitting up on block.  Body scan  viloma breath 30 minutes  All relaxation sitting up due to GI pain  metta  meditation, DB, body scan                                 Activity Pacing ( minutes) -   minutes  minutes  minutes  minutes  minutes  minutes                     Stress Management/Education ( minutes) -   minutes  minutes  minutes  minutes  minutes  minutes                Patient Education and Home Exercises      Education provided:   - alignment of yoga poses, seated meditation  - Progress towards goals     Written Home Exercises Provided: to be provided in upcoming sessions.  Exercises were reviewed and Puja was able to demonstrate them prior to the end of the session.  Puja demonstrated good  understanding of the HEP provided. See EMR under Patient Instructions for exercises provided during therapy sessions.       Assessment     Pt would continue to benefit from skilled Occupational Therapy.      Puja is  progressing well towards her goals and there are no updates to goals at this time. She was not feeling well today and we focused on pain management using relaxation trechniques in sitting. Pt prognosis is Excellent.     Pt will continue to benefit from skilled outpatient occupational therapy to address the deficits listed in the problem list on initial evaluation provide pt/family education and to maximize pt's level of independence in the home and community environment.     Pt's spiritual, cultural and educational needs considered and pt agreeable to plan of care and goals.    Anticipated barriers to occupational therapy: gastric symptoms     Goals:        Goals:  Short Term Goals: 4 weeks      Goal # Goal Status   1 Patient will be independent with Home Exercise Program to increase endurance and manage stress.  Progressing   2 Patient will demonstrate independence with diaphragmatic breathing in sit and supine.  Progressing   3 Patient will identify 2 new stress coping skills for stress management/immune health. not Progressing   4 Patient will identify activity pacing problems.  Patient will then  implement new plan for daily activity to increase endurance for ADL's. Not Progressing      Long Term Goals: 12 weeks      Goal # Goal Status   1 Patient will demonstrate independence with yoga Home Exercise Program to increase strength and endurance for ADL's. Progressing   2 Patient will demonstrate independence with relaxation techniques to manage stress for immune health.  Progressing   3 Patient will verbalize good understanding of stress/immune health relationship.   Progressing   4               PLAN          Outpatient Occupational Therapy 2 times weekly for 3  And then 1x week for 9 weeks to include the following interventions: Patient Education, Self Care, Therapeutic Activities and Therapeutic Exercise      Jamie Ray OT

## 2022-04-22 NOTE — PROGRESS NOTES
MEDICAL ONCOLOGY - ESTABLISHED PATIENT    Reason for visit: Gastric cancer     Best Contact Phone Number(s): [phone] (home)      Cancer/Stage/TNM:   Cancer Staging  No matching staging information was found for the patient.     Oncology History   Malignant neoplasm of stomach   6/10/2021 Initial Diagnosis    Gastric adenocarcinoma     7/26/2021 - 4/6/2022 Chemotherapy    Treatment Summary   Plan Name: OP FOLFOX 6 Q2W  Treatment Goal: Palliative  Status: Inactive  Start Date: 7/26/2021  End Date: 4/6/2022  Provider: Fer Ashraf MD  Chemotherapy: fluorouraciL 2,400 mg/m2 = 3,770 mg in sodium chloride 0.9% 100 mL chemo infusion, 2,400 mg/m2 = 3,770 mg, Intravenous, Over 46 hours, 19 of 20 cycles  Administration: 3,770 mg (7/26/2021), 3,770 mg (8/9/2021), 3,770 mg (8/23/2021), 3,770 mg (9/7/2021), 3,770 mg (9/21/2021), 3,770 mg (10/5/2021), 3,770 mg (10/19/2021), 3,770 mg (11/2/2021), 3,770 mg (11/15/2021), 3,770 mg (11/30/2021), 3,770 mg (12/13/2021), 4,000 mg (12/27/2021), 4,030 mg (1/10/2022), 4,030 mg (1/24/2022), 4,000 mg (2/7/2022), 4,000 mg (2/22/2022), 4,000 mg (3/7/2022), 4,000 mg (3/22/2022), 4,000 mg (4/4/2022)  oxaliplatin (ELOXATIN) 85 mg/m2 = 133 mg in dextrose 5 % 500 mL chemo infusion, 85 mg/m2 = 133 mg, Intravenous, Clinic[facility] 1 time, 9 of 9 cycles  Administration: 133 mg (7/26/2021), 133 mg (8/9/2021), 133 mg (8/23/2021), 133 mg (9/7/2021), 133 mg (9/21/2021), 133 mg (10/5/2021), 133 mg (10/19/2021), 133 mg (11/2/2021), 133 mg (11/15/2021)     4/21/2022 -  Chemotherapy    Treatment Summary   Plan Name: OP FOLFIRI + RAMUCIRUMAB Q2WK  Treatment Goal: Palliative  Status: Active  Start Date: 4/21/2022 (Planned)  End Date: 3/11/2023 (Planned)  Provider: Fer Ashraf MD  Chemotherapy: dexAMETHasone (DECADRON) 4 MG Tab, 8 mg, Oral, Daily, 0 of 1 cycle, Start date: --, End date: --  irinotecan (CAMPTOSAR) 125 mg/m2 = 208 mg in sodium chloride 0.9% 500 mL chemo infusion, 125 mg/m2 = 208 mg  (original dose ), Intravenous, [facility] 1 time, 0 of 24 cycles  Dose modification: 125 mg/m2 (Cycle 1)  ramucirumab (CYRAMZA) 471 mg in sodium chloride 0.9% 250 mL chemo infusion, 8 mg/kg, Intravenous, [facility] 1 time, 0 of 24 cycles          Interim History:  41 y.o. female with metastatic gastric cancer who presents for follow-up prior to cycle 20 of chemotherapy, now on maintenance 5-FU. Continues to have paracentesis performed for increasing abdominal ascites. She continues to have lower abdominal distension, discomfort. Some of her discomfort worsened following her para.  It has since resolved and she if feeling better.  Her abdomen is becoming distended again and is scheduled for repeat para on 4/27. She is trying to eat but feels that her appetite has decreased some due to her abdominal discomfort. She is breathing well.  Neuropathy is stable. She reports new concerns with vaginal bleeding and continued lower abdominal fullness. +lower extremity edema +increasing abdominal distension.    Unfortunately, she was most recently noted to have progression of disease on her most recent scan, including an enlarging mass of the ovary that is concerning for metastatic disease.    ECOG status is 1. Presents alone.    Review of Systems   Constitutional: Negative for activity change, appetite change, chills, fatigue, fever and unexpected weight change.   HENT: Negative for ear pain, facial swelling, hearing loss, mouth sores, nosebleeds, sore throat and trouble swallowing.    Eyes: Negative for pain, discharge, redness and visual disturbance.   Respiratory: Negative for cough, chest tightness and shortness of breath.    Cardiovascular: Negative for chest pain, palpitations and leg swelling.   Gastrointestinal: Positive for abdominal distention. Negative for abdominal pain, blood in stool, constipation, diarrhea, nausea, vomiting and reflux.   Endocrine: Negative for cold intolerance and heat intolerance.    Genitourinary: Negative for decreased urine volume, difficulty urinating, dysuria, frequency, hematuria, hot flashes, urgency and vaginal bleeding.   Musculoskeletal: Negative for arthralgias, gait problem, leg pain and myalgias.   Integumentary:  Negative for pallor, rash and wound.   Allergic/Immunologic: Negative for immunocompromised state.   Neurological: Positive for numbness. Negative for dizziness, tremors, weakness, light-headedness and headaches.   Hematological: Negative for adenopathy. Does not bruise/bleed easily.   Psychiatric/Behavioral: Negative for agitation, confusion, dysphoric mood and sleep disturbance. The patient is not nervous/anxious.            Past Medical History:   Past Medical History:   Diagnosis Date    Anxiety     Gastric cancer     Gastric ulcer     Hx of psychiatric care     Pregnancy 08/12/2020    delivered on 8/12/2020    Umbilical hernia         Past Surgical History:   Past Surgical History:   Procedure Laterality Date    CLOSURE OF PERFORATED ULCER OF DUODENUM USING OMENTAL PATCH      ESOPHAGOGASTRODUODENOSCOPY N/A 10/13/2020    Procedure: EGD (ESOPHAGOGASTRODUODENOSCOPY);  Surgeon: Rosio Marion MD;  Location: [facility] ([address] [place] [address]);  Service: Endoscopy;  Laterality: N/A;    ESOPHAGOGASTRODUODENOSCOPY N/A 12/11/2020    Procedure: EGD (ESOPHAGOGASTRODUODENOSCOPY);  Surgeon: Rosio Maroin MD;  [facility]  Service: Endoscopy;  Laterality: N/A;  Covid-19 test 12/8/20 at [facility]    ESOPHAGOGASTRODUODENOSCOPY N/A 3/12/2021    Procedure: EGD (ESOPHAGOGASTRODUODENOSCOPY);  Surgeon: Nav Omer MD;  Location: [facility] (2ND FLR);  Service: Endoscopy;  Laterality: N/A;  COVID at [facility] 3/9 ttr    ESOPHAGOGASTRODUODENOSCOPY N/A 5/18/2021    Procedure: EGD (ESOPHAGOGASTRODUODENOSCOPY);  Surgeon: Rosio Marion MD;  Location: [facility] (2ND FLR);  Service: Endoscopy;  Laterality: N/A;  5/15-covid pcw-inst portal-tb     "ESOPHAGOGASTRODUODENOSCOPY N/A 2021    Procedure: EGD (ESOPHAGOGASTRODUODENOSCOPY);  Surgeon: Socrates Terrazas MD;  Location: [facility] ([address] [place] [address]);  Service: Endoscopy;  Laterality: N/A;        Family History:   Family History   Problem Relation Age of Onset    Cancer Mother 67        lymphoma (type? "not active," not on tx; "related to her blood")    Schizophrenia Mother     Lung cancer Father 48        type? h/o smoking    No Known Problems Son     Breast cancer Other 73        unilat; stage I, but aggressive    Prostate cancer Paternal Uncle         (dx 50s/60? no chemo?)    Breast cancer Paternal Cousin         (dx age?) ductal    Colon cancer Neg Hx     Esophageal cancer Neg Hx         Social History:   Social History     Tobacco Use    Smoking status: Former Smoker     Types: Cigarettes     Quit date: 2019     Years since quittin.4    Smokeless tobacco: Never Used   Substance Use Topics    Alcohol use: Yes        I have reviewed and updated the patient's past medical, surgical, family and social histories.    Allergies:   Review of patient's allergies indicates:  No Known Allergies     Medications:   Current Outpatient Medications   Medication Sig Dispense Refill    acetaminophen (TYLENOL) 500 MG tablet Take 500 mg by mouth every 6 (six) hours as needed for Pain.      amitriptyline (ELAVIL) 10 MG tablet Take 1 tablet (10 mg total) by mouth every evening. 30 tablet 3    amitriptyline (ELAVIL) 10 MG tablet Take 10 mg by mouth.      dicyclomine (BENTYL) 10 MG capsule TAKE ONE CAPSULE BY MOUTH FOUR TIMES DAILY 120 capsule 0    famotidine (PEPCID) 20 MG tablet Take 1 tablet (20 mg total) by mouth nightly as needed for Heartburn. 60 tablet 1    ferrous sulfate (FEOSOL) 325 mg (65 mg iron) Tab tablet Take 1 tablet (325 mg total) by mouth once daily for 7 days, THEN 1 tablet (325 mg total) 2 (two) times daily. 60 tablet 10    ferrous sulfate (FEOSOL) 325 mg (65 mg iron) Tab tablet Take by " mouth.      furosemide (LASIX) 20 MG tablet Take 10 mg by mouth.      furosemide (LASIX) 40 MG tablet Take 0.5 tablets (20 mg total) by mouth daily as needed (leg swelling or abdominal swelling). 30 tablet 1    furosemide (LASIX) 40 MG tablet       gabapentin (NEURONTIN) 300 MG capsule Take 1 capsule (300 mg total) by mouth 3 (three) times daily. 90 capsule 11    HYDROcodone-acetaminophen (NORCO) 5-325 mg per tablet Take 1 tablet by mouth.      HYDROcodone-acetaminophen (NORCO) 5-325 mg per tablet Take 1 tablet by mouth 3 (three) times daily as needed.      LIDOcaine-prilocaine (EMLA) cream Apply to Port-A-Cath area 30 to 45 minutes prior to port access as directed (topical anesthetic use to the anterior chest only).      LIDOcaine-prilocaine (EMLA) cream Apply topically.      metoclopramide HCl (REGLAN) 5 MG tablet 5 mg.      multivitamin with folic acid 400 mcg Tab Take 1 tablet by mouth once daily.      omeprazole (PRILOSEC) 40 MG capsule Take 1 capsule (40 mg total) by mouth once daily. 30 capsule 11    ondansetron (ZOFRAN) 8 MG tablet Take 1 tablet (8 mg total) by mouth every 8 (eight) hours as needed for Nausea. 60 tablet 2    pantoprazole (PROTONIX) 40 MG tablet Take 40 mg by mouth.      pantoprazole (PROTONIX) 40 MG tablet Take 1 tablet (40 mg total) by mouth 2 (two) times daily. 60 tablet 11    prochlorperazine (COMPAZINE) 10 MG tablet TAKE 1 TABLET(10 MG) BY MOUTH EVERY 6 HOURS AS NEEDED FOR NAUSEA 90 tablet 2    prochlorperazine (COMPAZINE) 10 MG tablet as needed.      senna-docusate 8.6-50 mg (PERICOLACE) 8.6-50 mg per tablet Take 1 tablet by mouth.      senna-docusate 8.6-50 mg (SENNA WITH DOCUSATE SODIUM) 8.6-50 mg per tablet Take 1 tablet by mouth 2 (two) times daily as needed for Constipation.      sucralfate (CARAFATE) 1 gram tablet TAKE 1 TABLET(1 GRAM) BY MOUTH FOUR TIMES DAILY FOR 14 DAYS 56 tablet 0    sucralfate (CARAFATE) 1 gram tablet       traMADoL (ULTRAM) 50 mg tablet  "Take 50 mg by mouth.      traMADoL (ULTRAM) 50 mg tablet Take by mouth.       No current facility-administered medications for this visit.        Physical Exam:   /84 (BP Location: Left arm, Patient Position: Sitting, BP Method: Small (Automatic))   Pulse 79   Temp 98 °F (36.7 °C) (Oral)   Resp 18   Ht 5' 7" (1.702 m)   Wt 62.1 kg (136 lb 12.7 oz)   SpO2 98%   BMI 21.43 kg/m²      ECOG Performance status: 1            Physical Exam  Vitals reviewed.   Constitutional:       Appearance: Normal appearance. She is not ill-appearing or toxic-appearing.      Comments: Thin   HENT:      Head: Normocephalic and atraumatic.      Nose: Nose normal.      Mouth/Throat:      Mouth: Mucous membranes are moist.      Pharynx: Oropharynx is clear.   Eyes:      General: No scleral icterus.     Extraocular Movements: Extraocular movements intact.      Conjunctiva/sclera: Conjunctivae normal.   Cardiovascular:      Rate and Rhythm: Normal rate and regular rhythm.      Pulses: Normal pulses.      Heart sounds: Normal heart sounds.   Pulmonary:      Effort: Pulmonary effort is normal.      Breath sounds: Normal breath sounds.   Abdominal:      General: Bowel sounds are normal. There is distension.      Palpations: Abdomen is soft.      Tenderness: There is no abdominal tenderness. There is no guarding.   Musculoskeletal:         General: No swelling or tenderness. Normal range of motion.      Cervical back: Normal range of motion and neck supple.      Right lower leg: No edema.      Left lower leg: No edema.   Skin:     General: Skin is warm.      Findings: No rash.   Neurological:      General: No focal deficit present.      Mental Status: She is alert. Mental status is at baseline.   Psychiatric:         Mood and Affect: Mood normal.         Behavior: Behavior normal.             Labs:   Recent Results (from the past 48 hour(s))   CBC Oncology    Collection Time: 04/22/22  8:40 AM   Result Value Ref Range    WBC 4.92 3.90 " - 12.70 K/uL    RBC 4.05 4.00 - 5.40 M/uL    Hemoglobin 12.9 12.0 - 16.0 g/dL    Hematocrit 39.9 37.0 - 48.5 %    MCV 99 (H) 82 - 98 fL    MCH 31.9 (H) 27.0 - 31.0 pg    MCHC 32.3 32.0 - 36.0 g/dL    RDW 13.2 11.5 - 14.5 %    Platelets 233 150 - 450 K/uL    MPV 9.1 (L) 9.2 - 12.9 fL    Gran # (ANC) 3.5 1.8 - 7.7 K/uL    Immature Grans (Abs) 0.02 0.00 - 0.04 K/uL   Comprehensive Metabolic Panel    Collection Time: 04/22/22  8:40 AM   Result Value Ref Range    Sodium 144 136 - 145 mmol/L    Potassium 4.3 3.5 - 5.1 mmol/L    Chloride 108 95 - 110 mmol/L    CO2 31 (H) 23 - 29 mmol/L    Glucose 98 70 - 110 mg/dL    BUN 9 6 - 20 mg/dL    Creatinine 0.7 0.5 - 1.4 mg/dL    Calcium 8.8 8.7 - 10.5 mg/dL    Total Protein 5.4 (L) 6.0 - 8.4 g/dL    Albumin 2.4 (L) 3.5 - 5.2 g/dL    Total Bilirubin 0.5 0.1 - 1.0 mg/dL    Alkaline Phosphatase 87 55 - 135 U/L    AST 25 10 - 40 U/L    ALT 21 10 - 44 U/L    Anion Gap 5 (L) 8 - 16 mmol/L    eGFR if African American >60.0 >60 mL/min/1.73 m^2    eGFR if non African American >60.0 >60 mL/min/1.73 m^2        I have reviewed the pertinent labs from today which show normal blood counts and unremarkable CMP.     Imaging:    CT CAP: 2/10/2022    Impression:     Persistent gastric wall thickening involving the distal stomach, similar to the prior examination.     Numerous hepatic hypodensities some of which likely represent cysts, however there is a more solid-appearing soft tissue density liver lesion within the posteromedial right lobe, unchanged.     Subtle sclerotic bone lesions within the thoracic spine remain stable.     Small to moderate left pleural effusion, increased when compared to the prior study.     Moderate ascites, increased when compared to the prior study.     Probable necrotic enlarged left inguinal lymph node, similar to the prior examination.     Nodular enhancement within the anterior peritoneum anterior to the stomach, possibly peritoneal carcinomatosis.     Probable  left lobe thyroid nodule, stable compared to 10/08/2021.     We have personally reviewed the most recent imaging from 10/8/2021, which is displays overall stability of disease. There is a L sided thyroid nodule that is indeterminate at this point. Decreased amount of ascites.      Abdominal US 2/10/2022:     FINDINGS:  There is moderate, anechoic abdominal ascites, most prominent within the lower quadrants.     Impression:     Moderate, anechoic abdominal ascites.  Left inguinal hernia containing fat and fluid.    CT CAP: 4/14/2022:     Outside hospital OSI CT CHEST ABDOMEN PELVIS dated 4/14/2022 9:31 AM.     1.  Gastric wall thickening is slightly increased.   2.  Moderate ascites, slightly increased in volume.   3.  Peritoneal metastatic disease is stable to slightly increased.   4.  Stable small left pleural effusion.   5.  Increased cystic and solid mass involving the left ovary, likely due to metastatic disease.      Path:   5/26/21:  Positive for malignancy.   Poorly differentiated adenocarcinoma growing with signet ring features   Immunostains for Bruce-EP4 and CEA are positive.  The controls stain   appropriately.   This case was reviewed by Dr Sebastian who concurs with the diagnosis       Assessment:       1. Malignant neoplasm of stomach, unspecified location    2. Bone metastases    3. Peritoneal carcinomatosis    4. Krukenberg tumor, unspecified laterality    5. Other ascites    6. Non-intractable vomiting with nausea, unspecified vomiting type    7. Chronic gastric ulcer with perforation    8. Microcytic anemia    9. Anxiety associated with cancer diagnosis    10. Neuropathy    11. Immunodeficiency due to chemotherapy    12. Edema of both lower extremities          Plan:             # Gastric adenocarcinoma with peritoneal metastases, immunodeficiency due to chemotherapy, bone metastases  HER-2 negative by FISH.  PD-L1 < 1%.  Her cytology from her ascites and EGD and CT findings confirmed metastatic gastric  adenocarcinoma to the peritoneum.  We previously explained the implications of a stage IV diagnosis to her and potential treatment options.  She is now s/p port placement. She sought a second opinion at [facility] for zolbetuximab trial targeting CLDN protein but she did not test positive for this biomarker. We recommended proceeding with SOC FOLFOX, with which the [facility] team agreed. Because of the negative PD-L1 I do not think the benefit of adding nivolumab outweighs the potential toxicities.       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  She opted not to get genetic testing done at her appt with [facility]. We recommended that she reconsider that decision in light of a very likely germline mutation in SAMUEL which has clinical consequences.      Tolerating chemotherapy well thus far.  She has experienced improvement in many of her GI symptoms since starting treatment.    CT CAP after 6 cycles showed improvement in ascites, probable hepatic, thyroid and bone metastases.  Given improvement in symptoms and delay between baseline imaging and cycle 1, suspect she was having benefit from chemotherapy. She was in agreement with this.    We dropped oxaliplatin starting with cycle 10 due to grade 1 neuropathy and desire to keep it from worsening. Doing well overall. Stable neuropathy in the feet. Otherwise, tolerating treatment good.     CT CAP after cycle 10 showed stable disease.  CT scan after cycle 15 continued to display overall stability of disease within the gastric wall, peritoneum, spine and liver. Although increase ascites on imaging and clinically can represent progression of disease, her previous CT suggested overall stability within the liver and gastric wall, so we recc to continue with treatment time. However, she continued to have increasing abdominal ascites that has been concerning for progression  of disease. Hence, she had repeat imaging. Per her most recent CT performed on 4/14/2022, she appears to have worsening disease suggestive of progression. Specifically, she presents with increasing cystic, solid mass of the ovary that is concerning for metastases. She was recently seen by Dr. Reid at [facility] for f/u and to discuss possible treatment options. During her visit with Dr. Reid, it was suggested that she receive referral to GYN Onc for removal of the ovary and possible enrollment in a clinical trial. She was consented for [id number] to determine eligibility.     After discussion with the patient, she is wanting to pursue options with [facility]. Hence, she is wanting to postpone chemotherapy for now. Given her current prognosis and clinical status, we think that this is very reasonable. She will receive a consult for GYN Onc with [facility]. We will continue to monitor. If needed, we will discuss her case before our TB. If she decides to resume chemotherapy here, we would favor use of FOLFIRI as second-line rather than Taxol/Claudio due to ongoing neuropathy. Pt is agreeable.   -For now, we will continue to monitor her status.   -will tentatively plan to see her in 4 weeks for update and check in    #Ascites, lower extremity edema  Initially had peritoneal catheter after diagnosis, output slowed so was removed in October 2021 by IR.  Now recent reaccumulation of ascites.  S/p paracentesis 3/17 with 3L removed, good relief.  Repeat 3/30 with 3.7L removed.  Repeated on 4/11 and 4/18 where a total of 8L of fluid was removed in total.   She continues to have good relief after the paracentesis is performed. Placed standing orders for IR para.  Concern for progression as above.      Will order US of the lower extremities     # Nausea, gastric ulcer  Stable. No vomiting. Most likely 2/2 cancer progression and most recent finding of ovarian mass  Continue Protonix.  Continue Compazine PRN.  Continue dietary modifications to prevent  indigestion.    # Anemia  Hgb normal. Doing well.    Iron deficiency due to chronic blood loss.  S/p 2 doses of Injectafer.    # Anxiety  Feeling well today.   Continue f/u with psych onc w/ Dr. Berumen    # chemotherapy induced neuropathy  2/2 previous chemotherapy with Oxaliplatin  Continue acupuncture.  Continue gabapentin.  Will continue to monitor.     Follow up: Will schedule f/u pending outcome with [facility]. Will schedule f/u in 4 weeks for check in    Pte displayed understanding of the above encounter and treatment plan. All thoughtful questions were answered to their satisfaction. Pte was advised to notify the care team or proceed to the ER if signs and symptoms worsen. Pt was discussed with Dr. Ashraf as well.       JHONATAN Kramer, PA-C  Physician Assistant Certified  Dept of Hematology/Oncology  PA-C to Dr. Owens, Dr. Ashraf and Dr. Madrid    Route Chart for Scheduling    Med Onc Chart Routing      Follow up with physician 4 weeks.   Follow up with ISHMAEL    Labs CBC and CMP   Lab interval: every 2 weeks     Imaging    Pharmacy appointment    Other referrals          Treatment Plan Information   OP FOLFIRI + RAMUCIRUMAB Q2WK   Fer Ashraf MD   Upcoming Treatment Dates - OP FOLFIRI + RAMUCIRUMAB Q2WK    4/21/2022       Pre-Medications       diphenhydrAMINE (BENADRYL) 50 mg in sodium chloride 0.9% 50 mL IVPB       Chemotherapy       ramucirumab (CYRAMZA) 471 mg in sodium chloride 0.9% 250 mL chemo infusion       irinotecan (CAMPTOSAR) 125 mg/m2 = 208 mg in sodium chloride 0.9% 500 mL chemo infusion       fluorouracil (ADRUCIL) 2,400 mg/m2 = 4,010 mg in sodium chloride 0.9% 100 mL chemo infusion       Supportive Care       atropine injection 0.4 mg  5/5/2022       Pre-Medications       DIPHENHYDRAMINE IV ORDERABLE       Chemotherapy       ramucirumab (CYRAMZA) 471 mg in sodium chloride 0.9% 250 mL chemo infusion       irinotecan (CAMPTOSAR) 125 mg/m2 = 208 mg in sodium chloride 0.9% 500 mL chemo  infusion       fluorouracil (ADRUCIL) 2,400 mg/m2 = 4,010 mg in sodium chloride 0.9% 100 mL chemo infusion       Supportive Care       atropine injection 0.4 mg  5/19/2022       Pre-Medications       DIPHENHYDRAMINE IV ORDERABLE       Chemotherapy       ramucirumab (CYRAMZA) 471 mg in sodium chloride 0.9% 250 mL chemo infusion       irinotecan (CAMPTOSAR) 125 mg/m2 = 208 mg in sodium chloride 0.9% 500 mL chemo infusion       fluorouracil (ADRUCIL) 2,400 mg/m2 = 4,010 mg in sodium chloride 0.9% 100 mL chemo infusion       Supportive Care       atropine injection 0.4 mg  6/2/2022       Pre-Medications       DIPHENHYDRAMINE IV ORDERABLE       Chemotherapy       ramucirumab (CYRAMZA) 471 mg in sodium chloride 0.9% 250 mL chemo infusion       irinotecan (CAMPTOSAR) 125 mg/m2 = 208 mg in sodium chloride 0.9% 500 mL chemo infusion       fluorouracil (ADRUCIL) 2,400 mg/m2 = 4,010 mg in sodium chloride 0.9% 100 mL chemo infusion       Supportive Care       atropine injection 0.4 mg    Therapy Plan Information  EPINEPHrine (EPIPEN) 0.3 mg/0.3 mL pen injection 0.3 mg  0.3 mg, Intramuscular, PRN  diphenhydrAMINE injection 50 mg  50 mg, Intravenous, PRN  methylPREDNISolone sodium succinate injection 125 mg  125 mg, Intravenous, PRN  sodium chloride 0.9% bolus 1,000 mL  1,000 mL, Intravenous, PRN

## 2022-04-22 NOTE — PROGRESS NOTES
Social Work Consult    Name:Puja Quigley  : 1980  MRN: [id number]    Referral:Insurance Questions     SW received consult from Dr Berumen. Patient had questions about insurance coverage.    SW spoke to patient briefly while she was walking into [facility] appointment. Patient understood that she could not use medicaid out of state at [facility]. SW confirmed.    Patient curious as to whether there are any clinical trials she may be eligible for. SALENA explained that Dr Ashraf would be able to answer those questions. Once we know if there are any available then we would look into insurance authorization.     PT walking into appointment at end of call, SW will address other financial concerns in the future.

## 2022-04-24 ENCOUNTER — PATIENT MESSAGE (OUTPATIENT)
Dept: HEMATOLOGY/ONCOLOGY | Facility: CLINIC | Age: 42
End: 2022-04-24
Payer: COMMERCIAL

## 2022-04-25 ENCOUNTER — DOCUMENTATION ONLY (OUTPATIENT)
Dept: HEMATOLOGY/ONCOLOGY | Facility: CLINIC | Age: 42
End: 2022-04-25
Payer: COMMERCIAL

## 2022-04-25 NOTE — PROGRESS NOTES
Social Work Consult    Name:Puja Quigley  : 1980  MRN: [id number]    Referral:[facility]     SW received consult from JOSE RAUL Baird. There are no available clinical trials for patient at [facility] but there may be 1-2 at [facility]. Patient is considering purchasing COBRA benefits.     SW spoke to patient who was dropping off her 2.5 year old son. She understood that she should call [facility] with insurance info to see if they could provide info on prior auth.

## 2022-04-25 NOTE — PROGRESS NOTES
Subjective:       Patient ID: Puja Quigley is a 41 y.o. female.    Chief Complaint: Results (CIPN)    Patient is continuing care for her CIPN. Today she is feeling slight upset stomach and nausea and is having bloating and swelling in legs.     Review of Systems   Gastrointestinal: Positive for nausea.   Musculoskeletal: Positive for leg pain.   Neurological: Positive for numbness.         Objective:      Physical Exam    Assessment:       Problem List Items Addressed This Visit        Neuro    Neuropathy due to chemotherapeutic drug       Oncology    Malignant neoplasm of stomach - Primary          Plan:       1x a week*         Pre-Symptom Score: NA  Post-Symptom Score: NA     Results (CIPN)       Encounter Diagnoses   Name Primary?    Malignant neoplasm of stomach, unspecified location Yes    Neuropathy due to chemotherapeutic drug        Acupuncture points used:  4 MONTANEZ, FAUSTINA WEST, FAUSTINA FAITH , Gb34, Ki3, Li11, Sp10, Sp6, Sp9, St36 and YIN RICKETTS    STIM USED @ FAUSTINA WEST      NEEDLES IN: 24  NEEDLES OUT: 24    NEEDLES W/ STIM  AT: 1:30 PM  NEEDLES W/ STIM REMOVED AT: 2:00 PM

## 2022-04-27 ENCOUNTER — HOSPITAL ENCOUNTER (OUTPATIENT)
Dept: RADIOLOGY | Facility: OTHER | Age: 42
Discharge: HOME | End: 2022-04-27
Attending: PHYSICIAN ASSISTANT
Payer: MEDICAID

## 2022-04-27 ENCOUNTER — PATIENT MESSAGE (OUTPATIENT)
Dept: HEMATOLOGY/ONCOLOGY | Facility: CLINIC | Age: 42
End: 2022-04-27
Payer: COMMERCIAL

## 2022-04-27 ENCOUNTER — HOSPITAL ENCOUNTER (OUTPATIENT)
Dept: INTERVENTIONAL RADIOLOGY/VASCULAR | Facility: HOSPITAL | Age: 42
Discharge: HOME | End: 2022-04-27
Attending: INTERNAL MEDICINE
Payer: MEDICAID

## 2022-04-27 VITALS — SYSTOLIC BLOOD PRESSURE: 130 MMHG | DIASTOLIC BLOOD PRESSURE: 88 MMHG

## 2022-04-27 DIAGNOSIS — R18.8 OTHER ASCITES: ICD-10-CM

## 2022-04-27 DIAGNOSIS — R60.0 EDEMA OF BOTH LOWER EXTREMITIES: ICD-10-CM

## 2022-04-27 PROCEDURE — C1729 CATH, DRAINAGE: HCPCS

## 2022-04-27 PROCEDURE — 93970 EXTREMITY STUDY: CPT | Mod: 26,,, | Performed by: STUDENT IN AN ORGANIZED HEALTH CARE EDUCATION/TRAINING PROGRAM

## 2022-04-27 PROCEDURE — 93970 EXTREMITY STUDY: CPT | Mod: TC

## 2022-04-27 PROCEDURE — 93970 US LOWER EXTREMITY VEINS BILATERAL: ICD-10-PCS | Mod: 26,,, | Performed by: STUDENT IN AN ORGANIZED HEALTH CARE EDUCATION/TRAINING PROGRAM

## 2022-04-27 PROCEDURE — 49083 ABD PARACENTESIS W/IMAGING: CPT | Performed by: RADIOLOGY

## 2022-04-27 PROCEDURE — 49083 IR PARACENTESIS WITH IMAGING: ICD-10-PCS | Mod: ,,, | Performed by: RADIOLOGY

## 2022-04-27 NOTE — BRIEF OP NOTE
Radiology Post-Procedure Note     Pre Op Diagnosis: Recurrent painful, tense ascites  Post Op Diagnosis: Same     Procedure: 1. US-guided percutaneous RUQ-approach therapeutic LVP     Procedure performed by: Castro Bermudez MD     Written Informed Consent Obtained: Yes  Specimen Removed: YES, 4,000-cc of thin, serosanguinous ascitic fluid  Estimated Blood Loss: none     Findings:   Successful US-guided percutaneous RUQ-approach therapeutic LVP with local anesthetic only and albumin infusion post PRN as indicated per institutional protocol. Patient tolerated the procedure well. No immediate post-procedural complications noted.      Thank you for considering IR for the care of your patient.      Castro Bermudez MD  Interventional Radiology

## 2022-04-27 NOTE — DISCHARGE SUMMARY
Radiology Discharge Summary      Hospital Course: No complications    Admit Date: 4/27/2022  Discharge Date: 04/27/2022     Instructions Given to Patient: Yes  Diet: Resume prior diet  Activity: activity as tolerated    Description of Condition on Discharge: Stable  Vital Signs (Most Recent): BP: 130/88 (04/27/22 0839)    Discharge Disposition: Home    Discharge Diagnosis:  41 y.o. female with recurrent painful, tense ascites s/p successful US-guided percutaneous RUQ-approach therapeutic LVP with local anesthetic only and albumin infusion post PRN as indicated per institutional protocol. Patient tolerated the procedure well. No immediate post-procedural complications noted.      Thank you for considering IR for the care of your patient.      Castro Bermudez MD  Interventional Radiology

## 2022-04-27 NOTE — H&P
Radiology History & Physical      SUBJECTIVE:     Chief Complaint: Recurrent painful, tense ascites     History of Present Illness:  Puja Quigley is a 41 y.o. female with pertinent PMHx of gastric adenocarcinoma with osseous, hepatic and peritoneal metastases complicated by recurrent abdominal distension and pain 2/2 recurrent painful, tense ascites requiring frequent decompression for symptom relief.     A new outpatient IR consult received for US-guided percutaneous RUQ-approach therapeutic large-volume paracentesis.    Past Medical History:   Diagnosis Date    Anxiety     Gastric cancer     Gastric ulcer     Hx of psychiatric care     Pregnancy 08/12/2020    delivered on 8/12/2020    Umbilical hernia      Past Surgical History:   Procedure Laterality Date    CLOSURE OF PERFORATED ULCER OF DUODENUM USING OMENTAL PATCH      ESOPHAGOGASTRODUODENOSCOPY N/A 10/13/2020    Procedure: EGD (ESOPHAGOGASTRODUODENOSCOPY);  Surgeon: Rosio Marion MD;  Location: [facility] ENDO (2ND FLR);  Service: Endoscopy;  Laterality: N/A;    ESOPHAGOGASTRODUODENOSCOPY N/A 12/11/2020    Procedure: EGD (ESOPHAGOGASTRODUODENOSCOPY);  Surgeon: Rosio Marion MD;  Location: [facility] ENDO (2ND FLR);  Service: Endoscopy;  Laterality: N/A;  Covid-19 test 12/8/20 at [facility] - pg    ESOPHAGOGASTRODUODENOSCOPY N/A 3/12/2021    Procedure: EGD (ESOPHAGOGASTRODUODENOSCOPY);  Surgeon: Nav Omer MD;  Location: [facility] ENDO (2ND FLR);  Service: Endoscopy;  Laterality: N/A;  COVID at [facility] 3/9 ttr    ESOPHAGOGASTRODUODENOSCOPY N/A 5/18/2021    Procedure: EGD (ESOPHAGOGASTRODUODENOSCOPY);  Surgeon: Rosio Marion MD;  Location: [facility] ENDO (2ND FLR);  Service: Endoscopy;  Laterality: N/A;  5/15-covid pcw-inst portal-tb    ESOPHAGOGASTRODUODENOSCOPY N/A 5/26/2021    Procedure: EGD (ESOPHAGOGASTRODUODENOSCOPY);  Surgeon: Socrates Terrazas MD;  Location: [facility] ENDO (2ND FLR);  Service: Endoscopy;  Laterality: N/A;     Home Meds:    Prior to Admission medications    Medication Sig Start Date End Date Taking? Authorizing Provider   acetaminophen (TYLENOL) 500 MG tablet Take 500 mg by mouth every 6 (six) hours as needed for Pain.    Historical Provider   amitriptyline (ELAVIL) 10 MG tablet Take 1 tablet (10 mg total) by mouth every evening. 5/13/21   Socrates Terrazas MD   amitriptyline (ELAVIL) 10 MG tablet Take 10 mg by mouth. 5/13/21   Historical Provider   dicyclomine (BENTYL) 10 MG capsule TAKE ONE CAPSULE BY MOUTH FOUR TIMES DAILY 9/27/21   Kamille Hooks PA-C   famotidine (PEPCID) 20 MG tablet Take 1 tablet (20 mg total) by mouth nightly as needed for Heartburn. 2/23/22 2/23/23  Kamille Hooks PA-C   ferrous sulfate (FEOSOL) 325 mg (65 mg iron) Tab tablet Take 1 tablet (325 mg total) by mouth once daily for 7 days, THEN 1 tablet (325 mg total) 2 (two) times daily. 5/26/21 5/26/22  Reshma Wilson MD   ferrous sulfate (FEOSOL) 325 mg (65 mg iron) Tab tablet Take by mouth. 5/26/21 5/26/22  Historical Provider   furosemide (LASIX) 20 MG tablet Take 10 mg by mouth.    Historical Provider   furosemide (LASIX) 40 MG tablet Take 0.5 tablets (20 mg total) by mouth daily as needed (leg swelling or abdominal swelling). 5/26/21 5/26/22  Reshma Wilson MD   furosemide (LASIX) 40 MG tablet  5/26/21   Historical Provider   gabapentin (NEURONTIN) 300 MG capsule Take 1 capsule (300 mg total) by mouth 3 (three) times daily. 2/7/22 2/7/23  Kamille Hooks PA-C   HYDROcodone-acetaminophen (NORCO) 5-325 mg per tablet Take 1 tablet by mouth. 7/9/21   Historical Provider   HYDROcodone-acetaminophen (NORCO) 5-325 mg per tablet Take 1 tablet by mouth 3 (three) times daily as needed. 7/9/21   Historical Provider   LIDOcaine-prilocaine (EMLA) cream Apply to Port-A-Cath area 30 to 45 minutes prior to port access as directed (topical anesthetic use to the anterior chest only). 6/25/21   Historical Provider   LIDOcaine-prilocaine (EMLA) cream Apply topically. 6/25/21    Historical Provider   metoclopramide HCl (REGLAN) 5 MG tablet 5 mg. 6/11/21   Historical Provider   multivitamin with folic acid 400 mcg Tab Take 1 tablet by mouth once daily.    Historical Provider   omeprazole (PRILOSEC) 40 MG capsule Take 1 capsule (40 mg total) by mouth once daily. 2/23/22 2/23/23  Kamille Hooks PA-C   ondansetron (ZOFRAN) 8 MG tablet Take 1 tablet (8 mg total) by mouth every 8 (eight) hours as needed for Nausea. 7/26/21   Fer Ashraf MD   pantoprazole (PROTONIX) 40 MG tablet Take 40 mg by mouth. 6/2/21   Historical Provider   pantoprazole (PROTONIX) 40 MG tablet Take 1 tablet (40 mg total) by mouth 2 (two) times daily. 2/1/22   Fer Ashraf MD   prochlorperazine (COMPAZINE) 10 MG tablet TAKE 1 TABLET(10 MG) BY MOUTH EVERY 6 HOURS AS NEEDED FOR NAUSEA 8/7/21   Fer Ashraf MD   prochlorperazine (COMPAZINE) 10 MG tablet as needed. 6/3/21   Historical Provider   senna-docusate 8.6-50 mg (PERICOLACE) 8.6-50 mg per tablet Take 1 tablet by mouth. 5/26/21   Historical Provider   senna-docusate 8.6-50 mg (SENNA WITH DOCUSATE SODIUM) 8.6-50 mg per tablet Take 1 tablet by mouth 2 (two) times daily as needed for Constipation. 5/26/21   Reshma Wilson MD   sucralfate (CARAFATE) 1 gram tablet TAKE 1 TABLET(1 GRAM) BY MOUTH FOUR TIMES DAILY FOR 14 DAYS 10/20/21   Rosio Marion MD   sucralfate (CARAFATE) 1 gram tablet  4/8/21   Historical Provider   traMADoL (ULTRAM) 50 mg tablet Take 50 mg by mouth. 6/25/21   Historical Provider   traMADoL (ULTRAM) 50 mg tablet Take by mouth. 6/25/21   Historical Provider     Anticoagulants/Antiplatelets: no anticoagulation     Allergies: Review of patient's allergies indicates:  No Known Allergies      Sedation History:  no adverse reactions     Review of Systems:   Hematological: no known coagulopathies  Respiratory: no cough, shortness of breath, or wheezing  Cardiovascular: no chest pain or dyspnea on exertion  Gastrointestinal:  positive for - abdominal pain and distension  Genito-Urinary: no dysuria, trouble voiding, or hematuria  Musculoskeletal: negative  Neurological: no TIA or stroke symptoms      OBJECTIVE:     Vital Signs (Most Recent)     Physical Exam:  General: no acute distress  Mental Status: alert and oriented to person, place and time  HEENT: normocephalic, atraumatic  Chest: unlabored breathing  Heart: regular heart rate  Abdomen: +distended with +fluid wave. No TTP/r/g.  Extremity: moves all extremities    Laboratory  Lab Results   Component Value Date    INR 1.0 06/10/2021       Lab Results   Component Value Date    WBC 4.92 04/22/2022    HGB 12.9 04/22/2022    HCT 39.9 04/22/2022    MCV 99 (H) 04/22/2022     04/22/2022      Lab Results   Component Value Date    GLU 98 04/22/2022     04/22/2022    K 4.3 04/22/2022     04/22/2022    CO2 31 (H) 04/22/2022    BUN 9 04/22/2022    CREATININE 0.7 04/22/2022    CALCIUM 8.8 04/22/2022    MG 1.9 04/18/2022    ALT 21 04/22/2022    AST 25 04/22/2022    ALBUMIN 2.4 (L) 04/22/2022    BILITOT 0.5 04/22/2022     ASSESSMENT/PLAN:     41 y.o. female with pertinent PMHx of gastric adenocarcinoma with osseous, hepatic and peritoneal metastases complicated by recurrent abdominal distension and pain 2/2 recurrent painful, tense ascites requiring frequent decompression for symptom relief.     1. Recurrent painful, tense ascites - Will attempt US-guided percutaneous RUQ-approach therapeutic large-volume paracentesis with local anesthetic only and albumin infusion post PRN as indicated per institutional protocol.     Risks (including, but not limited to, pain, bleeding, infection, damage to nearby structures, failure to obtain sufficient material for a diagnosis, the need for additional procedures, and death), benefits, and alternatives were discussed with the patient. All questions were answered to the best of my abilities. The patient wishes to proceed with the procedure.  Written informed consent was obtained.     Thank you for considering IR for the care of your patient.      Castro Bermudez MD  Interventional Radiology

## 2022-05-03 ENCOUNTER — TELEPHONE (OUTPATIENT)
Dept: HEMATOLOGY/ONCOLOGY | Facility: CLINIC | Age: 42
End: 2022-05-03
Payer: COMMERCIAL

## 2022-05-03 NOTE — TELEPHONE ENCOUNTER
"----- Message from Lady Braun sent at 5/3/2022  2:00 PM CDT -----  Regarding: Consult/Advisory:             Name Of Caller:  [employer]     Contact Preference?:    [phone]           What is the nature of the call?:  Agent says that they did not receive page one of the Physical Ability Assistance paperwork,   reference #[id number]           Additional Notes:  "Thank you for all that you do for our patients'"     "

## 2022-05-04 ENCOUNTER — HOSPITAL ENCOUNTER (OUTPATIENT)
Dept: INTERVENTIONAL RADIOLOGY/VASCULAR | Facility: HOSPITAL | Age: 42
Discharge: HOME | End: 2022-05-04
Attending: INTERNAL MEDICINE
Payer: COMMERCIAL

## 2022-05-04 ENCOUNTER — CLINICAL SUPPORT (OUTPATIENT)
Dept: REHABILITATION | Facility: HOSPITAL | Age: 42
End: 2022-05-04
Payer: COMMERCIAL

## 2022-05-04 DIAGNOSIS — C16.9 MALIGNANT NEOPLASM OF STOMACH, UNSPECIFIED LOCATION: ICD-10-CM

## 2022-05-04 DIAGNOSIS — C80.1 ANXIETY ASSOCIATED WITH CANCER DIAGNOSIS: ICD-10-CM

## 2022-05-04 DIAGNOSIS — F41.1 ANXIETY ASSOCIATED WITH CANCER DIAGNOSIS: ICD-10-CM

## 2022-05-04 DIAGNOSIS — R18.8 OTHER ASCITES: Primary | ICD-10-CM

## 2022-05-04 DIAGNOSIS — R52 PAIN AGGRAVATED BY ACTIVITIES OF DAILY LIVING: Primary | ICD-10-CM

## 2022-05-04 DIAGNOSIS — C78.6 PERITONEAL CARCINOMATOSIS: ICD-10-CM

## 2022-05-04 PROCEDURE — A4550 SURGICAL TRAYS: HCPCS

## 2022-05-04 PROCEDURE — 49083 IR PARACENTESIS WITH IMAGING: ICD-10-PCS | Mod: ,,, | Performed by: RADIOLOGY

## 2022-05-04 PROCEDURE — 49083 ABD PARACENTESIS W/IMAGING: CPT | Performed by: RADIOLOGY

## 2022-05-04 PROCEDURE — 97110 THERAPEUTIC EXERCISES: CPT

## 2022-05-04 PROCEDURE — 97535 SELF CARE MNGMENT TRAINING: CPT

## 2022-05-04 NOTE — H&P
Radiology History & Physical      SUBJECTIVE:     Chief Complaint: Recurrent painful, tense ascites     History of Present Illness:  Puja Quigley is a 41 y.o. female with pertinent PMHx of gastric adenocarcinoma with osseous, hepatic and peritoneal metastases complicated by recurrent abdominal distension and pain 2/2 recurrent painful, tense ascites requiring frequent decompression for symptom relief.     A new outpatient IR consult received for US-guided percutaneous RUQ-approach therapeutic large-volume paracentesis.    Past Medical History:   Diagnosis Date    Anxiety     Gastric cancer     Gastric ulcer     Hx of psychiatric care     Pregnancy 08/12/2020    delivered on 8/12/2020    Umbilical hernia      Past Surgical History:   Procedure Laterality Date    CLOSURE OF PERFORATED ULCER OF DUODENUM USING OMENTAL PATCH      ESOPHAGOGASTRODUODENOSCOPY N/A 10/13/2020    Procedure: EGD (ESOPHAGOGASTRODUODENOSCOPY);  Surgeon: Rosio Marion MD;  Location: [facility] ENDO (2ND FLR);  Service: Endoscopy;  Laterality: N/A;    ESOPHAGOGASTRODUODENOSCOPY N/A 12/11/2020    Procedure: EGD (ESOPHAGOGASTRODUODENOSCOPY);  Surgeon: Rosio Marion MD;  Location: [facility] ENDO (2ND FLR);  Service: Endoscopy;  Laterality: N/A;  Covid-19 test 12/8/20 at [facility] - pg    ESOPHAGOGASTRODUODENOSCOPY N/A 3/12/2021    Procedure: EGD (ESOPHAGOGASTRODUODENOSCOPY);  Surgeon: aNv Omer MD;  Location: [facility] ENDO (2ND FLR);  Service: Endoscopy;  Laterality: N/A;  COVID at [facility] 3/9 ttr    ESOPHAGOGASTRODUODENOSCOPY N/A 5/18/2021    Procedure: EGD (ESOPHAGOGASTRODUODENOSCOPY);  Surgeon: Rosio Marion MD;  Location: [facility] ENDO (2ND FLR);  Service: Endoscopy;  Laterality: N/A;  5/15-covid pcw-inst portal-tb    ESOPHAGOGASTRODUODENOSCOPY N/A 5/26/2021    Procedure: EGD (ESOPHAGOGASTRODUODENOSCOPY);  Surgeon: Socrates Terrazas MD;  Location: [facility] ENDO (2ND FLR);  Service: Endoscopy;  Laterality: N/A;     Home Meds:    Prior to Admission medications    Medication Sig Start Date End Date Taking? Authorizing Provider   acetaminophen (TYLENOL) 500 MG tablet Take 500 mg by mouth every 6 (six) hours as needed for Pain.    Historical Provider   amitriptyline (ELAVIL) 10 MG tablet Take 1 tablet (10 mg total) by mouth every evening. 5/13/21   Socrates Terrazas MD   amitriptyline (ELAVIL) 10 MG tablet Take 10 mg by mouth. 5/13/21   Historical Provider   dicyclomine (BENTYL) 10 MG capsule TAKE ONE CAPSULE BY MOUTH FOUR TIMES DAILY 9/27/21   Kamille Hooks PA-C   famotidine (PEPCID) 20 MG tablet Take 1 tablet (20 mg total) by mouth nightly as needed for Heartburn. 2/23/22 2/23/23  Kamille Hooks PA-C   ferrous sulfate (FEOSOL) 325 mg (65 mg iron) Tab tablet Take 1 tablet (325 mg total) by mouth once daily for 7 days, THEN 1 tablet (325 mg total) 2 (two) times daily. 5/26/21 5/26/22  Reshma Wilson MD   ferrous sulfate (FEOSOL) 325 mg (65 mg iron) Tab tablet Take by mouth. 5/26/21 5/26/22  Historical Provider   furosemide (LASIX) 20 MG tablet Take 10 mg by mouth.    Historical Provider   furosemide (LASIX) 40 MG tablet Take 0.5 tablets (20 mg total) by mouth daily as needed (leg swelling or abdominal swelling). 5/26/21 5/26/22  Reshma Wilson MD   furosemide (LASIX) 40 MG tablet  5/26/21   Historical Provider   gabapentin (NEURONTIN) 300 MG capsule Take 1 capsule (300 mg total) by mouth 3 (three) times daily. 2/7/22 2/7/23  Kamille Hooks PA-C   HYDROcodone-acetaminophen (NORCO) 5-325 mg per tablet Take 1 tablet by mouth. 7/9/21   Historical Provider   HYDROcodone-acetaminophen (NORCO) 5-325 mg per tablet Take 1 tablet by mouth 3 (three) times daily as needed. 7/9/21   Historical Provider   LIDOcaine-prilocaine (EMLA) cream Apply to Port-A-Cath area 30 to 45 minutes prior to port access as directed (topical anesthetic use to the anterior chest only). 6/25/21   Historical Provider   LIDOcaine-prilocaine (EMLA) cream Apply topically. 6/25/21    Historical Provider   metoclopramide HCl (REGLAN) 5 MG tablet 5 mg. 6/11/21   Historical Provider   multivitamin with folic acid 400 mcg Tab Take 1 tablet by mouth once daily.    Historical Provider   omeprazole (PRILOSEC) 40 MG capsule Take 1 capsule (40 mg total) by mouth once daily. 2/23/22 2/23/23  Kamille Hooks PA-C   ondansetron (ZOFRAN) 8 MG tablet Take 1 tablet (8 mg total) by mouth every 8 (eight) hours as needed for Nausea. 7/26/21   Fer Ashraf MD   pantoprazole (PROTONIX) 40 MG tablet Take 40 mg by mouth. 6/2/21   Historical Provider   pantoprazole (PROTONIX) 40 MG tablet Take 1 tablet (40 mg total) by mouth 2 (two) times daily. 2/1/22   Fer Ashraf MD   prochlorperazine (COMPAZINE) 10 MG tablet TAKE 1 TABLET(10 MG) BY MOUTH EVERY 6 HOURS AS NEEDED FOR NAUSEA 8/7/21   Fer Ashraf MD   prochlorperazine (COMPAZINE) 10 MG tablet as needed. 6/3/21   Historical Provider   senna-docusate 8.6-50 mg (PERICOLACE) 8.6-50 mg per tablet Take 1 tablet by mouth. 5/26/21   Historical Provider   senna-docusate 8.6-50 mg (SENNA WITH DOCUSATE SODIUM) 8.6-50 mg per tablet Take 1 tablet by mouth 2 (two) times daily as needed for Constipation. 5/26/21   Reshma Wilson MD   sucralfate (CARAFATE) 1 gram tablet TAKE 1 TABLET(1 GRAM) BY MOUTH FOUR TIMES DAILY FOR 14 DAYS 10/20/21   Rosio Marion MD   sucralfate (CARAFATE) 1 gram tablet  4/8/21   Historical Provider   traMADoL (ULTRAM) 50 mg tablet Take 50 mg by mouth. 6/25/21   Historical Provider   traMADoL (ULTRAM) 50 mg tablet Take by mouth. 6/25/21   Historical Provider     Anticoagulants/Antiplatelets: no anticoagulation     Allergies: Review of patient's allergies indicates:  No Known Allergies      Sedation History:  no adverse reactions     Review of Systems:   Hematological: no known coagulopathies  Respiratory: no cough, shortness of breath, or wheezing  Cardiovascular: no chest pain or dyspnea on exertion  Gastrointestinal:  positive for - abdominal pain and distension  Genito-Urinary: no dysuria, trouble voiding, or hematuria  Musculoskeletal: negative  Neurological: no TIA or stroke symptoms      OBJECTIVE:     Vital Signs (Most Recent)     Physical Exam:  General: no acute distress  Mental Status: alert and oriented to person, place and time  HEENT: normocephalic, atraumatic  Chest: unlabored breathing  Heart: regular heart rate  Abdomen: +distended with +fluid wave. No TTP/r/g.  Extremity: moves all extremities    Laboratory  Lab Results   Component Value Date    INR 1.0 06/10/2021       Lab Results   Component Value Date    WBC 4.92 04/22/2022    HGB 12.9 04/22/2022    HCT 39.9 04/22/2022    MCV 99 (H) 04/22/2022     04/22/2022      Lab Results   Component Value Date    GLU 98 04/22/2022     04/22/2022    K 4.3 04/22/2022     04/22/2022    CO2 31 (H) 04/22/2022    BUN 9 04/22/2022    CREATININE 0.7 04/22/2022    CALCIUM 8.8 04/22/2022    MG 1.9 04/18/2022    ALT 21 04/22/2022    AST 25 04/22/2022    ALBUMIN 2.4 (L) 04/22/2022    BILITOT 0.5 04/22/2022     ASSESSMENT/PLAN:     41 y.o. female with pertinent PMHx of gastric adenocarcinoma with osseous, hepatic and peritoneal metastases complicated by recurrent abdominal distension and pain 2/2 recurrent painful, tense ascites requiring frequent decompression for symptom relief.     1. Recurrent painful, tense ascites - Will attempt US-guided percutaneous RUQ-approach therapeutic large-volume paracentesis with local anesthetic only and albumin infusion post PRN as indicated per institutional protocol.     Risks (including, but not limited to, pain, bleeding, infection, damage to nearby structures, failure to obtain sufficient material for a diagnosis, the need for additional procedures, and death), benefits, and alternatives were discussed with the patient. All questions were answered to the best of my abilities. The patient wishes to proceed with the procedure.  Written informed consent was obtained.     Thank you for considering IR for the care of your patient.      Castro Bermudez MD  Interventional Radiology

## 2022-05-04 NOTE — DISCHARGE SUMMARY
Radiology Discharge Summary      Hospital Course: No complications    Admit Date: 5/4/2022  Discharge Date: 05/04/2022     Instructions Given to Patient: Yes  Diet: Resume prior diet  Activity: activity as tolerated    Description of Condition on Discharge: Stable  Vital Signs (Most Recent):      Discharge Disposition: Home    Discharge Diagnosis:  41 y.o. female with recurrent painful, tense ascites s/p successful US-guided percutaneous RUQ-approach therapeutic LVP with local anesthetic only and albumin infusion post PRN as indicated per institutional protocol. Patient tolerated the procedure well. No immediate post-procedural complications noted.      Thank you for considering IR for the care of your patient.      Castro Bermudez MD  Interventional Radiology

## 2022-05-04 NOTE — PROGRESS NOTES
[facility]  Occupational Therapy Treatment Note - Therapeutic Yoga Progam    Date: 5/4/2022  Name: Puja Quigley  Clinic Number: [id number]    Therapy Diagnosis:   Encounter Diagnoses   Name Primary?    Pain aggravated by activities of daily living Yes    Anxiety associated with cancer diagnosis      Physician: Tanika Sarabia PA-C  Physician: Tanika Sarabia PA-C     Physician Orders: Eval and Treat   Medical Diagnosis from Referral:   C78.6 (ICD-10-CM) - Peritoneal carcinomatosis   C16.9 (ICD-10-CM) - Malignant neoplasm of stomach, unspecified location         Evaluation Date: 3/11/2022  Authorization Period Expiration: 3/10/23  Plan of Care Expiration: 4/10/23  Progress Note Due: 4/10/23  Visit # / Visits authorized: 3/15     Precautions: Standard           Precautions:  Standard    Time In: 11:00am  Time Out: 12:00pm  Total Billable Time: 60 minutes      SUBJECTIVE     Pt reports: She is going to [facility] due 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  Response to previous treatment:good    Pain: 7/10  Location: bilateral abdomen     OBJECTIVE     Objective Measures updated at progress report unless specified.    Treatment     Puja received the treatments listed below:       Date 3/15/22   3/25/22 4/22/22 5/4/22     Therapeutic Yoga Exercises - 18631 Therapeutic Ex 50 minutes  30 minutes   0 minutes  30 minutes  minutes  minutes    Seated Yoga   One legged hip/LE/back and back stretch; cobblers x4; twist;  One legged hip/LE/back and back stretch; cobblers,; twist x4 One legged hip/LE/back and back stretch; cobblers x4; wide straddle, half yasmany,         Quadruped Cat-cow rolls;         Supine  Elevated supine-knees to chest, twist,modified bridge        Prone          Restorative          Breathing Techniques Victory breath   Single nostril breathing technique x3 lessons during session     Standing    Chair/volcano  poses with block x3, warrior 2, triangle, wide straddle  with tabletop back.     Relaxation Techniques (15 minutes) - 73892 Self-Care/Home Management  10 minutes  vipassana meditation(facial triangle) sitting up on block.  Body scan  viloma breath 30 minutes  All relaxation sitting up due to GI pain  metta meditation, DB, body scan 30 minutes  All relaxation sitting up due to GI pain  metta meditation, DB, body scan                                Activity Pacing ( minutes) -   minutes  minutes  minutes  minutes  minutes  minutes                     Stress Management/Education ( minutes) -   minutes  minutes  minutes  minutes  minutes  minutes                Patient Education and Home Exercises      Education provided:   - alignment of yoga poses, seated meditation  - Progress towards goals     Written Home Exercises Provided: to be provided in upcoming sessions.  Exercises were reviewed and Puja was able to demonstrate them prior to the end of the session.  Puja demonstrated good  understanding of the HEP provided. See EMR under Patient Instructions for exercises provided during therapy sessions.       Assessment     Pt would continue to benefit from skilled Occupational Therapy.      Puja is  progressing well towards her goals and there are no updates to goals at this time. She was not feeling well today and we focused on pain management using relaxation trechniques in sitting. Pt prognosis is Excellent.     Pt will continue to benefit from skilled outpatient occupational therapy to address the deficits listed in the problem list on initial evaluation provide pt/family education and to maximize pt's level of independence in the home and community environment.     Pt's spiritual, cultural and educational needs considered and pt agreeable to plan of care and goals.    Anticipated barriers to occupational therapy: gastric symptoms     Goals:        Goals:  Short Term Goals: 4 weeks      Goal # Goal Status   1 Patient will be independent with Home Exercise Program to  increase endurance and manage stress.  Progressing   2 Patient will demonstrate independence with diaphragmatic breathing in sit and supine.  Progressing   3 Patient will identify 2 new stress coping skills for stress management/immune health. not Progressing   4 Patient will identify activity pacing problems.  Patient will then implement new plan for daily activity to increase endurance for ADL's. Not Progressing      Long Term Goals: 12 weeks      Goal # Goal Status   1 Patient will demonstrate independence with yoga Home Exercise Program to increase strength and endurance for ADL's. Progressing   2 Patient will demonstrate independence with relaxation techniques to manage stress for immune health.  Progressing   3 Patient will verbalize good understanding of stress/immune health relationship.   Progressing   4               PLAN          Outpatient Occupational Therapy 2 times weekly for 3  And then 1x week for 9 weeks to include the following interventions: Patient Education, Self Care, Therapeutic Activities and Therapeutic Exercise      Jamie Ray OT

## 2022-05-04 NOTE — BRIEF OP NOTE
Radiology Post-Procedure Note     Pre Op Diagnosis: Recurrent painful, tense ascites  Post Op Diagnosis: Same     Procedure: 1. US-guided percutaneous RUQ-approach therapeutic LVP     Procedure performed by: Castro Bermudez MD     Written Informed Consent Obtained: Yes  Specimen Removed: YES, 4,750-cc of thin, serosanguinous ascitic fluid  Estimated Blood Loss: none     Findings:   Successful US-guided percutaneous RUQ-approach therapeutic LVP with local anesthetic only and albumin infusion post PRN as indicated per institutional protocol. Patient tolerated the procedure well. No immediate post-procedural complications noted.      Thank you for considering IR for the care of your patient.      Castro Bermudez MD  Interventional Radiology

## 2022-05-12 ENCOUNTER — HOSPITAL ENCOUNTER (OUTPATIENT)
Dept: INTERVENTIONAL RADIOLOGY/VASCULAR | Facility: HOSPITAL | Age: 42
Discharge: HOME | End: 2022-05-12
Attending: INTERNAL MEDICINE
Payer: COMMERCIAL

## 2022-05-12 VITALS — SYSTOLIC BLOOD PRESSURE: 111 MMHG | DIASTOLIC BLOOD PRESSURE: 78 MMHG

## 2022-05-12 DIAGNOSIS — R18.8 OTHER ASCITES: ICD-10-CM

## 2022-05-12 PROCEDURE — 49083 IR PARACENTESIS WITH IMAGING: ICD-10-PCS | Mod: ,,, | Performed by: RADIOLOGY

## 2022-05-12 PROCEDURE — C1729 CATH, DRAINAGE: HCPCS

## 2022-05-12 PROCEDURE — 49083 ABD PARACENTESIS W/IMAGING: CPT | Performed by: RADIOLOGY

## 2022-05-12 NOTE — DISCHARGE SUMMARY
Radiology Discharge Summary      Hospital Course: No complications    Admit Date: 5/12/2022  Discharge Date: 05/12/2022     Instructions Given to Patient: Yes  Diet: Resume prior diet  Activity: activity as tolerated    Description of Condition on Discharge: Stable  Vital Signs (Most Recent): BP: 111/78 (05/12/22 1409)    Discharge Disposition: Home    Discharge Diagnosis: gastric cancer, recurrent ascites s/p paracentesis     Follow-up: continue periodic paracentesis    Martin Ivory MD  Staff Radiologist  Department of Radiology  Pager: [phone]

## 2022-05-12 NOTE — PROCEDURES
Radiology Post-Procedure Note    Pre Op Diagnosis: Ascites, gastric cancer  Post Op Diagnosis: Same    Procedure: Paracentesis    Procedure performed by: Martin Ivory MD    Written Informed Consent Obtained: Yes  Specimen Removed: YES thin renuka fluid  Estimated Blood Loss: Minimal    Findings:   Successful paracentesis.  Albumin administered PRN per protocol.    Patient tolerated procedure well.    Martin Ivory MD  Staff Radiologist  Department of Radiology  Pager: [phone]

## 2022-05-12 NOTE — H&P
Radiology History & Physical      SUBJECTIVE:     Chief Complaint: gastric cancer, recurrent ascites    History of Present Illness:  Puja Quigley is a 41 y.o. female who presents for paracentesis  Past Medical History:   Diagnosis Date    Anxiety     Gastric cancer     Gastric ulcer     Hx of psychiatric care     Pregnancy 08/12/2020    delivered on 8/12/2020    Umbilical hernia      Past Surgical History:   Procedure Laterality Date    CLOSURE OF PERFORATED ULCER OF DUODENUM USING OMENTAL PATCH      ESOPHAGOGASTRODUODENOSCOPY N/A 10/13/2020    Procedure: EGD (ESOPHAGOGASTRODUODENOSCOPY);  Surgeon: Rosio Marion MD;  Location: [facility] (2ND FLR);  Service: Endoscopy;  Laterality: N/A;    ESOPHAGOGASTRODUODENOSCOPY N/A 12/11/2020    Procedure: EGD (ESOPHAGOGASTRODUODENOSCOPY);  Surgeon: Rosio Marion MD;  Location: [facility] (2ND FLR);  Service: Endoscopy;  Laterality: N/A;  Covid-19 test 12/8/20 at [facility]    ESOPHAGOGASTRODUODENOSCOPY N/A 3/12/2021    Procedure: EGD (ESOPHAGOGASTRODUODENOSCOPY);  Surgeon: Nav Omer MD;  Location: [facility] (2ND FLR);  Service: Endoscopy;  Laterality: N/A;  COVID at [facility] 3/9 [facility]    ESOPHAGOGASTRODUODENOSCOPY N/A 5/18/2021    Procedure: EGD (ESOPHAGOGASTRODUODENOSCOPY);  Surgeon: Rosio Marion MD;  Location: [facility] (2ND FLR);  Service: Endoscopy;  Laterality: N/A;  5/15-covid pcw-inst portal-tb    ESOPHAGOGASTRODUODENOSCOPY N/A 5/26/2021    Procedure: EGD (ESOPHAGOGASTRODUODENOSCOPY);  Surgeon: Socrates Terrazas MD;  Location: [facility] (2ND FLR);  Service: Endoscopy;  Laterality: N/A;       Home Meds:   Prior to Admission medications    Medication Sig Start Date End Date Taking? Authorizing Provider   acetaminophen (TYLENOL) 500 MG tablet Take 500 mg by mouth every 6 (six) hours as needed for Pain.    Historical Provider   amitriptyline (ELAVIL) 10 MG tablet Take 1 tablet (10 mg total) by mouth every evening. 5/13/21   Socrates  MD [facility]   amitriptyline (ELAVIL) 10 MG tablet Take 10 mg by mouth. 5/13/21   Historical Provider   dicyclomine (BENTYL) 10 MG capsule TAKE ONE CAPSULE BY MOUTH FOUR TIMES DAILY 9/27/21   Kamille Hooks PA-C   famotidine (PEPCID) 20 MG tablet Take 1 tablet (20 mg total) by mouth nightly as needed for Heartburn. 2/23/22 2/23/23  Kamille Hooks PA-C   ferrous sulfate (FEOSOL) 325 mg (65 mg iron) Tab tablet Take 1 tablet (325 mg total) by mouth once daily for 7 days, THEN 1 tablet (325 mg total) 2 (two) times daily. 5/26/21 5/26/22  Reshma Wilson MD   ferrous sulfate (FEOSOL) 325 mg (65 mg iron) Tab tablet Take by mouth. 5/26/21 5/26/22  Historical Provider   furosemide (LASIX) 20 MG tablet Take 10 mg by mouth.    Historical Provider   furosemide (LASIX) 40 MG tablet Take 0.5 tablets (20 mg total) by mouth daily as needed (leg swelling or abdominal swelling). 5/26/21 5/26/22  Reshma Wilson MD   furosemide (LASIX) 40 MG tablet  5/26/21   Historical Provider   gabapentin (NEURONTIN) 300 MG capsule Take 1 capsule (300 mg total) by mouth 3 (three) times daily. 2/7/22 2/7/23  Kamille Hooks PA-C   HYDROcodone-acetaminophen (NORCO) 5-325 mg per tablet Take 1 tablet by mouth. 7/9/21   Historical Provider   HYDROcodone-acetaminophen (NORCO) 5-325 mg per tablet Take 1 tablet by mouth 3 (three) times daily as needed. 7/9/21   Historical Provider   LIDOcaine-prilocaine (EMLA) cream Apply to Port-A-Cath area 30 to 45 minutes prior to port access as directed (topical anesthetic use to the anterior chest only). 6/25/21   Historical Provider   LIDOcaine-prilocaine (EMLA) cream Apply topically. 6/25/21   Historical Provider   metoclopramide HCl (REGLAN) 5 MG tablet 5 mg. 6/11/21   Historical Provider   multivitamin with folic acid 400 mcg Tab Take 1 tablet by mouth once daily.    Historical Provider   omeprazole (PRILOSEC) 40 MG capsule Take 1 capsule (40 mg total) by mouth once daily. 2/23/22 2/23/23  Kamille Hooks PA-C    ondansetron (ZOFRAN) 8 MG tablet Take 1 tablet (8 mg total) by mouth every 8 (eight) hours as needed for Nausea. 7/26/21   Fer Ashraf MD   pantoprazole (PROTONIX) 40 MG tablet Take 40 mg by mouth. 6/2/21   Historical Provider   pantoprazole (PROTONIX) 40 MG tablet Take 1 tablet (40 mg total) by mouth 2 (two) times daily. 2/1/22   Fer Ashraf MD   prochlorperazine (COMPAZINE) 10 MG tablet TAKE 1 TABLET(10 MG) BY MOUTH EVERY 6 HOURS AS NEEDED FOR NAUSEA 8/7/21   Fer Ashraf MD   prochlorperazine (COMPAZINE) 10 MG tablet as needed. 6/3/21   Historical Provider   senna-docusate 8.6-50 mg (PERICOLACE) 8.6-50 mg per tablet Take 1 tablet by mouth. 5/26/21   Historical Provider   senna-docusate 8.6-50 mg (SENNA WITH DOCUSATE SODIUM) 8.6-50 mg per tablet Take 1 tablet by mouth 2 (two) times daily as needed for Constipation. 5/26/21   Reshma Wilson MD   sucralfate (CARAFATE) 1 gram tablet TAKE 1 TABLET(1 GRAM) BY MOUTH FOUR TIMES DAILY FOR 14 DAYS 10/20/21   Rsoio Marion MD   sucralfate (CARAFATE) 1 gram tablet  4/8/21   Historical Provider   traMADoL (ULTRAM) 50 mg tablet Take 50 mg by mouth. 6/25/21   Historical Provider   traMADoL (ULTRAM) 50 mg tablet Take by mouth. 6/25/21   Historical Provider     Anticoagulants/Antiplatelets: no anticoagulation    Allergies: Review of patient's allergies indicates:  No Known Allergies  Sedation History:  no adverse reactions    Review of Systems:   Hematological: no known coagulopathies  Respiratory: no shortness of breath  Cardiovascular: no chest pain  Gastrointestinal: mild abdominal pain due to ascites  Genito-Urinary: no dysuria  Musculoskeletal: negative  Neurological: no TIA or stroke symptoms         OBJECTIVE:     Vital Signs (Most Recent)       Physical Exam:  ASA: 2  Mallampati: N/A    General: no acute distress  Mental Status: alert and oriented to person, place and time  HEENT: normocephalic, atraumatic  Chest: unlabored  breathing  Heart: regular heart rate  Abdomen: distended  Extremity: moves all extremities    Laboratory  Lab Results   Component Value Date    INR 1.0 06/10/2021       Lab Results   Component Value Date    WBC 4.92 04/22/2022    HGB 12.9 04/22/2022    HCT 39.9 04/22/2022    MCV 99 (H) 04/22/2022     04/22/2022      Lab Results   Component Value Date    GLU 98 04/22/2022     04/22/2022    K 4.3 04/22/2022     04/22/2022    CO2 31 (H) 04/22/2022    BUN 9 04/22/2022    CREATININE 0.7 04/22/2022    CALCIUM 8.8 04/22/2022    MG 1.9 04/18/2022    ALT 21 04/22/2022    AST 25 04/22/2022    ALBUMIN 2.4 (L) 04/22/2022    BILITOT 0.5 04/22/2022       ASSESSMENT/PLAN:     Sedation Plan: local anesthesia  Patient will undergo paracentesis.    Martin Ivory MD  Staff Radiologist  Department of Radiology  Pager: [phone]

## 2022-05-18 ENCOUNTER — TELEPHONE (OUTPATIENT)
Dept: HEMATOLOGY/ONCOLOGY | Facility: CLINIC | Age: 42
End: 2022-05-18
Payer: COMMERCIAL

## 2022-05-20 ENCOUNTER — HOSPITAL ENCOUNTER (OUTPATIENT)
Dept: INTERVENTIONAL RADIOLOGY/VASCULAR | Facility: HOSPITAL | Age: 42
Discharge: HOME | End: 2022-05-20
Attending: INTERNAL MEDICINE
Payer: COMMERCIAL

## 2022-05-20 VITALS — DIASTOLIC BLOOD PRESSURE: 84 MMHG | SYSTOLIC BLOOD PRESSURE: 125 MMHG

## 2022-05-20 DIAGNOSIS — R18.8 OTHER ASCITES: ICD-10-CM

## 2022-05-20 PROCEDURE — 49083 IR PARACENTESIS WITH IMAGING: ICD-10-PCS | Mod: ,,, | Performed by: RADIOLOGY

## 2022-05-20 PROCEDURE — 49083 ABD PARACENTESIS W/IMAGING: CPT | Performed by: RADIOLOGY

## 2022-05-20 PROCEDURE — C1729 CATH, DRAINAGE: HCPCS

## 2022-05-20 NOTE — DISCHARGE SUMMARY
Radiology Discharge Summary      Hospital Course: No complications    Admit Date: 5/20/2022  Discharge Date: 05/20/2022     Instructions Given to Patient: Yes  Diet: Resume prior diet  Activity: activity as tolerated    Description of Condition on Discharge: Stable  Vital Signs (Most Recent): BP: 125/84 (05/20/22 1340)    Discharge Disposition: Home    Discharge Diagnosis:  41 y.o. female with recurrent painful, tense ascites s/p successful US-guided percutaneous RUQ-approach therapeutic LVP with local anesthetic only and albumin infusion post PRN as indicated per institutional protocol. Patient tolerated the procedure well. No immediate post-procedural complications noted.      Thank you for considering IR for the care of your patient.      Castro Bermudez MD  Interventional Radiology

## 2022-05-20 NOTE — H&P
Radiology History & Physical      SUBJECTIVE:     Chief Complaint: Recurrent painful, tense ascites     History of Present Illness:  Puja Quigley is a 41 y.o. female with pertinent PMHx of gastric adenocarcinoma with osseous, hepatic and peritoneal metastases complicated by recurrent abdominal distension and pain 2/2 recurrent painful, tense ascites requiring frequent decompression for symptom relief.     A new outpatient IR consult received for US-guided percutaneous RUQ-approach therapeutic large-volume paracentesis.    Past Medical History:   Diagnosis Date    Anxiety     Gastric cancer     Gastric ulcer     Hx of psychiatric care     Pregnancy 08/12/2020    delivered on 8/12/2020    Umbilical hernia      Past Surgical History:   Procedure Laterality Date    CLOSURE OF PERFORATED ULCER OF DUODENUM USING OMENTAL PATCH      ESOPHAGOGASTRODUODENOSCOPY N/A 10/13/2020    Procedure: EGD (ESOPHAGOGASTRODUODENOSCOPY);  Surgeon: Rosio Marion MD;  Location: [facility] (2ND FLR);  Service: Endoscopy;  Laterality: N/A;    ESOPHAGOGASTRODUODENOSCOPY N/A 12/11/2020    Procedure: EGD (ESOPHAGOGASTRODUODENOSCOPY);  Surgeon: Rosio Marion MD;  Location: [facility] (2ND FLR);  Service: Endoscopy;  Laterality: N/A;  Covid-19 test 12/8/20 at [facility] - pg    ESOPHAGOGASTRODUODENOSCOPY N/A 3/12/2021    Procedure: EGD (ESOPHAGOGASTRODUODENOSCOPY);  Surgeon: Nav Omer MD;  Location: [facility] (2ND FLR);  Service: Endoscopy;  Laterality: N/A;  COVID at [facility] 3/9 ttr    ESOPHAGOGASTRODUODENOSCOPY N/A 5/18/2021    Procedure: EGD (ESOPHAGOGASTRODUODENOSCOPY);  Surgeon: Rosio Marion MD;  Location: [facility] (2ND FLR);  Service: Endoscopy;  Laterality: N/A;  5/15-covid pcw-inst portal-tb    ESOPHAGOGASTRODUODENOSCOPY N/A 5/26/2021    Procedure: EGD (ESOPHAGOGASTRODUODENOSCOPY);  Surgeon: Socrates Terrazas MD;  Location: [facility] (2ND FLR);  Service: Endoscopy;  Laterality: N/A;     Home Meds:    Prior to Admission medications    Medication Sig Start Date End Date Taking? Authorizing Provider   acetaminophen (TYLENOL) 500 MG tablet Take 500 mg by mouth every 6 (six) hours as needed for Pain.    Historical Provider   amitriptyline (ELAVIL) 10 MG tablet Take 1 tablet (10 mg total) by mouth every evening. 5/13/21   Socrates Terrazas MD   amitriptyline (ELAVIL) 10 MG tablet Take 10 mg by mouth. 5/13/21   Historical Provider   dicyclomine (BENTYL) 10 MG capsule TAKE ONE CAPSULE BY MOUTH FOUR TIMES DAILY 9/27/21   Kamille Hooks PA-C   famotidine (PEPCID) 20 MG tablet Take 1 tablet (20 mg total) by mouth nightly as needed for Heartburn. 2/23/22 2/23/23  Kamille Hooks PA-C   ferrous sulfate (FEOSOL) 325 mg (65 mg iron) Tab tablet Take 1 tablet (325 mg total) by mouth once daily for 7 days, THEN 1 tablet (325 mg total) 2 (two) times daily. 5/26/21 5/26/22  Reshma Wilson MD   ferrous sulfate (FEOSOL) 325 mg (65 mg iron) Tab tablet Take by mouth. 5/26/21 5/26/22  Historical Provider   furosemide (LASIX) 20 MG tablet Take 10 mg by mouth.    Historical Provider   furosemide (LASIX) 40 MG tablet Take 0.5 tablets (20 mg total) by mouth daily as needed (leg swelling or abdominal swelling). 5/26/21 5/26/22  Reshma Wilson MD   furosemide (LASIX) 40 MG tablet  5/26/21   Historical Provider   gabapentin (NEURONTIN) 300 MG capsule Take 1 capsule (300 mg total) by mouth 3 (three) times daily. 2/7/22 2/7/23  Kamille Hooks PA-C   HYDROcodone-acetaminophen (NORCO) 5-325 mg per tablet Take 1 tablet by mouth. 7/9/21   Historical Provider   HYDROcodone-acetaminophen (NORCO) 5-325 mg per tablet Take 1 tablet by mouth 3 (three) times daily as needed. 7/9/21   Historical Provider   LIDOcaine-prilocaine (EMLA) cream Apply to Port-A-Cath area 30 to 45 minutes prior to port access as directed (topical anesthetic use to the anterior chest only). 6/25/21   Historical Provider   LIDOcaine-prilocaine (EMLA) cream Apply topically. 6/25/21    Historical Provider   metoclopramide HCl (REGLAN) 5 MG tablet 5 mg. 6/11/21   Historical Provider   multivitamin with folic acid 400 mcg Tab Take 1 tablet by mouth once daily.    Historical Provider   omeprazole (PRILOSEC) 40 MG capsule Take 1 capsule (40 mg total) by mouth once daily. 2/23/22 2/23/23  Kamille Hooks PA-C   ondansetron (ZOFRAN) 8 MG tablet Take 1 tablet (8 mg total) by mouth every 8 (eight) hours as needed for Nausea. 7/26/21   Fer Ashraf MD   pantoprazole (PROTONIX) 40 MG tablet Take 40 mg by mouth. 6/2/21   Historical Provider   pantoprazole (PROTONIX) 40 MG tablet Take 1 tablet (40 mg total) by mouth 2 (two) times daily. 2/1/22   Fer Ashraf MD   prochlorperazine (COMPAZINE) 10 MG tablet TAKE 1 TABLET(10 MG) BY MOUTH EVERY 6 HOURS AS NEEDED FOR NAUSEA 8/7/21   Fer Ashraf MD   prochlorperazine (COMPAZINE) 10 MG tablet as needed. 6/3/21   Historical Provider   senna-docusate 8.6-50 mg (PERICOLACE) 8.6-50 mg per tablet Take 1 tablet by mouth. 5/26/21   Historical Provider   senna-docusate 8.6-50 mg (SENNA WITH DOCUSATE SODIUM) 8.6-50 mg per tablet Take 1 tablet by mouth 2 (two) times daily as needed for Constipation. 5/26/21   Reshma Wilson MD   sucralfate (CARAFATE) 1 gram tablet TAKE 1 TABLET(1 GRAM) BY MOUTH FOUR TIMES DAILY FOR 14 DAYS 10/20/21   Rosio Marion MD   sucralfate (CARAFATE) 1 gram tablet  4/8/21   Historical Provider   traMADoL (ULTRAM) 50 mg tablet Take 50 mg by mouth. 6/25/21   Historical Provider   traMADoL (ULTRAM) 50 mg tablet Take by mouth. 6/25/21   Historical Provider     Anticoagulants/Antiplatelets: no anticoagulation     Allergies: Review of patient's allergies indicates:  No Known Allergies      Sedation History:  no adverse reactions     Review of Systems:   Hematological: no known coagulopathies  Respiratory: no cough, shortness of breath, or wheezing  Cardiovascular: no chest pain or dyspnea on exertion  Gastrointestinal:  positive for - abdominal pain and distension  Genito-Urinary: no dysuria, trouble voiding, or hematuria  Musculoskeletal: negative  Neurological: no TIA or stroke symptoms      OBJECTIVE:     Vital Signs (Most Recent)       Physical Exam:  General: no acute distress  Mental Status: alert and oriented to person, place and time  HEENT: normocephalic, atraumatic  Chest: unlabored breathing  Heart: regular heart rate  Abdomen: +distended with +fluid wave. No TTP/r/g.  Extremity: moves all extremities    Laboratory  Lab Results   Component Value Date    INR 1.0 06/10/2021       Lab Results   Component Value Date    WBC 4.92 04/22/2022    HGB 12.9 04/22/2022    HCT 39.9 04/22/2022    MCV 99 (H) 04/22/2022     04/22/2022      Lab Results   Component Value Date    GLU 98 04/22/2022     04/22/2022    K 4.3 04/22/2022     04/22/2022    CO2 31 (H) 04/22/2022    BUN 9 04/22/2022    CREATININE 0.7 04/22/2022    CALCIUM 8.8 04/22/2022    MG 1.9 04/18/2022    ALT 21 04/22/2022    AST 25 04/22/2022    ALBUMIN 2.4 (L) 04/22/2022    BILITOT 0.5 04/22/2022     ASSESSMENT/PLAN:     41 y.o. female with pertinent PMHx of gastric adenocarcinoma with osseous, hepatic and peritoneal metastases complicated by recurrent abdominal distension and pain 2/2 recurrent painful, tense ascites requiring frequent decompression for symptom relief.     1. Recurrent painful, tense ascites - Will attempt US-guided percutaneous RUQ-approach therapeutic large-volume paracentesis with local anesthetic only and albumin infusion post PRN as indicated per institutional protocol.     Risks (including, but not limited to, pain, bleeding, infection, damage to nearby structures, failure to obtain sufficient material for a diagnosis, the need for additional procedures, and death), benefits, and alternatives were discussed with the patient. All questions were answered to the best of my abilities. The patient wishes to proceed with the procedure.  Written informed consent was obtained.     Thank you for considering IR for the care of your patient.      Castro Bermudez MD  Interventional Radiology

## 2022-05-20 NOTE — BRIEF OP NOTE
Radiology Post-Procedure Note     Pre Op Diagnosis: Recurrent painful, tense ascites  Post Op Diagnosis: Same     Procedure: 1. US-guided percutaneous RUQ-approach therapeutic LVP     Procedure performed by: Castro Bermudez MD     Written Informed Consent Obtained: Yes  Specimen Removed: YES, 4,400-cc of thin, serosanguinous ascitic fluid  Estimated Blood Loss: none     Findings:   Successful US-guided percutaneous RUQ-approach therapeutic LVP with local anesthetic only and albumin infusion post PRN as indicated per institutional protocol. Patient tolerated the procedure well. No immediate post-procedural complications noted.      Thank you for considering IR for the care of your patient.      Castro Bermudez MD  Interventional Radiology

## 2022-05-24 ENCOUNTER — PATIENT MESSAGE (OUTPATIENT)
Dept: HEMATOLOGY/ONCOLOGY | Facility: CLINIC | Age: 42
End: 2022-05-24
Payer: COMMERCIAL

## 2022-05-24 DIAGNOSIS — C16.9 MALIGNANT NEOPLASM OF STOMACH, UNSPECIFIED LOCATION: Primary | ICD-10-CM

## 2022-05-25 ENCOUNTER — OFFICE VISIT (OUTPATIENT)
Dept: HEMATOLOGY/ONCOLOGY | Facility: CLINIC | Age: 42
End: 2022-05-25
Payer: COMMERCIAL

## 2022-05-25 ENCOUNTER — LAB VISIT (OUTPATIENT)
Dept: LAB | Facility: HOSPITAL | Age: 42
End: 2022-05-25
Payer: COMMERCIAL

## 2022-05-25 VITALS
DIASTOLIC BLOOD PRESSURE: 89 MMHG | TEMPERATURE: 98 F | OXYGEN SATURATION: 96 % | WEIGHT: 142.31 LBS | RESPIRATION RATE: 18 BRPM | SYSTOLIC BLOOD PRESSURE: 132 MMHG | BODY MASS INDEX: 22.34 KG/M2 | HEIGHT: 67 IN | HEART RATE: 69 BPM

## 2022-05-25 DIAGNOSIS — C16.9 MALIGNANT NEOPLASM OF STOMACH, UNSPECIFIED LOCATION: ICD-10-CM

## 2022-05-25 DIAGNOSIS — T45.1X5A IMMUNODEFICIENCY DUE TO CHEMOTHERAPY: ICD-10-CM

## 2022-05-25 DIAGNOSIS — T45.1X5A NEUROPATHY DUE TO CHEMOTHERAPEUTIC DRUG: ICD-10-CM

## 2022-05-25 DIAGNOSIS — Z79.899 IMMUNODEFICIENCY DUE TO CHEMOTHERAPY: ICD-10-CM

## 2022-05-25 DIAGNOSIS — R52 PAIN: ICD-10-CM

## 2022-05-25 DIAGNOSIS — K25.5 CHRONIC GASTRIC ULCER WITH PERFORATION: ICD-10-CM

## 2022-05-25 DIAGNOSIS — C79.60: ICD-10-CM

## 2022-05-25 DIAGNOSIS — R11.2 NON-INTRACTABLE VOMITING WITH NAUSEA, UNSPECIFIED VOMITING TYPE: ICD-10-CM

## 2022-05-25 DIAGNOSIS — C78.6 PERITONEAL CARCINOMATOSIS: ICD-10-CM

## 2022-05-25 DIAGNOSIS — R18.8 OTHER ASCITES: ICD-10-CM

## 2022-05-25 DIAGNOSIS — G62.0 NEUROPATHY DUE TO CHEMOTHERAPEUTIC DRUG: ICD-10-CM

## 2022-05-25 DIAGNOSIS — C79.51 BONE METASTASES: ICD-10-CM

## 2022-05-25 DIAGNOSIS — C16.9 GASTRIC ADENOCARCINOMA: Primary | ICD-10-CM

## 2022-05-25 DIAGNOSIS — C16.9 MALIGNANT NEOPLASM OF STOMACH, UNSPECIFIED LOCATION: Primary | ICD-10-CM

## 2022-05-25 DIAGNOSIS — D84.821 IMMUNODEFICIENCY DUE TO CHEMOTHERAPY: ICD-10-CM

## 2022-05-25 DIAGNOSIS — G62.9 NEUROPATHY: ICD-10-CM

## 2022-05-25 LAB
ALBUMIN SERPL BCP-MCNC: 1.6 G/DL (ref 3.5–5.2)
ALP SERPL-CCNC: 82 U/L (ref 55–135)
ALT SERPL W/O P-5'-P-CCNC: 12 U/L (ref 10–44)
ANION GAP SERPL CALC-SCNC: 5 MMOL/L (ref 8–16)
AST SERPL-CCNC: 21 U/L (ref 10–40)
BILIRUB SERPL-MCNC: 0.2 MG/DL (ref 0.1–1)
BUN SERPL-MCNC: 9 MG/DL (ref 6–20)
CALCIUM SERPL-MCNC: 9 MG/DL (ref 8.7–10.5)
CHLORIDE SERPL-SCNC: 105 MMOL/L (ref 95–110)
CO2 SERPL-SCNC: 29 MMOL/L (ref 23–29)
CREAT SERPL-MCNC: 0.7 MG/DL (ref 0.5–1.4)
ERYTHROCYTE [DISTWIDTH] IN BLOOD BY AUTOMATED COUNT: 12.4 % (ref 11.5–14.5)
EST. GFR  (AFRICAN AMERICAN): >60 ML/MIN/1.73 M^2
EST. GFR  (NON AFRICAN AMERICAN): >60 ML/MIN/1.73 M^2
GLUCOSE SERPL-MCNC: 104 MG/DL (ref 70–110)
HCT VFR BLD AUTO: 36.6 % (ref 37–48.5)
HGB BLD-MCNC: 11.7 G/DL (ref 12–16)
IMM GRANULOCYTES # BLD AUTO: 0.02 K/UL (ref 0–0.04)
MCH RBC QN AUTO: 29.6 PG (ref 27–31)
MCHC RBC AUTO-ENTMCNC: 32 G/DL (ref 32–36)
MCV RBC AUTO: 93 FL (ref 82–98)
NEUTROPHILS # BLD AUTO: 4.9 K/UL (ref 1.8–7.7)
PLATELET # BLD AUTO: 300 K/UL (ref 150–450)
PMV BLD AUTO: 8.7 FL (ref 9.2–12.9)
POTASSIUM SERPL-SCNC: 4.6 MMOL/L (ref 3.5–5.1)
PROT SERPL-MCNC: 5.4 G/DL (ref 6–8.4)
RBC # BLD AUTO: 3.95 M/UL (ref 4–5.4)
SODIUM SERPL-SCNC: 139 MMOL/L (ref 136–145)
WBC # BLD AUTO: 6.94 K/UL (ref 3.9–12.7)

## 2022-05-25 PROCEDURE — 99999 PR PBB SHADOW E&M-EST. PATIENT-LVL V: ICD-10-PCS | Mod: PBBFAC,,, | Performed by: PHYSICIAN ASSISTANT

## 2022-05-25 PROCEDURE — 3079F PR MOST RECENT DIASTOLIC BLOOD PRESSURE 80-89 MM HG: ICD-10-PCS | Mod: CPTII,S$GLB,, | Performed by: PHYSICIAN ASSISTANT

## 2022-05-25 PROCEDURE — 36415 COLL VENOUS BLD VENIPUNCTURE: CPT | Performed by: PHYSICIAN ASSISTANT

## 2022-05-25 PROCEDURE — 85027 COMPLETE CBC AUTOMATED: CPT | Performed by: PHYSICIAN ASSISTANT

## 2022-05-25 PROCEDURE — 99215 OFFICE O/P EST HI 40 MIN: CPT | Mod: S$GLB,,, | Performed by: PHYSICIAN ASSISTANT

## 2022-05-25 PROCEDURE — 3075F SYST BP GE 130 - 139MM HG: CPT | Mod: CPTII,S$GLB,, | Performed by: PHYSICIAN ASSISTANT

## 2022-05-25 PROCEDURE — 3008F PR BODY MASS INDEX (BMI) DOCUMENTED: ICD-10-PCS | Mod: CPTII,S$GLB,, | Performed by: PHYSICIAN ASSISTANT

## 2022-05-25 PROCEDURE — 3079F DIAST BP 80-89 MM HG: CPT | Mod: CPTII,S$GLB,, | Performed by: PHYSICIAN ASSISTANT

## 2022-05-25 PROCEDURE — 80053 COMPREHEN METABOLIC PANEL: CPT | Performed by: PHYSICIAN ASSISTANT

## 2022-05-25 PROCEDURE — 99215 PR OFFICE/OUTPT VISIT, EST, LEVL V, 40-54 MIN: ICD-10-PCS | Mod: S$GLB,,, | Performed by: PHYSICIAN ASSISTANT

## 2022-05-25 PROCEDURE — 99215 OFFICE O/P EST HI 40 MIN: CPT | Mod: PBBFAC | Performed by: PHYSICIAN ASSISTANT

## 2022-05-25 PROCEDURE — 3008F BODY MASS INDEX DOCD: CPT | Mod: CPTII,S$GLB,, | Performed by: PHYSICIAN ASSISTANT

## 2022-05-25 PROCEDURE — 3075F PR MOST RECENT SYSTOLIC BLOOD PRESS GE 130-139MM HG: ICD-10-PCS | Mod: CPTII,S$GLB,, | Performed by: PHYSICIAN ASSISTANT

## 2022-05-25 PROCEDURE — 99999 PR PBB SHADOW E&M-EST. PATIENT-LVL V: CPT | Mod: PBBFAC,,, | Performed by: PHYSICIAN ASSISTANT

## 2022-05-25 RX ORDER — PANTOPRAZOLE SODIUM 40 MG/1
40 TABLET, DELAYED RELEASE ORAL 2 TIMES DAILY
Qty: 60 TABLET | Refills: 11 | Status: SHIPPED | OUTPATIENT
Start: 2022-05-25 | End: 2022-06-20

## 2022-05-25 RX ORDER — HYDROCODONE BITARTRATE AND ACETAMINOPHEN 5; 325 MG/1; MG/1
1 TABLET ORAL 3 TIMES DAILY PRN
Qty: 90 TABLET | Refills: 0 | Status: ON HOLD | OUTPATIENT
Start: 2022-05-25 | End: 2022-06-20 | Stop reason: HOSPADM

## 2022-05-25 NOTE — PROGRESS NOTES
MEDICAL ONCOLOGY - ESTABLISHED PATIENT    Reason for visit: Gastric cancer     Best Contact Phone Number(s): [phone] (home)      Cancer/Stage/TNM:   Cancer Staging  No matching staging information was found for the patient.     Oncology History   Malignant neoplasm of stomach   6/10/2021 Initial Diagnosis    Gastric adenocarcinoma     7/26/2021 - 4/6/2022 Chemotherapy    Treatment Summary   Plan Name: OP FOLFOX 6 Q2W  Treatment Goal: Palliative  Status: Inactive  Start Date: 7/26/2021  End Date: 4/6/2022  Provider: Fer Ashraf MD  Chemotherapy: fluorouraciL 2,400 mg/m2 = 3,770 mg in sodium chloride 0.9% 100 mL chemo infusion, 2,400 mg/m2 = 3,770 mg, Intravenous, Over 46 hours, 19 of 20 cycles  Administration: 3,770 mg (7/26/2021), 3,770 mg (8/9/2021), 3,770 mg (8/23/2021), 3,770 mg (9/7/2021), 3,770 mg (9/21/2021), 3,770 mg (10/5/2021), 3,770 mg (10/19/2021), 3,770 mg (11/2/2021), 3,770 mg (11/15/2021), 3,770 mg (11/30/2021), 3,770 mg (12/13/2021), 4,000 mg (12/27/2021), 4,030 mg (1/10/2022), 4,030 mg (1/24/2022), 4,000 mg (2/7/2022), 4,000 mg (2/22/2022), 4,000 mg (3/7/2022), 4,000 mg (3/22/2022), 4,000 mg (4/4/2022)  oxaliplatin (ELOXATIN) 85 mg/m2 = 133 mg in dextrose 5 % 500 mL chemo infusion, 85 mg/m2 = 133 mg, Intravenous, Clinic/[facility] 1 time, 9 of 9 cycles  Administration: 133 mg (7/26/2021), 133 mg (8/9/2021), 133 mg (8/23/2021), 133 mg (9/7/2021), 133 mg (9/21/2021), 133 mg (10/5/2021), 133 mg (10/19/2021), 133 mg (11/2/2021), 133 mg (11/15/2021)     4/21/2022 -  Chemotherapy    Treatment Summary   Plan Name: OP FOLFIRI + RAMUCIRUMAB Q2WK  Treatment Goal: Palliative  Status: Active  Start Date: 4/21/2022 (Planned)  End Date: 3/11/2023 (Planned)  Provider: Fer Ashraf MD  Chemotherapy: dexAMETHasone (DECADRON) 4 MG Tab, 8 mg, Oral, Daily, 0 of 1 cycle, Start date: --, End date: --  irinotecan (CAMPTOSAR) 125 mg/m2 = 208 mg in sodium chloride 0.9% 500 mL chemo infusion, 125 mg/m2 = 208 mg  (original dose ), Intravenous, Clinic/[facility] 1 time, 0 of 24 cycles  Dose modification: 125 mg/m2 (Cycle 1)  ramucirumab (CYRAMZA) 471 mg in sodium chloride 0.9% 250 mL chemo infusion, 8 mg/kg, Intravenous, Clinic/[facility] 1 time, 0 of 24 cycles          Oncological History: Puja is a very pleasant 41 y.o. female with metastatic gastric cancer who presents for follow-up and treatment discussion. Unfortunately, she was most recently noted to have progression of disease on her most recent scan, including an enlarging mass of the ovary that is concerning for metastatic disease. She was previously receiving chemotherapy with FOLFOX with her last cycle being given on 11/15/2021. Oxaliplatin was discontinued at that time due to worsening neuropathy but she has received 9 cycles. Aside from the neuropathy, she appeared to have tolerated the chemotherapy fairly well. She was then placed on 5-FU maintenance chemotherapy. Her last cycle of maintenance chemotherapy was given on 4/4/2022. She has been evaluated at [facility] as well by Dr. Reid, another medical oncologist. She was most recently seen at [facility] on 5/19/2022 for treatment discussion and re-evaluation for possible resection of the ovarian mass. She was evaluated by GynOnc in clinic, with finding of possible secondary involvement of the uterus from the disease (biopsy came back positive for carcinoma).    Interval History: she is doing fairly today. Her main complaint is abdominal discomfort and soreness, that is only partially relieved by the weekly paracentesis. She has a regular bowel function, even though lately has been noticing some changes in the quality of her stools that now appear more narrow. She has occasional nausea and vomiting even though this has improved since the time of her chemotherapy. She has recently been noticing swelling in her left leg (doppler US was negative for thrombosis). Her appetite is only fair but she is trying to eat and maintain a good  nutritional status. Continues to have acid reflux as well. Taking PPI with some relief.     ECOG status is 1. Presents alone.    Review of Systems   Constitutional: Positive for appetite change. Negative for activity change, chills, fatigue, fever and unexpected weight change.   HENT: Negative for ear pain, facial swelling, hearing loss, mouth sores, nosebleeds, sore throat and trouble swallowing.    Eyes: Negative for pain, discharge, redness and visual disturbance.   Respiratory: Negative for cough, chest tightness and shortness of breath.    Cardiovascular: Positive for leg swelling. Negative for chest pain and palpitations.   Gastrointestinal: Positive for abdominal distention, nausea (controlled with medication), vomiting (intermittent) and reflux. Negative for abdominal pain, blood in stool, constipation and diarrhea.   Endocrine: Negative for cold intolerance and heat intolerance.   Genitourinary: Positive for vaginal bleeding. Negative for decreased urine volume, difficulty urinating, dysuria, frequency, hematuria, hot flashes and urgency.   Musculoskeletal: Negative for arthralgias, gait problem, leg pain and myalgias.   Integumentary:  Negative for pallor, rash and wound.   Allergic/Immunologic: Negative for immunocompromised state.   Neurological: Positive for numbness (slowly improving). Negative for dizziness, tremors, weakness, light-headedness and headaches.   Hematological: Negative for adenopathy. Does not bruise/bleed easily.   Psychiatric/Behavioral: Negative for agitation, confusion, dysphoric mood and sleep disturbance. The patient is not nervous/anxious.            Past Medical History:   Past Medical History:   Diagnosis Date    Anxiety     Gastric cancer     Gastric ulcer     Hx of psychiatric care     Pregnancy 08/12/2020    delivered on 8/12/2020    Umbilical hernia         Past Surgical History:   Past Surgical History:   Procedure Laterality Date    CLOSURE OF PERFORATED ULCER OF  "DUODENUM USING OMENTAL PATCH      ESOPHAGOGASTRODUODENOSCOPY N/A 10/13/2020    Procedure: EGD (ESOPHAGOGASTRODUODENOSCOPY);  Surgeon: Rosio Marion MD;  Location: [facility] ENDO (2ND FLR);  Service: Endoscopy;  Laterality: N/A;    ESOPHAGOGASTRODUODENOSCOPY N/A 2020    Procedure: EGD (ESOPHAGOGASTRODUODENOSCOPY);  Surgeon: Rosio Marion MD;  Location: [facility] ENDO (2ND FLR);  Service: Endoscopy;  Laterality: N/A;  Covid-19 test 20 at [facility] -     ESOPHAGOGASTRODUODENOSCOPY N/A 3/12/2021    Procedure: EGD (ESOPHAGOGASTRODUODENOSCOPY);  Surgeon: Nav Omer MD;  Location: [facility] ENDO (2ND FLR);  Service: Endoscopy;  Laterality: N/A;  COVID at [facility] 3/9 ttr    ESOPHAGOGASTRODUODENOSCOPY N/A 2021    Procedure: EGD (ESOPHAGOGASTRODUODENOSCOPY);  Surgeon: Rosio Marion MD;  Location: [facility] ENDO (2ND FLR);  Service: Endoscopy;  Laterality: N/A;  5/15-covid pcw-inst portal-tb    ESOPHAGOGASTRODUODENOSCOPY N/A 2021    Procedure: EGD (ESOPHAGOGASTRODUODENOSCOPY);  Surgeon: Socrates Terrazas MD;  Location: [facility] ENDO (2ND FLR);  Service: Endoscopy;  Laterality: N/A;        Family History:   Family History   Problem Relation Age of Onset    Cancer Mother 67        lymphoma (type? "not active," not on tx; "related to her blood")    Schizophrenia Mother     Lung cancer Father 48        type? h/o smoking    No Known Problems Son     Breast cancer Other 73        unilat; stage I, but aggressive    Prostate cancer Paternal Uncle         (dx 50s/60? no chemo?)    Breast cancer Paternal Cousin         (dx age?) ductal    Colon cancer Neg Hx     Esophageal cancer Neg Hx         Social History:   Social History     Tobacco Use    Smoking status: Former Smoker     Types: Cigarettes     Quit date: 2019     Years since quittin.5    Smokeless tobacco: Never Used   Substance Use Topics    Alcohol use: Yes        I have reviewed and updated the patient's past medical, surgical, " family and social histories.    Allergies:   Review of patient's allergies indicates:  No Known Allergies     Medications:   Current Outpatient Medications   Medication Sig Dispense Refill    acetaminophen (TYLENOL) 500 MG tablet Take 500 mg by mouth every 6 (six) hours as needed for Pain.      amitriptyline (ELAVIL) 10 MG tablet Take 1 tablet (10 mg total) by mouth every evening. 30 tablet 3    amitriptyline (ELAVIL) 10 MG tablet Take 10 mg by mouth.      dicyclomine (BENTYL) 10 MG capsule TAKE ONE CAPSULE BY MOUTH FOUR TIMES DAILY 120 capsule 0    famotidine (PEPCID) 20 MG tablet Take 1 tablet (20 mg total) by mouth nightly as needed for Heartburn. 60 tablet 1    ferrous sulfate (FEOSOL) 325 mg (65 mg iron) Tab tablet Take 1 tablet (325 mg total) by mouth once daily for 7 days, THEN 1 tablet (325 mg total) 2 (two) times daily. 60 tablet 10    ferrous sulfate (FEOSOL) 325 mg (65 mg iron) Tab tablet Take by mouth.      furosemide (LASIX) 20 MG tablet Take 10 mg by mouth.      furosemide (LASIX) 40 MG tablet Take 0.5 tablets (20 mg total) by mouth daily as needed (leg swelling or abdominal swelling). 30 tablet 1    furosemide (LASIX) 40 MG tablet       gabapentin (NEURONTIN) 300 MG capsule Take 1 capsule (300 mg total) by mouth 3 (three) times daily. 90 capsule 11    HYDROcodone-acetaminophen (NORCO) 5-325 mg per tablet Take 1 tablet by mouth.      HYDROcodone-acetaminophen (NORCO) 5-325 mg per tablet Take 1 tablet by mouth 3 (three) times daily as needed for Pain. 90 tablet 0    LIDOcaine-prilocaine (EMLA) cream Apply to Port-A-Cath area 30 to 45 minutes prior to port access as directed (topical anesthetic use to the anterior chest only).      LIDOcaine-prilocaine (EMLA) cream Apply topically.      metoclopramide HCl (REGLAN) 5 MG tablet 5 mg.      multivitamin with folic acid 400 mcg Tab Take 1 tablet by mouth once daily.      omeprazole (PRILOSEC) 40 MG capsule Take 1 capsule (40 mg total) by  "mouth once daily. 30 capsule 11    ondansetron (ZOFRAN) 8 MG tablet Take 1 tablet (8 mg total) by mouth every 8 (eight) hours as needed for Nausea. 60 tablet 2    pantoprazole (PROTONIX) 40 MG tablet Take 40 mg by mouth.      pantoprazole (PROTONIX) 40 MG tablet Take 1 tablet (40 mg total) by mouth 2 (two) times daily. 60 tablet 11    prochlorperazine (COMPAZINE) 10 MG tablet TAKE 1 TABLET(10 MG) BY MOUTH EVERY 6 HOURS AS NEEDED FOR NAUSEA 90 tablet 2    prochlorperazine (COMPAZINE) 10 MG tablet as needed.      senna-docusate 8.6-50 mg (PERICOLACE) 8.6-50 mg per tablet Take 1 tablet by mouth.      senna-docusate 8.6-50 mg (SENNA WITH DOCUSATE SODIUM) 8.6-50 mg per tablet Take 1 tablet by mouth 2 (two) times daily as needed for Constipation.      sucralfate (CARAFATE) 1 gram tablet TAKE 1 TABLET(1 GRAM) BY MOUTH FOUR TIMES DAILY FOR 14 DAYS 56 tablet 0    sucralfate (CARAFATE) 1 gram tablet       traMADoL (ULTRAM) 50 mg tablet Take 50 mg by mouth.      traMADoL (ULTRAM) 50 mg tablet Take by mouth.       No current facility-administered medications for this visit.        Physical Exam:   /89 (BP Location: Left arm, Patient Position: Sitting, BP Method: Small (Automatic))   Pulse 69   Temp 97.6 °F (36.4 °C) (Oral)   Resp 18   Ht 5' 7" (1.702 m)   Wt 64.5 kg (142 lb 4.9 oz)   SpO2 96%   BMI 22.29 kg/m²      ECOG Performance status: 1            Physical Exam  Vitals reviewed.   Constitutional:       Appearance: Normal appearance. She is not ill-appearing or toxic-appearing.      Comments: Thin   HENT:      Head: Normocephalic and atraumatic.      Nose: Nose normal.      Mouth/Throat:      Mouth: Mucous membranes are moist.      Pharynx: Oropharynx is clear.   Eyes:      General: No scleral icterus.     Extraocular Movements: Extraocular movements intact.      Conjunctiva/sclera: Conjunctivae normal.   Cardiovascular:      Rate and Rhythm: Normal rate and regular rhythm.      Pulses: Normal " pulses.      Heart sounds: Normal heart sounds.   Pulmonary:      Effort: Pulmonary effort is normal.      Breath sounds: Normal breath sounds.   Abdominal:      General: Bowel sounds are normal. There is distension.      Palpations: Abdomen is soft.      Tenderness: There is no abdominal tenderness. There is no guarding.   Musculoskeletal:         General: No swelling or tenderness. Normal range of motion.      Cervical back: Normal range of motion and neck supple.      Right lower leg: No edema.      Left lower leg: No edema.   Skin:     General: Skin is warm.      Findings: No rash.   Neurological:      General: No focal deficit present.      Mental Status: She is alert. Mental status is at baseline.   Psychiatric:         Mood and Affect: Mood normal.         Behavior: Behavior normal.             Labs:   Recent Results (from the past 48 hour(s))   CBC Oncology    Collection Time: 05/25/22  3:36 PM   Result Value Ref Range    WBC 6.94 3.90 - 12.70 K/uL    RBC 3.95 (L) 4.00 - 5.40 M/uL    Hemoglobin 11.7 (L) 12.0 - 16.0 g/dL    Hematocrit 36.6 (L) 37.0 - 48.5 %    MCV 93 82 - 98 fL    MCH 29.6 27.0 - 31.0 pg    MCHC 32.0 32.0 - 36.0 g/dL    RDW 12.4 11.5 - 14.5 %    Platelets 300 150 - 450 K/uL    MPV 8.7 (L) 9.2 - 12.9 fL    Gran # (ANC) 4.9 1.8 - 7.7 K/uL    Immature Grans (Abs) 0.02 0.00 - 0.04 K/uL   Comprehensive Metabolic Panel    Collection Time: 05/25/22  3:36 PM   Result Value Ref Range    Sodium 139 136 - 145 mmol/L    Potassium 4.6 3.5 - 5.1 mmol/L    Chloride 105 95 - 110 mmol/L    CO2 29 23 - 29 mmol/L    Glucose 104 70 - 110 mg/dL    BUN 9 6 - 20 mg/dL    Creatinine 0.7 0.5 - 1.4 mg/dL    Calcium 9.0 8.7 - 10.5 mg/dL    Total Protein 5.4 (L) 6.0 - 8.4 g/dL    Albumin 1.6 (L) 3.5 - 5.2 g/dL    Total Bilirubin 0.2 0.1 - 1.0 mg/dL    Alkaline Phosphatase 82 55 - 135 U/L    AST 21 10 - 40 U/L    ALT 12 10 - 44 U/L    Anion Gap 5 (L) 8 - 16 mmol/L    eGFR if African American >60.0 >60 mL/min/1.73 m^2     eGFR if non African American >60.0 >60 mL/min/1.73 m^2        I have reviewed the pertinent labs from today which show normal blood counts. Albumin 1.6    Imaging:    CT CAP: 2/10/2022    Impression:     Persistent gastric wall thickening involving the distal stomach, similar to the prior examination.     Numerous hepatic hypodensities some of which likely represent cysts, however there is a more solid-appearing soft tissue density liver lesion within the posteromedial right lobe, unchanged.     Subtle sclerotic bone lesions within the thoracic spine remain stable.     Small to moderate left pleural effusion, increased when compared to the prior study.     Moderate ascites, increased when compared to the prior study.     Probable necrotic enlarged left inguinal lymph node, similar to the prior examination.     Nodular enhancement within the anterior peritoneum anterior to the stomach, possibly peritoneal carcinomatosis.     Probable left lobe thyroid nodule, stable compared to 10/08/2021.     We have personally reviewed the most recent imaging from 10/8/2021, which is displays overall stability of disease. There is a L sided thyroid nodule that is indeterminate at this point. Decreased amount of ascites.      Abdominal US 2/10/2022:     FINDINGS:  There is moderate, anechoic abdominal ascites, most prominent within the lower quadrants.     Impression:     Moderate, anechoic abdominal ascites.  Left inguinal hernia containing fat and fluid.    CT CAP: 4/14/2022:     Outside hospital OSI CT CHEST ABDOMEN PELVIS dated 4/14/2022 9:31 AM.     1.  Gastric wall thickening is slightly increased.   2.  Moderate ascites, slightly increased in volume.   3.  Peritoneal metastatic disease is stable to slightly increased.   4.  Stable small left pleural effusion.   5.  Increased cystic and solid mass involving the left ovary, likely due to metastatic disease.      Path:   5/26/21:  Positive for malignancy.   Poorly differentiated  adenocarcinoma growing with signet ring features   Immunostains for Bruce-EP4 and CEA are positive.  The controls stain   appropriately.   This case was reviewed by Dr Sebastian who concurs with the diagnosis       Assessment:       1. Malignant neoplasm of stomach, unspecified location    2. Neuropathy due to chemotherapeutic drug    3. Bone metastases    4. Peritoneal carcinomatosis    5. Krukenberg tumor, unspecified laterality    6. Other ascites    7. Non-intractable vomiting with nausea, unspecified vomiting type    8. Chronic gastric ulcer with perforation    9. Immunodeficiency due to chemotherapy    10. Neuropathy    11. Pain          Plan:             # Gastric adenocarcinoma with peritoneal metastases, immunodeficiency due to chemotherapy, bone metastases  HER-2 negative by FISH.  PD-L1 < 1%.  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  We previously explained the implications of a stage IV diagnosis to her and potential treatment options.  She is now s/p port placement. She sought a second opinion at [facility] for zolbetuximab trial targeting CLDN protein but she did not test positive for this biomarker. We recommended proceeding with SOC FOLFOX, with which the [facility] team agreed. Because of the negative PD-L1 I do not think the benefit of adding nivolumab outweighs the potential toxicities.       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  She opted not to get genetic testing done at her appt with Phi. We recommended that she reconsider that decision in light of a very likely germline mutation in SAMUEL which has clinical consequences.      CT CAP after 6 cycles showed improvement in ascites, probable hepatic, thyroid and bone metastases. We dropped oxaliplatin starting with cycle 10 due to grade 1 neuropathy and desire to  keep it from worsening.    CT CAP after cycle 10 showed stable disease.  CT scan after cycle 15 continued to display overall stability of disease within the gastric wall, peritoneum, spine and liver. Although increase ascites on imaging and clinically can represent progression of disease, her previous CT suggested overall stability within the liver and gastric wall, so we recc to continue with treatment time. However, she continued to have increasing abdominal ascites that has been concerning for progression of disease. Hence, she had repeat imaging. Per her most recent CT performed on 4/14/2022, she appears to have worsening disease suggestive of progression. Specifically, she presents with increasing cystic, solid mass of the ovary that is concerning for metastases. She was recently seen by Dr. Reid at [facility] for f/u and to discuss possible treatment options. She was consented for [id number] to determine eligibility for possible clinical trial. She was also evaluated by Dr. Andree Mueller and Dr. See of Surgical Oncology at [facility] this past week on 5/19/2022.     They discussed with the patient her current condition and the risks and benefit of palliative oophorectomy. In her case at this time risks seems to overcome benefits, especially with regards of her ascites, that would represent a risk factor for poor wound healing and dehiscence, as well as a contraindication to possible hysterectomy. Given her stable conditions and good blood counts, they felt that she seems to be a candidate for further evaluation by Medical Oncology for second line treatments and possible enrollment in a clinical trial. If her ascites improves during the course of chemotherapy and her performance status remains stable or improves, they would re-consider her again for palliative oophorectomy.     After an in-depth discussion, she would like to pursue 2nd line chemotherapy. At this time, we would favor use of FOLFIRI as second-line  rather than Taxol/Claudio due to ongoing neuropathy. Pt is agreeable. This was also discussed with Dr. Ashraf, who is in agreement. We will proceed with 4 cycles followed by imaging studies. We will also repeat imaging studies with a CT chest non-contrast and MRI abdomen/pelvis as a baseline. Pt is agreeable.     Patient was consented for chemotherapy today 5/25/2022 for FOLFIRI.   An extensive discussion was had which included a thorough discussion of the risk and benefits of treatment and alternatives.  Risks, including but not limited to, possible hair loss, bone marrow damage (anemia, thrombocytopenia, immune suppression, neutropenia), damage to body organs (brain, heart, liver, kidney, lungs, nervous system, skin, and others), allergic reactions, sterility, nausea/vomiting, constipation/diarrhea, sores in the mouth, secondary cancers, local damage at possible injection sites, and rarely death were all discussed.  The patient agrees with the plan, and all questions have been answered to their satisfaction.  Consent was signed the patient, provider, and a third party witness.     -clinically, doing fairly today but stable to start chemotherapy. Lab work is adequate as well.   -Proceed with cycle 1 of FOLFIRI. Will hold cyramza at this time given the vaginal bleeding from the ovarian tumor.  -RTC in 2 weeks for cycle 2. Will perform baseline imaging studies as well.     #Ascites, lower extremity edema  Initially had peritoneal catheter after diagnosis, output slowed so was removed in October 2021 by IR.  Now recent reaccumulation of ascites. She continues to have good relief after the paracentesis is performed. Will continue with weekly paracentesis.   She is scheduled to have another paracentesis this Friday, 5/27/2022  Will need albumin replacement if >4.5L of fluid is removed during paracentesis     # Nausea, gastric ulcer  Stable. Fairly controlled with medication. Most likely 2/2 cancer progression and most  recent finding of ovarian mass  Continue Protonix.  Continue Compazine PRN.  Continue dietary modifications to prevent indigestion.    # Anemia  Hgb stable. Will continue to monitor  Iron deficiency due to chronic blood loss.  S/p 2 doses of Injectafer.    # Anxiety  Feeling fair today.  Continue f/u with psych onc w/ Dr. Berumen    # chemotherapy induced neuropathy, pain  2/2 previous chemotherapy with Oxaliplatin. Slowly improving  Continue acupuncture.  Continue gabapentin.  Will continue to monitor. Will see Dr. Brink in Palliative Care on 6/22/2022    Follow up: RTC in 2 weeks for lab work, clinic visit with Dr. Ashraf and cycle 2 of FOLFIRI with pump d/c on day 3.     Pte displayed understanding of the above encounter and treatment plan. All thoughtful questions were answered to their satisfaction. Pte was advised to notify the care team or proceed to the ER if signs and symptoms worsen. Pt was discussed with Dr. Ashraf as well.       JHONATAN Kramer, PA-C  Physician Assistant Certified  Dept of Hematology/Oncology  PA-C to Dr. Owens, Dr. Ashraf and Dr. Madrid    Route Chart for Scheduling    Med Onc Chart Routing      Follow up with physician 2 weeks. Will schedule cycle 1 of FOLFIRI with pump d/c on day 3 for this week. RTC in 2 weeks for lab work, clinic visit with Dr. Ashraf and cycle 2 of FOLFIRI with pump d/c on day 3.    Follow up with ISHMAEL 4 weeks. RTC in 4 weeks for cycle 3 of FOLFIRI and pump d/c on day 3.    Labs CBC and CMP   Lab interval: every 2 weeks     Imaging    CT chest non-contrast as baseline with MRI abdomen/pelvis   Pharmacy appointment    Other referrals          Treatment Plan Information   OP FOLFIRI + RAMUCIRUMAB Q2WK   Fer Ashraf MD   Upcoming Treatment Dates - OP FOLFIRI + RAMUCIRUMAB Q2WK    4/21/2022       Pre-Medications       diphenhydrAMINE (BENADRYL) 50 mg in sodium chloride 0.9% 50 mL IVPB       Chemotherapy       ramucirumab (CYRAMZA) 471 mg in sodium  chloride 0.9% 250 mL chemo infusion       irinotecan (CAMPTOSAR) 125 mg/m2 = 208 mg in sodium chloride 0.9% 500 mL chemo infusion       fluorouracil (ADRUCIL) 2,400 mg/m2 = 4,010 mg in sodium chloride 0.9% 100 mL chemo infusion       Supportive Care       atropine injection 0.4 mg  5/5/2022       Pre-Medications       DIPHENHYDRAMINE IV ORDERABLE       Chemotherapy       ramucirumab (CYRAMZA) 471 mg in sodium chloride 0.9% 250 mL chemo infusion       irinotecan (CAMPTOSAR) 125 mg/m2 = 208 mg in sodium chloride 0.9% 500 mL chemo infusion       fluorouracil (ADRUCIL) 2,400 mg/m2 = 4,010 mg in sodium chloride 0.9% 100 mL chemo infusion       Supportive Care       atropine injection 0.4 mg  5/19/2022       Pre-Medications       DIPHENHYDRAMINE IV ORDERABLE       Chemotherapy       ramucirumab (CYRAMZA) 471 mg in sodium chloride 0.9% 250 mL chemo infusion       irinotecan (CAMPTOSAR) 125 mg/m2 = 208 mg in sodium chloride 0.9% 500 mL chemo infusion       fluorouracil (ADRUCIL) 2,400 mg/m2 = 4,010 mg in sodium chloride 0.9% 100 mL chemo infusion       Supportive Care       atropine injection 0.4 mg  6/2/2022       Pre-Medications       DIPHENHYDRAMINE IV ORDERABLE       Chemotherapy       ramucirumab (CYRAMZA) 471 mg in sodium chloride 0.9% 250 mL chemo infusion       irinotecan (CAMPTOSAR) 125 mg/m2 = 208 mg in sodium chloride 0.9% 500 mL chemo infusion       fluorouracil (ADRUCIL) 2,400 mg/m2 = 4,010 mg in sodium chloride 0.9% 100 mL chemo infusion       Supportive Care       atropine injection 0.4 mg    Therapy Plan Information  EPINEPHrine (EPIPEN) 0.3 mg/0.3 mL pen injection 0.3 mg  0.3 mg, Intramuscular, PRN  diphenhydrAMINE injection 50 mg  50 mg, Intravenous, PRN  methylPREDNISolone sodium succinate injection 125 mg  125 mg, Intravenous, PRN  sodium chloride 0.9% bolus 1,000 mL  1,000 mL, Intravenous, PRN

## 2022-05-26 ENCOUNTER — PATIENT MESSAGE (OUTPATIENT)
Dept: HEMATOLOGY/ONCOLOGY | Facility: CLINIC | Age: 42
End: 2022-05-26
Payer: COMMERCIAL

## 2022-05-26 ENCOUNTER — HOSPITAL ENCOUNTER (OUTPATIENT)
Dept: INTERVENTIONAL RADIOLOGY/VASCULAR | Facility: HOSPITAL | Age: 42
Discharge: HOME | End: 2022-05-26
Attending: INTERNAL MEDICINE
Payer: COMMERCIAL

## 2022-05-26 ENCOUNTER — INFUSION (OUTPATIENT)
Dept: INFUSION THERAPY | Facility: HOSPITAL | Age: 42
End: 2022-05-26
Attending: PHYSICIAN ASSISTANT
Payer: COMMERCIAL

## 2022-05-26 VITALS
DIASTOLIC BLOOD PRESSURE: 59 MMHG | SYSTOLIC BLOOD PRESSURE: 115 MMHG | HEART RATE: 88 BPM | TEMPERATURE: 98 F | OXYGEN SATURATION: 98 % | RESPIRATION RATE: 18 BRPM

## 2022-05-26 VITALS — DIASTOLIC BLOOD PRESSURE: 88 MMHG | SYSTOLIC BLOOD PRESSURE: 145 MMHG

## 2022-05-26 DIAGNOSIS — R18.8 OTHER ASCITES: ICD-10-CM

## 2022-05-26 DIAGNOSIS — C16.9 MALIGNANT NEOPLASM OF STOMACH, UNSPECIFIED LOCATION: Primary | ICD-10-CM

## 2022-05-26 PROCEDURE — 96415 CHEMO IV INFUSION ADDL HR: CPT

## 2022-05-26 PROCEDURE — 96413 CHEMO IV INFUSION 1 HR: CPT

## 2022-05-26 PROCEDURE — 25000003 PHARM REV CODE 250: Performed by: INTERNAL MEDICINE

## 2022-05-26 PROCEDURE — C1729 CATH, DRAINAGE: HCPCS

## 2022-05-26 PROCEDURE — 96375 TX/PRO/DX INJ NEW DRUG ADDON: CPT

## 2022-05-26 PROCEDURE — 49083 IR PARACENTESIS WITH IMAGING: ICD-10-PCS | Mod: ,,, | Performed by: RADIOLOGY

## 2022-05-26 PROCEDURE — 96367 TX/PROPH/DG ADDL SEQ IV INF: CPT

## 2022-05-26 PROCEDURE — 63600175 PHARM REV CODE 636 W HCPCS: Performed by: INTERNAL MEDICINE

## 2022-05-26 PROCEDURE — 49083 ABD PARACENTESIS W/IMAGING: CPT | Performed by: RADIOLOGY

## 2022-05-26 PROCEDURE — 96416 CHEMO PROLONG INFUSE W/PUMP: CPT

## 2022-05-26 PROCEDURE — 96417 CHEMO IV INFUS EACH ADDL SEQ: CPT

## 2022-05-26 RX ORDER — SODIUM CHLORIDE 0.9 % (FLUSH) 0.9 %
10 SYRINGE (ML) INJECTION
Status: CANCELLED | OUTPATIENT
Start: 2022-05-26

## 2022-05-26 RX ORDER — HEPARIN 100 UNIT/ML
500 SYRINGE INTRAVENOUS
Status: CANCELLED | OUTPATIENT
Start: 2022-05-26

## 2022-05-26 RX ORDER — HEPARIN 100 UNIT/ML
500 SYRINGE INTRAVENOUS
Status: CANCELLED | OUTPATIENT
Start: 2022-05-27

## 2022-05-26 RX ORDER — DIPHENHYDRAMINE HYDROCHLORIDE 50 MG/ML
50 INJECTION INTRAMUSCULAR; INTRAVENOUS ONCE AS NEEDED
Status: CANCELLED | OUTPATIENT
Start: 2022-05-26

## 2022-05-26 RX ORDER — ATROPINE SULFATE 0.4 MG/ML
0.4 INJECTION, SOLUTION ENDOTRACHEAL; INTRAMEDULLARY; INTRAMUSCULAR; INTRAVENOUS; SUBCUTANEOUS ONCE AS NEEDED
Status: COMPLETED | OUTPATIENT
Start: 2022-05-26 | End: 2022-05-26

## 2022-05-26 RX ORDER — EPINEPHRINE 0.3 MG/.3ML
0.3 INJECTION SUBCUTANEOUS ONCE AS NEEDED
Status: CANCELLED | OUTPATIENT
Start: 2022-05-26

## 2022-05-26 RX ORDER — SODIUM CHLORIDE 0.9 % (FLUSH) 0.9 %
10 SYRINGE (ML) INJECTION
Status: CANCELLED | OUTPATIENT
Start: 2022-05-27

## 2022-05-26 RX ADMIN — IRINOTECAN HYDROCHLORIDE 200 MG: 20 INJECTION, SOLUTION INTRAVENOUS at 03:05

## 2022-05-26 RX ADMIN — FLUOROURACIL 3500 MG: 50 INJECTION, SOLUTION INTRAVENOUS at 05:05

## 2022-05-26 RX ADMIN — SODIUM CHLORIDE 471 MG: 0.9 INJECTION, SOLUTION INTRAVENOUS at 02:05

## 2022-05-26 RX ADMIN — DEXAMETHASONE SODIUM PHOSPHATE 0.25 MG: 10 INJECTION, SOLUTION INTRAMUSCULAR; INTRAVENOUS at 03:05

## 2022-05-26 RX ADMIN — ATROPINE SULFATE 0.4 MG: 0.4 INJECTION, SOLUTION INTRAVENOUS at 03:05

## 2022-05-26 RX ADMIN — DIPHENHYDRAMINE HYDROCHLORIDE 50 MG: 50 INJECTION INTRAMUSCULAR; INTRAVENOUS at 01:05

## 2022-05-26 NOTE — DISCHARGE SUMMARY
Radiology Discharge Summary      Hospital Course: No complications    Admit Date: 5/26/2022  Discharge Date: 05/26/2022     Instructions Given to Patient: Yes  Diet: Resume prior diet  Activity: activity as tolerated    Description of Condition on Discharge: Stable  Vital Signs (Most Recent): BP: 139/80 (05/26/22 1024)    Discharge Disposition: Home    Discharge Diagnosis:  41 y.o. female with recurrent painful, tense ascites s/p successful US-guided percutaneous RUQ-approach therapeutic LVP with local anesthetic only and albumin infusion post PRN as indicated per institutional protocol. Patient tolerated the procedure well. No immediate post-procedural complications noted.      Thank you for considering IR for the care of your patient.      Castro Bermudez MD  Interventional Radiology

## 2022-05-26 NOTE — H&P
Radiology History & Physical      SUBJECTIVE:     Chief Complaint: Recurrent painful, tense ascites     History of Present Illness:  Puja Quigley is a 41 y.o. female with pertinent PMHx of gastric adenocarcinoma with osseous, hepatic and peritoneal metastases complicated by recurrent abdominal distension and pain 2/2 recurrent painful, tense ascites requiring frequent decompression for symptom relief.     A new outpatient IR consult received for US-guided percutaneous RUQ-approach therapeutic large-volume paracentesis.    Past Medical History:   Diagnosis Date    Anxiety     Gastric cancer     Gastric ulcer     Hx of psychiatric care     Pregnancy 08/12/2020    delivered on 8/12/2020    Umbilical hernia      Past Surgical History:   Procedure Laterality Date    CLOSURE OF PERFORATED ULCER OF DUODENUM USING OMENTAL PATCH      ESOPHAGOGASTRODUODENOSCOPY N/A 10/13/2020    Procedure: EGD (ESOPHAGOGASTRODUODENOSCOPY);  Surgeon: Rosio Marion MD;  Location: [facility] ENDO (2ND FLR);  Service: Endoscopy;  Laterality: N/A;    ESOPHAGOGASTRODUODENOSCOPY N/A 12/11/2020    Procedure: EGD (ESOPHAGOGASTRODUODENOSCOPY);  Surgeon: Rosio Marion MD;  Location: [facility] ENDO (2ND FLR);  Service: Endoscopy;  Laterality: N/A;  Covid-19 test 12/8/20 at [facility] - pg    ESOPHAGOGASTRODUODENOSCOPY N/A 3/12/2021    Procedure: EGD (ESOPHAGOGASTRODUODENOSCOPY);  Surgeon: Nav Omer MD;  Location: [facility] ENDO (2ND FLR);  Service: Endoscopy;  Laterality: N/A;  COVID at [facility] 3/9 ttr    ESOPHAGOGASTRODUODENOSCOPY N/A 5/18/2021    Procedure: EGD (ESOPHAGOGASTRODUODENOSCOPY);  Surgeon: Rosio Marion MD;  Location: [facility] ENDO (2ND FLR);  Service: Endoscopy;  Laterality: N/A;  5/15-covid pcw-inst portal-tb    ESOPHAGOGASTRODUODENOSCOPY N/A 5/26/2021    Procedure: EGD (ESOPHAGOGASTRODUODENOSCOPY);  Surgeon: Socrates Terrazas MD;  Location: [facility] ENDO (2ND FLR);  Service: Endoscopy;  Laterality: N/A;     Home Meds:    Prior to Admission medications    Medication Sig Start Date End Date Taking? Authorizing Provider   acetaminophen (TYLENOL) 500 MG tablet Take 500 mg by mouth every 6 (six) hours as needed for Pain.    Historical Provider   amitriptyline (ELAVIL) 10 MG tablet Take 1 tablet (10 mg total) by mouth every evening. 5/13/21   Socrates Terrazas MD   amitriptyline (ELAVIL) 10 MG tablet Take 10 mg by mouth. 5/13/21   Historical Provider   dicyclomine (BENTYL) 10 MG capsule TAKE ONE CAPSULE BY MOUTH FOUR TIMES DAILY 9/27/21   Kamille Hooks PA-C   famotidine (PEPCID) 20 MG tablet Take 1 tablet (20 mg total) by mouth nightly as needed for Heartburn. 2/23/22 2/23/23  Kamille Hooks PA-C   ferrous sulfate (FEOSOL) 325 mg (65 mg iron) Tab tablet Take 1 tablet (325 mg total) by mouth once daily for 7 days, THEN 1 tablet (325 mg total) 2 (two) times daily. 5/26/21 5/26/22  Reshma Wilson MD   ferrous sulfate (FEOSOL) 325 mg (65 mg iron) Tab tablet Take by mouth. 5/26/21 5/26/22  Historical Provider   furosemide (LASIX) 20 MG tablet Take 10 mg by mouth.    Historical Provider   furosemide (LASIX) 40 MG tablet Take 0.5 tablets (20 mg total) by mouth daily as needed (leg swelling or abdominal swelling). 5/26/21 5/26/22  Reshma Wilson MD   furosemide (LASIX) 40 MG tablet  5/26/21   Historical Provider   gabapentin (NEURONTIN) 300 MG capsule Take 1 capsule (300 mg total) by mouth 3 (three) times daily. 2/7/22 2/7/23  Kamille Hooks PA-C   HYDROcodone-acetaminophen (NORCO) 5-325 mg per tablet Take 1 tablet by mouth. 7/9/21   Historical Provider   HYDROcodone-acetaminophen (NORCO) 5-325 mg per tablet Take 1 tablet by mouth 3 (three) times daily as needed for Pain. 5/25/22   Kamille Hooks PA-C   LIDOcaine-prilocaine (EMLA) cream Apply to Port-A-Cath area 30 to 45 minutes prior to port access as directed (topical anesthetic use to the anterior chest only). 6/25/21   Historical Provider   LIDOcaine-prilocaine (EMLA) cream Apply  topically. 6/25/21   Historical Provider   metoclopramide HCl (REGLAN) 5 MG tablet 5 mg. 6/11/21   Historical Provider   multivitamin with folic acid 400 mcg Tab Take 1 tablet by mouth once daily.    Historical Provider   omeprazole (PRILOSEC) 40 MG capsule Take 1 capsule (40 mg total) by mouth once daily. 5/25/22 5/25/23  Kamille Hooks PA-C   ondansetron (ZOFRAN) 8 MG tablet Take 1 tablet (8 mg total) by mouth every 8 (eight) hours as needed for Nausea. 7/26/21   Fer Ashraf MD   pantoprazole (PROTONIX) 40 MG tablet Take 40 mg by mouth. 6/2/21   Historical Provider   pantoprazole (PROTONIX) 40 MG tablet Take 1 tablet (40 mg total) by mouth 2 (two) times daily. 5/25/22   Kamille Hooks PA-C   prochlorperazine (COMPAZINE) 10 MG tablet TAKE 1 TABLET(10 MG) BY MOUTH EVERY 6 HOURS AS NEEDED FOR NAUSEA 8/7/21   Fer Ashraf MD   prochlorperazine (COMPAZINE) 10 MG tablet as needed. 6/3/21   Historical Provider   senna-docusate 8.6-50 mg (PERICOLACE) 8.6-50 mg per tablet Take 1 tablet by mouth. 5/26/21   Historical Provider   senna-docusate 8.6-50 mg (SENNA WITH DOCUSATE SODIUM) 8.6-50 mg per tablet Take 1 tablet by mouth 2 (two) times daily as needed for Constipation. 5/26/21   Reshma Wilson MD   sucralfate (CARAFATE) 1 gram tablet TAKE 1 TABLET(1 GRAM) BY MOUTH FOUR TIMES DAILY FOR 14 DAYS 10/20/21   Rosio Marion MD   sucralfate (CARAFATE) 1 gram tablet  4/8/21   Historical Provider   traMADoL (ULTRAM) 50 mg tablet Take 50 mg by mouth. 6/25/21   Historical Provider   traMADoL (ULTRAM) 50 mg tablet Take by mouth. 6/25/21   Historical Provider     Anticoagulants/Antiplatelets: no anticoagulation     Allergies: Review of patient's allergies indicates:  No Known Allergies      Sedation History:  no adverse reactions     Review of Systems:   Hematological: no known coagulopathies  Respiratory: no cough, shortness of breath, or wheezing  Cardiovascular: no chest pain or dyspnea on  exertion  Gastrointestinal: positive for - abdominal pain and distension  Genito-Urinary: no dysuria, trouble voiding, or hematuria  Musculoskeletal: negative  Neurological: no TIA or stroke symptoms      OBJECTIVE:     Vital Signs (Most Recent)     Physical Exam:  General: no acute distress  Mental Status: alert and oriented to person, place and time  HEENT: normocephalic, atraumatic  Chest: unlabored breathing  Heart: regular heart rate  Abdomen: +distended with +fluid wave. No TTP/r/g.  Extremity: moves all extremities    Laboratory  Lab Results   Component Value Date    INR 1.0 06/10/2021       Lab Results   Component Value Date    WBC 6.94 05/25/2022    HGB 11.7 (L) 05/25/2022    HCT 36.6 (L) 05/25/2022    MCV 93 05/25/2022     05/25/2022      Lab Results   Component Value Date     05/25/2022     05/25/2022    K 4.6 05/25/2022     05/25/2022    CO2 29 05/25/2022    BUN 9 05/25/2022    CREATININE 0.7 05/25/2022    CALCIUM 9.0 05/25/2022    MG 1.9 04/18/2022    ALT 12 05/25/2022    AST 21 05/25/2022    ALBUMIN 1.6 (L) 05/25/2022    BILITOT 0.2 05/25/2022     ASSESSMENT/PLAN:     41 y.o. female with pertinent PMHx of gastric adenocarcinoma with osseous, hepatic and peritoneal metastases complicated by recurrent abdominal distension and pain 2/2 recurrent painful, tense ascites requiring frequent decompression for symptom relief.     1. Recurrent painful, tense ascites - Will attempt US-guided percutaneous RUQ-approach therapeutic LVP with local anesthetic only and albumin infusion post PRN as indicated per institutional protocol.     Risks (including, but not limited to, pain, bleeding, infection, damage to nearby structures, failure to obtain sufficient material for a diagnosis, the need for additional procedures, and death), benefits, and alternatives were discussed with the patient. All questions were answered to the best of my abilities. The patient wishes to proceed with the  procedure. Written informed consent was obtained.     Thank you for considering IR for the care of your patient.      Castro Bermudez MD  Interventional Radiology

## 2022-05-26 NOTE — BRIEF OP NOTE
Radiology Post-Procedure Note     Pre Op Diagnosis: Recurrent painful, tense ascites  Post Op Diagnosis: Same     Procedure: 1. US-guided percutaneous RUQ-approach therapeutic LVP     Procedure performed by: Castro Bermudez MD     Written Informed Consent Obtained: Yes  Specimen Removed: YES, 3,450-cc of thin, serosanguinous ascitic fluid  Estimated Blood Loss: none     Findings:   Successful US-guided percutaneous RUQ-approach therapeutic LVP with local anesthetic only and albumin infusion post PRN as indicated per institutional protocol. Patient tolerated the procedure well. No immediate post-procedural complications noted.     Of note, pt reports development of worsening lower abdominal and pelvic pressure, distension and pain. US of this region reveals highly loculated partially cystic and solid lesion explaining pts symptoms as noted on recent CT.      Thank you for considering IR for the care of your patient.      Castro Bermudez MD  Interventional Radiology

## 2022-05-28 ENCOUNTER — INFUSION (OUTPATIENT)
Dept: INFUSION THERAPY | Facility: HOSPITAL | Age: 42
End: 2022-05-28
Payer: COMMERCIAL

## 2022-05-28 VITALS — RESPIRATION RATE: 18 BRPM | DIASTOLIC BLOOD PRESSURE: 82 MMHG | SYSTOLIC BLOOD PRESSURE: 121 MMHG | HEART RATE: 82 BPM

## 2022-05-28 DIAGNOSIS — C16.9 MALIGNANT NEOPLASM OF STOMACH, UNSPECIFIED LOCATION: Primary | ICD-10-CM

## 2022-05-28 RX ORDER — SODIUM CHLORIDE 0.9 % (FLUSH) 0.9 %
10 SYRINGE (ML) INJECTION
Status: DISCONTINUED | OUTPATIENT
Start: 2022-05-28 | End: 2022-05-28 | Stop reason: HOSPADM

## 2022-05-28 RX ORDER — HEPARIN 100 UNIT/ML
500 SYRINGE INTRAVENOUS
Status: DISCONTINUED | OUTPATIENT
Start: 2022-05-28 | End: 2022-05-28 | Stop reason: HOSPADM

## 2022-05-30 ENCOUNTER — PATIENT MESSAGE (OUTPATIENT)
Dept: HEMATOLOGY/ONCOLOGY | Facility: CLINIC | Age: 42
End: 2022-05-30
Payer: COMMERCIAL

## 2022-05-30 DIAGNOSIS — E86.0 DEHYDRATION: ICD-10-CM

## 2022-05-30 DIAGNOSIS — K92.0 HEMATEMESIS WITH NAUSEA: Primary | ICD-10-CM

## 2022-05-30 RX ORDER — SODIUM CHLORIDE 0.9 % (FLUSH) 0.9 %
10 SYRINGE (ML) INJECTION
Status: CANCELLED | OUTPATIENT
Start: 2022-05-31

## 2022-05-30 RX ORDER — HEPARIN 100 UNIT/ML
500 SYRINGE INTRAVENOUS
Status: CANCELLED | OUTPATIENT
Start: 2022-05-31

## 2022-05-31 ENCOUNTER — LAB VISIT (OUTPATIENT)
Dept: LAB | Facility: HOSPITAL | Age: 42
End: 2022-05-31
Payer: COMMERCIAL

## 2022-05-31 ENCOUNTER — HOSPITAL ENCOUNTER (EMERGENCY)
Facility: HOSPITAL | Age: 42
Discharge: HOME | End: 2022-05-31
Attending: EMERGENCY MEDICINE
Payer: COMMERCIAL

## 2022-05-31 ENCOUNTER — DOCUMENTATION ONLY (OUTPATIENT)
Dept: HEMATOLOGY/ONCOLOGY | Facility: CLINIC | Age: 42
End: 2022-05-31
Payer: COMMERCIAL

## 2022-05-31 ENCOUNTER — OFFICE VISIT (OUTPATIENT)
Dept: PSYCHIATRY | Facility: CLINIC | Age: 42
End: 2022-05-31
Payer: COMMERCIAL

## 2022-05-31 VITALS
DIASTOLIC BLOOD PRESSURE: 68 MMHG | OXYGEN SATURATION: 97 % | HEIGHT: 67 IN | SYSTOLIC BLOOD PRESSURE: 112 MMHG | RESPIRATION RATE: 15 BRPM | HEART RATE: 60 BPM | WEIGHT: 133.38 LBS | BODY MASS INDEX: 20.93 KG/M2 | TEMPERATURE: 97 F

## 2022-05-31 DIAGNOSIS — C16.9 MALIGNANT NEOPLASM OF STOMACH, UNSPECIFIED LOCATION: ICD-10-CM

## 2022-05-31 DIAGNOSIS — R11.10 VOMITING: ICD-10-CM

## 2022-05-31 DIAGNOSIS — F41.1 ANXIETY ASSOCIATED WITH CANCER DIAGNOSIS: Primary | ICD-10-CM

## 2022-05-31 DIAGNOSIS — R11.2 NON-INTRACTABLE VOMITING WITH NAUSEA, UNSPECIFIED VOMITING TYPE: Primary | ICD-10-CM

## 2022-05-31 DIAGNOSIS — T45.1X5A ADVERSE EFFECT OF CHEMOTHERAPY, INITIAL ENCOUNTER: ICD-10-CM

## 2022-05-31 DIAGNOSIS — C16.9 GASTRIC ADENOCARCINOMA: ICD-10-CM

## 2022-05-31 DIAGNOSIS — C80.1 ANXIETY ASSOCIATED WITH CANCER DIAGNOSIS: Primary | ICD-10-CM

## 2022-05-31 DIAGNOSIS — C78.6 PERITONEAL CARCINOMATOSIS: ICD-10-CM

## 2022-05-31 LAB
ALBUMIN SERPL BCP-MCNC: 1.8 G/DL (ref 3.5–5.2)
ALBUMIN SERPL BCP-MCNC: 2.1 G/DL (ref 3.5–5.2)
ALP SERPL-CCNC: 106 U/L (ref 55–135)
ALP SERPL-CCNC: 91 U/L (ref 55–135)
ALT SERPL W/O P-5'-P-CCNC: 14 U/L (ref 10–44)
ALT SERPL W/O P-5'-P-CCNC: 15 U/L (ref 10–44)
ANION GAP SERPL CALC-SCNC: 7 MMOL/L (ref 8–16)
ANION GAP SERPL CALC-SCNC: 9 MMOL/L (ref 8–16)
AST SERPL-CCNC: 26 U/L (ref 10–40)
AST SERPL-CCNC: 32 U/L (ref 10–40)
BASOPHILS # BLD AUTO: 0.04 K/UL (ref 0–0.2)
BASOPHILS NFR BLD: 0.6 % (ref 0–1.9)
BILIRUB SERPL-MCNC: 0.3 MG/DL (ref 0.1–1)
BILIRUB SERPL-MCNC: 0.3 MG/DL (ref 0.1–1)
BUN SERPL-MCNC: 6 MG/DL (ref 6–20)
BUN SERPL-MCNC: 7 MG/DL (ref 6–20)
CALCIUM SERPL-MCNC: 8.9 MG/DL (ref 8.7–10.5)
CALCIUM SERPL-MCNC: 9.2 MG/DL (ref 8.7–10.5)
CHLORIDE SERPL-SCNC: 103 MMOL/L (ref 95–110)
CHLORIDE SERPL-SCNC: 106 MMOL/L (ref 95–110)
CO2 SERPL-SCNC: 28 MMOL/L (ref 23–29)
CO2 SERPL-SCNC: 31 MMOL/L (ref 23–29)
CREAT SERPL-MCNC: 0.7 MG/DL (ref 0.5–1.4)
CREAT SERPL-MCNC: 0.7 MG/DL (ref 0.5–1.4)
DIFFERENTIAL METHOD: ABNORMAL
EOSINOPHIL # BLD AUTO: 0.2 K/UL (ref 0–0.5)
EOSINOPHIL NFR BLD: 2.3 % (ref 0–8)
ERYTHROCYTE [DISTWIDTH] IN BLOOD BY AUTOMATED COUNT: 12.1 % (ref 11.5–14.5)
ERYTHROCYTE [DISTWIDTH] IN BLOOD BY AUTOMATED COUNT: 12.2 % (ref 11.5–14.5)
EST. GFR  (AFRICAN AMERICAN): >60 ML/MIN/1.73 M^2
EST. GFR  (AFRICAN AMERICAN): >60 ML/MIN/1.73 M^2
EST. GFR  (NON AFRICAN AMERICAN): >60 ML/MIN/1.73 M^2
EST. GFR  (NON AFRICAN AMERICAN): >60 ML/MIN/1.73 M^2
GLUCOSE SERPL-MCNC: 89 MG/DL (ref 70–110)
GLUCOSE SERPL-MCNC: 91 MG/DL (ref 70–110)
HCT VFR BLD AUTO: 33.7 % (ref 37–48.5)
HCT VFR BLD AUTO: 38.7 % (ref 37–48.5)
HCV AB SERPL QL IA: NEGATIVE
HGB BLD-MCNC: 10.8 G/DL (ref 12–16)
HGB BLD-MCNC: 12.6 G/DL (ref 12–16)
HIV 1+2 AB+HIV1 P24 AG SERPL QL IA: NEGATIVE
IMM GRANULOCYTES # BLD AUTO: 0.02 K/UL (ref 0–0.04)
IMM GRANULOCYTES # BLD AUTO: 0.03 K/UL (ref 0–0.04)
IMM GRANULOCYTES NFR BLD AUTO: 0.3 % (ref 0–0.5)
LIPASE SERPL-CCNC: 46 U/L (ref 4–60)
LYMPHOCYTES # BLD AUTO: 1.6 K/UL (ref 1–4.8)
LYMPHOCYTES NFR BLD: 24.8 % (ref 18–48)
MCH RBC QN AUTO: 28.7 PG (ref 27–31)
MCH RBC QN AUTO: 29.2 PG (ref 27–31)
MCHC RBC AUTO-ENTMCNC: 32 G/DL (ref 32–36)
MCHC RBC AUTO-ENTMCNC: 32.6 G/DL (ref 32–36)
MCV RBC AUTO: 90 FL (ref 82–98)
MCV RBC AUTO: 90 FL (ref 82–98)
MONOCYTES # BLD AUTO: 0.2 K/UL (ref 0.3–1)
MONOCYTES NFR BLD: 2.5 % (ref 4–15)
NEUTROPHILS # BLD AUTO: 3.9 K/UL (ref 1.8–7.7)
NEUTROPHILS # BLD AUTO: 4.5 K/UL (ref 1.8–7.7)
NEUTROPHILS NFR BLD: 69.5 % (ref 38–73)
NRBC BLD-RTO: 0 /100 WBC
PLATELET # BLD AUTO: 316 K/UL (ref 150–450)
PLATELET # BLD AUTO: 351 K/UL (ref 150–450)
PMV BLD AUTO: 8.4 FL (ref 9.2–12.9)
PMV BLD AUTO: 8.6 FL (ref 9.2–12.9)
POTASSIUM SERPL-SCNC: 3.5 MMOL/L (ref 3.5–5.1)
POTASSIUM SERPL-SCNC: 4.3 MMOL/L (ref 3.5–5.1)
PROT SERPL-MCNC: 5.6 G/DL (ref 6–8.4)
PROT SERPL-MCNC: 6.6 G/DL (ref 6–8.4)
RBC # BLD AUTO: 3.76 M/UL (ref 4–5.4)
RBC # BLD AUTO: 4.32 M/UL (ref 4–5.4)
SODIUM SERPL-SCNC: 140 MMOL/L (ref 136–145)
SODIUM SERPL-SCNC: 144 MMOL/L (ref 136–145)
WBC # BLD AUTO: 5.37 K/UL (ref 3.9–12.7)
WBC # BLD AUTO: 6.48 K/UL (ref 3.9–12.7)

## 2022-05-31 PROCEDURE — 99284 EMERGENCY DEPT VISIT MOD MDM: CPT | Mod: 25,27

## 2022-05-31 PROCEDURE — 90834 PSYTX W PT 45 MINUTES: CPT | Mod: ,,, | Performed by: PSYCHOLOGIST

## 2022-05-31 PROCEDURE — 99284 EMERGENCY DEPT VISIT MOD MDM: CPT | Mod: ,,, | Performed by: EMERGENCY MEDICINE

## 2022-05-31 PROCEDURE — 99211 OFF/OP EST MAY X REQ PHY/QHP: CPT | Mod: PBBFAC | Performed by: PSYCHOLOGIST

## 2022-05-31 PROCEDURE — 85027 COMPLETE CBC AUTOMATED: CPT | Performed by: INTERNAL MEDICINE

## 2022-05-31 PROCEDURE — 96361 HYDRATE IV INFUSION ADD-ON: CPT

## 2022-05-31 PROCEDURE — 96375 TX/PRO/DX INJ NEW DRUG ADDON: CPT

## 2022-05-31 PROCEDURE — 87389 HIV-1 AG W/HIV-1&-2 AB AG IA: CPT | Performed by: EMERGENCY MEDICINE

## 2022-05-31 PROCEDURE — 83690 ASSAY OF LIPASE: CPT | Performed by: EMERGENCY MEDICINE

## 2022-05-31 PROCEDURE — 99999 PR PBB SHADOW E&M-EST. PATIENT-LVL I: CPT | Mod: PBBFAC,,, | Performed by: PSYCHOLOGIST

## 2022-05-31 PROCEDURE — 99284 PR EMERGENCY DEPT VISIT,LEVEL IV: ICD-10-PCS | Mod: ,,, | Performed by: EMERGENCY MEDICINE

## 2022-05-31 PROCEDURE — C9113 INJ PANTOPRAZOLE SODIUM, VIA: HCPCS | Performed by: EMERGENCY MEDICINE

## 2022-05-31 PROCEDURE — 86803 HEPATITIS C AB TEST: CPT | Performed by: EMERGENCY MEDICINE

## 2022-05-31 PROCEDURE — 85025 COMPLETE CBC W/AUTO DIFF WBC: CPT | Performed by: EMERGENCY MEDICINE

## 2022-05-31 PROCEDURE — 80053 COMPREHEN METABOLIC PANEL: CPT | Mod: 91 | Performed by: EMERGENCY MEDICINE

## 2022-05-31 PROCEDURE — 99999 PR PBB SHADOW E&M-EST. PATIENT-LVL I: ICD-10-PCS | Mod: PBBFAC,,, | Performed by: PSYCHOLOGIST

## 2022-05-31 PROCEDURE — 36415 COLL VENOUS BLD VENIPUNCTURE: CPT | Performed by: INTERNAL MEDICINE

## 2022-05-31 PROCEDURE — 90834 PR PSYCHOTHERAPY W/PATIENT, 45 MIN: ICD-10-PCS | Mod: ,,, | Performed by: PSYCHOLOGIST

## 2022-05-31 PROCEDURE — 80053 COMPREHEN METABOLIC PANEL: CPT | Performed by: INTERNAL MEDICINE

## 2022-05-31 PROCEDURE — 63600175 PHARM REV CODE 636 W HCPCS: Performed by: EMERGENCY MEDICINE

## 2022-05-31 PROCEDURE — 96374 THER/PROPH/DIAG INJ IV PUSH: CPT

## 2022-05-31 PROCEDURE — 25000003 PHARM REV CODE 250: Performed by: EMERGENCY MEDICINE

## 2022-05-31 RX ORDER — PROMETHAZINE HYDROCHLORIDE 25 MG/1
25 TABLET ORAL EVERY 6 HOURS PRN
Qty: 12 TABLET | Refills: 1 | Status: SHIPPED | OUTPATIENT
Start: 2022-05-31 | End: 2022-06-13 | Stop reason: SDUPTHER

## 2022-05-31 RX ORDER — PANTOPRAZOLE SODIUM 40 MG/10ML
40 INJECTION, POWDER, LYOPHILIZED, FOR SOLUTION INTRAVENOUS
Status: COMPLETED | OUTPATIENT
Start: 2022-05-31 | End: 2022-05-31

## 2022-05-31 RX ORDER — PROCHLORPERAZINE EDISYLATE 5 MG/ML
10 INJECTION INTRAMUSCULAR; INTRAVENOUS
Status: COMPLETED | OUTPATIENT
Start: 2022-05-31 | End: 2022-05-31

## 2022-05-31 RX ADMIN — PROCHLORPERAZINE EDISYLATE 10 MG: 5 INJECTION INTRAMUSCULAR; INTRAVENOUS at 11:05

## 2022-05-31 RX ADMIN — PANTOPRAZOLE SODIUM 40 MG: 40 INJECTION, POWDER, FOR SOLUTION INTRAVENOUS at 02:05

## 2022-05-31 RX ADMIN — SODIUM CHLORIDE 1000 ML: 0.9 INJECTION, SOLUTION INTRAVENOUS at 11:05

## 2022-05-31 NOTE — PROGRESS NOTES
INFORMED CONSENT: Puja Quigley   is known to this provider and identity was confirmed via NAME and .  The patient has been informed of the risks and benefits associated with engaging in psychotherapy, the handling of protected health information, the rights of privacy and the limits of confidentiality. The patient has also been informed of the importance of reporting any suicidal or homicidal ideation to this or any provider to ensure safety of all parties, and the Puja Quigley expressed understanding. The patient was agreeable to these terms and freely participates in individual psychotherapy.    PSYCHO-ONCOLOGY NOTE/ Individual Psychotherapy     Date: 2022   Site:  [facility]        Therapeutic Intervention: Met with patient.  Outpatient - Behavior modifying psychotherapy 45 min - CPT code 77621      Patient was last seen by me on 2022    Problem list  Patient Active Problem List   Diagnosis    Gastric leak    Gastric fistula    Abnormal endoscopy of upper gastrointestinal tract    Chronic gastric ulcer with perforation    Other ascites    Microcytic anemia    Hematemesis with nausea    Malignant neoplasm of stomach    Normocytic anemia    Severe malnutrition    Immunodeficiency due to chemotherapy    Peritoneal carcinomatosis    Non-intractable vomiting with nausea    Anxiety associated with cancer diagnosis    Inguinal adenopathy    Neuropathy due to chemotherapeutic drug    Bone metastases    Pain aggravated by activities of daily living    Dehydration       Chief complaint/reason for encounter: anxiety   Met with patient to evaluate psychosocial adaptation to diagnosis/treatment/survivorship of Advanced gastric cancer    Current Medications  Current Outpatient Medications   Medication    acetaminophen (TYLENOL) 500 MG tablet    amitriptyline (ELAVIL) 10 MG tablet    amitriptyline (ELAVIL) 10 MG tablet    dicyclomine (BENTYL) 10 MG capsule    famotidine  (PEPCID) 20 MG tablet    furosemide (LASIX) 20 MG tablet    furosemide (LASIX) 40 MG tablet    gabapentin (NEURONTIN) 300 MG capsule    HYDROcodone-acetaminophen (NORCO) 5-325 mg per tablet    HYDROcodone-acetaminophen (NORCO) 5-325 mg per tablet    LIDOcaine-prilocaine (EMLA) cream    LIDOcaine-prilocaine (EMLA) cream    metoclopramide HCl (REGLAN) 5 MG tablet    multivitamin with folic acid 400 mcg Tab    omeprazole (PRILOSEC) 40 MG capsule    ondansetron (ZOFRAN) 8 MG tablet    pantoprazole (PROTONIX) 40 MG tablet    pantoprazole (PROTONIX) 40 MG tablet    prochlorperazine (COMPAZINE) 10 MG tablet    prochlorperazine (COMPAZINE) 10 MG tablet    senna-docusate 8.6-50 mg (PERICOLACE) 8.6-50 mg per tablet    senna-docusate 8.6-50 mg (SENNA WITH DOCUSATE SODIUM) 8.6-50 mg per tablet    sucralfate (CARAFATE) 1 gram tablet    sucralfate (CARAFATE) 1 gram tablet    traMADoL (ULTRAM) 50 mg tablet    traMADoL (ULTRAM) 50 mg tablet     No current facility-administered medications for this visit.       Objective:  Puja Quigley arrived promptly for the session.   Ms. Quigley was independently ambulatory at the time of session. The patient was fully cooperative throughout the session.  Appearance: thin, ill-appearing  Behavior/Cooperation: friendly and cooperative, lethargic  Speech: normal in rate, volume, and tone and appropriate quality, quantity and organization of sentences  Mood: euthymic  Affect: mood congruent  Thought Process: goal-directed, logical  Thought Content: normal,  No delusions or paranoia; did not appear to be responding to internal stimuli during the session  Orientation: grossly intact  Memory: Grossly intact  Attention Span/Concentration: Attends to session without distraction; reports no difficulty  Fund of Knowledge: average  Estimate of Intelligence: average from verbal skills and history  Cognition: grossly intact  Insight: patient has awareness of illness; good insight  into own behavior and behavior of others  Judgment: the patient's behavior is adequate to circumstances    Interval history and content of current session: Patient not well today. Plans for IVF today./ Ascites prominent. Patient with mild worry related to current medical status. Does not want to talk about prognosis.   Discussed diagnosis, treatment, prognosis, current adaptation to disease and treatment status and family's adaptation to disease and treatment status. Reports to be coping in a passive manner. Evaluated cognitive response, paying particular attention to negative intrusive thoughts of a persistent and detrimental nature. Thoughts of this type are in evidence with moderate distress. Provided cognitive behavioral therapy to address negative cognitions. Identified and evaluated psychosocial and environmental stressors secondary to diagnosis and treatment.  Examined proactive behaviors that may be implemented to minimize or ameliorate psychosocial stressors secondary to diagnosis and treatment.     Risk parameters:   Patient reports no suicidal ideation  Patient reports no homicidal ideation  Patient reports no self-injurious behavior  Patient reports no violent behavior   Safety needs:  None at this time      Verbal deficits: None     Patient's response to intervention:The patient's response to intervention is accepting.     Progress toward goals and other mental status changes:  The patient's progress toward goals is good.      Progress to date:Progress as Expected      Goals from last visit: Met     Patient reported outcomes:    Distress Thermometer:   Distress Score    Distress Score: 7        Practical Problems Physical Problems   : No Appearance: Yes   Housing: No Bathing / Dressing: Yes   Insurance / Financial: No Breathing: No    Transportation: No  Changes in Urination: No    Work / School: No  Constipation: Yes   Treatment Decisions: No  Diarrhea: Yes     Eating: Yes    Family Problems  Fatigue: Yes    Dealing with Children: No Feeling Swollen: Yes    Dealing with Partner: No Fevers: No    Ability to Have Children: No  Getting Around: Yes       Indigestion: Yes     Memory / Concentration: No   Emotional Problems Mouth Sores: No    Depression: No  Nausea: Yes    Fears: No  Nose Dry / Congested: No    Nervousness: No  Pain: Yes    Sadness: No Sexual: No    Worry: No Skin Dry / Itchy: No    Loss of Interest in Usual Activities: No Sleep: Yes     Substance Abuse: No    Spiritual/Religions Concerns Tingling in Hands / Feet: Yes   Spritual / Jew Concerns: No         Other Problems              PHQ ANSWERS    Q1. Little interest or pleasure in doing things: (P) Several days (05/31/22 0948)  Q2. Feeling down, depressed, or hopeless: (P) Not at all (05/31/22 0948)  Q3. Trouble falling or staying asleep, or sleeping too much: (P) Several days (05/31/22 0948)  Q4. Feeling tired or having little energy: (P) Several days (05/31/22 0948)  Q5. Poor appetite or overeating: (P) Several days (05/31/22 0948)  Q6. Feeling bad about yourself - or that you are a failure or have let yourself or your family down: (P) Not at all (05/31/22 0948)  Q7. Trouble concentrating on things, such as reading the newspaper or watching television: (P) Not at all (05/31/22 0948)  Q8. Moving or speaking so slowly that other people could have noticed. Or the opposite - being so fidgety or restless that you have been moving around a lot more than usual: (P) Not at all (05/31/22 0948)  Q9.  No SI    PHQ8 Score : (P) 4 (05/31/22 0948)  PHQ-9 Total Score: (P) 4 (05/31/22 0948)     GARCIA-7 Answers    GAD7 5/31/2022   1. Feeling nervous, anxious, or on edge? 0   2. Not being able to stop or control worrying? 2   3. Worrying too much about different things? 0   4. Trouble relaxing? 2   5. Being so restless that it is hard to sit still? 0   6. Becoming easily annoyed or irritable? 0   7. Feeling afraid as if something awful might happen? 0  "  GARCIA-7 Score 4     GARCIA-7 Score: (P) 4  Interpretation: (P) Normal     Client Strengths: verbal, intelligent, successful, good social support, good insight, commitment to wellness, strong lyla, strong cultural traditions     Diagnosis:     ICD-10-CM ICD-9-CM   1. Anxiety associated with cancer diagnosis  F41.1 300.09    C80.1    2. Malignant neoplasm of stomach, unspecified location  C16.9 151.9   3. Peritoneal carcinomatosis  C78.6 197.6       Treatment Plan:individual psychotherapy and medication management by physician  · Target symptoms: anxiety   · Why chosen therapy is appropriate versus another modality: relevant to diagnosis, patient responds to this modality, evidence based practice  · Outcome monitoring methods: self-report, observation, checklist/rating scale  · Therapeutic intervention type: behavior modifying psychotherapy  Prognosis: Fair . Patient refuses to initiate conversation about prognosis.  She reported she understands "I am on treatment to stay alive."     Behavioral goals:     Identify and reduce avoidance behaviors contributing to and maintaining anxiety    Return to clinic: 1 month      Length of Service (minutes direct face-to-face contact): 45    Giselle Berumen, PhD  Clinical Psychologist  [place] License #[id number]  [place] License #[id number]    "

## 2022-05-31 NOTE — ED PROVIDER NOTES
Source of History:  Patient    Chief complaint:  Vomiting (Started on new chemo for abd chemo)      HPI:  Puja Quigley is a 41 y.o. female presenting with nausea and vomiting for approximately a day and half.  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  She switched her chemo regimen for her last dose, and has had this reaction from it.  Her abdomen is chronically distended, and she is requiring weekly paracentesis, and states that this is the normal size of her abdomen as she is having and to the end of the week.  She denies any abdominal pain, and has had decreased bowel movements.  She denies any fever chills.  She is taking ondansetron at home with no relief.    ROS: As per HPI and below:  General: No fever.  No chills.  Eyes: No visual changes.  Head: No headache.    Integument: No rashes or lesions.  Chest: No shortness of breath.  Cardiovascular: No chest pain.  Abdomen: No abdominal pain.  + nausea & vomiting.  Urinary: No abnormal urination.  Neurologic: No focal weakness.  No numbness.  Hematologic: No easy bruising.  Endocrine: No excessive thirst or urination.      Review of patient's allergies indicates:  No Known Allergies    No current facility-administered medications on file prior to encounter.     Current Outpatient Medications on File Prior to Encounter   Medication Sig Dispense Refill    acetaminophen (TYLENOL) 500 MG tablet Take 500 mg by mouth every 6 (six) hours as needed for Pain.      amitriptyline (ELAVIL) 10 MG tablet Take 1 tablet (10 mg total) by mouth every evening. 30 tablet 3    amitriptyline (ELAVIL) 10 MG tablet Take 10 mg by mouth.      dicyclomine (BENTYL) 10 MG capsule TAKE ONE CAPSULE BY MOUTH FOUR TIMES DAILY 120 capsule 0    famotidine (PEPCID) 20 MG tablet Take 1 tablet (20 mg total) by mouth nightly as needed for Heartburn. 60 tablet 1    furosemide (LASIX) 20 MG tablet Take 10 mg by mouth.       furosemide (LASIX) 40 MG tablet       gabapentin (NEURONTIN) 300 MG capsule Take 1 capsule (300 mg total) by mouth 3 (three) times daily. 90 capsule 11    HYDROcodone-acetaminophen (NORCO) 5-325 mg per tablet Take 1 tablet by mouth.      HYDROcodone-acetaminophen (NORCO) 5-325 mg per tablet Take 1 tablet by mouth 3 (three) times daily as needed for Pain. 90 tablet 0    LIDOcaine-prilocaine (EMLA) cream Apply to Port-A-Cath area 30 to 45 minutes prior to port access as directed (topical anesthetic use to the anterior chest only).      LIDOcaine-prilocaine (EMLA) cream Apply topically.      metoclopramide HCl (REGLAN) 5 MG tablet 5 mg.      multivitamin with folic acid 400 mcg Tab Take 1 tablet by mouth once daily.      omeprazole (PRILOSEC) 40 MG capsule Take 1 capsule (40 mg total) by mouth once daily. 30 capsule 11    ondansetron (ZOFRAN) 8 MG tablet Take 1 tablet (8 mg total) by mouth every 8 (eight) hours as needed for Nausea. 60 tablet 2    pantoprazole (PROTONIX) 40 MG tablet Take 40 mg by mouth.      pantoprazole (PROTONIX) 40 MG tablet Take 1 tablet (40 mg total) by mouth 2 (two) times daily. 60 tablet 11    prochlorperazine (COMPAZINE) 10 MG tablet TAKE 1 TABLET(10 MG) BY MOUTH EVERY 6 HOURS AS NEEDED FOR NAUSEA 90 tablet 2    prochlorperazine (COMPAZINE) 10 MG tablet as needed.      senna-docusate 8.6-50 mg (PERICOLACE) 8.6-50 mg per tablet Take 1 tablet by mouth.      senna-docusate 8.6-50 mg (SENNA WITH DOCUSATE SODIUM) 8.6-50 mg per tablet Take 1 tablet by mouth 2 (two) times daily as needed for Constipation.      sucralfate (CARAFATE) 1 gram tablet TAKE 1 TABLET(1 GRAM) BY MOUTH FOUR TIMES DAILY FOR 14 DAYS 56 tablet 0    sucralfate (CARAFATE) 1 gram tablet       traMADoL (ULTRAM) 50 mg tablet Take 50 mg by mouth.      traMADoL (ULTRAM) 50 mg tablet Take by mouth.         PMH:  As per HPI and below:  Past Medical History:   Diagnosis Date    Anxiety     Dehydration 5/30/2022     "Gastric cancer     Gastric ulcer     Hx of psychiatric care     Pregnancy 2020    delivered on 2020    Umbilical hernia      Past Surgical History:   Procedure Laterality Date    CLOSURE OF PERFORATED ULCER OF DUODENUM USING OMENTAL PATCH      ESOPHAGOGASTRODUODENOSCOPY N/A 10/13/2020    Procedure: EGD (ESOPHAGOGASTRODUODENOSCOPY);  Surgeon: Rosio Marion MD;  Location: [facility] ENDO (2ND FLR);  Service: Endoscopy;  Laterality: N/A;    ESOPHAGOGASTRODUODENOSCOPY N/A 2020    Procedure: EGD (ESOPHAGOGASTRODUODENOSCOPY);  Surgeon: Rosio Marion MD;  Location: [facility] ENDO (2ND FLR);  Service: Endoscopy;  Laterality: N/A;  Covid-19 test 20 at [facility]    ESOPHAGOGASTRODUODENOSCOPY N/A 3/12/2021    Procedure: EGD (ESOPHAGOGASTRODUODENOSCOPY);  Surgeon: Nav Omer MD;  Location: [facility] ENDO (2ND FLR);  Service: Endoscopy;  Laterality: N/A;  COVID at [facility] [place] 3/9 ttr    ESOPHAGOGASTRODUODENOSCOPY N/A 2021    Procedure: EGD (ESOPHAGOGASTRODUODENOSCOPY);  Surgeon: Rosio Marion MD;  Location: [facility] ENDO (2ND FLR);  Service: Endoscopy;  Laterality: N/A;  5/15-covid pcw-inst portal-tb    ESOPHAGOGASTRODUODENOSCOPY N/A 2021    Procedure: EGD (ESOPHAGOGASTRODUODENOSCOPY);  Surgeon: Socrates Terrazas MD;  Location: [facility] ENDO (2ND FLR);  Service: Endoscopy;  Laterality: N/A;       Social History     Socioeconomic History    Marital status: Single    Number of children: 1   Tobacco Use    Smoking status: Former Smoker     Types: Cigarettes     Quit date: 2019     Years since quittin.5    Smokeless tobacco: Never Used   Substance and Sexual Activity    Alcohol use: Yes    Drug use: Not Currently    Sexual activity: Yes     Partners: Male       Family History   Problem Relation Age of Onset    Cancer Mother 67        lymphoma (type? "not active," not on tx; "related to her blood")    Schizophrenia Mother     Lung cancer Father 48        type? h/o smoking " "   No Known Problems Son     Breast cancer Other 73        unilat; stage I, but aggressive    Prostate cancer Paternal Uncle         (dx 50s/60? no chemo?)    Breast cancer Paternal Cousin         (dx age?) ductal    Colon cancer Neg Hx     Esophageal cancer Neg Hx        Physical Exam:    Vitals:    05/31/22 1045 05/31/22 1330 05/31/22 1520   BP: (!) 136/94 112/68    BP Location:  Left arm    Pulse: 77 64 60   Resp: 18 15 15   Temp: 97.8 °F (36.6 °C) 97.4 °F (36.3 °C)    TempSrc: Oral Oral    SpO2: 98%  97%   Weight: 60.5 kg (133 lb 6.1 oz)     Height: 5' 7" (1.702 m)       Appearance: Moderate distress due to vomiting. Cachetic.  Skin: No rashes seen.  Good turgor.  No abrasions.  No ecchymoses.  Eyes: No conjunctival injection.  ENT: Oropharynx clear.    Chest: Clear to auscultation bilaterally.  Good air movement.  No wheezes.  No rhonchi.  Cardiovascular: Regular rate and rhythm.  No murmurs. No gallops. No rubs.  Abdomen: Soft.  + distended.  Nontender.  No guarding.  No rebound. Normal BS all 4 quads  Musculoskeletal: Good range of motion all joints.  No deformities.  Neck supple.  No meningismus.  Neurologic: Motor intact.  Sensation intact.  Cerebellar intact.  Cranial nerves intact.  Mental Status:  Alert and oriented x 3.  Appropriate, conversant.      Initial Impression:  Nausea & vomiting status post changing chemo.    Labs Reviewed   CBC W/ AUTO DIFFERENTIAL - Abnormal; Notable for the following components:       Result Value    MPV 8.6 (*)     Mono # 0.2 (*)     Mono % 2.5 (*)     All other components within normal limits   COMPREHENSIVE METABOLIC PANEL - Abnormal; Notable for the following components:    Albumin 2.1 (*)     All other components within normal limits   HIV 1 / 2 ANTIBODY    Narrative:     Release to patient->Immediate   HEPATITIS C ANTIBODY    Narrative:     Release to patient->Immediate   LIPASE       I decided to obtain the patient's medical records.    Imaging Results          " X-Ray Abdomen Flat And Erect (Final result)  Result time 05/31/22 13:56:44    Final result by Nav Garces MD (05/31/22 13:56:44)                 Impression:      See above      Electronically signed by: Nav Garces MD  Date:    05/31/2022  Time:    13:56             Narrative:    EXAMINATION:  XR ABDOMEN FLAT AND ERECT    CLINICAL HISTORY:  Vomiting, unspecified    TECHNIQUE:  Flat and erect AP views of the abdomen were performed.    COMPARISON:  None    FINDINGS:  Heart size normal.  The lungs are clear.  No pleural effusion                                Medications   sodium chloride 0.9% bolus 1,000 mL (0 mLs Intravenous Stopped 5/31/22 1250)   prochlorperazine injection Soln 10 mg (10 mg Intravenous Given 5/31/22 1150)   pantoprazole injection 40 mg (40 mg Intravenous Given 5/31/22 1408)                   MDM:    41 y.o. female with Nausea & vomiting status post changing chemo.  No abdominal pain, and distention is normal size for her.  However I am going to get a flat and erect her abdomen just to rule out an obstruction.  Also order labs and gave her fluids and Compazine for her nausea.  Will monitor for electrolyte abnormalities and replace as indicated.  If nausea is controlled x-ray is normal, will plan to treat her as an outpatient and follow-up with heme Onc.    Diagnostic Impression:    1. Non-intractable vomiting with nausea, unspecified vomiting type    2. Vomiting    3. Adverse effect of chemotherapy, initial encounter         ED Disposition Condition    Discharge Stable        ED Prescriptions     Medication Sig Dispense Start Date End Date Auth. Provider    promethazine (PHENERGAN) 25 MG tablet Take 1 tablet (25 mg total) by mouth every 6 (six) hours as needed for Nausea. 12 tablet 5/31/2022  Diaz Aponte III, MD        Follow-up Information    None          Diaz Aponte III, MD  06/06/22 1310

## 2022-06-01 ENCOUNTER — PATIENT MESSAGE (OUTPATIENT)
Dept: PSYCHIATRY | Facility: CLINIC | Age: 42
End: 2022-06-01
Payer: COMMERCIAL

## 2022-06-03 ENCOUNTER — HOSPITAL ENCOUNTER (OUTPATIENT)
Dept: INTERVENTIONAL RADIOLOGY/VASCULAR | Facility: HOSPITAL | Age: 42
Discharge: HOME | End: 2022-06-03
Attending: INTERNAL MEDICINE
Payer: COMMERCIAL

## 2022-06-03 VITALS — SYSTOLIC BLOOD PRESSURE: 144 MMHG | DIASTOLIC BLOOD PRESSURE: 94 MMHG

## 2022-06-03 DIAGNOSIS — R18.8 OTHER ASCITES: ICD-10-CM

## 2022-06-03 PROCEDURE — 49083 ABD PARACENTESIS W/IMAGING: CPT | Performed by: RADIOLOGY

## 2022-06-03 PROCEDURE — 25000003 PHARM REV CODE 250: Performed by: RADIOLOGY

## 2022-06-03 PROCEDURE — C1729 CATH, DRAINAGE: HCPCS

## 2022-06-03 PROCEDURE — 49083 IR PARACENTESIS WITH IMAGING: ICD-10-PCS | Mod: ,,, | Performed by: RADIOLOGY

## 2022-06-03 RX ORDER — LIDOCAINE HYDROCHLORIDE 20 MG/ML
INJECTION, SOLUTION INFILTRATION; PERINEURAL CODE/TRAUMA/SEDATION MEDICATION
Status: COMPLETED | OUTPATIENT
Start: 2022-06-03 | End: 2022-06-03

## 2022-06-03 RX ADMIN — LIDOCAINE HYDROCHLORIDE 10 ML: 20 INJECTION, SOLUTION INFILTRATION; PERINEURAL at 10:06

## 2022-06-03 NOTE — DISCHARGE SUMMARY
Radiology Discharge Summary      Hospital Course: No complications    Admit Date: 6/3/2022  Discharge Date: 06/03/2022     Instructions Given to Patient: Yes  Diet: Resume prior diet  Activity: activity as tolerated    Description of Condition on Discharge: Stable  Vital Signs (Most Recent): BP: (!) 144/94 (06/03/22 1028)    Discharge Disposition: Home    Discharge Diagnosis: gastric cancer with ascites s/p paracentesis     Follow-up: Continue periodic paracentesis as needed.  Follow up with Oncology as needed.    Martin Ivory MD  Staff Radiologist  Department of Radiology  Pager: [phone]

## 2022-06-03 NOTE — H&P
Radiology History & Physical      SUBJECTIVE:     Chief Complaint: gastric cancer    History of Present Illness:  Puja Quigley is a 41 y.o. female who presents for paracentesis  Past Medical History:   Diagnosis Date    Anxiety     Dehydration 5/30/2022    Gastric cancer     Gastric ulcer     Hx of psychiatric care     Pregnancy 08/12/2020    delivered on 8/12/2020    Umbilical hernia      Past Surgical History:   Procedure Laterality Date    CLOSURE OF PERFORATED ULCER OF DUODENUM USING OMENTAL PATCH      ESOPHAGOGASTRODUODENOSCOPY N/A 10/13/2020    Procedure: EGD (ESOPHAGOGASTRODUODENOSCOPY);  Surgeon: Rosio Marion MD;  Location: [facility] (2ND FLR);  Service: Endoscopy;  Laterality: N/A;    ESOPHAGOGASTRODUODENOSCOPY N/A 12/11/2020    Procedure: EGD (ESOPHAGOGASTRODUODENOSCOPY);  Surgeon: Rosio Marion MD;  Location: [facility] (2ND FLR);  Service: Endoscopy;  Laterality: N/A;  Cov[facility] test 12/8/20 at [facility]    ESOPHAGOGASTRODUODENOSCOPY N/A 3/12/2021    Procedure: EGD (ESOPHAGOGASTRODUODENOSCOPY);  Surgeon: Nav Omer MD;  Location: [facility] (2ND FLR);  Service: Endoscopy;  Laterality: N/A;  COVID at [facility] 3/9 [facility]    ESOPHAGOGASTRODUODENOSCOPY N/A 5/18/2021    Procedure: EGD (ESOPHAGOGASTRODUODENOSCOPY);  Surgeon: Rosio Marion MD;  Location: [facility] ENDO (2ND FLR);  Service: Endoscopy;  Laterality: N/A;  5/15-covid pcw-inst portal-tb    ESOPHAGOGASTRODUODENOSCOPY N/A 5/26/2021    Procedure: EGD (ESOPHAGOGASTRODUODENOSCOPY);  Surgeon: Socrates Terrazas MD;  Location: [facility] ENDO (2ND FLR);  Service: Endoscopy;  Laterality: N/A;       Home Meds:   Prior to Admission medications    Medication Sig Start Date End Date Taking? Authorizing Provider   acetaminophen (TYLENOL) 500 MG tablet Take 500 mg by mouth every 6 (six) hours as needed for Pain.    Historical Provider   amitriptyline (ELAVIL) 10 MG tablet Take 1 tablet (10 mg total) by mouth every evening. 5/13/21    Socrates Terrazas MD   amitriptyline (ELAVIL) 10 MG tablet Take 10 mg by mouth. 5/13/21   Historical Provider   dicyclomine (BENTYL) 10 MG capsule TAKE ONE CAPSULE BY MOUTH FOUR TIMES DAILY 9/27/21   Kamille Hooks PA-C   famotidine (PEPCID) 20 MG tablet Take 1 tablet (20 mg total) by mouth nightly as needed for Heartburn. 2/23/22 2/23/23  Kamille Hooks PA-C   furosemide (LASIX) 20 MG tablet Take 10 mg by mouth.    Historical Provider   furosemide (LASIX) 40 MG tablet  5/26/21   Historical Provider   gabapentin (NEURONTIN) 300 MG capsule Take 1 capsule (300 mg total) by mouth 3 (three) times daily. 2/7/22 2/7/23  Kamille Hooks PA-C   HYDROcodone-acetaminophen (NORCO) 5-325 mg per tablet Take 1 tablet by mouth. 7/9/21   Historical Provider   HYDROcodone-acetaminophen (NORCO) 5-325 mg per tablet Take 1 tablet by mouth 3 (three) times daily as needed for Pain. 5/25/22   Kamille Hooks PA-C   LIDOcaine-prilocaine (EMLA) cream Apply to Port-A-Cath area 30 to 45 minutes prior to port access as directed (topical anesthetic use to the anterior chest only). 6/25/21   Historical Provider   LIDOcaine-prilocaine (EMLA) cream Apply topically. 6/25/21   Historical Provider   metoclopramide HCl (REGLAN) 5 MG tablet 5 mg. 6/11/21   Historical Provider   multivitamin with folic acid 400 mcg Tab Take 1 tablet by mouth once daily.    Historical Provider   omeprazole (PRILOSEC) 40 MG capsule Take 1 capsule (40 mg total) by mouth once daily. 5/25/22 5/25/23  Kamille Hooks PA-C   ondansetron (ZOFRAN) 8 MG tablet Take 1 tablet (8 mg total) by mouth every 8 (eight) hours as needed for Nausea. 7/26/21   Fer Ashraf MD   pantoprazole (PROTONIX) 40 MG tablet Take 40 mg by mouth. 6/2/21   Historical Provider   pantoprazole (PROTONIX) 40 MG tablet Take 1 tablet (40 mg total) by mouth 2 (two) times daily. 5/25/22   Kamille Hooks PA-C   prochlorperazine (COMPAZINE) 10 MG tablet TAKE 1 TABLET(10 MG) BY MOUTH EVERY 6 HOURS AS  NEEDED FOR NAUSEA 8/7/21   Fer Ashraf MD   prochlorperazine (COMPAZINE) 10 MG tablet as needed. 6/3/21   Historical Provider   promethazine (PHENERGAN) 25 MG tablet Take 1 tablet (25 mg total) by mouth every 6 (six) hours as needed for Nausea. 5/31/22   Daiz Aponte III, MD   senna-docusate 8.6-50 mg (PERICOLACE) 8.6-50 mg per tablet Take 1 tablet by mouth. 5/26/21   Historical Provider   senna-docusate 8.6-50 mg (SENNA WITH DOCUSATE SODIUM) 8.6-50 mg per tablet Take 1 tablet by mouth 2 (two) times daily as needed for Constipation. 5/26/21   Reshma Wilson MD   sucralfate (CARAFATE) 1 gram tablet TAKE 1 TABLET(1 GRAM) BY MOUTH FOUR TIMES DAILY FOR 14 DAYS 10/20/21   Rosio Marion MD   sucralfate (CARAFATE) 1 gram tablet  4/8/21   Historical Provider   traMADoL (ULTRAM) 50 mg tablet Take 50 mg by mouth. 6/25/21   Historical Provider   traMADoL (ULTRAM) 50 mg tablet Take by mouth. 6/25/21   Historical Provider     Anticoagulants/Antiplatelets: no anticoagulation    Allergies: Review of patient's allergies indicates:  No Known Allergies  Sedation History:  no adverse reactions    Review of Systems:   Hematological: no known coagulopathies  Respiratory: no shortness of breath  Cardiovascular: no chest pain  Gastrointestinal: no abdominal pain, but some abdominal discomfort due to ascites  Genito-Urinary: no dysuria  Musculoskeletal: negative  Neurological: no TIA or stroke symptoms         OBJECTIVE:     Vital Signs (Most Recent)       Physical Exam:  ASA: 2  Mallampati: N/A    General: no acute distress  Mental Status: alert and oriented to person, place and time  HEENT: normocephalic, atraumatic  Chest: unlabored breathing  Heart: regular heart rate  Abdomen: distended  Extremity: moves all extremities    Laboratory  Lab Results   Component Value Date    INR 1.0 06/10/2021       Lab Results   Component Value Date    WBC 6.48 05/31/2022    HGB 12.6 05/31/2022    HCT 38.7 05/31/2022    MCV 90  05/31/2022     05/31/2022      Lab Results   Component Value Date    GLU 89 05/31/2022     05/31/2022    K 3.5 05/31/2022     05/31/2022    CO2 28 05/31/2022    BUN 6 05/31/2022    CREATININE 0.7 05/31/2022    CALCIUM 9.2 05/31/2022    MG 1.9 04/18/2022    ALT 15 05/31/2022    AST 32 05/31/2022    ALBUMIN 2.1 (L) 05/31/2022    BILITOT 0.3 05/31/2022       ASSESSMENT/PLAN:     Sedation Plan: local anesthesia  Patient will undergo paracentesis.    Martin Ivory MD  Staff Radiologist  Department of Radiology  Pager: [phone]

## 2022-06-03 NOTE — PROCEDURES
Radiology Post-Procedure Note    Pre Op Diagnosis: Ascites, gastric cancer  Post Op Diagnosis: Same    Procedure: Paracentesis    Procedure performed by: Martin Ivory MD    Written Informed Consent Obtained: Yes  Specimen Removed: YES thin renuka fluid  Estimated Blood Loss: Minimal    Findings:   Successful paracentesis.  Albumin administered PRN per protocol.    Patient tolerated procedure well.    Martin Ivory MD  Staff Radiologist  Department of Radiology  Pager: [phone]

## 2022-06-06 ENCOUNTER — HOSPITAL ENCOUNTER (OUTPATIENT)
Dept: RADIOLOGY | Facility: HOSPITAL | Age: 42
Discharge: HOME | End: 2022-06-06
Attending: PHYSICIAN ASSISTANT
Payer: COMMERCIAL

## 2022-06-06 DIAGNOSIS — C16.9 MALIGNANT NEOPLASM OF STOMACH, UNSPECIFIED LOCATION: ICD-10-CM

## 2022-06-06 PROCEDURE — 71250 CT THORAX DX C-: CPT | Mod: 26,,, | Performed by: RADIOLOGY

## 2022-06-06 PROCEDURE — 71250 CT CHEST WITHOUT CONTRAST: ICD-10-PCS | Mod: 26,,, | Performed by: RADIOLOGY

## 2022-06-06 PROCEDURE — 71250 CT THORAX DX C-: CPT | Mod: TC

## 2022-06-10 ENCOUNTER — HOSPITAL ENCOUNTER (OUTPATIENT)
Dept: INTERVENTIONAL RADIOLOGY/VASCULAR | Facility: HOSPITAL | Age: 42
Discharge: HOME | End: 2022-06-10
Attending: INTERNAL MEDICINE
Payer: COMMERCIAL

## 2022-06-10 VITALS — SYSTOLIC BLOOD PRESSURE: 123 MMHG | DIASTOLIC BLOOD PRESSURE: 87 MMHG

## 2022-06-10 DIAGNOSIS — R18.8 OTHER ASCITES: ICD-10-CM

## 2022-06-10 PROCEDURE — 49083 IR PARACENTESIS WITH IMAGING: ICD-10-PCS | Mod: ,,, | Performed by: RADIOLOGY

## 2022-06-10 PROCEDURE — C1729 CATH, DRAINAGE: HCPCS

## 2022-06-10 PROCEDURE — 49083 ABD PARACENTESIS W/IMAGING: CPT | Performed by: RADIOLOGY

## 2022-06-10 NOTE — H&P
Radiology History & Physical      SUBJECTIVE:     Chief Complaint: gastric cancer, ascites    History of Present Illness:  Puja Quigley is a 41 y.o. female who presents for paracentesis  Past Medical History:   Diagnosis Date    Anxiety     Dehydration 5/30/2022    Gastric cancer     Gastric ulcer     Hx of psychiatric care     Pregnancy 08/12/2020    delivered on 8/12/2020    Umbilical hernia      Past Surgical History:   Procedure Laterality Date    CLOSURE OF PERFORATED ULCER OF DUODENUM USING OMENTAL PATCH      ESOPHAGOGASTRODUODENOSCOPY N/A 10/13/2020    Procedure: EGD (ESOPHAGOGASTRODUODENOSCOPY);  Surgeon: Rosio Marion MD;  [facility]  Service: Endoscopy;  Laterality: N/A;    ESOPHAGOGASTRODUODENOSCOPY N/A 12/11/2020    Procedure: EGD (ESOPHAGOGASTRODUODENOSCOPY);  Surgeon: Rosio Marion MD;  [facility]);  Service: Endoscopy;  Laterality: N/A;  Covid-19 test 12/8/20 at [facility]    ESOPHAGOGASTRODUODENOSCOPY N/A 3/12/2021    Procedure: EGD (ESOPHAGOGASTRODUODENOSCOPY);  Surgeon: Nav Omer MD;  [facility]);  Service: Endoscopy;  Laterality: N/A;  COVID at [facility] 3/9 ttr    ESOPHAGOGASTRODUODENOSCOPY N/A 5/18/2021    Procedure: EGD (ESOPHAGOGASTRODUODENOSCOPY);  Surgeon: Rosio Marion MD;  [facility]);  Service: Endoscopy;  Laterality: N/A;  5/15-covid pcw-inst portal-tb    ESOPHAGOGASTRODUODENOSCOPY N/A 5/26/2021    Procedure: EGD (ESOPHAGOGASTRODUODENOSCOPY);  Surgeon: Socrates Terrazas MD;  Location[facility] ENDO (2ND FLR);  Service: Endoscopy;  Laterality: N/A;       Home Meds:   Prior to Admission medications    Medication Sig Start Date End Date Taking? Authorizing Provider   acetaminophen (TYLENOL) 500 MG tablet Take 500 mg by mouth every 6 (six) hours as needed for Pain.    Historical Provider   amitriptyline (ELAVIL) 10 MG tablet Take 1 tablet (10 mg total) by mouth every evening.  5/13/21   Socrates Terrazas MD   amitriptyline (ELAVIL) 10 MG tablet Take 10 mg by mouth. 5/13/21   Historical Provider   dicyclomine (BENTYL) 10 MG capsule TAKE ONE CAPSULE BY MOUTH FOUR TIMES DAILY 9/27/21   Kamille Hooks PA-C   famotidine (PEPCID) 20 MG tablet Take 1 tablet (20 mg total) by mouth nightly as needed for Heartburn. 2/23/22 2/23/23  Kamille Hooks PA-C   furosemide (LASIX) 20 MG tablet Take 10 mg by mouth.    Historical Provider   furosemide (LASIX) 40 MG tablet  5/26/21   Historical Provider   gabapentin (NEURONTIN) 300 MG capsule Take 1 capsule (300 mg total) by mouth 3 (three) times daily. 2/7/22 2/7/23  Kamille Hooks PA-C   HYDROcodone-acetaminophen (NORCO) 5-325 mg per tablet Take 1 tablet by mouth. 7/9/21   Historical Provider   HYDROcodone-acetaminophen (NORCO) 5-325 mg per tablet Take 1 tablet by mouth 3 (three) times daily as needed for Pain. 5/25/22   Kamille Hooks PA-C   LIDOcaine-prilocaine (EMLA) cream Apply to Port-A-Cath area 30 to 45 minutes prior to port access as directed (topical anesthetic use to the anterior chest only). 6/25/21   Historical Provider   LIDOcaine-prilocaine (EMLA) cream Apply topically. 6/25/21   Historical Provider   metoclopramide HCl (REGLAN) 5 MG tablet 5 mg. 6/11/21   Historical Provider   multivitamin with folic acid 400 mcg Tab Take 1 tablet by mouth once daily.    Historical Provider   omeprazole (PRILOSEC) 40 MG capsule Take 1 capsule (40 mg total) by mouth once daily. 5/25/22 5/25/23  Kamille Hooks PA-C   ondansetron (ZOFRAN) 8 MG tablet Take 1 tablet (8 mg total) by mouth every 8 (eight) hours as needed for Nausea. 7/26/21   Fer Ashraf MD   pantoprazole (PROTONIX) 40 MG tablet Take 40 mg by mouth. 6/2/21   Historical Provider   pantoprazole (PROTONIX) 40 MG tablet Take 1 tablet (40 mg total) by mouth 2 (two) times daily. 5/25/22   Kamille Hooks PA-C   prochlorperazine (COMPAZINE) 10 MG tablet TAKE 1 TABLET(10 MG) BY MOUTH EVERY 6  HOURS AS NEEDED FOR NAUSEA 8/7/21   Fer Ashraf MD   prochlorperazine (COMPAZINE) 10 MG tablet as needed. 6/3/21   Historical Provider   promethazine (PHENERGAN) 25 MG tablet Take 1 tablet (25 mg total) by mouth every 6 (six) hours as needed for Nausea. 5/31/22   Diaz Aponte III, MD   senna-docusate 8.6-50 mg (PERICOLACE) 8.6-50 mg per tablet Take 1 tablet by mouth. 5/26/21   Historical Provider   senna-docusate 8.6-50 mg (SENNA WITH DOCUSATE SODIUM) 8.6-50 mg per tablet Take 1 tablet by mouth 2 (two) times daily as needed for Constipation. 5/26/21   Reshma Wilson MD   sucralfate (CARAFATE) 1 gram tablet TAKE 1 TABLET(1 GRAM) BY MOUTH FOUR TIMES DAILY FOR 14 DAYS 10/20/21   Rosio Marion MD   sucralfate (CARAFATE) 1 gram tablet  4/8/21   Historical Provider   traMADoL (ULTRAM) 50 mg tablet Take 50 mg by mouth. 6/25/21   Historical Provider   traMADoL (ULTRAM) 50 mg tablet Take by mouth. 6/25/21   Historical Provider     Anticoagulants/Antiplatelets: no anticoagulation    Allergies: Review of patient's allergies indicates:  No Known Allergies  Sedation History:  no adverse reactions    Review of Systems:   Hematological: no known coagulopathies  Respiratory: no shortness of breath  Cardiovascular: no chest pain  Gastrointestinal: no abdominal pain  Genito-Urinary: no dysuria  Musculoskeletal: negative  Neurological: no TIA or stroke symptoms         OBJECTIVE:     Vital Signs (Most Recent)       Physical Exam:  ASA: 2  Mallampati: N/A    General: no acute distress  Mental Status: alert and oriented to person, place and time  HEENT: normocephalic, atraumatic  Chest: unlabored breathing  Heart: regular heart rate  Abdomen: distended  Extremity: moves all extremities    Laboratory  Lab Results   Component Value Date    INR 1.0 06/10/2021       Lab Results   Component Value Date    WBC 6.48 05/31/2022    HGB 12.6 05/31/2022    HCT 38.7 05/31/2022    MCV 90 05/31/2022     05/31/2022      Lab  Results   Component Value Date    GLU 89 05/31/2022     05/31/2022    K 3.5 05/31/2022     05/31/2022    CO2 28 05/31/2022    BUN 6 05/31/2022    CREATININE 0.7 05/31/2022    CALCIUM 9.2 05/31/2022    MG 1.9 04/18/2022    ALT 15 05/31/2022    AST 32 05/31/2022    ALBUMIN 2.1 (L) 05/31/2022    BILITOT 0.3 05/31/2022       ASSESSMENT/PLAN:     Sedation Plan: local anesthesia only  Patient will undergo paracentesis.    Martin Ivory MD  Staff Radiologist  Department of Radiology  Pager: [phone]

## 2022-06-10 NOTE — DISCHARGE SUMMARY
Radiology Discharge Summary      Hospital Course: No complications    Admit Date: 6/10/2022  Discharge Date: 06/10/2022     Instructions Given to Patient: Yes  Diet: Resume prior diet  Activity: activity as tolerated    Description of Condition on Discharge: Stable  Vital Signs (Most Recent):      Discharge Disposition: Home    Discharge Diagnosis: gastric cancer, ascites s/p paracentesis     Follow-up: continue periodic paracentesis, follow up with oncology as needed    Martin Ivory MD  Staff Radiologist  Department of Radiology  Pager: [phone]

## 2022-06-10 NOTE — PROCEDURES
Radiology Post-Procedure Note    Pre Op Diagnosis: Ascites, gastric cancer  Post Op Diagnosis: Same    Procedure: Paracentesis    Procedure performed by: Martin Ivory MD    Written Informed Consent Obtained: Yes  Specimen Removed: YES thin renuka fluid  Estimated Blood Loss: Minimal    Findings:   Successful paracentesis.  Albumin administered PRN per protocol.    Patient tolerated procedure well.    Martin Ivory MD  Staff Radiologist  Department of Radiology  Pager: [phone]

## 2022-06-13 ENCOUNTER — OFFICE VISIT (OUTPATIENT)
Dept: HEMATOLOGY/ONCOLOGY | Facility: CLINIC | Age: 42
End: 2022-06-13
Payer: COMMERCIAL

## 2022-06-13 ENCOUNTER — INFUSION (OUTPATIENT)
Dept: INFUSION THERAPY | Facility: HOSPITAL | Age: 42
End: 2022-06-13
Attending: PHYSICIAN ASSISTANT
Payer: COMMERCIAL

## 2022-06-13 ENCOUNTER — LAB VISIT (OUTPATIENT)
Dept: LAB | Facility: HOSPITAL | Age: 42
End: 2022-06-13
Payer: COMMERCIAL

## 2022-06-13 VITALS
TEMPERATURE: 98 F | RESPIRATION RATE: 16 BRPM | OXYGEN SATURATION: 100 % | HEART RATE: 105 BPM | DIASTOLIC BLOOD PRESSURE: 86 MMHG | SYSTOLIC BLOOD PRESSURE: 112 MMHG

## 2022-06-13 VITALS
DIASTOLIC BLOOD PRESSURE: 89 MMHG | RESPIRATION RATE: 18 BRPM | HEART RATE: 95 BPM | HEIGHT: 67 IN | SYSTOLIC BLOOD PRESSURE: 134 MMHG | TEMPERATURE: 98 F | BODY MASS INDEX: 21.8 KG/M2 | OXYGEN SATURATION: 98 % | WEIGHT: 138.88 LBS

## 2022-06-13 DIAGNOSIS — R18.8 OTHER ASCITES: ICD-10-CM

## 2022-06-13 DIAGNOSIS — T45.1X5A IMMUNODEFICIENCY DUE TO CHEMOTHERAPY: ICD-10-CM

## 2022-06-13 DIAGNOSIS — C80.1 ANXIETY ASSOCIATED WITH CANCER DIAGNOSIS: ICD-10-CM

## 2022-06-13 DIAGNOSIS — D50.9 MICROCYTIC ANEMIA: ICD-10-CM

## 2022-06-13 DIAGNOSIS — C78.6 PERITONEAL CARCINOMATOSIS: ICD-10-CM

## 2022-06-13 DIAGNOSIS — R11.2 NON-INTRACTABLE VOMITING WITH NAUSEA, UNSPECIFIED VOMITING TYPE: ICD-10-CM

## 2022-06-13 DIAGNOSIS — C16.9 MALIGNANT NEOPLASM OF STOMACH, UNSPECIFIED LOCATION: Primary | ICD-10-CM

## 2022-06-13 DIAGNOSIS — C16.9 MALIGNANT NEOPLASM OF STOMACH, UNSPECIFIED LOCATION: ICD-10-CM

## 2022-06-13 DIAGNOSIS — C79.60: ICD-10-CM

## 2022-06-13 DIAGNOSIS — E86.0 DEHYDRATION: Primary | ICD-10-CM

## 2022-06-13 DIAGNOSIS — R52 PAIN: ICD-10-CM

## 2022-06-13 DIAGNOSIS — G62.0 NEUROPATHY DUE TO CHEMOTHERAPEUTIC DRUG: ICD-10-CM

## 2022-06-13 DIAGNOSIS — T45.1X5A NEUROPATHY DUE TO CHEMOTHERAPEUTIC DRUG: ICD-10-CM

## 2022-06-13 DIAGNOSIS — R60.0 EDEMA OF BOTH LOWER EXTREMITIES: ICD-10-CM

## 2022-06-13 DIAGNOSIS — C79.51 BONE METASTASES: ICD-10-CM

## 2022-06-13 DIAGNOSIS — Z79.899 IMMUNODEFICIENCY DUE TO CHEMOTHERAPY: ICD-10-CM

## 2022-06-13 DIAGNOSIS — K25.5 CHRONIC GASTRIC ULCER WITH PERFORATION: ICD-10-CM

## 2022-06-13 DIAGNOSIS — F41.1 ANXIETY ASSOCIATED WITH CANCER DIAGNOSIS: ICD-10-CM

## 2022-06-13 DIAGNOSIS — D84.821 IMMUNODEFICIENCY DUE TO CHEMOTHERAPY: ICD-10-CM

## 2022-06-13 LAB
ALBUMIN SERPL BCP-MCNC: 1.9 G/DL (ref 3.5–5.2)
ALP SERPL-CCNC: 95 U/L (ref 55–135)
ALT SERPL W/O P-5'-P-CCNC: 9 U/L (ref 10–44)
ANION GAP SERPL CALC-SCNC: 10 MMOL/L (ref 8–16)
AST SERPL-CCNC: 20 U/L (ref 10–40)
BILIRUB SERPL-MCNC: 0.3 MG/DL (ref 0.1–1)
BUN SERPL-MCNC: 10 MG/DL (ref 6–20)
CALCIUM SERPL-MCNC: 8.9 MG/DL (ref 8.7–10.5)
CHLORIDE SERPL-SCNC: 104 MMOL/L (ref 95–110)
CO2 SERPL-SCNC: 27 MMOL/L (ref 23–29)
CREAT SERPL-MCNC: 0.7 MG/DL (ref 0.5–1.4)
ERYTHROCYTE [DISTWIDTH] IN BLOOD BY AUTOMATED COUNT: 13.3 % (ref 11.5–14.5)
EST. GFR  (AFRICAN AMERICAN): >60 ML/MIN/1.73 M^2
EST. GFR  (NON AFRICAN AMERICAN): >60 ML/MIN/1.73 M^2
GLUCOSE SERPL-MCNC: 85 MG/DL (ref 70–110)
HCT VFR BLD AUTO: 40.7 % (ref 37–48.5)
HGB BLD-MCNC: 12.4 G/DL (ref 12–16)
IMM GRANULOCYTES # BLD AUTO: 0.01 K/UL (ref 0–0.04)
MCH RBC QN AUTO: 28.2 PG (ref 27–31)
MCHC RBC AUTO-ENTMCNC: 30.5 G/DL (ref 32–36)
MCV RBC AUTO: 93 FL (ref 82–98)
NEUTROPHILS # BLD AUTO: 2.5 K/UL (ref 1.8–7.7)
PLATELET # BLD AUTO: 342 K/UL (ref 150–450)
PMV BLD AUTO: 8.9 FL (ref 9.2–12.9)
POTASSIUM SERPL-SCNC: 4.3 MMOL/L (ref 3.5–5.1)
PROT SERPL-MCNC: 5.7 G/DL (ref 6–8.4)
RBC # BLD AUTO: 4.39 M/UL (ref 4–5.4)
SODIUM SERPL-SCNC: 141 MMOL/L (ref 136–145)
WBC # BLD AUTO: 4.51 K/UL (ref 3.9–12.7)

## 2022-06-13 PROCEDURE — 63600175 PHARM REV CODE 636 W HCPCS: Performed by: INTERNAL MEDICINE

## 2022-06-13 PROCEDURE — 3079F DIAST BP 80-89 MM HG: CPT | Mod: CPTII,S$GLB,, | Performed by: INTERNAL MEDICINE

## 2022-06-13 PROCEDURE — 96416 CHEMO PROLONG INFUSE W/PUMP: CPT

## 2022-06-13 PROCEDURE — 99999 PR PBB SHADOW E&M-EST. PATIENT-LVL V: ICD-10-PCS | Mod: PBBFAC,,, | Performed by: INTERNAL MEDICINE

## 2022-06-13 PROCEDURE — 99999 PR PBB SHADOW E&M-EST. PATIENT-LVL V: CPT | Mod: PBBFAC,,, | Performed by: INTERNAL MEDICINE

## 2022-06-13 PROCEDURE — 99215 OFFICE O/P EST HI 40 MIN: CPT | Mod: PBBFAC,25 | Performed by: INTERNAL MEDICINE

## 2022-06-13 PROCEDURE — 96365 THER/PROPH/DIAG IV INF INIT: CPT

## 2022-06-13 PROCEDURE — 99215 OFFICE O/P EST HI 40 MIN: CPT | Mod: S$GLB,,, | Performed by: INTERNAL MEDICINE

## 2022-06-13 PROCEDURE — 3008F PR BODY MASS INDEX (BMI) DOCUMENTED: ICD-10-PCS | Mod: CPTII,S$GLB,, | Performed by: INTERNAL MEDICINE

## 2022-06-13 PROCEDURE — 25000003 PHARM REV CODE 250: Performed by: INTERNAL MEDICINE

## 2022-06-13 PROCEDURE — 1159F PR MEDICATION LIST DOCUMENTED IN MEDICAL RECORD: ICD-10-PCS | Mod: CPTII,S$GLB,, | Performed by: INTERNAL MEDICINE

## 2022-06-13 PROCEDURE — 85027 COMPLETE CBC AUTOMATED: CPT | Performed by: PHYSICIAN ASSISTANT

## 2022-06-13 PROCEDURE — 96375 TX/PRO/DX INJ NEW DRUG ADDON: CPT

## 2022-06-13 PROCEDURE — 1159F MED LIST DOCD IN RCRD: CPT | Mod: CPTII,S$GLB,, | Performed by: INTERNAL MEDICINE

## 2022-06-13 PROCEDURE — 96415 CHEMO IV INFUSION ADDL HR: CPT

## 2022-06-13 PROCEDURE — 96374 THER/PROPH/DIAG INJ IV PUSH: CPT

## 2022-06-13 PROCEDURE — 99215 PR OFFICE/OUTPT VISIT, EST, LEVL V, 40-54 MIN: ICD-10-PCS | Mod: S$GLB,,, | Performed by: INTERNAL MEDICINE

## 2022-06-13 PROCEDURE — 96413 CHEMO IV INFUSION 1 HR: CPT

## 2022-06-13 PROCEDURE — 96367 TX/PROPH/DG ADDL SEQ IV INF: CPT

## 2022-06-13 PROCEDURE — 3075F PR MOST RECENT SYSTOLIC BLOOD PRESS GE 130-139MM HG: ICD-10-PCS | Mod: CPTII,S$GLB,, | Performed by: INTERNAL MEDICINE

## 2022-06-13 PROCEDURE — 80053 COMPREHEN METABOLIC PANEL: CPT | Performed by: PHYSICIAN ASSISTANT

## 2022-06-13 PROCEDURE — 3008F BODY MASS INDEX DOCD: CPT | Mod: CPTII,S$GLB,, | Performed by: INTERNAL MEDICINE

## 2022-06-13 PROCEDURE — 36415 COLL VENOUS BLD VENIPUNCTURE: CPT | Performed by: PHYSICIAN ASSISTANT

## 2022-06-13 PROCEDURE — 3079F PR MOST RECENT DIASTOLIC BLOOD PRESSURE 80-89 MM HG: ICD-10-PCS | Mod: CPTII,S$GLB,, | Performed by: INTERNAL MEDICINE

## 2022-06-13 PROCEDURE — 3075F SYST BP GE 130 - 139MM HG: CPT | Mod: CPTII,S$GLB,, | Performed by: INTERNAL MEDICINE

## 2022-06-13 RX ORDER — HEPARIN 100 UNIT/ML
500 SYRINGE INTRAVENOUS
Status: CANCELLED | OUTPATIENT
Start: 2022-06-13

## 2022-06-13 RX ORDER — ATROPINE SULFATE 0.4 MG/ML
0.4 INJECTION, SOLUTION ENDOTRACHEAL; INTRAMEDULLARY; INTRAMUSCULAR; INTRAVENOUS; SUBCUTANEOUS ONCE AS NEEDED
Status: CANCELLED | OUTPATIENT
Start: 2022-06-13

## 2022-06-13 RX ORDER — SODIUM CHLORIDE 0.9 % (FLUSH) 0.9 %
10 SYRINGE (ML) INJECTION
Status: CANCELLED | OUTPATIENT
Start: 2022-06-15

## 2022-06-13 RX ORDER — PROMETHAZINE HYDROCHLORIDE 25 MG/1
25 TABLET ORAL EVERY 6 HOURS PRN
Qty: 60 TABLET | Refills: 2 | Status: SHIPPED | OUTPATIENT
Start: 2022-06-13 | End: 2022-09-09 | Stop reason: SDUPTHER

## 2022-06-13 RX ORDER — ATROPINE SULFATE 0.4 MG/ML
0.4 INJECTION, SOLUTION ENDOTRACHEAL; INTRAMEDULLARY; INTRAMUSCULAR; INTRAVENOUS; SUBCUTANEOUS ONCE AS NEEDED
Status: COMPLETED | OUTPATIENT
Start: 2022-06-13 | End: 2022-06-13

## 2022-06-13 RX ORDER — HEPARIN 100 UNIT/ML
500 SYRINGE INTRAVENOUS
Status: DISCONTINUED | OUTPATIENT
Start: 2022-06-13 | End: 2022-06-13 | Stop reason: HOSPADM

## 2022-06-13 RX ORDER — SODIUM CHLORIDE 0.9 % (FLUSH) 0.9 %
10 SYRINGE (ML) INJECTION
Status: CANCELLED | OUTPATIENT
Start: 2022-06-13

## 2022-06-13 RX ORDER — DIPHENHYDRAMINE HYDROCHLORIDE 50 MG/ML
50 INJECTION INTRAMUSCULAR; INTRAVENOUS ONCE AS NEEDED
Status: CANCELLED | OUTPATIENT
Start: 2022-06-13

## 2022-06-13 RX ORDER — DEXAMETHASONE 4 MG/1
TABLET ORAL
Qty: 60 TABLET | Refills: 2 | Status: SHIPPED | OUTPATIENT
Start: 2022-06-13 | End: 2022-08-08

## 2022-06-13 RX ORDER — SODIUM CHLORIDE 0.9 % (FLUSH) 0.9 %
10 SYRINGE (ML) INJECTION
Status: DISCONTINUED | OUTPATIENT
Start: 2022-06-13 | End: 2022-06-13 | Stop reason: HOSPADM

## 2022-06-13 RX ORDER — HEPARIN 100 UNIT/ML
500 SYRINGE INTRAVENOUS
Status: CANCELLED | OUTPATIENT
Start: 2022-06-15

## 2022-06-13 RX ORDER — EPINEPHRINE 0.3 MG/.3ML
0.3 INJECTION SUBCUTANEOUS ONCE AS NEEDED
Status: CANCELLED | OUTPATIENT
Start: 2022-06-13

## 2022-06-13 RX ADMIN — ATROPINE SULFATE 0.4 MG: 0.4 INJECTION, SOLUTION INTRAVENOUS at 12:06

## 2022-06-13 RX ADMIN — IRINOTECAN HYDROCHLORIDE 126 MG: 20 INJECTION, SOLUTION INTRAVENOUS at 01:06

## 2022-06-13 RX ADMIN — FLUOROURACIL 3675 MG: 50 INJECTION, SOLUTION INTRAVENOUS at 03:06

## 2022-06-13 RX ADMIN — DIPHENHYDRAMINE HYDROCHLORIDE 50 MG: 50 INJECTION INTRAMUSCULAR; INTRAVENOUS at 12:06

## 2022-06-13 RX ADMIN — DEXAMETHASONE SODIUM PHOSPHATE 0.25 MG: 10 INJECTION, SOLUTION INTRAMUSCULAR; INTRAVENOUS at 12:06

## 2022-06-13 NOTE — PROGRESS NOTES
MEDICAL ONCOLOGY - ESTABLISHED PATIENT    Reason for visit: Gastric cancer     Best Contact Phone Number(s): [phone] (home)      Cancer/Stage/TNM:   Cancer Staging  No matching staging information was found for the patient.     Oncology History   Malignant neoplasm of stomach   6/10/2021 Initial Diagnosis    Gastric adenocarcinoma     7/26/2021 - 4/6/2022 Chemotherapy    Treatment Summary   Plan Name: OP FOLFOX 6 Q2W  Treatment Goal: Palliative  Status: Inactive  Start Date: 7/26/2021  End Date: 4/6/2022  Provider: Fer Ashraf MD  Chemotherapy: fluorouraciL 2,400 mg/m2 = 3,770 mg in sodium chloride 0.9% 100 mL chemo infusion, 2,400 mg/m2 = 3,770 mg, Intravenous, Over 46 hours, 19 of 20 cycles  Administration: 3,770 mg (7/26/2021), 3,770 mg (8/9/2021), 3,770 mg (8/23/2021), 3,770 mg (9/7/2021), 3,770 mg (9/21/2021), 3,770 mg (10/5/2021), 3,770 mg (10/19/2021), 3,770 mg (11/2/2021), 3,770 mg (11/15/2021), 3,770 mg (11/30/2021), 3,770 mg (12/13/2021), 4,000 mg (12/27/2021), 4,030 mg (1/10/2022), 4,030 mg (1/24/2022), 4,000 mg (2/7/2022), 4,000 mg (2/22/2022), 4,000 mg (3/7/2022), 4,000 mg (3/22/2022), 4,000 mg (4/4/2022)  oxaliplatin (ELOXATIN) 85 mg/m2 = 133 mg in dextrose 5 % 500 mL chemo infusion, 85 mg/m2 = 133 mg, Intravenous, Clinic/\[facility]\"" 1 time, 9 of 9 cycles  Administration: 133 mg (7/26/2021), 133 mg (8/9/2021), 133 mg (8/23/2021), 133 mg (9/7/2021), 133 mg (9/21/2021), 133 mg (10/5/2021), 133 mg (10/19/2021), 133 mg (11/2/2021), 133 mg (11/15/2021)     5/26/2022 -  Chemotherapy    Treatment Summary   Plan Name: OP FOLFIRI Q2WK  Treatment Goal: Palliative  Status: Active  Start Date: 5/26/2022  End Date: 4/19/2023 (Planned)  Provider: Fer Ashraf MD  Chemotherapy: dexAMETHasone (DECADRON) 4 MG Tab, 8 mg, Oral, Daily, 1 of 1 cycle, Start date: --, End date: --  irinotecan (CAMPTOSAR) 120 mg/m2 = 200 mg in sodium chloride 0.9% 500 mL chemo infusion, 120 mg/m2 = 200 mg (100 % of original dose  120 mg/m2), Intravenous, Clinic/HOD 1 time, 2 of 24 cycles  Dose modification: 125 mg/m2 (original dose 120 mg/m2, Cycle 1), 120 mg/m2 (original dose 120 mg/m2, Cycle 1), 75 mg/m2 (original dose 120 mg/m2, Cycle 2, Reason: Dose not tolerated)  Administration: 200 mg (5/26/2022)  ramucirumab (CYRAMZA) 471 mg in sodium chloride 0.9% 250 mL chemo infusion, 8 mg/kg = 471 mg, Intravenous, Clinic/HOD 1 time, 1 of 1 cycle  Administration: 471 mg (5/26/2022)          Oncological History: Puja is a very pleasant 41 y.o. female with metastatic gastric cancer who presents for follow-up prior to cycle 2 of FOLFIRI.  She had significant nausea and vomiting for several days following her first cycle of FOLFIRI. She had vomiting while in a visit with Dr. Berumen and went to the ER where she received Phenergan and some fluids.  She has continued with weekly paracenteses, has abdominal discomfort for which she uses Norco once daily and Tylenol twice daily.  She finds the weight of the abdominal distension discomforting.      ECOG status is 1. Presents alone.    Review of Systems   Constitutional: Positive for appetite change. Negative for activity change, chills, fatigue, fever and unexpected weight change.   HENT: Negative for ear pain, facial swelling, hearing loss, mouth sores, nosebleeds, sore throat and trouble swallowing.    Eyes: Negative for pain, discharge, redness and visual disturbance.   Respiratory: Negative for cough, chest tightness and shortness of breath.    Cardiovascular: Positive for leg swelling. Negative for chest pain and palpitations.   Gastrointestinal: Positive for abdominal distention, nausea, vomiting and reflux. Negative for abdominal pain, blood in stool, constipation and diarrhea.   Endocrine: Negative for cold intolerance and heat intolerance.   Genitourinary: Negative for decreased urine volume, difficulty urinating, dysuria, frequency, hematuria, hot flashes and urgency.   Musculoskeletal: Negative  for arthralgias, gait problem, leg pain and myalgias.   Integumentary:  Negative for pallor, rash and wound.   Allergic/Immunologic: Negative for immunocompromised state.   Neurological: Positive for numbness (slowly improving). Negative for dizziness, tremors, weakness, light-headedness and headaches.   Hematological: Negative for adenopathy. Does not bruise/bleed easily.   Psychiatric/Behavioral: Negative for agitation, confusion, dysphoric mood and sleep disturbance. The patient is not nervous/anxious.            Past Medical History:   Past Medical History:   Diagnosis Date    Anxiety     Dehydration 5/30/2022    Gastric cancer     Gastric ulcer     Hx of psychiatric care     Pregnancy 08/12/2020    delivered on 8/12/2020    Umbilical hernia         Past Surgical History:   Past Surgical History:   Procedure Laterality Date    CLOSURE OF PERFORATED ULCER OF DUODENUM USING OMENTAL PATCH      ESOPHAGOGASTRODUODENOSCOPY N/A 10/13/2020    Procedure: EGD (ESOPHAGOGASTRODUODENOSCOPY);  Surgeon: Rosio Marion MD;  Location: [facility] (2ND FLR);  Service: Endoscopy;  Laterality: N/A;    ESOPHAGOGASTRODUODENOSCOPY N/A 12/11/2020    Procedure: EGD (ESOPHAGOGASTRODUODENOSCOPY);  Surgeon: Rosio Marion MD;  Location: [facility] (2ND FLR);  Service: Endoscopy;  Laterality: N/A;  Covid-19 test 12/8/20 at [facility]    ESOPHAGOGASTRODUODENOSCOPY N/A 3/12/2021    Procedure: EGD (ESOPHAGOGASTRODUODENOSCOPY);  Surgeon: Nav Omer MD;  Location: [facility] (2ND FLR);  Service: Endoscopy;  Laterality: N/A;  COVID at [facility] 3/9 [facility]    ESOPHAGOGASTRODUODENOSCOPY N/A 5/18/2021    Procedure: EGD (ESOPHAGOGASTRODUODENOSCOPY);  Surgeon: Rosio Marion MD;  Location: [facility] ENDO (2ND FLR);  Service: Endoscopy;  Laterality: N/A;  5/15-covid pcw-inst portal-tb    ESOPHAGOGASTRODUODENOSCOPY N/A 5/26/2021    Procedure: EGD (ESOPHAGOGASTRODUODENOSCOPY);  Surgeon: Socrates Terrazas MD;  Location: [facility] ENDO (2ND  "[facility]K);  Service: Endoscopy;  Laterality: N/A;        Family History:   Family History   Problem Relation Age of Onset    Cancer Mother 67        lymphoma (type? "not active," not on tx; "related to her blood")    Schizophrenia Mother     Lung cancer Father 48        type? h/o smoking    No Known Problems Son     Breast cancer Other 73        unilat; stage I, but aggressive    Prostate cancer Paternal Uncle         (dx 50s/60? no chemo?)    Breast cancer Paternal Cousin         (dx age?) ductal    Colon cancer Neg Hx     Esophageal cancer Neg Hx         Social History:   Social History     Tobacco Use    Smoking status: Former Smoker     Types: Cigarettes     Quit date: 2019     Years since quittin.5    Smokeless tobacco: Never Used   Substance Use Topics    Alcohol use: Yes        I have reviewed and updated the patient's past medical, surgical, family and social histories.    Allergies:   Review of patient's allergies indicates:  No Known Allergies     Medications:   Current Outpatient Medications   Medication Sig Dispense Refill    acetaminophen (TYLENOL) 500 MG tablet Take 500 mg by mouth every 6 (six) hours as needed for Pain.      amitriptyline (ELAVIL) 10 MG tablet Take 1 tablet (10 mg total) by mouth every evening. 30 tablet 3    amitriptyline (ELAVIL) 10 MG tablet Take 10 mg by mouth.      dexAMETHasone (DECADRON) 4 MG Tab 8 mg daily on days 2 through 4 of each chemotherapy cycle. 60 tablet 2    dicyclomine (BENTYL) 10 MG capsule TAKE ONE CAPSULE BY MOUTH FOUR TIMES DAILY 120 capsule 0    famotidine (PEPCID) 20 MG tablet Take 1 tablet (20 mg total) by mouth nightly as needed for Heartburn. 60 tablet 1    furosemide (LASIX) 20 MG tablet Take 10 mg by mouth.      furosemide (LASIX) 40 MG tablet       gabapentin (NEURONTIN) 300 MG capsule Take 1 capsule (300 mg total) by mouth 3 (three) times daily. 90 capsule 11    HYDROcodone-acetaminophen (NORCO) 5-325 mg per tablet Take 1 " tablet by mouth.      HYDROcodone-acetaminophen (NORCO) 5-325 mg per tablet Take 1 tablet by mouth 3 (three) times daily as needed for Pain. 90 tablet 0    LIDOcaine-prilocaine (EMLA) cream Apply to Port-A-Cath area 30 to 45 minutes prior to port access as directed (topical anesthetic use to the anterior chest only).      LIDOcaine-prilocaine (EMLA) cream Apply topically.      metoclopramide HCl (REGLAN) 5 MG tablet 5 mg.      multivitamin with folic acid 400 mcg Tab Take 1 tablet by mouth once daily.      omeprazole (PRILOSEC) 40 MG capsule Take 1 capsule (40 mg total) by mouth once daily. 30 capsule 11    ondansetron (ZOFRAN) 8 MG tablet Take 1 tablet (8 mg total) by mouth every 8 (eight) hours as needed for Nausea. 60 tablet 2    pantoprazole (PROTONIX) 40 MG tablet Take 40 mg by mouth.      pantoprazole (PROTONIX) 40 MG tablet Take 1 tablet (40 mg total) by mouth 2 (two) times daily. 60 tablet 11    prochlorperazine (COMPAZINE) 10 MG tablet TAKE 1 TABLET(10 MG) BY MOUTH EVERY 6 HOURS AS NEEDED FOR NAUSEA 90 tablet 2    prochlorperazine (COMPAZINE) 10 MG tablet as needed.      promethazine (PHENERGAN) 25 MG tablet Take 1 tablet (25 mg total) by mouth every 6 (six) hours as needed for Nausea. 60 tablet 2    senna-docusate 8.6-50 mg (PERICOLACE) 8.6-50 mg per tablet Take 1 tablet by mouth.      senna-docusate 8.6-50 mg (SENNA WITH DOCUSATE SODIUM) 8.6-50 mg per tablet Take 1 tablet by mouth 2 (two) times daily as needed for Constipation.      sucralfate (CARAFATE) 1 gram tablet TAKE 1 TABLET(1 GRAM) BY MOUTH FOUR TIMES DAILY FOR 14 DAYS 56 tablet 0    sucralfate (CARAFATE) 1 gram tablet       traMADoL (ULTRAM) 50 mg tablet Take 50 mg by mouth.      traMADoL (ULTRAM) 50 mg tablet Take by mouth.       No current facility-administered medications for this visit.     Facility-Administered Medications Ordered in Other Visits   Medication Dose Route Frequency Provider Last Rate Last Admin     "fluorouracil (ADRUCIL) 2,200 mg/m2 = 3,675 mg in sodium chloride 0.9% 100 mL chemo infusion  2,200 mg/m2 (Treatment Plan Recorded) Intravenous over 46 hr Fer Ashraf MD        heparin, porcine (PF) 100 unit/mL injection flush 500 Units  500 Units Intravenous PRN Fer Ashraf MD        irinotecan (CAMPTOSAR) 75 mg/m2 = 126 mg in sodium chloride 0.9% 500 mL chemo infusion  75 mg/m2 (Treatment Plan Recorded) Intravenous 1 time in Clinic/HOD Fer Ashraf MD        palonosetron 0.25mg/dexamethasone 12mg in NS IVPB 0.25 mg  0.25 mg Intravenous 1 time in Clinic/HOD Fer Ashraf MD   Stopped at 06/13/22 1307    sodium chloride 0.9% flush 10 mL  10 mL Intravenous PRN Fer Ashraf MD            Physical Exam:   /89 (BP Location: Left arm, Patient Position: Sitting, BP Method: Small (Automatic))   Pulse 95   Temp 97.7 °F (36.5 °C) (Oral)   Resp 18   Ht 5' 7" (1.702 m)   Wt 63 kg (138 lb 14.2 oz)   SpO2 98%   BMI 21.75 kg/m²      ECOG Performance status: 1            Physical Exam  Vitals reviewed.   Constitutional:       Appearance: Normal appearance. She is not ill-appearing or toxic-appearing.      Comments: Thin   HENT:      Head: Normocephalic and atraumatic.      Nose: Nose normal.      Mouth/Throat:      Mouth: Mucous membranes are moist.      Pharynx: Oropharynx is clear.   Eyes:      General: No scleral icterus.     Extraocular Movements: Extraocular movements intact.      Conjunctiva/sclera: Conjunctivae normal.   Cardiovascular:      Rate and Rhythm: Normal rate and regular rhythm.      Pulses: Normal pulses.      Heart sounds: Normal heart sounds.   Pulmonary:      Effort: Pulmonary effort is normal.      Breath sounds: Normal breath sounds.   Abdominal:      General: Bowel sounds are normal. There is distension.      Palpations: Abdomen is soft.      Tenderness: There is no abdominal tenderness. There is no guarding.   Musculoskeletal:         General: No " swelling or tenderness. Normal range of motion.      Cervical back: Normal range of motion and neck supple.      Right lower leg: No edema.      Left lower leg: No edema.   Skin:     General: Skin is warm.      Findings: No rash.   Neurological:      General: No focal deficit present.      Mental Status: She is alert. Mental status is at baseline.   Psychiatric:         Mood and Affect: Mood normal.         Behavior: Behavior normal.             Labs:   Recent Results (from the past 48 hour(s))   CBC Oncology    Collection Time: 06/13/22  9:55 AM   Result Value Ref Range    WBC 4.51 3.90 - 12.70 K/uL    RBC 4.39 4.00 - 5.40 M/uL    Hemoglobin 12.4 12.0 - 16.0 g/dL    Hematocrit 40.7 37.0 - 48.5 %    MCV 93 82 - 98 fL    MCH 28.2 27.0 - 31.0 pg    MCHC 30.5 (L) 32.0 - 36.0 g/dL    RDW 13.3 11.5 - 14.5 %    Platelets 342 150 - 450 K/uL    MPV 8.9 (L) 9.2 - 12.9 fL    Gran # (ANC) 2.5 1.8 - 7.7 K/uL    Immature Grans (Abs) 0.01 0.00 - 0.04 K/uL   Comprehensive Metabolic Panel    Collection Time: 06/13/22  9:55 AM   Result Value Ref Range    Sodium 141 136 - 145 mmol/L    Potassium 4.3 3.5 - 5.1 mmol/L    Chloride 104 95 - 110 mmol/L    CO2 27 23 - 29 mmol/L    Glucose 85 70 - 110 mg/dL    BUN 10 6 - 20 mg/dL    Creatinine 0.7 0.5 - 1.4 mg/dL    Calcium 8.9 8.7 - 10.5 mg/dL    Total Protein 5.7 (L) 6.0 - 8.4 g/dL    Albumin 1.9 (L) 3.5 - 5.2 g/dL    Total Bilirubin 0.3 0.1 - 1.0 mg/dL    Alkaline Phosphatase 95 55 - 135 U/L    AST 20 10 - 40 U/L    ALT 9 (L) 10 - 44 U/L    Anion Gap 10 8 - 16 mmol/L    eGFR if African American >60.0 >60 mL/min/1.73 m^2    eGFR if non African American >60.0 >60 mL/min/1.73 m^2        I have reviewed the pertinent labs from today which show normal blood counts. Albumin 1.6    Imaging:    CT CAP: 4/14/2022:     Outside hospital OSI CT CHEST ABDOMEN PELVIS dated 4/14/2022 9:31 AM.     1.  Gastric wall thickening is slightly increased.   2.  Moderate ascites, slightly increased in volume.    3.  Peritoneal metastatic disease is stable to slightly increased.   4.  Stable small left pleural effusion.   5.  Increased cystic and solid mass involving the left ovary, likely due to metastatic disease.      Path:   5/26/21:  Positive for malignancy.   Poorly differentiated adenocarcinoma growing with signet ring features   Immunostains for Bruce-EP4 and CEA are positive.  The controls stain   appropriately.   This case was reviewed by Dr Sebastian who concurs with the diagnosis       Assessment:       1. Malignant neoplasm of stomach, unspecified location    2. Bone metastases    3. Peritoneal carcinomatosis    4. Krukenberg tumor, unspecified laterality    5. 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 Edema of both lower extremities    8. Non-intractable vomiting with nausea, unspecified vomiting type    9. Chronic gastric ulcer with perforation    10. Microcytic anemia    11. Anxiety associated with cancer diagnosis    12. Neuropathy due to chemotherapeutic drug    13. Pain          Plan:             # Gastric adenocarcinoma with peritoneal metastases, immunodeficiency due to chemotherapy, bone metastases  HER-2 negative by FISH.  PD-L1 < 1%.  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  We previously explained the implications of a stage IV diagnosis to her and potential treatment options.  She is now s/p port placement. She sought a second opinion at [facility] [place] for zolbetuximab trial targeting CLDN protein but she did not test positive for this biomarker. We recommended proceeding with SOC FOLFOX, with which the [facility] team agreed. Because of the negative PD-L1 I do not think the benefit of adding nivolumab outweighs the potential toxicities.       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  She opted not to  get genetic testing done at her appt with Phi. We recommended that she reconsider that decision in light of a very likely germline mutation in SAMUEL which has clinical consequences.      CT CAP after 6 cycles showed improvement in ascites, probable hepatic, thyroid and bone metastases. We dropped oxaliplatin starting with cycle 10 due to grade 1 neuropathy and desire to keep it from worsening.    CT CAP after cycle 10 showed stable disease.  CT scan after cycle 15 continued to display overall stability of disease within the gastric wall, peritoneum, spine and liver. Although increase ascites on imaging and clinically can represent progression of disease, her previous CT suggested overall stability within the liver and gastric wall, so we recommended to continue with treatment time. However, she continued to have increasing abdominal ascites that has been concerning for progression of disease. Hence, she had repeat imaging. On CT performed on 4/14/2022, she appeared to have worsening disease suggestive of progression. She was seen by Dr. Reid at [facility] for f/u and to discuss possible treatment options.  She was also evaluated by Dr. Andree Mueller and Dr. See of Surgical Oncology at [facility].  Ultimately she was not felt to be a good surgical debulking candidate due to her ascites.  If her ascites improves during the course of chemotherapy and her performance status remains stable or improves, they would re-consider her again for palliative oophorectomy.     She was started on second-line FOLFIRI. Was given ramucirumab with cycle 1 but this later will be held given [facility] recs for just FOLFIRI.  She didn't tolerate cycle 1 well with significant N/V.  Will dose reduce irinotecan to 75 mg/m2 - has UGT1A1 homozygous mutation indicating poor metabolism.  Continue 5-FU infusion at 2200 mg/m2.    After an in-depth discussion, she would like to pursue 2nd line chemotherapy. At this time, we would favor use of FOLFIRI as  second-line rather than Taxol/Claudio due to ongoing neuropathy. Pt is agreeable. This was also discussed with Dr. Ashraf, who is in agreement. We will proceed with 4 cycles followed by imaging studies. We will also repeat imaging studies with a CT chest non-contrast and MRI abdomen/pelvis as a baseline. Pt is agreeable.     -RTC in 2 weeks for cycle 3.     #Ascites, lower extremity edema  Initially had peritoneal catheter after diagnosis, output slowed so was removed in October 2021 by IR.  Will continue with weekly paracentesis.  She thinks the accumulation is starting to slow down.  Will monitor.     # Nausea, gastric ulcer  Start dex 8 mg on days 2-4 of chemotherapy.  Refill for Phenergan provided.  Has Zofran and compazine also PRN.  Continue Protonix.      # Anemia  Hgb stable. Will continue to monitor  Iron deficiency due to chronic blood loss.  S/p 2 doses of Injectafer.    # Anxiety  Continue f/u with psych onc w/ Dr. Berumen    # chemotherapy induced neuropathy, pain  2/2 previous chemotherapy with Oxaliplatin. Slowly improving  Continue acupuncture.  Continue gabapentin.  Will continue to monitor. Will see Dr. Brink in Palliative Care on 6/22/2022    Follow up: RTC in 2 weeks for lab work, clinic visit and cycle 3 of FOLFIRI with pump d/c on day 3.     Fer Ashraf MD  Hematology/Oncology  [facility]      Route Chart for Scheduling    Med Onc Chart Routing      Follow up with physician 4 weeks. Cycle 4 of FOLFIRI at [facility]   Follow up with ISHMAEL 2 weeks. RTC in 2 weeks for cycle 3 of FOLFIRI and pump d/c on day 3.  At [facility].   Labs CBC and CMP   Lab interval: every 2 weeks     Imaging    Pharmacy appointment    Other referrals          Treatment Plan Information   OP FOLFIRI Q2WK   Fer Ashraf MD   Upcoming Treatment Dates - OP FOLFIRI Q2WK    6/27/2022       Pre-Medications       diphenhydrAMINE (BENADRYL) 50 mg in sodium chloride 0.9% 50 mL IVPB        Chemotherapy       irinotecan (CAMPTOSAR) 75 mg/m2 = 126 mg in sodium chloride 0.9% 500 mL chemo infusion       fluorouracil (ADRUCIL) 2,200 mg/m2 = 3,675 mg in sodium chloride 0.9% 100 mL chemo infusion       Supportive Care       atropine injection 0.4 mg  7/11/2022       Pre-Medications       diphenhydrAMINE (BENADRYL) 50 mg in sodium chloride 0.9% 50 mL IVPB       Chemotherapy       irinotecan (CAMPTOSAR) 75 mg/m2 = 126 mg in sodium chloride 0.9% 500 mL chemo infusion       fluorouracil (ADRUCIL) 2,200 mg/m2 = 3,675 mg in sodium chloride 0.9% 100 mL chemo infusion       Supportive Care       atropine injection 0.4 mg  7/25/2022       Pre-Medications       diphenhydrAMINE (BENADRYL) 50 mg in sodium chloride 0.9% 50 mL IVPB       Chemotherapy       irinotecan (CAMPTOSAR) 75 mg/m2 = 126 mg in sodium chloride 0.9% 500 mL chemo infusion       fluorouracil (ADRUCIL) 2,200 mg/m2 = 3,675 mg in sodium chloride 0.9% 100 mL chemo infusion       Supportive Care       atropine injection 0.4 mg  8/8/2022       Pre-Medications       diphenhydrAMINE (BENADRYL) 50 mg in sodium chloride 0.9% 50 mL IVPB       Chemotherapy       irinotecan (CAMPTOSAR) 75 mg/m2 = 126 mg in sodium chloride 0.9% 500 mL chemo infusion       fluorouracil (ADRUCIL) 2,200 mg/m2 = 3,675 mg in sodium chloride 0.9% 100 mL chemo infusion       Supportive Care       atropine injection 0.4 mg    Supportive Plan Information  IV FLUIDS AND ELECTROLYTES   Kamille Hooks PA-C   Upcoming Treatment Dates - IV FLUIDS AND ELECTROLYTES    No upcoming days in selected categories.    Therapy Plan Information  EPINEPHrine (EPIPEN) 0.3 mg/0.3 mL pen injection 0.3 mg  0.3 mg, Intramuscular, PRN  diphenhydrAMINE injection 50 mg  50 mg, Intravenous, PRN  methylPREDNISolone sodium succinate injection 125 mg  125 mg, Intravenous, PRN  sodium chloride 0.9% bolus 1,000 mL  1,000 mL, Intravenous, PRN

## 2022-06-13 NOTE — PLAN OF CARE
Pt arrived to unit for C2D1 of mFOLFIRI. Pt saw  in clinic just prior to. Labs along with plan of care reviewed. Pt okay to proceed with tx today. Reports after her first cycle she had bad nausea and vomiting to where she went to the ER for it. Endorsing occasional nausea with generalized fatigue today. VSS. Received initial pre-meds of Benadryl and Aloxi/Dex along with Atropine IVP. Irinotecan given over 90 minutes. Tolerated well. Pt then connected to 5FU pump with 'RUN' message showing at 15:10PM. Pt instructed to return Wednesday, 6/15, at 1PM for her pump d/c. Verbalized understanding. Left unit in NAD at time of discharge.

## 2022-06-15 ENCOUNTER — INFUSION (OUTPATIENT)
Dept: INFUSION THERAPY | Facility: HOSPITAL | Age: 42
End: 2022-06-15
Attending: PHYSICIAN ASSISTANT
Payer: COMMERCIAL

## 2022-06-15 DIAGNOSIS — E86.0 DEHYDRATION: Primary | ICD-10-CM

## 2022-06-15 DIAGNOSIS — C16.9 MALIGNANT NEOPLASM OF STOMACH, UNSPECIFIED LOCATION: ICD-10-CM

## 2022-06-15 PROCEDURE — 25000003 PHARM REV CODE 250: Performed by: INTERNAL MEDICINE

## 2022-06-15 PROCEDURE — A4216 STERILE WATER/SALINE, 10 ML: HCPCS | Performed by: INTERNAL MEDICINE

## 2022-06-15 PROCEDURE — 99211 OFF/OP EST MAY X REQ PHY/QHP: CPT

## 2022-06-15 PROCEDURE — 63600175 PHARM REV CODE 636 W HCPCS: Performed by: INTERNAL MEDICINE

## 2022-06-15 RX ORDER — HEPARIN 100 UNIT/ML
500 SYRINGE INTRAVENOUS
Status: DISCONTINUED | OUTPATIENT
Start: 2022-06-15 | End: 2022-06-15 | Stop reason: HOSPADM

## 2022-06-15 RX ORDER — SODIUM CHLORIDE 0.9 % (FLUSH) 0.9 %
10 SYRINGE (ML) INJECTION
Status: DISCONTINUED | OUTPATIENT
Start: 2022-06-15 | End: 2022-06-15 | Stop reason: HOSPADM

## 2022-06-15 RX ADMIN — Medication 10 ML: at 01:06

## 2022-06-15 RX ADMIN — HEPARIN 500 UNITS: 100 SYRINGE at 01:06

## 2022-06-15 NOTE — PLAN OF CARE
Pt arrived to unit for C2D1 for her pump d/c. Endorsing fatigue along with nausea and bloating due to her ascites. Scheduled for a paracentesis tomorrow. Per 5FU pump, fluorouracil completed. Pt's PAC deaccessed and flushed without difficulty. Has next appointments. Discharged from unit in NAD.

## 2022-06-16 ENCOUNTER — HOSPITAL ENCOUNTER (OUTPATIENT)
Dept: RADIOLOGY | Facility: HOSPITAL | Age: 42
Discharge: HOME | DRG: 374 | End: 2022-06-16
Attending: PHYSICIAN ASSISTANT
Payer: COMMERCIAL

## 2022-06-16 ENCOUNTER — HOSPITAL ENCOUNTER (OUTPATIENT)
Dept: INTERVENTIONAL RADIOLOGY/VASCULAR | Facility: HOSPITAL | Age: 42
Discharge: HOME | DRG: 374 | End: 2022-06-16
Attending: INTERNAL MEDICINE
Payer: COMMERCIAL

## 2022-06-16 ENCOUNTER — PATIENT MESSAGE (OUTPATIENT)
Dept: HEMATOLOGY/ONCOLOGY | Facility: CLINIC | Age: 42
End: 2022-06-16
Payer: COMMERCIAL

## 2022-06-16 ENCOUNTER — HOSPITAL ENCOUNTER (INPATIENT)
Facility: HOSPITAL | Age: 42
LOS: 3 days | Discharge: HOME | DRG: 374 | End: 2022-06-20
Attending: EMERGENCY MEDICINE | Admitting: INTERNAL MEDICINE
Payer: COMMERCIAL

## 2022-06-16 VITALS — SYSTOLIC BLOOD PRESSURE: 123 MMHG | DIASTOLIC BLOOD PRESSURE: 90 MMHG

## 2022-06-16 DIAGNOSIS — G62.0 NEUROPATHY DUE TO CHEMOTHERAPEUTIC DRUG: Primary | ICD-10-CM

## 2022-06-16 DIAGNOSIS — R10.9 ABDOMINAL PAIN: ICD-10-CM

## 2022-06-16 DIAGNOSIS — R07.9 CHEST PAIN: ICD-10-CM

## 2022-06-16 DIAGNOSIS — C16.9 MALIGNANT NEOPLASM OF STOMACH, UNSPECIFIED LOCATION: ICD-10-CM

## 2022-06-16 DIAGNOSIS — R00.0 TACHYCARDIA: ICD-10-CM

## 2022-06-16 DIAGNOSIS — R18.8 OTHER ASCITES: ICD-10-CM

## 2022-06-16 DIAGNOSIS — K25.5 CHRONIC GASTRIC ULCER WITH PERFORATION: ICD-10-CM

## 2022-06-16 DIAGNOSIS — T45.1X5A NEUROPATHY DUE TO CHEMOTHERAPEUTIC DRUG: Primary | ICD-10-CM

## 2022-06-16 DIAGNOSIS — R55 SYNCOPE: ICD-10-CM

## 2022-06-16 DIAGNOSIS — Z51.5 PALLIATIVE CARE ENCOUNTER: ICD-10-CM

## 2022-06-16 LAB
ALBUMIN SERPL BCP-MCNC: 1.7 G/DL (ref 3.5–5.2)
ALP SERPL-CCNC: 68 U/L (ref 55–135)
ALT SERPL W/O P-5'-P-CCNC: 9 U/L (ref 10–44)
ANION GAP SERPL CALC-SCNC: 7 MMOL/L (ref 8–16)
AST SERPL-CCNC: 22 U/L (ref 10–40)
BASOPHILS # BLD AUTO: 0.01 K/UL (ref 0–0.2)
BASOPHILS NFR BLD: 0.2 % (ref 0–1.9)
BILIRUB SERPL-MCNC: 0.1 MG/DL (ref 0.1–1)
BUN SERPL-MCNC: 16 MG/DL (ref 6–20)
CALCIUM SERPL-MCNC: 7.8 MG/DL (ref 8.7–10.5)
CHLORIDE SERPL-SCNC: 106 MMOL/L (ref 95–110)
CO2 SERPL-SCNC: 24 MMOL/L (ref 23–29)
CREAT SERPL-MCNC: 0.8 MG/DL (ref 0.5–1.4)
DIFFERENTIAL METHOD: ABNORMAL
EOSINOPHIL # BLD AUTO: 0 K/UL (ref 0–0.5)
EOSINOPHIL NFR BLD: 0 % (ref 0–8)
ERYTHROCYTE [DISTWIDTH] IN BLOOD BY AUTOMATED COUNT: 13.6 % (ref 11.5–14.5)
EST. GFR  (AFRICAN AMERICAN): >60 ML/MIN/1.73 M^2
EST. GFR  (NON AFRICAN AMERICAN): >60 ML/MIN/1.73 M^2
GLUCOSE SERPL-MCNC: 137 MG/DL (ref 70–110)
HCT VFR BLD AUTO: 30.7 % (ref 37–48.5)
HGB BLD-MCNC: 9.5 G/DL (ref 12–16)
IMM GRANULOCYTES # BLD AUTO: 0.01 K/UL (ref 0–0.04)
IMM GRANULOCYTES NFR BLD AUTO: 0.2 % (ref 0–0.5)
LIPASE SERPL-CCNC: 27 U/L (ref 4–60)
LYMPHOCYTES # BLD AUTO: 0.8 K/UL (ref 1–4.8)
LYMPHOCYTES NFR BLD: 19 % (ref 18–48)
MAGNESIUM SERPL-MCNC: 2 MG/DL (ref 1.6–2.6)
MCH RBC QN AUTO: 28.3 PG (ref 27–31)
MCHC RBC AUTO-ENTMCNC: 30.9 G/DL (ref 32–36)
MCV RBC AUTO: 91 FL (ref 82–98)
MONOCYTES # BLD AUTO: 0.1 K/UL (ref 0.3–1)
MONOCYTES NFR BLD: 1.2 % (ref 4–15)
NEUTROPHILS # BLD AUTO: 3.2 K/UL (ref 1.8–7.7)
NEUTROPHILS NFR BLD: 79.4 % (ref 38–73)
NRBC BLD-RTO: 0 /100 WBC
PHOSPHATE SERPL-MCNC: 3.7 MG/DL (ref 2.7–4.5)
PLATELET # BLD AUTO: 347 K/UL (ref 150–450)
PMV BLD AUTO: 9 FL (ref 9.2–12.9)
POTASSIUM SERPL-SCNC: 4.4 MMOL/L (ref 3.5–5.1)
PROT SERPL-MCNC: 4.7 G/DL (ref 6–8.4)
RBC # BLD AUTO: 3.36 M/UL (ref 4–5.4)
SODIUM SERPL-SCNC: 137 MMOL/L (ref 136–145)
WBC # BLD AUTO: 4.01 K/UL (ref 3.9–12.7)

## 2022-06-16 PROCEDURE — 99284 EMERGENCY DEPT VISIT MOD MDM: CPT | Mod: ,,, | Performed by: EMERGENCY MEDICINE

## 2022-06-16 PROCEDURE — 99284 PR EMERGENCY DEPT VISIT,LEVEL IV: ICD-10-PCS | Mod: ,,, | Performed by: EMERGENCY MEDICINE

## 2022-06-16 PROCEDURE — 93010 EKG 12-LEAD: ICD-10-PCS | Mod: ,,, | Performed by: INTERNAL MEDICINE

## 2022-06-16 PROCEDURE — 93005 ELECTROCARDIOGRAM TRACING: CPT

## 2022-06-16 PROCEDURE — 96374 THER/PROPH/DIAG INJ IV PUSH: CPT

## 2022-06-16 PROCEDURE — 25500020 PHARM REV CODE 255: Performed by: PHYSICIAN ASSISTANT

## 2022-06-16 PROCEDURE — 80053 COMPREHEN METABOLIC PANEL: CPT | Performed by: STUDENT IN AN ORGANIZED HEALTH CARE EDUCATION/TRAINING PROGRAM

## 2022-06-16 PROCEDURE — 49083 IR PARACENTESIS WITH IMAGING: ICD-10-PCS | Mod: ,,, | Performed by: RADIOLOGY

## 2022-06-16 PROCEDURE — 96376 TX/PRO/DX INJ SAME DRUG ADON: CPT

## 2022-06-16 PROCEDURE — 72197 MRI PELVIS W/O & W/DYE: CPT | Mod: TC

## 2022-06-16 PROCEDURE — 99285 EMERGENCY DEPT VISIT HI MDM: CPT | Mod: 25

## 2022-06-16 PROCEDURE — 72197 MRI PELVIS W/O & W/DYE: CPT | Mod: 26,,, | Performed by: STUDENT IN AN ORGANIZED HEALTH CARE EDUCATION/TRAINING PROGRAM

## 2022-06-16 PROCEDURE — 96375 TX/PRO/DX INJ NEW DRUG ADDON: CPT

## 2022-06-16 PROCEDURE — 49083 ABD PARACENTESIS W/IMAGING: CPT | Performed by: RADIOLOGY

## 2022-06-16 PROCEDURE — 83690 ASSAY OF LIPASE: CPT | Performed by: STUDENT IN AN ORGANIZED HEALTH CARE EDUCATION/TRAINING PROGRAM

## 2022-06-16 PROCEDURE — 85025 COMPLETE CBC W/AUTO DIFF WBC: CPT | Performed by: STUDENT IN AN ORGANIZED HEALTH CARE EDUCATION/TRAINING PROGRAM

## 2022-06-16 PROCEDURE — 74183 MRI ABD W/O CNTR FLWD CNTR: CPT | Mod: 26,,, | Performed by: STUDENT IN AN ORGANIZED HEALTH CARE EDUCATION/TRAINING PROGRAM

## 2022-06-16 PROCEDURE — 93010 ELECTROCARDIOGRAM REPORT: CPT | Mod: ,,, | Performed by: INTERNAL MEDICINE

## 2022-06-16 PROCEDURE — A9585 GADOBUTROL INJECTION: HCPCS | Performed by: PHYSICIAN ASSISTANT

## 2022-06-16 PROCEDURE — 63600175 PHARM REV CODE 636 W HCPCS: Performed by: STUDENT IN AN ORGANIZED HEALTH CARE EDUCATION/TRAINING PROGRAM

## 2022-06-16 PROCEDURE — 83735 ASSAY OF MAGNESIUM: CPT | Performed by: STUDENT IN AN ORGANIZED HEALTH CARE EDUCATION/TRAINING PROGRAM

## 2022-06-16 PROCEDURE — 74183 MRI ABDOMEN-PELVIS W W/O CONTRAST (XPD): ICD-10-PCS | Mod: 26,,, | Performed by: STUDENT IN AN ORGANIZED HEALTH CARE EDUCATION/TRAINING PROGRAM

## 2022-06-16 PROCEDURE — 72197 MRI ABDOMEN-PELVIS W W/O CONTRAST (XPD): ICD-10-PCS | Mod: 26,,, | Performed by: STUDENT IN AN ORGANIZED HEALTH CARE EDUCATION/TRAINING PROGRAM

## 2022-06-16 PROCEDURE — C1729 CATH, DRAINAGE: HCPCS

## 2022-06-16 PROCEDURE — 84100 ASSAY OF PHOSPHORUS: CPT | Performed by: STUDENT IN AN ORGANIZED HEALTH CARE EDUCATION/TRAINING PROGRAM

## 2022-06-16 RX ORDER — MORPHINE SULFATE 4 MG/ML
4 INJECTION, SOLUTION INTRAMUSCULAR; INTRAVENOUS
Status: COMPLETED | OUTPATIENT
Start: 2022-06-16 | End: 2022-06-16

## 2022-06-16 RX ORDER — HYDROMORPHONE HYDROCHLORIDE 1 MG/ML
0.5 INJECTION, SOLUTION INTRAMUSCULAR; INTRAVENOUS; SUBCUTANEOUS
Status: COMPLETED | OUTPATIENT
Start: 2022-06-16 | End: 2022-06-16

## 2022-06-16 RX ORDER — GADOBUTROL 604.72 MG/ML
10 INJECTION INTRAVENOUS
Status: COMPLETED | OUTPATIENT
Start: 2022-06-16 | End: 2022-06-16

## 2022-06-16 RX ORDER — ONDANSETRON 2 MG/ML
4 INJECTION INTRAMUSCULAR; INTRAVENOUS
Status: COMPLETED | OUTPATIENT
Start: 2022-06-16 | End: 2022-06-16

## 2022-06-16 RX ADMIN — MORPHINE SULFATE 4 MG: 4 INJECTION INTRAVENOUS at 07:06

## 2022-06-16 RX ADMIN — ONDANSETRON 4 MG: 2 INJECTION INTRAMUSCULAR; INTRAVENOUS at 10:06

## 2022-06-16 RX ADMIN — GADOBUTROL 10 ML: 604.72 INJECTION INTRAVENOUS at 06:06

## 2022-06-16 RX ADMIN — MORPHINE SULFATE 4 MG: 4 INJECTION INTRAVENOUS at 08:06

## 2022-06-16 RX ADMIN — HYDROMORPHONE HYDROCHLORIDE 0.5 MG: 1 INJECTION, SOLUTION INTRAMUSCULAR; INTRAVENOUS; SUBCUTANEOUS at 08:06

## 2022-06-16 NOTE — DISCHARGE SUMMARY
Radiology Discharge Summary      Hospital Course: No complications    Admit Date: 6/16/2022  Discharge Date: 06/16/2022     Instructions Given to Patient: Yes  Diet: Resume prior diet  Activity: activity as tolerated    Description of Condition on Discharge: Stable  Vital Signs (Most Recent): BP: (!) 123/90 (06/16/22 1151)    Discharge Disposition: Home    Discharge Diagnosis:  41 y.o. female with recurrent painful, tense ascites s/p successful US-guided percutaneous RUQ-approach therapeutic LVP with local anesthetic only and albumin infusion post PRN as indicated per institutional protocol. Patient tolerated the procedure well. No immediate post-procedural complications noted.      Thank you for considering IR for the care of your patient.      Castro Bermudez MD  Interventional Radiology

## 2022-06-16 NOTE — CARE UPDATE
"Patient had MRI abdomen pelvis with and without contrast at [facility] this evening.     I was called from the reading room to come assess patient as she began to complain of abdominal pain during scan. Patient reportedly "passed out" and blood pressure was approximately 98/59 with normal blood pressures ranging in the 140s/80s. She remained awake, alert, and oriented the entire time I was with patient.     This was patient's first MRI and she received 10 cc of Gadavist contrast.    Of note, she had paracentesis at [facility] earlier today.       EMS was called and patient to [facility] ED for further assessment.     Mari Pope, PGY-5  "

## 2022-06-16 NOTE — H&P
Radiology History & Physical      SUBJECTIVE:     Chief Complaint: Recurrent painful, tense ascites     History of Present Illness:  Puja Quigley is a 41 y.o. female with pertinent PMHx of gastric adenocarcinoma with osseous, hepatic and peritoneal metastases complicated by recurrent abdominal distension and pain 2/2 recurrent painful, tense ascites requiring frequent decompression for symptom relief.     A new outpatient IR consult received for US-guided percutaneous RUQ-approach therapeutic large-volume paracentesis.    Past Medical History:   Diagnosis Date    Anxiety     Dehydration 5/30/2022    Gastric cancer     Gastric ulcer     Hx of psychiatric care     Pregnancy 08/12/2020    delivered on 8/12/2020    Umbilical hernia      Past Surgical History:   Procedure Laterality Date    CLOSURE OF PERFORATED ULCER OF DUODENUM USING OMENTAL PATCH      ESOPHAGOGASTRODUODENOSCOPY N/A 10/13/2020    Procedure: EGD (ESOPHAGOGASTRODUODENOSCOPY);  Surgeon: Rosio Marion MD;  Location: [facility] ENDO (2ND FLR);  Service: Endoscopy;  Laterality: N/A;    ESOPHAGOGASTRODUODENOSCOPY N/A 12/11/2020    Procedure: EGD (ESOPHAGOGASTRODUODENOSCOPY);  Surgeon: Rosio Marion MD;  Location: [facility] ENDO (2ND FLR);  Service: Endoscopy;  Laterality: N/A;  Covid-19 test 12/8/20 at [facility]    ESOPHAGOGASTRODUODENOSCOPY N/A 3/12/2021    Procedure: EGD (ESOPHAGOGASTRODUODENOSCOPY);  Surgeon: Nav Omer MD;  Location: [facility] ENDO (2ND FLR);  Service: Endoscopy;  Laterality: N/A;  COVID at [facility] 3/9 ttr    ESOPHAGOGASTRODUODENOSCOPY N/A 5/18/2021    Procedure: EGD (ESOPHAGOGASTRODUODENOSCOPY);  Surgeon: Rosio Marion MD;  Location: [facility] ENDO (2ND FLR);  Service: Endoscopy;  Laterality: N/A;  5/15-covid pcw-inst portal-tb    ESOPHAGOGASTRODUODENOSCOPY N/A 5/26/2021    Procedure: EGD (ESOPHAGOGASTRODUODENOSCOPY);  Surgeon: Socrates Terrazas MD;  Location: [facility] ENDO (2ND FLR);  Service: Endoscopy;   Laterality: N/A;     Home Meds:   Prior to Admission medications    Medication Sig Start Date End Date Taking? Authorizing Provider   acetaminophen (TYLENOL) 500 MG tablet Take 500 mg by mouth every 6 (six) hours as needed for Pain.    Historical Provider   amitriptyline (ELAVIL) 10 MG tablet Take 1 tablet (10 mg total) by mouth every evening. 5/13/21   Socrates Terrazas MD   amitriptyline (ELAVIL) 10 MG tablet Take 10 mg by mouth. 5/13/21   Historical Provider   dexAMETHasone (DECADRON) 4 MG Tab 8 mg daily on days 2 through 4 of each chemotherapy cycle. 6/13/22   Fer Ashraf MD   dicyclomine (BENTYL) 10 MG capsule TAKE ONE CAPSULE BY MOUTH FOUR TIMES DAILY 9/27/21   Kamille Hooks PA-C   famotidine (PEPCID) 20 MG tablet Take 1 tablet (20 mg total) by mouth nightly as needed for Heartburn. 2/23/22 2/23/23  Kamille Hooks PA-C   furosemide (LASIX) 20 MG tablet Take 10 mg by mouth.    Historical Provider   furosemide (LASIX) 40 MG tablet  5/26/21   Historical Provider   gabapentin (NEURONTIN) 300 MG capsule Take 1 capsule (300 mg total) by mouth 3 (three) times daily. 2/7/22 2/7/23  Kamille Hooks PA-C   HYDROcodone-acetaminophen (NORCO) 5-325 mg per tablet Take 1 tablet by mouth. 7/9/21   Historical Provider   HYDROcodone-acetaminophen (NORCO) 5-325 mg per tablet Take 1 tablet by mouth 3 (three) times daily as needed for Pain. 5/25/22   Kamille Hooks PA-C   LIDOcaine-prilocaine (EMLA) cream Apply to Port-A-Cath area 30 to 45 minutes prior to port access as directed (topical anesthetic use to the anterior chest only). 6/25/21   Historical Provider   LIDOcaine-prilocaine (EMLA) cream Apply topically. 6/25/21   Historical Provider   metoclopramide HCl (REGLAN) 5 MG tablet 5 mg. 6/11/21   Historical Provider   multivitamin with folic acid 400 mcg Tab Take 1 tablet by mouth once daily.    Historical Provider   omeprazole (PRILOSEC) 40 MG capsule Take 1 capsule (40 mg total) by mouth once daily. 5/25/22 5/25/23   Kamille Hooks PA-C   ondansetron (ZOFRAN) 8 MG tablet Take 1 tablet (8 mg total) by mouth every 8 (eight) hours as needed for Nausea. 7/26/21   Fer Ashraf MD   pantoprazole (PROTONIX) 40 MG tablet Take 40 mg by mouth. 6/2/21   Historical Provider   pantoprazole (PROTONIX) 40 MG tablet Take 1 tablet (40 mg total) by mouth 2 (two) times daily. 5/25/22   Kamille Hooks PA-C   prochlorperazine (COMPAZINE) 10 MG tablet TAKE 1 TABLET(10 MG) BY MOUTH EVERY 6 HOURS AS NEEDED FOR NAUSEA 8/7/21   Fre Ashraf MD   prochlorperazine (COMPAZINE) 10 MG tablet as needed. 6/3/21   Historical Provider   promethazine (PHENERGAN) 25 MG tablet Take 1 tablet (25 mg total) by mouth every 6 (six) hours as needed for Nausea. 6/13/22   Fer Ashraf MD   senna-docusate 8.6-50 mg (PERICOLACE) 8.6-50 mg per tablet Take 1 tablet by mouth. 5/26/21   Historical Provider   senna-docusate 8.6-50 mg (SENNA WITH DOCUSATE SODIUM) 8.6-50 mg per tablet Take 1 tablet by mouth 2 (two) times daily as needed for Constipation. 5/26/21   Reshma Wilson MD   sucralfate (CARAFATE) 1 gram tablet TAKE 1 TABLET(1 GRAM) BY MOUTH FOUR TIMES DAILY FOR 14 DAYS 10/20/21   Rosio Marion MD   sucralfate (CARAFATE) 1 gram tablet  4/8/21   Historical Provider   traMADoL (ULTRAM) 50 mg tablet Take 50 mg by mouth. 6/25/21   Historical Provider   traMADoL (ULTRAM) 50 mg tablet Take by mouth. 6/25/21   Historical Provider     Anticoagulants/Antiplatelets: no anticoagulation     Allergies: Review of patient's allergies indicates:  No Known Allergies      Sedation History:  no adverse reactions     Review of Systems:   Hematological: no known coagulopathies  Respiratory: no cough, shortness of breath, or wheezing  Cardiovascular: no chest pain or dyspnea on exertion  Gastrointestinal: positive for - abdominal pain and distension  Genito-Urinary: no dysuria, trouble voiding, or hematuria  Musculoskeletal: negative  Neurological: no TIA or  stroke symptoms      OBJECTIVE:     Vital Signs (Most Recent)       Physical Exam:  General: no acute distress  Mental Status: alert and oriented to person, place and time  HEENT: normocephalic, atraumatic  Chest: unlabored breathing  Heart: regular heart rate  Abdomen: +distended with +fluid wave. No TTP/r/g.  Extremity: moves all extremities    Laboratory  Lab Results   Component Value Date    INR 1.0 06/10/2021       Lab Results   Component Value Date    WBC 4.51 06/13/2022    HGB 12.4 06/13/2022    HCT 40.7 06/13/2022    MCV 93 06/13/2022     06/13/2022      Lab Results   Component Value Date    GLU 85 06/13/2022     06/13/2022    K 4.3 06/13/2022     06/13/2022    CO2 27 06/13/2022    BUN 10 06/13/2022    CREATININE 0.7 06/13/2022    CALCIUM 8.9 06/13/2022    MG 1.9 04/18/2022    ALT 9 (L) 06/13/2022    AST 20 06/13/2022    ALBUMIN 1.9 (L) 06/13/2022    BILITOT 0.3 06/13/2022     ASSESSMENT/PLAN:     41 y.o. female with pertinent PMHx of gastric adenocarcinoma with osseous, hepatic and peritoneal metastases complicated by recurrent abdominal distension and pain 2/2 recurrent painful, tense ascites requiring frequent decompression for symptom relief.     1. Recurrent painful, tense ascites - Will attempt US-guided percutaneous RUQ-approach therapeutic LVP with local anesthetic only and albumin infusion post PRN as indicated per institutional protocol.     Risks (including, but not limited to, pain, bleeding, infection, damage to nearby structures, failure to obtain sufficient material for a diagnosis, the need for additional procedures, and death), benefits, and alternatives were discussed with the patient. All questions were answered to the best of my abilities. The patient wishes to proceed with the procedure. Written informed consent was obtained.     Thank you for considering IR for the care of your patient.      Castro Bermudez MD  Interventional Radiology

## 2022-06-17 ENCOUNTER — PATIENT MESSAGE (OUTPATIENT)
Dept: HEMATOLOGY/ONCOLOGY | Facility: CLINIC | Age: 42
End: 2022-06-17
Payer: COMMERCIAL

## 2022-06-17 PROBLEM — R55 SYNCOPE: Status: ACTIVE | Noted: 2022-06-17

## 2022-06-17 PROBLEM — R60.0 BILATERAL LOWER EXTREMITY EDEMA: Chronic | Status: ACTIVE | Noted: 2022-06-17

## 2022-06-17 LAB
ABO + RH BLD: NORMAL
ALBUMIN SERPL BCP-MCNC: 2.6 G/DL (ref 3.5–5.2)
ALP SERPL-CCNC: 62 U/L (ref 55–135)
ALT SERPL W/O P-5'-P-CCNC: 9 U/L (ref 10–44)
ANION GAP SERPL CALC-SCNC: 10 MMOL/L (ref 8–16)
AST SERPL-CCNC: 18 U/L (ref 10–40)
BACTERIA #/AREA URNS AUTO: ABNORMAL /HPF
BASOPHILS # BLD AUTO: 0.01 K/UL (ref 0–0.2)
BASOPHILS NFR BLD: 0.1 % (ref 0–1.9)
BILIRUB SERPL-MCNC: 0.3 MG/DL (ref 0.1–1)
BILIRUB UR QL STRIP: NEGATIVE
BLD GP AB SCN CELLS X3 SERPL QL: NORMAL
BLD PROD TYP BPU: NORMAL
BLOOD UNIT EXPIRATION DATE: NORMAL
BLOOD UNIT TYPE CODE: 5100
BLOOD UNIT TYPE: NORMAL
BUN SERPL-MCNC: 16 MG/DL (ref 6–20)
CALCIUM SERPL-MCNC: 8.8 MG/DL (ref 8.7–10.5)
CHLORIDE SERPL-SCNC: 108 MMOL/L (ref 95–110)
CLARITY UR REFRACT.AUTO: CLEAR
CO2 SERPL-SCNC: 22 MMOL/L (ref 23–29)
CODING SYSTEM: NORMAL
COLOR UR AUTO: YELLOW
CREAT SERPL-MCNC: 0.7 MG/DL (ref 0.5–1.4)
CTP QC/QA: YES
DIFFERENTIAL METHOD: ABNORMAL
DISPENSE STATUS: NORMAL
EOSINOPHIL # BLD AUTO: 0 K/UL (ref 0–0.5)
EOSINOPHIL NFR BLD: 0 % (ref 0–8)
ERYTHROCYTE [DISTWIDTH] IN BLOOD BY AUTOMATED COUNT: 13.5 % (ref 11.5–14.5)
EST. GFR  (AFRICAN AMERICAN): >60 ML/MIN/1.73 M^2
EST. GFR  (NON AFRICAN AMERICAN): >60 ML/MIN/1.73 M^2
FERRITIN SERPL-MCNC: 63 NG/ML (ref 20–300)
GLUCOSE SERPL-MCNC: 104 MG/DL (ref 70–110)
GLUCOSE UR QL STRIP: NEGATIVE
HCT VFR BLD AUTO: 27.1 % (ref 37–48.5)
HGB BLD-MCNC: 8.2 G/DL (ref 12–16)
HGB UR QL STRIP: NEGATIVE
HYALINE CASTS UR QL AUTO: 0 /LPF
IMM GRANULOCYTES # BLD AUTO: 0.03 K/UL (ref 0–0.04)
IMM GRANULOCYTES NFR BLD AUTO: 0.3 % (ref 0–0.5)
IRON SERPL-MCNC: 16 UG/DL (ref 30–160)
KETONES UR QL STRIP: ABNORMAL
LEUKOCYTE ESTERASE UR QL STRIP: NEGATIVE
LYMPHOCYTES # BLD AUTO: 2.1 K/UL (ref 1–4.8)
LYMPHOCYTES NFR BLD: 20.9 % (ref 18–48)
MAGNESIUM SERPL-MCNC: 2.2 MG/DL (ref 1.6–2.6)
MCH RBC QN AUTO: 28.2 PG (ref 27–31)
MCHC RBC AUTO-ENTMCNC: 30.3 G/DL (ref 32–36)
MCV RBC AUTO: 93 FL (ref 82–98)
MICROSCOPIC COMMENT: ABNORMAL
MONOCYTES # BLD AUTO: 0.4 K/UL (ref 0.3–1)
MONOCYTES NFR BLD: 3.8 % (ref 4–15)
NEUTROPHILS # BLD AUTO: 7.4 K/UL (ref 1.8–7.7)
NEUTROPHILS NFR BLD: 74.9 % (ref 38–73)
NITRITE UR QL STRIP: NEGATIVE
NRBC BLD-RTO: 0 /100 WBC
PH UR STRIP: 5 [PH] (ref 5–8)
PHOSPHATE SERPL-MCNC: 4.2 MG/DL (ref 2.7–4.5)
PLATELET # BLD AUTO: 259 K/UL (ref 150–450)
PMV BLD AUTO: 9.5 FL (ref 9.2–12.9)
POTASSIUM SERPL-SCNC: 4.4 MMOL/L (ref 3.5–5.1)
PROT SERPL-MCNC: 5.3 G/DL (ref 6–8.4)
PROT UR QL STRIP: ABNORMAL
RBC # BLD AUTO: 2.91 M/UL (ref 4–5.4)
RBC #/AREA URNS AUTO: 9 /HPF (ref 0–4)
SARS-COV-2 RDRP RESP QL NAA+PROBE: NEGATIVE
SATURATED IRON: 8 % (ref 20–50)
SODIUM SERPL-SCNC: 140 MMOL/L (ref 136–145)
SP GR UR STRIP: >=1.03 (ref 1–1.03)
SQUAMOUS #/AREA URNS AUTO: 5 /HPF
TOTAL IRON BINDING CAPACITY: 213 UG/DL (ref 250–450)
TRANS ERYTHROCYTES VOL PATIENT: NORMAL ML
TRANSFERRIN SERPL-MCNC: 144 MG/DL (ref 200–375)
URN SPEC COLLECT METH UR: ABNORMAL
WBC # BLD AUTO: 9.94 K/UL (ref 3.9–12.7)
WBC #/AREA URNS AUTO: 6 /HPF (ref 0–5)

## 2022-06-17 PROCEDURE — 25000003 PHARM REV CODE 250: Performed by: STUDENT IN AN ORGANIZED HEALTH CARE EDUCATION/TRAINING PROGRAM

## 2022-06-17 PROCEDURE — 96375 TX/PRO/DX INJ NEW DRUG ADDON: CPT

## 2022-06-17 PROCEDURE — P9021 RED BLOOD CELLS UNIT: HCPCS

## 2022-06-17 PROCEDURE — 84100 ASSAY OF PHOSPHORUS: CPT | Performed by: STUDENT IN AN ORGANIZED HEALTH CARE EDUCATION/TRAINING PROGRAM

## 2022-06-17 PROCEDURE — 96376 TX/PRO/DX INJ SAME DRUG ADON: CPT

## 2022-06-17 PROCEDURE — 99223 1ST HOSP IP/OBS HIGH 75: CPT | Mod: ,,, | Performed by: EMERGENCY MEDICINE

## 2022-06-17 PROCEDURE — 36415 COLL VENOUS BLD VENIPUNCTURE: CPT

## 2022-06-17 PROCEDURE — P9047 ALBUMIN (HUMAN), 25%, 50ML: HCPCS | Mod: JG | Performed by: STUDENT IN AN ORGANIZED HEALTH CARE EDUCATION/TRAINING PROGRAM

## 2022-06-17 PROCEDURE — 84466 ASSAY OF TRANSFERRIN: CPT | Performed by: STUDENT IN AN ORGANIZED HEALTH CARE EDUCATION/TRAINING PROGRAM

## 2022-06-17 PROCEDURE — 94761 N-INVAS EAR/PLS OXIMETRY MLT: CPT

## 2022-06-17 PROCEDURE — 99223 1ST HOSP IP/OBS HIGH 75: CPT | Mod: ,,, | Performed by: INTERNAL MEDICINE

## 2022-06-17 PROCEDURE — 25000003 PHARM REV CODE 250

## 2022-06-17 PROCEDURE — 63600175 PHARM REV CODE 636 W HCPCS: Performed by: INTERNAL MEDICINE

## 2022-06-17 PROCEDURE — 97535 SELF CARE MNGMENT TRAINING: CPT

## 2022-06-17 PROCEDURE — 80053 COMPREHEN METABOLIC PANEL: CPT | Performed by: STUDENT IN AN ORGANIZED HEALTH CARE EDUCATION/TRAINING PROGRAM

## 2022-06-17 PROCEDURE — 83735 ASSAY OF MAGNESIUM: CPT | Performed by: STUDENT IN AN ORGANIZED HEALTH CARE EDUCATION/TRAINING PROGRAM

## 2022-06-17 PROCEDURE — 36430 TRANSFUSION BLD/BLD COMPNT: CPT

## 2022-06-17 PROCEDURE — 85025 COMPLETE CBC W/AUTO DIFF WBC: CPT | Performed by: STUDENT IN AN ORGANIZED HEALTH CARE EDUCATION/TRAINING PROGRAM

## 2022-06-17 PROCEDURE — 86920 COMPATIBILITY TEST SPIN: CPT

## 2022-06-17 PROCEDURE — 97165 OT EVAL LOW COMPLEX 30 MIN: CPT

## 2022-06-17 PROCEDURE — 99356 PR PROLONGED SERV,INPATIENT,1ST HR: ICD-10-PCS | Mod: ,,, | Performed by: EMERGENCY MEDICINE

## 2022-06-17 PROCEDURE — 87040 BLOOD CULTURE FOR BACTERIA: CPT | Mod: 59 | Performed by: STUDENT IN AN ORGANIZED HEALTH CARE EDUCATION/TRAINING PROGRAM

## 2022-06-17 PROCEDURE — 20600001 HC STEP DOWN PRIVATE ROOM

## 2022-06-17 PROCEDURE — 63600175 PHARM REV CODE 636 W HCPCS

## 2022-06-17 PROCEDURE — 99223 PR INITIAL HOSPITAL CARE,LEVL III: ICD-10-PCS | Mod: ,,, | Performed by: INTERNAL MEDICINE

## 2022-06-17 PROCEDURE — 86850 RBC ANTIBODY SCREEN: CPT

## 2022-06-17 PROCEDURE — 82728 ASSAY OF FERRITIN: CPT | Performed by: STUDENT IN AN ORGANIZED HEALTH CARE EDUCATION/TRAINING PROGRAM

## 2022-06-17 PROCEDURE — 63600175 PHARM REV CODE 636 W HCPCS: Performed by: STUDENT IN AN ORGANIZED HEALTH CARE EDUCATION/TRAINING PROGRAM

## 2022-06-17 PROCEDURE — 99356 PR PROLONGED SERV,INPATIENT,1ST HR: CPT | Mod: ,,, | Performed by: EMERGENCY MEDICINE

## 2022-06-17 PROCEDURE — U0002 COVID-19 LAB TEST NON-CDC: HCPCS

## 2022-06-17 PROCEDURE — 99223 PR INITIAL HOSPITAL CARE,LEVL III: ICD-10-PCS | Mod: ,,, | Performed by: EMERGENCY MEDICINE

## 2022-06-17 PROCEDURE — 25500020 PHARM REV CODE 255: Performed by: EMERGENCY MEDICINE

## 2022-06-17 PROCEDURE — 81001 URINALYSIS AUTO W/SCOPE: CPT | Performed by: STUDENT IN AN ORGANIZED HEALTH CARE EDUCATION/TRAINING PROGRAM

## 2022-06-17 RX ORDER — LOPERAMIDE HYDROCHLORIDE 2 MG/1
2 CAPSULE ORAL 4 TIMES DAILY PRN
Status: DISCONTINUED | OUTPATIENT
Start: 2022-06-17 | End: 2022-06-20 | Stop reason: HOSPADM

## 2022-06-17 RX ORDER — NALOXONE HCL 0.4 MG/ML
0.02 VIAL (ML) INJECTION
Status: DISCONTINUED | OUTPATIENT
Start: 2022-06-17 | End: 2022-06-20 | Stop reason: HOSPADM

## 2022-06-17 RX ORDER — HYDROCODONE BITARTRATE AND ACETAMINOPHEN 500; 5 MG/1; MG/1
TABLET ORAL
Status: DISCONTINUED | OUTPATIENT
Start: 2022-06-17 | End: 2022-06-20 | Stop reason: HOSPADM

## 2022-06-17 RX ORDER — GABAPENTIN 300 MG/1
300 CAPSULE ORAL 3 TIMES DAILY
Status: DISCONTINUED | OUTPATIENT
Start: 2022-06-17 | End: 2022-06-20 | Stop reason: HOSPADM

## 2022-06-17 RX ORDER — PANTOPRAZOLE SODIUM 40 MG/1
40 TABLET, DELAYED RELEASE ORAL DAILY
Refills: 11 | Status: DISCONTINUED | OUTPATIENT
Start: 2022-06-17 | End: 2022-06-17

## 2022-06-17 RX ORDER — DIPHENHYDRAMINE HCL 25 MG
25 CAPSULE ORAL EVERY 6 HOURS PRN
Status: DISCONTINUED | OUTPATIENT
Start: 2022-06-17 | End: 2022-06-20 | Stop reason: HOSPADM

## 2022-06-17 RX ORDER — HYDROCODONE BITARTRATE AND ACETAMINOPHEN 5; 325 MG/1; MG/1
1 TABLET ORAL EVERY 6 HOURS PRN
Status: DISCONTINUED | OUTPATIENT
Start: 2022-06-17 | End: 2022-06-18

## 2022-06-17 RX ORDER — FAMOTIDINE 20 MG/1
20 TABLET, FILM COATED ORAL 2 TIMES DAILY PRN
Status: DISCONTINUED | OUTPATIENT
Start: 2022-06-17 | End: 2022-06-20 | Stop reason: HOSPADM

## 2022-06-17 RX ORDER — ACETAMINOPHEN 325 MG/1
650 TABLET ORAL ONCE AS NEEDED
Status: DISCONTINUED | OUTPATIENT
Start: 2022-06-17 | End: 2022-06-20 | Stop reason: HOSPADM

## 2022-06-17 RX ORDER — IBUPROFEN 200 MG
16 TABLET ORAL
Status: DISCONTINUED | OUTPATIENT
Start: 2022-06-17 | End: 2022-06-20 | Stop reason: HOSPADM

## 2022-06-17 RX ORDER — TALC
6 POWDER (GRAM) TOPICAL NIGHTLY PRN
Status: DISCONTINUED | OUTPATIENT
Start: 2022-06-17 | End: 2022-06-20 | Stop reason: HOSPADM

## 2022-06-17 RX ORDER — SIMETHICONE 80 MG
1 TABLET,CHEWABLE ORAL 4 TIMES DAILY PRN
Status: DISCONTINUED | OUTPATIENT
Start: 2022-06-17 | End: 2022-06-20 | Stop reason: HOSPADM

## 2022-06-17 RX ORDER — HYDROMORPHONE HYDROCHLORIDE 1 MG/ML
0.5 INJECTION, SOLUTION INTRAMUSCULAR; INTRAVENOUS; SUBCUTANEOUS EVERY 6 HOURS PRN
Status: DISCONTINUED | OUTPATIENT
Start: 2022-06-17 | End: 2022-06-17

## 2022-06-17 RX ORDER — HYDROMORPHONE HYDROCHLORIDE 1 MG/ML
1 INJECTION, SOLUTION INTRAMUSCULAR; INTRAVENOUS; SUBCUTANEOUS EVERY 6 HOURS PRN
Status: DISCONTINUED | OUTPATIENT
Start: 2022-06-17 | End: 2022-06-18

## 2022-06-17 RX ORDER — GLUCAGON 1 MG
1 KIT INJECTION
Status: DISCONTINUED | OUTPATIENT
Start: 2022-06-17 | End: 2022-06-20 | Stop reason: HOSPADM

## 2022-06-17 RX ORDER — SODIUM CHLORIDE 0.9 % (FLUSH) 0.9 %
10 SYRINGE (ML) INJECTION EVERY 12 HOURS PRN
Status: DISCONTINUED | OUTPATIENT
Start: 2022-06-17 | End: 2022-06-20 | Stop reason: HOSPADM

## 2022-06-17 RX ORDER — PANTOPRAZOLE SODIUM 40 MG/1
40 TABLET, DELAYED RELEASE ORAL 2 TIMES DAILY
Status: DISCONTINUED | OUTPATIENT
Start: 2022-06-17 | End: 2022-06-20 | Stop reason: HOSPADM

## 2022-06-17 RX ORDER — DICYCLOMINE HYDROCHLORIDE 10 MG/1
10 CAPSULE ORAL 4 TIMES DAILY
Status: DISCONTINUED | OUTPATIENT
Start: 2022-06-17 | End: 2022-06-20 | Stop reason: HOSPADM

## 2022-06-17 RX ORDER — ALBUMIN HUMAN 250 G/1000ML
25 SOLUTION INTRAVENOUS ONCE
Status: COMPLETED | OUTPATIENT
Start: 2022-06-17 | End: 2022-06-17

## 2022-06-17 RX ORDER — HYDROMORPHONE HYDROCHLORIDE 1 MG/ML
0.5 INJECTION, SOLUTION INTRAMUSCULAR; INTRAVENOUS; SUBCUTANEOUS
Status: COMPLETED | OUTPATIENT
Start: 2022-06-17 | End: 2022-06-17

## 2022-06-17 RX ORDER — AMITRIPTYLINE HYDROCHLORIDE 10 MG/1
10 TABLET, FILM COATED ORAL NIGHTLY
Status: DISCONTINUED | OUTPATIENT
Start: 2022-06-17 | End: 2022-06-20 | Stop reason: HOSPADM

## 2022-06-17 RX ORDER — IBUPROFEN 200 MG
24 TABLET ORAL
Status: DISCONTINUED | OUTPATIENT
Start: 2022-06-17 | End: 2022-06-20 | Stop reason: HOSPADM

## 2022-06-17 RX ORDER — ONDANSETRON 2 MG/ML
4 INJECTION INTRAMUSCULAR; INTRAVENOUS EVERY 8 HOURS PRN
Status: DISCONTINUED | OUTPATIENT
Start: 2022-06-17 | End: 2022-06-20 | Stop reason: HOSPADM

## 2022-06-17 RX ORDER — HEPARIN SODIUM 5000 [USP'U]/ML
5000 INJECTION, SOLUTION INTRAVENOUS; SUBCUTANEOUS EVERY 8 HOURS
Status: DISCONTINUED | OUTPATIENT
Start: 2022-06-17 | End: 2022-06-20 | Stop reason: HOSPADM

## 2022-06-17 RX ORDER — ACETAMINOPHEN 325 MG/1
650 TABLET ORAL EVERY 8 HOURS PRN
Status: DISCONTINUED | OUTPATIENT
Start: 2022-06-17 | End: 2022-06-20 | Stop reason: HOSPADM

## 2022-06-17 RX ADMIN — FAMOTIDINE 20 MG: 20 TABLET ORAL at 06:06

## 2022-06-17 RX ADMIN — HEPARIN SODIUM 5000 UNITS: 5000 INJECTION INTRAVENOUS; SUBCUTANEOUS at 05:06

## 2022-06-17 RX ADMIN — HYDROMORPHONE HYDROCHLORIDE 1 MG: 1 INJECTION, SOLUTION INTRAMUSCULAR; INTRAVENOUS; SUBCUTANEOUS at 03:06

## 2022-06-17 RX ADMIN — AMITRIPTYLINE HYDROCHLORIDE 10 MG: 10 TABLET, FILM COATED ORAL at 08:06

## 2022-06-17 RX ADMIN — HYDROMORPHONE HYDROCHLORIDE 0.5 MG: 1 INJECTION, SOLUTION INTRAMUSCULAR; INTRAVENOUS; SUBCUTANEOUS at 03:06

## 2022-06-17 RX ADMIN — HEPARIN SODIUM 5000 UNITS: 5000 INJECTION INTRAVENOUS; SUBCUTANEOUS at 08:06

## 2022-06-17 RX ADMIN — ONDANSETRON 4 MG: 2 INJECTION INTRAMUSCULAR; INTRAVENOUS at 06:06

## 2022-06-17 RX ADMIN — ALBUMIN (HUMAN) 25 G: 12.5 SOLUTION INTRAVENOUS at 06:06

## 2022-06-17 RX ADMIN — PROMETHAZINE HYDROCHLORIDE 25 MG: 25 INJECTION INTRAMUSCULAR; INTRAVENOUS at 03:06

## 2022-06-17 RX ADMIN — IOHEXOL 75 ML: 350 INJECTION, SOLUTION INTRAVENOUS at 01:06

## 2022-06-17 RX ADMIN — DIPHENHYDRAMINE HYDROCHLORIDE 25 MG: 25 CAPSULE ORAL at 08:06

## 2022-06-17 RX ADMIN — HEPARIN SODIUM 5000 UNITS: 5000 INJECTION INTRAVENOUS; SUBCUTANEOUS at 03:06

## 2022-06-17 RX ADMIN — CEFTRIAXONE 2 G: 2 INJECTION, SOLUTION INTRAVENOUS at 08:06

## 2022-06-17 RX ADMIN — DICYCLOMINE HYDROCHLORIDE 10 MG: 10 CAPSULE ORAL at 08:06

## 2022-06-17 RX ADMIN — HYDROCODONE BITARTRATE AND ACETAMINOPHEN 1 TABLET: 5; 325 TABLET ORAL at 08:06

## 2022-06-17 RX ADMIN — DICYCLOMINE HYDROCHLORIDE 10 MG: 10 CAPSULE ORAL at 03:06

## 2022-06-17 RX ADMIN — HYDROMORPHONE HYDROCHLORIDE 0.5 MG: 1 INJECTION, SOLUTION INTRAMUSCULAR; INTRAVENOUS; SUBCUTANEOUS at 08:06

## 2022-06-17 RX ADMIN — HYDROCODONE BITARTRATE AND ACETAMINOPHEN 1 TABLET: 5; 325 TABLET ORAL at 11:06

## 2022-06-17 RX ADMIN — PANTOPRAZOLE SODIUM 40 MG: 40 TABLET, DELAYED RELEASE ORAL at 08:06

## 2022-06-17 NOTE — PT/OT/SLP EVAL
"Occupational Therapy   Evaluation    Name: Puja Quigley  MRN: [id number]  Admitting Diagnosis:  Intractable abdominal pain  Recent Surgery: * No surgery found *      Recommendations:     Discharge Recommendations: home with home health  Discharge Equipment Recommendations:  other (see comments) (Tub chair, grab bars for safety in bathing)  Barriers to discharge:  None    Assessment:     Puja Quigley is a 41 y.o. female with a medical diagnosis of Intractable abdominal pain.  She presents with pain limiting function in lower abdomen, supportive family, difficulty reaching lower legs and feet for ADL's due to abdominal distention and highly motivated to take a trip out of the country which is planned next week. Performance deficits affecting function: impaired endurance, impaired self care skills, impaired functional mobilty, impaired cardiopulmonary response to activity (syncopal episode 6/16/22).      Rehab Prognosis: Good; patient would benefit from acute skilled OT services to address these deficits and reach maximum level of function.       Plan:     Patient to be seen 3 x/week to address the above listed problems via self-care/home management, therapeutic activities, therapeutic exercises  · Plan of Care Expires: 07/17/22  · Plan of Care Reviewed with: patient, family    Subjective     Chief Complaint: painful abdomen  Patient/Family Comments/goals: to be able to do everything for myself    Occupational Profile:  Living Environment: Pt lives with boyfriend and her 3 yo son in 1  with 1 WANDER. Sh/tub combo in which she is currently using a bath bench turned sideways as tub is very "high" not allowing for proper use of TTB.   Previous level of function: independent in self care and most homemaking tasks, caring for toddler and driving.  Roles and Routines: homemaking tasks.  Equipment Used at Home:  bath bench  Assistance upon Discharge: supportive family    Pain/Comfort:  · Pain Rating 1: " 3/10  · Location - Side 1: Bilateral  · Location - Orientation 1: lower  · Location 1: abdomen  · Pain Addressed 1: Distraction  · Pain Rating Post-Intervention 1: 3/10    Patients cultural, spiritual, Mosque conflicts given the current situation: no    Objective:     Communicated with: pts nurse prior to session.  Patient found HOB elevated with peripheral IV upon OT entry to room.    General Precautions: Standard, fall   Orthopedic Precautions:N/A   Braces: N/A  Respiratory Status: Room air    Occupational Performance:    Bed Mobility:    · Patient completed Rolling/Turning to Left with  modified independence  · Patient completed Rolling/Turning to Right with modified independence  · Patient completed Supine to Sit with contact guard assistance  · Patient completed Sit to Supine with stand by assistance    Functional Mobility/Transfers:  · Patient completed Sit <> Stand Transfer with contact guard assistance  with  hand-held assist   · Patient completed Bed <> Chair Transfer using Step Transfer technique with contact guard assistance with hand-held assist  · Patient completed Toilet Transfer Step Transfer technique with contact guard assistance with  hand-held assist and bedside commode  · Functional Mobility: Pt able to ambulate 20' within room to bathroom with HHA from OT for steadying and due to concern with prior syncopal episode. LOB x 2 with patient self correcting.    Activities of Daily Living:  · Feeding:  independence after set up  · Grooming: independence after set up  · Upper Body Dressing: supervision seated on EOB  · Lower Body Dressing: minimum assistance due to abdominal pain and distention.  · Toileting: supervision to ensure safety during perineal care.    Cognitive/Visual Perceptual:  No deficits noted    Physical Exam:  BUE strength - grossly 4/5 throughout.  BUE AROM - WFL throughout.  Balance: Static and dynamic sitting - good  Static standing - fair +  Dynamic standing - fair +    AMPAC  6 Click ADL:  AMPAC Total Score: 21    Treatment & Education:  -Pt education on OT role and POC.  -Importance of E/OOB activity with staff assistance, emphasis on daily participation  -Multiple self-care tasks and functional mobility completed -- assistance level noted above  -Safety during functional transfer and mobility ensured  -Patient provided with education on importance of Bilateral UB/LB integration during functional tasks for improvement in functional performance.   -Education provided/reviewed, questions answered within OT scope of practice.   -Education provided to pt on use of call bell and repeating call to receive service in timely manner to prevent falls/injury  -Additional time spent providing emotional support as pt expressed experience since medical diagnosis and hospital stay  -Patient demonstrates understanding and learning this date.  Education:    Patient left sitting edge of bed with call button in reach and family member present    GOALS:   Multidisciplinary Problems     Occupational Therapy Goals        Problem: Occupational Therapy    Goal Priority Disciplines Outcome Interventions   Occupational Therapy Goal     OT, PT/OT Ongoing, Progressing    Description: Goals to be met by: 6/24/22    Patient will increase functional independence with ADLs by performing:    UE Dressing with [place].  LE Dressing with [place].  Toileting from toilet with [place] for hygiene and clothing management.   Bathing from  shower chair/bench with Supervision.  Increased functional strength to 4/5 for improved completion of self care task of bathing.                     History:     Past Medical History:   Diagnosis Date    Anxiety     Dehydration 5/30/2022    Gastric cancer     Gastric ulcer     Hx of psychiatric care     Pregnancy 08/12/2020    delivered on 8/12/2020    Umbilical hernia        Past Surgical History:   Procedure Laterality Date    CLOSURE OF PERFORATED ULCER OF DUODENUM  USING OMENTAL PATCH      ESOPHAGOGASTRODUODENOSCOPY N/A 10/13/2020    Procedure: EGD (ESOPHAGOGASTRODUODENOSCOPY);  Surgeon: Rosio Marion MD;  Location: [facility] ENDO (2ND FLR);  Service: Endoscopy;  Laterality: N/A;    ESOPHAGOGASTRODUODENOSCOPY N/A 12/11/2020    Procedure: EGD (ESOPHAGOGASTRODUODENOSCOPY);  Surgeon: Rosio Marion MD;  Location: [facility] ENDO (2ND FLR);  Service: Endoscopy;  Laterality: N/A;  Covid[facility]19 test 12/8/20 at [facility]    ESOPHAGOGASTRODUODENOSCOPY N/A 3/12/2021    Procedure: EGD (ESOPHAGOGASTRODUODENOSCOPY);  Surgeon: Nav Omer MD;  Location: [facility] ENDO (2ND FLR);  Service: Endoscopy;  Laterality: N/A;  COVID at [facility] 3/9 ttr    ESOPHAGOGASTRODUODENOSCOPY N/A 5/18/2021    Procedure: EGD (ESOPHAGOGASTRODUODENOSCOPY);  Surgeon: Rosio Marion MD;  Location: [facility] ENDO (2ND FLR);  Service: Endoscopy;  Laterality: N/A;  5/15-covid pcw-inst portal-tb    ESOPHAGOGASTRODUODENOSCOPY N/A 5/26/2021    Procedure: EGD (ESOPHAGOGASTRODUODENOSCOPY);  Surgeon: Socrates Terrazas MD;  Location: [facility] ENDO (2ND FLR);  Service: Endoscopy;  Laterality: N/A;       Time Tracking:     OT Date of Treatment: 06/17/22  OT Start Time: 1258  OT Stop Time: 1322  OT Total Time (min): 24 min    Billable Minutes:Evaluation 10  Self Care/Home Management 14    6/17/2022

## 2022-06-17 NOTE — TREATMENT PLAN
GI Treatment Plan    Consult received for evaluation of patient with history of gastric adenocarcinoma (with metastases to peritoneum and on chemotherapy) who reportedly developed syncope during outpatient MRI following onset of non-bloody vomiting and diarrhea after first chemotherapy session on 06/14. Presentation notable for normal vital signs with labs showing new anemia (Hgb 9 from 12 on 06/13), however without overt signs of GI bleeding.     Recommendations:     -Case discussed with staff. No plans for endoscopic investigation of anemia unless patient develops overt signs of GI bleeding (e.g. melena, hematemesis, or hematochezia).   -Recommend increased PPI to 40 mg BID to decreased irritation near gastric carcinoma site.     Please call with questions.         Barrett Almanzar MD, PGY-IV  Gastroenterology Fellow  [facility]

## 2022-06-17 NOTE — ASSESSMENT & PLAN NOTE
- Paracentesis performed 6/16 with 3.7L removed, no samples collected  - See intractable abdominal pain

## 2022-06-17 NOTE — CONSULTS
Brief consult note:    Full note to follow.  Reviewed outpt notes and discussed with team, pt, and family at the bedside.     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     Recs:  Peripheral neuropathy:   Stopped taking gabapentin   -receiving acupuncture  -Can consider low dose nightly methadone 5 mg qhs  -can also consider rotation of TCA to nortriptyline 25 mg qhs (fewer anticholinergic side effects) but also need to explore why she was on amitriptyline    Nociceptive pain:  -Due to underlying metastatic disease and peritoneal stretch with recurrent massive ascites  -pt only take up to two lortab (admittedly underdoses) daily and 2-3 doses of additional APAP  -as above would start on methadone 5 mg for long acting pain relief  -would rotate to oxy IR 5 mg to remove excess APAP/combo meds when ready;   -if staying with parenteral meds can decrease dilaudid to 0.5 mg IV with greater prn frequency    Palliative care encounter:  -Ongoing support for pt and family as treatment expectations and prognotic awareness is globally increased; pt not eager to have prognostic talks  -SW support for guidance on the stress surrounding her mother; significant mental health disease, schizophrenia and BPD: they are looking for facility placement; the pt had been the primary care giver  -has a 3 yo son  -was hoping to travel to [place] on Tuesday;   -ACP discussions; did not discuss today    Thank you for the opportunity to care for this patient and family.     Please call with questions.     Nicola Bingham MD  Palliative Medicine   [facility]  [phone] (cell)

## 2022-06-17 NOTE — ED NOTES
Patient identifiers for Puja Quigley checked and correct.    LOC: The patient is awake, alert and aware of environment with an appropriate affect, the patient is oriented x 4 and speaking appropriately.    APPEARANCE: Patient in no acute distress, patient is clean and well groomed, patient's clothing is properly fastened.    SKIN: The skin is warm and dry, color consistent with ethnicity, patient has normal skin turgor and moist mucus membranes, skin intact, no breakdown or bruising noted.    MUSCULOSKELETAL: Patient moving all extremities well, no obvious swelling or deformities noted.    RESPIRATORY: Airway is open and patent, respirations are spontaneous and even, patient has a normal effort and rate.    CARDIAC: Patient has a normal rate and rhythm, no periphreal edema noted, capillary refill < 3 seconds. Normal +2 pedal pulses present.    ABDOMEN: distended and TTP to all 4 quadrants, pt reports N/V PTA and LOC just PTA    NEUROLOGIC: Eyes open spontaneously, PERRL, behavior appropriate to situation, follows commands, facial expression symmetrical, bilateral hand grasp equal and even, purposeful motor response noted, normal sensation in all extremities.

## 2022-06-17 NOTE — ASSESSMENT & PLAN NOTE
Both episodes triggered by standing. Clinically on admission patient was intravascularly depleted as evidenced by dry mouth, tachycardia, multiple episodes of nausea and vomiting, diarrhea, and limited oral intake due to paracentesis and MRI that day.    - Orthostatic vitals ordered  - Hold off on IV fluids for now given anasarca and pleural effusion, however serum albumin 1.7 and paracentesis (3.7L removed) performed on day of admit so IV albumin 25 g x1 ordered for intravascular depletion  - Infectious workup: CXR unchanged (chronic L effusion/LLL atelectasis), UA ordered, blood cultures ordered, consider diagnostic paracentesis

## 2022-06-17 NOTE — ASSESSMENT & PLAN NOTE
- CTA A/P negative for acute bleed  - Iron panel and ferritin ordered (previously received Injectafer, not currently on iron supplementation)  - CBC daily

## 2022-06-17 NOTE — H&P
[facility] - Emergency Dept  Hematology/Oncology  H&P    Patient Name: Puja Quigley  MRN: [id number]  Admission Date: 6/16/2022  Code Status: Full Code   Attending Provider: Nya Galvez MD  Primary Care Physician: Primary Doctor No  Principal Problem:Intractable abdominal pain    Subjective:     HPI: Puja uQigley is a 41 year-old woman with metastatic gastric adenocarcinoma on FOLFIRI (cycle 2 on 6/13/22) c/b malignant ascites requiring weekly paracentesis, chronic gastric ulcer with perforation c/b CATINA 2/2 chronic blood loss, chronic lower extremity edema, anxiety, malnutrition, and peripheral neuropathy who presented to [facility] on 6/16 after a syncopal episode after an outpatient MRI.    She started having nausea, vomiting, and worsened indigestion on 6/14 which she also had after her 1st cycle of FOLFIRI. She also admits to some fatigue and loss of appetite over the past month. On 6/15 she had diarrhea with 3 loose, non-bloody, non-melanotic bowel movements associated with some diffuse lower abdominal pain and cramping. She took her home Norco with improvement in abdominal pain. She denied any recent sick contacts, travel, fevers, chills, night sweats, nasal congestion, sore throat, cough, SOB, or chest pain (although does have epigastric indigestion pain). On 6/16 AM she had her normally scheduled paracentesis at [facility] where 3.7L of serosanguinous fluid was drained. She tolerated this well. In the afternoon she went to her MRI abdomen/pelvis at [facility] and while in the MRI started to experience RLQ & LLQ pain worse than ever before, 10/10. After finishing the scan she stood up to put her shoes on and then had a syncopal episode. BP at that time after awakening was 98/59. She also had an episode of non-bloody, non-bilious emesis. This was her first time receiving Gadavist contrast. She did not have any focal neurologic deficits, hives, rash, throat swelling or SOB at the time.     On arrival to the ER,  /70, HR 80, RR 20, temp 98.5F, saturation 97% on room air. Labs were significant for Hgb 9.5 (baseline ~12), WBC 4.01, Plt 347k, normal electrolytes, renal function & liver function, albumin 1.7, lipase 27, COVID negative. CXR showed unchanged moderate L pleural effusion. CTA abdomen/pelvis was performed which did not show active bleeding however did show significant increase in size of pelvic mass 12.4 x 22.0 x 18.4 cm (previously 14 x 10 cm in April '22), worsening peritoneal carcinomatosis and anasarca. In the ER she received IV morphine 4 mg x2 which helped minimally with her pain and caused her to be nauseous and vomit again. Upon returning from the bathroom, she had another syncopal episode witnessed by the nurse. BP at that time remained 110s/70s. She received IV ondansetron and promethazine in the ER for nausea. Pain persisted and she received IV hydromorphone 0.5 mg x2 with improvement of pain from 10/10 to 7/10. Throughout her ER stay she became more tachycardic up to the 140s. She was admitted to medical oncology for pain control and further management.    Oncology History (per Dr. Ashraf):  HER-2 negative by FISH.  PD-L1 < 1%.  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  We previously explained the implications of a stage IV diagnosis to her and potential treatment options.  She is now s/p port placement. She sought a second opinion at [facility] for zolbetuximab trial targeting CLDN protein but she did not test positive for this biomarker. We recommended proceeding with SOC FOLFOX, with which the [facility] team agreed. Because of the negative PD-L1 I do not think the benefit of adding nivolumab outweighs the potential toxicities.        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  She opted not to get genetic testing  done at her appt with [facility]. We recommended that she reconsider that decision in light of a very likely germline mutation in SAMUEL which has clinical consequences.       CT CAP after 6 cycles showed improvement in ascites, probable hepatic, thyroid and bone metastases. We dropped oxaliplatin starting with cycle 10 due to grade 1 neuropathy and desire to keep it from worsening.     CT CAP after cycle 10 showed stable disease.  CT scan after cycle 15 continued to display overall stability of disease within the gastric wall, peritoneum, spine and liver. Although increase ascites on imaging and clinically can represent progression of disease, her previous CT suggested overall stability within the liver and gastric wall, so we recommended to continue with treatment time. However, she continued to have increasing abdominal ascites that has been concerning for progression of disease. Hence, she had repeat imaging. On CT performed on 4/14/2022, she appeared to have worsening disease suggestive of progression. She was seen by Dr. Reid at [facility] for f/u and to discuss possible treatment options.  She was also evaluated by Dr. Andree Mueller and Dr. See of Surgical Oncology at [facility].  Ultimately she was not felt to be a good surgical debulking candidate due to her ascites.  If her ascites improves during the course of chemotherapy and her performance status remains stable or improves, they would re-consider her again for palliative oophorectomy.      She was started on second-line FOLFIRI. Was given ramucirumab with cycle 1 but this later will be held given [facility] recs for just FOLFIRI.  She didn't tolerate cycle 1 well with significant N/V.  Will dose reduce irinotecan to 75 mg/m2 - has UGT1A1 homozygous mutation indicating poor metabolism.  Continue 5-FU infusion at 2200 mg/m2.     After an in-depth discussion, she would like to pursue 2nd line chemotherapy. At this time, we would favor use of FOLFIRI as second-line rather  than Taxol/Claudio due to ongoing neuropathy. Pt is agreeable. This was also discussed with Dr. Ashraf, who is in agreement. We will proceed with 4 cycles followed by imaging studies. We will also repeat imaging studies with a CT chest non-contrast and MRI abdomen/pelvis as a baseline. Pt is agreeable.       Oncology Treatment Plan:   OP FOLFIRI Q2WK    Medications:  Continuous Infusions:  Scheduled Meds:   albumin human 25%  25 g Intravenous Once    amitriptyline  10 mg Oral QHS    cefTRIAXone (ROCEPHIN) IVPB  2 g Intravenous Q24H    dicyclomine  10 mg Oral QID    gabapentin  300 mg Oral TID    heparin (porcine)  5,000 Units Subcutaneous Q8H    pantoprazole  40 mg Oral Daily     PRN Meds:acetaminophen, chlorproMAZINE, dextrose 10%, dextrose 10%, famotidine, glucagon (human recombinant), glucose, glucose, HYDROcodone-acetaminophen, HYDROmorphone, loperamide, melatonin, naloxone, ondansetron, promethazine (PHENERGAN) IVPB, simethicone, sodium chloride 0.9%     Review of patient's allergies indicates:  No Known Allergies     Past Medical History:   Diagnosis Date    Anxiety     Dehydration 5/30/2022    Gastric cancer     Gastric ulcer     Hx of psychiatric care     Pregnancy 08/12/2020    delivered on 8/12/2020    Umbilical hernia      Past Surgical History:   Procedure Laterality Date    CLOSURE OF PERFORATED ULCER OF DUODENUM USING OMENTAL PATCH      ESOPHAGOGASTRODUODENOSCOPY N/A 10/13/2020    Procedure: EGD (ESOPHAGOGASTRODUODENOSCOPY);  Surgeon: Rosio Marion MD;  Location: [address]);  Service: Endoscopy;  Laterality: N/A;    ESOPHAGOGASTRODUODENOSCOPY N/A 12/11/2020    Procedure: EGD (ESOPHAGOGASTRODUODENOSCOPY);  Surgeon: Rosio Marion MD;  Location: [address]);  Service: Endoscopy;  Laterality: N/A;  Covid-19 test 12/8/20 at [facility] - pg    ESOPHAGOGASTRODUODENOSCOPY N/A 3/12/2021    Procedure: EGD (ESOPHAGOGASTRODUODENOSCOPY);  Surgeon: Nav Omer MD;   Location: [facility] ENDO (2ND FLR);  Service: Endoscopy;  Laterality: N/A;  COVID at [facility] 3/9 ttr    ESOPHAGOGASTRODUODENOSCOPY N/A 2021    Procedure: EGD (ESOPHAGOGASTRODUODENOSCOPY);  Surgeon: Rosio Marion MD;  Location: [facility] ENDO (2ND FLR);  Service: Endoscopy;  Laterality: N/A;  5/15-covid pcw-inst portal-tb    ESOPHAGOGASTRODUODENOSCOPY N/A 2021    Procedure: EGD (ESOPHAGOGASTRODUODENOSCOPY);  Surgeon: Socrates Terrazas MD;  Location: [facility] ENDO (2ND FLR);  Service: Endoscopy;  Laterality: N/A;     Family History       Problem Relation (Age of Onset)    Breast cancer Other (73), Paternal Cousin    Cancer Mother (67)    Lung cancer Father (48)    No Known Problems Son    Prostate cancer Paternal Uncle    Schizophrenia Mother          Tobacco Use    Smoking status: Former Smoker     Types: Cigarettes     Quit date: 2019     Years since quittin.5    Smokeless tobacco: Never Used   Substance and Sexual Activity    Alcohol use: Yes    Drug use: Not Currently    Sexual activity: Yes     Partners: Male       Review of Systems   Constitutional:  Positive for activity change, appetite change and fatigue. Negative for chills and fever.   HENT:  Negative for congestion, ear pain, sinus pain and sore throat.    Eyes:  Negative for visual disturbance.   Respiratory:  Negative for cough and shortness of breath.    Cardiovascular:  Positive for leg swelling (chronic, at baseline). Negative for chest pain.   Gastrointestinal:  Positive for abdominal distention, abdominal pain, diarrhea, nausea and vomiting. Negative for constipation.   Genitourinary:  Negative for decreased urine volume, difficulty urinating and dysuria.   Musculoskeletal:  Negative for back pain and neck pain.   Skin:  Negative for rash.   Neurological:  Positive for syncope. Negative for dizziness and headaches.   Psychiatric/Behavioral:  Negative for confusion and decreased concentration.    Objective:     Vital Signs (Most  Recent):  Temp: 98.5 °F (36.9 °C) (06/16/22 1847)  Pulse: (!) 128 (06/17/22 0307)  Resp: 18 (06/17/22 0307)  BP: 138/88 (06/17/22 0307)  SpO2: 100 % (06/17/22 0307)   Vital Signs (24h Range):  Temp:  [98.5 °F (36.9 °C)] 98.5 °F (36.9 °C)  Pulse:  [] 128  Resp:  [16-29] 18  SpO2:  [97 %-100 %] 100 %  BP: (114-138)/(64-93) 138/88     Weight: 63.5 kg (140 lb)  Body mass index is 21.93 kg/m².  Body surface area is 1.73 meters squared.      Intake/Output Summary (Last 24 hours) at 6/17/2022 0409  Last data filed at 6/17/2022 0321  Gross per 24 hour   Intake 50 ml   Output --   Net 50 ml       Physical Exam  Vitals reviewed.   Constitutional:       General: She is not in acute distress.     Appearance: She is well-developed and underweight. She is not diaphoretic.   HENT:      Head: Normocephalic and atraumatic.      Mouth/Throat:      Mouth: Mucous membranes are dry.   Eyes:      Conjunctiva/sclera: Conjunctivae normal.   Neck:      Trachea: No tracheal deviation.   Cardiovascular:      Rate and Rhythm: Regular rhythm. Tachycardia present.      Heart sounds: Normal heart sounds. No murmur heard.     Comments: R chest port-a-cath non-tender, non-erythematous  Pulmonary:      Effort: Pulmonary effort is normal. No respiratory distress.      Breath sounds: Rales (LLL) present. No wheezing.   Abdominal:      General: Bowel sounds are normal. There is distension.      Palpations: Abdomen is soft.      Tenderness: There is abdominal tenderness (LLQ and RLQ). There is no guarding.   Musculoskeletal:         General: No deformity. Normal range of motion.      Cervical back: Normal range of motion.      Comments: LLL 1+ pitting edema, RLE trace pitting edema   Skin:     General: Skin is warm and dry.      Findings: No erythema or rash.   Neurological:      Mental Status: She is alert and oriented to person, place, and time.      Cranial Nerves: No cranial nerve deficit.      Sensory: No sensory deficit.      Motor: No  abnormal muscle tone.       Significant Labs:   CBC:   Recent Labs   Lab 06/16/22 1922   WBC 4.01   HGB 9.5*   HCT 30.7*      , CMP:   Recent Labs   Lab 06/16/22 1922      K 4.4      CO2 24   *   BUN 16   CREATININE 0.8   CALCIUM 7.8*   PROT 4.7*   ALBUMIN 1.7*   BILITOT 0.1   ALKPHOS 68   AST 22   ALT 9*   ANIONGAP 7*   EGFRNONAA >60.0   , and All pertinent labs from the last 24 hours have been reviewed.    Diagnostic Results:  I have reviewed and interpreted all pertinent imaging results/findings within the past 24 hours.    Assessment/Plan:     * Intractable abdominal pain  Intra-abdominal bleeding 2/2 paracentesis ruled out in ER with CTA A/P. Concern that pain is 2/2 significantly growing tumor burden vs new SBP (especially in the setting of recent diarrhea and hypotension during MRI).     - Ceftriaxone 2 g qd x5 days  - Consider diagnostic paracentesis in AM  - Norco 5 mg q6 for mild-mod pain  - IV dilaudid 0.5 mg q6 for severe pain  - Continue home Bentyl QID  - Simethicone PRN for cramping    Syncope  Both episodes triggered by standing. Clinically on admission patient was intravascularly depleted as evidenced by dry mouth, tachycardia, multiple episodes of nausea and vomiting, diarrhea, and limited oral intake due to paracentesis and MRI that day.    - Orthostatic vitals ordered  - Hold off on IV fluids for now given anasarca and pleural effusion, however serum albumin 1.7 and paracentesis (3.7L removed) performed on day of admit so IV albumin 25 g x1 ordered for intravascular depletion  - Infectious workup: CXR unchanged (chronic L effusion/LLL atelectasis), UA ordered, blood cultures ordered, consider diagnostic paracentesis    Bilateral lower extremity edema  - Chronic, at baseline  - Holding home diuretic 2/2 syncope    Neuropathy due to chemotherapeutic drug  - Continue home gabapentin    Anxiety associated with cancer diagnosis  - Continue home  amitryptiline    Non-intractable vomiting with nausea  - PRN IV ondansetron and promethazine  - Full liquid diet and advance as tolerated    Severe malnutrition  - Boost TID WM  - Albumin significantly low, see abdominal pain    Normocytic anemia  - CTA A/P negative for acute bleed  - Iron panel and ferritin ordered (previously received Injectafer, not currently on iron supplementation)  - CBC daily    Malignant neoplasm of stomach  - Follows with Dr. Ashraf & Jessee  - Significant increase in size of pelvic mass and peritoneal carcinomatosis on admission  - See intractable abdominal pain    Malignant ascites  - Paracentesis performed 6/16 with 3.7L removed, no samples collected  - See intractable abdominal pain    Chronic gastric ulcer with perforation  - Continue home PPI qd  - Continue home famotidine PRN        Ciaran Mas DO  Hematology/Oncology  [facility]

## 2022-06-17 NOTE — CHAPLAIN
"Name: Puja Arceo [facility] Number: [id number]  Date of Treatment: 06/17/2022     Said/ Situation:  Pt. Was in and out of sleep but was able to answer questions with eyes closed. Pt. Said they were feeling better today than yesterday. "I am in a lot of pain but not like yesterday." She asked for prayer to strengthen her mind so she can deal with pain. Patient would like biblical scriptures to be read later on to help them sleep. Patient was in a lot of abdominal pain.      Observed:   I observed the Pt. Taking her time breathing to cope with pain. She was gripping the hand rails of the bed when waves of pain would come. Her Niece was in the room laying down on the couch. The room was dark and everyone was speaking softly. Patient was engaged in answering questions then would fall asleep. Nurse came in to collect vitals and administer medication at the end of our visit.       Assessment:   I assessed that both the pt. And family were actively praying and had a desire to focus on spiritual healing to elevate physical pain. Pt. Was not able to hold conversation but welcomed a follow-up visit. Prayer support was helpful in uplifting the family's hope in God's will will be done.     Plan:  Provided prayer support. Continue with more follow-up visits and bring a bible to read scriptures to patient.      Chaplain Fuentes  [id number]    "

## 2022-06-17 NOTE — HPI
Puja Quigley is a 41 year-old woman with metastatic gastric adenocarcinoma on FOLFIRI (cycle 2 on 6/13/22) c/b malignant ascites requiring weekly paracentesis, chronic gastric ulcer with perforation c/b CATINA 2/2 chronic blood loss, chronic lower extremity edema, anxiety, malnutrition, and peripheral neuropathy who presented to [facility] on 6/16 after a syncopal episode after an outpatient MRI.    She started having nausea, vomiting, and worsened indigestion on 6/14 which she also had after her 1st cycle of FOLFIRI. She also admits to some fatigue and loss of appetite over the past month. On 6/15 she had diarrhea with 3 loose, non-bloody, non-melanotic bowel movements associated with some diffuse lower abdominal pain and cramping. She took her home Norco with improvement in abdominal pain. She denied any recent sick contacts, travel, fevers, chills, night sweats, nasal congestion, sore throat, cough, SOB, or chest pain (although does have epigastric indigestion pain). On 6/16 AM she had her normally scheduled paracentesis at [facility] where 3.7L of serosanguinous fluid was drained. She tolerated this well. In the afternoon she went to her MRI abdomen/pelvis at [facility] [address] and while in the MRI started to experience RLQ & LLQ pain worse than ever before, 10/10. After finishing the scan she stood up to put her shoes on and then had a syncopal episode. BP at that time after awakening was 98/59. She also had an episode of non-bloody, non-bilious emesis. This was her first time receiving Gadavist contrast. She did not have any focal neurologic deficits, hives, rash, throat swelling or SOB at the time.     On arrival to the ER, /70, HR 80, RR 20, temp 98.5F, saturation 97% on room air. Labs were significant for Hgb 9.5 (baseline ~12), WBC 4.01, Plt 347k, normal electrolytes, renal function & liver function, albumin 1.7, lipase 27, COVID negative. CXR showed unchanged moderate L pleural effusion. CTA abdomen/pelvis was  performed which did not show active bleeding however did show significant increase in size of pelvic mass 12.4 x 22.0 x 18.4 cm (previously 14 x 10 cm in April '22), worsening peritoneal carcinomatosis and anasarca. In the ER she received IV morphine 4 mg x2 which helped minimally with her pain and caused her to be nauseous and vomit again. Upon returning from the bathroom, she had another syncopal episode witnessed by the nurse. BP at that time remained 110s/70s. She received IV ondansetron and promethazine in the ER for nausea. Pain persisted and she received IV hydromorphone 0.5 mg x2 with improvement of pain from 10/10 to 7/10. Throughout her ER stay she became more tachycardic up to the 140s. She was admitted to medical oncology for pain control and further management.    Oncology History (per Dr. Ashraf):  HER-2 negative by FISH.  PD-L1 < 1%.  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  We previously explained the implications of a stage IV diagnosis to her and potential treatment options.  She is now s/p port placement. She sought a second opinion at [facility] for zolbetuximab trial targeting CLDN protein but she did not test positive for this biomarker. We recommended proceeding with SOC FOLFOX, with which the [facility] team agreed. Because of the negative PD-L1 I do not think the benefit of adding nivolumab outweighs the potential toxicities.        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  She opted not to get genetic testing done at her appt with [facility]. We recommended that she reconsider that decision in light of a very likely germline mutation in SAMUEL which has clinical consequences.       CT CAP after 6 cycles showed improvement in ascites, probable hepatic, thyroid and bone metastases. We dropped oxaliplatin starting  with cycle 10 due to grade 1 neuropathy and desire to keep it from worsening.     CT CAP after cycle 10 showed stable disease.  CT scan after cycle 15 continued to display overall stability of disease within the gastric wall, peritoneum, spine and liver. Although increase ascites on imaging and clinically can represent progression of disease, her previous CT suggested overall stability within the liver and gastric wall, so we recommended to continue with treatment time. However, she continued to have increasing abdominal ascites that has been concerning for progression of disease. Hence, she had repeat imaging. On CT performed on 4/14/2022, she appeared to have worsening disease suggestive of progression. She was seen by Dr. Reid at [facility] for f/u and to discuss possible treatment options.  She was also evaluated by Dr. Andree Mueller and Dr. See of Surgical Oncology at [facility].  Ultimately she was not felt to be a good surgical debulking candidate due to her ascites.  If her ascites improves during the course of chemotherapy and her performance status remains stable or improves, they would re-consider her again for palliative oophorectomy.      She was started on second-line FOLFIRI. Was given ramucirumab with cycle 1 but this later will be held given [facility] recs for just FOLFIRI.  She didn't tolerate cycle 1 well with significant N/V.  Will dose reduce irinotecan to 75 mg/m2 - has UGT1A1 homozygous mutation indicating poor metabolism.  Continue 5-FU infusion at 2200 mg/m2.     After an in-depth discussion, she would like to pursue 2nd line chemotherapy. At this time, we would favor use of FOLFIRI as second-line rather than Taxol/Claudio due to ongoing neuropathy. Pt is agreeable. This was also discussed with Dr. Ashraf, who is in agreement. We will proceed with 4 cycles followed by imaging studies. We will also repeat imaging studies with a CT chest non-contrast and MRI abdomen/pelvis as a baseline. Pt is agreeable.

## 2022-06-17 NOTE — ED NOTES
Pt c/o ABD pain, LOC, and N/V after IV contrast was injected just PTA. PT is currently on Chemo for ovarian CA

## 2022-06-17 NOTE — ED PROVIDER NOTES
ED Resident HAND-OFF NOTE:  10:57 PM 6/16/2022  Puja Quigley is a 41 y.o. female who presented to the ED on 6/16/2022 and has been managed by Dr. Petersen, Dr. Rivas , who reports patient C/O of abdominal pain. I assumed care of patient from off-going ED physician team at 10:57 PM pending CTA.  CTA displayed no signs of extravasation or active bleed.  Patient reporting persistent pain.  Patient given additional Dilaudid 0.5 mg. Discussed case with hematology oncology will admit patient for continued eat care and pain management.    On my evaluation, Puja Quigley appears well, hemodynamically stable and in NAD. Thus far, Puja Quigley has received:  Medications   morphine injection 4 mg (4 mg Intravenous Given 6/16/22 1934)   morphine injection 4 mg (4 mg Intravenous Given 6/16/22 2007)   HYDROmorphone injection 0.5 mg (0.5 mg Intravenous Given 6/16/22 2056)   ondansetron injection 4 mg (4 mg Intravenous Given 6/16/22 2246)           Disposition:  Patient admitted to Hematology Oncology  I have discussed and counseled Puja Quigley regarding exam, results, diagnosis, treatment, and plan.  ______________________  Virgilio Ashraf MD  Emergency Medicine Resident  10:57 PM 6/16/2022           Virgilio Ashraf MD  Resident  06/17/22 0309       Irene Jackson MD  06/17/22 0334

## 2022-06-17 NOTE — PLAN OF CARE
Pt AAOx4. VSS, afebrile, on room air. Sinus tach on tele monitoring. Pain moderately controlled w/ PRN Dilaudid & [place]. 1 unit RBC infusing (Hgb 8.2, Hct 27.1). 1x Albumin administered. IV Rocephin continued. Pt on full liquid diet, denies N/V. 0 BM/shift. Pt worked w/ OT. Pt ambulating in room w/ 1 person assistance, bed alarm set d/t recent syncopal episode. Bed in lowest position, wheels locked, call light within pt reach, side rails 2x, call light within pt reach. Pt & family updated on plan of care.

## 2022-06-17 NOTE — PLAN OF CARE
Problem: Occupational Therapy  Goal: Occupational Therapy Goal  Description: Goals to be met by: 6/24/22    Patient will increase functional independence with ADLs by performing:    UE Dressing with Maury.  LE Dressing with Maury.  Toileting from toilet with Maury for hygiene and clothing management.   Bathing from  shower chair/bench with Supervision.  Increased functional strength to 4/5 for improved completion of self care task of bathing.    Outcome: Ongoing, Progressing

## 2022-06-17 NOTE — ASSESSMENT & PLAN NOTE
- Follows with Dr. Ashraf & Jessee  - Significant increase in size of pelvic mass and peritoneal carcinomatosis on admission  - See intractable abdominal pain

## 2022-06-17 NOTE — ASSESSMENT & PLAN NOTE
Intra-abdominal bleeding 2/2 paracentesis ruled out in ER with CTA A/P. Concern that pain is 2/2 significantly growing tumor burden vs new SBP (especially in the setting of recent diarrhea and hypotension during MRI).     - Ceftriaxone 2 g qd x5 days  - Consider diagnostic paracentesis in AM  - Norco 5 mg q6 for mild-mod pain  - IV dilaudid 0.5 mg q6 for severe pain  - Continue home Bentyl QID  - Simethicone PRN for cramping

## 2022-06-17 NOTE — SUBJECTIVE & OBJECTIVE
Oncology Treatment Plan:   OP FOLFIRI Q2WK    Medications:  Continuous Infusions:  Scheduled Meds:   albumin human 25%  25 g Intravenous Once    amitriptyline  10 mg Oral QHS    cefTRIAXone (ROCEPHIN) IVPB  2 g Intravenous Q24H    dicyclomine  10 mg Oral QID    gabapentin  300 mg Oral TID    heparin (porcine)  5,000 Units Subcutaneous Q8H    pantoprazole  40 mg Oral Daily     PRN Meds:acetaminophen, chlorproMAZINE, dextrose 10%, dextrose 10%, famotidine, glucagon (human recombinant), glucose, glucose, HYDROcodone-acetaminophen, HYDROmorphone, loperamide, melatonin, naloxone, ondansetron, promethazine (PHENERGAN) IVPB, simethicone, sodium chloride 0.9%     Review of patient's allergies indicates:  No Known Allergies     Past Medical History:   Diagnosis Date    Anxiety     Dehydration 5/30/2022    Gastric cancer     Gastric ulcer     Hx of psychiatric care     Pregnancy 08/12/2020    delivered on 8/12/2020    Umbilical hernia      Past Surgical History:   Procedure Laterality Date    CLOSURE OF PERFORATED ULCER OF DUODENUM USING OMENTAL PATCH      ESOPHAGOGASTRODUODENOSCOPY N/A 10/13/2020    Procedure: EGD (ESOPHAGOGASTRODUODENOSCOPY);  Surgeon: Rosio Marion MD;  Location: [address]);  Service: Endoscopy;  Laterality: N/A;    ESOPHAGOGASTRODUODENOSCOPY N/A 12/11/2020    Procedure: EGD (ESOPHAGOGASTRODUODENOSCOPY);  Surgeon: Rosio Marion MD;  Location: [facility] (2ND FLR);  Service: Endoscopy;  Laterality: N/A;  Covid-19 test 12/8/20 at [facility]    ESOPHAGOGASTRODUODENOSCOPY N/A 3/12/2021    Procedure: EGD (ESOPHAGOGASTRODUODENOSCOPY);  Surgeon: Nav Omer MD;  Location: [facility] (2ND FLR);  Service: Endoscopy;  Laterality: N/A;  COVID at [facility] 3/9 [facility]    ESOPHAGOGASTRODUODENOSCOPY N/A 5/18/2021    Procedure: EGD (ESOPHAGOGASTRODUODENOSCOPY);  Surgeon: Rosio Marion MD;  Location: [facility] (2ND FLR);  Service: Endoscopy;  Laterality: N/A;  5/15-covid pcw-inst portal-tb     ESOPHAGOGASTRODUODENOSCOPY N/A 2021    Procedure: EGD (ESOPHAGOGASTRODUODENOSCOPY);  Surgeon: Socrates Terrazas MD;  Location: [address]);  Service: Endoscopy;  Laterality: N/A;     Family History       Problem Relation (Age of Onset)    Breast cancer Other (73), Paternal Cousin    Cancer Mother (67)    Lung cancer Father (48)    No Known Problems Son    Prostate cancer Paternal Uncle    Schizophrenia Mother          Tobacco Use    Smoking status: Former Smoker     Types: Cigarettes     Quit date: 2019     Years since quittin.5    Smokeless tobacco: Never Used   Substance and Sexual Activity    Alcohol use: Yes    Drug use: Not Currently    Sexual activity: Yes     Partners: Male       Review of Systems   Constitutional:  Positive for activity change, appetite change and fatigue. Negative for chills and fever.   HENT:  Negative for congestion, ear pain, sinus pain and sore throat.    Eyes:  Negative for visual disturbance.   Respiratory:  Negative for cough and shortness of breath.    Cardiovascular:  Positive for leg swelling (chronic, at baseline). Negative for chest pain.   Gastrointestinal:  Positive for abdominal distention, abdominal pain, diarrhea, nausea and vomiting. Negative for constipation.   Genitourinary:  Negative for decreased urine volume, difficulty urinating and dysuria.   Musculoskeletal:  Negative for back pain and neck pain.   Skin:  Negative for rash.   Neurological:  Positive for syncope. Negative for dizziness and headaches.   Psychiatric/Behavioral:  Negative for confusion and decreased concentration.    Objective:     Vital Signs (Most Recent):  Temp: 98.5 °F (36.9 °C) (22)  Pulse: (!) 128 (22 030)  Resp: 18 (22)  BP: 138/88 (22)  SpO2: 100 % (22)   Vital Signs (24h Range):  Temp:  [98.5 °F (36.9 °C)] 98.5 °F (36.9 °C)  Pulse:  [] 128  Resp:  [16-29] 18  SpO2:  [97 %-100 %] 100 %  BP: (114-138)/(64-93) 138/88      Weight: 63.5 kg (140 lb)  Body mass index is 21.93 kg/m².  Body surface area is 1.73 meters squared.      Intake/Output Summary (Last 24 hours) at 6/17/2022 0409  Last data filed at 6/17/2022 0321  Gross per 24 hour   Intake 50 ml   Output --   Net 50 ml       Physical Exam  Vitals reviewed.   Constitutional:       General: She is not in acute distress.     Appearance: She is well-developed and underweight. She is not diaphoretic.   HENT:      Head: Normocephalic and atraumatic.      Mouth/Throat:      Mouth: Mucous membranes are dry.   Eyes:      Conjunctiva/sclera: Conjunctivae normal.   Neck:      Trachea: No tracheal deviation.   Cardiovascular:      Rate and Rhythm: Regular rhythm. Tachycardia present.      Heart sounds: Normal heart sounds. No murmur heard.     Comments: R chest port-a-cath non-tender, non-erythematous  Pulmonary:      Effort: Pulmonary effort is normal. No respiratory distress.      Breath sounds: Rales (LLL) present. No wheezing.   Abdominal:      General: Bowel sounds are normal. There is distension.      Palpations: Abdomen is soft.      Tenderness: There is abdominal tenderness (LLQ and RLQ). There is no guarding.   Musculoskeletal:         General: No deformity. Normal range of motion.      Cervical back: Normal range of motion.      Comments: LLL 1+ pitting edema, RLE trace pitting edema   Skin:     General: Skin is warm and dry.      Findings: No erythema or rash.   Neurological:      Mental Status: She is alert and oriented to person, place, and time.      Cranial Nerves: No cranial nerve deficit.      Sensory: No sensory deficit.      Motor: No abnormal muscle tone.       Significant Labs:   CBC:   Recent Labs   Lab 06/16/22 1922   WBC 4.01   HGB 9.5*   HCT 30.7*      , CMP:   Recent Labs   Lab 06/16/22 1922      K 4.4      CO2 24   *   BUN 16   CREATININE 0.8   CALCIUM 7.8*   PROT 4.7*   ALBUMIN 1.7*   BILITOT 0.1   ALKPHOS 68   AST 22   ALT 9*    ANIONGAP 7*   EGFRNONAA >60.0   , and All pertinent labs from the last 24 hours have been reviewed.    Diagnostic Results:  I have reviewed and interpreted all pertinent imaging results/findings within the past 24 hours.

## 2022-06-18 PROBLEM — Z51.5 PALLIATIVE CARE ENCOUNTER: Status: ACTIVE | Noted: 2022-06-18

## 2022-06-18 LAB
ALBUMIN SERPL BCP-MCNC: 1.8 G/DL (ref 3.5–5.2)
ALP SERPL-CCNC: 61 U/L (ref 55–135)
ALT SERPL W/O P-5'-P-CCNC: 6 U/L (ref 10–44)
ANION GAP SERPL CALC-SCNC: 7 MMOL/L (ref 8–16)
AST SERPL-CCNC: 16 U/L (ref 10–40)
BASOPHILS # BLD AUTO: 0.05 K/UL (ref 0–0.2)
BASOPHILS NFR BLD: 0.5 % (ref 0–1.9)
BILIRUB SERPL-MCNC: 0.3 MG/DL (ref 0.1–1)
BUN SERPL-MCNC: 13 MG/DL (ref 6–20)
CALCIUM SERPL-MCNC: 8.3 MG/DL (ref 8.7–10.5)
CHLORIDE SERPL-SCNC: 104 MMOL/L (ref 95–110)
CO2 SERPL-SCNC: 23 MMOL/L (ref 23–29)
CREAT SERPL-MCNC: 0.7 MG/DL (ref 0.5–1.4)
DIFFERENTIAL METHOD: ABNORMAL
EOSINOPHIL # BLD AUTO: 0 K/UL (ref 0–0.5)
EOSINOPHIL NFR BLD: 0.2 % (ref 0–8)
ERYTHROCYTE [DISTWIDTH] IN BLOOD BY AUTOMATED COUNT: 13.1 % (ref 11.5–14.5)
EST. GFR  (AFRICAN AMERICAN): >60 ML/MIN/1.73 M^2
EST. GFR  (NON AFRICAN AMERICAN): >60 ML/MIN/1.73 M^2
GLUCOSE SERPL-MCNC: 98 MG/DL (ref 70–110)
HCT VFR BLD AUTO: 33.2 % (ref 37–48.5)
HGB BLD-MCNC: 10.9 G/DL (ref 12–16)
IMM GRANULOCYTES # BLD AUTO: 0.08 K/UL (ref 0–0.04)
IMM GRANULOCYTES NFR BLD AUTO: 0.8 % (ref 0–0.5)
LYMPHOCYTES # BLD AUTO: 2.4 K/UL (ref 1–4.8)
LYMPHOCYTES NFR BLD: 22.6 % (ref 18–48)
MAGNESIUM SERPL-MCNC: 2 MG/DL (ref 1.6–2.6)
MCH RBC QN AUTO: 29.1 PG (ref 27–31)
MCHC RBC AUTO-ENTMCNC: 32.8 G/DL (ref 32–36)
MCV RBC AUTO: 89 FL (ref 82–98)
MONOCYTES # BLD AUTO: 0.4 K/UL (ref 0.3–1)
MONOCYTES NFR BLD: 3.6 % (ref 4–15)
NEUTROPHILS # BLD AUTO: 7.6 K/UL (ref 1.8–7.7)
NEUTROPHILS NFR BLD: 72.3 % (ref 38–73)
NRBC BLD-RTO: 1 /100 WBC
PHOSPHATE SERPL-MCNC: 3.1 MG/DL (ref 2.7–4.5)
PLATELET # BLD AUTO: 293 K/UL (ref 150–450)
PMV BLD AUTO: 9.4 FL (ref 9.2–12.9)
POTASSIUM SERPL-SCNC: 4.3 MMOL/L (ref 3.5–5.1)
PROT SERPL-MCNC: 4.8 G/DL (ref 6–8.4)
RBC # BLD AUTO: 3.74 M/UL (ref 4–5.4)
SODIUM SERPL-SCNC: 134 MMOL/L (ref 136–145)
WBC # BLD AUTO: 10.54 K/UL (ref 3.9–12.7)

## 2022-06-18 PROCEDURE — 83735 ASSAY OF MAGNESIUM: CPT | Performed by: STUDENT IN AN ORGANIZED HEALTH CARE EDUCATION/TRAINING PROGRAM

## 2022-06-18 PROCEDURE — 25000003 PHARM REV CODE 250

## 2022-06-18 PROCEDURE — 80053 COMPREHEN METABOLIC PANEL: CPT | Performed by: STUDENT IN AN ORGANIZED HEALTH CARE EDUCATION/TRAINING PROGRAM

## 2022-06-18 PROCEDURE — 99233 PR SUBSEQUENT HOSPITAL CARE,LEVL III: ICD-10-PCS | Mod: ,,, | Performed by: INTERNAL MEDICINE

## 2022-06-18 PROCEDURE — 99233 SBSQ HOSP IP/OBS HIGH 50: CPT | Mod: ,,, | Performed by: STUDENT IN AN ORGANIZED HEALTH CARE EDUCATION/TRAINING PROGRAM

## 2022-06-18 PROCEDURE — 99497 ADVNCD CARE PLAN 30 MIN: CPT | Mod: ,,, | Performed by: STUDENT IN AN ORGANIZED HEALTH CARE EDUCATION/TRAINING PROGRAM

## 2022-06-18 PROCEDURE — 63600175 PHARM REV CODE 636 W HCPCS

## 2022-06-18 PROCEDURE — 25000003 PHARM REV CODE 250: Performed by: STUDENT IN AN ORGANIZED HEALTH CARE EDUCATION/TRAINING PROGRAM

## 2022-06-18 PROCEDURE — 20600001 HC STEP DOWN PRIVATE ROOM

## 2022-06-18 PROCEDURE — 84100 ASSAY OF PHOSPHORUS: CPT | Performed by: STUDENT IN AN ORGANIZED HEALTH CARE EDUCATION/TRAINING PROGRAM

## 2022-06-18 PROCEDURE — 94761 N-INVAS EAR/PLS OXIMETRY MLT: CPT

## 2022-06-18 PROCEDURE — 93005 ELECTROCARDIOGRAM TRACING: CPT

## 2022-06-18 PROCEDURE — 93010 ELECTROCARDIOGRAM REPORT: CPT | Mod: ,,, | Performed by: INTERNAL MEDICINE

## 2022-06-18 PROCEDURE — 63600175 PHARM REV CODE 636 W HCPCS: Performed by: STUDENT IN AN ORGANIZED HEALTH CARE EDUCATION/TRAINING PROGRAM

## 2022-06-18 PROCEDURE — 36415 COLL VENOUS BLD VENIPUNCTURE: CPT | Performed by: STUDENT IN AN ORGANIZED HEALTH CARE EDUCATION/TRAINING PROGRAM

## 2022-06-18 PROCEDURE — 99497 PR ADVNCD CARE PLAN 30 MIN: ICD-10-PCS | Mod: ,,, | Performed by: STUDENT IN AN ORGANIZED HEALTH CARE EDUCATION/TRAINING PROGRAM

## 2022-06-18 PROCEDURE — 85025 COMPLETE CBC W/AUTO DIFF WBC: CPT | Performed by: STUDENT IN AN ORGANIZED HEALTH CARE EDUCATION/TRAINING PROGRAM

## 2022-06-18 PROCEDURE — 99233 PR SUBSEQUENT HOSPITAL CARE,LEVL III: ICD-10-PCS | Mod: ,,, | Performed by: STUDENT IN AN ORGANIZED HEALTH CARE EDUCATION/TRAINING PROGRAM

## 2022-06-18 PROCEDURE — S0109 METHADONE ORAL 5MG: HCPCS

## 2022-06-18 PROCEDURE — 99233 SBSQ HOSP IP/OBS HIGH 50: CPT | Mod: ,,, | Performed by: INTERNAL MEDICINE

## 2022-06-18 PROCEDURE — 93010 EKG 12-LEAD: ICD-10-PCS | Mod: ,,, | Performed by: INTERNAL MEDICINE

## 2022-06-18 PROCEDURE — 63600175 PHARM REV CODE 636 W HCPCS: Performed by: INTERNAL MEDICINE

## 2022-06-18 PROCEDURE — 93010 ELECTROCARDIOGRAM REPORT: CPT | Mod: 59,76,, | Performed by: INTERNAL MEDICINE

## 2022-06-18 RX ORDER — HYDROMORPHONE HYDROCHLORIDE 1 MG/ML
0.5 INJECTION, SOLUTION INTRAMUSCULAR; INTRAVENOUS; SUBCUTANEOUS
Status: DISCONTINUED | OUTPATIENT
Start: 2022-06-18 | End: 2022-06-20 | Stop reason: HOSPADM

## 2022-06-18 RX ORDER — OXYCODONE HYDROCHLORIDE 5 MG/1
5 TABLET ORAL EVERY 6 HOURS PRN
Status: DISCONTINUED | OUTPATIENT
Start: 2022-06-18 | End: 2022-06-20 | Stop reason: HOSPADM

## 2022-06-18 RX ORDER — METHADONE HYDROCHLORIDE 5 MG/5ML
2.5 SOLUTION ORAL EVERY 12 HOURS
Status: DISCONTINUED | OUTPATIENT
Start: 2022-06-18 | End: 2022-06-20 | Stop reason: HOSPADM

## 2022-06-18 RX ORDER — AMOXICILLIN 250 MG
1 CAPSULE ORAL DAILY
Status: DISCONTINUED | OUTPATIENT
Start: 2022-06-18 | End: 2022-06-20 | Stop reason: HOSPADM

## 2022-06-18 RX ORDER — LIDOCAINE HYDROCHLORIDE 20 MG/ML
JELLY TOPICAL
Status: DISCONTINUED | OUTPATIENT
Start: 2022-06-18 | End: 2022-06-20 | Stop reason: HOSPADM

## 2022-06-18 RX ORDER — METHADONE HYDROCHLORIDE 5 MG/1
5 TABLET ORAL NIGHTLY
Status: DISCONTINUED | OUTPATIENT
Start: 2022-06-18 | End: 2022-06-18

## 2022-06-18 RX ORDER — POLYETHYLENE GLYCOL 3350 17 G/17G
17 POWDER, FOR SOLUTION ORAL DAILY
Status: DISCONTINUED | OUTPATIENT
Start: 2022-06-18 | End: 2022-06-20 | Stop reason: HOSPADM

## 2022-06-18 RX ADMIN — HYDROMORPHONE HYDROCHLORIDE 1 MG: 1 INJECTION, SOLUTION INTRAMUSCULAR; INTRAVENOUS; SUBCUTANEOUS at 07:06

## 2022-06-18 RX ADMIN — POLYETHYLENE GLYCOL 3350 17 G: 17 POWDER, FOR SOLUTION ORAL at 10:06

## 2022-06-18 RX ADMIN — FAMOTIDINE 20 MG: 20 TABLET ORAL at 02:06

## 2022-06-18 RX ADMIN — ONDANSETRON 4 MG: 2 INJECTION INTRAMUSCULAR; INTRAVENOUS at 11:06

## 2022-06-18 RX ADMIN — OXYCODONE 5 MG: 5 TABLET ORAL at 11:06

## 2022-06-18 RX ADMIN — PANTOPRAZOLE SODIUM 40 MG: 40 TABLET, DELAYED RELEASE ORAL at 08:06

## 2022-06-18 RX ADMIN — OXYCODONE 5 MG: 5 TABLET ORAL at 10:06

## 2022-06-18 RX ADMIN — PANTOPRAZOLE SODIUM 40 MG: 40 TABLET, DELAYED RELEASE ORAL at 10:06

## 2022-06-18 RX ADMIN — HEPARIN SODIUM 5000 UNITS: 5000 INJECTION INTRAVENOUS; SUBCUTANEOUS at 09:06

## 2022-06-18 RX ADMIN — HYDROMORPHONE HYDROCHLORIDE 0.5 MG: 1 INJECTION, SOLUTION INTRAMUSCULAR; INTRAVENOUS; SUBCUTANEOUS at 03:06

## 2022-06-18 RX ADMIN — AMITRIPTYLINE HYDROCHLORIDE 10 MG: 10 TABLET, FILM COATED ORAL at 08:06

## 2022-06-18 RX ADMIN — HEPARIN SODIUM 5000 UNITS: 5000 INJECTION INTRAVENOUS; SUBCUTANEOUS at 05:06

## 2022-06-18 RX ADMIN — DIPHENHYDRAMINE HYDROCHLORIDE 25 MG: 25 CAPSULE ORAL at 10:06

## 2022-06-18 RX ADMIN — DICYCLOMINE HYDROCHLORIDE 10 MG: 10 CAPSULE ORAL at 08:06

## 2022-06-18 RX ADMIN — CEFTRIAXONE 2 G: 2 INJECTION, SOLUTION INTRAVENOUS at 07:06

## 2022-06-18 RX ADMIN — METHADONE HYDROCHLORIDE 2.5 MG: 5 SOLUTION ORAL at 08:06

## 2022-06-18 RX ADMIN — HEPARIN SODIUM 5000 UNITS: 5000 INJECTION INTRAVENOUS; SUBCUTANEOUS at 02:06

## 2022-06-18 RX ADMIN — DICYCLOMINE HYDROCHLORIDE 10 MG: 10 CAPSULE ORAL at 10:06

## 2022-06-18 RX ADMIN — CHLORPROMAZINE HYDROCHLORIDE 25 MG: 25 INJECTION INTRAMUSCULAR at 03:06

## 2022-06-18 NOTE — NURSING
Notified JOSE J Gastelum MD of elevated HR. Pt sustaining 130-140s on tele monitoring. HR noted to be elevated after completion of PRN thorazine infusion. STAT 12-lead EKG ordered.

## 2022-06-18 NOTE — HPI
uPja Quigley is a 41 year-old woman with metastatic gastric adenocarcinoma on FOLFIRI (cycle 2 on 6/13/22) c/b malignant ascites requiring weekly paracentesis, chronic gastric ulcer with perforation c/b CATINA 2/2 chronic blood loss, chronic lower extremity edema, anxiety, malnutrition, and peripheral neuropathy who presented to [facility] on 6/16 after a syncopal episode after an outpatient MRI.     She started having nausea, vomiting, and worsened indigestion on 6/14 which she also had after her 1st cycle of FOLFIRI. She also admits to some fatigue and loss of appetite over the past month. On 6/15 she had diarrhea with 3 loose, non-bloody, non-melanotic bowel movements associated with some diffuse lower abdominal pain and cramping. She took her home Norco with improvement in abdominal pain. She denied any recent sick contacts, travel, fevers, chills, night sweats, nasal congestion, sore throat, cough, SOB, or chest pain (although does have epigastric indigestion pain). On 6/16 AM she had her normally scheduled paracentesis at [facility] where 3.7L of serosanguinous fluid was drained. She tolerated this well. In the afternoon she went to her MRI abdomen/pelvis at [facility] [address] and while in the MRI started to experience RLQ & LLQ pain worse than ever before, 10/10. After finishing the scan she stood up to put her shoes on and then had a syncopal episode. BP at that time after awakening was 98/59. She also had an episode of non-bloody, non-bilious emesis. This was her first time receiving Gadavist contrast. She did not have any focal neurologic deficits, hives, rash, throat swelling or SOB at the time.      On arrival to the ER, /70, HR 80, RR 20, temp 98.5F, saturation 97% on room air. Labs were significant for Hgb 9.5 (baseline ~12), WBC 4.01, Plt 347k, normal electrolytes, renal function & liver function, albumin 1.7, lipase 27, COVID negative. CXR showed unchanged moderate L pleural effusion. CTA abdomen/pelvis was  performed which did not show active bleeding however did show significant increase in size of pelvic mass 12.4 x 22.0 x 18.4 cm (previously 14 x 10 cm in April '22), worsening peritoneal carcinomatosis and anasarca. In the ER she received IV morphine 4 mg x2 which helped minimally with her pain and caused her to be nauseous and vomit again. Upon returning from the bathroom, she had another syncopal episode witnessed by the nurse. BP at that time remained 110s/70s. She received IV ondansetron and promethazine in the ER for nausea. Pain persisted and she received IV hydromorphone 0.5 mg x2 with improvement of pain from 10/10 to 7/10. Throughout her ER stay she became more tachycardic up to the 140s. She was admitted to medical oncology for pain control and further management.     Outpt oncology notes reviewed.  Completed 2 cycles of second round therapy and found to have significant progression of disease on scans in the past 24 hours.     In this setting, palliative medicine was consulted to help with medical decision making and aid in the formation of goals of care.

## 2022-06-18 NOTE — ASSESSMENT & PLAN NOTE
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      Recommendations:  Peripheral neuropathy:   Doesn't feel like gabapentin has been helping (900 mg total daily dose)  -receiving acupuncture  -Recommend starting low dose methadone 2.5 mg BID  -can also consider rotation of TCA to nortriptyline 25 mg qhs (fewer anticholinergic side effects) but also need to explore why she was on amitriptyline  - recommend starting lidocaine gel for back pain/neuropathic pain in her feet      Nociceptive pain:  -Due to underlying metastatic disease and peritoneal stretch with recurrent massive ascites; may need more frequent paracentesis  -at home: pt only take up to two lortab (admittedly underdoses) daily and 2-3 doses of additional APAP  -start methadone 2.5 mg BID for pain relief  -agree with rotation to oxy IR 5 mg to remove excess APAP/combo meds  -continue hydromorphone 0.5 mg IVP but recommend spacing out to q4h prn for pain unrelieved by PO medication  -discussed safety use of heating pad including having gown between pad and abdomen, using for set amount of time then taking it off  - start lidocaine gel for back pain      Reflux  -continue protonix 40 mg bid  - recommend starting tums prn for reflux not relieved by protonix    Palliative care encounter:  -Ongoing support for pt and family as treatment expectations and prognotic awareness is globally increased; pt not eager to have prognostic talks  -SW support for guidance on the stress surrounding her mother; significant mental health disease, schizophrenia and BPD: they are looking for facility placement; the pt had been the primary care giver  -has a 3 yo son  -was hoping to travel to [facility] on Tuesday;   -ACP discussions; did not discuss today    6/18/22  - introduced palliative medicine clinic today. Patient is open to follow up  in clinic after discharge  - patient spoke about starting new treatment regimen  - goals: life prolonging and continuing disease directed therapy  - did not discuss code status or more about prognosis at this time.  - patient discussed feelings around not being able to be as active with 3 y/o. Discussed modifications that may occur to allow her to participate and find balance between rest and activity.      Thank you for the opportunity to care for this patient and family.      Please call with questions.

## 2022-06-18 NOTE — PROGRESS NOTES
[facility] - Transplant Stepdown  Palliative Medicine  Progress Note    Patient Name: Puja Quigley  MRN: [id number]  Admission Date: 6/16/2022  Hospital Length of Stay: 1 days  Code Status: Full Code   Attending Provider: Louann Sinha MD  Consulting Provider: Sharon Brink MD  Primary Care Physician: Primary Doctor No  Principal Problem:Intractable abdominal pain    Patient information was obtained from patient, caregiver / friend, primary team and current medical record.      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      Recommendations:  Peripheral neuropathy:   Doesn't feel like gabapentin has been helping (900 mg total daily dose)  -receiving acupuncture  -Recommend starting low dose methadone 2.5 mg BID  -can also consider rotation of TCA to nortriptyline 25 mg qhs (fewer anticholinergic side effects) but also need to explore why she was on amitriptyline  - recommend starting lidocaine gel for back pain/neuropathic pain in her feet      Nociceptive pain:  -Due to underlying metastatic disease and peritoneal stretch with recurrent massive ascites; may need more frequent paracentesis  -at home: pt only take up to two lortab (admittedly underdoses) daily and 2-3 doses of additional APAP  -start methadone 2.5 mg BID for pain relief  -agree with rotation to oxy IR 5 mg to remove excess APAP/combo meds  -continue hydromorphone 0.5 mg IVP but recommend spacing out to q4h prn for pain unrelieved by PO medication  -discussed safety use of heating pad including having gown between pad and abdomen, using for set amount of time then taking it off  - start lidocaine gel for back pain      Reflux  -continue protonix 40 mg bid  - recommend starting tums prn for reflux not relieved by protonix    Palliative care  encounter:  -Ongoing support for pt and family as treatment expectations and prognotic awareness is globally increased; pt not eager to have prognostic talks  -SW support for guidance on the stress surrounding her mother; significant mental health disease, schizophrenia and BPD: they are looking for facility placement; the pt had been the primary care giver  -has a 1 yo son  -was hoping to travel to [facility] on Tuesday;   -ACP discussions; did not discuss today    6/18/22  - introduced palliative medicine clinic today. Patient is open to follow up in clinic after discharge  - patient discussed feelings around not being able to be as active with 1 y/o. Discussed modifications that may occur to allow her to participate and find balance between rest and activity.      Thank you for the opportunity to care for this patient and family.      Please call with questions.            I will follow-up with patient. Please contact us if you have any additional questions.    Subjective:     Chief Complaint:   Chief Complaint   Patient presents with    Loss of Consciousness     [facility]  Pt had paracentesis today.  Pt with ovarian cancer, chemo infusion yesterday.       HPI:   Puja Quigley is a 41 year-old woman with metastatic gastric adenocarcinoma on FOLFIRI (cycle 2 on 6/13/22) c/b malignant ascites requiring weekly paracentesis, chronic gastric ulcer with perforation c/b CATINA 2/2 chronic blood loss, chronic lower extremity edema, anxiety, malnutrition, and peripheral neuropathy who [facility] to [facility]16 after a syncopal episode after an outpatient MRI.     She started having nausea, vomiting, and worsened indigestion on 6/14 which she also had after her 1st cycle of FOLFIRI. She also admits to some fatigue and loss of appetite over the past month. On 6/15 she had diarrhea with 3 loose, non-bloody, non-melanotic bowel movements associated with some diffuse lower abdominal  pain and cramping. She took her home Norco with improvement in abdominal pain. She denied any recent sick contacts, travel, fevers, chills, night sweats, nasal congestion, sore throat, cough, SOB, or chest pain (although does have epigastric indigestion pain). On 6/16 AM she had her normally scheduled paracentesis at [facility] where 3.7L of serosanguinous fluid was drained. She tolerated this well. In the afternoon she went to her MRI abdomen/pelvis at [facility] and while in the MRI started to experience RLQ & LLQ pain worse than ever before, 10/10. After finishing the scan she stood up to put her shoes on and then had a syncopal episode. BP at that time after awakening was 98/59. She also had an episode of non-bloody, non-bilious emesis. This was her first time receiving Gadavist contrast. She did not have any focal neurologic deficits, hives, rash, throat swelling or SOB at the time.      On arrival to the ER, /70, HR 80, RR 20, temp 98.5F, saturation 97% on room air. Labs were significant for Hgb 9.5 (baseline ~12), WBC 4.01, Plt 347k, normal electrolytes, renal function & liver function, albumin 1.7, lipase 27, COVID negative. CXR showed unchanged moderate L pleural effusion. CTA abdomen/pelvis was performed which did not show active bleeding however did show significant increase in size of pelvic mass 12.4 x 22.0 x 18.4 cm (previously 14 x 10 cm in April '22), worsening peritoneal carcinomatosis and anasarca. In the ER she received IV morphine 4 mg x2 which helped minimally with her pain and caused her to be nauseous and vomit again. Upon returning from the bathroom, she had another syncopal episode witnessed by the nurse. BP at that time remained 110s/70s. She received IV ondansetron and promethazine in the ER for nausea. Pain persisted and she received IV hydromorphone 0.5 mg x2 with improvement of pain from 10/10 to 7/10. Throughout her ER stay she became more tachycardic up to the 140s. She was  admitted to medical oncology for pain control and further management.     Outpt oncology notes reviewed.  Completed 2 cycles of second round therapy and found to have significant progression of disease on scans in the past 24 hours.     In this setting, palliative medicine was consulted to help with medical decision making and aid in the formation of goals of care.         Hospital Course:  No notes on file    Interval History: pt seen at bedside with family presents (sister and best friend). Patient doing well this morning. She states that pain is somewhat controlled with current regimen. IV medication helps quicker than PO medication.     24h PRN medication usage from 8a-8a:  Two doses of hydrocodone-apap 5-325 mg PO  One dose of hydromorphone 0.5 mg IVP  Two doses of hydromorphone 1 mg IVP    Past Medical History:   Diagnosis Date    Anxiety     Dehydration 5/30/2022    Gastric cancer     Gastric ulcer     Hx of psychiatric care     Pregnancy 08/12/2020    delivered on 8/12/2020    Umbilical hernia        Past Surgical History:   Procedure Laterality Date    CLOSURE OF PERFORATED ULCER OF DUODENUM USING OMENTAL PATCH      ESOPHAGOGASTRODUODENOSCOPY N/A 10/13/2020    Procedure: EGD (ESOPHAGOGASTRODUODENOSCOPY);  Surgeon: Rosio Marion MD;  Location: [address]);  Service: Endoscopy;  Laterality: N/A;    ESOPHAGOGASTRODUODENOSCOPY N/A 12/11/2020    Procedure: EGD (ESOPHAGOGASTRODUODENOSCOPY);  Surgeon: Rosio Marion MD;  Location: [address]);  Service: Endoscopy;  Laterality: N/A;  Covid-19 test 12/8/20 at [facility]    ESOPHAGOGASTRODUODENOSCOPY N/A 3/12/2021    Procedure: EGD (ESOPHAGOGASTRODUODENOSCOPY);  Surgeon: Nav Omer MD;  Location: [address]);  Service: Endoscopy;  Laterality: N/A;  COVID at [facility] 3/9 ttr    ESOPHAGOGASTRODUODENOSCOPY N/A 5/18/2021    Procedure: EGD (ESOPHAGOGASTRODUODENOSCOPY);  Surgeon: Rosio Marion MD;  Location:  [facility] ENDO (2ND FLR);  Service: Endoscopy;  Laterality: N/A;  5/15-covid pcw-inst portal-tb    ESOPHAGOGASTRODUODENOSCOPY N/A 2021    Procedure: EGD (ESOPHAGOGASTRODUODENOSCOPY);  Surgeon: Socrates Terrazas MD;  Location: [facility] ENDO (2ND FLR);  Service: Endoscopy;  Laterality: N/A;       Review of patient's allergies indicates:  No Known Allergies    Medications:  Continuous Infusions:  Scheduled Meds:   amitriptyline  10 mg Oral QHS    cefTRIAXone (ROCEPHIN) IVPB  2 g Intravenous Q24H    dicyclomine  10 mg Oral QID    gabapentin  300 mg Oral TID    heparin (porcine)  5,000 Units Subcutaneous Q8H    methadone  2.5 mg Oral Q12H    pantoprazole  40 mg Oral BID    polyethylene glycol  17 g Oral Daily    senna-docusate 8.6-50 mg  1 tablet Oral Daily     PRN Meds:sodium chloride, acetaminophen, acetaminophen, chlorproMAZINE, dextrose 10%, dextrose 10%, diphenhydrAMINE, famotidine, glucagon (human recombinant), glucose, glucose, HYDROmorphone, loperamide, melatonin, naloxone, ondansetron, oxyCODONE, promethazine (PHENERGAN) IVPB, simethicone, sodium chloride 0.9%    Family History       Problem Relation (Age of Onset)    Breast cancer Other (73), Paternal Cousin    Cancer Mother (67)    Lung cancer Father (48)    No Known Problems Son    Prostate cancer Paternal Uncle    Schizophrenia Mother          Tobacco Use    Smoking status: Former Smoker     Types: Cigarettes     Quit date: 2019     Years since quittin.5    Smokeless tobacco: Never Used   Substance and Sexual Activity    Alcohol use: Yes    Drug use: Not Currently    Sexual activity: Yes     Partners: Male       Review of Systems   Constitutional:  Positive for activity change, fatigue and unexpected weight change.   HENT: Negative.     Eyes: Negative.    Respiratory: Negative.     Gastrointestinal:  Positive for abdominal distention, abdominal pain, nausea and vomiting.   Endocrine: Negative.    Genitourinary:  Positive for flank pain.    Musculoskeletal:  Positive for back pain.   Skin: Negative.    Allergic/Immunologic: Negative.    Neurological:  Positive for weakness.        No vision changes   Hematological: Negative.    All other systems reviewed and are negative.  Objective:     Vital Signs (Most Recent):  Temp: 97.8 °F (36.6 °C) (06/18/22 1200)  Pulse: 106 (06/18/22 1200)  Resp: 18 (06/18/22 1200)  BP: (!) 133/98 (06/18/22 1200)  SpO2: 98 % (06/18/22 1200)   Vital Signs (24h Range):  Temp:  [97 °F (36.1 °C)-98.7 °F (37.1 °C)] 97.8 °F (36.6 °C)  Pulse:  [] 106  Resp:  [1-20] 18  SpO2:  [95 %-99 %] 98 %  BP: (116-147)/() 133/98     Weight: 71.2 kg (156 lb 15.5 oz)  Body mass index is 24.58 kg/m².    Physical Exam  Vitals and nursing note reviewed.   Constitutional:       General: She is not in acute distress.     Appearance: She is ill-appearing.   HENT:      Head: Normocephalic and atraumatic.      Right Ear: External ear normal.      Left Ear: External ear normal.      Nose: Nose normal.      Mouth/Throat:      Mouth: Mucous membranes are moist.   Eyes:      General: No scleral icterus.     Conjunctiva/sclera: Conjunctivae normal.   Neck:      Comments: Trachea midline    Cardiovascular:      Rate and Rhythm: Tachycardia present.   Pulmonary:      Effort: Pulmonary effort is normal. No respiratory distress.   Abdominal:      General: There is distension.      Palpations: Abdomen is soft.      Tenderness: There is abdominal tenderness.      Comments: Mild TTP globally but more pronounced in LLQ  Abd surgical scars present; well healed   Musculoskeletal:         General: Swelling present.      Cervical back: Normal range of motion.      Right lower leg: Edema present.      Left lower leg: Edema present.   Skin:     General: Skin is warm.      Findings: No rash.   Neurological:      General: No focal deficit present.      Mental Status: She is alert and oriented to person, place, and time.   Psychiatric:         Thought Content:  Thought content normal.         Judgment: Judgment normal.     Review of Symptoms      Symptom Assessment (ESAS 0-10 Scale)  Pain:  5  Dyspnea:  1  Anxiety:  0  Nausea:  0  Depression:  0  Anorexia:  4  Fatigue:  4  Insomnia:  6  Restlessness:  0  Agitation:  0     CAM / Delirium:  Negative  Constipation:  Negative  Diarrhea:  Negative    Bowel Management Plan (BMP):  Yes      Pain Assessment:  OME in 24 hours:  50  Location(s): abdomen and leg    Abdomen       Location: generalized (inferior greater than superior; left greater than right)        Quality: Cramping, dull, stabbing and aching        Quantity: 5/10 in intensity        Chronicity: Onset 3 week(s) ago, gradually worsening        Aggravating Factors: Eating and movement        Alleviating Factors: Belching and opiates        Associated Symptoms: Nausea  Leg       Location: bilateral        Quality: Tingling and burning        Quantity: 4/10 in intensity        Chronicity: Onset 4 month(s) ago, stable since Worsened since OCtober        Aggravating Factors: Movement        Alleviating Factors: Pressure (massage and acupuncture)        Associated Symptoms: None    Modified Maeve Scale:  0.5    ECOG Performance Status rdGrdrrdarddrderd:rd rd3rd Living Arrangement: lives with sig other and 1 yo son.    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  CBC:   Recent Labs   Lab  "06/18/22  0805   WBC 10.54   HGB 10.9*   HCT 33.2*   MCV 89          BMP:  Recent Labs   Lab 06/18/22  0805   GLU 98   *   K 4.3      CO2 23   BUN 13   CREATININE 0.7   CALCIUM 8.3*   MG 2.0     LFT:  Lab Results   Component Value Date    AST 16 06/18/2022    ALKPHOS 61 06/18/2022    BILITOT 0.3 06/18/2022     Albumin:   Albumin   Date Value Ref Range Status   06/18/2022 1.8 (L) 3.5 - 5.2 g/dL Final     Protein:   Total Protein   Date Value Ref Range Status   06/18/2022 4.8 (L) 6.0 - 8.4 g/dL Final     Lactic acid:   Lab Results   Component Value Date    LACTATE 1.3 10/08/2020       Significant Imaging: I have reviewed all pertinent imaging results/findings within the past 24 hours.    06/17/2022 CTA A/P: "1. 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 2. Moderate abdominopelvic ascites.  No evidence of active intra-abdominal arterial contrast extravasation. 3. Worsening peritoneal carcinomatosis. 4. Diffuse gastric wall thickening, most pronounced in the antrum and in keeping with biopsy-proven adenocarcinoma. 5. Large left pleural effusion. 6. Diffuse body wall edema. 7. Additional findings as above"      16 minutes spent discussing ACP    Sharon Brink MD  Palliative Medicine  [facility] - Transplant Stepdown              "

## 2022-06-18 NOTE — ASSESSMENT & PLAN NOTE
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      Recommendations:  Peripheral neuropathy:   Doesn't feel like gabapentin has been helping (900 mg total daily dose)  -receiving acupuncture  -Can consider low dose nightly methadone 5 mg qhs  -can also consider rotation of TCA to nortriptyline 25 mg qhs (fewer anticholinergic side effects) but also need to explore why she was on amitriptyline     Nociceptive pain:  -Due to underlying metastatic disease and peritoneal stretch with recurrent massive ascites; may need more frequent paracentesis  -pt only take up to two lortab (admittedly underdoses) daily and 2-3 doses of additional APAP  -as above would start on methadone 5 mg for long acting pain relief  -would rotate to oxy IR 5 mg to remove excess APAP/combo meds when ready;   -if staying with parenteral meds can decrease dilaudid to 0.5 mg IV with greater prn frequency     Palliative care encounter:  -Ongoing support for pt and family as treatment expectations and prognotic awareness is globally increased; pt not eager to have prognostic talks  -SW support for guidance on the stress surrounding her mother; significant mental health disease, schizophrenia and BPD: they are looking for facility placement; the pt had been the primary care giver  -has a 1 yo son  -was hoping to travel to [facility] on Tuesday;   -ACP discussions; did not discuss today     Thank you for the opportunity to care for this patient and family.      Please call with questions.      Nicola Bingham MD  Palliative Medicine   [facility]  [phone] (cell)

## 2022-06-18 NOTE — PROGRESS NOTES
[facility] Stepdown  Hematology/Oncology  Progress Note    Patient Name: Puja Quigley  Admission Date: 6/16/2022  Hospital Length of Stay: 1 days  Code Status: Full Code     Subjective:     HPI:  Puja Quigley is a 41 year-old woman with metastatic gastric adenocarcinoma on FOLFIRI (cycle 2 on 6/13/22) c/b malignant ascites requiring weekly paracentesis, chronic gastric ulcer with perforation c/b CATINA 2/2 chronic blood loss, chronic lower extremity edema, anxiety, malnutrition, and peripheral neuropathy who presented to [facility] on 6/16 after a syncopal episode after an outpatient MRI.    She started having nausea, vomiting, and worsened indigestion on 6/14 which she also had after her 1st cycle of FOLFIRI. She also admits to some fatigue and loss of appetite over the past month. On 6/15 she had diarrhea with 3 loose, non-bloody, non-melanotic bowel movements associated with some diffuse lower abdominal pain and cramping. She took her home Norco with improvement in abdominal pain. She denied any recent sick contacts, travel, fevers, chills, night sweats, nasal congestion, sore throat, cough, SOB, or chest pain (although does have epigastric indigestion pain). On 6/16 AM she had her normally scheduled paracentesis at [facility] where 3.7L of serosanguinous fluid was drained. She tolerated this well. In the afternoon she went to her MRI abdomen/pelvis at [facility] and while in the MRI started to experience RLQ & LLQ pain worse than ever before, 10/10. After finishing the scan she stood up to put her shoes on and then had a syncopal episode. BP at that time after awakening was 98/59. She also had an episode of non-bloody, non-bilious emesis. This was her first time receiving Gadavist contrast. She did not have any focal neurologic deficits, hives, rash, throat swelling or SOB at the time.     On arrival to the ER, /70, HR 80, RR 20, temp 98.5F, saturation 97% on room air. Labs were significant for Hgb  9.5 (baseline ~12), WBC 4.01, Plt 347k, normal electrolytes, renal function & liver function, albumin 1.7, lipase 27, COVID negative. CXR showed unchanged moderate L pleural effusion. CTA abdomen/pelvis was performed which did not show active bleeding however did show significant increase in size of pelvic mass 12.4 x 22.0 x 18.4 cm (previously 14 x 10 cm in April '22), worsening peritoneal carcinomatosis and anasarca. In the ER she received IV morphine 4 mg x2 which helped minimally with her pain and caused her to be nauseous and vomit again. Upon returning from the bathroom, she had another syncopal episode witnessed by the nurse. BP at that time remained 110s/70s. She received IV ondansetron and promethazine in the ER for nausea. Pain persisted and she received IV hydromorphone 0.5 mg x2 with improvement of pain from 10/10 to 7/10. Throughout her ER stay she became more tachycardic up to the 140s. She was admitted to medical oncology for pain control and further management.    Oncology History (per Dr. Ashraf):  HER-2 negative by FISH.  PD-L1 < 1%.  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  We previously explained the implications of a stage IV diagnosis to her and potential treatment options.  She is now s/p port placement. She sought a second opinion at [facility] for zolbetuximab trial targeting CLDN protein but she did not test positive for this biomarker. We recommended proceeding with SOC FOLFOX, with which the [facility] team agreed. Because of the negative PD-L1 I do not think the benefit of adding nivolumab outweighs the potential toxicities.        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  She opted not to get genetic testing done at her appt with [facility]. We recommended that she reconsider that decision in light of a  very likely germline mutation in SAMUEL which has clinical consequences.       CT CAP after 6 cycles showed improvement in ascites, probable hepatic, thyroid and bone metastases. We dropped oxaliplatin starting with cycle 10 due to grade 1 neuropathy and desire to keep it from worsening.     CT CAP after cycle 10 showed stable disease.  CT scan after cycle 15 continued to display overall stability of disease within the gastric wall, peritoneum, spine and liver. Although increase ascites on imaging and clinically can represent progression of disease, her previous CT suggested overall stability within the liver and gastric wall, so we recommended to continue with treatment time. However, she continued to have increasing abdominal ascites that has been concerning for progression of disease. Hence, she had repeat imaging. On CT performed on 4/14/2022, she appeared to have worsening disease suggestive of progression. She was seen by Dr. Reid at [facility] for f/u and to discuss possible treatment options.  She was also evaluated by Dr. Andree Mueller and Dr. See of Surgical Oncology at [facility].  Ultimately she was not felt to be a good surgical debulking candidate due to her ascites.  If her ascites improves during the course of chemotherapy and her performance status remains stable or improves, they would re-consider her again for palliative oophorectomy.      She was started on second-line FOLFIRI. Was given ramucirumab with cycle 1 but this later will be held given [facility] recs for just FOLFIRI.  She didn't tolerate cycle 1 well with significant N/V.  Will dose reduce irinotecan to 75 mg/m2 - has UGT1A1 homozygous mutation indicating poor metabolism.  Continue 5-FU infusion at 2200 mg/m2.     After an in-depth discussion, she would like to pursue 2nd line chemotherapy. At this time, we would favor use of FOLFIRI as second-line rather than Taxol/Claudio due to ongoing neuropathy. Pt is agreeable. This was also discussed with   Pascale, who is in agreement. We will proceed with 4 cycles followed by imaging studies. We will also repeat imaging studies with a CT chest non-contrast and MRI abdomen/pelvis as a baseline. Pt is agreeable.      Interval History: patient's pain has improved significantly. Palliative care consulted and recommended adding methadone to pain regimen. Otherwise, NAEON.    Oncology Treatment Plan:   OP FOLFIRI Q2WK    Medications:  Continuous Infusions:  Scheduled Meds:   amitriptyline  10 mg Oral QHS    cefTRIAXone (ROCEPHIN) IVPB  2 g Intravenous Q24H    dicyclomine  10 mg Oral QID    gabapentin  300 mg Oral TID    heparin (porcine)  5,000 Units Subcutaneous Q8H    methadone  2.5 mg Oral Q12H    pantoprazole  40 mg Oral BID    polyethylene glycol  17 g Oral Daily    senna-docusate 8.6-50 mg  1 tablet Oral Daily     PRN Meds:sodium chloride, acetaminophen, acetaminophen, chlorproMAZINE, dextrose 10%, dextrose 10%, diphenhydrAMINE, famotidine, glucagon (human recombinant), glucose, glucose, HYDROmorphone, loperamide, melatonin, naloxone, ondansetron, oxyCODONE, promethazine (PHENERGAN) IVPB, simethicone, sodium chloride 0.9%     Review of Systems   Constitutional:  Positive for fatigue. Negative for activity change, appetite change, chills and fever.   HENT:  Negative for congestion, ear pain, sinus pain and sore throat.    Eyes:  Negative for visual disturbance.   Respiratory:  Negative for cough and shortness of breath.    Cardiovascular:  Positive for leg swelling (chronic, at baseline). Negative for chest pain.   Gastrointestinal:  Positive for abdominal distention, abdominal pain and nausea. Negative for constipation, diarrhea and vomiting.   Genitourinary:  Negative for decreased urine volume, difficulty urinating and dysuria.   Musculoskeletal:  Negative for back pain and neck pain.   Skin:  Negative for rash.   Neurological:  Negative for dizziness, syncope and headaches.   Psychiatric/Behavioral:   Negative for confusion and decreased concentration.    Objective:     Vital Signs (Most Recent):  Temp: 97.8 °F (36.6 °C) (06/18/22 1200)  Pulse: 106 (06/18/22 1200)  Resp: 18 (06/18/22 1200)  BP: (!) 133/98 (06/18/22 1200)  SpO2: 98 % (06/18/22 1200)   Vital Signs (24h Range):  Temp:  [97 °F (36.1 °C)-98.7 °F (37.1 °C)] 97.8 °F (36.6 °C)  Pulse:  [] 106  Resp:  [1-20] 18  SpO2:  [95 %-99 %] 98 %  BP: (116-147)/() 133/98     Weight: 71.2 kg (156 lb 15.5 oz)  Body mass index is 24.58 kg/m².  Body surface area is 1.83 meters squared.      Intake/Output Summary (Last 24 hours) at 6/18/2022 1343  Last data filed at 6/18/2022 1200  Gross per 24 hour   Intake 1303.21 ml   Output 225 ml   Net 1078.21 ml       Physical Exam  Vitals reviewed.   Constitutional:       General: She is not in acute distress.     Appearance: She is well-developed and underweight. She is not diaphoretic.   HENT:      Head: Normocephalic and atraumatic.      Mouth/Throat:      Mouth: Mucous membranes are dry.   Eyes:      Conjunctiva/sclera: Conjunctivae normal.   Neck:      Trachea: No tracheal deviation.   Cardiovascular:      Rate and Rhythm: Regular rhythm. Tachycardia present.      Heart sounds: Normal heart sounds. No murmur heard.     Comments: R chest port-a-cath non-tender, non-erythematous  Pulmonary:      Effort: Pulmonary effort is normal. No respiratory distress.      Breath sounds: Rales present. No wheezing.   Abdominal:      General: Bowel sounds are normal. There is distension.      Palpations: Abdomen is soft.      Tenderness: There is abdominal tenderness (LLQ and RLQ). There is guarding.   Musculoskeletal:         General: No deformity. Normal range of motion.      Cervical back: Normal range of motion.      Comments: LLL 1+ pitting edema, RLE trace pitting edema   Skin:     General: Skin is warm and dry.      Findings: No erythema or rash.   Neurological:      Mental Status: She is alert and oriented to person,  "place, and time.      Cranial Nerves: No cranial nerve deficit.      Sensory: No sensory deficit.      Motor: No abnormal muscle tone.       Significant Labs:   CBC:   Recent Labs   Lab 06/16/22 1922 06/17/22  0713 06/18/22  0805   WBC 4.01 9.94 10.54   HGB 9.5* 8.2* 10.9*   HCT 30.7* 27.1* 33.2*    259 293   , CMP:   Recent Labs   Lab 06/16/22 1922 06/17/22  0713 06/18/22  0805    140 134*   K 4.4 4.4 4.3    108 104   CO2 24 22* 23   * 104 98   BUN 16 16 13   CREATININE 0.8 0.7 0.7   CALCIUM 7.8* 8.8 8.3*   PROT 4.7* 5.3* 4.8*   ALBUMIN 1.7* 2.6* 1.8*   BILITOT 0.1 0.3 0.3   ALKPHOS 68 62 61   AST 22 18 16   ALT 9* 9* 6*   ANIONGAP 7* 10 7*   EGFRNONAA >60.0 >60.0 >60.0   , and All pertinent labs from the last 24 hours have been reviewed.    Diagnostic Results:  I have reviewed all pertinent imaging results/findings within the past 24 hours.    Assessment/Plan:     * Intractable abdominal pain  Intra-abdominal bleeding 2/2 paracentesis ruled out in ER with CTA A/P. Concern that pain is 2/2 significantly growing tumor burden vs new SBP (especially in the setting of recent diarrhea and hypotension during MRI).     - continue Ceftriaxone 2 g qd  - palliative care consulted and recommended:  -- methadone 2.5 mg BID  -- dilaudid 0.5 mg q3 prn  -- oxycodone IR q6 prn  -- continue bentyl  -- continue simethicone  -- lidocaine jelly for back and hands    Palliative care encounter  Palliative care consulted:  "-Ongoing support for pt and family as treatment expectations and prognotic awareness is globally increased; pt not eager to have prognostic talks  -SW support for guidance on the stress surrounding her mother; significant mental health disease, schizophrenia and BPD: they are looking for facility placement; the pt had been the primary care giver  -has a 1 yo son  -was hoping to travel to [facility] on Tuesday;"    Syncope  Both episodes triggered by standing. Clinically on admission patient " was intravascularly depleted as evidenced by dry mouth, tachycardia, multiple episodes of nausea and vomiting, diarrhea, and limited oral intake due to paracentesis and MRI that day.    - Orthostatic vitals ordered  - Hold off on IV fluids for now given anasarca and pleural effusion, however serum albumin 1.7 and paracentesis (3.7L removed) performed on day of admit so IV albumin 25 g x1 ordered for intravascular depletion  - cultures showed NGTD. Continue to monitor for signs of infection. Continue ceftriaxone    Bilateral lower extremity edema  - Chronic, at baseline  - Holding home diuretic 2/2 syncope    Neuropathy due to chemotherapeutic drug  - Continue home gabapentin    Anxiety associated with cancer diagnosis  - Continue home amitryptiline    Non-intractable vomiting with nausea  - PRN IV ondansetron and promethazine  - Full liquid diet and advance as tolerated    Severe malnutrition  - Boost TID WM  - Albumin significantly low, see abdominal pain    Normocytic anemia  - CTA A/P negative for acute bleed  - Iron panel and ferritin ordered (previously received Injectafer, not currently on iron supplementation)  - CBC daily    Malignant neoplasm of stomach  - Follows with Dr. Pascale Hooks  - Significant increase in size of pelvic mass and peritoneal carcinomatosis on admission  - See intractable abdominal pain    Malignant ascites  - Paracentesis performed 6/16 with 3.7L removed, no samples collected  - See intractable abdominal pain    Chronic gastric ulcer with perforation  - Continue home PPI qd  - Continue home famotidine PRN             Jocelyn Gastelum MD  Hematology/Oncology  [facility] - [facility]

## 2022-06-18 NOTE — ASSESSMENT & PLAN NOTE
Both episodes triggered by standing. Clinically on admission patient was intravascularly depleted as evidenced by dry mouth, tachycardia, multiple episodes of nausea and vomiting, diarrhea, and limited oral intake due to paracentesis and MRI that day.    - Orthostatic vitals ordered  - Hold off on IV fluids for now given anasarca and pleural effusion, however serum albumin 1.7 and paracentesis (3.7L removed) performed on day of admit so IV albumin 25 g x1 ordered for intravascular depletion  - cultures showed NGTD. Continue to monitor for signs of infection. Continue ceftriaxone

## 2022-06-18 NOTE — SUBJECTIVE & OBJECTIVE
Interval History: patient's pain has improved significantly. Palliative care consulted and recommended adding methadone to pain regimen. Otherwise, NAEON.    Oncology Treatment Plan:   OP FOLFIRI Q2WK    Medications:  Continuous Infusions:  Scheduled Meds:   amitriptyline  10 mg Oral QHS    cefTRIAXone (ROCEPHIN) IVPB  2 g Intravenous Q24H    dicyclomine  10 mg Oral QID    gabapentin  300 mg Oral TID    heparin (porcine)  5,000 Units Subcutaneous Q8H    methadone  2.5 mg Oral Q12H    pantoprazole  40 mg Oral BID    polyethylene glycol  17 g Oral Daily    senna-docusate 8.6-50 mg  1 tablet Oral Daily     PRN Meds:sodium chloride, acetaminophen, acetaminophen, chlorproMAZINE, dextrose 10%, dextrose 10%, diphenhydrAMINE, famotidine, glucagon (human recombinant), glucose, glucose, HYDROmorphone, loperamide, melatonin, naloxone, ondansetron, oxyCODONE, promethazine (PHENERGAN) IVPB, simethicone, sodium chloride 0.9%     Review of Systems   Constitutional:  Positive for fatigue. Negative for activity change, appetite change, chills and fever.   HENT:  Negative for congestion, ear pain, sinus pain and sore throat.    Eyes:  Negative for visual disturbance.   Respiratory:  Negative for cough and shortness of breath.    Cardiovascular:  Positive for leg swelling (chronic, at baseline). Negative for chest pain.   Gastrointestinal:  Positive for abdominal distention, abdominal pain and nausea. Negative for constipation, diarrhea and vomiting.   Genitourinary:  Negative for decreased urine volume, difficulty urinating and dysuria.   Musculoskeletal:  Negative for back pain and neck pain.   Skin:  Negative for rash.   Neurological:  Negative for dizziness, syncope and headaches.   Psychiatric/Behavioral:  Negative for confusion and decreased concentration.    Objective:     Vital Signs (Most Recent):  Temp: 97.8 °F (36.6 °C) (06/18/22 1200)  Pulse: 106 (06/18/22 1200)  Resp: 18 (06/18/22 1200)  BP: (!) 133/98 (06/18/22  1200)  SpO2: 98 % (06/18/22 1200)   Vital Signs (24h Range):  Temp:  [97 °F (36.1 °C)-98.7 °F (37.1 °C)] 97.8 °F (36.6 °C)  Pulse:  [] 106  Resp:  [1-20] 18  SpO2:  [95 %-99 %] 98 %  BP: (116-147)/() 133/98     Weight: 71.2 kg (156 lb 15.5 oz)  Body mass index is 24.58 kg/m².  Body surface area is 1.83 meters squared.      Intake/Output Summary (Last 24 hours) at 6/18/2022 1343  Last data filed at 6/18/2022 1200  Gross per 24 hour   Intake 1303.21 ml   Output 225 ml   Net 1078.21 ml       Physical Exam  Vitals reviewed.   Constitutional:       General: She is not in acute distress.     Appearance: She is well-developed and underweight. She is not diaphoretic.   HENT:      Head: Normocephalic and atraumatic.      Mouth/Throat:      Mouth: Mucous membranes are dry.   Eyes:      Conjunctiva/sclera: Conjunctivae normal.   Neck:      Trachea: No tracheal deviation.   Cardiovascular:      Rate and Rhythm: Regular rhythm. Tachycardia present.      Heart sounds: Normal heart sounds. No murmur heard.     Comments: R chest port-a-cath non-tender, non-erythematous  Pulmonary:      Effort: Pulmonary effort is normal. No respiratory distress.      Breath sounds: Rales present. No wheezing.   Abdominal:      General: Bowel sounds are normal. There is distension.      Palpations: Abdomen is soft.      Tenderness: There is abdominal tenderness (LLQ and RLQ). There is guarding.   Musculoskeletal:         General: No deformity. Normal range of motion.      Cervical back: Normal range of motion.      Comments: LLL 1+ pitting edema, RLE trace pitting edema   Skin:     General: Skin is warm and dry.      Findings: No erythema or rash.   Neurological:      Mental Status: She is alert and oriented to person, place, and time.      Cranial Nerves: No cranial nerve deficit.      Sensory: No sensory deficit.      Motor: No abnormal muscle tone.       Significant Labs:   CBC:   Recent Labs   Lab 06/16/22  1922 06/17/22  0713  06/18/22  0805   WBC 4.01 9.94 10.54   HGB 9.5* 8.2* 10.9*   HCT 30.7* 27.1* 33.2*    259 293   , CMP:   Recent Labs   Lab 06/16/22  1922 06/17/22  0713 06/18/22  0805    140 134*   K 4.4 4.4 4.3    108 104   CO2 24 22* 23   * 104 98   BUN 16 16 13   CREATININE 0.8 0.7 0.7   CALCIUM 7.8* 8.8 8.3*   PROT 4.7* 5.3* 4.8*   ALBUMIN 1.7* 2.6* 1.8*   BILITOT 0.1 0.3 0.3   ALKPHOS 68 62 61   AST 22 18 16   ALT 9* 9* 6*   ANIONGAP 7* 10 7*   EGFRNONAA >60.0 >60.0 >60.0   , and All pertinent labs from the last 24 hours have been reviewed.    Diagnostic Results:  I have reviewed all pertinent imaging results/findings within the past 24 hours.

## 2022-06-18 NOTE — SUBJECTIVE & OBJECTIVE
Interval History: pt seen at bedside with family presents (sister and best friend). Patient doing well this morning. She states that pain is somewhat controlled with current regimen. IV medication helps quicker than PO medication.     24h PRN medication usage from 8a-8a:  Two doses of hydrocodone-apap 5-325 mg PO  One dose of hydromorphone 0.5 mg IVP  Two doses of hydromorphone 1 mg IVP    Past Medical History:   Diagnosis Date    Anxiety     Dehydration 5/30/2022    Gastric cancer     Gastric ulcer     Hx of psychiatric care     Pregnancy 08/12/2020    delivered on 8/12/2020    Umbilical hernia        Past Surgical History:   Procedure Laterality Date    CLOSURE OF PERFORATED ULCER OF DUODENUM USING OMENTAL PATCH      ESOPHAGOGASTRODUODENOSCOPY N/A 10/13/2020    Procedure: EGD (ESOPHAGOGASTRODUODENOSCOPY);  Surgeon: Rosio Marion MD;  Location: [facility] (2ND FLR);  Service: Endoscopy;  Laterality: N/A;    ESOPHAGOGASTRODUODENOSCOPY N/A 12/11/2020    Procedure: EGD (ESOPHAGOGASTRODUODENOSCOPY);  Surgeon: Rosio Marion MD;  Location: [facility] (2ND FLR);  Service: Endoscopy;  Laterality: N/A;  Covid-19 test 12/8/20 at [facility]    ESOPHAGOGASTRODUODENOSCOPY N/A 3/12/2021    Procedure: EGD (ESOPHAGOGASTRODUODENOSCOPY);  Surgeon: Nav Omer MD;  Location: [facility] (2ND FLR);  Service: Endoscopy;  Laterality: N/A;  COVID at [facility] 3/9 ttr    ESOPHAGOGASTRODUODENOSCOPY N/A 5/18/2021    Procedure: EGD (ESOPHAGOGASTRODUODENOSCOPY);  Surgeon: Rosio Marion MD;  Location: [facility] (2ND FLR);  Service: Endoscopy;  Laterality: N/A;  5/15-covid pcw-inst portal-tb    ESOPHAGOGASTRODUODENOSCOPY N/A 5/26/2021    Procedure: EGD (ESOPHAGOGASTRODUODENOSCOPY);  Surgeon: Socrates Terrazas MD;  Location: [facility] (2ND FLR);  Service: Endoscopy;  Laterality: N/A;       Review of patient's allergies indicates:  No Known Allergies    Medications:  Continuous Infusions:  Scheduled Meds:   amitriptyline  10 mg  Oral QHS    cefTRIAXone (ROCEPHIN) IVPB  2 g Intravenous Q24H    dicyclomine  10 mg Oral QID    gabapentin  300 mg Oral TID    heparin (porcine)  5,000 Units Subcutaneous Q8H    methadone  2.5 mg Oral Q12H    pantoprazole  40 mg Oral BID    polyethylene glycol  17 g Oral Daily    senna-docusate 8.6-50 mg  1 tablet Oral Daily     PRN Meds:sodium chloride, acetaminophen, acetaminophen, chlorproMAZINE, dextrose 10%, dextrose 10%, diphenhydrAMINE, famotidine, glucagon (human recombinant), glucose, glucose, HYDROmorphone, loperamide, melatonin, naloxone, ondansetron, oxyCODONE, promethazine (PHENERGAN) IVPB, simethicone, sodium chloride 0.9%    Family History       Problem Relation (Age of Onset)    Breast cancer Other (73), Paternal Cousin    Cancer Mother (67)    Lung cancer Father (48)    No Known Problems Son    Prostate cancer Paternal Uncle    Schizophrenia Mother          Tobacco Use    Smoking status: Former Smoker     Types: Cigarettes     Quit date: 2019     Years since quittin.5    Smokeless tobacco: Never Used   Substance and Sexual Activity    Alcohol use: Yes    Drug use: Not Currently    Sexual activity: Yes     Partners: Male       Review of Systems   Constitutional:  Positive for activity change, fatigue and unexpected weight change.   HENT: Negative.     Eyes: Negative.    Respiratory: Negative.     Gastrointestinal:  Positive for abdominal distention, abdominal pain, nausea and vomiting.   Endocrine: Negative.    Genitourinary:  Positive for flank pain.   Musculoskeletal:  Positive for back pain.   Skin: Negative.    Allergic/Immunologic: Negative.    Neurological:  Positive for weakness.        No vision changes   Hematological: Negative.    All other systems reviewed and are negative.  Objective:     Vital Signs (Most Recent):  Temp: 97.8 °F (36.6 °C) (22 1200)  Pulse: 106 (22 1200)  Resp: 18 (22 1200)  BP: (!) 133/98 (22 1200)  SpO2: 98 % (22 1200)   Vital  Signs (24h Range):  Temp:  [97 °F (36.1 °C)-98.7 °F (37.1 °C)] 97.8 °F (36.6 °C)  Pulse:  [] 106  Resp:  [1-20] 18  SpO2:  [95 %-99 %] 98 %  BP: (116-147)/() 133/98     Weight: 71.2 kg (156 lb 15.5 oz)  Body mass index is 24.58 kg/m².    Physical Exam  Vitals and nursing note reviewed.   Constitutional:       General: She is not in acute distress.     Appearance: She is ill-appearing.   HENT:      Head: Normocephalic and atraumatic.      Right Ear: External ear normal.      Left Ear: External ear normal.      Nose: Nose normal.      Mouth/Throat:      Mouth: Mucous membranes are moist.   Eyes:      General: No scleral icterus.     Conjunctiva/sclera: Conjunctivae normal.   Neck:      Comments: Trachea midline    Cardiovascular:      Rate and Rhythm: Tachycardia present.   Pulmonary:      Effort: Pulmonary effort is normal. No respiratory distress.   Abdominal:      General: There is distension.      Palpations: Abdomen is soft.      Tenderness: There is abdominal tenderness.      Comments: Mild TTP globally but more pronounced in LLQ  Abd surgical scars present; well healed   Musculoskeletal:         General: Swelling present.      Cervical back: Normal range of motion.      Right lower leg: Edema present.      Left lower leg: Edema present.   Skin:     General: Skin is warm.      Findings: No rash.   Neurological:      General: No focal deficit present.      Mental Status: She is alert and oriented to person, place, and time.   Psychiatric:         Thought Content: Thought content normal.         Judgment: Judgment normal.     Review of Symptoms      Symptom Assessment (ESAS 0-10 Scale)  Pain:  5  Dyspnea:  1  Anxiety:  0  Nausea:  0  Depression:  0  Anorexia:  4  Fatigue:  4  Insomnia:  6  Restlessness:  0  Agitation:  0     CAM / Delirium:  Negative  Constipation:  Negative  Diarrhea:  Negative    Bowel Management Plan (BMP):  Yes      Pain Assessment:  OME in 24 hours:  50  Location(s): abdomen and  leg    Abdomen       Location: generalized (inferior greater than superior; left greater than right)        Quality: Cramping, dull, stabbing and aching        Quantity: 5/10 in intensity        Chronicity: Onset 3 week(s) ago, gradually worsening        Aggravating Factors: Eating and movement        Alleviating Factors: Belching and opiates        Associated Symptoms: Nausea  Leg       Location: bilateral        Quality: Tingling and burning        Quantity: 4/10 in intensity        Chronicity: Onset 4 month(s) ago, stable since Worsened since OCtober        Aggravating Factors: Movement        Alleviating Factors: Pressure (massage and acupuncture)        Associated Symptoms: None    Modified Maeve Scale:  0.5    ECOG Performance Status rdGrdrrdarddrderd:rd rd3rd Living Arrangement: lives with sig other and 3 yo son.    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  CBC:   Recent Labs   Lab 06/18/22  0805   WBC 10.54   HGB 10.9*   HCT 33.2*   MCV 89          BMP:  Recent Labs   Lab 06/18/22  0805   GLU 98   *   K 4.3      CO2 23   BUN 13   CREATININE 0.7   CALCIUM 8.3*   MG 2.0     LFT:  Lab Results   Component Value Date    AST 16 06/18/2022    ALKPHOS 61 06/18/2022    BILITOT 0.3 06/18/2022     Albumin:   Albumin   Date Value Ref Range Status   06/18/2022 1.8 (L) 3.5 - 5.2 g/dL Final     Protein:   Total Protein  "  Date Value Ref Range Status   06/18/2022 4.8 (L) 6.0 - 8.4 g/dL Final     Lactic acid:   Lab Results   Component Value Date    LACTATE 1.3 10/08/2020       Significant Imaging: I have reviewed all pertinent imaging results/findings within the past 24 hours.    06/17/2022 CTA A/P: "1. 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 2. Moderate abdominopelvic ascites.  No evidence of active intra-abdominal arterial contrast extravasation. 3. Worsening peritoneal carcinomatosis. 4. Diffuse gastric wall thickening, most pronounced in the antrum and in keeping with biopsy-proven adenocarcinoma. 5. Large left pleural effusion. 6. Diffuse body wall edema. 7. Additional findings as above"    "

## 2022-06-18 NOTE — CONSULTS
Consults REASON FOR VISIT:  Follow-up    SUBJECTIVE:    -Snap shot of visit and/or what was said by the patient:  Visit was a follow-up. Brought scriptures to read with the patient to help relax her mind so she could go to sleep. Pt. Was very talkative and spoke of her 22 month old son. Pt. Said her son was motivation for her to heal. Was very pleased that so many family members were helping her and her son during this time.     OBJECTIVE:    -Observations of the Patient, and/or if Applicable Family Present:  Mother-in-law was present but sleeping on sofa. Mother-in-law did not join open discussion and scripture reading. Read with patient Psalm 28. She enjoyed the reading and we talked about self-care and how to prepare ourselves for healing spiritually.     ASSESSMENT:  -Spiritual Needs, Coping, and/or Distress  Pt. Has a very strong internal lyla and believes that the care of her thoughts and words will help her body heal in addition to the medical care.       PROVIDED & PLAN:    -What did the  do or Plan to do; Referrals Made if Applicable  Scripture reading was helpful but it energized the patient instead of helping her sleep. Will research some poetry or prayers that have calming effects and provide them to patient during follow-up visit next week.

## 2022-06-18 NOTE — PT/OT/SLP PROGRESS
Physical Therapy      Patient Name:  Puja Quigley   MRN:  [id number]    Patient not seen today secondary to  (Pt required to be supine for IV infusion as well as for 30 minutes after completion. PT unable to return for additional attempt.). Will follow-up as appropriate.

## 2022-06-18 NOTE — PLAN OF CARE
Pt remains AAO x 4 with VSS on Visi monitor, afebrile, sats upper 90s on RA throughout shift  Pt c/o abdominal pain relieved with PRN Norco and Dilaudid. Pt c/o itching following blood transfusion, VSS, NAD - team aware, PRN Benadryl given with relief.  1 U PRBC given for drop in H/H 8.2/27.1 - will repeat CBC in the AM  L chest wall port not accessed  R UA 22g - CDI, saline locked  BLE edema noted.   ST on tele monitor - -110s  Pt up independent and ambulatory, voids in hat, LBM 6/15  Full Liquid diet   Pt remains free from falls and injuries, call light in reach, bed in lowest position, nonskid socks on when OOB, side rails up x2  Will continue to monitor

## 2022-06-18 NOTE — ASSESSMENT & PLAN NOTE
"Palliative care consulted:  "-Ongoing support for pt and family as treatment expectations and prognotic awareness is globally increased; pt not eager to have prognostic talks  -SW support for guidance on the stress surrounding her mother; significant mental health disease, schizophrenia and BPD: they are looking for facility placement; the pt had been the primary care giver  -has a 1 yo son  -was hoping to travel to [facility] on Tuesday;"  "

## 2022-06-18 NOTE — HOSPITAL COURSE
Patient admitted to medical oncology for further management. SBP risk, started patient on SBP prophylaxis. Patient also had a significant drop in Hgb prompting 1u pRBC transfusion. Palliative care consulted and recommended starting patient on methadone for long acting coverage with prn pain medications placed. Imaging found that previous gastric cancers and known masses had increased. Patient's pain much better with changes in pain regimen. Patient was tachycardic to 140s. EKG showed sinus tachycardia. Echo ordered. Likely due to low intravascular volume.

## 2022-06-18 NOTE — CONSULTS
[facility] - Transplant Stepdown  Palliative Medicine  Consult Note    Patient Name: Puja Quigley  MRN: [id number]  Admission Date: 6/16/2022  Hospital Length of Stay: 1 days  Code Status: Full Code   Attending Provider: No att. providers found  Consulting Provider: Nicola Bingham MD  Primary Care Physician: Primary Doctor No  Principal Problem:Intractable abdominal pain    Patient information was obtained from patient, relative(s) and primary team.      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      Recommendations:  Peripheral neuropathy:   Doesn't feel like gabapentin has been helping (900 mg total daily dose)  -receiving acupuncture  -Can consider low dose nightly methadone 5 mg qhs  -can also consider rotation of TCA to nortriptyline 25 mg qhs (fewer anticholinergic side effects) but also need to explore why she was on amitriptyline     Nociceptive pain:  -Due to underlying metastatic disease and peritoneal stretch with recurrent massive ascites; may need more frequent paracentesis  -pt only take up to two lortab (admittedly underdoses) daily and 2-3 doses of additional APAP  -as above would start on methadone 5 mg for long acting pain relief  -would rotate to oxy IR 5 mg to remove excess APAP/combo meds when ready;   -if staying with parenteral meds can decrease dilaudid to 0.5 mg IV with greater prn frequency     Palliative care encounter:  -Ongoing support for pt and family as treatment expectations and prognotic awareness is globally increased; pt not eager to have prognostic talks  -SW support for guidance on the stress surrounding her mother; significant mental health disease, schizophrenia and BPD: they are looking for facility placement; the pt had been the primary care giver  -has a 1 yo son  -was  hoping to travel to [facility] on Tuesday;   -ACP discussions; did not discuss today     Thank you for the opportunity to care for this patient and family.      Please call with questions.      Nicola Bingham MD  Palliative Medicine   [facility]  [phone] (cell)        Thank you for your consult. I will follow-up with patient. Please contact us if you have any additional questions.    Subjective:     HPI:   Puja Quigley is a 41 year-old woman with metastatic gastric adenocarcinoma on FOLFIRI (cycle 2 on 6/13/22) c/b malignant ascites requiring weekly paracentesis, chronic gastric ulcer with perforation c/b CATINA 2/2 chronic blood loss, chronic lower extremity edema, anxiety, malnutrition, and peripheral neuropathy who presented to [facility] on 6/16 after a syncopal episode after an outpatient MRI.     She started having nausea, vomiting, and worsened indigestion on 6/14 which she also had after her 1st cycle of FOLFIRI. She also admits to some fatigue and loss of appetite over the past month. On 6/15 she had diarrhea with 3 loose, non-bloody, non-melanotic bowel movements associated with some diffuse lower abdominal pain and cramping. She took her home Norco with improvement in abdominal pain. She denied any recent sick contacts, travel, fevers, chills, night sweats, nasal congestion, sore throat, cough, SOB, or chest pain (although does have epigastric indigestion pain). On 6/16 AM she had her normally scheduled paracentesis at [facility] where 3.7L of serosanguinous fluid was drained. She tolerated this well. In the afternoon she went to her MRI abdomen/pelvis at [facility] and while in the MRI started to experience RLQ & LLQ pain worse than ever before, 10/10. After finishing the scan she stood up to put her shoes on and then had a syncopal episode. BP at that time after awakening was 98/59. She also had an episode of non-bloody, non-bilious emesis. This was her first time receiving Gadavist contrast. She did  not have any focal neurologic deficits, hives, rash, throat swelling or SOB at the time.      On arrival to the ER, /70, HR 80, RR 20, temp 98.5F, saturation 97% on room air. Labs were significant for Hgb 9.5 (baseline ~12), WBC 4.01, Plt 347k, normal electrolytes, renal function & liver function, albumin 1.7, lipase 27, COVID negative. CXR showed unchanged moderate L pleural effusion. CTA abdomen/pelvis was performed which did not show active bleeding however did show significant increase in size of pelvic mass 12.4 x 22.0 x 18.4 cm (previously 14 x 10 cm in April '22), worsening peritoneal carcinomatosis and anasarca. In the ER she received IV morphine 4 mg x2 which helped minimally with her pain and caused her to be nauseous and vomit again. Upon returning from the bathroom, she had another syncopal episode witnessed by the nurse. BP at that time remained 110s/70s. She received IV ondansetron and promethazine in the ER for nausea. Pain persisted and she received IV hydromorphone 0.5 mg x2 with improvement of pain from 10/10 to 7/10. Throughout her ER stay she became more tachycardic up to the 140s. She was admitted to medical oncology for pain control and further management.     Outpt oncology notes reviewed.  Completed 2 cycles of second round therapy and found to have significant progression of disease on scans in the past 24 hours.     In this setting, palliative medicine was consulted to help with medical decision making and aid in the formation of goals of care.         Hospital Course:  No notes on file    Interval History: pt seen at bedside with family presents (sister and best friend)    PRBC hanging    3.5 mg total IV hydromorphone given since admission for and/pelvic pain    Pt denies nausea currently; intermittently falls asleep during interview but easily arousable; just received prn dilaudid 1 mg    Past Medical History:   Diagnosis Date    Anxiety     Dehydration 5/30/2022    Gastric  cancer     Gastric ulcer     Hx of psychiatric care     Pregnancy 08/12/2020    delivered on 8/12/2020    Umbilical hernia        Past Surgical History:   Procedure Laterality Date    CLOSURE OF PERFORATED ULCER OF DUODENUM USING OMENTAL PATCH      ESOPHAGOGASTRODUODENOSCOPY N/A 10/13/2020    Procedure: EGD (ESOPHAGOGASTRODUODENOSCOPY);  Surgeon: Rosio Marion MD;  Location: [facility] (2ND FLR);  Service: Endoscopy;  Laterality: N/A;    ESOPHAGOGASTRODUODENOSCOPY N/A 12/11/2020    Procedure: EGD (ESOPHAGOGASTRODUODENOSCOPY);  Surgeon: Rosio Marion MD;  Location: [facility] ENDO (2ND FLR);  Service: Endoscopy;  Laterality: N/A;  Covid-19 test 12/8/20 at [facility]    ESOPHAGOGASTRODUODENOSCOPY N/A 3/12/2021    Procedure: EGD (ESOPHAGOGASTRODUODENOSCOPY);  Surgeon: Nav Omer MD;  Location: [facility] (2ND FLR);  Service: Endoscopy;  Laterality: N/A;  COVID at [facility] 3/9 ttr    ESOPHAGOGASTRODUODENOSCOPY N/A 5/18/2021    Procedure: EGD (ESOPHAGOGASTRODUODENOSCOPY);  Surgeon: Rosio Marion MD;  Location: [facility] (2ND FLR);  Service: Endoscopy;  Laterality: N/A;  5/15-covid pcw-inst portal-tb    ESOPHAGOGASTRODUODENOSCOPY N/A 5/26/2021    Procedure: EGD (ESOPHAGOGASTRODUODENOSCOPY);  Surgeon: Socrates Terrazas MD;  Location: [facility] ENDO (2ND FLR);  Service: Endoscopy;  Laterality: N/A;       Review of patient's allergies indicates:  No Known Allergies    Medications:  Continuous Infusions:  Scheduled Meds:   amitriptyline  10 mg Oral QHS    cefTRIAXone (ROCEPHIN) IVPB  2 g Intravenous Q24H    dicyclomine  10 mg Oral QID    gabapentin  300 mg Oral TID    heparin (porcine)  5,000 Units Subcutaneous Q8H    pantoprazole  40 mg Oral BID    polyethylene glycol  17 g Oral Daily    senna-docusate 8.6-50 mg  1 tablet Oral Daily     PRN Meds:sodium chloride, acetaminophen, acetaminophen, chlorproMAZINE, dextrose 10%, dextrose 10%, diphenhydrAMINE, famotidine, glucagon (human recombinant),  glucose, glucose, HYDROcodone-acetaminophen, HYDROmorphone, loperamide, melatonin, naloxone, ondansetron, promethazine (PHENERGAN) IVPB, simethicone, sodium chloride 0.9%    Family History       Problem Relation (Age of Onset)    Breast cancer Other (73), Paternal Cousin    Cancer Mother (67)    Lung cancer Father (48)    No Known Problems Son    Prostate cancer Paternal Uncle    Schizophrenia Mother          Tobacco Use    Smoking status: Former Smoker     Types: Cigarettes     Quit date: 2019     Years since quittin.5    Smokeless tobacco: Never Used   Substance and Sexual Activity    Alcohol use: Yes    Drug use: Not Currently    Sexual activity: Yes     Partners: Male       Review of Systems   Constitutional:  Positive for activity change, fatigue and unexpected weight change.   HENT: Negative.     Eyes: Negative.    Respiratory: Negative.     Gastrointestinal:  Positive for abdominal distention, abdominal pain, nausea and vomiting.   Endocrine: Negative.    Genitourinary:  Positive for flank pain.   Musculoskeletal:  Positive for back pain.   Skin: Negative.    Allergic/Immunologic: Negative.    Neurological:  Positive for weakness.        No vision changes   Hematological: Negative.    All other systems reviewed and are negative.  Objective:     Vital Signs (Most Recent):  Temp: 98.7 °F (37.1 °C) (22 07)  Pulse: (!) 116 (22 07)  Resp: 17 (22 0740)  BP: (!) 138/104 (22)  SpO2: 95 % (22)   Vital Signs (24h Range):  Temp:  [96.8 °F (36 °C)-98.7 °F (37.1 °C)] 98.7 °F (37.1 °C)  Pulse:  [] 116  Resp:  [1-20] 17  SpO2:  [95 %-99 %] 95 %  BP: (116-147)/() 138/104     Weight: 71.2 kg (156 lb 15.5 oz)  Body mass index is 24.58 kg/m².    Physical Exam  Vitals and nursing note reviewed.   Constitutional:       General: She is not in acute distress.     Appearance: She is ill-appearing.   HENT:      Right Ear: External ear normal.      Left Ear:  External ear normal.      Mouth/Throat:      Mouth: Mucous membranes are moist.   Eyes:      General: No scleral icterus.     Extraocular Movements: Extraocular movements intact.      Conjunctiva/sclera: Conjunctivae normal.      Pupils: Pupils are equal, round, and reactive to light.   Neck:      Comments: No JVD    Cardiovascular:      Rate and Rhythm: Normal rate.      Pulses: Normal pulses.      Heart sounds: Normal heart sounds.   Pulmonary:      Effort: Pulmonary effort is normal. No respiratory distress.      Breath sounds: No rhonchi.   Abdominal:      General: There is distension.      Palpations: Abdomen is soft.      Comments: Mild TTP globally but more pronounced in LLQ  Abd surgical scars present; well healed   Musculoskeletal:         General: Swelling present.      Cervical back: Normal range of motion and neck supple.      Right lower leg: Edema present.      Left lower leg: Edema present.   Skin:     General: Skin is warm.      Capillary Refill: Capillary refill takes less than 2 seconds.   Neurological:      General: No focal deficit present.      Mental Status: She is alert.      Comments: Slightly lethargic       Review of Symptoms      Symptom Assessment (ESAS 0-10 Scale)  Pain:  7  Dyspnea:  2  Anxiety:  0  Nausea:  0  Depression:  0  Anorexia:  0  Fatigue:  5  Insomnia:  0  Restlessness:  0  Agitation:  0     CAM / Delirium:  Negative  Constipation:  Negative  Diarrhea:  Negative    Bowel Management Plan (BMP):  Yes      Pain Assessment:  OME in 24 hours:  50  Location(s): abdomen and leg    Abdomen       Location: generalized (inferior greater than superior; left greater than right)        Quality: Cramping, dull, stabbing and aching        Quantity: 7/10 in intensity        Chronicity: Onset 3 week(s) ago, gradually worsening        Aggravating Factors: Eating and movement        Alleviating Factors: Belching and opiates        Associated Symptoms: Nausea  Leg       Location: bilateral         Quality: Tingling and burning        Quantity: 4/10 in intensity        Chronicity: Onset 4 month(s) ago, stable since Worsened since OCtober        Aggravating Factors: Movement        Alleviating Factors: Pressure (massage and acupuncture)        Associated Symptoms: None    ECOG Performance Status stGstrstastdstest:st st1st Living Arrangement: lives with sig other and 3 yo son.    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  CBC:   Recent Labs   Lab 06/17/22  0713   WBC 9.94   HGB 8.2*   HCT 27.1*   MCV 93        BMP:  No results for input(s): GLU, NA, K, CL, CO2, BUN, CREATININE, CALCIUM, MG in the last 24 hours.  LFT:  Lab Results   Component Value Date    AST 18 06/17/2022    ALKPHOS 62 06/17/2022    BILITOT 0.3 06/17/2022     Albumin:   Albumin   Date Value Ref Range Status   06/17/2022 2.6 (L) 3.5 - 5.2 g/dL Final     Protein:   Total Protein   Date Value Ref Range Status   06/17/2022 5.3 (L) 6.0 - 8.4 g/dL Final     Lactic acid:   Lab Results   Component Value Date    LACTATE 1.3 10/08/2020       Significant Imaging: I have reviewed all pertinent imaging results/findings within the past 24 hours.  Imaging Results               CTA Abdomen and Pelvis (Final result)  Result time 06/17/22 02:20:44      Final result by Tucker Syed MD (06/17/22 02:20:44)                   Impression:      1. 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  2. Moderate abdominopelvic ascites.  No evidence of active intra-abdominal arterial contrast extravasation.  3. Worsening peritoneal carcinomatosis.  4. Diffuse gastric wall thickening, most pronounced in the antrum and in keeping with biopsy-proven adenocarcinoma.  5. Large left pleural effusion.  6. Diffuse body wall edema.  7. Additional findings as above.  This report was flagged in Epic as abnormal.    Electronically signed by resident: Angelo Peralta  Date:    06/17/2022  Time:    01:13    Electronically signed by: Tucker Syed MD  Date:    06/17/2022  Time:    02:20               Narrative:    EXAMINATION:  CTA ABDOMEN AND PELVIS    CLINICAL HISTORY:  GI bleed;    TECHNIQUE:  Helical CT images of the abdomen and pelvis were obtained prior to and after the IV administration of 75 mL of Omnipaque 350 .  Oral contrast was not given. Post-contrast images were obtained in the arterial and delayed phases per GI bleed protocol.  Axial, coronal, and sagittal reconstructions were created from the source data, including MIP reconstructions for the arterial phase images.    COMPARISON:  CT chest 06/06/2022    CT chest abdomen pelvis 04/14/2022    FINDINGS:  Heart: Normal in size. No pericardial effusion.    Lungs: Partially visualized large left pleural effusion with adjacent compressive atelectasis, similar to CT chest 06/06/2022.  No right pleural effusion.  No large consolidation.  No pneumothorax.    Liver: Normal in size and contour.  Numerous subcentimeter hypodensities throughout the liver, too small to characterize and similar in size and distribution as compared to CT 04/14/2022.    Gallbladder: No calcified gallstones.    Bile Ducts: No evidence of dilated ducts.    Pancreas: No mass or peripancreatic fat stranding.    Spleen: Unremarkable.    Stomach and duodenum: Diffuse gastric wall thickening,  most pronounced in the antral region in keeping with biopsy-proven adenocarcinoma.  Two surgical clips in the antrum.  Duodenum unremarkable.    Adrenals: Unremarkable.    Kidneys/ Ureters: Normal in size and location. Normal enhancement and excretion of contrast.  No hydronephrosis or nephrolithiasis. No ureteral dilatation.    Bladder: No evidence of wall thickening.  Excreted contrast within the bladder.    Reproductive organs: Significant increase in size of large mass arising from the pelvis with cystic and solid enhancing components now measuring approximately 12.4 x 22.0 x 18.4 cm (axial series 4, image 472 and coronal series 604 image 101).    Bowel/Mesentery: Small bowel is normal in caliber with no evidence of obstruction. No evidence of inflammation or wall thickening.  Colon demonstrates no focal wall thickening.    Peritoneum: Moderate abdominopelvic ascites.  Areas of hyperdensity along the lateral abdomen (for example axial series 5, images 60 and 80), likely worsening peritoneal carcinomatosis with scattered areas of hematoma considered less likely.  No evidence of active intraperitoneal arterial extravasation.  No intraperitoneal free air.    Lymph nodes: No retroperitoneal lymphadenopathy.    Vasculature: No aneurysm. No significant calcific atherosclerosis.  Celiac, SMA, BEN, and bilateral renal arteries are patent.  Portal veins, SMV, and splenic vein are patent.    Abdominal wall:  Diffuse body wall edema.    Bones: No acute fracture. No suspicious osseous lesions.                                       X-Ray Chest AP Portable (Final result)  Result time 06/16/22 19:53:09      Final result by Corky Buck MD (06/16/22 19:53:09)                   Impression:      Small to moderate-sized left pleural effusion with probable continued left basilar atelectasis/infiltrate.      Electronically signed by: Corky Buck MD  Date:    06/16/2022  Time:    19:53               Narrative:    EXAMINATION:  XR  CHEST AP PORTABLE    CLINICAL HISTORY:  Unspecified abdominal pain    TECHNIQUE:  Single frontal view of the chest was performed.    COMPARISON:  CT thorax 06/06/2022, chest radiograph 03/09/2021    FINDINGS:  Monitoring leads overlie the chest.  Patient is rotated.    Right upper chest Port-A-Cath unchanged.  Cardiomediastinal silhouette remains slightly deviated towards the left likely secondary to some volume loss/atelectasis within the left lung base better demonstrated on previous cross-sectional imaging.  There is small to moderate-sized left pleural effusion.  Right hemithorax is well expanded and clear.  No pneumothorax.  Hilar contours are unchanged.  No acute osseous process seen.  Partially imaged upper abdomen is grossly within normal limits.                                            > 50% of 110 min visit spent in chart review, face to face discussion of goals of care,  symptom assessment, coordination of care and emotional support.    Nicola Bingham MD  Palliative Medicine  [facility] - Transplant Stepdown

## 2022-06-18 NOTE — ASSESSMENT & PLAN NOTE
Intra-abdominal bleeding 2/2 paracentesis ruled out in ER with CTA A/P. Concern that pain is 2/2 significantly growing tumor burden vs new SBP (especially in the setting of recent diarrhea and hypotension during MRI).     - continue Ceftriaxone 2 g qd  - palliative care consulted and recommended:  -- methadone 2.5 mg BID  -- dilaudid 0.5 mg q3 prn  -- oxycodone IR q6 prn  -- continue bentyl  -- continue simethicone  -- lidocaine jelly for back and hands

## 2022-06-18 NOTE — SUBJECTIVE & OBJECTIVE
Interval History: pt seen at bedside with family presents (sister and best friend)    PRBC hanging    3.5 mg total IV hydromorphone given since admission for and/pelvic pain    Pt denies nausea currently; intermittently falls asleep during interview but easily arousable; just received prn dilaudid 1 mg    Past Medical History:   Diagnosis Date    Anxiety     Dehydration 5/30/2022    Gastric cancer     Gastric ulcer     Hx of psychiatric care     Pregnancy 08/12/2020    delivered on 8/12/2020    Umbilical hernia        Past Surgical History:   Procedure Laterality Date    CLOSURE OF PERFORATED ULCER OF DUODENUM USING OMENTAL PATCH      ESOPHAGOGASTRODUODENOSCOPY N/A 10/13/2020    Procedure: EGD (ESOPHAGOGASTRODUODENOSCOPY);  Surgeon: Rosio Marion MD;  Location: [facility] (2ND FLR);  Service: Endoscopy;  Laterality: N/A;    ESOPHAGOGASTRODUODENOSCOPY N/A 12/11/2020    Procedure: EGD (ESOPHAGOGASTRODUODENOSCOPY);  Surgeon: Rosio Marion MD;  Location: [facility] (2ND FLR);  Service: Endoscopy;  Laterality: N/A;  Covid-19 test 12/8/20 at [facility] - pg    ESOPHAGOGASTRODUODENOSCOPY N/A 3/12/2021    Procedure: EGD (ESOPHAGOGASTRODUODENOSCOPY);  Surgeon: Nav Omer MD;  Location: [facility] (2ND FLR);  Service: Endoscopy;  Laterality: N/A;  COVID at [facility] 3/9 ttr    ESOPHAGOGASTRODUODENOSCOPY N/A 5/18/2021    Procedure: EGD (ESOPHAGOGASTRODUODENOSCOPY);  Surgeon: Rosio Marion MD;  Location: [facility] (2ND FLR);  Service: Endoscopy;  Laterality: N/A;  5/15-covid pcw-inst portal-tb    ESOPHAGOGASTRODUODENOSCOPY N/A 5/26/2021    Procedure: EGD (ESOPHAGOGASTRODUODENOSCOPY);  Surgeon: Socrates Terrazas MD;  Location: [facility] (2ND FLR);  Service: Endoscopy;  Laterality: N/A;       Review of patient's allergies indicates:  No Known Allergies    Medications:  Continuous Infusions:  Scheduled Meds:   amitriptyline  10 mg Oral QHS    cefTRIAXone (ROCEPHIN) IVPB  2 g Intravenous Q24H     dicyclomine  10 mg Oral QID    gabapentin  300 mg Oral TID    heparin (porcine)  5,000 Units Subcutaneous Q8H    pantoprazole  40 mg Oral BID    polyethylene glycol  17 g Oral Daily    senna-docusate 8.6-50 mg  1 tablet Oral Daily     PRN Meds:sodium chloride, acetaminophen, acetaminophen, chlorproMAZINE, dextrose 10%, dextrose 10%, diphenhydrAMINE, famotidine, glucagon (human recombinant), glucose, glucose, HYDROcodone-acetaminophen, HYDROmorphone, loperamide, melatonin, naloxone, ondansetron, promethazine (PHENERGAN) IVPB, simethicone, sodium chloride 0.9%    Family History       Problem Relation (Age of Onset)    Breast cancer Other (73), Paternal Cousin    Cancer Mother (67)    Lung cancer Father (48)    No Known Problems Son    Prostate cancer Paternal Uncle    Schizophrenia Mother          Tobacco Use    Smoking status: Former Smoker     Types: Cigarettes     Quit date: 2019     Years since quittin.5    Smokeless tobacco: Never Used   Substance and Sexual Activity    Alcohol use: Yes    Drug use: Not Currently    Sexual activity: Yes     Partners: Male       Review of Systems   Constitutional:  Positive for activity change, fatigue and unexpected weight change.   HENT: Negative.     Eyes: Negative.    Respiratory: Negative.     Gastrointestinal:  Positive for abdominal distention, abdominal pain, nausea and vomiting.   Endocrine: Negative.    Genitourinary:  Positive for flank pain.   Musculoskeletal:  Positive for back pain.   Skin: Negative.    Allergic/Immunologic: Negative.    Neurological:  Positive for weakness.        No vision changes   Hematological: Negative.    All other systems reviewed and are negative.  Objective:     Vital Signs (Most Recent):  Temp: 98.7 °F (37.1 °C) (22)  Pulse: (!) 116 (22)  Resp: 17 (22 0740)  BP: (!) 138/104 (22 07)  SpO2: 95 % (22)   Vital Signs (24h Range):  Temp:  [96.8 °F (36 °C)-98.7 °F (37.1 °C)] 98.7  °F (37.1 °C)  Pulse:  [] 116  Resp:  [1-20] 17  SpO2:  [95 %-99 %] 95 %  BP: (116-147)/() 138/104     Weight: 71.2 kg (156 lb 15.5 oz)  Body mass index is 24.58 kg/m².    Physical Exam  Vitals and nursing note reviewed.   Constitutional:       General: She is not in acute distress.     Appearance: She is ill-appearing.   HENT:      Right Ear: External ear normal.      Left Ear: External ear normal.      Mouth/Throat:      Mouth: Mucous membranes are moist.   Eyes:      General: No scleral icterus.     Extraocular Movements: Extraocular movements intact.      Conjunctiva/sclera: Conjunctivae normal.      Pupils: Pupils are equal, round, and reactive to light.   Neck:      Comments: No JVD    Cardiovascular:      Rate and Rhythm: Normal rate.      Pulses: Normal pulses.      Heart sounds: Normal heart sounds.   Pulmonary:      Effort: Pulmonary effort is normal. No respiratory distress.      Breath sounds: No rhonchi.   Abdominal:      General: There is distension.      Palpations: Abdomen is soft.      Comments: Mild TTP globally but more pronounced in LLQ  Abd surgical scars present; well healed   Musculoskeletal:         General: Swelling present.      Cervical back: Normal range of motion and neck supple.      Right lower leg: Edema present.      Left lower leg: Edema present.   Skin:     General: Skin is warm.      Capillary Refill: Capillary refill takes less than 2 seconds.   Neurological:      General: No focal deficit present.      Mental Status: She is alert.      Comments: Slightly lethargic       Review of Symptoms      Symptom Assessment (ESAS 0-10 Scale)  Pain:  7  Dyspnea:  2  Anxiety:  0  Nausea:  0  Depression:  0  Anorexia:  0  Fatigue:  5  Insomnia:  0  Restlessness:  0  Agitation:  0     CAM / Delirium:  Negative  Constipation:  Negative  Diarrhea:  Negative    Bowel Management Plan (BMP):  Yes      Pain Assessment:  OME in 24 hours:  50  Location(s): abdomen and leg    Abdomen        Location: generalized (inferior greater than superior; left greater than right)        Quality: Cramping, dull, stabbing and aching        Quantity: 7/10 in intensity        Chronicity: Onset 3 week(s) ago, gradually worsening        Aggravating Factors: Eating and movement        Alleviating Factors: Belching and opiates        Associated Symptoms: Nausea  Leg       Location: bilateral        Quality: Tingling and burning        Quantity: 4/10 in intensity        Chronicity: Onset 4 month(s) ago, stable since Worsened since OCtober        Aggravating Factors: Movement        Alleviating Factors: Pressure (massage and acupuncture)        Associated Symptoms: None    ECOG Performance Status stGstrstastdstest:st st1st Living Arrangement: lives with sig other and 1 yo son.    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  CBC:   Recent Labs   Lab 06/17/22  0713   WBC 9.94   HGB 8.2*   HCT 27.1*   MCV 93        BMP:  No results for input(s): GLU, NA, K, CL, CO2, BUN, CREATININE, CALCIUM, MG in the last 24 hours.  LFT:  Lab Results   Component Value Date    AST 18 06/17/2022    ALKPHOS 62 06/17/2022    BILITOT 0.3 06/17/2022     Albumin:   Albumin   Date Value Ref Range Status   06/17/2022 2.6 (L) 3.5 - 5.2 g/dL Final     Protein:   Total Protein   Date Value Ref Range Status   06/17/2022 5.3 (L) 6.0 - 8.4 g/dL Final     Lactic acid:    Lab Results   Component Value Date    LACTATE 1.3 10/08/2020       Significant Imaging: I have reviewed all pertinent imaging results/findings within the past 24 hours.  Imaging Results               CTA Abdomen and Pelvis (Final result)  Result time 06/17/22 02:20:44      Final result by Tucker Syed MD (06/17/22 02:20:44)                   Impression:      1. 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  2. Moderate abdominopelvic ascites.  No evidence of active intra-abdominal arterial contrast extravasation.  3. Worsening peritoneal carcinomatosis.  4. Diffuse gastric wall thickening, most pronounced in the antrum and in keeping with biopsy-proven adenocarcinoma.  5. Large left pleural effusion.  6. Diffuse body wall edema.  7. Additional findings as above.  This report was flagged in Epic as abnormal.    Electronically signed by resident: Angelo Peralta  Date:    06/17/2022  Time:    01:13    Electronically signed by: Tucker Syed MD  Date:    06/17/2022  Time:    02:20               Narrative:    EXAMINATION:  CTA ABDOMEN AND PELVIS    CLINICAL HISTORY:  GI bleed;    TECHNIQUE:  Helical CT images of the abdomen and pelvis were obtained prior to and after the IV administration of 75 mL of Omnipaque 350 .  Oral contrast was not given. Post-contrast images were obtained in the arterial and delayed phases per GI bleed protocol.  Axial, coronal, and sagittal reconstructions were created from the source data, including MIP reconstructions for the arterial phase images.    COMPARISON:  CT chest 06/06/2022    CT chest abdomen pelvis 04/14/2022    FINDINGS:  Heart: Normal in size. No pericardial effusion.    Lungs: Partially visualized large left pleural effusion with adjacent compressive atelectasis, similar to CT chest 06/06/2022.  No right pleural effusion.  No large consolidation.  No pneumothorax.    Liver: Normal  in size and contour.  Numerous subcentimeter hypodensities throughout the liver, too small to characterize and similar in size and distribution as compared to CT 04/14/2022.    Gallbladder: No calcified gallstones.    Bile Ducts: No evidence of dilated ducts.    Pancreas: No mass or peripancreatic fat stranding.    Spleen: Unremarkable.    Stomach and duodenum: Diffuse gastric wall thickening, most pronounced in the antral region in keeping with biopsy-proven adenocarcinoma.  Two surgical clips in the antrum.  Duodenum unremarkable.    Adrenals: Unremarkable.    Kidneys/ Ureters: Normal in size and location. Normal enhancement and excretion of contrast.  No hydronephrosis or nephrolithiasis. No ureteral dilatation.    Bladder: No evidence of wall thickening.  Excreted contrast within the bladder.    Reproductive organs: Significant increase in size of large mass arising from the pelvis with cystic and solid enhancing components now measuring approximately 12.4 x 22.0 x 18.4 cm (axial series 4, image 472 and coronal series 604 image 101).    Bowel/Mesentery: Small bowel is normal in caliber with no evidence of obstruction. No evidence of inflammation or wall thickening.  Colon demonstrates no focal wall thickening.    Peritoneum: Moderate abdominopelvic ascites.  Areas of hyperdensity along the lateral abdomen (for example axial series 5, images 60 and 80), likely worsening peritoneal carcinomatosis with scattered areas of hematoma considered less likely.  No evidence of active intraperitoneal arterial extravasation.  No intraperitoneal free air.    Lymph nodes: No retroperitoneal lymphadenopathy.    Vasculature: No aneurysm. No significant calcific atherosclerosis.  Celiac, SMA, BEN, and bilateral renal arteries are patent.  Portal veins, SMV, and splenic vein are patent.    Abdominal wall:  Diffuse body wall edema.    Bones: No acute fracture. No suspicious osseous lesions.                                        X-Ray Chest AP Portable (Final result)  Result time 06/16/22 19:53:09      Final result by Corky Buck MD (06/16/22 19:53:09)                   Impression:      Small to moderate-sized left pleural effusion with probable continued left basilar atelectasis/infiltrate.      Electronically signed by: Corky Buck MD  Date:    06/16/2022  Time:    19:53               Narrative:    EXAMINATION:  XR CHEST AP PORTABLE    CLINICAL HISTORY:  Unspecified abdominal pain    TECHNIQUE:  Single frontal view of the chest was performed.    COMPARISON:  CT thorax 06/06/2022, chest radiograph 03/09/2021    FINDINGS:  Monitoring leads overlie the chest.  Patient is rotated.    Right upper chest Port-A-Cath unchanged.  Cardiomediastinal silhouette remains slightly deviated towards the left likely secondary to some volume loss/atelectasis within the left lung base better demonstrated on previous cross-sectional imaging.  There is small to moderate-sized left pleural effusion.  Right hemithorax is well expanded and clear.  No pneumothorax.  Hilar contours are unchanged.  No acute osseous process seen.  Partially imaged upper abdomen is grossly within normal limits.

## 2022-06-19 ENCOUNTER — PATIENT MESSAGE (OUTPATIENT)
Dept: HEMATOLOGY/ONCOLOGY | Facility: CLINIC | Age: 42
End: 2022-06-19
Payer: COMMERCIAL

## 2022-06-19 LAB
ALBUMIN SERPL BCP-MCNC: 1.6 G/DL (ref 3.5–5.2)
ALP SERPL-CCNC: 60 U/L (ref 55–135)
ALT SERPL W/O P-5'-P-CCNC: 7 U/L (ref 10–44)
ANION GAP SERPL CALC-SCNC: 8 MMOL/L (ref 8–16)
ASCENDING AORTA: 2.99 CM
AST SERPL-CCNC: 14 U/L (ref 10–40)
AV INDEX (PROSTH): 0.96
AV MEAN GRADIENT: 3 MMHG
AV PEAK GRADIENT: 5 MMHG
AV VALVE AREA: 2.85 CM2
AV VELOCITY RATIO: 0.96
BASOPHILS # BLD AUTO: 0.04 K/UL (ref 0–0.2)
BASOPHILS NFR BLD: 0.5 % (ref 0–1.9)
BILIRUB SERPL-MCNC: 0.3 MG/DL (ref 0.1–1)
BSA FOR ECHO PROCEDURE: 1.84 M2
BUN SERPL-MCNC: 15 MG/DL (ref 6–20)
CALCIUM SERPL-MCNC: 8.1 MG/DL (ref 8.7–10.5)
CHLORIDE SERPL-SCNC: 103 MMOL/L (ref 95–110)
CO2 SERPL-SCNC: 22 MMOL/L (ref 23–29)
CREAT SERPL-MCNC: 0.7 MG/DL (ref 0.5–1.4)
CV ECHO LV RWT: 0.36 CM
DIFFERENTIAL METHOD: ABNORMAL
DOP CALC AO PEAK VEL: 1.13 M/S
DOP CALC AO VTI: 14.78 CM
DOP CALC LVOT AREA: 3 CM2
DOP CALC LVOT DIAMETER: 1.94 CM
DOP CALC LVOT PEAK VEL: 1.08 M/S
DOP CALC LVOT STROKE VOLUME: 42.07 CM3
DOP CALCLVOT PEAK VEL VTI: 14.24 CM
E WAVE DECELERATION TIME: 122.46 MSEC
E/A RATIO: 1.11
E/E' RATIO: 6.36 M/S
ECHO LV POSTERIOR WALL: 0.69 CM (ref 0.6–1.1)
EJECTION FRACTION: 65 %
EOSINOPHIL # BLD AUTO: 0.1 K/UL (ref 0–0.5)
EOSINOPHIL NFR BLD: 0.6 % (ref 0–8)
ERYTHROCYTE [DISTWIDTH] IN BLOOD BY AUTOMATED COUNT: 13.4 % (ref 11.5–14.5)
EST. GFR  (AFRICAN AMERICAN): >60 ML/MIN/1.73 M^2
EST. GFR  (NON AFRICAN AMERICAN): >60 ML/MIN/1.73 M^2
FRACTIONAL SHORTENING: 38 % (ref 28–44)
GLUCOSE SERPL-MCNC: 93 MG/DL (ref 70–110)
HCT VFR BLD AUTO: 29 % (ref 37–48.5)
HGB BLD-MCNC: 9.6 G/DL (ref 12–16)
IMM GRANULOCYTES # BLD AUTO: 0.07 K/UL (ref 0–0.04)
IMM GRANULOCYTES NFR BLD AUTO: 0.9 % (ref 0–0.5)
INTERVENTRICULAR SEPTUM: 0.74 CM (ref 0.6–1.1)
IVRT: 137.01 MSEC
LA MAJOR: 4.26 CM
LA MINOR: 4.82 CM
LA WIDTH: 3.26 CM
LDH SERPL L TO P-CCNC: 141 U/L (ref 110–260)
LEFT ATRIUM SIZE: 2.6 CM
LEFT ATRIUM VOLUME INDEX MOD: 15.1 ML/M2
LEFT ATRIUM VOLUME INDEX: 17.8 ML/M2
LEFT ATRIUM VOLUME MOD: 27.66 CM3
LEFT ATRIUM VOLUME: 32.58 CM3
LEFT INTERNAL DIMENSION IN SYSTOLE: 2.38 CM (ref 2.1–4)
LEFT VENTRICLE DIASTOLIC VOLUME INDEX: 34.93 ML/M2
LEFT VENTRICLE DIASTOLIC VOLUME: 63.92 ML
LEFT VENTRICLE MASS INDEX: 41 G/M2
LEFT VENTRICLE SYSTOLIC VOLUME INDEX: 10.8 ML/M2
LEFT VENTRICLE SYSTOLIC VOLUME: 19.74 ML
LEFT VENTRICULAR INTERNAL DIMENSION IN DIASTOLE: 3.85 CM (ref 3.5–6)
LEFT VENTRICULAR MASS: 75.59 G
LV LATERAL E/E' RATIO: 6.36 M/S
LV SEPTAL E/E' RATIO: 6.36 M/S
LYMPHOCYTES # BLD AUTO: 2.2 K/UL (ref 1–4.8)
LYMPHOCYTES NFR BLD: 26.8 % (ref 18–48)
MAGNESIUM SERPL-MCNC: 1.9 MG/DL (ref 1.6–2.6)
MCH RBC QN AUTO: 29.4 PG (ref 27–31)
MCHC RBC AUTO-ENTMCNC: 33.1 G/DL (ref 32–36)
MCV RBC AUTO: 89 FL (ref 82–98)
MONOCYTES # BLD AUTO: 0.6 K/UL (ref 0.3–1)
MONOCYTES NFR BLD: 6.7 % (ref 4–15)
MV PEAK A VEL: 0.63 M/S
MV PEAK E VEL: 0.7 M/S
MV STENOSIS PRESSURE HALF TIME: 35.51 MS
MV VALVE AREA P 1/2 METHOD: 6.2 CM2
NEUTROPHILS # BLD AUTO: 5.3 K/UL (ref 1.8–7.7)
NEUTROPHILS NFR BLD: 64.5 % (ref 38–73)
NRBC BLD-RTO: 1 /100 WBC
PHOSPHATE SERPL-MCNC: 3 MG/DL (ref 2.7–4.5)
PLATELET # BLD AUTO: 245 K/UL (ref 150–450)
PMV BLD AUTO: 9.4 FL (ref 9.2–12.9)
POTASSIUM SERPL-SCNC: 4.4 MMOL/L (ref 3.5–5.1)
PROT SERPL-MCNC: 4.5 G/DL (ref 6–8.4)
PROT SERPL-MCNC: 4.5 G/DL (ref 6–8.4)
RA MAJOR: 3.31 CM
RA WIDTH: 2.4 CM
RBC # BLD AUTO: 3.27 M/UL (ref 4–5.4)
RIGHT VENTRICULAR END-DIASTOLIC DIMENSION: 2.54 CM
RV TISSUE DOPPLER FREE WALL SYSTOLIC VELOCITY 1 (APICAL 4 CHAMBER VIEW): 17.69 CM/S
SINUS: 3.13 CM
SODIUM SERPL-SCNC: 133 MMOL/L (ref 136–145)
STJ: 2.73 CM
TDI LATERAL: 0.11 M/S
TDI SEPTAL: 0.11 M/S
TDI: 0.11 M/S
TRICUSPID ANNULAR PLANE SYSTOLIC EXCURSION: 1.92 CM
WBC # BLD AUTO: 6.72 K/UL (ref 3.9–12.7)
WBC # BLD AUTO: 8.21 K/UL (ref 3.9–12.7)

## 2022-06-19 PROCEDURE — 99233 SBSQ HOSP IP/OBS HIGH 50: CPT | Mod: ,,, | Performed by: INTERNAL MEDICINE

## 2022-06-19 PROCEDURE — 97116 GAIT TRAINING THERAPY: CPT

## 2022-06-19 PROCEDURE — 85048 AUTOMATED LEUKOCYTE COUNT: CPT

## 2022-06-19 PROCEDURE — 25000003 PHARM REV CODE 250: Performed by: STUDENT IN AN ORGANIZED HEALTH CARE EDUCATION/TRAINING PROGRAM

## 2022-06-19 PROCEDURE — 83615 LACTATE (LD) (LDH) ENZYME: CPT

## 2022-06-19 PROCEDURE — 63600175 PHARM REV CODE 636 W HCPCS

## 2022-06-19 PROCEDURE — 25000003 PHARM REV CODE 250

## 2022-06-19 PROCEDURE — 63600175 PHARM REV CODE 636 W HCPCS: Performed by: STUDENT IN AN ORGANIZED HEALTH CARE EDUCATION/TRAINING PROGRAM

## 2022-06-19 PROCEDURE — S0109 METHADONE ORAL 5MG: HCPCS

## 2022-06-19 PROCEDURE — 85025 COMPLETE CBC W/AUTO DIFF WBC: CPT | Performed by: STUDENT IN AN ORGANIZED HEALTH CARE EDUCATION/TRAINING PROGRAM

## 2022-06-19 PROCEDURE — 84155 ASSAY OF PROTEIN SERUM: CPT

## 2022-06-19 PROCEDURE — 36415 COLL VENOUS BLD VENIPUNCTURE: CPT | Performed by: STUDENT IN AN ORGANIZED HEALTH CARE EDUCATION/TRAINING PROGRAM

## 2022-06-19 PROCEDURE — 83735 ASSAY OF MAGNESIUM: CPT | Performed by: STUDENT IN AN ORGANIZED HEALTH CARE EDUCATION/TRAINING PROGRAM

## 2022-06-19 PROCEDURE — 80053 COMPREHEN METABOLIC PANEL: CPT | Performed by: STUDENT IN AN ORGANIZED HEALTH CARE EDUCATION/TRAINING PROGRAM

## 2022-06-19 PROCEDURE — 99233 PR SUBSEQUENT HOSPITAL CARE,LEVL III: ICD-10-PCS | Mod: ,,, | Performed by: INTERNAL MEDICINE

## 2022-06-19 PROCEDURE — 97161 PT EVAL LOW COMPLEX 20 MIN: CPT

## 2022-06-19 PROCEDURE — 20600001 HC STEP DOWN PRIVATE ROOM

## 2022-06-19 PROCEDURE — 84100 ASSAY OF PHOSPHORUS: CPT | Performed by: STUDENT IN AN ORGANIZED HEALTH CARE EDUCATION/TRAINING PROGRAM

## 2022-06-19 PROCEDURE — 36415 COLL VENOUS BLD VENIPUNCTURE: CPT

## 2022-06-19 RX ORDER — BACLOFEN 5 MG/1
5 TABLET ORAL 3 TIMES DAILY
Status: DISCONTINUED | OUTPATIENT
Start: 2022-06-19 | End: 2022-06-20 | Stop reason: HOSPADM

## 2022-06-19 RX ADMIN — CEFTRIAXONE 2 G: 2 INJECTION, SOLUTION INTRAVENOUS at 08:06

## 2022-06-19 RX ADMIN — DICYCLOMINE HYDROCHLORIDE 10 MG: 10 CAPSULE ORAL at 08:06

## 2022-06-19 RX ADMIN — BACLOFEN 5 MG: 5 TABLET ORAL at 02:06

## 2022-06-19 RX ADMIN — POLYETHYLENE GLYCOL 3350 17 G: 17 POWDER, FOR SOLUTION ORAL at 08:06

## 2022-06-19 RX ADMIN — AMITRIPTYLINE HYDROCHLORIDE 10 MG: 10 TABLET, FILM COATED ORAL at 09:06

## 2022-06-19 RX ADMIN — OXYCODONE 5 MG: 5 TABLET ORAL at 11:06

## 2022-06-19 RX ADMIN — HEPARIN SODIUM 5000 UNITS: 5000 INJECTION INTRAVENOUS; SUBCUTANEOUS at 02:06

## 2022-06-19 RX ADMIN — OXYCODONE 5 MG: 5 TABLET ORAL at 06:06

## 2022-06-19 RX ADMIN — DICYCLOMINE HYDROCHLORIDE 10 MG: 10 CAPSULE ORAL at 05:06

## 2022-06-19 RX ADMIN — DICYCLOMINE HYDROCHLORIDE 10 MG: 10 CAPSULE ORAL at 01:06

## 2022-06-19 RX ADMIN — SENNOSIDES AND DOCUSATE SODIUM 1 TABLET: 50; 8.6 TABLET ORAL at 08:06

## 2022-06-19 RX ADMIN — BACLOFEN 5 MG: 5 TABLET ORAL at 09:06

## 2022-06-19 RX ADMIN — HYDROMORPHONE HYDROCHLORIDE 0.5 MG: 1 INJECTION, SOLUTION INTRAMUSCULAR; INTRAVENOUS; SUBCUTANEOUS at 05:06

## 2022-06-19 RX ADMIN — METHADONE HYDROCHLORIDE 2.5 MG: 5 SOLUTION ORAL at 08:06

## 2022-06-19 RX ADMIN — DICYCLOMINE HYDROCHLORIDE 10 MG: 10 CAPSULE ORAL at 09:06

## 2022-06-19 RX ADMIN — PANTOPRAZOLE SODIUM 40 MG: 40 TABLET, DELAYED RELEASE ORAL at 08:06

## 2022-06-19 RX ADMIN — PANTOPRAZOLE SODIUM 40 MG: 40 TABLET, DELAYED RELEASE ORAL at 09:06

## 2022-06-19 RX ADMIN — HEPARIN SODIUM 5000 UNITS: 5000 INJECTION INTRAVENOUS; SUBCUTANEOUS at 09:06

## 2022-06-19 RX ADMIN — GABAPENTIN 300 MG: 300 CAPSULE ORAL at 08:06

## 2022-06-19 RX ADMIN — Medication 6 MG: at 09:06

## 2022-06-19 RX ADMIN — HEPARIN SODIUM 5000 UNITS: 5000 INJECTION INTRAVENOUS; SUBCUTANEOUS at 05:06

## 2022-06-19 RX ADMIN — METHADONE HYDROCHLORIDE 2.5 MG: 5 SOLUTION ORAL at 09:06

## 2022-06-19 NOTE — NURSING
Medical oncology notified by charge nurse of pt HR sustaining 140s-150s while sitting in bed.  Informed med onc that the pt received thorazine for hiccups around 3 pm.  She is asymptomatic and states that she feels fine.  STAT ekg ordered.  Results showing sinus tachycardia, otherwise normal EKG; no new orders given.

## 2022-06-19 NOTE — SUBJECTIVE & OBJECTIVE
Interval History: Patient became tachycardic at night. EKG normal. Likely 2/2 low intravascular volume. Echo ordered. Plan for IR paracentesis tomorrow.     Oncology Treatment Plan:   OP FOLFIRI Q2WK    Medications:  Continuous Infusions:  Scheduled Meds:   amitriptyline  10 mg Oral QHS    baclofen  5 mg Oral TID    cefTRIAXone (ROCEPHIN) IVPB  2 g Intravenous Q24H    dicyclomine  10 mg Oral QID    gabapentin  300 mg Oral TID    heparin (porcine)  5,000 Units Subcutaneous Q8H    methadone  2.5 mg Oral Q12H    pantoprazole  40 mg Oral BID    polyethylene glycol  17 g Oral Daily    senna-docusate 8.6-50 mg  1 tablet Oral Daily     PRN Meds:sodium chloride, acetaminophen, acetaminophen, dextrose 10%, dextrose 10%, diphenhydrAMINE, famotidine, glucagon (human recombinant), glucose, glucose, HYDROmorphone, LIDOcaine HCL 2%, loperamide, melatonin, naloxone, ondansetron, oxyCODONE, promethazine (PHENERGAN) IVPB, simethicone, sodium chloride 0.9%     Review of Systems   Constitutional:  Positive for fatigue. Negative for activity change, appetite change, chills and fever.   HENT:  Negative for congestion, ear pain, sinus pain and sore throat.    Eyes:  Negative for visual disturbance.   Respiratory:  Negative for cough and shortness of breath.    Cardiovascular:  Positive for leg swelling (chronic, at baseline). Negative for chest pain.   Gastrointestinal:  Positive for abdominal distention, abdominal pain and nausea. Negative for constipation, diarrhea and vomiting.   Genitourinary:  Negative for decreased urine volume, difficulty urinating and dysuria.   Musculoskeletal:  Negative for back pain and neck pain.   Skin:  Negative for rash.   Neurological:  Negative for dizziness, syncope and headaches.   Psychiatric/Behavioral:  Negative for confusion and decreased concentration.    Objective:     Vital Signs (Most Recent):  Temp: 97.6 °F (36.4 °C) (06/19/22 1139)  Pulse: (!) 111 (06/19/22 1139)  Resp: 18 (06/19/22  1158)  BP: (!) 146/105 (06/19/22 1139)  SpO2: 100 % (06/19/22 1139)   Vital Signs (24h Range):  Temp:  [97.6 °F (36.4 °C)-98.2 °F (36.8 °C)] 97.6 °F (36.4 °C)  Pulse:  [111-138] 111  Resp:  [13-18] 18  SpO2:  [98 %-100 %] 100 %  BP: (120-157)/() 146/105     Weight: 72 kg (158 lb 11.7 oz)  Body mass index is 24.86 kg/m².  Body surface area is 1.84 meters squared.      Intake/Output Summary (Last 24 hours) at 6/19/2022 1201  Last data filed at 6/19/2022 0600  Gross per 24 hour   Intake 849.64 ml   Output 100 ml   Net 749.64 ml       Physical Exam  Vitals reviewed.   Constitutional:       General: She is not in acute distress.     Appearance: Normal appearance. She is well-developed and underweight. She is not ill-appearing or diaphoretic.   HENT:      Head: Normocephalic and atraumatic.      Right Ear: External ear normal.      Left Ear: External ear normal.      Nose: Nose normal. No congestion or rhinorrhea.      Mouth/Throat:      Mouth: Mucous membranes are moist.      Pharynx: Oropharynx is clear. No oropharyngeal exudate or posterior oropharyngeal erythema.   Eyes:      General: No scleral icterus.     Extraocular Movements: Extraocular movements intact.      Conjunctiva/sclera: Conjunctivae normal.   Neck:      Vascular: No carotid bruit.      Trachea: No tracheal deviation.   Cardiovascular:      Rate and Rhythm: Normal rate and regular rhythm.      Pulses: Normal pulses.      Heart sounds: Normal heart sounds. No murmur heard.    No friction rub.      Comments: R chest port-a-cath non-tender, non-erythematous  Pulmonary:      Effort: Pulmonary effort is normal. No respiratory distress.      Breath sounds: Normal breath sounds. No stridor. No wheezing.   Chest:      Chest wall: No tenderness.   Abdominal:      General: Abdomen is flat. Bowel sounds are normal. There is no distension.      Palpations: Abdomen is soft. There is no mass.      Tenderness: There is abdominal tenderness (LLQ and RLQ). There is  no right CVA tenderness, left CVA tenderness or guarding.   Musculoskeletal:         General: No swelling, tenderness or deformity. Normal range of motion.      Cervical back: Normal range of motion. No rigidity or tenderness.      Right lower leg: No edema.      Left lower leg: No edema.      Comments: LLL 1+ pitting edema, RLE trace pitting edema   Skin:     General: Skin is warm and dry.      Capillary Refill: Capillary refill takes less than 2 seconds.      Coloration: Skin is not jaundiced or pale.      Findings: No bruising, erythema or rash.   Neurological:      General: No focal deficit present.      Mental Status: She is alert and oriented to person, place, and time. Mental status is at baseline.      Cranial Nerves: No cranial nerve deficit.      Sensory: No sensory deficit.      Motor: No weakness or abnormal muscle tone.      Coordination: Coordination normal.   Psychiatric:         Mood and Affect: Mood normal.         Behavior: Behavior normal.         Thought Content: Thought content normal.       Significant Labs:   CBC:   Recent Labs   Lab 06/18/22  0805 06/19/22  0604   WBC 10.54 8.21   HGB 10.9* 9.6*   HCT 33.2* 29.0*    245   , CMP:   Recent Labs   Lab 06/18/22  0805 06/19/22  0604   * 133*   K 4.3 4.4    103   CO2 23 22*   GLU 98 93   BUN 13 15   CREATININE 0.7 0.7   CALCIUM 8.3* 8.1*   PROT 4.8* 4.5*   ALBUMIN 1.8* 1.6*   BILITOT 0.3 0.3   ALKPHOS 61 60   AST 16 14   ALT 6* 7*   ANIONGAP 7* 8   EGFRNONAA >60.0 >60.0   , and All pertinent labs from the last 24 hours have been reviewed.    Diagnostic Results:  I have reviewed all pertinent imaging results/findings within the past 24 hours.

## 2022-06-19 NOTE — PLAN OF CARE
VSS  No signs of evident distress  Pain medication admin as per MAR  Family and friends bedside  Right chest wall port for chemo   Right upper arm 20g PIV dressing CDI  Baclofen added to medication regimen.  NPO at midnight  Para ordered for tomorrow  Echo ordered.  Bed in lowest locked position.  Call light within reach.  Instructed to call for assistance.  Pt. Expressed understanding.    Educated on plan of care

## 2022-06-19 NOTE — PLAN OF CARE
"Puja Quigley is a 41 y.o. female admitted to [facility] on 2022 for Intractable abdominal pain. Puja Quilgey tolerated evaluation well today. She was resting in bed with family (mom, boyfriend) present upon my entry to room, agreeable to evaluation. Reports she is feeling stronger each day but not yet back to her baseline levels. Demonstrates independence with bed mobility and transfers. Recorded resting vitals at side of bed;  bpm, pulse ox 98% on room air. Ambulates 240 ft in hallways (wearing mask) on room air with stand-by assistance, no device; able to hold conversation while walking, stating it feels good to ambulate. HR as high as 138 bpm and pulse ox as low as 88% at end of ambulation trial; within 30 seconds of seated rest, HR was 124 bpm and pulse ox 95%. I asked her to rate how fatiguing the gait trial was on a 0-10 scale (0 being no fatigue, 10 being max fatigue) and she answered "2/10". I'm comfortable with patient ambulating with family or nursing supervision to maintain strength/endurance during admission. Discussed PT role, POC, goals and recommendations (Home with family, no DME needs) with patient and family; verbalized understanding. Puja Quigley would benefit from acute PT services to promote mobility during this admission and improve return to PLOF.    Problem: Physical Therapy  Goal: Physical Therapy Goal  Description: Goals to be met by: 22     Patient will increase functional independence with mobility by performin. Sit to stand transfer with Tucker x 5 consecutive trials without fatigue or SOB - Not met  2. Gait  x 500 feet with Supervision using No Assistive Device - Not met  3. Lower extremity exercise program x 20 reps per handout, with independence - Not met  Outcome: Ongoing, Progressing    Jose Celaya, PT  2022  "

## 2022-06-19 NOTE — NURSING
Notified Dr. York of HR 150s while patient sitting in bed (asymptomatic)  stat ekg ordered will monitor

## 2022-06-19 NOTE — PLAN OF CARE
Admitted 6/16 after a syncopal episode at outpatient MRI  -AAO4, VSS/afebrile, NST on tele (130s), on RA  -EKG ordered for elevated HR at rest; showing sinus tachycardia, otherwise normal  -hx of metastatic gastric adenocarcinoma; received cycle #2 of FOLFIRI 6/13/22  -pain controlled with PRN oxy/dilaudid as ordered  -weekly tutu for malignant ascites; last para 6/16 w 3.7 L off  -abd pain from tumor size or SBP? Rocephin q24 as ordered  -labs> H/H stable, WBC WDL  -palliative care consulted  -fall precautions maintained, call bell/personal items in reach

## 2022-06-19 NOTE — ASSESSMENT & PLAN NOTE
Intra-abdominal bleeding 2/2 paracentesis ruled out in ER with CTA A/P. Concern that pain is 2/2 significantly growing tumor burden vs new SBP (especially in the setting of recent diarrhea and hypotension during MRI).     - continue Ceftriaxone 2 g qd  - plan for IR paracentesis with labs tomorrow  - palliative care consulted and recommended:  -- methadone 2.5 mg BID  -- dilaudid 0.5 mg q3 prn  -- oxycodone IR q6 prn  -- continue bentyl  -- continue simethicone  -- lidocaine jelly for back and hands

## 2022-06-19 NOTE — PROGRESS NOTES
[facility] - Transplant Stepdown  Hematology/Oncology  Progress Note    Patient Name: Puja Quigley  Admission Date: 6/16/2022  Hospital Length of Stay: 2 days  Code Status: Full Code     Subjective:     HPI:  Puja Quigley is a 41 year-old woman with metastatic gastric adenocarcinoma on FOLFIRI (cycle 2 on 6/13/22) c/b malignant ascites requiring weekly paracentesis, chronic gastric ulcer with perforation c/b CATINA 2/2 chronic blood loss, chronic lower extremity edema, anxiety, malnutrition, and peripheral neuropathy who [place] [facility] [place] after a syncopal episode after an outpatient MRI.    She started having nausea, vomiting, and worsened indigestion on 6/14 which she also had after her 1st cycle of FOLFIRI. She also admits to some fatigue and loss of appetite over the past month. On 6/15 she had diarrhea with 3 loose, non-bloody, non-melanotic bowel movements associated with some diffuse lower abdominal pain and cramping. She took her home Norco with improvement in abdominal pain. She denied any recent sick contacts, travel, fevers, chills, night sweats, nasal congestion, sore throat, cough, SOB, or chest pain (although does have epigastric indigestion pain). On 6/16 AM she had her normally scheduled paracentesis [place] [facility] where 3.7L of serosanguinous fluid was drained. She tolerated this well. In the afternoon she went to her MRI abdomen/pelvis [place] [facility] and while in the MRI started to experience RLQ & LLQ pain worse than ever before, 10/10. After finishing the scan she stood up to put her shoes on and then had a syncopal episode. BP at that time after awakening was 98/59. She also had an episode of non-bloody, non-bilious emesis. This was her first time receiving Gadavist contrast. She did not have any focal neurologic deficits, hives, rash, throat swelling or SOB at the time.     On arrival to the ER, /70, HR 80, RR 20, temp 98.5F, saturation 97% on room air. Labs were significant for Hgb  9.5 (baseline ~12), WBC 4.01, Plt 347k, normal electrolytes, renal function & liver function, albumin 1.7, lipase 27, COVID negative. CXR showed unchanged moderate L pleural effusion. CTA abdomen/pelvis was performed which did not show active bleeding however did show significant increase in size of pelvic mass 12.4 x 22.0 x 18.4 cm (previously 14 x 10 cm in April '22), worsening peritoneal carcinomatosis and anasarca. In the ER she received IV morphine 4 mg x2 which helped minimally with her pain and caused her to be nauseous and vomit again. Upon returning from the bathroom, she had another syncopal episode witnessed by the nurse. BP at that time remained 110s/70s. She received IV ondansetron and promethazine in the ER for nausea. Pain persisted and she received IV hydromorphone 0.5 mg x2 with improvement of pain from 10/10 to 7/10. Throughout her ER stay she became more tachycardic up to the 140s. She was admitted to medical oncology for pain control and further management.    Oncology History (per Dr. Ashraf):  HER-2 negative by FISH.  PD-L1 < 1%.  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  We previously explained the implications of a stage IV diagnosis to her and potential treatment options.  She is now s/p port placement. She sought a second opinion at [facility] for zolbetuximab trial targeting CLDN protein but she did not test positive for this biomarker. We recommended proceeding with SOC FOLFOX, with which the [facility] team agreed. Because of the negative PD-L1 I do not think the benefit of adding nivolumab outweighs the potential toxicities.        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  She opted not to get genetic testing done at her appt with [facility]. We recommended that she reconsider that decision in light of a  very likely germline mutation in SAMUEL which has clinical consequences.       CT CAP after 6 cycles showed improvement in ascites, probable hepatic, thyroid and bone metastases. We dropped oxaliplatin starting with cycle 10 due to grade 1 neuropathy and desire to keep it from worsening.     CT CAP after cycle 10 showed stable disease.  CT scan after cycle 15 continued to display overall stability of disease within the gastric wall, peritoneum, spine and liver. Although increase ascites on imaging and clinically can represent progression of disease, her previous CT suggested overall stability within the liver and gastric wall, so we recommended to continue with treatment time. However, she continued to have increasing abdominal ascites that has been concerning for progression of disease. Hence, she had repeat imaging. On CT performed on 4/14/2022, she appeared to have worsening disease suggestive of progression. She was seen by Dr. Reid at [facility] for f/u and to discuss possible treatment options.  She was also evaluated by Dr. Andree Mueller and Dr. See of Surgical Oncology at [facility].  Ultimately she was not felt to be a good surgical debulking candidate due to her ascites.  If her ascites improves during the course of chemotherapy and her performance status remains stable or improves, they would re-consider her again for palliative oophorectomy.      She was started on second-line FOLFIRI. Was given ramucirumab with cycle 1 but this later will be held given [facility] recs for just FOLFIRI.  She didn't tolerate cycle 1 well with significant N/V.  Will dose reduce irinotecan to 75 mg/m2 - has UGT1A1 homozygous mutation indicating poor metabolism.  Continue 5-FU infusion at 2200 mg/m2.     After an in-depth discussion, she would like to pursue 2nd line chemotherapy. At this time, we would favor use of FOLFIRI as second-line rather than Taxol/Claudio due to ongoing neuropathy. Pt is agreeable. This was also discussed with   P[facility], who is in agreement. We will proceed with 4 cycles followed by imaging studies. We will also repeat imaging studies with a CT chest non-contrast and MRI abdomen/pelvis as a baseline. Pt is agreeable.      Interval History: Patient became tachycardic at night. EKG normal. Likely 2/2 low intravascular volume. Echo ordered. Plan for IR paracentesis tomorrow.     Oncology Treatment Plan:   OP FOLFIRI Q2WK    Medications:  Continuous Infusions:  Scheduled Meds:   amitriptyline  10 mg Oral QHS    baclofen  5 mg Oral TID    cefTRIAXone (ROCEPHIN) IVPB  2 g Intravenous Q24H    dicyclomine  10 mg Oral QID    gabapentin  300 mg Oral TID    heparin (porcine)  5,000 Units Subcutaneous Q8H    methadone  2.5 mg Oral Q12H    pantoprazole  40 mg Oral BID    polyethylene glycol  17 g Oral Daily    senna-docusate 8.6-50 mg  1 tablet Oral Daily     PRN Meds:sodium chloride, acetaminophen, acetaminophen, dextrose 10%, dextrose 10%, diphenhydrAMINE, famotidine, glucagon (human recombinant), glucose, glucose, HYDROmorphone, LIDOcaine HCL 2%, loperamide, melatonin, naloxone, ondansetron, oxyCODONE, promethazine (PHENERGAN) IVPB, simethicone, sodium chloride 0.9%     Review of Systems   Constitutional:  Positive for fatigue. Negative for activity change, appetite change, chills and fever.   HENT:  Negative for congestion, ear pain, sinus pain and sore throat.    Eyes:  Negative for visual disturbance.   Respiratory:  Negative for cough and shortness of breath.    Cardiovascular:  Positive for leg swelling (chronic, at baseline). Negative for chest pain.   Gastrointestinal:  Positive for abdominal distention, abdominal pain and nausea. Negative for constipation, diarrhea and vomiting.   Genitourinary:  Negative for decreased urine volume, difficulty urinating and dysuria.   Musculoskeletal:  Negative for back pain and neck pain.   Skin:  Negative for rash.   Neurological:  Negative for dizziness, syncope and headaches.    Psychiatric/Behavioral:  Negative for confusion and decreased concentration.    Objective:     Vital Signs (Most Recent):  Temp: 97.6 °F (36.4 °C) (06/19/22 1139)  Pulse: (!) 111 (06/19/22 1139)  Resp: 18 (06/19/22 1158)  BP: (!) 146/105 (06/19/22 1139)  SpO2: 100 % (06/19/22 1139)   Vital Signs (24h Range):  Temp:  [97.6 °F (36.4 °C)-98.2 °F (36.8 °C)] 97.6 °F (36.4 °C)  Pulse:  [111-138] 111  Resp:  [13-18] 18  SpO2:  [98 %-100 %] 100 %  BP: (120-157)/() 146/105     Weight: 72 kg (158 lb 11.7 oz)  Body mass index is 24.86 kg/m².  Body surface area is 1.84 meters squared.      Intake/Output Summary (Last 24 hours) at 6/19/2022 1201  Last data filed at 6/19/2022 0600  Gross per 24 hour   Intake 849.64 ml   Output 100 ml   Net 749.64 ml       Physical Exam  Vitals reviewed.   Constitutional:       General: She is not in acute distress.     Appearance: Normal appearance. She is well-developed and underweight. She is not ill-appearing or diaphoretic.   HENT:      Head: Normocephalic and atraumatic.      Right Ear: External ear normal.      Left Ear: External ear normal.      Nose: Nose normal. No congestion or rhinorrhea.      Mouth/Throat:      Mouth: Mucous membranes are moist.      Pharynx: Oropharynx is clear. No oropharyngeal exudate or posterior oropharyngeal erythema.   Eyes:      General: No scleral icterus.     Extraocular Movements: Extraocular movements intact.      Conjunctiva/sclera: Conjunctivae normal.   Neck:      Vascular: No carotid bruit.      Trachea: No tracheal deviation.   Cardiovascular:      Rate and Rhythm: Normal rate and regular rhythm.      Pulses: Normal pulses.      Heart sounds: Normal heart sounds. No murmur heard.    No friction rub.      Comments: R chest port-a-cath non-tender, non-erythematous  Pulmonary:      Effort: Pulmonary effort is normal. No respiratory distress.      Breath sounds: Normal breath sounds. No stridor. No wheezing.   Chest:      Chest wall: No  tenderness.   Abdominal:      General: Abdomen is flat. Bowel sounds are normal. There is no distension.      Palpations: Abdomen is soft. There is no mass.      Tenderness: There is abdominal tenderness (LLQ and RLQ). There is no right CVA tenderness, left CVA tenderness or guarding.   Musculoskeletal:         General: No swelling, tenderness or deformity. Normal range of motion.      Cervical back: Normal range of motion. No rigidity or tenderness.      Right lower leg: No edema.      Left lower leg: No edema.      Comments: LLL 1+ pitting edema, RLE trace pitting edema   Skin:     General: Skin is warm and dry.      Capillary Refill: Capillary refill takes less than 2 seconds.      Coloration: Skin is not jaundiced or pale.      Findings: No bruising, erythema or rash.   Neurological:      General: No focal deficit present.      Mental Status: She is alert and oriented to person, place, and time. Mental status is at baseline.      Cranial Nerves: No cranial nerve deficit.      Sensory: No sensory deficit.      Motor: No weakness or abnormal muscle tone.      Coordination: Coordination normal.   Psychiatric:         Mood and Affect: Mood normal.         Behavior: Behavior normal.         Thought Content: Thought content normal.       Significant Labs:   CBC:   Recent Labs   Lab 06/18/22  0805 06/19/22  0604   WBC 10.54 8.21   HGB 10.9* 9.6*   HCT 33.2* 29.0*    245   , CMP:   Recent Labs   Lab 06/18/22  0805 06/19/22  0604   * 133*   K 4.3 4.4    103   CO2 23 22*   GLU 98 93   BUN 13 15   CREATININE 0.7 0.7   CALCIUM 8.3* 8.1*   PROT 4.8* 4.5*   ALBUMIN 1.8* 1.6*   BILITOT 0.3 0.3   ALKPHOS 61 60   AST 16 14   ALT 6* 7*   ANIONGAP 7* 8   EGFRNONAA >60.0 >60.0   , and All pertinent labs from the last 24 hours have been reviewed.    Diagnostic Results:  I have reviewed all pertinent imaging results/findings within the past 24 hours.    Assessment/Plan:     * Intractable abdominal  "pain  Intra-abdominal bleeding 2/2 paracentesis ruled out in ER with CTA A/P. Concern that pain is 2/2 significantly growing tumor burden vs new SBP (especially in the setting of recent diarrhea and hypotension during MRI).     - continue Ceftriaxone 2 g qd  - plan for IR paracentesis with labs tomorrow  - palliative care consulted and recommended:  -- methadone 2.5 mg BID  -- dilaudid 0.5 mg q3 prn  -- oxycodone IR q6 prn  -- continue bentyl  -- continue simethicone  -- lidocaine jelly for back and hands    Palliative care encounter  Palliative care consulted:  "-Ongoing support for pt and family as treatment expectations and prognotic awareness is globally increased; pt not eager to have prognostic talks  -SW support for guidance on the stress surrounding her mother; significant mental health disease, schizophrenia and BPD: they are looking for facility placement; the pt had been the primary care giver  -has a 3 yo son  -was hoping to travel to [facility] on Tuesday;"    Syncope  Both episodes triggered by standing. Clinically on admission patient was intravascularly depleted as evidenced by dry mouth, tachycardia, multiple episodes of nausea and vomiting, diarrhea, and limited oral intake due to paracentesis and MRI that day.    - Orthostatic vitals ordered  - Hold off on IV fluids for now given anasarca and pleural effusion, however serum albumin 1.7 and paracentesis (3.7L removed) performed on day of admit so IV albumin 25 g x1 ordered for intravascular depletion  - cultures showed NGTD. Continue to monitor for signs of infection. Continue ceftriaxone    Bilateral lower extremity edema  - Chronic, at baseline  - Holding home diuretic 2/2 syncope    Neuropathy due to chemotherapeutic drug  - Continue home gabapentin    Anxiety associated with cancer diagnosis  - Continue home amitryptiline    Non-intractable vomiting with nausea  - PRN IV ondansetron and promethazine  - Full liquid diet and advance as " tolerated    Severe malnutrition  - Boost TID WM  - Albumin significantly low, see abdominal pain    Normocytic anemia  - CTA A/P negative for acute bleed  - Iron panel and ferritin ordered (previously received Injectafer, not currently on iron supplementation)  - CBC daily    Malignant neoplasm of stomach  - Follows with Dr. Ashraf & Jessee  - Significant increase in size of pelvic mass and peritoneal carcinomatosis on admission  - See intractable abdominal pain    Malignant ascites  - Paracentesis performed 6/16 with 3.7L removed, no samples collected  - See intractable abdominal pain    Chronic gastric ulcer with perforation  - Continue home PPI qd  - Continue home famotidine PRN             Jocelyn Gastelum MD  Hematology/Oncology  [facility] - [facility] Step[facility]

## 2022-06-19 NOTE — PT/OT/SLP EVAL
"Physical Therapy  Evaluation and Treatment    Puja Quigley   [id number]    Time Tracking:     PT Received On: 06/19/22   PT Start Time: 1028   PT Stop Time: 1048   PT Total Time (min): 20 min    Billable Minutes: Evaluation 1 procedure and Gait Training 10 minutes      Recommendations:     Discharge recommendations: Home with family     Equipment recommendations: None    Barriers to Discharge: None    Patient Information:     Recent Surgery: * No surgery found *      Diagnosis: Intractable abdominal pain    Length of Stay: 2 days    General Precautions: Standard, fall  Orthopedic Precautions: None  Brace: None    Assessment:     Puja Quigley is a 41 y.o. female admitted to [facility] [place] [place] on 6/16/2022 for Intractable abdominal pain. Puja Quigley tolerated evaluation well today. She was resting in bed with family (mom, boyfriend) present upon my entry to room, agreeable to evaluation. Reports she is feeling stronger each day but not yet back to her baseline levels. Demonstrates independence with bed mobility and transfers. Recorded resting vitals at side of bed;  bpm, pulse ox 98% on room air. Ambulates 240 ft in hallways (wearing mask) on room air with stand-by assistance, no device; able to hold conversation while walking, stating it feels good to ambulate. HR as high as 138 bpm and pulse ox as low as 88% at end of ambulation trial; within 30 seconds of seated rest, HR was 124 bpm and pulse ox 95%. I asked her to rate how fatiguing the gait trial was on a 0-10 scale (0 being no fatigue, 10 being max fatigue) and she answered "2/10". I'm comfortable with patient ambulating with family or nursing supervision to maintain strength/endurance during admission. Discussed PT role, POC, goals and recommendations (Home with family, no DME needs) with patient and family; verbalized understanding. Puja Quigley would benefit from acute PT services to promote mobility during this admission and improve " "return to PLOF.    Problem List: weakness, decreased endurance, impaired self-care skills, impaired mobility, decreased sitting or standing balance, impaired cardiopulmonary response to activity and pain    Rehab Prognosis: Good; patient would benefit from acute skilled PT services to address these deficits and reach maximum level of function.    Plan:     Patient to be seen 2 x/week to address the above listed problems via gait training, therapeutic activities, therapeutic exercises    Plan of Care Expires: 07/17/22  Plan of Care reviewed with: patient, family    Subjective:     Communicated with DEANA Ortiz prior to evaluation, appropriate to see for evaluation.    Pt found supine in bed (HOB elevated) upon PT entry to room, agreeable to evaluation.    Patient commenting: "PT? Oh man, I didn't know I needed PT."    Does this patient have any cultural, spiritual, Anglican conflicts given the current situation? Patient has no barriers to learning. Patient verbalizes understanding of his/her program and goals and demonstrates them correctly. No cultural, spiritual, or educational needs identified.    Past Medical History:   Diagnosis Date    Anxiety     Dehydration 5/30/2022    Gastric cancer     Gastric ulcer     Hx of psychiatric care     Pregnancy 08/12/2020    delivered on 8/12/2020    Umbilical hernia       Past Surgical History:   Procedure Laterality Date    CLOSURE OF PERFORATED ULCER OF DUODENUM USING OMENTAL PATCH      ESOPHAGOGASTRODUODENOSCOPY N/A 10/13/2020    Procedure: EGD (ESOPHAGOGASTRODUODENOSCOPY);  Surgeon: Rosio Marion MD;  Location: [address]);  Service: Endoscopy;  Laterality: N/A;    ESOPHAGOGASTRODUODENOSCOPY N/A 12/11/2020    Procedure: EGD (ESOPHAGOGASTRODUODENOSCOPY);  Surgeon: Rosio Marion MD;  Location: [address]);  Service: Endoscopy;  Laterality: N/A;  Covid-19 test 12/8/20 at [facility]    ESOPHAGOGASTRODUODENOSCOPY N/A 3/12/2021    " Procedure: EGD (ESOPHAGOGASTRODUODENOSCOPY);  Surgeon: Nav Omer MD;  Location: [facility] ENDO (2ND FLR);  Service: Endoscopy;  Laterality: N/A;  COVID at [facility] 3/9 ttr    ESOPHAGOGASTRODUODENOSCOPY N/A 5/18/2021    Procedure: EGD (ESOPHAGOGASTRODUODENOSCOPY);  Surgeon: Rosio Marion MD;  Location: [facility] ENDO (2ND FLR);  Service: Endoscopy;  Laterality: N/A;  5/15-covid pcw-inst portal-tb    ESOPHAGOGASTRODUODENOSCOPY N/A 5/26/2021    Procedure: EGD (ESOPHAGOGASTRODUODENOSCOPY);  Surgeon: Socrates Terrazas MD;  Location: [facility] ENDO (2ND FLR);  Service: Endoscopy;  Laterality: N/A;       Living Environment:  Pt lives with her boyfriend and her 1 yo son in a 1  with 1 WANDER. Uses a tub/shower at home.    PLOF:  Prior to admission, patient was independent with mobility and ADL's.    DME:  Patient owns or has access to the following DME: None    Upon discharge, patient will have assistance from family.    Objective:     Patient found with: telemetry    Pain:  Pain Rating 1: 5/10 at bilateral lower abdomen (ascites)  Pain Rating Post-Intervention 1: 5/10 (same, see above)    Cognitive Exam:  Patient is oriented to Person, Place, Time and Situation.  Patient follows 100% of single-step commands.    Sensation:   Intact at BLE to LT    Lower Extremity Range of Motion:  Right Lower Extremity: WFL actively  Left Lower Extremity: WFL actively    Lower Extremity Strength:  Right Lower Extremity: grossly 4/5 via MMT  Left Lower Extremity: grossly 4/5 via MMT    Functional Mobility:    · Bed Mobility:  · Supine to Sitting: Independent  · Sitting to Supine: Independent    · Transfers:  · Sit to Stand: Independent from EOB with no AD x 2 trial(s)    · Gait:  · 240 feet in hallways (wearing mask) on room air with stand-by assistance, no device; able to hold conversation while walking, stating it feels good to ambulate.  · Resting vitals with  bpm, pulse ox 98% on room air  · HR as high as 138 bpm and pulse ox as low as 88% at  "end of ambulation trial; within 30 seconds of seated rest, HR was 124 bpm and pulse ox 95%.  · I asked her to rate how fatiguing the gait trial was on a 0-10 scale (0 being no fatigue, 10 being max fatigue) and she answered "2/10".     · Assist level: Stand-By Assist  · Device: no AD    · Balance:  · Static Sit: Independent at EOB    · Static Stand: Independent with no AD    Additional Therapeutic Activity/Exercises:     1. She was resting in bed with family (mom, boyfriend) present upon my entry to room, agreeable to evaluation. Reports she is feeling stronger each day but not yet back to her baseline levels. Demonstrates independence with bed mobility and transfers.    2. Recorded resting vitals at side of bed;  bpm, pulse ox 98% on room air. Ambulates 240 ft in hallways (wearing mask) on room air with stand-by assistance, no device; able to hold conversation while walking, stating it feels good to ambulate. HR as high as 138 bpm and pulse ox as low as 88% at end of ambulation trial; within 30 seconds of seated rest, HR was 124 bpm and pulse ox 95%. I asked her to rate how fatiguing the gait trial was on a 0-10 scale (0 being no fatigue, 10 being max fatigue) and she answered "2/10".    3. I'm comfortable with patient ambulating with family or nursing supervision to maintain strength/endurance during admission. Discussed PT role, POC, goals and recommendations (Home with family, no DME needs) with patient and family; verbalized understanding.    4. Whiteboard was updated.    AM-PAC 6 CLICK MOBILITY  Turning over in bed (including adjusting bedclothes, sheets and blankets)?: 4  Sitting down on and standing up from a chair with arms (e.g., wheelchair, bedside commode, etc.): 4  Moving from lying on back to sitting on the side of the bed?: 4  Moving to and from a bed to a chair (including a wheelchair)?: 4  Need to walk in hospital room?: 4  Climbing 3-5 steps with a railing?: 3  Basic Mobility Total Score: " 23    Patient was left supine in bed (HOB elevated) with all lines intact, call button in reach and family present.    Clinical Decision Making for Evaluation Complexity:  1. Body System(s) Examination: 1-2  2. Clinical Presentation: Evolving  3. Evaluation Complexity: Low    GOALS:   Multidisciplinary Problems     Physical Therapy Goals        Problem: Physical Therapy    Goal Priority Disciplines Outcome Goal Variances Interventions   Physical Therapy Goal     PT, PT/OT      Description: Goals to be met by: 22     Patient will increase functional independence with mobility by performin. Sit to stand transfer with Shorter x 5 consecutive trials without fatigue or SOB - Not met  2. Gait  x 500 feet with Supervision using No Assistive Device - Not met  3. Lower extremity exercise program x 20 reps per handout, with independence - Not met                 Jose Celaya, PT  2022

## 2022-06-20 VITALS
WEIGHT: 158 LBS | DIASTOLIC BLOOD PRESSURE: 95 MMHG | OXYGEN SATURATION: 97 % | TEMPERATURE: 98 F | SYSTOLIC BLOOD PRESSURE: 152 MMHG | HEART RATE: 90 BPM | HEIGHT: 67 IN | RESPIRATION RATE: 16 BRPM | BODY MASS INDEX: 24.8 KG/M2

## 2022-06-20 LAB
ALBUMIN FLD-MCNC: 1.5 G/DL
ALBUMIN SERPL BCP-MCNC: 1.6 G/DL (ref 3.5–5.2)
ALP SERPL-CCNC: 63 U/L (ref 55–135)
ALT SERPL W/O P-5'-P-CCNC: 5 U/L (ref 10–44)
ANION GAP SERPL CALC-SCNC: 5 MMOL/L (ref 8–16)
APPEARANCE FLD: NORMAL
AST SERPL-CCNC: 16 U/L (ref 10–40)
BASOPHILS # BLD AUTO: 0.02 K/UL (ref 0–0.2)
BASOPHILS NFR BLD: 0.3 % (ref 0–1.9)
BILIRUB SERPL-MCNC: 0.4 MG/DL (ref 0.1–1)
BODY FLD TYPE: NORMAL
BODY FLUID SOURCE, LDH: NORMAL
BODY FLUID SOURCE, LDH: NORMAL
BUN SERPL-MCNC: 13 MG/DL (ref 6–20)
CALCIUM SERPL-MCNC: 8.6 MG/DL (ref 8.7–10.5)
CHLORIDE SERPL-SCNC: 102 MMOL/L (ref 95–110)
CO2 SERPL-SCNC: 25 MMOL/L (ref 23–29)
COLOR FLD: NORMAL
CREAT SERPL-MCNC: 0.7 MG/DL (ref 0.5–1.4)
DIFFERENTIAL METHOD: ABNORMAL
EOSINOPHIL # BLD AUTO: 0.1 K/UL (ref 0–0.5)
EOSINOPHIL NFR BLD: 0.7 % (ref 0–8)
ERYTHROCYTE [DISTWIDTH] IN BLOOD BY AUTOMATED COUNT: 13.6 % (ref 11.5–14.5)
EST. GFR  (AFRICAN AMERICAN): >60 ML/MIN/1.73 M^2
EST. GFR  (NON AFRICAN AMERICAN): >60 ML/MIN/1.73 M^2
GLUCOSE SERPL-MCNC: 86 MG/DL (ref 70–110)
GRAM STN SPEC: NORMAL
GRAM STN SPEC: NORMAL
HCT VFR BLD AUTO: 28.9 % (ref 37–48.5)
HGB BLD-MCNC: 9.4 G/DL (ref 12–16)
IMM GRANULOCYTES # BLD AUTO: 0.06 K/UL (ref 0–0.04)
IMM GRANULOCYTES NFR BLD AUTO: 0.8 % (ref 0–0.5)
LDH FLD L TO P-CCNC: 318 U/L
LDH FLD L TO P-CCNC: 320 U/L
LYMPHOCYTES # BLD AUTO: 1.6 K/UL (ref 1–4.8)
LYMPHOCYTES NFR BLD: 21.8 % (ref 18–48)
LYMPHOCYTES NFR FLD MANUAL: 21 %
MAGNESIUM SERPL-MCNC: 1.9 MG/DL (ref 1.6–2.6)
MCH RBC QN AUTO: 28.7 PG (ref 27–31)
MCHC RBC AUTO-ENTMCNC: 32.5 G/DL (ref 32–36)
MCV RBC AUTO: 88 FL (ref 82–98)
MESOTHL CELL NFR FLD MANUAL: 10 %
MONOCYTES # BLD AUTO: 0.5 K/UL (ref 0.3–1)
MONOCYTES NFR BLD: 6.9 % (ref 4–15)
MONOS+MACROS NFR FLD MANUAL: 63 %
NEUTROPHILS # BLD AUTO: 5.1 K/UL (ref 1.8–7.7)
NEUTROPHILS NFR BLD: 69.5 % (ref 38–73)
NEUTROPHILS NFR FLD MANUAL: 6 %
NRBC BLD-RTO: 0 /100 WBC
PHOSPHATE SERPL-MCNC: 3 MG/DL (ref 2.7–4.5)
PLATELET # BLD AUTO: 258 K/UL (ref 150–450)
PMV BLD AUTO: 9.1 FL (ref 9.2–12.9)
POTASSIUM SERPL-SCNC: 4.4 MMOL/L (ref 3.5–5.1)
PROT FLD-MCNC: 2.9 G/DL
PROT SERPL-MCNC: 4.8 G/DL (ref 6–8.4)
RBC # BLD AUTO: 3.28 M/UL (ref 4–5.4)
SODIUM SERPL-SCNC: 132 MMOL/L (ref 136–145)
SPECIMEN SOURCE: NORMAL
SPECIMEN SOURCE: NORMAL
WBC # BLD AUTO: 7.28 K/UL (ref 3.9–12.7)
WBC # FLD: 315 /CU MM

## 2022-06-20 PROCEDURE — 94761 N-INVAS EAR/PLS OXIMETRY MLT: CPT

## 2022-06-20 PROCEDURE — S0109 METHADONE ORAL 5MG: HCPCS

## 2022-06-20 PROCEDURE — 83615 LACTATE (LD) (LDH) ENZYME: CPT | Mod: 91

## 2022-06-20 PROCEDURE — 87075 CULTR BACTERIA EXCEPT BLOOD: CPT

## 2022-06-20 PROCEDURE — 87102 FUNGUS ISOLATION CULTURE: CPT

## 2022-06-20 PROCEDURE — 84100 ASSAY OF PHOSPHORUS: CPT | Performed by: STUDENT IN AN ORGANIZED HEALTH CARE EDUCATION/TRAINING PROGRAM

## 2022-06-20 PROCEDURE — 63600175 PHARM REV CODE 636 W HCPCS

## 2022-06-20 PROCEDURE — 85025 COMPLETE CBC W/AUTO DIFF WBC: CPT | Performed by: STUDENT IN AN ORGANIZED HEALTH CARE EDUCATION/TRAINING PROGRAM

## 2022-06-20 PROCEDURE — 99239 PR HOSPITAL DISCHARGE DAY,>30 MIN: ICD-10-PCS | Mod: ,,, | Performed by: INTERNAL MEDICINE

## 2022-06-20 PROCEDURE — 25000003 PHARM REV CODE 250: Performed by: FAMILY MEDICINE

## 2022-06-20 PROCEDURE — 83735 ASSAY OF MAGNESIUM: CPT | Performed by: STUDENT IN AN ORGANIZED HEALTH CARE EDUCATION/TRAINING PROGRAM

## 2022-06-20 PROCEDURE — 36415 COLL VENOUS BLD VENIPUNCTURE: CPT | Performed by: STUDENT IN AN ORGANIZED HEALTH CARE EDUCATION/TRAINING PROGRAM

## 2022-06-20 PROCEDURE — 80053 COMPREHEN METABOLIC PANEL: CPT | Performed by: STUDENT IN AN ORGANIZED HEALTH CARE EDUCATION/TRAINING PROGRAM

## 2022-06-20 PROCEDURE — 25000003 PHARM REV CODE 250: Performed by: STUDENT IN AN ORGANIZED HEALTH CARE EDUCATION/TRAINING PROGRAM

## 2022-06-20 PROCEDURE — 99239 HOSP IP/OBS DSCHRG MGMT >30: CPT | Mod: ,,, | Performed by: INTERNAL MEDICINE

## 2022-06-20 PROCEDURE — 25000003 PHARM REV CODE 250

## 2022-06-20 PROCEDURE — 84157 ASSAY OF PROTEIN OTHER: CPT

## 2022-06-20 PROCEDURE — 87070 CULTURE OTHR SPECIMN AEROBIC: CPT

## 2022-06-20 PROCEDURE — 87205 SMEAR GRAM STAIN: CPT

## 2022-06-20 PROCEDURE — 63600175 PHARM REV CODE 636 W HCPCS: Performed by: STUDENT IN AN ORGANIZED HEALTH CARE EDUCATION/TRAINING PROGRAM

## 2022-06-20 PROCEDURE — 82042 OTHER SOURCE ALBUMIN QUAN EA: CPT

## 2022-06-20 PROCEDURE — 89051 BODY FLUID CELL COUNT: CPT

## 2022-06-20 RX ORDER — OXYCODONE HYDROCHLORIDE 5 MG/1
5 TABLET ORAL EVERY 6 HOURS PRN
Qty: 60 TABLET | Refills: 0 | Status: SHIPPED | OUTPATIENT
Start: 2022-06-20 | End: 2022-09-23 | Stop reason: SDUPTHER

## 2022-06-20 RX ORDER — LIDOCAINE HYDROCHLORIDE 10 MG/ML
INJECTION INFILTRATION; PERINEURAL CODE/TRAUMA/SEDATION MEDICATION
Status: COMPLETED | OUTPATIENT
Start: 2022-06-20 | End: 2022-06-20

## 2022-06-20 RX ORDER — BACLOFEN 5 MG/1
5 TABLET ORAL 3 TIMES DAILY
Qty: 30 TABLET | Refills: 0 | Status: SHIPPED | OUTPATIENT
Start: 2022-06-20 | End: 2022-07-14 | Stop reason: SDUPTHER

## 2022-06-20 RX ORDER — LIDOCAINE HYDROCHLORIDE 20 MG/ML
JELLY TOPICAL
Qty: 100 ML | Refills: 0 | Status: SHIPPED | OUTPATIENT
Start: 2022-06-20 | End: 2022-08-17 | Stop reason: SDUPTHER

## 2022-06-20 RX ORDER — NORTRIPTYLINE HYDROCHLORIDE 25 MG/1
25 CAPSULE ORAL NIGHTLY
Qty: 30 CAPSULE | Refills: 11 | Status: ON HOLD | OUTPATIENT
Start: 2022-06-20 | End: 2022-11-10 | Stop reason: HOSPADM

## 2022-06-20 RX ORDER — OMEPRAZOLE 40 MG/1
40 CAPSULE, DELAYED RELEASE ORAL
Qty: 60 CAPSULE | Refills: 11 | Status: ON HOLD | OUTPATIENT
Start: 2022-06-20 | End: 2022-10-14 | Stop reason: HOSPADM

## 2022-06-20 RX ORDER — POLYETHYLENE GLYCOL 3350 17 G/17G
17 POWDER, FOR SOLUTION ORAL DAILY
Qty: 510 G | Refills: 0 | Status: SHIPPED | OUTPATIENT
Start: 2022-06-21

## 2022-06-20 RX ORDER — METHADONE HYDROCHLORIDE 5 MG/5ML
2.5 SOLUTION ORAL EVERY 12 HOURS
Qty: 50 ML | Refills: 0 | Status: SHIPPED | OUTPATIENT
Start: 2022-06-20 | End: 2022-06-21 | Stop reason: SDUPTHER

## 2022-06-20 RX ORDER — LIDOCAINE HYDROCHLORIDE 10 MG/ML
INJECTION INFILTRATION; PERINEURAL
Status: DISCONTINUED
Start: 2022-06-20 | End: 2022-06-20 | Stop reason: HOSPADM

## 2022-06-20 RX ADMIN — SENNOSIDES AND DOCUSATE SODIUM 1 TABLET: 50; 8.6 TABLET ORAL at 10:06

## 2022-06-20 RX ADMIN — HYDROMORPHONE HYDROCHLORIDE 0.5 MG: 1 INJECTION, SOLUTION INTRAMUSCULAR; INTRAVENOUS; SUBCUTANEOUS at 08:06

## 2022-06-20 RX ADMIN — OXYCODONE 5 MG: 5 TABLET ORAL at 03:06

## 2022-06-20 RX ADMIN — DICYCLOMINE HYDROCHLORIDE 10 MG: 10 CAPSULE ORAL at 10:06

## 2022-06-20 RX ADMIN — BACLOFEN 5 MG: 5 TABLET ORAL at 10:06

## 2022-06-20 RX ADMIN — PANTOPRAZOLE SODIUM 40 MG: 40 TABLET, DELAYED RELEASE ORAL at 08:06

## 2022-06-20 RX ADMIN — LIDOCAINE HYDROCHLORIDE 10 ML: 10 INJECTION, SOLUTION INFILTRATION; PERINEURAL at 09:06

## 2022-06-20 RX ADMIN — METHADONE HYDROCHLORIDE 2.5 MG: 5 SOLUTION ORAL at 08:06

## 2022-06-20 RX ADMIN — CEFTRIAXONE 2 G: 2 INJECTION, SOLUTION INTRAVENOUS at 10:06

## 2022-06-20 RX ADMIN — POLYETHYLENE GLYCOL 3350 17 G: 17 POWDER, FOR SOLUTION ORAL at 10:06

## 2022-06-20 NOTE — NURSING
Patient left via wheelchair accompanied by staff and family, with all personal belongings, discharge handouts and Rx meds.  Prior to discharge reviewed medication changes & discharge handout.  Pt states understanding that Rx for methadone was sent to her home [facility]

## 2022-06-20 NOTE — RESPIRATORY THERAPY
RAPID RESPONSE RESPIRATORY CHART CHECK       Chart check completed. Verified orders and therapy match. no concerns verbalized at this time, instructed to call [phone] for further concerns or assistance.

## 2022-06-20 NOTE — DISCHARGE SUMMARY
[facility] - Transplant Stepdown  Hematology  Bone Marrow Transplant  Discharge Summary      Patient Name: Puja Quigley  MRN: [id number]  Admission Date: 6/16/2022  Hospital Length of Stay: 3 days  Discharge Date and Time:  06/20/2022 12:09 PM  Attending Physician: Louann Sinha MD   Discharging Provider: Bren Allen MD  Primary Care Provider: Primary Doctor No    HPI:Puja Quigley is a 41 year-old woman with metastatic gastric adenocarcinoma on FOLFIRI (cycle 2 on 6/13/22) c/b malignant ascites requiring weekly paracentesis, chronic gastric ulcer with perforation c/b CATINA 2/2 chronic blood loss, chronic lower extremity edema, anxiety, malnutrition, and peripheral neuropathy who presented to [facility] on 6/16 after a syncopal episode after an outpatient MRI.     She started having nausea, vomiting, and worsened indigestion on 6/14 which she also had after her 1st cycle of FOLFIRI. She also admits to some fatigue and loss of appetite over the past month. On 6/15 she had diarrhea with 3 loose, non-bloody, non-melanotic bowel movements associated with some diffuse lower abdominal pain and cramping. She took her home Norco with improvement in abdominal pain. She denied any recent sick contacts, travel, fevers, chills, night sweats, nasal congestion, sore throat, cough, SOB, or chest pain (although does have epigastric indigestion pain). On 6/16 AM she had her normally scheduled paracentesis at [facility] where 3.7L of serosanguinous fluid was drained. She tolerated this well. In the afternoon she went to her MRI abdomen/pelvis at [facility] and while in the MRI started to experience RLQ & LLQ pain worse than ever before, 10/10. After finishing the scan she stood up to put her shoes on and then had a syncopal episode. BP at that time after awakening was 98/59. She also had an episode of non-bloody, non-bilious emesis. This was her first time receiving Gadavist contrast. She did not have any focal neurologic deficits, hives,  rash, throat swelling or SOB at the time.      On arrival to the ER, /70, HR 80, RR 20, temp 98.5F, saturation 97% on room air. Labs were significant for Hgb 9.5 (baseline ~12), WBC 4.01, Plt 347k, normal electrolytes, renal function & liver function, albumin 1.7, lipase 27, COVID negative. CXR showed unchanged moderate L pleural effusion. CTA abdomen/pelvis was performed which did not show active bleeding however did show significant increase in size of pelvic mass 12.4 x 22.0 x 18.4 cm (previously 14 x 10 cm in April '22), worsening peritoneal carcinomatosis and anasarca. In the ER she received IV morphine 4 mg x2 which helped minimally with her pain and caused her to be nauseous and vomit again. Upon returning from the bathroom, she had another syncopal episode witnessed by the nurse. BP at that time remained 110s/70s. She received IV ondansetron and promethazine in the ER for nausea. Pain persisted and she received IV hydromorphone 0.5 mg x2 with improvement of pain from 10/10 to 7/10. Throughout her ER stay she became more tachycardic up to the 140s. She was admitted to medical oncology for pain control and further management.       Hospital Course: Patient underwent abdominal imaging which revealed worsening metastatic disease. This is the likely source of her abdominal pain. Palliative care consulted for pain management: started methadone 2.5mtg q12h, nortriptyline 25mg qhs, lidocaine gel, oxycodone PRN. Medications to be delivered at bedside.       Goals of Care Treatment Preferences:  Code Status: Full Code      Consults (From admission, onward)        Status Ordering Provider     Inpatient consult to Palliative Care  Once        Provider:  (Not yet assigned)    Completed SHAHIDA PLATT          Significant Diagnostic Studies: Labs:   CMP   Recent Labs   Lab 06/19/22  0604 06/19/22  1400 06/20/22  0645   *  --  132*   K 4.4  --  4.4     --  102   CO2 22*  --  25   GLU 93  --  86   BUN 15   --  13   CREATININE 0.7  --  0.7   CALCIUM 8.1*  --  8.6*   PROT 4.5* 4.5* 4.8*   ALBUMIN 1.6*  --  1.6*   BILITOT 0.3  --  0.4   ALKPHOS 60  --  63   AST 14  --  16   ALT 7*  --  5*   ANIONGAP 8  --  5*   ESTGFRAFRICA >60.0  --  >60.0   EGFRNONAA >60.0  --  >60.0    and CBC   Recent Labs   Lab 06/19/22  0604 06/19/22  1400 06/20/22 0645   WBC 8.21 6.72 7.28   HGB 9.6*  --  9.4*   HCT 29.0*  --  28.9*     --  258       Pending Diagnostic Studies:     Procedure Component Value Units Date/Time    Albumin, Peritoneal, Pleural Fluid or JEAN CLAUDE Drainage, In-House Ascites [[id number]] Collected: 06/20/22 0920    Order Status: Sent Lab Status: In process Updated: 06/20/22 0921    Specimen: Body Fluid     IR Paracentesis with Imaging [[id number]] Resulted: 06/20/22 1002    Order Status: Sent Lab Status: In process Updated: 06/20/22 1054    LDH, Peritoneal, Pleural Fluid or JEAN CLAUDE Drainage, In-House Ascites [[id number]] Collected: 06/20/22 0920    Order Status: Sent Lab Status: In process Updated: 06/20/22 0921    Specimen: Body Fluid     LDH, Peritoneal, Pleural Fluid or JEAN CLAUDE Drainage, In-House Ascites [[id number]] Collected: 06/20/22 0920    Order Status: Sent Lab Status: In process Updated: 06/20/22 0921    Specimen: Body Fluid     Protein, Peritoneal, Pleural Fluid or JEAN CLAUDE Drainage, In-House Ascites [[id number]] Collected: 06/20/22 0920    Order Status: Sent Lab Status: In process Updated: 06/20/22 0921    Specimen: Body Fluid     WBC & Diff,Body Fluid Ascites [[id number]] Collected: 06/20/22 0920    Order Status: Sent Lab Status: In process Updated: 06/20/22 0921    Specimen: Body Fluid         Final Active Diagnoses:    Diagnosis Date Noted POA    PRINCIPAL PROBLEM:  Intractable abdominal pain [R10.9] 09/24/2020 Yes    Palliative care encounter [Z51.5] 06/18/2022 Not Applicable    Bilateral lower extremity edema [R60.0] 06/17/2022 Yes     Chronic    Syncope [R55] 06/17/2022 Yes    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, T45.1X5A] 02/07/2022 Yes    Anxiety associated with cancer diagnosis [F41.1, C80.1] 12/27/2021 Yes    Non-intractable vomiting with nausea [R11.2] 09/22/2021 Yes    Severe malnutrition [E43] 06/11/2021 Yes    Normocytic anemia [D64.9] 06/10/2021 Yes    Malignant neoplasm of stomach [C16.9] 06/10/2021 Yes    Malignant ascites [R18.0] 05/22/2021 Yes    Chronic gastric ulcer with perforation [K25.5] 05/18/2021 Yes      Problems Resolved During this Admission:      Discharged Condition: good    Disposition: Home or Self Care    Follow Up:   Future Appointments   Date Time Provider Department Center   6/22/2022  2:00 PM Sharon Brink MD [facility]   6/27/2022  9:30 AM [facility] IR1 [facility] RAD IR [facility]   6/27/2022  1:00 PM CHAIR 03 [facility] CHEMO [facility]   6/29/2022  1:15 PM CHAIR 04 [facility] CHEMO [facility]   6/29/2022  6:00 PM ONC [facility]EDIT[facility], [facility] INTEGRATIVE ONCOLOGY VALIENTE 3RD FLOOR [facility] DENA Ramires   7/5/2022 10:00 AM Giselle Berumen, PhD [facility] REX Ramires   7/11/2022  1:00 PM CHAIR 03 WB WB CHEMO [facility]   7/13/2022  1:15 PM CHAIR 04 WB WB CHEMO [facility]         Patient Instructions:   No discharge procedures on file.  Medications:  Reconciled Home Medications:      Medication List      START taking these medications    baclofen 5 mg Tab tablet  Commonly known as: LIORESAL  Take 1 tablet (5 mg total) by mouth 3 (three) times daily.     LIDOcaine HCL 2% 2 % jelly  Commonly known as: XYLOCAINE  Apply topically as needed.     methadone 5 mg/5 mL solution  Commonly known as: DOLOPHINE  Take 2.5 mLs (2.5 mg total) by mouth every 12 (twelve) hours.     nortriptyline 25 MG capsule  Commonly known as: PAMELOR  Take 1 capsule (25 mg total) by mouth every evening.     oxyCODONE 5 MG immediate release tablet  Commonly known as: ROXICODONE  Take 1 tablet (5 mg total) by mouth every 6 (six) hours as needed.     polyethylene glycol 17 gram  Pwpk  Commonly known as: GLYCOLAX  Take 17 g by mouth once daily.  Start taking on: June 21, 2022        CHANGE how you take these medications    furosemide 20 MG tablet  Commonly known as: LASIX  Take 10 mg by mouth.  What changed: Another medication with the same name was removed. Continue taking this medication, and follow the directions you see here.     LIDOcaine-prilocaine cream  Commonly known as: EMLA  Apply to Port-A-Cath area 30 to 45 minutes prior to port access as directed (topical anesthetic use to the anterior chest only).  What changed: Another medication with the same name was removed. Continue taking this medication, and follow the directions you see here.     omeprazole 40 MG capsule  Commonly known as: PRILOSEC  Take 1 capsule (40 mg total) by mouth 2 (two) times daily before meals.  What changed: when to take this     prochlorperazine 10 MG tablet  Commonly known as: COMPAZINE  TAKE 1 TABLET(10 MG) BY MOUTH EVERY 6 HOURS AS NEEDED FOR NAUSEA  What changed: Another medication with the same name was removed. Continue taking this medication, and follow the directions you see here.     senna-docusate 8.6-50 mg 8.6-50 mg per tablet  Commonly known as: SENNA WITH DOCUSATE SODIUM  Take 1 tablet by mouth 2 (two) times daily as needed for Constipation.  What changed: Another medication with the same name was removed. Continue taking this medication, and follow the directions you see here.     traMADoL 50 mg tablet  Commonly known as: ULTRAM  Take 50 mg by mouth.  What changed: Another medication with the same name was removed. Continue taking this medication, and follow the directions you see here.        CONTINUE taking these medications    dexAMETHasone 4 MG Tab  Commonly known as: DECADRON  8 mg daily on days 2 through 4 of each chemotherapy cycle.     dicyclomine 10 MG capsule  Commonly known as: BENTYL  TAKE ONE CAPSULE BY MOUTH FOUR TIMES DAILY     famotidine 20 MG tablet  Commonly known as:  PEPCID  Take 1 tablet (20 mg total) by mouth nightly as needed for Heartburn.     gabapentin 300 MG capsule  Commonly known as: NEURONTIN  Take 1 capsule (300 mg total) by mouth 3 (three) times daily.     metoclopramide HCl 5 MG tablet  Commonly known as: REGLAN  5 mg.     multivitamin with folic acid 400 mcg Tab  Take 1 tablet by mouth once daily.     ondansetron 8 MG tablet  Commonly known as: ZOFRAN  Take 1 tablet (8 mg total) by mouth every 8 (eight) hours as needed for Nausea.     promethazine 25 MG tablet  Commonly known as: PHENERGAN  Take 1 tablet (25 mg total) by mouth every 6 (six) hours as needed for Nausea.     * sucralfate 1 gram tablet  Commonly known as: CARAFATE     * sucralfate 1 gram tablet  Commonly known as: CARAFATE  TAKE 1 TABLET(1 GRAM) BY MOUTH FOUR TIMES DAILY FOR 14 DAYS         * This list has 2 medication(s) that are the same as other medications prescribed for you. Read the directions carefully, and ask your doctor or other care provider to review them with you.            STOP taking these medications    acetaminophen 500 MG tablet  Commonly known as: TYLENOL     amitriptyline 10 MG tablet  Commonly known as: ELAVIL     HYDROcodone-acetaminophen 5-325 mg per tablet  Commonly known as: NORCO     pantoprazole 40 MG tablet  Commonly known as: PROTONIX            Bren Allen MD  Bone Marrow Transplant  [facility]

## 2022-06-20 NOTE — H&P
Inpatient Radiology Pre-procedure Note    History of Present Illness:  Puja Quigley is a 41 y.o. female who presents for ultrasound guided paracentesis.  Admission H&P reviewed.  Past Medical History:   Diagnosis Date    Anxiety     Dehydration 5/30/2022    Gastric cancer     Gastric ulcer     Hx of psychiatric care     Pregnancy 08/12/2020    delivered on 8/12/2020    Umbilical hernia      Past Surgical History:   Procedure Laterality Date    CLOSURE OF PERFORATED ULCER OF DUODENUM USING OMENTAL PATCH      ESOPHAGOGASTRODUODENOSCOPY N/A 10/13/2020    Procedure: EGD (ESOPHAGOGASTRODUODENOSCOPY);  Surgeon: Rosio Marion MD;  Location: [facility] (2ND FLR);  Service: Endoscopy;  Laterality: N/A;    ESOPHAGOGASTRODUODENOSCOPY N/A 12/11/2020    Procedure: EGD (ESOPHAGOGASTRODUODENOSCOPY);  Surgeon: Rosio Mraion MD;  Location: [facility] (2ND FLR);  Service: Endoscopy;  Laterality: N/A;  Covid-19 test 12/8/20 at [facility]    ESOPHAGOGASTRODUODENOSCOPY N/A 3/12/2021    Procedure: EGD (ESOPHAGOGASTRODUODENOSCOPY);  Surgeon: Nav Omer MD;  Location: [facility] (2ND FLR);  Service: Endoscopy;  Laterality: N/A;  COVID at [facility] 3/9 ttr    ESOPHAGOGASTRODUODENOSCOPY N/A 5/18/2021    Procedure: EGD (ESOPHAGOGASTRODUODENOSCOPY);  Surgeon: Rosio Marion MD;  Location: [facility] (2ND FLR);  Service: Endoscopy;  Laterality: N/A;  5/15-covid pcw-inst portal-tb    ESOPHAGOGASTRODUODENOSCOPY N/A 5/26/2021    Procedure: EGD (ESOPHAGOGASTRODUODENOSCOPY);  Surgeon: Socrates Terrazas MD;  Location: [facility] ENDO (2ND FLR);  Service: Endoscopy;  Laterality: N/A;       Review of Systems:   As documented in primary team H&P    Home Meds:   Prior to Admission medications    Medication Sig Start Date End Date Taking? Authorizing Provider   dicyclomine (BENTYL) 10 MG capsule TAKE ONE CAPSULE BY MOUTH FOUR TIMES DAILY 9/27/21  Yes Kamille Hooks PA-C   famotidine (PEPCID) 20 MG tablet Take 1 tablet  (20 mg total) by mouth nightly as needed for Heartburn. 2/23/22 2/23/23 Yes Kamille Hooks PA-C   furosemide (LASIX) 20 MG tablet Take 10 mg by mouth.   Yes Historical Provider   gabapentin (NEURONTIN) 300 MG capsule Take 1 capsule (300 mg total) by mouth 3 (three) times daily. 2/7/22 2/7/23 Yes Kamille Hooks PA-C   HYDROcodone-acetaminophen (NORCO) 5-325 mg per tablet Take 1 tablet by mouth. 7/9/21  Yes Historical Provider   HYDROcodone-acetaminophen (NORCO) 5-325 mg per tablet Take 1 tablet by mouth 3 (three) times daily as needed for Pain. 5/25/22  Yes Kamille Hooks PA-C   pantoprazole (PROTONIX) 40 MG tablet Take 1 tablet (40 mg total) by mouth 2 (two) times daily. 5/25/22  Yes Kamille Hooks PA-C   promethazine (PHENERGAN) 25 MG tablet Take 1 tablet (25 mg total) by mouth every 6 (six) hours as needed for Nausea. 6/13/22  Yes Fer Ashraf MD   acetaminophen (TYLENOL) 500 MG tablet Take 500 mg by mouth every 6 (six) hours as needed for Pain.    Historical Provider   amitriptyline (ELAVIL) 10 MG tablet Take 1 tablet (10 mg total) by mouth every evening. 5/13/21   Socrates Terrazas MD   amitriptyline (ELAVIL) 10 MG tablet Take 10 mg by mouth. 5/13/21   Historical Provider   dexAMETHasone (DECADRON) 4 MG Tab 8 mg daily on days 2 through 4 of each chemotherapy cycle. 6/13/22   Fer Ashraf MD   furosemide (LASIX) 40 MG tablet  5/26/21   Historical Provider   LIDOcaine-prilocaine (EMLA) cream Apply to Port-A-Cath area 30 to 45 minutes prior to port access as directed (topical anesthetic use to the anterior chest only). 6/25/21   Historical Provider   LIDOcaine-prilocaine (EMLA) cream Apply topically. 6/25/21   Historical Provider   metoclopramide HCl (REGLAN) 5 MG tablet 5 mg. 6/11/21   Historical Provider   multivitamin with folic acid 400 mcg Tab Take 1 tablet by mouth once daily.    Historical Provider   omeprazole (PRILOSEC) 40 MG capsule Take 1 capsule (40 mg total) by mouth once daily.  5/25/22 5/25/23  Kamille Hooks PA-C   ondansetron (ZOFRAN) 8 MG tablet Take 1 tablet (8 mg total) by mouth every 8 (eight) hours as needed for Nausea. 7/26/21   Fer Ashraf MD   pantoprazole (PROTONIX) 40 MG tablet Take 40 mg by mouth. 6/2/21   Historical Provider   prochlorperazine (COMPAZINE) 10 MG tablet TAKE 1 TABLET(10 MG) BY MOUTH EVERY 6 HOURS AS NEEDED FOR NAUSEA 8/7/21   Fer Ashraf MD   prochlorperazine (COMPAZINE) 10 MG tablet as needed. 6/3/21   Historical Provider   senna-docusate 8.6-50 mg (PERICOLACE) 8.6-50 mg per tablet Take 1 tablet by mouth. 5/26/21   Historical Provider   senna-docusate 8.6-50 mg (SENNA WITH DOCUSATE SODIUM) 8.6-50 mg per tablet Take 1 tablet by mouth 2 (two) times daily as needed for Constipation. 5/26/21   Reshma Wilson MD   sucralfate (CARAFATE) 1 gram tablet TAKE 1 TABLET(1 GRAM) BY MOUTH FOUR TIMES DAILY FOR 14 DAYS 10/20/21   Rosio Marion MD   sucralfate (CARAFATE) 1 gram tablet  4/8/21   Historical Provider   traMADoL (ULTRAM) 50 mg tablet Take 50 mg by mouth. 6/25/21   Historical Provider   traMADoL (ULTRAM) 50 mg tablet Take by mouth. 6/25/21   Historical Provider     Scheduled Meds:    LIDOcaine HCL 10 mg/ml (1%)        amitriptyline  10 mg Oral QHS    baclofen  5 mg Oral TID    cefTRIAXone (ROCEPHIN) IVPB  2 g Intravenous Q24H    dicyclomine  10 mg Oral QID    gabapentin  300 mg Oral TID    heparin (porcine)  5,000 Units Subcutaneous Q8H    methadone  2.5 mg Oral Q12H    pantoprazole  40 mg Oral BID    polyethylene glycol  17 g Oral Daily    senna-docusate 8.6-50 mg  1 tablet Oral Daily     Continuous Infusions:   PRN Meds:sodium chloride, acetaminophen, acetaminophen, dextrose 10%, dextrose 10%, diphenhydrAMINE, famotidine, glucagon (human recombinant), glucose, glucose, HYDROmorphone, LIDOcaine HCL 2%, loperamide, melatonin, naloxone, ondansetron, oxyCODONE, promethazine (PHENERGAN) IVPB, simethicone, sodium chloride  0.9%  Anticoagulants/Antiplatelets: Heparin    Allergies: Review of patient's allergies indicates:  No Known Allergies  Sedation Hx: have not been any systemic reactions    Vitals:  Temp: 98.5 °F (36.9 °C) (06/20/22 0332)  Pulse: 95 (06/20/22 0907)  Resp: 18 (06/20/22 0907)  BP: (!) 155/104 (06/20/22 0907)  SpO2: 100 % (06/20/22 0907)     Physical Exam:  ASA: 3  Mallampati: n/a    General: no acute distress  Mental Status: alert and oriented to person, place and time  HEENT: normocephalic, atraumatic  Chest: unlabored breathing  Heart: regular heart rate  Abdomen: distended  Extremity: moves all extremities    Plan: ultrasound guided paracentesis  Sedation Plan: local    RAJESH Medina, ALBAP  Interventional Radiology  [phone] [facility]

## 2022-06-20 NOTE — PROCEDURES
Radiology Post-Procedure Note    Pre Op Diagnosis: Ascites  Post Op Diagnosis: Same    Procedure: Ultrasound Guided Paracentesis    Procedure performed by: Lane MADRIGAL, Rosetta     Written Informed Consent Obtained: Yes  Specimen Removed: YES renuka  Estimated Blood Loss: Minimal    Findings:   Successful paracentesis.  Albumin administered PRN per protocol.    Patient tolerated procedure well.    Rosetta Sherman, APRN, FNP  Interventional Radiology  [phone] clinic

## 2022-06-20 NOTE — PLAN OF CARE
Patient awake and alert, no distress noted, respirations even and unlabored. Allergies reviewed. Waiting for eval and consent.  Vitals:    06/20/22 0907   BP: (!) 155/104   Pulse: 95   Resp: 18   Temp:

## 2022-06-20 NOTE — PLAN OF CARE
Admitted 6/16 after a syncopal episode at outpatient MRI  -AAO4, VSS/afebrile, NST on tele (low 100s), on RA  -echo done 6/19; EF 65%, showing large L pleural effusion  -hx of metastatic gastric adenocarcinoma; received cycle #2 of FOLFIRI 6/13/22  -pain controlled with PRN oxy/dilaudid as ordered, scheduled methadone, baclofen for hiccups  -weekly tutu for malignant ascites; NPO at midnight for para in AM  -abd pain from tumor size or SBP? Rocephin q24 as ordered  -labs> H/H stable, WBC WDL  -palliative care consulted  -fall precautions maintained, call bell/personal items in reach

## 2022-06-20 NOTE — PLAN OF CARE
Care plan adequate for discharge    Problem: Adult Inpatient Plan of Care  Goal: Plan of Care Review  Outcome: Adequate for Care Transition  Goal: Patient-Specific Goal (Individualized)  Outcome: Adequate for Care Transition  Goal: Absence of Hospital-Acquired Illness or Injury  Outcome: Adequate for Care Transition  Goal: Optimal Comfort and Wellbeing  Outcome: Adequate for Care Transition  Goal: Readiness for Transition of Care  Outcome: Adequate for Care Transition     Problem: Infection  Goal: Absence of Infection Signs and Symptoms  Outcome: Adequate for Care Transition     Problem: Coping Ineffective  Goal: Effective Coping  Outcome: Adequate for Care Transition

## 2022-06-20 NOTE — PLAN OF CARE
Paracentesis done. Removed 2400 ml serosanguinous ascites. Patient awake and alert, no distress noted, respirations even and unlabored. Report called to Tasia LEBLANC.  Vitals:    06/20/22 1027   BP: (!) 131/90   Pulse: 95   Resp: 16   Temp:

## 2022-06-21 ENCOUNTER — PATIENT MESSAGE (OUTPATIENT)
Dept: HEMATOLOGY/ONCOLOGY | Facility: CLINIC | Age: 42
End: 2022-06-21
Payer: COMMERCIAL

## 2022-06-21 DIAGNOSIS — Z51.5 PALLIATIVE CARE ENCOUNTER: ICD-10-CM

## 2022-06-21 RX ORDER — METHADONE HYDROCHLORIDE 5 MG/5ML
2.5 SOLUTION ORAL EVERY 12 HOURS
Qty: 75 ML | Refills: 0 | Status: SHIPPED | OUTPATIENT
Start: 2022-06-21 | End: 2022-07-06 | Stop reason: SDUPTHER

## 2022-06-22 ENCOUNTER — TELEPHONE (OUTPATIENT)
Dept: PHARMACY | Facility: CLINIC | Age: 42
End: 2022-06-22
Payer: COMMERCIAL

## 2022-06-22 LAB
BACTERIA BLD CULT: NORMAL
BACTERIA BLD CULT: NORMAL

## 2022-06-23 ENCOUNTER — PATIENT MESSAGE (OUTPATIENT)
Dept: HEMATOLOGY/ONCOLOGY | Facility: CLINIC | Age: 42
End: 2022-06-23
Payer: COMMERCIAL

## 2022-06-23 ENCOUNTER — TELEPHONE (OUTPATIENT)
Dept: PHARMACY | Facility: CLINIC | Age: 42
End: 2022-06-23
Payer: COMMERCIAL

## 2022-06-23 LAB — BACTERIA SPEC AEROBE CULT: NO GROWTH

## 2022-06-27 ENCOUNTER — INFUSION (OUTPATIENT)
Dept: INFUSION THERAPY | Facility: HOSPITAL | Age: 42
End: 2022-06-27
Attending: INTERNAL MEDICINE
Payer: COMMERCIAL

## 2022-06-27 ENCOUNTER — OFFICE VISIT (OUTPATIENT)
Dept: HEMATOLOGY/ONCOLOGY | Facility: CLINIC | Age: 42
End: 2022-06-27
Payer: COMMERCIAL

## 2022-06-27 ENCOUNTER — PATIENT MESSAGE (OUTPATIENT)
Dept: HEMATOLOGY/ONCOLOGY | Facility: CLINIC | Age: 42
End: 2022-06-27
Payer: COMMERCIAL

## 2022-06-27 ENCOUNTER — HOSPITAL ENCOUNTER (OUTPATIENT)
Dept: INTERVENTIONAL RADIOLOGY/VASCULAR | Facility: HOSPITAL | Age: 42
Discharge: HOME | End: 2022-06-27
Attending: INTERNAL MEDICINE
Payer: COMMERCIAL

## 2022-06-27 VITALS
OXYGEN SATURATION: 100 % | DIASTOLIC BLOOD PRESSURE: 90 MMHG | RESPIRATION RATE: 16 BRPM | HEART RATE: 102 BPM | SYSTOLIC BLOOD PRESSURE: 151 MMHG

## 2022-06-27 VITALS
SYSTOLIC BLOOD PRESSURE: 129 MMHG | OXYGEN SATURATION: 97 % | TEMPERATURE: 98 F | DIASTOLIC BLOOD PRESSURE: 75 MMHG | RESPIRATION RATE: 16 BRPM | HEART RATE: 105 BPM

## 2022-06-27 DIAGNOSIS — R11.2 NON-INTRACTABLE VOMITING WITH NAUSEA, UNSPECIFIED VOMITING TYPE: ICD-10-CM

## 2022-06-27 DIAGNOSIS — C79.60: ICD-10-CM

## 2022-06-27 DIAGNOSIS — R18.8 OTHER ASCITES: ICD-10-CM

## 2022-06-27 DIAGNOSIS — K25.5 CHRONIC GASTRIC ULCER WITH PERFORATION: ICD-10-CM

## 2022-06-27 DIAGNOSIS — T45.1X5A NEUROPATHY DUE TO CHEMOTHERAPEUTIC DRUG: ICD-10-CM

## 2022-06-27 DIAGNOSIS — C78.6 PERITONEAL CARCINOMATOSIS: ICD-10-CM

## 2022-06-27 DIAGNOSIS — C79.51 BONE METASTASES: ICD-10-CM

## 2022-06-27 DIAGNOSIS — D50.9 MICROCYTIC ANEMIA: ICD-10-CM

## 2022-06-27 DIAGNOSIS — C16.9 MALIGNANT NEOPLASM OF STOMACH, UNSPECIFIED LOCATION: ICD-10-CM

## 2022-06-27 DIAGNOSIS — F41.1 ANXIETY ASSOCIATED WITH CANCER DIAGNOSIS: ICD-10-CM

## 2022-06-27 DIAGNOSIS — E43 SEVERE MALNUTRITION: ICD-10-CM

## 2022-06-27 DIAGNOSIS — R60.0 EDEMA OF BOTH LOWER EXTREMITIES: ICD-10-CM

## 2022-06-27 DIAGNOSIS — E86.0 DEHYDRATION: Primary | ICD-10-CM

## 2022-06-27 DIAGNOSIS — T45.1X5A IMMUNODEFICIENCY DUE TO CHEMOTHERAPY: ICD-10-CM

## 2022-06-27 DIAGNOSIS — R52 PAIN: ICD-10-CM

## 2022-06-27 DIAGNOSIS — D84.821 IMMUNODEFICIENCY DUE TO CHEMOTHERAPY: ICD-10-CM

## 2022-06-27 DIAGNOSIS — C80.1 ANXIETY ASSOCIATED WITH CANCER DIAGNOSIS: ICD-10-CM

## 2022-06-27 DIAGNOSIS — Z79.899 IMMUNODEFICIENCY DUE TO CHEMOTHERAPY: ICD-10-CM

## 2022-06-27 DIAGNOSIS — C16.9 MALIGNANT NEOPLASM OF STOMACH, UNSPECIFIED LOCATION: Primary | ICD-10-CM

## 2022-06-27 DIAGNOSIS — G62.0 NEUROPATHY DUE TO CHEMOTHERAPEUTIC DRUG: ICD-10-CM

## 2022-06-27 LAB — BACTERIA SPEC ANAEROBE CULT: NORMAL

## 2022-06-27 PROCEDURE — 1159F PR MEDICATION LIST DOCUMENTED IN MEDICAL RECORD: ICD-10-PCS | Mod: CPTII,95,, | Performed by: PHYSICIAN ASSISTANT

## 2022-06-27 PROCEDURE — P9047 ALBUMIN (HUMAN), 25%, 50ML: HCPCS | Mod: JG | Performed by: RADIOLOGY

## 2022-06-27 PROCEDURE — 96415 CHEMO IV INFUSION ADDL HR: CPT

## 2022-06-27 PROCEDURE — 49083 ABD PARACENTESIS W/IMAGING: CPT | Performed by: RADIOLOGY

## 2022-06-27 PROCEDURE — 99214 PR OFFICE/OUTPT VISIT, EST, LEVL IV, 30-39 MIN: ICD-10-PCS | Mod: 95,,, | Performed by: PHYSICIAN ASSISTANT

## 2022-06-27 PROCEDURE — C1729 CATH, DRAINAGE: HCPCS

## 2022-06-27 PROCEDURE — 49083 IR PARACENTESIS WITH IMAGING: ICD-10-PCS | Mod: ,,, | Performed by: RADIOLOGY

## 2022-06-27 PROCEDURE — 96367 TX/PROPH/DG ADDL SEQ IV INF: CPT

## 2022-06-27 PROCEDURE — 1159F MED LIST DOCD IN RCRD: CPT | Mod: CPTII,95,, | Performed by: PHYSICIAN ASSISTANT

## 2022-06-27 PROCEDURE — 99214 OFFICE O/P EST MOD 30 MIN: CPT | Mod: 95,,, | Performed by: PHYSICIAN ASSISTANT

## 2022-06-27 PROCEDURE — 63600175 PHARM REV CODE 636 W HCPCS: Mod: JG | Performed by: RADIOLOGY

## 2022-06-27 PROCEDURE — 63600175 PHARM REV CODE 636 W HCPCS: Performed by: PHYSICIAN ASSISTANT

## 2022-06-27 PROCEDURE — 1160F PR REVIEW ALL MEDS BY PRESCRIBER/CLIN PHARMACIST DOCUMENTED: ICD-10-PCS | Mod: CPTII,95,, | Performed by: PHYSICIAN ASSISTANT

## 2022-06-27 PROCEDURE — 96413 CHEMO IV INFUSION 1 HR: CPT

## 2022-06-27 PROCEDURE — 1160F RVW MEDS BY RX/DR IN RCRD: CPT | Mod: CPTII,95,, | Performed by: PHYSICIAN ASSISTANT

## 2022-06-27 PROCEDURE — 96365 THER/PROPH/DIAG IV INF INIT: CPT

## 2022-06-27 PROCEDURE — 96416 CHEMO PROLONG INFUSE W/PUMP: CPT

## 2022-06-27 PROCEDURE — 25000003 PHARM REV CODE 250: Performed by: PHYSICIAN ASSISTANT

## 2022-06-27 RX ORDER — HEPARIN 100 UNIT/ML
500 SYRINGE INTRAVENOUS
Status: CANCELLED | OUTPATIENT
Start: 2022-06-29

## 2022-06-27 RX ORDER — HEPARIN 100 UNIT/ML
500 SYRINGE INTRAVENOUS
Status: DISCONTINUED | OUTPATIENT
Start: 2022-06-27 | End: 2022-06-27 | Stop reason: HOSPADM

## 2022-06-27 RX ORDER — DIPHENHYDRAMINE HYDROCHLORIDE 50 MG/ML
50 INJECTION INTRAMUSCULAR; INTRAVENOUS ONCE AS NEEDED
Status: CANCELLED | OUTPATIENT
Start: 2022-06-27

## 2022-06-27 RX ORDER — EPINEPHRINE 0.3 MG/.3ML
0.3 INJECTION SUBCUTANEOUS ONCE AS NEEDED
Status: CANCELLED | OUTPATIENT
Start: 2022-06-27

## 2022-06-27 RX ORDER — HEPARIN 100 UNIT/ML
500 SYRINGE INTRAVENOUS
Status: CANCELLED | OUTPATIENT
Start: 2022-06-27

## 2022-06-27 RX ORDER — SODIUM CHLORIDE 0.9 % (FLUSH) 0.9 %
10 SYRINGE (ML) INJECTION
Status: DISCONTINUED | OUTPATIENT
Start: 2022-06-27 | End: 2022-06-27 | Stop reason: HOSPADM

## 2022-06-27 RX ORDER — ALBUMIN HUMAN 250 G/1000ML
50 SOLUTION INTRAVENOUS ONCE
Status: COMPLETED | OUTPATIENT
Start: 2022-06-27 | End: 2022-06-27

## 2022-06-27 RX ORDER — ATROPINE SULFATE 0.4 MG/ML
0.4 INJECTION, SOLUTION ENDOTRACHEAL; INTRAMEDULLARY; INTRAMUSCULAR; INTRAVENOUS; SUBCUTANEOUS ONCE AS NEEDED
Status: CANCELLED | OUTPATIENT
Start: 2022-06-27

## 2022-06-27 RX ORDER — SODIUM CHLORIDE 0.9 % (FLUSH) 0.9 %
10 SYRINGE (ML) INJECTION
Status: CANCELLED | OUTPATIENT
Start: 2022-06-29

## 2022-06-27 RX ORDER — ATROPINE SULFATE 0.4 MG/ML
0.4 INJECTION, SOLUTION ENDOTRACHEAL; INTRAMEDULLARY; INTRAMUSCULAR; INTRAVENOUS; SUBCUTANEOUS ONCE AS NEEDED
Status: DISCONTINUED | OUTPATIENT
Start: 2022-06-27 | End: 2022-06-27 | Stop reason: HOSPADM

## 2022-06-27 RX ORDER — SODIUM CHLORIDE 0.9 % (FLUSH) 0.9 %
10 SYRINGE (ML) INJECTION
Status: CANCELLED | OUTPATIENT
Start: 2022-06-27

## 2022-06-27 RX ADMIN — DEXAMETHASONE SODIUM PHOSPHATE 0.25 MG: 10 INJECTION, SOLUTION INTRAMUSCULAR; INTRAVENOUS at 02:06

## 2022-06-27 RX ADMIN — DIPHENHYDRAMINE HYDROCHLORIDE 50 MG: 50 INJECTION, SOLUTION INTRAMUSCULAR; INTRAVENOUS at 01:06

## 2022-06-27 RX ADMIN — IRINOTECAN HYDROCHLORIDE 126 MG: 20 INJECTION, SOLUTION INTRAVENOUS at 02:06

## 2022-06-27 RX ADMIN — FLUOROURACIL 3675 MG: 50 INJECTION, SOLUTION INTRAVENOUS at 04:06

## 2022-06-27 RX ADMIN — ALBUMIN (HUMAN) 50 G: 12.5 SOLUTION INTRAVENOUS at 11:06

## 2022-06-27 NOTE — PROGRESS NOTES
The patient location is: [facility]  The chief complaint leading to consultation is: Metastatic gastric cancer    Visit type: audio only    Face to Face time with patient: 40 mins of total time spent on the encounter, which includes non-face to face time preparing to see the patient (eg, review of tests), Obtaining and/or reviewing separately obtained history, Documenting clinical information in the electronic or other health record, Independently interpreting results (not separately reported) and communicating results to the patient/family/caregiver, or Care coordination (not separately reported).     Each patient to whom he or she provides medical services by telemedicine is:  (1) informed of the relationship between the physician and patient and the respective role of any other health care provider with respect to management of the patient; and (2) notified that he or she may decline to receive medical services by telemedicine and may withdraw from such care at any time.    Notes: Phone call was conducted to patient as she was not able to log into her virtual visit    MEDICAL ONCOLOGY - ESTABLISHED PATIENT    Reason for visit: Gastric cancer     Best Contact Phone Number(s): [phone] (home)      Cancer/Stage/TNM:   Cancer Staging  No matching staging information was found for the patient.     Oncology History   Malignant neoplasm of stomach   6/10/2021 Initial Diagnosis    Gastric adenocarcinoma     7/26/2021 - 4/6/2022 Chemotherapy    Treatment Summary   Plan Name: OP FOLFOX 6 Q2W  Treatment Goal: Palliative  Status: Inactive  Start Date: 7/26/2021  End Date: 4/6/2022  Provider: Fer Ashraf MD  Chemotherapy: fluorouraciL 2,400 mg/m2 = 3,770 mg in sodium chloride 0.9% 100 mL chemo infusion, 2,400 mg/m2 = 3,770 mg, Intravenous, Over 46 hours, 19 of 20 cycles  Administration: 3,770 mg (7/26/2021), 3,770 mg (8/9/2021), 3,770 mg (8/23/2021), 3,770 mg (9/7/2021), 3,770 mg (9/21/2021), 3,770 mg  (10/5/2021), 3,770 mg (10/19/2021), 3,770 mg (11/2/2021), 3,770 mg (11/15/2021), 3,770 mg (11/30/2021), 3,770 mg (12/13/2021), 4,000 mg (12/27/2021), 4,030 mg (1/10/2022), 4,030 mg (1/24/2022), 4,000 mg (2/7/2022), 4,000 mg (2/22/2022), 4,000 mg (3/7/2022), 4,000 mg (3/22/2022), 4,000 mg (4/4/2022)  oxaliplatin (ELOXATIN) 85 mg/m2 = 133 mg in dextrose 5 % 500 mL chemo infusion, 85 mg/m2 = 133 mg, Intravenous, Clinic/HOD 1 time, 9 of 9 cycles  Administration: 133 mg (7/26/2021), 133 mg (8/9/2021), 133 mg (8/23/2021), 133 mg (9/7/2021), 133 mg (9/21/2021), 133 mg (10/5/2021), 133 mg (10/19/2021), 133 mg (11/2/2021), 133 mg (11/15/2021)     5/26/2022 -  Chemotherapy    Treatment Summary   Plan Name: OP FOLFIRI Q2WK  Treatment Goal: Palliative  Status: Active  Start Date: 5/26/2022  End Date: 4/19/2023 (Planned)  Provider: Fer Ashraf MD  Chemotherapy: dexAMETHasone (DECADRON) 4 MG Tab, 8 mg, Oral, Daily, 1 of 1 cycle, Start date: --, End date: --  irinotecan (CAMPTOSAR) 120 mg/m2 = 200 mg in sodium chloride 0.9% 500 mL chemo infusion, 120 mg/m2 = 200 mg (100 % of original dose 120 mg/m2), Intravenous, Clinic/HOD 1 time, 3 of 24 cycles  Dose modification: 125 mg/m2 (original dose 120 mg/m2, Cycle 1), 120 mg/m2 (original dose 120 mg/m2, Cycle 1), 75 mg/m2 (original dose 120 mg/m2, Cycle 2, Reason: Dose not tolerated)  Administration: 200 mg (5/26/2022), 126 mg (6/13/2022), 126 mg (6/27/2022)  ramucirumab (CYRAMZA) 471 mg in sodium chloride 0.9% 250 mL chemo infusion, 8 mg/kg = 471 mg, Intravenous, Clinic/HOD 1 time, 1 of 1 cycle  Administration: 471 mg (5/26/2022)     Anxiety associated with cancer diagnosis   12/27/2021 Initial Diagnosis    Anxiety associated with cancer diagnosis          Oncological History: Puja is a very pleasant 41 y.o. female with metastatic gastric cancer who presents for follow-up prior to cycle 3 of FOLFIRI.  She had significant nausea and vomiting for several days following her  first cycle of FOLFIRI. She had vomiting while in a visit with Dr. Berumen and went to the ER where she received Phenergan and some fluids.  She has continued with weekly paracenteses, has abdominal discomfort for which she uses Norco once daily and Tylenol twice daily.  She finds the weight of the abdominal distension discomforting.      ECOG status is 1. Presents alone.    Review of Systems   Constitutional: Positive for appetite change. Negative for activity change, chills, fatigue, fever and unexpected weight change.   HENT: Negative for ear pain, facial swelling, hearing loss, mouth sores, nosebleeds, sore throat and trouble swallowing.    Eyes: Negative for pain, discharge, redness and visual disturbance.   Respiratory: Negative for cough, chest tightness and shortness of breath.    Cardiovascular: Positive for leg swelling. Negative for chest pain and palpitations.   Gastrointestinal: Positive for abdominal distention, nausea, vomiting and reflux. Negative for abdominal pain, blood in stool, constipation and diarrhea.   Endocrine: Negative for cold intolerance and heat intolerance.   Genitourinary: Negative for decreased urine volume, difficulty urinating, dysuria, frequency, hematuria, hot flashes and urgency.   Musculoskeletal: Negative for arthralgias, gait problem, leg pain and myalgias.   Integumentary:  Negative for pallor, rash and wound.   Allergic/Immunologic: Negative for immunocompromised state.   Neurological: Positive for numbness (slowly improving). Negative for dizziness, tremors, weakness, light-headedness and headaches.   Hematological: Negative for adenopathy. Does not bruise/bleed easily.   Psychiatric/Behavioral: Negative for agitation, confusion, dysphoric mood and sleep disturbance. The patient is not nervous/anxious.            Past Medical History:   Past Medical History:   Diagnosis Date    Anxiety     Dehydration 5/30/2022    Gastric cancer     Gastric ulcer     Hx of psychiatric  "care     Pregnancy 08/12/2020    delivered on 8/12/2020    Umbilical hernia         Past Surgical History:   Past Surgical History:   Procedure Laterality Date    CLOSURE OF PERFORATED ULCER OF DUODENUM USING OMENTAL PATCH      ESOPHAGOGASTRODUODENOSCOPY N/A 10/13/2020    Procedure: EGD (ESOPHAGOGASTRODUODENOSCOPY);  Surgeon: Rosio Marion MD;  Location: [facility] ENDO (2ND FLR);  Service: Endoscopy;  Laterality: N/A;    ESOPHAGOGASTRODUODENOSCOPY N/A 12/11/2020    Procedure: EGD (ESOPHAGOGASTRODUODENOSCOPY);  Surgeon: Rosio Marion MD;  Location: [facility] ENDO (2ND FLR);  Service: Endoscopy;  Laterality: N/A;  Covid-19 test 12/8/20 at [facility]    ESOPHAGOGASTRODUODENOSCOPY N/A 3/12/2021    Procedure: EGD (ESOPHAGOGASTRODUODENOSCOPY);  Surgeon: Nav Omer MD;  Location: [facility] ENDO (2ND FLR);  Service: Endoscopy;  Laterality: N/A;  COVID at [facility]/9 ttr    ESOPHAGOGASTRODUODENOSCOPY N/A 5/18/2021    Procedure: EGD (ESOPHAGOGASTRODUODENOSCOPY);  Surgeon: Rosio Marion MD;  Location: [facility] ENDO (2ND FLR);  Service: Endoscopy;  Laterality: N/A;  5/15-covid pcw-inst portal-tb    ESOPHAGOGASTRODUODENOSCOPY N/A 5/26/2021    Procedure: EGD (ESOPHAGOGASTRODUODENOSCOPY);  Surgeon: Socrates Terrazas MD;  Location: [facility] ENDO (2ND FLR);  Service: Endoscopy;  Laterality: N/A;        Family History:   Family History   Problem Relation Age of Onset    Cancer Mother 67        lymphoma (type? "not active," not on tx; "related to her blood")    Schizophrenia Mother     Lung cancer Father 48        type? h/o smoking    No Known Problems Son     Breast cancer Other 73        unilat; stage I, but aggressive    Prostate cancer Paternal Uncle         (dx 50s/60? no chemo?)    Breast cancer Paternal Cousin         (dx age?) ductal    Colon cancer Neg Hx     Esophageal cancer Neg Hx         Social History:   Social History     Tobacco Use    Smoking status: Former Smoker     Types: Cigarettes     Quit " date: 2019     Years since quittin.6    Smokeless tobacco: Never Used   Substance Use Topics    Alcohol use: Yes        I have reviewed and updated the patient's past medical, surgical, family and social histories.    Allergies:   Review of patient's allergies indicates:  No Known Allergies     Medications:   Current Outpatient Medications   Medication Sig Dispense Refill    baclofen (LIORESAL) 5 mg Tab tablet Take 1 tablet (5 mg total) by mouth 3 (three) times daily. 30 tablet 0    dexAMETHasone (DECADRON) 4 MG Tab 8 mg daily on days 2 through 4 of each chemotherapy cycle. 60 tablet 2    dicyclomine (BENTYL) 10 MG capsule TAKE ONE CAPSULE BY MOUTH FOUR TIMES DAILY 120 capsule 0    famotidine (PEPCID) 20 MG tablet Take 1 tablet (20 mg total) by mouth nightly as needed for Heartburn. 60 tablet 1    furosemide (LASIX) 20 MG tablet Take 10 mg by mouth.      gabapentin (NEURONTIN) 300 MG capsule Take 1 capsule (300 mg total) by mouth 3 (three) times daily. 90 capsule 11    LIDOcaine HCL 2% (XYLOCAINE) 2 % jelly Apply topically as needed. 100 mL 0    LIDOcaine-prilocaine (EMLA) cream Apply to Port-A-Cath area 30 to 45 minutes prior to port access as directed (topical anesthetic use to the anterior chest only).      methadone (DOLOPHINE) 5 mg/5 mL solution Take 2.5 mLs (2.5 mg total) by mouth every 12 (twelve) hours. 75 mL 0    metoclopramide HCl (REGLAN) 5 MG tablet 5 mg.      multivitamin with folic acid 400 mcg Tab Take 1 tablet by mouth once daily.      nortriptyline (PAMELOR) 25 MG capsule Take 1 capsule (25 mg total) by mouth every evening. 30 capsule 11    omeprazole (PRILOSEC) 40 MG capsule Take 1 capsule (40 mg total) by mouth 2 (two) times daily before meals. 60 capsule 11    ondansetron (ZOFRAN) 8 MG tablet Take 1 tablet (8 mg total) by mouth every 8 (eight) hours as needed for Nausea. 60 tablet 2    oxyCODONE (ROXICODONE) 5 MG immediate release tablet Take 1 tablet (5 mg total) by  mouth every 6 (six) hours as needed. 60 tablet 0    polyethylene glycol (GLYCOLAX) 17 gram/dose powder Mix 1 capful (17 g) with liquid and take by mouth once daily for 20 days 510 g 0    prochlorperazine (COMPAZINE) 10 MG tablet TAKE 1 TABLET(10 MG) BY MOUTH EVERY 6 HOURS AS NEEDED FOR NAUSEA 90 tablet 2    promethazine (PHENERGAN) 25 MG tablet Take 1 tablet (25 mg total) by mouth every 6 (six) hours as needed for Nausea. 60 tablet 2    senna-docusate 8.6-50 mg (SENNA WITH DOCUSATE SODIUM) 8.6-50 mg per tablet Take 1 tablet by mouth 2 (two) times daily as needed for Constipation.      sucralfate (CARAFATE) 1 gram tablet TAKE 1 TABLET(1 GRAM) BY MOUTH FOUR TIMES DAILY FOR 14 DAYS 56 tablet 0    sucralfate (CARAFATE) 1 gram tablet       traMADoL (ULTRAM) 50 mg tablet Take 50 mg by mouth.       No current facility-administered medications for this visit.        Physical Exam:   There were no vitals taken for this visit.     ECOG Performance status: 1            Physical Exam  Constitutional:       Comments: Physical exam was not performed as visit was completed by audio only             Labs:   Recent Results (from the past 48 hour(s))   Pregnancy, urine rapid    Collection Time: 06/27/22  8:18 AM   Result Value Ref Range    Preg Test, Ur Negative    CBC Oncology    Collection Time: 06/27/22  8:30 AM   Result Value Ref Range    WBC 10.67 3.90 - 12.70 K/uL    RBC 3.20 (L) 4.00 - 5.40 M/uL    Hemoglobin 9.1 (L) 12.0 - 16.0 g/dL    Hematocrit 28.6 (L) 37.0 - 48.5 %    MCV 89 82 - 98 fL    MCH 28.4 27.0 - 31.0 pg    MCHC 31.8 (L) 32.0 - 36.0 g/dL    RDW 14.5 11.5 - 14.5 %    Platelets 361 150 - 450 K/uL    MPV 8.5 (L) 9.2 - 12.9 fL    Gran # (ANC) 8.7 (H) 1.8 - 7.7 K/uL    Immature Grans (Abs) 0.03 0.00 - 0.04 K/uL   Comprehensive Metabolic Panel    Collection Time: 06/27/22  8:30 AM   Result Value Ref Range    Sodium 136 136 - 145 mmol/L    Potassium 3.9 3.5 - 5.1 mmol/L    Chloride 102 95 - 110 mmol/L    CO2 26 23  - 29 mmol/L    Glucose 95 70 - 110 mg/dL    BUN 16 6 - 20 mg/dL    Creatinine 0.8 0.5 - 1.4 mg/dL    Calcium 8.8 8.7 - 10.5 mg/dL    Total Protein 5.9 (L) 6.0 - 8.4 g/dL    Albumin 2.0 (L) 3.5 - 5.2 g/dL    Total Bilirubin 0.7 0.1 - 1.0 mg/dL    Alkaline Phosphatase 74 55 - 135 U/L    AST 25 10 - 40 U/L    ALT 10 10 - 44 U/L    Anion Gap 8 8 - 16 mmol/L    eGFR if African American >60 >60 mL/min/1.73 m^2    eGFR if non African American >60 >60 mL/min/1.73 m^2        I have reviewed the pertinent labs from today which show normal blood counts. Albumin 2.0    Imaging:    CT CAP: 4/14/2022:     Outside hospital OSI CT CHEST ABDOMEN PELVIS dated 4/14/2022 9:31 AM.     1.  Gastric wall thickening is slightly increased.   2.  Moderate ascites, slightly increased in volume.   3.  Peritoneal metastatic disease is stable to slightly increased.   4.  Stable small left pleural effusion.   5.  Increased cystic and solid mass involving the left ovary, likely due to metastatic disease.    CT chest: 6/6/2022  Impression:     Interval development of a large left pleural effusion and ascites with associated left lower lobe compressive atelectasis.     MRI abdomen/pelvis: 6/16/2022  Impression:     1. Diffuse irregular enhancing gastric wall thickening in keeping with known biopsy-proven gastric adenocarcinoma.  Gastric antrum partially obscured by susceptibility artifact.  2. Significantly increased size of a large mixed cystic and solid heterogeneously enhancing pelvic mass.  In light of patient's history, finding is concerning for ovarian metastasis/Krukenberg tumor.  Lesion demonstrates significant mass effect within the pelvis and lower abdomen with obscuration of surrounding structures.  3. Moderate volume abdominopelvic ascites.  Diffuse peritoneal thickening and enhancement concerning for peritoneal carcinomatosis.  4. Numerous small hepatic cysts.  5. Moderate left pleural effusion.  6. Diffuse body wall edema.  7. Small  nonspecific enhancing lesion within the right femoral head corresponding with a peripherally sclerotic lucent focus identified on prior CT.  Favor a degenerative subchondral cyst however cannot entirely exclude metastatic disease.  Attention on follow-up    Path:   5/26/21:  Positive for malignancy.   Poorly differentiated adenocarcinoma growing with signet ring features   Immunostains for Bruce-EP4 and CEA are positive.  The controls stain   appropriately.   This case was reviewed by Dr Sebastian who concurs with the diagnosis       Assessment:       1. Malignant neoplasm of stomach, unspecified location    2. Bone metastases    3. Peritoneal carcinomatosis    4. Krukenberg tumor, unspecified laterality    5. 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 Edema of both lower extremities    8. Non-intractable vomiting with nausea, unspecified vomiting type    9. Chronic gastric ulcer with perforation    10. Microcytic anemia    11. Anxiety associated with cancer diagnosis    12. Neuropathy due to chemotherapeutic drug    13. Pain    14. Severe malnutrition          Plan:             # Gastric adenocarcinoma with peritoneal metastases, immunodeficiency due to chemotherapy, bone metastases  HER-2 negative by FISH.  PD-L1 < 1%.  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  We previously explained the implications of a stage IV diagnosis to her and potential treatment options.  She is now s/p port placement. She sought a second opinion at [facility] for zolbetuximab trial targeting CLDN protein but she did not test positive for this biomarker. We recommended proceeding with SOC FOLFOX, with which the [facility] team agreed. Because of the negative PD-L1 I do not think the benefit of adding nivolumab outweighs the potential toxicities.       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  She opted not to get genetic testing done at her appt with Phi. We recommended that she reconsider that decision in light of a very likely germline mutation in SAMUEL which has clinical consequences.      CT CAP after 6 cycles showed improvement in ascites, probable hepatic, thyroid and bone metastases. We dropped oxaliplatin starting with cycle 10 due to grade 1 neuropathy and desire to keep it from worsening.    CT CAP after cycle 10 showed stable disease.  CT scan after cycle 15 continued to display overall stability of disease within the gastric wall, peritoneum, spine and liver. Although increase ascites on imaging and clinically can represent progression of disease, her previous CT suggested overall stability within the liver and gastric wall, so we recommended to continue with treatment time. However, she continued to have increasing abdominal ascites that has been concerning for progression of disease. Hence, she had repeat imaging. On CT performed on 4/14/2022, she appeared to have worsening disease suggestive of progression. She was seen by Dr. Reid at [facility] for f/u and to discuss possible treatment options.  She was also evaluated by Dr. Andree Mueller and Dr. See of Surgical Oncology at [facility].  Ultimately she was not felt to be a good surgical debulking candidate due to her ascites.  If her ascites improves during the course of chemotherapy and her performance status remains stable or improves, they would re-consider her again for palliative oophorectomy.     She was started on second-line FOLFIRI. Was given ramucirumab with cycle 1 but this later will be held given [facility] recs for just FOLFIRI.  She didn't tolerate cycle 1 well with significant N/V.  Will dose reduce irinotecan to 75 mg/m2 - has UGT1A1 homozygous mutation indicating poor metabolism.  Continue 5-FU infusion at 2200 mg/m2.    After an in-depth discussion, she would like to pursue 2nd  line chemotherapy. At this time, we would favor use of FOLFIRI as second-line rather than Taxol/Claudio due to ongoing neuropathy. Pt is agreeable. This was also discussed with Dr. Ashraf, who is in agreement. We will proceed with 4 cycles followed by imaging studies. We will also repeat imaging studies with a CT chest non-contrast and MRI abdomen/pelvis as a baseline. Pt is agreeable.   -doing fairly. Continues to have edema of the lower extremities and feet. She is trying to eat. Had paracentesis today with albumin replacement.   -Lab work is adequate for treatment today.   -proceed with cycle 3 of chemotherapy today. Will continue with reduced dose of the irinotecan 75 mg/m2.   -RTC in 2 weeks for cycle 4. Will repeat imaging studies after cycle 5 or 6.     #Ascites, lower extremity edema, weight loss, malnutrition  Initially had peritoneal catheter after diagnosis, output slowed so was removed in October 2021 by IR.  Will continue with weekly paracentesis. Has increased edema of the lower extremity that is most likely due to malnutrition.   Will continue to have albumin supplementation after paracentesis if needed. Will have her see the nutritionist as well.       # Nausea, gastric ulcer  Start dex 8 mg on days 2-4 of chemotherapy.  Continue Phenergan as needed. Has Zofran and compazine also PRN.  Continue Protonix.      # Anemia  Hgb stable. Will continue to monitor  Iron deficiency due to chronic blood loss.  S/p 2 doses of Injectafer.    # Anxiety  Continue f/u with psych onc w/ Dr. Berumen    # chemotherapy induced neuropathy, pain  2/2 previous chemotherapy with Oxaliplatin. Slowly improving  Continue acupuncture.  Continue gabapentin.  Will continue to monitor. Will see Dr. Brink in Palliative Care on 6/22/2022    Follow up: RTC in 2 weeks for lab work, clinic visit and cycle 4 of FOLFIRI with pump d/c on day 3.       Route Chart for Scheduling    Med Onc Chart Routing      Follow up with physician 2 weeks.  Cycle 4 of FOLFIRI at [facility].    Follow up with ISHMAEL 4 weeks. RTC in 4 weeks with cbc, cmp, clinic visit with April/Melonie and cycle 5 of chemotherapy FOLFIRI with pump d/c on day 3.    Infusion scheduling note    Injection scheduling note    Labs CBC and CMP   Lab interval: every 2 weeks     Imaging CT chest abdomen pelvis   Please schedule CT CAP in 4 weeks.    Pharmacy appointment    Other referrals          Treatment Plan Information   OP FOLFIRI Q2WK   Fer Ashraf MD   Upcoming Treatment Dates - OP FOLFIRI Q2WK    7/11/2022       Pre-Medications       diphenhydrAMINE (BENADRYL) 50 mg in sodium chloride 0.9% 50 mL IVPB       Chemotherapy       irinotecan (CAMPTOSAR) 75 mg/m2 = 126 mg in sodium chloride 0.9% 500 mL chemo infusion       fluorouracil (ADRUCIL) 2,200 mg/m2 = 3,675 mg in sodium chloride 0.9% 100 mL chemo infusion       Supportive Care       atropine injection 0.4 mg       Antiemetics       palonosetron (ALOXI) 0.25 mg with Dexamethasone (DECADRON) 12 mg in NS 50 mL IVPB  7/25/2022       Pre-Medications       diphenhydrAMINE (BENADRYL) 50 mg in sodium chloride 0.9% 50 mL IVPB       Chemotherapy       irinotecan (CAMPTOSAR) 75 mg/m2 = 126 mg in sodium chloride 0.9% 500 mL chemo infusion       fluorouracil (ADRUCIL) 2,200 mg/m2 = 3,675 mg in sodium chloride 0.9% 100 mL chemo infusion       Supportive Care       atropine injection 0.4 mg       Antiemetics       palonosetron (ALOXI) 0.25 mg with Dexamethasone (DECADRON) 12 mg in NS 50 mL IVPB  8/8/2022       Pre-Medications       diphenhydrAMINE (BENADRYL) 50 mg in sodium chloride 0.9% 50 mL IVPB       Chemotherapy       irinotecan (CAMPTOSAR) 75 mg/m2 = 126 mg in sodium chloride 0.9% 500 mL chemo infusion       fluorouracil (ADRUCIL) 2,200 mg/m2 = 3,675 mg in sodium chloride 0.9% 100 mL chemo infusion       Supportive Care       atropine injection 0.4 mg       Antiemetics       palonosetron (ALOXI) 0.25 mg with Dexamethasone (DECADRON) 12 mg in  NS 50 mL IVPB  8/22/2022       Pre-Medications       diphenhydrAMINE (BENADRYL) 50 mg in sodium chloride 0.9% 50 mL IVPB       Chemotherapy       irinotecan (CAMPTOSAR) 75 mg/m2 = 126 mg in sodium chloride 0.9% 500 mL chemo infusion       fluorouracil (ADRUCIL) 2,200 mg/m2 = 3,675 mg in sodium chloride 0.9% 100 mL chemo infusion       Supportive Care       atropine injection 0.4 mg       Antiemetics       palonosetron (ALOXI) 0.25 mg with Dexamethasone (DECADRON) 12 mg in NS 50 mL IVPB    Supportive Plan Information  IV FLUIDS AND ELECTROLYTES   Kamille Hooks PA-C   Upcoming Treatment Dates - IV FLUIDS AND ELECTROLYTES    No upcoming days in selected categories.    Therapy Plan Information  EPINEPHrine (EPIPEN) 0.3 mg/0.3 mL pen injection 0.3 mg  0.3 mg, Intramuscular, PRN  diphenhydrAMINE injection 50 mg  50 mg, Intravenous, PRN  methylPREDNISolone sodium succinate injection 125 mg  125 mg, Intravenous, PRN  sodium chloride 0.9% bolus 1,000 mL  1,000 mL, Intravenous, PRN

## 2022-06-27 NOTE — DISCHARGE SUMMARY
Radiology Discharge Summary      Hospital Course: No complications    Admit Date: 6/27/2022  Discharge Date: 06/27/2022     Instructions Given to Patient: Yes  Diet: Resume prior diet  Activity: activity as tolerated    Description of Condition on Discharge: Stable  Vital Signs (Most Recent): Temp: 98 °F (36.7 °C) (06/27/22 1200)  Pulse: 105 (06/27/22 1200)  Resp: 16 (06/27/22 1200)  BP: 129/75 (06/27/22 1200)  SpO2: 97 % (06/27/22 1200)    Discharge Disposition: Home    Discharge Diagnosis: gastric cancer, recurrent ascites s/p paracentesis     Follow-up: continue periodic paracentesis as needed    Martin Ivory MD  Staff Radiologist  Department of Radiology  Pager: [phone]

## 2022-06-27 NOTE — PROCEDURES
Radiology Post-Procedure Note    Pre Op Diagnosis: Ascites, gastric cancer  Post Op Diagnosis: Same    Procedure: Paracentesis    Procedure performed by: Martin Ivory MD    Written Informed Consent Obtained: Yes  Specimen Removed: YES thin red fluid  Estimated Blood Loss: Minimal    Findings:   Successful paracentesis.  Albumin administered PRN per protocol.    Patient tolerated procedure well.    Martin Ivory MD  Staff Radiologist  Department of Radiology  Pager: [phone]

## 2022-06-27 NOTE — H&P
Radiology History & Physical      SUBJECTIVE:     Chief Complaint: gastric cancer, recurrent ascites    History of Present Illness:  Puja Quigley is a 41 y.o. female who presents for paracentesis  Past Medical History:   Diagnosis Date    Anxiety     Dehydration 5/30/2022    Gastric cancer     Gastric ulcer     Hx of psychiatric care     Pregnancy 08/12/2020    delivered on 8/12/2020    Umbilical hernia      Past Surgical History:   Procedure Laterality Date    CLOSURE OF PERFORATED ULCER OF DUODENUM USING OMENTAL PATCH      ESOPHAGOGASTRODUODENOSCOPY N/A 10/13/2020    Procedure: EGD (ESOPHAGOGASTRODUODENOSCOPY);  Surgeon: Rosio Marion MD;  Location: [facility] (2ND FLR);  Service: Endoscopy;  Laterality: N/A;    ESOPHAGOGASTRODUODENOSCOPY N/A 12/11/2020    Procedure: EGD (ESOPHAGOGASTRODUODENOSCOPY);  Surgeon: Rosio Marion MD;  Location: [facility] (2ND FLR);  Service: Endoscopy;  Laterality: N/A;  Covid-19 test 12/8/20 at [facility]    ESOPHAGOGASTRODUODENOSCOPY N/A 3/12/2021    Procedure: EGD (ESOPHAGOGASTRODUODENOSCOPY);  Surgeon: Nav Omer MD;  Location: [facility] (2ND FLR);  Service: Endoscopy;  Laterality: N/A;  COVID at [facility] 3/9 [facility]    ESOPHAGOGASTRODUODENOSCOPY N/A 5/18/2021    Procedure: EGD (ESOPHAGOGASTRODUODENOSCOPY);  Surgeon: Rosio Marion MD;  Location: [facility] (2ND FLR);  Service: Endoscopy;  Laterality: N/A;  5/15-covid pcw-inst portal-tb    ESOPHAGOGASTRODUODENOSCOPY N/A 5/26/2021    Procedure: EGD (ESOPHAGOGASTRODUODENOSCOPY);  Surgeon: Socrates Terrazas MD;  Location: [facility] ENDO (2ND FLR);  Service: Endoscopy;  Laterality: N/A;       Home Meds:   Prior to Admission medications    Medication Sig Start Date End Date Taking? Authorizing Provider   baclofen (LIORESAL) 5 mg Tab tablet Take 1 tablet (5 mg total) by mouth 3 (three) times daily. 6/20/22   Bren Allen MD   dexAMETHasone (DECADRON) 4 MG Tab 8 mg daily on days 2 through 4 of each  chemotherapy cycle. 6/13/22   Fer Ashraf MD   dicyclomine (BENTYL) 10 MG capsule TAKE ONE CAPSULE BY MOUTH FOUR TIMES DAILY 9/27/21   Kamille Hooks PA-C   famotidine (PEPCID) 20 MG tablet Take 1 tablet (20 mg total) by mouth nightly as needed for Heartburn. 2/23/22 2/23/23  Kamille Hooks PA-C   furosemide (LASIX) 20 MG tablet Take 10 mg by mouth.    Historical Provider   gabapentin (NEURONTIN) 300 MG capsule Take 1 capsule (300 mg total) by mouth 3 (three) times daily. 2/7/22 2/7/23  Kamille Hooks PA-C   LIDOcaine HCL 2% (XYLOCAINE) 2 % jelly Apply topically as needed. 6/20/22   Bren Allen MD   LIDOcaine-prilocaine (EMLA) cream Apply to Port-A-Cath area 30 to 45 minutes prior to port access as directed (topical anesthetic use to the anterior chest only). 6/25/21   Historical Provider   methadone (DOLOPHINE) 5 mg/5 mL solution Take 2.5 mLs (2.5 mg total) by mouth every 12 (twelve) hours. 6/21/22   Sharon Brink MD   metoclopramide HCl (REGLAN) 5 MG tablet 5 mg. 6/11/21   Historical Provider   multivitamin with folic acid 400 mcg Tab Take 1 tablet by mouth once daily.    Historical Provider   nortriptyline (PAMELOR) 25 MG capsule Take 1 capsule (25 mg total) by mouth every evening. 6/20/22 6/20/23  Bren Allen MD   omeprazole (PRILOSEC) 40 MG capsule Take 1 capsule (40 mg total) by mouth 2 (two) times daily before meals. 6/20/22 6/20/23  Bren Allen MD   ondansetron (ZOFRAN) 8 MG tablet Take 1 tablet (8 mg total) by mouth every 8 (eight) hours as needed for Nausea. 7/26/21   Fer Ashraf MD   oxyCODONE (ROXICODONE) 5 MG immediate release tablet Take 1 tablet (5 mg total) by mouth every 6 (six) hours as needed. 6/20/22   Bren Allen MD   polyethylene glycol (GLYCOLAX) 17 gram/dose powder Mix 1 capful (17 g) with liquid and take by mouth once daily for 20 days 6/21/22   Bren Allen MD   prochlorperazine (COMPAZINE) 10 MG tablet TAKE 1 TABLET(10 MG) BY MOUTH EVERY 6 HOURS AS NEEDED FOR  NAUSEA 8/7/21   Fer Ashraf MD   promethazine (PHENERGAN) 25 MG tablet Take 1 tablet (25 mg total) by mouth every 6 (six) hours as needed for Nausea. 6/13/22   Fer Ashraf MD   senna-docusate 8.6-50 mg (SENNA WITH DOCUSATE SODIUM) 8.6-50 mg per tablet Take 1 tablet by mouth 2 (two) times daily as needed for Constipation. 5/26/21   Reshma Wilson MD   sucralfate (CARAFATE) 1 gram tablet TAKE 1 TABLET(1 GRAM) BY MOUTH FOUR TIMES DAILY FOR 14 DAYS 10/20/21   Rosio Marion MD   sucralfate (CARAFATE) 1 gram tablet  4/8/21   Historical Provider   traMADoL (ULTRAM) 50 mg tablet Take 50 mg by mouth. 6/25/21   Historical Provider   amitriptyline (ELAVIL) 10 MG tablet Take 1 tablet (10 mg total) by mouth every evening. 5/13/21 6/20/22  Socrates Terrazas MD   pantoprazole (PROTONIX) 40 MG tablet Take 1 tablet (40 mg total) by mouth 2 (two) times daily. 5/25/22 6/20/22  Kmaille Hooks PA-C     Anticoagulants/Antiplatelets: no anticoagulation    Allergies: Review of patient's allergies indicates:  No Known Allergies  Sedation History:  no adverse reactions    Review of Systems:   Hematological: no known coagulopathies  Respiratory: no shortness of breath  Cardiovascular: no chest pain  Gastrointestinal: some abdominal pain due to ascites  Genito-Urinary: no dysuria  Musculoskeletal: progressive increase in bilateral lower extremity edema  Neurological: no TIA or stroke symptoms         OBJECTIVE:     Vital Signs (Most Recent)       Physical Exam:  ASA: 2  Mallampati: N/A    General: no acute distress  Mental Status: alert and oriented to person, place and time  HEENT: normocephalic, atraumatic  Chest: unlabored breathing  Heart: regular heart rate  Abdomen: distended  Extremity: moves all extremities, bilateral LE edema    Laboratory  Lab Results   Component Value Date    INR 1.0 06/10/2021       Lab Results   Component Value Date    WBC 10.67 06/27/2022    HGB 9.1 (L) 06/27/2022    HCT 28.6 (L) 06/27/2022     MCV 89 06/27/2022     06/27/2022      Lab Results   Component Value Date    GLU 95 06/27/2022     06/27/2022    K 3.9 06/27/2022     06/27/2022    CO2 26 06/27/2022    BUN 16 06/27/2022    CREATININE 0.8 06/27/2022    CALCIUM 8.8 06/27/2022    MG 1.9 06/20/2022    ALT 10 06/27/2022    AST 25 06/27/2022    ALBUMIN 2.0 (L) 06/27/2022    BILITOT 0.7 06/27/2022       ASSESSMENT/PLAN:     Sedation Plan: local anesthesia only  Patient will undergo paracentesis.    Martin Ivory MD  Staff Radiologist  Department of Radiology  Pager: [phone]

## 2022-06-27 NOTE — PLAN OF CARE
Pt arrived to unit for C3D1 of mFOLFIRI. Pt completed labs along with virtual visit. Labs along with plan of care reviewed. Pt okay to proceed with tx today. Endorsing occasional nausea with generalized fatigue today. Had another paracentesis done today. VSS. Received initial pre-meds of Benadryl and Aloxi/Dex Irinotecan given over 90 minutes. Tolerated well. Pt then connected to 5FU pump with 'RUN' message showing at 1620PM. Pt instructed to return Wednesday, 6/29, at 2:20PM for her pump d/c. Verbalized understanding. Left unit in NAD at time of discharge.

## 2022-06-27 NOTE — DISCHARGE INSTRUCTIONS
BATHING:  You may shower tomorrow.  DRESSING:  Remove dressing tomorrow.        ACTIVITY LEVEL: If you have received sedation or an anesthetic, you may feel sleepy for several hours. Rest until you are more awake. Gradually resume your normal activities    Do not drive, drink alcohol, or sign legal documents for 24 hours, or if taking narcotic pain medication.      DIET: You may resume your home diet. If nausea is present, increase your diet gradually with fluids and bland foods.    Medications: Pain medication should be taken only if needed and as directed. If antibiotics are prescribed, the medication should be taken until completed. You will be given an updated list of you medications.  No driving, alcoholic beverages or signing legal documents for next 24 hours if you have had sedation, or while taking pain medication    CALL THE DOCTOR:   For any obvious bleeding (some dried blood over the incision is normal).     Redness, swelling, foul smell around incision or fever over 101.  Shortness of breath.  Persistent pain or nausea not relieved by medication.  Call  [phone]     to speak with an Interventional Radiologist    If any unusual problems or difficulties occur contact your doctor. If you cannot contact your doctor but feel your signs and symptoms warrant a physicians attention return to the emergency room.

## 2022-06-27 NOTE — PLAN OF CARE
Pt verbalized readiness to go home and understanding of discharge instructions. VSS. PIV removed. Bandaid CDI.

## 2022-06-28 ENCOUNTER — PATIENT MESSAGE (OUTPATIENT)
Dept: HEMATOLOGY/ONCOLOGY | Facility: CLINIC | Age: 42
End: 2022-06-28
Payer: COMMERCIAL

## 2022-06-29 ENCOUNTER — PATIENT MESSAGE (OUTPATIENT)
Dept: HEMATOLOGY/ONCOLOGY | Facility: CLINIC | Age: 42
End: 2022-06-29
Payer: COMMERCIAL

## 2022-06-29 ENCOUNTER — INFUSION (OUTPATIENT)
Dept: INFUSION THERAPY | Facility: HOSPITAL | Age: 42
End: 2022-06-29
Attending: INTERNAL MEDICINE
Payer: COMMERCIAL

## 2022-06-29 VITALS
TEMPERATURE: 98 F | RESPIRATION RATE: 16 BRPM | OXYGEN SATURATION: 100 % | HEART RATE: 102 BPM | DIASTOLIC BLOOD PRESSURE: 81 MMHG | SYSTOLIC BLOOD PRESSURE: 135 MMHG

## 2022-06-29 DIAGNOSIS — E86.0 DEHYDRATION: Primary | ICD-10-CM

## 2022-06-29 DIAGNOSIS — C16.9 MALIGNANT NEOPLASM OF STOMACH, UNSPECIFIED LOCATION: ICD-10-CM

## 2022-06-29 PROCEDURE — A4216 STERILE WATER/SALINE, 10 ML: HCPCS | Performed by: PHYSICIAN ASSISTANT

## 2022-06-29 PROCEDURE — 63600175 PHARM REV CODE 636 W HCPCS: Performed by: PHYSICIAN ASSISTANT

## 2022-06-29 PROCEDURE — 99211 OFF/OP EST MAY X REQ PHY/QHP: CPT

## 2022-06-29 PROCEDURE — 25000003 PHARM REV CODE 250: Performed by: PHYSICIAN ASSISTANT

## 2022-06-29 RX ORDER — HEPARIN 100 UNIT/ML
500 SYRINGE INTRAVENOUS
Status: DISCONTINUED | OUTPATIENT
Start: 2022-06-29 | End: 2022-06-29 | Stop reason: HOSPADM

## 2022-06-29 RX ORDER — SODIUM CHLORIDE 0.9 % (FLUSH) 0.9 %
10 SYRINGE (ML) INJECTION
Status: DISCONTINUED | OUTPATIENT
Start: 2022-06-29 | End: 2022-06-29 | Stop reason: HOSPADM

## 2022-06-29 RX ADMIN — HEPARIN 500 UNITS: 100 SYRINGE at 12:06

## 2022-06-29 RX ADMIN — Medication 10 ML: at 12:06

## 2022-06-29 NOTE — PLAN OF CARE
Patient arrived to unit for 5FU pump d/c . Pt presents with +4 pitting edema to BLE. GILSON Hooks, NP notified and aware. Explains that it is nutrition related. Encouraged pt to elevate legs throughout the day. Plan of care reviewed, patient agreeable to plan. 5FU pump completed, disconnected, port flushed, Heparin administered to dwell. VSS. Discharge instructions reviewed, patient instructed to return 7/11. Patient ambulated off unit unassisted by self. Patient in NAD at time of discharge.

## 2022-07-01 ENCOUNTER — HOSPITAL ENCOUNTER (OUTPATIENT)
Dept: INTERVENTIONAL RADIOLOGY/VASCULAR | Facility: HOSPITAL | Age: 42
Discharge: HOME | End: 2022-07-01
Attending: INTERNAL MEDICINE
Payer: COMMERCIAL

## 2022-07-01 VITALS — DIASTOLIC BLOOD PRESSURE: 60 MMHG | SYSTOLIC BLOOD PRESSURE: 103 MMHG

## 2022-07-01 DIAGNOSIS — R18.8 OTHER ASCITES: ICD-10-CM

## 2022-07-01 PROCEDURE — 49083 IR PARACENTESIS WITH IMAGING: ICD-10-PCS | Mod: ,,, | Performed by: RADIOLOGY

## 2022-07-01 PROCEDURE — C1729 CATH, DRAINAGE: HCPCS

## 2022-07-01 PROCEDURE — 49083 ABD PARACENTESIS W/IMAGING: CPT | Performed by: RADIOLOGY

## 2022-07-01 NOTE — DISCHARGE SUMMARY
Radiology Discharge Summary      Hospital Course: No complications    Admit Date: 7/1/2022  Discharge Date: 07/01/2022     Instructions Given to Patient: Yes  Diet: Resume prior diet  Activity: activity as tolerated    Description of Condition on Discharge: Stable  Vital Signs (Most Recent): BP: 103/60 (07/01/22 1400)    Discharge Disposition: Home    Discharge Diagnosis: gastric cancer, ascites s/p paracentesis     Follow-up: continue periodic paracentesis as needed    Martin Ivory MD  Staff Radiologist  Department of Radiology  Pager: [phone]

## 2022-07-01 NOTE — PROCEDURES
Radiology Post-Procedure Note    Pre Op Diagnosis: Ascites, gastric cancer  Post Op Diagnosis: Same    Procedure: Paracentesis    Procedure performed by: Martin Ivory MD    Written Informed Consent Obtained: Yes  Specimen Removed: YES thin reddish/brown fluid  Estimated Blood Loss: Minimal    Findings:   Successful paracentesis.  Albumin administered PRN per protocol.    Patient tolerated procedure well.    Martin Ivory MD  Staff Radiologist  Department of Radiology  Pager: [phone]

## 2022-07-01 NOTE — H&P
Radiology History & Physical      SUBJECTIVE:     Chief Complaint: gastric cancer, recurrent ascites    History of Present Illness:  Puja Quigley is a 41 y.o. female who presents for paracentesis  Past Medical History:   Diagnosis Date    Anxiety     Dehydration 5/30/2022    Gastric cancer     Gastric ulcer     Hx of psychiatric care     Pregnancy 08/12/2020    delivered on 8/12/2020    Umbilical hernia      Past Surgical History:   Procedure Laterality Date    CLOSURE OF PERFORATED ULCER OF DUODENUM USING OMENTAL PATCH      ESOPHAGOGASTRODUODENOSCOPY N/A 10/13/2020    Procedure: EGD (ESOPHAGOGASTRODUODENOSCOPY);  Surgeon: Rosio Marion MD;  Location: [facility] (2ND FLR);  Service: Endoscopy;  Laterality: N/A;    ESOPHAGOGASTRODUODENOSCOPY N/A 12/11/2020    Procedure: EGD (ESOPHAGOGASTRODUODENOSCOPY);  Surgeon: Rosio Marion MD;  Location: [facility] (2ND FLR);  Service: Endoscopy;  Laterality: N/A;  Covid-19 test 12/8/20 at [facility]    ESOPHAGOGASTRODUODENOSCOPY N/A 3/12/2021    Procedure: EGD (ESOPHAGOGASTRODUODENOSCOPY);  Surgeon: Nav Omer MD;  Location: [facility] (2ND FLR);  Service: Endoscopy;  Laterality: N/A;  COVID at [facility] 3/9 [facility]    ESOPHAGOGASTRODUODENOSCOPY N/A 5/18/2021    Procedure: EGD (ESOPHAGOGASTRODUODENOSCOPY);  Surgeon: Rosio Marion MD;  Location: [facility] (2ND FLR);  Service: Endoscopy;  Laterality: N/A;  5/15-covid pcw-inst portal-tb    ESOPHAGOGASTRODUODENOSCOPY N/A 5/26/2021    Procedure: EGD (ESOPHAGOGASTRODUODENOSCOPY);  Surgeon: Socrates Terrazas MD;  Location: [facility] ENDO (2ND FLR);  Service: Endoscopy;  Laterality: N/A;       Home Meds:   Prior to Admission medications    Medication Sig Start Date End Date Taking? Authorizing Provider   baclofen (LIORESAL) 5 mg Tab tablet Take 1 tablet (5 mg total) by mouth 3 (three) times daily. 6/20/22   Bren Allen MD   dexAMETHasone (DECADRON) 4 MG Tab 8 mg daily on days 2 through 4 of each  chemotherapy cycle. 6/13/22   Fer Ashraf MD   dicyclomine (BENTYL) 10 MG capsule TAKE ONE CAPSULE BY MOUTH FOUR TIMES DAILY 9/27/21   Kamille Hooks PA-C   famotidine (PEPCID) 20 MG tablet Take 1 tablet (20 mg total) by mouth nightly as needed for Heartburn. 2/23/22 2/23/23  Kamille Hooks PA-C   furosemide (LASIX) 20 MG tablet Take 10 mg by mouth.    Historical Provider   gabapentin (NEURONTIN) 300 MG capsule Take 1 capsule (300 mg total) by mouth 3 (three) times daily. 2/7/22 2/7/23  Kamille Hooks PA-C   LIDOcaine HCL 2% (XYLOCAINE) 2 % jelly Apply topically as needed. 6/20/22   Bren Allen MD   LIDOcaine-prilocaine (EMLA) cream Apply to Port-A-Cath area 30 to 45 minutes prior to port access as directed (topical anesthetic use to the anterior chest only). 6/25/21   Historical Provider   methadone (DOLOPHINE) 5 mg/5 mL solution Take 2.5 mLs (2.5 mg total) by mouth every 12 (twelve) hours. 6/21/22   Sharon Brink MD   metoclopramide HCl (REGLAN) 5 MG tablet 5 mg. 6/11/21   Historical Provider   multivitamin with folic acid 400 mcg Tab Take 1 tablet by mouth once daily.    Historical Provider   nortriptyline (PAMELOR) 25 MG capsule Take 1 capsule (25 mg total) by mouth every evening. 6/20/22 6/20/23  Bren Allen MD   omeprazole (PRILOSEC) 40 MG capsule Take 1 capsule (40 mg total) by mouth 2 (two) times daily before meals. 6/20/22 6/20/23  Bren Allen MD   ondansetron (ZOFRAN) 8 MG tablet Take 1 tablet (8 mg total) by mouth every 8 (eight) hours as needed for Nausea. 7/26/21   Fer Ashraf MD   oxyCODONE (ROXICODONE) 5 MG immediate release tablet Take 1 tablet (5 mg total) by mouth every 6 (six) hours as needed. 6/20/22   Bren Allen MD   polyethylene glycol (GLYCOLAX) 17 gram/dose powder Mix 1 capful (17 g) with liquid and take by mouth once daily for 20 days 6/21/22   Bren Allen MD   prochlorperazine (COMPAZINE) 10 MG tablet TAKE 1 TABLET(10 MG) BY MOUTH EVERY 6 HOURS AS NEEDED FOR  NAUSEA 8/7/21   Fer Ashraf MD   promethazine (PHENERGAN) 25 MG tablet Take 1 tablet (25 mg total) by mouth every 6 (six) hours as needed for Nausea. 6/13/22   Fer Ashraf MD   senna-docusate 8.6-50 mg (SENNA WITH DOCUSATE SODIUM) 8.6-50 mg per tablet Take 1 tablet by mouth 2 (two) times daily as needed for Constipation. 5/26/21   Reshma Wilson MD   sucralfate (CARAFATE) 1 gram tablet TAKE 1 TABLET(1 GRAM) BY MOUTH FOUR TIMES DAILY FOR 14 DAYS 10/20/21   Rosio Marion MD   sucralfate (CARAFATE) 1 gram tablet  4/8/21   Historical Provider   traMADoL (ULTRAM) 50 mg tablet Take 50 mg by mouth. 6/25/21   Historical Provider   amitriptyline (ELAVIL) 10 MG tablet Take 1 tablet (10 mg total) by mouth every evening. 5/13/21 6/20/22  Socrates Terrazas MD   pantoprazole (PROTONIX) 40 MG tablet Take 1 tablet (40 mg total) by mouth 2 (two) times daily. 5/25/22 6/20/22  Kamille Hooks PA-C     Anticoagulants/Antiplatelets: no anticoagulation    Allergies: Review of patient's allergies indicates:  No Known Allergies  Sedation History:  no adverse reactions    Review of Systems:   Hematological: no known coagulopathies  Respiratory: no shortness of breath  Cardiovascular: no chest pain  Gastrointestinal: abdominal pain due to ascites and tumor  Genito-Urinary: no dysuria  Musculoskeletal: negative  Neurological: no TIA or stroke symptoms         OBJECTIVE:     Vital Signs (Most Recent)  BP: (!) 125/95 (07/01/22 1333)    Physical Exam:  ASA: 2  Mallampati: N/A    General: no acute distress  Mental Status: alert and oriented to person, place and time  HEENT: normocephalic, atraumatic  Chest: unlabored breathing  Heart: regular heart rate  Abdomen: distended  Extremity: moves all extremities, bilateral LE edema    Laboratory  Lab Results   Component Value Date    INR 1.0 06/10/2021       Lab Results   Component Value Date    WBC 10.67 06/27/2022    HGB 9.1 (L) 06/27/2022    HCT 28.6 (L) 06/27/2022    MCV 89  06/27/2022     06/27/2022      Lab Results   Component Value Date    GLU 95 06/27/2022     06/27/2022    K 3.9 06/27/2022     06/27/2022    CO2 26 06/27/2022    BUN 16 06/27/2022    CREATININE 0.8 06/27/2022    CALCIUM 8.8 06/27/2022    MG 1.9 06/20/2022    ALT 10 06/27/2022    AST 25 06/27/2022    ALBUMIN 2.0 (L) 06/27/2022    BILITOT 0.7 06/27/2022       ASSESSMENT/PLAN:     Sedation Plan: local anesthesia only  Patient will undergo paracentesis.    Martin Ivory MD  Staff Radiologist  Department of Radiology  Pager: [phone]

## 2022-07-05 ENCOUNTER — OFFICE VISIT (OUTPATIENT)
Dept: PSYCHIATRY | Facility: CLINIC | Age: 42
End: 2022-07-05
Payer: COMMERCIAL

## 2022-07-05 ENCOUNTER — TELEPHONE (OUTPATIENT)
Dept: PALLIATIVE MEDICINE | Facility: CLINIC | Age: 42
End: 2022-07-05
Payer: COMMERCIAL

## 2022-07-05 DIAGNOSIS — F41.1 ANXIETY ASSOCIATED WITH CANCER DIAGNOSIS: Primary | ICD-10-CM

## 2022-07-05 DIAGNOSIS — C78.6 PERITONEAL CARCINOMATOSIS: ICD-10-CM

## 2022-07-05 DIAGNOSIS — C16.9 MALIGNANT NEOPLASM OF STOMACH, UNSPECIFIED LOCATION: ICD-10-CM

## 2022-07-05 DIAGNOSIS — C80.1 ANXIETY ASSOCIATED WITH CANCER DIAGNOSIS: Primary | ICD-10-CM

## 2022-07-05 PROCEDURE — 90834 PSYTX W PT 45 MINUTES: CPT | Mod: S$GLB,,, | Performed by: PSYCHOLOGIST

## 2022-07-05 PROCEDURE — 90834 PR PSYCHOTHERAPY W/PATIENT, 45 MIN: ICD-10-PCS | Mod: S$GLB,,, | Performed by: PSYCHOLOGIST

## 2022-07-05 PROCEDURE — 99999 PR PBB SHADOW E&M-EST. PATIENT-LVL II: CPT | Mod: PBBFAC,,, | Performed by: PSYCHOLOGIST

## 2022-07-05 PROCEDURE — 99999 PR PBB SHADOW E&M-EST. PATIENT-LVL II: ICD-10-PCS | Mod: PBBFAC,,, | Performed by: PSYCHOLOGIST

## 2022-07-05 PROCEDURE — 99212 OFFICE O/P EST SF 10 MIN: CPT | Mod: PBBFAC | Performed by: PSYCHOLOGIST

## 2022-07-05 NOTE — PROGRESS NOTES
INFORMED CONSENT: Puja Quigley   is known to this provider and identity was confirmed via NAME and .  The patient has been informed of the risks and benefits associated with engaging in psychotherapy, the handling of protected health information, the rights of privacy and the limits of confidentiality. The patient has also been informed of the importance of reporting any suicidal or homicidal ideation to this or any provider to ensure safety of all parties, and the Puja Quigley expressed understanding. The patient was agreeable to these terms and freely participates in individual psychotherapy.    PSYCHO-ONCOLOGY NOTE/ Individual Psychotherapy     Date: 2022   Site:  [facility]        Therapeutic Intervention: Met with patient.  Outpatient - Behavior modifying psychotherapy 45 min - CPT code 44151      Patient was last seen by me on 2022    Problem list  Patient Active Problem List   Diagnosis    Intractable abdominal pain    Gastric leak    Gastric fistula    Abnormal endoscopy of upper gastrointestinal tract    Chronic gastric ulcer with perforation    Malignant ascites    Microcytic anemia    Hematemesis with nausea    Malignant neoplasm of stomach    Normocytic anemia    Severe malnutrition    Immunodeficiency due to chemotherapy    Peritoneal carcinomatosis    Non-intractable vomiting with nausea    Anxiety associated with cancer diagnosis    Inguinal adenopathy    Neuropathy due to chemotherapeutic drug    Bone metastases    Pain aggravated by activities of daily living    Dehydration    Bilateral lower extremity edema    Syncope    Palliative care encounter       Chief complaint/reason for encounter: anxiety   Met with patient to evaluate psychosocial adaptation to diagnosis/treatment/survivorship of Gastric Cancer    Current Medications  Current Outpatient Medications   Medication    baclofen (LIORESAL) 5 mg Tab tablet    dexAMETHasone (DECADRON) 4 MG  Tab    dicyclomine (BENTYL) 10 MG capsule    famotidine (PEPCID) 20 MG tablet    furosemide (LASIX) 20 MG tablet    gabapentin (NEURONTIN) 300 MG capsule    LIDOcaine HCL 2% (XYLOCAINE) 2 % jelly    LIDOcaine-prilocaine (EMLA) cream    methadone (DOLOPHINE) 5 mg/5 mL solution    metoclopramide HCl (REGLAN) 5 MG tablet    multivitamin with folic acid 400 mcg Tab    nortriptyline (PAMELOR) 25 MG capsule    omeprazole (PRILOSEC) 40 MG capsule    ondansetron (ZOFRAN) 8 MG tablet    oxyCODONE (ROXICODONE) 5 MG immediate release tablet    polyethylene glycol (GLYCOLAX) 17 gram/dose powder    prochlorperazine (COMPAZINE) 10 MG tablet    promethazine (PHENERGAN) 25 MG tablet    senna-docusate 8.6-50 mg (SENNA WITH DOCUSATE SODIUM) 8.6-50 mg per tablet    sucralfate (CARAFATE) 1 gram tablet    sucralfate (CARAFATE) 1 gram tablet    traMADoL (ULTRAM) 50 mg tablet     No current facility-administered medications for this visit.       Objective:  Puja Quigley arrived promptly for the session.  Ms. Quigley was independently ambulatory at the time of session. The patient was fully cooperative throughout the session.  Appearance: age appropriate, appropriately  dressed, adequately  groomed  Behavior/Cooperation: friendly and cooperative  Speech: normal in rate, volume, and tone and appropriate quality, quantity and organization of sentences  Mood: anxious, sad  Affect: tearful  Thought Process: goal-directed, logical  Thought Content: normal,  No delusions or paranoia; did not appear to be responding to internal stimuli during the session  Orientation: grossly intact  Memory: Grossly intact  Attention Span/Concentration: Attends to session without distraction; reports no difficulty  Fund of Knowledge: average  Estimate of Intelligence: average from verbal skills and history  Cognition: grossly intact  Insight: patient has awareness of illness; good insight into own behavior and behavior of  others  Judgment: the patient's behavior is adequate to circumstances    Interval history and content of current session: Patient feeling more sad and worried lately given new side effects from cancer/treatment. Tumor size burden prominent.  Discussed diagnosis, treatment, prognosis, current adaptation to disease and treatment status and family's adaptation to disease and treatment status. Reports to be coping with great difficulty. Evaluated cognitive response, paying particular attention to negative intrusive thoughts of a persistent and detrimental nature. Thoughts of this type are in evidence with moderate distress. Provided cognitive behavioral therapy to address negative cognitions. Identified and evaluated psychosocial and environmental stressors secondary to diagnosis and treatment.  Examined proactive behaviors that may be implemented to minimize or ameliorate psychosocial stressors secondary to diagnosis and treatment.     Risk parameters:   Patient reports no suicidal ideation  Patient reports no homicidal ideation  Patient reports no self-injurious behavior  Patient reports no violent behavior   Safety needs:  None at this time      Verbal deficits: None     Patient's response to intervention:The patient's response to intervention is accepting.     Progress toward goals and other mental status changes:  The patient's progress toward goals is good.      Progress to date:Progress as Expected      Goals from last visit: Met     Patient reported outcomes:    Distress Thermometer:   Distress Score    Distress Score: 5        Practical Problems Physical Problems                                                   Family Problems                                         Emotional Problems                                                         Spiritual/Religions Concerns     Spritual / Anabaptist Concerns: No         Other Problems                Client Strengths: verbal, intelligent, successful, good social  support, good insight, commitment to wellness, strong lyla, strong cultural traditions     Diagnosis:     ICD-10-CM ICD-9-CM   1. Anxiety associated with cancer diagnosis  F41.1 300.09    C80.1    2. Malignant neoplasm of stomach, unspecified location  C16.9 151.9   3. Peritoneal carcinomatosis  C78.6 197.6       Treatment Plan:individual psychotherapy and medication management by physician  · Target symptoms: anxiety , adjustment  · Why chosen therapy is appropriate versus another modality: relevant to diagnosis, patient responds to this modality, evidence based practice  · Outcome monitoring methods: self-report, observation, checklist/rating scale  · Therapeutic intervention type: behavior modifying psychotherapy  · Prognosis: Good      Behavioral goals:    Exercise:   Stress management:   Social engagement:   Nutrition:   Smoking Cessation:   Therapy:  Increase daily self-care and attention to health management  Pleasant events scheduling and increased social interaction  Monitor stressors in writing and bring to next visit  Identify and decrease cognitive distortions contributing negatively to mood  Identify and reduce avoidance behaviors contributing negatively to mood    Return to clinic: 3 weeks     Length of Service (minutes direct face-to-face contact): 45    Giselle Berumen, PhD  Clinical Psychologist  [place] License #[id number]  [place] License #[id number]

## 2022-07-06 ENCOUNTER — LAB VISIT (OUTPATIENT)
Dept: LAB | Facility: HOSPITAL | Age: 42
End: 2022-07-06
Attending: INTERNAL MEDICINE
Payer: COMMERCIAL

## 2022-07-06 ENCOUNTER — OFFICE VISIT (OUTPATIENT)
Dept: HEMATOLOGY/ONCOLOGY | Facility: CLINIC | Age: 42
End: 2022-07-06
Payer: COMMERCIAL

## 2022-07-06 VITALS
TEMPERATURE: 99 F | BODY MASS INDEX: 23.46 KG/M2 | DIASTOLIC BLOOD PRESSURE: 84 MMHG | HEART RATE: 77 BPM | RESPIRATION RATE: 18 BRPM | OXYGEN SATURATION: 100 % | WEIGHT: 149.5 LBS | HEIGHT: 67 IN | SYSTOLIC BLOOD PRESSURE: 130 MMHG

## 2022-07-06 DIAGNOSIS — D50.9 MICROCYTIC ANEMIA: ICD-10-CM

## 2022-07-06 DIAGNOSIS — E43 SEVERE MALNUTRITION: ICD-10-CM

## 2022-07-06 DIAGNOSIS — F41.1 ANXIETY ASSOCIATED WITH CANCER DIAGNOSIS: ICD-10-CM

## 2022-07-06 DIAGNOSIS — C79.51 BONE METASTASES: ICD-10-CM

## 2022-07-06 DIAGNOSIS — R11.2 NON-INTRACTABLE VOMITING WITH NAUSEA, UNSPECIFIED VOMITING TYPE: ICD-10-CM

## 2022-07-06 DIAGNOSIS — K25.5 CHRONIC GASTRIC ULCER WITH PERFORATION: ICD-10-CM

## 2022-07-06 DIAGNOSIS — R60.0 EDEMA OF BOTH LOWER EXTREMITIES: ICD-10-CM

## 2022-07-06 DIAGNOSIS — T45.1X5A NEUROPATHY DUE TO CHEMOTHERAPEUTIC DRUG: ICD-10-CM

## 2022-07-06 DIAGNOSIS — C16.9 GASTRIC ADENOCARCINOMA: ICD-10-CM

## 2022-07-06 DIAGNOSIS — C78.6 PERITONEAL CARCINOMATOSIS: ICD-10-CM

## 2022-07-06 DIAGNOSIS — Z51.5 PALLIATIVE CARE ENCOUNTER: ICD-10-CM

## 2022-07-06 DIAGNOSIS — C80.1 ANXIETY ASSOCIATED WITH CANCER DIAGNOSIS: ICD-10-CM

## 2022-07-06 DIAGNOSIS — Z79.899 IMMUNODEFICIENCY DUE TO CHEMOTHERAPY: ICD-10-CM

## 2022-07-06 DIAGNOSIS — C16.9 MALIGNANT NEOPLASM OF STOMACH, UNSPECIFIED LOCATION: Primary | ICD-10-CM

## 2022-07-06 DIAGNOSIS — R18.8 OTHER ASCITES: ICD-10-CM

## 2022-07-06 DIAGNOSIS — C79.60: ICD-10-CM

## 2022-07-06 DIAGNOSIS — D84.821 IMMUNODEFICIENCY DUE TO CHEMOTHERAPY: ICD-10-CM

## 2022-07-06 DIAGNOSIS — T45.1X5A IMMUNODEFICIENCY DUE TO CHEMOTHERAPY: ICD-10-CM

## 2022-07-06 DIAGNOSIS — G62.0 NEUROPATHY DUE TO CHEMOTHERAPEUTIC DRUG: ICD-10-CM

## 2022-07-06 DIAGNOSIS — R52 PAIN: ICD-10-CM

## 2022-07-06 LAB
ALBUMIN SERPL BCP-MCNC: 1.8 G/DL (ref 3.5–5.2)
ALP SERPL-CCNC: 81 U/L (ref 55–135)
ALT SERPL W/O P-5'-P-CCNC: 8 U/L (ref 10–44)
ANION GAP SERPL CALC-SCNC: 5 MMOL/L (ref 8–16)
AST SERPL-CCNC: 17 U/L (ref 10–40)
B-HCG UR QL: NEGATIVE
BILIRUB SERPL-MCNC: 0.2 MG/DL (ref 0.1–1)
BUN SERPL-MCNC: 9 MG/DL (ref 6–20)
CALCIUM SERPL-MCNC: 8.7 MG/DL (ref 8.7–10.5)
CHLORIDE SERPL-SCNC: 105 MMOL/L (ref 95–110)
CO2 SERPL-SCNC: 28 MMOL/L (ref 23–29)
CREAT SERPL-MCNC: 0.7 MG/DL (ref 0.5–1.4)
ERYTHROCYTE [DISTWIDTH] IN BLOOD BY AUTOMATED COUNT: 14.5 % (ref 11.5–14.5)
EST. GFR  (AFRICAN AMERICAN): >60 ML/MIN/1.73 M^2
EST. GFR  (NON AFRICAN AMERICAN): >60 ML/MIN/1.73 M^2
GLUCOSE SERPL-MCNC: 91 MG/DL (ref 70–110)
HCT VFR BLD AUTO: 29.3 % (ref 37–48.5)
HGB BLD-MCNC: 9.1 G/DL (ref 12–16)
IMM GRANULOCYTES # BLD AUTO: 0.03 K/UL (ref 0–0.04)
MCH RBC QN AUTO: 27 PG (ref 27–31)
MCHC RBC AUTO-ENTMCNC: 31.1 G/DL (ref 32–36)
MCV RBC AUTO: 87 FL (ref 82–98)
NEUTROPHILS # BLD AUTO: 4.9 K/UL (ref 1.8–7.7)
PLATELET # BLD AUTO: 356 K/UL (ref 150–450)
PMV BLD AUTO: 8.5 FL (ref 9.2–12.9)
POTASSIUM SERPL-SCNC: 3.7 MMOL/L (ref 3.5–5.1)
PROT SERPL-MCNC: 5.5 G/DL (ref 6–8.4)
RBC # BLD AUTO: 3.37 M/UL (ref 4–5.4)
SODIUM SERPL-SCNC: 138 MMOL/L (ref 136–145)
WBC # BLD AUTO: 6.61 K/UL (ref 3.9–12.7)

## 2022-07-06 PROCEDURE — 99999 PR PBB SHADOW E&M-EST. PATIENT-LVL V: ICD-10-PCS | Mod: PBBFAC,,, | Performed by: INTERNAL MEDICINE

## 2022-07-06 PROCEDURE — 1111F PR DISCHARGE MEDS RECONCILED W/ CURRENT OUTPATIENT MED LIST: ICD-10-PCS | Mod: CPTII,S$GLB,, | Performed by: INTERNAL MEDICINE

## 2022-07-06 PROCEDURE — 3079F PR MOST RECENT DIASTOLIC BLOOD PRESSURE 80-89 MM HG: ICD-10-PCS | Mod: CPTII,S$GLB,, | Performed by: INTERNAL MEDICINE

## 2022-07-06 PROCEDURE — 81025 URINE PREGNANCY TEST: CPT | Performed by: INTERNAL MEDICINE

## 2022-07-06 PROCEDURE — 3075F SYST BP GE 130 - 139MM HG: CPT | Mod: CPTII,S$GLB,, | Performed by: INTERNAL MEDICINE

## 2022-07-06 PROCEDURE — 99215 PR OFFICE/OUTPT VISIT, EST, LEVL V, 40-54 MIN: ICD-10-PCS | Mod: S$GLB,,, | Performed by: INTERNAL MEDICINE

## 2022-07-06 PROCEDURE — 3008F BODY MASS INDEX DOCD: CPT | Mod: CPTII,S$GLB,, | Performed by: INTERNAL MEDICINE

## 2022-07-06 PROCEDURE — 1111F DSCHRG MED/CURRENT MED MERGE: CPT | Mod: CPTII,S$GLB,, | Performed by: INTERNAL MEDICINE

## 2022-07-06 PROCEDURE — 1159F PR MEDICATION LIST DOCUMENTED IN MEDICAL RECORD: ICD-10-PCS | Mod: CPTII,S$GLB,, | Performed by: INTERNAL MEDICINE

## 2022-07-06 PROCEDURE — 3079F DIAST BP 80-89 MM HG: CPT | Mod: CPTII,S$GLB,, | Performed by: INTERNAL MEDICINE

## 2022-07-06 PROCEDURE — 85027 COMPLETE CBC AUTOMATED: CPT | Performed by: INTERNAL MEDICINE

## 2022-07-06 PROCEDURE — 36415 COLL VENOUS BLD VENIPUNCTURE: CPT | Performed by: INTERNAL MEDICINE

## 2022-07-06 PROCEDURE — 1159F MED LIST DOCD IN RCRD: CPT | Mod: CPTII,S$GLB,, | Performed by: INTERNAL MEDICINE

## 2022-07-06 PROCEDURE — 99999 PR PBB SHADOW E&M-EST. PATIENT-LVL V: CPT | Mod: PBBFAC,,, | Performed by: INTERNAL MEDICINE

## 2022-07-06 PROCEDURE — 3008F PR BODY MASS INDEX (BMI) DOCUMENTED: ICD-10-PCS | Mod: CPTII,S$GLB,, | Performed by: INTERNAL MEDICINE

## 2022-07-06 PROCEDURE — 80053 COMPREHEN METABOLIC PANEL: CPT | Performed by: INTERNAL MEDICINE

## 2022-07-06 PROCEDURE — 3075F PR MOST RECENT SYSTOLIC BLOOD PRESS GE 130-139MM HG: ICD-10-PCS | Mod: CPTII,S$GLB,, | Performed by: INTERNAL MEDICINE

## 2022-07-06 PROCEDURE — 99215 OFFICE O/P EST HI 40 MIN: CPT | Mod: S$GLB,,, | Performed by: INTERNAL MEDICINE

## 2022-07-06 RX ORDER — FUROSEMIDE 20 MG/1
20 TABLET ORAL DAILY
Qty: 30 TABLET | Refills: 2 | Status: ON HOLD | OUTPATIENT
Start: 2022-07-06 | End: 2022-10-03

## 2022-07-06 RX ORDER — ATROPINE SULFATE 0.4 MG/ML
0.4 INJECTION, SOLUTION ENDOTRACHEAL; INTRAMEDULLARY; INTRAMUSCULAR; INTRAVENOUS; SUBCUTANEOUS ONCE AS NEEDED
Status: CANCELLED | OUTPATIENT
Start: 2022-07-11

## 2022-07-06 RX ORDER — SODIUM CHLORIDE 0.9 % (FLUSH) 0.9 %
10 SYRINGE (ML) INJECTION
Status: CANCELLED | OUTPATIENT
Start: 2022-07-13

## 2022-07-06 RX ORDER — EPINEPHRINE 0.3 MG/.3ML
0.3 INJECTION SUBCUTANEOUS ONCE AS NEEDED
Status: CANCELLED | OUTPATIENT
Start: 2022-07-11

## 2022-07-06 RX ORDER — HEPARIN 100 UNIT/ML
500 SYRINGE INTRAVENOUS
Status: CANCELLED | OUTPATIENT
Start: 2022-07-11

## 2022-07-06 RX ORDER — METHADONE HYDROCHLORIDE 5 MG/5ML
2.5 SOLUTION ORAL EVERY 12 HOURS
Qty: 75 ML | Refills: 0 | Status: SHIPPED | OUTPATIENT
Start: 2022-07-06 | End: 2022-07-26 | Stop reason: SDUPTHER

## 2022-07-06 RX ORDER — HEPARIN 100 UNIT/ML
500 SYRINGE INTRAVENOUS
Status: CANCELLED | OUTPATIENT
Start: 2022-07-13

## 2022-07-06 RX ORDER — DIPHENHYDRAMINE HYDROCHLORIDE 50 MG/ML
50 INJECTION INTRAMUSCULAR; INTRAVENOUS ONCE AS NEEDED
Status: CANCELLED | OUTPATIENT
Start: 2022-07-11

## 2022-07-06 RX ORDER — SODIUM CHLORIDE 0.9 % (FLUSH) 0.9 %
10 SYRINGE (ML) INJECTION
Status: CANCELLED | OUTPATIENT
Start: 2022-07-11

## 2022-07-08 ENCOUNTER — TELEPHONE (OUTPATIENT)
Dept: PALLIATIVE MEDICINE | Facility: CLINIC | Age: 42
End: 2022-07-08
Payer: COMMERCIAL

## 2022-07-08 ENCOUNTER — HOSPITAL ENCOUNTER (OUTPATIENT)
Dept: INTERVENTIONAL RADIOLOGY/VASCULAR | Facility: HOSPITAL | Age: 42
Discharge: HOME | End: 2022-07-08
Attending: INTERNAL MEDICINE
Payer: COMMERCIAL

## 2022-07-08 VITALS — SYSTOLIC BLOOD PRESSURE: 141 MMHG | DIASTOLIC BLOOD PRESSURE: 88 MMHG

## 2022-07-08 DIAGNOSIS — R18.8 OTHER ASCITES: ICD-10-CM

## 2022-07-08 PROCEDURE — C1729 CATH, DRAINAGE: HCPCS

## 2022-07-08 PROCEDURE — 49083 IR PARACENTESIS WITH IMAGING: ICD-10-PCS | Mod: ,,, | Performed by: RADIOLOGY

## 2022-07-08 PROCEDURE — 49083 ABD PARACENTESIS W/IMAGING: CPT | Performed by: RADIOLOGY

## 2022-07-08 NOTE — DISCHARGE SUMMARY
Radiology Discharge Summary      Hospital Course: No complications    Admit Date: 7/8/2022  Discharge Date: 07/08/2022     Instructions Given to Patient: Yes  Diet: Resume prior diet  Activity: activity as tolerated    Description of Condition on Discharge: Stable  Vital Signs (Most Recent): BP: 138/88 (07/08/22 1307)    Discharge Disposition: Home    Discharge Diagnosis: gastric cancer, recurrent ascites s/p paracentesis     Follow-up: repeat paracentesis as needed    Martin Ivory MD  Staff Radiologist  Department of Radiology  Pager: [phone]

## 2022-07-08 NOTE — H&P
Radiology History & Physical      SUBJECTIVE:     Chief Complaint: gastric cancer, recurrent ascites    History of Present Illness:  Puja Quigley is a 41 y.o. female who presents for paracentesis  Past Medical History:   Diagnosis Date    Anxiety     Dehydration 5/30/2022    Gastric cancer     Gastric ulcer     Hx of psychiatric care     Pregnancy 08/12/2020    delivered on 8/12/2020    Umbilical hernia      Past Surgical History:   Procedure Laterality Date    CLOSURE OF PERFORATED ULCER OF DUODENUM USING OMENTAL PATCH      ESOPHAGOGASTRODUODENOSCOPY N/A 10/13/2020    Procedure: EGD (ESOPHAGOGASTRODUODENOSCOPY);  Surgeon: Rosio Marion MD;  Location: [facility] (2ND FLR);  Service: Endoscopy;  Laterality: N/A;    ESOPHAGOGASTRODUODENOSCOPY N/A 12/11/2020    Procedure: EGD (ESOPHAGOGASTRODUODENOSCOPY);  Surgeon: Rosio Marion MD;  Location: [facility] (2ND FLR);  Service: Endoscopy;  Laterality: N/A;  Covid-19 test 12/8/20 at [facility]    ESOPHAGOGASTRODUODENOSCOPY N/A 3/12/2021    Procedure: EGD (ESOPHAGOGASTRODUODENOSCOPY);  Surgeon: Nav Omer MD;  Location: [facility] (2ND FLR);  Service: Endoscopy;  Laterality: N/A;  COVID at [facility] 3/9 [facility] [place] [place]    ESOPHAGOGASTRODUODENOSCOPY N/A 5/18/2021    Procedure: EGD (ESOPHAGOGASTRODUODENOSCOPY);  Surgeon: Rosio Marion MD;  Location: [facility] (2ND FLR);  Service: Endoscopy;  Laterality: N/A;  5/15-covid pcw-inst portal-tb    ESOPHAGOGASTRODUODENOSCOPY N/A 5/26/2021    Procedure: EGD (ESOPHAGOGASTRODUODENOSCOPY);  Surgeon: Socrates Terrazas MD;  Location: [facility] (2ND FLR);  Service: Endoscopy;  Laterality: N/A;       Home Meds:   Prior to Admission medications    Medication Sig Start Date End Date Taking? Authorizing Provider   baclofen (LIORESAL) 5 mg Tab tablet Take 1 tablet (5 mg total) by mouth 3 (three) times daily. 6/20/22   Bren Allen MD   dexAMETHasone (DECADRON) 4 MG Tab 8 mg daily on days 2 through 4 of each  chemotherapy cycle. 6/13/22   Fer Ashraf MD   dicyclomine (BENTYL) 10 MG capsule TAKE ONE CAPSULE BY MOUTH FOUR TIMES DAILY 9/27/21   Kamille Hooks PA-C   famotidine (PEPCID) 20 MG tablet Take 1 tablet (20 mg total) by mouth nightly as needed for Heartburn. 2/23/22 2/23/23  Kamille Hooks PA-C   furosemide (LASIX) 20 MG tablet Take 1 tablet (20 mg total) by mouth once daily. 7/6/22   Fer Ashraf MD   gabapentin (NEURONTIN) 300 MG capsule Take 1 capsule (300 mg total) by mouth 3 (three) times daily. 2/7/22 2/7/23  Kamille Hooks PA-C   LIDOcaine HCL 2% (XYLOCAINE) 2 % jelly Apply topically as needed. 6/20/22   Bren Allen MD   LIDOcaine-prilocaine (EMLA) cream Apply to Port-A-Cath area 30 to 45 minutes prior to port access as directed (topical anesthetic use to the anterior chest only). 6/25/21   Historical Provider   methadone (DOLOPHINE) 5 mg/5 mL solution Take 2.5 mLs (2.5 mg total) by mouth every 12 (twelve) hours. 7/6/22   Elva Gutierrez DNP   metoclopramide HCl (REGLAN) 5 MG tablet 5 mg. 6/11/21   Historical Provider   multivitamin with folic acid 400 mcg Tab Take 1 tablet by mouth once daily.    Historical Provider   nortriptyline (PAMELOR) 25 MG capsule Take 1 capsule (25 mg total) by mouth every evening. 6/20/22 6/20/23  Bren Allen MD   omeprazole (PRILOSEC) 40 MG capsule Take 1 capsule (40 mg total) by mouth 2 (two) times daily before meals. 6/20/22 6/20/23  Bren Allen MD   ondansetron (ZOFRAN) 8 MG tablet Take 1 tablet (8 mg total) by mouth every 8 (eight) hours as needed for Nausea. 7/26/21   Fer Ashraf MD   oxyCODONE (ROXICODONE) 5 MG immediate release tablet Take 1 tablet (5 mg total) by mouth every 6 (six) hours as needed. 6/20/22   Bren Allen MD   polyethylene glycol (GLYCOLAX) 17 gram/dose powder Mix 1 capful (17 g) with liquid and take by mouth once daily for 20 days 6/21/22   Bren Allen MD   prochlorperazine (COMPAZINE) 10 MG tablet TAKE 1 TABLET(10 MG) BY  MOUTH EVERY 6 HOURS AS NEEDED FOR NAUSEA 8/7/21   Fer Ashraf MD   promethazine (PHENERGAN) 25 MG tablet Take 1 tablet (25 mg total) by mouth every 6 (six) hours as needed for Nausea. 6/13/22   Fer Ashraf MD   senna-docusate 8.6-50 mg (SENNA WITH DOCUSATE SODIUM) 8.6-50 mg per tablet Take 1 tablet by mouth 2 (two) times daily as needed for Constipation. 5/26/21   Reshma Wilson MD   sucralfate (CARAFATE) 1 gram tablet TAKE 1 TABLET(1 GRAM) BY MOUTH FOUR TIMES DAILY FOR 14 DAYS 10/20/21   Rosio Marion MD   sucralfate (CARAFATE) 1 gram tablet  4/8/21   Historical Provider   traMADoL (ULTRAM) 50 mg tablet Take 50 mg by mouth. 6/25/21   Historical Provider   amitriptyline (ELAVIL) 10 MG tablet Take 1 tablet (10 mg total) by mouth every evening. 5/13/21 6/20/22  Socrates Terrazas MD   pantoprazole (PROTONIX) 40 MG tablet Take 1 tablet (40 mg total) by mouth 2 (two) times daily. 5/25/22 6/20/22  Kamille Hooks PA-C     Anticoagulants/Antiplatelets: no anticoagulation    Allergies: Review of patient's allergies indicates:  No Known Allergies  Sedation History:  no adverse reactions    Review of Systems:   Hematological: no known coagulopathies  Respiratory: no shortness of breath  Cardiovascular: no chest pain  Gastrointestinal: mild abdominal pain due to ascites  Genito-Urinary: no dysuria  Musculoskeletal: bilateral LE edema  Neurological: no TIA or stroke symptoms         OBJECTIVE:     Vital Signs (Most Recent)  BP: 138/88 (07/08/22 1307)    Physical Exam:  ASA: 2  Mallampati: N/A    General: no acute distress  Mental Status: alert and oriented to person, place and time  HEENT: normocephalic, atraumatic  Chest: unlabored breathing  Heart: regular heart rate  Abdomen: distended  Extremity: moves all extremities, bilateral LE edema    Laboratory  Lab Results   Component Value Date    INR 1.0 06/10/2021       Lab Results   Component Value Date    WBC 6.61 07/06/2022    HGB 9.1 (L) 07/06/2022    HCT  29.3 (L) 07/06/2022    MCV 87 07/06/2022     07/06/2022      Lab Results   Component Value Date    GLU 91 07/06/2022     07/06/2022    K 3.7 07/06/2022     07/06/2022    CO2 28 07/06/2022    BUN 9 07/06/2022    CREATININE 0.7 07/06/2022    CALCIUM 8.7 07/06/2022    MG 1.9 06/20/2022    ALT 8 (L) 07/06/2022    AST 17 07/06/2022    ALBUMIN 1.8 (L) 07/06/2022    BILITOT 0.2 07/06/2022       ASSESSMENT/PLAN:     Sedation Plan: local anesthesia only  Patient will undergo paracentesis.    Martin Ivory MD  Staff Radiologist  Department of Radiology  Pager: [phone]

## 2022-07-08 NOTE — PROCEDURES
Radiology Post-Procedure Note    Pre Op Diagnosis: Ascites, gastric cancer  Post Op Diagnosis: Same    Procedure: Paracentesis    Procedure performed by: Martin Ivory MD    Written Informed Consent Obtained: Yes  Specimen Removed: YES thin renuka/lang fluid  Estimated Blood Loss: Minimal    Findings:   Successful paracentesis.  Albumin administered PRN per protocol.    Patient tolerated procedure well.    Martin Ivory MD  Staff Radiologist  Department of Radiology  Pager: [phone]

## 2022-07-11 ENCOUNTER — HOSPITAL ENCOUNTER (EMERGENCY)
Facility: HOSPITAL | Age: 42
Discharge: HOME | End: 2022-07-11
Attending: EMERGENCY MEDICINE
Payer: COMMERCIAL

## 2022-07-11 VITALS
TEMPERATURE: 99 F | SYSTOLIC BLOOD PRESSURE: 140 MMHG | HEART RATE: 97 BPM | OXYGEN SATURATION: 100 % | RESPIRATION RATE: 16 BRPM | WEIGHT: 150 LBS | DIASTOLIC BLOOD PRESSURE: 94 MMHG | BODY MASS INDEX: 23.49 KG/M2

## 2022-07-11 DIAGNOSIS — N39.0 URINARY TRACT INFECTION WITHOUT HEMATURIA, SITE UNSPECIFIED: ICD-10-CM

## 2022-07-11 DIAGNOSIS — K64.4 EXTERNAL HEMORRHOID: ICD-10-CM

## 2022-07-11 DIAGNOSIS — R10.9 ABDOMINAL PAIN: ICD-10-CM

## 2022-07-11 DIAGNOSIS — R10.84 GENERALIZED ABDOMINAL PAIN: Primary | ICD-10-CM

## 2022-07-11 LAB
ALBUMIN SERPL BCP-MCNC: 1.9 G/DL (ref 3.5–5.2)
ALP SERPL-CCNC: 85 U/L (ref 55–135)
ALT SERPL W/O P-5'-P-CCNC: 8 U/L (ref 10–44)
AMORPH CRY URNS QL MICRO: ABNORMAL
ANION GAP SERPL CALC-SCNC: 9 MMOL/L (ref 8–16)
APPEARANCE FLD: NORMAL
AST SERPL-CCNC: 15 U/L (ref 10–40)
B-HCG UR QL: NEGATIVE
BACTERIA #/AREA URNS HPF: ABNORMAL /HPF
BASOPHILS # BLD AUTO: 0.03 K/UL (ref 0–0.2)
BASOPHILS NFR BLD: 0.2 % (ref 0–1.9)
BILIRUB SERPL-MCNC: 0.2 MG/DL (ref 0.1–1)
BILIRUB UR QL STRIP: NEGATIVE
BODY FLD TYPE: NORMAL
BUN SERPL-MCNC: 10 MG/DL (ref 6–20)
CALCIUM SERPL-MCNC: 8.5 MG/DL (ref 8.7–10.5)
CHLORIDE SERPL-SCNC: 104 MMOL/L (ref 95–110)
CLARITY UR: ABNORMAL
CO2 SERPL-SCNC: 28 MMOL/L (ref 23–29)
COLOR FLD: NORMAL
COLOR UR: YELLOW
CREAT SERPL-MCNC: 0.7 MG/DL (ref 0.5–1.4)
DIFFERENTIAL METHOD: ABNORMAL
EOSINOPHIL # BLD AUTO: 0 K/UL (ref 0–0.5)
EOSINOPHIL NFR BLD: 0.1 % (ref 0–8)
ERYTHROCYTE [DISTWIDTH] IN BLOOD BY AUTOMATED COUNT: 15.2 % (ref 11.5–14.5)
EST. GFR  (AFRICAN AMERICAN): >60 ML/MIN/1.73 M^2
EST. GFR  (NON AFRICAN AMERICAN): >60 ML/MIN/1.73 M^2
GLUCOSE SERPL-MCNC: 143 MG/DL (ref 70–110)
GLUCOSE UR QL STRIP: NEGATIVE
HCT VFR BLD AUTO: 33.6 % (ref 37–48.5)
HGB BLD-MCNC: 10.1 G/DL (ref 12–16)
HGB UR QL STRIP: NEGATIVE
IMM GRANULOCYTES # BLD AUTO: 0.07 K/UL (ref 0–0.04)
IMM GRANULOCYTES NFR BLD AUTO: 0.5 % (ref 0–0.5)
KETONES UR QL STRIP: NEGATIVE
LACTATE SERPL-SCNC: 2 MMOL/L (ref 0.5–2.2)
LEUKOCYTE ESTERASE UR QL STRIP: ABNORMAL
LIPASE SERPL-CCNC: 69 U/L (ref 4–60)
LYMPHOCYTES # BLD AUTO: 0.7 K/UL (ref 1–4.8)
LYMPHOCYTES NFR BLD: 5 % (ref 18–48)
LYMPHOCYTES NFR FLD MANUAL: 32 %
MCH RBC QN AUTO: 25.9 PG (ref 27–31)
MCHC RBC AUTO-ENTMCNC: 30.1 G/DL (ref 32–36)
MCV RBC AUTO: 86 FL (ref 82–98)
MESOTHL CELL NFR FLD MANUAL: 4 %
MICROSCOPIC COMMENT: ABNORMAL
MONOCYTES # BLD AUTO: 0.2 K/UL (ref 0.3–1)
MONOCYTES NFR BLD: 1.4 % (ref 4–15)
MONOS+MACROS NFR FLD MANUAL: 37 %
NEUTROPHILS # BLD AUTO: 13.1 K/UL (ref 1.8–7.7)
NEUTROPHILS NFR BLD: 92.8 % (ref 38–73)
NEUTROPHILS NFR FLD MANUAL: 27 %
NITRITE UR QL STRIP: NEGATIVE
NRBC BLD-RTO: 0 /100 WBC
PATH INTERP FLD-IMP: NORMAL
PH UR STRIP: 7 [PH] (ref 5–8)
PLATELET # BLD AUTO: 402 K/UL (ref 150–450)
PMV BLD AUTO: 8.3 FL (ref 9.2–12.9)
POTASSIUM SERPL-SCNC: 3.5 MMOL/L (ref 3.5–5.1)
PROT SERPL-MCNC: 5.3 G/DL (ref 6–8.4)
PROT UR QL STRIP: ABNORMAL
RBC # BLD AUTO: 3.9 M/UL (ref 4–5.4)
RBC #/AREA URNS HPF: 3 /HPF (ref 0–4)
SODIUM SERPL-SCNC: 141 MMOL/L (ref 136–145)
SP GR UR STRIP: 1.02 (ref 1–1.03)
SQUAMOUS #/AREA URNS HPF: 8 /HPF
TROPONIN I SERPL DL<=0.01 NG/ML-MCNC: 0.01 NG/ML (ref 0–0.03)
URN SPEC COLLECT METH UR: ABNORMAL
UROBILINOGEN UR STRIP-ACNC: NEGATIVE EU/DL
WBC # BLD AUTO: 14.07 K/UL (ref 3.9–12.7)
WBC # FLD: 503 /CU MM
WBC #/AREA URNS HPF: 34 /HPF (ref 0–5)

## 2022-07-11 PROCEDURE — 93010 EKG 12-LEAD: ICD-10-PCS | Mod: ,,, | Performed by: INTERNAL MEDICINE

## 2022-07-11 PROCEDURE — 93005 ELECTROCARDIOGRAM TRACING: CPT | Mod: 59

## 2022-07-11 PROCEDURE — 80053 COMPREHEN METABOLIC PANEL: CPT | Performed by: EMERGENCY MEDICINE

## 2022-07-11 PROCEDURE — 93010 ELECTROCARDIOGRAM REPORT: CPT | Mod: ,,, | Performed by: INTERNAL MEDICINE

## 2022-07-11 PROCEDURE — 63600175 PHARM REV CODE 636 W HCPCS: Performed by: EMERGENCY MEDICINE

## 2022-07-11 PROCEDURE — 87070 CULTURE OTHR SPECIMN AEROBIC: CPT | Mod: 59 | Performed by: EMERGENCY MEDICINE

## 2022-07-11 PROCEDURE — 87205 SMEAR GRAM STAIN: CPT | Performed by: EMERGENCY MEDICINE

## 2022-07-11 PROCEDURE — 84484 ASSAY OF TROPONIN QUANT: CPT | Performed by: EMERGENCY MEDICINE

## 2022-07-11 PROCEDURE — 99285 EMERGENCY DEPT VISIT HI MDM: CPT | Mod: 25

## 2022-07-11 PROCEDURE — 96375 TX/PRO/DX INJ NEW DRUG ADDON: CPT | Mod: 59

## 2022-07-11 PROCEDURE — 89051 BODY FLUID CELL COUNT: CPT | Performed by: EMERGENCY MEDICINE

## 2022-07-11 PROCEDURE — 83615 LACTATE (LD) (LDH) ENZYME: CPT | Performed by: EMERGENCY MEDICINE

## 2022-07-11 PROCEDURE — 82042 OTHER SOURCE ALBUMIN QUAN EA: CPT | Performed by: EMERGENCY MEDICINE

## 2022-07-11 PROCEDURE — 85025 COMPLETE CBC W/AUTO DIFF WBC: CPT | Performed by: EMERGENCY MEDICINE

## 2022-07-11 PROCEDURE — 84157 ASSAY OF PROTEIN OTHER: CPT | Performed by: EMERGENCY MEDICINE

## 2022-07-11 PROCEDURE — 83605 ASSAY OF LACTIC ACID: CPT | Performed by: EMERGENCY MEDICINE

## 2022-07-11 PROCEDURE — 25500020 PHARM REV CODE 255: Performed by: EMERGENCY MEDICINE

## 2022-07-11 PROCEDURE — 83690 ASSAY OF LIPASE: CPT | Performed by: EMERGENCY MEDICINE

## 2022-07-11 PROCEDURE — 87075 CULTR BACTERIA EXCEPT BLOOD: CPT | Performed by: EMERGENCY MEDICINE

## 2022-07-11 PROCEDURE — 87086 URINE CULTURE/COLONY COUNT: CPT | Performed by: EMERGENCY MEDICINE

## 2022-07-11 PROCEDURE — 96365 THER/PROPH/DIAG IV INF INIT: CPT | Mod: 59

## 2022-07-11 PROCEDURE — 81025 URINE PREGNANCY TEST: CPT | Performed by: EMERGENCY MEDICINE

## 2022-07-11 PROCEDURE — 81000 URINALYSIS NONAUTO W/SCOPE: CPT | Performed by: EMERGENCY MEDICINE

## 2022-07-11 RX ORDER — HYDROCORTISONE 25 MG/G
CREAM TOPICAL 2 TIMES DAILY
Qty: 3.5 G | Refills: 0 | Status: ON HOLD | OUTPATIENT
Start: 2022-07-11 | End: 2022-11-10 | Stop reason: HOSPADM

## 2022-07-11 RX ORDER — NITROFURANTOIN 25; 75 MG/1; MG/1
100 CAPSULE ORAL 2 TIMES DAILY
Qty: 10 CAPSULE | Refills: 0 | Status: SHIPPED | OUTPATIENT
Start: 2022-07-11 | End: 2022-07-16

## 2022-07-11 RX ORDER — MORPHINE SULFATE 4 MG/ML
6 INJECTION, SOLUTION INTRAMUSCULAR; INTRAVENOUS
Status: COMPLETED | OUTPATIENT
Start: 2022-07-11 | End: 2022-07-11

## 2022-07-11 RX ADMIN — MORPHINE SULFATE 6 MG: 4 INJECTION INTRAVENOUS at 08:07

## 2022-07-11 RX ADMIN — CEFTRIAXONE 1 G: 1 INJECTION, SOLUTION INTRAVENOUS at 11:07

## 2022-07-11 RX ADMIN — IOHEXOL 100 ML: 350 INJECTION, SOLUTION INTRAVENOUS at 08:07

## 2022-07-11 NOTE — ED NOTES
Reassessed pt's pain. She rates a 7/10. Pt states she does not want any addition pain meds at this time. Pt AAOx4, resting in bed, side rails up x 2, call bell within reach. NAD at this time. Will continue to monitor.

## 2022-07-11 NOTE — DISCHARGE INSTRUCTIONS
Follow-up with your primary care doctor the next 12-24 hours for recheck.  Return to the emergency department for any worsening signs or symptoms.

## 2022-07-11 NOTE — OP NOTE
Pre Op Diagnosis: ascites     Post Op Diagnosis: same     Procedure:  para     Procedure performed by: Christiano FUENTES, Tracy ESCOBEDO     Written Informed Consent Obtained: Yes     Specimen Removed:  yes     Estimated Blood Loss:  minimal     Findings: Local anesthesia.     The patient tolerated the procedure well and there were no complications.      Sterile technique was performed in the lower abdomen, lidocaine was used as a local anesthetic.   2.5 liters of serous fluid removed for therapeutic purposes.  Pt tolerated the procedure well without immediate complications.  Please see radiologist report for details. F/u with PCP and/or ordering physician.

## 2022-07-11 NOTE — ED NOTES
The patient is resting quietly, eyes open, airway is open and patent, respirations are spontaneous, normal respiratory effort and rate noted, skin warm and dry, appearance: in no acute distress and resting comfortably.

## 2022-07-11 NOTE — ED NOTES
Pt in NAD, VSS, Resp e/u. Pt reports pain improvement after paracentesis. Stretcher locked in lowest position. Side rails up x2. Call bell within reach. Will continue to monitor.

## 2022-07-11 NOTE — ED PROVIDER NOTES
SCRIBE #1 NOTE: I, Cristi Dominguez, am scribing for, and in the presence of, Bethel Shetty MD. I have scribed the entire note.      History      Chief Complaint   Patient presents with    Abdominal Pain     C/o abdominal pain, nausea, hx of gastric CA       Review of patient's allergies indicates:  No Known Allergies     HPI   HPI    7/11/2022, 6:44 AM   History obtained from the patient      History of Present Illness: Puja Quigley is a 41 y.o. female patient who presents to the Emergency Department for generalized abdominal pain, onset several days PTA. Symptoms are constant and moderate in severity. Pain is worse with movement. Associated sxs include nausea, abdominal distention, and BLE swelling. Patient denies any fever, chills, vomiting, SOB, CP, weakness, numbness, dizziness, headache, and all other sxs at this time. Pt is currently on chemotherapy and methadone; pt received 50 mcg Fentanyl en route to the ED, with no improvement of sxs. Pt states that she receives weekly paracenteses on Fridays, and last had a paracentesis 3 days ago. Pt currently follows with Dr. Ashraf (Hem/Onc). No further complaints or concerns at this time.     Arrival mode: EMS    PCP: Primary Doctor No       Past Medical History:  Past Medical History:   Diagnosis Date    Anxiety     Dehydration 5/30/2022    Gastric cancer     Gastric ulcer     Hx of psychiatric care     Pregnancy 08/12/2020    delivered on 8/12/2020    Umbilical hernia        Past Surgical History:  Past Surgical History:   Procedure Laterality Date    CLOSURE OF PERFORATED ULCER OF DUODENUM USING OMENTAL PATCH      ESOPHAGOGASTRODUODENOSCOPY N/A 10/13/2020    Procedure: EGD (ESOPHAGOGASTRODUODENOSCOPY);  Surgeon: Rosio Marion MD;  Location: [facility] ([address] [place] [address]);  Service: Endoscopy;  Laterality: N/A;    ESOPHAGOGASTRODUODENOSCOPY N/A 12/11/2020    Procedure: EGD (ESOPHAGOGASTRODUODENOSCOPY);  Surgeon: Rosio Marion MD;  Location: [facility]  "(2ND FLR);  Service: Endoscopy;  Laterality: N/A;  Covid-19 test 20 at [facility] - pg    ESOPHAGOGASTRODUODENOSCOPY N/A 3/12/2021    Procedure: EGD (ESOPHAGOGASTRODUODENOSCOPY);  Surgeon: Nav Omer MD;  Location: [facility] (2ND FLR);  Service: Endoscopy;  Laterality: N/A;  COVID at [facility] 3/9 ttr    ESOPHAGOGASTRODUODENOSCOPY N/A 2021    Procedure: EGD (ESOPHAGOGASTRODUODENOSCOPY);  Surgeon: Rosio Marion MD;  Location: [facility] END[facility] (2ND FLR);  Service: Endoscopy;  Laterality: N/A;  5/15-covid pcw-inst portal-tb    ESOPHAGOGASTRODUODENOSCOPY N/A 2021    Procedure: EGD (ESOPHAGOGASTRODUODENOSCOPY);  Surgeon: Socrates Terrazas MD;  Location: [facility] END[facility] (2ND FLR);  Service: Endoscopy;  Laterality: N/A;         Family History:  Family History   Problem Relation Age of Onset    Cancer Mother 67        lymphoma (type? "not active," not on tx; "related to her blood")    Schizophrenia Mother     Lung cancer Father 48        type? h/o smoking    No Known Problems Son     Breast cancer Other 73        unilat; stage I, but aggressive    Prostate cancer Paternal Uncle         (dx 50s/60? no chemo?)    Breast cancer Paternal Cousin         (dx age?) ductal    Colon cancer Neg Hx     Esophageal cancer Neg Hx        Social History:  Social History     Tobacco Use    Smoking status: Former Smoker     Types: Cigarettes     Quit date: 2019     Years since quittin.6    Smokeless tobacco: Never Used   Substance and Sexual Activity    Alcohol use: Yes    Drug use: Not Currently    Sexual activity: Yes     Partners: Male       ROS   Review of Systems   Constitutional: Negative for chills and fever.   HENT: Negative for sore throat.    Respiratory: Negative for shortness of breath.    Cardiovascular: Positive for leg swelling (BLE). Negative for chest pain.   Gastrointestinal: Positive for abdominal distention, abdominal pain (generalized) and nausea. Negative for diarrhea and vomiting. "   Genitourinary: Negative for dysuria.   Musculoskeletal: Negative for back pain.   Skin: Negative for rash.   Neurological: Negative for dizziness, weakness, light-headedness, numbness and headaches.   Hematological: Does not bruise/bleed easily.   All other systems reviewed and are negative.    Physical Exam      Initial Vitals [07/11/22 0611]   BP Pulse Resp Temp SpO2   (!) 145/98 97 20 98.6 °F (37 °C) 97 %      MAP       --          Physical Exam  Nursing Notes and Vital Signs Reviewed.  Constitutional: Patient is in moderate distress. Cachectic.  Head: Atraumatic. Normocephalic.  Eyes: PERRL. EOM intact. Conjunctivae are not pale. No scleral icterus.  ENT: Mucous membranes are moist. Oropharynx is clear and symmetric.    Neck: Supple. Full ROM.   Cardiovascular: Regular rate. Regular rhythm. No murmurs, rubs, or gallops. Distal pulses are 2+ and symmetric.  Pulmonary/Chest: No respiratory distress. Clear to auscultation bilaterally. No wheezing or rales.  Abdominal: Distention. There is diffuse tenderness with voluntary guarding.   Musculoskeletal: Moves all extremities. No obvious deformities. 3+ BLE edema.  Skin: Warm and dry.  Neurological:  Alert, awake, and appropriate.  Normal speech.  No acute focal neurological deficits are appreciated.  Psychiatric: Normal affect. Good eye contact. Appropriate in content.    ED Course    Procedures  ED Vital Signs:  Vitals:    07/11/22 0611 07/11/22 0800 07/11/22 0817 07/11/22 1000   BP: (!) 145/98 (!) 140/90  139/86   Pulse: 97 81  93   Resp: 20 17 16 17   Temp: 98.6 °F (37 °C)      TempSrc:       SpO2: 97% 100%  100%   Weight: 68 kg (150 lb)       07/11/22 1055 07/11/22 1105 07/11/22 1130 07/11/22 1200   BP: 136/89 (!) 140/92 111/61 114/63   Pulse: 89 94 93 94   Resp: 17 16 20 19   Temp:       TempSrc:       SpO2: 100% 100% 100% 100%   Weight:        07/11/22 1300   BP: (!) 140/94   Pulse: 97   Resp: 16   Temp: 98.5 °F (36.9 °C)   TempSrc: Oral   SpO2: 100%   Weight:         Abnormal Lab Results:  Labs Reviewed   CBC W/ AUTO DIFFERENTIAL - Abnormal; Notable for the following components:       Result Value    WBC 14.07 (*)     RBC 3.90 (*)     Hemoglobin 10.1 (*)     Hematocrit 33.6 (*)     MCH 25.9 (*)     MCHC 30.1 (*)     RDW 15.2 (*)     MPV 8.3 (*)     Gran # (ANC) 13.1 (*)     Immature Grans (Abs) 0.07 (*)     Lymph # 0.7 (*)     Mono # 0.2 (*)     Gran % 92.8 (*)     Lymph % 5.0 (*)     Mono % 1.4 (*)     All other components within normal limits   COMPREHENSIVE METABOLIC PANEL - Abnormal; Notable for the following components:    Glucose 143 (*)     Calcium 8.5 (*)     Total Protein 5.3 (*)     Albumin 1.9 (*)     ALT 8 (*)     All other components within normal limits   LIPASE - Abnormal; Notable for the following components:    Lipase 69 (*)     All other components within normal limits   URINALYSIS, REFLEX TO URINE CULTURE - Abnormal; Notable for the following components:    Appearance, UA Cloudy (*)     Protein, UA Trace (*)     Leukocytes, UA 3+ (*)     All other components within normal limits    Narrative:     Specimen Source->Urine   URINALYSIS MICROSCOPIC - Abnormal; Notable for the following components:    WBC, UA 34 (*)     All other components within normal limits    Narrative:     Specimen Source->Urine   CULTURE, AEROBIC  (SPECIFY SOURCE)   CULTURE, ANAEROBIC   GRAM STAIN   CULTURE, URINE   LACTIC ACID, PLASMA   PREGNANCY TEST, URINE RAPID    Narrative:     Specimen Source->Urine   WBC & DIFF, BODY FLUID   B-TYPE NATRIURETIC PEPTIDE   TROPONIN I   PATHOLOGIST REVIEW, BODY FLUID   ALBUMIN, PERITONEAL, PLEURAL FLUID OR JEAN CLAUDE DRAINAGE, IN-HOUSE   PROTEIN, PERITONEAL, PLEURAL FLUID OR JEAN CLAUDE DRAINAGE, IN-HOUSE   LDH, PERITONEAL, PLEURAL FLUID OR JEAN CLAUDE DRAINAGE, IN-HOUSE        All Lab Results:  Results for orders placed or performed during the hospital encounter of 07/11/22   CBC W/ AUTO DIFFERENTIAL   Result Value Ref Range    WBC 14.07 (H) 3.90 - 12.70 K/uL    RBC 3.90  (L) 4.00 - 5.40 M/uL    Hemoglobin 10.1 (L) 12.0 - 16.0 g/dL    Hematocrit 33.6 (L) 37.0 - 48.5 %    MCV 86 82 - 98 fL    MCH 25.9 (L) 27.0 - 31.0 pg    MCHC 30.1 (L) 32.0 - 36.0 g/dL    RDW 15.2 (H) 11.5 - 14.5 %    Platelets 402 150 - 450 K/uL    MPV 8.3 (L) 9.2 - 12.9 fL    Immature Granulocytes 0.5 0.0 - 0.5 %    Gran # (ANC) 13.1 (H) 1.8 - 7.7 K/uL    Immature Grans (Abs) 0.07 (H) 0.00 - 0.04 K/uL    Lymph # 0.7 (L) 1.0 - 4.8 K/uL    Mono # 0.2 (L) 0.3 - 1.0 K/uL    Eos # 0.0 0.0 - 0.5 K/uL    Baso # 0.03 0.00 - 0.20 K/uL    nRBC 0 0 /100 WBC    Gran % 92.8 (H) 38.0 - 73.0 %    Lymph % 5.0 (L) 18.0 - 48.0 %    Mono % 1.4 (L) 4.0 - 15.0 %    Eosinophil % 0.1 0.0 - 8.0 %    Basophil % 0.2 0.0 - 1.9 %    Differential Method Automated    Comp. Metabolic Panel   Result Value Ref Range    Sodium 141 136 - 145 mmol/L    Potassium 3.5 3.5 - 5.1 mmol/L    Chloride 104 95 - 110 mmol/L    CO2 28 23 - 29 mmol/L    Glucose 143 (H) 70 - 110 mg/dL    BUN 10 6 - 20 mg/dL    Creatinine 0.7 0.5 - 1.4 mg/dL    Calcium 8.5 (L) 8.7 - 10.5 mg/dL    Total Protein 5.3 (L) 6.0 - 8.4 g/dL    Albumin 1.9 (L) 3.5 - 5.2 g/dL    Total Bilirubin 0.2 0.1 - 1.0 mg/dL    Alkaline Phosphatase 85 55 - 135 U/L    AST 15 10 - 40 U/L    ALT 8 (L) 10 - 44 U/L    Anion Gap 9 8 - 16 mmol/L    eGFR if African American >60 >60 mL/min/1.73 m^2    eGFR if non African American >60 >60 mL/min/1.73 m^2   Lipase   Result Value Ref Range    Lipase 69 (H) 4 - 60 U/L   Urinalysis, Reflex to Urine Culture Urine, Clean Catch    Specimen: Urine   Result Value Ref Range    Specimen UA Urine, Clean Catch     Color, UA Yellow Yellow, Straw, Mansi    Appearance, UA Cloudy (A) Clear    pH, UA 7.0 5.0 - 8.0    Specific Gravity, UA 1.020 1.005 - 1.030    Protein, UA Trace (A) Negative    Glucose, UA Negative Negative    Ketones, UA Negative Negative    Bilirubin (UA) Negative Negative    Occult Blood UA Negative Negative    Nitrite, UA Negative Negative    Urobilinogen, UA  Negative <2.0 EU/dL    Leukocytes, UA 3+ (A) Negative   Lactic acid, plasma   Result Value Ref Range    Lactate (Lactic Acid) 2.0 0.5 - 2.2 mmol/L   Pregnancy, urine rapid   Result Value Ref Range    Preg Test, Ur Negative    WBC & Diff,Body Fluid Ascites   Result Value Ref Range    Body Fluid Type Ascites     Fluid Appearance Hazy     Fluid Color Xanthochromic     WBC, Body Fluid 503 /cu mm    Segs, Fluid 27 %    Lymphs, Fluid 32 %    Monocytes/Macrophages, Fluid 37 %    Mesothelial Cells, Fluid 4 %   Urinalysis Microscopic   Result Value Ref Range    RBC, UA 3 0 - 4 /hpf    WBC, UA 34 (H) 0 - 5 /hpf    Bacteria Occasional None-Occ /hpf    Squam Epithel, UA 8 /hpf    Amorphous, UA Few None-Moderate    Microscopic Comment SEE COMMENT    Troponin I   Result Value Ref Range    Troponin I 0.009 0.000 - 0.026 ng/mL   Pathologist Review of Laboratory Test   Result Value Ref Range    Pathologist Review, Body Fluid REVIEWED      Imaging Results:  Imaging Results          US Guided Paracentesis (Final result)  Result time 07/11/22 10:45:29    Final result by Harley Alvarado MD (07/11/22 10:45:29)                 Impression:      Left lower quadrant paracentesis with removal of 2.5 L of ascitic fluid without difficulty.      Electronically signed by: Harley Alvarado MD  Date:    07/11/2022  Time:    10:45             Narrative:      US GUIDED PARACENTESIS INC IMAGING    CLINICAL HISTORY:  abd pain/ascites;    TECHNIQUE:  : Alice Miles    Written and verbal informed consent was obtained.  Targeted ultrasound was performed and the skin was marked in the left lower quadrant superficial to a safe pocket of abdominal ascites.  The area was prepped and draped in the usual sterile fashion.  A time-out was performed.  Lidocaine was instilled for local anesthesia.  5 Kiswahili catheter was inserted into the left lower quadrant with the assistance of Oscar Hardin.   Approximately 2.5 L of serous ascites was  withdrawn.  A sample was sent for additional analysis.  The catheter was removed.  Local hemostasis was achieved.  A sterile dressing was applied. The patient tolerated the procedure well.    FINDINGS:  Ultrasound images demonstrate a moderate volume of abdominal ascites and safe pocket for paracentesis in the left lower quadrant.                               CT Abdomen Pelvis With Contrast (Final result)  Result time 07/11/22 09:17:10    Final result by Jaspal Zambrano III, MD (07/11/22 09:17:10)                 Impression:      1. 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    2.  Mildly worsening large volume of malignant abdominopelvic ascites with persistent findings of peritoneal carcinomatosis.    3. Diffuse gastric wall thickening with an enlarging ulcerative mass of the gastric antrum consistent with patient's known biopsy-proven adenocarcinoma.    4.  Developing bowel obstruction likely secondary to serosal metastases and mass effect from the gastric and pelvic mass.    5.  Large left pleural effusion.    All CT scans at this facility are performed  using dose modulation techniques as appropriate to performed exam including the following:  automated exposure control; adjustment of mA and/or kV according to the patients size (this includes techniques or standardized protocols for targeted exams where dose is matched to indication/reason for exam: i.e. extremities or head);  iterative reconstruction technique.      Electronically signed by: Jaspal Zambrano MD  Date:    07/11/2022  Time:    09:17             Narrative:    EXAMINATION:  CT ABDOMEN PELVIS WITH CONTRAST    CLINICAL HISTORY:  abdominal pain; history of gastric cancer    COMPARISON:  06/17/2022    FINDINGS:  Lung bases: Interval increase in size of a large left pleural effusion with adjacent compressive atelectasis in the left lower lung.  The right lung  base appears grossly clear.    Bones: No acute osseous abnormality or suspicious bone lesions.    Liver: Multiple stable subcentimeter cystic lesions scattered throughout the liver.  Otherwise unremarkable..    Gallbladder: Unremarkable.    Bile Ducts: No evidence of dilated ducts.    Pancreas: Unremarkable.    Spleen: Unremarkable.    Stomach: Persistent fairly diffuse gastric wall thickening, most pronounced in the antral region with a large ulcerative mass of the gastric antrum in keeping with biopsy-proven adenocarcinoma.  The ulcerative component of the mass appears larger compared to prior exam.  Two surgical clips in the antrum.    Adrenals: Unremarkable.    Kidneys/ Ureters: Normal in size and location. Normal enhancement and excretion of contrast.  No hydronephrosis or nephrolithiasis. No ureteral dilatation.  Small bilateral renal cysts.  No solid masses identified.    Bladder: Completely collapsed.  No acute findings.    Reproductive organs: No significant change of the large cystic and solid pelvic mass extending into the lower abdomen most consistent with ovarian metastasis/Krukenberg tumor.    Bowel/Mesentery: Fairly diffuse fluid-filled small bowel and colonic dilation with focal narrowing and wall thickening of several segments of small bowel in the right lower quadrant and right mid abdomen as well as the mid transverse colon and sigmoid colon likely secondary to serosal metastases and mass effect from the gastric mass and large pelvic mass.  The bowel dilation is new compared to prior exam.    Peritoneum: Mildly worsening large volume malignant abdominopelvic ascites with peritoneal thickening and hyperenhancement, likely worsening peritoneal carcinomatosis.  No intraperitoneal free air.  No intraperitoneal abscess identified.    Lymph nodes: No retroperitoneal lymphadenopathy.    Vasculature: No aneurysm. No significant calcific atherosclerosis.  Celiac, SMA, BEN, and bilateral renal arteries are  patent.  Portal veins, SMV, and splenic vein are patent.    Abdominal wall:  Diffuse body wall edema.                               X-Ray Abdomen Flat And Erect (Final result)  Result time 07/11/22 08:43:42    Final result by Jaspal Zambrano III, MD (07/11/22 08:43:42)                 Impression:      Possible partial small-bowel obstruction or enteritis.      Electronically signed by: Jaspal Zambrano MD  Date:    07/11/2022  Time:    08:43             Narrative:    EXAMINATION:  XR ABDOMEN FLAT AND ERECT    CLINICAL HISTORY:  Abdominal Pain;    FINDINGS:  No definite free air is seen. Mild small bowel dilation with air-fluid levels, possibly a low-grade partial small bowel obstruction or enteritis.  No significant colonic dilation. No radiographic evidence of urolithiasis.                               The EKG was ordered, reviewed, and independently interpreted by the ED provider.  Interpretation time: 08:10  Rate: 90 BPM  Rhythm: normal sinus rhythm  Interpretation: No acute ST changes. No STEMI.           The Emergency Provider reviewed the vital signs and test results, which are outlined above.    ED Discussion   Paracentesis fluid will need to be sent to [facility] for processing.  Pt's pain much improved after large BM (diarrhea) in the ED and after 2.5 L removed during para.  I have low suspicion of SBP and does not clinically have an obstruction at this time c nml BS and +BM.  No acute abd on re-eval.        12:26 PM: Discussed pt's case with Dr. Owens (Hem/Onc at [facility]) and Dr. Ashraf (Hem/Onc) who recommends outpatient Hem/Onc follow up. Pt has chemotherapy scheduled for 1300 today at [facility]; Dr. Ashraf will arrange for pt's chemotherapy to be rescheduled for 11:00 AM tomorrow and will f/u on para fluid results.    12:49 PM: Reassessed pt at this time. Discussed with pt all pertinent ED information and results. Discussed pt dx and plan of tx. Gave pt all f/u and return to the ED instructions. All  questions and concerns were addressed at this time. Pt expresses understanding of information and instructions, and is comfortable with plan to discharge. Pt is stable for discharge.    I discussed with patient and/or family/caretaker that evaluation in the ED does not suggest any emergent or life threatening medical conditions requiring immediate intervention beyond what was provided in the ED, and I believe patient is safe for discharge.  Regardless, an unremarkable evaluation in the ED does not preclude the development or presence of a serious of life threatening condition. As such, patient was instructed to return immediately for any worsening or change in current symptoms.           ED Medication(s):  Medications   morphine injection 6 mg (6 mg Intravenous Given 7/11/22 0817)   iohexoL (OMNIPAQUE 350) injection 100 mL (100 mLs Intravenous Given 7/11/22 0846)   cefTRIAXone (ROCEPHIN) 1 g/50 mL D5W IVPB (0 g Intravenous Stopped 7/11/22 1139)        Follow-up Information     Fer Ashraf MD. Schedule an appointment as soon as possible for a visit in 1 day.    Specialty: Hematology and Oncology  Contact information:  [address]  [facility] [address]  [phone]                        Discharge Medication List as of 7/11/2022  1:21 PM      START taking these medications    Details   hydrocortisone 2.5 % cream Apply topically 2 (two) times daily., Starting Mon 7/11/2022, Normal      nitrofurantoin, macrocrystal-monohydrate, (MACROBID) 100 MG capsule Take 1 capsule (100 mg total) by mouth 2 (two) times daily. for 5 days, Starting Mon 7/11/2022, Until Sat 7/16/2022, Normal               Medical Decision Making    Medical Decision Making:   Clinical Tests:   Lab Tests: Ordered and Reviewed  Radiological Study: Ordered and Reviewed  Medical Tests: Ordered and Reviewed           Scribe Attestation:   Scribe #1: I performed the above scribed service and the documentation accurately describes the services I  performed. I attest to the accuracy of the note.    Attending:   Physician Attestation Statement for Scribe #1: I, Bethel Shetty MD, personally performed the services described in this documentation, as scribed by Cristi Dominguez, in my presence, and it is both accurate and complete.          Clinical Impression       ICD-10-CM ICD-9-CM   1. Generalized abdominal pain  R10.84 789.07   2. Abdominal pain  R10.9 789.00   3. External hemorrhoid  K64.4 455.3   4. Urinary tract infection without hematuria, site unspecified  N39.0 599.0       Disposition:   Disposition: Discharged  Condition: Stable         Bethel Shetty MD  07/11/22 2022

## 2022-07-12 ENCOUNTER — PATIENT MESSAGE (OUTPATIENT)
Dept: HEMATOLOGY/ONCOLOGY | Facility: CLINIC | Age: 42
End: 2022-07-12
Payer: COMMERCIAL

## 2022-07-12 ENCOUNTER — INFUSION (OUTPATIENT)
Dept: INFUSION THERAPY | Facility: HOSPITAL | Age: 42
End: 2022-07-12
Payer: COMMERCIAL

## 2022-07-12 VITALS
DIASTOLIC BLOOD PRESSURE: 77 MMHG | SYSTOLIC BLOOD PRESSURE: 111 MMHG | HEART RATE: 97 BPM | TEMPERATURE: 98 F | RESPIRATION RATE: 16 BRPM | OXYGEN SATURATION: 99 %

## 2022-07-12 DIAGNOSIS — E86.0 DEHYDRATION: Primary | ICD-10-CM

## 2022-07-12 DIAGNOSIS — Z51.5 PALLIATIVE CARE ENCOUNTER: ICD-10-CM

## 2022-07-12 DIAGNOSIS — C16.9 MALIGNANT NEOPLASM OF STOMACH, UNSPECIFIED LOCATION: ICD-10-CM

## 2022-07-12 LAB
ALBUMIN FLD-MCNC: 1.3 G/DL
BACTERIA UR CULT: NO GROWTH
BODY FLUID SOURCE, LDH: NORMAL
GRAM STN SPEC: NORMAL
GRAM STN SPEC: NORMAL
LDH FLD L TO P-CCNC: 251 U/L
PROT FLD-MCNC: 2.9 G/DL
SPECIMEN SOURCE: NORMAL
SPECIMEN SOURCE: NORMAL

## 2022-07-12 PROCEDURE — 96375 TX/PRO/DX INJ NEW DRUG ADDON: CPT

## 2022-07-12 PROCEDURE — 25000003 PHARM REV CODE 250: Performed by: INTERNAL MEDICINE

## 2022-07-12 PROCEDURE — 96416 CHEMO PROLONG INFUSE W/PUMP: CPT

## 2022-07-12 PROCEDURE — 63600175 PHARM REV CODE 636 W HCPCS: Performed by: INTERNAL MEDICINE

## 2022-07-12 PROCEDURE — 96367 TX/PROPH/DG ADDL SEQ IV INF: CPT

## 2022-07-12 PROCEDURE — 96413 CHEMO IV INFUSION 1 HR: CPT

## 2022-07-12 PROCEDURE — 96365 THER/PROPH/DIAG IV INF INIT: CPT

## 2022-07-12 PROCEDURE — 96374 THER/PROPH/DIAG INJ IV PUSH: CPT

## 2022-07-12 PROCEDURE — 96415 CHEMO IV INFUSION ADDL HR: CPT

## 2022-07-12 RX ORDER — HEPARIN 100 UNIT/ML
500 SYRINGE INTRAVENOUS
Status: DISCONTINUED | OUTPATIENT
Start: 2022-07-12 | End: 2022-07-12 | Stop reason: HOSPADM

## 2022-07-12 RX ORDER — SODIUM CHLORIDE 0.9 % (FLUSH) 0.9 %
10 SYRINGE (ML) INJECTION
Status: DISCONTINUED | OUTPATIENT
Start: 2022-07-12 | End: 2022-07-12 | Stop reason: HOSPADM

## 2022-07-12 RX ORDER — ATROPINE SULFATE 0.4 MG/ML
0.4 INJECTION, SOLUTION ENDOTRACHEAL; INTRAMEDULLARY; INTRAMUSCULAR; INTRAVENOUS; SUBCUTANEOUS ONCE AS NEEDED
Status: COMPLETED | OUTPATIENT
Start: 2022-07-12 | End: 2022-07-12

## 2022-07-12 RX ORDER — BACLOFEN 5 MG/1
5 TABLET ORAL 3 TIMES DAILY
Qty: 30 TABLET | Refills: 0 | OUTPATIENT
Start: 2022-07-12

## 2022-07-12 RX ADMIN — DIPHENHYDRAMINE HYDROCHLORIDE 50 MG: 50 INJECTION, SOLUTION INTRAMUSCULAR; INTRAVENOUS at 11:07

## 2022-07-12 RX ADMIN — FLUOROURACIL 3940 MG: 50 INJECTION, SOLUTION INTRAVENOUS at 01:07

## 2022-07-12 RX ADMIN — ATROPINE SULFATE 0.4 MG: 0.4 INJECTION, SOLUTION INTRAVENOUS at 12:07

## 2022-07-12 RX ADMIN — IRINOTECAN HYDROCHLORIDE 134 MG: 20 INJECTION, SOLUTION INTRAVENOUS at 12:07

## 2022-07-12 RX ADMIN — DEXAMETHASONE SODIUM PHOSPHATE 0.25 MG: 10 INJECTION, SOLUTION INTRAMUSCULAR; INTRAVENOUS at 11:07

## 2022-07-12 NOTE — PLAN OF CARE
Pt arrived to unit for C4D1 of mFOLFIRI. Most recent labs along with plan of care reviewed. Pt okay to proceed with chemo today. Was seen in the ER yesterday due to generalized abdominal pain and diarrhea. Feeling better overall today. VSS. Received initial pre-meds of Benadryl and Aloxi/Dex Irinotecan given over 90 minutes. Tolerated well. Pt then connected to 5FU pump with 'RUN' message showing at 1350PM. Pt instructed to return Wednesday, 7/14, at 12PM for her pump d/c. Verbalized understanding. Left unit in NAD at time of discharge.

## 2022-07-13 ENCOUNTER — TELEPHONE (OUTPATIENT)
Dept: PALLIATIVE MEDICINE | Facility: CLINIC | Age: 42
End: 2022-07-13
Payer: COMMERCIAL

## 2022-07-14 ENCOUNTER — INFUSION (OUTPATIENT)
Dept: INFUSION THERAPY | Facility: HOSPITAL | Age: 42
End: 2022-07-14
Attending: INTERNAL MEDICINE
Payer: COMMERCIAL

## 2022-07-14 ENCOUNTER — PATIENT MESSAGE (OUTPATIENT)
Dept: PALLIATIVE MEDICINE | Facility: CLINIC | Age: 42
End: 2022-07-14

## 2022-07-14 ENCOUNTER — OFFICE VISIT (OUTPATIENT)
Dept: PALLIATIVE MEDICINE | Facility: CLINIC | Age: 42
End: 2022-07-14
Payer: COMMERCIAL

## 2022-07-14 VITALS — HEART RATE: 87 BPM | SYSTOLIC BLOOD PRESSURE: 124 MMHG | DIASTOLIC BLOOD PRESSURE: 88 MMHG

## 2022-07-14 DIAGNOSIS — E86.0 DEHYDRATION: Primary | ICD-10-CM

## 2022-07-14 DIAGNOSIS — R60.0 BILATERAL LOWER EXTREMITY EDEMA: ICD-10-CM

## 2022-07-14 DIAGNOSIS — G62.9 NEUROPATHY: ICD-10-CM

## 2022-07-14 DIAGNOSIS — R11.0 NAUSEA: ICD-10-CM

## 2022-07-14 DIAGNOSIS — Z71.89 ADVANCED CARE PLANNING/COUNSELING DISCUSSION: ICD-10-CM

## 2022-07-14 DIAGNOSIS — G89.3 CANCER RELATED PAIN: ICD-10-CM

## 2022-07-14 DIAGNOSIS — R19.7 DIARRHEA, UNSPECIFIED TYPE: ICD-10-CM

## 2022-07-14 DIAGNOSIS — R18.0 MALIGNANT ASCITES: ICD-10-CM

## 2022-07-14 DIAGNOSIS — R63.0 ANOREXIA: ICD-10-CM

## 2022-07-14 DIAGNOSIS — R11.2 NON-INTRACTABLE VOMITING WITH NAUSEA, UNSPECIFIED VOMITING TYPE: ICD-10-CM

## 2022-07-14 DIAGNOSIS — Z51.5 ENCOUNTER FOR PALLIATIVE CARE: ICD-10-CM

## 2022-07-14 DIAGNOSIS — Z51.5 PALLIATIVE CARE ENCOUNTER: ICD-10-CM

## 2022-07-14 DIAGNOSIS — C16.9 MALIGNANT NEOPLASM OF STOMACH, UNSPECIFIED LOCATION: Primary | ICD-10-CM

## 2022-07-14 DIAGNOSIS — R06.00 DYSPNEA, UNSPECIFIED TYPE: ICD-10-CM

## 2022-07-14 DIAGNOSIS — C16.9 MALIGNANT NEOPLASM OF STOMACH, UNSPECIFIED LOCATION: ICD-10-CM

## 2022-07-14 DIAGNOSIS — R52 PAIN: ICD-10-CM

## 2022-07-14 DIAGNOSIS — R53.0 NEOPLASTIC (MALIGNANT) RELATED FATIGUE: ICD-10-CM

## 2022-07-14 DIAGNOSIS — G47.00 INSOMNIA, UNSPECIFIED TYPE: ICD-10-CM

## 2022-07-14 PROCEDURE — 99211 OFF/OP EST MAY X REQ PHY/QHP: CPT

## 2022-07-14 PROCEDURE — 99215 PR OFFICE/OUTPT VISIT, EST, LEVL V, 40-54 MIN: ICD-10-PCS | Mod: S$GLB,,, | Performed by: STUDENT IN AN ORGANIZED HEALTH CARE EDUCATION/TRAINING PROGRAM

## 2022-07-14 PROCEDURE — 1159F PR MEDICATION LIST DOCUMENTED IN MEDICAL RECORD: ICD-10-PCS | Mod: CPTII,S$GLB,, | Performed by: STUDENT IN AN ORGANIZED HEALTH CARE EDUCATION/TRAINING PROGRAM

## 2022-07-14 PROCEDURE — 1159F MED LIST DOCD IN RCRD: CPT | Mod: CPTII,S$GLB,, | Performed by: STUDENT IN AN ORGANIZED HEALTH CARE EDUCATION/TRAINING PROGRAM

## 2022-07-14 PROCEDURE — 99999 PR PBB SHADOW E&M-EST. PATIENT-LVL III: ICD-10-PCS | Mod: PBBFAC,,, | Performed by: STUDENT IN AN ORGANIZED HEALTH CARE EDUCATION/TRAINING PROGRAM

## 2022-07-14 PROCEDURE — 25000003 PHARM REV CODE 250: Performed by: INTERNAL MEDICINE

## 2022-07-14 PROCEDURE — 99215 OFFICE O/P EST HI 40 MIN: CPT | Mod: S$GLB,,, | Performed by: STUDENT IN AN ORGANIZED HEALTH CARE EDUCATION/TRAINING PROGRAM

## 2022-07-14 PROCEDURE — 3079F PR MOST RECENT DIASTOLIC BLOOD PRESSURE 80-89 MM HG: ICD-10-PCS | Mod: CPTII,S$GLB,, | Performed by: STUDENT IN AN ORGANIZED HEALTH CARE EDUCATION/TRAINING PROGRAM

## 2022-07-14 PROCEDURE — 3074F SYST BP LT 130 MM HG: CPT | Mod: CPTII,S$GLB,, | Performed by: STUDENT IN AN ORGANIZED HEALTH CARE EDUCATION/TRAINING PROGRAM

## 2022-07-14 PROCEDURE — 1111F PR DISCHARGE MEDS RECONCILED W/ CURRENT OUTPATIENT MED LIST: ICD-10-PCS | Mod: CPTII,S$GLB,, | Performed by: STUDENT IN AN ORGANIZED HEALTH CARE EDUCATION/TRAINING PROGRAM

## 2022-07-14 PROCEDURE — A4216 STERILE WATER/SALINE, 10 ML: HCPCS | Performed by: INTERNAL MEDICINE

## 2022-07-14 PROCEDURE — 1160F RVW MEDS BY RX/DR IN RCRD: CPT | Mod: CPTII,S$GLB,, | Performed by: STUDENT IN AN ORGANIZED HEALTH CARE EDUCATION/TRAINING PROGRAM

## 2022-07-14 PROCEDURE — 63600175 PHARM REV CODE 636 W HCPCS: Performed by: INTERNAL MEDICINE

## 2022-07-14 PROCEDURE — 99999 PR PBB SHADOW E&M-EST. PATIENT-LVL III: CPT | Mod: PBBFAC,,, | Performed by: STUDENT IN AN ORGANIZED HEALTH CARE EDUCATION/TRAINING PROGRAM

## 2022-07-14 PROCEDURE — 1111F DSCHRG MED/CURRENT MED MERGE: CPT | Mod: CPTII,S$GLB,, | Performed by: STUDENT IN AN ORGANIZED HEALTH CARE EDUCATION/TRAINING PROGRAM

## 2022-07-14 PROCEDURE — 3079F DIAST BP 80-89 MM HG: CPT | Mod: CPTII,S$GLB,, | Performed by: STUDENT IN AN ORGANIZED HEALTH CARE EDUCATION/TRAINING PROGRAM

## 2022-07-14 PROCEDURE — 3074F PR MOST RECENT SYSTOLIC BLOOD PRESSURE < 130 MM HG: ICD-10-PCS | Mod: CPTII,S$GLB,, | Performed by: STUDENT IN AN ORGANIZED HEALTH CARE EDUCATION/TRAINING PROGRAM

## 2022-07-14 PROCEDURE — 1160F PR REVIEW ALL MEDS BY PRESCRIBER/CLIN PHARMACIST DOCUMENTED: ICD-10-PCS | Mod: CPTII,S$GLB,, | Performed by: STUDENT IN AN ORGANIZED HEALTH CARE EDUCATION/TRAINING PROGRAM

## 2022-07-14 RX ORDER — HEPARIN 100 UNIT/ML
500 SYRINGE INTRAVENOUS
Status: DISCONTINUED | OUTPATIENT
Start: 2022-07-14 | End: 2022-07-14 | Stop reason: HOSPADM

## 2022-07-14 RX ORDER — SODIUM CHLORIDE 0.9 % (FLUSH) 0.9 %
10 SYRINGE (ML) INJECTION
Status: DISCONTINUED | OUTPATIENT
Start: 2022-07-14 | End: 2022-07-14 | Stop reason: HOSPADM

## 2022-07-14 RX ADMIN — HEPARIN 500 UNITS: 100 SYRINGE at 12:07

## 2022-07-14 RX ADMIN — Medication 10 ML: at 12:07

## 2022-07-14 NOTE — PLAN OF CARE
Pt arrived to unit for C4D3 for her pump d/c. Endorsing fatigue along with swelling to both her lower extremities. Per 5FU pump, fluorouracil completed. Pt's PAC deaccessed and flushed without difficulty. Has next appointments. Discharged from unit in NAD.

## 2022-07-14 NOTE — PROGRESS NOTES
Consult Note  Palliative Care      Consult Requested By: Kamille Hooks  Reason for Consult: symptom management and ACP      ASSESSMENT/PLAN:     Plan/Recommendations:  Diagnoses and all orders for this visit:    Malignant neoplasm of stomach  - patient followed by Dr. Seaman  - currently on disease directed therapy    Encounter for palliative care/Advanced care planning  - patient decisional  - patient by herself in clinic today  - no ACP documents uploaded into EMR  - philosophy of Palliative Medicine reviewed with patient during previous hospitalization  - new patient folder given to and reviewed with patient today  - will follow up at future appointments about ACP booklet   - goals: life prolonging; patient wanting to continue disease directed therapy and continue to live. She reports that at times she finds herself irritated as she does not always want to talk about the cancer, but rather talk about other aspects of her life as well.     Cancer related pain/Neuropathy  - patient reporting moderate pain in her abdomen and continued increased neuropathy  - patient's current regimen is methadone 2.5 mg BID and oxycodone 5 mg q6h prn  - patient reporting pain well controlled but at times she has increased pain after her paracentesis  - patient also uses bentyl for abdominal cramps with some relief  - continue nortriptyline and gabapentin for neuropathic pain  - patient uses non-pharmacologic interventions (eg. Heating pad) with relief as well  - opioid safety reviewed with patient previously during hospitalization   - opioid safety sheet in new patient folder for patient to review  - patient also follows with integrative oncology    Dyspnea  - patient reporting moderate dyspnea with certain activities or as fluid re-accumulates  - discussed finding balance between rest and activities as well as work around for activities for better tolerance  - tip sheet for shortness of breath in new patient folder for patient  to review    Nausea/vomiting/Anorexia  - patient with some increased nausea(queaziness) and emesis  - patient also having some intermittent anorexia  - she states sometimes certain foods/drinks will soothe her stomach and certain times they can upset her stomach  - patient uses anti-emetics with some relief  - patient reporting the phenergan makes her sleepy  - will continue to monitor    Neoplastic (malignant) related fatigue/Insomnia  - patient having increased fatigue and intermittent insomnia  - patient reporting sometimes difficult to find a comfortable position to sleep  - discussed finding balance between rest and activities  - continue nortriptyline 25 mg qhs  - tip sheet for better sleep in new patient folder for patient to review    Diarrhea  - patient with diarrhea on Sunday and Monday  - patient recently presented to ED due to increased diarrhea  - diarrhea improved since ED evaluation  - will continue close monitoring    Bilateral lower extremity edema/Malignant ascites  - patient reporting continued BLE edema   - she states that they continue to have worsening edema since trip to [facility]  - patient currently on lasix with increased urinary output  - patient has scheduled weekly paracentesis for malignant ascites    Understanding of illness/Prognosis: patient has good understanding of illness; overall prognosis is poor    Goals of care: life prolonging    Follow up: ~ 4 weeks    Patient's encounter and above plan of care discussed with patient's oncologist    SUBJECTIVE:     History of Present Illness:  Patient is a 41 y.o. year old female with anxiety and malignant neoplasm of stomach presents to Palliative Medicine for symptom management and ACP . Please see oncology notes for full details of oncologic history and treatment course.     07/14/2022:  LA  reviewed and summarized:  07/06/2022 Methadone 5mg/5ml soln Disp: 75 for 15 days  06/21/2022 Methadone  5mg/5ml soln Disp: 75 for 15  days  06/20/2022 Oxycodone 5 mg Disp: 60 for 15 days    Patient presents today by herself. Overall pain is stable with current regimen. She does have some increased pain intermittently with paracentesis. Patient having intermittent dyspnea with exertion. Patient having some nausea with emesis. She states phenergan makes her sleepy. Patient's anorexia stable. She has been very fatigued recently. She has a hard time finding a comfortable position to sleep in. She is compliant with lasix for bilateral lower extremity edema, and she has noted increased urine output though no changes in her edema yet. Patient was seen in ED recently for diarrhea. Patient was first introduced to Palliative Medicine while hospitalized.     Past Medical History:   Diagnosis Date    Anxiety     Dehydration 5/30/2022    Gastric cancer     Gastric ulcer     Hx of psychiatric care     Pregnancy 08/12/2020    delivered on 8/12/2020    Umbilical hernia      Past Surgical History:   Procedure Laterality Date    CLOSURE OF PERFORATED ULCER OF DUODENUM USING OMENTAL PATCH      ESOPHAGOGASTRODUODENOSCOPY N/A 10/13/2020    Procedure: EGD (ESOPHAGOGASTRODUODENOSCOPY);  Surgeon: Rosio Marion MD;  [facility]  Service: Endoscopy;  Laterality: N/A;    ESOPHAGOGASTRODUODENOSCOPY N/A 12/11/2020    Procedure: EGD (ESOPHAGOGASTRODUODENOSCOPY);  Surgeon: Rosio Marion MD;  Location: [facility] (2ND FLR);  Service: Endoscopy;  Laterality: N/A;  Covid-19 test 12/8/20 at [facility]    ESOPHAGOGASTRODUODENOSCOPY N/A 3/12/2021    Procedure: EGD (ESOPHAGOGASTRODUODENOSCOPY);  Surgeon: Nav Omer MD;  Location: [facility] (2ND FLR);  Service: Endoscopy;  Laterality: N/A;  C[facility] 3/9 ttr    ESOPHAGOGASTRODUODENOSCOPY N/A 5/18/2021    Procedure: EGD (ESOPHAGOGASTRODUODENOSCOPY);  Surgeon: Rosio Marion MD;  Location: [facility] (2ND FLR);  Service: Endoscopy;  Laterality: N/A;  5/15-covid pc-inst  "portal-tb    ESOPHAGOGASTRODUODENOSCOPY N/A 5/26/2021    Procedure: EGD (ESOPHAGOGASTRODUODENOSCOPY);  Surgeon: Socrates Terrazas MD;  Location: [facility] ([address] [place] [address]);  Service: Endoscopy;  Laterality: N/A;     Family History   Problem Relation Age of Onset    Cancer Mother 67        lymphoma (type? "not active," not on tx; "related to her blood")    Schizophrenia Mother     Lung cancer Father 48        type? h/o smoking    No Known Problems Son     Breast cancer Other 73        unilat; stage I, but aggressive    Prostate cancer Paternal Uncle         (dx 50s/60? no chemo?)    Breast cancer Paternal Cousin         (dx age?) ductal    Colon cancer Neg Hx     Esophageal cancer Neg Hx      Review of patient's allergies indicates:  No Known Allergies    Medications:    Current Outpatient Medications:     baclofen (LIORESAL) 5 mg Tab tablet, Take 1 tablet (5 mg total) by mouth 3 (three) times daily., Disp: 30 tablet, Rfl: 0    dexAMETHasone (DECADRON) 4 MG Tab, 8 mg daily on days 2 through 4 of each chemotherapy cycle., Disp: 60 tablet, Rfl: 2    dicyclomine (BENTYL) 10 MG capsule, TAKE ONE CAPSULE BY MOUTH FOUR TIMES DAILY, Disp: 120 capsule, Rfl: 0    famotidine (PEPCID) 20 MG tablet, Take 1 tablet (20 mg total) by mouth nightly as needed for Heartburn., Disp: 60 tablet, Rfl: 1    furosemide (LASIX) 20 MG tablet, Take 1 tablet (20 mg total) by mouth once daily., Disp: 30 tablet, Rfl: 2    gabapentin (NEURONTIN) 300 MG capsule, Take 1 capsule (300 mg total) by mouth 3 (three) times daily., Disp: 90 capsule, Rfl: 11    hydrocortisone 2.5 % cream, Apply topically 2 (two) times daily., Disp: 3.5 g, Rfl: 0    LIDOcaine HCL 2% (XYLOCAINE) 2 % jelly, Apply topically as needed., Disp: 100 mL, Rfl: 0    LIDOcaine-prilocaine (EMLA) cream, Apply to Port-A-Cath area 30 to 45 minutes prior to port access as directed (topical anesthetic use to the anterior chest only)., Disp: , Rfl:     methadone (DOLOPHINE) 5 mg/5 mL " solution, Take 2.5 mLs (2.5 mg total) by mouth every 12 (twelve) hours., Disp: 75 mL, Rfl: 0    metoclopramide HCl (REGLAN) 5 MG tablet, 5 mg., Disp: , Rfl:     multivitamin with folic acid 400 mcg Tab, Take 1 tablet by mouth once daily., Disp: , Rfl:     nitrofurantoin, macrocrystal-monohydrate, (MACROBID) 100 MG capsule, Take 1 capsule (100 mg total) by mouth 2 (two) times daily. for 5 days, Disp: 10 capsule, Rfl: 0    nortriptyline (PAMELOR) 25 MG capsule, Take 1 capsule (25 mg total) by mouth every evening., Disp: 30 capsule, Rfl: 11    omeprazole (PRILOSEC) 40 MG capsule, Take 1 capsule (40 mg total) by mouth 2 (two) times daily before meals., Disp: 60 capsule, Rfl: 11    ondansetron (ZOFRAN) 8 MG tablet, Take 1 tablet (8 mg total) by mouth every 8 (eight) hours as needed for Nausea., Disp: 60 tablet, Rfl: 2    oxyCODONE (ROXICODONE) 5 MG immediate release tablet, Take 1 tablet (5 mg total) by mouth every 6 (six) hours as needed., Disp: 60 tablet, Rfl: 0    polyethylene glycol (GLYCOLAX) 17 gram/dose powder, Mix 1 capful (17 g) with liquid and take by mouth once daily for 20 days, Disp: 510 g, Rfl: 0    prochlorperazine (COMPAZINE) 10 MG tablet, TAKE 1 TABLET(10 MG) BY MOUTH EVERY 6 HOURS AS NEEDED FOR NAUSEA, Disp: 90 tablet, Rfl: 2    promethazine (PHENERGAN) 25 MG tablet, Take 1 tablet (25 mg total) by mouth every 6 (six) hours as needed for Nausea., Disp: 60 tablet, Rfl: 2    senna-docusate 8.6-50 mg (SENNA WITH DOCUSATE SODIUM) 8.6-50 mg per tablet, Take 1 tablet by mouth 2 (two) times daily as needed for Constipation., Disp: , Rfl:     sucralfate (CARAFATE) 1 gram tablet, TAKE 1 TABLET(1 GRAM) BY MOUTH FOUR TIMES DAILY FOR 14 DAYS, Disp: 56 tablet, Rfl: 0    sucralfate (CARAFATE) 1 gram tablet, , Disp: , Rfl:     traMADoL (ULTRAM) 50 mg tablet, Take 50 mg by mouth., Disp: , Rfl:     OBJECTIVE:       ROS:  Review of Systems   Constitutional: Positive for activity change, appetite change and  fatigue.   HENT: Negative.    Eyes: Negative.    Respiratory: Positive for shortness of breath.    Cardiovascular: Positive for leg swelling.   Gastrointestinal: Positive for abdominal distention, abdominal pain, diarrhea, nausea and vomiting.   Genitourinary: Negative.    Musculoskeletal: Negative.    Skin: Negative.    Neurological: Negative.    Psychiatric/Behavioral: Positive for sleep disturbance. Negative for dysphoric mood. The patient is not nervous/anxious.    All other systems reviewed and are negative.      Review of Symptoms    Symptom Assessment (ESAS 0-10 Scale)  Pain:  5  Dyspnea:  5  Anxiety:  0  Nausea:  3  Depression:  0  Anorexia:  5  Fatigue:  8  Insomnia:  5  Restlessness:  0  Agitation:  0     CAM / Delirium:  Negative  Constipation:  Negative  Diarrhea:  Positive    Bowel Management Plan (BMP):  No      Pain Assessment:  OME in 24 hours:  ~30-40  Location(s): abdomen    Abdomen       Location: generalized        Quality: aching and pressure-like        Quantity: 5/10 in intensity        Chronicity: Onset 2 month(s) ago, unchanged        Aggravating Factors: none        Alleviating Factors: opiates        Associated Symptoms: none    Modified Maeve Scale:  1    ECOG Performance Status ndGndrndanddndend:nd nd2nd Living Arrangements:  Lives with family    Psychosocial/Cultural: Patient lives with her boyfriend and their 1 y/o child      Advance Care Planning   Advance Directives:   Living Will: No    LaPOST: No    Do Not Resuscitate Status: No    Medical Power of : No      Decision Making:  Patient answered questions          Physical Exam:  Vitals: Pulse: 87 (07/14/22 1354)  BP: 124/88 (07/14/22 1354)  Physical Exam  Vitals reviewed.   Constitutional:       General: She is not in acute distress.     Appearance: She is ill-appearing. She is not toxic-appearing.   HENT:      Head: Normocephalic and atraumatic.      Right Ear: External ear normal.      Left Ear: External ear normal.   Eyes:      General:  "No scleral icterus.        Right eye: No discharge.         Left eye: No discharge.   Neck:      Comments: Trachea midline  Pulmonary:      Effort: Pulmonary effort is normal. No respiratory distress.   Abdominal:      General: There is distension.      Tenderness: There is abdominal tenderness.   Musculoskeletal:         General: No signs of injury.      Cervical back: Normal range of motion.      Right lower leg: Edema present.      Left lower leg: Edema present.   Skin:     General: Skin is dry.      Coloration: Skin is not jaundiced.      Findings: No rash.   Neurological:      General: No focal deficit present.      Mental Status: She is alert and oriented to person, place, and time.   Psychiatric:         Mood and Affect: Mood normal.         Behavior: Behavior normal.         Thought Content: Thought content normal.         Judgment: Judgment normal.         Labs:  CBC:   WBC   Date Value Ref Range Status   07/11/2022 14.07 (H) 3.90 - 12.70 K/uL Final     Hemoglobin   Date Value Ref Range Status   07/11/2022 10.1 (L) 12.0 - 16.0 g/dL Final     Hematocrit   Date Value Ref Range Status   07/11/2022 33.6 (L) 37.0 - 48.5 % Final     MCV   Date Value Ref Range Status   07/11/2022 86 82 - 98 fL Final     Platelets   Date Value Ref Range Status   07/11/2022 402 150 - 450 K/uL Final       LFT:   Lab Results   Component Value Date    AST 15 07/11/2022    ALKPHOS 85 07/11/2022    BILITOT 0.2 07/11/2022       Albumin:   Albumin   Date Value Ref Range Status   07/11/2022 1.9 (L) 3.5 - 5.2 g/dL Final     Protein:   Total Protein   Date Value Ref Range Status   07/11/2022 5.3 (L) 6.0 - 8.4 g/dL Final       Radiology:I have reviewed all pertinent imaging results/findings within the past 24 hours.    07/11/2022 CT A/P: " 1. 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 2.  Mildly worsening large volume of malignant abdominopelvic ascites " "with persistent findings of peritoneal carcinomatosis. 3. Diffuse gastric wall thickening with an enlarging ulcerative mass of the gastric antrum consistent with patient's known biopsy-proven adenocarcinoma. 4.  Developing bowel obstruction likely secondary to serosal metastases and mass effect from the gastric and pelvic mass. 5.  Large left pleural effusion."    Signature: Sharon Brink MD        "

## 2022-07-15 ENCOUNTER — HOSPITAL ENCOUNTER (OUTPATIENT)
Dept: INTERVENTIONAL RADIOLOGY/VASCULAR | Facility: HOSPITAL | Age: 42
Discharge: HOME | End: 2022-07-15
Attending: INTERNAL MEDICINE
Payer: COMMERCIAL

## 2022-07-15 VITALS — SYSTOLIC BLOOD PRESSURE: 115 MMHG | DIASTOLIC BLOOD PRESSURE: 77 MMHG

## 2022-07-15 DIAGNOSIS — R18.8 OTHER ASCITES: ICD-10-CM

## 2022-07-15 LAB — BACTERIA SPEC AEROBE CULT: NO GROWTH

## 2022-07-15 PROCEDURE — C1729 CATH, DRAINAGE: HCPCS

## 2022-07-15 PROCEDURE — 49083 ABD PARACENTESIS W/IMAGING: CPT | Performed by: RADIOLOGY

## 2022-07-15 PROCEDURE — 49083 IR PARACENTESIS WITH IMAGING: ICD-10-PCS | Mod: ,,, | Performed by: RADIOLOGY

## 2022-07-15 RX ORDER — BACLOFEN 5 MG/1
5 TABLET ORAL 3 TIMES DAILY
Qty: 30 TABLET | Refills: 0 | Status: SHIPPED | OUTPATIENT
Start: 2022-07-15 | End: 2022-08-22 | Stop reason: SDUPTHER

## 2022-07-15 NOTE — DISCHARGE SUMMARY
Radiology Discharge Summary      Hospital Course: No complications    Admit Date: 7/15/2022  Discharge Date: 07/15/2022     Instructions Given to Patient: Yes  Diet: Resume prior diet  Activity: activity as tolerated    Description of Condition on Discharge: Stable  Vital Signs (Most Recent): BP: 115/77 (07/15/22 1012)    Discharge Disposition: Home    Discharge Diagnosis:  41 y.o. female with recurrent painful, tense ascites s/p successful US-guided percutaneous RUQ-approach therapeutic LVP with local anesthetic only and albumin infusion post PRN as indicated per institutional protocol. Patient tolerated the procedure well. No immediate post-procedural complications noted.      Thank you for considering IR for the care of your patient.      Castro Bermudez MD  Interventional Radiology

## 2022-07-15 NOTE — H&P
Radiology History & Physical      SUBJECTIVE:     Chief Complaint: Recurrent painful, tense ascites     History of Present Illness:  Puja Quigley is a 41 y.o. female with pertinent PMHx of gastric adenocarcinoma with osseous, hepatic and peritoneal metastases complicated by recurrent abdominal distension and pain 2/2 recurrent painful, tense ascites requiring frequent decompression for symptom relief.     A new outpatient IR consult received for US-guided percutaneous RUQ-approach therapeutic large-volume paracentesis.    Past Medical History:   Diagnosis Date    Anxiety     Dehydration 5/30/2022    Gastric cancer     Gastric ulcer     Hx of psychiatric care     Pregnancy 08/12/2020    delivered on 8/12/2020    Umbilical hernia      Past Surgical History:   Procedure Laterality Date    CLOSURE OF PERFORATED ULCER OF DUODENUM USING OMENTAL PATCH      ESOPHAGOGASTRODUODENOSCOPY N/A 10/13/2020    Procedure: EGD (ESOPHAGOGASTRODUODENOSCOPY);  Surgeon: Rosio Marion MD;  Location: [facility] ENDO (2ND FLR);  Service: Endoscopy;  Laterality: N/A;    ESOPHAGOGASTRODUODENOSCOPY N/A 12/11/2020    Procedure: EGD (ESOPHAGOGASTRODUODENOSCOPY);  Surgeon: Rosio Marion MD;  Location: [facility] ENDO (2ND FLR);  Service: Endoscopy;  Laterality: N/A;  Covid-19 test 12/8/20 at [facility]    ESOPHAGOGASTRODUODENOSCOPY N/A 3/12/2021    Procedure: EGD (ESOPHAGOGASTRODUODENOSCOPY);  Surgeon: Nav Omer MD;  Location: [facility] ENDO (2ND FLR);  Service: Endoscopy;  Laterality: N/A;  COVID at [facility] 3/9 ttr    ESOPHAGOGASTRODUODENOSCOPY N/A 5/18/2021    Procedure: EGD (ESOPHAGOGASTRODUODENOSCOPY);  Surgeon: Rosio Marion MD;  Location: [facility] ENDO (2ND FLR);  Service: Endoscopy;  Laterality: N/A;  5/15-covid pcw-inst portal-tb    ESOPHAGOGASTRODUODENOSCOPY N/A 5/26/2021    Procedure: EGD (ESOPHAGOGASTRODUODENOSCOPY);  Surgeon: Socrates Terrazas MD;  Location: [facility] ENDO (2ND FLR);  Service: Endoscopy;   Laterality: N/A;     Home Meds:   Prior to Admission medications    Medication Sig Start Date End Date Taking? Authorizing Provider   baclofen (LIORESAL) 5 mg Tab tablet Take 1 tablet (5 mg total) by mouth 3 (three) times daily. 7/15/22   Sharon Brink MD   dexAMETHasone (DECADRON) 4 MG Tab 8 mg daily on days 2 through 4 of each chemotherapy cycle. 6/13/22   Fer Ashraf MD   dicyclomine (BENTYL) 10 MG capsule TAKE ONE CAPSULE BY MOUTH FOUR TIMES DAILY 9/27/21   Kamille Hooks PA-C   famotidine (PEPCID) 20 MG tablet Take 1 tablet (20 mg total) by mouth nightly as needed for Heartburn. 2/23/22 2/23/23  Kamille Hooks PA-C   furosemide (LASIX) 20 MG tablet Take 1 tablet (20 mg total) by mouth once daily. 7/6/22   Fer Ashraf MD   gabapentin (NEURONTIN) 300 MG capsule Take 1 capsule (300 mg total) by mouth 3 (three) times daily. 2/7/22 2/7/23  Kamille Hooks PA-C   hydrocortisone 2.5 % cream Apply topically 2 (two) times daily. 7/11/22   Bethel Shetty MD   LIDOcaine HCL 2% (XYLOCAINE) 2 % jelly Apply topically as needed. 6/20/22   Bren Allen MD   LIDOcaine-prilocaine (EMLA) cream Apply to Port-A-Cath area 30 to 45 minutes prior to port access as directed (topical anesthetic use to the anterior chest only). 6/25/21   Historical Provider   methadone (DOLOPHINE) 5 mg/5 mL solution Take 2.5 mLs (2.5 mg total) by mouth every 12 (twelve) hours. 7/6/22   Elva Gutierrez DNP   metoclopramide HCl (REGLAN) 5 MG tablet 5 mg. 6/11/21   Historical Provider   multivitamin with folic acid 400 mcg Tab Take 1 tablet by mouth once daily.    Historical Provider   nitrofurantoin, macrocrystal-monohydrate, (MACROBID) 100 MG capsule Take 1 capsule (100 mg total) by mouth 2 (two) times daily. for 5 days 7/11/22 7/16/22  Bethel Shetty MD   nortriptyline (PAMELOR) 25 MG capsule Take 1 capsule (25 mg total) by mouth every evening. 6/20/22 6/20/23  Bren Allen MD   omeprazole (PRILOSEC) 40 MG capsule Take 1 capsule  (40 mg total) by mouth 2 (two) times daily before meals. 6/20/22 6/20/23  Bren Allen MD   ondansetron (ZOFRAN) 8 MG tablet Take 1 tablet (8 mg total) by mouth every 8 (eight) hours as needed for Nausea. 7/26/21   Fer Ashraf MD   oxyCODONE (ROXICODONE) 5 MG immediate release tablet Take 1 tablet (5 mg total) by mouth every 6 (six) hours as needed. 6/20/22   Bren Allen MD   polyethylene glycol (GLYCOLAX) 17 gram/dose powder Mix 1 capful (17 g) with liquid and take by mouth once daily for 20 days 6/21/22   Bren Allen MD   prochlorperazine (COMPAZINE) 10 MG tablet TAKE 1 TABLET(10 MG) BY MOUTH EVERY 6 HOURS AS NEEDED FOR NAUSEA 8/7/21   Fer Ashraf MD   promethazine (PHENERGAN) 25 MG tablet Take 1 tablet (25 mg total) by mouth every 6 (six) hours as needed for Nausea. 6/13/22   Fer Ashraf MD   senna-docusate 8.6-50 mg (SENNA WITH DOCUSATE SODIUM) 8.6-50 mg per tablet Take 1 tablet by mouth 2 (two) times daily as needed for Constipation. 5/26/21   Reshma Wilson MD   sucralfate (CARAFATE) 1 gram tablet TAKE 1 TABLET(1 GRAM) BY MOUTH FOUR TIMES DAILY FOR 14 DAYS 10/20/21   Rosio Marion MD   sucralfate (CARAFATE) 1 gram tablet  4/8/21   Historical Provider   traMADoL (ULTRAM) 50 mg tablet Take 50 mg by mouth. 6/25/21   Historical Provider   amitriptyline (ELAVIL) 10 MG tablet Take 1 tablet (10 mg total) by mouth every evening. 5/13/21 6/20/22  Socrates Terrazas MD   pantoprazole (PROTONIX) 40 MG tablet Take 1 tablet (40 mg total) by mouth 2 (two) times daily. 5/25/22 6/20/22  XENA KramerC     Anticoagulants/Antiplatelets: no anticoagulation     Allergies: Review of patient's allergies indicates:  No Known Allergies      Sedation History:  no adverse reactions     Review of Systems:   Hematological: no known coagulopathies  Respiratory: no cough, shortness of breath, or wheezing  Cardiovascular: no chest pain or dyspnea on exertion  Gastrointestinal: positive for - abdominal pain  and distension  Genito-Urinary: no dysuria, trouble voiding, or hematuria  Musculoskeletal: negative  Neurological: no TIA or stroke symptoms      OBJECTIVE:     Vital Signs (Most Recent)       Physical Exam:  General: no acute distress  Mental Status: alert and oriented to person, place and time  HEENT: normocephalic, atraumatic  Chest: unlabored breathing  Heart: regular heart rate  Abdomen: +distended with +fluid wave. No TTP/r/g.  Extremity: moves all extremities    Laboratory  Lab Results   Component Value Date    INR 1.0 06/10/2021       Lab Results   Component Value Date    WBC 14.07 (H) 07/11/2022    HGB 10.1 (L) 07/11/2022    HCT 33.6 (L) 07/11/2022    MCV 86 07/11/2022     07/11/2022      Lab Results   Component Value Date     (H) 07/11/2022     07/11/2022    K 3.5 07/11/2022     07/11/2022    CO2 28 07/11/2022    BUN 10 07/11/2022    CREATININE 0.7 07/11/2022    CALCIUM 8.5 (L) 07/11/2022    MG 1.9 06/20/2022    ALT 8 (L) 07/11/2022    AST 15 07/11/2022    ALBUMIN 1.9 (L) 07/11/2022    BILITOT 0.2 07/11/2022     ASSESSMENT/PLAN:     41 y.o. female with pertinent PMHx of gastric adenocarcinoma with osseous, hepatic and peritoneal metastases complicated by recurrent abdominal distension and pain 2/2 recurrent painful, tense ascites requiring frequent decompression for symptom relief.     1. Recurrent painful, tense ascites - Will attempt US-guided percutaneous RUQ-approach therapeutic LVP with local anesthetic only and albumin infusion post PRN as indicated per institutional protocol.     Risks (including, but not limited to, pain, bleeding, infection, damage to nearby structures, failure to obtain sufficient material for a diagnosis, the need for additional procedures, and death), benefits, and alternatives were discussed with the patient. All questions were answered to the best of my abilities. The patient wishes to proceed with the procedure. Written informed consent was  obtained.     Thank you for considering IR for the care of your patient.      Castro Bermudez MD  Interventional Radiology

## 2022-07-15 NOTE — BRIEF OP NOTE
Radiology Post-Procedure Note     Pre Op Diagnosis: Recurrent painful, tense ascites  Post Op Diagnosis: Same     Procedure: 1. US-guided percutaneous RUQ-approach therapeutic LVP     Procedure performed by: Castro Bermudez MD     Written Informed Consent Obtained: Yes  Specimen Removed: YES, 1,900-cc of thin, serosanguinous ascitic fluid  Estimated Blood Loss: none     Findings:   Successful US-guided percutaneous RUQ-approach therapeutic LVP with local anesthetic only and albumin infusion post PRN as indicated per institutional protocol. Patient tolerated the procedure well. No immediate post-procedural complications noted.      Thank you for considering IR for the care of your patient.      Castro Bermudez MD  Interventional Radiology

## 2022-07-19 LAB
BACTERIA SPEC ANAEROBE CULT: NORMAL
FUNGUS SPEC CULT: NORMAL

## 2022-07-21 ENCOUNTER — HOSPITAL ENCOUNTER (OUTPATIENT)
Dept: INTERVENTIONAL RADIOLOGY/VASCULAR | Facility: HOSPITAL | Age: 42
Discharge: HOME | End: 2022-07-21
Attending: INTERNAL MEDICINE
Payer: COMMERCIAL

## 2022-07-21 VITALS — SYSTOLIC BLOOD PRESSURE: 125 MMHG | DIASTOLIC BLOOD PRESSURE: 91 MMHG

## 2022-07-21 DIAGNOSIS — R18.8 OTHER ASCITES: ICD-10-CM

## 2022-07-21 PROCEDURE — 49083 ABD PARACENTESIS W/IMAGING: CPT | Performed by: RADIOLOGY

## 2022-07-21 PROCEDURE — 49083 IR PARACENTESIS WITH IMAGING: ICD-10-PCS | Mod: ,,, | Performed by: RADIOLOGY

## 2022-07-21 PROCEDURE — C1729 CATH, DRAINAGE: HCPCS

## 2022-07-21 NOTE — DISCHARGE SUMMARY
Radiology Discharge Summary      Hospital Course: No complications    Admit Date: 7/21/2022  Discharge Date: 07/21/2022     Instructions Given to Patient: Yes  Diet: Resume prior diet  Activity: activity as tolerated    Description of Condition on Discharge: Stable  Vital Signs (Most Recent): BP: (!) 125/91 (07/21/22 1604)    Discharge Disposition: Home    Discharge Diagnosis: gastric cancer, recurrent ascites s/p paracentesis     Follow-up: repeat paracentesis as needed    Martin Ivory MD  Staff Radiologist  Department of Radiology  Pager: [phone]

## 2022-07-21 NOTE — PROCEDURES
Radiology Post-Procedure Note    Pre Op Diagnosis: Ascites, gastric cancer  Post Op Diagnosis: Same    Procedure: Paracentesis    Procedure performed by: Martin Ivory MD    Written Informed Consent Obtained: Yes  Specimen Removed: YES 1.9 L thin yellow fluid  Estimated Blood Loss: Minimal    Findings:   Successful paracentesis.  Albumin administered PRN per protocol.    Patient tolerated procedure well.    Martin Ivory MD  Staff Radiologist  Department of Radiology  Pager: [phone]

## 2022-07-21 NOTE — H&P
Radiology History & Physical      SUBJECTIVE:     Chief Complaint: gastric cancer, recurrent ascites    History of Present Illness:  Puja Quigley is a 41 y.o. female who presents for paracentesis  Past Medical History:   Diagnosis Date    Anxiety     Dehydration 5/30/2022    Gastric cancer     Gastric ulcer     Hx of psychiatric care     Pregnancy 08/12/2020    delivered on 8/12/2020    Umbilical hernia      Past Surgical History:   Procedure Laterality Date    CLOSURE OF PERFORATED ULCER OF DUODENUM USING OMENTAL PATCH      ESOPHAGOGASTRODUODENOSCOPY N/A 10/13/2020    Procedure: EGD (ESOPHAGOGASTRODUODENOSCOPY);  Surgeon: Rosio Marion MD;  Location: [facility] (2ND FLR);  Service: Endoscopy;  Laterality: N/A;    ESOPHAGOGASTRODUODENOSCOPY N/A 12/11/2020    Procedure: EGD (ESOPHAGOGASTRODUODENOSCOPY);  Surgeon: Rosio Marion MD;  Location: [facility] (2ND FLR);  Service: Endoscopy;  Laterality: N/A;  Covid-19 [facility] 12/8/20 at [facility]    ESOPHAGOGASTRODUODENOSCOPY N/A 3/12/2021    Procedure: EGD (ESOPHAGOGASTRODUODENOSCOPY);  Surgeon: Nav Omer MD;  Location: [facility] (2ND FLR);  Service: Endoscopy;  Laterality: N/A;  COVID [facility] [place] [facility]    ESOPHAGOGASTRODUODENOSCOPY N/A 5/18/2021    Procedure: EGD (ESOPHAGOGASTRODUODENOSCOPY);  Surgeon: Rosio Marion MD;  Location: [facility] (2ND FLR);  Service: Endoscopy;  Laterality: N/A;  5/15-covid pcw-inst portal-tb    ESOPHAGOGASTRODUODENOSCOPY N/A 5/26/2021    Procedure: EGD (ESOPHAGOGASTRODUODENOSCOPY);  Surgeon: Socrates Terrazas MD;  Location: [facility] (2ND FLR);  Service: Endoscopy;  Laterality: N/A;       Home Meds:   Prior to Admission medications    Medication Sig Start Date End Date Taking? Authorizing Provider   baclofen (LIORESAL) 5 mg Tab tablet Take 1 tablet (5 mg total) by mouth 3 (three) times daily. 7/15/22   Sharon Brink MD   dexAMETHasone (DECADRON) 4 MG Tab 8 mg daily on days 2 through 4 of each  chemotherapy cycle. 6/13/22   Fer Ashraf MD   dicyclomine (BENTYL) 10 MG capsule TAKE ONE CAPSULE BY MOUTH FOUR TIMES DAILY 9/27/21   Kamille Hooks PA-C   famotidine (PEPCID) 20 MG tablet Take 1 tablet (20 mg total) by mouth nightly as needed for Heartburn. 2/23/22 2/23/23  Kamille Hooks PA-C   furosemide (LASIX) 20 MG tablet Take 1 tablet (20 mg total) by mouth once daily. 7/6/22   Fer Ashraf MD   gabapentin (NEURONTIN) 300 MG capsule Take 1 capsule (300 mg total) by mouth 3 (three) times daily. 2/7/22 2/7/23  Kamille Hooks PA-C   hydrocortisone 2.5 % cream Apply topically 2 (two) times daily. 7/11/22   Bethel Shetty MD   LIDOcaine HCL 2% (XYLOCAINE) 2 % jelly Apply topically as needed. 6/20/22   Bren Allen MD   LIDOcaine-prilocaine (EMLA) cream Apply to Port-A-Cath area 30 to 45 minutes prior to port access as directed (topical anesthetic use to the anterior chest only). 6/25/21   Historical Provider   methadone (DOLOPHINE) 5 mg/5 mL solution Take 2.5 mLs (2.5 mg total) by mouth every 12 (twelve) hours. 7/6/22   Elva Gutierrez DNP   metoclopramide HCl (REGLAN) 5 MG tablet 5 mg. 6/11/21   Historical Provider   multivitamin with folic acid 400 mcg Tab Take 1 tablet by mouth once daily.    Historical Provider   nortriptyline (PAMELOR) 25 MG capsule Take 1 capsule (25 mg total) by mouth every evening. 6/20/22 6/20/23  Bren Allen MD   omeprazole (PRILOSEC) 40 MG capsule Take 1 capsule (40 mg total) by mouth 2 (two) times daily before meals. 6/20/22 6/20/23  Bren Allen MD   ondansetron (ZOFRAN) 8 MG tablet Take 1 tablet (8 mg total) by mouth every 8 (eight) hours as needed for Nausea. 7/26/21   Fer Ashraf MD   oxyCODONE (ROXICODONE) 5 MG immediate release tablet Take 1 tablet (5 mg total) by mouth every 6 (six) hours as needed. 6/20/22   Bren Allen MD   polyethylene glycol (GLYCOLAX) 17 gram/dose powder Mix 1 capful (17 g) with liquid and take by mouth once daily for 20 days  6/21/22   Bren Allen MD   prochlorperazine (COMPAZINE) 10 MG tablet TAKE 1 TABLET(10 MG) BY MOUTH EVERY 6 HOURS AS NEEDED FOR NAUSEA 8/7/21   Fer Ashraf MD   promethazine (PHENERGAN) 25 MG tablet Take 1 tablet (25 mg total) by mouth every 6 (six) hours as needed for Nausea. 6/13/22   Fer Ashraf MD   senna-docusate 8.6-50 mg (SENNA WITH DOCUSATE SODIUM) 8.6-50 mg per tablet Take 1 tablet by mouth 2 (two) times daily as needed for Constipation. 5/26/21   Reshma Wilson MD   sucralfate (CARAFATE) 1 gram tablet TAKE 1 TABLET(1 GRAM) BY MOUTH FOUR TIMES DAILY FOR 14 DAYS 10/20/21   oRsio Marion MD   sucralfate (CARAFATE) 1 gram tablet  4/8/21   Historical Provider   traMADoL (ULTRAM) 50 mg tablet Take 50 mg by mouth. 6/25/21   Historical Provider   amitriptyline (ELAVIL) 10 MG tablet Take 1 tablet (10 mg total) by mouth every evening. 5/13/21 6/20/22  Socrates Terrazas MD   pantoprazole (PROTONIX) 40 MG tablet Take 1 tablet (40 mg total) by mouth 2 (two) times daily. 5/25/22 6/20/22  Kamille Hooks PA-C     Anticoagulants/Antiplatelets: no anticoagulation    Allergies: Review of patient's allergies indicates:  No Known Allergies  Sedation History:  no adverse reactions    Review of Systems:   Hematological: no known coagulopathies  Respiratory: no shortness of breath  Cardiovascular: no chest pain  Gastrointestinal: some abdominal pain due to ascites  Genito-Urinary: no dysuria  Musculoskeletal: bilateral LE edema  Neurological: no TIA or stroke symptoms         OBJECTIVE:     Vital Signs (Most Recent)  BP: (!) 125/91 (07/21/22 1604)    Physical Exam:  ASA: 2  Mallampati: N/A    General: no acute distress  Mental Status: alert and oriented to person, place and time  HEENT: normocephalic, atraumatic  Chest: unlabored breathing  Heart: regular heart rate  Abdomen: distended  Extremity: moves all extremities, bilateral LE edema    Laboratory  Lab Results   Component Value Date    INR 1.0 06/10/2021        Lab Results   Component Value Date    WBC 14.07 (H) 07/11/2022    HGB 10.1 (L) 07/11/2022    HCT 33.6 (L) 07/11/2022    MCV 86 07/11/2022     07/11/2022      Lab Results   Component Value Date     (H) 07/11/2022     07/11/2022    K 3.5 07/11/2022     07/11/2022    CO2 28 07/11/2022    BUN 10 07/11/2022    CREATININE 0.7 07/11/2022    CALCIUM 8.5 (L) 07/11/2022    MG 1.9 06/20/2022    ALT 8 (L) 07/11/2022    AST 15 07/11/2022    ALBUMIN 1.9 (L) 07/11/2022    BILITOT 0.2 07/11/2022       ASSESSMENT/PLAN:     Sedation Plan: local anesthesia only  Patient will undergo paracentesis.    Martin Ivory MD  Staff Radiologist  Department of Radiology  Pager: [phone]

## 2022-07-22 ENCOUNTER — TELEPHONE (OUTPATIENT)
Dept: HEMATOLOGY/ONCOLOGY | Facility: CLINIC | Age: 42
End: 2022-07-22
Payer: COMMERCIAL

## 2022-07-25 ENCOUNTER — INFUSION (OUTPATIENT)
Dept: INFUSION THERAPY | Facility: HOSPITAL | Age: 42
End: 2022-07-25
Attending: INTERNAL MEDICINE
Payer: COMMERCIAL

## 2022-07-25 ENCOUNTER — OFFICE VISIT (OUTPATIENT)
Dept: HEMATOLOGY/ONCOLOGY | Facility: CLINIC | Age: 42
End: 2022-07-25
Payer: COMMERCIAL

## 2022-07-25 ENCOUNTER — PATIENT MESSAGE (OUTPATIENT)
Dept: HEMATOLOGY/ONCOLOGY | Facility: CLINIC | Age: 42
End: 2022-07-25

## 2022-07-25 VITALS
HEART RATE: 107 BPM | WEIGHT: 147.63 LBS | RESPIRATION RATE: 18 BRPM | SYSTOLIC BLOOD PRESSURE: 113 MMHG | DIASTOLIC BLOOD PRESSURE: 73 MMHG | HEIGHT: 67 IN | OXYGEN SATURATION: 100 % | BODY MASS INDEX: 23.17 KG/M2 | TEMPERATURE: 98 F

## 2022-07-25 VITALS
TEMPERATURE: 98 F | RESPIRATION RATE: 19 BRPM | HEART RATE: 104 BPM | BODY MASS INDEX: 23.1 KG/M2 | DIASTOLIC BLOOD PRESSURE: 70 MMHG | WEIGHT: 147.5 LBS | SYSTOLIC BLOOD PRESSURE: 120 MMHG

## 2022-07-25 DIAGNOSIS — E43 SEVERE MALNUTRITION: ICD-10-CM

## 2022-07-25 DIAGNOSIS — C16.9 MALIGNANT NEOPLASM OF STOMACH, UNSPECIFIED LOCATION: ICD-10-CM

## 2022-07-25 DIAGNOSIS — C16.9 MALIGNANT NEOPLASM OF STOMACH, UNSPECIFIED LOCATION: Primary | ICD-10-CM

## 2022-07-25 DIAGNOSIS — J90 PLEURAL EFFUSION, LEFT: ICD-10-CM

## 2022-07-25 DIAGNOSIS — C79.51 BONE METASTASES: ICD-10-CM

## 2022-07-25 DIAGNOSIS — D84.821 IMMUNODEFICIENCY DUE TO CHEMOTHERAPY: ICD-10-CM

## 2022-07-25 DIAGNOSIS — C79.60: ICD-10-CM

## 2022-07-25 DIAGNOSIS — E86.0 DEHYDRATION: Primary | ICD-10-CM

## 2022-07-25 DIAGNOSIS — Z79.899 IMMUNODEFICIENCY DUE TO CHEMOTHERAPY: ICD-10-CM

## 2022-07-25 DIAGNOSIS — C78.6 PERITONEAL CARCINOMATOSIS: ICD-10-CM

## 2022-07-25 DIAGNOSIS — R60.0 EDEMA OF BOTH LOWER EXTREMITIES: ICD-10-CM

## 2022-07-25 DIAGNOSIS — R18.8 OTHER ASCITES: ICD-10-CM

## 2022-07-25 DIAGNOSIS — D63.0 ANEMIA IN NEOPLASTIC DISEASE: ICD-10-CM

## 2022-07-25 DIAGNOSIS — T45.1X5A IMMUNODEFICIENCY DUE TO CHEMOTHERAPY: ICD-10-CM

## 2022-07-25 PROCEDURE — 25000003 PHARM REV CODE 250: Performed by: PHYSICIAN ASSISTANT

## 2022-07-25 PROCEDURE — 99999 PR PBB SHADOW E&M-EST. PATIENT-LVL V: CPT | Mod: PBBFAC,,, | Performed by: PHYSICIAN ASSISTANT

## 2022-07-25 PROCEDURE — 96375 TX/PRO/DX INJ NEW DRUG ADDON: CPT

## 2022-07-25 PROCEDURE — 3008F PR BODY MASS INDEX (BMI) DOCUMENTED: ICD-10-PCS | Mod: CPTII,S$GLB,, | Performed by: PHYSICIAN ASSISTANT

## 2022-07-25 PROCEDURE — 99999 PR PBB SHADOW E&M-EST. PATIENT-LVL V: ICD-10-PCS | Mod: PBBFAC,,, | Performed by: PHYSICIAN ASSISTANT

## 2022-07-25 PROCEDURE — 3078F DIAST BP <80 MM HG: CPT | Mod: CPTII,S$GLB,, | Performed by: PHYSICIAN ASSISTANT

## 2022-07-25 PROCEDURE — 96413 CHEMO IV INFUSION 1 HR: CPT

## 2022-07-25 PROCEDURE — 96416 CHEMO PROLONG INFUSE W/PUMP: CPT

## 2022-07-25 PROCEDURE — 96367 TX/PROPH/DG ADDL SEQ IV INF: CPT

## 2022-07-25 PROCEDURE — 63600175 PHARM REV CODE 636 W HCPCS: Performed by: PHYSICIAN ASSISTANT

## 2022-07-25 PROCEDURE — 3078F PR MOST RECENT DIASTOLIC BLOOD PRESSURE < 80 MM HG: ICD-10-PCS | Mod: CPTII,S$GLB,, | Performed by: PHYSICIAN ASSISTANT

## 2022-07-25 PROCEDURE — 96415 CHEMO IV INFUSION ADDL HR: CPT

## 2022-07-25 PROCEDURE — 3074F PR MOST RECENT SYSTOLIC BLOOD PRESSURE < 130 MM HG: ICD-10-PCS | Mod: CPTII,S$GLB,, | Performed by: PHYSICIAN ASSISTANT

## 2022-07-25 PROCEDURE — 3008F BODY MASS INDEX DOCD: CPT | Mod: CPTII,S$GLB,, | Performed by: PHYSICIAN ASSISTANT

## 2022-07-25 PROCEDURE — 99215 PR OFFICE/OUTPT VISIT, EST, LEVL V, 40-54 MIN: ICD-10-PCS | Mod: S$GLB,,, | Performed by: PHYSICIAN ASSISTANT

## 2022-07-25 PROCEDURE — 3074F SYST BP LT 130 MM HG: CPT | Mod: CPTII,S$GLB,, | Performed by: PHYSICIAN ASSISTANT

## 2022-07-25 PROCEDURE — 99215 OFFICE O/P EST HI 40 MIN: CPT | Mod: S$GLB,,, | Performed by: PHYSICIAN ASSISTANT

## 2022-07-25 RX ORDER — DIPHENHYDRAMINE HYDROCHLORIDE 50 MG/ML
50 INJECTION INTRAMUSCULAR; INTRAVENOUS ONCE AS NEEDED
Status: DISCONTINUED | OUTPATIENT
Start: 2022-07-25 | End: 2022-07-25 | Stop reason: HOSPADM

## 2022-07-25 RX ORDER — SODIUM CHLORIDE 0.9 % (FLUSH) 0.9 %
10 SYRINGE (ML) INJECTION
Status: DISCONTINUED | OUTPATIENT
Start: 2022-07-25 | End: 2022-07-25 | Stop reason: HOSPADM

## 2022-07-25 RX ORDER — ATROPINE SULFATE 0.4 MG/ML
0.4 INJECTION, SOLUTION ENDOTRACHEAL; INTRAMEDULLARY; INTRAMUSCULAR; INTRAVENOUS; SUBCUTANEOUS ONCE AS NEEDED
Status: COMPLETED | OUTPATIENT
Start: 2022-07-25 | End: 2022-07-25

## 2022-07-25 RX ORDER — ACETAMINOPHEN 325 MG/1
650 TABLET ORAL
Status: CANCELLED | OUTPATIENT
Start: 2022-07-25

## 2022-07-25 RX ORDER — HEPARIN 100 UNIT/ML
500 SYRINGE INTRAVENOUS
Status: DISCONTINUED | OUTPATIENT
Start: 2022-07-25 | End: 2022-07-25 | Stop reason: HOSPADM

## 2022-07-25 RX ORDER — DIPHENHYDRAMINE HYDROCHLORIDE 50 MG/ML
50 INJECTION INTRAMUSCULAR; INTRAVENOUS ONCE AS NEEDED
Status: CANCELLED | OUTPATIENT
Start: 2022-07-25

## 2022-07-25 RX ORDER — EPINEPHRINE 0.3 MG/.3ML
0.3 INJECTION SUBCUTANEOUS ONCE AS NEEDED
Status: CANCELLED | OUTPATIENT
Start: 2022-07-25

## 2022-07-25 RX ORDER — EPINEPHRINE 0.3 MG/.3ML
0.3 INJECTION SUBCUTANEOUS ONCE AS NEEDED
Status: DISCONTINUED | OUTPATIENT
Start: 2022-07-25 | End: 2022-07-25 | Stop reason: HOSPADM

## 2022-07-25 RX ORDER — SODIUM CHLORIDE 0.9 % (FLUSH) 0.9 %
10 SYRINGE (ML) INJECTION
Status: CANCELLED | OUTPATIENT
Start: 2022-07-25

## 2022-07-25 RX ORDER — HEPARIN 100 UNIT/ML
500 SYRINGE INTRAVENOUS
Status: CANCELLED | OUTPATIENT
Start: 2022-07-25

## 2022-07-25 RX ORDER — ATROPINE SULFATE 0.4 MG/ML
0.4 INJECTION, SOLUTION ENDOTRACHEAL; INTRAMEDULLARY; INTRAMUSCULAR; INTRAVENOUS; SUBCUTANEOUS ONCE AS NEEDED
Status: CANCELLED | OUTPATIENT
Start: 2022-07-25

## 2022-07-25 RX ORDER — HYDROCODONE BITARTRATE AND ACETAMINOPHEN 500; 5 MG/1; MG/1
TABLET ORAL ONCE
Status: CANCELLED | OUTPATIENT
Start: 2022-07-25 | End: 2022-07-25

## 2022-07-25 RX ADMIN — ATROPINE SULFATE 0.4 MG: 0.4 INJECTION, SOLUTION INTRAMUSCULAR; INTRAVENOUS; SUBCUTANEOUS at 02:07

## 2022-07-25 RX ADMIN — FLUOROURACIL 3940 MG: 50 INJECTION, SOLUTION INTRAVENOUS at 03:07

## 2022-07-25 RX ADMIN — IRINOTECAN HYDROCHLORIDE 134 MG: 20 INJECTION, SOLUTION INTRAVENOUS at 02:07

## 2022-07-25 RX ADMIN — DIPHENHYDRAMINE HYDROCHLORIDE 50 MG: 50 INJECTION, SOLUTION INTRAMUSCULAR; INTRAVENOUS at 01:07

## 2022-07-25 RX ADMIN — SODIUM CHLORIDE: 9 INJECTION, SOLUTION INTRAVENOUS at 12:07

## 2022-07-25 RX ADMIN — PALONOSETRON 0.25 MG: 0.05 INJECTION, SOLUTION INTRAVENOUS at 12:07

## 2022-07-25 NOTE — PLAN OF CARE
1200 pt here for Folfiri infusion D1C5, labs, hx, meds, allergies reviewed, pt with no new complaints at this time, reclined in chair, continue to monitor

## 2022-07-25 NOTE — PROGRESS NOTES
MEDICAL ONCOLOGY - ESTABLISHED PATIENT    Reason for visit: Gastric cancer     Best Contact Phone Number(s): [phone] (home)      Cancer/Stage/TNM:   Cancer Staging  No matching staging information was found for the patient.     Oncology History   Malignant neoplasm of stomach   6/10/2021 Initial Diagnosis    Gastric adenocarcinoma     7/26/2021 - 4/6/2022 Chemotherapy    Treatment Summary   Plan Name: OP FOLFOX 6 Q2W  Treatment Goal: Palliative  Status: Inactive  Start Date: 7/26/2021  End Date: 4/6/2022  Provider: Fer Ashraf MD  Chemotherapy: fluorouraciL 2,400 mg/m2 = 3,770 mg in sodium chloride 0.9% 100 mL chemo infusion, 2,400 mg/m2 = 3,770 mg, Intravenous, Over 46 hours, 19 of 20 cycles  Administration: 3,770 mg (7/26/2021), 3,770 mg (8/9/2021), 3,770 mg (8/23/2021), 3,770 mg (9/7/2021), 3,770 mg (9/21/2021), 3,770 mg (10/5/2021), 3,770 mg (10/19/2021), 3,770 mg (11/2/2021), 3,770 mg (11/15/2021), 3,770 mg (11/30/2021), 3,770 mg (12/13/2021), 4,000 mg (12/27/2021), 4,030 mg (1/10/2022), 4,030 mg (1/24/2022), 4,000 mg (2/7/2022), 4,000 mg (2/22/2022), 4,000 mg (3/7/2022), 4,000 mg (3/22/2022), 4,000 mg (4/4/2022)  oxaliplatin (ELOXATIN) 85 mg/m2 = 133 mg in dextrose 5 % 500 mL chemo infusion, 85 mg/m2 = 133 mg, Intravenous, Clinic/[facility] 1 time, 9 of 9 cycles  Administration: 133 mg (7/26/2021), 133 mg (8/9/2021), 133 mg (8/23/2021), 133 mg (9/7/2021), 133 mg (9/21/2021), 133 mg (10/5/2021), 133 mg (10/19/2021), 133 mg (11/2/2021), 133 mg (11/15/2021)     5/26/2022 -  Chemotherapy    Treatment Summary   Plan Name: OP FOLFIRI Q2WK  Treatment Goal: Palliative  Status: Active  Start Date: 5/26/2022  End Date: 4/19/2023 (Planned)  Provider: Fer Ashraf MD  Chemotherapy: dexAMETHasone (DECADRON) 4 MG Tab, 8 mg, Oral, Daily, 1 of 1 cycle, Start date: --, End date: --  irinotecan (CAMPTOSAR) 120 mg/m2 = 200 mg in sodium chloride 0.9% 500 mL chemo infusion, 120 mg/m2 = 200 mg (100 % of original dose  120 mg/m2), Intravenous, Clinic/[facility] 1 time, 4 of 24 cycles  Dose modification: 125 mg/m2 (original dose 120 mg/m2, Cycle 1), 120 mg/m2 (original dose 120 mg/m2, Cycle 1), 75 mg/m2 (original dose 120 mg/m2, Cycle 2, Reason: Dose not tolerated)  Administration: 200 mg (5/26/2022), 126 mg (6/13/2022), 126 mg (6/27/2022), 134 mg (7/12/2022)  ramucirumab (CYRAMZA) 471 mg in sodium chloride 0.9% 250 mL chemo infusion, 8 mg/kg = 471 mg, Intravenous, Clinic/[facility] 1 time, 1 of 1 cycle  Administration: 471 mg (5/26/2022)     Anxiety associated with cancer diagnosis   12/27/2021 Initial Diagnosis    Anxiety associated with cancer diagnosis          Interim History:   Puja is a very pleasant 41 y.o. female with metastatic gastric cancer who presents for follow-up prior to cycle 5 of FOLFIRI. Tolerance of chemotherapy has been fairly good. Her  significant LE edema has actually improved since her last visit with us. In addition, the fluid removed from her abdomen during her paracentesis has decreased over the past couple of weeks as well. Continues with weekly paracenteses which provides some relief. She is not taking the Lasix, as she did not find it helpful. She is eating and has a fair appetite.     She was recently seen in the ER for worsening abdominal discomfort. She was stabilized and released from the ER without complication. She continues to f/u with Dr. Brink in Palliative Medicine as well. Breathing well and denies chest pain, shortness of breath.     ECOG status is 1. Presents with her aunt today.     Review of Systems   Constitutional: Positive for appetite change. Negative for activity change, chills, fatigue, fever and unexpected weight change.   HENT: Negative for ear pain, facial swelling, hearing loss, mouth sores, nosebleeds, sore throat and trouble swallowing.    Eyes: Negative for pain, discharge, redness and visual disturbance.   Respiratory: Negative for cough, chest tightness and shortness of breath.     Cardiovascular: Positive for leg swelling. Negative for chest pain and palpitations.   Gastrointestinal: Positive for abdominal distention, nausea, vomiting and reflux. Negative for abdominal pain, blood in stool, constipation and diarrhea.   Endocrine: Negative for cold intolerance and heat intolerance.   Genitourinary: Negative for decreased urine volume, difficulty urinating, dysuria, frequency, hematuria, hot flashes and urgency.   Musculoskeletal: Negative for arthralgias, gait problem, leg pain and myalgias.   Integumentary:  Negative for pallor, rash and wound.   Allergic/Immunologic: Negative for immunocompromised state.   Neurological: Positive for numbness (slowly improving). Negative for dizziness, tremors, weakness, light-headedness and headaches.   Hematological: Negative for adenopathy. Does not bruise/bleed easily.   Psychiatric/Behavioral: Negative for agitation, confusion, dysphoric mood and sleep disturbance. The patient is not nervous/anxious.            Past Medical History:   Past Medical History:   Diagnosis Date    Anxiety     Dehydration 5/30/2022    Gastric cancer     Gastric ulcer     Hx of psychiatric care     Pregnancy 08/12/2020    delivered on 8/12/2020    Umbilical hernia         Past Surgical History:   Past Surgical History:   Procedure Laterality Date    CLOSURE OF PERFORATED ULCER OF DUODENUM USING OMENTAL PATCH      ESOPHAGOGASTRODUODENOSCOPY N/A 10/13/2020    Procedure: EGD (ESOPHAGOGASTRODUODENOSCOPY);  Surgeon: Rosio Marion MD;  Location: [address];  Service: Endoscopy;  Laterality: N/A;    ESOPHAGOGASTRODUODENOSCOPY N/A 12/11/2020    Procedure: EGD (ESOPHAGOGASTRODUODENOSCOPY);  Surgeon: Rosio Marion MD;  Location: [address]);  Service: Endoscopy;  Laterality: N/A;  Covid-19 test 12/8/20 at [facility] -     ESOPHAGOGASTRODUODENOSCOPY N/A 3/12/2021    Procedure: EGD (ESOPHAGOGASTRODUODENOSCOPY);  Surgeon: Nav Omer MD;   "Location: [facility] ENDO (2ND FLR);  Service: Endoscopy;  Laterality: N/A;  COVID at [facility] 3/9 ttr    ESOPHAGOGASTRODUODENOSCOPY N/A 2021    Procedure: EGD (ESOPHAGOGASTRODUODENOSCOPY);  Surgeon: Rosio Marion MD;  Location: [facility] ENDO (2ND FLR);  Service: Endoscopy;  Laterality: N/A;  5/15-covid pcw-inst portal-tb    ESOPHAGOGASTRODUODENOSCOPY N/A 2021    Procedure: EGD (ESOPHAGOGASTRODUODENOSCOPY);  Surgeon: Socrates Terrazas MD;  Location: [facility] ENDO (2ND FLR);  Service: Endoscopy;  Laterality: N/A;        Family History:   Family History   Problem Relation Age of Onset    Cancer Mother 67        lymphoma (type? "not active," not on tx; "related to her blood")    Schizophrenia Mother     Lung cancer Father 48        type? h/o smoking    No Known Problems Son     Breast cancer Other 73        unilat; stage I, but aggressive    Prostate cancer Paternal Uncle         (dx 50s/60? no chemo?)    Breast cancer Paternal Cousin         (dx age?) ductal    Colon cancer Neg Hx     Esophageal cancer Neg Hx         Social History:   Social History     Tobacco Use    Smoking status: Former Smoker     Types: Cigarettes     Quit date: 2019     Years since quittin.6    Smokeless tobacco: Never Used   Substance Use Topics    Alcohol use: Yes        I have reviewed and updated the patient's past medical, surgical, family and social histories.    Allergies:   Review of patient's allergies indicates:  No Known Allergies     Medications:   Current Outpatient Medications   Medication Sig Dispense Refill    baclofen (LIORESAL) 5 mg Tab tablet Take 1 tablet (5 mg total) by mouth 3 (three) times daily. 30 tablet 0    dexAMETHasone (DECADRON) 4 MG Tab 8 mg daily on days 2 through 4 of each chemotherapy cycle. 60 tablet 2    dicyclomine (BENTYL) 10 MG capsule TAKE ONE CAPSULE BY MOUTH FOUR TIMES DAILY 120 capsule 0    famotidine (PEPCID) 20 MG tablet Take 1 tablet (20 mg total) by mouth nightly as needed for " Heartburn. 60 tablet 1    furosemide (LASIX) 20 MG tablet Take 1 tablet (20 mg total) by mouth once daily. 30 tablet 2    gabapentin (NEURONTIN) 300 MG capsule Take 1 capsule (300 mg total) by mouth 3 (three) times daily. 90 capsule 11    hydrocortisone 2.5 % cream Apply topically 2 (two) times daily. 3.5 g 0    LIDOcaine HCL 2% (XYLOCAINE) 2 % jelly Apply topically as needed. 100 mL 0    LIDOcaine-prilocaine (EMLA) cream Apply to Port-A-Cath area 30 to 45 minutes prior to port access as directed (topical anesthetic use to the anterior chest only).      methadone (DOLOPHINE) 5 mg/5 mL solution Take 2.5 mLs (2.5 mg total) by mouth every 12 (twelve) hours. 75 mL 0    metoclopramide HCl (REGLAN) 5 MG tablet 5 mg.      multivitamin with folic acid 400 mcg Tab Take 1 tablet by mouth once daily.      nortriptyline (PAMELOR) 25 MG capsule Take 1 capsule (25 mg total) by mouth every evening. 30 capsule 11    omeprazole (PRILOSEC) 40 MG capsule Take 1 capsule (40 mg total) by mouth 2 (two) times daily before meals. 60 capsule 11    ondansetron (ZOFRAN) 8 MG tablet Take 1 tablet (8 mg total) by mouth every 8 (eight) hours as needed for Nausea. 60 tablet 2    oxyCODONE (ROXICODONE) 5 MG immediate release tablet Take 1 tablet (5 mg total) by mouth every 6 (six) hours as needed. 60 tablet 0    polyethylene glycol (GLYCOLAX) 17 gram/dose powder Mix 1 capful (17 g) with liquid and take by mouth once daily for 20 days 510 g 0    prochlorperazine (COMPAZINE) 10 MG tablet TAKE 1 TABLET(10 MG) BY MOUTH EVERY 6 HOURS AS NEEDED FOR NAUSEA 90 tablet 2    promethazine (PHENERGAN) 25 MG tablet Take 1 tablet (25 mg total) by mouth every 6 (six) hours as needed for Nausea. 60 tablet 2    senna-docusate 8.6-50 mg (SENNA WITH DOCUSATE SODIUM) 8.6-50 mg per tablet Take 1 tablet by mouth 2 (two) times daily as needed for Constipation.      sucralfate (CARAFATE) 1 gram tablet TAKE 1 TABLET(1 GRAM) BY MOUTH FOUR TIMES DAILY FOR 14  "DAYS 56 tablet 0    sucralfate (CARAFATE) 1 gram tablet       traMADoL (ULTRAM) 50 mg tablet Take 50 mg by mouth.       No current facility-administered medications for this visit.        Physical Exam:   /73 (BP Location: Left arm, Patient Position: Sitting, BP Method: Small (Automatic))   Pulse 107   Temp 98.3 °F (36.8 °C) (Oral)   Resp 18   Ht 5' 7" (1.702 m)   Wt 67 kg (147 lb 9.6 oz)   SpO2 100%   BMI 23.12 kg/m²      ECOG Performance status: 2            Physical Exam  Constitutional:       General: She is not in acute distress.     Appearance: Normal appearance. She is not ill-appearing, toxic-appearing or diaphoretic.   HENT:      Head: Normocephalic and atraumatic.      Mouth/Throat:      Mouth: Mucous membranes are moist.   Eyes:      General: No scleral icterus.     Extraocular Movements: Extraocular movements intact.   Cardiovascular:      Rate and Rhythm: Normal rate and regular rhythm.      Heart sounds: Normal heart sounds.   Pulmonary:      Effort: Pulmonary effort is normal. No respiratory distress.      Breath sounds: No stridor.      Comments: Decreased breath sounds of LLL field  Chest:      Chest wall: No tenderness.   Abdominal:      General: There is distension.      Tenderness: There is abdominal tenderness.   Musculoskeletal:         General: Swelling present. No signs of injury.      Cervical back: Normal range of motion.      Right lower leg: Edema present.      Left lower leg: Edema present.   Skin:     General: Skin is warm.      Capillary Refill: Capillary refill takes less than 2 seconds.   Neurological:      General: No focal deficit present.      Mental Status: She is alert and oriented to person, place, and time. Mental status is at baseline.      Cranial Nerves: No cranial nerve deficit.   Psychiatric:         Mood and Affect: Mood normal.         Behavior: Behavior normal.         Thought Content: Thought content normal.         Judgment: Judgment normal.         "     Labs:   Recent Results (from the past 48 hour(s))   CBC Auto Differential    Collection Time: 07/25/22  9:49 AM   Result Value Ref Range    WBC 7.46 3.90 - 12.70 K/uL    RBC 3.02 (L) 4.00 - 5.40 M/uL    Hemoglobin 7.6 (L) 12.0 - 16.0 g/dL    Hematocrit 25.6 (L) 37.0 - 48.5 %    MCV 85 82 - 98 fL    MCH 25.2 (L) 27.0 - 31.0 pg    MCHC 29.7 (L) 32.0 - 36.0 g/dL    RDW 15.9 (H) 11.5 - 14.5 %    Platelets 349 150 - 450 K/uL    MPV 8.4 (L) 9.2 - 12.9 fL    Immature Granulocytes 0.4 0.0 - 0.5 %    Gran # (ANC) 6.2 1.8 - 7.7 K/uL    Immature Grans (Abs) 0.03 0.00 - 0.04 K/uL    Lymph # 0.8 (L) 1.0 - 4.8 K/uL    Mono # 0.4 0.3 - 1.0 K/uL    Eos # 0.1 0.0 - 0.5 K/uL    Baso # 0.02 0.00 - 0.20 K/uL    nRBC 0 0 /100 WBC    Gran % 82.5 (H) 38.0 - 73.0 %    Lymph % 11.0 (L) 18.0 - 48.0 %    Mono % 5.0 4.0 - 15.0 %    Eosinophil % 0.8 0.0 - 8.0 %    Basophil % 0.3 0.0 - 1.9 %    Differential Method Automated    Comprehensive Metabolic Panel    Collection Time: 07/25/22  9:49 AM   Result Value Ref Range    Sodium 139 136 - 145 mmol/L    Potassium 4.4 3.5 - 5.1 mmol/L    Chloride 108 95 - 110 mmol/L    CO2 27 23 - 29 mmol/L    Glucose 90 70 - 110 mg/dL    BUN 10 6 - 20 mg/dL    Creatinine 0.6 0.5 - 1.4 mg/dL    Calcium 8.1 (L) 8.7 - 10.5 mg/dL    Total Protein 5.0 (L) 6.0 - 8.4 g/dL    Albumin 1.5 (L) 3.5 - 5.2 g/dL    Total Bilirubin 0.2 0.1 - 1.0 mg/dL    Alkaline Phosphatase 77 55 - 135 U/L    AST 13 10 - 40 U/L    ALT 7 (L) 10 - 44 U/L    Anion Gap 4 (L) 8 - 16 mmol/L    eGFR if African American >60.0 >60 mL/min/1.73 m^2    eGFR if non African American >60.0 >60 mL/min/1.73 m^2        I have reviewed the pertinent labs from today which show hgb 7.6, albumin 1.5.    Imaging:    CT CAP: 6/11/2022:    Impression:     1. 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     2.  Mildly worsening large volume of malignant abdominopelvic ascites  with persistent findings of peritoneal carcinomatosis.     3. Diffuse gastric wall thickening with an enlarging ulcerative mass of the gastric antrum consistent with patient's known biopsy-proven adenocarcinoma.     4.  Developing bowel obstruction likely secondary to serosal metastases and mass effect from the gastric and pelvic mass.     5.  Large left pleural effusion.    CT chest: 6/6/2022  Impression:     Interval development of a large left pleural effusion and ascites with associated left lower lobe compressive atelectasis.     MRI abdomen/pelvis: 6/16/2022  Impression:     1. Diffuse irregular enhancing gastric wall thickening in keeping with known biopsy-proven gastric adenocarcinoma.  Gastric antrum partially obscured by susceptibility artifact.  2. Significantly increased size of a large mixed cystic and solid heterogeneously enhancing pelvic mass.  In light of patient's history, finding is concerning for ovarian metastasis/Krukenberg tumor.  Lesion demonstrates significant mass effect within the pelvis and lower abdomen with obscuration of surrounding structures.  3. Moderate volume abdominopelvic ascites.  Diffuse peritoneal thickening and enhancement concerning for peritoneal carcinomatosis.  4. Numerous small hepatic cysts.  5. Moderate left pleural effusion.  6. Diffuse body wall edema.  7. Small nonspecific enhancing lesion within the right femoral head corresponding with a peripherally sclerotic lucent focus identified on prior CT.  Favor a degenerative subchondral cyst however cannot entirely exclude metastatic disease.  Attention on follow-up    Path:   5/26/21:  Positive for malignancy.   Poorly differentiated adenocarcinoma growing with signet ring features   Immunostains for Bruce-EP4 and CEA are positive.  The controls stain   appropriately.   This case was reviewed by Dr Sebastian who concurs with the diagnosis       Assessment:       1. Malignant neoplasm of stomach, unspecified location    2.  Bone metastases    3. Peritoneal carcinomatosis    4. Krukenberg tumor, unspecified laterality    5. 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 Edema of both lower extremities    8. Severe malnutrition    9. Anemia in neoplastic disease          Plan:             # Gastric adenocarcinoma with peritoneal metastases, immunodeficiency due to chemotherapy, bone metastases  HER-2 negative by FISH.  PD-L1 < 1%.  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  We previously explained the implications of a stage IV diagnosis to her and potential treatment options.  She is now s/p port placement. She sought a second opinion at [facility] for zolbetuximab trial targeting CLDN protein but she did not test positive for this biomarker. We recommended proceeding with SOC FOLFOX, with which the [facility] team agreed. Because of the negative PD-L1 I do not think the benefit of adding nivolumab outweighs the potential toxicities.       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  She opted not to get genetic testing done at her appt with Phi. We recommended that she reconsider that decision in light of a very likely germline mutation in SAMUEL which has clinical consequences.      CT CAP after 6 cycles showed improvement in ascites, probable hepatic, thyroid and bone metastases. We dropped oxaliplatin starting with cycle 10 due to grade 1 neuropathy and desire to keep it from worsening.    CT CAP after cycle 10 showed stable disease.  CT scan after cycle 15 continued to display overall stability of disease within the gastric wall, peritoneum, spine and liver. Although increase ascites on imaging and clinically can represent progression of disease, her previous CT suggested overall stability within the liver and gastric wall, so we  recommended to continue with treatment time. However, she continued to have increasing abdominal ascites that has been concerning for progression of disease. Hence, she had repeat imaging. On CT performed on 4/14/2022, she appeared to have worsening disease suggestive of progression. She was seen by Dr. Reid at [facility] for f/u and to discuss possible treatment options.  She was also evaluated by Dr. Andree Mueller and Dr. See of Surgical Oncology at [facility].  Ultimately she was not felt to be a good surgical debulking candidate due to her ascites.  If her ascites improves during the course of chemotherapy and her performance status remains stable or improves, they would re-consider her again for palliative oophorectomy.     She was started on second-line FOLFIRI. Was given ramucirumab with cycle 1 but this later will be held given [facility] recs for just FOLFIRI.  She didn't tolerate cycle 1 well with significant N/V.  Will dose reduce irinotecan to 75 mg/m2 - has UGT1A1 homozygous mutation indicating poor metabolism.  Continue 5-FU infusion at 2200 mg/m2.    After an in-depth discussion, she opted to pursue 2nd line chemotherapy. We favored use of FOLFIRI as second-line rather than Taxol/Claudio due to ongoing neuropathy. Pt was agreeable.   Tolerance has been fair.  Will proceed with cycle 5 of  Chemotherapy today. Will continue with reduced dose of the irinotecan 75 mg/m2.   -RTC in 2 weeks for cycle 6. Will repeat imaging studies as scheduled at [facility] after cycle 5.  - We discussed the potential pros and cons of debulking surgery if this is offered at [place] pending the results of her upcoming scan.    #Ascites, lower extremity edema, weight loss, malnutrition  Initially had peritoneal catheter after diagnosis, output slowed so was removed in October 2021 by IR.  Improving. Will continue with weekly paracentesis. Has increased edema of the lower extremity that is most likely due to malnutrition, hypoalbuminemia;  perhaps some impaired venous drainage due to compression from tumor.   Does not take the Lasix. Will discuss giving IV albumin with Dr. Ashraf     # Nausea, gastric ulcer  Continue dex 8 mg on days 2-4 of chemotherapy.  Continue Phenergan as needed. Has Zofran and compazine also PRN.  Continue Protonix.      # Anemia  Hgb 6/7. Will transfuse 1 pack of RBCs on day of pump d/c. Will continue to monitor.  Prior iron deficiency due to chronic blood loss.  S/p 2 doses of Injectafer. Will consider another cycle of Injectafer, pending lab work in 2 weeks.     # Anxiety  Continue f/u with psych onc w/ Dr. Berumen    # chemotherapy induced neuropathy, pain  2/2 previous chemotherapy with Oxaliplatin.  Worsening but feels that it is due to the edema of her LE and feet. Continue acupuncture.  Continue gabapentin.  Will continue to monitor.   Continue palliative care f/u.    Follow up: RTC in 2 weeks for lab work, clinic visit and cycle 6 of FOLFIRI with pump d/c on day 3.       Route Chart for Scheduling    Med Onc Chart Routing      Follow up with physician 2 weeks. Cycle 6 of FOLFIRI at [facility] [place] [place], based on Dr. Ashraf's availability   Follow up with ISHMAEL 4 weeks. Cycle 7 of FOLFIRI with pump d/c on day 3 at [facility].   Infusion scheduling note    Injection scheduling note    Labs CBC and CMP   Lab interval: every 2 weeks     Imaging    Pharmacy appointment    Other referrals          Treatment Plan Information   OP FOLFIRI Q2WK   Fer Ashraf MD   Upcoming Treatment Dates - OP FOLFIRI Q2WK    7/25/2022       Pre-Medications       diphenhydrAMINE (BENADRYL) 50 mg in sodium chloride 0.9% 50 mL IVPB       Chemotherapy       irinotecan (CAMPTOSAR) 75 mg/m2 = 134 mg in sodium chloride 0.9% 500 mL chemo infusion       fluorouracil (ADRUCIL) 2,200 mg/m2 = 3,940 mg in sodium chloride 0.9% 100 mL chemo infusion       Supportive Care       atropine injection 0.4 mg       Antiemetics       palonosetron (ALOXI) 0.25 mg with  Dexamethasone (DECADRON) 12 mg in NS 50 mL IVPB  8/8/2022       Pre-Medications       diphenhydrAMINE (BENADRYL) 50 mg in sodium chloride 0.9% 50 mL IVPB       Chemotherapy       irinotecan (CAMPTOSAR) 75 mg/m2 = 134 mg in sodium chloride 0.9% 500 mL chemo infusion       fluorouracil (ADRUCIL) 2,200 mg/m2 = 3,940 mg in sodium chloride 0.9% 100 mL chemo infusion       Supportive Care       atropine injection 0.4 mg       Antiemetics       palonosetron (ALOXI) 0.25 mg with Dexamethasone (DECADRON) 12 mg in NS 50 mL IVPB  8/22/2022       Pre-Medications       diphenhydrAMINE (BENADRYL) 50 mg in sodium chloride 0.9% 50 mL IVPB       Chemotherapy       irinotecan (CAMPTOSAR) 75 mg/m2 = 134 mg in sodium chloride 0.9% 500 mL chemo infusion       fluorouracil (ADRUCIL) 2,200 mg/m2 = 3,940 mg in sodium chloride 0.9% 100 mL chemo infusion       Supportive Care       atropine injection 0.4 mg       Antiemetics       palonosetron (ALOXI) 0.25 mg with Dexamethasone (DECADRON) 12 mg in NS 50 mL IVPB  9/5/2022       Pre-Medications       diphenhydrAMINE (BENADRYL) 50 mg in sodium chloride 0.9% 50 mL IVPB       Chemotherapy       irinotecan (CAMPTOSAR) 75 mg/m2 = 134 mg in sodium chloride 0.9% 500 mL chemo infusion       fluorouracil (ADRUCIL) 2,200 mg/m2 = 3,940 mg in sodium chloride 0.9% 100 mL chemo infusion       Supportive Care       atropine injection 0.4 mg       Antiemetics       palonosetron (ALOXI) 0.25 mg with Dexamethasone (DECADRON) 12 mg in NS 50 mL IVPB    Supportive Plan Information  IV FLUIDS AND ELECTROLYTES   Kamille Hooks PA-C   Upcoming Treatment Dates - IV FLUIDS AND ELECTROLYTES    No upcoming days in selected categories.    Therapy Plan Information  EPINEPHrine (EPIPEN) 0.3 mg/0.3 mL pen injection 0.3 mg  0.3 mg, Intramuscular, PRN  diphenhydrAMINE injection 50 mg  50 mg, Intravenous, PRN  methylPREDNISolone sodium succinate injection 125 mg  125 mg, Intravenous, PRN  sodium chloride 0.9% bolus 1,000  mL  1,000 mL, Intravenous, PRN

## 2022-07-25 NOTE — PLAN OF CARE
1545 pt tolerated folfiri infusion without issue, cadd pump infusing at 2.2 ml/hr without issue prior to d/c, pt to rtc wed around 1330 for pump d/c, no distress noted upon d/c to home

## 2022-07-26 ENCOUNTER — CLINICAL SUPPORT (OUTPATIENT)
Dept: HEMATOLOGY/ONCOLOGY | Facility: CLINIC | Age: 42
End: 2022-07-26
Payer: COMMERCIAL

## 2022-07-26 ENCOUNTER — PATIENT MESSAGE (OUTPATIENT)
Dept: HEMATOLOGY/ONCOLOGY | Facility: CLINIC | Age: 42
End: 2022-07-26

## 2022-07-26 ENCOUNTER — PATIENT MESSAGE (OUTPATIENT)
Dept: HEMATOLOGY/ONCOLOGY | Facility: CLINIC | Age: 42
End: 2022-07-26
Payer: COMMERCIAL

## 2022-07-26 DIAGNOSIS — G89.29 CHRONIC BACK PAIN, UNSPECIFIED BACK LOCATION, UNSPECIFIED BACK PAIN LATERALITY: Primary | ICD-10-CM

## 2022-07-26 DIAGNOSIS — Z51.5 PALLIATIVE CARE ENCOUNTER: ICD-10-CM

## 2022-07-26 DIAGNOSIS — C16.9 MALIGNANT NEOPLASM OF STOMACH, UNSPECIFIED LOCATION: Primary | ICD-10-CM

## 2022-07-26 DIAGNOSIS — C78.6 PERITONEAL CARCINOMATOSIS: ICD-10-CM

## 2022-07-26 DIAGNOSIS — C79.51 BONE METASTASES: ICD-10-CM

## 2022-07-26 DIAGNOSIS — E43 SEVERE MALNUTRITION: ICD-10-CM

## 2022-07-26 DIAGNOSIS — M54.9 CHRONIC BACK PAIN, UNSPECIFIED BACK LOCATION, UNSPECIFIED BACK PAIN LATERALITY: Primary | ICD-10-CM

## 2022-07-26 PROCEDURE — 97813 ACUP 1/> W/ESTIM 1ST 15 MIN: CPT | Mod: S$GLB,,, | Performed by: ACUPUNCTURIST

## 2022-07-26 PROCEDURE — 97814 PR ACUPUNCT W/ ELEC STIMUL ADDL 15M: ICD-10-PCS | Mod: S$GLB,,, | Performed by: ACUPUNCTURIST

## 2022-07-26 PROCEDURE — 99999 PR PBB SHADOW E&M-EST. PATIENT-LVL I: CPT | Mod: PBBFAC,,, | Performed by: ACUPUNCTURIST

## 2022-07-26 PROCEDURE — 97814 ACUP 1/> W/ESTIM EA ADDL 15: CPT | Mod: S$GLB,,, | Performed by: ACUPUNCTURIST

## 2022-07-26 PROCEDURE — 99999 PR PBB SHADOW E&M-EST. PATIENT-LVL I: ICD-10-PCS | Mod: PBBFAC,,, | Performed by: ACUPUNCTURIST

## 2022-07-26 PROCEDURE — 97813 PR ACUPUNCT W/ ELEC STIMUL 15 MIN: ICD-10-PCS | Mod: S$GLB,,, | Performed by: ACUPUNCTURIST

## 2022-07-26 RX ORDER — METHADONE HYDROCHLORIDE 5 MG/5ML
2.5 SOLUTION ORAL EVERY 12 HOURS
Qty: 75 ML | Refills: 0 | Status: SHIPPED | OUTPATIENT
Start: 2022-07-26 | End: 2022-08-17 | Stop reason: SDUPTHER

## 2022-07-27 ENCOUNTER — INFUSION (OUTPATIENT)
Dept: INFUSION THERAPY | Facility: HOSPITAL | Age: 42
End: 2022-07-27
Attending: INTERNAL MEDICINE
Payer: COMMERCIAL

## 2022-07-27 ENCOUNTER — PATIENT MESSAGE (OUTPATIENT)
Dept: HEMATOLOGY/ONCOLOGY | Facility: CLINIC | Age: 42
End: 2022-07-27
Payer: COMMERCIAL

## 2022-07-27 VITALS
TEMPERATURE: 98 F | OXYGEN SATURATION: 100 % | HEART RATE: 94 BPM | DIASTOLIC BLOOD PRESSURE: 78 MMHG | SYSTOLIC BLOOD PRESSURE: 115 MMHG | RESPIRATION RATE: 16 BRPM

## 2022-07-27 DIAGNOSIS — C16.9 MALIGNANT NEOPLASM OF STOMACH, UNSPECIFIED LOCATION: ICD-10-CM

## 2022-07-27 DIAGNOSIS — D63.0 ANEMIA IN NEOPLASTIC DISEASE: Primary | ICD-10-CM

## 2022-07-27 DIAGNOSIS — E86.0 DEHYDRATION: ICD-10-CM

## 2022-07-27 LAB
ABO + RH BLD: NORMAL
BLD GP AB SCN CELLS X3 SERPL QL: NORMAL

## 2022-07-27 PROCEDURE — 86920 COMPATIBILITY TEST SPIN: CPT | Performed by: PHYSICIAN ASSISTANT

## 2022-07-27 PROCEDURE — 36591 DRAW BLOOD OFF VENOUS DEVICE: CPT

## 2022-07-27 PROCEDURE — 63600175 PHARM REV CODE 636 W HCPCS: Performed by: INTERNAL MEDICINE

## 2022-07-27 PROCEDURE — 99211 OFF/OP EST MAY X REQ PHY/QHP: CPT

## 2022-07-27 PROCEDURE — A4216 STERILE WATER/SALINE, 10 ML: HCPCS | Performed by: INTERNAL MEDICINE

## 2022-07-27 PROCEDURE — 86901 BLOOD TYPING SEROLOGIC RH(D): CPT | Performed by: PHYSICIAN ASSISTANT

## 2022-07-27 PROCEDURE — 25000003 PHARM REV CODE 250: Performed by: INTERNAL MEDICINE

## 2022-07-27 RX ORDER — HEPARIN 100 UNIT/ML
500 SYRINGE INTRAVENOUS
Status: DISCONTINUED | OUTPATIENT
Start: 2022-07-27 | End: 2022-07-27 | Stop reason: HOSPADM

## 2022-07-27 RX ORDER — SODIUM CHLORIDE 0.9 % (FLUSH) 0.9 %
10 SYRINGE (ML) INJECTION
Status: DISCONTINUED | OUTPATIENT
Start: 2022-07-27 | End: 2022-07-27 | Stop reason: HOSPADM

## 2022-07-27 RX ADMIN — Medication 10 ML: at 02:07

## 2022-07-27 RX ADMIN — HEPARIN 500 UNITS: 100 SYRINGE at 02:07

## 2022-07-27 NOTE — PLAN OF CARE
Patient arrived to unit for 5FU pump d/c and type and screen. Pt reports increased fatigue, SALAZAR, increased ascites. Plan of care reviewed, patient agreeable to plan.Type and screen drawn, 5FU completed, disconnected and port flushed and Heparin administered to dwell. BBID# [id number] noted. VSS. Discharge instructions reviewed, patient instructed to return 7/28 for 1u pRBCs. Patient ambulated off unit unassisted by self. Patient in NAD at time of discharge.

## 2022-07-28 ENCOUNTER — INFUSION (OUTPATIENT)
Dept: INFUSION THERAPY | Facility: HOSPITAL | Age: 42
End: 2022-07-28
Attending: INTERNAL MEDICINE
Payer: COMMERCIAL

## 2022-07-28 ENCOUNTER — HOSPITAL ENCOUNTER (OUTPATIENT)
Dept: INTERVENTIONAL RADIOLOGY/VASCULAR | Facility: HOSPITAL | Age: 42
Discharge: HOME | End: 2022-07-28
Attending: INTERNAL MEDICINE
Payer: COMMERCIAL

## 2022-07-28 VITALS
SYSTOLIC BLOOD PRESSURE: 124 MMHG | RESPIRATION RATE: 16 BRPM | OXYGEN SATURATION: 100 % | TEMPERATURE: 98 F | DIASTOLIC BLOOD PRESSURE: 88 MMHG | HEART RATE: 93 BPM

## 2022-07-28 VITALS — DIASTOLIC BLOOD PRESSURE: 80 MMHG | SYSTOLIC BLOOD PRESSURE: 124 MMHG

## 2022-07-28 DIAGNOSIS — D63.0 ANEMIA IN NEOPLASTIC DISEASE: ICD-10-CM

## 2022-07-28 DIAGNOSIS — R18.8 OTHER ASCITES: ICD-10-CM

## 2022-07-28 LAB
BLD PROD TYP BPU: NORMAL
BLOOD UNIT EXPIRATION DATE: NORMAL
BLOOD UNIT TYPE CODE: 5100
BLOOD UNIT TYPE: NORMAL
CODING SYSTEM: NORMAL
DISPENSE STATUS: NORMAL
NUM UNITS TRANS PACKED RBC: NORMAL

## 2022-07-28 PROCEDURE — P9040 RBC LEUKOREDUCED IRRADIATED: HCPCS | Performed by: PHYSICIAN ASSISTANT

## 2022-07-28 PROCEDURE — 25000003 PHARM REV CODE 250: Performed by: INTERNAL MEDICINE

## 2022-07-28 PROCEDURE — 36430 TRANSFUSION BLD/BLD COMPNT: CPT

## 2022-07-28 PROCEDURE — 49083 ABD PARACENTESIS W/IMAGING: CPT | Performed by: RADIOLOGY

## 2022-07-28 PROCEDURE — 49083 IR PARACENTESIS WITH IMAGING: ICD-10-PCS | Mod: ,,, | Performed by: RADIOLOGY

## 2022-07-28 PROCEDURE — C1729 CATH, DRAINAGE: HCPCS

## 2022-07-28 PROCEDURE — 25000003 PHARM REV CODE 250: Performed by: PHYSICIAN ASSISTANT

## 2022-07-28 RX ORDER — ACETAMINOPHEN 325 MG/1
650 TABLET ORAL
Status: COMPLETED | OUTPATIENT
Start: 2022-07-28 | End: 2022-07-28

## 2022-07-28 RX ORDER — DIPHENHYDRAMINE HCL 25 MG
25 CAPSULE ORAL
Status: COMPLETED | OUTPATIENT
Start: 2022-07-28 | End: 2022-07-28

## 2022-07-28 RX ORDER — HYDROCODONE BITARTRATE AND ACETAMINOPHEN 500; 5 MG/1; MG/1
TABLET ORAL ONCE
Status: COMPLETED | OUTPATIENT
Start: 2022-07-28 | End: 2022-07-28

## 2022-07-28 RX ADMIN — ACETAMINOPHEN 650 MG: 325 TABLET ORAL at 01:07

## 2022-07-28 RX ADMIN — SODIUM CHLORIDE: 9 INJECTION, SOLUTION INTRAVENOUS at 12:07

## 2022-07-28 RX ADMIN — DIPHENHYDRAMINE HYDROCHLORIDE 25 MG: 25 CAPSULE ORAL at 01:07

## 2022-07-28 NOTE — H&P
Radiology History & Physical      SUBJECTIVE:     Chief Complaint: Recurrent painful, tense ascites     History of Present Illness:  Puja Quigley is a 41 y.o. female with pertinent PMHx of gastric adenocarcinoma with osseous, hepatic and peritoneal metastases complicated by recurrent abdominal distension and pain 2/2 recurrent painful, tense ascites requiring frequent decompression for symptom relief.     A new outpatient IR consult received for US-guided percutaneous RUQ-approach therapeutic large-volume paracentesis.    Past Medical History:   Diagnosis Date    Anxiety     Dehydration 5/30/2022    Gastric cancer     Gastric ulcer     Hx of psychiatric care     Pregnancy 08/12/2020    delivered on 8/12/2020    Umbilical hernia      Past Surgical History:   Procedure Laterality Date    CLOSURE OF PERFORATED ULCER OF DUODENUM USING OMENTAL PATCH      ESOPHAGOGASTRODUODENOSCOPY N/A 10/13/2020    Procedure: EGD (ESOPHAGOGASTRODUODENOSCOPY);  Surgeon: Rosio Marion MD;  Location: [facility] ENDO (2ND FLR);  Service: Endoscopy;  Laterality: N/A;    ESOPHAGOGASTRODUODENOSCOPY N/A 12/11/2020    Procedure: EGD (ESOPHAGOGASTRODUODENOSCOPY);  Surgeon: Rosio Marion MD;  Location: [facility] ENDO (2ND FLR);  Service: Endoscopy;  Laterality: N/A;  Covid-19 test 12/8/20 at [facility]    ESOPHAGOGASTRODUODENOSCOPY N/A 3/12/2021    Procedure: EGD (ESOPHAGOGASTRODUODENOSCOPY);  Surgeon: Nav Omer MD;  Location: [facility] ENDO (2ND FLR);  Service: Endoscopy;  Laterality: N/A;  COVID at [facility] 3/9 ttr    ESOPHAGOGASTRODUODENOSCOPY N/A 5/18/2021    Procedure: EGD (ESOPHAGOGASTRODUODENOSCOPY);  Surgeon: Rosio Marion MD;  Location: [facility] ENDO (2ND FLR);  Service: Endoscopy;  Laterality: N/A;  5/15-covid pcw-inst portal-tb    ESOPHAGOGASTRODUODENOSCOPY N/A 5/26/2021    Procedure: EGD (ESOPHAGOGASTRODUODENOSCOPY);  Surgeon: Socrates Terrazas MD;  Location: [facility] ENDO (2ND FLR);  Service: Endoscopy;   Laterality: N/A;     Home Meds:   Prior to Admission medications    Medication Sig Start Date End Date Taking? Authorizing Provider   baclofen (LIORESAL) 5 mg Tab tablet Take 1 tablet (5 mg total) by mouth 3 (three) times daily. 7/15/22   Sharon Brink MD   dexAMETHasone (DECADRON) 4 MG Tab 8 mg daily on days 2 through 4 of each chemotherapy cycle. 6/13/22   Fer Ashraf MD   dicyclomine (BENTYL) 10 MG capsule TAKE ONE CAPSULE BY MOUTH FOUR TIMES DAILY 9/27/21   Kamille Hooks PA-C   famotidine (PEPCID) 20 MG tablet Take 1 tablet (20 mg total) by mouth nightly as needed for Heartburn. 2/23/22 2/23/23  Kamille Hooks PA-C   furosemide (LASIX) 20 MG tablet Take 1 tablet (20 mg total) by mouth once daily. 7/6/22   Fer Ashraf MD   gabapentin (NEURONTIN) 300 MG capsule Take 1 capsule (300 mg total) by mouth 3 (three) times daily. 2/7/22 2/7/23  Kamille Hooks PA-C   hydrocortisone 2.5 % cream Apply topically 2 (two) times daily. 7/11/22   Bethel Shetty MD   LIDOcaine HCL 2% (XYLOCAINE) 2 % jelly Apply topically as needed. 6/20/22   Bren Allen MD   LIDOcaine-prilocaine (EMLA) cream Apply to Port-A-Cath area 30 to 45 minutes prior to port access as directed (topical anesthetic use to the anterior chest only). 6/25/21   Historical Provider   methadone (DOLOPHINE) 5 mg/5 mL solution Take 2.5 mLs (2.5 mg total) by mouth every 12 (twelve) hours. 7/26/22   Elva Gutierrez DNP   metoclopramide HCl (REGLAN) 5 MG tablet 5 mg. 6/11/21   Historical Provider   multivitamin with folic acid 400 mcg Tab Take 1 tablet by mouth once daily.    Historical Provider   nortriptyline (PAMELOR) 25 MG capsule Take 1 capsule (25 mg total) by mouth every evening. 6/20/22 6/20/23  Bren Allen MD   omeprazole (PRILOSEC) 40 MG capsule Take 1 capsule (40 mg total) by mouth 2 (two) times daily before meals. 6/20/22 6/20/23  Bren Allen MD   ondansetron (ZOFRAN) 8 MG tablet Take 1 tablet (8 mg total) by mouth every 8  (eight) hours as needed for Nausea. 7/26/21   Fer Ashraf MD   oxyCODONE (ROXICODONE) 5 MG immediate release tablet Take 1 tablet (5 mg total) by mouth every 6 (six) hours as needed. 6/20/22   Bren Allen MD   polyethylene glycol (GLYCOLAX) 17 gram/dose powder Mix 1 capful (17 g) with liquid and take by mouth once daily for 20 days 6/21/22   Bren Allen MD   prochlorperazine (COMPAZINE) 10 MG tablet TAKE 1 TABLET(10 MG) BY MOUTH EVERY 6 HOURS AS NEEDED FOR NAUSEA 8/7/21   Fer Ashraf MD   promethazine (PHENERGAN) 25 MG tablet Take 1 tablet (25 mg total) by mouth every 6 (six) hours as needed for Nausea. 6/13/22   Fer Ashraf MD   senna-docusate 8.6-50 mg (SENNA WITH DOCUSATE SODIUM) 8.6-50 mg per tablet Take 1 tablet by mouth 2 (two) times daily as needed for Constipation. 5/26/21   Reshma Wilson MD   sucralfate (CARAFATE) 1 gram tablet TAKE 1 TABLET(1 GRAM) BY MOUTH FOUR TIMES DAILY FOR 14 DAYS 10/20/21   Rosio Marion MD   sucralfate (CARAFATE) 1 gram tablet  4/8/21   Historical Provider   traMADoL (ULTRAM) 50 mg tablet Take 50 mg by mouth. 6/25/21   Historical Provider   amitriptyline (ELAVIL) 10 MG tablet Take 1 tablet (10 mg total) by mouth every evening. 5/13/21 6/20/22  Socrates Terrazas MD   pantoprazole (PROTONIX) 40 MG tablet Take 1 tablet (40 mg total) by mouth 2 (two) times daily. 5/25/22 6/20/22  Kamille Hooks PA-C     Anticoagulants/Antiplatelets: no anticoagulation     Allergies: Review of patient's allergies indicates:  No Known Allergies      Sedation History:  no adverse reactions     Review of Systems:   Hematological: no known coagulopathies  Respiratory: no cough, shortness of breath, or wheezing  Cardiovascular: no chest pain or dyspnea on exertion  Gastrointestinal: positive for - abdominal pain and distension  Genito-Urinary: no dysuria, trouble voiding, or hematuria  Musculoskeletal: negative  Neurological: no TIA or stroke symptoms      OBJECTIVE:     Vital  Signs (Most Recent)       Physical Exam:  General: no acute distress  Mental Status: alert and oriented to person, place and time  HEENT: normocephalic, atraumatic  Chest: unlabored breathing  Heart: regular heart rate  Abdomen: +distended with +fluid wave. No TTP/r/g.  Extremity: moves all extremities    Laboratory  Lab Results   Component Value Date    INR 1.0 06/10/2021       Lab Results   Component Value Date    WBC 7.46 07/25/2022    HGB 7.6 (L) 07/25/2022    HCT 25.6 (L) 07/25/2022    MCV 85 07/25/2022     07/25/2022      Lab Results   Component Value Date    GLU 90 07/25/2022     07/25/2022    K 4.4 07/25/2022     07/25/2022    CO2 27 07/25/2022    BUN 10 07/25/2022    CREATININE 0.6 07/25/2022    CALCIUM 8.1 (L) 07/25/2022    MG 1.9 06/20/2022    ALT 7 (L) 07/25/2022    AST 13 07/25/2022    ALBUMIN 1.5 (L) 07/25/2022    BILITOT 0.2 07/25/2022     ASSESSMENT/PLAN:     41 y.o. female with pertinent PMHx of gastric adenocarcinoma with osseous, hepatic and peritoneal metastases complicated by recurrent abdominal distension and pain 2/2 recurrent painful, tense ascites requiring frequent decompression for symptom relief.     1. Recurrent painful, tense ascites - Will attempt US-guided percutaneous RUQ-approach therapeutic LVP with local anesthetic only and albumin infusion post PRN as indicated per institutional protocol.     Risks (including, but not limited to, pain, bleeding, infection, damage to nearby structures, failure to obtain sufficient material for a diagnosis, the need for additional procedures, and death), benefits, and alternatives were discussed with the patient. All questions were answered to the best of my abilities. The patient wishes to proceed with the procedure. Written informed consent was obtained.     Thank you for considering IR for the care of your patient.      Castro Bermudez MD  Interventional Radiology

## 2022-07-28 NOTE — DISCHARGE SUMMARY
Radiology Discharge Summary      Hospital Course: No complications    Admit Date: 7/28/2022  Discharge Date: 07/28/2022     Instructions Given to Patient: Yes  Diet: Resume prior diet  Activity: activity as tolerated    Description of Condition on Discharge: Stable  Vital Signs (Most Recent): BP: 124/80 (07/28/22 1628)    Discharge Disposition: Home    Discharge Diagnosis:  41 y.o. female with recurrent painful, tense ascites s/p successful US-guided percutaneous RUQ-approach therapeutic LVP with local anesthetic only and albumin infusion post PRN as indicated per institutional protocol. Patient tolerated the procedure well. No immediate post-procedural complications noted.      Thank you for considering IR for the care of your patient.      Castro Bermudez MD  Interventional Radiology

## 2022-07-28 NOTE — BRIEF OP NOTE
Radiology Post-Procedure Note     Pre Op Diagnosis: Recurrent painful, tense ascites  Post Op Diagnosis: Same     Procedure: 1. US-guided percutaneous RUQ-approach therapeutic LVP     Procedure performed by: Castro Bermudez MD     Written Informed Consent Obtained: Yes  Specimen Removed: YES, 2,050-cc of thin, serosanguinous ascitic fluid  Estimated Blood Loss: none     Findings:   Successful US-guided percutaneous RUQ-approach therapeutic LVP with local anesthetic only and albumin infusion post PRN as indicated per institutional protocol. Patient tolerated the procedure well. No immediate post-procedural complications noted.      Thank you for considering IR for the care of your patient.      Castro Bermudez MD  Interventional Radiology

## 2022-07-28 NOTE — PLAN OF CARE
Patient arrived to unit for 1u pRBC infusion. Pt c/o fatigue/weakness, decreased appetite due to gas/bloating, ascites pushing on diaphragm. Pt to have paracentesis following completion of this visit. Reviewed signs and symptoms of transfusion reaction, pt verbalized understanding. Reviewed and blood consent signed at 1230. Plan of care reviewed, patient agreeable to plan. Tylenol and Benadryl administered prior to pRBCs. Patient tolerated transfusion well. VSS. Discharge instructions reviewed, patient instructed to present to IR for Paracentesis procedure today. Patient ambulated off unit unassisted by self.Pt declined w/c. Patient in NAD at time of discharge.

## 2022-07-29 ENCOUNTER — CLINICAL SUPPORT (OUTPATIENT)
Dept: HEMATOLOGY/ONCOLOGY | Facility: CLINIC | Age: 42
End: 2022-07-29
Payer: COMMERCIAL

## 2022-07-29 DIAGNOSIS — G89.29 CHRONIC BACK PAIN, UNSPECIFIED BACK LOCATION, UNSPECIFIED BACK PAIN LATERALITY: Primary | ICD-10-CM

## 2022-07-29 DIAGNOSIS — M54.9 CHRONIC BACK PAIN, UNSPECIFIED BACK LOCATION, UNSPECIFIED BACK PAIN LATERALITY: Primary | ICD-10-CM

## 2022-07-29 DIAGNOSIS — G62.0 NEUROPATHY DUE TO CHEMOTHERAPEUTIC DRUG: ICD-10-CM

## 2022-07-29 DIAGNOSIS — T45.1X5A NEUROPATHY DUE TO CHEMOTHERAPEUTIC DRUG: ICD-10-CM

## 2022-07-29 PROCEDURE — 99999 PR PBB SHADOW E&M-EST. PATIENT-LVL I: CPT | Mod: PBBFAC,,, | Performed by: ACUPUNCTURIST

## 2022-07-29 PROCEDURE — 97814 PR ACUPUNCT W/ ELEC STIMUL ADDL 15M: ICD-10-PCS | Mod: S$GLB,,, | Performed by: ACUPUNCTURIST

## 2022-07-29 PROCEDURE — 97814 ACUP 1/> W/ESTIM EA ADDL 15: CPT | Mod: S$GLB,,, | Performed by: ACUPUNCTURIST

## 2022-07-29 PROCEDURE — 97813 ACUP 1/> W/ESTIM 1ST 15 MIN: CPT | Mod: S$GLB,,, | Performed by: ACUPUNCTURIST

## 2022-07-29 PROCEDURE — 99999 PR PBB SHADOW E&M-EST. PATIENT-LVL I: ICD-10-PCS | Mod: PBBFAC,,, | Performed by: ACUPUNCTURIST

## 2022-07-29 PROCEDURE — 97813 PR ACUPUNCT W/ ELEC STIMUL 15 MIN: ICD-10-PCS | Mod: S$GLB,,, | Performed by: ACUPUNCTURIST

## 2022-07-29 NOTE — PROGRESS NOTES
Subjective:       Patient ID: Puja Quigley is a 41 y.o. female.    Chief Complaint: Pain (CIPN)    Patient continuing care for CIPN. No significant changes, evaluate after 3 more consecutive txs. Swelling in legs slightly reduced today.     Review of Systems   Musculoskeletal: Positive for leg pain.   Neurological: Positive for numbness.         Objective:      Physical Exam    Assessment:       Problem List Items Addressed This Visit        Neuro    Neuropathy due to chemotherapeutic drug      Other Visit Diagnoses     Chronic back pain, unspecified back location, unspecified back pain laterality    -  Primary          Plan:       1x a week for 3 more weeks then eval*         Pre-Symptom Score: NA  Post-Symptom Score: NA     Pain (CIPN)       Encounter Diagnoses   Name Primary?    Chronic back pain, unspecified back location, unspecified back pain laterality Yes    Neuropathy due to chemotherapeutic drug        Acupuncture points used:  4 TARIK, [facility], BA MARCELL , Gb34, Li11, Sp6, Sp9, St36 and YIN RICKETTS    STIM USED @ [facility]      NEEDLES IN: 26  NEEDLES OUT: 26    NEEDLES W/ STIM  AT: 10:00 AM  NEEDLES W/ STIM REMOVED AT: 10:30 AM

## 2022-08-02 ENCOUNTER — HOSPITAL ENCOUNTER (OUTPATIENT)
Dept: INTERVENTIONAL RADIOLOGY/VASCULAR | Facility: HOSPITAL | Age: 42
Discharge: HOME | End: 2022-08-02
Attending: INTERNAL MEDICINE
Payer: COMMERCIAL

## 2022-08-02 VITALS — DIASTOLIC BLOOD PRESSURE: 95 MMHG | SYSTOLIC BLOOD PRESSURE: 138 MMHG

## 2022-08-02 DIAGNOSIS — R18.8 OTHER ASCITES: ICD-10-CM

## 2022-08-02 PROCEDURE — 49083 IR PARACENTESIS WITH IMAGING: ICD-10-PCS | Mod: ,,, | Performed by: RADIOLOGY

## 2022-08-02 PROCEDURE — C1729 CATH, DRAINAGE: HCPCS

## 2022-08-02 PROCEDURE — 49083 ABD PARACENTESIS W/IMAGING: CPT | Performed by: RADIOLOGY

## 2022-08-02 NOTE — PROCEDURES
[facility] Interventional Radiology  Interventional Radiology  High Risk Procedure - Outpatient    Date: 08/02/2022 Time: 2:07 PM    Pre-Op Diagnosis: Recurrent ascites    Post-Op Diagnosis: same    Procedure Performed by: Watson sainz MD    Assistant: none    Procedure: U/S guided paracentesis    Specimen/Tissue Removed: 4700 mL of clear light pink/orange fluid    Estimated Blood Loss: Less than 5 mL    Procedure Note/Findings: U/S guided paracentesis via right lateral abdominal approach with 5 Persian centesis needle. No immediate post-procedure complications noted.            Please refer to dictated report for additional details.

## 2022-08-02 NOTE — DISCHARGE SUMMARY
Radiology Discharge Summary      Admit date: 8/2/2022  1:00 PM  Discharge date: August 2, 2022    Instructions Given to patient: YesVerbal    Diet: Regular    Activity:NO Restrictions    Medications on discharge (List): Refer to Discharge Medication List    Hospital Course: U/S guided paracentesis    Description of Condition on Discharge: stable    Discharge Disposition: Home    Discharge Diagnosis: Recurrent ascites    Discharge Procedure Orders   Diet general     Call MD for:  redness, tenderness, or signs of infection (pain, swelling, redness, odor or green/yellow discharge around incision site)     Call MD for:  temperature >100.4     Call MD for:  severe uncontrolled pain     Remove dressing in 24 hours     Activity as tolerated

## 2022-08-02 NOTE — H&P
[facility] - [place], [place] - Interventional Radiology  History & Physical - Short Stay  Interventional Radiology    SUBJECTIVE:     Chief Complaint/Reason for Admission: Recurrent ascites    Informant(s):  self and Electronic Health Record    History of Present Illness:  Puja Quigley is a 41 y.o. female with a history of ascites.    Patient presents for paracentesis.    Scheduled Meds:   Continuous Infusions:   PRN Meds:     Review of patient's allergies indicates:  No Known Allergies    Past Medical History:   Diagnosis Date    Anxiety     Dehydration 5/30/2022    Gastric cancer     Gastric ulcer     Hx of psychiatric care     Pregnancy 08/12/2020    delivered on 8/12/2020    Umbilical hernia      Past Surgical History:   Procedure Laterality Date    CLOSURE OF PERFORATED ULCER OF DUODENUM USING OMENTAL PATCH      ESOPHAGOGASTRODUODENOSCOPY N/A 10/13/2020    Procedure: EGD (ESOPHAGOGASTRODUODENOSCOPY);  Surgeon: Rosio Marion MD;  Location: [facility] (2ND FLR);  Service: Endoscopy;  Laterality: N/A;    ESOPHAGOGASTRODUODENOSCOPY N/A 12/11/2020    Procedure: EGD (ESOPHAGOGASTRODUODENOSCOPY);  Surgeon: Rosio Marion MD;  Location: [facility] (2ND FLR);  Service: Endoscopy;  Laterality: N/A;  Covid-19 test 12/8/20 at [facility]    ESOPHAGOGASTRODUODENOSCOPY N/A 3/12/2021    Procedure: EGD (ESOPHAGOGASTRODUODENOSCOPY);  Surgeon: Nav Omer MD;  Location: [facility] ENDO (2ND FLR);  Service: Endoscopy;  Laterality: N/A;  COVID at [facility] [place] 3/9 ttr    ESOPHAGOGASTRODUODENOSCOPY N/A 5/18/2021    Procedure: EGD (ESOPHAGOGASTRODUODENOSCOPY);  Surgeon: Rosio Marion MD;  Location: [facility] ENDO (2ND FLR);  Service: Endoscopy;  Laterality: N/A;  5/15-covid pcw-inst portal-tb    ESOPHAGOGASTRODUODENOSCOPY N/A 5/26/2021    Procedure: EGD (ESOPHAGOGASTRODUODENOSCOPY);  Surgeon: Socrates Terrazas MD;  Location: [facility] ENDO (2ND FLR);  Service: Endoscopy;  Laterality: N/A;     Family History   Problem Relation Age of  "Onset    Cancer Mother 67        lymphoma (type? "not active," not on tx; "related to her blood")    Schizophrenia Mother     Lung cancer Father 48        type? h/o smoking    No Known Problems Son     Breast cancer Other 73        unilat; stage I, but aggressive    Prostate cancer Paternal Uncle         (dx 50s/60? no chemo?)    Breast cancer Paternal Cousin         (dx age?) ductal    Colon cancer Neg Hx     Esophageal cancer Neg Hx      Social History     Tobacco Use    Smoking status: Former Smoker     Types: Cigarettes     Quit date: 2019     Years since quittin.7    Smokeless tobacco: Never Used   Substance Use Topics    Alcohol use: Yes    Drug use: Not Currently        Review of Systems:  ROS not obtained    OBJECTIVE:     Vital Signs (Most Recent):       Physical Exam:  alert and oriented    Laboratory  CBC:   Lab Results   Component Value Date/Time    WBC 7.46 2022 09:49 AM    RBC 3.02 (L) 2022 09:49 AM    HGB 7.6 (L) 2022 09:49 AM    HCT 25.6 (L) 2022 09:49 AM     2022 09:49 AM    MCV 85 2022 09:49 AM    MCH 25.2 (L) 2022 09:49 AM    MCHC 29.7 (L) 2022 09:49 AM     Coagulation:   Lab Results   Component Value Date/Time    INR 1.0 06/10/2021 10:37 AM    APTT 24.0 06/10/2021 10:37 AM         ASSESSMENT/PLAN:     Ascites.    Patient will undergo paracentesis.    Sedation Plan: Lidocaine local anesthesia  "

## 2022-08-05 ENCOUNTER — PATIENT MESSAGE (OUTPATIENT)
Dept: HEMATOLOGY/ONCOLOGY | Facility: CLINIC | Age: 42
End: 2022-08-05
Payer: COMMERCIAL

## 2022-08-08 ENCOUNTER — OFFICE VISIT (OUTPATIENT)
Dept: PSYCHIATRY | Facility: CLINIC | Age: 42
End: 2022-08-08
Payer: COMMERCIAL

## 2022-08-08 ENCOUNTER — LAB VISIT (OUTPATIENT)
Dept: LAB | Facility: HOSPITAL | Age: 42
End: 2022-08-08
Attending: INTERNAL MEDICINE
Payer: COMMERCIAL

## 2022-08-08 ENCOUNTER — OFFICE VISIT (OUTPATIENT)
Dept: HEMATOLOGY/ONCOLOGY | Facility: CLINIC | Age: 42
End: 2022-08-08
Payer: COMMERCIAL

## 2022-08-08 VITALS
OXYGEN SATURATION: 99 % | DIASTOLIC BLOOD PRESSURE: 99 MMHG | BODY MASS INDEX: 24.13 KG/M2 | SYSTOLIC BLOOD PRESSURE: 124 MMHG | HEART RATE: 110 BPM | RESPIRATION RATE: 18 BRPM | HEIGHT: 67 IN | WEIGHT: 153.75 LBS | TEMPERATURE: 99 F

## 2022-08-08 DIAGNOSIS — C79.60: ICD-10-CM

## 2022-08-08 DIAGNOSIS — F41.1 ANXIETY ASSOCIATED WITH CANCER DIAGNOSIS: Primary | ICD-10-CM

## 2022-08-08 DIAGNOSIS — D63.0 ANEMIA IN NEOPLASTIC DISEASE: ICD-10-CM

## 2022-08-08 DIAGNOSIS — R18.8 OTHER ASCITES: Primary | ICD-10-CM

## 2022-08-08 DIAGNOSIS — Z79.899 IMMUNODEFICIENCY DUE TO CHEMOTHERAPY: ICD-10-CM

## 2022-08-08 DIAGNOSIS — C80.1 ANXIETY ASSOCIATED WITH CANCER DIAGNOSIS: ICD-10-CM

## 2022-08-08 DIAGNOSIS — G62.0 NEUROPATHY DUE TO CHEMOTHERAPEUTIC DRUG: ICD-10-CM

## 2022-08-08 DIAGNOSIS — R18.8 OTHER ASCITES: ICD-10-CM

## 2022-08-08 DIAGNOSIS — M54.9 CHRONIC BACK PAIN, UNSPECIFIED BACK LOCATION, UNSPECIFIED BACK PAIN LATERALITY: ICD-10-CM

## 2022-08-08 DIAGNOSIS — C80.1 SIGNET RING CELL ADENOCARCINOMA: ICD-10-CM

## 2022-08-08 DIAGNOSIS — R11.2 NON-INTRACTABLE VOMITING WITH NAUSEA, UNSPECIFIED VOMITING TYPE: ICD-10-CM

## 2022-08-08 DIAGNOSIS — E43 SEVERE MALNUTRITION: ICD-10-CM

## 2022-08-08 DIAGNOSIS — C78.6 PERITONEAL CARCINOMATOSIS: ICD-10-CM

## 2022-08-08 DIAGNOSIS — T45.1X5A IMMUNODEFICIENCY DUE TO CHEMOTHERAPY: ICD-10-CM

## 2022-08-08 DIAGNOSIS — C79.51 BONE METASTASES: ICD-10-CM

## 2022-08-08 DIAGNOSIS — G89.29 CHRONIC BACK PAIN, UNSPECIFIED BACK LOCATION, UNSPECIFIED BACK PAIN LATERALITY: ICD-10-CM

## 2022-08-08 DIAGNOSIS — F41.1 ANXIETY ASSOCIATED WITH CANCER DIAGNOSIS: ICD-10-CM

## 2022-08-08 DIAGNOSIS — K25.5 CHRONIC GASTRIC ULCER WITH PERFORATION: ICD-10-CM

## 2022-08-08 DIAGNOSIS — D84.821 IMMUNODEFICIENCY DUE TO CHEMOTHERAPY: ICD-10-CM

## 2022-08-08 DIAGNOSIS — C80.1 ANXIETY ASSOCIATED WITH CANCER DIAGNOSIS: Primary | ICD-10-CM

## 2022-08-08 DIAGNOSIS — C16.9 MALIGNANT NEOPLASM OF STOMACH, UNSPECIFIED LOCATION: Primary | ICD-10-CM

## 2022-08-08 DIAGNOSIS — R60.0 EDEMA OF BOTH LOWER EXTREMITIES: ICD-10-CM

## 2022-08-08 DIAGNOSIS — T45.1X5A NEUROPATHY DUE TO CHEMOTHERAPEUTIC DRUG: ICD-10-CM

## 2022-08-08 DIAGNOSIS — B37.31 VAGINAL CANDIDOSIS: ICD-10-CM

## 2022-08-08 DIAGNOSIS — C16.9 MALIGNANT NEOPLASM OF STOMACH, UNSPECIFIED LOCATION: ICD-10-CM

## 2022-08-08 LAB
ALBUMIN SERPL BCP-MCNC: 1.5 G/DL (ref 3.5–5.2)
ALP SERPL-CCNC: 76 U/L (ref 55–135)
ALT SERPL W/O P-5'-P-CCNC: 7 U/L (ref 10–44)
ANION GAP SERPL CALC-SCNC: 8 MMOL/L (ref 8–16)
AST SERPL-CCNC: 15 U/L (ref 10–40)
BASOPHILS # BLD AUTO: 0.05 K/UL (ref 0–0.2)
BASOPHILS NFR BLD: 0.4 % (ref 0–1.9)
BILIRUB SERPL-MCNC: 0.4 MG/DL (ref 0.1–1)
BUN SERPL-MCNC: 11 MG/DL (ref 6–20)
CALCIUM SERPL-MCNC: 8.8 MG/DL (ref 8.7–10.5)
CHLORIDE SERPL-SCNC: 104 MMOL/L (ref 95–110)
CO2 SERPL-SCNC: 24 MMOL/L (ref 23–29)
CREAT SERPL-MCNC: 0.7 MG/DL (ref 0.5–1.4)
DIFFERENTIAL METHOD: ABNORMAL
EOSINOPHIL # BLD AUTO: 0 K/UL (ref 0–0.5)
EOSINOPHIL NFR BLD: 0.3 % (ref 0–8)
ERYTHROCYTE [DISTWIDTH] IN BLOOD BY AUTOMATED COUNT: 17.2 % (ref 11.5–14.5)
EST. GFR  (NO RACE VARIABLE): >60 ML/MIN/1.73 M^2
GLUCOSE SERPL-MCNC: 101 MG/DL (ref 70–110)
HCT VFR BLD AUTO: 36.9 % (ref 37–48.5)
HGB BLD-MCNC: 10.9 G/DL (ref 12–16)
IMM GRANULOCYTES # BLD AUTO: 0.09 K/UL (ref 0–0.04)
IMM GRANULOCYTES NFR BLD AUTO: 0.8 % (ref 0–0.5)
LYMPHOCYTES # BLD AUTO: 1.4 K/UL (ref 1–4.8)
LYMPHOCYTES NFR BLD: 11.7 % (ref 18–48)
MCH RBC QN AUTO: 24.9 PG (ref 27–31)
MCHC RBC AUTO-ENTMCNC: 29.5 G/DL (ref 32–36)
MCV RBC AUTO: 84 FL (ref 82–98)
MONOCYTES # BLD AUTO: 0.6 K/UL (ref 0.3–1)
MONOCYTES NFR BLD: 5.4 % (ref 4–15)
NEUTROPHILS # BLD AUTO: 9.5 K/UL (ref 1.8–7.7)
NEUTROPHILS NFR BLD: 81.4 % (ref 38–73)
NRBC BLD-RTO: 0 /100 WBC
PLATELET # BLD AUTO: 425 K/UL (ref 150–450)
PMV BLD AUTO: 8.5 FL (ref 9.2–12.9)
POTASSIUM SERPL-SCNC: 4.2 MMOL/L (ref 3.5–5.1)
PROT SERPL-MCNC: 5.4 G/DL (ref 6–8.4)
RBC # BLD AUTO: 4.38 M/UL (ref 4–5.4)
SODIUM SERPL-SCNC: 136 MMOL/L (ref 136–145)
WBC # BLD AUTO: 11.69 K/UL (ref 3.9–12.7)

## 2022-08-08 PROCEDURE — 99999 PR PBB SHADOW E&M-EST. PATIENT-LVL V: CPT | Mod: PBBFAC,,, | Performed by: INTERNAL MEDICINE

## 2022-08-08 PROCEDURE — 3080F PR MOST RECENT DIASTOLIC BLOOD PRESSURE >= 90 MM HG: ICD-10-PCS | Mod: CPTII,S$GLB,, | Performed by: INTERNAL MEDICINE

## 2022-08-08 PROCEDURE — 3008F PR BODY MASS INDEX (BMI) DOCUMENTED: ICD-10-PCS | Mod: CPTII,S$GLB,, | Performed by: INTERNAL MEDICINE

## 2022-08-08 PROCEDURE — 3074F SYST BP LT 130 MM HG: CPT | Mod: CPTII,S$GLB,, | Performed by: INTERNAL MEDICINE

## 2022-08-08 PROCEDURE — 3080F DIAST BP >= 90 MM HG: CPT | Mod: CPTII,S$GLB,, | Performed by: INTERNAL MEDICINE

## 2022-08-08 PROCEDURE — 99999 PR PBB SHADOW E&M-EST. PATIENT-LVL II: CPT | Mod: PBBFAC,,, | Performed by: PSYCHOLOGIST

## 2022-08-08 PROCEDURE — 80053 COMPREHEN METABOLIC PANEL: CPT | Performed by: INTERNAL MEDICINE

## 2022-08-08 PROCEDURE — 3008F BODY MASS INDEX DOCD: CPT | Mod: CPTII,S$GLB,, | Performed by: INTERNAL MEDICINE

## 2022-08-08 PROCEDURE — 99214 PR OFFICE/OUTPT VISIT, EST, LEVL IV, 30-39 MIN: ICD-10-PCS | Mod: S$GLB,,, | Performed by: INTERNAL MEDICINE

## 2022-08-08 PROCEDURE — 1159F MED LIST DOCD IN RCRD: CPT | Mod: CPTII,S$GLB,, | Performed by: PSYCHOLOGIST

## 2022-08-08 PROCEDURE — 99999 PR PBB SHADOW E&M-EST. PATIENT-LVL V: ICD-10-PCS | Mod: PBBFAC,,, | Performed by: INTERNAL MEDICINE

## 2022-08-08 PROCEDURE — 99214 OFFICE O/P EST MOD 30 MIN: CPT | Mod: S$GLB,,, | Performed by: INTERNAL MEDICINE

## 2022-08-08 PROCEDURE — 99999 PR PBB SHADOW E&M-EST. PATIENT-LVL II: ICD-10-PCS | Mod: PBBFAC,,, | Performed by: PSYCHOLOGIST

## 2022-08-08 PROCEDURE — 90832 PR PSYCHOTHERAPY W/PATIENT, 30 MIN: ICD-10-PCS | Mod: S$GLB,,, | Performed by: PSYCHOLOGIST

## 2022-08-08 PROCEDURE — 90832 PSYTX W PT 30 MINUTES: CPT | Mod: S$GLB,,, | Performed by: PSYCHOLOGIST

## 2022-08-08 PROCEDURE — 36415 COLL VENOUS BLD VENIPUNCTURE: CPT | Performed by: INTERNAL MEDICINE

## 2022-08-08 PROCEDURE — 85025 COMPLETE CBC W/AUTO DIFF WBC: CPT | Performed by: INTERNAL MEDICINE

## 2022-08-08 PROCEDURE — 3074F PR MOST RECENT SYSTOLIC BLOOD PRESSURE < 130 MM HG: ICD-10-PCS | Mod: CPTII,S$GLB,, | Performed by: INTERNAL MEDICINE

## 2022-08-08 PROCEDURE — 1159F PR MEDICATION LIST DOCUMENTED IN MEDICAL RECORD: ICD-10-PCS | Mod: CPTII,S$GLB,, | Performed by: PSYCHOLOGIST

## 2022-08-08 RX ORDER — DEXAMETHASONE 4 MG/1
TABLET ORAL
Qty: 32 TABLET | Refills: 2 | Status: ON HOLD | OUTPATIENT
Start: 2022-08-08 | End: 2022-10-14 | Stop reason: HOSPADM

## 2022-08-08 RX ORDER — SODIUM CHLORIDE 0.9 % (FLUSH) 0.9 %
10 SYRINGE (ML) INJECTION
Status: CANCELLED | OUTPATIENT
Start: 2022-08-16

## 2022-08-08 RX ORDER — FLUCONAZOLE 150 MG/1
150 TABLET ORAL DAILY
Qty: 1 TABLET | Refills: 0 | Status: SHIPPED | OUTPATIENT
Start: 2022-08-08 | End: 2022-08-09

## 2022-08-08 RX ORDER — HEPARIN 100 UNIT/ML
500 SYRINGE INTRAVENOUS
Status: CANCELLED | OUTPATIENT
Start: 2022-08-16

## 2022-08-08 NOTE — PROGRESS NOTES
MEDICAL ONCOLOGY - ESTABLISHED PATIENT    Reason for visit: Gastric cancer     Best Contact Phone Number(s): [phone] (home)      Cancer/Stage/TNM:   Cancer Staging  No matching staging information was found for the patient.     Oncology History   Malignant neoplasm of stomach   6/10/2021 Initial Diagnosis    Gastric adenocarcinoma     7/26/2021 - 4/6/2022 Chemotherapy    Treatment Summary   Plan Name: OP FOLFOX 6 Q2W  Treatment Goal: Palliative  Status: Inactive  Start Date: 7/26/2021  End Date: 4/6/2022  Provider: Fer Ashraf MD  Chemotherapy: fluorouraciL 2,400 mg/m2 = 3,770 mg in sodium chloride 0.9% 100 mL chemo infusion, 2,400 mg/m2 = 3,770 mg, Intravenous, Over 46 hours, 19 of 20 cycles  Administration: 3,770 mg (7/26/2021), 3,770 mg (8/9/2021), 3,770 mg (8/23/2021), 3,770 mg (9/7/2021), 3,770 mg (9/21/2021), 3,770 mg (10/5/2021), 3,770 mg (10/19/2021), 3,770 mg (11/2/2021), 3,770 mg (11/15/2021), 3,770 mg (11/30/2021), 3,770 mg (12/13/2021), 4,000 mg (12/27/2021), 4,030 mg (1/10/2022), 4,030 mg (1/24/2022), 4,000 mg (2/7/2022), 4,000 mg (2/22/2022), 4,000 mg (3/7/2022), 4,000 mg (3/22/2022), 4,000 mg (4/4/2022)  oxaliplatin (ELOXATIN) 85 mg/m2 = 133 mg in dextrose 5 % 500 mL chemo infusion, 85 mg/m2 = 133 mg, Intravenous, Clinic/[facility] 1 time, 9 of 9 cycles  Administration: 133 mg (7/26/2021), 133 mg (8/9/2021), 133 mg (8/23/2021), 133 mg (9/7/2021), 133 mg (9/21/2021), 133 mg (10/5/2021), 133 mg (10/19/2021), 133 mg (11/2/2021), 133 mg (11/15/2021)     5/26/2022 - 7/27/2022 Chemotherapy    Treatment Summary   Plan Name: OP FOLFIRI Q2WK  Treatment Goal: Palliative  Status: Inactive  Start Date: 5/26/2022  End Date: 7/27/2022  Provider: Fer Ashraf MD  Chemotherapy: dexAMETHasone (DECADRON) 4 MG Tab, 8 mg, Oral, Daily, 1 of 1 cycle, Start date: --, End date: --  irinotecan (CAMPTOSAR) 120 mg/m2 = 200 mg in sodium chloride 0.9% 500 mL chemo infusion, 120 mg/m2 = 200 mg (100 % of original  dose 120 mg/m2), Intravenous, Clinic/[facility] 1 time, 5 of 24 cycles  Dose modification: 125 mg/m2 (original dose 120 mg/m2, Cycle 1), 120 mg/m2 (original dose 120 mg/m2, Cycle 1), 75 mg/m2 (original dose 120 mg/m2, Cycle 2, Reason: Dose not tolerated)  Administration: 200 mg (5/26/2022), 126 mg (6/13/2022), 126 mg (6/27/2022), 134 mg (7/12/2022), 134 mg (7/25/2022)  ramucirumab (CYRAMZA) 471 mg in sodium chloride 0.9% 250 mL chemo infusion, 8 mg/kg = 471 mg, Intravenous, Clinic/[facility] 1 time, 1 of 1 cycle  Administration: 471 mg (5/26/2022)     8/12/2022 -  Chemotherapy    Treatment Summary   Plan Name: OP DOCETAXEL (75 MG/M2) Q3W  Treatment Goal: Palliative  Status: Active  Start Date: 8/12/2022 (Planned)  End Date: 11/4/2022 (Planned)  Provider: Fer Ashraf MD  Chemotherapy: DOCEtaxeL (TAXOTERE) 35 mg/m2 = 65 mg in sodium chloride 0.9% 250 mL chemo infusion, 35 mg/m2 = 65 mg (46.7 % of original dose 75 mg/m2), Intravenous, Clinic/[facility] 1 time, 0 of 7 cycles  Dose modification: 35 mg/m2 (original dose 75 mg/m2, Cycle 1, Reason: MD Discretion, Comment: per [facility] recommendations)     Anxiety associated with cancer diagnosis   12/27/2021 Initial Diagnosis    Anxiety associated with cancer diagnosis          Interim History:   Puja is a very pleasant 41 y.o. female with metastatic gastric cancer who presents for follow-up after completing 5 cycles of FOLFIRI.  She was seen last week at [facility] by Dr. Reid who repeated imaging with findings of progressive peritoneal disease and ovarian mets.  He recommended switching to biweekly docetaxel.  She has been more regularly wearing compression stockings which has shifted her fluid moreso into her abdomen.  In the last day has worsened bilateral groin pain that is positional.  No other major new complaints. Continues with acupuncture.  Has some vaginal itching without discharge or pain.    ECOG status is 1.     Review of Systems   Constitutional: Positive for  appetite change. Negative for activity change, chills, fatigue, fever and unexpected weight change.   HENT: Negative for ear pain, facial swelling, hearing loss, mouth sores, nosebleeds, sore throat and trouble swallowing.    Eyes: Negative for pain, discharge, redness and visual disturbance.   Respiratory: Negative for cough, chest tightness and shortness of breath.    Cardiovascular: Positive for leg swelling. Negative for chest pain and palpitations.   Gastrointestinal: Positive for abdominal distention, nausea and reflux. Negative for abdominal pain, blood in stool, constipation, diarrhea and vomiting.   Endocrine: Negative for cold intolerance and heat intolerance.   Genitourinary: Positive for vaginal dryness. Negative for decreased urine volume, difficulty urinating, dysuria, frequency, hematuria, hot flashes and urgency.   Musculoskeletal: Negative for arthralgias, gait problem, leg pain and myalgias.   Integumentary:  Negative for pallor, rash and wound.   Allergic/Immunologic: Negative for immunocompromised state.   Neurological: Positive for numbness (slowly improving). Negative for dizziness, tremors, weakness, light-headedness and headaches.   Hematological: Negative for adenopathy. Does not bruise/bleed easily.   Psychiatric/Behavioral: Negative for agitation, confusion, dysphoric mood and sleep disturbance. The patient is not nervous/anxious.            Past Medical History:   Past Medical History:   Diagnosis Date    Anxiety     Dehydration 5/30/2022    Gastric cancer     Gastric ulcer     Hx of psychiatric care     Pregnancy 08/12/2020    delivered on 8/12/2020    Umbilical hernia         Past Surgical History:   Past Surgical History:   Procedure Laterality Date    CLOSURE OF PERFORATED ULCER OF DUODENUM USING OMENTAL PATCH      ESOPHAGOGASTRODUODENOSCOPY N/A 10/13/2020    Procedure: EGD (ESOPHAGOGASTRODUODENOSCOPY);  Surgeon: Rosio Marion MD;  Location: [facility] ([address] [place] [address]);   "Service: Endoscopy;  Laterality: N/A;    ESOPHAGOGASTRODUODENOSCOPY N/A 2020    Procedure: EGD (ESOPHAGOGASTRODUODENOSCOPY);  Surgeon: Rosio Marion MD;  Location: [facility] ENDO (2ND FLR);  Service: Endoscopy;  Laterality: N/A;  Covid-19 test 20 at [facility] -     ESOPHAGOGASTRODUODENOSCOPY N/A 3/12/2021    Procedure: EGD (ESOPHAGOGASTRODUODENOSCOPY);  Surgeon: Nav Omer MD;  Location: [facility] ENDO (2ND FLR);  Service: Endoscopy;  Laterality: N/A;  COVID at [facility] 3/9 ttr    ESOPHAGOGASTRODUODENOSCOPY N/A 2021    Procedure: EGD (ESOPHAGOGASTRODUODENOSCOPY);  Surgeon: Rosio Marion MD;  Location: [facility] ENDO (2ND FLR);  Service: Endoscopy;  Laterality: N/A;  5/15-covid pcw-inst portal-tb    ESOPHAGOGASTRODUODENOSCOPY N/A 2021    Procedure: EGD (ESOPHAGOGASTRODUODENOSCOPY);  Surgeon: Socrates Terrazas MD;  Location: [facility] ENDO (2ND FLR);  Service: Endoscopy;  Laterality: N/A;        Family History:   Family History   Problem Relation Age of Onset    Cancer Mother 67        lymphoma (type? "not active," not on tx; "related to her blood")    Schizophrenia Mother     Lung cancer Father 48        type? h/o smoking    No Known Problems Son     Breast cancer Other 73        unilat; stage I, but aggressive    Prostate cancer Paternal Uncle         (dx 50s/60? no chemo?)    Breast cancer Paternal Cousin         (dx age?) ductal    Colon cancer Neg Hx     Esophageal cancer Neg Hx         Social History:   Social History     Tobacco Use    Smoking status: Former Smoker     Types: Cigarettes     Quit date: 2019     Years since quittin.7    Smokeless tobacco: Never Used   Substance Use Topics    Alcohol use: Yes        I have reviewed and updated the patient's past medical, surgical, family and social histories.    Allergies:   Review of patient's allergies indicates:  No Known Allergies     Medications:   Current Outpatient Medications   Medication Sig Dispense Refill    " baclofen (LIORESAL) 5 mg Tab tablet Take 1 tablet (5 mg total) by mouth 3 (three) times daily. 30 tablet 0    dexAMETHasone (DECADRON) 4 MG Tab 8 mg (two tabs) twice daily on the day before chemo and the day after chemo 32 tablet 2    dicyclomine (BENTYL) 10 MG capsule TAKE ONE CAPSULE BY MOUTH FOUR TIMES DAILY 120 capsule 0    famotidine (PEPCID) 20 MG tablet Take 1 tablet (20 mg total) by mouth nightly as needed for Heartburn. 60 tablet 1    fluconazole (DIFLUCAN) 150 MG Tab Take 1 tablet (150 mg total) by mouth once daily. for 1 day 1 tablet 0    furosemide (LASIX) 20 MG tablet Take 1 tablet (20 mg total) by mouth once daily. 30 tablet 2    gabapentin (NEURONTIN) 300 MG capsule Take 1 capsule (300 mg total) by mouth 3 (three) times daily. 90 capsule 11    hydrocortisone 2.5 % cream Apply topically 2 (two) times daily. 3.5 g 0    LIDOcaine HCL 2% (XYLOCAINE) 2 % jelly Apply topically as needed. 100 mL 0    LIDOcaine-prilocaine (EMLA) cream Apply to Port-A-Cath area 30 to 45 minutes prior to port access as directed (topical anesthetic use to the anterior chest only).      methadone (DOLOPHINE) 5 mg/5 mL solution Take 2.5 mLs (2.5 mg total) by mouth every 12 (twelve) hours. 75 mL 0    metoclopramide HCl (REGLAN) 5 MG tablet 5 mg.      multivitamin with folic acid 400 mcg Tab Take 1 tablet by mouth once daily.      nortriptyline (PAMELOR) 25 MG capsule Take 1 capsule (25 mg total) by mouth every evening. 30 capsule 11    omeprazole (PRILOSEC) 40 MG capsule Take 1 capsule (40 mg total) by mouth 2 (two) times daily before meals. 60 capsule 11    ondansetron (ZOFRAN) 8 MG tablet Take 1 tablet (8 mg total) by mouth every 8 (eight) hours as needed for Nausea. 60 tablet 2    oxyCODONE (ROXICODONE) 5 MG immediate release tablet Take 1 tablet (5 mg total) by mouth every 6 (six) hours as needed. 60 tablet 0    polyethylene glycol (GLYCOLAX) 17 gram/dose powder Mix 1 capful (17 g) with liquid and take by mouth  "once daily for 20 days 510 g 0    prochlorperazine (COMPAZINE) 10 MG tablet TAKE 1 TABLET(10 MG) BY MOUTH EVERY 6 HOURS AS NEEDED FOR NAUSEA 90 tablet 2    promethazine (PHENERGAN) 25 MG tablet Take 1 tablet (25 mg total) by mouth every 6 (six) hours as needed for Nausea. 60 tablet 2    senna-docusate 8.6-50 mg (SENNA WITH DOCUSATE SODIUM) 8.6-50 mg per tablet Take 1 tablet by mouth 2 (two) times daily as needed for Constipation.      sucralfate (CARAFATE) 1 gram tablet TAKE 1 TABLET(1 GRAM) BY MOUTH FOUR TIMES DAILY FOR 14 DAYS 56 tablet 0    sucralfate (CARAFATE) 1 gram tablet       traMADoL (ULTRAM) 50 mg tablet Take 50 mg by mouth.       No current facility-administered medications for this visit.        Physical Exam:   BP (!) 124/99 (BP Location: Left arm, Patient Position: Sitting, BP Method: Small (Automatic))   Pulse 110   Temp 98.8 °F (37.1 °C) (Other (see comments))   Resp 18   Ht 5' 7" (1.702 m)   Wt 69.7 kg (153 lb 12.3 oz)   SpO2 99%   BMI 24.08 kg/m²      ECOG Performance status: 1            Physical Exam  Constitutional:       General: She is not in acute distress.     Appearance: Normal appearance. She is not ill-appearing, toxic-appearing or diaphoretic.   HENT:      Head: Normocephalic and atraumatic.      Mouth/Throat:      Mouth: Mucous membranes are moist.   Eyes:      General: No scleral icterus.     Extraocular Movements: Extraocular movements intact.   Cardiovascular:      Rate and Rhythm: Normal rate and regular rhythm.      Heart sounds: Normal heart sounds.   Pulmonary:      Effort: Pulmonary effort is normal. No respiratory distress.      Breath sounds: No stridor.      Comments: Decreased breath sounds of LLL field  Chest:      Chest wall: No tenderness.   Abdominal:      General: There is distension.      Tenderness: There is abdominal tenderness.   Musculoskeletal:         General: Swelling present. No signs of injury.      Cervical back: Normal range of motion.      " Right lower leg: Edema present.      Left lower leg: Edema present.   Skin:     General: Skin is warm.      Capillary Refill: Capillary refill takes less than 2 seconds.   Neurological:      General: No focal deficit present.      Mental Status: She is alert and oriented to person, place, and time. Mental status is at baseline.      Cranial Nerves: No cranial nerve deficit.   Psychiatric:         Mood and Affect: Mood normal.         Behavior: Behavior normal.         Thought Content: Thought content normal.         Judgment: Judgment normal.             Labs:   Recent Results (from the past 48 hour(s))   CBC Auto Differential    Collection Time: 08/08/22  1:37 PM   Result Value Ref Range    WBC 11.69 3.90 - 12.70 K/uL    RBC 4.38 4.00 - 5.40 M/uL    Hemoglobin 10.9 (L) 12.0 - 16.0 g/dL    Hematocrit 36.9 (L) 37.0 - 48.5 %    MCV 84 82 - 98 fL    MCH 24.9 (L) 27.0 - 31.0 pg    MCHC 29.5 (L) 32.0 - 36.0 g/dL    RDW 17.2 (H) 11.5 - 14.5 %    Platelets 425 150 - 450 K/uL    MPV 8.5 (L) 9.2 - 12.9 fL    Immature Granulocytes 0.8 (H) 0.0 - 0.5 %    Gran # (ANC) 9.5 (H) 1.8 - 7.7 K/uL    Immature Grans (Abs) 0.09 (H) 0.00 - 0.04 K/uL    Lymph # 1.4 1.0 - 4.8 K/uL    Mono # 0.6 0.3 - 1.0 K/uL    Eos # 0.0 0.0 - 0.5 K/uL    Baso # 0.05 0.00 - 0.20 K/uL    nRBC 0 0 /100 WBC    Gran % 81.4 (H) 38.0 - 73.0 %    Lymph % 11.7 (L) 18.0 - 48.0 %    Mono % 5.4 4.0 - 15.0 %    Eosinophil % 0.3 0.0 - 8.0 %    Basophil % 0.4 0.0 - 1.9 %    Differential Method Automated    Comprehensive Metabolic Panel    Collection Time: 08/08/22  1:37 PM   Result Value Ref Range    Sodium 136 136 - 145 mmol/L    Potassium 4.2 3.5 - 5.1 mmol/L    Chloride 104 95 - 110 mmol/L    CO2 24 23 - 29 mmol/L    Glucose 101 70 - 110 mg/dL    BUN 11 6 - 20 mg/dL    Creatinine 0.7 0.5 - 1.4 mg/dL    Calcium 8.8 8.7 - 10.5 mg/dL    Total Protein 5.4 (L) 6.0 - 8.4 g/dL    Albumin 1.5 (L) 3.5 - 5.2 g/dL    Total Bilirubin 0.4 0.1 - 1.0 mg/dL    Alkaline Phosphatase  76 55 - 135 U/L    AST 15 10 - 40 U/L    ALT 7 (L) 10 - 44 U/L    Anion Gap 8 8 - 16 mmol/L    eGFR >60.0 >60 mL/min/1.73 m^2        I have reviewed the pertinent labs from today which show hgb 10.9, albumin 1.5.    Imaging:    CT CAP: 6/11/2022:    Impression:     1. 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     2.  Mildly worsening large volume of malignant abdominopelvic ascites with persistent findings of peritoneal carcinomatosis.     3. Diffuse gastric wall thickening with an enlarging ulcerative mass of the gastric antrum consistent with patient's known biopsy-proven adenocarcinoma.     4.  Developing bowel obstruction likely secondary to serosal metastases and mass effect from the gastric and pelvic mass.     5.  Large left pleural effusion.    CT chest: 6/6/2022  Impression:     Interval development of a large left pleural effusion and ascites with associated left lower lobe compressive atelectasis.     MRI abdomen/pelvis: 6/16/2022  Impression:     1. Diffuse irregular enhancing gastric wall thickening in keeping with known biopsy-proven gastric adenocarcinoma.  Gastric antrum partially obscured by susceptibility artifact.  2. Significantly increased size of a large mixed cystic and solid heterogeneously enhancing pelvic mass.  In light of patient's history, finding is concerning for ovarian metastasis/Krukenberg tumor.  Lesion demonstrates significant mass effect within the pelvis and lower abdomen with obscuration of surrounding structures.  3. Moderate volume abdominopelvic ascites.  Diffuse peritoneal thickening and enhancement concerning for peritoneal carcinomatosis.  4. Numerous small hepatic cysts.  5. Moderate left pleural effusion.  6. Diffuse body wall edema.  7. Small nonspecific enhancing lesion within the right femoral head corresponding with a peripherally sclerotic lucent focus identified on prior  CT.  Favor a degenerative subchondral cyst however cannot entirely exclude metastatic disease.  Attention on follow-up    Path:   5/26/21:  Positive for malignancy.   Poorly differentiated adenocarcinoma growing with signet ring features   Immunostains for Bruce-EP4 and CEA are positive.  The controls stain   appropriately.   This case was reviewed by Dr Sebastian who concurs with the diagnosis       Assessment:       1. Malignant neoplasm of stomach, unspecified location    2. Vaginal candidosis    3. Peritoneal carcinomatosis    4. Bone metastases    5. Krukenberg tumor, unspecified laterality    6. Other ascites    7. Immunodeficiency due to chemotherapy    8. Non-intractable vomiting with nausea, unspecified vomiting type    9. Chronic gastric ulcer with perforation    10. Anemia in neoplastic disease    11. Anxiety associated with cancer diagnosis    12. Neuropathy due to chemotherapeutic drug    13. Chronic back pain, unspecified back location, unspecified back pain laterality    14. Edema of both lower extremities    15. Severe malnutrition          Plan:             # Gastric adenocarcinoma with peritoneal metastases, immunodeficiency due to chemotherapy, bone metastases  HER-2 negative by FISH.  PD-L1 < 1%.  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  We previously explained the implications of a stage IV diagnosis to her and potential treatment options.  She is now s/p port placement. She sought a second opinion at [facility] for zolbetuximab trial targeting CLDN protein but she did not test positive for this biomarker. We recommended proceeding with SOC FOLFOX, with which the [facility] team agreed. Because of the negative PD-L1 I do not think the benefit of adding nivolumab outweighs the potential toxicities.       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  She opted not to get genetic testing done at her appt with Phi. We recommended that she reconsider that decision in light of a very likely germline mutation in SAMUEL which has clinical consequences.      CT CAP after 6 cycles showed improvement in ascites, probable hepatic, thyroid and bone metastases. We dropped oxaliplatin starting with cycle 10 due to grade 1 neuropathy and desire to keep it from worsening.    CT CAP after cycle 10 showed stable disease.  CT scan after cycle 15 continued to display overall stability of disease within the gastric wall, peritoneum, spine and liver. Although increase ascites on imaging and clinically can represent progression of disease, her previous CT suggested overall stability within the liver and gastric wall, so we recommended to continue with treatment time. However, she continued to have increasing abdominal ascites that has been concerning for progression of disease. Hence, she had repeat imaging. On CT performed on 4/14/2022, she appeared to have worsening disease suggestive of progression. She was seen by Dr. Reid at [facility] for f/u and to discuss possible treatment options.  She was also evaluated by Dr. Andree Mueller and Dr. See of Surgical Oncology at [facility].  Ultimately she was not felt to be a good surgical debulking candidate due to her ascites.  If her ascites improves during the course of chemotherapy and her performance status remains stable or improves, they would re-consider her again for palliative oophorectomy.     She was started on second-line FOLFIRI. Was given ramucirumab with cycle 1 but this later was held given [facility] recs for just FOLFIRI.  She didn't tolerate cycle 1 well with significant N/V.  Will dose reduce irinotecan to 75 mg/m2 - has UGT1A1 homozygous mutation indicating poor metabolism.  Continue 5-FU infusion at 2200 mg/m2.    Repeat imaging with CT CAP after [facility] on 8/3/22 after cycle 5 of FOLFIRI demonstrated  disease progression.  Dr. Reid recommended third line docetaxel biweekly at 35 mg/m2.    Treatment plan has been placed, handout given on docetaxel.  Dex on day -1 and +1 sent to pharmacy, discussed potential adverse effects.    RTC prior to cycle 1.  Repeat imaging in 2 months after 4 cycles at [facility].    #Ascites, lower extremity edema, weight loss, malnutrition  Initially had peritoneal catheter after diagnosis, output slowed so was removed in October 2021 by IR.  Will continue with weekly paracentesis. Has increased edema of the lower extremity that is most likely due to malnutrition, hypoalbuminemia; perhaps some impaired venous drainage due to compression from tumor.    Continue compression stockings and paracenteses.     # Nausea, gastric ulcer  Continue Phenergan as needed. Has Zofran and compazine also PRN.  Continue Protonix.      # Anemia  Hgb 10.9. Transfuse hgb < 7.  Prior iron deficiency due to chronic blood loss.  S/p 2 doses of Injectafer.    # Anxiety  Continue f/u with psych onc w/ Dr. Berumen    # chemotherapy induced neuropathy, pain  2/2 previous chemotherapy with Oxaliplatin.  Continue acupuncture.  Continue gabapentin.  Will continue to monitor.   Continue palliative care f/u.    # Candidal vaginosis  Rx for Diflucan.  Will refer to integrative for sexual health.    Follow up: RTC in 1 week to start docetaxel    At least 70 minutes were spent today on this encounter including face to face time with the patient, data gathering/interpretation and documentation. Greater than 50% of this time involved counseling or coordination of care. I have provided the patient with an opportunity to ask questions and have all questions answered to patient's satisfaction.       Route Chart for Scheduling    Med Onc Chart Routing      Follow up with physician 3 weeks. Prior to cycle 2 of docetaxel (every 2 weeks).   Follow up with ISHMAEL 1 week. For cycle 1 of docetaxel - please help with auth   Infusion scheduling  note    Injection scheduling note    Labs CBC and CMP   Lab interval: every 2 weeks     Imaging    Pharmacy appointment    Other referrals          Treatment Plan Information   OP DOCETAXEL (75 MG/M2) Q3W   Fer Ashraf MD   Upcoming Treatment Dates - OP DOCETAXEL (75 MG/M2) Q3W    8/12/2022       Pre-Medications       dexamethasone (DECADRON) 20 mg in sodium chloride 0.9% 50 mL IVPB       Chemotherapy       DOCEtaxeL (TAXOTERE) 35 mg/m2 = 65 mg in sodium chloride 0.9% 250 mL chemo infusion  8/26/2022       Pre-Medications       dexamethasone (DECADRON) 20 mg in sodium chloride 0.9% 50 mL IVPB       Chemotherapy       DOCEtaxeL (TAXOTERE) 35 mg/m2 = 65 mg in sodium chloride 0.9% 250 mL chemo infusion  9/9/2022       Pre-Medications       dexamethasone (DECADRON) 20 mg in sodium chloride 0.9% 50 mL IVPB       Chemotherapy       DOCEtaxeL (TAXOTERE) 35 mg/m2 = 65 mg in sodium chloride 0.9% 250 mL chemo infusion  9/23/2022       Pre-Medications       dexamethasone (DECADRON) 20 mg in sodium chloride 0.9% 50 mL IVPB       Chemotherapy       DOCEtaxeL (TAXOTERE) 35 mg/m2 = 65 mg in sodium chloride 0.9% 250 mL chemo infusion    Supportive Plan Information  IV FLUIDS AND ELECTROLYTES   Kamille Hooks PA-C   Upcoming Treatment Dates - IV FLUIDS AND ELECTROLYTES    No upcoming days in selected categories.    Therapy Plan Information  EPINEPHrine (EPIPEN) 0.3 mg/0.3 mL pen injection 0.3 mg  0.3 mg, Intramuscular, PRN  diphenhydrAMINE injection 50 mg  50 mg, Intravenous, PRN  methylPREDNISolone sodium succinate injection 125 mg  125 mg, Intravenous, PRN  sodium chloride 0.9% bolus 1,000 mL  1,000 mL, Intravenous, PRN

## 2022-08-08 NOTE — Clinical Note
Nat Sagastume, Your team has seen Ms Quigley before.  She is interested in discussing sexual health with y'all - she's on third line chemo; has a boyfriend.  Complains of vaginal dryness and dyspareunia.  Can y'all see her to discuss? Thanks! [place]

## 2022-08-08 NOTE — PROGRESS NOTES
INFORMED CONSENT: Puja Quigley   is known to this provider and identity was confirmed via NAME and .  The patient has been informed of the risks and benefits associated with engaging in psychotherapy, the handling of protected health information, the rights of privacy and the limits of confidentiality. The patient has also been informed of the importance of reporting any suicidal or homicidal ideation to this or any provider to ensure safety of all parties, and the Puja Quigley expressed understanding. The patient was agreeable to these terms and freely participates in individual psychotherapy.    PSYCHO-ONCOLOGY NOTE/ Individual Psychotherapy     Date: 2022   [facility]:  [facility]        Therapeutic Intervention: Met with patient.  Outpatient - Behavior modifying psychotherapy 30 min - CPT code 23112      Patient was last seen by me on 2022    Problem list  Patient Active Problem List   Diagnosis    Intractable abdominal pain    Gastric leak    Gastric fistula    Abnormal endoscopy of upper gastrointestinal tract    Chronic gastric ulcer with perforation    Malignant ascites    Microcytic anemia    Hematemesis with nausea    Malignant neoplasm of stomach    Normocytic anemia    Severe malnutrition    Immunodeficiency due to chemotherapy    Peritoneal carcinomatosis    Non-intractable vomiting with nausea    Anxiety associated with cancer diagnosis    Inguinal adenopathy    Neuropathy due to chemotherapeutic drug    Bone metastases    Pain aggravated by activities of daily living    Dehydration    Bilateral lower extremity edema    Syncope    Palliative care encounter       Chief complaint/reason for encounter: anxiety   Met with patient to evaluate psychosocial adaptation to diagnosis/treatment/survivorship of Stomach Cancer    Current Medications  Current Outpatient Medications   Medication    baclofen (LIORESAL) 5 mg Tab tablet    dexAMETHasone (DECADRON) 4 MG  Tab    dicyclomine (BENTYL) 10 MG capsule    famotidine (PEPCID) 20 MG tablet    fluconazole (DIFLUCAN) 150 MG Tab    furosemide (LASIX) 20 MG tablet    gabapentin (NEURONTIN) 300 MG capsule    hydrocortisone 2.5 % cream    LIDOcaine HCL 2% (XYLOCAINE) 2 % jelly    LIDOcaine-prilocaine (EMLA) cream    methadone (DOLOPHINE) 5 mg/5 mL solution    metoclopramide HCl (REGLAN) 5 MG tablet    multivitamin with folic acid 400 mcg Tab    nortriptyline (PAMELOR) 25 MG capsule    omeprazole (PRILOSEC) 40 MG capsule    ondansetron (ZOFRAN) 8 MG tablet    oxyCODONE (ROXICODONE) 5 MG immediate release tablet    polyethylene glycol (GLYCOLAX) 17 gram/dose powder    prochlorperazine (COMPAZINE) 10 MG tablet    promethazine (PHENERGAN) 25 MG tablet    senna-docusate 8.6-50 mg (SENNA WITH DOCUSATE SODIUM) 8.6-50 mg per tablet    sucralfate (CARAFATE) 1 gram tablet    sucralfate (CARAFATE) 1 gram tablet    traMADoL (ULTRAM) 50 mg tablet     No current facility-administered medications for this visit.       Objective:  Puja Quigley arrived  promptly for the session.   Ms. Quigley was independently ambulatory at the time of session. The patient was fully cooperative throughout the session.  Appearance: age appropriate, appropriately  dressed, adequately  groomed  Behavior/Cooperation: friendly and cooperative  Speech: normal in rate, volume, and tone and appropriate quality, quantity and organization of sentences  Mood: anxious  Affect: mood congruent  Thought Process: goal-directed, logical  Thought Content: normal,  No delusions or paranoia; did not appear to be responding to internal stimuli during the session  Orientation: grossly intact  Memory: Grossly intact  Attention Span/Concentration: Attends to session without distraction; reports no difficulty  Fund of Knowledge: average  Estimate of Intelligence: average from verbal skills and history  Cognition: grossly intact  Insight: patient has awareness  of illness; good insight into own behavior and behavior of others  Judgment: the patient's behavior is adequate to circumstances    Interval history and content of current session: Patient visited [facility] and her treatment plans recs changed.  Discussed diagnosis, treatment, prognosis, current adaptation to disease and treatment status and family's adaptation to disease and treatment status. Reports to be coping in a passive manner. Evaluated cognitive response, paying particular attention to negative intrusive thoughts of a persistent and detrimental nature. Thoughts of this type are in evidence with severe distress. Provided cognitive behavioral therapy to address negative cognitions. Identified and evaluated psychosocial and environmental stressors secondary to diagnosis and treatment.  Examined proactive behaviors that may be implemented to minimize or ameliorate psychosocial stressors secondary to diagnosis and treatment.     Risk parameters:   Patient reports no suicidal ideation  Patient reports no homicidal ideation  Patient reports no self-injurious behavior  Patient reports no violent behavior   Safety needs:  None at this time      Verbal deficits: None     Patient's response to intervention:The patient's response to intervention is accepting.     Progress toward goals and other mental status changes:  The patient's progress toward goals is good.      Progress to date:Progress as Expected      Goals from last visit: Met     Patient reported outcomes:    Distress Thermometer:   Distress Score             Practical Problems Physical Problems                                                   Family Problems                                         Emotional Problems                                                         Spiritual/Religions Concerns               Other Problems                PHQ-9= 14   GARCIA-7=9      Client Strengths: verbal, intelligent, successful, good social support, good insight, commitment  to wellness, strong lyla, strong cultural traditions     Diagnosis:     ICD-10-CM ICD-9-CM   1. Anxiety associated with cancer diagnosis  F41.1 300.09    C80.1    2. Malignant neoplasm of stomach, unspecified location  C16.9 151.9   3. Peritoneal carcinomatosis  C78.6 197.6   4. Bone metastases  C79.51 198.5       Treatment Plan:individual psychotherapy  · Target symptoms: anxiety   · Why chosen therapy is appropriate versus another modality: relevant to diagnosis, patient responds to this modality, evidence based practice  · Outcome monitoring methods: self-report, observation, checklist/rating scale  · Therapeutic intervention type: behavior modifying psychotherapy  · Prognosis: Good      Behavioral goals:     Increase daily self-care and attention to health management  Pleasant events scheduling and increased social interaction  Monitor stressors in writing and bring to next visit    Return to clinic: 1 month    Length of Service (minutes direct face-to-face contact): 30    Giselle Berumen, PhD  Clinical Psychologist  [place] License #[id number]  [place] License #[id number]

## 2022-08-08 NOTE — PLAN OF CARE
DISCONTINUE OFF PATHWAY REGIMEN - Gastroesophageal            Ramucirumab (Cyramza)       Irinotecan (Camptosar)       Leucovorin       Fluorouracil       Fluorouracil           Additional Orders: Urine protein check recommended at baseline and   periodically during therapy.    **Always confirm dose/schedule in your pharmacy ordering system**    REASON: Disease Progression  PRIOR TREATMENT:   TREATMENT RESPONSE: Progressive Disease (PD)    START OFF PATHWAY REGIMEN - Gastroesophageal            Docetaxel (Taxotere)           Additional Orders: Premedicate with dexamethasone 8 mg PO BID for three   days beginning 1 day prior to therapy    **Always confirm dose/schedule in your pharmacy ordering system**    Patient Characteristics:  Distant Metastases (cM1/pM1) / Locally Recurrent Disease, Adenocarcinoma -   Esophageal, [place], and Gastric, Third Line and Beyond, HER2   Negative/Unknown and NOLA/pMMR or MSI Unknown  Histology: Adenocarcinoma  Disease Classification: Gastric  Therapeutic Status: Distant Metastases (No Additional Staging)  Line of Therapy: Third Line and Beyond  Microsatellite/Mismatch Repair Status: NOLA/pMMR  HER2 Status: Negative  Intent of Therapy:  Non-Curative / Palliative Intent, Discussed with Patient

## 2022-08-09 ENCOUNTER — TELEPHONE (OUTPATIENT)
Dept: HEMATOLOGY/ONCOLOGY | Facility: CLINIC | Age: 42
End: 2022-08-09
Payer: COMMERCIAL

## 2022-08-09 ENCOUNTER — TELEPHONE (OUTPATIENT)
Dept: OBSTETRICS AND GYNECOLOGY | Facility: CLINIC | Age: 42
End: 2022-08-09
Payer: COMMERCIAL

## 2022-08-09 ENCOUNTER — HOSPITAL ENCOUNTER (OUTPATIENT)
Dept: INTERVENTIONAL RADIOLOGY/VASCULAR | Facility: HOSPITAL | Age: 42
Discharge: HOME | End: 2022-08-09
Attending: INTERNAL MEDICINE
Payer: COMMERCIAL

## 2022-08-09 VITALS
DIASTOLIC BLOOD PRESSURE: 84 MMHG | HEART RATE: 102 BPM | SYSTOLIC BLOOD PRESSURE: 137 MMHG | RESPIRATION RATE: 16 BRPM | OXYGEN SATURATION: 100 % | TEMPERATURE: 98 F

## 2022-08-09 DIAGNOSIS — R18.8 OTHER ASCITES: ICD-10-CM

## 2022-08-09 PROCEDURE — 49083 IR PARACENTESIS WITH IMAGING: ICD-10-PCS | Mod: ,,, | Performed by: RADIOLOGY

## 2022-08-09 PROCEDURE — 49083 ABD PARACENTESIS W/IMAGING: CPT | Performed by: RADIOLOGY

## 2022-08-09 PROCEDURE — 63600175 PHARM REV CODE 636 W HCPCS: Mod: JG | Performed by: RADIOLOGY

## 2022-08-09 PROCEDURE — P9045 ALBUMIN (HUMAN), 5%, 250 ML: HCPCS | Mod: JG | Performed by: RADIOLOGY

## 2022-08-09 PROCEDURE — C1729 CATH, DRAINAGE: HCPCS

## 2022-08-09 RX ORDER — ALBUMIN HUMAN 50 G/1000ML
50 SOLUTION INTRAVENOUS ONCE
Status: COMPLETED | OUTPATIENT
Start: 2022-08-09 | End: 2022-08-09

## 2022-08-09 RX ADMIN — ALBUMIN (HUMAN) 50 G: 12.5 SOLUTION INTRAVENOUS at 02:08

## 2022-08-09 NOTE — TELEPHONE ENCOUNTER
----- Message from Tanika Sarabia PA-C sent at 8/8/2022  4:05 PM CDT -----  Please schedule for survivorship/sexual health with myself or Dr. Messina at [facility]. Thank you!    ----- Message -----  From: Fer Ashraf MD  Sent: 8/8/2022   3:48 PM CDT  To: FANNY Devi,  Your team has seen Ms Quigley before.  She is interested in discussing sexual health with y'all - she's on third line chemo; has a boyfriend.  Complains of vaginal dryness and dyspareunia.  Can y'all see her to discuss?  Thanks!  [place]

## 2022-08-09 NOTE — DISCHARGE INSTRUCTIONS
BATHING:  You may shower tomorrow.  DRESSING:  Remove dressing tomorrow.        ACTIVITY LEVEL: If you have received sedation or an anesthetic, you may feel sleepy for several hours. Rest until you are more awake. Gradually resume your normal activities    Do not drive, drink alcohol, or sign legal documents for 24 hours, or if taking narcotic pain medication.      DIET: You may resume your home diet. If nausea is present, increase your diet gradually with fluids and bland foods.    Medications: Pain medication should be taken only if needed and as directed. If antibiotics are prescribed, the medication should be taken until completed. You will be given an updated list of you medications.  No driving, alcoholic beverages or signing legal documents for next 24 hours if you have had sedation, or while taking pain medication    CALL THE DOCTOR:   For any obvious bleeding (some dried blood over the incision is normal).     Redness, swelling, foul smell around incision or fever over 101.  Shortness of breath.  Persistent pain or nausea not relieved by medication.  Call  [phone]     to speak with an Interventional Radiologist    If any unusual problems or difficulties occur contact your doctor. If you cannot contact your doctor but feel your signs and symptoms warrant a physicians attention return to the emergency room.      Fall Prevention  Millions of people fall every year and injure themselves. You may have had anesthesia or sedation which may increase your risk of falling. You may have health issues that put you at an increased risk of falling.     Here are ways to reduce your risk of falling.    Make your home safe by keeping walkways clear of objects you may trip over.  Use non-slip pads under rugs. Do not use area rugs or small throw rugs.  Use non-slip mats in bathtubs and showers.  Install handrails and lights on staircases.  Do not walk in poorly lit areas.  Do not stand on chairs or wobbly ladders.  Use  caution when reaching overhead or looking upward. This position can cause a loss of balance.  Be sure your shoes fit properly, have non-slip bottoms and are in good condition.   Wear shoes both inside and out. Avoid going barefoot or wearing slippers.  Be cautious when going up and down stairs, curbs, and when walking on uneven sidewalks.  If your balance is poor, consider using a cane or walker.  If your fall was related to alcohol use, stop or limit alcohol intake.   If your fall was related to use of sleeping medicines, talk to your doctor about this. You may need to reduce your dosage at bedtime if you awaken during the night to go to the bathroom.    To reduce the need for nighttime bathroom trips:  Avoid drinking fluids for several hours before going to bed  Empty your bladder before going to bed  Men can keep a urinal at the bedside  Stay as active as you can. Balance, flexibility, strength, and endurance all come from exercise. They all play a role in preventing falls. Ask your healthcare provider which types of activity are right for you.  Get your vision checked on a regular basis.  If you have pets, know where they are before you stand up or walk so you don't trip over them.  Use night lights.

## 2022-08-09 NOTE — TELEPHONE ENCOUNTER
"----- Message from Dennis Mishra sent at 8/9/2022  9:05 AM CDT -----  Consult/Advisory:          Name Of Caller: Self      Contact Preference?: [phone]       Provider Name: [facility]      Does patient feel the need to be seen today? No      What is the nature of the call?: Returning call to [facility].          Additional Notes:  "Thank you for all that you do for our patients"       "

## 2022-08-09 NOTE — H&P
Radiology History & Physical      SUBJECTIVE:     Chief Complaint: Ascites    History of Present Illness:  Puja Quigley is a 41 y.o. female who presents for paracentesis  Past Medical History:   Diagnosis Date    Anxiety     Dehydration 5/30/2022    Gastric cancer     Gastric ulcer     Hx of psychiatric care     Pregnancy 08/12/2020    delivered on 8/12/2020    Umbilical hernia      Past Surgical History:   Procedure Laterality Date    CLOSURE OF PERFORATED ULCER OF DUODENUM USING OMENTAL PATCH      ESOPHAGOGASTRODUODENOSCOPY N/A 10/13/2020    Procedure: EGD (ESOPHAGOGASTRODUODENOSCOPY);  Surgeon: Rosio Marion MD;  Location: [facility] ENDO (2ND FLR);  Service: Endoscopy;  Laterality: N/A;    ESOPHAGOGASTRODUODENOSCOPY N/A 12/11/2020    Procedure: EGD (ESOPHAGOGASTRODUODENOSCOPY);  Surgeon: Rosio Marion MD;  Location: [facility] (2ND FLR);  Service: Endoscopy;  Laterality: N/A;  Covid-19 test 12/8/20 at [facility]    ESOPHAGOGASTRODUODENOSCOPY N/A 3/12/2021    Procedure: EGD (ESOPHAGOGASTRODUODENOSCOPY);  Surgeon: Nav Omer MD;  Location: [facility] ENDO (2ND FLR);  Service: Endoscopy;  Laterality: N/A;  COVID at [facility] 3/9 ttr    ESOPHAGOGASTRODUODENOSCOPY N/A 5/18/2021    Procedure: EGD (ESOPHAGOGASTRODUODENOSCOPY);  Surgeon: Rosio Marion MD;  Location: [facility] ENDO (2ND FLR);  Service: Endoscopy;  Laterality: N/A;  5/15-covid pcw-inst portal-tb    ESOPHAGOGASTRODUODENOSCOPY N/A 5/26/2021    Procedure: EGD (ESOPHAGOGASTRODUODENOSCOPY);  Surgeon: Socrates Terrazas MD;  Location: [facility] ENDO (2ND FLR);  Service: Endoscopy;  Laterality: N/A;       Home Meds:   Prior to Admission medications    Medication Sig Start Date End Date Taking? Authorizing Provider   baclofen (LIORESAL) 5 mg Tab tablet Take 1 tablet (5 mg total) by mouth 3 (three) times daily. 7/15/22   Sharon Brink MD   dexAMETHasone (DECADRON) 4 MG Tab 8 mg (two tabs) twice daily on the day before chemo and the day  after chemo 8/8/22   Fer Ashraf MD   dicyclomine (BENTYL) 10 MG capsule TAKE ONE CAPSULE BY MOUTH FOUR TIMES DAILY 9/27/21   Kamille Hooks PA-C   famotidine (PEPCID) 20 MG tablet Take 1 tablet (20 mg total) by mouth nightly as needed for Heartburn. 2/23/22 2/23/23  Kamille Hooks PA-C   fluconazole (DIFLUCAN) 150 MG Tab Take 1 tablet (150 mg total) by mouth once daily. for 1 day 8/8/22 8/9/22  Fer Ashraf MD   furosemide (LASIX) 20 MG tablet Take 1 tablet (20 mg total) by mouth once daily. 7/6/22   Fer Ashraf MD   gabapentin (NEURONTIN) 300 MG capsule Take 1 capsule (300 mg total) by mouth 3 (three) times daily. 2/7/22 2/7/23  Kamille Hooks PA-C   hydrocortisone 2.5 % cream Apply topically 2 (two) times daily. 7/11/22   Bethel Shetty MD   LIDOcaine HCL 2% (XYLOCAINE) 2 % jelly Apply topically as needed. 6/20/22   Bren Allen MD   LIDOcaine-prilocaine (EMLA) cream Apply to Port-A-Cath area 30 to 45 minutes prior to port access as directed (topical anesthetic use to the anterior chest only). 6/25/21   Historical Provider   methadone (DOLOPHINE) 5 mg/5 mL solution Take 2.5 mLs (2.5 mg total) by mouth every 12 (twelve) hours. 7/26/22   Elva Gutierrez DNP   metoclopramide HCl (REGLAN) 5 MG tablet 5 mg. 6/11/21   Historical Provider   multivitamin with folic acid 400 mcg Tab Take 1 tablet by mouth once daily.    Historical Provider   nortriptyline (PAMELOR) 25 MG capsule Take 1 capsule (25 mg total) by mouth every evening. 6/20/22 6/20/23  Bren Allen MD   omeprazole (PRILOSEC) 40 MG capsule Take 1 capsule (40 mg total) by mouth 2 (two) times daily before meals. 6/20/22 6/20/23  Bren Allen MD   ondansetron (ZOFRAN) 8 MG tablet Take 1 tablet (8 mg total) by mouth every 8 (eight) hours as needed for Nausea. 7/26/21   Fer Ashraf MD   oxyCODONE (ROXICODONE) 5 MG immediate release tablet Take 1 tablet (5 mg total) by mouth every 6 (six) hours as needed. 6/20/22   Bren Allen MD    polyethylene glycol (GLYCOLAX) 17 gram/dose powder Mix 1 capful (17 g) with liquid and take by mouth once daily for 20 days 6/21/22   Bren Allen MD   prochlorperazine (COMPAZINE) 10 MG tablet TAKE 1 TABLET(10 MG) BY MOUTH EVERY 6 HOURS AS NEEDED FOR NAUSEA 8/7/21   Fer Ashraf MD   promethazine (PHENERGAN) 25 MG tablet Take 1 tablet (25 mg total) by mouth every 6 (six) hours as needed for Nausea. 6/13/22   Fer Ashraf MD   senna-docusate 8.6-50 mg (SENNA WITH DOCUSATE SODIUM) 8.6-50 mg per tablet Take 1 tablet by mouth 2 (two) times daily as needed for Constipation. 5/26/21   Reshma Wilson MD   sucralfate (CARAFATE) 1 gram tablet TAKE 1 TABLET(1 GRAM) BY MOUTH FOUR TIMES DAILY FOR 14 DAYS 10/20/21   Rosio Marion MD   sucralfate (CARAFATE) 1 gram tablet  4/8/21   Historical Provider   traMADoL (ULTRAM) 50 mg tablet Take 50 mg by mouth. 6/25/21   Historical Provider   amitriptyline (ELAVIL) 10 MG tablet Take 1 tablet (10 mg total) by mouth every evening. 5/13/21 6/20/22  Socrates Terrazas MD   pantoprazole (PROTONIX) 40 MG tablet Take 1 tablet (40 mg total) by mouth 2 (two) times daily. 5/25/22 6/20/22  Kamille Hooks PA-C     Anticoagulants/Antiplatelets: no anticoagulation    Allergies: Review of patient's allergies indicates:  No Known Allergies  Sedation History:  no adverse reactions    Review of Systems:   Hematological: no known coagulopathies  Respiratory: no shortness of breath  Cardiovascular: no chest pain  Gastrointestinal: no abdominal pain  Genito-Urinary: no dysuria  Musculoskeletal: negative  Neurological: no TIA or stroke symptoms         OBJECTIVE:     Vital Signs (Most Recent)  BP: (!) 134/105 (08/09/22 1137)    Physical Exam:  ASA: 3  Mallampati: 2    General: no acute distress  Mental Status: alert and oriented to person, place and time  HEENT: normocephalic, atraumatic  Chest: unlabored breathing  Heart: regular heart rate  Abdomen: distended  Extremity: moves all  extremities    Laboratory  Lab Results   Component Value Date    INR 1.0 06/10/2021       Lab Results   Component Value Date    WBC 11.69 08/08/2022    HGB 10.9 (L) 08/08/2022    HCT 36.9 (L) 08/08/2022    MCV 84 08/08/2022     08/08/2022      Lab Results   Component Value Date     08/08/2022     08/08/2022    K 4.2 08/08/2022     08/08/2022    CO2 24 08/08/2022    BUN 11 08/08/2022    CREATININE 0.7 08/08/2022    CALCIUM 8.8 08/08/2022    MG 1.9 06/20/2022    ALT 7 (L) 08/08/2022    AST 15 08/08/2022    ALBUMIN 1.5 (L) 08/08/2022    BILITOT 0.4 08/08/2022       ASSESSMENT/PLAN:     Sedation Plan: Local only  Patient will undergo paracentesis with US guidance.    Omar Augustine M.D.  Interventional Radiology  Department of Radiology  Pager: [phone]

## 2022-08-09 NOTE — DISCHARGE SUMMARY
Radiology Discharge Summary      Hospital Course: No complications    Admit Date: 8/9/2022  Discharge Date: 08/09/2022     Instructions Given to Patient: Yes  Diet: Resume prior diet  Activity: activity as tolerated and no driving for today    Description of Condition on Discharge: Stable  Vital Signs (Most Recent): BP: (!) 139/100 (08/09/22 1238)    Discharge Disposition: Home    Discharge Diagnosis: Ascites s/p paracentesis    Follow up: As scheduled    Omar Augustine M.D.  Interventional Radiology  Department of Radiology  Pager: [phone]

## 2022-08-11 ENCOUNTER — HOSPITAL ENCOUNTER (OUTPATIENT)
Dept: INTERVENTIONAL RADIOLOGY/VASCULAR | Facility: HOSPITAL | Age: 42
Discharge: HOME | End: 2022-08-11
Attending: INTERNAL MEDICINE
Payer: COMMERCIAL

## 2022-08-11 ENCOUNTER — TELEPHONE (OUTPATIENT)
Dept: HEMATOLOGY/ONCOLOGY | Facility: CLINIC | Age: 42
End: 2022-08-11
Payer: COMMERCIAL

## 2022-08-11 VITALS — SYSTOLIC BLOOD PRESSURE: 121 MMHG | DIASTOLIC BLOOD PRESSURE: 84 MMHG

## 2022-08-11 DIAGNOSIS — R18.8 OTHER ASCITES: ICD-10-CM

## 2022-08-11 PROCEDURE — 49083 IR PARACENTESIS WITH IMAGING: ICD-10-PCS | Mod: ,,, | Performed by: RADIOLOGY

## 2022-08-11 PROCEDURE — 49083 ABD PARACENTESIS W/IMAGING: CPT | Mod: ,,, | Performed by: RADIOLOGY

## 2022-08-11 PROCEDURE — 49083 ABD PARACENTESIS W/IMAGING: CPT

## 2022-08-11 NOTE — H&P
Radiology History & Physical      SUBJECTIVE:     Chief Complaint: gastric cancer with recurrent ascites    History of Present Illness:  Puja Quigley is a 41 y.o. female who presents for paracentesis  Past Medical History:   Diagnosis Date    Anxiety     Dehydration 5/30/2022    Gastric cancer     Gastric ulcer     Hx of psychiatric care     Pregnancy 08/12/2020    delivered on 8/12/2020    Umbilical hernia      Past Surgical History:   Procedure Laterality Date    CLOSURE OF PERFORATED ULCER OF DUODENUM USING OMENTAL PATCH      ESOPHAGOGASTRODUODENOSCOPY N/A 10/13/2020    Procedure: EGD (ESOPHAGOGASTRODUODENOSCOPY);  Surgeon: Rosio Marion MD;  Location: [facility] (2ND FLR);  Service: Endoscopy;  Laterality: N/A;    ESOPHAGOGASTRODUODENOSCOPY N/A 12/11/2020    Procedure: EGD (ESOPHAGOGASTRODUODENOSCOPY);  Surgeon: Rosio Marion MD;  Location: [facility] (2ND FLR);  Service: Endoscopy;  Laterality: N/A;  Covid-19 test 12/8/20 at [facility]    ESOPHAGOGASTRODUODENOSCOPY N/A 3/12/2021    Procedure: EGD (ESOPHAGOGASTRODUODENOSCOPY);  Surgeon: Nav Omer MD;  Location: [facility] (2ND FLR);  Service: Endoscopy;  Laterality: N/A;  COVID at [facility] 3/9 ttr    ESOPHAGOGASTRODUODENOSCOPY N/A 5/18/2021    Procedure: EGD (ESOPHAGOGASTRODUODENOSCOPY);  Surgeon: Rosio Marion MD;  Location: [facility] (2ND FLR);  Service: Endoscopy;  Laterality: N/A;  5/15-covid pcw-inst portal-tb    ESOPHAGOGASTRODUODENOSCOPY N/A 5/26/2021    Procedure: EGD (ESOPHAGOGASTRODUODENOSCOPY);  Surgeon: Socrates Terrazas MD;  Location: [facility] ENDO (2ND FLR);  Service: Endoscopy;  Laterality: N/A;       Home Meds:   Prior to Admission medications    Medication Sig Start Date End Date Taking? Authorizing Provider   baclofen (LIORESAL) 5 mg Tab tablet Take 1 tablet (5 mg total) by mouth 3 (three) times daily. 7/15/22   Sharon Brink MD   dexAMETHasone (DECADRON) 4 MG Tab 8 mg (two tabs) twice daily on the  day before chemo and the day after chemo 8/8/22   Fer Ashraf MD   dicyclomine (BENTYL) 10 MG capsule TAKE ONE CAPSULE BY MOUTH FOUR TIMES DAILY 9/27/21   Kamille Hooks PA-C   famotidine (PEPCID) 20 MG tablet Take 1 tablet (20 mg total) by mouth nightly as needed for Heartburn. 2/23/22 2/23/23  Kmaille Hooks PA-C   furosemide (LASIX) 20 MG tablet Take 1 tablet (20 mg total) by mouth once daily. 7/6/22   Fer Ashraf MD   gabapentin (NEURONTIN) 300 MG capsule Take 1 capsule (300 mg total) by mouth 3 (three) times daily. 2/7/22 2/7/23  Kamille Hooks PA-C   hydrocortisone 2.5 % cream Apply topically 2 (two) times daily. 7/11/22   Bethel Shetty MD   LIDOcaine HCL 2% (XYLOCAINE) 2 % jelly Apply topically as needed. 6/20/22   Bren Allen MD   LIDOcaine-prilocaine (EMLA) cream Apply to Port-A-Cath area 30 to 45 minutes prior to port access as directed (topical anesthetic use to the anterior chest only). 6/25/21   Historical Provider   methadone (DOLOPHINE) 5 mg/5 mL solution Take 2.5 mLs (2.5 mg total) by mouth every 12 (twelve) hours. 7/26/22   Elva Gutierrez, BRANDON   metoclopramide HCl (REGLAN) 5 MG tablet 5 mg. 6/11/21   Historical Provider   multivitamin with folic acid 400 mcg Tab Take 1 tablet by mouth once daily.    Historical Provider   nortriptyline (PAMELOR) 25 MG capsule Take 1 capsule (25 mg total) by mouth every evening. 6/20/22 6/20/23  Bren Allen MD   omeprazole (PRILOSEC) 40 MG capsule Take 1 capsule (40 mg total) by mouth 2 (two) times daily before meals. 6/20/22 6/20/23  Bren Allen MD   ondansetron (ZOFRAN) 8 MG tablet Take 1 tablet (8 mg total) by mouth every 8 (eight) hours as needed for Nausea. 7/26/21   Fer sAhraf MD   oxyCODONE (ROXICODONE) 5 MG immediate release tablet Take 1 tablet (5 mg total) by mouth every 6 (six) hours as needed. 6/20/22   Bren Allen MD   polyethylene glycol (GLYCOLAX) 17 gram/dose powder Mix 1 capful (17 g) with liquid and take by mouth  once daily for 20 days 6/21/22   Bren Allen MD   prochlorperazine (COMPAZINE) 10 MG tablet TAKE 1 TABLET(10 MG) BY MOUTH EVERY 6 HOURS AS NEEDED FOR NAUSEA 8/7/21   Fer Ashraf MD   promethazine (PHENERGAN) 25 MG tablet Take 1 tablet (25 mg total) by mouth every 6 (six) hours as needed for Nausea. 6/13/22   Fer Ashraf MD   senna-docusate 8.6-50 mg (SENNA WITH DOCUSATE SODIUM) 8.6-50 mg per tablet Take 1 tablet by mouth 2 (two) times daily as needed for Constipation. 5/26/21   Reshma Wilson MD   sucralfate (CARAFATE) 1 gram tablet TAKE 1 TABLET(1 GRAM) BY MOUTH FOUR TIMES DAILY FOR 14 DAYS 10/20/21   Rosio Marion MD   sucralfate (CARAFATE) 1 gram tablet  4/8/21   Historical Provider   traMADoL (ULTRAM) 50 mg tablet Take 50 mg by mouth. 6/25/21   Historical Provider   amitriptyline (ELAVIL) 10 MG tablet Take 1 tablet (10 mg total) by mouth every evening. 5/13/21 6/20/22  Socrates Terrazas MD   pantoprazole (PROTONIX) 40 MG tablet Take 1 tablet (40 mg total) by mouth 2 (two) times daily. 5/25/22 6/20/22  Kamille Hooks PA-C     Anticoagulants/Antiplatelets: no anticoagulation    Allergies: Review of patient's allergies indicates:  No Known Allergies  Sedation History:  no adverse reactions    Review of Systems:   Hematological: no known coagulopathies  Respiratory: no shortness of breath  Cardiovascular: no chest pain  Gastrointestinal: mild abdominal pain due to ascites  Genito-Urinary: no dysuria  Musculoskeletal: bilateral LE edema  Neurological: no TIA or stroke symptoms         OBJECTIVE:     Vital Signs (Most Recent)       Physical Exam:  ASA: 2  Mallampati: N/A    General: no acute distress  Mental Status: alert and oriented to person, place and time  HEENT: normocephalic, atraumatic  Chest: unlabored breathing  Heart: regular heart rate  Abdomen: distended  Extremity: moves all extremities, bilateral LE edema    Laboratory  Lab Results   Component Value Date    INR 1.0 06/10/2021        Lab Results   Component Value Date    WBC 11.69 08/08/2022    HGB 10.9 (L) 08/08/2022    HCT 36.9 (L) 08/08/2022    MCV 84 08/08/2022     08/08/2022      Lab Results   Component Value Date     08/08/2022     08/08/2022    K 4.2 08/08/2022     08/08/2022    CO2 24 08/08/2022    BUN 11 08/08/2022    CREATININE 0.7 08/08/2022    CALCIUM 8.8 08/08/2022    MG 1.9 06/20/2022    ALT 7 (L) 08/08/2022    AST 15 08/08/2022    ALBUMIN 1.5 (L) 08/08/2022    BILITOT 0.4 08/08/2022       ASSESSMENT/PLAN:     Sedation Plan: local anesthesia only  Patient will undergo paracentesis.    Martin Ivory MD  Staff Radiologist  Department of Radiology  Pager: [phone]

## 2022-08-11 NOTE — PROCEDURES
Radiology Post-Procedure Note    Pre Op Diagnosis: Ascites, gastric cancer  Post Op Diagnosis: Same    Procedure: Paracentesis    Procedure performed by: Martin Ivory MD    Written Informed Consent Obtained: Yes  Specimen Removed: YES thin lang fluid  Estimated Blood Loss: Minimal    Findings:   Successful paracentesis.  Albumin administered PRN per protocol.    Patient tolerated procedure well.    Martin Ivory MD  Staff Radiologist  Department of Radiology  Pager: [phone]

## 2022-08-16 ENCOUNTER — INFUSION (OUTPATIENT)
Dept: INFUSION THERAPY | Facility: HOSPITAL | Age: 42
End: 2022-08-16
Attending: INTERNAL MEDICINE
Payer: COMMERCIAL

## 2022-08-16 ENCOUNTER — TELEPHONE (OUTPATIENT)
Dept: PALLIATIVE MEDICINE | Facility: CLINIC | Age: 42
End: 2022-08-16
Payer: COMMERCIAL

## 2022-08-16 VITALS
DIASTOLIC BLOOD PRESSURE: 86 MMHG | HEART RATE: 110 BPM | OXYGEN SATURATION: 98 % | TEMPERATURE: 98 F | RESPIRATION RATE: 16 BRPM | SYSTOLIC BLOOD PRESSURE: 118 MMHG

## 2022-08-16 DIAGNOSIS — M79.659 PAIN OF THIGH, UNSPECIFIED LATERALITY: Primary | ICD-10-CM

## 2022-08-16 DIAGNOSIS — C16.9 MALIGNANT NEOPLASM OF STOMACH, UNSPECIFIED LOCATION: Primary | ICD-10-CM

## 2022-08-16 PROCEDURE — 96413 CHEMO IV INFUSION 1 HR: CPT

## 2022-08-16 PROCEDURE — 63600175 PHARM REV CODE 636 W HCPCS: Performed by: INTERNAL MEDICINE

## 2022-08-16 PROCEDURE — A4216 STERILE WATER/SALINE, 10 ML: HCPCS | Performed by: INTERNAL MEDICINE

## 2022-08-16 PROCEDURE — 25000003 PHARM REV CODE 250: Performed by: INTERNAL MEDICINE

## 2022-08-16 PROCEDURE — 96367 TX/PROPH/DG ADDL SEQ IV INF: CPT

## 2022-08-16 PROCEDURE — 96365 THER/PROPH/DIAG IV INF INIT: CPT

## 2022-08-16 RX ORDER — SODIUM CHLORIDE 0.9 % (FLUSH) 0.9 %
10 SYRINGE (ML) INJECTION
Status: DISCONTINUED | OUTPATIENT
Start: 2022-08-16 | End: 2022-08-16 | Stop reason: HOSPADM

## 2022-08-16 RX ORDER — HEPARIN 100 UNIT/ML
500 SYRINGE INTRAVENOUS
Status: DISCONTINUED | OUTPATIENT
Start: 2022-08-16 | End: 2022-08-16 | Stop reason: HOSPADM

## 2022-08-16 RX ADMIN — DEXAMETHASONE SODIUM PHOSPHATE 20 MG: 10 INJECTION INTRAMUSCULAR; INTRAVENOUS at 11:08

## 2022-08-16 RX ADMIN — DOCETAXEL 65 MG: 20 INJECTION, SOLUTION, CONCENTRATE INTRAVENOUS at 12:08

## 2022-08-16 RX ADMIN — Medication 10 ML: at 01:08

## 2022-08-16 RX ADMIN — HEPARIN 500 UNITS: 100 SYRINGE at 01:08

## 2022-08-16 NOTE — PLAN OF CARE
Pt arrived to unit for C1 of Taxotere. Most recent labs along with plan of care reviewed. Okay to proceed with tx today. Pt reminded to pick Dexamethasone rx at local pharmacy once she leaves here. Verbalized understanding. Pt continues with swelling in her abdomen and BLE. Endorsing generalized fatigue also. VSS. Given pre-med of Dexamethasone. Taxotere infused. Pt tolerated well. [facility] Discharged from unit in NAD.

## 2022-08-17 ENCOUNTER — OFFICE VISIT (OUTPATIENT)
Dept: PALLIATIVE MEDICINE | Facility: CLINIC | Age: 42
End: 2022-08-17
Payer: COMMERCIAL

## 2022-08-17 VITALS — DIASTOLIC BLOOD PRESSURE: 89 MMHG | SYSTOLIC BLOOD PRESSURE: 120 MMHG | HEART RATE: 101 BPM

## 2022-08-17 DIAGNOSIS — R63.0 ANOREXIA: ICD-10-CM

## 2022-08-17 DIAGNOSIS — R06.00 DYSPNEA, UNSPECIFIED TYPE: ICD-10-CM

## 2022-08-17 DIAGNOSIS — Z51.5 ENCOUNTER FOR PALLIATIVE CARE: ICD-10-CM

## 2022-08-17 DIAGNOSIS — R19.7 DIARRHEA, UNSPECIFIED TYPE: ICD-10-CM

## 2022-08-17 DIAGNOSIS — R06.6 HICCUPS: ICD-10-CM

## 2022-08-17 DIAGNOSIS — C16.9 MALIGNANT NEOPLASM OF STOMACH, UNSPECIFIED LOCATION: Primary | ICD-10-CM

## 2022-08-17 DIAGNOSIS — F43.23 ADJUSTMENT DISORDER WITH MIXED ANXIETY AND DEPRESSED MOOD: ICD-10-CM

## 2022-08-17 DIAGNOSIS — R18.0 MALIGNANT ASCITES: ICD-10-CM

## 2022-08-17 DIAGNOSIS — Z51.5 PALLIATIVE CARE ENCOUNTER: ICD-10-CM

## 2022-08-17 DIAGNOSIS — R53.0 NEOPLASTIC (MALIGNANT) RELATED FATIGUE: ICD-10-CM

## 2022-08-17 DIAGNOSIS — G62.9 NEUROPATHY: ICD-10-CM

## 2022-08-17 DIAGNOSIS — Z71.89 ADVANCED CARE PLANNING/COUNSELING DISCUSSION: ICD-10-CM

## 2022-08-17 DIAGNOSIS — R11.0 NAUSEA: ICD-10-CM

## 2022-08-17 DIAGNOSIS — R60.0 BILATERAL LOWER EXTREMITY EDEMA: ICD-10-CM

## 2022-08-17 DIAGNOSIS — G47.00 INSOMNIA, UNSPECIFIED TYPE: ICD-10-CM

## 2022-08-17 DIAGNOSIS — G89.3 CANCER RELATED PAIN: ICD-10-CM

## 2022-08-17 PROCEDURE — 99215 PR OFFICE/OUTPT VISIT, EST, LEVL V, 40-54 MIN: ICD-10-PCS | Mod: S$GLB,,, | Performed by: STUDENT IN AN ORGANIZED HEALTH CARE EDUCATION/TRAINING PROGRAM

## 2022-08-17 PROCEDURE — 99215 OFFICE O/P EST HI 40 MIN: CPT | Mod: S$GLB,,, | Performed by: STUDENT IN AN ORGANIZED HEALTH CARE EDUCATION/TRAINING PROGRAM

## 2022-08-17 PROCEDURE — 3079F DIAST BP 80-89 MM HG: CPT | Mod: CPTII,S$GLB,, | Performed by: STUDENT IN AN ORGANIZED HEALTH CARE EDUCATION/TRAINING PROGRAM

## 2022-08-17 PROCEDURE — 3074F PR MOST RECENT SYSTOLIC BLOOD PRESSURE < 130 MM HG: ICD-10-PCS | Mod: CPTII,S$GLB,, | Performed by: STUDENT IN AN ORGANIZED HEALTH CARE EDUCATION/TRAINING PROGRAM

## 2022-08-17 PROCEDURE — 99999 PR PBB SHADOW E&M-EST. PATIENT-LVL IV: CPT | Mod: PBBFAC,,, | Performed by: STUDENT IN AN ORGANIZED HEALTH CARE EDUCATION/TRAINING PROGRAM

## 2022-08-17 PROCEDURE — 99999 PR PBB SHADOW E&M-EST. PATIENT-LVL IV: ICD-10-PCS | Mod: PBBFAC,,, | Performed by: STUDENT IN AN ORGANIZED HEALTH CARE EDUCATION/TRAINING PROGRAM

## 2022-08-17 PROCEDURE — 3079F PR MOST RECENT DIASTOLIC BLOOD PRESSURE 80-89 MM HG: ICD-10-PCS | Mod: CPTII,S$GLB,, | Performed by: STUDENT IN AN ORGANIZED HEALTH CARE EDUCATION/TRAINING PROGRAM

## 2022-08-17 PROCEDURE — 3074F SYST BP LT 130 MM HG: CPT | Mod: CPTII,S$GLB,, | Performed by: STUDENT IN AN ORGANIZED HEALTH CARE EDUCATION/TRAINING PROGRAM

## 2022-08-17 RX ORDER — LIDOCAINE AND PRILOCAINE 25; 25 MG/G; MG/G
CREAM TOPICAL
Qty: 30 G | Refills: 2 | Status: ON HOLD | OUTPATIENT
Start: 2022-08-17 | End: 2022-10-14 | Stop reason: HOSPADM

## 2022-08-17 RX ORDER — NALOXONE HYDROCHLORIDE 4 MG/.1ML
SPRAY NASAL
Qty: 1 EACH | Refills: 2 | Status: SHIPPED | OUTPATIENT
Start: 2022-08-17

## 2022-08-17 RX ORDER — METHADONE HYDROCHLORIDE 5 MG/5ML
2.5 SOLUTION ORAL EVERY 12 HOURS
Qty: 75 ML | Refills: 0 | Status: SHIPPED | OUTPATIENT
Start: 2022-08-17 | End: 2022-08-22 | Stop reason: SDUPTHER

## 2022-08-17 RX ORDER — LIDOCAINE HYDROCHLORIDE 20 MG/ML
JELLY TOPICAL
Qty: 100 ML | Refills: 0 | Status: ON HOLD | OUTPATIENT
Start: 2022-08-17 | End: 2022-10-14 | Stop reason: HOSPADM

## 2022-08-17 NOTE — PROGRESS NOTES
Progress Note  Palliative Care      Reason for Consult: symptom management and ACP      ASSESSMENT/PLAN:     Plan/Recommendations:  Diagnoses and all orders for this visit:    Malignant neoplasm of stomach  - patient followed by Dr. Seaman  - recently rotated to new chemotherapy of Docetaxel     Encounter for palliative care/Advanced care planning  - patient decisional  - patient by herself in clinic today  - no ACP documents uploaded into EMR  - philosophy of Palliative Medicine reviewed with patient during previous hospitalization  - new patient folder given to and reviewed with patient at first clinic visit  - will follow up at future appointments about ACP booklet   - goals: life prolonging; patient wanting to continue disease directed therapy and continue to live. She reports that at times she finds herself irritated as she does not always want to talk about the cancer, but rather talk about other aspects of her life as well.     Cancer related pain/Neuropathy  - patient reporting moderate to severe pain in her abdomen and continued increased neuropathy  - patient's current regimen is methadone 2.5 mg BID and oxycodone 5 mg q6h prn  - patient was using methadone more as PRN, but within last couple of days, she restarted using it on scheduled basis. Requested her to message clinic in one week to see if pain improved with consistent use of methadone  - patient reporting pain well controlled but at times she has increased pain after her paracentesis  - patient also uses bentyl for abdominal cramps with some relief  - patient uses non-pharmacologic interventions (eg. Heating pad) with relief as well  - discussed use of lidocaine for topical pain around needle sites as well as on her flanks/feet   - patient does not use gabapentin, nortriptyline, or tramadol at this time  - opioid safety reviewed with patient previously during hospitalization   - opioid safety sheet in new patient folder for patient to review  -  patient also follows with integrative oncology    Dyspnea  - patient reporting moderate to severe dyspnea with certain activities or as fluid re-accumulates  - discussed finding balance between rest and activities as well as work around for activities for better tolerance  - tip sheet for shortness of breath in new patient folder for patient to review    Nausea/vomiting/Anorexia  - patient with some increased nausea (queaziness) and emesis with starting the new treatment  - she has started taking her dexamethasone as today is day 2 after infusion  - patient also having some intermittent anorexia  - she states sometimes certain foods/drinks will soothe her stomach and certain times they can upset her stomach. She eats small amounts at a time  - patient uses anti-emetics with some relief  - patient reporting the phenergan makes her sleepy  - will continue to monitor    Neoplastic (malignant) related fatigue/Insomnia  - patient having increased fatigue and increased insomnia  - patient reporting sometimes difficult to find a comfortable position to sleep, as she is having increased pain in her left thigh/groin.  - discussed using knee pillow as well as towels underneath her flank abdomen to keep her hips and abdomen in line while she tries to sleep  - discussed finding balance between rest and activities; discussed creating one to two goals to work toward throughout the day  - tip sheet for better sleep in new patient folder for patient to review  - patient is currently not using nortriptyline; low threshold to restart medication    Diarrhea  - patient with continued intermittent diarrhea  - discussed need for adequate hydration with diarrhea  - will discuss with oncologist prior to any pharmacologic intervention  - will continue close monitoring    Bilateral lower extremity edema/Malignant ascites/red skin  - patient reporting continued BLE edema that has worsened and spread to her bilateral thighs  - she states that  they continue to have worsening edema, which has now caused some dry skin/cracks in her feet  - patient currently on lasix with increased urinary output  - patient has scheduled weekly paracentesis for malignant ascites  - she has also noted that some areas of skin are thinner and turning red with increased ascites. Will send patient to wound care for evaluation and treatment    Hiccups  - patient continues to experience hiccups  - she takes baclofen tid prn with minimal relief  - she does not use gabapentin at this time  - will continue to monitor    Adjustment disorder with anxiety and depressed mood  - patient expressed today about the challenges she experiences during the day with her increased symptom burden  - she has increased anxiety around the new chemotherapy and if this will work  - she is starting to feel her emotions more, such as anger or sadness.   - she also expressed how she wants to be able to focus on other aspects of life rather than just cancer with her friends/family/support system. Discussed with patient about creating healthy boundaries, such as asking friends/family to not bring it up unless she does first or having another person (eg. Significant other) that questions can be directed to   - emotional support provided today  - will continue close monitoring and follow up    Understanding of illness/Prognosis: patient has good understanding of illness; overall prognosis is poor    Goals of care: life prolonging    Follow up: ~ 4-5 weeks    Patient's encounter and above plan of care discussed with patient's oncologist    SUBJECTIVE:     History of Present Illness:  Patient is a 41 y.o. year old female with anxiety and malignant neoplasm of stomach presents to Palliative Medicine for symptom management and ACP . Please see oncology notes for full details of oncologic history and treatment course.     08/17/2022:  LA  reviewed and summarized:  07/26/2022 Methadone 5mg/5ml Disp: 75 for 15  days  07/06/2022 Methadone 5mg/5ml Disp: 75 for 15 days    Today patient states she is doing okay. Patient just started on new chemotherapy on 8/16/22. Patient states she is experiencing some nausea today since taking chemotherapy. She continues to have severe pain all over. She has moderate anorexia, and she is able to eat small amounts. She is feeling more fatigued, as she is not sleeping well. Her pain in her thighs have worsened, which is making sleep more difficult. Patient has noted some early breakdown in skin. Patient continues to have diarrhea. She reports that she was using methadone more on PRN basis, but within last couple of days, she has started using more consistently. She is compliant with bentyl and baclofen. She does use oxycodone as needed for breakthrough pain. Patient does not use gabapentin, notriptyline, or tramadol. Patient expressed her increased anxiety around starting new treatment.     07/14/2022:  LA  reviewed and summarized:  07/06/2022 Methadone 5mg/5ml soln Disp: 75 for 15 days  06/21/2022 Methadone  5mg/5ml soln Disp: 75 for 15 days  06/20/2022 Oxycodone 5 mg Disp: 60 for 15 days    Patient presents today by herself. Overall pain is stable with current regimen. She does have some increased pain intermittently with paracentesis. Patient having intermittent dyspnea with exertion. Patient having some nausea with emesis. She states phenergan makes her sleepy. Patient's anorexia stable. She has been very fatigued recently. She has a hard time finding a comfortable position to sleep in. She is compliant with lasix for bilateral lower extremity edema, and she has noted increased urine output though no changes in her edema yet. Patient was seen in ED recently for diarrhea. Patient was first introduced to Palliative Medicine while hospitalized.     Past Medical History:   Diagnosis Date    Anxiety     Dehydration 5/30/2022    Gastric cancer     Gastric ulcer     Hx of psychiatric care  "    Pregnancy 08/12/2020    delivered on 8/12/2020    Umbilical hernia      Past Surgical History:   Procedure Laterality Date    CLOSURE OF PERFORATED ULCER OF DUODENUM USING OMENTAL PATCH      ESOPHAGOGASTRODUODENOSCOPY N/A 10/13/2020    Procedure: EGD (ESOPHAGOGASTRODUODENOSCOPY);  Surgeon: Rosio Marion MD;  Location: [facility] ENDO (2ND FLR);  Service: Endoscopy;  Laterality: N/A;    ESOPHAGOGASTRODUODENOSCOPY N/A 12/11/2020    Procedure: EGD (ESOPHAGOGASTRODUODENOSCOPY);  Surgeon: Rosio Marion MD;  Location: [facility] ENDO (2ND FLR);  Service: Endoscopy;  Laterality: N/A;  Covid-19 test 12/8/20 at [facility] -     ESOPHAGOGASTRODUODENOSCOPY N/A 3/12/2021    Procedure: EGD (ESOPHAGOGASTRODUODENOSCOPY);  Surgeon: Nav Omer MD;  Location: [facility] ENDO (2ND FLR);  Service: Endoscopy;  Laterality: N/A;  COVID at [facility] 3/9 ttr    ESOPHAGOGASTRODUODENOSCOPY N/A 5/18/2021    Procedure: EGD (ESOPHAGOGASTRODUODENOSCOPY);  Surgeon: Rosio Marion MD;  Location: [facility] ENDO (2ND FLR);  Service: Endoscopy;  Laterality: N/A;  5/15-covid pcw-inst portal-tb    ESOPHAGOGASTRODUODENOSCOPY N/A 5/26/2021    Procedure: EGD (ESOPHAGOGASTRODUODENOSCOPY);  Surgeon: Socrates Terrazas MD;  Location: [facility] ENDO (2ND FLR);  Service: Endoscopy;  Laterality: N/A;     Family History   Problem Relation Age of Onset    Cancer Mother 67        lymphoma (type? "not active," not on tx; "related to her blood")    Schizophrenia Mother     Lung cancer Father 48        type? h/o smoking    No Known Problems Son     Breast cancer Other 73        unilat; stage I, but aggressive    Prostate cancer Paternal Uncle         (dx 50s/60? no chemo?)    Breast cancer Paternal Cousin         (dx age?) ductal    Colon cancer Neg Hx     Esophageal cancer Neg Hx      Review of patient's allergies indicates:  No Known Allergies    Medications:    Current Outpatient Medications:     baclofen (LIORESAL) 5 mg Tab tablet, Take 1 tablet (5 mg " total) by mouth 3 (three) times daily., Disp: 30 tablet, Rfl: 0    dexAMETHasone (DECADRON) 4 MG Tab, 8 mg (two tabs) twice daily on the day before chemo and the day after chemo, Disp: 32 tablet, Rfl: 2    dicyclomine (BENTYL) 10 MG capsule, TAKE ONE CAPSULE BY MOUTH FOUR TIMES DAILY, Disp: 120 capsule, Rfl: 0    famotidine (PEPCID) 20 MG tablet, Take 1 tablet (20 mg total) by mouth nightly as needed for Heartburn., Disp: 60 tablet, Rfl: 1    furosemide (LASIX) 20 MG tablet, Take 1 tablet (20 mg total) by mouth once daily., Disp: 30 tablet, Rfl: 2    gabapentin (NEURONTIN) 300 MG capsule, Take 1 capsule (300 mg total) by mouth 3 (three) times daily., Disp: 90 capsule, Rfl: 11    hydrocortisone 2.5 % cream, Apply topically 2 (two) times daily., Disp: 3.5 g, Rfl: 0    LIDOcaine HCL 2% (XYLOCAINE) 2 % jelly, Apply topically as needed., Disp: 100 mL, Rfl: 0    LIDOcaine-prilocaine (EMLA) cream, Apply to Port-A-Cath area 30 to 45 minutes prior to port access as directed (topical anesthetic use to the anterior chest only)., Disp: , Rfl:     methadone (DOLOPHINE) 5 mg/5 mL solution, Take 2.5 mLs (2.5 mg total) by mouth every 12 (twelve) hours., Disp: 75 mL, Rfl: 0    metoclopramide HCl (REGLAN) 5 MG tablet, 5 mg., Disp: , Rfl:     multivitamin with folic acid 400 mcg Tab, Take 1 tablet by mouth once daily., Disp: , Rfl:     nortriptyline (PAMELOR) 25 MG capsule, Take 1 capsule (25 mg total) by mouth every evening., Disp: 30 capsule, Rfl: 11    omeprazole (PRILOSEC) 40 MG capsule, Take 1 capsule (40 mg total) by mouth 2 (two) times daily before meals., Disp: 60 capsule, Rfl: 11    ondansetron (ZOFRAN) 8 MG tablet, Take 1 tablet (8 mg total) by mouth every 8 (eight) hours as needed for Nausea., Disp: 60 tablet, Rfl: 2    oxyCODONE (ROXICODONE) 5 MG immediate release tablet, Take 1 tablet (5 mg total) by mouth every 6 (six) hours as needed., Disp: 60 tablet, Rfl: 0    polyethylene glycol (GLYCOLAX) 17  gram/dose powder, Mix 1 capful (17 g) with liquid and take by mouth once daily for 20 days, Disp: 510 g, Rfl: 0    prochlorperazine (COMPAZINE) 10 MG tablet, TAKE 1 TABLET(10 MG) BY MOUTH EVERY 6 HOURS AS NEEDED FOR NAUSEA, Disp: 90 tablet, Rfl: 2    promethazine (PHENERGAN) 25 MG tablet, Take 1 tablet (25 mg total) by mouth every 6 (six) hours as needed for Nausea., Disp: 60 tablet, Rfl: 2    senna-docusate 8.6-50 mg (SENNA WITH DOCUSATE SODIUM) 8.6-50 mg per tablet, Take 1 tablet by mouth 2 (two) times daily as needed for Constipation., Disp: , Rfl:     sucralfate (CARAFATE) 1 gram tablet, TAKE 1 TABLET(1 GRAM) BY MOUTH FOUR TIMES DAILY FOR 14 DAYS, Disp: 56 tablet, Rfl: 0    sucralfate (CARAFATE) 1 gram tablet, , Disp: , Rfl:     traMADoL (ULTRAM) 50 mg tablet, Take 50 mg by mouth., Disp: , Rfl:   No current facility-administered medications for this visit.    OBJECTIVE:       ROS:  Review of Systems   Constitutional: Positive for activity change, appetite change and fatigue.   HENT: Negative.    Eyes: Negative.    Respiratory: Positive for shortness of breath.    Cardiovascular: Positive for leg swelling.   Gastrointestinal: Positive for abdominal distention, abdominal pain, diarrhea, nausea and vomiting.   Genitourinary: Negative.    Musculoskeletal: Positive for arthralgias and myalgias.   Skin: Negative.    Neurological: Negative.    Psychiatric/Behavioral: Positive for sleep disturbance. Negative for dysphoric mood. The patient is nervous/anxious.    All other systems reviewed and are negative.      Review of Symptoms    Symptom Assessment (ESAS 0-10 Scale)  Pain:  8  Dyspnea:  6  Anxiety:  8  Nausea:  8  Depression:  0  Anorexia:  4  Fatigue:  7  Insomnia:  8  Restlessness:  0  Agitation:  0     CAM / Delirium:  Negative  Constipation:  Negative  Diarrhea:  Positive    Anxiety:  Is nervous/anxious    Bowel Management Plan (BMP):  No      Pain Assessment:  OME in 24 hours:  ~30-40  Location(s): abdomen  and groin    Abdomen       Location: generalized        Quality: aching and pressure-like        Quantity: 8/10 in intensity        Chronicity: Onset 2 month(s) ago, unchanged        Aggravating Factors: none        Alleviating Factors: opiates        Associated Symptoms: none  Groin       Location: left        Quality: sharp, stabbing and shooting        Quantity: 8/10 in intensity        Chronicity: Onset 2 week(s) ago, unchanged        Aggravating Factors: pressure        Alleviating Factors: opiates        Associated Symptoms: none    Modified Maeve Scale:  1    ECOG Performance Status ndGndrndanddndend:nd nd2nd Living Arrangements:  Lives with family    Psychosocial/Cultural: Patient lives with her boyfriend and their 3 y/o child      Advance Care Planning   Advance Directives:   Living Will: No    LaPOST: No    Do Not Resuscitate Status: No    Medical Power of : No      Decision Making:  Patient answered questions          Physical Exam:  Vitals:     Vitals:    08/17/22 1311   BP: 120/89   Pulse: 101     Physical Exam  Vitals reviewed.   Constitutional:       General: She is not in acute distress.     Appearance: She is ill-appearing. She is not toxic-appearing.   HENT:      Head: Normocephalic and atraumatic.      Right Ear: External ear normal.      Left Ear: External ear normal.   Eyes:      General: No scleral icterus.        Right eye: No discharge.         Left eye: No discharge.   Neck:      Comments: Trachea midline  Pulmonary:      Effort: Pulmonary effort is normal. No respiratory distress.   Abdominal:      General: There is distension.      Tenderness: There is abdominal tenderness.      Comments: Flank fullness from ascites   Musculoskeletal:         General: No signs of injury.      Cervical back: Normal range of motion.      Right lower leg: Edema present.      Left lower leg: Edema present.   Skin:     General: Skin is dry.      Coloration: Skin is not jaundiced.      Findings: No rash.  "  Neurological:      General: No focal deficit present.      Mental Status: She is alert and oriented to person, place, and time.   Psychiatric:         Mood and Affect: Mood is anxious and depressed. Affect is tearful.         Behavior: Behavior normal.         Thought Content: Thought content normal.         Judgment: Judgment normal.         Labs:  CBC:   WBC   Date Value Ref Range Status   08/08/2022 11.69 3.90 - 12.70 K/uL Final     Hemoglobin   Date Value Ref Range Status   08/08/2022 10.9 (L) 12.0 - 16.0 g/dL Final     Hematocrit   Date Value Ref Range Status   08/08/2022 36.9 (L) 37.0 - 48.5 % Final     MCV   Date Value Ref Range Status   08/08/2022 84 82 - 98 fL Final     Platelets   Date Value Ref Range Status   08/08/2022 425 150 - 450 K/uL Final       LFT:   Lab Results   Component Value Date    AST 15 08/08/2022    ALKPHOS 76 08/08/2022    BILITOT 0.4 08/08/2022       Albumin:   Albumin   Date Value Ref Range Status   08/08/2022 1.5 (L) 3.5 - 5.2 g/dL Final     Protein:   Total Protein   Date Value Ref Range Status   08/08/2022 5.4 (L) 6.0 - 8.4 g/dL Final       Radiology:I have reviewed all pertinent imaging results/findings within the past 24 hours.    08/03/2022 CT C/A/P per Care Everywhere: "  Chest:  1. Redemonstration of an area of focal hypoattenuation the right internal jugular vein that remain suspect for nonocclusive right internal jugular vein thrombus. This can be further evaluated by correlating to ultrasound.  2. Increased large left pleural effusion as compared to 4/14/2022."    Abdomen and pelvis:  1. Increasing bilateral ovarian metastases, abdominopelvic ascites and carcinomatosis as compared to 6/17/22.  2. Increased conspicuity of the hypoattenuating lesion within hepatic segment VII compatible with a metastasis.  3. Unchanged extensive distal gastric wall thickening consistent with primary neoplasm."      Signature: Sharon Brink MD          "

## 2022-08-18 ENCOUNTER — HOSPITAL ENCOUNTER (OUTPATIENT)
Dept: INTERVENTIONAL RADIOLOGY/VASCULAR | Facility: HOSPITAL | Age: 42
Discharge: HOME | End: 2022-08-18
Attending: INTERNAL MEDICINE | Admitting: RADIOLOGY
Payer: COMMERCIAL

## 2022-08-18 ENCOUNTER — HOSPITAL ENCOUNTER (OUTPATIENT)
Dept: RADIOLOGY | Facility: HOSPITAL | Age: 42
Discharge: HOME | End: 2022-08-18
Attending: PHYSICIAN ASSISTANT
Payer: COMMERCIAL

## 2022-08-18 VITALS — SYSTOLIC BLOOD PRESSURE: 118 MMHG | DIASTOLIC BLOOD PRESSURE: 78 MMHG

## 2022-08-18 DIAGNOSIS — R18.8 OTHER ASCITES: ICD-10-CM

## 2022-08-18 DIAGNOSIS — M79.659 PAIN OF THIGH, UNSPECIFIED LATERALITY: ICD-10-CM

## 2022-08-18 PROCEDURE — 93970 US LOWER EXTREMITY VEINS BILATERAL: ICD-10-PCS | Mod: 26,,, | Performed by: RADIOLOGY

## 2022-08-18 PROCEDURE — 93970 EXTREMITY STUDY: CPT | Mod: 26,,, | Performed by: RADIOLOGY

## 2022-08-18 PROCEDURE — 93970 EXTREMITY STUDY: CPT | Mod: TC

## 2022-08-18 PROCEDURE — 49083 IR PARACENTESIS WITH IMAGING: ICD-10-PCS | Mod: ,,, | Performed by: RADIOLOGY

## 2022-08-18 PROCEDURE — 49083 ABD PARACENTESIS W/IMAGING: CPT | Performed by: RADIOLOGY

## 2022-08-18 PROCEDURE — C1729 CATH, DRAINAGE: HCPCS

## 2022-08-18 RX ORDER — ALBUMIN HUMAN 250 G/1000ML
37.5 SOLUTION INTRAVENOUS ONCE AS NEEDED
Status: DISCONTINUED | OUTPATIENT
Start: 2022-08-18 | End: 2022-08-19 | Stop reason: HOSPADM

## 2022-08-18 RX ORDER — HYDROCODONE BITARTRATE AND ACETAMINOPHEN 5; 325 MG/1; MG/1
1 TABLET ORAL EVERY 4 HOURS PRN
Status: DISCONTINUED | OUTPATIENT
Start: 2022-08-18 | End: 2022-08-19 | Stop reason: HOSPADM

## 2022-08-18 NOTE — BRIEF OP NOTE
Radiology Post-Procedure Note     Pre Op Diagnosis: Recurrent painful, tense ascites  Post Op Diagnosis: Same     Procedure: 1. US-guided percutaneous RUQ-approach therapeutic LVP     Procedure performed by: Castro Bermudez MD     Written Informed Consent Obtained: Yes  Specimen Removed: YES, 5,200-cc of thin, serosanguinous ascitic fluid  Estimated Blood Loss: none     Findings:   Successful US-guided percutaneous RUQ-approach therapeutic LVP with local anesthetic only and albumin infusion post PRN as indicated per institutional protocol. Patient tolerated the procedure well. No immediate post-procedural complications noted.      Thank you for considering IR for the care of your patient.      Castro Bermudez MD  Interventional Radiology

## 2022-08-18 NOTE — H&P
Radiology History & Physical      SUBJECTIVE:     Chief Complaint: Recurrent painful, tense ascites     History of Present Illness:  Puja Quigley is a 41 y.o. female with pertinent PMHx of gastric adenocarcinoma with osseous, hepatic and peritoneal metastases complicated by recurrent abdominal distension and pain 2/2 recurrent painful, tense ascites requiring frequent decompression for symptom relief.     A new outpatient IR consult received for US-guided percutaneous RUQ-approach therapeutic large-volume paracentesis.    Past Medical History:   Diagnosis Date    Anxiety     Dehydration 5/30/2022    Gastric cancer     Gastric ulcer     Hx of psychiatric care     Pregnancy 08/12/2020    delivered on 8/12/2020    Umbilical hernia      Past Surgical History:   Procedure Laterality Date    CLOSURE OF PERFORATED ULCER OF DUODENUM USING OMENTAL PATCH      ESOPHAGOGASTRODUODENOSCOPY N/A 10/13/2020    Procedure: EGD (ESOPHAGOGASTRODUODENOSCOPY);  Surgeon: Rosio Marion MD;  Location: [facility] ENDO (2ND FLR);  Service: Endoscopy;  Laterality: N/A;    ESOPHAGOGASTRODUODENOSCOPY N/A 12/11/2020    Procedure: EGD (ESOPHAGOGASTRODUODENOSCOPY);  Surgeon: Rosio Marion MD;  Location: [facility] ENDO (2ND FLR);  Service: Endoscopy;  Laterality: N/A;  Covid-19 test 12/8/20 at [facility]    ESOPHAGOGASTRODUODENOSCOPY N/A 3/12/2021    Procedure: EGD (ESOPHAGOGASTRODUODENOSCOPY);  Surgeon: Nav Omer MD;  Location: [facility] ENDO (2ND FLR);  Service: Endoscopy;  Laterality: N/A;  COVID at [facility] [place] by [facility] 3/9 ttr    ESOPHAGOGASTRODUODENOSCOPY N/A 5/18/2021    Procedure: EGD (ESOPHAGOGASTRODUODENOSCOPY);  Surgeon: Rosio Marion MD;  Location: [facility] ENDO (2ND FLR);  Service: Endoscopy;  Laterality: N/A;  5/15-covid pcw-inst portal-tb    ESOPHAGOGASTRODUODENOSCOPY N/A 5/26/2021    Procedure: EGD (ESOPHAGOGASTRODUODENOSCOPY);  Surgeon: Socrates Terrazas MD;  Location: [facility] ENDO (2ND FLR);  Service: Endoscopy;   Laterality: N/A;     Home Meds:   Prior to Admission medications    Medication Sig Start Date End Date Taking? Authorizing Provider   baclofen (LIORESAL) 5 mg Tab tablet Take 1 tablet (5 mg total) by mouth 3 (three) times daily. 7/15/22   Sharon Brink MD   dexAMETHasone (DECADRON) 4 MG Tab 8 mg (two tabs) twice daily on the day before chemo and the day after chemo 8/8/22   Fer Ashraf MD   dicyclomine (BENTYL) 10 MG capsule TAKE ONE CAPSULE BY MOUTH FOUR TIMES DAILY 9/27/21   Kamille Hooks PA-C   famotidine (PEPCID) 20 MG tablet Take 1 tablet (20 mg total) by mouth nightly as needed for Heartburn. 2/23/22 2/23/23  Kamille Hooks PA-C   furosemide (LASIX) 20 MG tablet Take 1 tablet (20 mg total) by mouth once daily. 7/6/22   Fer Ashraf MD   gabapentin (NEURONTIN) 300 MG capsule Take 1 capsule (300 mg total) by mouth 3 (three) times daily. 2/7/22 2/7/23  Kamille Hooks PA-C   hydrocortisone 2.5 % cream Apply topically 2 (two) times daily. 7/11/22   Bethel Shetty MD   LIDOcaine HCL 2% (XYLOCAINE) 2 % jelly Apply topically as needed. 8/17/22   Sharon Brink MD   LIDOcaine-prilocaine (EMLA) cream Apply to Port-A-Cath area 30 to 45 minutes prior to port access as directed (topical anesthetic use to the anterior chest only). 8/17/22   Sharon Brink MD   methadone (DOLOPHINE) 5 mg/5 mL solution Take 2.5 mLs (2.5 mg total) by mouth every 12 (twelve) hours. 8/17/22   Sharon Brink MD   metoclopramide HCl (REGLAN) 5 MG tablet 5 mg. 6/11/21   Historical Provider   multivitamin with folic acid 400 mcg Tab Take 1 tablet by mouth once daily.    Historical Provider   naloxone (NARCAN) 4 mg/actuation Spry 4mg by nasal route as needed for opioid overdose; may repeat every 2-3 minutes in alternating nostrils until medical help arrives. Call 911 8/17/22   Sharon Brink MD   nortriptyline (PAMELOR) 25 MG capsule Take 1 capsule (25 mg total) by mouth every evening. 6/20/22  6/20/23  Bren Allen MD   omeprazole (PRILOSEC) 40 MG capsule Take 1 capsule (40 mg total) by mouth 2 (two) times daily before meals. 6/20/22 6/20/23  Bren Allen MD   ondansetron (ZOFRAN) 8 MG tablet Take 1 tablet (8 mg total) by mouth every 8 (eight) hours as needed for Nausea. 7/26/21   Fer Ashraf MD   oxyCODONE (ROXICODONE) 5 MG immediate release tablet Take 1 tablet (5 mg total) by mouth every 6 (six) hours as needed. 6/20/22   Bren Allen MD   polyethylene glycol (GLYCOLAX) 17 gram/dose powder Mix 1 capful (17 g) with liquid and take by mouth once daily for 20 days 6/21/22   Bren Allen MD   prochlorperazine (COMPAZINE) 10 MG tablet TAKE 1 TABLET(10 MG) BY MOUTH EVERY 6 HOURS AS NEEDED FOR NAUSEA 8/7/21   Fre Ashraf MD   promethazine (PHENERGAN) 25 MG tablet Take 1 tablet (25 mg total) by mouth every 6 (six) hours as needed for Nausea. 6/13/22   Fer Ashraf MD   senna-docusate 8.6-50 mg (SENNA WITH DOCUSATE SODIUM) 8.6-50 mg per tablet Take 1 tablet by mouth 2 (two) times daily as needed for Constipation. 5/26/21   Reshma Wilson MD   sucralfate (CARAFATE) 1 gram tablet TAKE 1 TABLET(1 GRAM) BY MOUTH FOUR TIMES DAILY FOR 14 DAYS 10/20/21   Rosio Marion MD   sucralfate (CARAFATE) 1 gram tablet  4/8/21   Historical Provider   traMADoL (ULTRAM) 50 mg tablet Take 50 mg by mouth. 6/25/21   Historical Provider   amitriptyline (ELAVIL) 10 MG tablet Take 1 tablet (10 mg total) by mouth every evening. 5/13/21 6/20/22  Socrates Terrazas MD   pantoprazole (PROTONIX) 40 MG tablet Take 1 tablet (40 mg total) by mouth 2 (two) times daily. 5/25/22 6/20/22  Kamille Hooks PA-C     Anticoagulants/Antiplatelets: no anticoagulation     Allergies: Review of patient's allergies indicates:  No Known Allergies      Sedation History:  no adverse reactions     Review of Systems:   Hematological: no known coagulopathies  Respiratory: no cough, shortness of breath, or wheezing  Cardiovascular: no chest  pain or dyspnea on exertion  Gastrointestinal: positive for - abdominal pain and distension  Genito-Urinary: no dysuria, trouble voiding, or hematuria  Musculoskeletal: negative  Neurological: no TIA or stroke symptoms      OBJECTIVE:     Vital Signs (Most Recent)       Physical Exam:  General: no acute distress  Mental Status: alert and oriented to person, place and time  HEENT: normocephalic, atraumatic  Chest: unlabored breathing  Heart: regular heart rate  Abdomen: +distended with +fluid wave. No TTP/r/g.  Extremity: moves all extremities    Laboratory  Lab Results   Component Value Date    INR 1.0 06/10/2021       Lab Results   Component Value Date    WBC 11.69 08/08/2022    HGB 10.9 (L) 08/08/2022    HCT 36.9 (L) 08/08/2022    MCV 84 08/08/2022     08/08/2022      Lab Results   Component Value Date     08/08/2022     08/08/2022    K 4.2 08/08/2022     08/08/2022    CO2 24 08/08/2022    BUN 11 08/08/2022    CREATININE 0.7 08/08/2022    CALCIUM 8.8 08/08/2022    MG 1.9 06/20/2022    ALT 7 (L) 08/08/2022    AST 15 08/08/2022    ALBUMIN 1.5 (L) 08/08/2022    BILITOT 0.4 08/08/2022     ASSESSMENT/PLAN:     41 y.o. female with pertinent PMHx of gastric adenocarcinoma with osseous, hepatic and peritoneal metastases complicated by recurrent abdominal distension and pain 2/2 recurrent painful, tense ascites requiring frequent decompression for symptom relief.     1. Recurrent painful, tense ascites - Will attempt US-guided percutaneous RUQ-approach therapeutic LVP with local anesthetic only and albumin infusion post PRN as indicated per institutional protocol.     Risks (including, but not limited to, pain, bleeding, infection, damage to nearby structures, failure to obtain sufficient material for a diagnosis, the need for additional procedures, and death), benefits, and alternatives were discussed with the patient. All questions were answered to the best of my abilities. The patient wishes to  proceed with the procedure. Written informed consent was obtained.     Thank you for considering IR for the care of your patient.      Castro Bermudez MD  Interventional Radiology

## 2022-08-18 NOTE — DISCHARGE SUMMARY
Radiology Discharge Summary      Hospital Course: No complications    Admit Date: 8/18/2022  Discharge Date: 08/18/2022     Instructions Given to Patient: Yes  Diet: Resume prior diet  Activity: activity as tolerated    Description of Condition on Discharge: Stable  Vital Signs (Most Recent): BP: 118/78 (08/18/22 1210)    Discharge Disposition: Home    Discharge Diagnosis:  41 y.o. female with recurrent painful, tense ascites s/p successful US-guided percutaneous RUQ-approach therapeutic LVP with local anesthetic only and albumin infusion post PRN as indicated per institutional protocol. Patient tolerated the procedure well. No immediate post-procedural complications noted.      Thank you for considering IR for the care of your patient.      Castro Bermudez MD  Interventional Radiology

## 2022-08-19 ENCOUNTER — PATIENT MESSAGE (OUTPATIENT)
Dept: HEMATOLOGY/ONCOLOGY | Facility: CLINIC | Age: 42
End: 2022-08-19

## 2022-08-19 ENCOUNTER — CLINICAL SUPPORT (OUTPATIENT)
Dept: HEMATOLOGY/ONCOLOGY | Facility: CLINIC | Age: 42
End: 2022-08-19
Payer: COMMERCIAL

## 2022-08-19 VITALS — HEIGHT: 67 IN | BODY MASS INDEX: 23.18 KG/M2 | WEIGHT: 147.69 LBS

## 2022-08-19 DIAGNOSIS — E43 SEVERE MALNUTRITION: ICD-10-CM

## 2022-08-19 DIAGNOSIS — C16.9 MALIGNANT NEOPLASM OF STOMACH, UNSPECIFIED LOCATION: ICD-10-CM

## 2022-08-19 DIAGNOSIS — Z71.3 NUTRITIONAL COUNSELING: Primary | ICD-10-CM

## 2022-08-19 DIAGNOSIS — R60.0 BILATERAL LOWER EXTREMITY EDEMA: Chronic | ICD-10-CM

## 2022-08-19 DIAGNOSIS — C79.51 BONE METASTASES: ICD-10-CM

## 2022-08-19 PROCEDURE — 97802 PR MED NUTR THER, 1ST, INDIV, EA 15 MIN: ICD-10-PCS | Mod: S$GLB,,, | Performed by: DIETITIAN, REGISTERED

## 2022-08-19 PROCEDURE — 97802 MEDICAL NUTRITION INDIV IN: CPT | Mod: S$GLB,,, | Performed by: DIETITIAN, REGISTERED

## 2022-08-19 PROCEDURE — 99999 PR PBB SHADOW E&M-EST. PATIENT-LVL II: CPT | Mod: PBBFAC,,, | Performed by: DIETITIAN, REGISTERED

## 2022-08-19 PROCEDURE — 99999 PR PBB SHADOW E&M-EST. PATIENT-LVL II: ICD-10-PCS | Mod: PBBFAC,,, | Performed by: DIETITIAN, REGISTERED

## 2022-08-19 NOTE — PROGRESS NOTES
"Oncology Nutrition Assessment for Medical Nutrition Therapy  Initial Visit    Puja Quigley   1980    Referring Provider:  Fer Ashraf MD      Reason for Visit: Pt in for education and nutrition counseling     PMHx:   Past Medical History:   Diagnosis Date    Anxiety     Dehydration 5/30/2022    Gastric cancer     Gastric ulcer     Hx of psychiatric care     Pregnancy 08/12/2020    delivered on 8/12/2020    Umbilical hernia        Nutrition Assessment    This is a 41 y.o.female with with metastatic gastric cancer. Currently on third line treatment with biweekly docetaxel. Previously treated with FOLFOX then FOLFIRI. She is referred today due to malnutrition and weight loss.   She reports a lot of stomach upset, cramping, vomiting since being on FOLFIRI. Starting to feel better, but still gets acid/bile build up in the morning that causes vomiting. She has had ascites requiring weekly paracentesis. Has 5L removed yesterday. Also bilateral lower extremity edema which may be related to malnutrition. Reports this has been ongoing since June. Her weight is artificially elevated from this. She has noticeable cachexia.   She reports her appetite is actually pretty good. Uses THC edibles which helps a lot. She has been making oatmeal or omelettes in the morning and hot meals in the evening. She avoids beef and pork, and anything heavy, spicy or greasy. Otherwise eats mostly anything. She has been using [employer] chocolate 1.4 usually once per day. Can drink 2 per day sometimes. Likes Ow[employer] protein as well. Avoids dairy and only does plant based drinks.     Weight:67 kg (147 lb 11.3 oz)  Height:5' 7" (1.702 m)  BMI:Body mass index is 23.13 kg/m².   IBW: Ideal body weight: 61.6 kg (135 lb 12.9 oz)  Adjusted ideal body weight: 63.8 kg (140 lb 9 oz)    Allergies: Patient has no known allergies.    Current Medications:    Current Outpatient Medications:     baclofen (LIORESAL) 5 mg Tab tablet, Take " 1 tablet (5 mg total) by mouth 3 (three) times daily., Disp: 30 tablet, Rfl: 0    dexAMETHasone (DECADRON) 4 MG Tab, 8 mg (two tabs) twice daily on the day before chemo and the day after chemo, Disp: 32 tablet, Rfl: 2    dicyclomine (BENTYL) 10 MG capsule, TAKE ONE CAPSULE BY MOUTH FOUR TIMES DAILY, Disp: 120 capsule, Rfl: 0    famotidine (PEPCID) 20 MG tablet, Take 1 tablet (20 mg total) by mouth nightly as needed for Heartburn., Disp: 60 tablet, Rfl: 1    furosemide (LASIX) 20 MG tablet, Take 1 tablet (20 mg total) by mouth once daily., Disp: 30 tablet, Rfl: 2    gabapentin (NEURONTIN) 300 MG capsule, Take 1 capsule (300 mg total) by mouth 3 (three) times daily., Disp: 90 capsule, Rfl: 11    hydrocortisone 2.5 % cream, Apply topically 2 (two) times daily., Disp: 3.5 g, Rfl: 0    LIDOcaine HCL 2% (XYLOCAINE) 2 % jelly, Apply topically as needed., Disp: 100 mL, Rfl: 0    LIDOcaine-prilocaine (EMLA) cream, Apply to Port-A-Cath area 30 to 45 minutes prior to port access as directed (topical anesthetic use to the anterior chest only)., Disp: 30 g, Rfl: 2    methadone (DOLOPHINE) 5 mg/5 mL solution, Take 2.5 mLs (2.5 mg total) by mouth every 12 (twelve) hours., Disp: 75 mL, Rfl: 0    metoclopramide HCl (REGLAN) 5 MG tablet, 5 mg., Disp: , Rfl:     multivitamin with folic acid 400 mcg Tab, Take 1 tablet by mouth once daily., Disp: , Rfl:     naloxone (NARCAN) 4 mg/actuation Spry, 4mg by nasal route as needed for opioid overdose; may repeat every 2-3 minutes in alternating nostrils until medical help arrives. Call 911, Disp: 1 each, Rfl: 2    nortriptyline (PAMELOR) 25 MG capsule, Take 1 capsule (25 mg total) by mouth every evening., Disp: 30 capsule, Rfl: 11    omeprazole (PRILOSEC) 40 MG capsule, Take 1 capsule (40 mg total) by mouth 2 (two) times daily before meals., Disp: 60 capsule, Rfl: 11    ondansetron (ZOFRAN) 8 MG tablet, Take 1 tablet (8 mg total) by mouth every 8 (eight) hours as needed for  Nausea., Disp: 60 tablet, Rfl: 2    oxyCODONE (ROXICODONE) 5 MG immediate release tablet, Take 1 tablet (5 mg total) by mouth every 6 (six) hours as needed., Disp: 60 tablet, Rfl: 0    polyethylene glycol (GLYCOLAX) 17 gram/dose powder, Mix 1 capful (17 g) with liquid and take by mouth once daily for 20 days, Disp: 510 g, Rfl: 0    prochlorperazine (COMPAZINE) 10 MG tablet, TAKE 1 TABLET(10 MG) BY MOUTH EVERY 6 HOURS AS NEEDED FOR NAUSEA, Disp: 90 tablet, Rfl: 2    promethazine (PHENERGAN) 25 MG tablet, Take 1 tablet (25 mg total) by mouth every 6 (six) hours as needed for Nausea., Disp: 60 tablet, Rfl: 2    senna-docusate 8.6-50 mg (SENNA WITH DOCUSATE SODIUM) 8.6-50 mg per tablet, Take 1 tablet by mouth 2 (two) times daily as needed for Constipation., Disp: , Rfl:     sucralfate (CARAFATE) 1 gram tablet, TAKE 1 TABLET(1 GRAM) BY MOUTH FOUR TIMES DAILY FOR 14 DAYS, Disp: 56 tablet, Rfl: 0    sucralfate (CARAFATE) 1 gram tablet, , Disp: , Rfl:     traMADoL (ULTRAM) 50 mg tablet, Take 50 mg by mouth., Disp: , Rfl:   No current facility-administered medications for this visit.    Vitamins/Supplements: none currently     Labs: Reviewed from 8/8- Albumin 1.5    Nutrition Diagnosis    Problem: malnutrition  Etiology (related to): advanced cancer  Signs/Symptoms (as evidenced by): severe muscle and fat losses    Nutrition Intervention    Nutrition Prescription based on estimated dry weight of 56kg  2000 Kcals (35kcal/kg)  84 g protein (1.5g/kg)   2000 mL fluid (35mL/kg) unless restricted per MD recs     Recommendations:  2 meals and 2 high protein-calorie supplements or smoothies daily   Use nut butters/peanut butter and milk (coconut milk) in oatmeal for more calories   Snack before bedtime (Perfect Bars or small peanut butter sandwich)  Continue [employer] 1.4 drinks   Smoothie recipes using [employer] provided     Add supplements to help with GI upset:   Probiotics daily -refrigerated from [employer]   Fish  oil 2000mg daily (purified/burpless) for cachexia   Ginger/peppermint tummy drops as needed(samples provided)     Materials Provided/Reviewed [employer] recipes, high protein and high calorie healthy foods  Discussed goals of at least 80g protein and 2000 calories per day     Nutrition Monitoring and Evaluation    Monitor: energy intake, diet tolerance  and weight    Goals: improved lean body mass    Follow up PRN- patient will follow up through portal    Communication to referring provider/care team: note available in chart     Counseling time: 30 Minutes    Eda Melgoza, MPH, RD, , LDN, FAND   [phone]

## 2022-08-22 ENCOUNTER — PATIENT MESSAGE (OUTPATIENT)
Dept: HEMATOLOGY/ONCOLOGY | Facility: CLINIC | Age: 42
End: 2022-08-22
Payer: COMMERCIAL

## 2022-08-22 DIAGNOSIS — J90 PLEURAL EFFUSION, LEFT: Primary | ICD-10-CM

## 2022-08-23 ENCOUNTER — PATIENT MESSAGE (OUTPATIENT)
Dept: HEMATOLOGY/ONCOLOGY | Facility: CLINIC | Age: 42
End: 2022-08-23
Payer: COMMERCIAL

## 2022-08-24 ENCOUNTER — PATIENT MESSAGE (OUTPATIENT)
Dept: HEMATOLOGY/ONCOLOGY | Facility: CLINIC | Age: 42
End: 2022-08-24
Payer: COMMERCIAL

## 2022-08-24 ENCOUNTER — TELEPHONE (OUTPATIENT)
Dept: HEMATOLOGY/ONCOLOGY | Facility: CLINIC | Age: 42
End: 2022-08-24
Payer: COMMERCIAL

## 2022-08-24 ENCOUNTER — PATIENT MESSAGE (OUTPATIENT)
Dept: PALLIATIVE MEDICINE | Facility: CLINIC | Age: 42
End: 2022-08-24
Payer: COMMERCIAL

## 2022-08-24 ENCOUNTER — HOSPITAL ENCOUNTER (OUTPATIENT)
Dept: RADIOLOGY | Facility: HOSPITAL | Age: 42
Discharge: HOME | End: 2022-08-24
Attending: RADIOLOGY
Payer: COMMERCIAL

## 2022-08-24 ENCOUNTER — HOSPITAL ENCOUNTER (OUTPATIENT)
Dept: INTERVENTIONAL RADIOLOGY/VASCULAR | Facility: HOSPITAL | Age: 42
Discharge: HOME | End: 2022-08-24
Attending: INTERNAL MEDICINE
Payer: COMMERCIAL

## 2022-08-24 VITALS — SYSTOLIC BLOOD PRESSURE: 131 MMHG | HEART RATE: 124 BPM | OXYGEN SATURATION: 97 % | DIASTOLIC BLOOD PRESSURE: 97 MMHG

## 2022-08-24 DIAGNOSIS — R18.8 OTHER ASCITES: ICD-10-CM

## 2022-08-24 DIAGNOSIS — G89.3 CANCER RELATED PAIN: ICD-10-CM

## 2022-08-24 DIAGNOSIS — J90 PLEURAL EFFUSION, LEFT: Primary | ICD-10-CM

## 2022-08-24 DIAGNOSIS — C16.9 MALIGNANT NEOPLASM OF STOMACH, UNSPECIFIED LOCATION: ICD-10-CM

## 2022-08-24 DIAGNOSIS — J90 PLEURAL EFFUSION, LEFT: ICD-10-CM

## 2022-08-24 PROCEDURE — 25000003 PHARM REV CODE 250: Performed by: RADIOLOGY

## 2022-08-24 PROCEDURE — 71045 XR CHEST AP PORTABLE: ICD-10-PCS | Mod: 26,,, | Performed by: RADIOLOGY

## 2022-08-24 PROCEDURE — P9047 ALBUMIN (HUMAN), 25%, 50ML: HCPCS | Mod: JG | Performed by: RADIOLOGY

## 2022-08-24 PROCEDURE — 63600175 PHARM REV CODE 636 W HCPCS: Mod: JG | Performed by: RADIOLOGY

## 2022-08-24 PROCEDURE — 32555 IR THORACENTESIS WITH IMAGING: ICD-10-PCS | Mod: LT,,, | Performed by: RADIOLOGY

## 2022-08-24 PROCEDURE — 49083 ABD PARACENTESIS W/IMAGING: CPT | Performed by: RADIOLOGY

## 2022-08-24 PROCEDURE — 49083 IR PARACENTESIS WITH IMAGING: ICD-10-PCS | Mod: ,,, | Performed by: RADIOLOGY

## 2022-08-24 PROCEDURE — 71045 X-RAY EXAM CHEST 1 VIEW: CPT | Mod: 26,,, | Performed by: RADIOLOGY

## 2022-08-24 PROCEDURE — C1729 CATH, DRAINAGE: HCPCS

## 2022-08-24 PROCEDURE — 32555 ASPIRATE PLEURA W/ IMAGING: CPT | Performed by: RADIOLOGY

## 2022-08-24 PROCEDURE — 71045 X-RAY EXAM CHEST 1 VIEW: CPT | Mod: TC,FY

## 2022-08-24 RX ORDER — ALBUMIN HUMAN 250 G/1000ML
SOLUTION INTRAVENOUS
Status: COMPLETED | OUTPATIENT
Start: 2022-08-24 | End: 2022-08-24

## 2022-08-24 RX ORDER — LIDOCAINE HYDROCHLORIDE 20 MG/ML
INJECTION, SOLUTION INFILTRATION; PERINEURAL CODE/TRAUMA/SEDATION MEDICATION
Status: COMPLETED | OUTPATIENT
Start: 2022-08-24 | End: 2022-08-24

## 2022-08-24 RX ORDER — METHADONE HYDROCHLORIDE 5 MG/5ML
SOLUTION ORAL
Qty: 115 ML | Refills: 0 | Status: SHIPPED | OUTPATIENT
Start: 2022-08-24 | End: 2022-08-25 | Stop reason: SDUPTHER

## 2022-08-24 RX ADMIN — LIDOCAINE HYDROCHLORIDE 5 ML: 20 INJECTION, SOLUTION INFILTRATION; PERINEURAL at 01:08

## 2022-08-24 RX ADMIN — ALBUMIN (HUMAN) 25 G: 25 SOLUTION INTRAVENOUS at 03:08

## 2022-08-24 NOTE — DISCHARGE SUMMARY
Radiology Discharge Summary      Hospital Course: No complications    Admit Date: 8/24/2022  Discharge Date: 08/24/2022     Instructions Given to Patient: Yes  Diet: Resume prior diet  Activity: activity as tolerated    Description of Condition on Discharge: Stable  Vital Signs (Most Recent): Pulse: (!) 124 (08/24/22 1359)  BP: (!) 131/97 (08/24/22 1428)  SpO2: 97 % (08/24/22 1359)    Discharge Disposition: Home    Discharge Diagnosis:   41 y.o. female with pertinent PMHx of gastric adenocarcinoma with osseous, hepatic and peritoneal metastases complicated by recurrent abdominal distension and pain 2/2 recurrent painful, tense ascites and recurrent SOB/SALAZAR 2/2 recurrent Lt-sided pleural effusion s/p successful US-guided percutaneous RUQ-approach therapeutic LVP and Lt posterolateral-approach therapeutic thoracentesis with local anesthetic only. Patient tolerated the procedure well. No immediate post-procedural complications noted.     Thank you for considering IR for the care of your patient.     Castro Bermudez MD  Interventional Radiology

## 2022-08-24 NOTE — H&P
Radiology History & Physical      SUBJECTIVE:     Chief Complaint: 1. Recurrent painful, tense ascites 2. SOB/SALAZAR 2/2 recurrent Lt pleural effusion     History of Present Illness:  Puja Quigley is a 41 y.o. female with pertinent PMHx of gastric adenocarcinoma with osseous, hepatic and peritoneal metastases complicated by recurrent abdominal distension and pain 2/2 recurrent painful, tense ascites and recurrent SOB/SALAZAR 2/2 recurrent Lt-sided pleural effusion requiring frequent decompression for symptom relief.      A new outpatient IR consult received for US-guided percutaneous RUQ-approach therapeutic large-volume paracentesis and Lt posterolateral-approach therapeutic thoracentesis.    Past Medical History:   Diagnosis Date    Anxiety     Dehydration 5/30/2022    Gastric cancer     Gastric ulcer     Hx of psychiatric care     Pregnancy 08/12/2020    delivered on 8/12/2020    Umbilical hernia      Past Surgical History:   Procedure Laterality Date    CLOSURE OF PERFORATED ULCER OF DUODENUM USING OMENTAL PATCH      ESOPHAGOGASTRODUODENOSCOPY N/A 10/13/2020    Procedure: EGD (ESOPHAGOGASTRODUODENOSCOPY);  Surgeon: Rosio Marion MD;  Location: [facility] (2ND FLR);  Service: Endoscopy;  Laterality: N/A;    ESOPHAGOGASTRODUODENOSCOPY N/A 12/11/2020    Procedure: EGD (ESOPHAGOGASTRODUODENOSCOPY);  Surgeon: Rosio Marion MD;  Location: [facility] ENDO (2ND FLR);  Service: Endoscopy;  Laterality: N/A;  Covid-19 test 12/8/20 at [facility]    ESOPHAGOGASTRODUODENOSCOPY N/A 3/12/2021    Procedure: EGD (ESOPHAGOGASTRODUODENOSCOPY);  Surgeon: Nav Omer MD;  Location: [facility] ENDO (2ND FLR);  Service: Endoscopy;  Laterality: N/A;  COVID at [facility] 3/9 ttr    ESOPHAGOGASTRODUODENOSCOPY N/A 5/18/2021    Procedure: EGD (ESOPHAGOGASTRODUODENOSCOPY);  Surgeon: Rosio Marion MD;  Location: [facility] ENDO (2ND FLR);  Service: Endoscopy;  Laterality: N/A;  5/15-covid pcw-inst portal-tb     ESOPHAGOGASTRODUODENOSCOPY N/A 5/26/2021    Procedure: EGD (ESOPHAGOGASTRODUODENOSCOPY);  Surgeon: Socrates Terrazas MD;  Location: [address]);  Service: Endoscopy;  Laterality: N/A;     Home Meds:   Prior to Admission medications    Medication Sig Start Date End Date Taking? Authorizing Provider   baclofen (LIORESAL) 5 mg Tab tablet Take 1 tablet (5 mg total) by mouth 3 (three) times daily. 8/23/22   Sharon Brink MD   dexAMETHasone (DECADRON) 4 MG Tab 8 mg (two tabs) twice daily on the day before chemo and the day after chemo 8/8/22   Fer Ashraf MD   dicyclomine (BENTYL) 10 MG capsule TAKE ONE CAPSULE BY MOUTH FOUR TIMES DAILY 9/27/21   Kamille Hooks PA-C   famotidine (PEPCID) 20 MG tablet Take 1 tablet (20 mg total) by mouth nightly as needed for Heartburn. 2/23/22 2/23/23  Kamille Hooks PA-C   furosemide (LASIX) 20 MG tablet Take 1 tablet (20 mg total) by mouth once daily. 7/6/22   Fer Ashraf MD   gabapentin (NEURONTIN) 300 MG capsule Take 1 capsule (300 mg total) by mouth 3 (three) times daily. 8/22/22 8/22/23  Kamille Hooks PA-C   hydrocortisone 2.5 % cream Apply topically 2 (two) times daily. 7/11/22   Bethel Shetty MD   LIDOcaine HCL 2% (XYLOCAINE) 2 % jelly Apply topically as needed. 8/17/22   Sharon Brink MD   LIDOcaine-prilocaine (EMLA) cream Apply to Port-A-Cath area 30 to 45 minutes prior to port access as directed (topical anesthetic use to the anterior chest only). 8/17/22   Sharon Brink MD   methadone (DOLOPHINE) 5 mg/5 mL solution Take 2.5 mLs (2.5 mg total) by mouth every 12 (twelve) hours. 8/23/22   Sharon Brink MD   metoclopramide HCl (REGLAN) 5 MG tablet 5 mg. 6/11/21   Historical Provider   multivitamin with folic acid 400 mcg Tab Take 1 tablet by mouth once daily.    Historical Provider   naloxone (NARCAN) 4 mg/actuation Spry 4mg by nasal route as needed for opioid overdose; may repeat every 2-3 minutes in alternating nostrils until  medical help arrives. Call 911 8/17/22   Sharno Brink MD   nortriptyline (PAMELOR) 25 MG capsule Take 1 capsule (25 mg total) by mouth every evening. 6/20/22 6/20/23  Bren Allen MD   omeprazole (PRILOSEC) 40 MG capsule Take 1 capsule (40 mg total) by mouth 2 (two) times daily before meals. 6/20/22 6/20/23  Bren Allen MD   ondansetron (ZOFRAN) 8 MG tablet Take 1 tablet (8 mg total) by mouth every 8 (eight) hours as needed for Nausea. 7/26/21   Fer Ashraf MD   oxyCODONE (ROXICODONE) 5 MG immediate release tablet Take 1 tablet (5 mg total) by mouth every 6 (six) hours as needed. 6/20/22   Bren Allen MD   polyethylene glycol (GLYCOLAX) 17 gram/dose powder Mix 1 capful (17 g) with liquid and take by mouth once daily for 20 days 6/21/22   Bern Allen MD   prochlorperazine (COMPAZINE) 10 MG tablet TAKE 1 TABLET(10 MG) BY MOUTH EVERY 6 HOURS AS NEEDED FOR NAUSEA 8/7/21   Fer Ashraf MD   promethazine (PHENERGAN) 25 MG tablet Take 1 tablet (25 mg total) by mouth every 6 (six) hours as needed for Nausea. 6/13/22   Fer Ashraf MD   senna-docusate 8.6-50 mg (SENNA WITH DOCUSATE SODIUM) 8.6-50 mg per tablet Take 1 tablet by mouth 2 (two) times daily as needed for Constipation. 5/26/21   Reshma Wilson MD   sucralfate (CARAFATE) 1 gram tablet TAKE 1 TABLET(1 GRAM) BY MOUTH FOUR TIMES DAILY FOR 14 DAYS 10/20/21   Rosio Marion MD   sucralfate (CARAFATE) 1 gram tablet  4/8/21   Historical Provider   traMADoL (ULTRAM) 50 mg tablet Take 50 mg by mouth. 6/25/21   Historical Provider   amitriptyline (ELAVIL) 10 MG tablet Take 1 tablet (10 mg total) by mouth every evening. 5/13/21 6/20/22  Socrates Terrazas MD   pantoprazole (PROTONIX) 40 MG tablet Take 1 tablet (40 mg total) by mouth 2 (two) times daily. 5/25/22 6/20/22  Kamille Hooks PA-C     Anticoagulants/Antiplatelets: no anticoagulation     Allergies: Review of patient's allergies indicates:  No Known Allergies      Sedation History:  no  adverse reactions     Review of Systems:   Hematological: no known coagulopathies  Respiratory: no cough, shortness of breath, or wheezing  Cardiovascular: no chest pain or dyspnea on exertion  Gastrointestinal: positive for - abdominal pain and distension  Genito-Urinary: no dysuria, trouble voiding, or hematuria  Musculoskeletal: negative  Neurological: no TIA or stroke symptoms      OBJECTIVE:     Vital Signs (Most Recent)       Physical Exam:  General: no acute distress  Mental Status: alert and oriented to person, place and time  HEENT: normocephalic, atraumatic  Chest: unlabored breathing  Heart: regular heart rate  Abdomen: +distended with +fluid wave. No TTP/r/g.  Extremity: moves all extremities    Laboratory  Lab Results   Component Value Date    INR 1.0 06/10/2021       Lab Results   Component Value Date    WBC 11.69 08/08/2022    HGB 10.9 (L) 08/08/2022    HCT 36.9 (L) 08/08/2022    MCV 84 08/08/2022     08/08/2022      Lab Results   Component Value Date     08/08/2022     08/08/2022    K 4.2 08/08/2022     08/08/2022    CO2 24 08/08/2022    BUN 11 08/08/2022    CREATININE 0.7 08/08/2022    CALCIUM 8.8 08/08/2022    MG 1.9 06/20/2022    ALT 7 (L) 08/08/2022    AST 15 08/08/2022    ALBUMIN 1.5 (L) 08/08/2022    BILITOT 0.4 08/08/2022     ASSESSMENT/PLAN:     41 y.o. female with pertinent PMHx of gastric adenocarcinoma with osseous, hepatic and peritoneal metastases complicated by recurrent abdominal distension and pain 2/2 recurrent painful, tense ascites and recurrent SOB/SALAZAR 2/2 recurrent Lt-sided pleural effusion requiring frequent decompression for symptom relief.     1. Recurrent painful, tense ascites - Will attempt US-guided percutaneous RUQ-approach therapeutic LVP with local anesthetic only and albumin infusion post PRN as indicated per institutional protocol.     2. SOB/SALAZAR 2/2 recurrent Lt pleural effusion - Will attempt US-guided percutaneous Lt  posterolateral-approach therapeutic thoracentesis with local anesthetic only.    Risks (including, but not limited to, pain, bleeding, infection, damage to nearby structures, failure to obtain sufficient material for a diagnosis, the need for additional procedures, and death), benefits, and alternatives were discussed with the patient. All questions were answered to the best of my abilities. The patient wishes to proceed with the procedure. Written informed consent was obtained.     Thank you for considering IR for the care of your patient.      Castro Bermudez MD  Interventional Radiology

## 2022-08-24 NOTE — SEDATION DOCUMENTATION
1100 mL pleural fluid removed from left side, dark maroon in color.  Patient repositioned to perform paracentesis.

## 2022-08-24 NOTE — BRIEF OP NOTE
Radiology Post-Procedure Note    Pre Op Diagnosis: 1. SOB/SALAZAR 2/2 recurrent Lt pleural effusion 2. Recurrent painful, tense ascites  Post Op Diagnosis: Same    Procedure: US-guided 1. Lt posterolateral-approach therapeutic thoracentesis 2. US-guided percutaneous RUQ-approach therapeutic LVP    Procedure performed by: Castro Bermudez MD    Written Informed Consent Obtained: Yes  Specimen Removed: YES, 1,100-cc of mildly thick, dark-red blood pleural fluid and 6,800-cc of slightly serosanguinous ascitic fluid  Estimated Blood Loss: none    Findings:   Successful US-guided percutaneous RUQ-approach therapeutic LVP and Lt posterolateral-approach therapeutic thoracentesis with local anesthetic only. Patient tolerated the procedure well. No immediate post-procedural complications noted.     Immediate post-op CXR to exclude potential for post-procedural complications prior to tentative discharge.    Thank you for considering IR for the care of your patient.     Castro Bermudez MD  Interventional Radiology

## 2022-08-25 ENCOUNTER — PATIENT MESSAGE (OUTPATIENT)
Dept: PALLIATIVE MEDICINE | Facility: CLINIC | Age: 42
End: 2022-08-25
Payer: COMMERCIAL

## 2022-08-25 DIAGNOSIS — C16.9 MALIGNANT NEOPLASM OF STOMACH, UNSPECIFIED LOCATION: ICD-10-CM

## 2022-08-25 DIAGNOSIS — G89.3 CANCER RELATED PAIN: ICD-10-CM

## 2022-08-25 RX ORDER — METHADONE HYDROCHLORIDE 5 MG/5ML
SOLUTION ORAL
Qty: 115 ML | Refills: 0 | Status: SHIPPED | OUTPATIENT
Start: 2022-08-25 | End: 2022-09-09 | Stop reason: SDUPTHER

## 2022-08-25 NOTE — PROGRESS NOTES
Subjective:       Patient ID: Puja Quigley is a 41 y.o. female.    Chief Complaint: Pain (CIPN, edema)    Patient is having edema and swelling in lower legs that is causing pain. She is also still dealing with severe CIPN symptoms. Patient may continue as needed.    Review of Systems   Musculoskeletal: Positive for leg pain.   Neurological: Positive for numbness.         Objective:      Physical Exam    Assessment:       Problem List Items Addressed This Visit    None     Visit Diagnoses     Chronic back pain, unspecified back location, unspecified back pain laterality    -  Primary          Plan:       As needed*             Pain (CIPN, edema)       Encounter Diagnoses   Name Primary?    Chronic back pain, unspecified back location, unspecified back pain laterality Yes       Acupuncture points used:  4 MONTANEZ, BA JENNA, BA MARCELL , Gb34, Li11, Sp10, Sp6, Sp9, St36 and YIN RICKETTS    STIM USED @[facility]      NEEDLES IN: 26  NEEDLES OUT: 26    NEEDLES W/ STIM  AT: 3:00 PM  NEEDLES W/ STIM REMOVED AT: 3:30 PM

## 2022-08-29 ENCOUNTER — HOSPITAL ENCOUNTER (OUTPATIENT)
Dept: INTERVENTIONAL RADIOLOGY/VASCULAR | Facility: HOSPITAL | Age: 42
Discharge: HOME | End: 2022-08-29
Attending: INTERNAL MEDICINE
Payer: COMMERCIAL

## 2022-08-29 ENCOUNTER — OFFICE VISIT (OUTPATIENT)
Dept: HEMATOLOGY/ONCOLOGY | Facility: CLINIC | Age: 42
End: 2022-08-29
Payer: COMMERCIAL

## 2022-08-29 VITALS
DIASTOLIC BLOOD PRESSURE: 97 MMHG | OXYGEN SATURATION: 100 % | RESPIRATION RATE: 18 BRPM | WEIGHT: 155.75 LBS | HEIGHT: 67 IN | HEART RATE: 100 BPM | BODY MASS INDEX: 24.45 KG/M2 | TEMPERATURE: 98 F | SYSTOLIC BLOOD PRESSURE: 133 MMHG

## 2022-08-29 DIAGNOSIS — C79.51 BONE METASTASES: ICD-10-CM

## 2022-08-29 DIAGNOSIS — C79.60: ICD-10-CM

## 2022-08-29 DIAGNOSIS — D84.821 IMMUNODEFICIENCY DUE TO CHEMOTHERAPY: ICD-10-CM

## 2022-08-29 DIAGNOSIS — G62.0 NEUROPATHY DUE TO CHEMOTHERAPEUTIC DRUG: ICD-10-CM

## 2022-08-29 DIAGNOSIS — C16.9 MALIGNANT NEOPLASM OF STOMACH, UNSPECIFIED LOCATION: Primary | ICD-10-CM

## 2022-08-29 DIAGNOSIS — D63.0 ANEMIA IN NEOPLASTIC DISEASE: ICD-10-CM

## 2022-08-29 DIAGNOSIS — K25.5 CHRONIC GASTRIC ULCER WITH PERFORATION: ICD-10-CM

## 2022-08-29 DIAGNOSIS — C78.6 PERITONEAL CARCINOMATOSIS: ICD-10-CM

## 2022-08-29 DIAGNOSIS — T45.1X5A IMMUNODEFICIENCY DUE TO CHEMOTHERAPY: ICD-10-CM

## 2022-08-29 DIAGNOSIS — R60.0 BILATERAL LOWER EXTREMITY EDEMA: ICD-10-CM

## 2022-08-29 DIAGNOSIS — R11.2 NON-INTRACTABLE VOMITING WITH NAUSEA, UNSPECIFIED VOMITING TYPE: ICD-10-CM

## 2022-08-29 DIAGNOSIS — R18.8 OTHER ASCITES: ICD-10-CM

## 2022-08-29 DIAGNOSIS — M79.659 PAIN OF THIGH, UNSPECIFIED LATERALITY: ICD-10-CM

## 2022-08-29 DIAGNOSIS — E43 SEVERE MALNUTRITION: ICD-10-CM

## 2022-08-29 DIAGNOSIS — Z79.899 IMMUNODEFICIENCY DUE TO CHEMOTHERAPY: ICD-10-CM

## 2022-08-29 DIAGNOSIS — B37.31 VAGINAL CANDIDOSIS: ICD-10-CM

## 2022-08-29 DIAGNOSIS — T45.1X5A NEUROPATHY DUE TO CHEMOTHERAPEUTIC DRUG: ICD-10-CM

## 2022-08-29 PROCEDURE — 3008F PR BODY MASS INDEX (BMI) DOCUMENTED: ICD-10-PCS | Mod: CPTII,S$GLB,, | Performed by: PHYSICIAN ASSISTANT

## 2022-08-29 PROCEDURE — P9047 ALBUMIN (HUMAN), 25%, 50ML: HCPCS | Mod: JG | Performed by: RADIOLOGY

## 2022-08-29 PROCEDURE — 99215 OFFICE O/P EST HI 40 MIN: CPT | Mod: PBBFAC | Performed by: PHYSICIAN ASSISTANT

## 2022-08-29 PROCEDURE — 3008F BODY MASS INDEX DOCD: CPT | Mod: CPTII,S$GLB,, | Performed by: PHYSICIAN ASSISTANT

## 2022-08-29 PROCEDURE — 99999 PR PBB SHADOW E&M-EST. PATIENT-LVL V: CPT | Mod: PBBFAC,,, | Performed by: PHYSICIAN ASSISTANT

## 2022-08-29 PROCEDURE — 49083 IR PARACENTESIS WITH IMAGING: ICD-10-PCS | Mod: ,,, | Performed by: RADIOLOGY

## 2022-08-29 PROCEDURE — 3080F PR MOST RECENT DIASTOLIC BLOOD PRESSURE >= 90 MM HG: ICD-10-PCS | Mod: CPTII,S$GLB,, | Performed by: PHYSICIAN ASSISTANT

## 2022-08-29 PROCEDURE — 3075F SYST BP GE 130 - 139MM HG: CPT | Mod: CPTII,S$GLB,, | Performed by: PHYSICIAN ASSISTANT

## 2022-08-29 PROCEDURE — 49083 ABD PARACENTESIS W/IMAGING: CPT | Performed by: RADIOLOGY

## 2022-08-29 PROCEDURE — 99215 PR OFFICE/OUTPT VISIT, EST, LEVL V, 40-54 MIN: ICD-10-PCS | Mod: S$GLB,,, | Performed by: PHYSICIAN ASSISTANT

## 2022-08-29 PROCEDURE — 99215 OFFICE O/P EST HI 40 MIN: CPT | Mod: S$GLB,,, | Performed by: PHYSICIAN ASSISTANT

## 2022-08-29 PROCEDURE — 99999 PR PBB SHADOW E&M-EST. PATIENT-LVL V: ICD-10-PCS | Mod: PBBFAC,,, | Performed by: PHYSICIAN ASSISTANT

## 2022-08-29 PROCEDURE — C1729 CATH, DRAINAGE: HCPCS

## 2022-08-29 PROCEDURE — 3080F DIAST BP >= 90 MM HG: CPT | Mod: CPTII,S$GLB,, | Performed by: PHYSICIAN ASSISTANT

## 2022-08-29 PROCEDURE — 63600175 PHARM REV CODE 636 W HCPCS: Mod: JG | Performed by: RADIOLOGY

## 2022-08-29 PROCEDURE — 3075F PR MOST RECENT SYSTOLIC BLOOD PRESS GE 130-139MM HG: ICD-10-PCS | Mod: CPTII,S$GLB,, | Performed by: PHYSICIAN ASSISTANT

## 2022-08-29 RX ORDER — HEPARIN 100 UNIT/ML
500 SYRINGE INTRAVENOUS
Status: CANCELLED | OUTPATIENT
Start: 2022-08-30

## 2022-08-29 RX ORDER — SODIUM CHLORIDE 0.9 % (FLUSH) 0.9 %
10 SYRINGE (ML) INJECTION
Status: CANCELLED | OUTPATIENT
Start: 2022-08-30

## 2022-08-29 RX ORDER — ALBUMIN HUMAN 250 G/1000ML
SOLUTION INTRAVENOUS
Status: DISCONTINUED | OUTPATIENT
Start: 2022-08-29 | End: 2022-08-30 | Stop reason: HOSPADM

## 2022-08-29 RX ADMIN — ALBUMIN (HUMAN) 25 G: 25 SOLUTION INTRAVENOUS at 05:08

## 2022-08-29 RX ADMIN — ALBUMIN (HUMAN) 12.5 G: 25 SOLUTION INTRAVENOUS at 05:08

## 2022-08-29 NOTE — DISCHARGE SUMMARY
Radiology Discharge Summary      Hospital Course: No complications    Admit Date: 8/29/2022  Discharge Date: 08/29/2022     Instructions Given to Patient: Yes  Diet: Resume prior diet  Activity: activity as tolerated    Description of Condition on Discharge: Stable  Vital Signs (Most Recent):      Discharge Disposition: Home    Discharge Diagnosis:  41 y.o. female with recurrent painful, tense ascites s/p successful US-guided percutaneous RUQ-approach therapeutic LVP with local anesthetic only and albumin infusion post PRN as indicated per institutional protocol. Patient tolerated the procedure well. No immediate post-procedural complications noted.      Thank you for considering IR for the care of your patient.      Castro Bermudez MD  Interventional Radiology

## 2022-08-29 NOTE — H&P
Radiology History & Physical      SUBJECTIVE:     Chief Complaint: Recurrent painful, tense ascites     History of Present Illness:  Puja Quigley is a 41 y.o. female with pertinent PMHx of gastric adenocarcinoma with osseous, hepatic and peritoneal metastases complicated by recurrent abdominal distension and pain 2/2 recurrent painful, tense ascites requiring frequent decompression for symptom relief.     A new outpatient IR consult received for US-guided percutaneous RUQ-approach therapeutic large-volume paracentesis.    Past Medical History:   Diagnosis Date    Anxiety     Dehydration 5/30/2022    Gastric cancer     Gastric ulcer     Hx of psychiatric care     Pregnancy 08/12/2020    delivered on 8/12/2020    Umbilical hernia      Past Surgical History:   Procedure Laterality Date    CLOSURE OF PERFORATED ULCER OF DUODENUM USING OMENTAL PATCH      ESOPHAGOGASTRODUODENOSCOPY N/A 10/13/2020    Procedure: EGD (ESOPHAGOGASTRODUODENOSCOPY);  Surgeon: Rosio Marion MD;  Location: [facility] ENDO (2ND FLR);  Service: Endoscopy;  Laterality: N/A;    ESOPHAGOGASTRODUODENOSCOPY N/A 12/11/2020    Procedure: EGD (ESOPHAGOGASTRODUODENOSCOPY);  Surgeon: Rosio Marion MD;  Location: [facility] (2ND FLR);  Service: Endoscopy;  Laterality: N/A;  Covid-19 test 12/8/20 at [facility]    ESOPHAGOGASTRODUODENOSCOPY N/A 3/12/2021    Procedure: EGD (ESOPHAGOGASTRODUODENOSCOPY);  Surgeon: Nav Omer MD;  Location: [facility] ENDO (2ND FLR);  Service: Endoscopy;  Laterality: N/A;  COVID at [facility] 3/9 ttr    ESOPHAGOGASTRODUODENOSCOPY N/A 5/18/2021    Procedure: EGD (ESOPHAGOGASTRODUODENOSCOPY);  Surgeon: Rosio Marion MD;  Location: [facility] ENDO (2ND FLR);  Service: Endoscopy;  Laterality: N/A;  5/15-covid pcw-inst portal-tb    ESOPHAGOGASTRODUODENOSCOPY N/A 5/26/2021    Procedure: EGD (ESOPHAGOGASTRODUODENOSCOPY);  Surgeon: Socrates Terrazas MD;  Location: [facility] ENDO (2ND FLR);  Service: Endoscopy;  Laterality: N/A;      Home Meds:   Prior to Admission medications    Medication Sig Start Date End Date Taking? Authorizing Provider   baclofen (LIORESAL) 5 mg Tab tablet Take 1 tablet (5 mg total) by mouth 3 (three) times daily. 8/23/22   Sharon Brink MD   dexAMETHasone (DECADRON) 4 MG Tab 8 mg (two tabs) twice daily on the day before chemo and the day after chemo 8/8/22   Fer Ashraf MD   dicyclomine (BENTYL) 10 MG capsule TAKE ONE CAPSULE BY MOUTH FOUR TIMES DAILY 9/27/21   Kamille Hooks PA-C   famotidine (PEPCID) 20 MG tablet Take 1 tablet (20 mg total) by mouth nightly as needed for Heartburn. 2/23/22 2/23/23  Kamille Hooks PA-C   furosemide (LASIX) 20 MG tablet Take 1 tablet (20 mg total) by mouth once daily. 7/6/22   Fer Ashraf MD   gabapentin (NEURONTIN) 300 MG capsule Take 1 capsule (300 mg total) by mouth 3 (three) times daily. 8/22/22 8/22/23  Kamille Hooks PA-C   hydrocortisone 2.5 % cream Apply topically 2 (two) times daily. 7/11/22   Bethel Shetty MD   LIDOcaine HCL 2% (XYLOCAINE) 2 % jelly Apply topically as needed. 8/17/22   Sharon Brink MD   LIDOcaine-prilocaine (EMLA) cream Apply to Port-A-Cath area 30 to 45 minutes prior to port access as directed (topical anesthetic use to the anterior chest only). 8/17/22   Sharon Brink MD   methadone (DOLOPHINE) 5 mg/5 mL solution Take 2.5 mLs (2.5 mg total) by mouth every morning AND 5 mLs (5 mg total) every evening. Do all this for 15 days. 8/25/22 9/10/22  Jeni Trujillo MD   metoclopramide HCl (REGLAN) 5 MG tablet 5 mg. 6/11/21   Historical Provider   multivitamin with folic acid 400 mcg Tab Take 1 tablet by mouth once daily.    Historical Provider   naloxone (NARCAN) 4 mg/actuation Spry 4mg by nasal route as needed for opioid overdose; may repeat every 2-3 minutes in alternating nostrils until medical help arrives. Call 911 8/17/22   Sharon Brink MD   nortriptyline (PAMELOR) 25 MG capsule Take 1 capsule (25  mg total) by mouth every evening. 6/20/22 6/20/23  Bren Allen MD   omeprazole (PRILOSEC) 40 MG capsule Take 1 capsule (40 mg total) by mouth 2 (two) times daily before meals. 6/20/22 6/20/23  Bren Allen MD   ondansetron (ZOFRAN) 8 MG tablet TAKE 1 TABLET(8 MG) BY MOUTH EVERY 8 HOURS AS NEEDED FOR NAUSEA 8/29/22   Fer Ashraf MD   oxyCODONE (ROXICODONE) 5 MG immediate release tablet Take 1 tablet (5 mg total) by mouth every 6 (six) hours as needed. 6/20/22   Bren Allen MD   polyethylene glycol (GLYCOLAX) 17 gram/dose powder Mix 1 capful (17 g) with liquid and take by mouth once daily for 20 days 6/21/22   Bren Allen MD   prochlorperazine (COMPAZINE) 10 MG tablet TAKE 1 TABLET(10 MG) BY MOUTH EVERY 6 HOURS AS NEEDED FOR NAUSEA 8/7/21   Fer Ashraf MD   promethazine (PHENERGAN) 25 MG tablet Take 1 tablet (25 mg total) by mouth every 6 (six) hours as needed for Nausea. 6/13/22   Fer Ashraf MD   senna-docusate 8.6-50 mg (SENNA WITH DOCUSATE SODIUM) 8.6-50 mg per tablet Take 1 tablet by mouth 2 (two) times daily as needed for Constipation. 5/26/21   Reshma Wilson MD   sucralfate (CARAFATE) 1 gram tablet TAKE 1 TABLET(1 GRAM) BY MOUTH FOUR TIMES DAILY FOR 14 DAYS 10/20/21   Rosio Marion MD   sucralfate (CARAFATE) 1 gram tablet  4/8/21   Historical Provider   traMADoL (ULTRAM) 50 mg tablet Take 50 mg by mouth. 6/25/21   Historical Provider   amitriptyline (ELAVIL) 10 MG tablet Take 1 tablet (10 mg total) by mouth every evening. 5/13/21 6/20/22  Socrates Terrazas MD   ondansetron (ZOFRAN) 8 MG tablet Take 1 tablet (8 mg total) by mouth every 8 (eight) hours as needed for Nausea. 7/26/21 8/29/22  Fer Ashraf MD   pantoprazole (PROTONIX) 40 MG tablet Take 1 tablet (40 mg total) by mouth 2 (two) times daily. 5/25/22 6/20/22  Kamille Hooks PA-C     Anticoagulants/Antiplatelets: no anticoagulation     Allergies: Review of patient's allergies indicates:  No Known Allergies       Sedation History:  no adverse reactions     Review of Systems:   Hematological: no known coagulopathies  Respiratory: no cough, shortness of breath, or wheezing  Cardiovascular: no chest pain or dyspnea on exertion  Gastrointestinal: positive for - abdominal pain and distension  Genito-Urinary: no dysuria, trouble voiding, or hematuria  Musculoskeletal: negative  Neurological: no TIA or stroke symptoms      OBJECTIVE:     Vital Signs (Most Recent)       Physical Exam:  General: no acute distress  Mental Status: alert and oriented to person, place and time  HEENT: normocephalic, atraumatic  Chest: unlabored breathing  Heart: regular heart rate  Abdomen: +distended with +fluid wave. No TTP/r/g.  Extremity: moves all extremities  Laboratory  Lab Results   Component Value Date    INR 1.0 06/10/2021       Lab Results   Component Value Date    WBC 8.28 08/29/2022    HGB 9.5 (L) 08/29/2022    HCT 31.1 (L) 08/29/2022    MCV 81 (L) 08/29/2022     08/29/2022      Lab Results   Component Value Date    GLU 92 08/29/2022     (L) 08/29/2022    K 4.0 08/29/2022     08/29/2022    CO2 24 08/29/2022    BUN 11 08/29/2022    CREATININE 0.7 08/29/2022    CALCIUM 8.3 (L) 08/29/2022    MG 1.9 06/20/2022    ALT 7 (L) 08/29/2022    AST 15 08/29/2022    ALBUMIN 1.4 (L) 08/29/2022    BILITOT 0.2 08/29/2022     ASSESSMENT/PLAN:     41 y.o. female with pertinent PMHx of gastric adenocarcinoma with osseous, hepatic and peritoneal metastases complicated by recurrent abdominal distension and pain 2/2 recurrent painful, tense ascites requiring frequent decompression for symptom relief.     1. Recurrent painful, tense ascites - Will attempt US-guided percutaneous RUQ-approach therapeutic LVP with local anesthetic only and albumin infusion post PRN as indicated per institutional protocol.     Risks (including, but not limited to, pain, bleeding, infection, damage to nearby structures, failure to obtain sufficient material for a  diagnosis, the need for additional procedures, and death), benefits, and alternatives were discussed with the patient. All questions were answered to the best of my abilities. The patient wishes to proceed with the procedure. Written informed consent was obtained.     Thank you for considering IR for the care of your patient.      Castro Bermudez MD  Interventional Radiology

## 2022-08-29 NOTE — BRIEF OP NOTE
Radiology Post-Procedure Note     Pre Op Diagnosis: Recurrent painful, tense ascites  Post Op Diagnosis: Same     Procedure: 1. US-guided percutaneous RUQ-approach therapeutic LVP     Procedure performed by: Castro Bermudez MD     Written Informed Consent Obtained: Yes  Specimen Removed: YES, 5,700-cc of thin, serosanguinous ascitic fluid  Estimated Blood Loss: none     Findings:   Successful US-guided percutaneous RUQ-approach therapeutic LVP with local anesthetic only and albumin infusion post PRN as indicated per institutional protocol. Patient tolerated the procedure well. No immediate post-procedural complications noted.      Thank you for considering IR for the care of your patient.      Castro Bermudez MD  Interventional Radiology

## 2022-08-29 NOTE — PROGRESS NOTES
MEDICAL ONCOLOGY - ESTABLISHED PATIENT    Reason for visit: Gastric cancer     Best Contact Phone Number(s): [phone] (home)      Cancer/Stage/TNM:    Cancer Staging   No matching staging information was found for the patient.     Oncology History   Malignant neoplasm of stomach   6/10/2021 Initial Diagnosis    Gastric adenocarcinoma     7/26/2021 - 4/6/2022 Chemotherapy    Treatment Summary   Plan Name: OP FOLFOX 6 Q2W  Treatment Goal: Palliative  Status: Inactive  Start Date: 7/26/2021  End Date: 4/6/2022  Provider: Fer Ashraf MD  Chemotherapy: fluorouraciL 2,400 mg/m2 = 3,770 mg in sodium chloride 0.9% 100 mL chemo infusion, 2,400 mg/m2 = 3,770 mg, Intravenous, Over 46 hours, 19 of 20 cycles  Administration: 3,770 mg (7/26/2021), 3,770 mg (8/9/2021), 3,770 mg (8/23/2021), 3,770 mg (9/7/2021), 3,770 mg (9/21/2021), 3,770 mg (10/5/2021), 3,770 mg (10/19/2021), 3,770 mg (11/2/2021), 3,770 mg (11/15/2021), 3,770 mg (11/30/2021), 3,770 mg (12/13/2021), 4,000 mg (12/27/2021), 4,030 mg (1/10/2022), 4,030 mg (1/24/2022), 4,000 mg (2/7/2022), 4,000 mg (2/22/2022), 4,000 mg (3/7/2022), 4,000 mg (3/22/2022), 4,000 mg (4/4/2022)  oxaliplatin (ELOXATIN) 85 mg/m2 = 133 mg in dextrose 5 % 500 mL chemo infusion, 85 mg/m2 = 133 mg, Intravenous, Clinic/hospitals 1 time, 9 of 9 cycles  Administration: 133 mg (7/26/2021), 133 mg (8/9/2021), 133 mg (8/23/2021), 133 mg (9/7/2021), 133 mg (9/21/2021), 133 mg (10/5/2021), 133 mg (10/19/2021), 133 mg (11/2/2021), 133 mg (11/15/2021)       5/26/2022 - 7/27/2022 Chemotherapy    Treatment Summary   Plan Name: OP FOLFIRI Q2WK  Treatment Goal: Palliative  Status: Inactive  Start Date: 5/26/2022  End Date: 7/27/2022  Provider: Fer Ashraf MD  Chemotherapy: dexAMETHasone (DECADRON) 4 MG Tab, 8 mg, Oral, Daily, 1 of 1 cycle, Start date: --, End date: --  irinotecan (CAMPTOSAR) 120 mg/m2 = 200 mg in sodium chloride 0.9% 500 mL chemo infusion, 120 mg/m2 = 200 mg (100 % of original  dose 120 mg/m2), Intravenous, Clinic/HOD 1 time, 5 of 24 cycles  Dose modification: 125 mg/m2 (original dose 120 mg/m2, Cycle 1), 120 mg/m2 (original dose 120 mg/m2, Cycle 1), 75 mg/m2 (original dose 120 mg/m2, Cycle 2, Reason: Dose not tolerated)  Administration: 200 mg (5/26/2022), 126 mg (6/13/2022), 126 mg (6/27/2022), 134 mg (7/12/2022), 134 mg (7/25/2022)  ramucirumab (CYRAMZA) 471 mg in sodium chloride 0.9% 250 mL chemo infusion, 8 mg/kg = 471 mg, Intravenous, Clinic/HOD 1 time, 1 of 1 cycle  Administration: 471 mg (5/26/2022)       8/16/2022 -  Chemotherapy    Treatment Summary   Plan Name: OP DOCETAXEL (75 MG/M2) Q3W  Treatment Goal: Palliative  Status: Active  Start Date: 8/16/2022  End Date: 11/8/2022 (Planned)  Provider: Fer Ashraf MD  Chemotherapy: DOCEtaxeL (TAXOTERE) 35 mg/m2 = 65 mg in sodium chloride 0.9% 253.25 mL chemo infusion, 35 mg/m2 = 65 mg (46.7 % of original dose 75 mg/m2), Intravenous, Clinic/HOD 1 time, 1 of 7 cycles  Dose modification: 35 mg/m2 (original dose 75 mg/m2, Cycle 1, Reason: MD Discretion, Comment: per [facility] recommendations)  Administration: 65 mg (8/16/2022)       Anxiety associated with cancer diagnosis   12/27/2021 Initial Diagnosis    Anxiety associated with cancer diagnosis          Interim History:   Puja is a very pleasant 41 y.o. female with metastatic gastric cancer who presents for follow-up after completing 5 cycles of FOLFIRI and is s/p 1 cycle of Docetaxel. She tolerated the first cycle fairly. She is doing fairly. She continues to have bilateral lower leg swelling, abdominal distension, cachexia, neuropathy to the feet, finger tips and vaginal area. She is eating and trying to improve her nutritional status. She has a fair appetite but is discouraged trying to manage her swelling and ascites. Most of her discomfort comes from the swelling and pressure of her legs and abdomen. In addition, she is noticing increasing neuropathy of the hands and  feet, also with a burning sensation of the bilateral thigh. She has Gabapentin given for neuropathy but has not taken it.   She is taking her Omeprazole and Bentyl that does help with acid reflux and the abdominal cramping.     ECOG status is 1. Presents alone today.     Review of Systems   Constitutional:  Positive for appetite change. Negative for activity change, chills, fatigue, fever and unexpected weight change.   HENT:  Negative for ear pain, facial swelling, hearing loss, mouth sores, nosebleeds, sore throat and trouble swallowing.    Eyes:  Negative for pain, discharge, redness and visual disturbance.   Respiratory:  Negative for cough, chest tightness and shortness of breath.    Cardiovascular:  Positive for leg swelling. Negative for chest pain and palpitations.   Gastrointestinal:  Positive for abdominal distention, nausea and reflux. Negative for abdominal pain, blood in stool, constipation, diarrhea and vomiting.   Endocrine: Negative for cold intolerance and heat intolerance.   Genitourinary:  Positive for vaginal dryness. Negative for decreased urine volume, difficulty urinating, dysuria, frequency, hematuria, hot flashes and urgency.   Musculoskeletal:  Negative for arthralgias, gait problem, leg pain and myalgias.   Integumentary:  Negative for pallor, rash and wound.   Allergic/Immunologic: Negative for immunocompromised state.   Neurological:  Positive for numbness (slowly improving). Negative for dizziness, tremors, weakness, light-headedness and headaches.        Bilateral thigh numbness   Hematological:  Negative for adenopathy. Does not bruise/bleed easily.   Psychiatric/Behavioral:  Negative for agitation, confusion, dysphoric mood and sleep disturbance. The patient is not nervous/anxious.          Past Medical History:   Past Medical History:   Diagnosis Date    Anxiety     Dehydration 5/30/2022    Gastric cancer     Gastric ulcer     Hx of psychiatric care     Pregnancy 08/12/2020     "delivered on 2020    Umbilical hernia         Past Surgical History:   Past Surgical History:   Procedure Laterality Date    CLOSURE OF PERFORATED ULCER OF DUODENUM USING OMENTAL PATCH      ESOPHAGOGASTRODUODENOSCOPY N/A 10/13/2020    Procedure: EGD (ESOPHAGOGASTRODUODENOSCOPY);  Surgeon: Rosio Marion MD;  Location: [facility] (2ND FLR);  Service: Endoscopy;  Laterality: N/A;    ESOPHAGOGASTRODUODENOSCOPY N/A 2020    Procedure: EGD (ESOPHAGOGASTRODUODENOSCOPY);  Surgeon: Rosio Marion MD;  Location: [facility] (2ND FLR);  Service: Endoscopy;  Laterality: N/A;  Covid-19 test 20 at [facility] -     ESOPHAGOGASTRODUODENOSCOPY N/A 3/12/2021    Procedure: EGD (ESOPHAGOGASTRODUODENOSCOPY);  Surgeon: Nav Omer MD;  Location: [facility] (2ND FLR);  Service: Endoscopy;  Laterality: N/A;  COVID at [facility] 3/9 ttr    ESOPHAGOGASTRODUODENOSCOPY N/A 2021    Procedure: EGD (ESOPHAGOGASTRODUODENOSCOPY);  Surgeon: Rosio Marion MD;  Location: [facility] (2ND FLR);  Service: Endoscopy;  Laterality: N/A;  5/15-covid pcw-inst portal-tb    ESOPHAGOGASTRODUODENOSCOPY N/A 2021    Procedure: EGD (ESOPHAGOGASTRODUODENOSCOPY);  Surgeon: Socrates Terrazas MD;  Location: [facility] (2ND FLR);  Service: Endoscopy;  Laterality: N/A;        Family History:   Family History   Problem Relation Age of Onset    Cancer Mother 67        lymphoma (type? "not active," not on tx; "related to her blood")    Schizophrenia Mother     Lung cancer Father 48        type? h/o smoking    No Known Problems Son     Breast cancer Other 73        unilat; stage I, but aggressive    Prostate cancer Paternal Uncle         (dx 50s/60? no chemo?)    Breast cancer Paternal Cousin         (dx age?) ductal    Colon cancer Neg Hx     Esophageal cancer Neg Hx         Social History:   Social History     Tobacco Use    Smoking status: Former     Types: Cigarettes     Quit date: 2019     Years since quittin.7    Smokeless " tobacco: Never   Substance Use Topics    Alcohol use: Yes        I have reviewed and updated the patient's past medical, surgical, family and social histories.    Allergies:   Review of patient's allergies indicates:  No Known Allergies     Medications:   Current Outpatient Medications   Medication Sig Dispense Refill    baclofen (LIORESAL) 5 mg Tab tablet Take 1 tablet (5 mg total) by mouth 3 (three) times daily. 30 tablet 0    dexAMETHasone (DECADRON) 4 MG Tab 8 mg (two tabs) twice daily on the day before chemo and the day after chemo 32 tablet 2    dicyclomine (BENTYL) 10 MG capsule TAKE ONE CAPSULE BY MOUTH FOUR TIMES DAILY 120 capsule 0    famotidine (PEPCID) 20 MG tablet Take 1 tablet (20 mg total) by mouth nightly as needed for Heartburn. 60 tablet 1    furosemide (LASIX) 20 MG tablet Take 1 tablet (20 mg total) by mouth once daily. 30 tablet 2    gabapentin (NEURONTIN) 300 MG capsule Take 1 capsule (300 mg total) by mouth 3 (three) times daily. 90 capsule 11    hydrocortisone 2.5 % cream Apply topically 2 (two) times daily. 3.5 g 0    LIDOcaine HCL 2% (XYLOCAINE) 2 % jelly Apply topically as needed. 100 mL 0    LIDOcaine-prilocaine (EMLA) cream Apply to Port-A-Cath area 30 to 45 minutes prior to port access as directed (topical anesthetic use to the anterior chest only). 30 g 2    methadone (DOLOPHINE) 5 mg/5 mL solution Take 2.5 mLs (2.5 mg total) by mouth every morning AND 5 mLs (5 mg total) every evening. Do all this for 15 days. 115 mL 0    metoclopramide HCl (REGLAN) 5 MG tablet 5 mg.      multivitamin with folic acid 400 mcg Tab Take 1 tablet by mouth once daily.      naloxone (NARCAN) 4 mg/actuation Spry 4mg by nasal route as needed for opioid overdose; may repeat every 2-3 minutes in alternating nostrils until medical help arrives. Call 911 1 each 2    nortriptyline (PAMELOR) 25 MG capsule Take 1 capsule (25 mg total) by mouth every evening. 30 capsule 11    omeprazole (PRILOSEC) 40 MG capsule Take 1  "capsule (40 mg total) by mouth 2 (two) times daily before meals. 60 capsule 11    ondansetron (ZOFRAN) 8 MG tablet TAKE 1 TABLET(8 MG) BY MOUTH EVERY 8 HOURS AS NEEDED FOR NAUSEA 60 tablet 2    oxyCODONE (ROXICODONE) 5 MG immediate release tablet Take 1 tablet (5 mg total) by mouth every 6 (six) hours as needed. 60 tablet 0    polyethylene glycol (GLYCOLAX) 17 gram/dose powder Mix 1 capful (17 g) with liquid and take by mouth once daily for 20 days 510 g 0    prochlorperazine (COMPAZINE) 10 MG tablet TAKE 1 TABLET(10 MG) BY MOUTH EVERY 6 HOURS AS NEEDED FOR NAUSEA 90 tablet 2    promethazine (PHENERGAN) 25 MG tablet Take 1 tablet (25 mg total) by mouth every 6 (six) hours as needed for Nausea. 60 tablet 2    senna-docusate 8.6-50 mg (SENNA WITH DOCUSATE SODIUM) 8.6-50 mg per tablet Take 1 tablet by mouth 2 (two) times daily as needed for Constipation.      sucralfate (CARAFATE) 1 gram tablet TAKE 1 TABLET(1 GRAM) BY MOUTH FOUR TIMES DAILY FOR 14 DAYS 56 tablet 0    sucralfate (CARAFATE) 1 gram tablet       traMADoL (ULTRAM) 50 mg tablet Take 50 mg by mouth.       No current facility-administered medications for this visit.        Physical Exam:   BP (!) 133/97 (BP Location: Left arm, Patient Position: Sitting, BP Method: Small (Automatic))   Pulse 100   Temp 98.1 °F (36.7 °C) (Oral)   Resp 18   Ht 5' 7" (1.702 m)   Wt 70.7 kg (155 lb 12.1 oz)   SpO2 100%   BMI 24.39 kg/m²      ECOG Performance status: 1            Physical Exam  Constitutional:       General: She is not in acute distress.     Appearance: Normal appearance. She is not ill-appearing, toxic-appearing or diaphoretic.      Comments: Appears with moderate distress due to lower leg swelling, abdominal discomfort and burning discomfort within the bilateral thigh   HENT:      Head: Normocephalic and atraumatic.      Mouth/Throat:      Mouth: Mucous membranes are moist.   Eyes:      General: No scleral icterus.     Extraocular Movements: Extraocular " movements intact.   Cardiovascular:      Rate and Rhythm: Normal rate and regular rhythm.      Heart sounds: Normal heart sounds.   Pulmonary:      Effort: Pulmonary effort is normal. No respiratory distress.      Breath sounds: No stridor. Rales present. No rhonchi.      Comments: Mild rales to the LLL field  Chest:      Chest wall: No tenderness.   Abdominal:      General: There is distension.      Tenderness: There is abdominal tenderness.   Musculoskeletal:         General: Swelling present. No signs of injury.      Cervical back: Normal range of motion.      Right lower leg: Edema present.      Left lower leg: Edema present.   Skin:     General: Skin is warm.      Capillary Refill: Capillary refill takes less than 2 seconds.   Neurological:      General: No focal deficit present.      Mental Status: She is alert and oriented to person, place, and time. Mental status is at baseline.      Cranial Nerves: No cranial nerve deficit.   Psychiatric:         Mood and Affect: Mood normal.         Behavior: Behavior normal.         Thought Content: Thought content normal.         Judgment: Judgment normal.           Labs:   Recent Results (from the past 48 hour(s))   CBC Auto Differential    Collection Time: 08/29/22  1:22 PM   Result Value Ref Range    WBC 8.28 3.90 - 12.70 K/uL    RBC 3.82 (L) 4.00 - 5.40 M/uL    Hemoglobin 9.5 (L) 12.0 - 16.0 g/dL    Hematocrit 31.1 (L) 37.0 - 48.5 %    MCV 81 (L) 82 - 98 fL    MCH 24.9 (L) 27.0 - 31.0 pg    MCHC 30.5 (L) 32.0 - 36.0 g/dL    RDW 18.2 (H) 11.5 - 14.5 %    Platelets 344 150 - 450 K/uL    MPV 8.4 (L) 9.2 - 12.9 fL    Immature Granulocytes 0.4 0.0 - 0.5 %    Gran # (ANC) 6.4 1.8 - 7.7 K/uL    Immature Grans (Abs) 0.03 0.00 - 0.04 K/uL    Lymph # 1.4 1.0 - 4.8 K/uL    Mono # 0.5 0.3 - 1.0 K/uL    Eos # 0.0 0.0 - 0.5 K/uL    Baso # 0.03 0.00 - 0.20 K/uL    nRBC 0 0 /100 WBC    Gran % 76.9 (H) 38.0 - 73.0 %    Lymph % 16.5 (L) 18.0 - 48.0 %    Mono % 5.6 4.0 - 15.0 %     Eosinophil % 0.2 0.0 - 8.0 %    Basophil % 0.4 0.0 - 1.9 %    Differential Method Automated    Comprehensive Metabolic Panel    Collection Time: 08/29/22  1:22 PM   Result Value Ref Range    Sodium 135 (L) 136 - 145 mmol/L    Potassium 4.0 3.5 - 5.1 mmol/L    Chloride 106 95 - 110 mmol/L    CO2 24 23 - 29 mmol/L    Glucose 92 70 - 110 mg/dL    BUN 11 6 - 20 mg/dL    Creatinine 0.7 0.5 - 1.4 mg/dL    Calcium 8.3 (L) 8.7 - 10.5 mg/dL    Total Protein 4.8 (L) 6.0 - 8.4 g/dL    Albumin 1.4 (L) 3.5 - 5.2 g/dL    Total Bilirubin 0.2 0.1 - 1.0 mg/dL    Alkaline Phosphatase 77 55 - 135 U/L    AST 15 10 - 40 U/L    ALT 7 (L) 10 - 44 U/L    Anion Gap 5 (L) 8 - 16 mmol/L    eGFR >60.0 >60 mL/min/1.73 m^2        I have reviewed the pertinent labs from today which show hgb 9.5, albumin 1.4.    Imaging:    CT CAP: 6/11/2022:    Impression:     1. 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     2.  Mildly worsening large volume of malignant abdominopelvic ascites with persistent findings of peritoneal carcinomatosis.     3. Diffuse gastric wall thickening with an enlarging ulcerative mass of the gastric antrum consistent with patient's known biopsy-proven adenocarcinoma.     4.  Developing bowel obstruction likely secondary to serosal metastases and mass effect from the gastric and pelvic mass.     5.  Large left pleural effusion.    CT chest: 6/6/2022  Impression:     Interval development of a large left pleural effusion and ascites with associated left lower lobe compressive atelectasis.     MRI abdomen/pelvis: 6/16/2022  Impression:     1. Diffuse irregular enhancing gastric wall thickening in keeping with known biopsy-proven gastric adenocarcinoma.  Gastric antrum partially obscured by susceptibility artifact.  2. Significantly increased size of a large mixed cystic and solid heterogeneously enhancing pelvic mass.  In light of patient's  history, finding is concerning for ovarian metastasis/Krukenberg tumor.  Lesion demonstrates significant mass effect within the pelvis and lower abdomen with obscuration of surrounding structures.  3. Moderate volume abdominopelvic ascites.  Diffuse peritoneal thickening and enhancement concerning for peritoneal carcinomatosis.  4. Numerous small hepatic cysts.  5. Moderate left pleural effusion.  6. Diffuse body wall edema.  7. Small nonspecific enhancing lesion within the right femoral head corresponding with a peripherally sclerotic lucent focus identified on prior CT.  Favor a degenerative subchondral cyst however cannot entirely exclude metastatic disease.  Attention on follow-up    Path:   5/26/21:  Positive for malignancy.   Poorly differentiated adenocarcinoma growing with signet ring features   Immunostains for Bruce-EP4 and CEA are positive.  The controls stain   appropriately.   This case was reviewed by Dr Sebastian who concurs with the diagnosis       Assessment:       1. Malignant neoplasm of stomach, unspecified location    2. Bone metastases    3. Peritoneal carcinomatosis    4. 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 Other ascites    7. Severe malnutrition    8. Bilateral lower extremity edema    9. Non-intractable vomiting with nausea, unspecified vomiting type    10. 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 Neuropathy due to chemotherapeutic drug    13. Pain of thigh, unspecified laterality    14. Vaginal candidosis            Plan:             # Gastric adenocarcinoma with peritoneal metastases, immunodeficiency due to chemotherapy, bone metastases  HER-2 negative by FISH.  PD-L1 < 1%.  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  We previously explained the implications of a stage IV diagnosis to her and potential treatment options.  She  is now s/p port placement. She sought a second opinion at [facility] for zolbetuximab trial targeting CLDN protein but she did not test positive for this biomarker. We recommended proceeding with SOC FOLFOX, with which the [facility] team agreed. Because of the negative PD-L1 I do not think the benefit of adding nivolumab outweighs the potential toxicities.       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  She opted not to get genetic testing done at her appt with [facility]. We recommended that she reconsider that decision in light of a very likely germline mutation in SAMUEL which has clinical consequences.      CT CAP after 6 cycles showed improvement in ascites, probable hepatic, thyroid and bone metastases. We dropped oxaliplatin starting with cycle 10 due to grade 1 neuropathy and desire to keep it from worsening.    CT CAP after cycle 10 showed stable disease.  CT scan after cycle 15 continued to display overall stability of disease within the gastric wall, peritoneum, spine and liver. Although increase ascites on imaging and clinically can represent progression of disease, her previous CT suggested overall stability within the liver and gastric wall, so we recommended to continue with treatment time. However, she continued to have increasing abdominal ascites that has been concerning for progression of disease. Hence, she had repeat imaging. On CT performed on 4/14/2022, she appeared to have worsening disease suggestive of progression. She was seen by Dr. Reid at [facility] for f/u and to discuss possible treatment options.  She was also evaluated by Dr. Andree Mueller and Dr. See of Surgical Oncology at [facility].  Ultimately she was not felt to be a good surgical debulking candidate due to her ascites.  If her ascites improves during the course of chemotherapy and her performance status remains stable or improves,  they would re-consider her again for palliative oophorectomy.     Was given ramucirumab with cycle 1 but this later was held given [facility] recs for just FOLFIRI. In addition, she didn't tolerate cycle 1 of cyramza well with significant N/V. She was started on second-line FOLFIRI. She received 5 cycles with FOLFIRI of irinotecan to 75 mg/m2 - has UGT1A1 homozygous mutation indicating poor metabolism.  Continue 5-FU infusion at 2200 mg/m2.    Repeat imaging with CT CAP after [facility] on 8/3/22 after cycle 5 of FOLFIRI demonstrated disease progression.  Dr. Reid recommended third line docetaxel biweekly at 35 mg/m2. She is s/p cycle 1 and tolerated it fairly well. Lab work has been reviewed today and adequate for treatment. Although, her PS appears to fluctuate due to her symptoms, she is good to proceed with chemotherapy.   -Proceed with cycle 2 today of Docetaxel.     -RTC prior to cycle 3.  Repeat imaging in 2 months after 4 cycles at [facility]. She is going on vacation to [place] for her [birth year] on 9/24-9/30. Will plan her treatment accordingly prior to her departure.    #Ascites, lower extremity edema, weight loss, malnutrition  Initially had peritoneal catheter after diagnosis, output slowed so was removed in October 2021 by IR. However, given that the amount of ascitic fluid being removed has increased, will consider replacing the peritoneal catheter to allow for drainage when needed. Will discuss with her again.   Will continue with weekly paracentesis for now.   Has increased edema of the lower extremity that is most likely due to malnutrition, hypoalbuminemia; perhaps some impaired venous drainage due to compression from tumor. Repeat US of the lower extremity was negative for a DVT.  Will continue to monitor  Continue compression stockings as tolerated and paracenteses.     # Nausea, gastric ulcer  Improved. Continue Phenergan as needed. Has Zofran and compazine also PRN.  Continue Protonix.      # Anemia  Hgb 9.5  Transfuse hgb < 7. No overt evidence for bleeding at this time.   Prior iron deficiency due to chronic blood loss.  S/p 2 doses of Injectafer.    # Anxiety  Stable. Notes being discouraged and frustrated with her condition  Continue f/u with psych onc w/ Dr. Berumen    # chemotherapy induced neuropathy, pain  2/2 previous chemotherapy with Oxaliplatin and now Docetaxel.  Continue acupuncture. Asked her to resume gabapentin to see if this helps as well.  Will continue to monitor.   Continue palliative care f/u.    # Candidal vaginosis  Rx for Diflucan.  Will refer to integrative for sexual health.    Follow up: RTC in 1 week to continue with cycle 2 of docetaxel    At least 70 minutes were spent today on this encounter including face to face time with the patient, data gathering/interpretation and documentation. Greater than 50% of this time involved counseling or coordination of care. I have provided the patient with an opportunity to ask questions and have all questions answered to patient's satisfaction.       Route Chart for Scheduling    Med Onc Chart Routing      Follow up with physician 2 weeks. prior to cycle 3 of docetaxel (every 2 weeks).   Follow up with ISHMAEL    Infusion scheduling note    Injection scheduling note    Labs CBC and CMP   Lab interval: every 2 weeks     Imaging    Pharmacy appointment    Other referrals        Treatment Plan Information   OP DOCETAXEL (75 MG/M2) Q3W   Fer Ashraf MD   Upcoming Treatment Dates - OP DOCETAXEL (75 MG/M2) Q3W    8/30/2022       Pre-Medications       dexamethasone (DECADRON) 20 mg in sodium chloride 0.9% 50 mL IVPB       Chemotherapy       DOCEtaxeL (TAXOTERE) 35 mg/m2 = 65 mg in sodium chloride 0.9% 250 mL chemo infusion  9/13/2022       Pre-Medications       dexamethasone (DECADRON) 20 mg in sodium chloride 0.9% 50 mL IVPB       Chemotherapy       DOCEtaxeL (TAXOTERE) 35 mg/m2 = 65 mg in sodium chloride 0.9% 250 mL chemo infusion  9/27/2022        Pre-Medications       dexamethasone (DECADRON) 20 mg in sodium chloride 0.9% 50 mL IVPB       Chemotherapy       DOCEtaxeL (TAXOTERE) 35 mg/m2 = 65 mg in sodium chloride 0.9% 250 mL chemo infusion  10/11/2022       Pre-Medications       dexamethasone (DECADRON) 20 mg in sodium chloride 0.9% 50 mL IVPB       Chemotherapy       DOCEtaxeL (TAXOTERE) 35 mg/m2 = 65 mg in sodium chloride 0.9% 250 mL chemo infusion    Supportive Plan Information  IV FLUIDS AND ELECTROLYTES   Kamille Hooks PA-C   Upcoming Treatment Dates - IV FLUIDS AND ELECTROLYTES    No upcoming days in selected categories.    Therapy Plan Information  EPINEPHrine (EPIPEN) 0.3 mg/0.3 mL pen injection 0.3 mg  0.3 mg, Intramuscular, PRN  diphenhydrAMINE injection 50 mg  50 mg, Intravenous, PRN  methylPREDNISolone sodium succinate injection 125 mg  125 mg, Intravenous, PRN  sodium chloride 0.9% bolus 1,000 mL  1,000 mL, Intravenous, PRN

## 2022-08-30 ENCOUNTER — INFUSION (OUTPATIENT)
Dept: INFUSION THERAPY | Facility: HOSPITAL | Age: 42
End: 2022-08-30
Attending: INTERNAL MEDICINE
Payer: COMMERCIAL

## 2022-08-30 VITALS
SYSTOLIC BLOOD PRESSURE: 123 MMHG | TEMPERATURE: 98 F | OXYGEN SATURATION: 99 % | DIASTOLIC BLOOD PRESSURE: 83 MMHG | HEART RATE: 93 BPM | RESPIRATION RATE: 16 BRPM

## 2022-08-30 DIAGNOSIS — C16.9 MALIGNANT NEOPLASM OF STOMACH, UNSPECIFIED LOCATION: Primary | ICD-10-CM

## 2022-08-30 PROCEDURE — 96365 THER/PROPH/DIAG IV INF INIT: CPT

## 2022-08-30 PROCEDURE — 96413 CHEMO IV INFUSION 1 HR: CPT

## 2022-08-30 PROCEDURE — 25000003 PHARM REV CODE 250: Performed by: PHYSICIAN ASSISTANT

## 2022-08-30 PROCEDURE — 63600175 PHARM REV CODE 636 W HCPCS: Performed by: PHYSICIAN ASSISTANT

## 2022-08-30 PROCEDURE — 96367 TX/PROPH/DG ADDL SEQ IV INF: CPT

## 2022-08-30 PROCEDURE — A4216 STERILE WATER/SALINE, 10 ML: HCPCS | Performed by: PHYSICIAN ASSISTANT

## 2022-08-30 RX ORDER — HEPARIN 100 UNIT/ML
500 SYRINGE INTRAVENOUS
Status: DISCONTINUED | OUTPATIENT
Start: 2022-08-30 | End: 2022-08-30 | Stop reason: HOSPADM

## 2022-08-30 RX ORDER — SODIUM CHLORIDE 0.9 % (FLUSH) 0.9 %
10 SYRINGE (ML) INJECTION
Status: DISCONTINUED | OUTPATIENT
Start: 2022-08-30 | End: 2022-08-30 | Stop reason: HOSPADM

## 2022-08-30 RX ADMIN — DEXAMETHASONE SODIUM PHOSPHATE 20 MG: 10 INJECTION, SOLUTION INTRAMUSCULAR; INTRAVENOUS at 11:08

## 2022-08-30 RX ADMIN — HEPARIN 500 UNITS: 100 SYRINGE at 01:08

## 2022-08-30 RX ADMIN — DOCETAXEL 65 MG: 80 INJECTION, SOLUTION INTRAVENOUS at 11:08

## 2022-08-30 RX ADMIN — Medication 10 ML: at 01:08

## 2022-08-30 NOTE — PLAN OF CARE
Pt arrived to unit for C2 of Taxotere. Most recent labs reviewed along with plan of care. Pt is okay to proceed today. Denies any new or worsening complaints after receiving her first cycle. Had a paracentesis done yesterday. VSS. Given pre-med of Dexamethasone. Taxotere infused. Pt tolerated well. Receives appointments through [facility]. Discharged from unit in NAD.

## 2022-09-01 ENCOUNTER — HOSPITAL ENCOUNTER (OUTPATIENT)
Dept: INTERVENTIONAL RADIOLOGY/VASCULAR | Facility: HOSPITAL | Age: 42
Discharge: HOME | End: 2022-09-01
Attending: INTERNAL MEDICINE
Payer: COMMERCIAL

## 2022-09-01 DIAGNOSIS — R18.8 OTHER ASCITES: ICD-10-CM

## 2022-09-01 PROCEDURE — 49083 ABD PARACENTESIS W/IMAGING: CPT | Performed by: RADIOLOGY

## 2022-09-01 PROCEDURE — C1729 CATH, DRAINAGE: HCPCS

## 2022-09-01 PROCEDURE — 49083 IR PARACENTESIS WITH IMAGING: ICD-10-PCS | Mod: ,,, | Performed by: RADIOLOGY

## 2022-09-01 NOTE — H&P
Radiology History & Physical      SUBJECTIVE:     Chief Complaint: Recurrent painful, tense ascites     History of Present Illness:  Puja Quigley is a 41 y.o. female with pertinent PMHx of gastric adenocarcinoma with osseous, hepatic and peritoneal metastases complicated by recurrent abdominal distension and pain 2/2 recurrent painful, tense ascites requiring frequent decompression for symptom relief.     A new outpatient IR consult received for US-guided percutaneous RUQ-approach therapeutic large-volume paracentesis.    Past Medical History:   Diagnosis Date    Anxiety     Dehydration 5/30/2022    Gastric cancer     Gastric ulcer     Hx of psychiatric care     Pregnancy 08/12/2020    delivered on 8/12/2020    Umbilical hernia      Past Surgical History:   Procedure Laterality Date    CLOSURE OF PERFORATED ULCER OF DUODENUM USING OMENTAL PATCH      ESOPHAGOGASTRODUODENOSCOPY N/A 10/13/2020    Procedure: EGD (ESOPHAGOGASTRODUODENOSCOPY);  Surgeon: Rosio Marion MD;  Location: [facility] ENDO (2ND FLR);  Service: Endoscopy;  Laterality: N/A;    ESOPHAGOGASTRODUODENOSCOPY N/A 12/11/2020    Procedure: EGD (ESOPHAGOGASTRODUODENOSCOPY);  Surgeon: Rosio Marion MD;  Location: [facility] (2ND FLR);  Service: Endoscopy;  Laterality: N/A;  Covid-19 test 12/8/20 at [facility]    ESOPHAGOGASTRODUODENOSCOPY N/A 3/12/2021    Procedure: EGD (ESOPHAGOGASTRODUODENOSCOPY);  Surgeon: Nav Omer MD;  Location: [facility] ENDO (2ND FLR);  Service: Endoscopy;  Laterality: N/A;  COVID at [facility] 3/9 ttr    ESOPHAGOGASTRODUODENOSCOPY N/A 5/18/2021    Procedure: EGD (ESOPHAGOGASTRODUODENOSCOPY);  Surgeon: Rosio Marion MD;  Location: [facility] ENDO (2ND FLR);  Service: Endoscopy;  Laterality: N/A;  5/15-covid pcw-inst portal-tb    ESOPHAGOGASTRODUODENOSCOPY N/A 5/26/2021    Procedure: EGD (ESOPHAGOGASTRODUODENOSCOPY);  Surgeon: Socrates Terrazas MD;  Location: [facility] ENDO (2ND FLR);  Service: Endoscopy;  Laterality: N/A;      Home Meds:   Prior to Admission medications    Medication Sig Start Date End Date Taking? Authorizing Provider   baclofen (LIORESAL) 5 mg Tab tablet Take 1 tablet (5 mg total) by mouth 3 (three) times daily. 8/23/22   Sharon Brink MD   dexAMETHasone (DECADRON) 4 MG Tab 8 mg (two tabs) twice daily on the day before chemo and the day after chemo 8/8/22   Fer Ashraf MD   dicyclomine (BENTYL) 10 MG capsule TAKE ONE CAPSULE BY MOUTH FOUR TIMES DAILY 9/27/21   Kamille Hooks PA-C   famotidine (PEPCID) 20 MG tablet Take 1 tablet (20 mg total) by mouth nightly as needed for Heartburn. 2/23/22 2/23/23  Kamille Hooks PA-C   furosemide (LASIX) 20 MG tablet Take 1 tablet (20 mg total) by mouth once daily. 7/6/22   Fer Ashraf MD   gabapentin (NEURONTIN) 300 MG capsule Take 1 capsule (300 mg total) by mouth 3 (three) times daily. 8/22/22 8/22/23  Kamille Hooks PA-C   hydrocortisone 2.5 % cream Apply topically 2 (two) times daily. 7/11/22   Bethel Shetty MD   LIDOcaine HCL 2% (XYLOCAINE) 2 % jelly Apply topically as needed. 8/17/22   Sharon Brink MD   LIDOcaine-prilocaine (EMLA) cream Apply to Port-A-Cath area 30 to 45 minutes prior to port access as directed (topical anesthetic use to the anterior chest only). 8/17/22   Sharon Brink MD   methadone (DOLOPHINE) 5 mg/5 mL solution Take 2.5 mLs (2.5 mg total) by mouth every morning AND 5 mLs (5 mg total) every evening. Do all this for 15 days. 8/25/22 9/10/22  Jeni Trujillo MD   metoclopramide HCl (REGLAN) 5 MG tablet 5 mg. 6/11/21   Historical Provider   multivitamin with folic acid 400 mcg Tab Take 1 tablet by mouth once daily.    Historical Provider   naloxone (NARCAN) 4 mg/actuation Spry 4mg by nasal route as needed for opioid overdose; may repeat every 2-3 minutes in alternating nostrils until medical help arrives. Call 911 8/17/22   Sharon Brink MD   nortriptyline (PAMELOR) 25 MG capsule Take 1 capsule (25  mg total) by mouth every evening. 6/20/22 6/20/23  Bren Allen MD   omeprazole (PRILOSEC) 40 MG capsule Take 1 capsule (40 mg total) by mouth 2 (two) times daily before meals. 6/20/22 6/20/23  Bren Allen MD   ondansetron (ZOFRAN) 8 MG tablet TAKE 1 TABLET(8 MG) BY MOUTH EVERY 8 HOURS AS NEEDED FOR NAUSEA 8/29/22   Fer Ashraf MD   oxyCODONE (ROXICODONE) 5 MG immediate release tablet Take 1 tablet (5 mg total) by mouth every 6 (six) hours as needed. 6/20/22   Bren Allen MD   polyethylene glycol (GLYCOLAX) 17 gram/dose powder Mix 1 capful (17 g) with liquid and take by mouth once daily for 20 days 6/21/22   Bren Allen MD   prochlorperazine (COMPAZINE) 10 MG tablet TAKE 1 TABLET(10 MG) BY MOUTH EVERY 6 HOURS AS NEEDED FOR NAUSEA 8/7/21   Fer Ashraf MD   promethazine (PHENERGAN) 25 MG tablet Take 1 tablet (25 mg total) by mouth every 6 (six) hours as needed for Nausea. 6/13/22   Fer Ashraf MD   senna-docusate 8.6-50 mg (SENNA WITH DOCUSATE SODIUM) 8.6-50 mg per tablet Take 1 tablet by mouth 2 (two) times daily as needed for Constipation. 5/26/21   Reshma Wilson MD   sucralfate (CARAFATE) 1 gram tablet TAKE 1 TABLET(1 GRAM) BY MOUTH FOUR TIMES DAILY FOR 14 DAYS 10/20/21   Rosio Marion MD   sucralfate (CARAFATE) 1 gram tablet  4/8/21   Historical Provider   traMADoL (ULTRAM) 50 mg tablet Take 50 mg by mouth. 6/25/21   Historical Provider   amitriptyline (ELAVIL) 10 MG tablet Take 1 tablet (10 mg total) by mouth every evening. 5/13/21 6/20/22  Socrates Terrazas MD   pantoprazole (PROTONIX) 40 MG tablet Take 1 tablet (40 mg total) by mouth 2 (two) times daily. 5/25/22 6/20/22  Kamille Hooks PA-C     Anticoagulants/Antiplatelets: no anticoagulation     Allergies: Review of patient's allergies indicates:  No Known Allergies      Sedation History:  no adverse reactions     Review of Systems:   Hematological: no known coagulopathies  Respiratory: no cough, shortness of breath, or  wheezing  Cardiovascular: no chest pain or dyspnea on exertion  Gastrointestinal: positive for - abdominal pain and distension  Genito-Urinary: no dysuria, trouble voiding, or hematuria  Musculoskeletal: negative  Neurological: no TIA or stroke symptoms      OBJECTIVE:     Vital Signs (Most Recent)       Physical Exam:  General: no acute distress  Mental Status: alert and oriented to person, place and time  HEENT: normocephalic, atraumatic  Chest: unlabored breathing  Heart: regular heart rate  Abdomen: +distended with +fluid wave. No TTP/r/g.  Extremity: moves all extremities  Laboratory  Lab Results   Component Value Date    INR 1.0 06/10/2021       Lab Results   Component Value Date    WBC 8.28 08/29/2022    HGB 9.5 (L) 08/29/2022    HCT 31.1 (L) 08/29/2022    MCV 81 (L) 08/29/2022     08/29/2022      Lab Results   Component Value Date    GLU 92 08/29/2022     (L) 08/29/2022    K 4.0 08/29/2022     08/29/2022    CO2 24 08/29/2022    BUN 11 08/29/2022    CREATININE 0.7 08/29/2022    CALCIUM 8.3 (L) 08/29/2022    MG 1.9 06/20/2022    ALT 7 (L) 08/29/2022    AST 15 08/29/2022    ALBUMIN 1.4 (L) 08/29/2022    BILITOT 0.2 08/29/2022     ASSESSMENT/PLAN:     41 y.o. female with pertinent PMHx of gastric adenocarcinoma with osseous, hepatic and peritoneal metastases complicated by recurrent abdominal distension and pain 2/2 recurrent painful, tense ascites requiring frequent decompression for symptom relief.     1. Recurrent painful, tense ascites - Will attempt US-guided percutaneous RUQ-approach therapeutic LVP with local anesthetic only and albumin infusion post PRN as indicated per institutional protocol.     Risks (including, but not limited to, pain, bleeding, infection, damage to nearby structures, failure to obtain sufficient material for a diagnosis, the need for additional procedures, and death), benefits, and alternatives were discussed with the patient. All questions were answered to the  best of my abilities. The patient wishes to proceed with the procedure. Written informed consent was obtained.     Thank you for considering IR for the care of your patient.      Castro Bermudez MD  Interventional Radiology

## 2022-09-01 NOTE — DISCHARGE SUMMARY
Radiology Discharge Summary      Hospital Course: No complications    Admit Date: 9/1/2022  Discharge Date: 09/01/2022     Instructions Given to Patient: Yes  Diet: Resume prior diet  Activity: activity as tolerated    Description of Condition on Discharge: Stable  Vital Signs (Most Recent):      Discharge Disposition: Home    Discharge Diagnosis:  41 y.o. female with recurrent painful, tense ascites s/p successful US-guided percutaneous RUQ-approach therapeutic LVP with local anesthetic only and albumin infusion post PRN as indicated per institutional protocol. Patient tolerated the procedure well. No immediate post-procedural complications noted.      Thank you for considering IR for the care of your patient.      Castro Bermudez MD  Interventional Radiology

## 2022-09-01 NOTE — BRIEF OP NOTE
Radiology Post-Procedure Note     Pre Op Diagnosis: Recurrent painful, tense ascites  Post Op Diagnosis: Same     Procedure: 1. US-guided percutaneous RUQ-approach therapeutic LVP     Procedure performed by: Catsro Bermudez MD     Written Informed Consent Obtained: Yes  Specimen Removed: YES, 3,700-cc of thin, serosanguinous ascitic fluid  Estimated Blood Loss: none     Findings:   Successful US-guided percutaneous RUQ-approach therapeutic LVP with local anesthetic only and albumin infusion post PRN as indicated per institutional protocol. Patient tolerated the procedure well. No immediate post-procedural complications noted.      Thank you for considering IR for the care of your patient.      Castro Bermudez MD  Interventional Radiology

## 2022-09-06 ENCOUNTER — HOSPITAL ENCOUNTER (OUTPATIENT)
Dept: INTERVENTIONAL RADIOLOGY/VASCULAR | Facility: HOSPITAL | Age: 42
Discharge: HOME | End: 2022-09-06
Attending: INTERNAL MEDICINE
Payer: COMMERCIAL

## 2022-09-06 VITALS — DIASTOLIC BLOOD PRESSURE: 81 MMHG | SYSTOLIC BLOOD PRESSURE: 129 MMHG

## 2022-09-06 DIAGNOSIS — R18.8 OTHER ASCITES: ICD-10-CM

## 2022-09-06 PROCEDURE — C1729 CATH, DRAINAGE: HCPCS

## 2022-09-06 PROCEDURE — P9047 ALBUMIN (HUMAN), 25%, 50ML: HCPCS | Mod: JG | Performed by: RADIOLOGY

## 2022-09-06 PROCEDURE — 49083 IR PARACENTESIS WITH IMAGING: ICD-10-PCS | Mod: ,,, | Performed by: RADIOLOGY

## 2022-09-06 PROCEDURE — 25000003 PHARM REV CODE 250: Performed by: RADIOLOGY

## 2022-09-06 PROCEDURE — 49083 ABD PARACENTESIS W/IMAGING: CPT | Performed by: RADIOLOGY

## 2022-09-06 PROCEDURE — 63600175 PHARM REV CODE 636 W HCPCS: Mod: JG | Performed by: RADIOLOGY

## 2022-09-06 RX ORDER — ALBUMIN HUMAN 250 G/1000ML
SOLUTION INTRAVENOUS
Status: DISCONTINUED | OUTPATIENT
Start: 2022-09-06 | End: 2022-09-07 | Stop reason: HOSPADM

## 2022-09-06 RX ORDER — LIDOCAINE HYDROCHLORIDE 20 MG/ML
INJECTION, SOLUTION INFILTRATION; PERINEURAL
Status: DISCONTINUED | OUTPATIENT
Start: 2022-09-06 | End: 2022-09-07 | Stop reason: HOSPADM

## 2022-09-06 RX ADMIN — LIDOCAINE HYDROCHLORIDE 10 ML: 20 INJECTION, SOLUTION INFILTRATION; PERINEURAL at 02:09

## 2022-09-06 RX ADMIN — ALBUMIN (HUMAN) 25 G: 25 SOLUTION INTRAVENOUS at 03:09

## 2022-09-06 RX ADMIN — ALBUMIN (HUMAN) 25 G: 25 SOLUTION INTRAVENOUS at 02:09

## 2022-09-06 NOTE — H&P
Radiology History & Physical      SUBJECTIVE:     Chief Complaint: Recurrent painful, tense ascites     History of Present Illness:  Puja Quigley is a 41 y.o. female with pertinent PMHx of gastric adenocarcinoma with osseous, hepatic and peritoneal metastases complicated by recurrent abdominal distension and pain 2/2 recurrent painful, tense ascites requiring frequent decompression for symptom relief.     A new outpatient IR consult received for US-guided percutaneous RUQ-approach therapeutic large-volume paracentesis.    Past Medical History:   Diagnosis Date    Anxiety     Dehydration 5/30/2022    Gastric cancer     Gastric ulcer     Hx of psychiatric care     Pregnancy 08/12/2020    delivered on 8/12/2020    Umbilical hernia      Past Surgical History:   Procedure Laterality Date    CLOSURE OF PERFORATED ULCER OF DUODENUM USING OMENTAL PATCH      ESOPHAGOGASTRODUODENOSCOPY N/A 10/13/2020    Procedure: EGD (ESOPHAGOGASTRODUODENOSCOPY);  Surgeon: Rosio Marion MD;  Location: [facility] ENDO (2ND FLR);  Service: Endoscopy;  Laterality: N/A;    ESOPHAGOGASTRODUODENOSCOPY N/A 12/11/2020    Procedure: EGD (ESOPHAGOGASTRODUODENOSCOPY);  Surgeon: Rosio Marion MD;  Location: [facility] (2ND FLR);  Service: Endoscopy;  Laterality: N/A;  Covid-19 test 12/8/20 at [facility]    ESOPHAGOGASTRODUODENOSCOPY N/A 3/12/2021    Procedure: EGD (ESOPHAGOGASTRODUODENOSCOPY);  Surgeon: Nav Omer MD;  Location: [facility] ENDO (2ND FLR);  Service: Endoscopy;  Laterality: N/A;  COVID at [facility] 3/9 ttr    ESOPHAGOGASTRODUODENOSCOPY N/A 5/18/2021    Procedure: EGD (ESOPHAGOGASTRODUODENOSCOPY);  Surgeon: Rosio Marion MD;  Location: [facility] ENDO (2ND FLR);  Service: Endoscopy;  Laterality: N/A;  5/15-covid pcw-inst portal-tb    ESOPHAGOGASTRODUODENOSCOPY N/A 5/26/2021    Procedure: EGD (ESOPHAGOGASTRODUODENOSCOPY);  Surgeon: Socrates Terrazas MD;  Location: [facility] ENDO (2ND FLR);  Service: Endoscopy;  Laterality: N/A;      Home Meds:   Prior to Admission medications    Medication Sig Start Date End Date Taking? Authorizing Provider   baclofen (LIORESAL) 5 mg Tab tablet Take 1 tablet (5 mg total) by mouth 3 (three) times daily. 8/23/22   Sharon Brink MD   dexAMETHasone (DECADRON) 4 MG Tab 8 mg (two tabs) twice daily on the day before chemo and the day after chemo 8/8/22   Fer Ashraf MD   dicyclomine (BENTYL) 10 MG capsule TAKE ONE CAPSULE BY MOUTH FOUR TIMES DAILY 9/27/21   Kamille Hooks PA-C   famotidine (PEPCID) 20 MG tablet Take 1 tablet (20 mg total) by mouth nightly as needed for Heartburn. 2/23/22 2/23/23  Kamille Hooks PA-C   furosemide (LASIX) 20 MG tablet Take 1 tablet (20 mg total) by mouth once daily. 7/6/22   Fer Ashraf MD   gabapentin (NEURONTIN) 300 MG capsule Take 1 capsule (300 mg total) by mouth 3 (three) times daily. 8/22/22 8/22/23  Kamille Hooks PA-C   hydrocortisone 2.5 % cream Apply topically 2 (two) times daily. 7/11/22   Bethel Shetty MD   LIDOcaine HCL 2% (XYLOCAINE) 2 % jelly Apply topically as needed. 8/17/22   Sharon Brink MD   LIDOcaine-prilocaine (EMLA) cream Apply to Port-A-Cath area 30 to 45 minutes prior to port access as directed (topical anesthetic use to the anterior chest only). 8/17/22   Sharon Brink MD   methadone (DOLOPHINE) 5 mg/5 mL solution Take 2.5 mLs (2.5 mg total) by mouth every morning AND 5 mLs (5 mg total) every evening. Do all this for 15 days. 8/25/22 9/10/22  Jeni Trujillo MD   metoclopramide HCl (REGLAN) 5 MG tablet 5 mg. 6/11/21   Historical Provider   multivitamin with folic acid 400 mcg Tab Take 1 tablet by mouth once daily.    Historical Provider   naloxone (NARCAN) 4 mg/actuation Spry 4mg by nasal route as needed for opioid overdose; may repeat every 2-3 minutes in alternating nostrils until medical help arrives. Call 911 8/17/22   Sharon Brink MD   nortriptyline (PAMELOR) 25 MG capsule Take 1 capsule (25  mg total) by mouth every evening. 6/20/22 6/20/23  Bren Allen MD   omeprazole (PRILOSEC) 40 MG capsule Take 1 capsule (40 mg total) by mouth 2 (two) times daily before meals. 6/20/22 6/20/23  Bren Allen MD   ondansetron (ZOFRAN) 8 MG tablet TAKE 1 TABLET(8 MG) BY MOUTH EVERY 8 HOURS AS NEEDED FOR NAUSEA 8/29/22   Fer Ashraf MD   oxyCODONE (ROXICODONE) 5 MG immediate release tablet Take 1 tablet (5 mg total) by mouth every 6 (six) hours as needed. 6/20/22   Bren Allen MD   polyethylene glycol (GLYCOLAX) 17 gram/dose powder Mix 1 capful (17 g) with liquid and take by mouth once daily for 20 days 6/21/22   Bren Allen MD   prochlorperazine (COMPAZINE) 10 MG tablet TAKE 1 TABLET(10 MG) BY MOUTH EVERY 6 HOURS AS NEEDED FOR NAUSEA 8/7/21   Fer Ashraf MD   promethazine (PHENERGAN) 25 MG tablet Take 1 tablet (25 mg total) by mouth every 6 (six) hours as needed for Nausea. 6/13/22   Fer Ashraf MD   senna-docusate 8.6-50 mg (SENNA WITH DOCUSATE SODIUM) 8.6-50 mg per tablet Take 1 tablet by mouth 2 (two) times daily as needed for Constipation. 5/26/21   Reshma Wilson MD   sucralfate (CARAFATE) 1 gram tablet TAKE 1 TABLET(1 GRAM) BY MOUTH FOUR TIMES DAILY FOR 14 DAYS 10/20/21   Rosio Marion MD   sucralfate (CARAFATE) 1 gram tablet  4/8/21   Historical Provider   traMADoL (ULTRAM) 50 mg tablet Take 50 mg by mouth. 6/25/21   Historical Provider   amitriptyline (ELAVIL) 10 MG tablet Take 1 tablet (10 mg total) by mouth every evening. 5/13/21 6/20/22  Socrates Terrazas MD   pantoprazole (PROTONIX) 40 MG tablet Take 1 tablet (40 mg total) by mouth 2 (two) times daily. 5/25/22 6/20/22  Kamille Hooks PA-C     Anticoagulants/Antiplatelets: no anticoagulation     Allergies: Review of patient's allergies indicates:  No Known Allergies      Sedation History:  no adverse reactions     Review of Systems:   Hematological: no known coagulopathies  Respiratory: no cough, shortness of breath, or  wheezing  Cardiovascular: no chest pain or dyspnea on exertion  Gastrointestinal: positive for - abdominal pain and distension  Genito-Urinary: no dysuria, trouble voiding, or hematuria  Musculoskeletal: negative  Neurological: no TIA or stroke symptoms      OBJECTIVE:     Vital Signs (Most Recent)       Physical Exam:  General: no acute distress  Mental Status: alert and oriented to person, place and time  HEENT: normocephalic, atraumatic  Chest: unlabored breathing  Heart: regular heart rate  Abdomen: +distended with +fluid wave. No TTP/r/g.  Extremity: moves all extremities    Laboratory  Lab Results   Component Value Date    INR 1.0 06/10/2021       Lab Results   Component Value Date    WBC 8.28 08/29/2022    HGB 9.5 (L) 08/29/2022    HCT 31.1 (L) 08/29/2022    MCV 81 (L) 08/29/2022     08/29/2022      Lab Results   Component Value Date    GLU 92 08/29/2022     (L) 08/29/2022    K 4.0 08/29/2022     08/29/2022    CO2 24 08/29/2022    BUN 11 08/29/2022    CREATININE 0.7 08/29/2022    CALCIUM 8.3 (L) 08/29/2022    MG 1.9 06/20/2022    ALT 7 (L) 08/29/2022    AST 15 08/29/2022    ALBUMIN 1.4 (L) 08/29/2022    BILITOT 0.2 08/29/2022     ASSESSMENT/PLAN:     41 y.o. female with pertinent PMHx of gastric adenocarcinoma with osseous, hepatic and peritoneal metastases complicated by recurrent abdominal distension and pain 2/2 recurrent painful, tense ascites requiring frequent decompression for symptom relief.     1. Recurrent painful, tense ascites - Will attempt US-guided percutaneous RUQ-approach therapeutic LVP with local anesthetic only and albumin infusion post PRN as indicated per institutional protocol.     Risks (including, but not limited to, pain, bleeding, infection, damage to nearby structures, failure to obtain sufficient material for a diagnosis, the need for additional procedures, and death), benefits, and alternatives were discussed with the patient. All questions were answered to the  best of my abilities. The patient wishes to proceed with the procedure. Written informed consent was obtained.     Thank you for considering IR for the care of your patient.      Castro Bermudez MD  Interventional Radiology

## 2022-09-06 NOTE — DISCHARGE SUMMARY
Radiology Discharge Summary      Hospital Course: No complications    Admit Date: 9/6/2022  Discharge Date: 09/06/2022     Instructions Given to Patient: Yes  Diet: Resume prior diet  Activity: activity as tolerated    Description of Condition on Discharge: Stable  Vital Signs (Most Recent): BP: (!) 125/90 (09/06/22 1438)    Discharge Disposition: Home    Discharge Diagnosis:  41 y.o. female with recurrent painful, tense ascites s/p successful US-guided percutaneous RUQ-approach therapeutic LVP with local anesthetic only and albumin infusion post PRN as indicated per institutional protocol. Patient tolerated the procedure well. No immediate post-procedural complications noted.      Thank you for considering IR for the care of your patient.      Castro Bermudez MD  Interventional Radiology

## 2022-09-09 ENCOUNTER — HOSPITAL ENCOUNTER (OUTPATIENT)
Dept: INTERVENTIONAL RADIOLOGY/VASCULAR | Facility: HOSPITAL | Age: 42
Discharge: HOME | End: 2022-09-09
Attending: INTERNAL MEDICINE
Payer: COMMERCIAL

## 2022-09-09 DIAGNOSIS — G89.3 CANCER RELATED PAIN: ICD-10-CM

## 2022-09-09 DIAGNOSIS — C16.9 MALIGNANT NEOPLASM OF STOMACH, UNSPECIFIED LOCATION: ICD-10-CM

## 2022-09-09 DIAGNOSIS — R18.8 OTHER ASCITES: ICD-10-CM

## 2022-09-09 PROCEDURE — 25000003 PHARM REV CODE 250: Performed by: RADIOLOGY

## 2022-09-09 PROCEDURE — 49083 IR PARACENTESIS WITH IMAGING: ICD-10-PCS | Mod: ,,, | Performed by: RADIOLOGY

## 2022-09-09 PROCEDURE — 49083 ABD PARACENTESIS W/IMAGING: CPT | Performed by: RADIOLOGY

## 2022-09-09 PROCEDURE — 63600175 PHARM REV CODE 636 W HCPCS: Mod: JG | Performed by: RADIOLOGY

## 2022-09-09 PROCEDURE — P9047 ALBUMIN (HUMAN), 25%, 50ML: HCPCS | Mod: JG | Performed by: RADIOLOGY

## 2022-09-09 PROCEDURE — C1729 CATH, DRAINAGE: HCPCS

## 2022-09-09 RX ORDER — LIDOCAINE HYDROCHLORIDE 10 MG/ML
INJECTION INFILTRATION; PERINEURAL
Status: DISCONTINUED | OUTPATIENT
Start: 2022-09-09 | End: 2022-09-10 | Stop reason: HOSPADM

## 2022-09-09 RX ORDER — ALBUMIN HUMAN 250 G/1000ML
SOLUTION INTRAVENOUS
Status: DISCONTINUED | OUTPATIENT
Start: 2022-09-09 | End: 2022-09-10 | Stop reason: HOSPADM

## 2022-09-09 RX ORDER — METHADONE HYDROCHLORIDE 5 MG/5ML
SOLUTION ORAL
Qty: 225 ML | Refills: 0 | Status: ON HOLD | OUTPATIENT
Start: 2022-09-09 | End: 2022-10-14 | Stop reason: HOSPADM

## 2022-09-09 RX ADMIN — ALBUMIN (HUMAN) 25 G: 25 SOLUTION INTRAVENOUS at 02:09

## 2022-09-09 RX ADMIN — LIDOCAINE HYDROCHLORIDE 5 ML: 10 INJECTION, SOLUTION INFILTRATION; PERINEURAL at 02:09

## 2022-09-09 NOTE — PROCEDURES
Interventional Radiology Immediate Post-Procedure Note    Pre-Op Diagnosis: Ascites  Post-Op Diagnosis: Same    Procedure: US-guided paracentesis    Procedure performed by: Maninder White MD  Assistants: None    Estimated Blood Loss: Minimal  Specimen Removed: None requested    Findings/description of procedure:  Large ascites. 6250 mL yellow ascitic fluid removed from the RIGHT lower quadrant under real-time US guidance.    No immediate complications. Patient tolerated procedure well. Please see full dictated procedure report for additional details and recommendations.      Maninder White MD  [facility]  Pager [phone]

## 2022-09-09 NOTE — DISCHARGE SUMMARY
Interventional Radiology Short Stay Discharge Summary      Admit Date: 9/9/2022  Discharge Date: 09/09/2022     Hospital Course: Uneventful    Discharge Diagnosis: Ascites    Discharge Condition: Stable    Discharge Disposition: Home    Diet: Resume prior diet    Activity: Activity as tolerated    Follow-up: With referring provider      Maninder White MD  [facility]  Pager [phone]

## 2022-09-09 NOTE — H&P
Interventional Radiology Pre-Procedure History & Physical      Chief Complaint/Reason for Referral: Ascites    History of Present Illness:  Puja Quigley is a 41 y.o. female who presents with malignant ascites. Referred to IR for paracentesis.    Past Medical History:   Diagnosis Date    Anxiety     Dehydration 5/30/2022    Gastric cancer     Gastric ulcer     Hx of psychiatric care     Pregnancy 08/12/2020    delivered on 8/12/2020    Umbilical hernia      Past Surgical History:   Procedure Laterality Date    CLOSURE OF PERFORATED ULCER OF DUODENUM USING OMENTAL PATCH      ESOPHAGOGASTRODUODENOSCOPY N/A 10/13/2020    Procedure: EGD (ESOPHAGOGASTRODUODENOSCOPY);  Surgeon: Rosio Marion MD;  Location: [facility] (2ND FLR);  Service: Endoscopy;  Laterality: N/A;    ESOPHAGOGASTRODUODENOSCOPY N/A 12/11/2020    Procedure: EGD (ESOPHAGOGASTRODUODENOSCOPY);  Surgeon: Rosio Marion MD;  Location: [facility] (2ND FLR);  Service: Endoscopy;  Laterality: N/A;  Covid-19 test 12/8/20 at [facility]    ESOPHAGOGASTRODUODENOSCOPY N/A 3/12/2021    Procedure: EGD (ESOPHAGOGASTRODUODENOSCOPY);  Surgeon: Nav Omer MD;  Location: [facility] (2ND FLR);  Service: Endoscopy;  Laterality: N/A;  COVID at [facility] 3/9 [facility]    ESOPHAGOGASTRODUODENOSCOPY N/A 5/18/2021    Procedure: EGD (ESOPHAGOGASTRODUODENOSCOPY);  Surgeon: Rosio Marion MD;  Location: [facility] (2ND FLR);  Service: Endoscopy;  Laterality: N/A;  5/15-covid pcw-inst portal-tb    ESOPHAGOGASTRODUODENOSCOPY N/A 5/26/2021    Procedure: EGD (ESOPHAGOGASTRODUODENOSCOPY);  Surgeon: Socrates Terrazas MD;  Location: [facility] (2ND FLR);  Service: Endoscopy;  Laterality: N/A;       Allergies:   Review of patient's allergies indicates:  No Known Allergies    Home Meds:   Prior to Admission medications    Medication Sig Start Date End Date Taking? Authorizing Provider   baclofen (LIORESAL) 5 mg Tab tablet Take 1 tablet (5 mg total) by mouth 3 (three) times  daily. 8/23/22   Sharon Brink MD   dexAMETHasone (DECADRON) 4 MG Tab 8 mg (two tabs) twice daily on the day before chemo and the day after chemo 8/8/22   Fer Ashraf MD   dicyclomine (BENTYL) 10 MG capsule TAKE ONE CAPSULE BY MOUTH FOUR TIMES DAILY 9/27/21   Kamille Hooks PA-C   famotidine (PEPCID) 20 MG tablet Take 1 tablet (20 mg total) by mouth nightly as needed for Heartburn. 2/23/22 2/23/23  Kamille Hooks PA-C   furosemide (LASIX) 20 MG tablet Take 1 tablet (20 mg total) by mouth once daily. 7/6/22   Fer Ashraf MD   gabapentin (NEURONTIN) 300 MG capsule Take 1 capsule (300 mg total) by mouth 3 (three) times daily. 8/22/22 8/22/23  Kamille Hooks PA-C   hydrocortisone 2.5 % cream Apply topically 2 (two) times daily. 7/11/22   Bethel Shetty MD   LIDOcaine HCL 2% (XYLOCAINE) 2 % jelly Apply topically as needed. 8/17/22   Sharon Brink MD   LIDOcaine-prilocaine (EMLA) cream Apply to Port-A-Cath area 30 to 45 minutes prior to port access as directed (topical anesthetic use to the anterior chest only). 8/17/22   Sharon Brink MD   methadone (DOLOPHINE) 5 mg/5 mL solution Take 2.5 mLs (2.5 mg total) by mouth every morning AND 5 mLs (5 mg total) every evening. 9/9/22 10/9/22  Sharon Brink MD   metoclopramide HCl (REGLAN) 5 MG tablet 5 mg. 6/11/21   Historical Provider   multivitamin with folic acid 400 mcg Tab Take 1 tablet by mouth once daily.    Historical Provider   naloxone (NARCAN) 4 mg/actuation Spry 4mg by nasal route as needed for opioid overdose; may repeat every 2-3 minutes in alternating nostrils until medical help arrives. Call 911 8/17/22   Sharon Brink MD   nortriptyline (PAMELOR) 25 MG capsule Take 1 capsule (25 mg total) by mouth every evening. 6/20/22 6/20/23  Bren Allen MD   omeprazole (PRILOSEC) 40 MG capsule Take 1 capsule (40 mg total) by mouth 2 (two) times daily before meals. 6/20/22 6/20/23  Bren Allen MD   ondansetron (ZOFRAN) 8  MG tablet TAKE 1 TABLET(8 MG) BY MOUTH EVERY 8 HOURS AS NEEDED FOR NAUSEA 8/29/22   Fer Ashraf MD   oxyCODONE (ROXICODONE) 5 MG immediate release tablet Take 1 tablet (5 mg total) by mouth every 6 (six) hours as needed. 6/20/22   Bren Allen MD   polyethylene glycol (GLYCOLAX) 17 gram/dose powder Mix 1 capful (17 g) with liquid and take by mouth once daily for 20 days 6/21/22   Bren Allen MD   prochlorperazine (COMPAZINE) 10 MG tablet TAKE 1 TABLET(10 MG) BY MOUTH EVERY 6 HOURS AS NEEDED FOR NAUSEA 8/7/21   Fer Ashraf MD   promethazine (PHENERGAN) 25 MG tablet Take 1 tablet (25 mg total) by mouth every 6 (six) hours as needed for Nausea. 9/9/22   Fer Ashraf MD   senna-docusate 8.6-50 mg (SENNA WITH DOCUSATE SODIUM) 8.6-50 mg per tablet Take 1 tablet by mouth 2 (two) times daily as needed for Constipation. 5/26/21   Reshma Wilson MD   sucralfate (CARAFATE) 1 gram tablet TAKE 1 TABLET(1 GRAM) BY MOUTH FOUR TIMES DAILY FOR 14 DAYS 10/20/21   Rosio Marion MD   sucralfate (CARAFATE) 1 gram tablet  4/8/21   Historical Provider   traMADoL (ULTRAM) 50 mg tablet Take 50 mg by mouth. 6/25/21   Historical Provider   amitriptyline (ELAVIL) 10 MG tablet Take 1 tablet (10 mg total) by mouth every evening. 5/13/21 6/20/22  Socrates Terrazas MD   methadone (DOLOPHINE) 5 mg/5 mL solution Take 2.5 mLs (2.5 mg total) by mouth every morning AND 5 mLs (5 mg total) every evening. Do all this for 15 days. 8/25/22 9/9/22  Jeni Trujillo MD   pantoprazole (PROTONIX) 40 MG tablet Take 1 tablet (40 mg total) by mouth 2 (two) times daily. 5/25/22 6/20/22  XENA KramerC   promethazine (PHENERGAN) 25 MG tablet Take 1 tablet (25 mg total) by mouth every 6 (six) hours as needed for Nausea. 6/13/22 9/9/22  Fer Ashraf MD       Anticoagulation/Antiplatelet Meds: no anticoagulation    Review of Systems:   Hematological: no known coagulopathies  Respiratory: no shortness of  breath  Cardiovascular: no chest pain  Gastrointestinal: no abdominal pain  Genitourinary: no dysuria  Musculoskeletal: negative  Neurological: no TIA or stroke symptoms     Physical Exam:       General: NAD  HEENT: Normocephalic, sclera anicteric, oropharynx clear  Heart: RRR  Lungs: Symmetric excursions, breathing unlabored  Abd: Distended, soft, NT  Extremities: BLE pitting edema to the level of the mid thight  Neuro: Gross nonfocal    Laboratory:  Lab Results   Component Value Date    INR 1.0 06/10/2021       Lab Results   Component Value Date    WBC 8.28 08/29/2022    HGB 9.5 (L) 08/29/2022    HCT 31.1 (L) 08/29/2022    MCV 81 (L) 08/29/2022     08/29/2022      Lab Results   Component Value Date    GLU 92 08/29/2022     (L) 08/29/2022    K 4.0 08/29/2022     08/29/2022    CO2 24 08/29/2022    BUN 11 08/29/2022    CREATININE 0.7 08/29/2022    CALCIUM 8.3 (L) 08/29/2022    MG 1.9 06/20/2022    ALT 7 (L) 08/29/2022    AST 15 08/29/2022    ALBUMIN 1.4 (L) 08/29/2022    BILITOT 0.2 08/29/2022         Assessment/Plan:  41 y.o. female with ascites. Will undergo paracentesis today.    Sedation plan: None    Risks (including, but not limited to, pain, bleeding, infection, damage to nearby structures, treatment failure/recurrence, and the need for additional procedures), potential benefits, and alternatives were discussed with the patient. All questions were answered to the best of my abilities. The patient wishes to proceed. Written informed consent was obtained.      Maninder White MD  [facility]  Pager [phone]

## 2022-09-12 ENCOUNTER — HOSPITAL ENCOUNTER (EMERGENCY)
Facility: HOSPITAL | Age: 42
Discharge: HOME | End: 2022-09-12
Attending: EMERGENCY MEDICINE
Payer: COMMERCIAL

## 2022-09-12 VITALS
TEMPERATURE: 98 F | RESPIRATION RATE: 19 BRPM | DIASTOLIC BLOOD PRESSURE: 95 MMHG | HEIGHT: 67 IN | BODY MASS INDEX: 22.76 KG/M2 | WEIGHT: 145 LBS | HEART RATE: 106 BPM | SYSTOLIC BLOOD PRESSURE: 127 MMHG | OXYGEN SATURATION: 99 %

## 2022-09-12 DIAGNOSIS — R18.0 MALIGNANT ASCITES: Primary | ICD-10-CM

## 2022-09-12 DIAGNOSIS — R14.0 ABDOMINAL DISTENSION: ICD-10-CM

## 2022-09-12 PROCEDURE — 99281 EMR DPT VST MAYX REQ PHY/QHP: CPT

## 2022-09-13 ENCOUNTER — LAB VISIT (OUTPATIENT)
Dept: LAB | Facility: HOSPITAL | Age: 42
End: 2022-09-13
Attending: INTERNAL MEDICINE
Payer: COMMERCIAL

## 2022-09-13 ENCOUNTER — INFUSION (OUTPATIENT)
Dept: INFUSION THERAPY | Facility: HOSPITAL | Age: 42
End: 2022-09-13
Payer: COMMERCIAL

## 2022-09-13 ENCOUNTER — OFFICE VISIT (OUTPATIENT)
Dept: HEMATOLOGY/ONCOLOGY | Facility: CLINIC | Age: 42
End: 2022-09-13
Payer: COMMERCIAL

## 2022-09-13 VITALS
OXYGEN SATURATION: 98 % | WEIGHT: 157.88 LBS | HEART RATE: 96 BPM | SYSTOLIC BLOOD PRESSURE: 115 MMHG | DIASTOLIC BLOOD PRESSURE: 90 MMHG | RESPIRATION RATE: 18 BRPM | BODY MASS INDEX: 24.78 KG/M2 | HEIGHT: 67 IN

## 2022-09-13 VITALS
SYSTOLIC BLOOD PRESSURE: 135 MMHG | HEART RATE: 100 BPM | DIASTOLIC BLOOD PRESSURE: 107 MMHG | RESPIRATION RATE: 17 BRPM | TEMPERATURE: 97 F | OXYGEN SATURATION: 100 %

## 2022-09-13 DIAGNOSIS — C16.9 MALIGNANT NEOPLASM OF STOMACH, UNSPECIFIED LOCATION: Primary | ICD-10-CM

## 2022-09-13 DIAGNOSIS — C78.6 PERITONEAL CARCINOMATOSIS: ICD-10-CM

## 2022-09-13 DIAGNOSIS — M79.659 PAIN OF THIGH, UNSPECIFIED LATERALITY: ICD-10-CM

## 2022-09-13 DIAGNOSIS — T45.1X5A IMMUNODEFICIENCY DUE TO CHEMOTHERAPY: ICD-10-CM

## 2022-09-13 DIAGNOSIS — R18.8 OTHER ASCITES: ICD-10-CM

## 2022-09-13 DIAGNOSIS — D84.821 IMMUNODEFICIENCY DUE TO CHEMOTHERAPY: ICD-10-CM

## 2022-09-13 DIAGNOSIS — B37.31 VAGINAL CANDIDOSIS: ICD-10-CM

## 2022-09-13 DIAGNOSIS — C80.1 SIGNET RING CELL ADENOCARCINOMA: ICD-10-CM

## 2022-09-13 DIAGNOSIS — E43 SEVERE MALNUTRITION: ICD-10-CM

## 2022-09-13 DIAGNOSIS — C79.51 BONE METASTASES: ICD-10-CM

## 2022-09-13 DIAGNOSIS — Z79.899 IMMUNODEFICIENCY DUE TO CHEMOTHERAPY: ICD-10-CM

## 2022-09-13 DIAGNOSIS — D63.0 ANEMIA IN NEOPLASTIC DISEASE: ICD-10-CM

## 2022-09-13 DIAGNOSIS — C79.60: ICD-10-CM

## 2022-09-13 DIAGNOSIS — T45.1X5A NEUROPATHY DUE TO CHEMOTHERAPEUTIC DRUG: ICD-10-CM

## 2022-09-13 DIAGNOSIS — R11.2 NON-INTRACTABLE VOMITING WITH NAUSEA, UNSPECIFIED VOMITING TYPE: ICD-10-CM

## 2022-09-13 DIAGNOSIS — G62.0 NEUROPATHY DUE TO CHEMOTHERAPEUTIC DRUG: ICD-10-CM

## 2022-09-13 DIAGNOSIS — K25.5 CHRONIC GASTRIC ULCER WITH PERFORATION: ICD-10-CM

## 2022-09-13 DIAGNOSIS — R60.0 BILATERAL LOWER EXTREMITY EDEMA: ICD-10-CM

## 2022-09-13 LAB
ALBUMIN SERPL BCP-MCNC: 1.6 G/DL (ref 3.5–5.2)
ALP SERPL-CCNC: 62 U/L (ref 55–135)
ALT SERPL W/O P-5'-P-CCNC: 6 U/L (ref 10–44)
ANION GAP SERPL CALC-SCNC: 4 MMOL/L (ref 8–16)
AST SERPL-CCNC: 15 U/L (ref 10–40)
BASOPHILS # BLD AUTO: 0.01 K/UL (ref 0–0.2)
BASOPHILS NFR BLD: 0.1 % (ref 0–1.9)
BILIRUB SERPL-MCNC: 0.2 MG/DL (ref 0.1–1)
BUN SERPL-MCNC: 12 MG/DL (ref 6–20)
CALCIUM SERPL-MCNC: 8.7 MG/DL (ref 8.7–10.5)
CHLORIDE SERPL-SCNC: 104 MMOL/L (ref 95–110)
CO2 SERPL-SCNC: 26 MMOL/L (ref 23–29)
CREAT SERPL-MCNC: 0.8 MG/DL (ref 0.5–1.4)
DIFFERENTIAL METHOD: ABNORMAL
EOSINOPHIL # BLD AUTO: 0 K/UL (ref 0–0.5)
EOSINOPHIL NFR BLD: 0 % (ref 0–8)
ERYTHROCYTE [DISTWIDTH] IN BLOOD BY AUTOMATED COUNT: 19.4 % (ref 11.5–14.5)
EST. GFR  (NO RACE VARIABLE): >60 ML/MIN/1.73 M^2
GLUCOSE SERPL-MCNC: 112 MG/DL (ref 70–110)
HCT VFR BLD AUTO: 31.8 % (ref 37–48.5)
HGB BLD-MCNC: 9.7 G/DL (ref 12–16)
IMM GRANULOCYTES # BLD AUTO: 0.07 K/UL (ref 0–0.04)
IMM GRANULOCYTES NFR BLD AUTO: 1 % (ref 0–0.5)
LYMPHOCYTES # BLD AUTO: 0.9 K/UL (ref 1–4.8)
LYMPHOCYTES NFR BLD: 12.1 % (ref 18–48)
MCH RBC QN AUTO: 23.7 PG (ref 27–31)
MCHC RBC AUTO-ENTMCNC: 30.5 G/DL (ref 32–36)
MCV RBC AUTO: 78 FL (ref 82–98)
MONOCYTES # BLD AUTO: 0.2 K/UL (ref 0.3–1)
MONOCYTES NFR BLD: 2.1 % (ref 4–15)
NEUTROPHILS # BLD AUTO: 6 K/UL (ref 1.8–7.7)
NEUTROPHILS NFR BLD: 84.7 % (ref 38–73)
NRBC BLD-RTO: 0 /100 WBC
PLATELET # BLD AUTO: 364 K/UL (ref 150–450)
PMV BLD AUTO: 8.7 FL (ref 9.2–12.9)
POTASSIUM SERPL-SCNC: 4.3 MMOL/L (ref 3.5–5.1)
PROT SERPL-MCNC: 4.7 G/DL (ref 6–8.4)
RBC # BLD AUTO: 4.09 M/UL (ref 4–5.4)
SODIUM SERPL-SCNC: 134 MMOL/L (ref 136–145)
WBC # BLD AUTO: 7.05 K/UL (ref 3.9–12.7)

## 2022-09-13 PROCEDURE — 80053 COMPREHEN METABOLIC PANEL: CPT | Performed by: INTERNAL MEDICINE

## 2022-09-13 PROCEDURE — 3008F BODY MASS INDEX DOCD: CPT | Mod: CPTII,S$GLB,, | Performed by: PHYSICIAN ASSISTANT

## 2022-09-13 PROCEDURE — 3074F PR MOST RECENT SYSTOLIC BLOOD PRESSURE < 130 MM HG: ICD-10-PCS | Mod: CPTII,S$GLB,, | Performed by: PHYSICIAN ASSISTANT

## 2022-09-13 PROCEDURE — 63600175 PHARM REV CODE 636 W HCPCS: Performed by: PHYSICIAN ASSISTANT

## 2022-09-13 PROCEDURE — 96413 CHEMO IV INFUSION 1 HR: CPT

## 2022-09-13 PROCEDURE — A4216 STERILE WATER/SALINE, 10 ML: HCPCS | Performed by: PHYSICIAN ASSISTANT

## 2022-09-13 PROCEDURE — 99999 PR PBB SHADOW E&M-EST. PATIENT-LVL V: ICD-10-PCS | Mod: PBBFAC,,, | Performed by: PHYSICIAN ASSISTANT

## 2022-09-13 PROCEDURE — 99999 PR PBB SHADOW E&M-EST. PATIENT-LVL V: CPT | Mod: PBBFAC,,, | Performed by: PHYSICIAN ASSISTANT

## 2022-09-13 PROCEDURE — 3074F SYST BP LT 130 MM HG: CPT | Mod: CPTII,S$GLB,, | Performed by: PHYSICIAN ASSISTANT

## 2022-09-13 PROCEDURE — 3008F PR BODY MASS INDEX (BMI) DOCUMENTED: ICD-10-PCS | Mod: CPTII,S$GLB,, | Performed by: PHYSICIAN ASSISTANT

## 2022-09-13 PROCEDURE — 99215 PR OFFICE/OUTPT VISIT, EST, LEVL V, 40-54 MIN: ICD-10-PCS | Mod: S$GLB,,, | Performed by: PHYSICIAN ASSISTANT

## 2022-09-13 PROCEDURE — 99215 OFFICE O/P EST HI 40 MIN: CPT | Mod: S$GLB,,, | Performed by: PHYSICIAN ASSISTANT

## 2022-09-13 PROCEDURE — 3080F DIAST BP >= 90 MM HG: CPT | Mod: CPTII,S$GLB,, | Performed by: PHYSICIAN ASSISTANT

## 2022-09-13 PROCEDURE — 85025 COMPLETE CBC W/AUTO DIFF WBC: CPT | Performed by: INTERNAL MEDICINE

## 2022-09-13 PROCEDURE — 3080F PR MOST RECENT DIASTOLIC BLOOD PRESSURE >= 90 MM HG: ICD-10-PCS | Mod: CPTII,S$GLB,, | Performed by: PHYSICIAN ASSISTANT

## 2022-09-13 PROCEDURE — 96365 THER/PROPH/DIAG IV INF INIT: CPT

## 2022-09-13 PROCEDURE — 36415 COLL VENOUS BLD VENIPUNCTURE: CPT | Performed by: INTERNAL MEDICINE

## 2022-09-13 PROCEDURE — 25000003 PHARM REV CODE 250: Performed by: PHYSICIAN ASSISTANT

## 2022-09-13 RX ORDER — SODIUM CHLORIDE 0.9 % (FLUSH) 0.9 %
10 SYRINGE (ML) INJECTION
Status: CANCELLED | OUTPATIENT
Start: 2022-09-13

## 2022-09-13 RX ORDER — HEPARIN 100 UNIT/ML
500 SYRINGE INTRAVENOUS
Status: DISCONTINUED | OUTPATIENT
Start: 2022-09-13 | End: 2022-09-13 | Stop reason: HOSPADM

## 2022-09-13 RX ORDER — HEPARIN 100 UNIT/ML
500 SYRINGE INTRAVENOUS
Status: CANCELLED | OUTPATIENT
Start: 2022-09-13

## 2022-09-13 RX ORDER — SODIUM CHLORIDE 0.9 % (FLUSH) 0.9 %
10 SYRINGE (ML) INJECTION
Status: DISCONTINUED | OUTPATIENT
Start: 2022-09-13 | End: 2022-09-13 | Stop reason: HOSPADM

## 2022-09-13 RX ADMIN — Medication 10 ML: at 02:09

## 2022-09-13 RX ADMIN — DEXAMETHASONE SODIUM PHOSPHATE 20 MG: 4 INJECTION, SOLUTION INTRA-ARTICULAR; INTRALESIONAL; INTRAMUSCULAR; INTRAVENOUS; SOFT TISSUE at 11:09

## 2022-09-13 RX ADMIN — DOCETAXEL ANHYDROUS 65 MG: 10 INJECTION, SOLUTION INTRAVENOUS at 12:09

## 2022-09-13 RX ADMIN — SODIUM CHLORIDE: 0.9 INJECTION, SOLUTION INTRAVENOUS at 11:09

## 2022-09-13 RX ADMIN — HEPARIN SODIUM (PORCINE) LOCK FLUSH IV SOLN 100 UNIT/ML 500 UNITS: 100 SOLUTION at 02:09

## 2022-09-13 NOTE — PROGRESS NOTES
MEDICAL ONCOLOGY - ESTABLISHED PATIENT    Reason for visit: Gastric cancer     Best Contact Phone Number(s): [phone] (home)      Cancer/Stage/TNM:    Cancer Staging   No matching staging information was found for the patient.     Oncology History   Malignant neoplasm of stomach   6/10/2021 Initial Diagnosis    Gastric adenocarcinoma     7/26/2021 - 4/6/2022 Chemotherapy    Treatment Summary   Plan Name: OP FOLFOX 6 Q2W  Treatment Goal: Palliative  Status: Inactive  Start Date: 7/26/2021  End Date: 4/6/2022  Provider: Fer Ashraf MD  Chemotherapy: fluorouraciL 2,400 mg/m2 = 3,770 mg in sodium chloride 0.9% 100 mL chemo infusion, 2,400 mg/m2 = 3,770 mg, Intravenous, Over 46 hours, 19 of 20 cycles  Administration: 3,770 mg (7/26/2021), 3,770 mg (8/9/2021), 3,770 mg (8/23/2021), 3,770 mg (9/7/2021), 3,770 mg (9/21/2021), 3,770 mg (10/5/2021), 3,770 mg (10/19/2021), 3,770 mg (11/2/2021), 3,770 mg (11/15/2021), 3,770 mg (11/30/2021), 3,770 mg (12/13/2021), 4,000 mg (12/27/2021), 4,030 mg (1/10/2022), 4,030 mg (1/24/2022), 4,000 mg (2/7/2022), 4,000 mg (2/22/2022), 4,000 mg (3/7/2022), 4,000 mg (3/22/2022), 4,000 mg (4/4/2022)  oxaliplatin (ELOXATIN) 85 mg/m2 = 133 mg in dextrose 5 % 500 mL chemo infusion, 85 mg/m2 = 133 mg, Intravenous, [facility] [place] 1 time, 9 of 9 cycles  Administration: 133 mg (7/26/2021), 133 mg (8/9/2021), 133 mg (8/23/2021), 133 mg (9/7/2021), 133 mg (9/21/2021), 133 mg (10/5/2021), 133 mg (10/19/2021), 133 mg (11/2/2021), 133 mg (11/15/2021)       5/26/2022 - 7/27/2022 Chemotherapy    Treatment Summary   Plan Name: OP FOLFIRI Q2WK  Treatment Goal: Palliative  Status: Inactive  Start Date: 5/26/2022  End Date: 7/27/2022  Provider: Fer Ashraf MD  Chemotherapy: dexAMETHasone (DECADRON) 4 MG Tab, 8 mg, Oral, Daily, 1 of 1 cycle, Start date: --, End date: --  irinotecan (CAMPTOSAR) 120 mg/m2 = 200 mg in sodium chloride 0.9% 500 mL chemo infusion, 120 mg/m2 = 200 mg (100 % of original  dose 120 mg/m2), Intravenous, Clinic/[facility] 1 time, 5 of 24 cycles  Dose modification: 125 mg/m2 (original dose 120 mg/m2, Cycle 1), 120 mg/m2 (original dose 120 mg/m2, Cycle 1), 75 mg/m2 (original dose 120 mg/m2, Cycle 2, Reason: Dose not tolerated)  Administration: 200 mg (5/26/2022), 126 mg (6/13/2022), 126 mg (6/27/2022), 134 mg (7/12/2022), 134 mg (7/25/2022)  ramucirumab (CYRAMZA) 471 mg in sodium chloride 0.9% 250 mL chemo infusion, 8 mg/kg = 471 mg, Intravenous, Clinic/[facility] 1 time, 1 of 1 cycle  Administration: 471 mg (5/26/2022)       8/16/2022 -  Chemotherapy    Treatment Summary   Plan Name: OP DOCETAXEL (75 MG/M2) Q3W  Treatment Goal: Palliative  Status: Active  Start Date: 8/16/2022  End Date: 11/8/2022 (Planned)  Provider: Fer Ashraf MD  Chemotherapy: DOCEtaxeL (TAXOTERE) 35 mg/m2 = 65 mg in sodium chloride 0.9% 253.25 mL chemo infusion, 35 mg/m2 = 65 mg (46.7 % of original dose 75 mg/m2), Intravenous, Clinic/[facility] 1 time, 2 of 7 cycles  Dose modification: 35 mg/m2 (original dose 75 mg/m2, Cycle 1, Reason: MD Discretion, Comment: per MD Gorge recommendations)  Administration: 65 mg (8/16/2022), 65 mg (8/30/2022)       Anxiety associated with cancer diagnosis   12/27/2021 Initial Diagnosis    Anxiety associated with cancer diagnosis          Interim History:   Puja is a very pleasant 41 y.o. female with metastatic gastric cancer who presents for follow-up after completing 5 cycles of FOLFIRI and is s/p 2 cycles of Docetaxel. She tolerated the first cycle fairly. She is doing fairly. She continues to have bilateral lower leg swelling, abdominal distension, cachexia, neuropathy to the feet, finger tips and vaginal area. She was seen in the ER on 9/12/2022 due to abdominal discomfort, where she thought that she may need a repeat paracentesis. During her visit in the ER, they did not find that a paracentesis was needed and she stated that she was ok enough to wait until tomorrow to get the  paracentesis done. They did not think that she presented with concern for SBP.     She is eating and trying to improve her nutritional status. She has a fair appetite but is discouraged trying to manage her swelling and ascites. Most of her discomfort comes from the swelling and pressure of her legs and abdomen. In addition, she is noticing increasing neuropathy of the hands and feet, also with a burning sensation of the bilateral thigh. She has Gabapentin given for neuropathy and is taking it. She finds this helps some.   She is taking her Omeprazole and Bentyl that does help with acid reflux and the abdominal cramping. Otherwise, she is not having any worsening pain, chest pain, shortness of breath, cough or dizziness.     ECOG status is 1. Presents alone today. Next appointment with [facility] is 10/12/2022 with a repeat CT CAP without contrast. She will be going out of town during the week of her birthday.     Review of Systems   Constitutional:  Positive for appetite change. Negative for activity change, chills, fatigue, fever and unexpected weight change.   HENT:  Negative for ear pain, facial swelling, hearing loss, mouth sores, nosebleeds, sore throat and trouble swallowing.    Eyes:  Negative for pain, discharge, redness and visual disturbance.   Respiratory:  Negative for cough, chest tightness and shortness of breath.    Cardiovascular:  Positive for leg swelling. Negative for chest pain and palpitations.   Gastrointestinal:  Positive for abdominal distention, nausea and reflux. Negative for abdominal pain, blood in stool, constipation, diarrhea and vomiting.   Endocrine: Negative for cold intolerance and heat intolerance.   Genitourinary:  Positive for vaginal dryness. Negative for decreased urine volume, difficulty urinating, dysuria, frequency, hematuria, hot flashes and urgency.   Musculoskeletal:  Negative for arthralgias, gait problem, leg pain and myalgias.   Integumentary:  Negative for pallor, rash and  wound.   Allergic/Immunologic: Negative for immunocompromised state.   Neurological:  Positive for numbness (slowly improving). Negative for dizziness, tremors, weakness, light-headedness and headaches.        Bilateral thigh numbness   Hematological:  Negative for adenopathy. Does not bruise/bleed easily.   Psychiatric/Behavioral:  Negative for agitation, confusion, dysphoric mood and sleep disturbance. The patient is not nervous/anxious.          Past Medical History:   Past Medical History:   Diagnosis Date    Anxiety     Dehydration 5/30/2022    Gastric cancer     Gastric ulcer     Hx of psychiatric care     Pregnancy 08/12/2020    delivered on 8/12/2020    Umbilical hernia         Past Surgical History:   Past Surgical History:   Procedure Laterality Date    CLOSURE OF PERFORATED ULCER OF DUODENUM USING OMENTAL PATCH      ESOPHAGOGASTRODUODENOSCOPY N/A 10/13/2020    Procedure: EGD (ESOPHAGOGASTRODUODENOSCOPY);  Surgeon: Rosio Marion MD;  Location: [facility] (2ND FLR);  Service: Endoscopy;  Laterality: N/A;    ESOPHAGOGASTRODUODENOSCOPY N/A 12/11/2020    Procedure: EGD (ESOPHAGOGASTRODUODENOSCOPY);  Surgeon: Rosio Marion MD;  Location: [facility] (2ND FLR);  Service: Endoscopy;  Laterality: N/A;  Covid-19 test 12/8/20 at [facility]    ESOPHAGOGASTRODUODENOSCOPY N/A 3/12/2021    Procedure: EGD (ESOPHAGOGASTRODUODENOSCOPY);  Surgeon: Nav Omer MD;  Location: [facility] (2ND FLR);  Service: Endoscopy;  Laterality: N/A;  COVID at [facility] 3/9 ttr    ESOPHAGOGASTRODUODENOSCOPY N/A 5/18/2021    Procedure: EGD (ESOPHAGOGASTRODUODENOSCOPY);  Surgeon: Rosio Marion MD;  Location: [facility] (2ND FLR);  Service: Endoscopy;  Laterality: N/A;  5/15-covid pcw-inst portal-tb    ESOPHAGOGASTRODUODENOSCOPY N/A 5/26/2021    Procedure: EGD (ESOPHAGOGASTRODUODENOSCOPY);  Surgeon: Socrates Terrazas MD;  Location: [facility] (2ND FLR);  Service: Endoscopy;  Laterality: N/A;        Family History:  "  Family History   Problem Relation Age of Onset    Cancer Mother 67        lymphoma (type? "not active," not on tx; "related to her blood")    Schizophrenia Mother     Lung cancer Father 48        type? h/o smoking    No Known Problems Son     Breast cancer Other 73        unilat; stage I, but aggressive    Prostate cancer Paternal Uncle         (dx 50s/60? no chemo?)    Breast cancer Paternal Cousin         (dx age?) ductal    Colon cancer Neg Hx     Esophageal cancer Neg Hx         Social History:   Social History     Tobacco Use    Smoking status: Former     Types: Cigarettes     Quit date: 2019     Years since quittin.8    Smokeless tobacco: Never   Substance Use Topics    Alcohol use: Yes        I have reviewed and updated the patient's past medical, surgical, family and social histories.    Allergies:   Review of patient's allergies indicates:  No Known Allergies     Medications:   Current Outpatient Medications   Medication Sig Dispense Refill    baclofen (LIORESAL) 5 mg Tab tablet Take 1 tablet (5 mg total) by mouth 3 (three) times daily. 30 tablet 0    dexAMETHasone (DECADRON) 4 MG Tab 8 mg (two tabs) twice daily on the day before chemo and the day after chemo 32 tablet 2    dicyclomine (BENTYL) 10 MG capsule TAKE ONE CAPSULE BY MOUTH FOUR TIMES DAILY 120 capsule 0    famotidine (PEPCID) 20 MG tablet Take 1 tablet (20 mg total) by mouth nightly as needed for Heartburn. 60 tablet 1    furosemide (LASIX) 20 MG tablet Take 1 tablet (20 mg total) by mouth once daily. 30 tablet 2    gabapentin (NEURONTIN) 300 MG capsule Take 1 capsule (300 mg total) by mouth 3 (three) times daily. 90 capsule 11    hydrocortisone 2.5 % cream Apply topically 2 (two) times daily. 3.5 g 0    LIDOcaine HCL 2% (XYLOCAINE) 2 % jelly Apply topically as needed. 100 mL 0    LIDOcaine-prilocaine (EMLA) cream Apply to Port-A-Cath area 30 to 45 minutes prior to port access as directed (topical anesthetic use " to the anterior chest only). 30 g 2    methadone (DOLOPHINE) 5 mg/5 mL solution Take 2.5 mLs (2.5 mg total) by mouth every morning AND 5 mLs (5 mg total) every evening. 225 mL 0    metoclopramide HCl (REGLAN) 5 MG tablet 5 mg.      multivitamin with folic acid 400 mcg Tab Take 1 tablet by mouth once daily.      naloxone (NARCAN) 4 mg/actuation Spry 4mg by nasal route as needed for opioid overdose; may repeat every 2-3 minutes in alternating nostrils until medical help arrives. Call 911 1 each 2    nortriptyline (PAMELOR) 25 MG capsule Take 1 capsule (25 mg total) by mouth every evening. 30 capsule 11    omeprazole (PRILOSEC) 40 MG capsule Take 1 capsule (40 mg total) by mouth 2 (two) times daily before meals. 60 capsule 11    ondansetron (ZOFRAN) 8 MG tablet TAKE 1 TABLET(8 MG) BY MOUTH EVERY 8 HOURS AS NEEDED FOR NAUSEA 60 tablet 2    oxyCODONE (ROXICODONE) 5 MG immediate release tablet Take 1 tablet (5 mg total) by mouth every 6 (six) hours as needed. 60 tablet 0    polyethylene glycol (GLYCOLAX) 17 gram/dose powder Mix 1 capful (17 g) with liquid and take by mouth once daily for 20 days 510 g 0    prochlorperazine (COMPAZINE) 10 MG tablet TAKE 1 TABLET(10 MG) BY MOUTH EVERY 6 HOURS AS NEEDED FOR NAUSEA 90 tablet 2    promethazine (PHENERGAN) 25 MG tablet Take 1 tablet (25 mg total) by mouth every 6 (six) hours as needed for Nausea. 60 tablet 2    senna-docusate 8.6-50 mg (SENNA WITH DOCUSATE SODIUM) 8.6-50 mg per tablet Take 1 tablet by mouth 2 (two) times daily as needed for Constipation.      sucralfate (CARAFATE) 1 gram tablet TAKE 1 TABLET(1 GRAM) BY MOUTH FOUR TIMES DAILY FOR 14 DAYS 56 tablet 0    sucralfate (CARAFATE) 1 gram tablet       traMADoL (ULTRAM) 50 mg tablet Take 50 mg by mouth.       No current facility-administered medications for this visit.        Physical Exam:   BP (!) 115/90 (BP Location: Left arm, Patient Position: Sitting, BP Method: Small (Automatic))   Pulse 96   Resp  "18   Ht 5' 7" (1.702 m)   Wt 71.6 kg (157 lb 13.6 oz)   SpO2 98%   BMI 24.72 kg/m²      ECOG Performance status: 1            Physical Exam  Constitutional:       General: She is not in acute distress.     Appearance: Normal appearance. She is not ill-appearing, toxic-appearing or diaphoretic.      Comments: Appears with mild distress due to lower leg swelling, abdominal discomfort and burning discomfort within the bilateral thigh   HENT:      Head: Normocephalic and atraumatic.      Mouth/Throat:      Mouth: Mucous membranes are moist.   Eyes:      General: No scleral icterus.     Extraocular Movements: Extraocular movements intact.   Cardiovascular:      Rate and Rhythm: Normal rate and regular rhythm.      Heart sounds: Normal heart sounds.   Pulmonary:      Effort: Pulmonary effort is normal. No respiratory distress.      Breath sounds: No stridor. Rales present. No rhonchi.      Comments: Mild rales to the LLL field  Chest:      Chest wall: No tenderness.   Abdominal:      General: There is distension.      Tenderness: There is abdominal tenderness.   Musculoskeletal:         General: Swelling present. No signs of injury.      Cervical back: Normal range of motion.      Right lower leg: Edema present.      Left lower leg: Edema present.   Skin:     General: Skin is warm.      Capillary Refill: Capillary refill takes less than 2 seconds.   Neurological:      General: No focal deficit present.      Mental Status: She is alert and oriented to person, place, and time. Mental status is at baseline.      Cranial Nerves: No cranial nerve deficit.   Psychiatric:         Mood and Affect: Mood normal.         Behavior: Behavior normal.         Thought Content: Thought content normal.         Judgment: Judgment normal.           Labs:   Recent Results (from the past 48 hour(s))   CBC Auto Differential    Collection Time: 09/13/22  9:12 AM   Result Value Ref Range    WBC 7.05 3.90 - 12.70 K/uL    RBC 4.09 4.00 - 5.40 " M/uL    Hemoglobin 9.7 (L) 12.0 - 16.0 g/dL    Hematocrit 31.8 (L) 37.0 - 48.5 %    MCV 78 (L) 82 - 98 fL    MCH 23.7 (L) 27.0 - 31.0 pg    MCHC 30.5 (L) 32.0 - 36.0 g/dL    RDW 19.4 (H) 11.5 - 14.5 %    Platelets 364 150 - 450 K/uL    MPV 8.7 (L) 9.2 - 12.9 fL    Immature Granulocytes 1.0 (H) 0.0 - 0.5 %    Gran # (ANC) 6.0 1.8 - 7.7 K/uL    Immature Grans (Abs) 0.07 (H) 0.00 - 0.04 K/uL    Lymph # 0.9 (L) 1.0 - 4.8 K/uL    Mono # 0.2 (L) 0.3 - 1.0 K/uL    Eos # 0.0 0.0 - 0.5 K/uL    Baso # 0.01 0.00 - 0.20 K/uL    nRBC 0 0 /100 WBC    Gran % 84.7 (H) 38.0 - 73.0 %    Lymph % 12.1 (L) 18.0 - 48.0 %    Mono % 2.1 (L) 4.0 - 15.0 %    Eosinophil % 0.0 0.0 - 8.0 %    Basophil % 0.1 0.0 - 1.9 %    Differential Method Automated    Comprehensive Metabolic Panel    Collection Time: 09/13/22  9:12 AM   Result Value Ref Range    Sodium 134 (L) 136 - 145 mmol/L    Potassium 4.3 3.5 - 5.1 mmol/L    Chloride 104 95 - 110 mmol/L    CO2 26 23 - 29 mmol/L    Glucose 112 (H) 70 - 110 mg/dL    BUN 12 6 - 20 mg/dL    Creatinine 0.8 0.5 - 1.4 mg/dL    Calcium 8.7 8.7 - 10.5 mg/dL    Total Protein 4.7 (L) 6.0 - 8.4 g/dL    Albumin 1.6 (L) 3.5 - 5.2 g/dL    Total Bilirubin 0.2 0.1 - 1.0 mg/dL    Alkaline Phosphatase 62 55 - 135 U/L    AST 15 10 - 40 U/L    ALT 6 (L) 10 - 44 U/L    Anion Gap 4 (L) 8 - 16 mmol/L    eGFR >60.0 >60 mL/min/1.73 m^2      Imaging:    CT CAP: 6/11/2022:    Impression:     1. 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     2.  Mildly worsening large volume of malignant abdominopelvic ascites with persistent findings of peritoneal carcinomatosis.     3. Diffuse gastric wall thickening with an enlarging ulcerative mass of the gastric antrum consistent with patient's known biopsy-proven adenocarcinoma.     4.  Developing bowel obstruction likely secondary to serosal metastases and mass effect from the gastric and pelvic  mass.     5.  Large left pleural effusion.    CT chest: 6/6/2022  Impression:     Interval development of a large left pleural effusion and ascites with associated left lower lobe compressive atelectasis.     MRI abdomen/pelvis: 6/16/2022  Impression:     1. Diffuse irregular enhancing gastric wall thickening in keeping with known biopsy-proven gastric adenocarcinoma.  Gastric antrum partially obscured by susceptibility artifact.  2. Significantly increased size of a large mixed cystic and solid heterogeneously enhancing pelvic mass.  In light of patient's history, finding is concerning for ovarian metastasis/Krukenberg tumor.  Lesion demonstrates significant mass effect within the pelvis and lower abdomen with obscuration of surrounding structures.  3. Moderate volume abdominopelvic ascites.  Diffuse peritoneal thickening and enhancement concerning for peritoneal carcinomatosis.  4. Numerous small hepatic cysts.  5. Moderate left pleural effusion.  6. Diffuse body wall edema.  7. Small nonspecific enhancing lesion within the right femoral head corresponding with a peripherally sclerotic lucent focus identified on prior CT.  Favor a degenerative subchondral cyst however cannot entirely exclude metastatic disease.  Attention on follow-up    Path:   5/26/21:  Positive for malignancy.   Poorly differentiated adenocarcinoma growing with signet ring features   Immunostains for Bruce-EP4 and CEA are positive.  The controls stain   appropriately.   This case was reviewed by Dr Sebastian who concurs with the diagnosis       Assessment:       1. Malignant neoplasm of stomach, unspecified location    2. Bone metastases    3. Peritoneal carcinomatosis    4. 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 Other ascites    7. Severe malnutrition    8. Bilateral lower extremity edema    9. Non-intractable vomiting with nausea, unspecified vomiting type    10. 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 Neuropathy due to chemotherapeutic drug    13. Pain of thigh, unspecified laterality    14. Vaginal candidosis              Plan:             # Gastric adenocarcinoma with peritoneal metastases, immunodeficiency due to chemotherapy, bone metastases  HER-2 negative by FISH.  PD-L1 < 1%.  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  We previously explained the implications of a stage IV diagnosis to her and potential treatment options.  She is now s/p port placement. She sought a second opinion at [facility] for zolbetuximab trial targeting CLDN protein but she did not test positive for this biomarker. We recommended proceeding with SOC FOLFOX, with which the [facility] team agreed. Because of the negative PD-L1 I do not think the benefit of adding nivolumab outweighs the potential toxicities.       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  She opted not to get genetic testing done at her appt with Phi. We recommended that she reconsider that decision in light of a very likely germline mutation in SAMUEL which has clinical consequences.      CT CAP after 6 cycles showed improvement in ascites, probable hepatic, thyroid and bone metastases. We dropped oxaliplatin starting with cycle 10 due to grade 1 neuropathy and desire to keep it from worsening.    CT CAP after cycle 10 showed stable disease.  CT scan after cycle 15 continued to display overall stability of disease within the gastric wall, peritoneum, spine and liver. Although increase ascites on imaging and clinically can represent progression of disease, her previous CT suggested overall stability within the liver and gastric wall, so we recommended to continue with treatment time. However, she continued to have increasing  abdominal ascites that has been concerning for progression of disease. Hence, she had repeat imaging. On CT performed on 4/14/2022, she appeared to have worsening disease suggestive of progression. She was seen by Dr. Reid at [facility] for f/u and to discuss possible treatment options.  She was also evaluated by Dr. Andree Mueller and Dr. See of Surgical Oncology at [facility].  Ultimately she was not felt to be a good surgical debulking candidate due to her ascites.  If her ascites improves during the course of chemotherapy and her performance status remains stable or improves, they would re-consider her again for palliative oophorectomy.     Was given ramucirumab with cycle 1 but this later was held given [facility] recs for just FOLFIRI. In addition, she didn't tolerate cycle 1 of cyramza well with significant N/V. She was started on second-line FOLFIRI. She received 5 cycles with FOLFIRI of irinotecan to 75 mg/m2 - has UGT1A1 homozygous mutation indicating poor metabolism.  Continue 5-FU infusion at 2200 mg/m2.    Repeat imaging with CT CAP after [facility] on 8/3/22 after cycle 5 of FOLFIRI demonstrated disease progression.  Dr. Reid recommended third line docetaxel biweekly at 35 mg/m2.     -She is s/p cycle 2 and tolerated it fairly well. Lab work has been reviewed today and adequate for treatment. Although, her PS appears to fluctuate due to her symptoms, she is good to proceed with chemotherapy.   -Doing fairly today. Continues to have abdominal distension but stable enough to wait until her paracentesis tomorrow.   -Proceed with cycle 3 today of Docetaxel.     -RTC prior to cycle 4.  Repeat imaging in 1 month after 4 cycles, at [facility]. She is going on vacation to [place] for her [place] on 9/24-9/30. Will plan her treatment accordingly prior to her departure.    #Ascites, lower extremity edema, weight loss, malnutrition  Initially had peritoneal catheter after diagnosis, output slowed so was removed in October 2021 by  IR. However, given that the amount of ascitic fluid being removed has increased, will consider replacing the peritoneal catheter to allow for drainage when needed. She will think about having the catheter placed again, through [facility]. Will hold off for now until she goes back to see them.  Will continue with bi-weekly paracentesis for now. Next one scheduled for tomorrow. Last paracentesis removed 6L of fluid from the abdomen.     Has increased edema of the lower extremity that is most likely due to malnutrition, hypoalbuminemia; perhaps some impaired venous drainage due to compression from tumor. Repeat US of the lower extremity was negative for a DVT.  Will continue to monitor  Continue compression stockings as tolerated and paracenteses.     # Nausea, gastric ulcer  Improved. Continue Phenergan as needed. Has Zofran and compazine also PRN.  Continue Protonix.      # Anemia  Hgb 9.7 stable. Transfuse hgb < 7. No overt evidence for bleeding at this time.   Prior iron deficiency due to chronic blood loss.  S/p 2 doses of Injectafer. Will repeat iron studies in 6 weeks    # Anxiety  Stable. Notes being discouraged and frustrated with her condition  Continue f/u with psych onc w/ Dr. Berumen. Has f/u appointment scheduled.     # chemotherapy induced neuropathy, pain  2/2 previous chemotherapy with Oxaliplatin and now Docetaxel.  Continue acupuncture and Gabapentin.   Will continue to monitor.   Continue palliative care f/u.    # Candidal vaginosis  Improved with Diflucan. Still has mild discharge but no other concerns.   Will refer to integrative for sexual health.    Follow up: RTC in 3 weeks to continue with cycle 4 of docetaxel. Will have 1 week delay due to her vacation for her birthday    At least 70 minutes were spent today on this encounter including face to face time with the patient, data gathering/interpretation and documentation. Greater than 50% of this time involved counseling or coordination of care. I have  provided the patient with an opportunity to ask questions and have all questions answered to patient's satisfaction.       Route Chart for Scheduling    Med Onc Chart Routing      Follow up with physician 3 weeks and other. Please schedule with Dr. Ashraf in 3 and 5 weeks prior to cycle 4 and 5 of docetaxel (every 2 weeks). Will have 1 week delay for this next cycle due to vacation for her [birth year].   Follow up with ISHMAEL    Infusion scheduling note    Injection scheduling note    Labs CBC and CMP   Lab interval: every 2 weeks  Will draw in 3 weeks due to 1 week delay   Imaging    Pharmacy appointment    Other referrals          Treatment Plan Information   OP DOCETAXEL (75 MG/M2) Q3W   Fer Ashraf MD   Upcoming Treatment Dates - OP DOCETAXEL (75 MG/M2) Q3W    9/13/2022       Pre-Medications       dexamethasone (DECADRON) 20 mg in sodium chloride 0.9% 50 mL IVPB       Chemotherapy       DOCEtaxeL (TAXOTERE) 35 mg/m2 = 65 mg in sodium chloride 0.9% 250 mL chemo infusion  9/27/2022       Pre-Medications       dexamethasone (DECADRON) 20 mg in sodium chloride 0.9% 50 mL IVPB       Chemotherapy       DOCEtaxeL (TAXOTERE) 35 mg/m2 = 65 mg in sodium chloride 0.9% 250 mL chemo infusion  10/11/2022       Pre-Medications       dexamethasone (DECADRON) 20 mg in sodium chloride 0.9% 50 mL IVPB       Chemotherapy       DOCEtaxeL (TAXOTERE) 35 mg/m2 = 65 mg in sodium chloride 0.9% 250 mL chemo infusion  10/25/2022       Pre-Medications       dexamethasone (DECADRON) 20 mg in sodium chloride 0.9% 50 mL IVPB       Chemotherapy       DOCEtaxeL (TAXOTERE) 35 mg/m2 = 65 mg in sodium chloride 0.9% 250 mL chemo infusion    Supportive Plan Information  IV FLUIDS AND ELECTROLYTES   Kamille Hooks PA-C   Upcoming Treatment Dates - IV FLUIDS AND ELECTROLYTES    No upcoming days in selected categories.    Therapy Plan Information  EPINEPHrine (EPIPEN) 0.3 mg/0.3 mL pen injection 0.3 mg  0.3 mg, Intramuscular, PRN  diphenhydrAMINE  injection 50 mg  50 mg, Intravenous, PRN  methylPREDNISolone sodium succinate injection 125 mg  125 mg, Intravenous, PRN  sodium chloride 0.9% bolus 1,000 mL  1,000 mL, Intravenous, PRN

## 2022-09-13 NOTE — ED PROVIDER NOTES
Encounter Date: 9/12/2022       History     Chief Complaint   Patient presents with    Parenthesis     Pt c/o access abdominal fluid. Pt has a history of ascites and scheduled for a para on Wednesday, 9/14/2022. Pt states she is unable to wait for her appointment due to severe discomfort. Pt denies cp, n/v/d.     41-year-old female with PMH of gastric cancer and recurrent ascites requiring multiple paracenteses presents with abdominal distension.  Abdominal distension is acute on chronic, worse over the past few days, progressive, moderately relieved after walking around, no specific aggravating factors, and associated with abdominal pain. Pt denies fever, chills, chest pain, sob, nausea, vomiting, dysuria, hematuria, diarrhea, constipation, black/bloody stools.  Patient states the abdominal pain is the same as she always gets when her ascites worsens.  She denies any new symptoms.  She denies a history of SBP. She last received a paracentesis 9/9/22. She's actively on chemo. She came to the ED to see if she could get a therapeutic paracentesis before Wednesday.     The history is provided by the patient and medical records.   Review of patient's allergies indicates:  No Known Allergies  Past Medical History:   Diagnosis Date    Anxiety     Dehydration 5/30/2022    Gastric cancer     Gastric ulcer     Hx of psychiatric care     Pregnancy 08/12/2020    delivered on 8/12/2020    Umbilical hernia      Past Surgical History:   Procedure Laterality Date    CLOSURE OF PERFORATED ULCER OF DUODENUM USING OMENTAL PATCH      ESOPHAGOGASTRODUODENOSCOPY N/A 10/13/2020    Procedure: EGD (ESOPHAGOGASTRODUODENOSCOPY);  Surgeon: Rosio Marion MD;  Location: [facility] ([address] [place] [address]);  Service: Endoscopy;  Laterality: N/A;    ESOPHAGOGASTRODUODENOSCOPY N/A 12/11/2020    Procedure: EGD (ESOPHAGOGASTRODUODENOSCOPY);  Surgeon: Rosio Marion MD;  Location: [facility] ([address] [place] [address]);  Service: Endoscopy;  Laterality: N/A;  Covid-19 test  "20 at [facility] - pg    ESOPHAGOGASTRODUODENOSCOPY N/A 3/12/2021    Procedure: EGD (ESOPHAGOGASTRODUODENOSCOPY);  Surgeon: Nav Omer MD;  Location: [facility] (2ND FLR);  Service: Endoscopy;  Laterality: N/A;  COVID at [facility] [place] 3/9 ttr    ESOPHAGOGASTRODUODENOSCOPY N/A 2021    Procedure: EGD (ESOPHAGOGASTRODUODENOSCOPY);  Surgeon: Rosio Marion MD;  Location: [facility] ENDO (2ND FLR);  Service: Endoscopy;  Laterality: N/A;  5/15-covid pcw-inst portal-tb    ESOPHAGOGASTRODUODENOSCOPY N/A 2021    Procedure: EGD (ESOPHAGOGASTRODUODENOSCOPY);  Surgeon: Socrates Terrazas MD;  Location: [facility] ENDO (2ND FLR);  Service: Endoscopy;  Laterality: N/A;     Family History   Problem Relation Age of Onset    Cancer Mother 67        lymphoma (type? "not active," not on tx; "related to her blood")    Schizophrenia Mother     Lung cancer Father 48        type? h/o smoking    No Known Problems Son     Breast cancer Other 73        unilat; stage I, but aggressive    Prostate cancer Paternal Uncle         (dx 50s/60? no chemo?)    Breast cancer Paternal Cousin         (dx age?) ductal    Colon cancer Neg Hx     Esophageal cancer Neg Hx      Social History     Tobacco Use    Smoking status: Former     Types: Cigarettes     Quit date: 2019     Years since quittin.8    Smokeless tobacco: Never   Substance Use Topics    Alcohol use: Yes    Drug use: Not Currently     Review of Systems   Constitutional:  Negative for chills and fever.   HENT:  Negative for congestion and sinus pain.    Eyes:  Negative for pain and visual disturbance.   Respiratory:  Negative for cough and shortness of breath.    Cardiovascular:  Positive for leg swelling (Chronic). Negative for chest pain.   Gastrointestinal:  Positive for abdominal distention and abdominal pain. Negative for blood in stool, constipation, diarrhea, nausea and vomiting.   Endocrine: Negative for polydipsia and polyuria.   Genitourinary:  Negative for dysuria and " hematuria.   Musculoskeletal:  Negative for arthralgias and back pain.   Skin:  Negative for color change and rash.   Neurological:  Negative for dizziness, weakness, light-headedness and headaches.     Physical Exam     Initial Vitals [09/12/22 1847]   BP Pulse Resp Temp SpO2   (!) 127/95 106 19 98.4 °F (36.9 °C) 99 %      MAP       --         Physical Exam    Nursing note and vitals reviewed.  Constitutional: She appears well-developed and well-nourished. She is not diaphoretic. No distress.   HENT:   Head: Normocephalic and atraumatic.   Eyes: Conjunctivae and EOM are normal. Pupils are equal, round, and reactive to light.   Neck: Neck supple.   Normal range of motion.  Cardiovascular:  Regular rhythm, normal heart sounds and intact distal pulses.           No murmur heard.  Tachycardic   Pulmonary/Chest: Breath sounds normal. No respiratory distress. She has no wheezes. She has no rhonchi. She has no rales.   No increased work of breathing or tachypnea   Abdominal:   Soft, distended abdomen with mild TTP diffusely (reported baseline) There is no rebound and no guarding.   Musculoskeletal:         General: No tenderness.      Cervical back: Normal range of motion and neck supple.      Comments: Mild edema to b/l lower extremities     Neurological: She is alert and oriented to person, place, and time.   Skin: Skin is warm and dry. Capillary refill takes less than 2 seconds.       ED Course   Procedures  Labs Reviewed - No data to display       Imaging Results    None          Medications - No data to display  Medical Decision Making:   History:   Old Records Summarized: records from clinic visits.       <> Summary of Records: 600ml of acites removed 9/9/22 by IR  Initial Assessment:   41F with malignant ascites presents with recurrent abdominal distension, afebrile and hemodynamically stable  Differential Diagnosis:   Malignant ascites, pleural effusion, doubt SBP  ED Management:  Patient is able to come to 30  without becoming short of breath.  All of the patient's symptoms appear baseline, and I have low suspicion for SBP.  After additional discussions about how we do not typically do therapeutic paracenteses in the ED, the patient states she is comfortable waiting until Wednesday.  She is educated on the signs and symptoms that would be concerning for significant progression that would necessitate a return to the ED.  She has been given follow-up instructions, home care instructions, and strict return precautions.  Patient agrees with and is comfortable with the plan.           ED Course as of 09/12/22 2027   Mon Sep 12, 2022   2025 On my exam the patient is alert and oriented.  She reports increasing abdominal tightness but no fevers or chills.  She does have shortness of breath with ambulation but at rest she reports she is not feeling short of breath.  She is able to take a deep breath and count out loud to 28 without taking another breath.  Regular rate and rhythm.  Lungs are clear.  Abdomen is soft with ascites.  Large peripheral edema in the lower extremities bilaterally. [MH]   2026 At this time the patient does not look like she would benefit from emergent paracentesis.  She will keep her appointment as scheduled on Wednesday. [MH]      ED Course User Index  [MH] Hedy Jaramillo MD                 Clinical Impression:   Final diagnoses:  [R18.0] Malignant ascites (Primary)  [R14.0] Abdominal distension      ED Disposition Condition    Discharge Stable          ED Prescriptions    None       Follow-up Information       Follow up With Specialties Details Why Contact Info    Primary Care Physician  Schedule an appointment as soon as possible for a visit  As needed and to discuss recent ER visit     [facility] Emergency Dept Emergency Medicine Go to  As needed, If symptoms worsen [id number]  [facility] [id number] [phone]             Jose L Prado MD  Resident  09/12/22 2027

## 2022-09-13 NOTE — PLAN OF CARE
1128 Patient seated in chair, VSS, Assessment done.  Patient had checked into infusion clinic @ 1101 but left to go down to car.  Returned @1125.  Port accessed without issue, flushed, blood return noted. Patient has noticably swollen LEs bilaterally.  Abdomen also swollen/ ascites.  Started NS @ 25 cc/hr KVO while waiting for Taxotere from pharmacy.  [facility] for safety

## 2022-09-13 NOTE — PLAN OF CARE
1418  Patient completed taxotere infusion, tolerated well.  VSS, Port de accessed without issue, flushed, heparin locked.  RTC not yet scheduled.  Patient ambulated off floor escorted by sister.

## 2022-09-13 NOTE — DISCHARGE INSTRUCTIONS
It was a pleasure taking care of you today!     Diagnosis: Ascites    Home Care:   - Continue medications as prescribed    Follow-Up Plan:  - Keep your appointment on Wednesday to have your next paracentesis  - Follow-up with primary care doctor within 3 - 5 days to discuss your recent ER visit and any additional concerns that you may have.  - Additional testing and/or evaluation as directed by your primary doctor    Return to the Emergency Department for symptoms including but not limited to: persistence or worsening of symptoms, shortness of breath or chest pain, inability to drink without vomiting, passing out/fainting/ loss of consciousness, or if you have other concerns.

## 2022-09-14 ENCOUNTER — PATIENT MESSAGE (OUTPATIENT)
Dept: HEMATOLOGY/ONCOLOGY | Facility: CLINIC | Age: 42
End: 2022-09-14
Payer: COMMERCIAL

## 2022-09-14 ENCOUNTER — HOSPITAL ENCOUNTER (OUTPATIENT)
Dept: INTERVENTIONAL RADIOLOGY/VASCULAR | Facility: HOSPITAL | Age: 42
Discharge: HOME | End: 2022-09-14
Attending: INTERNAL MEDICINE
Payer: COMMERCIAL

## 2022-09-14 VITALS — SYSTOLIC BLOOD PRESSURE: 137 MMHG | DIASTOLIC BLOOD PRESSURE: 89 MMHG

## 2022-09-14 DIAGNOSIS — R18.8 OTHER ASCITES: ICD-10-CM

## 2022-09-14 PROCEDURE — 25000003 PHARM REV CODE 250: Performed by: RADIOLOGY

## 2022-09-14 PROCEDURE — C1729 CATH, DRAINAGE: HCPCS

## 2022-09-14 PROCEDURE — 49083 IR PARACENTESIS WITH IMAGING: ICD-10-PCS | Mod: ,,, | Performed by: RADIOLOGY

## 2022-09-14 PROCEDURE — 63600175 PHARM REV CODE 636 W HCPCS: Mod: JG | Performed by: RADIOLOGY

## 2022-09-14 PROCEDURE — P9047 ALBUMIN (HUMAN), 25%, 50ML: HCPCS | Mod: JG | Performed by: RADIOLOGY

## 2022-09-14 PROCEDURE — 49083 ABD PARACENTESIS W/IMAGING: CPT | Performed by: RADIOLOGY

## 2022-09-14 RX ORDER — ALBUMIN HUMAN 250 G/1000ML
SOLUTION INTRAVENOUS
Status: DISCONTINUED | OUTPATIENT
Start: 2022-09-14 | End: 2022-09-15 | Stop reason: HOSPADM

## 2022-09-14 RX ORDER — LIDOCAINE HYDROCHLORIDE 10 MG/ML
INJECTION INFILTRATION; PERINEURAL
Status: DISCONTINUED | OUTPATIENT
Start: 2022-09-14 | End: 2022-09-15 | Stop reason: HOSPADM

## 2022-09-14 RX ADMIN — ALBUMIN (HUMAN) 25 G: 25 SOLUTION INTRAVENOUS at 02:09

## 2022-09-14 RX ADMIN — LIDOCAINE HYDROCHLORIDE 5 ML: 10 INJECTION, SOLUTION INFILTRATION; PERINEURAL at 02:09

## 2022-09-14 NOTE — H&P
Radiology History & Physical      SUBJECTIVE:     Chief Complaint: Recurrent painful, tense ascites     History of Present Illness:  Puja Quigley is a 41 y.o. female with pertinent PMHx of gastric adenocarcinoma with osseous, hepatic and peritoneal metastases complicated by recurrent abdominal distension and pain 2/2 recurrent painful, tense ascites requiring frequent decompression for symptom relief.     A new outpatient IR consult received for US-guided percutaneous RUQ-approach therapeutic large-volume paracentesis.    Past Medical History:   Diagnosis Date    Anxiety     Dehydration 5/30/2022    Gastric cancer     Gastric ulcer     Hx of psychiatric care     Pregnancy 08/12/2020    delivered on 8/12/2020    Umbilical hernia      Past Surgical History:   Procedure Laterality Date    CLOSURE OF PERFORATED ULCER OF DUODENUM USING OMENTAL PATCH      ESOPHAGOGASTRODUODENOSCOPY N/A 10/13/2020    Procedure: EGD (ESOPHAGOGASTRODUODENOSCOPY);  Surgeon: Rosio Marion MD;  Location: [facility] ENDO (2ND FLR);  Service: Endoscopy;  Laterality: N/A;    ESOPHAGOGASTRODUODENOSCOPY N/A 12/11/2020    Procedure: EGD (ESOPHAGOGASTRODUODENOSCOPY);  Surgeon: Rosio Marion MD;  Location: [facility] (2ND FLR);  Service: Endoscopy;  Laterality: N/A;  Covid-19 test 12/8/20 at [facility]    ESOPHAGOGASTRODUODENOSCOPY N/A 3/12/2021    Procedure: EGD (ESOPHAGOGASTRODUODENOSCOPY);  Surgeon: Nav Omer MD;  Location: [facility] ENDO (2ND FLR);  Service: Endoscopy;  Laterality: N/A;  COVID at [facility] [place] 3/9 ttr    ESOPHAGOGASTRODUODENOSCOPY N/A 5/18/2021    Procedure: EGD (ESOPHAGOGASTRODUODENOSCOPY);  Surgeon: Rosio Marion MD;  Location: [facility] ENDO (2ND FLR);  Service: Endoscopy;  Laterality: N/A;  5/15-covid pcw-inst portal-tb    ESOPHAGOGASTRODUODENOSCOPY N/A 5/26/2021    Procedure: EGD (ESOPHAGOGASTRODUODENOSCOPY);  Surgeon: Socrates Terrazas MD;  Location: [facility] ENDO (2ND FLR);  Service: Endoscopy;  Laterality: N/A;      Home Meds:   Prior to Admission medications    Medication Sig Start Date End Date Taking? Authorizing Provider   baclofen (LIORESAL) 5 mg Tab tablet Take 1 tablet (5 mg total) by mouth 3 (three) times daily. 8/23/22   Sharon Brink MD   dexAMETHasone (DECADRON) 4 MG Tab 8 mg (two tabs) twice daily on the day before chemo and the day after chemo 8/8/22   Fer Ashraf MD   dicyclomine (BENTYL) 10 MG capsule TAKE ONE CAPSULE BY MOUTH FOUR TIMES DAILY 9/27/21   Kamille Hooks PA-C   famotidine (PEPCID) 20 MG tablet Take 1 tablet (20 mg total) by mouth nightly as needed for Heartburn. 2/23/22 2/23/23  Kamille Hooks PA-C   furosemide (LASIX) 20 MG tablet Take 1 tablet (20 mg total) by mouth once daily. 7/6/22   Fer Ashraf MD   gabapentin (NEURONTIN) 300 MG capsule Take 1 capsule (300 mg total) by mouth 3 (three) times daily. 8/22/22 8/22/23  Kamille Hooks PA-C   hydrocortisone 2.5 % cream Apply topically 2 (two) times daily. 7/11/22   Bethel Shetty MD   LIDOcaine HCL 2% (XYLOCAINE) 2 % jelly Apply topically as needed. 8/17/22   Sharon Brink MD   LIDOcaine-prilocaine (EMLA) cream Apply to Port-A-Cath area 30 to 45 minutes prior to port access as directed (topical anesthetic use to the anterior chest only). 8/17/22   Sharon Brink MD   methadone (DOLOPHINE) 5 mg/5 mL solution Take 2.5 mLs (2.5 mg total) by mouth every morning AND 5 mLs (5 mg total) every evening. 9/9/22 10/10/22  Sharon Brink MD   metoclopramide HCl (REGLAN) 5 MG tablet 5 mg. 6/11/21   Historical Provider   multivitamin with folic acid 400 mcg Tab Take 1 tablet by mouth once daily.    Historical Provider   naloxone (NARCAN) 4 mg/actuation Spry 4mg by nasal route as needed for opioid overdose; may repeat every 2-3 minutes in alternating nostrils until medical help arrives. Call 911 8/17/22   Sharon Brnik MD   nortriptyline (PAMELOR) 25 MG capsule Take 1 capsule (25 mg total) by mouth every  evening. 6/20/22 6/20/23  Bren Allen MD   omeprazole (PRILOSEC) 40 MG capsule Take 1 capsule (40 mg total) by mouth 2 (two) times daily before meals. 6/20/22 6/20/23  Bren Allen MD   ondansetron (ZOFRAN) 8 MG tablet TAKE 1 TABLET(8 MG) BY MOUTH EVERY 8 HOURS AS NEEDED FOR NAUSEA 8/29/22   Fer Ashraf MD   oxyCODONE (ROXICODONE) 5 MG immediate release tablet Take 1 tablet (5 mg total) by mouth every 6 (six) hours as needed. 6/20/22   Bren Allen MD   polyethylene glycol (GLYCOLAX) 17 gram/dose powder Mix 1 capful (17 g) with liquid and take by mouth once daily for 20 days 6/21/22   Bren Allen MD   prochlorperazine (COMPAZINE) 10 MG tablet TAKE 1 TABLET(10 MG) BY MOUTH EVERY 6 HOURS AS NEEDED FOR NAUSEA 8/7/21   Fer Ashraf MD   promethazine (PHENERGAN) 25 MG tablet Take 1 tablet (25 mg total) by mouth every 6 (six) hours as needed for Nausea. 9/9/22   Fer Ashraf MD   senna-docusate 8.6-50 mg (SENNA WITH DOCUSATE SODIUM) 8.6-50 mg per tablet Take 1 tablet by mouth 2 (two) times daily as needed for Constipation. 5/26/21   Reshma Wilson MD   sucralfate (CARAFATE) 1 gram tablet TAKE 1 TABLET(1 GRAM) BY MOUTH FOUR TIMES DAILY FOR 14 DAYS 10/20/21   Rosio Marion MD   sucralfate (CARAFATE) 1 gram tablet  4/8/21   Historical Provider   traMADoL (ULTRAM) 50 mg tablet Take 50 mg by mouth. 6/25/21   Historical Provider   amitriptyline (ELAVIL) 10 MG tablet Take 1 tablet (10 mg total) by mouth every evening. 5/13/21 6/20/22  Socrates Terrazas MD   pantoprazole (PROTONIX) 40 MG tablet Take 1 tablet (40 mg total) by mouth 2 (two) times daily. 5/25/22 6/20/22  Kamille Hooks PA-C     Anticoagulants/Antiplatelets: no anticoagulation     Allergies: Review of patient's allergies indicates:  No Known Allergies      Sedation History:  no adverse reactions     Review of Systems:   Hematological: no known coagulopathies  Respiratory: no cough, shortness of breath, or wheezing  Cardiovascular: no chest  pain or dyspnea on exertion  Gastrointestinal: positive for - abdominal pain and distension  Genito-Urinary: no dysuria, trouble voiding, or hematuria  Musculoskeletal: negative  Neurological: no TIA or stroke symptoms      OBJECTIVE:     Vital Signs (Most Recent)       Physical Exam:  General: no acute distress  Mental Status: alert and oriented to person, place and time  HEENT: normocephalic, atraumatic  Chest: unlabored breathing  Heart: regular heart rate  Abdomen: +distended with +fluid wave. No TTP/r/g.  Extremity: moves all extremities    Laboratory  Lab Results   Component Value Date    INR 1.0 06/10/2021       Lab Results   Component Value Date    WBC 8.28 08/29/2022    HGB 9.5 (L) 08/29/2022    HCT 31.1 (L) 08/29/2022    MCV 81 (L) 08/29/2022     08/29/2022      Lab Results   Component Value Date    GLU 92 08/29/2022     (L) 08/29/2022    K 4.0 08/29/2022     08/29/2022    CO2 24 08/29/2022    BUN 11 08/29/2022    CREATININE 0.7 08/29/2022    CALCIUM 8.3 (L) 08/29/2022    MG 1.9 06/20/2022    ALT 7 (L) 08/29/2022    AST 15 08/29/2022    ALBUMIN 1.4 (L) 08/29/2022    BILITOT 0.2 08/29/2022     ASSESSMENT/PLAN:     41 y.o. female with pertinent PMHx of gastric adenocarcinoma with osseous, hepatic and peritoneal metastases complicated by recurrent abdominal distension and pain 2/2 recurrent painful, tense ascites requiring frequent decompression for symptom relief.     1. Recurrent painful, tense ascites - Will attempt US-guided percutaneous RUQ-approach therapeutic LVP with local anesthetic only and albumin infusion post PRN as indicated per institutional protocol.     Risks (including, but not limited to, pain, bleeding, infection, damage to nearby structures, failure to obtain sufficient material for a diagnosis, the need for additional procedures, and death), benefits, and alternatives were discussed with the patient. All questions were answered to the best of my abilities. The patient  wishes to proceed with the procedure. Written informed consent was obtained.     Thank you for considering IR for the care of your patient.      Castro Bermudez MD  Interventional Radiology

## 2022-09-14 NOTE — DISCHARGE SUMMARY
Radiology Discharge Summary      Hospital Course: No complications    Admit Date: 9/14/2022  Discharge Date: 09/14/2022     Instructions Given to Patient: Yes  Diet: Resume prior diet  Activity: activity as tolerated    Description of Condition on Discharge: Stable  Vital Signs (Most Recent): BP: 137/89 (09/14/22 1450)    Discharge Disposition: Home    Discharge Diagnosis:  41 y.o. female with recurrent painful, tense ascites s/p successful US-guided percutaneous RUQ-approach therapeutic LVP with local anesthetic only and albumin infusion post PRN as indicated per institutional protocol. Patient tolerated the procedure well. No immediate post-procedural complications noted.      Thank you for considering IR for the care of your patient.      Castro Bermudez MD  Interventional Radiology

## 2022-09-16 ENCOUNTER — HOSPITAL ENCOUNTER (OUTPATIENT)
Dept: INTERVENTIONAL RADIOLOGY/VASCULAR | Facility: HOSPITAL | Age: 42
Discharge: HOME | End: 2022-09-16
Attending: INTERNAL MEDICINE
Payer: COMMERCIAL

## 2022-09-16 VITALS — SYSTOLIC BLOOD PRESSURE: 114 MMHG | DIASTOLIC BLOOD PRESSURE: 65 MMHG

## 2022-09-16 DIAGNOSIS — R18.8 OTHER ASCITES: ICD-10-CM

## 2022-09-16 PROCEDURE — 49083 ABD PARACENTESIS W/IMAGING: CPT | Performed by: RADIOLOGY

## 2022-09-16 PROCEDURE — 49083 IR PARACENTESIS WITH IMAGING: ICD-10-PCS | Mod: ,,, | Performed by: RADIOLOGY

## 2022-09-16 PROCEDURE — C1729 CATH, DRAINAGE: HCPCS

## 2022-09-16 NOTE — BRIEF OP NOTE
Radiology Post-Procedure Note     Pre Op Diagnosis: Recurrent painful, tense ascites  Post Op Diagnosis: Same     Procedure: 1. US-guided percutaneous RUQ-approach therapeutic LVP     Procedure performed by: Castro Bermudez MD     Written Informed Consent Obtained: Yes  Specimen Removed: YES, 3,900-cc of thin, serosanguinous ascitic fluid  Estimated Blood Loss: none     Findings:   Successful US-guided percutaneous RUQ-approach therapeutic LVP with local anesthetic only and albumin infusion post PRN as indicated per institutional protocol. Patient tolerated the procedure well. No immediate post-procedural complications noted.      Thank you for considering IR for the care of your patient.      Castro Bermudez MD  Interventional Radiology

## 2022-09-16 NOTE — H&P
Radiology History & Physical      SUBJECTIVE:     Chief Complaint: Recurrent painful, tense ascites     History of Present Illness:  Puja Quigley is a 41 y.o. female with pertinent PMHx of gastric adenocarcinoma with osseous, hepatic and peritoneal metastases complicated by recurrent abdominal distension and pain 2/2 recurrent painful, tense ascites requiring frequent decompression for symptom relief.     A new outpatient IR consult received for US-guided percutaneous RUQ-approach therapeutic large-volume paracentesis.    Past Medical History:   Diagnosis Date    Anxiety     Dehydration 5/30/2022    Gastric cancer     Gastric ulcer     Hx of psychiatric care     Pregnancy 08/12/2020    delivered on 8/12/2020    Umbilical hernia      Past Surgical History:   Procedure Laterality Date    CLOSURE OF PERFORATED ULCER OF DUODENUM USING OMENTAL PATCH      ESOPHAGOGASTRODUODENOSCOPY N/A 10/13/2020    Procedure: EGD (ESOPHAGOGASTRODUODENOSCOPY);  Surgeon: Rosio Marion MD;  Location: [facility] ENDO (2ND FLR);  Service: Endoscopy;  Laterality: N/A;    ESOPHAGOGASTRODUODENOSCOPY N/A 12/11/2020    Procedure: EGD (ESOPHAGOGASTRODUODENOSCOPY);  Surgeon: Rosio Marion MD;  Location: [facility] (2ND FLR);  Service: Endoscopy;  Laterality: N/A;  Covid-19 test 12/8/20 at [facility]    ESOPHAGOGASTRODUODENOSCOPY N/A 3/12/2021    Procedure: EGD (ESOPHAGOGASTRODUODENOSCOPY);  Surgeon: Nav Omer MD;  Location: [facility] ENDO (2ND FLR);  Service: Endoscopy;  Laterality: N/A;  COVID at [facility] 3/9 ttr    ESOPHAGOGASTRODUODENOSCOPY N/A 5/18/2021    Procedure: EGD (ESOPHAGOGASTRODUODENOSCOPY);  Surgeon: Rosio Marion MD;  Location: [facility] ENDO (2ND FLR);  Service: Endoscopy;  Laterality: N/A;  5/15-covid pcw-inst portal-tb    ESOPHAGOGASTRODUODENOSCOPY N/A 5/26/2021    Procedure: EGD (ESOPHAGOGASTRODUODENOSCOPY);  Surgeon: Socrates Terrazas MD;  Location: [facility] ENDO (2ND FLR);  Service: Endoscopy;  Laterality: N/A;      Home Meds:   Prior to Admission medications    Medication Sig Start Date End Date Taking? Authorizing Provider   baclofen (LIORESAL) 5 mg Tab tablet Take 1 tablet (5 mg total) by mouth 3 (three) times daily. 8/23/22   Sharon Brink MD   dexAMETHasone (DECADRON) 4 MG Tab 8 mg (two tabs) twice daily on the day before chemo and the day after chemo 8/8/22   Fer Ashraf MD   dicyclomine (BENTYL) 10 MG capsule TAKE ONE CAPSULE BY MOUTH FOUR TIMES DAILY 9/27/21   Kamille Hooks PA-C   famotidine (PEPCID) 20 MG tablet Take 1 tablet (20 mg total) by mouth nightly as needed for Heartburn. 2/23/22 2/23/23  Kamille Hooks PA-C   furosemide (LASIX) 20 MG tablet Take 1 tablet (20 mg total) by mouth once daily. 7/6/22   Fer Ashraf MD   gabapentin (NEURONTIN) 300 MG capsule Take 1 capsule (300 mg total) by mouth 3 (three) times daily. 8/22/22 8/22/23  Kamille Hooks PA-C   hydrocortisone 2.5 % cream Apply topically 2 (two) times daily. 7/11/22   Bethel Shetty MD   LIDOcaine HCL 2% (XYLOCAINE) 2 % jelly Apply topically as needed. 8/17/22   Sharon Brink MD   LIDOcaine-prilocaine (EMLA) cream Apply to Port-A-Cath area 30 to 45 minutes prior to port access as directed (topical anesthetic use to the anterior chest only). 8/17/22   Sharon Brink MD   methadone (DOLOPHINE) 5 mg/5 mL solution Take 2.5 mLs (2.5 mg total) by mouth every morning AND 5 mLs (5 mg total) every evening. 9/9/22 10/10/22  Sharon Brink MD   metoclopramide HCl (REGLAN) 5 MG tablet 5 mg. 6/11/21   Historical Provider   multivitamin with folic acid 400 mcg Tab Take 1 tablet by mouth once daily.    Historical Provider   naloxone (NARCAN) 4 mg/actuation Spry 4mg by nasal route as needed for opioid overdose; may repeat every 2-3 minutes in alternating nostrils until medical help arrives. Call 911 8/17/22   Sharon Brink MD   nortriptyline (PAMELOR) 25 MG capsule Take 1 capsule (25 mg total) by mouth every  evening. 6/20/22 6/20/23  Bren Allen MD   omeprazole (PRILOSEC) 40 MG capsule Take 1 capsule (40 mg total) by mouth 2 (two) times daily before meals. 6/20/22 6/20/23  Bren Allen MD   ondansetron (ZOFRAN) 8 MG tablet TAKE 1 TABLET(8 MG) BY MOUTH EVERY 8 HOURS AS NEEDED FOR NAUSEA 8/29/22   Fer Ashraf MD   oxyCODONE (ROXICODONE) 5 MG immediate release tablet Take 1 tablet (5 mg total) by mouth every 6 (six) hours as needed. 6/20/22   Bren Allen MD   polyethylene glycol (GLYCOLAX) 17 gram/dose powder Mix 1 capful (17 g) with liquid and take by mouth once daily for 20 days 6/21/22   Bren Allen MD   prochlorperazine (COMPAZINE) 10 MG tablet TAKE 1 TABLET(10 MG) BY MOUTH EVERY 6 HOURS AS NEEDED FOR NAUSEA 8/7/21   Fer Ashraf MD   promethazine (PHENERGAN) 25 MG tablet Take 1 tablet (25 mg total) by mouth every 6 (six) hours as needed for Nausea. 9/9/22   Fer Ashraf MD   senna-docusate 8.6-50 mg (SENNA WITH DOCUSATE SODIUM) 8.6-50 mg per tablet Take 1 tablet by mouth 2 (two) times daily as needed for Constipation. 5/26/21   Reshma Wilson MD   sucralfate (CARAFATE) 1 gram tablet TAKE 1 TABLET(1 GRAM) BY MOUTH FOUR TIMES DAILY FOR 14 DAYS 10/20/21   Rosio Marion MD   sucralfate (CARAFATE) 1 gram tablet  4/8/21   Historical Provider   traMADoL (ULTRAM) 50 mg tablet Take 50 mg by mouth. 6/25/21   Historical Provider   amitriptyline (ELAVIL) 10 MG tablet Take 1 tablet (10 mg total) by mouth every evening. 5/13/21 6/20/22  Socrates Terrazas MD   pantoprazole (PROTONIX) 40 MG tablet Take 1 tablet (40 mg total) by mouth 2 (two) times daily. 5/25/22 6/20/22  Kamille Hooks PA-C     Anticoagulants/Antiplatelets: no anticoagulation     Allergies: Review of patient's allergies indicates:  No Known Allergies      Sedation History:  no adverse reactions     Review of Systems:   Hematological: no known coagulopathies  Respiratory: no cough, shortness of breath, or wheezing  Cardiovascular: no chest  pain or dyspnea on exertion  Gastrointestinal: positive for - abdominal pain and distension  Genito-Urinary: no dysuria, trouble voiding, or hematuria  Musculoskeletal: negative  Neurological: no TIA or stroke symptoms      OBJECTIVE:     Vital Signs (Most Recent)       Physical Exam:  General: no acute distress  Mental Status: alert and oriented to person, place and time  HEENT: normocephalic, atraumatic  Chest: unlabored breathing  Heart: regular heart rate  Abdomen: +distended with +fluid wave. No TTP/r/g.  Extremity: moves all extremities    Laboratory  Lab Results   Component Value Date    INR 1.0 06/10/2021       Lab Results   Component Value Date    WBC 7.05 09/13/2022    HGB 9.7 (L) 09/13/2022    HCT 31.8 (L) 09/13/2022    MCV 78 (L) 09/13/2022     09/13/2022      Lab Results   Component Value Date     (H) 09/13/2022     (L) 09/13/2022    K 4.3 09/13/2022     09/13/2022    CO2 26 09/13/2022    BUN 12 09/13/2022    CREATININE 0.8 09/13/2022    CALCIUM 8.7 09/13/2022    MG 1.9 06/20/2022    ALT 6 (L) 09/13/2022    AST 15 09/13/2022    ALBUMIN 1.6 (L) 09/13/2022    BILITOT 0.2 09/13/2022     ASSESSMENT/PLAN:     41 y.o. female with pertinent PMHx of gastric adenocarcinoma with osseous, hepatic and peritoneal metastases complicated by recurrent abdominal distension and pain 2/2 recurrent painful, tense ascites requiring frequent decompression for symptom relief.     1. Recurrent painful, tense ascites - Will attempt US-guided percutaneous RUQ-approach therapeutic LVP with local anesthetic only and albumin infusion post PRN as indicated per institutional protocol.     Risks (including, but not limited to, pain, bleeding, infection, damage to nearby structures, failure to obtain sufficient material for a diagnosis, the need for additional procedures, and death), benefits, and alternatives were discussed with the patient. All questions were answered to the best of my abilities. The patient  wishes to proceed with the procedure. Written informed consent was obtained.     Thank you for considering IR for the care of your patient.      Castro Bermudez MD  Interventional Radiology

## 2022-09-16 NOTE — DISCHARGE SUMMARY
Radiology Discharge Summary      Hospital Course: No complications    Admit Date: 9/16/2022  Discharge Date: 09/16/2022     Instructions Given to Patient: Yes  Diet: Resume prior diet  Activity: activity as tolerated    Description of Condition on Discharge: Stable  Vital Signs (Most Recent): BP: 114/65 (09/16/22 1448)    Discharge Disposition: Home    Discharge Diagnosis:  41 y.o. female with recurrent painful, tense ascites s/p successful US-guided percutaneous RUQ-approach therapeutic LVP with local anesthetic only and albumin infusion post PRN as indicated per institutional protocol. Patient tolerated the procedure well. No immediate post-procedural complications noted.      Thank you for considering IR for the care of your patient.      Castro Bermudez MD  Interventional Radiology

## 2022-09-19 ENCOUNTER — HOSPITAL ENCOUNTER (OUTPATIENT)
Dept: INTERVENTIONAL RADIOLOGY/VASCULAR | Facility: HOSPITAL | Age: 42
Discharge: HOME | End: 2022-09-19
Attending: INTERNAL MEDICINE
Payer: COMMERCIAL

## 2022-09-19 VITALS — DIASTOLIC BLOOD PRESSURE: 94 MMHG | SYSTOLIC BLOOD PRESSURE: 118 MMHG

## 2022-09-19 DIAGNOSIS — R18.8 OTHER ASCITES: ICD-10-CM

## 2022-09-19 PROCEDURE — P9047 ALBUMIN (HUMAN), 25%, 50ML: HCPCS | Mod: JG | Performed by: RADIOLOGY

## 2022-09-19 PROCEDURE — 49083 ABD PARACENTESIS W/IMAGING: CPT | Performed by: RADIOLOGY

## 2022-09-19 PROCEDURE — P9045 ALBUMIN (HUMAN), 5%, 250 ML: HCPCS | Mod: JG | Performed by: RADIOLOGY

## 2022-09-19 PROCEDURE — 63600175 PHARM REV CODE 636 W HCPCS: Mod: JG | Performed by: RADIOLOGY

## 2022-09-19 PROCEDURE — 49083 IR PARACENTESIS WITH IMAGING: ICD-10-PCS | Mod: ,,, | Performed by: RADIOLOGY

## 2022-09-19 PROCEDURE — C1729 CATH, DRAINAGE: HCPCS

## 2022-09-19 RX ORDER — ALBUMIN HUMAN 50 G/1000ML
SOLUTION INTRAVENOUS
Status: DISCONTINUED | OUTPATIENT
Start: 2022-09-19 | End: 2022-09-20 | Stop reason: HOSPADM

## 2022-09-19 RX ORDER — ALBUMIN HUMAN 250 G/1000ML
SOLUTION INTRAVENOUS
Status: DISCONTINUED | OUTPATIENT
Start: 2022-09-19 | End: 2022-09-20 | Stop reason: HOSPADM

## 2022-09-19 RX ADMIN — ALBUMIN (HUMAN) 12.5 G: 25 SOLUTION INTRAVENOUS at 03:09

## 2022-09-19 RX ADMIN — ALBUMIN (HUMAN) 25 G: 12.5 SOLUTION INTRAVENOUS at 03:09

## 2022-09-19 NOTE — BRIEF OP NOTE
Radiology Post-Procedure Note     Pre Op Diagnosis: Recurrent painful, tense ascites  Post Op Diagnosis: Same     Procedure: 1. US-guided percutaneous RUQ-approach therapeutic LVP     Procedure performed by: Castro Bermudez MD     Written Informed Consent Obtained: Yes  Specimen Removed: YES, 5,400-cc of thin, serosanguinous ascitic fluid  Estimated Blood Loss: none     Findings:   Successful US-guided percutaneous RUQ-approach therapeutic LVP with local anesthetic only and albumin infusion post PRN as indicated per institutional protocol. Patient tolerated the procedure well. No immediate post-procedural complications noted.      No post-op activity, diet or medication restrictions.    Thank you for considering IR for the care of your patient.      Castro Bermudez MD  Interventional Radiology

## 2022-09-19 NOTE — H&P
Radiology History & Physical      SUBJECTIVE:     Chief Complaint: Recurrent painful, tense ascites     History of Present Illness:  Puja Quigley is a 41 y.o. female with pertinent PMHx of gastric adenocarcinoma with osseous, hepatic and peritoneal metastases complicated by recurrent abdominal distension and pain 2/2 recurrent painful, tense ascites requiring frequent decompression for symptom relief.     A new outpatient IR consult received for US-guided percutaneous RUQ-approach therapeutic large-volume paracentesis.    Past Medical History:   Diagnosis Date    Anxiety     Dehydration 5/30/2022    Gastric cancer     Gastric ulcer     Hx of psychiatric care     Pregnancy 08/12/2020    delivered on 8/12/2020    Umbilical hernia      Past Surgical History:   Procedure Laterality Date    CLOSURE OF PERFORATED ULCER OF DUODENUM USING OMENTAL PATCH      ESOPHAGOGASTRODUODENOSCOPY N/A 10/13/2020    Procedure: EGD (ESOPHAGOGASTRODUODENOSCOPY);  Surgeon: Rosio Marion MD;  Location: [facility] ENDO (2ND FLR);  Service: Endoscopy;  Laterality: N/A;    ESOPHAGOGASTRODUODENOSCOPY N/A 12/11/2020    Procedure: EGD (ESOPHAGOGASTRODUODENOSCOPY);  Surgeon: Rosio Marion MD;  Location: [facility] (2ND FLR);  Service: Endoscopy;  Laterality: N/A;  Covid-19 test 12/8/20 at [facility]    ESOPHAGOGASTRODUODENOSCOPY N/A 3/12/2021    Procedure: EGD (ESOPHAGOGASTRODUODENOSCOPY);  Surgeon: Nav Omer MD;  Location: [facility] ENDO (2ND FLR);  Service: Endoscopy;  Laterality: N/A;  COVID at [facility] 3/9 ttr    ESOPHAGOGASTRODUODENOSCOPY N/A 5/18/2021    Procedure: EGD (ESOPHAGOGASTRODUODENOSCOPY);  Surgeon: Rosio Marion MD;  Location: [facility] ENDO (2ND FLR);  Service: Endoscopy;  Laterality: N/A;  5/15-covid pcw-inst portal-tb    ESOPHAGOGASTRODUODENOSCOPY N/A 5/26/2021    Procedure: EGD (ESOPHAGOGASTRODUODENOSCOPY);  Surgeon: Socrates Terrazas MD;  Location: [facility] ENDO (2ND FLR);  Service: Endoscopy;  Laterality: N/A;      Home Meds:   Prior to Admission medications    Medication Sig Start Date End Date Taking? Authorizing Provider   baclofen (LIORESAL) 5 mg Tab tablet Take 1 tablet (5 mg total) by mouth 3 (three) times daily. 8/23/22   Sharon Brink MD   dexAMETHasone (DECADRON) 4 MG Tab 8 mg (two tabs) twice daily on the day before chemo and the day after chemo 8/8/22   Fer Ashraf MD   dicyclomine (BENTYL) 10 MG capsule TAKE ONE CAPSULE BY MOUTH FOUR TIMES DAILY 9/27/21   Kamille Hooks PA-C   famotidine (PEPCID) 20 MG tablet Take 1 tablet (20 mg total) by mouth nightly as needed for Heartburn. 2/23/22 2/23/23  Kamille Hooks PA-C   furosemide (LASIX) 20 MG tablet Take 1 tablet (20 mg total) by mouth once daily. 7/6/22   Fer Ashraf MD   gabapentin (NEURONTIN) 300 MG capsule Take 1 capsule (300 mg total) by mouth 3 (three) times daily. 8/22/22 8/22/23  Kamille Hooks PA-C   hydrocortisone 2.5 % cream Apply topically 2 (two) times daily. 7/11/22   Bethel Shetty MD   LIDOcaine HCL 2% (XYLOCAINE) 2 % jelly Apply topically as needed. 8/17/22   Sharon Brink MD   LIDOcaine-prilocaine (EMLA) cream Apply to Port-A-Cath area 30 to 45 minutes prior to port access as directed (topical anesthetic use to the anterior chest only). 8/17/22   Sharon Brink MD   methadone (DOLOPHINE) 5 mg/5 mL solution Take 2.5 mLs (2.5 mg total) by mouth every morning AND 5 mLs (5 mg total) every evening. 9/9/22 10/10/22  Sharon Brink MD   metoclopramide HCl (REGLAN) 5 MG tablet 5 mg. 6/11/21   Historical Provider   multivitamin with folic acid 400 mcg Tab Take 1 tablet by mouth once daily.    Historical Provider   naloxone (NARCAN) 4 mg/actuation Spry 4mg by nasal route as needed for opioid overdose; may repeat every 2-3 minutes in alternating nostrils until medical help arrives. Call 911 8/17/22   Sharon Brink MD   nortriptyline (PAMELOR) 25 MG capsule Take 1 capsule (25 mg total) by mouth every  evening. 6/20/22 6/20/23  Bren Allen MD   omeprazole (PRILOSEC) 40 MG capsule Take 1 capsule (40 mg total) by mouth 2 (two) times daily before meals. 6/20/22 6/20/23  Bren Allen MD   ondansetron (ZOFRAN) 8 MG tablet TAKE 1 TABLET(8 MG) BY MOUTH EVERY 8 HOURS AS NEEDED FOR NAUSEA 8/29/22   Fer Ashraf MD   oxyCODONE (ROXICODONE) 5 MG immediate release tablet Take 1 tablet (5 mg total) by mouth every 6 (six) hours as needed. 6/20/22   Bren Allen MD   polyethylene glycol (GLYCOLAX) 17 gram/dose powder Mix 1 capful (17 g) with liquid and take by mouth once daily for 20 days 6/21/22   Bren Allen MD   prochlorperazine (COMPAZINE) 10 MG tablet TAKE 1 TABLET(10 MG) BY MOUTH EVERY 6 HOURS AS NEEDED FOR NAUSEA 8/7/21   Fer Ashraf MD   promethazine (PHENERGAN) 25 MG tablet Take 1 tablet (25 mg total) by mouth every 6 (six) hours as needed for Nausea. 9/9/22   Fer Ashraf MD   senna-docusate 8.6-50 mg (SENNA WITH DOCUSATE SODIUM) 8.6-50 mg per tablet Take 1 tablet by mouth 2 (two) times daily as needed for Constipation. 5/26/21   Reshma Wilson MD   sucralfate (CARAFATE) 1 gram tablet TAKE 1 TABLET(1 GRAM) BY MOUTH FOUR TIMES DAILY FOR 14 DAYS 10/20/21   Rosio Marion MD   sucralfate (CARAFATE) 1 gram tablet  4/8/21   Historical Provider   traMADoL (ULTRAM) 50 mg tablet Take 50 mg by mouth. 6/25/21   Historical Provider   amitriptyline (ELAVIL) 10 MG tablet Take 1 tablet (10 mg total) by mouth every evening. 5/13/21 6/20/22  Socrates Terrazas MD   pantoprazole (PROTONIX) 40 MG tablet Take 1 tablet (40 mg total) by mouth 2 (two) times daily. 5/25/22 6/20/22  Kamille Hooks PA-C     Anticoagulants/Antiplatelets: no anticoagulation     Allergies: Review of patient's allergies indicates:  No Known Allergies      Sedation History:  no adverse reactions     Review of Systems:   Hematological: no known coagulopathies  Respiratory: no cough, shortness of breath, or wheezing  Cardiovascular: no chest  pain or dyspnea on exertion  Gastrointestinal: positive for - abdominal pain and distension  Genito-Urinary: no dysuria, trouble voiding, or hematuria  Musculoskeletal: negative  Neurological: no TIA or stroke symptoms      OBJECTIVE:     Vital Signs (Most Recent)       Physical Exam:  General: no acute distress  Mental Status: alert and oriented to person, place and time  HEENT: normocephalic, atraumatic  Chest: unlabored breathing  Heart: regular heart rate  Abdomen: +distended with +fluid wave. No TTP/r/g.  Extremity: moves all extremities    Laboratory  Lab Results   Component Value Date    INR 1.0 06/10/2021       Lab Results   Component Value Date    WBC 7.05 09/13/2022    HGB 9.7 (L) 09/13/2022    HCT 31.8 (L) 09/13/2022    MCV 78 (L) 09/13/2022     09/13/2022      Lab Results   Component Value Date     (H) 09/13/2022     (L) 09/13/2022    K 4.3 09/13/2022     09/13/2022    CO2 26 09/13/2022    BUN 12 09/13/2022    CREATININE 0.8 09/13/2022    CALCIUM 8.7 09/13/2022    MG 1.9 06/20/2022    ALT 6 (L) 09/13/2022    AST 15 09/13/2022    ALBUMIN 1.6 (L) 09/13/2022    BILITOT 0.2 09/13/2022     ASSESSMENT/PLAN:     41 y.o. female with pertinent PMHx of gastric adenocarcinoma with osseous, hepatic and peritoneal metastases complicated by recurrent abdominal distension and pain 2/2 recurrent painful, tense ascites requiring frequent decompression for symptom relief.     1. Recurrent painful, tense ascites - Will attempt US-guided percutaneous RUQ-approach therapeutic LVP with local anesthetic only and albumin infusion post PRN as indicated per institutional protocol.     Risks (including, but not limited to, pain, bleeding, infection, damage to nearby structures, failure to obtain sufficient material for a diagnosis, the need for additional procedures, and death), benefits, and alternatives were discussed with the patient. All questions were answered to the best of my abilities. The patient  wishes to proceed with the procedure. Written informed consent was obtained.     Thank you for considering IR for the care of your patient.      Castro Bermudez MD  Interventional Radiology

## 2022-09-19 NOTE — DISCHARGE SUMMARY
Radiology Discharge Summary      Hospital Course: No complications    Admit Date: 9/19/2022  Discharge Date: 09/19/2022     Instructions Given to Patient: Yes  Diet: Resume prior diet  Activity: activity as tolerated    Description of Condition on Discharge: Stable  Vital Signs (Most Recent): BP: (!) 118/94 (09/19/22 1434)    Discharge Disposition: Home    Discharge Diagnosis:  41 y.o. female with recurrent painful, tense ascites s/p successful US-guided percutaneous RUQ-approach therapeutic LVP with local anesthetic only and albumin infusion post PRN as indicated per institutional protocol. Patient tolerated the procedure well. No immediate post-procedural complications noted.      No post-op activity, diet or medication restrictions.    Thank you for considering IR for the care of your patient.      Castro Bermudez MD  Interventional Radiology

## 2022-09-20 ENCOUNTER — PATIENT MESSAGE (OUTPATIENT)
Dept: HEMATOLOGY/ONCOLOGY | Facility: CLINIC | Age: 42
End: 2022-09-20
Payer: COMMERCIAL

## 2022-09-22 ENCOUNTER — TELEPHONE (OUTPATIENT)
Dept: PALLIATIVE MEDICINE | Facility: CLINIC | Age: 42
End: 2022-09-22
Payer: COMMERCIAL

## 2022-09-22 DIAGNOSIS — Z51.5 PALLIATIVE CARE ENCOUNTER: ICD-10-CM

## 2022-09-23 ENCOUNTER — HOSPITAL ENCOUNTER (OUTPATIENT)
Dept: INTERVENTIONAL RADIOLOGY/VASCULAR | Facility: HOSPITAL | Age: 42
Discharge: HOME | End: 2022-09-23
Attending: INTERNAL MEDICINE
Payer: COMMERCIAL

## 2022-09-23 VITALS — SYSTOLIC BLOOD PRESSURE: 133 MMHG | DIASTOLIC BLOOD PRESSURE: 94 MMHG

## 2022-09-23 DIAGNOSIS — Z51.5 PALLIATIVE CARE ENCOUNTER: ICD-10-CM

## 2022-09-23 DIAGNOSIS — R18.8 OTHER ASCITES: ICD-10-CM

## 2022-09-23 PROCEDURE — C1729 CATH, DRAINAGE: HCPCS

## 2022-09-23 PROCEDURE — P9047 ALBUMIN (HUMAN), 25%, 50ML: HCPCS | Mod: JG | Performed by: RADIOLOGY

## 2022-09-23 PROCEDURE — 49083 IR PARACENTESIS WITH IMAGING: ICD-10-PCS | Mod: ,,, | Performed by: RADIOLOGY

## 2022-09-23 PROCEDURE — 49083 ABD PARACENTESIS W/IMAGING: CPT | Performed by: RADIOLOGY

## 2022-09-23 PROCEDURE — 63600175 PHARM REV CODE 636 W HCPCS: Mod: JG | Performed by: RADIOLOGY

## 2022-09-23 RX ORDER — BACLOFEN 5 MG/1
5 TABLET ORAL 3 TIMES DAILY
Qty: 30 TABLET | Refills: 2 | Status: ON HOLD | OUTPATIENT
Start: 2022-09-23 | End: 2022-11-10 | Stop reason: HOSPADM

## 2022-09-23 RX ORDER — OXYCODONE HYDROCHLORIDE 5 MG/1
5 TABLET ORAL EVERY 4 HOURS PRN
Qty: 90 TABLET | Refills: 0 | Status: SHIPPED | OUTPATIENT
Start: 2022-09-23

## 2022-09-23 RX ORDER — ALBUMIN HUMAN 250 G/1000ML
SOLUTION INTRAVENOUS
Status: DISCONTINUED | OUTPATIENT
Start: 2022-09-23 | End: 2022-09-24 | Stop reason: HOSPADM

## 2022-09-23 RX ADMIN — ALBUMIN (HUMAN) 25 G: 25 SOLUTION INTRAVENOUS at 03:09

## 2022-09-23 NOTE — H&P
Radiology History & Physical      SUBJECTIVE:     Chief Complaint: Recurrent painful, tense ascites     History of Present Illness:  Puja Quigley is a 41 y.o. female with pertinent PMHx of gastric adenocarcinoma with osseous, hepatic and peritoneal metastases complicated by recurrent abdominal distension and pain 2/2 recurrent painful, tense ascites requiring frequent decompression for symptom relief.     A new outpatient IR consult received for US-guided percutaneous RLQ-approach therapeutic large-volume paracentesis.    Past Medical History:   Diagnosis Date    Anxiety     Dehydration 5/30/2022    Gastric cancer     Gastric ulcer     Hx of psychiatric care     Pregnancy 08/12/2020    delivered on 8/12/2020    Umbilical hernia      Past Surgical History:   Procedure Laterality Date    CLOSURE OF PERFORATED ULCER OF DUODENUM USING OMENTAL PATCH      ESOPHAGOGASTRODUODENOSCOPY N/A 10/13/2020    Procedure: EGD (ESOPHAGOGASTRODUODENOSCOPY);  Surgeon: Rosio Marion MD;  Location: [facility] ENDO (2ND FLR);  Service: Endoscopy;  Laterality: N/A;    ESOPHAGOGASTRODUODENOSCOPY N/A 12/11/2020    Procedure: EGD (ESOPHAGOGASTRODUODENOSCOPY);  Surgeon: Rosio Marion MD;  Location: [facility] (2ND FLR);  Service: Endoscopy;  Laterality: N/A;  Covid-19 test 12/8/20 at [facility]    ESOPHAGOGASTRODUODENOSCOPY N/A 3/12/2021    Procedure: EGD (ESOPHAGOGASTRODUODENOSCOPY);  Surgeon: Nav Omer MD;  Location: [facility] ENDO (2ND FLR);  Service: Endoscopy;  Laterality: N/A;  COVID at [facility] 3/9 ttr    ESOPHAGOGASTRODUODENOSCOPY N/A 5/18/2021    Procedure: EGD (ESOPHAGOGASTRODUODENOSCOPY);  Surgeon: Rosio Marion MD;  Location: [facility] ENDO (2ND FLR);  Service: Endoscopy;  Laterality: N/A;  5/15-covid pcw-inst portal-tb    ESOPHAGOGASTRODUODENOSCOPY N/A 5/26/2021    Procedure: EGD (ESOPHAGOGASTRODUODENOSCOPY);  Surgeon: Socrates Terrazas MD;  Location: [facility] ENDO (2ND FLR);  Service: Endoscopy;  Laterality: N/A;      Home Meds:   Prior to Admission medications    Medication Sig Start Date End Date Taking? Authorizing Provider   baclofen (LIORESAL) 5 mg Tab tablet Take 1 tablet (5 mg total) by mouth 3 (three) times daily. 9/23/22   Sharon Brink MD   dexAMETHasone (DECADRON) 4 MG Tab 8 mg (two tabs) twice daily on the day before chemo and the day after chemo 8/8/22   Fer Ashraf MD   dicyclomine (BENTYL) 10 MG capsule TAKE ONE CAPSULE BY MOUTH FOUR TIMES DAILY 9/27/21   Kamille Hooks PA-C   famotidine (PEPCID) 20 MG tablet Take 1 tablet (20 mg total) by mouth nightly as needed for Heartburn. 2/23/22 2/23/23  Kamille Hooks PA-C   furosemide (LASIX) 20 MG tablet Take 1 tablet (20 mg total) by mouth once daily. 7/6/22   Fer Ashraf MD   gabapentin (NEURONTIN) 300 MG capsule Take 1 capsule (300 mg total) by mouth 3 (three) times daily. 8/22/22 8/22/23  Kamille Hooks PA-C   hydrocortisone 2.5 % cream Apply topically 2 (two) times daily. 7/11/22   Bethel Shetty MD   LIDOcaine HCL 2% (XYLOCAINE) 2 % jelly Apply topically as needed. 8/17/22   Sharon Brink MD   LIDOcaine-prilocaine (EMLA) cream Apply to Port-A-Cath area 30 to 45 minutes prior to port access as directed (topical anesthetic use to the anterior chest only). 8/17/22   Sharon Brink MD   methadone (DOLOPHINE) 5 mg/5 mL solution Take 2.5 mLs (2.5 mg total) by mouth every morning AND 5 mLs (5 mg total) every evening. 9/9/22 10/10/22  Sharon Brink MD   metoclopramide HCl (REGLAN) 5 MG tablet 5 mg. 6/11/21   Historical Provider   multivitamin with folic acid 400 mcg Tab Take 1 tablet by mouth once daily.    Historical Provider   naloxone (NARCAN) 4 mg/actuation Spry 4mg by nasal route as needed for opioid overdose; may repeat every 2-3 minutes in alternating nostrils until medical help arrives. Call 911 8/17/22   Sharon Brink MD   nortriptyline (PAMELOR) 25 MG capsule Take 1 capsule (25 mg total) by mouth every  evening. 6/20/22 6/20/23  Bren Allen MD   omeprazole (PRILOSEC) 40 MG capsule Take 1 capsule (40 mg total) by mouth 2 (two) times daily before meals. 6/20/22 6/20/23  Bren Allen MD   ondansetron (ZOFRAN) 8 MG tablet TAKE 1 TABLET(8 MG) BY MOUTH EVERY 8 HOURS AS NEEDED FOR NAUSEA 8/29/22   Fer Ashraf MD   oxyCODONE (ROXICODONE) 5 MG immediate release tablet Take 1 tablet (5 mg total) by mouth every 4 (four) hours as needed for Pain. 9/23/22   Fer Ashraf MD   polyethylene glycol (GLYCOLAX) 17 gram/dose powder Mix 1 capful (17 g) with liquid and take by mouth once daily for 20 days 6/21/22   Bren Allen MD   prochlorperazine (COMPAZINE) 10 MG tablet TAKE 1 TABLET(10 MG) BY MOUTH EVERY 6 HOURS AS NEEDED FOR NAUSEA 8/7/21   Fer Ashraf MD   promethazine (PHENERGAN) 25 MG tablet Take 1 tablet (25 mg total) by mouth every 6 (six) hours as needed for Nausea. 9/9/22   Fer Ashraf MD   senna-docusate 8.6-50 mg (SENNA WITH DOCUSATE SODIUM) 8.6-50 mg per tablet Take 1 tablet by mouth 2 (two) times daily as needed for Constipation. 5/26/21   Reshma Wilson MD   sucralfate (CARAFATE) 1 gram tablet TAKE 1 TABLET(1 GRAM) BY MOUTH FOUR TIMES DAILY FOR 14 DAYS 10/20/21   Rosio Marion MD   sucralfate (CARAFATE) 1 gram tablet  4/8/21   Historical Provider   traMADoL (ULTRAM) 50 mg tablet Take 50 mg by mouth. 6/25/21   Historical Provider   amitriptyline (ELAVIL) 10 MG tablet Take 1 tablet (10 mg total) by mouth every evening. 5/13/21 6/20/22  Socrates Terrazas MD   oxyCODONE (ROXICODONE) 5 MG immediate release tablet Take 1 tablet (5 mg total) by mouth every 6 (six) hours as needed. 6/20/22 9/23/22  Bren Allen MD   pantoprazole (PROTONIX) 40 MG tablet Take 1 tablet (40 mg total) by mouth 2 (two) times daily. 5/25/22 6/20/22  XENA KramerC     Anticoagulants/Antiplatelets: no anticoagulation     Allergies: Review of patient's allergies indicates:  No Known Allergies      Sedation  History:  no adverse reactions     Review of Systems:   Hematological: no known coagulopathies  Respiratory: no cough, shortness of breath, or wheezing  Cardiovascular: no chest pain or dyspnea on exertion  Gastrointestinal: positive for - abdominal pain and distension  Genito-Urinary: no dysuria, trouble voiding, or hematuria  Musculoskeletal: negative  Neurological: no TIA or stroke symptoms      OBJECTIVE:     Vital Signs (Most Recent)       Physical Exam:  General: no acute distress  Mental Status: alert and oriented to person, place and time  HEENT: normocephalic, atraumatic  Chest: unlabored breathing  Heart: regular heart rate  Abdomen: +distended with +fluid wave. No TTP/r/g.  Extremity: moves all extremities    Laboratory  Lab Results   Component Value Date    INR 1.0 06/10/2021       Lab Results   Component Value Date    WBC 7.05 09/13/2022    HGB 9.7 (L) 09/13/2022    HCT 31.8 (L) 09/13/2022    MCV 78 (L) 09/13/2022     09/13/2022      Lab Results   Component Value Date     (H) 09/13/2022     (L) 09/13/2022    K 4.3 09/13/2022     09/13/2022    CO2 26 09/13/2022    BUN 12 09/13/2022    CREATININE 0.8 09/13/2022    CALCIUM 8.7 09/13/2022    MG 1.9 06/20/2022    ALT 6 (L) 09/13/2022    AST 15 09/13/2022    ALBUMIN 1.6 (L) 09/13/2022    BILITOT 0.2 09/13/2022     ASSESSMENT/PLAN:     41 y.o. female with pertinent PMHx of gastric adenocarcinoma with osseous, hepatic and peritoneal metastases complicated by recurrent abdominal distension and pain 2/2 recurrent painful, tense ascites requiring frequent decompression for symptom relief.     1. Recurrent painful, tense ascites - Will attempt US-guided percutaneous RLQ-approach therapeutic LVP with local anesthetic only and albumin infusion post PRN as indicated per institutional protocol.     Risks (including, but not limited to, pain, bleeding, infection, damage to nearby structures, failure to obtain sufficient material for a  diagnosis, the need for additional procedures, and death), benefits, and alternatives were discussed with the patient. All questions were answered to the best of my abilities. The patient wishes to proceed with the procedure. Written informed consent was obtained.     Thank you for considering IR for the care of your patient.      Castro Bermudez MD  Interventional Radiology

## 2022-09-23 NOTE — DISCHARGE SUMMARY
Radiology Discharge Summary      Hospital Course: No complications    Admit Date: 9/23/2022  Discharge Date: 09/23/2022     Instructions Given to Patient: Yes  Diet: Resume prior diet  Activity: activity as tolerated    Description of Condition on Discharge: Stable  Vital Signs (Most Recent):      Discharge Disposition: Home    Discharge Diagnosis:  41 y.o. female with recurrent painful, tense ascites s/p successful US-guided percutaneous RLQ-approach therapeutic LVP with local anesthetic only and albumin infusion post PRN as indicated per institutional protocol. Patient tolerated the procedure well. No immediate post-procedural complications noted.      No post-op activity, diet or medication restrictions.    Thank you for considering IR for the care of your patient.      Castro Bermudez MD  Interventional Radiology

## 2022-09-23 NOTE — BRIEF OP NOTE
Radiology Post-Procedure Note     Pre Op Diagnosis: Recurrent painful, tense ascites  Post Op Diagnosis: Same     Procedure: 1. US-guided percutaneous RLQ-approach therapeutic LVP     Procedure performed by: Castro Bermudez MD     Written Informed Consent Obtained: Yes  Specimen Removed: YES, 6,700-cc of thin, renuka-colored ascitic fluid  Estimated Blood Loss: none     Findings:   Successful US-guided percutaneous RLQ-approach therapeutic LVP with local anesthetic only and albumin infusion post PRN as indicated per institutional protocol. Patient tolerated the procedure well. No immediate post-procedural complications noted.      No post-op activity, diet or medication restrictions.    Thank you for considering IR for the care of your patient.      Castro Bermudez MD  Interventional Radiology

## 2022-09-30 ENCOUNTER — HOSPITAL ENCOUNTER (INPATIENT)
Facility: HOSPITAL | Age: 42
LOS: 13 days | Discharge: HOSPICE | DRG: 374 | End: 2022-10-14
Attending: EMERGENCY MEDICINE | Admitting: EMERGENCY MEDICINE
Payer: COMMERCIAL

## 2022-09-30 DIAGNOSIS — Z71.89 GOALS OF CARE, COUNSELING/DISCUSSION: Primary | ICD-10-CM

## 2022-09-30 DIAGNOSIS — F41.9 ANXIETY: ICD-10-CM

## 2022-09-30 DIAGNOSIS — N17.9 AKI (ACUTE KIDNEY INJURY): ICD-10-CM

## 2022-09-30 DIAGNOSIS — R10.9 INTRACTABLE ABDOMINAL PAIN: ICD-10-CM

## 2022-09-30 DIAGNOSIS — R18.0 MALIGNANT ASCITES: ICD-10-CM

## 2022-09-30 DIAGNOSIS — R00.0 TACHYCARDIA: ICD-10-CM

## 2022-09-30 DIAGNOSIS — C16.9 MALIGNANT NEOPLASM OF STOMACH, UNSPECIFIED LOCATION: ICD-10-CM

## 2022-09-30 DIAGNOSIS — G89.3 CANCER RELATED PAIN: ICD-10-CM

## 2022-09-30 DIAGNOSIS — Z51.5 PALLIATIVE CARE ENCOUNTER: ICD-10-CM

## 2022-09-30 DIAGNOSIS — F54 PSYCHOLOGICAL FACTORS AFFECTING MEDICAL CONDITION: ICD-10-CM

## 2022-09-30 DIAGNOSIS — R06.02 SOB (SHORTNESS OF BREATH): ICD-10-CM

## 2022-09-30 DIAGNOSIS — R07.9 CHEST PAIN: ICD-10-CM

## 2022-09-30 LAB
ALBUMIN SERPL BCP-MCNC: 1.9 G/DL (ref 3.5–5.2)
ALP SERPL-CCNC: 105 U/L (ref 55–135)
ALT SERPL W/O P-5'-P-CCNC: 14 U/L (ref 10–44)
ANION GAP SERPL CALC-SCNC: 13 MMOL/L (ref 8–16)
AST SERPL-CCNC: 23 U/L (ref 10–40)
BASOPHILS # BLD AUTO: 0.02 K/UL (ref 0–0.2)
BASOPHILS NFR BLD: 0.2 % (ref 0–1.9)
BILIRUB SERPL-MCNC: 0.3 MG/DL (ref 0.1–1)
BNP SERPL-MCNC: 12 PG/ML (ref 0–99)
BUN SERPL-MCNC: 34 MG/DL (ref 6–20)
BUN SERPL-MCNC: 45 MG/DL (ref 6–30)
CALCIUM SERPL-MCNC: 8.9 MG/DL (ref 8.7–10.5)
CHLORIDE SERPL-SCNC: 96 MMOL/L (ref 95–110)
CHLORIDE SERPL-SCNC: 98 MMOL/L (ref 95–110)
CO2 SERPL-SCNC: 18 MMOL/L (ref 23–29)
CREAT SERPL-MCNC: 2.6 MG/DL (ref 0.5–1.4)
CREAT SERPL-MCNC: 3.1 MG/DL (ref 0.5–1.4)
DIFFERENTIAL METHOD: ABNORMAL
EOSINOPHIL # BLD AUTO: 0 K/UL (ref 0–0.5)
EOSINOPHIL NFR BLD: 0 % (ref 0–8)
ERYTHROCYTE [DISTWIDTH] IN BLOOD BY AUTOMATED COUNT: 20.3 % (ref 11.5–14.5)
EST. GFR  (NO RACE VARIABLE): 23.2 ML/MIN/1.73 M^2
GLUCOSE SERPL-MCNC: 90 MG/DL (ref 70–110)
GLUCOSE SERPL-MCNC: 98 MG/DL (ref 70–110)
HCT VFR BLD AUTO: 41.1 % (ref 37–48.5)
HCT VFR BLD CALC: 44 %PCV (ref 36–54)
HGB BLD-MCNC: 12.6 G/DL (ref 12–16)
IMM GRANULOCYTES # BLD AUTO: 0.16 K/UL (ref 0–0.04)
IMM GRANULOCYTES NFR BLD AUTO: 1.6 % (ref 0–0.5)
INR PPP: 1.1 (ref 0.8–1.2)
LIPASE SERPL-CCNC: 83 U/L (ref 4–60)
LYMPHOCYTES # BLD AUTO: 1.3 K/UL (ref 1–4.8)
LYMPHOCYTES NFR BLD: 13.8 % (ref 18–48)
MAGNESIUM SERPL-MCNC: 2.1 MG/DL (ref 1.6–2.6)
MCH RBC QN AUTO: 23.3 PG (ref 27–31)
MCHC RBC AUTO-ENTMCNC: 30.7 G/DL (ref 32–36)
MCV RBC AUTO: 76 FL (ref 82–98)
MONOCYTES # BLD AUTO: 0.5 K/UL (ref 0.3–1)
MONOCYTES NFR BLD: 5 % (ref 4–15)
NEUTROPHILS # BLD AUTO: 7.7 K/UL (ref 1.8–7.7)
NEUTROPHILS NFR BLD: 79.4 % (ref 38–73)
NRBC BLD-RTO: 1 /100 WBC
PLATELET # BLD AUTO: 372 K/UL (ref 150–450)
PMV BLD AUTO: 8.4 FL (ref 9.2–12.9)
POC IONIZED CALCIUM: 1.12 MMOL/L (ref 1.06–1.42)
POC TCO2 (MEASURED): 23 MMOL/L (ref 23–29)
POTASSIUM BLD-SCNC: 5 MMOL/L (ref 3.5–5.1)
POTASSIUM SERPL-SCNC: 4.8 MMOL/L (ref 3.5–5.1)
PROT SERPL-MCNC: 5.8 G/DL (ref 6–8.4)
PROTHROMBIN TIME: 11.2 SEC (ref 9–12.5)
RBC # BLD AUTO: 5.4 M/UL (ref 4–5.4)
SAMPLE: ABNORMAL
SODIUM BLD-SCNC: 126 MMOL/L (ref 136–145)
SODIUM SERPL-SCNC: 127 MMOL/L (ref 136–145)
TROPONIN I SERPL DL<=0.01 NG/ML-MCNC: 0.02 NG/ML (ref 0–0.03)
WBC # BLD AUTO: 9.71 K/UL (ref 3.9–12.7)

## 2022-09-30 PROCEDURE — 93005 ELECTROCARDIOGRAM TRACING: CPT

## 2022-09-30 PROCEDURE — 80053 COMPREHEN METABOLIC PANEL: CPT | Performed by: PHYSICIAN ASSISTANT

## 2022-09-30 PROCEDURE — 84484 ASSAY OF TROPONIN QUANT: CPT | Performed by: PHYSICIAN ASSISTANT

## 2022-09-30 PROCEDURE — 80048 BASIC METABOLIC PNL TOTAL CA: CPT | Mod: XB

## 2022-09-30 PROCEDURE — 83690 ASSAY OF LIPASE: CPT | Performed by: PHYSICIAN ASSISTANT

## 2022-09-30 PROCEDURE — 63600175 PHARM REV CODE 636 W HCPCS: Performed by: PHYSICIAN ASSISTANT

## 2022-09-30 PROCEDURE — 96374 THER/PROPH/DIAG INJ IV PUSH: CPT

## 2022-09-30 PROCEDURE — 96376 TX/PRO/DX INJ SAME DRUG ADON: CPT

## 2022-09-30 PROCEDURE — 99285 EMERGENCY DEPT VISIT HI MDM: CPT | Mod: 25

## 2022-09-30 PROCEDURE — 99285 EMERGENCY DEPT VISIT HI MDM: CPT | Mod: CS,,, | Performed by: EMERGENCY MEDICINE

## 2022-09-30 PROCEDURE — 93010 EKG 12-LEAD: ICD-10-PCS | Mod: ,,, | Performed by: INTERNAL MEDICINE

## 2022-09-30 PROCEDURE — 63600175 PHARM REV CODE 636 W HCPCS: Performed by: STUDENT IN AN ORGANIZED HEALTH CARE EDUCATION/TRAINING PROGRAM

## 2022-09-30 PROCEDURE — 83735 ASSAY OF MAGNESIUM: CPT | Performed by: PHYSICIAN ASSISTANT

## 2022-09-30 PROCEDURE — 85610 PROTHROMBIN TIME: CPT | Performed by: PHYSICIAN ASSISTANT

## 2022-09-30 PROCEDURE — 99285 PR EMERGENCY DEPT VISIT,LEVEL V: ICD-10-PCS | Mod: CS,,, | Performed by: EMERGENCY MEDICINE

## 2022-09-30 PROCEDURE — 85025 COMPLETE CBC W/AUTO DIFF WBC: CPT | Performed by: PHYSICIAN ASSISTANT

## 2022-09-30 PROCEDURE — 83880 ASSAY OF NATRIURETIC PEPTIDE: CPT | Performed by: PHYSICIAN ASSISTANT

## 2022-09-30 PROCEDURE — 93010 ELECTROCARDIOGRAM REPORT: CPT | Mod: ,,, | Performed by: INTERNAL MEDICINE

## 2022-09-30 RX ORDER — HYDROMORPHONE HYDROCHLORIDE 1 MG/ML
0.5 INJECTION, SOLUTION INTRAMUSCULAR; INTRAVENOUS; SUBCUTANEOUS
Status: COMPLETED | OUTPATIENT
Start: 2022-09-30 | End: 2022-09-30

## 2022-09-30 RX ORDER — ONDANSETRON 2 MG/ML
4 INJECTION INTRAMUSCULAR; INTRAVENOUS
Status: DISPENSED | OUTPATIENT
Start: 2022-09-30 | End: 2022-10-01

## 2022-09-30 RX ORDER — HYDROMORPHONE HYDROCHLORIDE 1 MG/ML
0.2 INJECTION, SOLUTION INTRAMUSCULAR; INTRAVENOUS; SUBCUTANEOUS
Status: COMPLETED | OUTPATIENT
Start: 2022-09-30 | End: 2022-09-30

## 2022-09-30 RX ADMIN — HYDROMORPHONE HYDROCHLORIDE 0.5 MG: 1 INJECTION, SOLUTION INTRAMUSCULAR; INTRAVENOUS; SUBCUTANEOUS at 11:09

## 2022-09-30 RX ADMIN — HYDROMORPHONE HYDROCHLORIDE 0.2 MG: 1 INJECTION, SOLUTION INTRAMUSCULAR; INTRAVENOUS; SUBCUTANEOUS at 09:09

## 2022-10-01 PROBLEM — N17.9 AKI (ACUTE KIDNEY INJURY): Status: ACTIVE | Noted: 2022-10-01

## 2022-10-01 LAB
ALBUMIN SERPL BCP-MCNC: 1.8 G/DL (ref 3.5–5.2)
ALBUMIN SERPL BCP-MCNC: 2.5 G/DL (ref 3.5–5.2)
ALP SERPL-CCNC: 80 U/L (ref 55–135)
ALP SERPL-CCNC: 99 U/L (ref 55–135)
ALT SERPL W/O P-5'-P-CCNC: 11 U/L (ref 10–44)
ALT SERPL W/O P-5'-P-CCNC: 9 U/L (ref 10–44)
ANION GAP SERPL CALC-SCNC: 10 MMOL/L (ref 8–16)
ANION GAP SERPL CALC-SCNC: 11 MMOL/L (ref 8–16)
APTT BLDCRRT: 27.8 SEC (ref 21–32)
AST SERPL-CCNC: 15 U/L (ref 10–40)
AST SERPL-CCNC: 19 U/L (ref 10–40)
BASOPHILS # BLD AUTO: 0.02 K/UL (ref 0–0.2)
BASOPHILS NFR BLD: 0.2 % (ref 0–1.9)
BILIRUB SERPL-MCNC: 0.3 MG/DL (ref 0.1–1)
BILIRUB SERPL-MCNC: 0.5 MG/DL (ref 0.1–1)
BUN SERPL-MCNC: 37 MG/DL (ref 6–20)
BUN SERPL-MCNC: 40 MG/DL (ref 6–20)
CALCIUM SERPL-MCNC: 8.6 MG/DL (ref 8.7–10.5)
CALCIUM SERPL-MCNC: 8.7 MG/DL (ref 8.7–10.5)
CHLORIDE SERPL-SCNC: 96 MMOL/L (ref 95–110)
CHLORIDE SERPL-SCNC: 96 MMOL/L (ref 95–110)
CO2 SERPL-SCNC: 18 MMOL/L (ref 23–29)
CO2 SERPL-SCNC: 18 MMOL/L (ref 23–29)
CREAT SERPL-MCNC: 2.9 MG/DL (ref 0.5–1.4)
CREAT SERPL-MCNC: 3.2 MG/DL (ref 0.5–1.4)
DIFFERENTIAL METHOD: ABNORMAL
EOSINOPHIL # BLD AUTO: 0 K/UL (ref 0–0.5)
EOSINOPHIL NFR BLD: 0.1 % (ref 0–8)
ERYTHROCYTE [DISTWIDTH] IN BLOOD BY AUTOMATED COUNT: 20.4 % (ref 11.5–14.5)
EST. GFR  (NO RACE VARIABLE): 18.1 ML/MIN/1.73 M^2
EST. GFR  (NO RACE VARIABLE): 20.4 ML/MIN/1.73 M^2
GLUCOSE SERPL-MCNC: 104 MG/DL (ref 70–110)
GLUCOSE SERPL-MCNC: 98 MG/DL (ref 70–110)
HCT VFR BLD AUTO: 38.7 % (ref 37–48.5)
HGB BLD-MCNC: 12 G/DL (ref 12–16)
IMM GRANULOCYTES # BLD AUTO: 0.11 K/UL (ref 0–0.04)
IMM GRANULOCYTES NFR BLD AUTO: 1.3 % (ref 0–0.5)
INR PPP: 1.1 (ref 0.8–1.2)
LYMPHOCYTES # BLD AUTO: 1.1 K/UL (ref 1–4.8)
LYMPHOCYTES NFR BLD: 12.8 % (ref 18–48)
MAGNESIUM SERPL-MCNC: 2.1 MG/DL (ref 1.6–2.6)
MCH RBC QN AUTO: 23.1 PG (ref 27–31)
MCHC RBC AUTO-ENTMCNC: 31 G/DL (ref 32–36)
MCV RBC AUTO: 75 FL (ref 82–98)
MONOCYTES # BLD AUTO: 0.6 K/UL (ref 0.3–1)
MONOCYTES NFR BLD: 6.4 % (ref 4–15)
NEUTROPHILS # BLD AUTO: 6.8 K/UL (ref 1.8–7.7)
NEUTROPHILS NFR BLD: 79.2 % (ref 38–73)
NRBC BLD-RTO: 1 /100 WBC
PHOSPHATE SERPL-MCNC: 6.4 MG/DL (ref 2.7–4.5)
PLATELET # BLD AUTO: 358 K/UL (ref 150–450)
PMV BLD AUTO: 8.4 FL (ref 9.2–12.9)
POTASSIUM SERPL-SCNC: 4.9 MMOL/L (ref 3.5–5.1)
POTASSIUM SERPL-SCNC: 5.3 MMOL/L (ref 3.5–5.1)
PROT SERPL-MCNC: 5.2 G/DL (ref 6–8.4)
PROT SERPL-MCNC: 5.3 G/DL (ref 6–8.4)
PROTHROMBIN TIME: 11.1 SEC (ref 9–12.5)
RBC # BLD AUTO: 5.19 M/UL (ref 4–5.4)
SARS-COV-2 RDRP RESP QL NAA+PROBE: NEGATIVE
SODIUM SERPL-SCNC: 124 MMOL/L (ref 136–145)
SODIUM SERPL-SCNC: 125 MMOL/L (ref 136–145)
WBC # BLD AUTO: 8.57 K/UL (ref 3.9–12.7)

## 2022-10-01 PROCEDURE — 80053 COMPREHEN METABOLIC PANEL: CPT | Performed by: STUDENT IN AN ORGANIZED HEALTH CARE EDUCATION/TRAINING PROGRAM

## 2022-10-01 PROCEDURE — 49083 PR ABD PARACENTESIS W/IMAGING: ICD-10-PCS | Mod: ,,, | Performed by: HOSPITALIST

## 2022-10-01 PROCEDURE — 25000003 PHARM REV CODE 250: Performed by: STUDENT IN AN ORGANIZED HEALTH CARE EDUCATION/TRAINING PROGRAM

## 2022-10-01 PROCEDURE — 99223 1ST HOSP IP/OBS HIGH 75: CPT | Mod: ,,, | Performed by: HOSPITALIST

## 2022-10-01 PROCEDURE — 85730 THROMBOPLASTIN TIME PARTIAL: CPT | Performed by: STUDENT IN AN ORGANIZED HEALTH CARE EDUCATION/TRAINING PROGRAM

## 2022-10-01 PROCEDURE — 85610 PROTHROMBIN TIME: CPT | Performed by: STUDENT IN AN ORGANIZED HEALTH CARE EDUCATION/TRAINING PROGRAM

## 2022-10-01 PROCEDURE — 80053 COMPREHEN METABOLIC PANEL: CPT | Mod: 91

## 2022-10-01 PROCEDURE — 49083 ABD PARACENTESIS W/IMAGING: CPT | Mod: ,,, | Performed by: HOSPITALIST

## 2022-10-01 PROCEDURE — 36415 COLL VENOUS BLD VENIPUNCTURE: CPT | Performed by: STUDENT IN AN ORGANIZED HEALTH CARE EDUCATION/TRAINING PROGRAM

## 2022-10-01 PROCEDURE — P9047 ALBUMIN (HUMAN), 25%, 50ML: HCPCS | Mod: JG | Performed by: STUDENT IN AN ORGANIZED HEALTH CARE EDUCATION/TRAINING PROGRAM

## 2022-10-01 PROCEDURE — 63600175 PHARM REV CODE 636 W HCPCS: Mod: JG | Performed by: STUDENT IN AN ORGANIZED HEALTH CARE EDUCATION/TRAINING PROGRAM

## 2022-10-01 PROCEDURE — 63600175 PHARM REV CODE 636 W HCPCS: Performed by: HOSPITALIST

## 2022-10-01 PROCEDURE — 20600001 HC STEP DOWN PRIVATE ROOM

## 2022-10-01 PROCEDURE — 83735 ASSAY OF MAGNESIUM: CPT | Performed by: STUDENT IN AN ORGANIZED HEALTH CARE EDUCATION/TRAINING PROGRAM

## 2022-10-01 PROCEDURE — 99223 PR INITIAL HOSPITAL CARE,LEVL III: ICD-10-PCS | Mod: ,,, | Performed by: HOSPITALIST

## 2022-10-01 PROCEDURE — 84100 ASSAY OF PHOSPHORUS: CPT | Performed by: STUDENT IN AN ORGANIZED HEALTH CARE EDUCATION/TRAINING PROGRAM

## 2022-10-01 PROCEDURE — U0002 COVID-19 LAB TEST NON-CDC: HCPCS | Performed by: HOSPITALIST

## 2022-10-01 PROCEDURE — 63600175 PHARM REV CODE 636 W HCPCS: Performed by: STUDENT IN AN ORGANIZED HEALTH CARE EDUCATION/TRAINING PROGRAM

## 2022-10-01 PROCEDURE — 36415 COLL VENOUS BLD VENIPUNCTURE: CPT

## 2022-10-01 PROCEDURE — 85025 COMPLETE CBC W/AUTO DIFF WBC: CPT | Performed by: STUDENT IN AN ORGANIZED HEALTH CARE EDUCATION/TRAINING PROGRAM

## 2022-10-01 RX ORDER — MORPHINE SULFATE 4 MG/ML
4 INJECTION, SOLUTION INTRAMUSCULAR; INTRAVENOUS ONCE
Status: COMPLETED | OUTPATIENT
Start: 2022-10-01 | End: 2022-10-01

## 2022-10-01 RX ORDER — ALBUMIN HUMAN 250 G/1000ML
37.5 SOLUTION INTRAVENOUS ONCE AS NEEDED
Status: COMPLETED | OUTPATIENT
Start: 2022-10-01 | End: 2022-10-01

## 2022-10-01 RX ORDER — FUROSEMIDE 20 MG/1
20 TABLET ORAL DAILY
Status: DISCONTINUED | OUTPATIENT
Start: 2022-10-01 | End: 2022-10-01

## 2022-10-01 RX ORDER — HEPARIN SODIUM 5000 [USP'U]/ML
5000 INJECTION, SOLUTION INTRAVENOUS; SUBCUTANEOUS EVERY 8 HOURS
Status: DISCONTINUED | OUTPATIENT
Start: 2022-10-01 | End: 2022-10-04 | Stop reason: SDUPTHER

## 2022-10-01 RX ORDER — IBUPROFEN 200 MG
16 TABLET ORAL
Status: DISCONTINUED | OUTPATIENT
Start: 2022-10-01 | End: 2022-10-15 | Stop reason: HOSPADM

## 2022-10-01 RX ORDER — ALUMINUM HYDROXIDE, MAGNESIUM HYDROXIDE, AND SIMETHICONE 2400; 240; 2400 MG/30ML; MG/30ML; MG/30ML
30 SUSPENSION ORAL EVERY 6 HOURS PRN
Status: DISCONTINUED | OUTPATIENT
Start: 2022-10-01 | End: 2022-10-01

## 2022-10-01 RX ORDER — MORPHINE SULFATE 2 MG/ML
4 INJECTION, SOLUTION INTRAMUSCULAR; INTRAVENOUS EVERY 4 HOURS PRN
Status: DISCONTINUED | OUTPATIENT
Start: 2022-10-01 | End: 2022-10-12

## 2022-10-01 RX ORDER — LIDOCAINE HYDROCHLORIDE 10 MG/ML
10 INJECTION INFILTRATION; PERINEURAL ONCE
Status: COMPLETED | OUTPATIENT
Start: 2022-10-01 | End: 2022-10-01

## 2022-10-01 RX ORDER — GABAPENTIN 300 MG/1
300 CAPSULE ORAL 3 TIMES DAILY
Status: DISCONTINUED | OUTPATIENT
Start: 2022-10-01 | End: 2022-10-11

## 2022-10-01 RX ORDER — SIMETHICONE 80 MG
1 TABLET,CHEWABLE ORAL 3 TIMES DAILY PRN
Status: DISCONTINUED | OUTPATIENT
Start: 2022-10-01 | End: 2022-10-15 | Stop reason: HOSPADM

## 2022-10-01 RX ORDER — NORTRIPTYLINE HYDROCHLORIDE 25 MG/1
25 CAPSULE ORAL NIGHTLY
Status: DISCONTINUED | OUTPATIENT
Start: 2022-10-01 | End: 2022-10-01

## 2022-10-01 RX ORDER — IBUPROFEN 200 MG
24 TABLET ORAL
Status: DISCONTINUED | OUTPATIENT
Start: 2022-10-01 | End: 2022-10-15 | Stop reason: HOSPADM

## 2022-10-01 RX ORDER — TALC
6 POWDER (GRAM) TOPICAL NIGHTLY PRN
Status: DISCONTINUED | OUTPATIENT
Start: 2022-10-01 | End: 2022-10-15 | Stop reason: HOSPADM

## 2022-10-01 RX ORDER — PROMETHAZINE HYDROCHLORIDE 25 MG/1
25 TABLET ORAL EVERY 6 HOURS PRN
Status: DISCONTINUED | OUTPATIENT
Start: 2022-10-01 | End: 2022-10-01

## 2022-10-01 RX ORDER — ONDANSETRON 8 MG/1
8 TABLET, ORALLY DISINTEGRATING ORAL EVERY 8 HOURS PRN
Status: DISCONTINUED | OUTPATIENT
Start: 2022-10-01 | End: 2022-10-04

## 2022-10-01 RX ORDER — MORPHINE SULFATE 4 MG/ML
4 INJECTION, SOLUTION INTRAMUSCULAR; INTRAVENOUS EVERY 4 HOURS PRN
Status: DISCONTINUED | OUTPATIENT
Start: 2022-10-01 | End: 2022-10-01

## 2022-10-01 RX ORDER — FAMOTIDINE 20 MG/1
20 TABLET, FILM COATED ORAL NIGHTLY PRN
Status: DISCONTINUED | OUTPATIENT
Start: 2022-10-01 | End: 2022-10-04

## 2022-10-01 RX ORDER — NALOXONE HCL 0.4 MG/ML
0.02 VIAL (ML) INJECTION
Status: DISCONTINUED | OUTPATIENT
Start: 2022-10-01 | End: 2022-10-15 | Stop reason: HOSPADM

## 2022-10-01 RX ORDER — GLUCAGON 1 MG
1 KIT INJECTION
Status: DISCONTINUED | OUTPATIENT
Start: 2022-10-01 | End: 2022-10-15 | Stop reason: HOSPADM

## 2022-10-01 RX ORDER — LIDOCAINE HYDROCHLORIDE 10 MG/ML
INJECTION INFILTRATION; PERINEURAL
Status: DISPENSED
Start: 2022-10-01 | End: 2022-10-02

## 2022-10-01 RX ORDER — OXYCODONE HYDROCHLORIDE 5 MG/1
5 TABLET ORAL EVERY 4 HOURS PRN
Status: DISCONTINUED | OUTPATIENT
Start: 2022-10-01 | End: 2022-10-12

## 2022-10-01 RX ORDER — ACETAMINOPHEN 325 MG/1
650 TABLET ORAL EVERY 8 HOURS PRN
Status: DISCONTINUED | OUTPATIENT
Start: 2022-10-01 | End: 2022-10-15 | Stop reason: HOSPADM

## 2022-10-01 RX ORDER — SODIUM CHLORIDE 0.9 % (FLUSH) 0.9 %
10 SYRINGE (ML) INJECTION EVERY 12 HOURS PRN
Status: DISCONTINUED | OUTPATIENT
Start: 2022-10-01 | End: 2022-10-07

## 2022-10-01 RX ADMIN — SIMETHICONE 80 MG: 80 TABLET, CHEWABLE ORAL at 07:10

## 2022-10-01 RX ADMIN — MORPHINE SULFATE 4 MG: 2 INJECTION, SOLUTION INTRAMUSCULAR; INTRAVENOUS at 10:10

## 2022-10-01 RX ADMIN — MORPHINE SULFATE 4 MG: 4 INJECTION INTRAVENOUS at 04:10

## 2022-10-01 RX ADMIN — OXYCODONE 5 MG: 5 TABLET ORAL at 07:10

## 2022-10-01 RX ADMIN — FAMOTIDINE 20 MG: 20 TABLET ORAL at 06:10

## 2022-10-01 RX ADMIN — ALBUMIN (HUMAN) 37.5 G: 12.5 SOLUTION INTRAVENOUS at 02:10

## 2022-10-01 RX ADMIN — OXYCODONE 5 MG: 5 TABLET ORAL at 06:10

## 2022-10-01 RX ADMIN — MORPHINE SULFATE 4 MG: 2 INJECTION, SOLUTION INTRAMUSCULAR; INTRAVENOUS at 03:10

## 2022-10-01 RX ADMIN — MORPHINE SULFATE 4 MG: 2 INJECTION, SOLUTION INTRAMUSCULAR; INTRAVENOUS at 07:10

## 2022-10-01 RX ADMIN — LIDOCAINE HYDROCHLORIDE 10 ML: 10 INJECTION, SOLUTION INFILTRATION; PERINEURAL at 12:10

## 2022-10-01 RX ADMIN — MORPHINE SULFATE 4 MG: 2 INJECTION, SOLUTION INTRAMUSCULAR; INTRAVENOUS at 11:10

## 2022-10-01 NOTE — ASSESSMENT & PLAN NOTE
Pt with known h/o ascites   Initially had peritoneal catheter after diagnosis, output slowed so was removed in October 2021 by IR.   bi-weekly paracentesis for now.  Last paracentesis 9/23 removed 6.7L of fluid from the abdomen.     Plan:   -IR for therapeutic paracentesis while inpatient  -Monitor hemodynamics   -PT/INR ordered

## 2022-10-01 NOTE — ASSESSMENT & PLAN NOTE
Baseline Cr 0.8, Cr 3.1 on admission  Hx of CKD: no  Associated with  fatigue, n/v  DDx: hypotension, hypovolemia, obstructive uropathy 2/2 to malignancy   No indications for HD at this time    Plan:   - Urine studies: Rod, UCr, UPCR, UA / UCx  - Trend Cr  - Strict I&Os  - Avoid nephrotoxic agents (NSAIDs, gadolinium, IV radiocontrast)  - Avoid hypotension  - Renally adjust medications

## 2022-10-01 NOTE — H&P
[facility] - Emergency Dept  Hematology  Bone Marrow Transplant  H&P    Subjective:     Principal Problem: Intractable abdominal pain    HPI: Mr. Quigley is a 42 F with PMHx of gastric adenocarcinoma with osseous, hepatic and peritoneal metastases complicated by recurrent abdominal distension and pain 2/2 recurrent painful, tense ascites requiring frequent decompression for symptom relief. presenting to the ED with the chief complaint of abdominal pain. Reports worsening abdominal swelling and BLE swelling over the past week. Reports undergoing paracentesis 1-2 times per week, but missed her sessions this week as she went out of town for her birthday. Reports her abdominal swelling has not been this significant in the past. Additionally reports clear productive cough. Feels shortness of breath is due to her abdominal distention. Denies fever, chest pain, vomiting, urinary or bowel movement changes.             Patient information was obtained from patient, past medical records, and ER records.     Oncology History:     Malignant neoplasm of stomach   6/10/2021 Initial Diagnosis     Gastric adenocarcinoma      7/26/2021 - 4/6/2022 Chemotherapy     Treatment Summary   Plan Name: OP FOLFOX 6 Q2W  Treatment Goal: Palliative  Status: Inactive  Start Date: 7/26/2021  End Date: 4/6/2022  Provider: Fer Ashraf MD  Chemotherapy: fluorouraciL 2,400 mg/m2 = 3,770 mg in sodium chloride 0.9% 100 mL chemo infusion, 2,400 mg/m2 = 3,770 mg, Intravenous, Over 46 hours, 19 of 20 cycles  Administration: 3,770 mg (7/26/2021), 3,770 mg (8/9/2021), 3,770 mg (8/23/2021), 3,770 mg (9/7/2021), 3,770 mg (9/21/2021), 3,770 mg (10/5/2021), 3,770 mg (10/19/2021), 3,770 mg (11/2/2021), 3,770 mg (11/15/2021), 3,770 mg (11/30/2021), 3,770 mg (12/13/2021), 4,000 mg (12/27/2021), 4,030 mg (1/10/2022), 4,030 mg (1/24/2022), 4,000 mg (2/7/2022), 4,000 mg (2/22/2022), 4,000 mg (3/7/2022), 4,000 mg (3/22/2022), 4,000 mg (4/4/2022)  oxaliplatin  (ELOXATIN) 85 mg/m2 = 133 mg in dextrose 5 % 500 mL chemo infusion, 85 mg/m2 = 133 mg, Intravenous, Clinic/HOD 1 time, 9 of 9 cycles  Administration: 133 mg (7/26/2021), 133 mg (8/9/2021), 133 mg (8/23/2021), 133 mg (9/7/2021), 133 mg (9/21/2021), 133 mg (10/5/2021), 133 mg (10/19/2021), 133 mg (11/2/2021), 133 mg (11/15/2021)         5/26/2022 - 7/27/2022 Chemotherapy     Treatment Summary   Plan Name: OP FOLFIRI Q2WK  Treatment Goal: Palliative  Status: Inactive  Start Date: 5/26/2022  End Date: 7/27/2022  Provider: Fer Ashraf MD  Chemotherapy: dexAMETHasone (DECADRON) 4 MG Tab, 8 mg, Oral, Daily, 1 of 1 cycle, Start date: --, End date: --  irinotecan (CAMPTOSAR) 120 mg/m2 = 200 mg in sodium chloride 0.9% 500 mL chemo infusion, 120 mg/m2 = 200 mg (100 % of original dose 120 mg/m2), Intravenous, Clinic/HOD 1 time, 5 of 24 cycles  Dose modification: 125 mg/m2 (original dose 120 mg/m2, Cycle 1), 120 mg/m2 (original dose 120 mg/m2, Cycle 1), 75 mg/m2 (original dose 120 mg/m2, Cycle 2, Reason: Dose not tolerated)  Administration: 200 mg (5/26/2022), 126 mg (6/13/2022), 126 mg (6/27/2022), 134 mg (7/12/2022), 134 mg (7/25/2022)  ramucirumab (CYRAMZA) 471 mg in sodium chloride 0.9% 250 mL chemo infusion, 8 mg/kg = 471 mg, Intravenous, Clinic/HOD 1 time, 1 of 1 cycle  Administration: 471 mg (5/26/2022)         8/16/2022 -  Chemotherapy     Treatment Summary   Plan Name: OP DOCETAXEL (75 MG/M2) Q3W  Treatment Goal: Palliative  Status: Active  Start Date: 8/16/2022  End Date: 11/8/2022 (Planned)  Provider: Fer Ashraf MD  Chemotherapy: DOCEtaxeL (TAXOTERE) 35 mg/m2 = 65 mg in sodium chloride 0.9% 253.25 mL chemo infusion, 35 mg/m2 = 65 mg (46.7 % of original dose 75 mg/m2), Intravenous, Clinic/HOD 1 time, 2 of 7 cycles  Dose modification: 35 mg/m2 (original dose 75 mg/m2, Cycle 1, Reason: MD Discretion, Comment: per MD Pennington recommendations)  Administration: 65 mg (8/16/2022), 65 mg (8/30/2022)                            (Not in a hospital admission)      Patient has no known allergies.     Past Medical History:   Diagnosis Date    Anxiety     Dehydration 2022    Gastric cancer     Gastric ulcer     Hx of psychiatric care     Pregnancy 2020    delivered on 2020    Umbilical hernia      Past Surgical History:   Procedure Laterality Date    CLOSURE OF PERFORATED ULCER OF DUODENUM USING OMENTAL PATCH      ESOPHAGOGASTRODUODENOSCOPY N/A 10/13/2020    Procedure: EGD (ESOPHAGOGASTRODUODENOSCOPY);  Surgeon: Rosio Marion MD;  Location: [facility] ENDO (2ND FLR);  Service: Endoscopy;  Laterality: N/A;    ESOPHAGOGASTRODUODENOSCOPY N/A 2020    Procedure: EGD (ESOPHAGOGASTRODUODENOSCOPY);  Surgeon: Rosio Marion MD;  Location: [facility] ENDO (2ND FLR);  Service: Endoscopy;  Laterality: N/A;  Cov[facility]-19 test 20 at [facility]    ESOPHAGOGASTRODUODENOSCOPY N/A 3/12/2021    Procedure: EGD (ESOPHAGOGASTRODUODENOSCOPY);  Surgeon: Nav Omer MD;  Location: [facility] ENDO (2ND FLR);  Service: Endoscopy;  Laterality: N/A;  COVID at [facility] 3/9 ttr    ESOPHAGOGASTRODUODENOSCOPY N/A 2021    Procedure: EGD (ESOPHAGOGASTRODUODENOSCOPY);  Surgeon: Rosio Marion MD;  Location: [facility] ENDO (2ND FLR);  Service: Endoscopy;  Laterality: N/A;  5/15-covid pcw-inst portal-tb    ESOPHAGOGASTRODUODENOSCOPY N/A 2021    Procedure: EGD (ESOPHAGOGASTRODUODENOSCOPY);  Surgeon: Socrates Terrazas MD;  Location: [facility] ENDO (2ND FLR);  Service: Endoscopy;  Laterality: N/A;     Family History       Problem Relation (Age of Onset)    Breast cancer Other (73), Paternal Cousin    Cancer Mother (67)    Lung cancer Father (48)    No Known Problems Son    Prostate cancer Paternal Uncle    Schizophrenia Mother          Tobacco Use    Smoking status: Former     Types: Cigarettes     Quit date: 2019     Years since quittin.8    Smokeless tobacco: Never   Substance and Sexual Activity    Alcohol use:  Yes    Drug use: Not Currently    Sexual activity: Yes     Partners: Male       Review of Systems   Constitutional:  Negative for chills, diaphoresis, fatigue and fever.   HENT:  Negative for postnasal drip, sore throat and trouble swallowing.    Eyes:  Negative for visual disturbance.   Respiratory:  Positive for cough. Negative for shortness of breath.    Cardiovascular:  Positive for leg swelling. Negative for chest pain and palpitations.   Gastrointestinal:  Positive for abdominal distention and abdominal pain. Negative for constipation, diarrhea, nausea and vomiting.   Genitourinary:  Negative for dysuria.   Musculoskeletal:  Negative for back pain and myalgias.   Skin:  Negative for rash.   Neurological:  Negative for dizziness, syncope, weakness and headaches.   Hematological:  Does not bruise/bleed easily.   Psychiatric/Behavioral:  Negative for confusion and decreased concentration.    Objective:     Vital Signs (Most Recent):  Temp: 97.4 °F (36.3 °C) (09/30/22 2225)  Pulse: 106 (09/30/22 2225)  Resp: 15 (09/30/22 2354)  BP: 115/89 (09/30/22 2225)  SpO2: 99 % (09/30/22 2225) Vital Signs (24h Range):  Temp:  [97.4 °F (36.3 °C)] 97.4 °F (36.3 °C)  Pulse:  [100-106] 106  Resp:  [14-20] 15  SpO2:  [95 %-99 %] 99 %  BP: (115-129)/() 115/89        There is no height or weight on file to calculate BMI.  There is no height or weight on file to calculate BSA.    ECOG SCORE           [unfilled]    Lines/Drains/Airways       Central Venous Catheter Line  Duration             Port A Cath Single Lumen right subclavian -- days              Peripheral Intravenous Line  Duration                  Peripheral IV - Single Lumen 09/30/22 2132 20 G Right Antecubital <1 day         Peripheral IV - Single Lumen 09/30/22 2132 22 G Right Forearm <1 day                    Physical Exam  Vitals and nursing note reviewed.   Constitutional:       General: She is not in acute distress.     Appearance: Normal appearance.   HENT:       Head: Normocephalic and atraumatic.      Mouth/Throat:      Mouth: Mucous membranes are moist.   Eyes:      General: No scleral icterus.     Extraocular Movements: Extraocular movements intact.      Conjunctiva/sclera: Conjunctivae normal.   Cardiovascular:      Rate and Rhythm: Regular rhythm. Tachycardia present.      Pulses: Normal pulses.      Heart sounds: Normal heart sounds. No murmur heard.    No friction rub. No gallop.   Pulmonary:      Effort: Pulmonary effort is normal. No respiratory distress.      Breath sounds: Normal breath sounds.   Abdominal:      General: Bowel sounds are normal. There is distension.      Tenderness: There is abdominal tenderness.   Musculoskeletal:      Right lower leg: Edema present.      Left lower leg: Edema present.   Neurological:      General: No focal deficit present.      Mental Status: She is alert and oriented to person, place, and time.   Psychiatric:         Mood and Affect: Mood normal.       Significant Labs:   All pertinent labs from the last 24 hours have been reviewed.    Diagnostic Results:  I have reviewed all pertinent imaging results/findings within the past 24 hours.    Assessment/Plan:     * Intractable abdominal pain  Abdominal pain likely 2/2 to compression from ascites   CT A/P (10/1/22) revealed resolution of bowel distension now with large and small intestines normal caliber and compressed by massive ascites. No evidence of bowel obstruction.    Plan:   -Therapeutic thoracentesis in AM  -Pain control   -Antiemetics   -Full liquid diet, advance diet as tolerated       MARIA (acute kidney injury)  Baseline Cr 0.8, Cr 3.1 on admission  Hx of CKD: no  Associated with  fatigue, n/v  DDx: hypotension, hypovolemia, obstructive uropathy 2/2 to malignancy   No indications for HD at this time    Plan:   - Urine studies: Rod, UCr, UPCR, UA / UCx  - Trend Cr  - Strict I&Os  - Avoid nephrotoxic agents (NSAIDs, gadolinium, IV radiocontrast)  - Avoid hypotension  -  Renally adjust medications      Malignant neoplasm of stomach  HER-2 negative by FISH.  PD-L1 < 1%.  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  We previously explained the implications of a stage IV diagnosis to her and potential treatment options.  She is now s/p port placement. She sought a second opinion at [facility] for zolbetuximab trial targeting CLDN protein but she did not test positive for this biomarker. We recommended proceeding with SOC FOLFOX, with which the [facility] team agreed. Because of the negative PD-L1 I do not think the benefit of adding nivolumab outweighs the potential toxicities.        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  She opted not to get genetic testing done at her appt with [facility]. We recommended that she reconsider that decision in light of a very likely germline mutation in SAMUEL which has clinical consequences.       CT CAP after 6 cycles showed improvement in ascites, probable hepatic, thyroid and bone metastases. We dropped oxaliplatin starting with cycle 10 due to grade 1 neuropathy and desire to keep it from worsening.     CT CAP after cycle 10 showed stable disease.  CT scan after cycle 15 continued to display overall stability of disease within the gastric wall, peritoneum, spine and liver. Although increase ascites on imaging and clinically can represent progression of disease, her previous CT suggested overall stability within the liver and gastric wall, so we recommended to continue with treatment time. However, she continued to have increasing abdominal ascites that has been concerning for progression of disease. Hence, she had repeat imaging. On CT performed on 4/14/2022, she appeared to have worsening disease suggestive of progression. She was seen by Dr. Reid at [facility] for f/u and to  discuss possible treatment options.  She was also evaluated by Dr. Andree Mueller and Dr. See of Surgical Oncology at [facility].  Ultimately she was not felt to be a good surgical debulking candidate due to her ascites.  If her ascites improves during the course of chemotherapy and her performance status remains stable or improves, they would re-consider her again for palliative oophorectomy.      Was given ramucirumab with cycle 1 but this later was held given [facility] recs for just FOLFIRI. In addition, she didn't tolerate cycle 1 of cyramza well with significant N/V. She was started on second-line FOLFIRI. She received 5 cycles with FOLFIRI of irinotecan to 75 mg/m2 - has UGT1A1 homozygous mutation indicating poor metabolism.  Continue 5-FU infusion at 2200 mg/m2.     Repeat imaging with CT CAP after [facility] on 8/3/22 after cycle 5 of FOLFIRI demonstrated disease progression.  Dr. Reid recommended third line docetaxel biweekly at 35 mg/m2.     -s/p cycle 3 today of Docetaxel.        Malignant ascites  Pt with known h/o ascites   Initially had peritoneal catheter after diagnosis, output slowed so was removed in October 2021 by IR.   bi-weekly paracentesis for now.  Last paracentesis 9/23 removed 6.7L of fluid from the abdomen.     Plan:   -IR for therapeutic paracentesis while inpatient  -Monitor hemodynamics   -PT/INR ordered       VTE Risk Mitigation (From admission, onward)         Ordered     heparin (porcine) injection 5,000 Units  Every 8 hours         10/01/22 0055     IP VTE HIGH RISK PATIENT  Once         10/01/22 0055     Place sequential compression device  Until discontinued         10/01/22 0055                Disposition: tbd    Rachid Wolf MD  Bone Marrow Transplant  Hematology  [facility] - Emergency Dept

## 2022-10-01 NOTE — ED NOTES
Pt states she refuses the urine pregnancy test, states she is not pregnant, pt informed of risks and pt acknowledges risks

## 2022-10-01 NOTE — ED NOTES
"Patient identifiers for Puja Quigley 42 y.o. female checked and correct.  Chief Complaint   Patient presents with    Ascites     Endorses fluid in abd and lower extremities for the past few days, reports she needs a paracentesis, states she gets them almost weekly. +SOB. States "I haven't been able to eat or drink anything because the pressure just pushes it right back up"     Past Medical History:   Diagnosis Date    Anxiety     Dehydration 5/30/2022    Gastric cancer     Gastric ulcer     Hx of psychiatric care     Pregnancy 08/12/2020    delivered on 8/12/2020    Umbilical hernia      Allergies reported: Review of patient's allergies indicates:  No Known Allergies      LOC: Patient is awake, alert, and aware of environment with an appropriate affect. Patient is oriented x 4 and speaking appropriately.  APPEARANCE: Patient resting comfortably and in no acute distress. Patient is clean and well groomed, patient's clothing is properly fastened.  HEENT: Pt presents with surgical mask on.   SKIN: The skin is warm and dry. Patient has normal skin turgor and moist mucus membranes.   MUSKULOSKELETAL: Patient is moving all extremities well, no obvious deformities noted. Pulses intact.   RESPIRATORY: Airway is open and patent. Respirations are spontaneous and non-labored with normal effort and rate. Pt reports shortness of breath.   CARDIAC: Patient has an elevated rate and rhythm. 105 on cardiac monitor. 3+ pitting edema noted to the bilateral lower extremities.   ABDOMEN: Distention noted. Taut and tender upon palpation. Ascites last drained last Friday.   NEUROLOGICAL: Facial expression is symmetrical. Hand grasps are equal bilaterally. Normal sensation in all extremities when touched with finger with the exception of neuropathy in her hands and feet.          "

## 2022-10-01 NOTE — ED PROVIDER NOTES
"Encounter Date: 9/30/2022       History     Chief Complaint   Patient presents with    Ascites     Endorses fluid in abd and lower extremities for the past few days, reports she needs a paracentesis, states she gets them almost weekly. +SOB. States "I haven't been able to eat or drink anything because the pressure just pushes it right back up"     The history is provided by the patient and medical records. No  was used.     Puja Quigley is a 42 y.o. female with medical history of gastric cancer on chemotherapy, recurrent ascites presenting to the ED with the chief complaint of ascites.    Reports worsening abdominal swelling and BLE swelling over the past week. Reports undergoing paracentesis 1-2 times per week, but missed her sessions this week as she went out of town for her birthday. Reports her abdominal swelling has not been this significant in the past. Additionally reports BLE swelling and clear productive cough. She feels SOB that she contributes to her abdominal distention. She is receiving chemotherapy. Denies fever, chest pain, vomiting, urinary or bowel movement changes.     Review of patient's allergies indicates:  No Known Allergies  Past Medical History:   Diagnosis Date    Anxiety     Dehydration 5/30/2022    Gastric cancer     Gastric ulcer     Hx of psychiatric care     Pregnancy 08/12/2020    delivered on 8/12/2020    Umbilical hernia      Past Surgical History:   Procedure Laterality Date    CLOSURE OF PERFORATED ULCER OF DUODENUM USING OMENTAL PATCH      ESOPHAGOGASTRODUODENOSCOPY N/A 10/13/2020    Procedure: EGD (ESOPHAGOGASTRODUODENOSCOPY);  Surgeon: Rosio Marion MD;  Location: [address]);  Service: Endoscopy;  Laterality: N/A;    ESOPHAGOGASTRODUODENOSCOPY N/A 12/11/2020    Procedure: EGD (ESOPHAGOGASTRODUODENOSCOPY);  Surgeon: Rosio Marion MD;  Location: [address]);  Service: Endoscopy;  Laterality: N/A;  Covid-19 test 12/8/20 at " "[facility]    ESOPHAGOGASTRODUODENOSCOPY N/A 3/12/2021    Procedure: EGD (ESOPHAGOGASTRODUODENOSCOPY);  Surgeon: Nav Omer MD;  Location: [facility] ENDO (2ND FLR);  Service: Endoscopy;  Laterality: N/A;  COVID at [facility] 3/9 ttr    ESOPHAGOGASTRODUODENOSCOPY N/A 2021    Procedure: EGD (ESOPHAGOGASTRODUODENOSCOPY);  Surgeon: Rosio Marion MD;  Location: [facility] ENDO (2ND FLR);  Service: Endoscopy;  Laterality: N/A;  5/15-covid pcw-inst portal-tb    ESOPHAGOGASTRODUODENOSCOPY N/A 2021    Procedure: EGD (ESOPHAGOGASTRODUODENOSCOPY);  Surgeon: Socrates Terrazas MD;  Location: [facility] ENDO (2ND FLR);  Service: Endoscopy;  Laterality: N/A;     Family History   Problem Relation Age of Onset    Cancer Mother 67        lymphoma (type? "not active," not on tx; "related to her blood")    Schizophrenia Mother     Lung cancer Father 48        type? h/o smoking    No Known Problems Son     Breast cancer Other 73        unilat; stage I, but aggressive    Prostate cancer Paternal Uncle         (dx 50s/60? no chemo?)    Breast cancer Paternal Cousin         (dx age?) ductal    Colon cancer Neg Hx     Esophageal cancer Neg Hx      Social History     Tobacco Use    Smoking status: Former     Types: Cigarettes     Quit date: 2019     Years since quittin.8    Smokeless tobacco: Never   Substance Use Topics    Alcohol use: Yes    Drug use: Not Currently     Review of Systems   Constitutional:  Negative for fever.   HENT:  Negative for sore throat.    Respiratory:  Positive for cough and shortness of breath.    Cardiovascular:  Positive for leg swelling. Negative for chest pain.   Gastrointestinal:  Positive for abdominal distention and abdominal pain. Negative for nausea.   Genitourinary:  Negative for dysuria.   Musculoskeletal:  Negative for back pain.   Skin:  Negative for rash.   Neurological:  Negative for weakness.   Hematological:  Does not bruise/bleed easily.     Physical Exam     Initial Vitals [22 " 2022]   BP Pulse Resp Temp SpO2   119/89 105 20 97.4 °F (36.3 °C) 95 %      MAP       --         Physical Exam    Constitutional: She appears well-developed and well-nourished. She is not diaphoretic. No distress.   HENT:   Head: Normocephalic and atraumatic.   Mouth/Throat: Oropharynx is clear and moist. No oropharyngeal exudate.   Eyes: Conjunctivae and EOM are normal. Pupils are equal, round, and reactive to light. No scleral icterus.   Neck: Neck supple.   Normal range of motion.  Cardiovascular:  Normal rate and regular rhythm.           +3 pitting edema BLE   Pulmonary/Chest: Breath sounds normal. No respiratory distress. She has no wheezes.   Abdominal: Abdomen is soft. She exhibits distension. There is abdominal tenderness. There is no rebound.   Musculoskeletal:         General: No tenderness or edema. Normal range of motion.      Cervical back: Normal range of motion and neck supple.     Neurological: She is alert and oriented to person, place, and time. She has normal strength. No sensory deficit.   Skin: Skin is warm and dry. No rash noted. No erythema.   Psychiatric: She has a normal mood and affect.       ED Course   Procedures  Labs Reviewed   CBC W/ AUTO DIFFERENTIAL - Abnormal; Notable for the following components:       Result Value    MCV 76 (*)     MCH 23.3 (*)     MCHC 30.7 (*)     RDW 20.3 (*)     MPV 8.4 (*)     Immature Granulocytes 1.6 (*)     Immature Grans (Abs) 0.16 (*)     nRBC 1 (*)     Gran % 79.4 (*)     Lymph % 13.8 (*)     All other components within normal limits    Narrative:     ADD ON LIPASE PER DR JUSTA SINGH/ORDER# [id number] @ 21:41   ISTAT PROCEDURE - Abnormal; Notable for the following components:    POC BUN 45 (*)     POC Creatinine 3.1 (*)     POC Sodium 126 (*)     All other components within normal limits   LIPASE   COMPREHENSIVE METABOLIC PANEL   MAGNESIUM   B-TYPE NATRIURETIC PEPTIDE   TROPONIN I   LIPASE   PROTIME-INR   POCT URINE PREGNANCY   ISTAT CHEM8           Imaging Results    None          Medications   ondansetron injection 4 mg (0 mg Intravenous Hold 9/30/22 2130)   HYDROmorphone injection 0.2 mg (0.2 mg Intravenous Given 9/30/22 2158)     Medical Decision Making:   History:   Old Medical Records: I decided to obtain old medical records.  Old Records Summarized: records from clinic visits and records from previous admission(s).  Independently Interpreted Test(s):   I have ordered and independently interpreted EKG Reading(s) - see summary below       <> Summary of EKG Reading(s): NSR 97 bpm. No STEMI  Clinical Tests:   Lab Tests: Ordered and Reviewed  Radiological Study: Ordered and Reviewed  Medical Tests: Ordered and Reviewed  Other:   I have discussed this case with another health care provider.       <> Summary of the Discussion: Hospital Medicine     APC / Resident Notes:   42 y.o. female with medical history of gastric cancer on chemotherapy, recurrent ascites presenting to the ED c/o worsening abdominal swelling and BLE swelling for the past week. DDx includes but not limited to malignant ascites, bowel obstruction, electrolyte disturbance, hepatorenal syndrome, CHF, cardiac arrhythmia, ACS, pneumonia. Afebrile and non-toxic appearing. Lower suspicion for SBP.     10:11 PM - Corky Lehman PA-C  Labs in process. Chem8 showing Crt elevated 3.1 which is new from baseline 0.9. Suspect hepatorenal syndrome. Patient signed out to my colleague at the end of my shift pending labs, CT, and final disposition. Anticipate hospitalization. Patient expresses understanding and agreeable to the plan. I have discussed the care of this patient with my supervising physician.          Attending Attestation:     Physician Attestation Statement for NP/PA:   I discussed this assessment and plan of this patient with the NP/PA, but I did not personally examine the patient. The face to face encounter was performed by the NP/PA.                         Clinical Impression:   Final  diagnoses:  [R06.02] SOB (shortness of breath)  [C16.9] Malignant neoplasm of stomach, unspecified location  [R18.0] Malignant ascites  [N17.9] MARIA (acute kidney injury) (Primary)             Corky Lehman PA-C  09/30/22 2217       Tanika Perez MD  10/01/22 1843

## 2022-10-01 NOTE — PROCEDURES
"Puja Quigley is a 42 y.o. female patient.    Temp: 97.4 °F (36.3 °C) (10/01/22 1116)  Pulse: 104 (10/01/22 1116)  Resp: 18 (10/01/22 1116)  BP: 123/78 (10/01/22 1116)  SpO2: 98 % (10/01/22 1116)  Weight: 69.5 kg (153 lb 3.5 oz) (10/01/22 0440)  Height: 5' 7" (170.2 cm) (10/01/22 0440)           Paracentesis    Date/Time: 10/1/2022 1:10 PM  Location procedure was performed: [facility] HEMATOLOGY/ONCOLOGY  Performed by: Jaspal Gay DO  Authorized by: Jaspal Gay DO   Assisting provider: Manuela Reid MD  Pre-operative diagnosis: ascites  Consent Done: Yes  Consent: Verbal consent obtained. Written consent obtained.  Consent given by: patient  Patient understanding: patient states understanding of the procedure being performed  Patient consent: the patient's understanding of the procedure matches consent given  Relevant documents: relevant documents present and verified  Site marked: the operative site was marked  Imaging studies: imaging studies available  Patient identity confirmed: name, MRN and   Time out: Immediately prior to procedure a "time out" was called to verify the correct patient, procedure, equipment, support staff and site/side marked as required.  Initial or subsequent exam: initial  Procedure purpose: therapeutic  Indications: abdominal discomfort secondary to ascites  Anesthesia: local infiltration    Anesthesia:  Local Anesthetic: lidocaine 1% without epinephrine  Anesthetic total: 6 mL    Patient sedated: no  Preparation: Patient was prepped and draped in the usual sterile fashion.  Ultrasound guidance: yes  Puncture site: right lower quadrant  Fluid appearance: cloudy and serous  Dressing: gauze packing  Complications: No  Specimens: No  Implants: No  Patient tolerance: Patient tolerated the procedure well with no immediate complications        10/1/2022    "

## 2022-10-01 NOTE — HPI
Mr. Quigley is a 42 F with PMHx of gastric adenocarcinoma with osseous, hepatic and peritoneal metastases complicated by recurrent abdominal distension and pain 2/2 recurrent painful, tense ascites requiring frequent decompression for symptom relief. presenting to the ED with the chief complaint of abdominal pain. Reports worsening abdominal swelling and BLE swelling over the past week. Reports undergoing paracentesis 1-2 times per week, but missed her sessions this week as she went out of town for her birthday. Reports her abdominal swelling has not been this significant in the past. Additionally reports clear productive cough. Feels shortness of breath is due to her abdominal distention. Denies fever, chest pain, vomiting, urinary or bowel movement changes.

## 2022-10-01 NOTE — ED NOTES
Telemetry Verification   Patient placed on Telemetry Box  Verified with War Room  Box # [id number]   Monitor Tech    Rate 100   Rhythm NSR

## 2022-10-01 NOTE — ED TRIAGE NOTES
The pt is a 42-year-old female who presents to the ED from via personal transportation. The pt's chief complaint is shortness of breath related to FVO due to ascites. Pt also reports pain, 10/10, underneath her abdomen, back, and legs

## 2022-10-01 NOTE — ASSESSMENT & PLAN NOTE
Abdominal pain likely 2/2 to compression from ascites   CT A/P (10/1/22) revealed resolution of bowel distension now with large and small intestines normal caliber and compressed by massive ascites. No evidence of bowel obstruction.    Plan:   -Therapeutic thoracentesis in AM  -Pain control   -Antiemetics   -Full liquid diet, advance diet as tolerated

## 2022-10-01 NOTE — PLAN OF CARE
VSS  No signs of evident distress  Pain medication admin as per MAR  Bedside para with 5L removed.  Albumin admin as per MAR  Pt. Ambulating in room independently.  Non slip socks worn when oob  Bed in lowest locked position.  Call light within reach.  Instructed to call for assistance.  Pt. Expressed understanding.  Educated on plan of care

## 2022-10-01 NOTE — ASSESSMENT & PLAN NOTE
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  PD-L1 < 1%.  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  We previously explained the implications of a stage IV diagnosis to her and potential treatment options.  She is now s/p port placement. She sought a second opinion at [facility] for zolbetuximab trial targeting CLDN protein but she did not test positive for this biomarker. We recommended proceeding with SOC FOLFOX, with which the [facility] team agreed. Because of the negative PD-L1 I do not think the benefit of adding nivolumab outweighs the potential toxicities.        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  She opted not to get genetic testing done at her appt with Phi. We recommended that she reconsider that decision in light of a very likely germline mutation in SAMUEL which has clinical consequences.       CT CAP after 6 cycles showed improvement in ascites, probable hepatic, thyroid and bone metastases. We dropped oxaliplatin starting with cycle 10 due to grade 1 neuropathy and desire to keep it from worsening.     CT CAP after cycle 10 showed stable disease.  CT scan after cycle 15 continued to display overall stability of disease within the gastric wall, peritoneum, spine and liver. Although increase ascites on imaging and clinically can represent progression of disease, her previous CT suggested overall stability within the liver and gastric wall, so we recommended to continue with treatment time. However, she continued to have increasing abdominal ascites that has been concerning for progression of disease. Hence, she had repeat imaging. On CT performed on 4/14/2022, she appeared to have worsening disease suggestive of progression. She was seen by Dr. Reid at [facility] for f/u and to discuss possible treatment options.  She was also evaluated by  Dr. Andree Mueller and Dr. See of Surgical Oncology at [facility].  Ultimately she was not felt to be a good surgical debulking candidate due to her ascites.  If her ascites improves during the course of chemotherapy and her performance status remains stable or improves, they would re-consider her again for palliative oophorectomy.      Was given ramucirumab with cycle 1 but this later was held given [facility] recs for just FOLFIRI. In addition, she didn't tolerate cycle 1 of cyramza well with significant N/V. She was started on second-line FOLFIRI. She received 5 cycles with FOLFIRI of irinotecan to 75 mg/m2 - has UGT1A1 homozygous mutation indicating poor metabolism.  Continue 5-FU infusion at 2200 mg/m2.     Repeat imaging with CT CAP after [facility] on 8/3/22 after cycle 5 of FOLFIRI demonstrated disease progression.  Dr. Reid recommended third line docetaxel biweekly at 35 mg/m2.     -s/p cycle 3 today of Docetaxel.

## 2022-10-01 NOTE — SUBJECTIVE & OBJECTIVE
Patient information was obtained from patient, past medical records, and ER records.     Oncology History:     Malignant neoplasm of stomach   6/10/2021 Initial Diagnosis     Gastric adenocarcinoma      7/26/2021 - 4/6/2022 Chemotherapy     Treatment Summary   Plan Name: OP FOLFOX 6 Q2W  Treatment Goal: Palliative  Status: Inactive  Start Date: 7/26/2021  End Date: 4/6/2022  Provider: Fer Ashraf MD  Chemotherapy: fluorouraciL 2,400 mg/m2 = 3,770 mg in sodium chloride 0.9% 100 mL chemo infusion, 2,400 mg/m2 = 3,770 mg, Intravenous, Over 46 hours, 19 of 20 cycles  Administration: 3,770 mg (7/26/2021), 3,770 mg (8/9/2021), 3,770 mg (8/23/2021), 3,770 mg (9/7/2021), 3,770 mg (9/21/2021), 3,770 mg (10/5/2021), 3,770 mg (10/19/2021), 3,770 mg (11/2/2021), 3,770 mg (11/15/2021), 3,770 mg (11/30/2021), 3,770 mg (12/13/2021), 4,000 mg (12/27/2021), 4,030 mg (1/10/2022), 4,030 mg (1/24/2022), 4,000 mg (2/7/2022), 4,000 mg (2/22/2022), 4,000 mg (3/7/2022), 4,000 mg (3/22/2022), 4,000 mg (4/4/2022)  oxaliplatin (ELOXATIN) 85 mg/m2 = 133 mg in dextrose 5 % 500 mL chemo infusion, 85 mg/m2 = 133 mg, Intravenous, [facility] 1 time, 9 of 9 cycles  Administration: 133 mg (7/26/2021), 133 mg (8/9/2021), 133 mg (8/23/2021), 133 mg (9/7/2021), 133 mg (9/21/2021), 133 mg (10/5/2021), 133 mg (10/19/2021), 133 mg (11/2/2021), 133 mg (11/15/2021)         5/26/2022 - 7/27/2022 Chemotherapy     Treatment Summary   Plan Name: OP FOLFIRI Q2WK  Treatment Goal: Palliative  Status: Inactive  Start Date: 5/26/2022  End Date: 7/27/2022  Provider: Fer Ashraf MD  Chemotherapy: dexAMETHasone (DECADRON) 4 MG Tab, 8 mg, Oral, Daily, 1 of 1 cycle, Start date: --, End date: --  irinotecan (CAMPTOSAR) 120 mg/m2 = 200 mg in sodium chloride 0.9% 500 mL chemo infusion, 120 mg/m2 = 200 mg (100 % of original dose 120 mg/m2), Intravenous, Clinic/[facility] 1 time, 5 of 24 cycles  Dose modification: 125 mg/m2 (original dose 120 mg/m2, Cycle 1), 120  mg/m2 (original dose 120 mg/m2, Cycle 1), 75 mg/m2 (original dose 120 mg/m2, Cycle 2, Reason: Dose not tolerated)  Administration: 200 mg (5/26/2022), 126 mg (6/13/2022), 126 mg (6/27/2022), 134 mg (7/12/2022), 134 mg (7/25/2022)  ramucirumab (CYRAMZA) 471 mg in sodium chloride 0.9% 250 mL chemo infusion, 8 mg/kg = 471 mg, Intravenous, Clinic/[facility] 1 time, 1 of 1 cycle  Administration: 471 mg (5/26/2022)         8/16/2022 -  Chemotherapy     Treatment Summary   Plan Name: OP DOCETAXEL (75 MG/M2) Q3W  Treatment Goal: Palliative  Status: Active  Start Date: 8/16/2022  End Date: 11/8/2022 (Planned)  Provider: Fer Ashraf MD  Chemotherapy: DOCEtaxeL (TAXOTERE) 35 mg/m2 = 65 mg in sodium chloride 0.9% 253.25 mL chemo infusion, 35 mg/m2 = 65 mg (46.7 % of original dose 75 mg/m2), Intravenous, Clinic/[facility] 1 time, 2 of 7 cycles  Dose modification: 35 mg/m2 (original dose 75 mg/m2, Cycle 1, Reason: MD Discretion, Comment: per [facility] recommendations)  Administration: 65 mg (8/16/2022), 65 mg (8/30/2022)                           (Not in a hospital admission)      Patient has no known allergies.     Past Medical History:   Diagnosis Date    Anxiety     Dehydration 5/30/2022    Gastric cancer     Gastric ulcer     Hx of psychiatric care     Pregnancy 08/12/2020    delivered on 8/12/2020    Umbilical hernia      Past Surgical History:   Procedure Laterality Date    CLOSURE OF PERFORATED ULCER OF DUODENUM USING OMENTAL PATCH      ESOPHAGOGASTRODUODENOSCOPY N/A 10/13/2020    Procedure: EGD (ESOPHAGOGASTRODUODENOSCOPY);  Surgeon: Rosio Marion MD;  Location: [facility] ([facility]);  Service: Endoscopy;  Laterality: N/A;    ESOPHAGOGASTRODUODENOSCOPY N/A 12/11/2020    Procedure: EGD (ESOPHAGOGASTRODUODENOSCOPY);  Surgeon: Rosio Marion MD;  Location: [facility] (2ND FLR);  Service: Endoscopy;  Laterality: N/A;  Covid-19 test 12/8/20 at [facility]    ESOPHAGOGASTRODUODENOSCOPY N/A 3/12/2021    Procedure:  EGD (ESOPHAGOGASTRODUODENOSCOPY);  Surgeon: Nav Omer MD;  Location: [facility] ENDO (2ND FLR);  Service: Endoscopy;  Laterality: N/A;  COVID at [facility] 3/9 ttr    ESOPHAGOGASTRODUODENOSCOPY N/A 2021    Procedure: EGD (ESOPHAGOGASTRODUODENOSCOPY);  Surgeon: Rosio Marion MD;  Location: [facility] ENDO (2ND FLR);  Service: Endoscopy;  Laterality: N/A;  5/15-covid pcw-inst portal-tb    ESOPHAGOGASTRODUODENOSCOPY N/A 2021    Procedure: EGD (ESOPHAGOGASTRODUODENOSCOPY);  Surgeon: Socrates Terrazas MD;  Location: [facility] ENDO (2ND FLR);  Service: Endoscopy;  Laterality: N/A;     Family History       Problem Relation (Age of Onset)    Breast cancer Other (73), Paternal Cousin    Cancer Mother (67)    Lung cancer Father (48)    No Known Problems Son    Prostate cancer Paternal Uncle    Schizophrenia Mother          Tobacco Use    Smoking status: Former     Types: Cigarettes     Quit date: 2019     Years since quittin.8    Smokeless tobacco: Never   Substance and Sexual Activity    Alcohol use: Yes    Drug use: Not Currently    Sexual activity: Yes     Partners: Male       Review of Systems   Constitutional:  Negative for chills, diaphoresis, fatigue and fever.   HENT:  Negative for postnasal drip, sore throat and trouble swallowing.    Eyes:  Negative for visual disturbance.   Respiratory:  Positive for cough. Negative for shortness of breath.    Cardiovascular:  Positive for leg swelling. Negative for chest pain and palpitations.   Gastrointestinal:  Positive for abdominal distention and abdominal pain. Negative for constipation, diarrhea, nausea and vomiting.   Genitourinary:  Negative for dysuria.   Musculoskeletal:  Negative for back pain and myalgias.   Skin:  Negative for rash.   Neurological:  Negative for dizziness, syncope, weakness and headaches.   Hematological:  Does not bruise/bleed easily.   Psychiatric/Behavioral:  Negative for confusion and decreased concentration.    Objective:     Vital Signs  (Most Recent):  Temp: 97.4 °F (36.3 °C) (09/30/22 2225)  Pulse: 106 (09/30/22 2225)  Resp: 15 (09/30/22 2354)  BP: 115/89 (09/30/22 2225)  SpO2: 99 % (09/30/22 2225) Vital Signs (24h Range):  Temp:  [97.4 °F (36.3 °C)] 97.4 °F (36.3 °C)  Pulse:  [100-106] 106  Resp:  [14-20] 15  SpO2:  [95 %-99 %] 99 %  BP: (115-129)/() 115/89        There is no height or weight on file to calculate BMI.  There is no height or weight on file to calculate BSA.    ECOG SCORE           [unfilled]    Lines/Drains/Airways       Central Venous Catheter Line  Duration             Port A Cath Single Lumen right subclavian -- days              Peripheral Intravenous Line  Duration                  Peripheral IV - Single Lumen 09/30/22 2132 20 G Right Antecubital <1 day         Peripheral IV - Single Lumen 09/30/22 2132 22 G Right Forearm <1 day                    Physical Exam  Vitals and nursing note reviewed.   Constitutional:       General: She is not in acute distress.     Appearance: Normal appearance.   HENT:      Head: Normocephalic and atraumatic.      Mouth/Throat:      Mouth: Mucous membranes are moist.   Eyes:      General: No scleral icterus.     Extraocular Movements: Extraocular movements intact.      Conjunctiva/sclera: Conjunctivae normal.   Cardiovascular:      Rate and Rhythm: Regular rhythm. Tachycardia present.      Pulses: Normal pulses.      Heart sounds: Normal heart sounds. No murmur heard.    No friction rub. No gallop.   Pulmonary:      Effort: Pulmonary effort is normal. No respiratory distress.      Breath sounds: Normal breath sounds.   Abdominal:      General: Bowel sounds are normal. There is distension.      Tenderness: There is abdominal tenderness.   Musculoskeletal:      Right lower leg: Edema present.      Left lower leg: Edema present.   Neurological:      General: No focal deficit present.      Mental Status: She is alert and oriented to person, place, and time.   Psychiatric:         Mood and  Affect: Mood normal.       Significant Labs:   All pertinent labs from the last 24 hours have been reviewed.    Diagnostic Results:  I have reviewed all pertinent imaging results/findings within the past 24 hours.

## 2022-10-01 NOTE — CONSULTS
Consult Note:    Please see resident H&P for consult note.      Jaspal Boucher D.O.  Hematology/Oncology Fellow, PGY-IV

## 2022-10-02 ENCOUNTER — PATIENT MESSAGE (OUTPATIENT)
Dept: HEMATOLOGY/ONCOLOGY | Facility: CLINIC | Age: 42
End: 2022-10-02
Payer: COMMERCIAL

## 2022-10-02 LAB
ALBUMIN SERPL BCP-MCNC: 2 G/DL (ref 3.5–5.2)
ALP SERPL-CCNC: 90 U/L (ref 55–135)
ALT SERPL W/O P-5'-P-CCNC: 10 U/L (ref 10–44)
ANION GAP SERPL CALC-SCNC: 12 MMOL/L (ref 8–16)
AST SERPL-CCNC: 15 U/L (ref 10–40)
BASOPHILS # BLD AUTO: 0.01 K/UL (ref 0–0.2)
BASOPHILS NFR BLD: 0.1 % (ref 0–1.9)
BILIRUB SERPL-MCNC: 0.3 MG/DL (ref 0.1–1)
BUN SERPL-MCNC: 40 MG/DL (ref 6–20)
CALCIUM SERPL-MCNC: 8.2 MG/DL (ref 8.7–10.5)
CHLORIDE SERPL-SCNC: 97 MMOL/L (ref 95–110)
CO2 SERPL-SCNC: 19 MMOL/L (ref 23–29)
CREAT SERPL-MCNC: 3.1 MG/DL (ref 0.5–1.4)
CREAT UR-MCNC: 211 MG/DL (ref 15–325)
CREAT UR-MCNC: 211 MG/DL (ref 15–325)
DIFFERENTIAL METHOD: ABNORMAL
EOSINOPHIL # BLD AUTO: 0 K/UL (ref 0–0.5)
EOSINOPHIL NFR BLD: 0.1 % (ref 0–8)
ERYTHROCYTE [DISTWIDTH] IN BLOOD BY AUTOMATED COUNT: 19.9 % (ref 11.5–14.5)
EST. GFR  (NO RACE VARIABLE): 18.8 ML/MIN/1.73 M^2
GLUCOSE SERPL-MCNC: 105 MG/DL (ref 70–110)
HCT VFR BLD AUTO: 33.9 % (ref 37–48.5)
HGB BLD-MCNC: 10.4 G/DL (ref 12–16)
IMM GRANULOCYTES # BLD AUTO: 0.07 K/UL (ref 0–0.04)
IMM GRANULOCYTES NFR BLD AUTO: 0.8 % (ref 0–0.5)
LYMPHOCYTES # BLD AUTO: 0.9 K/UL (ref 1–4.8)
LYMPHOCYTES NFR BLD: 11 % (ref 18–48)
MAGNESIUM SERPL-MCNC: 2 MG/DL (ref 1.6–2.6)
MCH RBC QN AUTO: 23.1 PG (ref 27–31)
MCHC RBC AUTO-ENTMCNC: 30.7 G/DL (ref 32–36)
MCV RBC AUTO: 75 FL (ref 82–98)
MONOCYTES # BLD AUTO: 0.6 K/UL (ref 0.3–1)
MONOCYTES NFR BLD: 7.2 % (ref 4–15)
NEUTROPHILS # BLD AUTO: 6.7 K/UL (ref 1.8–7.7)
NEUTROPHILS NFR BLD: 80.8 % (ref 38–73)
NRBC BLD-RTO: 0 /100 WBC
OSMOLALITY UR: 496 MOSM/KG (ref 50–1200)
PHOSPHATE SERPL-MCNC: 5.8 MG/DL (ref 2.7–4.5)
PLATELET # BLD AUTO: 312 K/UL (ref 150–450)
PMV BLD AUTO: 8.8 FL (ref 9.2–12.9)
POTASSIUM SERPL-SCNC: 4.8 MMOL/L (ref 3.5–5.1)
PROT SERPL-MCNC: 4.9 G/DL (ref 6–8.4)
PROT UR-MCNC: 88 MG/DL (ref 0–15)
PROT/CREAT UR: 0.42 MG/G{CREAT} (ref 0–0.2)
RBC # BLD AUTO: 4.51 M/UL (ref 4–5.4)
SODIUM SERPL-SCNC: 128 MMOL/L (ref 136–145)
SODIUM UR-SCNC: <10 MMOL/L (ref 20–250)
WBC # BLD AUTO: 8.35 K/UL (ref 3.9–12.7)

## 2022-10-02 PROCEDURE — 83735 ASSAY OF MAGNESIUM: CPT | Performed by: STUDENT IN AN ORGANIZED HEALTH CARE EDUCATION/TRAINING PROGRAM

## 2022-10-02 PROCEDURE — 84300 ASSAY OF URINE SODIUM: CPT | Performed by: STUDENT IN AN ORGANIZED HEALTH CARE EDUCATION/TRAINING PROGRAM

## 2022-10-02 PROCEDURE — 20600001 HC STEP DOWN PRIVATE ROOM

## 2022-10-02 PROCEDURE — 80053 COMPREHEN METABOLIC PANEL: CPT | Performed by: STUDENT IN AN ORGANIZED HEALTH CARE EDUCATION/TRAINING PROGRAM

## 2022-10-02 PROCEDURE — 85025 COMPLETE CBC W/AUTO DIFF WBC: CPT | Performed by: STUDENT IN AN ORGANIZED HEALTH CARE EDUCATION/TRAINING PROGRAM

## 2022-10-02 PROCEDURE — 63600175 PHARM REV CODE 636 W HCPCS: Performed by: HOSPITALIST

## 2022-10-02 PROCEDURE — 25000003 PHARM REV CODE 250

## 2022-10-02 PROCEDURE — 99232 SBSQ HOSP IP/OBS MODERATE 35: CPT | Mod: ,,, | Performed by: HOSPITALIST

## 2022-10-02 PROCEDURE — 25000003 PHARM REV CODE 250: Performed by: STUDENT IN AN ORGANIZED HEALTH CARE EDUCATION/TRAINING PROGRAM

## 2022-10-02 PROCEDURE — 84100 ASSAY OF PHOSPHORUS: CPT | Performed by: STUDENT IN AN ORGANIZED HEALTH CARE EDUCATION/TRAINING PROGRAM

## 2022-10-02 PROCEDURE — 36415 COLL VENOUS BLD VENIPUNCTURE: CPT | Performed by: STUDENT IN AN ORGANIZED HEALTH CARE EDUCATION/TRAINING PROGRAM

## 2022-10-02 PROCEDURE — 84156 ASSAY OF PROTEIN URINE: CPT | Performed by: STUDENT IN AN ORGANIZED HEALTH CARE EDUCATION/TRAINING PROGRAM

## 2022-10-02 PROCEDURE — 83935 ASSAY OF URINE OSMOLALITY: CPT | Performed by: STUDENT IN AN ORGANIZED HEALTH CARE EDUCATION/TRAINING PROGRAM

## 2022-10-02 PROCEDURE — 99232 PR SUBSEQUENT HOSPITAL CARE,LEVL II: ICD-10-PCS | Mod: ,,, | Performed by: HOSPITALIST

## 2022-10-02 RX ORDER — ADHESIVE BANDAGE
30 BANDAGE TOPICAL DAILY PRN
Status: DISCONTINUED | OUTPATIENT
Start: 2022-10-02 | End: 2022-10-15 | Stop reason: HOSPADM

## 2022-10-02 RX ADMIN — DOCUSATE SODIUM 50 MG: 50 CAPSULE, LIQUID FILLED ORAL at 05:10

## 2022-10-02 RX ADMIN — MORPHINE SULFATE 4 MG: 2 INJECTION, SOLUTION INTRAMUSCULAR; INTRAVENOUS at 09:10

## 2022-10-02 RX ADMIN — MAGNESIUM HYDROXIDE 2400 MG: 400 SUSPENSION ORAL at 05:10

## 2022-10-02 RX ADMIN — OXYCODONE 5 MG: 5 TABLET ORAL at 02:10

## 2022-10-02 RX ADMIN — OXYCODONE 5 MG: 5 TABLET ORAL at 06:10

## 2022-10-02 RX ADMIN — SIMETHICONE 80 MG: 80 TABLET, CHEWABLE ORAL at 03:10

## 2022-10-02 RX ADMIN — GABAPENTIN 300 MG: 300 CAPSULE ORAL at 11:10

## 2022-10-02 NOTE — ASSESSMENT & PLAN NOTE
Pt with known h/o ascites   Initially had peritoneal catheter after diagnosis, output slowed so was removed in October 2021 by IR.   bi-weekly paracentesis for now.  Last paracentesis 9/23 removed 6.7L of fluid from the abdomen.     Paracentesis performed on day of admission. 5L drained, pt tolerated procedure well    Plan:   -IR for therapeutic paracentesis while inpatient on 10/3/22  -Monitor hemodynamics

## 2022-10-02 NOTE — PLAN OF CARE
"VSS  No signs of evident distress  Pain medication admin as per MAR  Pt. Ambulating in room independently.  Non slip socks worn when OOB  Tele discontinued  Pt. Has not been measuring urine but states "I am going my normal amount"  No BM this shift.  Colace and milk of mag admin as per MAR  +3 bilateral LE edema.  Abdomen distended.  Para site dressing CDI  NPO at midnight for para tomorrow  Diet advanced to regular diet  Family at bedside.   Bed in lowest locked position. Call light within reach.  Instructed to call for assistance.  Pt. Expressed understanding  Educated on plan of care.  "

## 2022-10-02 NOTE — ASSESSMENT & PLAN NOTE
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  PD-L1 < 1%.  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  We previously explained the implications of a stage IV diagnosis to her and potential treatment options.  She is now s/p port placement. She sought a second opinion at [facility] for zolbetuximab trial targeting CLDN protein but she did not test positive for this biomarker. We recommended proceeding with SOC FOLFOX, with which the [facility] team agreed. Because of the negative PD-L1 I do not think the benefit of adding nivolumab outweighs the potential toxicities.        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  She opted not to get genetic testing done at her appt with Phi. We recommended that she reconsider that decision in light of a very likely germline mutation in SAMUEL which has clinical consequences.       CT CAP after 6 cycles showed improvement in ascites, probable hepatic, thyroid and bone metastases. We dropped oxaliplatin starting with cycle 10 due to grade 1 neuropathy and desire to keep it from worsening.     CT CAP after cycle 10 showed stable disease.  CT scan after cycle 15 continued to display overall stability of disease within the gastric wall, peritoneum, spine and liver. Although increase ascites on imaging and clinically can represent progression of disease, her previous CT suggested overall stability within the liver and gastric wall, so we recommended to continue with treatment time. However, she continued to have increasing abdominal ascites that has been concerning for progression of disease. Hence, she had repeat imaging. On CT performed on 4/14/2022, she appeared to have worsening disease suggestive of progression. She was seen by Dr. Reid at [facility] for f/u and to discuss possible treatment options.  She was also evaluated by  Dr. Andree Mueller and Dr. See of Surgical Oncology at [facility].  Ultimately she was not felt to be a good surgical debulking candidate due to her ascites.  If her ascites improves during the course of chemotherapy and her performance status remains stable or improves, they would re-consider her again for palliative oophorectomy.      Was given ramucirumab with cycle 1 but this later was held given [facility] recs for just FOLFIRI. In addition, she didn't tolerate cycle 1 of cyramza well with significant N/V. She was started on second-line FOLFIRI. She received 5 cycles with FOLFIRI of irinotecan to 75 mg/m2 - has UGT1A1 homozygous mutation indicating poor metabolism.  Continue 5-FU infusion at 2200 mg/m2.     Repeat imaging with CT CAP after [facility] on 8/3/22 after cycle 5 of FOLFIRI demonstrated disease progression.  Dr. Reid recommended third line docetaxel biweekly at 35 mg/m2.      -s/p cycle 3 today of Docetaxel.

## 2022-10-02 NOTE — PROGRESS NOTES
[facility] - Transplant Stepdown  Hematology/Oncology  Progress Note    Patient Name: Puja Quigley  Admission Date: 9/30/2022  Hospital Length of Stay: 1 days  Code Status: Full Code     Subjective:     HPI:  Mr. Quigley is a 42 F with PMHx of gastric adenocarcinoma with osseous, hepatic and peritoneal metastases complicated by recurrent abdominal distension and pain 2/2 recurrent painful, tense ascites requiring frequent decompression for symptom relief. presenting to the ED with the chief complaint of abdominal pain. Reports worsening abdominal swelling and BLE swelling over the past week. Reports undergoing paracentesis 1-2 times per week, but missed her sessions this week as she went out of town for her birthday. Reports her abdominal swelling has not been this significant in the past. Additionally reports clear productive cough. Feels shortness of breath is due to her abdominal distention. Denies fever, chest pain, vomiting, urinary or bowel movement changes.           Patient information was obtained from patient, past medical records, and ER records.      Oncology History:          Malignant neoplasm of stomach   6/10/2021 Initial Diagnosis     Gastric adenocarcinoma      7/26/2021 - 4/6/2022 Chemotherapy     Treatment Summary   Plan Name: OP FOLFOX 6 Q2W  Treatment Goal: Palliative  Status: Inactive  Start Date: 7/26/2021  End Date: 4/6/2022  Provider: Fer Ashraf MD  Chemotherapy: fluorouraciL 2,400 mg/m2 = 3,770 mg in sodium chloride 0.9% 100 mL chemo infusion, 2,400 mg/m2 = 3,770 mg, Intravenous, Over 46 hours, 19 of 20 cycles  Administration: 3,770 mg (7/26/2021), 3,770 mg (8/9/2021), 3,770 mg (8/23/2021), 3,770 mg (9/7/2021), 3,770 mg (9/21/2021), 3,770 mg (10/5/2021), 3,770 mg (10/19/2021), 3,770 mg (11/2/2021), 3,770 mg (11/15/2021), 3,770 mg (11/30/2021), 3,770 mg (12/13/2021), 4,000 mg (12/27/2021), 4,030 mg (1/10/2022), 4,030 mg (1/24/2022), 4,000 mg (2/7/2022), 4,000 mg (2/22/2022),  4,000 mg (3/7/2022), 4,000 mg (3/22/2022), 4,000 mg (4/4/2022)  oxaliplatin (ELOXATIN) 85 mg/m2 = 133 mg in dextrose 5 % 500 mL chemo infusion, 85 mg/m2 = 133 mg, Intravenous, Clinic/[facility] 1 time, 9 of 9 cycles  Administration: 133 mg (7/26/2021), 133 mg (8/9/2021), 133 mg (8/23/2021), 133 mg (9/7/2021), 133 mg (9/21/2021), 133 mg (10/5/2021), 133 mg (10/19/2021), 133 mg (11/2/2021), 133 mg (11/15/2021)         5/26/2022 - 7/27/2022 Chemotherapy     Treatment Summary   Plan Name: OP FOLFIRI Q2WK  Treatment Goal: Palliative  Status: Inactive  Start Date: 5/26/2022  End Date: 7/27/2022  Provider: Fer Ashraf MD  Chemotherapy: dexAMETHasone (DECADRON) 4 MG Tab, 8 mg, Oral, Daily, 1 of 1 cycle, Start date: --, End date: --  irinotecan (CAMPTOSAR) 120 mg/m2 = 200 mg in sodium chloride 0.9% 500 mL chemo infusion, 120 mg/m2 = 200 mg (100 % of original dose 120 mg/m2), Intravenous, Clinic/[facility] 1 time, 5 of 24 cycles  Dose modification: 125 mg/m2 (original dose 120 mg/m2, Cycle 1), 120 mg/m2 (original dose 120 mg/m2, Cycle 1), 75 mg/m2 (original dose 120 mg/m2, Cycle 2, Reason: Dose not tolerated)  Administration: 200 mg (5/26/2022), 126 mg (6/13/2022), 126 mg (6/27/2022), 134 mg (7/12/2022), 134 mg (7/25/2022)  ramucirumab (CYRAMZA) 471 mg in sodium chloride 0.9% 250 mL chemo infusion, 8 mg/kg = 471 mg, Intravenous, Clinic/[facility] 1 time, 1 of 1 cycle  Administration: 471 mg (5/26/2022)         8/16/2022 -  Chemotherapy     Treatment Summary   Plan Name: OP DOCETAXEL (75 MG/M2) Q3W  Treatment Goal: Palliative  Status: Active  Start Date: 8/16/2022  End Date: 11/8/2022 (Planned)  Provider: Fer Ashraf MD  Chemotherapy: DOCEtaxeL (TAXOTERE) 35 mg/m2 = 65 mg in sodium chloride 0.9% 253.25 mL chemo infusion, 35 mg/m2 = 65 mg (46.7 % of original dose 75 mg/m2), Intravenous, Clinic/[facility] 1 time, 2 of 7 cycles  Dose modification: 35 mg/m2 (original dose 75 mg/m2, Cycle 1, Reason: MD Discretion, Comment: per [facility]  recommendations)  Administration: 65 mg (8/16/2022), 65 mg (8/30/2022)       Interval History: NAEO. Pt still in moderate discomfort. Will plan for paracentesis by IR tomorrow, 10/3/22. Hopeful pt can get Pleur-X catheter placed as well.    Oncology Treatment Plan:   OP DOCETAXEL (75 MG/M2) Q3W    Medications:  Continuous Infusions:  Scheduled Meds:   gabapentin  300 mg Oral TID    heparin (porcine)  5,000 Units Subcutaneous Q8H     PRN Meds:acetaminophen, dextrose 10%, dextrose 10%, famotidine, glucagon (human recombinant), glucose, glucose, melatonin, morphine, naloxone, ondansetron, oxyCODONE, promethazine (PHENERGAN) IVPB, simethicone, sodium chloride 0.9%     Review of Systems   Constitutional:  Negative for chills, diaphoresis, fatigue and fever.   HENT:  Negative for postnasal drip, sore throat and trouble swallowing.    Eyes:  Negative for visual disturbance.   Respiratory:  Negative for cough and shortness of breath.    Cardiovascular:  Positive for leg swelling. Negative for chest pain and palpitations.   Gastrointestinal:  Positive for abdominal distention and abdominal pain. Negative for constipation, diarrhea, nausea and vomiting.   Genitourinary:  Negative for dysuria.   Musculoskeletal:  Negative for back pain and myalgias.   Skin:  Negative for rash.   Neurological:  Negative for dizziness, syncope, weakness and headaches.   Hematological:  Does not bruise/bleed easily.   Psychiatric/Behavioral:  Negative for confusion and decreased concentration.    Objective:     Vital Signs (Most Recent):  Temp: 97.6 °F (36.4 °C) (10/02/22 0849)  Pulse: 94 (10/02/22 1201)  Resp: 18 (10/02/22 1201)  BP: (!) 124/90 (10/02/22 1201)  SpO2: 99 % (10/02/22 1201)   Vital Signs (24h Range):  Temp:  [97.3 °F (36.3 °C)-97.8 °F (36.6 °C)] 97.6 °F (36.4 °C)  Pulse:  [] 94  Resp:  [18] 18  SpO2:  [98 %-100 %] 99 %  BP: (116-128)/(81-90) 124/90     Weight: 69.5 kg (153 lb 3.5 oz)  Body mass index is 24 kg/m².  Body  surface area is 1.81 meters squared.      Intake/Output Summary (Last 24 hours) at 10/2/2022 1403  Last data filed at 10/2/2022 0606  Gross per 24 hour   Intake 420 ml   Output --   Net 420 ml       Physical Exam  Vitals and nursing note reviewed.   Constitutional:       General: She is not in acute distress.     Appearance: Normal appearance.   HENT:      Head: Normocephalic and atraumatic.      Mouth/Throat:      Mouth: Mucous membranes are moist.   Eyes:      General: No scleral icterus.     Extraocular Movements: Extraocular movements intact.      Conjunctiva/sclera: Conjunctivae normal.   Cardiovascular:      Rate and Rhythm: Regular rhythm. Tachycardia present.      Pulses: Normal pulses.      Heart sounds: Normal heart sounds. No murmur heard.    No friction rub. No gallop.   Pulmonary:      Effort: Pulmonary effort is normal. No respiratory distress.      Breath sounds: Normal breath sounds.   Abdominal:      General: Bowel sounds are normal. There is distension.      Tenderness: There is abdominal tenderness.   Musculoskeletal:      Right lower leg: Edema present.      Left lower leg: Edema present.   Neurological:      General: No focal deficit present.      Mental Status: She is alert and oriented to person, place, and time.   Psychiatric:         Mood and Affect: Mood normal.       Significant Labs:   All pertinent labs from the last 24 hours have been reviewed.    Diagnostic Results:  I have reviewed all pertinent imaging results/findings within the past 24 hours.    Assessment/Plan:     * Intractable abdominal pain  Abdominal pain likely 2/2 to compression from ascites   CT A/P (10/1/22) revealed resolution of bowel distension now with large and small intestines normal caliber and compressed by massive ascites. No evidence of bowel obstruction.    Paracentesis performed on 10/2/22. 5 L drained, pt tolerated procedure well.     Plan:   - plan for 2nd paracentesis on 10/3/22, hopeful pt can have Pleur-X  catheter placed as well  - Pain control with oxycodone 5 mg PRN q 4 hours and IV morphine 4 mg PRN q 4 hours  - Antiemetics with Phenergan       Malignant ascites  Pt with known h/o ascites   Initially had peritoneal catheter after diagnosis, output slowed so was removed in October 2021 by IR.   bi-weekly paracentesis for now.  Last paracentesis 9/23 removed 6.7L of fluid from the abdomen.     Paracentesis performed on day of admission. 5L drained, pt tolerated procedure well    Plan:   -IR for therapeutic paracentesis while inpatient on 10/3/22  -Monitor hemodynamics       MARIA (acute kidney injury)  Baseline Cr 0.8, Cr 3.1 on admission  Hx of CKD: no  Associated with  fatigue, n/v  DDx: hypotension, hypovolemia, obstructive uropathy 2/2 to malignancy   No indications for HD at this time     Plan:   - Trend Cr  - Strict I&Os  - Avoid nephrotoxic agents (NSAIDs, gadolinium, IV radiocontrast)  - Avoid hypotension  - Renally adjust medications     Malignant neoplasm of stomach  HER-2 negative by FISH.  PD-L1 < 1%.  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  We previously explained the implications of a stage IV diagnosis to her and potential treatment options.  She is now s/p port placement. She sought a second opinion at [facility] for zolbetuximab trial targeting CLDN protein but she did not test positive for this biomarker. We recommended proceeding with SOC FOLFOX, with which the [facility] team agreed. Because of the negative PD-L1 I do not think the benefit of adding nivolumab outweighs the potential toxicities.        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  She opted not to get genetic testing done at her appt with [facility]. We recommended that she reconsider that decision in light of a very likely germline mutation in SAMUEL which  has clinical consequences.       CT CAP after 6 cycles showed improvement in ascites, probable hepatic, thyroid and bone metastases. We dropped oxaliplatin starting with cycle 10 due to grade 1 neuropathy and desire to keep it from worsening.     CT CAP after cycle 10 showed stable disease.  CT scan after cycle 15 continued to display overall stability of disease within the gastric wall, peritoneum, spine and liver. Although increase ascites on imaging and clinically can represent progression of disease, her previous CT suggested overall stability within the liver and gastric wall, so we recommended to continue with treatment time. However, she continued to have increasing abdominal ascites that has been concerning for progression of disease. Hence, she had repeat imaging. On CT performed on 4/14/2022, she appeared to have worsening disease suggestive of progression. She was seen by Dr. Reid at [facility] for f/u and to discuss possible treatment options.  She was also evaluated by Dr. Andree Mueller and Dr. See of Surgical Oncology at [facility].  Ultimately she was not felt to be a good surgical debulking candidate due to her ascites.  If her ascites improves during the course of chemotherapy and her performance status remains stable or improves, they would re-consider her again for palliative oophorectomy.      Was given ramucirumab with cycle 1 but this later was held given [facility] recs for just FOLFIRI. In addition, she didn't tolerate cycle 1 of cyramza well with significant N/V. She was started on second-line FOLFIRI. She received 5 cycles with FOLFIRI of irinotecan to 75 mg/m2 - has UGT1A1 homozygous mutation indicating poor metabolism.  Continue 5-FU infusion at 2200 mg/m2.     Repeat imaging with CT CAP after [facility] on 8/3/22 after cycle 5 of FOLFIRI demonstrated disease progression.  Dr. Reid recommended third line docetaxel biweekly at 35 mg/m2.      -s/p cycle 3 today of Docetaxel.               Jaspal Gay  DO  Hematology/Oncology  [facility] - [facility] Step[facility]

## 2022-10-02 NOTE — HOSPITAL COURSE
Pt admitted to [facility] for severe abdominal pain 2/2 ascites. Paracentesis was performed at bedside on 10/1/22. Pt tolerated procedure well and stated it provided her relief. 5L were drained. Albumin was given. Pt's pain improved but she was still in discomfort. IR performed another paracentesis, 10/3/22, pt tolerated procedure well and 5.4L were drained. MARIA was still present, nephrology consulted. Nephrology started IV Lasix for volume overload. IR placed a Pleur-X catheter on 10/5/22. Pt tolerated procedure well. Limited amounts of fluid were drawn from Pleur-X catheter, 750 cc and 350 cc, over 2 day period. US abdomen showed minimal loculations of fluid. Creatinine did not improve, therefore diuresis was stopped and fluids given, which then improved her creatinine. Patient continued to get weaker and more hypotensive despite fluid resuscitation, and was brought to the ICU and started on pressors. Narcan gtt was started as patient was unarousable. On day 3 of ICU, patient was down graded. She was seen by palliative team. On 10/14/22 patient was requesting to be discharged to hospice as she understands we have exhausted all options for treatment with [facility] agreeing this assessment as well. SW notified of this plan of care and will discharge home to hospice care.

## 2022-10-02 NOTE — SUBJECTIVE & OBJECTIVE
Interval History: NAEO. Pt still in moderate discomfort. Will plan for paracentesis by IR tomorrow, 10/3/22. Hopeful pt can get Pleur-X catheter placed as well.    Oncology Treatment Plan:   OP DOCETAXEL (75 MG/M2) Q3W    Medications:  Continuous Infusions:  Scheduled Meds:   gabapentin  300 mg Oral TID    heparin (porcine)  5,000 Units Subcutaneous Q8H     PRN Meds:acetaminophen, dextrose 10%, dextrose 10%, famotidine, glucagon (human recombinant), glucose, glucose, melatonin, morphine, naloxone, ondansetron, oxyCODONE, promethazine (PHENERGAN) IVPB, simethicone, sodium chloride 0.9%     Review of Systems   Constitutional:  Negative for chills, diaphoresis, fatigue and fever.   HENT:  Negative for postnasal drip, sore throat and trouble swallowing.    Eyes:  Negative for visual disturbance.   Respiratory:  Negative for cough and shortness of breath.    Cardiovascular:  Positive for leg swelling. Negative for chest pain and palpitations.   Gastrointestinal:  Positive for abdominal distention and abdominal pain. Negative for constipation, diarrhea, nausea and vomiting.   Genitourinary:  Negative for dysuria.   Musculoskeletal:  Negative for back pain and myalgias.   Skin:  Negative for rash.   Neurological:  Negative for dizziness, syncope, weakness and headaches.   Hematological:  Does not bruise/bleed easily.   Psychiatric/Behavioral:  Negative for confusion and decreased concentration.    Objective:     Vital Signs (Most Recent):  Temp: 97.6 °F (36.4 °C) (10/02/22 0849)  Pulse: 94 (10/02/22 1201)  Resp: 18 (10/02/22 1201)  BP: (!) 124/90 (10/02/22 1201)  SpO2: 99 % (10/02/22 1201)   Vital Signs (24h Range):  Temp:  [97.3 °F (36.3 °C)-97.8 °F (36.6 °C)] 97.6 °F (36.4 °C)  Pulse:  [] 94  Resp:  [18] 18  SpO2:  [98 %-100 %] 99 %  BP: (116-128)/(81-90) 124/90     Weight: 69.5 kg (153 lb 3.5 oz)  Body mass index is 24 kg/m².  Body surface area is 1.81 meters squared.      Intake/Output Summary (Last 24 hours) at  10/2/2022 1403  Last data filed at 10/2/2022 0606  Gross per 24 hour   Intake 420 ml   Output --   Net 420 ml       Physical Exam  Vitals and nursing note reviewed.   Constitutional:       General: She is not in acute distress.     Appearance: Normal appearance.   HENT:      Head: Normocephalic and atraumatic.      Mouth/Throat:      Mouth: Mucous membranes are moist.   Eyes:      General: No scleral icterus.     Extraocular Movements: Extraocular movements intact.      Conjunctiva/sclera: Conjunctivae normal.   Cardiovascular:      Rate and Rhythm: Regular rhythm. Tachycardia present.      Pulses: Normal pulses.      Heart sounds: Normal heart sounds. No murmur heard.    No friction rub. No gallop.   Pulmonary:      Effort: Pulmonary effort is normal. No respiratory distress.      Breath sounds: Normal breath sounds.   Abdominal:      General: Bowel sounds are normal. There is distension.      Tenderness: There is abdominal tenderness.   Musculoskeletal:      Right lower leg: Edema present.      Left lower leg: Edema present.   Neurological:      General: No focal deficit present.      Mental Status: She is alert and oriented to person, place, and time.   Psychiatric:         Mood and Affect: Mood normal.       Significant Labs:   All pertinent labs from the last 24 hours have been reviewed.    Diagnostic Results:  I have reviewed all pertinent imaging results/findings within the past 24 hours.

## 2022-10-02 NOTE — HPI
Mrs. Quigley is a 42 F with PMHx of gastric adenocarcinoma with osseous, hepatic and peritoneal metastases complicated by recurrent abdominal distension and pain 2/2 recurrent painful, tense ascites requiring frequent decompression for symptom relief. presenting to the ED with the chief complaint of abdominal pain. Reports worsening abdominal swelling and BLE swelling over the past week. Reports undergoing paracentesis 1-2 times per week, but missed her sessions this week as she went out of town for her birthday. Reports her abdominal swelling has not been this significant in the past. Additionally reports clear productive cough. Feels shortness of breath is due to her abdominal distention. Denies fever, chest pain, vomiting, urinary or bowel movement changes.           Patient information was obtained from patient, past medical records, and ER records.      Oncology History:          Malignant neoplasm of stomach   6/10/2021 Initial Diagnosis     Gastric adenocarcinoma      7/26/2021 - 4/6/2022 Chemotherapy     Treatment Summary   Plan Name: OP FOLFOX 6 Q2W  Treatment Goal: Palliative  Status: Inactive  Start Date: 7/26/2021  End Date: 4/6/2022  Provider: Fer Ashraf MD  Chemotherapy: fluorouraciL 2,400 mg/m2 = 3,770 mg in sodium chloride 0.9% 100 mL chemo infusion, 2,400 mg/m2 = 3,770 mg, Intravenous, Over 46 hours, 19 of 20 cycles  Administration: 3,770 mg (7/26/2021), 3,770 mg (8/9/2021), 3,770 mg (8/23/2021), 3,770 mg (9/7/2021), 3,770 mg (9/21/2021), 3,770 mg (10/5/2021), 3,770 mg (10/19/2021), 3,770 mg (11/2/2021), 3,770 mg (11/15/2021), 3,770 mg (11/30/2021), 3,770 mg (12/13/2021), 4,000 mg (12/27/2021), 4,030 mg (1/10/2022), 4,030 mg (1/24/2022), 4,000 mg (2/7/2022), 4,000 mg (2/22/2022), 4,000 mg (3/7/2022), 4,000 mg (3/22/2022), 4,000 mg (4/4/2022)  oxaliplatin (ELOXATIN) 85 mg/m2 = 133 mg in dextrose 5 % 500 mL chemo infusion, 85 mg/m2 = 133 mg, Intravenous, Clinic/[facility] 1 time, 9 of 9  cycles  Administration: 133 mg (7/26/2021), 133 mg (8/9/2021), 133 mg (8/23/2021), 133 mg (9/7/2021), 133 mg (9/21/2021), 133 mg (10/5/2021), 133 mg (10/19/2021), 133 mg (11/2/2021), 133 mg (11/15/2021)         5/26/2022 - 7/27/2022 Chemotherapy     Treatment Summary   Plan Name: OP FOLFIRI Q2WK  Treatment Goal: Palliative  Status: Inactive  Start Date: 5/26/2022  End Date: 7/27/2022  Provider: Fer Ashraf MD  Chemotherapy: dexAMETHasone (DECADRON) 4 MG Tab, 8 mg, Oral, Daily, 1 of 1 cycle, Start date: --, End date: --  irinotecan (CAMPTOSAR) 120 mg/m2 = 200 mg in sodium chloride 0.9% 500 mL chemo infusion, 120 mg/m2 = 200 mg (100 % of original dose 120 mg/m2), Intravenous, Clinic/[facility] 1 time, 5 of 24 cycles  Dose modification: 125 mg/m2 (original dose 120 mg/m2, Cycle 1), 120 mg/m2 (original dose 120 mg/m2, Cycle 1), 75 mg/m2 (original dose 120 mg/m2, Cycle 2, Reason: Dose not tolerated)  Administration: 200 mg (5/26/2022), 126 mg (6/13/2022), 126 mg (6/27/2022), 134 mg (7/12/2022), 134 mg (7/25/2022)  ramucirumab (CYRAMZA) 471 mg in sodium chloride 0.9% 250 mL chemo infusion, 8 mg/kg = 471 mg, Intravenous, Clinic/[facility] 1 time, 1 of 1 cycle  Administration: 471 mg (5/26/2022)         8/16/2022 -  Chemotherapy     Treatment Summary   Plan Name: OP DOCETAXEL (75 MG/M2) Q3W  Treatment Goal: Palliative  Status: Active  Start Date: 8/16/2022  End Date: 11/8/2022 (Planned)  Provider: Fer Ashraf MD  Chemotherapy: DOCEtaxeL (TAXOTERE) 35 mg/m2 = 65 mg in sodium chloride 0.9% 253.25 mL chemo infusion, 35 mg/m2 = 65 mg (46.7 % of original dose 75 mg/m2), Intravenous, Clinic/[facility] 1 time, 2 of 7 cycles  Dose modification: 35 mg/m2 (original dose 75 mg/m2, Cycle 1, Reason: MD Discretion, Comment: per [facility] recommendations)  Administration: 65 mg (8/16/2022), 65 mg (8/30/2022)

## 2022-10-02 NOTE — ASSESSMENT & PLAN NOTE
Abdominal pain likely 2/2 to compression from ascites   CT A/P (10/1/22) revealed resolution of bowel distension now with large and small intestines normal caliber and compressed by massive ascites. No evidence of bowel obstruction.    Paracentesis performed on 10/2/22. 5 L drained, pt tolerated procedure well.     Plan:   - plan for 2nd paracentesis on 10/3/22, hopeful pt can have Pleur-X catheter placed as well  - Pain control with oxycodone 5 mg PRN q 4 hours and IV morphine 4 mg PRN q 4 hours  - Antiemetics with Phenergan

## 2022-10-02 NOTE — ASSESSMENT & PLAN NOTE
Baseline Cr 0.8, Cr 3.1 on admission  Hx of CKD: no  Associated with  fatigue, n/v  DDx: hypotension, hypovolemia, obstructive uropathy 2/2 to malignancy   No indications for HD at this time     Plan:   - Trend Cr  - Strict I&Os  - Avoid nephrotoxic agents (NSAIDs, gadolinium, IV radiocontrast)  - Avoid hypotension  - Renally adjust medications

## 2022-10-03 PROBLEM — K59.00 CONSTIPATION: Status: ACTIVE | Noted: 2022-10-03

## 2022-10-03 LAB
ALBUMIN SERPL BCP-MCNC: 2.6 G/DL (ref 3.5–5.2)
ALP SERPL-CCNC: 80 U/L (ref 55–135)
ALT SERPL W/O P-5'-P-CCNC: 7 U/L (ref 10–44)
ANION GAP SERPL CALC-SCNC: 10 MMOL/L (ref 8–16)
AST SERPL-CCNC: 14 U/L (ref 10–40)
B-HCG UR QL: NEGATIVE
BASOPHILS # BLD AUTO: 0.01 K/UL (ref 0–0.2)
BASOPHILS NFR BLD: 0.1 % (ref 0–1.9)
BILIRUB SERPL-MCNC: 0.6 MG/DL (ref 0.1–1)
BUN SERPL-MCNC: 42 MG/DL (ref 6–20)
CALCIUM SERPL-MCNC: 8.4 MG/DL (ref 8.7–10.5)
CHLORIDE SERPL-SCNC: 96 MMOL/L (ref 95–110)
CO2 SERPL-SCNC: 21 MMOL/L (ref 23–29)
CREAT SERPL-MCNC: 2.4 MG/DL (ref 0.5–1.4)
CTP QC/QA: YES
DIFFERENTIAL METHOD: ABNORMAL
EOSINOPHIL # BLD AUTO: 0 K/UL (ref 0–0.5)
EOSINOPHIL NFR BLD: 0.1 % (ref 0–8)
ERYTHROCYTE [DISTWIDTH] IN BLOOD BY AUTOMATED COUNT: 20.1 % (ref 11.5–14.5)
EST. GFR  (NO RACE VARIABLE): 25.5 ML/MIN/1.73 M^2
GLUCOSE SERPL-MCNC: 81 MG/DL (ref 70–110)
HCT VFR BLD AUTO: 32.3 % (ref 37–48.5)
HGB BLD-MCNC: 9.8 G/DL (ref 12–16)
IMM GRANULOCYTES # BLD AUTO: 0.07 K/UL (ref 0–0.04)
IMM GRANULOCYTES NFR BLD AUTO: 0.7 % (ref 0–0.5)
LYMPHOCYTES # BLD AUTO: 0.9 K/UL (ref 1–4.8)
LYMPHOCYTES NFR BLD: 9.1 % (ref 18–48)
MAGNESIUM SERPL-MCNC: 2.2 MG/DL (ref 1.6–2.6)
MCH RBC QN AUTO: 23.1 PG (ref 27–31)
MCHC RBC AUTO-ENTMCNC: 30.3 G/DL (ref 32–36)
MCV RBC AUTO: 76 FL (ref 82–98)
MONOCYTES # BLD AUTO: 0.6 K/UL (ref 0.3–1)
MONOCYTES NFR BLD: 5.8 % (ref 4–15)
NEUTROPHILS # BLD AUTO: 8.3 K/UL (ref 1.8–7.7)
NEUTROPHILS NFR BLD: 84.2 % (ref 38–73)
NRBC BLD-RTO: 0 /100 WBC
PHOSPHATE SERPL-MCNC: 4.8 MG/DL (ref 2.7–4.5)
PLATELET # BLD AUTO: 251 K/UL (ref 150–450)
PMV BLD AUTO: 8.5 FL (ref 9.2–12.9)
POTASSIUM SERPL-SCNC: 5.2 MMOL/L (ref 3.5–5.1)
PROT SERPL-MCNC: 5.1 G/DL (ref 6–8.4)
RBC # BLD AUTO: 4.24 M/UL (ref 4–5.4)
SODIUM SERPL-SCNC: 127 MMOL/L (ref 136–145)
WBC # BLD AUTO: 9.8 K/UL (ref 3.9–12.7)

## 2022-10-03 PROCEDURE — 99233 PR SUBSEQUENT HOSPITAL CARE,LEVL III: ICD-10-PCS | Mod: ,,, | Performed by: HOSPITALIST

## 2022-10-03 PROCEDURE — 83735 ASSAY OF MAGNESIUM: CPT | Performed by: HOSPITALIST

## 2022-10-03 PROCEDURE — 20600001 HC STEP DOWN PRIVATE ROOM

## 2022-10-03 PROCEDURE — 36415 COLL VENOUS BLD VENIPUNCTURE: CPT | Performed by: HOSPITALIST

## 2022-10-03 PROCEDURE — P9047 ALBUMIN (HUMAN), 25%, 50ML: HCPCS | Mod: JG | Performed by: FAMILY MEDICINE

## 2022-10-03 PROCEDURE — 99223 1ST HOSP IP/OBS HIGH 75: CPT | Mod: ,,, | Performed by: INTERNAL MEDICINE

## 2022-10-03 PROCEDURE — 25000003 PHARM REV CODE 250: Performed by: STUDENT IN AN ORGANIZED HEALTH CARE EDUCATION/TRAINING PROGRAM

## 2022-10-03 PROCEDURE — 99223 PR INITIAL HOSPITAL CARE,LEVL III: ICD-10-PCS | Mod: ,,, | Performed by: INTERNAL MEDICINE

## 2022-10-03 PROCEDURE — 63600175 PHARM REV CODE 636 W HCPCS: Mod: JG | Performed by: FAMILY MEDICINE

## 2022-10-03 PROCEDURE — 81025 URINE PREGNANCY TEST: CPT | Performed by: FAMILY MEDICINE

## 2022-10-03 PROCEDURE — 85025 COMPLETE CBC W/AUTO DIFF WBC: CPT | Performed by: HOSPITALIST

## 2022-10-03 PROCEDURE — 99233 SBSQ HOSP IP/OBS HIGH 50: CPT | Mod: ,,, | Performed by: HOSPITALIST

## 2022-10-03 PROCEDURE — 25000003 PHARM REV CODE 250

## 2022-10-03 PROCEDURE — 84100 ASSAY OF PHOSPHORUS: CPT | Performed by: HOSPITALIST

## 2022-10-03 PROCEDURE — 80053 COMPREHEN METABOLIC PANEL: CPT | Performed by: HOSPITALIST

## 2022-10-03 PROCEDURE — 63600175 PHARM REV CODE 636 W HCPCS: Performed by: HOSPITALIST

## 2022-10-03 RX ORDER — ALBUMIN HUMAN 250 G/1000ML
SOLUTION INTRAVENOUS
Status: COMPLETED | OUTPATIENT
Start: 2022-10-03 | End: 2022-10-03

## 2022-10-03 RX ORDER — POLYETHYLENE GLYCOL 3350 17 G/17G
17 POWDER, FOR SOLUTION ORAL 3 TIMES DAILY
Status: DISCONTINUED | OUTPATIENT
Start: 2022-10-03 | End: 2022-10-07

## 2022-10-03 RX ORDER — BACLOFEN 5 MG/1
5 TABLET ORAL 2 TIMES DAILY PRN
Status: DISCONTINUED | OUTPATIENT
Start: 2022-10-03 | End: 2022-10-04

## 2022-10-03 RX ORDER — ALBUMIN HUMAN 250 G/1000ML
SOLUTION INTRAVENOUS
Status: DISPENSED
Start: 2022-10-03 | End: 2022-10-03

## 2022-10-03 RX ORDER — BISACODYL 10 MG
10 SUPPOSITORY, RECTAL RECTAL DAILY PRN
Status: DISCONTINUED | OUTPATIENT
Start: 2022-10-03 | End: 2022-10-03

## 2022-10-03 RX ADMIN — MORPHINE SULFATE 4 MG: 2 INJECTION, SOLUTION INTRAMUSCULAR; INTRAVENOUS at 11:10

## 2022-10-03 RX ADMIN — POLYETHYLENE GLYCOL 3350 17 G: 17 POWDER, FOR SOLUTION ORAL at 03:10

## 2022-10-03 RX ADMIN — GABAPENTIN 300 MG: 300 CAPSULE ORAL at 03:10

## 2022-10-03 RX ADMIN — FAMOTIDINE 20 MG: 20 TABLET ORAL at 11:10

## 2022-10-03 RX ADMIN — OXYCODONE 5 MG: 5 TABLET ORAL at 11:10

## 2022-10-03 RX ADMIN — MORPHINE SULFATE 4 MG: 2 INJECTION, SOLUTION INTRAMUSCULAR; INTRAVENOUS at 02:10

## 2022-10-03 RX ADMIN — DOCUSATE SODIUM 50 MG: 50 CAPSULE, LIQUID FILLED ORAL at 11:10

## 2022-10-03 RX ADMIN — MORPHINE SULFATE 4 MG: 2 INJECTION, SOLUTION INTRAMUSCULAR; INTRAVENOUS at 03:10

## 2022-10-03 RX ADMIN — BISACODYL 10 MG: 10 SUPPOSITORY RECTAL at 02:10

## 2022-10-03 RX ADMIN — ALBUMIN (HUMAN) 37.5 G: 12.5 SOLUTION INTRAVENOUS at 10:10

## 2022-10-03 RX ADMIN — GABAPENTIN 300 MG: 300 CAPSULE ORAL at 11:10

## 2022-10-03 RX ADMIN — OXYCODONE 5 MG: 5 TABLET ORAL at 06:10

## 2022-10-03 RX ADMIN — POLYETHYLENE GLYCOL 3350 17 G: 17 POWDER, FOR SOLUTION ORAL at 11:10

## 2022-10-03 NOTE — SUBJECTIVE & OBJECTIVE
Interval History: NAEO. Pt had 2nd paracentesis performed by IR this AM, 10/3/22. Pt tolerated procedure well and 5.4L were drained. Pt had small bowel movement this morning but still reports constipation. Will increase bowel regimen as needed. Pleur-X catheter was not placed, but pt is scheduled to have one placed at [facility] in [place], [place] on 10/6/22. MARIA still present, Nephrology consulted.    Oncology Treatment Plan:   OP DOCETAXEL (75 MG/M2) Q3W    Medications:  Continuous Infusions:  Scheduled Meds:   albumin human 25%        docusate sodium  50 mg Oral Daily    gabapentin  300 mg Oral TID    heparin (porcine)  5,000 Units Subcutaneous Q8H     PRN Meds:acetaminophen, bisacodyL, dextrose 10%, dextrose 10%, famotidine, glucagon (human recombinant), glucose, glucose, magnesium hydroxide 400 mg/5 ml, melatonin, morphine, naloxone, ondansetron, oxyCODONE, promethazine (PHENERGAN) IVPB, simethicone, sodium chloride 0.9%     Review of Systems   Constitutional:  Negative for chills, diaphoresis, fatigue and fever.   HENT:  Negative for postnasal drip, sore throat and trouble swallowing.    Eyes:  Negative for visual disturbance.   Respiratory:  Negative for cough and shortness of breath.    Cardiovascular:  Positive for leg swelling. Negative for chest pain and palpitations.   Gastrointestinal:  Positive for abdominal distention, abdominal pain and constipation. Negative for nausea and vomiting.   Genitourinary:  Negative for dysuria.   Musculoskeletal:  Negative for back pain and myalgias.   Skin:  Negative for rash.   Neurological:  Negative for dizziness, syncope, weakness and headaches.   Hematological:  Does not bruise/bleed easily.   Psychiatric/Behavioral:  Negative for confusion and decreased concentration.    Objective:     Vital Signs (Most Recent):  Temp: 98 °F (36.7 °C) (10/03/22 1233)  Pulse: 101 (10/03/22 1233)  Resp: 18 (10/03/22 1233)  BP: 128/88 (10/03/22 1233)  SpO2: 97 % (10/03/22 1233) Vital  Signs (24h Range):  Temp:  [97.3 °F (36.3 °C)-98 °F (36.7 °C)] 98 °F (36.7 °C)  Pulse:  [] 101  Resp:  [16-18] 18  SpO2:  [97 %-100 %] 97 %  BP: ()/() 128/88     Weight: 65.2 kg (143 lb 13.6 oz)  Body mass index is 22.53 kg/m².  Body surface area is 1.76 meters squared.      Intake/Output Summary (Last 24 hours) at 10/3/2022 1422  Last data filed at 10/2/2022 1500  Gross per 24 hour   Intake 240 ml   Output --   Net 240 ml       Physical Exam  Vitals and nursing note reviewed.   Constitutional:       General: She is not in acute distress.     Appearance: Normal appearance.   HENT:      Head: Normocephalic and atraumatic.      Mouth/Throat:      Mouth: Mucous membranes are moist.   Eyes:      General: No scleral icterus.     Extraocular Movements: Extraocular movements intact.      Conjunctiva/sclera: Conjunctivae normal.   Cardiovascular:      Rate and Rhythm: Regular rhythm. Tachycardia present.      Pulses: Normal pulses.      Heart sounds: Normal heart sounds. No murmur heard.    No friction rub. No gallop.   Pulmonary:      Effort: Pulmonary effort is normal. No respiratory distress.      Breath sounds: Normal breath sounds.   Abdominal:      General: Bowel sounds are normal. There is distension.      Tenderness: There is abdominal tenderness.   Musculoskeletal:      Right lower leg: Edema present.      Left lower leg: Edema present.   Neurological:      General: No focal deficit present.      Mental Status: She is alert and oriented to person, place, and time.   Psychiatric:         Mood and Affect: Mood normal.       Significant Labs:   All pertinent labs from the last 24 hours have been reviewed.    Diagnostic Results:  I have reviewed all pertinent imaging results/findings within the past 24 hours.

## 2022-10-03 NOTE — PROGRESS NOTES
[facility] - Transplant Stepdown  Hematology/Oncology  Progress Note    Patient Name: Puja Quigley  Admission Date: 9/30/2022  Hospital Length of Stay: 2 days  Code Status: Full Code     Subjective:     HPI:  Mr. Quigley is a 42 F with PMHx of gastric adenocarcinoma with osseous, hepatic and peritoneal metastases complicated by recurrent abdominal distension and pain 2/2 recurrent painful, tense ascites requiring frequent decompression for symptom relief. presenting to the ED with the chief complaint of abdominal pain. Reports worsening abdominal swelling and BLE swelling over the past week. Reports undergoing paracentesis 1-2 times per week, but missed her sessions this week as she went out of town for her birthday. Reports her abdominal swelling has not been this significant in the past. Additionally reports clear productive cough. Feels shortness of breath is due to her abdominal distention. Denies fever, chest pain, vomiting, urinary or bowel movement changes.           Patient information was obtained from patient, past medical records, and ER records.      Oncology History:          Malignant neoplasm of stomach   6/10/2021 Initial Diagnosis     Gastric adenocarcinoma      7/26/2021 - 4/6/2022 Chemotherapy     Treatment Summary   Plan Name: OP FOLFOX 6 Q2W  Treatment Goal: Palliative  Status: Inactive  Start Date: 7/26/2021  End Date: 4/6/2022  Provider: Fer Ashraf MD  Chemotherapy: fluorouraciL 2,400 mg/m2 = 3,770 mg in sodium chloride 0.9% 100 mL chemo infusion, 2,400 mg/m2 = 3,770 mg, Intravenous, Over 46 hours, 19 of 20 cycles  Administration: 3,770 mg (7/26/2021), 3,770 mg (8/9/2021), 3,770 mg (8/23/2021), 3,770 mg (9/7/2021), 3,770 mg (9/21/2021), 3,770 mg (10/5/2021), 3,770 mg (10/19/2021), 3,770 mg (11/2/2021), 3,770 mg (11/15/2021), 3,770 mg (11/30/2021), 3,770 mg (12/13/2021), 4,000 mg (12/27/2021), 4,030 mg (1/10/2022), 4,030 mg (1/24/2022), 4,000 mg (2/7/2022), 4,000 mg (2/22/2022),  4,000 mg (3/7/2022), 4,000 mg (3/22/2022), 4,000 mg (4/4/2022)  oxaliplatin (ELOXATIN) 85 mg/m2 = 133 mg in dextrose 5 % 500 mL chemo infusion, 85 mg/m2 = 133 mg, Intravenous, Clinic/HOD 1 time, 9 of 9 cycles  Administration: 133 mg (7/26/2021), 133 mg (8/9/2021), 133 mg (8/23/2021), 133 mg (9/7/2021), 133 mg (9/21/2021), 133 mg (10/5/2021), 133 mg (10/19/2021), 133 mg (11/2/2021), 133 mg (11/15/2021)         5/26/2022 - 7/27/2022 Chemotherapy     Treatment Summary   Plan Name: OP FOLFIRI Q2WK  Treatment Goal: Palliative  Status: Inactive  Start Date: 5/26/2022  End Date: 7/27/2022  Provider: Fer Ashraf MD  Chemotherapy: dexAMETHasone (DECADRON) 4 MG Tab, 8 mg, Oral, Daily, 1 of 1 cycle, Start date: --, End date: --  irinotecan (CAMPTOSAR) 120 mg/m2 = 200 mg in sodium chloride 0.9% 500 mL chemo infusion, 120 mg/m2 = 200 mg (100 % of original dose 120 mg/m2), Intravenous, Clinic/HOD 1 time, 5 of 24 cycles  Dose modification: 125 mg/m2 (original dose 120 mg/m2, Cycle 1), 120 mg/m2 (original dose 120 mg/m2, Cycle 1), 75 mg/m2 (original dose 120 mg/m2, Cycle 2, Reason: Dose not tolerated)  Administration: 200 mg (5/26/2022), 126 mg (6/13/2022), 126 mg (6/27/2022), 134 mg (7/12/2022), 134 mg (7/25/2022)  ramucirumab (CYRAMZA) 471 mg in sodium chloride 0.9% 250 mL chemo infusion, 8 mg/kg = 471 mg, Intravenous, Clinic/HOD 1 time, 1 of 1 cycle  Administration: 471 mg (5/26/2022)         8/16/2022 -  Chemotherapy     Treatment Summary   Plan Name: OP DOCETAXEL (75 MG/M2) Q3W  Treatment Goal: Palliative  Status: Active  Start Date: 8/16/2022  End Date: 11/8/2022 (Planned)  Provider: Fer Ashraf MD  Chemotherapy: DOCEtaxeL (TAXOTERE) 35 mg/m2 = 65 mg in sodium chloride 0.9% 253.25 mL chemo infusion, 35 mg/m2 = 65 mg (46.7 % of original dose 75 mg/m2), Intravenous, Clinic/HOD 1 time, 2 of 7 cycles  Dose modification: 35 mg/m2 (original dose 75 mg/m2, Cycle 1, Reason: MD Discretion, Comment: per MD Pennington  recommendations)  Administration: 65 mg (8/16/2022), 65 mg (8/30/2022)       Interval History: NAEO. Pt had 2nd paracentesis performed by IR this AM, 10/3/22. Pt tolerated procedure well and 5.4L were drained. Pt had small bowel movement this morning but still reports constipation. Will increase bowel regimen as needed. Pleur-X catheter was not placed, but pt is scheduled to have one placed at [facility] in [place], [place] on 10/6/22. MARIA still present, Nephrology consulted.    Oncology Treatment Plan:   OP DOCETAXEL (75 MG/M2) Q3W    Medications:  Continuous Infusions:  Scheduled Meds:   albumin human 25%        docusate sodium  50 mg Oral Daily    gabapentin  300 mg Oral TID    heparin (porcine)  5,000 Units Subcutaneous Q8H     PRN Meds:acetaminophen, bisacodyL, dextrose 10%, dextrose 10%, famotidine, glucagon (human recombinant), glucose, glucose, magnesium hydroxide 400 mg/5 ml, melatonin, morphine, naloxone, ondansetron, oxyCODONE, promethazine (PHENERGAN) IVPB, simethicone, sodium chloride 0.9%     Review of Systems   Constitutional:  Negative for chills, diaphoresis, fatigue and fever.   HENT:  Negative for postnasal drip, sore throat and trouble swallowing.    Eyes:  Negative for visual disturbance.   Respiratory:  Negative for cough and shortness of breath.    Cardiovascular:  Positive for leg swelling. Negative for chest pain and palpitations.   Gastrointestinal:  Positive for abdominal distention, abdominal pain and constipation. Negative for nausea and vomiting.   Genitourinary:  Negative for dysuria.   Musculoskeletal:  Negative for back pain and myalgias.   Skin:  Negative for rash.   Neurological:  Negative for dizziness, syncope, weakness and headaches.   Hematological:  Does not bruise/bleed easily.   Psychiatric/Behavioral:  Negative for confusion and decreased concentration.    Objective:     Vital Signs (Most Recent):  Temp: 98 °F (36.7 °C) (10/03/22 1233)  Pulse: 101 (10/03/22 1233)  Resp: 18  (10/03/22 1233)  BP: 128/88 (10/03/22 1233)  SpO2: 97 % (10/03/22 1233) Vital Signs (24h Range):  Temp:  [97.3 °F (36.3 °C)-98 °F (36.7 °C)] 98 °F (36.7 °C)  Pulse:  [] 101  Resp:  [16-18] 18  SpO2:  [97 %-100 %] 97 %  BP: ()/() 128/88     Weight: 65.2 kg (143 lb 13.6 oz)  Body mass index is 22.53 kg/m².  Body surface area is 1.76 meters squared.      Intake/Output Summary (Last 24 hours) at 10/3/2022 1422  Last data filed at 10/2/2022 1500  Gross per 24 hour   Intake 240 ml   Output --   Net 240 ml       Physical Exam  Vitals and nursing note reviewed.   Constitutional:       General: She is not in acute distress.     Appearance: Normal appearance.   HENT:      Head: Normocephalic and atraumatic.      Mouth/Throat:      Mouth: Mucous membranes are moist.   Eyes:      General: No scleral icterus.     Extraocular Movements: Extraocular movements intact.      Conjunctiva/sclera: Conjunctivae normal.   Cardiovascular:      Rate and Rhythm: Regular rhythm. Tachycardia present.      Pulses: Normal pulses.      Heart sounds: Normal heart sounds. No murmur heard.    No friction rub. No gallop.   Pulmonary:      Effort: Pulmonary effort is normal. No respiratory distress.      Breath sounds: Normal breath sounds.   Abdominal:      General: Bowel sounds are normal. There is distension.      Tenderness: There is abdominal tenderness.   Musculoskeletal:      Right lower leg: Edema present.      Left lower leg: Edema present.   Neurological:      General: No focal deficit present.      Mental Status: She is alert and oriented to person, place, and time.   Psychiatric:         Mood and Affect: Mood normal.       Significant Labs:   All pertinent labs from the last 24 hours have been reviewed.    Diagnostic Results:  I have reviewed all pertinent imaging results/findings within the past 24 hours.    Assessment/Plan:     * Malignant ascites  Pt with known h/o ascites   Initially had peritoneal catheter after  diagnosis, output slowed so was removed in October 2021 by IR.   bi-weekly paracentesis for now.  Last paracentesis 9/23 removed 6.7L of fluid from the abdomen.     Paracentesis performed on 10/2/22. 5 L drained, pt tolerated procedure well. 2nd paracentesis performed by IR on 10/3/22, 5.4L drained. Pleur-X catheter was not placed. Plan for pt to have catheter placed at [facility] on 10/6/22. Pt states this was originally planned before having to come to [facility].     Plan:   - Pain control with oxycodone 5 mg PRN q 4 hours and IV morphine 4 mg PRN q 4 hours  - Antiemetics with Phenergan   - possible 3rd paracentesis on 10/5/22 if needed      Intractable abdominal pain  Abdominal pain likely 2/2 to compression from ascites   CT A/P (10/1/22) revealed resolution of bowel distension now with large and small intestines normal caliber and compressed by massive ascites. No evidence of bowel obstruction.    Paracentesis performed on 10/2/22. 5 L drained, pt tolerated procedure well. 2nd paracentesis performed by IR on 10/3/22, 5.4L drained. Pleur-X catheter was not placed. Plan for pt to have catheter placed at [facility] on 10/6/22. Pt states this was originally planned before having to come to [facility].     Plan:   - Pain control with oxycodone 5 mg PRN q 4 hours and IV morphine 4 mg PRN q 4 hours  - Antiemetics with Phenergan   - possible 3rd paracentesis on 10/5/22 if needed      MARIA (acute kidney injury)  Baseline Cr 0.8, Cr 3.1 on admission  Hx of CKD: no  Associated with  fatigue, n/v  DDx: hypotension, hypovolemia, obstructive uropathy 2/2 to malignancy   No indications for HD at this time    MARIA has still not resolved after 2 paracentesis. Will consult Nephrology and f/u with recs     Plan:   - Trend Cr  - Strict I&Os  - Avoid nephrotoxic agents (NSAIDs, gadolinium, IV radiocontrast)  - Avoid hypotension  - Renally adjust medications   - f/u with Nephrology recommendations    Constipation  - Miralax TID   - will  add lactulose and suppository bisacodyl if needed    Malignant neoplasm of stomach  HER-2 negative by FISH.  PD-L1 < 1%.  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  We previously explained the implications of a stage IV diagnosis to her and potential treatment options.  She is now s/p port placement. She sought a second opinion at [facility] for zolbetuximab trial targeting CLDN protein but she did not test positive for this biomarker. We recommended proceeding with SOC FOLFOX, with which the [facility] team agreed. Because of the negative PD-L1 I do not think the benefit of adding nivolumab outweighs the potential toxicities.        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  She opted not to get genetic testing done at her appt with [facility]. We recommended that she reconsider that decision in light of a very likely germline mutation in SAMUEL which has clinical consequences.       CT CAP after 6 cycles showed improvement in ascites, probable hepatic, thyroid and bone metastases. We dropped oxaliplatin starting with cycle 10 due to grade 1 neuropathy and desire to keep it from worsening.     CT CAP after cycle 10 showed stable disease.  CT scan after cycle 15 continued to display overall stability of disease within the gastric wall, peritoneum, spine and liver. Although increase ascites on imaging and clinically can represent progression of disease, her previous CT suggested overall stability within the liver and gastric wall, so we recommended to continue with treatment time. However, she continued to have increasing abdominal ascites that has been concerning for progression of disease. Hence, she had repeat imaging. On CT performed on 4/14/2022, she appeared to have worsening disease suggestive of progression. She was seen by Dr. Reid at  [facility] for f/u and to discuss possible treatment options.  She was also evaluated by Dr. Andree Mueller and Dr. See of Surgical Oncology at [facility].  Ultimately she was not felt to be a good surgical debulking candidate due to her ascites.  If her ascites improves during the course of chemotherapy and her performance status remains stable or improves, they would re-consider her again for palliative oophorectomy.      Was given ramucirumab with cycle 1 but this later was held given [facility] recs for just FOLFIRI. In addition, she didn't tolerate cycle 1 of cyramza well with significant N/V. She was started on second-line FOLFIRI. She received 5 cycles with FOLFIRI of irinotecan to 75 mg/m2 - has UGT1A1 homozygous mutation indicating poor metabolism.  Continue 5-FU infusion at 2200 mg/m2.     Repeat imaging with CT CAP after [facility] on 8/3/22 after cycle 5 of FOLFIRI demonstrated disease progression.  Dr. Reid recommended third line docetaxel biweekly at 35 mg/m2.      -s/p cycle 3 today of Docetaxel.               Jaspal aGy DO  Hematology/Oncology  [facility] - Transplant Stepdown

## 2022-10-03 NOTE — ASSESSMENT & PLAN NOTE
Baseline Cr 0.8, Cr 3.1 on admission  Hx of CKD: no  Associated with  fatigue, n/v  DDx: hypotension, hypovolemia, obstructive uropathy 2/2 to malignancy   No indications for HD at this time    MARIA has still not resolved after 2 paracentesis. Will consult Nephrology and f/u with recs     Plan:   - Trend Cr  - Strict I&Os  - Avoid nephrotoxic agents (NSAIDs, gadolinium, IV radiocontrast)  - Avoid hypotension  - Renally adjust medications   - f/u with Nephrology recommendations

## 2022-10-03 NOTE — PROCEDURES
Radiology Post-Procedure Note    Pre Op Diagnosis: Ascites  Post Op Diagnosis: Same    Procedure: Ultrasound Guided Paracentesis    Procedure performed by: Lane MADRIGAL, Rosetta     Written Informed Consent Obtained: Yes  Specimen Removed: YES clear yellow  Estimated Blood Loss: Minimal    Findings:   Successful paracentesis.  Albumin administered PRN per protocol.    Patient tolerated procedure well.    Rosetta Sherman, APRN, FNP  Interventional Radiology  [phone] clinic

## 2022-10-03 NOTE — ASSESSMENT & PLAN NOTE
Pt with known h/o ascites   Initially had peritoneal catheter after diagnosis, output slowed so was removed in October 2021 by IR.   bi-weekly paracentesis for now.  Last paracentesis 9/23 removed 6.7L of fluid from the abdomen.     Paracentesis performed on 10/2/22. 5 L drained, pt tolerated procedure well. 2nd paracentesis performed by IR on 10/3/22, 5.4L drained. Pleur-X catheter was not placed. Plan for pt to have catheter placed at [facility] on 10/6/22. Pt states this was originally planned before having to come to [facility].     Plan:   - Pain control with oxycodone 5 mg PRN q 4 hours and IV morphine 4 mg PRN q 4 hours  - Antiemetics with Phenergan   - possible 3rd paracentesis on 10/5/22 if needed

## 2022-10-03 NOTE — HPI
42 year-old F w/ hx of metastatic gastric cancer c/b peritoneal carcinomatosis and malignant ascites. Patient recently on third line docetaxel. She was initially admitted with severe abdominal pain and MARIA. She was found to have massive ascites that required large volume paracentesis on 10/1 with removal of 5.4L of fluid, and received albumin infusion. Patient reportedly gets two paracentesis per week. Last paracentesis on 9/23 removed 6.7L of fluid from the abdomen. Patient with baseline serum creatinine of 0.7 to 0.8 and presented with initial serum creatinine of 2.6-->3.2-->2.4.

## 2022-10-03 NOTE — SUBJECTIVE & OBJECTIVE
Past Medical History:   Diagnosis Date    Anxiety     Dehydration 5/30/2022    Gastric cancer     Gastric ulcer     Hx of psychiatric care     Pregnancy 08/12/2020    delivered on 8/12/2020    Umbilical hernia        Past Surgical History:   Procedure Laterality Date    CLOSURE OF PERFORATED ULCER OF DUODENUM USING OMENTAL PATCH      ESOPHAGOGASTRODUODENOSCOPY N/A 10/13/2020    Procedure: EGD (ESOPHAGOGASTRODUODENOSCOPY);  Surgeon: Rosio Marion MD;  Location: [facility] (2ND FLR);  Service: Endoscopy;  Laterality: N/A;    ESOPHAGOGASTRODUODENOSCOPY N/A 12/11/2020    Procedure: EGD (ESOPHAGOGASTRODUODENOSCOPY);  Surgeon: Rosio Marion MD;  Location: [facility] (2ND FLR);  Service: Endoscopy;  Laterality: N/A;  Covid-19 test 12/8/20 at [facility] - pg    ESOPHAGOGASTRODUODENOSCOPY N/A 3/12/2021    Procedure: EGD (ESOPHAGOGASTRODUODENOSCOPY);  Surgeon: Nav Omer MD;  Location: [facility] (2ND FLR);  Service: Endoscopy;  Laterality: N/A;  COVID at [facility] 3/9 ttr    ESOPHAGOGASTRODUODENOSCOPY N/A 5/18/2021    Procedure: EGD (ESOPHAGOGASTRODUODENOSCOPY);  Surgeon: Rosio Marion MD;  Location: [facility] (2ND FLR);  Service: Endoscopy;  Laterality: N/A;  5/15-covid pcw-inst portal-tb    ESOPHAGOGASTRODUODENOSCOPY N/A 5/26/2021    Procedure: EGD (ESOPHAGOGASTRODUODENOSCOPY);  Surgeon: Socrates Terrazas MD;  Location: [facility] (2ND FLR);  Service: Endoscopy;  Laterality: N/A;       Review of patient's allergies indicates:  No Known Allergies  Current Facility-Administered Medications   Medication Frequency    acetaminophen tablet 650 mg Q8H PRN    albumin human 25% 25 % bottle     bisacodyL suppository 10 mg Daily PRN    dextrose 10% bolus 125 mL PRN    dextrose 10% bolus 250 mL PRN    docusate sodium capsule 50 mg Daily    famotidine tablet 20 mg Nightly PRN    gabapentin capsule 300 mg TID    glucagon (human recombinant) injection 1 mg PRN    glucose chewable tablet 16 g PRN    glucose chewable tablet  24 g PRN    heparin (porcine) injection 5,000 Units Q8H    magnesium hydroxide 400 mg/5 ml suspension 2,400 mg Daily PRN    melatonin tablet 6 mg Nightly PRN    morphine injection 4 mg Q4H PRN    naloxone 0.4 mg/mL injection 0.02 mg PRN    ondansetron disintegrating tablet 8 mg Q8H PRN    oxyCODONE immediate release tablet 5 mg Q4H PRN    promethazine (PHENERGAN) 6.25 mg in dextrose 5 % 50 mL IVPB Q6H PRN    simethicone chewable tablet 80 mg TID PRN    sodium chloride 0.9% flush 10 mL Q12H PRN     Family History       Problem Relation (Age of Onset)    Breast cancer Other (73), Paternal Cousin    Cancer Mother (67)    Lung cancer Father (48)    No Known Problems Son    Prostate cancer Paternal Uncle    Schizophrenia Mother          Tobacco Use    Smoking status: Former     Types: Cigarettes     Quit date: 2019     Years since quittin.8    Smokeless tobacco: Never   Substance and Sexual Activity    Alcohol use: Yes    Drug use: Not Currently    Sexual activity: Yes     Partners: Male     Review of Systems   Constitutional:  Positive for activity change. Negative for diaphoresis.   HENT:  Negative for facial swelling and trouble swallowing.    Eyes:  Negative for pain and visual disturbance.   Respiratory:  Negative for shortness of breath and wheezing.    Cardiovascular:  Positive for leg swelling.   Gastrointestinal:  Positive for abdominal distention, abdominal pain and constipation. Negative for diarrhea, nausea and vomiting.   Endocrine: Negative for polydipsia and polyuria.   Genitourinary:  Positive for urgency (with increased abdominal fluid). Negative for decreased urine volume, difficulty urinating, dysuria and flank pain.   Musculoskeletal:  Positive for back pain (associated with abdominal fluid). Negative for joint swelling.   Neurological:  Negative for speech difficulty and weakness.   Psychiatric/Behavioral:  Negative for agitation and confusion.    Objective:     Vital Signs (Most  Recent):  Temp: 98 °F (36.7 °C) (10/03/22 1233)  Pulse: 101 (10/03/22 1233)  Resp: 18 (10/03/22 1233)  BP: 128/88 (10/03/22 1233)  SpO2: 97 % (10/03/22 1233)  O2 Device (Oxygen Therapy): room air (10/01/22 2312) Vital Signs (24h Range):  Temp:  [97.3 °F (36.3 °C)-98 °F (36.7 °C)] 98 °F (36.7 °C)  Pulse:  [] 101  Resp:  [16-18] 18  SpO2:  [97 %-100 %] 97 %  BP: ()/() 128/88     Weight: 65.2 kg (143 lb 13.6 oz) (10/03/22 0645)  Body mass index is 22.53 kg/m².  Body surface area is 1.76 meters squared.    I/O last 3 completed shifts:  In: 1380 [P.O.:1380]  Out: -     Physical Exam  Vitals reviewed.   Constitutional:       Appearance: She is underweight.   HENT:      Head: Normocephalic and atraumatic.      Right Ear: External ear normal.      Left Ear: External ear normal.      Nose: No congestion.   Eyes:      General: No scleral icterus.        Right eye: No discharge.         Left eye: No discharge.      Conjunctiva/sclera: Conjunctivae normal.   Neck:      Comments: No JVD appreciated  Cardiovascular:      Rate and Rhythm: Normal rate and regular rhythm.   Pulmonary:      Effort: Pulmonary effort is normal. No respiratory distress.   Abdominal:      General: There is distension.      Tenderness: There is abdominal tenderness. There is no right CVA tenderness, left CVA tenderness or guarding.      Comments: Decreased bowel sounds   Musculoskeletal:      Cervical back: Normal range of motion.      Right lower leg: Edema (3+) present.      Left lower leg: Edema (3+) present.      Comments: Swelling up to thighs/hips   Skin:     General: Skin is warm and dry.   Neurological:      Mental Status: She is alert and oriented to person, place, and time. Mental status is at baseline.   Psychiatric:         Mood and Affect: Mood normal.         Behavior: Behavior normal.       Significant Labs:  All labs within the past 24 hours have been reviewed.    Significant Imaging:  Imaging from past 24 hours  reviewed

## 2022-10-03 NOTE — ASSESSMENT & PLAN NOTE
Abdominal pain likely 2/2 to compression from ascites   CT A/P (10/1/22) revealed resolution of bowel distension now with large and small intestines normal caliber and compressed by massive ascites. No evidence of bowel obstruction.    Paracentesis performed on 10/2/22. 5 L drained, pt tolerated procedure well. 2nd paracentesis performed by IR on 10/3/22, 5.4L drained. Pleur-X catheter was not placed. Plan for pt to have catheter placed at [facility] on 10/6/22. Pt states this was originally planned before having to come to [facility].     Plan:   - Pain control with oxycodone 5 mg PRN q 4 hours and IV morphine 4 mg PRN q 4 hours  - Antiemetics with Phenergan   - possible 3rd paracentesis on 10/5/22 if needed

## 2022-10-03 NOTE — PLAN OF CARE
Paracentesis complete. Removed 5450 mL. Albumin 37.5g given IV. Patient AAOx4, no distress noted, respirations even and unlabored. VSS. Pt stable for transport back to room at this time. Report called to floor RN.

## 2022-10-03 NOTE — PLAN OF CARE
Patient AAO X 4, VSS. Gave PRN for pain. Patient had complaints of constipation, ordered a suppository. Patient had 1 small BM. Skin intact. Old right para site covered with guaze and Tegaderm. Abdomen distended. Patient is NPO at midnight for para and possible pleur x cath today. Collected urine specimen yesterday. Up ambulatory, bed locked at lowest setting, yellow socks on, call bell in reach. Remains free from falls.

## 2022-10-03 NOTE — H&P
Inpatient Radiology Pre-procedure Note    History of Present Illness:  Puja Quigley is a 42 y.o. female who presents for ultrasound guided paracentesis.  Admission H&P reviewed.  Past Medical History:   Diagnosis Date    Anxiety     Dehydration 5/30/2022    Gastric cancer     Gastric ulcer     Hx of psychiatric care     Pregnancy 08/12/2020    delivered on 8/12/2020    Umbilical hernia      Past Surgical History:   Procedure Laterality Date    CLOSURE OF PERFORATED ULCER OF DUODENUM USING OMENTAL PATCH      ESOPHAGOGASTRODUODENOSCOPY N/A 10/13/2020    Procedure: EGD (ESOPHAGOGASTRODUODENOSCOPY);  Surgeon: Rosio Marion MD;  Location: [facility] (2ND FLR);  Service: Endoscopy;  Laterality: N/A;    ESOPHAGOGASTRODUODENOSCOPY N/A 12/11/2020    Procedure: EGD (ESOPHAGOGASTRODUODENOSCOPY);  Surgeon: Rosio Marion MD;  Location: [facility] (2ND FLR);  Service: Endoscopy;  Laterality: N/A;  Covid-19 test 12/8/20 at [facility]    ESOPHAGOGASTRODUODENOSCOPY N/A 3/12/2021    Procedure: EGD (ESOPHAGOGASTRODUODENOSCOPY);  Surgeon: Nav Omer MD;  Location: [facility] (2ND FLR);  Service: Endoscopy;  Laterality: N/A;  COVID at [facility] 3/9 ttr    ESOPHAGOGASTRODUODENOSCOPY N/A 5/18/2021    Procedure: EGD (ESOPHAGOGASTRODUODENOSCOPY);  Surgeon: Rosio Marion MD;  Location: [facility] (2ND FLR);  Service: Endoscopy;  Laterality: N/A;  5/15-covid pcw-inst portal-tb    ESOPHAGOGASTRODUODENOSCOPY N/A 5/26/2021    Procedure: EGD (ESOPHAGOGASTRODUODENOSCOPY);  Surgeon: Socrates Terrazas MD;  Location: [facility] (2ND FLR);  Service: Endoscopy;  Laterality: N/A;       Review of Systems:   As documented in primary team H&P    Home Meds:   Prior to Admission medications    Medication Sig Start Date End Date Taking? Authorizing Provider   baclofen (LIORESAL) 5 mg Tab tablet Take 1 tablet (5 mg total) by mouth 3 (three) times daily. 9/23/22   Sharon Brink MD   dexAMETHasone (DECADRON) 4 MG Tab 8 mg (two  tabs) twice daily on the day before chemo and the day after chemo 8/8/22   Fer Ashraf MD   dicyclomine (BENTYL) 10 MG capsule TAKE ONE CAPSULE BY MOUTH FOUR TIMES DAILY 9/27/21   Kamille Hooks PA-C   famotidine (PEPCID) 20 MG tablet Take 1 tablet (20 mg total) by mouth nightly as needed for Heartburn. 2/23/22 2/23/23  Kamilel Hooks PA-C   furosemide (LASIX) 20 MG tablet TAKE 1 TABLET(20 MG) BY MOUTH EVERY DAY 10/3/22   Fer Ashraf MD   gabapentin (NEURONTIN) 300 MG capsule Take 1 capsule (300 mg total) by mouth 3 (three) times daily. 8/22/22 8/22/23  Kamille Hooks PA-C   hydrocortisone 2.5 % cream Apply topically 2 (two) times daily. 7/11/22   Bethel Shetty MD   LIDOcaine HCL 2% (XYLOCAINE) 2 % jelly Apply topically as needed. 8/17/22   Sharon Brink MD   LIDOcaine-prilocaine (EMLA) cream Apply to Port-A-Cath area 30 to 45 minutes prior to port access as directed (topical anesthetic use to the anterior chest only). 8/17/22   Sharon Brink MD   methadone (DOLOPHINE) 5 mg/5 mL solution Take 2.5 mLs (2.5 mg total) by mouth every morning AND 5 mLs (5 mg total) every evening. 9/9/22 10/10/22  Sharon Brink MD   metoclopramide HCl (REGLAN) 5 MG tablet 5 mg. 6/11/21   Historical Provider   multivitamin with folic acid 400 mcg Tab Take 1 tablet by mouth once daily.    Historical Provider   naloxone (NARCAN) 4 mg/actuation Spry 4mg by nasal route as needed for opioid overdose; may repeat every 2-3 minutes in alternating nostrils until medical help arrives. Call 911 8/17/22   Sharon Brink MD   nortriptyline (PAMELOR) 25 MG capsule Take 1 capsule (25 mg total) by mouth every evening. 6/20/22 6/20/23  Bren Allen MD   omeprazole (PRILOSEC) 40 MG capsule Take 1 capsule (40 mg total) by mouth 2 (two) times daily before meals. 6/20/22 6/20/23  Bren Allen MD   ondansetron (ZOFRAN) 8 MG tablet TAKE 1 TABLET(8 MG) BY MOUTH EVERY 8 HOURS AS NEEDED FOR NAUSEA 8/29/22   Fer  KEITH Ashraf MD   oxyCODONE (ROXICODONE) 5 MG immediate release tablet Take 1 tablet (5 mg total) by mouth every 4 (four) hours as needed for Pain. 9/23/22   Fer Ashraf MD   polyethylene glycol (GLYCOLAX) 17 gram/dose powder Mix 1 capful (17 g) with liquid and take by mouth once daily for 20 days 6/21/22   Bren Allen MD   prochlorperazine (COMPAZINE) 10 MG tablet TAKE 1 TABLET(10 MG) BY MOUTH EVERY 6 HOURS AS NEEDED FOR NAUSEA 8/7/21   Fer Ashraf MD   promethazine (PHENERGAN) 25 MG tablet Take 1 tablet (25 mg total) by mouth every 6 (six) hours as needed for Nausea. 9/9/22   Fer Ashraf MD   senna-docusate 8.6-50 mg (SENNA WITH DOCUSATE SODIUM) 8.6-50 mg per tablet Take 1 tablet by mouth 2 (two) times daily as needed for Constipation. 5/26/21   Reshma Wislon MD   sucralfate (CARAFATE) 1 gram tablet TAKE 1 TABLET(1 GRAM) BY MOUTH FOUR TIMES DAILY FOR 14 DAYS 10/20/21   Rosio Marion MD   sucralfate (CARAFATE) 1 gram tablet  4/8/21   Historical Provider   traMADoL (ULTRAM) 50 mg tablet Take 50 mg by mouth. 6/25/21   Historical Provider   amitriptyline (ELAVIL) 10 MG tablet Take 1 tablet (10 mg total) by mouth every evening. 5/13/21 6/20/22  Socrates Terrazas MD   furosemide (LASIX) 20 MG tablet Take 1 tablet (20 mg total) by mouth once daily. 7/6/22 10/3/22  Fer Ashraf MD   pantoprazole (PROTONIX) 40 MG tablet Take 1 tablet (40 mg total) by mouth 2 (two) times daily. 5/25/22 6/20/22  Kamille Hooks PA-C     Scheduled Meds:    docusate sodium  50 mg Oral Daily    gabapentin  300 mg Oral TID    heparin (porcine)  5,000 Units Subcutaneous Q8H     Continuous Infusions:   PRN Meds:acetaminophen, bisacodyL, dextrose 10%, dextrose 10%, famotidine, glucagon (human recombinant), glucose, glucose, magnesium hydroxide 400 mg/5 ml, melatonin, morphine, naloxone, ondansetron, oxyCODONE, promethazine (PHENERGAN) IVPB, simethicone, sodium chloride 0.9%  Anticoagulants/Antiplatelets: no  anticoagulation    Allergies: Review of patient's allergies indicates:  No Known Allergies  Sedation Hx: have not been any systemic reactions    Vitals:  Temp: 97.3 °F (36.3 °C) (10/03/22 0745)  Pulse: 95 (10/03/22 0745)  Resp: 16 (10/03/22 0745)  BP: 110/88 (10/03/22 0745)  SpO2: 98 % (10/03/22 0745)     Physical Exam:  ASA: 3  Mallampati: n/a    General: no acute distress  Mental Status: alert and oriented to person, place and time  HEENT: normocephalic, atraumatic  Chest: unlabored breathing  Heart: regular heart rate  Abdomen: distended  Extremity: moves all extremities    Plan: ultrasound guided paracentesis  Sedation Plan: local    RAJESH Medina, ALBAP  Interventional Radiology  [phone] [facility]

## 2022-10-03 NOTE — ASSESSMENT & PLAN NOTE
42 year-old f w hx of gastric adenocarcinoma c/b hepaticand peritoneal metastases. Patient has episodes of recurrent abdominal distension due to tense ascites requiring now bi-weekly paracentesis for relief. Patient with initially up trending creatinine level that peaked at 3.2 (serum creatinine 0.7 to 0.8) that is improving after her second large volume para that removed. Patient had CT scan on admission however did not get good look at kidneys 2/2 to massive ascites. Patient denies any obstructive type symptoms.    Patient notes that she is able to urinate normally, although she notes that when she has a lot of fluid in her abdomen she goes more frequently and is associated with some back pain. That improves after fluid is removed according to patient. Patient notes since March she has been requiring paracentesis and now they are becoming more frequent.      MARIA could have also been caused by large volume paracentesis-induced kidney injury from prior paracentesis on 10/1, however creatinine per chart check had already been elevated on arrival. Patient status post paracentesis on 10/3 with approximately 5L removed and improvement in her creatinine. Patient is constipated and notes not having bowel movement since being in hospital 2/2 to pain medications.    DDX for MARIA: pre-renal from decreased PO intake, obstruction from large volume ascites, ATN    Plan:  --MARIA 2/2 to compression from large volume ascites and pre-renal from decreased PO intake  --Can trial IV fluids to help with likely pre-renal etiology  --CMP, phosphorus, Mg daily  --Avoid nephrotoxic agents  --Stricts ins/outs  --consider bladder scan, however imaging wont be ideal with ascites  --renal ultrasound  --urine microscopy in AM

## 2022-10-03 NOTE — PROGRESS NOTES
[facility]  Nephrology  Progress Note    Patient Name: Puja Quigley  MRN: [id number]  Admission Date: 9/30/2022  Hospital Length of Stay: 2 days  Attending Provider: Corky Hawley MD   Primary Care Physician: Primary Doctor No  Principal Problem:Malignant ascites    Subjective:     HPI: 42 year-old F w/ hx of metastatic gastric cancer c/b peritoneal carcinomatosis and malignant ascites. Patient recently on third line docetaxel. She was initially admitted with severe abdominal pain and MARIA. She was found to have massive ascites that required large volume paracentesis on 10/1 with removal of 5.4L of fluid, and received albumin infusion. Patient reportedly gets two paracentesis per week. Last paracentesis on 9/23 removed 6.7L of fluid from the abdomen. Patient with baseline serum creatinine of 0.7 to 0.8 and presented with initial serum creatinine of 2.6-->3.2-->2.4.    Resolution of bowel distension now with large and small intestines normal caliber and compressed by massive ascites.  No evidence of bowel obstruction. Complex solid and cystic mass arising from the pelvis as described above seen on previous exam again compatible with either ovarian or gastric neoplasm.                Past Medical History:   Diagnosis Date    Anxiety     Dehydration 5/30/2022    Gastric cancer     Gastric ulcer     Hx of psychiatric care     Pregnancy 08/12/2020    delivered on 8/12/2020    Umbilical hernia        Past Surgical History:   Procedure Laterality Date    CLOSURE OF PERFORATED ULCER OF DUODENUM USING OMENTAL PATCH      ESOPHAGOGASTRODUODENOSCOPY N/A 10/13/2020    Procedure: EGD (ESOPHAGOGASTRODUODENOSCOPY);  Surgeon: Rosio Marion MD;  Location: [facility] ([address] [place] [address]);  Service: Endoscopy;  Laterality: N/A;    ESOPHAGOGASTRODUODENOSCOPY N/A 12/11/2020    Procedure: EGD (ESOPHAGOGASTRODUODENOSCOPY);  Surgeon: Rosio Marion MD;  Location: [facility] ([address] [place] [address]);  Service: Endoscopy;   Laterality: N/A;  Covid-19 test 12/8/20 at [facility] - pg    ESOPHAGOGASTRODUODENOSCOPY N/A 3/12/2021    Procedure: EGD (ESOPHAGOGASTRODUODENOSCOPY);  Surgeon: Nav Omer MD;  Location: [facility] ENDO (2ND FLR);  Service: Endoscopy;  Laterality: N/A;  COVID at [facility] 3/9 ttr    ESOPHAGOGASTRODUODENOSCOPY N/A 5/18/2021    Procedure: EGD (ESOPHAGOGASTRODUODENOSCOPY);  Surgeon: Rosio Marion MD;  Location: [facility] ENDO (2ND FLR);  Service: Endoscopy;  Laterality: N/A;  5/15-covid pcw-inst portal-tb    ESOPHAGOGASTRODUODENOSCOPY N/A 5/26/2021    Procedure: EGD (ESOPHAGOGASTRODUODENOSCOPY);  Surgeon: Socrates Terrazas MD;  Location: [facility] ENDO (2ND FLR);  Service: Endoscopy;  Laterality: N/A;       Review of patient's allergies indicates:  No Known Allergies  Current Facility-Administered Medications   Medication Frequency    acetaminophen tablet 650 mg Q8H PRN    albumin human 25% 25 % bottle     bisacodyL suppository 10 mg Daily PRN    dextrose 10% bolus 125 mL PRN    dextrose 10% bolus 250 mL PRN    docusate sodium capsule 50 mg Daily    famotidine tablet 20 mg Nightly PRN    gabapentin capsule 300 mg TID    glucagon (human recombinant) injection 1 mg PRN    glucose chewable tablet 16 g PRN    glucose chewable tablet 24 g PRN    heparin (porcine) injection 5,000 Units Q8H    magnesium hydroxide 400 mg/5 ml suspension 2,400 mg Daily PRN    melatonin tablet 6 mg Nightly PRN    morphine injection 4 mg Q4H PRN    naloxone 0.4 mg/mL injection 0.02 mg PRN    ondansetron disintegrating tablet 8 mg Q8H PRN    oxyCODONE immediate release tablet 5 mg Q4H PRN    promethazine (PHENERGAN) 6.25 mg in dextrose 5 % 50 mL IVPB Q6H PRN    simethicone chewable tablet 80 mg TID PRN    sodium chloride 0.9% flush 10 mL Q12H PRN     Family History       Problem Relation (Age of Onset)    Breast cancer Other (73), Paternal Cousin    Cancer Mother (67)    Lung cancer Father (48)    No Known Problems Son    Prostate  cancer Paternal Uncle    Schizophrenia Mother          Tobacco Use    Smoking status: Former     Types: Cigarettes     Quit date: 2019     Years since quittin.8    Smokeless tobacco: Never   Substance and Sexual Activity    Alcohol use: Yes    Drug use: Not Currently    Sexual activity: Yes     Partners: Male     Review of Systems   Constitutional:  Positive for activity change. Negative for diaphoresis.   HENT:  Negative for facial swelling and trouble swallowing.    Eyes:  Negative for pain and visual disturbance.   Respiratory:  Negative for shortness of breath and wheezing.    Cardiovascular:  Positive for leg swelling.   Gastrointestinal:  Positive for abdominal distention, abdominal pain and constipation. Negative for diarrhea, nausea and vomiting.   Endocrine: Negative for polydipsia and polyuria.   Genitourinary:  Positive for urgency (with increased abdominal fluid). Negative for decreased urine volume, difficulty urinating, dysuria and flank pain.   Musculoskeletal:  Positive for back pain (associated with abdominal fluid). Negative for joint swelling.   Neurological:  Negative for speech difficulty and weakness.   Psychiatric/Behavioral:  Negative for agitation and confusion.    Objective:     Vital Signs (Most Recent):  Temp: 98 °F (36.7 °C) (10/03/22 1233)  Pulse: 101 (10/03/22 1233)  Resp: 18 (10/03/22 1233)  BP: 128/88 (10/03/22 1233)  SpO2: 97 % (10/03/22 1233)  O2 Device (Oxygen Therapy): room air (10/01/22 2312) Vital Signs (24h Range):  Temp:  [97.3 °F (36.3 °C)-98 °F (36.7 °C)] 98 °F (36.7 °C)  Pulse:  [] 101  Resp:  [16-18] 18  SpO2:  [97 %-100 %] 97 %  BP: ()/() 128/88     Weight: 65.2 kg (143 lb 13.6 oz) (10/03/22 0645)  Body mass index is 22.53 kg/m².  Body surface area is 1.76 meters squared.    I/O last 3 completed shifts:  In: 1380 [P.O.:1380]  Out: -     Physical Exam  Vitals reviewed.   Constitutional:       Appearance: She is underweight.   HENT:       Head: Normocephalic and atraumatic.      Right Ear: External ear normal.      Left Ear: External ear normal.      Nose: No congestion.   Eyes:      General: No scleral icterus.        Right eye: No discharge.         Left eye: No discharge.      Conjunctiva/sclera: Conjunctivae normal.   Neck:      Comments: No JVD appreciated  Cardiovascular:      Rate and Rhythm: Normal rate and regular rhythm.   Pulmonary:      Effort: Pulmonary effort is normal. No respiratory distress.   Abdominal:      General: There is distension.      Tenderness: There is abdominal tenderness. There is no right CVA tenderness, left CVA tenderness or guarding.      Comments: Decreased bowel sounds   Musculoskeletal:      Cervical back: Normal range of motion.      Right lower leg: Edema (3+) present.      Left lower leg: Edema (3+) present.      Comments: Swelling up to thighs/hips   Skin:     General: Skin is warm and dry.   Neurological:      Mental Status: She is alert and oriented to person, place, and time. Mental status is at baseline.   Psychiatric:         Mood and Affect: Mood normal.         Behavior: Behavior normal.       Significant Labs:  All labs within the past 24 hours have been reviewed.    Significant Imaging:  Imaging from past 24 hours reviewed    Assessment/Plan:     MARIA (acute kidney injury)  42 year-old f w hx of gastric adenocarcinoma c/b hepaticand peritoneal metastases. Patient has episodes of recurrent abdominal distension due to tense ascites requiring now bi-weekly paracentesis for relief. Patient with initially up trending creatinine level that peaked at 3.2 (serum creatinine 0.7 to 0.8) that is improving after her second large volume para that removed. Patient had CT scan on admission however did not get good look at kidneys 2/2 to massive ascites. Patient denies any obstructive type symptoms.    Patient notes that she is able to urinate normally, although she notes that when she has a lot of fluid in her  abdomen she goes more frequently and is associated with some back pain. That improves after fluid is removed according to patient. Patient notes since March she has been requiring paracentesis and now they are becoming more frequent.      MARIA could have also been caused by large volume paracentesis-induced kidney injury from prior paracentesis on 10/1, however creatinine per chart check had already been elevated on arrival. Patient status post paracentesis on 10/3 with approximately 5L removed and improvement in her creatinine. Patient is constipated and notes not having bowel movement since being in hospital 2/2 to pain medications.    DDX for MARIA: pre-renal from decreased PO intake, obstruction from large volume ascites, ATN    Plan:  --MARIA 2/2 to compression from large volume ascites and pre-renal from decreased PO intake  --Can trial IV fluids to help with likely pre-renal etiology  --CMP, phosphorus, Mg daily  --Avoid nephrotoxic agents  --Stricts ins/outs  --consider bladder scan, however imaging wont be ideal with ascites  --renal ultrasound  --urine microscopy in AM             Thank you for your consult. I will follow-up with patient. Please contact us if you have any additional questions.    Bienvenido Muhammad MD  Nephrology  [facility]

## 2022-10-03 NOTE — NURSING
Patient AAO x4.  VSS  Pain medication per MAR.   Pt ambulating in room with minimal assist.  Small BM in AM.  Miralax and colace admin as per MAR.  Abdomen distended. Para today, site CDI.  Call light within reach.  No issues during shift.

## 2022-10-03 NOTE — ASSESSMENT & PLAN NOTE
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  PD-L1 < 1%.  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  We previously explained the implications of a stage IV diagnosis to her and potential treatment options.  She is now s/p port placement. She sought a second opinion at [facility] for zolbetuximab trial targeting CLDN protein but she did not test positive for this biomarker. We recommended proceeding with SOC FOLFOX, with which the [facility] team agreed. Because of the negative PD-L1 I do not think the benefit of adding nivolumab outweighs the potential toxicities.        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  She opted not to get genetic testing done at her appt with Phi. We recommended that she reconsider that decision in light of a very likely germline mutation in SAMUEL which has clinical consequences.       CT CAP after 6 cycles showed improvement in ascites, probable hepatic, thyroid and bone metastases. We dropped oxaliplatin starting with cycle 10 due to grade 1 neuropathy and desire to keep it from worsening.     CT CAP after cycle 10 showed stable disease.  CT scan after cycle 15 continued to display overall stability of disease within the gastric wall, peritoneum, spine and liver. Although increase ascites on imaging and clinically can represent progression of disease, her previous CT suggested overall stability within the liver and gastric wall, so we recommended to continue with treatment time. However, she continued to have increasing abdominal ascites that has been concerning for progression of disease. Hence, she had repeat imaging. On CT performed on 4/14/2022, she appeared to have worsening disease suggestive of progression. She was seen by Dr. Reid at [facility] for f/u and to discuss possible treatment options.  She was also evaluated by  Dr. Andree Mueller and Dr. See of Surgical Oncology at [facility].  Ultimately she was not felt to be a good surgical debulking candidate due to her ascites.  If her ascites improves during the course of chemotherapy and her performance status remains stable or improves, they would re-consider her again for palliative oophorectomy.      Was given ramucirumab with cycle 1 but this later was held given [facility] recs for just FOLFIRI. In addition, she didn't tolerate cycle 1 of cyramza well with significant N/V. She was started on second-line FOLFIRI. She received 5 cycles with FOLFIRI of irinotecan to 75 mg/m2 - has UGT1A1 homozygous mutation indicating poor metabolism.  Continue 5-FU infusion at 2200 mg/m2.     Repeat imaging with CT CAP after [facility] on 8/3/22 after cycle 5 of FOLFIRI demonstrated disease progression.  Dr. Reid recommended third line docetaxel biweekly at 35 mg/m2.      -s/p cycle 3 today of Docetaxel.

## 2022-10-04 PROBLEM — E87.1 HYPONATREMIA: Status: ACTIVE | Noted: 2022-10-04

## 2022-10-04 LAB
ALBUMIN SERPL BCP-MCNC: 1.7 G/DL (ref 3.5–5.2)
ALBUMIN SERPL BCP-MCNC: 1.8 G/DL (ref 3.5–5.2)
ALP SERPL-CCNC: 89 U/L (ref 55–135)
ALP SERPL-CCNC: 94 U/L (ref 55–135)
ALT SERPL W/O P-5'-P-CCNC: 9 U/L (ref 10–44)
ALT SERPL W/O P-5'-P-CCNC: 9 U/L (ref 10–44)
ANION GAP SERPL CALC-SCNC: 10 MMOL/L (ref 8–16)
ANION GAP SERPL CALC-SCNC: 10 MMOL/L (ref 8–16)
ANION GAP SERPL CALC-SCNC: 12 MMOL/L (ref 8–16)
APTT BLDCRRT: 33.6 SEC (ref 21–32)
APTT BLDCRRT: 69.8 SEC (ref 21–32)
AST SERPL-CCNC: 15 U/L (ref 10–40)
AST SERPL-CCNC: 15 U/L (ref 10–40)
BASOPHILS # BLD AUTO: 0.01 K/UL (ref 0–0.2)
BASOPHILS # BLD AUTO: 0.02 K/UL (ref 0–0.2)
BASOPHILS NFR BLD: 0.1 % (ref 0–1.9)
BASOPHILS NFR BLD: 0.2 % (ref 0–1.9)
BILIRUB SERPL-MCNC: 0.7 MG/DL (ref 0.1–1)
BILIRUB SERPL-MCNC: 0.7 MG/DL (ref 0.1–1)
BUN SERPL-MCNC: 43 MG/DL (ref 6–20)
BUN SERPL-MCNC: 43 MG/DL (ref 6–20)
BUN SERPL-MCNC: 44 MG/DL (ref 6–20)
CALCIUM SERPL-MCNC: 8.4 MG/DL (ref 8.7–10.5)
CALCIUM SERPL-MCNC: 8.5 MG/DL (ref 8.7–10.5)
CALCIUM SERPL-MCNC: 8.7 MG/DL (ref 8.7–10.5)
CHLORIDE SERPL-SCNC: 95 MMOL/L (ref 95–110)
CHLORIDE SERPL-SCNC: 96 MMOL/L (ref 95–110)
CHLORIDE SERPL-SCNC: 97 MMOL/L (ref 95–110)
CO2 SERPL-SCNC: 16 MMOL/L (ref 23–29)
CO2 SERPL-SCNC: 18 MMOL/L (ref 23–29)
CO2 SERPL-SCNC: 20 MMOL/L (ref 23–29)
CREAT SERPL-MCNC: 2.1 MG/DL (ref 0.5–1.4)
CREAT SERPL-MCNC: 2.1 MG/DL (ref 0.5–1.4)
CREAT SERPL-MCNC: 2.3 MG/DL (ref 0.5–1.4)
CREAT UR-MCNC: 211 MG/DL (ref 15–325)
DIFFERENTIAL METHOD: ABNORMAL
DIFFERENTIAL METHOD: ABNORMAL
EOSINOPHIL # BLD AUTO: 0 K/UL (ref 0–0.5)
EOSINOPHIL # BLD AUTO: 0 K/UL (ref 0–0.5)
EOSINOPHIL NFR BLD: 0.1 % (ref 0–8)
EOSINOPHIL NFR BLD: 0.1 % (ref 0–8)
ERYTHROCYTE [DISTWIDTH] IN BLOOD BY AUTOMATED COUNT: 20.4 % (ref 11.5–14.5)
ERYTHROCYTE [DISTWIDTH] IN BLOOD BY AUTOMATED COUNT: 20.7 % (ref 11.5–14.5)
EST. GFR  (NO RACE VARIABLE): 26.9 ML/MIN/1.73 M^2
EST. GFR  (NO RACE VARIABLE): 30 ML/MIN/1.73 M^2
EST. GFR  (NO RACE VARIABLE): 30 ML/MIN/1.73 M^2
GLUCOSE SERPL-MCNC: 83 MG/DL (ref 70–110)
GLUCOSE SERPL-MCNC: 90 MG/DL (ref 70–110)
GLUCOSE SERPL-MCNC: 90 MG/DL (ref 70–110)
HCT VFR BLD AUTO: 35.8 % (ref 37–48.5)
HCT VFR BLD AUTO: 37.3 % (ref 37–48.5)
HGB BLD-MCNC: 11 G/DL (ref 12–16)
HGB BLD-MCNC: 11.4 G/DL (ref 12–16)
IMM GRANULOCYTES # BLD AUTO: 0.07 K/UL (ref 0–0.04)
IMM GRANULOCYTES # BLD AUTO: 0.09 K/UL (ref 0–0.04)
IMM GRANULOCYTES NFR BLD AUTO: 0.5 % (ref 0–0.5)
IMM GRANULOCYTES NFR BLD AUTO: 0.7 % (ref 0–0.5)
INR PPP: 1.2 (ref 0.8–1.2)
LYMPHOCYTES # BLD AUTO: 0.8 K/UL (ref 1–4.8)
LYMPHOCYTES # BLD AUTO: 0.8 K/UL (ref 1–4.8)
LYMPHOCYTES NFR BLD: 5.7 % (ref 18–48)
LYMPHOCYTES NFR BLD: 6 % (ref 18–48)
MAGNESIUM SERPL-MCNC: 2.3 MG/DL (ref 1.6–2.6)
MCH RBC QN AUTO: 23.1 PG (ref 27–31)
MCH RBC QN AUTO: 23.3 PG (ref 27–31)
MCHC RBC AUTO-ENTMCNC: 30.6 G/DL (ref 32–36)
MCHC RBC AUTO-ENTMCNC: 30.7 G/DL (ref 32–36)
MCV RBC AUTO: 75 FL (ref 82–98)
MCV RBC AUTO: 76 FL (ref 82–98)
MONOCYTES # BLD AUTO: 0.6 K/UL (ref 0.3–1)
MONOCYTES # BLD AUTO: 0.7 K/UL (ref 0.3–1)
MONOCYTES NFR BLD: 4.7 % (ref 4–15)
MONOCYTES NFR BLD: 5.3 % (ref 4–15)
NEUTROPHILS # BLD AUTO: 11.2 K/UL (ref 1.8–7.7)
NEUTROPHILS # BLD AUTO: 11.8 K/UL (ref 1.8–7.7)
NEUTROPHILS NFR BLD: 87.9 % (ref 38–73)
NEUTROPHILS NFR BLD: 88.7 % (ref 38–73)
NRBC BLD-RTO: 0 /100 WBC
NRBC BLD-RTO: 1 /100 WBC
OSMOLALITY SERPL: 283 MOSM/KG (ref 275–295)
OSMOLALITY UR: 591 MOSM/KG (ref 50–1200)
PHOSPHATE SERPL-MCNC: 4.9 MG/DL (ref 2.7–4.5)
PLATELET # BLD AUTO: 303 K/UL (ref 150–450)
PLATELET # BLD AUTO: 318 K/UL (ref 150–450)
PMV BLD AUTO: 8.8 FL (ref 9.2–12.9)
PMV BLD AUTO: 9.1 FL (ref 9.2–12.9)
POCT GLUCOSE: 78 MG/DL (ref 70–110)
POTASSIUM SERPL-SCNC: 5.2 MMOL/L (ref 3.5–5.1)
POTASSIUM SERPL-SCNC: 5.3 MMOL/L (ref 3.5–5.1)
POTASSIUM SERPL-SCNC: 5.5 MMOL/L (ref 3.5–5.1)
PROT SERPL-MCNC: 4.7 G/DL (ref 6–8.4)
PROT SERPL-MCNC: 4.7 G/DL (ref 6–8.4)
PROTHROMBIN TIME: 12 SEC (ref 9–12.5)
RBC # BLD AUTO: 4.76 M/UL (ref 4–5.4)
RBC # BLD AUTO: 4.9 M/UL (ref 4–5.4)
SODIUM SERPL-SCNC: 124 MMOL/L (ref 136–145)
SODIUM SERPL-SCNC: 125 MMOL/L (ref 136–145)
SODIUM SERPL-SCNC: 125 MMOL/L (ref 136–145)
SODIUM UR-SCNC: <10 MMOL/L (ref 20–250)
UUN UR-MCNC: 726 MG/DL (ref 140–1050)
WBC # BLD AUTO: 12.75 K/UL (ref 3.9–12.7)
WBC # BLD AUTO: 13.28 K/UL (ref 3.9–12.7)

## 2022-10-04 PROCEDURE — 36415 COLL VENOUS BLD VENIPUNCTURE: CPT | Performed by: HOSPITALIST

## 2022-10-04 PROCEDURE — 85730 THROMBOPLASTIN TIME PARTIAL: CPT | Performed by: STUDENT IN AN ORGANIZED HEALTH CARE EDUCATION/TRAINING PROGRAM

## 2022-10-04 PROCEDURE — 36415 COLL VENOUS BLD VENIPUNCTURE: CPT | Performed by: STUDENT IN AN ORGANIZED HEALTH CARE EDUCATION/TRAINING PROGRAM

## 2022-10-04 PROCEDURE — 83930 ASSAY OF BLOOD OSMOLALITY: CPT

## 2022-10-04 PROCEDURE — 99232 SBSQ HOSP IP/OBS MODERATE 35: CPT | Mod: ,,, | Performed by: INTERNAL MEDICINE

## 2022-10-04 PROCEDURE — 84100 ASSAY OF PHOSPHORUS: CPT | Performed by: HOSPITALIST

## 2022-10-04 PROCEDURE — 85730 THROMBOPLASTIN TIME PARTIAL: CPT | Mod: 91 | Performed by: HOSPITALIST

## 2022-10-04 PROCEDURE — 83935 ASSAY OF URINE OSMOLALITY: CPT

## 2022-10-04 PROCEDURE — 84540 ASSAY OF URINE/UREA-N: CPT

## 2022-10-04 PROCEDURE — 80053 COMPREHEN METABOLIC PANEL: CPT | Mod: 91 | Performed by: HOSPITALIST

## 2022-10-04 PROCEDURE — 82570 ASSAY OF URINE CREATININE: CPT

## 2022-10-04 PROCEDURE — 20600001 HC STEP DOWN PRIVATE ROOM

## 2022-10-04 PROCEDURE — C9113 INJ PANTOPRAZOLE SODIUM, VIA: HCPCS | Performed by: STUDENT IN AN ORGANIZED HEALTH CARE EDUCATION/TRAINING PROGRAM

## 2022-10-04 PROCEDURE — 93005 ELECTROCARDIOGRAM TRACING: CPT

## 2022-10-04 PROCEDURE — 63600175 PHARM REV CODE 636 W HCPCS: Performed by: STUDENT IN AN ORGANIZED HEALTH CARE EDUCATION/TRAINING PROGRAM

## 2022-10-04 PROCEDURE — 80053 COMPREHEN METABOLIC PANEL: CPT | Performed by: STUDENT IN AN ORGANIZED HEALTH CARE EDUCATION/TRAINING PROGRAM

## 2022-10-04 PROCEDURE — 93010 EKG 12-LEAD: ICD-10-PCS | Mod: ,,, | Performed by: INTERNAL MEDICINE

## 2022-10-04 PROCEDURE — 84300 ASSAY OF URINE SODIUM: CPT

## 2022-10-04 PROCEDURE — 99232 PR SUBSEQUENT HOSPITAL CARE,LEVL II: ICD-10-PCS | Mod: ,,, | Performed by: INTERNAL MEDICINE

## 2022-10-04 PROCEDURE — 85025 COMPLETE CBC W/AUTO DIFF WBC: CPT | Performed by: STUDENT IN AN ORGANIZED HEALTH CARE EDUCATION/TRAINING PROGRAM

## 2022-10-04 PROCEDURE — 80048 BASIC METABOLIC PNL TOTAL CA: CPT | Mod: XB | Performed by: STUDENT IN AN ORGANIZED HEALTH CARE EDUCATION/TRAINING PROGRAM

## 2022-10-04 PROCEDURE — 93010 ELECTROCARDIOGRAM REPORT: CPT | Mod: ,,, | Performed by: INTERNAL MEDICINE

## 2022-10-04 PROCEDURE — 25000003 PHARM REV CODE 250

## 2022-10-04 PROCEDURE — 25000003 PHARM REV CODE 250: Performed by: STUDENT IN AN ORGANIZED HEALTH CARE EDUCATION/TRAINING PROGRAM

## 2022-10-04 PROCEDURE — 83735 ASSAY OF MAGNESIUM: CPT | Performed by: HOSPITALIST

## 2022-10-04 PROCEDURE — 85025 COMPLETE CBC W/AUTO DIFF WBC: CPT | Mod: 91 | Performed by: HOSPITALIST

## 2022-10-04 PROCEDURE — 85610 PROTHROMBIN TIME: CPT | Performed by: STUDENT IN AN ORGANIZED HEALTH CARE EDUCATION/TRAINING PROGRAM

## 2022-10-04 RX ORDER — HEPARIN SODIUM,PORCINE/D5W 25000/250
0-40 INTRAVENOUS SOLUTION INTRAVENOUS CONTINUOUS
Status: DISCONTINUED | OUTPATIENT
Start: 2022-10-04 | End: 2022-10-04

## 2022-10-04 RX ORDER — PANTOPRAZOLE SODIUM 40 MG/10ML
40 INJECTION, POWDER, LYOPHILIZED, FOR SOLUTION INTRAVENOUS DAILY
Status: DISCONTINUED | OUTPATIENT
Start: 2022-10-04 | End: 2022-10-05

## 2022-10-04 RX ORDER — ONDANSETRON 4 MG/1
4 TABLET, ORALLY DISINTEGRATING ORAL EVERY 6 HOURS PRN
Status: DISCONTINUED | OUTPATIENT
Start: 2022-10-04 | End: 2022-10-15 | Stop reason: HOSPADM

## 2022-10-04 RX ORDER — MAG HYDROX/ALUMINUM HYD/SIMETH 200-200-20
30 SUSPENSION, ORAL (FINAL DOSE FORM) ORAL ONCE
Status: COMPLETED | OUTPATIENT
Start: 2022-10-04 | End: 2022-10-04

## 2022-10-04 RX ORDER — LIDOCAINE HYDROCHLORIDE 20 MG/ML
15 SOLUTION OROPHARYNGEAL ONCE
Status: COMPLETED | OUTPATIENT
Start: 2022-10-04 | End: 2022-10-04

## 2022-10-04 RX ADMIN — DEXTROSE 125 ML: 10 SOLUTION INTRAVENOUS at 07:10

## 2022-10-04 RX ADMIN — HEPARIN SODIUM 16 UNITS/KG/HR: 10000 INJECTION, SOLUTION INTRAVENOUS at 09:10

## 2022-10-04 RX ADMIN — POLYETHYLENE GLYCOL 3350 17 G: 17 POWDER, FOR SOLUTION ORAL at 09:10

## 2022-10-04 RX ADMIN — LIDOCAINE HYDROCHLORIDE 15 ML: 20 SOLUTION ORAL; TOPICAL at 04:10

## 2022-10-04 RX ADMIN — ONDANSETRON 4 MG: 4 TABLET, ORALLY DISINTEGRATING ORAL at 09:10

## 2022-10-04 RX ADMIN — ALUMINUM HYDROXIDE, MAGNESIUM HYDROXIDE, AND DIMETHICONE 30 ML: 200; 20; 200 SUSPENSION ORAL at 04:10

## 2022-10-04 RX ADMIN — HEPARIN SODIUM 18 UNITS/KG/HR: 10000 INJECTION, SOLUTION INTRAVENOUS at 02:10

## 2022-10-04 RX ADMIN — ONDANSETRON 4 MG: 4 TABLET, ORALLY DISINTEGRATING ORAL at 12:10

## 2022-10-04 RX ADMIN — POLYETHYLENE GLYCOL 3350 17 G: 17 POWDER, FOR SOLUTION ORAL at 04:10

## 2022-10-04 RX ADMIN — Medication 2.5 MG: at 09:10

## 2022-10-04 RX ADMIN — ONDANSETRON 8 MG: 8 TABLET, ORALLY DISINTEGRATING ORAL at 04:10

## 2022-10-04 RX ADMIN — PROMETHAZINE HYDROCHLORIDE 6.25 MG: 25 INJECTION INTRAMUSCULAR; INTRAVENOUS at 06:10

## 2022-10-04 RX ADMIN — PANTOPRAZOLE SODIUM 40 MG: 40 INJECTION, POWDER, FOR SOLUTION INTRAVENOUS at 01:10

## 2022-10-04 RX ADMIN — GABAPENTIN 300 MG: 300 CAPSULE ORAL at 04:10

## 2022-10-04 NOTE — PROGRESS NOTES
[facility] - [facility] Stepdown  Nephrology  Progress Note    Patient Name: Puja Quigley  MRN: [id number]  Admission Date: 9/30/2022  Hospital Length of Stay: 3 days  Attending Provider: Corky Hawley MD   Primary Care Physician: Primary Doctor No  Principal Problem:Malignant ascites    Subjective:     HPI: 42 year-old F w/ hx of metastatic gastric cancer c/b peritoneal carcinomatosis and malignant ascites. Patient recently on third line docetaxel. She was initially admitted with severe abdominal pain and MARIA. She was found to have massive ascites that required large volume paracentesis on 10/1 with removal of 5.4L of fluid, and received albumin infusion. Patient reportedly gets two paracentesis per week. Last paracentesis on 9/23 removed 6.7L of fluid from the abdomen. Patient with baseline serum creatinine of 0.7 to 0.8 and presented with initial serum creatinine of 2.6-->3.2-->2.4.                     Interval History: Patient reports still having trouble keeping fluid down, spitting up brown fluid. Creatinine improving after paracentesis on 10/3. No acute findings on renal ultrasound.    Review of patient's allergies indicates:  No Known Allergies  Current Facility-Administered Medications   Medication Frequency    acetaminophen tablet 650 mg Q8H PRN    baclofen 5 mg/mL oral liquid (PEDS) 2.5 mg PRN    dextrose 10% bolus 125 mL PRN    dextrose 10% bolus 250 mL PRN    famotidine tablet 20 mg Nightly PRN    gabapentin capsule 300 mg TID    glucagon (human recombinant) injection 1 mg PRN    glucose chewable tablet 16 g PRN    glucose chewable tablet 24 g PRN    heparin 25,000 units in dextrose 5% (100 units/ml) IV bolus from bag - ADDITIONAL PRN BOLUS - 30 units/kg PRN    heparin 25,000 units in dextrose 5% (100 units/ml) IV bolus from bag - ADDITIONAL PRN BOLUS - 60 units/kg PRN    heparin 25,000 units in dextrose 5% 250 mL (100 units/mL) infusion HIGH INTENSITY nomogram - [facility] Continuous     magnesium hydroxide 400 mg/5 ml suspension 2,400 mg Daily PRN    melatonin tablet 6 mg Nightly PRN    morphine injection 4 mg Q4H PRN    naloxone 0.4 mg/mL injection 0.02 mg PRN    ondansetron disintegrating tablet 4 mg Q6H PRN    oxyCODONE immediate release tablet 5 mg Q4H PRN    polyethylene glycol packet 17 g TID    promethazine (PHENERGAN) 6.25 mg in dextrose 5 % 50 mL IVPB Q6H PRN    simethicone chewable tablet 80 mg TID PRN    sodium chloride 0.9% flush 10 mL Q12H PRN       Objective:     Vital Signs (Most Recent):  Temp: 98 °F (36.7 °C) (10/04/22 0738)  Pulse: (!) 120 (10/04/22 0738)  Resp: 20 (10/04/22 0738)  BP: 94/70 (10/04/22 0738)  SpO2: 97 % (10/04/22 0738)  O2 Device (Oxygen Therapy): room air (10/04/22 0738)   Vital Signs (24h Range):  Temp:  [96.9 °F (36.1 °C)-98 °F (36.7 °C)] 98 °F (36.7 °C)  Pulse:  [] 120  Resp:  [15-20] 20  SpO2:  [97 %-100 %] 97 %  BP: ()/(66-88) 94/70     Weight: 65.2 kg (143 lb 13.6 oz) (10/03/22 0645)  Body mass index is 22.53 kg/m².  Body surface area is 1.76 meters squared.    I/O last 3 completed shifts:  In: 600 [P.O.:600]  Out: 200 [Urine:200]    Physical Exam  Vitals reviewed.   Constitutional:       Appearance: She is underweight.   HENT:      Head: Normocephalic and atraumatic.      Right Ear: External ear normal.      Left Ear: External ear normal.      Nose: No congestion.   Eyes:      General: No scleral icterus.        Right eye: No discharge.         Left eye: No discharge.   Neck:      Comments: No JVD appreciated  Cardiovascular:      Rate and Rhythm: Normal rate and regular rhythm.   Pulmonary:      Effort: Pulmonary effort is normal. No respiratory distress.   Abdominal:      General: There is distension.      Tenderness: There is abdominal tenderness.   Musculoskeletal:      Cervical back: Normal range of motion.      Right lower leg: Edema (3+) present.      Left lower leg: Edema (3+) present.      Comments: Swelling up to thighs/hips    Skin:     General: Skin is warm and dry.   Neurological:      Mental Status: She is alert and oriented to person, place, and time. Mental status is at baseline.   Psychiatric:         Mood and Affect: Mood normal.         Behavior: Behavior normal.       Significant Labs:  All labs within the past 24 hours have been reviewed.     Significant Imaging:  Imaging from past 24 hours has been reviewed    Assessment/Plan:     Hyponatremia  Patient with hyponatremia thought to be 2/2 to volume overload. Physical exam with lower extremity swelling with pitting edema. Abdomen still with ascites on retroperitoneal ultrasound. Serum OSM of 283, Urine sodium less than 10.    Plan:  --Likely 2/2 to volume overload  --recommend Lasix 160 IV 1x and monitor urine output for response  --recommend follow up CMP to check sodium after lasix given    MARIA (acute kidney injury)  42 year-old f w hx of gastric adenocarcinoma c/b hepaticand peritoneal metastases. Patient has episodes of recurrent abdominal distension due to tense ascites requiring now bi-weekly paracentesis for relief. Patient with initially up trending creatinine level that peaked at 3.2 (serum creatinine 0.7 to 0.8) that is improving after her second large volume para that removed. Patient had CT scan on admission however did not get good look at kidneys 2/2 to massive ascites. Patient denies any obstructive type symptoms.    Patient notes that she is able to urinate normally, although she notes that when she has a lot of fluid in her abdomen she goes more frequently and is associated with some back pain. That improves after fluid is removed according to patient. Patient notes since March she has been requiring paracentesis and now they are becoming more frequent.      MARIA could have also been caused by large volume paracentesis-induced kidney injury from prior paracentesis on 10/1, however creatinine per chart check had already been elevated on arrival. Patient status  post paracentesis on 10/3 with approximately 5L removed and improvement in her creatinine. Patient is constipated and notes not having bowel movement since being in hospital 2/2 to pain medications.    DDX for MARIA: pre-renal from decreased PO intake, obstruction from large volume ascites, ATN    Plan:  --MARIA 2/2 to compression from large volume ascites and pre-renal from decreased PO intake--improvement in creatinine today  --Given volume overload in extremities and abdomen suggest trial of lasix 160 mg and follow up urine output to see response. This may also help with her hyponatremia thought to be 2/2 to volume overload.  --CMP, phosphorus, Mg daily  --Avoid nephrotoxic agents  --avoid NSAIDs  --Stricts ins/outs  --renal ultrasound--no acute findings  --urine microscopy  --FeUrea of              Thank you for your consult. I will follow-up with patient. Please contact us if you have any additional questions.    Bienvenido Muhammad MD  Nephrology  [facility]

## 2022-10-04 NOTE — CODE/ RAPID DOCUMENTATION
RAPID RESPONSE NURSE ROUND       Rounding completed with charge RNIrene for MARIA reports NAD at this time. No additional concerns verbalized at this time. Instructed to call [phone] for further concerns or assistance.

## 2022-10-04 NOTE — PROGRESS NOTES
Admit Assessment    Patient Identification  Puja Quigley   :  1980  Admit Date:  2022  Attending Provider:  Corky Hawley MD              Referral:   Pt was admitted to  with a diagnosis of Malignant ascites, and was admitted this hospital stay due to SOB (shortness of breath) [R06.02]  Malignant ascites [R18.0]  MARIA (acute kidney injury) [N17.9]  Chest pain [R07.9]  Malignant neoplasm of stomach, unspecified location [C16.9].   is involved was referred to the Social Work Department via (Referal).  Patient presents as a 42 y.o. year old not  female.    Persons interviewed with patient's permission: sister(s), Mansi, [phone].    Living Situation:      Resides at [address] [facility] [address] [place] [address], phone: [phone] (home).  Patient lives with her significant other, Alvino, in a once floor home with no steps to get in.    (RETIRED) Functional Status Prior  Ambulation Prior: 0-->independent  Transferrin-->independent  Toiletin-->independent    Current or Past Agencies and Description of Services/Supplies    DME  Agency Name: None  Agency Phone Number: N/A       Home Health  Agency Name: None  Agency Phone Number: N/A  Services: N/A    IV Infusion  Agency Name: None  Agency Phone Number: N/A    Nutrition: Patient eating very little at this time. Being provided adult regular diet.    Outpatient Pharmacy:     [id number] - [place] -  [address] [facility]   [facility] [address]  Phone: [phone] Fax: [phone]    [facility] [address] [address]  Phone: [phone] Fax: [phone]      Patient Preference of agencies were not discussed at this time.    Patient/Caregiver informed of right to choose providers or agencies.  Patient provides permission to release any necessary information to [facility] and to Non-[facility] agencies as  "needed to facilitate patient care, treatment planning, and patient discharge planning.  Written and verbal resources provided.      Coping   Patient awake but did not appear alert and had great difficulty responding to any questions. SW relied on patient's sister, Mansi, for information.    Mansi reports patient is "dealing with it the best she can."      Patient has been working with Dr. Berumen.       Adjustment to Diagnosis and Treatment  Unable to assess    Emotional/Behavioral/Cognitive Issues  Patient has a two year old child at home.         History/Current Symptoms of Anxiety/Depression: Yes  History/Current Substance Use: Not discussed   Social History     Tobacco Use    Smoking status: Former     Types: Cigarettes     Quit date: 2019     Years since quittin.8    Smokeless tobacco: Never   Substance and Sexual Activity    Alcohol use: Yes    Drug use: Not Currently    Sexual activity: Yes     Partners: Male       Indications of Abuse/Neglect: No  Abuse Screen (yes response referral indicated)  Feels Unsafe at Home or Work/School: no  Physical Signs of Abuse Present: no    Financial:  Payer/Plan Subscr  Sex Relation Sub. Ins. ID Effective Group Num   1. BLUE CROSS BL* LUKAS EDMONDSON* 1980 Female Self [id number] 21 [id number]                                   [address] [address]   2. MEDICAID - * LUKAS EDMONDSON* 1980 Female Self [id number] [facility]1/[facility] [address] [address]                            Other identified concerns/needs: TBD    Plan: Patient will receive a PleurX drain tomorrow.    Interventions/Referrals: TBD  Patient/caregiver engaged in treatment planning process.     providing psychosocial and supportive counseling, resources, education, assistance and discharge planning as appropriate.  Patient/caregiver state understanding of  available resources,  following, remains " available.

## 2022-10-04 NOTE — SUBJECTIVE & OBJECTIVE
Interval History: Patient reports still having trouble keeping fluid down, spitting up brown fluid. Creatinine improving after paracentesis on 10/3. No acute findings on renal ultrasound.    Review of patient's allergies indicates:  No Known Allergies  Current Facility-Administered Medications   Medication Frequency    acetaminophen tablet 650 mg Q8H PRN    baclofen 5 mg/mL oral liquid (PEDS) 2.5 mg PRN    dextrose 10% bolus 125 mL PRN    dextrose 10% bolus 250 mL PRN    famotidine tablet 20 mg Nightly PRN    gabapentin capsule 300 mg TID    glucagon (human recombinant) injection 1 mg PRN    glucose chewable tablet 16 g PRN    glucose chewable tablet 24 g PRN    heparin 25,000 units in dextrose 5% (100 units/ml) IV bolus from bag - ADDITIONAL PRN BOLUS - 30 units/kg PRN    heparin 25,000 units in dextrose 5% (100 units/ml) IV bolus from bag - ADDITIONAL PRN BOLUS - 60 units/kg PRN    heparin 25,000 units in dextrose 5% 250 mL (100 units/mL) infusion HIGH INTENSITY nomogram - [facility] Continuous    magnesium hydroxide 400 mg/5 ml suspension 2,400 mg Daily PRN    melatonin tablet 6 mg Nightly PRN    morphine injection 4 mg Q4H PRN    naloxone 0.4 mg/mL injection 0.02 mg PRN    ondansetron disintegrating tablet 4 mg Q6H PRN    oxyCODONE immediate release tablet 5 mg Q4H PRN    polyethylene glycol packet 17 g TID    promethazine (PHENERGAN) 6.25 mg in dextrose 5 % 50 mL IVPB Q6H PRN    simethicone chewable tablet 80 mg TID PRN    sodium chloride 0.9% flush 10 mL Q12H PRN       Objective:     Vital Signs (Most Recent):  Temp: 98 °F (36.7 °C) (10/04/22 0738)  Pulse: (!) 120 (10/04/22 0738)  Resp: 20 (10/04/22 0738)  BP: 94/70 (10/04/22 0738)  SpO2: 97 % (10/04/22 0738)  O2 Device (Oxygen Therapy): room air (10/04/22 0738)   Vital Signs (24h Range):  Temp:  [96.9 °F (36.1 °C)-98 °F (36.7 °C)] 98 °F (36.7 °C)  Pulse:  [] 120  Resp:  [15-20] 20  SpO2:  [97 %-100 %] 97 %  BP: ()/(66-88) 94/70     Weight: 65.2 kg (143  lb 13.6 oz) (10/03/22 0645)  Body mass index is 22.53 kg/m².  Body surface area is 1.76 meters squared.    I/O last 3 completed shifts:  In: 600 [P.O.:600]  Out: 200 [Urine:200]    Physical Exam  Vitals reviewed.   Constitutional:       Appearance: She is underweight.   HENT:      Head: Normocephalic and atraumatic.      Right Ear: External ear normal.      Left Ear: External ear normal.      Nose: No congestion.   Eyes:      General: No scleral icterus.        Right eye: No discharge.         Left eye: No discharge.   Neck:      Comments: No JVD appreciated  Cardiovascular:      Rate and Rhythm: Normal rate and regular rhythm.   Pulmonary:      Effort: Pulmonary effort is normal. No respiratory distress.   Abdominal:      General: There is distension.      Tenderness: There is abdominal tenderness.   Musculoskeletal:      Cervical back: Normal range of motion.      Right lower leg: Edema (3+) present.      Left lower leg: Edema (3+) present.      Comments: Swelling up to thighs/hips   Skin:     General: Skin is warm and dry.   Neurological:      Mental Status: She is alert and oriented to person, place, and time. Mental status is at baseline.   Psychiatric:         Mood and Affect: Mood normal.         Behavior: Behavior normal.       Significant Labs:  All labs within the past 24 hours have been reviewed.     Significant Imaging:  Imaging from past 24 hours has been reviewed

## 2022-10-04 NOTE — PLAN OF CARE
Patient is AAOx4.  Patient is up to BSC with one person assist.   Patient with HR in 120s. EKG ordered and telemetry monitoring placed on patient.   GI cocktail ordered per patient request.   IV protonix push started.  Patient is to be NPO at midnight for plurex drain placement with IR tomorrow 10/5/22.  Heparin gtt paused per MD. Instructed to also hold heparin gtt overnight.   Urine collected for nephrology.  Bed is in lowest position with wheels locked.   Instructed to call for assistance.   Nonskid socks when oob.   WCTM

## 2022-10-04 NOTE — SUBJECTIVE & OBJECTIVE
Interval History: NAEO. Pt had Pleur-X catheter placed today, 10/5/22, by IR. Pt tolerated procedure well. Will give 160 mg IV Lasix, per Nephrology, to combat severe volume overload. Will continue to monitor pt's vitals and labs for improvement now that Pleur-X cathter is in place. Will continue heparin drip for femoral vein thrombus.      Oncology Treatment Plan:   OP DOCETAXEL (75 MG/M2) Q3W    Medications:  Continuous Infusions:   heparin (porcine) in D5W Stopped (10/04/22 1252)     Scheduled Meds:   aluminum-magnesium hydroxide-simethicone  30 mL Oral Once    And    LIDOcaine HCl 2%  15 mL Oral Once    gabapentin  300 mg Oral TID    pantoprazole  40 mg Intravenous Daily    polyethylene glycol  17 g Oral TID     PRN Meds:acetaminophen, baclofen, dextrose 10%, dextrose 10%, glucagon (human recombinant), glucose, glucose, heparin (PORCINE), heparin (PORCINE), magnesium hydroxide 400 mg/5 ml, melatonin, morphine, naloxone, ondansetron, oxyCODONE, promethazine (PHENERGAN) IVPB, simethicone, sodium chloride 0.9%     Review of Systems   Constitutional:  Negative for chills, diaphoresis, fatigue and fever.   HENT:  Negative for postnasal drip, sore throat and trouble swallowing.    Eyes:  Negative for visual disturbance.   Respiratory:  Negative for cough and shortness of breath.    Cardiovascular:  Positive for leg swelling. Negative for chest pain and palpitations.   Gastrointestinal:  Positive for abdominal distention, abdominal pain and constipation. Negative for nausea and vomiting.   Genitourinary:  Negative for dysuria.   Musculoskeletal:  Negative for back pain and myalgias.   Skin:  Negative for rash.   Neurological:  Negative for dizziness, syncope, weakness and headaches.   Hematological:  Does not bruise/bleed easily.   Psychiatric/Behavioral:  Negative for confusion and decreased concentration.    Objective:     Vital Signs (Most Recent):  Temp: 97.9 °F (36.6 °C) (10/04/22 1200)  Pulse: (!) 121 (10/04/22  1509)  Resp: 18 (10/04/22 1200)  BP: 124/86 (10/04/22 1200)  SpO2: 98 % (10/04/22 1200)   Vital Signs (24h Range):  Temp:  [96.9 °F (36.1 °C)-98 °F (36.7 °C)] 97.9 °F (36.6 °C)  Pulse:  [] 121  Resp:  [15-20] 18  SpO2:  [97 %-100 %] 98 %  BP: ()/(66-86) 124/86     Weight: 65.2 kg (143 lb 13.6 oz)  Body mass index is 22.53 kg/m².  Body surface area is 1.76 meters squared.      Intake/Output Summary (Last 24 hours) at 10/4/2022 1510  Last data filed at 10/3/2022 2300  Gross per 24 hour   Intake 200 ml   Output 200 ml   Net 0 ml       Physical Exam  Vitals and nursing note reviewed.   Constitutional:       General: She is not in acute distress.     Appearance: Normal appearance.   HENT:      Head: Normocephalic and atraumatic.      Mouth/Throat:      Mouth: Mucous membranes are moist.   Eyes:      General: No scleral icterus.     Extraocular Movements: Extraocular movements intact.      Conjunctiva/sclera: Conjunctivae normal.   Cardiovascular:      Rate and Rhythm: Regular rhythm. Tachycardia present.      Pulses: Normal pulses.      Heart sounds: Normal heart sounds. No murmur heard.    No friction rub. No gallop.   Pulmonary:      Effort: Pulmonary effort is normal. No respiratory distress.      Breath sounds: Normal breath sounds.   Abdominal:      General: Bowel sounds are normal. There is distension.      Tenderness: There is abdominal tenderness.   Musculoskeletal:      Right lower leg: Edema present.      Left lower leg: Edema present.   Neurological:      General: No focal deficit present.      Mental Status: She is alert and oriented to person, place, and time.   Psychiatric:         Mood and Affect: Mood normal.       Significant Labs:   All pertinent labs from the last 24 hours have been reviewed.    Diagnostic Results:  I have reviewed all pertinent imaging results/findings within the past 24 hours.

## 2022-10-04 NOTE — ASSESSMENT & PLAN NOTE
42 year-old f w hx of gastric adenocarcinoma c/b hepaticand peritoneal metastases. Patient has episodes of recurrent abdominal distension due to tense ascites requiring now bi-weekly paracentesis for relief. Patient with initially up trending creatinine level that peaked at 3.2 (serum creatinine 0.7 to 0.8) that is improving after her second large volume para that removed. Patient had CT scan on admission however did not get good look at kidneys 2/2 to massive ascites. Patient denies any obstructive type symptoms.    Patient notes that she is able to urinate normally, although she notes that when she has a lot of fluid in her abdomen she goes more frequently and is associated with some back pain. That improves after fluid is removed according to patient. Patient notes since March she has been requiring paracentesis and now they are becoming more frequent.      MARIA could have also been caused by large volume paracentesis-induced kidney injury from prior paracentesis on 10/1, however creatinine per chart check had already been elevated on arrival. Patient status post paracentesis on 10/3 with approximately 5L removed and improvement in her creatinine. Patient is constipated and notes not having bowel movement since being in hospital 2/2 to pain medications.    DDX for MARIA: pre-renal from decreased PO intake, obstruction from large volume ascites, ATN    Plan:  --MARIA 2/2 to compression from large volume ascites and pre-renal from decreased PO intake--improvement in creatinine today  --Given volume overload in extremities and abdomen suggest trial of lasix 160 mg and follow up urine output to see response. This may also help with her hyponatremia thought to be 2/2 to volume overload.  --CMP, phosphorus, Mg daily  --Avoid nephrotoxic agents  --avoid NSAIDs  --Stricts ins/outs  --renal ultrasound--no acute findings  --urine microscopy

## 2022-10-04 NOTE — H&P
Inpatient Radiology Pre-procedure Note    History of Present Illness:  Puja Quigley is a 42 y.o. female who presents for pmhx of metastatic gastric adenocarcinoma with recurrent ascites. IR consulted for Pleurx placement.     Admission H&P reviewed.  Past Medical History:   Diagnosis Date    Anxiety     Dehydration 5/30/2022    Gastric cancer     Gastric ulcer     Hx of psychiatric care     Pregnancy 08/12/2020    delivered on 8/12/2020    Umbilical hernia      Past Surgical History:   Procedure Laterality Date    CLOSURE OF PERFORATED ULCER OF DUODENUM USING OMENTAL PATCH      ESOPHAGOGASTRODUODENOSCOPY N/A 10/13/2020    Procedure: EGD (ESOPHAGOGASTRODUODENOSCOPY);  Surgeon: Rosio Marion MD;  Location: [facility] (2ND FLR);  Service: Endoscopy;  Laterality: N/A;    ESOPHAGOGASTRODUODENOSCOPY N/A 12/11/2020    Procedure: EGD (ESOPHAGOGASTRODUODENOSCOPY);  Surgeon: Rosio Marion MD;  Location: [facility] (2ND FLR);  Service: Endoscopy;  Laterality: N/A;  Covid-19 test 12/8/20 at [facility]    ESOPHAGOGASTRODUODENOSCOPY N/A 3/12/2021    Procedure: EGD (ESOPHAGOGASTRODUODENOSCOPY);  Surgeon: Nav Omer MD;  Location: [facility] (2ND FLR);  Service: Endoscopy;  Laterality: N/A;  COVID at [facility] 3/9 ttr    ESOPHAGOGASTRODUODENOSCOPY N/A 5/18/2021    Procedure: EGD (ESOPHAGOGASTRODUODENOSCOPY);  Surgeon: Rosio Marion MD;  Location: [facility] ENDO (2ND FLR);  Service: Endoscopy;  Laterality: N/A;  5/15-covid pcw-inst portal-tb    ESOPHAGOGASTRODUODENOSCOPY N/A 5/26/2021    Procedure: EGD (ESOPHAGOGASTRODUODENOSCOPY);  Surgeon: Socrates Terrazas MD;  Location: [facility] ENDO (2ND FLR);  Service: Endoscopy;  Laterality: N/A;       Review of Systems:   As documented in primary team H&P    Home Meds:   Prior to Admission medications    Medication Sig Start Date End Date Taking? Authorizing Provider   baclofen (LIORESAL) 5 mg Tab tablet Take 1 tablet (5 mg total) by mouth 3 (three) times daily. 9/23/22    Sharon Brink MD   dexAMETHasone (DECADRON) 4 MG Tab 8 mg (two tabs) twice daily on the day before chemo and the day after chemo 8/8/22   Fer Ashraf MD   dicyclomine (BENTYL) 10 MG capsule TAKE ONE CAPSULE BY MOUTH FOUR TIMES DAILY 9/27/21   Kamille Hooks PA-C   famotidine (PEPCID) 20 MG tablet Take 1 tablet (20 mg total) by mouth nightly as needed for Heartburn. 2/23/22 2/23/23  Kamille Hooks PA-C   furosemide (LASIX) 20 MG tablet TAKE 1 TABLET(20 MG) BY MOUTH EVERY DAY 10/3/22   Fer Ashraf MD   gabapentin (NEURONTIN) 300 MG capsule Take 1 capsule (300 mg total) by mouth 3 (three) times daily. 8/22/22 8/22/23  Kamille Hooks PA-C   hydrocortisone 2.5 % cream Apply topically 2 (two) times daily. 7/11/22   Bethel Shetty MD   LIDOcaine HCL 2% (XYLOCAINE) 2 % jelly Apply topically as needed. 8/17/22   Sharon Brink MD   LIDOcaine-prilocaine (EMLA) cream Apply to Port-A-Cath area 30 to 45 minutes prior to port access as directed (topical anesthetic use to the anterior chest only). 8/17/22   Sharon Brink MD   methadone (DOLOPHINE) 5 mg/5 mL solution Take 2.5 mLs (2.5 mg total) by mouth every morning AND 5 mLs (5 mg total) every evening. 9/9/22 10/10/22  Sharon Brink MD   metoclopramide HCl (REGLAN) 5 MG tablet 5 mg. 6/11/21   Historical Provider   multivitamin with folic acid 400 mcg Tab Take 1 tablet by mouth once daily.    Historical Provider   naloxone (NARCAN) 4 mg/actuation Spry 4mg by nasal route as needed for opioid overdose; may repeat every 2-3 minutes in alternating nostrils until medical help arrives. Call 911 8/17/22   Sharon Brink MD   nortriptyline (PAMELOR) 25 MG capsule Take 1 capsule (25 mg total) by mouth every evening. 6/20/22 6/20/23  Bren Allen MD   omeprazole (PRILOSEC) 40 MG capsule Take 1 capsule (40 mg total) by mouth 2 (two) times daily before meals. 6/20/22 6/20/23  Bren Allen MD   ondansetron (ZOFRAN) 8 MG tablet TAKE 1  TABLET(8 MG) BY MOUTH EVERY 8 HOURS AS NEEDED FOR NAUSEA 8/29/22   Fer Ashraf MD   oxyCODONE (ROXICODONE) 5 MG immediate release tablet Take 1 tablet (5 mg total) by mouth every 4 (four) hours as needed for Pain. 9/23/22   Fer Ashraf MD   polyethylene glycol (GLYCOLAX) 17 gram/dose powder Mix 1 capful (17 g) with liquid and take by mouth once daily for 20 days 6/21/22   Bren Allen MD   prochlorperazine (COMPAZINE) 10 MG tablet TAKE 1 TABLET(10 MG) BY MOUTH EVERY 6 HOURS AS NEEDED FOR NAUSEA 8/7/21   Fer Ashraf MD   promethazine (PHENERGAN) 25 MG tablet Take 1 tablet (25 mg total) by mouth every 6 (six) hours as needed for Nausea. 9/9/22   Fer Ashraf MD   senna-docusate 8.6-50 mg (SENNA WITH DOCUSATE SODIUM) 8.6-50 mg per tablet Take 1 tablet by mouth 2 (two) times daily as needed for Constipation. 5/26/21   Reshma Wilson MD   sucralfate (CARAFATE) 1 gram tablet TAKE 1 TABLET(1 GRAM) BY MOUTH FOUR TIMES DAILY FOR 14 DAYS 10/20/21   Rosio Marion MD   sucralfate (CARAFATE) 1 gram tablet  4/8/21   Historical Provider   traMADoL (ULTRAM) 50 mg tablet Take 50 mg by mouth. 6/25/21   Historical Provider   amitriptyline (ELAVIL) 10 MG tablet Take 1 tablet (10 mg total) by mouth every evening. 5/13/21 6/20/22  Socrates Terrazas MD   pantoprazole (PROTONIX) 40 MG tablet Take 1 tablet (40 mg total) by mouth 2 (two) times daily. 5/25/22 6/20/22  Kamille Hooks PA-C     Scheduled Meds:    gabapentin  300 mg Oral TID    pantoprazole  40 mg Intravenous Daily    polyethylene glycol  17 g Oral TID     Continuous Infusions:    heparin (porcine) in D5W Stopped (10/04/22 1252)     PRN Meds:acetaminophen, baclofen, dextrose 10%, dextrose 10%, glucagon (human recombinant), glucose, glucose, heparin (PORCINE), heparin (PORCINE), magnesium hydroxide 400 mg/5 ml, melatonin, morphine, naloxone, ondansetron, oxyCODONE, promethazine (PHENERGAN) IVPB, simethicone, sodium chloride  0.9%  Anticoagulants/Antiplatelets: no anticoagulation    Allergies: Review of patient's allergies indicates:  No Known Allergies    Labs:  Recent Labs   Lab 10/04/22  0155   INR 1.2       Recent Labs   Lab 10/04/22  0604   WBC 13.28*   HGB 11.4*   HCT 37.3   MCV 76*         Recent Labs   Lab 10/04/22  0604   GLU 90   *   K 5.2*   CL 95   CO2 20*   BUN 43*   CREATININE 2.3*   CALCIUM 8.5*   MG 2.3   ALT 9*   AST 15   ALBUMIN 1.8*   BILITOT 0.7         Vitals:  Temp: 97.9 °F (36.6 °C) (10/04/22 1200)  Pulse: (!) 120 (10/04/22 1200)  Resp: 18 (10/04/22 1200)  BP: 124/86 (10/04/22 1200)  SpO2: 98 % (10/04/22 1200)     Physical Exam:  ASA: 3  Mallampati: 2    General: no acute distress  Mental Status: alert and oriented to person, place and time  HEENT: normocephalic, atraumatic  Chest: unlabored breathing  Heart: regular heart rate  Abdomen: nondistended  Extremity: moves all extremities    Plan: Pleurx placement.   Sedation Plan: up to moderate sedation.    Tanika Richards

## 2022-10-04 NOTE — PLAN OF CARE
Patient AAO X 4, VSS. Gave PRN for pain. Patient had complaints of constipation and nausea. Patient keeps spitting up, gave zofran and phenergan .Skin intact. Patient had a para yesterday 5.4L removed. Abdomen distended. Patient went for LE US, showed thrombus. Started heparin gtt going at 11.4cc/hr. Aptt was 33.6. Retroperitoneal US also completed. Collected urine specimen yesterday. Up ambulatory, bed locked at lowest setting, yellow socks on, call bell in reach. Remains free from falls.

## 2022-10-04 NOTE — ASSESSMENT & PLAN NOTE
Patient with hyponatremia thought to be 2/2 to volume overload. Physical exam with lower extremity swelling with pitting edema. Abdomen still with ascites on retroperitoneal ultrasound. Serum OSM of 283, Urine sodium less than 10.    Plan:  --Likely 2/2 to volume overload  --CMP daily  --recommend Lasix 160 IV 1x and monitor urine output for response.

## 2022-10-05 PROBLEM — E87.5 HYPERKALEMIA: Status: ACTIVE | Noted: 2022-10-05

## 2022-10-05 PROBLEM — I82.90 THROMBUS: Status: ACTIVE | Noted: 2022-10-05

## 2022-10-05 LAB
ALBUMIN SERPL BCP-MCNC: 1.6 G/DL (ref 3.5–5.2)
ALBUMIN SERPL BCP-MCNC: 1.6 G/DL (ref 3.5–5.2)
ALP SERPL-CCNC: 103 U/L (ref 55–135)
ALP SERPL-CCNC: 99 U/L (ref 55–135)
ALT SERPL W/O P-5'-P-CCNC: 13 U/L (ref 10–44)
ALT SERPL W/O P-5'-P-CCNC: 8 U/L (ref 10–44)
ANION GAP SERPL CALC-SCNC: 11 MMOL/L (ref 8–16)
ANION GAP SERPL CALC-SCNC: 12 MMOL/L (ref 8–16)
AST SERPL-CCNC: 18 U/L (ref 10–40)
AST SERPL-CCNC: 20 U/L (ref 10–40)
BASOPHILS # BLD AUTO: 0.01 K/UL (ref 0–0.2)
BASOPHILS # BLD AUTO: 0.01 K/UL (ref 0–0.2)
BASOPHILS NFR BLD: 0.1 % (ref 0–1.9)
BASOPHILS NFR BLD: 0.1 % (ref 0–1.9)
BILIRUB SERPL-MCNC: 0.6 MG/DL (ref 0.1–1)
BILIRUB SERPL-MCNC: 0.7 MG/DL (ref 0.1–1)
BUN SERPL-MCNC: 49 MG/DL (ref 6–20)
BUN SERPL-MCNC: 51 MG/DL (ref 6–20)
CALCIUM SERPL-MCNC: 8.4 MG/DL (ref 8.7–10.5)
CALCIUM SERPL-MCNC: 8.6 MG/DL (ref 8.7–10.5)
CHLORIDE SERPL-SCNC: 96 MMOL/L (ref 95–110)
CHLORIDE SERPL-SCNC: 97 MMOL/L (ref 95–110)
CO2 SERPL-SCNC: 16 MMOL/L (ref 23–29)
CO2 SERPL-SCNC: 21 MMOL/L (ref 23–29)
CREAT SERPL-MCNC: 2.3 MG/DL (ref 0.5–1.4)
CREAT SERPL-MCNC: 2.3 MG/DL (ref 0.5–1.4)
DIFFERENTIAL METHOD: ABNORMAL
DIFFERENTIAL METHOD: ABNORMAL
EOSINOPHIL # BLD AUTO: 0 K/UL (ref 0–0.5)
EOSINOPHIL # BLD AUTO: 0 K/UL (ref 0–0.5)
EOSINOPHIL NFR BLD: 0.1 % (ref 0–8)
EOSINOPHIL NFR BLD: 0.2 % (ref 0–8)
ERYTHROCYTE [DISTWIDTH] IN BLOOD BY AUTOMATED COUNT: 20.6 % (ref 11.5–14.5)
ERYTHROCYTE [DISTWIDTH] IN BLOOD BY AUTOMATED COUNT: 20.7 % (ref 11.5–14.5)
EST. GFR  (NO RACE VARIABLE): 26.9 ML/MIN/1.73 M^2
EST. GFR  (NO RACE VARIABLE): 26.9 ML/MIN/1.73 M^2
FACT X PPP CHRO-ACNC: 0.39 IU/ML (ref 0.3–0.7)
FACT X PPP CHRO-ACNC: <0.1 IU/ML (ref 0.3–0.7)
GLUCOSE SERPL-MCNC: 77 MG/DL (ref 70–110)
GLUCOSE SERPL-MCNC: 90 MG/DL (ref 70–110)
HCT VFR BLD AUTO: 33.5 % (ref 37–48.5)
HCT VFR BLD AUTO: 34.1 % (ref 37–48.5)
HGB BLD-MCNC: 10.3 G/DL (ref 12–16)
HGB BLD-MCNC: 10.6 G/DL (ref 12–16)
IMM GRANULOCYTES # BLD AUTO: 0.05 K/UL (ref 0–0.04)
IMM GRANULOCYTES # BLD AUTO: 0.06 K/UL (ref 0–0.04)
IMM GRANULOCYTES NFR BLD AUTO: 0.4 % (ref 0–0.5)
IMM GRANULOCYTES NFR BLD AUTO: 0.5 % (ref 0–0.5)
INR PPP: 1.1 (ref 0.8–1.2)
LYMPHOCYTES # BLD AUTO: 0.7 K/UL (ref 1–4.8)
LYMPHOCYTES # BLD AUTO: 0.8 K/UL (ref 1–4.8)
LYMPHOCYTES NFR BLD: 5.8 % (ref 18–48)
LYMPHOCYTES NFR BLD: 6.6 % (ref 18–48)
MAGNESIUM SERPL-MCNC: 2.3 MG/DL (ref 1.6–2.6)
MCH RBC QN AUTO: 22.8 PG (ref 27–31)
MCH RBC QN AUTO: 23.2 PG (ref 27–31)
MCHC RBC AUTO-ENTMCNC: 30.7 G/DL (ref 32–36)
MCHC RBC AUTO-ENTMCNC: 31.1 G/DL (ref 32–36)
MCV RBC AUTO: 74 FL (ref 82–98)
MCV RBC AUTO: 75 FL (ref 82–98)
MONOCYTES # BLD AUTO: 0.8 K/UL (ref 0.3–1)
MONOCYTES # BLD AUTO: 0.9 K/UL (ref 0.3–1)
MONOCYTES NFR BLD: 6.2 % (ref 4–15)
MONOCYTES NFR BLD: 7.1 % (ref 4–15)
NEUTROPHILS # BLD AUTO: 10.7 K/UL (ref 1.8–7.7)
NEUTROPHILS # BLD AUTO: 10.9 K/UL (ref 1.8–7.7)
NEUTROPHILS NFR BLD: 85.5 % (ref 38–73)
NEUTROPHILS NFR BLD: 87.4 % (ref 38–73)
NRBC BLD-RTO: 1 /100 WBC
NRBC BLD-RTO: 1 /100 WBC
PHOSPHATE SERPL-MCNC: 4.7 MG/DL (ref 2.7–4.5)
PLATELET # BLD AUTO: 290 K/UL (ref 150–450)
PLATELET # BLD AUTO: 290 K/UL (ref 150–450)
PMV BLD AUTO: 8.9 FL (ref 9.2–12.9)
PMV BLD AUTO: 9 FL (ref 9.2–12.9)
POTASSIUM SERPL-SCNC: 4.9 MMOL/L (ref 3.5–5.1)
POTASSIUM SERPL-SCNC: 5.6 MMOL/L (ref 3.5–5.1)
PROT SERPL-MCNC: 4.7 G/DL (ref 6–8.4)
PROT SERPL-MCNC: 4.8 G/DL (ref 6–8.4)
PROTHROMBIN TIME: 11.7 SEC (ref 9–12.5)
RBC # BLD AUTO: 4.52 M/UL (ref 4–5.4)
RBC # BLD AUTO: 4.56 M/UL (ref 4–5.4)
SODIUM SERPL-SCNC: 125 MMOL/L (ref 136–145)
SODIUM SERPL-SCNC: 128 MMOL/L (ref 136–145)
WBC # BLD AUTO: 12.25 K/UL (ref 3.9–12.7)
WBC # BLD AUTO: 12.77 K/UL (ref 3.9–12.7)

## 2022-10-05 PROCEDURE — 25000003 PHARM REV CODE 250: Performed by: STUDENT IN AN ORGANIZED HEALTH CARE EDUCATION/TRAINING PROGRAM

## 2022-10-05 PROCEDURE — 25000003 PHARM REV CODE 250

## 2022-10-05 PROCEDURE — 20600001 HC STEP DOWN PRIVATE ROOM

## 2022-10-05 PROCEDURE — 99232 SBSQ HOSP IP/OBS MODERATE 35: CPT | Mod: ,,, | Performed by: INTERNAL MEDICINE

## 2022-10-05 PROCEDURE — 99233 PR SUBSEQUENT HOSPITAL CARE,LEVL III: ICD-10-PCS | Mod: ,,, | Performed by: HOSPITALIST

## 2022-10-05 PROCEDURE — 36415 COLL VENOUS BLD VENIPUNCTURE: CPT | Performed by: HOSPITALIST

## 2022-10-05 PROCEDURE — 99232 PR SUBSEQUENT HOSPITAL CARE,LEVL II: ICD-10-PCS | Mod: ,,, | Performed by: INTERNAL MEDICINE

## 2022-10-05 PROCEDURE — 83735 ASSAY OF MAGNESIUM: CPT | Performed by: HOSPITALIST

## 2022-10-05 PROCEDURE — 63600175 PHARM REV CODE 636 W HCPCS: Performed by: STUDENT IN AN ORGANIZED HEALTH CARE EDUCATION/TRAINING PROGRAM

## 2022-10-05 PROCEDURE — 84100 ASSAY OF PHOSPHORUS: CPT | Performed by: HOSPITALIST

## 2022-10-05 PROCEDURE — 80053 COMPREHEN METABOLIC PANEL: CPT | Performed by: HOSPITALIST

## 2022-10-05 PROCEDURE — 25000003 PHARM REV CODE 250: Performed by: HOSPITALIST

## 2022-10-05 PROCEDURE — 36415 COLL VENOUS BLD VENIPUNCTURE: CPT | Performed by: STUDENT IN AN ORGANIZED HEALTH CARE EDUCATION/TRAINING PROGRAM

## 2022-10-05 PROCEDURE — 80053 COMPREHEN METABOLIC PANEL: CPT | Mod: 91

## 2022-10-05 PROCEDURE — C9113 INJ PANTOPRAZOLE SODIUM, VIA: HCPCS | Performed by: STUDENT IN AN ORGANIZED HEALTH CARE EDUCATION/TRAINING PROGRAM

## 2022-10-05 PROCEDURE — 99233 SBSQ HOSP IP/OBS HIGH 50: CPT | Mod: ,,, | Performed by: HOSPITALIST

## 2022-10-05 PROCEDURE — 36415 COLL VENOUS BLD VENIPUNCTURE: CPT

## 2022-10-05 PROCEDURE — 85520 HEPARIN ASSAY: CPT | Performed by: STUDENT IN AN ORGANIZED HEALTH CARE EDUCATION/TRAINING PROGRAM

## 2022-10-05 PROCEDURE — 63600175 PHARM REV CODE 636 W HCPCS: Performed by: HOSPITALIST

## 2022-10-05 PROCEDURE — 85025 COMPLETE CBC W/AUTO DIFF WBC: CPT | Mod: 91 | Performed by: STUDENT IN AN ORGANIZED HEALTH CARE EDUCATION/TRAINING PROGRAM

## 2022-10-05 PROCEDURE — 63600175 PHARM REV CODE 636 W HCPCS

## 2022-10-05 PROCEDURE — 85610 PROTHROMBIN TIME: CPT | Performed by: STUDENT IN AN ORGANIZED HEALTH CARE EDUCATION/TRAINING PROGRAM

## 2022-10-05 PROCEDURE — 85520 HEPARIN ASSAY: CPT | Mod: 91 | Performed by: HOSPITALIST

## 2022-10-05 PROCEDURE — 85025 COMPLETE CBC W/AUTO DIFF WBC: CPT | Performed by: HOSPITALIST

## 2022-10-05 RX ORDER — FENTANYL CITRATE 50 UG/ML
INJECTION, SOLUTION INTRAMUSCULAR; INTRAVENOUS
Status: COMPLETED | OUTPATIENT
Start: 2022-10-05 | End: 2022-10-05

## 2022-10-05 RX ORDER — LIDOCAINE HYDROCHLORIDE 10 MG/ML
INJECTION INFILTRATION; PERINEURAL
Status: COMPLETED | OUTPATIENT
Start: 2022-10-05 | End: 2022-10-05

## 2022-10-05 RX ORDER — MIDAZOLAM HYDROCHLORIDE 1 MG/ML
INJECTION INTRAMUSCULAR; INTRAVENOUS
Status: COMPLETED | OUTPATIENT
Start: 2022-10-05 | End: 2022-10-05

## 2022-10-05 RX ORDER — PANTOPRAZOLE SODIUM 40 MG/1
40 TABLET, DELAYED RELEASE ORAL DAILY
Status: DISCONTINUED | OUTPATIENT
Start: 2022-10-06 | End: 2022-10-12

## 2022-10-05 RX ORDER — FUROSEMIDE 10 MG/ML
120 INJECTION INTRAMUSCULAR; INTRAVENOUS ONCE
Status: DISCONTINUED | OUTPATIENT
Start: 2022-10-05 | End: 2022-10-05

## 2022-10-05 RX ORDER — MAG HYDROX/ALUMINUM HYD/SIMETH 200-200-20
30 SUSPENSION, ORAL (FINAL DOSE FORM) ORAL
Status: DISCONTINUED | OUTPATIENT
Start: 2022-10-05 | End: 2022-10-05

## 2022-10-05 RX ORDER — MAG HYDROX/ALUMINUM HYD/SIMETH 200-200-20
30 SUSPENSION, ORAL (FINAL DOSE FORM) ORAL EVERY 6 HOURS PRN
Status: DISCONTINUED | OUTPATIENT
Start: 2022-10-05 | End: 2022-10-15 | Stop reason: HOSPADM

## 2022-10-05 RX ORDER — HEPARIN SODIUM,PORCINE/D5W 25000/250
25 INTRAVENOUS SOLUTION INTRAVENOUS CONTINUOUS
Status: DISCONTINUED | OUTPATIENT
Start: 2022-10-05 | End: 2022-10-06

## 2022-10-05 RX ADMIN — MIDAZOLAM HYDROCHLORIDE 0.5 MG: 1 INJECTION, SOLUTION INTRAMUSCULAR; INTRAVENOUS at 08:10

## 2022-10-05 RX ADMIN — GABAPENTIN 300 MG: 300 CAPSULE ORAL at 07:10

## 2022-10-05 RX ADMIN — FENTANYL CITRATE 50 MCG: 50 INJECTION, SOLUTION INTRAMUSCULAR; INTRAVENOUS at 08:10

## 2022-10-05 RX ADMIN — POLYETHYLENE GLYCOL 3350 17 G: 17 POWDER, FOR SOLUTION ORAL at 07:10

## 2022-10-05 RX ADMIN — OXYCODONE 5 MG: 5 TABLET ORAL at 10:10

## 2022-10-05 RX ADMIN — POLYETHYLENE GLYCOL 3350 17 G: 17 POWDER, FOR SOLUTION ORAL at 04:10

## 2022-10-05 RX ADMIN — CHLOROTHIAZIDE SODIUM 500 MG: 500 INJECTION, POWDER, LYOPHILIZED, FOR SOLUTION INTRAVENOUS at 09:10

## 2022-10-05 RX ADMIN — PANTOPRAZOLE SODIUM 40 MG: 40 INJECTION, POWDER, FOR SOLUTION INTRAVENOUS at 10:10

## 2022-10-05 RX ADMIN — LIDOCAINE HYDROCHLORIDE 10 ML: 10 INJECTION, SOLUTION INFILTRATION; PERINEURAL at 08:10

## 2022-10-05 RX ADMIN — Medication 2.5 MG: at 01:10

## 2022-10-05 RX ADMIN — SODIUM ZIRCONIUM CYCLOSILICATE 10 G: 5 POWDER, FOR SUSPENSION ORAL at 12:10

## 2022-10-05 RX ADMIN — POLYETHYLENE GLYCOL 3350 17 G: 17 POWDER, FOR SOLUTION ORAL at 10:10

## 2022-10-05 RX ADMIN — GABAPENTIN 300 MG: 300 CAPSULE ORAL at 10:10

## 2022-10-05 RX ADMIN — PROMETHAZINE HYDROCHLORIDE 6.25 MG: 25 INJECTION INTRAMUSCULAR; INTRAVENOUS at 01:10

## 2022-10-05 RX ADMIN — ACETAMINOPHEN 650 MG: 325 TABLET ORAL at 12:10

## 2022-10-05 RX ADMIN — FUROSEMIDE 200 MG: 10 INJECTION, SOLUTION INTRAMUSCULAR; INTRAVENOUS at 10:10

## 2022-10-05 RX ADMIN — HEPARIN SODIUM 25 UNITS/KG/HR: 10000 INJECTION, SOLUTION INTRAVENOUS at 09:10

## 2022-10-05 RX ADMIN — HEPARIN SODIUM 25 UNITS/KG/HR: 10000 INJECTION, SOLUTION INTRAVENOUS at 04:10

## 2022-10-05 RX ADMIN — FUROSEMIDE 160 MG: 10 INJECTION, SOLUTION INTRAMUSCULAR; INTRAVENOUS at 12:10

## 2022-10-05 RX ADMIN — MORPHINE SULFATE 4 MG: 2 INJECTION, SOLUTION INTRAMUSCULAR; INTRAVENOUS at 07:10

## 2022-10-05 RX ADMIN — MORPHINE SULFATE 4 MG: 2 INJECTION, SOLUTION INTRAMUSCULAR; INTRAVENOUS at 01:10

## 2022-10-05 NOTE — ASSESSMENT & PLAN NOTE
US on bilateral lower extremities showed new nonocclusive thrombus in the mid right femoral vein.    - High intensity heparin drip IV

## 2022-10-05 NOTE — CARE UPDATE
RAPID RESPONSE NURSE ROUND       Rounding completed with U Charge RN, Sharon for tachycardia and electrolyte abnormalities.   No additional concerns verbalized at this time.   Instructed to call [phone] for further concerns or assistance.

## 2022-10-05 NOTE — DISCHARGE INSTRUCTIONS
Please call with any questions or concerns.      Monday thru Friday 8:00 am - 4:30 pm    Interventional Radiology   [phone]    After Hours    Ask for the Radiology Intern on call  [phone]    _____________________________________________________________________________________________________________________________      :  [facility], [phone], to provide home hospice services beginning with the nurse admission assessment visit after discharge from the hospital on 10/14/22. Hospice staff ordering comfort care medications, medical equipment, and additional Pleurx catheter supplies as needed.  +++Call agency upon arrival home so that the nurse is notified.+++    RU Solano, LCSW  Oncology Social Worker  [phone]  (Covering for patient's primary Oncology Social Worker Armani Mcclellan LCSW)

## 2022-10-05 NOTE — NURSING
Patient received s/p abdominal pleurX drain placement in MPU bay 4. See VS flowsheet. Procedure site dressing is clean and dry, no evidence of bleeding. Patient is drowsy but responsive to verbal stimuli. Patient to recover for 1 hour and return to floor. Fall precautions reviewed. Bed in lowest, locked position. Call light within reach.

## 2022-10-05 NOTE — ASSESSMENT & PLAN NOTE
Patient with potassium of 5.6, that has slowly been increasing since admission from 5.2.      Plan:  - lasix today 160mg  - lokelma packet

## 2022-10-05 NOTE — ASSESSMENT & PLAN NOTE
42 year-old f w hx of gastric adenocarcinoma c/b hepaticand peritoneal metastases. Patient has episodes of recurrent abdominal distension due to tense ascites requiring now bi-weekly paracentesis for relief. Patient with initially up trending creatinine level that peaked at 3.2 (serum creatinine 0.7 to 0.8) that is improving after her second large volume para that removed. Patient had CT scan on admission however did not get good look at kidneys 2/2 to massive ascites. Patient denies any obstructive type symptoms.    Patient notes that she is able to urinate normally, although she notes that when she has a lot of fluid in her abdomen she goes more frequently and is associated with some back pain. That improves after fluid is removed according to patient. Patient notes since March she has been requiring paracentesis and now they are becoming more frequent.      MARIA could have also been caused by large volume paracentesis-induced kidney injury from prior paracentesis on 10/1, however creatinine per chart check had already been elevated on arrival. Patient status post paracentesis on 10/3 with approximately 5L removed and improvement in her creatinine. Patient is constipated and notes not having bowel movement since being in hospital 2/2 to pain medications.    DDX for MARIA: pre-renal from decreased PO intake, obstruction from large volume ascites, ATN    Plan:  --MARIA 2/2 to compression from large volume ascites and   --Given volume overload in extremities and abdomen will order 160 mg of lasix    response. This may also help with hyponatremia thought to be 2/2 to volume overload.  --CMP, phosphorus, Mg daily  --Avoid nephrotoxic agents  --avoid NSAIDs  --Stricts ins/outs, input/output not put in chart on 10/4  --renal ultrasound--no acute findings  --urine microscopy was consistent with muddy brown casts---acute tubular necrosis likely  --patient s/p abdominal pleurx for malignant ascities

## 2022-10-05 NOTE — ASSESSMENT & PLAN NOTE
Patient with hyponatremia thought to be 2/2 to volume overload. Physical exam with lower extremity swelling with pitting edema. Abdomen still with ascites on retroperitoneal ultrasound. Serum OSM of 283, Urine sodium less than 10.    Plan:  --Likely 2/2 to volume overload  --CMP daily  --Ordered Lasix 160 IV

## 2022-10-05 NOTE — ASSESSMENT & PLAN NOTE
Baseline Cr 0.8, Cr 3.1 on admission  Hx of CKD: no  Associated with  fatigue, n/v  DDx: hypotension, hypovolemia, obstructive uropathy 2/2 to malignancy   No indications for HD at this time    MARIA has still not resolved after 2 paracentesis. Will consult Nephrology and f/u with recs     Plan:   - Trend Cr  - Strict I&Os  - Avoid nephrotoxic agents (NSAIDs, gadolinium, IV radiocontrast)  - Avoid hypotension  - Renally adjust medications   - Lasix 200 mg IV  - Diuril 500 mg IV   - follow up CMP at 8pm tonight, 10/5/22

## 2022-10-05 NOTE — PROGRESS NOTES
[facility] - Transplant Step[facility]  Hematology/Oncology  Progress Note    Patient Name: Puja Quigley  Admission Date: 9/30/2022  Hospital Length of Stay: 4 days  Code Status: Full Code     Subjective:     HPI:  Mrs. Quigley is a 42 F with PMHx of gastric adenocarcinoma with osseous, hepatic and peritoneal metastases complicated by recurrent abdominal distension and pain 2/2 recurrent painful, tense ascites requiring frequent decompression for symptom relief. presenting to the ED with the chief complaint of abdominal pain. Reports worsening abdominal swelling and BLE swelling over the past week. Reports undergoing paracentesis 1-2 times per week, but missed her sessions this week as she went out of town for her birthday. Reports her abdominal swelling has not been this significant in the past. Additionally reports clear productive cough. Feels shortness of breath is due to her abdominal distention. Denies fever, chest pain, vomiting, urinary or bowel movement changes.           Patient information was obtained from patient, past medical records, and ER records.      Oncology History:          Malignant neoplasm of stomach   6/10/2021 Initial Diagnosis     Gastric adenocarcinoma      7/26/2021 - 4/6/2022 Chemotherapy     Treatment Summary   Plan Name: OP FOLFOX 6 Q2W  Treatment Goal: Palliative  Status: Inactive  Start Date: 7/26/2021  End Date: 4/6/2022  Provider: Fer Ashraf MD  Chemotherapy: fluorouraciL 2,400 mg/m2 = 3,770 mg in sodium chloride 0.9% 100 mL chemo infusion, 2,400 mg/m2 = 3,770 mg, Intravenous, Over 46 hours, 19 of 20 cycles  Administration: 3,770 mg (7/26/2021), 3,770 mg (8/9/2021), 3,770 mg (8/23/2021), 3,770 mg (9/7/2021), 3,770 mg (9/21/2021), 3,770 mg (10/5/2021), 3,770 mg (10/19/2021), 3,770 mg (11/2/2021), 3,770 mg (11/15/2021), 3,770 mg (11/30/2021), 3,770 mg (12/13/2021), 4,000 mg (12/27/2021), 4,030 mg (1/10/2022), 4,030 mg (1/24/2022), 4,000 mg (2/7/2022), 4,000 mg (2/22/2022),  4,000 mg (3/7/2022), 4,000 mg (3/22/2022), 4,000 mg (4/4/2022)  oxaliplatin (ELOXATIN) 85 mg/m2 = 133 mg in dextrose 5 % 500 mL chemo infusion, 85 mg/m2 = 133 mg, Intravenous, Clinic/HOD 1 time, 9 of 9 cycles  Administration: 133 mg (7/26/2021), 133 mg (8/9/2021), 133 mg (8/23/2021), 133 mg (9/7/2021), 133 mg (9/21/2021), 133 mg (10/5/2021), 133 mg (10/19/2021), 133 mg (11/2/2021), 133 mg (11/15/2021)         5/26/2022 - 7/27/2022 Chemotherapy     Treatment Summary   Plan Name: OP FOLFIRI Q2WK  Treatment Goal: Palliative  Status: Inactive  Start Date: 5/26/2022  End Date: 7/27/2022  Provider: Fer Ashraf MD  Chemotherapy: dexAMETHasone (DECADRON) 4 MG Tab, 8 mg, Oral, Daily, 1 of 1 cycle, Start date: --, End date: --  irinotecan (CAMPTOSAR) 120 mg/m2 = 200 mg in sodium chloride 0.9% 500 mL chemo infusion, 120 mg/m2 = 200 mg (100 % of original dose 120 mg/m2), Intravenous, Clinic/HOD 1 time, 5 of 24 cycles  Dose modification: 125 mg/m2 (original dose 120 mg/m2, Cycle 1), 120 mg/m2 (original dose 120 mg/m2, Cycle 1), 75 mg/m2 (original dose 120 mg/m2, Cycle 2, Reason: Dose not tolerated)  Administration: 200 mg (5/26/2022), 126 mg (6/13/2022), 126 mg (6/27/2022), 134 mg (7/12/2022), 134 mg (7/25/2022)  ramucirumab (CYRAMZA) 471 mg in sodium chloride 0.9% 250 mL chemo infusion, 8 mg/kg = 471 mg, Intravenous, Clinic/HOD 1 time, 1 of 1 cycle  Administration: 471 mg (5/26/2022)         8/16/2022 -  Chemotherapy     Treatment Summary   Plan Name: OP DOCETAXEL (75 MG/M2) Q3W  Treatment Goal: Palliative  Status: Active  Start Date: 8/16/2022  End Date: 11/8/2022 (Planned)  Provider: Fer Ashraf MD  Chemotherapy: DOCEtaxeL (TAXOTERE) 35 mg/m2 = 65 mg in sodium chloride 0.9% 253.25 mL chemo infusion, 35 mg/m2 = 65 mg (46.7 % of original dose 75 mg/m2), Intravenous, Clinic/HOD 1 time, 2 of 7 cycles  Dose modification: 35 mg/m2 (original dose 75 mg/m2, Cycle 1, Reason: MD Discretion, Comment: per [facility]  recommendations)  Administration: 65 mg (8/16/2022), 65 mg (8/30/2022)       Interval History: NAEO. Pt had Pleur-X catheter placed today, 10/5/22, by IR. Pt tolerated procedure well. Will give 160 mg IV Lasix, per Nephrology, to combat severe volume overload. Will continue to monitor pt's vitals and labs for improvement now that Pleur-X cathter is in place. Will continue heparin drip for femoral vein thrombus.      Oncology Treatment Plan:   OP DOCETAXEL (75 MG/M2) Q3W    Medications:  Continuous Infusions:   heparin (porcine) in D5W Stopped (10/04/22 1252)     Scheduled Meds:   aluminum-magnesium hydroxide-simethicone  30 mL Oral Once    And    LIDOcaine HCl 2%  15 mL Oral Once    gabapentin  300 mg Oral TID    pantoprazole  40 mg Intravenous Daily    polyethylene glycol  17 g Oral TID     PRN Meds:acetaminophen, baclofen, dextrose 10%, dextrose 10%, glucagon (human recombinant), glucose, glucose, heparin (PORCINE), heparin (PORCINE), magnesium hydroxide 400 mg/5 ml, melatonin, morphine, naloxone, ondansetron, oxyCODONE, promethazine (PHENERGAN) IVPB, simethicone, sodium chloride 0.9%     Review of Systems   Constitutional:  Negative for chills, diaphoresis, fatigue and fever.   HENT:  Negative for postnasal drip, sore throat and trouble swallowing.    Eyes:  Negative for visual disturbance.   Respiratory:  Negative for cough and shortness of breath.    Cardiovascular:  Positive for leg swelling. Negative for chest pain and palpitations.   Gastrointestinal:  Positive for abdominal distention, abdominal pain and constipation. Negative for nausea and vomiting.   Genitourinary:  Negative for dysuria.   Musculoskeletal:  Negative for back pain and myalgias.   Skin:  Negative for rash.   Neurological:  Negative for dizziness, syncope, weakness and headaches.   Hematological:  Does not bruise/bleed easily.   Psychiatric/Behavioral:  Negative for confusion and decreased concentration.    Objective:     Vital Signs  (Most Recent):  Temp: 97.9 °F (36.6 °C) (10/04/22 1200)  Pulse: (!) 121 (10/04/22 1509)  Resp: 18 (10/04/22 1200)  BP: 124/86 (10/04/22 1200)  SpO2: 98 % (10/04/22 1200)   Vital Signs (24h Range):  Temp:  [96.9 °F (36.1 °C)-98 °F (36.7 °C)] 97.9 °F (36.6 °C)  Pulse:  [] 121  Resp:  [15-20] 18  SpO2:  [97 %-100 %] 98 %  BP: ()/(66-86) 124/86     Weight: 65.2 kg (143 lb 13.6 oz)  Body mass index is 22.53 kg/m².  Body surface area is 1.76 meters squared.      Intake/Output Summary (Last 24 hours) at 10/4/2022 1510  Last data filed at 10/3/2022 2300  Gross per 24 hour   Intake 200 ml   Output 200 ml   Net 0 ml       Physical Exam  Vitals and nursing note reviewed.   Constitutional:       General: She is not in acute distress.     Appearance: Normal appearance.   HENT:      Head: Normocephalic and atraumatic.      Mouth/Throat:      Mouth: Mucous membranes are moist.   Eyes:      General: No scleral icterus.     Extraocular Movements: Extraocular movements intact.      Conjunctiva/sclera: Conjunctivae normal.   Cardiovascular:      Rate and Rhythm: Regular rhythm. Tachycardia present.      Pulses: Normal pulses.      Heart sounds: Normal heart sounds. No murmur heard.    No friction rub. No gallop.   Pulmonary:      Effort: Pulmonary effort is normal. No respiratory distress.      Breath sounds: Normal breath sounds.   Abdominal:      General: Bowel sounds are normal. There is distension.      Tenderness: There is abdominal tenderness.   Musculoskeletal:      Right lower leg: Edema present.      Left lower leg: Edema present.   Neurological:      General: No focal deficit present.      Mental Status: She is alert and oriented to person, place, and time.   Psychiatric:         Mood and Affect: Mood normal.       Significant Labs:   All pertinent labs from the last 24 hours have been reviewed.    Diagnostic Results:  I have reviewed all pertinent imaging results/findings within the past 24  hours.    Assessment/Plan:     * Malignant ascites  Pt with known h/o ascites   Initially had peritoneal catheter after diagnosis, output slowed so was removed in October 2021 by IR.   bi-weekly paracentesis for now.  Last paracentesis 9/23 removed 6.7L of fluid from the abdomen.     Paracentesis performed on 10/2/22. 5 L drained, pt tolerated procedure well. 2nd paracentesis performed by IR on 10/3/22, 5.4L drained. Pleur-X catheter was not placed. Plan for pt to have catheter placed at [facility] on 10/6/22. Pt states this was originally planned before having to come to [facility]. Pleur-X catheter placed by IR, 10/5/22, in place, pt tolerated procedure well.     Plan:   - Pain control with oxycodone 5 mg PRN q 4 hours and IV morphine 4 mg PRN q 4 hours  - Antiemetics with Phenergan   - IV Lasix 160 mg per Nephro  - monitor volume status, vitals, and daily labs      Intractable abdominal pain  Abdominal pain likely 2/2 to compression from ascites   CT A/P (10/1/22) revealed resolution of bowel distension now with large and small intestines normal caliber and compressed by massive ascites. No evidence of bowel obstruction.    Paracentesis performed on 10/2/22. 5 L drained, pt tolerated procedure well. 2nd paracentesis performed by IR on 10/3/22, 5.4L drained. Pleur-X catheter was not placed. Plan for pt to have catheter placed at [facility] on 10/6/22. Pt states this was originally planned before having to come to [facility].     Plan:   - Pain control with oxycodone 5 mg PRN q 4 hours and IV morphine 4 mg PRN q 4 hours  - Antiemetics with Phenergan   - possible 3rd paracentesis on 10/5/22 if needed      MARIA (acute kidney injury)  Baseline Cr 0.8, Cr 3.1 on admission  Hx of CKD: no  Associated with  fatigue, n/v  DDx: hypotension, hypovolemia, obstructive uropathy 2/2 to malignancy   No indications for HD at this time    MARIA has still not resolved after 2 paracentesis. Will consult Nephrology and f/u with  recs     Plan:   - Trend Cr  - Strict I&Os  - Avoid nephrotoxic agents (NSAIDs, gadolinium, IV radiocontrast)  - Avoid hypotension  - Renally adjust medications   - Lasix 200 mg IV  - Diuril 500 mg IV   - follow up CMP at 8pm tonight, 10/5/22     Thrombus of R femoral vein  US on bilateral lower extremities showed new nonocclusive thrombus in the mid right femoral vein.    - High intensity heparin drip IV    Hyperkalemia  Patient with potassium of 5.6, that has slowly been increasing since admission from 5.2.      Plan:  - lasix today 160mg  - lokelma packet    Hyponatremia  Patient with hyponatremia thought to be 2/2 to volume overload. Physical exam with lower extremity swelling with pitting edema. Abdomen still with ascites on retroperitoneal ultrasound. Serum OSM of 283, Urine sodium less than 10.     Plan:  --Likely 2/2 to volume overload  --CMP daily  --Ordered Lasix 160 IV     Constipation  - Miralax TID   - will add lactulose and suppository bisacodyl if needed    Malignant neoplasm of stomach  HER-2 negative by FISH.  PD-L1 < 1%.  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  We previously explained the implications of a stage IV diagnosis to her and potential treatment options.  She is now s/p port placement. She sought a second opinion at [facility] for zolbetuximab trial targeting CLDN protein but she did not test positive for this biomarker. We recommended proceeding with SOC FOLFOX, with which the [facility] team agreed. Because of the negative PD-L1 I do not think the benefit of adding nivolumab outweighs the potential toxicities.        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  She opted not to get genetic testing done at her appt with [facility]. We recommended that she reconsider that decision in light of a very likely  germline mutation in SAMUEL which has clinical consequences.       CT CAP after 6 cycles showed improvement in ascites, probable hepatic, thyroid and bone metastases. We dropped oxaliplatin starting with cycle 10 due to grade 1 neuropathy and desire to keep it from worsening.     CT CAP after cycle 10 showed stable disease.  CT scan after cycle 15 continued to display overall stability of disease within the gastric wall, peritoneum, spine and liver. Although increase ascites on imaging and clinically can represent progression of disease, her previous CT suggested overall stability within the liver and gastric wall, so we recommended to continue with treatment time. However, she continued to have increasing abdominal ascites that has been concerning for progression of disease. Hence, she had repeat imaging. On CT performed on 4/14/2022, she appeared to have worsening disease suggestive of progression. She was seen by Dr. Reid at [facility] for f/u and to discuss possible treatment options.  She was also evaluated by Dr. Andree Mueller and Dr. See of Surgical Oncology at [facility].  Ultimately she was not felt to be a good surgical debulking candidate due to her ascites.  If her ascites improves during the course of chemotherapy and her performance status remains stable or improves, they would re-consider her again for palliative oophorectomy.      Was given ramucirumab with cycle 1 but this later was held given [facility] recs for just FOLFIRI. In addition, she didn't tolerate cycle 1 of cyramza well with significant N/V. She was started on second-line FOLFIRI. She received 5 cycles with FOLFIRI of irinotecan to 75 mg/m2 - has UGT1A1 homozygous mutation indicating poor metabolism.  Continue 5-FU infusion at 2200 mg/m2.     Repeat imaging with CT CAP after [facility] on 8/3/22 after cycle 5 of FOLFIRI demonstrated disease progression.  Dr. Reid recommended third line docetaxel biweekly at 35 mg/m2.      -s/p cycle 3 today of Docetaxel.                Jaspal Gay, DO  Hematology/Oncology  [facility] - [facility]

## 2022-10-05 NOTE — PLAN OF CARE
Pt arrived to 190 for abdominal Pleurex drain placement. Pt oriented to unit and staff. Plan of care reviewed with patient, patient verbalizes understanding. Comfort measures utilized. Pt safely transferred from stretcher to procedural table. Fall risk reviewed with patient, fall risk interventions maintained with arm boards and direct observation/attendance.  positioner pillows utilized to minimize pressure points. Blankets applied. Pt prepped and draped utilizing standard sterile technique. Patient placed on continuous monitoring, as required by sedation policy. Timeouts completed utilizing standard universal time-out, per department and facility policy. RN to remain at bedside, continuous monitoring maintained. Pt resting comfortably. Denies pain/discomfort. Will continue to monitor. See flow sheets for monitoring, medication administration, and updates.

## 2022-10-05 NOTE — SUBJECTIVE & OBJECTIVE
Interval History: Creatinine stable, patient s/p abdominal pleuX placement  by IR.    Review of patient's allergies indicates:  No Known Allergies  Current Facility-Administered Medications   Medication Frequency    acetaminophen tablet 650 mg Q8H PRN    aluminum-magnesium hydroxide-simethicone 200-200-20 mg/5 mL suspension 30 mL Q6H PRN    baclofen 5 mg/mL oral liquid (PEDS) 2.5 mg PRN    dextrose 10% bolus 125 mL PRN    dextrose 10% bolus 250 mL PRN    furosemide (LASIX) 160 mg in dextrose 5 % 50 mL IVPB Daily    gabapentin capsule 300 mg TID    glucagon (human recombinant) injection 1 mg PRN    glucose chewable tablet 16 g PRN    glucose chewable tablet 24 g PRN    magnesium hydroxide 400 mg/5 ml suspension 2,400 mg Daily PRN    melatonin tablet 6 mg Nightly PRN    morphine injection 4 mg Q4H PRN    naloxone 0.4 mg/mL injection 0.02 mg PRN    ondansetron disintegrating tablet 4 mg Q6H PRN    oxyCODONE immediate release tablet 5 mg Q4H PRN    pantoprazole injection 40 mg Daily    polyethylene glycol packet 17 g TID    promethazine (PHENERGAN) 6.25 mg in dextrose 5 % 50 mL IVPB Q6H PRN    simethicone chewable tablet 80 mg TID PRN    sodium chloride 0.9% flush 10 mL Q12H PRN    sodium zirconium cyclosilicate packet 10 g Once       Objective:     Vital Signs (Most Recent):  Temp: 97.7 °F (36.5 °C) (10/05/22 1112)  Pulse: (!) 114 (10/05/22 1125)  Resp: 18 (10/05/22 1112)  BP: 107/84 (10/05/22 1112)  SpO2: 100 % (10/05/22 1112)  O2 Device (Oxygen Therapy): room air (10/05/22 1015)   Vital Signs (24h Range):  Temp:  [97.5 °F (36.4 °C)-98.4 °F (36.9 °C)] 97.7 °F (36.5 °C)  Pulse:  [111-130] 114  Resp:  [15-22] 18  SpO2:  [95 %-100 %] 100 %  BP: (104-124)/(71-88) 107/84     Weight: 58 kg (127 lb 13.9 oz) (10/05/22 0535)  Body mass index is 20.03 kg/m².  Body surface area is 1.66 meters squared.    I/O last 3 completed shifts:  In: 400 [P.O.:400]  Out: 200 [Urine:200]    Physical Exam  Vitals reviewed.   Constitutional:        Appearance: She is underweight.   HENT:      Head: Normocephalic and atraumatic.      Right Ear: External ear normal.      Left Ear: External ear normal.      Nose: No congestion.   Eyes:      General: No scleral icterus.        Right eye: No discharge.         Left eye: No discharge.   Neck:      Comments: No JVD appreciated  Cardiovascular:      Rate and Rhythm: Normal rate and regular rhythm.   Pulmonary:      Effort: Pulmonary effort is normal. No respiratory distress.   Abdominal:      General: There is distension.      Tenderness: There is abdominal tenderness.   Musculoskeletal:      Cervical back: Normal range of motion.      Right lower leg: Edema (3+) present.      Left lower leg: Edema (3+) present.      Comments: Swelling up to thighs/hips   Skin:     General: Skin is warm and dry.   Neurological:      Mental Status: She is alert and oriented to person, place, and time. Mental status is at baseline.   Psychiatric:         Mood and Affect: Mood normal.         Behavior: Behavior normal.       Significant Labs:  All labs within the past 24 hours have been reviewed.     Significant Imaging:  Imaging from past 24 hours reviewed

## 2022-10-05 NOTE — NURSING
Procedure recovery complete. Patient still a bit drowsy but oriented and making needs known. R abd dressing is CDI. PleurX home kit including catheter sent with patient. Patient returning to room with patient transport.

## 2022-10-05 NOTE — PLAN OF CARE
42 year-old female admitted 9/30/22 for SOB/MARIA/pain. Pt has hx of metastatic neoplasm of stomach, recurrent ascites  -AAOx4, ambulates with standby assistance  -PleurX drain place to RLQ placed/clamped  -700cc removed in IR  -20 G Rt FA  -Telemetry sinus tach  -Reg Diet/pt has increase in appetite  -K 5.6  -Lokelma  -Cr 2.5  -Lasix 160 IVPB  -BLE edema +2  -Heparin gtt restarted  -nausea controlled this shift  -PRN Morphine 4 mg  -PRN Oxycodone 5 mg  -pt sitting up in bed, bed in lowest position, wheels locked, non-skid socks in place, call light within reach

## 2022-10-05 NOTE — PROGRESS NOTES
[facility]  Nephrology  Progress Note    Patient Name: Puja Quigley  MRN: [id number]  Admission Date: 9/30/2022  Hospital Length of Stay: 4 days  Attending Provider: Corky Hawley MD    Primary Care Physician: Primary Doctor No  Principal Problem:Malignant ascites    Subjective:     HPI: 42 year-old F w/ hx of metastatic gastric cancer c/b peritoneal carcinomatosis and malignant ascites. Patient recently on third line docetaxel. She was initially admitted with severe abdominal pain and MARIA. She was found to have massive ascites that required large volume paracentesis on 10/1 with removal of 5.4L of fluid, and received albumin infusion. Patient reportedly gets two paracentesis per week. Last paracentesis on 9/23 removed 6.7L of fluid from the abdomen. Patient with baseline serum creatinine of 0.7 to 0.8 and presented with initial serum creatinine of 2.6-->3.2-->2.4.                     Interval History: Creatinine stable, patient s/p abdominal pleuX placement  by IR.    Review of patient's allergies indicates:  No Known Allergies  Current Facility-Administered Medications   Medication Frequency    acetaminophen tablet 650 mg Q8H PRN    aluminum-magnesium hydroxide-simethicone 200-200-20 mg/5 mL suspension 30 mL Q6H PRN    baclofen 5 mg/mL oral liquid (PEDS) 2.5 mg PRN    dextrose 10% bolus 125 mL PRN    dextrose 10% bolus 250 mL PRN    furosemide (LASIX) 160 mg in dextrose 5 % 50 mL IVPB Daily    gabapentin capsule 300 mg TID    glucagon (human recombinant) injection 1 mg PRN    glucose chewable tablet 16 g PRN    glucose chewable tablet 24 g PRN    magnesium hydroxide 400 mg/5 ml suspension 2,400 mg Daily PRN    melatonin tablet 6 mg Nightly PRN    morphine injection 4 mg Q4H PRN    naloxone 0.4 mg/mL injection 0.02 mg PRN    ondansetron disintegrating tablet 4 mg Q6H PRN    oxyCODONE immediate release tablet 5 mg Q4H PRN    pantoprazole injection 40 mg Daily     polyethylene glycol packet 17 g TID    promethazine (PHENERGAN) 6.25 mg in dextrose 5 % 50 mL IVPB Q6H PRN    simethicone chewable tablet 80 mg TID PRN    sodium chloride 0.9% flush 10 mL Q12H PRN    sodium zirconium cyclosilicate packet 10 g Once       Objective:     Vital Signs (Most Recent):  Temp: 97.7 °F (36.5 °C) (10/05/22 1112)  Pulse: (!) 114 (10/05/22 1125)  Resp: 18 (10/05/22 1112)  BP: 107/84 (10/05/22 1112)  SpO2: 100 % (10/05/22 1112)  O2 Device (Oxygen Therapy): room air (10/05/22 1015)   Vital Signs (24h Range):  Temp:  [97.5 °F (36.4 °C)-98.4 °F (36.9 °C)] 97.7 °F (36.5 °C)  Pulse:  [111-130] 114  Resp:  [15-22] 18  SpO2:  [95 %-100 %] 100 %  BP: (104-124)/(71-88) 107/84     Weight: 58 kg (127 lb 13.9 oz) (10/05/22 0535)  Body mass index is 20.03 kg/m².  Body surface area is 1.66 meters squared.    I/O last 3 completed shifts:  In: 400 [P.O.:400]  Out: 200 [Urine:200]    Physical Exam  Vitals reviewed.   Constitutional:       Appearance: She is underweight.   HENT:      Head: Normocephalic and atraumatic.      Right Ear: External ear normal.      Left Ear: External ear normal.      Nose: No congestion.   Eyes:      General: No scleral icterus.        Right eye: No discharge.         Left eye: No discharge.   Neck:      Comments: No JVD appreciated  Cardiovascular:      Rate and Rhythm: Normal rate and regular rhythm.   Pulmonary:      Effort: Pulmonary effort is normal. No respiratory distress.   Abdominal:      General: There is distension.      Tenderness: There is abdominal tenderness.   Musculoskeletal:      Cervical back: Normal range of motion.      Right lower leg: Edema (3+) present.      Left lower leg: Edema (3+) present.      Comments: Swelling up to thighs/hips   Skin:     General: Skin is warm and dry.   Neurological:      Mental Status: She is alert and oriented to person, place, and time. Mental status is at baseline.   Psychiatric:         Mood and Affect: Mood normal.          Behavior: Behavior normal.       Significant Labs:  All labs within the past 24 hours have been reviewed.     Significant Imaging:  Imaging from past 24 hours reviewed    Assessment/Plan:     Hyperkalemia  Patient with potassium of 5.6, that has slowly been increasing since admission from 5.2.     Plan:  --lasix today 160mg    Hyponatremia  Patient with hyponatremia thought to be 2/2 to volume overload. Physical exam with lower extremity swelling with pitting edema. Abdomen still with ascites on retroperitoneal ultrasound. Serum OSM of 283, Urine sodium less than 10.    Plan:  --Likely 2/2 to volume overload  --CMP daily  --Ordered Lasix 160 IV    MARIA (acute kidney injury)  42 year-old f w hx of gastric adenocarcinoma c/b hepaticand peritoneal metastases. Patient has episodes of recurrent abdominal distension due to tense ascites requiring now bi-weekly paracentesis for relief. Patient with initially up trending creatinine level that peaked at 3.2 (serum creatinine 0.7 to 0.8) that is improving after her second large volume para that removed. Patient had CT scan on admission however did not get good look at kidneys 2/2 to massive ascites. Patient denies any obstructive type symptoms.    Patient notes that she is able to urinate normally, although she notes that when she has a lot of fluid in her abdomen she goes more frequently and is associated with some back pain. That improves after fluid is removed according to patient. Patient notes since March she has been requiring paracentesis and now they are becoming more frequent.      MARIA could have also been caused by large volume paracentesis-induced kidney injury from prior paracentesis on 10/1, however creatinine per chart check had already been elevated on arrival. Patient status post paracentesis on 10/3 with approximately 5L removed and improvement in her creatinine. Patient is constipated and notes not having bowel movement since being in hospital 2/2 to pain  medications.    DDX for MARIA: pre-renal from decreased PO intake, obstruction from large volume ascites, ATN    Plan:  --MARIA 2/2 to compression from large volume ascites and   --Given volume overload in extremities and abdomen will order 160 mg of lasix    response. This may also help with hyponatremia thought to be 2/2 to volume overload.  --CMP, phosphorus, Mg daily  --Avoid nephrotoxic agents  --avoid NSAIDs  --Stricts ins/outs, input/output not put in chart on 10/4  --renal ultrasound--no acute findings  --urine microscopy was consistent with muddy brown casts---acute tubular necrosis likely  --patient s/p abdominal pleurx for malignant ascities             Thank you for your consult. I will follow-up with patient. Please contact us if you have any additional questions.    Bienvenido Muhammad MD  Nephrology  [facility]

## 2022-10-05 NOTE — ASSESSMENT & PLAN NOTE
Patient with potassium of 5.6, that has slowly been increasing since admission from 5.2.     Plan:  --lasix today 160mg

## 2022-10-05 NOTE — ASSESSMENT & PLAN NOTE
Pt with known h/o ascites   Initially had peritoneal catheter after diagnosis, output slowed so was removed in October 2021 by IR.   bi-weekly paracentesis for now.  Last paracentesis 9/23 removed 6.7L of fluid from the abdomen.     Paracentesis performed on 10/2/22. 5 L drained, pt tolerated procedure well. 2nd paracentesis performed by IR on 10/3/22, 5.4L drained. Pleur-X catheter was not placed. Plan for pt to have catheter placed at [facility] on 10/6/22. Pt states this was originally planned before having to come to [facility]. Pleur-X catheter placed by IR, 10/5/22, in place, pt tolerated procedure well.     Plan:   - Pain control with oxycodone 5 mg PRN q 4 hours and IV morphine 4 mg PRN q 4 hours  - Antiemetics with Phenergan   - IV Lasix 160 mg per Nephro  - monitor volume status, vitals, and daily labs

## 2022-10-05 NOTE — PROCEDURES
Interventional Radiology postop note    Pre Op Diagnosis: Metastatic gastric adenocarcinoma with recurrent ascites  Post Op Diagnosis: Same    Procedure: Tunneled Pleurx catheter placement     Procedure performed by: Tim    Written Informed Consent Obtained: Yes  Specimen Removed:  mL of serous fluid  Estimated Blood Loss: Minimal    Findings:   Tunneled abdominal Pleurx catheter placement without acute complication. Drainage of 600 mL of serous fluid.    Patient tolerated procedure well.    Kade Montero,   Interventional Radiology

## 2022-10-05 NOTE — NURSING
Abdominal Pleurex catheter/drain insertion complete. Pt tolerated well. VSS. No signs or symptoms of distress noted. Pt will be transferred to MPU bed escorted by this RN and report to RN on arrival and called to floor RN.  700 cc dark renuka para fluid drained.  No replacement albumin given.

## 2022-10-05 NOTE — PLAN OF CARE
AAOx4-- waxing and waning; notified MD overnight of patients lethargy and they are aware. No new orders at this time  Tele- sinus tach, EKG done 10/4 to show sinus tach  Nausea continued, PRN zofran/baclofen given with minimal relief. Continuing to cough up light tinged phlegm-- team aware  Nephrology following for MARIA, Cr 2.1, minimal UOP made overnight  NPO since MN for pleur-X drain placement, hep gtt on hold until procedure completed  Non skid socks worn, call light within reach, will cont to monitor

## 2022-10-06 ENCOUNTER — PATIENT MESSAGE (OUTPATIENT)
Dept: HEMATOLOGY/ONCOLOGY | Facility: CLINIC | Age: 42
End: 2022-10-06
Payer: COMMERCIAL

## 2022-10-06 LAB
ALBUMIN SERPL BCP-MCNC: 1.5 G/DL (ref 3.5–5.2)
ALP SERPL-CCNC: 106 U/L (ref 55–135)
ALT SERPL W/O P-5'-P-CCNC: 13 U/L (ref 10–44)
ANION GAP SERPL CALC-SCNC: 12 MMOL/L (ref 8–16)
AST SERPL-CCNC: 18 U/L (ref 10–40)
BASOPHILS # BLD AUTO: 0.01 K/UL (ref 0–0.2)
BASOPHILS # BLD AUTO: 0.01 K/UL (ref 0–0.2)
BASOPHILS NFR BLD: 0.1 % (ref 0–1.9)
BASOPHILS NFR BLD: 0.1 % (ref 0–1.9)
BILIRUB SERPL-MCNC: 0.5 MG/DL (ref 0.1–1)
BUN SERPL-MCNC: 52 MG/DL (ref 6–20)
CALCIUM SERPL-MCNC: 8.7 MG/DL (ref 8.7–10.5)
CHLORIDE SERPL-SCNC: 93 MMOL/L (ref 95–110)
CO2 SERPL-SCNC: 21 MMOL/L (ref 23–29)
CREAT SERPL-MCNC: 2.4 MG/DL (ref 0.5–1.4)
DIFFERENTIAL METHOD: ABNORMAL
DIFFERENTIAL METHOD: ABNORMAL
EOSINOPHIL # BLD AUTO: 0 K/UL (ref 0–0.5)
EOSINOPHIL # BLD AUTO: 0 K/UL (ref 0–0.5)
EOSINOPHIL NFR BLD: 0.1 % (ref 0–8)
EOSINOPHIL NFR BLD: 0.1 % (ref 0–8)
ERYTHROCYTE [DISTWIDTH] IN BLOOD BY AUTOMATED COUNT: 20.5 % (ref 11.5–14.5)
ERYTHROCYTE [DISTWIDTH] IN BLOOD BY AUTOMATED COUNT: 20.5 % (ref 11.5–14.5)
EST. GFR  (NO RACE VARIABLE): 25.5 ML/MIN/1.73 M^2
FACT X PPP CHRO-ACNC: 0.31 IU/ML (ref 0.3–0.7)
GLUCOSE SERPL-MCNC: 82 MG/DL (ref 70–110)
HCT VFR BLD AUTO: 31.9 % (ref 37–48.5)
HCT VFR BLD AUTO: 31.9 % (ref 37–48.5)
HGB BLD-MCNC: 10 G/DL (ref 12–16)
HGB BLD-MCNC: 10 G/DL (ref 12–16)
IMM GRANULOCYTES # BLD AUTO: 0.06 K/UL (ref 0–0.04)
IMM GRANULOCYTES # BLD AUTO: 0.06 K/UL (ref 0–0.04)
IMM GRANULOCYTES NFR BLD AUTO: 0.5 % (ref 0–0.5)
IMM GRANULOCYTES NFR BLD AUTO: 0.5 % (ref 0–0.5)
LYMPHOCYTES # BLD AUTO: 0.7 K/UL (ref 1–4.8)
LYMPHOCYTES # BLD AUTO: 0.7 K/UL (ref 1–4.8)
LYMPHOCYTES NFR BLD: 6.2 % (ref 18–48)
LYMPHOCYTES NFR BLD: 6.2 % (ref 18–48)
MAGNESIUM SERPL-MCNC: 2.4 MG/DL (ref 1.6–2.6)
MCH RBC QN AUTO: 23 PG (ref 27–31)
MCH RBC QN AUTO: 23 PG (ref 27–31)
MCHC RBC AUTO-ENTMCNC: 31.3 G/DL (ref 32–36)
MCHC RBC AUTO-ENTMCNC: 31.3 G/DL (ref 32–36)
MCV RBC AUTO: 73 FL (ref 82–98)
MCV RBC AUTO: 73 FL (ref 82–98)
MONOCYTES # BLD AUTO: 0.9 K/UL (ref 0.3–1)
MONOCYTES # BLD AUTO: 0.9 K/UL (ref 0.3–1)
MONOCYTES NFR BLD: 7.7 % (ref 4–15)
MONOCYTES NFR BLD: 7.7 % (ref 4–15)
NEUTROPHILS # BLD AUTO: 9.7 K/UL (ref 1.8–7.7)
NEUTROPHILS # BLD AUTO: 9.7 K/UL (ref 1.8–7.7)
NEUTROPHILS NFR BLD: 85.4 % (ref 38–73)
NEUTROPHILS NFR BLD: 85.4 % (ref 38–73)
NRBC BLD-RTO: 1 /100 WBC
NRBC BLD-RTO: 1 /100 WBC
PHOSPHATE SERPL-MCNC: 5.1 MG/DL (ref 2.7–4.5)
PLATELET # BLD AUTO: 261 K/UL (ref 150–450)
PLATELET # BLD AUTO: 261 K/UL (ref 150–450)
PMV BLD AUTO: 9.3 FL (ref 9.2–12.9)
PMV BLD AUTO: 9.3 FL (ref 9.2–12.9)
POCT GLUCOSE: 94 MG/DL (ref 70–110)
POTASSIUM SERPL-SCNC: 4.6 MMOL/L (ref 3.5–5.1)
PROT SERPL-MCNC: 4.7 G/DL (ref 6–8.4)
RBC # BLD AUTO: 4.35 M/UL (ref 4–5.4)
RBC # BLD AUTO: 4.35 M/UL (ref 4–5.4)
SODIUM SERPL-SCNC: 126 MMOL/L (ref 136–145)
WBC # BLD AUTO: 11.37 K/UL (ref 3.9–12.7)
WBC # BLD AUTO: 11.37 K/UL (ref 3.9–12.7)

## 2022-10-06 PROCEDURE — 25000003 PHARM REV CODE 250

## 2022-10-06 PROCEDURE — 85520 HEPARIN ASSAY: CPT | Performed by: STUDENT IN AN ORGANIZED HEALTH CARE EDUCATION/TRAINING PROGRAM

## 2022-10-06 PROCEDURE — 99233 PR SUBSEQUENT HOSPITAL CARE,LEVL III: ICD-10-PCS | Mod: ,,, | Performed by: HOSPITALIST

## 2022-10-06 PROCEDURE — 83735 ASSAY OF MAGNESIUM: CPT | Performed by: HOSPITALIST

## 2022-10-06 PROCEDURE — 36415 COLL VENOUS BLD VENIPUNCTURE: CPT | Performed by: STUDENT IN AN ORGANIZED HEALTH CARE EDUCATION/TRAINING PROGRAM

## 2022-10-06 PROCEDURE — 80053 COMPREHEN METABOLIC PANEL: CPT | Performed by: HOSPITALIST

## 2022-10-06 PROCEDURE — 99232 SBSQ HOSP IP/OBS MODERATE 35: CPT | Mod: ,,, | Performed by: INTERNAL MEDICINE

## 2022-10-06 PROCEDURE — 25000003 PHARM REV CODE 250: Performed by: STUDENT IN AN ORGANIZED HEALTH CARE EDUCATION/TRAINING PROGRAM

## 2022-10-06 PROCEDURE — 20600001 HC STEP DOWN PRIVATE ROOM

## 2022-10-06 PROCEDURE — 97165 OT EVAL LOW COMPLEX 30 MIN: CPT

## 2022-10-06 PROCEDURE — 85025 COMPLETE CBC W/AUTO DIFF WBC: CPT | Performed by: HOSPITALIST

## 2022-10-06 PROCEDURE — 99232 PR SUBSEQUENT HOSPITAL CARE,LEVL II: ICD-10-PCS | Mod: ,,, | Performed by: INTERNAL MEDICINE

## 2022-10-06 PROCEDURE — 99233 SBSQ HOSP IP/OBS HIGH 50: CPT | Mod: ,,, | Performed by: HOSPITALIST

## 2022-10-06 PROCEDURE — 97530 THERAPEUTIC ACTIVITIES: CPT

## 2022-10-06 PROCEDURE — 63600175 PHARM REV CODE 636 W HCPCS

## 2022-10-06 PROCEDURE — 84100 ASSAY OF PHOSPHORUS: CPT | Performed by: HOSPITALIST

## 2022-10-06 PROCEDURE — 63600175 PHARM REV CODE 636 W HCPCS: Performed by: HOSPITALIST

## 2022-10-06 RX ADMIN — APIXABAN 10 MG: 5 TABLET, FILM COATED ORAL at 07:10

## 2022-10-06 RX ADMIN — FUROSEMIDE 200 MG: 10 INJECTION, SOLUTION INTRAMUSCULAR; INTRAVENOUS at 11:10

## 2022-10-06 RX ADMIN — POLYETHYLENE GLYCOL 3350 17 G: 17 POWDER, FOR SOLUTION ORAL at 08:10

## 2022-10-06 RX ADMIN — PANTOPRAZOLE SODIUM 40 MG: 40 TABLET, DELAYED RELEASE ORAL at 08:10

## 2022-10-06 RX ADMIN — OXYCODONE 5 MG: 5 TABLET ORAL at 07:10

## 2022-10-06 RX ADMIN — ALUMINUM HYDROXIDE, MAGNESIUM HYDROXIDE, AND SIMETHICONE 30 ML: 200; 200; 20 SUSPENSION ORAL at 07:10

## 2022-10-06 RX ADMIN — OXYCODONE 5 MG: 5 TABLET ORAL at 05:10

## 2022-10-06 RX ADMIN — GABAPENTIN 300 MG: 300 CAPSULE ORAL at 07:10

## 2022-10-06 RX ADMIN — MAGNESIUM HYDROXIDE 2400 MG: 400 SUSPENSION ORAL at 11:10

## 2022-10-06 RX ADMIN — ALUMINUM HYDROXIDE, MAGNESIUM HYDROXIDE, AND SIMETHICONE 30 ML: 200; 200; 20 SUSPENSION ORAL at 01:10

## 2022-10-06 RX ADMIN — POLYETHYLENE GLYCOL 3350 17 G: 17 POWDER, FOR SOLUTION ORAL at 07:10

## 2022-10-06 RX ADMIN — MORPHINE SULFATE 4 MG: 2 INJECTION, SOLUTION INTRAMUSCULAR; INTRAVENOUS at 01:10

## 2022-10-06 NOTE — ASSESSMENT & PLAN NOTE
42 year-old f w hx of gastric adenocarcinoma c/b hepaticand peritoneal metastases. Patient has episodes of recurrent abdominal distension due to tense ascites requiring now bi-weekly paracentesis for relief. Patient with initially up trending creatinine level that peaked at 3.2 (serum creatinine 0.7 to 0.8) that is improving after her second large volume para that removed. Patient had CT scan on admission however did not get good look at kidneys 2/2 to massive ascites. Patient denies any obstructive type symptoms.    Patient notes that she is able to urinate normally, although she notes that when she has a lot of fluid in her abdomen she goes more frequently and is associated with some back pain. That improves after fluid is removed according to patient. Patient notes since March she has been requiring paracentesis and now they are becoming more frequent.      MARIA could have also been caused by large volume paracentesis-induced kidney injury from prior paracentesis on 10/1, however creatinine per chart check had already been elevated on arrival. Patient status post paracentesis on 10/3 with approximately 5L removed and improvement in her creatinine. Patient is constipated and notes not having bowel movement since being in hospital 2/2 to pain medications.    DDX for MARIA: pre-renal from decreased PO intake, obstruction from large volume ascites, ATN    Plan:  --MARIA 2/2 to compression from large volume ascites, now s/p pluerX to abdomen. Creatinine stable.  --Given volume overload in extremities and abdomen will continue lasix 200mg 1x--responding well to lasix  --assess volume status daily  --CMP, phosphorus, Mg daily  --Avoid nephrotoxic agents  --avoid NSAIDs  --Stricts ins/outs  --renal ultrasound--no acute findings  --urine microscopy was consistent with muddy brown casts---acute tubular necrosis   --patient s/p abdominal pleurx for malignant ascities

## 2022-10-06 NOTE — PROGRESS NOTES
[facility]  Nephrology  Progress Note    Patient Name: Puja Quigley  MRN: [id number]  Admission Date: 9/30/2022  Hospital Length of Stay: 5 days  Attending Provider: Corky Hawley MD   Primary Care Physician: Primary Doctor No  Principal Problem:Malignant ascites    Subjective:     HPI: 42 year-old F w/ hx of metastatic gastric cancer c/b peritoneal carcinomatosis and malignant ascites. Patient recently on third line docetaxel. She was initially admitted with severe abdominal pain and MARIA. She was found to have massive ascites that required large volume paracentesis on 10/1 with removal of 5.4L of fluid, and received albumin infusion. Patient reportedly gets two paracentesis per week. Last paracentesis on 9/23 removed 6.7L of fluid from the abdomen. Patient with baseline serum creatinine of 0.7 to 0.8 and presented with initial serum creatinine of 2.6-->3.2-->2.4.                     Interval History: Patient s/p abdominal pleurx placement on 10/5, given lasix 160 mg and 200 mg + diuril with good urine output. Sodium up trending after diuresis. Patient with complaints of swelling in thighs and legs.    Review of patient's allergies indicates:  No Known Allergies  Current Facility-Administered Medications   Medication Frequency    acetaminophen tablet 650 mg Q8H PRN    aluminum-magnesium hydroxide-simethicone 200-200-20 mg/5 mL suspension 30 mL Q6H PRN    baclofen 5 mg/mL oral liquid (PEDS) 2.5 mg PRN    dextrose 10% bolus 125 mL PRN    dextrose 10% bolus 250 mL PRN    gabapentin capsule 300 mg TID    glucagon (human recombinant) injection 1 mg PRN    glucose chewable tablet 16 g PRN    glucose chewable tablet 24 g PRN    heparin 25,000 units in dextrose 5% (100 units/ml) IV bolus from bag - ADDITIONAL PRN BOLUS - 30 units/kg PRN    heparin 25,000 units in dextrose 5% (100 units/ml) IV bolus from bag - ADDITIONAL PRN BOLUS - 60 units/kg PRN    heparin 25,000 units in dextrose  5% 250 mL (100 units/mL) infusion HIGH INTENSITY nomogram Anti- Xa Continuous    magnesium hydroxide 400 mg/5 ml suspension 2,400 mg Daily PRN    melatonin tablet 6 mg Nightly PRN    morphine injection 4 mg Q4H PRN    naloxone 0.4 mg/mL injection 0.02 mg PRN    ondansetron disintegrating tablet 4 mg Q6H PRN    oxyCODONE immediate release tablet 5 mg Q4H PRN    pantoprazole EC tablet 40 mg Daily    polyethylene glycol packet 17 g TID    promethazine (PHENERGAN) 6.25 mg in dextrose 5 % 50 mL IVPB Q6H PRN    simethicone chewable tablet 80 mg TID PRN    sodium chloride 0.9% flush 10 mL Q12H PRN       Objective:     Vital Signs (Most Recent):  Temp: 98.8 °F (37.1 °C) (10/06/22 0508)  Pulse: (!) 119 (10/06/22 0815)  Resp: 18 (10/06/22 0815)  BP: 112/75 (10/06/22 0815)  SpO2: 100 % (10/06/22 0815)  O2 Device (Oxygen Therapy): room air (10/06/22 0815)   Vital Signs (24h Range):  Temp:  [97.5 °F (36.4 °C)-98.8 °F (37.1 °C)] 98.8 °F (37.1 °C)  Pulse:  [107-122] 119  Resp:  [15-18] 18  SpO2:  [97 %-100 %] 100 %  BP: (100-121)/(72-84) 112/75     Weight: 58 kg (127 lb 13.9 oz) (10/05/22 0535)  Body mass index is 20.03 kg/m².  Body surface area is 1.66 meters squared.    I/O last 3 completed shifts:  In: 240 [P.O.:240]  Out: 2500 [Urine:1800; Other:700]    Physical Exam  Vitals reviewed.   Constitutional:       Appearance: She is underweight.   HENT:      Head: Normocephalic and atraumatic.      Right Ear: External ear normal.      Left Ear: External ear normal.      Nose: No congestion.   Eyes:      General: No scleral icterus.        Right eye: No discharge.         Left eye: No discharge.   Neck:      Comments: No JVD appreciated  Cardiovascular:      Rate and Rhythm: Normal rate and regular rhythm.   Pulmonary:      Effort: Pulmonary effort is normal. No respiratory distress.   Abdominal:      General: There is distension.      Tenderness: There is abdominal tenderness.   Musculoskeletal:      Cervical back: Normal  range of motion.      Right lower leg: Edema (3+) present.      Left lower leg: Edema (3+) present.      Comments: Swelling up to thighs/hips   Skin:     General: Skin is warm and dry.   Neurological:      Mental Status: She is alert and oriented to person, place, and time. Mental status is at baseline.   Psychiatric:         Mood and Affect: Mood normal.         Behavior: Behavior normal.       Significant Labs:  All labs within the past 24 hours have been reviewed.     Significant Imaging:  All imaging from past 24 hours reviewed.    Assessment/Plan:     * Malignant ascites  See MARIA    Hyponatremia  Patient with hyponatremia thought to be 2/2 to volume overload. Physical exam with lower extremity swelling with pitting edema. Abdomen still with ascites on retroperitoneal ultrasound. Serum OSM of 283, Urine sodium less than 10.    Plan:  --Likely 2/2 to volume overload  --CMP daily      MARIA (acute kidney injury)  42 year-old f w hx of gastric adenocarcinoma c/b hepaticand peritoneal metastases. Patient has episodes of recurrent abdominal distension due to tense ascites requiring now bi-weekly paracentesis for relief. Patient with initially up trending creatinine level that peaked at 3.2 (serum creatinine 0.7 to 0.8) that is improving after her second large volume para that removed. Patient had CT scan on admission however did not get good look at kidneys 2/2 to massive ascites. Patient denies any obstructive type symptoms.    Patient notes that she is able to urinate normally, although she notes that when she has a lot of fluid in her abdomen she goes more frequently and is associated with some back pain. That improves after fluid is removed according to patient. Patient notes since March she has been requiring paracentesis and now they are becoming more frequent.      MARIA could have also been caused by large volume paracentesis-induced kidney injury from prior paracentesis on 10/1, however creatinine per chart  check had already been elevated on arrival. Patient status post paracentesis on 10/3 with approximately 5L removed and improvement in her creatinine. Patient is constipated and notes not having bowel movement since being in hospital 2/2 to pain medications.    DDX for MARIA: pre-renal from decreased PO intake, obstruction from large volume ascites, ATN    Plan:  --MARIA 2/2 to compression from large volume ascites, now s/p pluerX to abdomen. Creatinine stable.  --Given volume overload in extremities and abdomen will continue lasix 200mg 1x--responding well to lasix  --assess volume status daily  --CMP, phosphorus, Mg daily  --Avoid nephrotoxic agents  --avoid NSAIDs  --Stricts ins/outs  --renal ultrasound--no acute findings  --urine microscopy was consistent with muddy brown casts---acute tubular necrosis   --patient s/p abdominal pleurx for malignant ascities             Thank you for your consult. I will follow-up with patient. Please contact us if you have any additional questions.    Bienvenido Muhammad MD  Nephrology  [facility]

## 2022-10-06 NOTE — ASSESSMENT & PLAN NOTE
Patient with potassium of 5.6, that has slowly been increasing since admission from 5.2.      Plan:  - lasix today 200 mg  - lokelma packet

## 2022-10-06 NOTE — ASSESSMENT & PLAN NOTE
Baseline Cr 0.8, Cr 3.1 on admission  Hx of CKD: no  Associated with  fatigue, n/v  DDx: hypotension, hypovolemia, obstructive uropathy 2/2 to malignancy   No indications for HD at this time    MARIA has still not resolved after 2 paracentesis. Will consult Nephrology and f/u with recs     Plan:   - Trend Cr  - Strict I&Os  - Avoid nephrotoxic agents (NSAIDs, gadolinium, IV radiocontrast)  - Avoid hypotension  - Renally adjust medications   - Lasix 200 mg IV

## 2022-10-06 NOTE — ASSESSMENT & PLAN NOTE
Patient with hyponatremia thought to be 2/2 to volume overload. Physical exam with lower extremity swelling with pitting edema. Abdomen still with ascites on retroperitoneal ultrasound. Serum OSM of 283, Urine sodium less than 10.     Plan:  --Likely 2/2 to volume overload  --CMP daily  --Ordered Lasix 200 IV

## 2022-10-06 NOTE — PLAN OF CARE
AAOx4-- much more alert then previous night  Tele- sinus tach; PleurX drain placed to RLQ; 700cc fluid drained 10/5  Continuing to cough up phlegm-- Maalox given for acid reflux  Nephrology following for MARIA, Cr 2.4 10/5 , 200mg IV lasix given + 500mg diuril given overnight following 2000 CMP levels. Purewick placed for voiding throughout night; refer to flowsheet for total  Heparin gtt continued for R femoral thrombus; anti-Xa level @ 2300 0.39; therapeutic, continue @ 25U/kg/hr-- dosing wt 58 kg, infusing @ 14.5 ml/hr  Plan to follow up @ [facility] for further POC regarding treatment   Non skid socks worn, call light within reach, will cont to monitor

## 2022-10-06 NOTE — CARE UPDATE
RAPID RESPONSE NURSE ROUND       Rounding completed with charge RN, Sharon for shortness of breath reports has pleurex drain. No additional concerns verbalized at this time. Instructed to call [phone] for further concerns or assistance.

## 2022-10-06 NOTE — PLAN OF CARE
42 year-old female admitted 9/30/22 for SOB/MARIA/pain. Pt has hx of metastatic neoplasm of stomach, recurrent ascites  -AAOx4, ambulates with standby assistance  -PleurX RLQ in place  -750 cc removed at bedside  -20 G Rt FA  -22 G Rt FA  -Telemetry sinus tach  -Reg Diet  -Cr 2.3  -Lasix 200 IVPB  -BLE edema +2  -Heparin gtt 14.5ml/hr  -Anti-Xa 0.31 (therapeutic)  -nausea controlled this shift  -Purwick in place, changed this shift  -PRN Morphine 4 mg  -PRN Oxycodone 5 mg  -PT/OT today  -pt sitting up in bed, bed in lowest position, wheels locked, non-skid socks in place, call light within reach

## 2022-10-06 NOTE — SUBJECTIVE & OBJECTIVE
Interval History: Patient s/p abdominal pleurx placement on 10/5, given lasix 160 mg and 200 mg + diuril with good urine output. Sodium up trending after diuresis. Patient with complaints of swelling in thighs and legs.    Review of patient's allergies indicates:  No Known Allergies  Current Facility-Administered Medications   Medication Frequency    acetaminophen tablet 650 mg Q8H PRN    aluminum-magnesium hydroxide-simethicone 200-200-20 mg/5 mL suspension 30 mL Q6H PRN    baclofen 5 mg/mL oral liquid (PEDS) 2.5 mg PRN    dextrose 10% bolus 125 mL PRN    dextrose 10% bolus 250 mL PRN    gabapentin capsule 300 mg TID    glucagon (human recombinant) injection 1 mg PRN    glucose chewable tablet 16 g PRN    glucose chewable tablet 24 g PRN    heparin 25,000 units in dextrose 5% (100 units/ml) IV bolus from bag - ADDITIONAL PRN BOLUS - 30 units/kg PRN    heparin 25,000 units in dextrose 5% (100 units/ml) IV bolus from bag - ADDITIONAL PRN BOLUS - 60 units/kg PRN    heparin 25,000 units in dextrose 5% 250 mL (100 units/mL) infusion HIGH INTENSITY nomogram Anti- Xa Continuous    magnesium hydroxide 400 mg/5 ml suspension 2,400 mg Daily PRN    melatonin tablet 6 mg Nightly PRN    morphine injection 4 mg Q4H PRN    naloxone 0.4 mg/mL injection 0.02 mg PRN    ondansetron disintegrating tablet 4 mg Q6H PRN    oxyCODONE immediate release tablet 5 mg Q4H PRN    pantoprazole EC tablet 40 mg Daily    polyethylene glycol packet 17 g TID    promethazine (PHENERGAN) 6.25 mg in dextrose 5 % 50 mL IVPB Q6H PRN    simethicone chewable tablet 80 mg TID PRN    sodium chloride 0.9% flush 10 mL Q12H PRN       Objective:     Vital Signs (Most Recent):  Temp: 98.8 °F (37.1 °C) (10/06/22 0508)  Pulse: (!) 119 (10/06/22 0815)  Resp: 18 (10/06/22 0815)  BP: 112/75 (10/06/22 0815)  SpO2: 100 % (10/06/22 0815)  O2 Device (Oxygen Therapy): room air (10/06/22 0815)   Vital Signs (24h Range):  Temp:  [97.5 °F (36.4 °C)-98.8 °F (37.1 °C)] 98.8 °F  (37.1 °C)  Pulse:  [107-122] 119  Resp:  [15-18] 18  SpO2:  [97 %-100 %] 100 %  BP: (100-121)/(72-84) 112/75     Weight: 58 kg (127 lb 13.9 oz) (10/05/22 0535)  Body mass index is 20.03 kg/m².  Body surface area is 1.66 meters squared.    I/O last 3 completed shifts:  In: 240 [P.O.:240]  Out: 2500 [Urine:1800; Other:700]    Physical Exam  Vitals reviewed.   Constitutional:       Appearance: She is underweight.   HENT:      Head: Normocephalic and atraumatic.      Right Ear: External ear normal.      Left Ear: External ear normal.      Nose: No congestion.   Eyes:      General: No scleral icterus.        Right eye: No discharge.         Left eye: No discharge.   Neck:      Comments: No JVD appreciated  Cardiovascular:      Rate and Rhythm: Normal rate and regular rhythm.   Pulmonary:      Effort: Pulmonary effort is normal. No respiratory distress.   Abdominal:      General: There is distension.      Tenderness: There is abdominal tenderness.   Musculoskeletal:      Cervical back: Normal range of motion.      Right lower leg: Edema (3+) present.      Left lower leg: Edema (3+) present.      Comments: Swelling up to thighs/hips   Skin:     General: Skin is warm and dry.   Neurological:      Mental Status: She is alert and oriented to person, place, and time. Mental status is at baseline.   Psychiatric:         Mood and Affect: Mood normal.         Behavior: Behavior normal.       Significant Labs:  All labs within the past 24 hours have been reviewed.     Significant Imaging:  All imaging from past 24 hours reviewed.

## 2022-10-06 NOTE — PROGRESS NOTES
[facility] Step[facility]  Hematology/Oncology  Progress Note    Patient Name: Puja Quigley  Admission Date: 9/30/2022  Hospital Length of Stay: 5 days  Code Status: Full Code     Subjective:     HPI:  Mrs. Quigley is a 42 F with PMHx of gastric adenocarcinoma with osseous, hepatic and peritoneal metastases complicated by recurrent abdominal distension and pain 2/2 recurrent painful, tense ascites requiring frequent decompression for symptom relief. presenting to the ED with the chief complaint of abdominal pain. Reports worsening abdominal swelling and BLE swelling over the past week. Reports undergoing paracentesis 1-2 times per week, but missed her sessions this week as she went out of town for her birthday. Reports her abdominal swelling has not been this significant in the past. Additionally reports clear productive cough. Feels shortness of breath is due to her abdominal distention. Denies fever, chest pain, vomiting, urinary or bowel movement changes.           Patient information was obtained from patient, past medical records, and ER records.      Oncology History:          Malignant neoplasm of stomach   6/10/2021 Initial Diagnosis     Gastric adenocarcinoma      7/26/2021 - 4/6/2022 Chemotherapy     Treatment Summary   Plan Name: OP FOLFOX 6 Q2W  Treatment Goal: Palliative  Status: Inactive  Start Date: 7/26/2021  End Date: 4/6/2022  Provider: Fer Ashraf MD  Chemotherapy: fluorouraciL 2,400 mg/m2 = 3,770 mg in sodium chloride 0.9% 100 mL chemo infusion, 2,400 mg/m2 = 3,770 mg, Intravenous, Over 46 hours, 19 of 20 cycles  Administration: 3,770 mg (7/26/2021), 3,770 mg (8/9/2021), 3,770 mg (8/23/2021), 3,770 mg (9/7/2021), 3,770 mg (9/21/2021), 3,770 mg (10/5/2021), 3,770 mg (10/19/2021), 3,770 mg (11/2/2021), 3,770 mg (11/15/2021), 3,770 mg (11/30/2021), 3,770 mg (12/13/2021), 4,000 mg (12/27/2021), 4,030 mg (1/10/2022), 4,030 mg (1/24/2022), 4,000 mg (2/7/2022), 4,000 mg (2/22/2022),  4,000 mg (3/7/2022), 4,000 mg (3/22/2022), 4,000 mg (4/4/2022)  oxaliplatin (ELOXATIN) 85 mg/m2 = 133 mg in dextrose 5 % 500 mL chemo infusion, 85 mg/m2 = 133 mg, Intravenous, Clinic/HOD 1 time, 9 of 9 cycles  Administration: 133 mg (7/26/2021), 133 mg (8/9/2021), 133 mg (8/23/2021), 133 mg (9/7/2021), 133 mg (9/21/2021), 133 mg (10/5/2021), 133 mg (10/19/2021), 133 mg (11/2/2021), 133 mg (11/15/2021)         5/26/2022 - 7/27/2022 Chemotherapy     Treatment Summary   Plan Name: OP FOLFIRI Q2WK  Treatment Goal: Palliative  Status: Inactive  Start Date: 5/26/2022  End Date: 7/27/2022  Provider: Fer Ashraf MD  Chemotherapy: dexAMETHasone (DECADRON) 4 MG Tab, 8 mg, Oral, Daily, 1 of 1 cycle, Start date: --, End date: --  irinotecan (CAMPTOSAR) 120 mg/m2 = 200 mg in sodium chloride 0.9% 500 mL chemo infusion, 120 mg/m2 = 200 mg (100 % of original dose 120 mg/m2), Intravenous, Clinic/HOD 1 time, 5 of 24 cycles  Dose modification: 125 mg/m2 (original dose 120 mg/m2, Cycle 1), 120 mg/m2 (original dose 120 mg/m2, Cycle 1), 75 mg/m2 (original dose 120 mg/m2, Cycle 2, Reason: Dose not tolerated)  Administration: 200 mg (5/26/2022), 126 mg (6/13/2022), 126 mg (6/27/2022), 134 mg (7/12/2022), 134 mg (7/25/2022)  ramucirumab (CYRAMZA) 471 mg in sodium chloride 0.9% 250 mL chemo infusion, 8 mg/kg = 471 mg, Intravenous, Clinic/HOD 1 time, 1 of 1 cycle  Administration: 471 mg (5/26/2022)         8/16/2022 -  Chemotherapy     Treatment Summary   Plan Name: OP DOCETAXEL (75 MG/M2) Q3W  Treatment Goal: Palliative  Status: Active  Start Date: 8/16/2022  End Date: 11/8/2022 (Planned)  Provider: Fer Ashraf MD  Chemotherapy: DOCEtaxeL (TAXOTERE) 35 mg/m2 = 65 mg in sodium chloride 0.9% 253.25 mL chemo infusion, 35 mg/m2 = 65 mg (46.7 % of original dose 75 mg/m2), Intravenous, Clinic/HOD 1 time, 2 of 7 cycles  Dose modification: 35 mg/m2 (original dose 75 mg/m2, Cycle 1, Reason: MD Discretion, Comment: per MD Penning[place]  recommendations)  Administration: 65 mg (8/16/2022), 65 mg (8/30/2022)       Interval History: NAEO. Pt is c/o abdominal pain 2/2 ascites. Team will use Pleur-X catheter to drain some fluid today, 10/6/22. 750 cc were drained. Nephro will continue to give IV Lasix for diuresis. She will remain on heparin IV drip for now.      Oncology Treatment Plan:   OP DOCETAXEL (75 MG/M2) Q3W    Medications:  Continuous Infusions:   heparin (porcine) in D5W 25 Units/kg/hr (10/05/22 2115)     Scheduled Meds:   gabapentin  300 mg Oral TID    pantoprazole  40 mg Oral Daily    polyethylene glycol  17 g Oral TID     PRN Meds:acetaminophen, aluminum-magnesium hydroxide-simethicone, baclofen, dextrose 10%, dextrose 10%, glucagon (human recombinant), glucose, glucose, heparin (PORCINE), heparin (PORCINE), magnesium hydroxide 400 mg/5 ml, melatonin, morphine, naloxone, ondansetron, oxyCODONE, promethazine (PHENERGAN) IVPB, simethicone, sodium chloride 0.9%     Review of Systems   Constitutional:  Negative for chills, diaphoresis, fatigue and fever.   HENT:  Negative for postnasal drip, sore throat and trouble swallowing.    Eyes:  Negative for visual disturbance.   Respiratory:  Negative for cough and shortness of breath.    Cardiovascular:  Positive for leg swelling. Negative for chest pain and palpitations.   Gastrointestinal:  Positive for abdominal distention, abdominal pain and constipation. Negative for nausea and vomiting.   Genitourinary:  Negative for dysuria.   Musculoskeletal:  Negative for back pain and myalgias.   Skin:  Negative for rash.   Neurological:  Negative for dizziness, syncope, weakness and headaches.   Hematological:  Does not bruise/bleed easily.   Psychiatric/Behavioral:  Negative for confusion and decreased concentration.    Objective:     Vital Signs (Most Recent):  Temp: 98.8 °F (37.1 °C) (10/06/22 0508)  Pulse: (!) 115 (10/06/22 1259)  Resp: 18 (10/06/22 1259)  BP: 112/77 (10/06/22 1259)  SpO2: 100 %  (10/06/22 1259)   Vital Signs (24h Range):  Temp:  [97.5 °F (36.4 °C)-98.8 °F (37.1 °C)] 98.8 °F (37.1 °C)  Pulse:  [107-122] 115  Resp:  [18] 18  SpO2:  [97 %-100 %] 100 %  BP: (100-121)/(72-84) 112/77     Weight: 58 kg (127 lb 13.9 oz)  Body mass index is 20.03 kg/m².  Body surface area is 1.66 meters squared.      Intake/Output Summary (Last 24 hours) at 10/6/2022 1456  Last data filed at 10/6/2022 0800  Gross per 24 hour   Intake --   Output 1650 ml   Net -1650 ml       Physical Exam  Vitals and nursing note reviewed.   Constitutional:       General: She is not in acute distress.     Appearance: Normal appearance.   HENT:      Head: Normocephalic and atraumatic.      Mouth/Throat:      Mouth: Mucous membranes are moist.   Eyes:      General: No scleral icterus.     Extraocular Movements: Extraocular movements intact.      Conjunctiva/sclera: Conjunctivae normal.   Cardiovascular:      Rate and Rhythm: Regular rhythm. Tachycardia present.      Pulses: Normal pulses.      Heart sounds: Normal heart sounds. No murmur heard.    No friction rub. No gallop.   Pulmonary:      Effort: Pulmonary effort is normal. No respiratory distress.      Breath sounds: Normal breath sounds.   Abdominal:      General: Bowel sounds are normal. There is distension.      Tenderness: There is abdominal tenderness.   Musculoskeletal:      Right lower leg: Edema present.      Left lower leg: Edema present.   Neurological:      General: No focal deficit present.      Mental Status: She is alert and oriented to person, place, and time.   Psychiatric:         Mood and Affect: Mood normal.       Significant Labs:   All pertinent labs from the last 24 hours have been reviewed.    Diagnostic Results:  I have reviewed all pertinent imaging results/findings within the past 24 hours.    Assessment/Plan:     * Malignant ascites  Pt with known h/o ascites   Initially had peritoneal catheter after diagnosis, output slowed so was removed in October  2021 by IR.   bi-weekly paracentesis for now.  Last paracentesis 9/23 removed 6.7L of fluid from the abdomen.     Paracentesis performed on 10/2/22. 5 L drained, pt tolerated procedure well. 2nd paracentesis performed by IR on 10/3/22, 5.4L drained. Pleur-X catheter was not placed. Plan for pt to have catheter placed at [facility] on 10/6/22. Pt states this was originally planned before having to come to [facility]. Pleur-X catheter placed by IR, 10/5/22, in place, pt tolerated procedure well.     Plan:   - Pain control with oxycodone 5 mg PRN q 4 hours and IV morphine 4 mg PRN q 4 hours  - Antiemetics with Phenergan   - IV Lasix 200 mg per Nephro  - monitor volume status, vitals, and daily labs  - Pleur-X catheter in place, drained 750 cc on 10/6/22      Intractable abdominal pain  Abdominal pain likely 2/2 to compression from ascites   CT A/P (10/1/22) revealed resolution of bowel distension now with large and small intestines normal caliber and compressed by massive ascites. No evidence of bowel obstruction.    Paracentesis performed on 10/2/22. 5 L drained, pt tolerated procedure well. 2nd paracentesis performed by IR on 10/3/22, 5.4L drained. Pleur-X catheter was not placed. Plan for pt to have catheter placed at [facility] on 10/6/22. Pt states this was originally planned before having to come to [facility].     Plan:   - Pain control with oxycodone 5 mg PRN q 4 hours and IV morphine 4 mg PRN q 4 hours  - Antiemetics with Phenergan   - possible 3rd paracentesis on 10/5/22 if needed      MARIA (acute kidney injury)  Baseline Cr 0.8, Cr 3.1 on admission  Hx of CKD: no  Associated with  fatigue, n/v  DDx: hypotension, hypovolemia, obstructive uropathy 2/2 to malignancy   No indications for HD at this time    MARIA has still not resolved after 2 paracentesis. Will consult Nephrology and f/u with recs     Plan:   - Trend Cr  - Strict I&Os  - Avoid nephrotoxic agents (NSAIDs, gadolinium, IV radiocontrast)  - Avoid  hypotension  - Renally adjust medications   - Lasix 200 mg IV      Thrombus of R femoral vein  US on bilateral lower extremities showed new nonocclusive thrombus in the mid right femoral vein.    - High intensity heparin drip IV    Hyperkalemia  Patient with potassium of 5.6, that has slowly been increasing since admission from 5.2.      Plan:  - lasix today 200 mg  - lokelma packet    Hyponatremia  Patient with hyponatremia thought to be 2/2 to volume overload. Physical exam with lower extremity swelling with pitting edema. Abdomen still with ascites on retroperitoneal ultrasound. Serum OSM of 283, Urine sodium less than 10.     Plan:  --Likely 2/2 to volume overload  --CMP daily  --Ordered Lasix 200 IV     Constipation  - Miralax TID   - will add lactulose and suppository bisacodyl if needed    Malignant neoplasm of stomach  HER-2 negative by FISH.  PD-L1 < 1%.  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  We previously explained the implications of a stage IV diagnosis to her and potential treatment options.  She is now s/p port placement. She sought a second opinion at [facility] for zolbetuximab trial targeting CLDN protein but she did not test positive for this biomarker. We recommended proceeding with SOC FOLFOX, with which the [facility] team agreed. Because of the negative PD-L1 I do not think the benefit of adding nivolumab outweighs the potential toxicities.        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  She opted not to get genetic testing done at her appt with Phi. We recommended that she reconsider that decision in light of a very likely germline mutation in SAMUEL which has clinical consequences.       CT CAP after 6 cycles showed improvement in ascites, probable hepatic, thyroid and bone metastases. We dropped  oxaliplatin starting with cycle 10 due to grade 1 neuropathy and desire to keep it from worsening.     CT CAP after cycle 10 showed stable disease.  CT scan after cycle 15 continued to display overall stability of disease within the gastric wall, peritoneum, spine and liver. Although increase ascites on imaging and clinically can represent progression of disease, her previous CT suggested overall stability within the liver and gastric wall, so we recommended to continue with treatment time. However, she continued to have increasing abdominal ascites that has been concerning for progression of disease. Hence, she had repeat imaging. On CT performed on 4/14/2022, she appeared to have worsening disease suggestive of progression. She was seen by Dr. Reid at [facility] for f/u and to discuss possible treatment options.  She was also evaluated by Dr. Andree Mueller and Dr. See of Surgical Oncology at [facility].  Ultimately she was not felt to be a good surgical debulking candidate due to her ascites.  If her ascites improves during the course of chemotherapy and her performance status remains stable or improves, they would re-consider her again for palliative oophorectomy.      Was given ramucirumab with cycle 1 but this later was held given [facility] recs for just FOLFIRI. In addition, she didn't tolerate cycle 1 of cyramza well with significant N/V. She was started on second-line FOLFIRI. She received 5 cycles with FOLFIRI of irinotecan to 75 mg/m2 - has UGT1A1 homozygous mutation indicating poor metabolism.  Continue 5-FU infusion at 2200 mg/m2.     Repeat imaging with CT CAP after [facility] on 8/3/22 after cycle 5 of FOLFIRI demonstrated disease progression.  Dr. Reid recommended third line docetaxel biweekly at 35 mg/m2.      -s/p cycle 3 today of Docetaxel.               Jaspal Gay DO  Hematology/Oncology  [facility] - Trans[facility] Step[facility]

## 2022-10-06 NOTE — PT/OT/SLP EVAL
"Occupational Therapy   Evaluation    Name: Puja Quigley  MRN: [id number]  Admitting Diagnosis:  Malignant ascites  Recent Surgery: * No surgery found *      Recommendations:     Discharge Recommendations: home, home health OT  Discharge Equipment Recommendations:  none  Barriers to discharge:  None    Assessment:     Puja Quigley is a 42 y.o. female with a medical diagnosis of Malignant ascites. Performance deficits affecting function: weakness, impaired endurance, impaired self care skills, impaired functional mobility, gait instability, impaired balance, decreased coordination.      Rehab Prognosis: Good; patient would benefit from acute skilled OT services to address these deficits and reach maximum level of function.       Plan:     Patient to be seen 3 x/week to address the above listed problems via self-care/home management, therapeutic activities, therapeutic exercises  Plan of Care Expires: 11/05/22  Plan of Care Reviewed with: patient    Subjective     "I was just on vacation in [place] last week when I got sick cause I needed a para."    Occupational Profile:  Living Environment: Pt lives with boyfriend and her 1 yo son in 1  with 1 [facility]. Sh/tub combo in which she is currently using a bath bench turned sideways as tub is very "high" not allowing for proper use of TTB.   Previous level of function: independent in self care and most homemaking tasks, caring for toddler and driving.  Roles and Routines: homemaking tasks.  Equipment Used at Home:  bath bench  Assistance upon Discharge: supportive family    Pain/Comfort:  Pain Rating 1: 0/10    Patients cultural, spiritual, Denominational conflicts given the current situation:      Objective:     Communicated with: rn prior to session.  Patient found supine with telemetry upon OT entry to room.    General Precautions: Standard, fall   Orthopedic Precautions:    Braces:    Respiratory Status: Room air    Occupational Performance:    Bed Mobility:  "   Patient completed Rolling/Turning to Right with minimum assistance  Patient completed Scooting/Bridging with supervision  Patient completed Supine to Sit with minimum assistance    Functional Mobility/Transfers:  Patient completed Sit <> Stand Transfer with stand by assistance  with  no assistive device   Functional Mobility: Pt took a few sidesteps with SBA / no AD.    Activities of Daily Living:  Feeding:  independence  Grooming: independence EOB    Cognitive/Visual Perceptual:  Cognitive/Psychosocial Skills:     -       Oriented to: Person, Place, Time, and Situation   -       Safety awareness/insight to disability: intact     Physical Exam:  BUE AROM/MMT: WNL  EOB Balance = Good    AMPAC 6 Click ADL:  AMPAC Total Score: 21    Treatment & Education:  UE ROM/MMT  Bed mobility training / assessment  Functional mobility assessment  Sit/standing balance assessment  Educated on importance of sitting OOB in bedside chair to promote increased strength, endurance & breathing.  Discussed OT POC / Post-acute plan    Patient left sitting edge of bed with all lines intact and call button in reach    GOALS:   Multidisciplinary Problems       Occupational Therapy Goals          Problem: Occupational Therapy    Goal Priority Disciplines Outcome Interventions   Occupational Therapy Goal     OT, PT/OT Ongoing, Progressing    Description: Goals to be met by: 10/13/22    Patient will increase functional independence with ADLs by performing:    LE Dressing with Set-up Assistance.  Grooming while standing at sink with Modified [place].  Toileting from toilet with Set-up Assistance for hygiene and clothing management.   Supine to sit with Modified [place].  Toilet transfer to toilet with Modified [place].                         History:     Past Medical History:   Diagnosis Date    Anxiety     Dehydration 5/30/2022    Gastric cancer     Gastric ulcer     Hx of psychiatric care     Pregnancy 08/12/2020    delivered on  8/12/2020    Umbilical hernia          Past Surgical History:   Procedure Laterality Date    CLOSURE OF PERFORATED ULCER OF DUODENUM USING OMENTAL PATCH      ESOPHAGOGASTRODUODENOSCOPY N/A 10/13/2020    Procedure: EGD (ESOPHAGOGASTRODUODENOSCOPY);  Surgeon: Rosio Marion MD;  Location: [facility] ENDO (2ND FLR);  Service: Endoscopy;  Laterality: N/A;    ESOPHAGOGASTRODUODENOSCOPY N/A 12/11/2020    Procedure: EGD (ESOPHAGOGASTRODUODENOSCOPY);  Surgeon: Rosio Marion MD;  Location: [facility] ENDO (2ND FLR);  Service: Endoscopy;  Laterality: N/A;  Covid-19 test 12/8/20 at [facility]    ESOPHAGOGASTRODUODENOSCOPY N/A 3/12/2021    Procedure: EGD (ESOPHAGOGASTRODUODENOSCOPY);  Surgeon: Nav Omer MD;  Location: [facility] ENDO (2ND FLR);  Service: Endoscopy;  Laterality: N/A;  COVID at [facility] 3/9 ttr    ESOPHAGOGASTRODUODENOSCOPY N/A 5/18/2021    Procedure: EGD (ESOPHAGOGASTRODUODENOSCOPY);  Surgeon: Rosio Marion MD;  Location: [facility] ENDO (2ND FLR);  Service: Endoscopy;  Laterality: N/A;  5/15-covid pcw-inst portal-tb    ESOPHAGOGASTRODUODENOSCOPY N/A 5/26/2021    Procedure: EGD (ESOPHAGOGASTRODUODENOSCOPY);  Surgeon: Socrates Terrazas MD;  Location: [facility] ENDO (2ND FLR);  Service: Endoscopy;  Laterality: N/A;       Time Tracking:     OT Date of Treatment: 10/06/22  OT Start Time: 0937  OT Stop Time: 0955  OT Total Time (min): 18 min    Billable Minutes:Evaluation 8  Therapeutic Activity 10    10/6/2022

## 2022-10-06 NOTE — SUBJECTIVE & OBJECTIVE
Interval History: NAEO. Pt is c/o abdominal pain 2/2 ascites. Team will use Pleur-X catheter to drain some fluid today, 10/6/22. 750 cc were drained. Nephro will continue to give IV Lasix for diuresis. She will remain on heparin IV drip for now.      Oncology Treatment Plan:   OP DOCETAXEL (75 MG/M2) Q3W    Medications:  Continuous Infusions:   heparin (porcine) in D5W 25 Units/kg/hr (10/05/22 2115)     Scheduled Meds:   gabapentin  300 mg Oral TID    pantoprazole  40 mg Oral Daily    polyethylene glycol  17 g Oral TID     PRN Meds:acetaminophen, aluminum-magnesium hydroxide-simethicone, baclofen, dextrose 10%, dextrose 10%, glucagon (human recombinant), glucose, glucose, heparin (PORCINE), heparin (PORCINE), magnesium hydroxide 400 mg/5 ml, melatonin, morphine, naloxone, ondansetron, oxyCODONE, promethazine (PHENERGAN) IVPB, simethicone, sodium chloride 0.9%     Review of Systems   Constitutional:  Negative for chills, diaphoresis, fatigue and fever.   HENT:  Negative for postnasal drip, sore throat and trouble swallowing.    Eyes:  Negative for visual disturbance.   Respiratory:  Negative for cough and shortness of breath.    Cardiovascular:  Positive for leg swelling. Negative for chest pain and palpitations.   Gastrointestinal:  Positive for abdominal distention, abdominal pain and constipation. Negative for nausea and vomiting.   Genitourinary:  Negative for dysuria.   Musculoskeletal:  Negative for back pain and myalgias.   Skin:  Negative for rash.   Neurological:  Negative for dizziness, syncope, weakness and headaches.   Hematological:  Does not bruise/bleed easily.   Psychiatric/Behavioral:  Negative for confusion and decreased concentration.    Objective:     Vital Signs (Most Recent):  Temp: 98.8 °F (37.1 °C) (10/06/22 0508)  Pulse: (!) 115 (10/06/22 1259)  Resp: 18 (10/06/22 1259)  BP: 112/77 (10/06/22 1259)  SpO2: 100 % (10/06/22 1259)   Vital Signs (24h Range):  Temp:  [97.5 °F (36.4 °C)-98.8 °F (37.1  °C)] 98.8 °F (37.1 °C)  Pulse:  [107-122] 115  Resp:  [18] 18  SpO2:  [97 %-100 %] 100 %  BP: (100-121)/(72-84) 112/77     Weight: 58 kg (127 lb 13.9 oz)  Body mass index is 20.03 kg/m².  Body surface area is 1.66 meters squared.      Intake/Output Summary (Last 24 hours) at 10/6/2022 1456  Last data filed at 10/6/2022 0800  Gross per 24 hour   Intake --   Output 1650 ml   Net -1650 ml       Physical Exam  Vitals and nursing note reviewed.   Constitutional:       General: She is not in acute distress.     Appearance: Normal appearance.   HENT:      Head: Normocephalic and atraumatic.      Mouth/Throat:      Mouth: Mucous membranes are moist.   Eyes:      General: No scleral icterus.     Extraocular Movements: Extraocular movements intact.      Conjunctiva/sclera: Conjunctivae normal.   Cardiovascular:      Rate and Rhythm: Regular rhythm. Tachycardia present.      Pulses: Normal pulses.      Heart sounds: Normal heart sounds. No murmur heard.    No friction rub. No gallop.   Pulmonary:      Effort: Pulmonary effort is normal. No respiratory distress.      Breath sounds: Normal breath sounds.   Abdominal:      General: Bowel sounds are normal. There is distension.      Tenderness: There is abdominal tenderness.   Musculoskeletal:      Right lower leg: Edema present.      Left lower leg: Edema present.   Neurological:      General: No focal deficit present.      Mental Status: She is alert and oriented to person, place, and time.   Psychiatric:         Mood and Affect: Mood normal.       Significant Labs:   All pertinent labs from the last 24 hours have been reviewed.    Diagnostic Results:  I have reviewed all pertinent imaging results/findings within the past 24 hours.

## 2022-10-06 NOTE — ASSESSMENT & PLAN NOTE
Patient with hyponatremia thought to be 2/2 to volume overload. Physical exam with lower extremity swelling with pitting edema. Abdomen still with ascites on retroperitoneal ultrasound. Serum OSM of 283, Urine sodium less than 10.    Plan:  --Likely 2/2 to volume overload  --CMP daily

## 2022-10-06 NOTE — ASSESSMENT & PLAN NOTE
Pt with known h/o ascites   Initially had peritoneal catheter after diagnosis, output slowed so was removed in October 2021 by IR.   bi-weekly paracentesis for now.  Last paracentesis 9/23 removed 6.7L of fluid from the abdomen.     Paracentesis performed on 10/2/22. 5 L drained, pt tolerated procedure well. 2nd paracentesis performed by IR on 10/3/22, 5.4L drained. Pleur-X catheter was not placed. Plan for pt to have catheter placed at [facility] on 10/6/22. Pt states this was originally planned before having to come to [facility]. Pleur-X catheter placed by IR, 10/5/22, in place, pt tolerated procedure well.     Plan:   - Pain control with oxycodone 5 mg PRN q 4 hours and IV morphine 4 mg PRN q 4 hours  - Antiemetics with Phenergan   - IV Lasix 200 mg per Nephro  - monitor volume status, vitals, and daily labs  - Pleur-X catheter in place, drained 750 cc on 10/6/22

## 2022-10-07 PROBLEM — F54 PSYCHOLOGICAL FACTORS AFFECTING MEDICAL CONDITION: Status: ACTIVE | Noted: 2022-10-07

## 2022-10-07 PROBLEM — F41.9 ANXIETY: Status: ACTIVE | Noted: 2022-10-07

## 2022-10-07 LAB
ALBUMIN SERPL BCP-MCNC: 1.3 G/DL (ref 3.5–5.2)
ALP SERPL-CCNC: 107 U/L (ref 55–135)
ALT SERPL W/O P-5'-P-CCNC: 12 U/L (ref 10–44)
ANION GAP SERPL CALC-SCNC: 13 MMOL/L (ref 8–16)
AST SERPL-CCNC: 17 U/L (ref 10–40)
BASOPHILS # BLD AUTO: 0.01 K/UL (ref 0–0.2)
BASOPHILS NFR BLD: 0.1 % (ref 0–1.9)
BILIRUB SERPL-MCNC: 0.5 MG/DL (ref 0.1–1)
BUN SERPL-MCNC: 59 MG/DL (ref 6–20)
CALCIUM SERPL-MCNC: 8.5 MG/DL (ref 8.7–10.5)
CHLORIDE SERPL-SCNC: 90 MMOL/L (ref 95–110)
CO2 SERPL-SCNC: 21 MMOL/L (ref 23–29)
CREAT SERPL-MCNC: 2.4 MG/DL (ref 0.5–1.4)
DIFFERENTIAL METHOD: ABNORMAL
EOSINOPHIL # BLD AUTO: 0 K/UL (ref 0–0.5)
EOSINOPHIL NFR BLD: 0 % (ref 0–8)
ERYTHROCYTE [DISTWIDTH] IN BLOOD BY AUTOMATED COUNT: 20.5 % (ref 11.5–14.5)
EST. GFR  (NO RACE VARIABLE): 25.5 ML/MIN/1.73 M^2
GLUCOSE SERPL-MCNC: 82 MG/DL (ref 70–110)
HCT VFR BLD AUTO: 33.7 % (ref 37–48.5)
HGB BLD-MCNC: 10.2 G/DL (ref 12–16)
IMM GRANULOCYTES # BLD AUTO: 0.05 K/UL (ref 0–0.04)
IMM GRANULOCYTES NFR BLD AUTO: 0.5 % (ref 0–0.5)
LYMPHOCYTES # BLD AUTO: 0.7 K/UL (ref 1–4.8)
LYMPHOCYTES NFR BLD: 7.6 % (ref 18–48)
MAGNESIUM SERPL-MCNC: 2.3 MG/DL (ref 1.6–2.6)
MCH RBC QN AUTO: 23.1 PG (ref 27–31)
MCHC RBC AUTO-ENTMCNC: 30.3 G/DL (ref 32–36)
MCV RBC AUTO: 76 FL (ref 82–98)
MONOCYTES # BLD AUTO: 0.9 K/UL (ref 0.3–1)
MONOCYTES NFR BLD: 9.7 % (ref 4–15)
NEUTROPHILS # BLD AUTO: 7.5 K/UL (ref 1.8–7.7)
NEUTROPHILS NFR BLD: 82.1 % (ref 38–73)
NRBC BLD-RTO: 0 /100 WBC
PHOSPHATE SERPL-MCNC: 5.9 MG/DL (ref 2.7–4.5)
PLATELET # BLD AUTO: 282 K/UL (ref 150–450)
PMV BLD AUTO: 9.7 FL (ref 9.2–12.9)
POTASSIUM SERPL-SCNC: 4.6 MMOL/L (ref 3.5–5.1)
PROT SERPL-MCNC: 4.6 G/DL (ref 6–8.4)
RBC # BLD AUTO: 4.42 M/UL (ref 4–5.4)
SODIUM SERPL-SCNC: 124 MMOL/L (ref 136–145)
SODIUM UR-SCNC: 13 MMOL/L (ref 20–250)
WBC # BLD AUTO: 9.11 K/UL (ref 3.9–12.7)

## 2022-10-07 PROCEDURE — 90834 PSYTX W PT 45 MINUTES: CPT | Mod: ,,, | Performed by: PSYCHOLOGIST

## 2022-10-07 PROCEDURE — 25000003 PHARM REV CODE 250: Performed by: STUDENT IN AN ORGANIZED HEALTH CARE EDUCATION/TRAINING PROGRAM

## 2022-10-07 PROCEDURE — 63600175 PHARM REV CODE 636 W HCPCS: Performed by: HOSPITALIST

## 2022-10-07 PROCEDURE — 36415 COLL VENOUS BLD VENIPUNCTURE: CPT

## 2022-10-07 PROCEDURE — 99232 PR SUBSEQUENT HOSPITAL CARE,LEVL II: ICD-10-PCS | Mod: ,,, | Performed by: INTERNAL MEDICINE

## 2022-10-07 PROCEDURE — 85025 COMPLETE CBC W/AUTO DIFF WBC: CPT

## 2022-10-07 PROCEDURE — 99231 SBSQ HOSP IP/OBS SF/LOW 25: CPT | Mod: ,,, | Performed by: INTERNAL MEDICINE

## 2022-10-07 PROCEDURE — 84100 ASSAY OF PHOSPHORUS: CPT | Performed by: HOSPITALIST

## 2022-10-07 PROCEDURE — 36415 COLL VENOUS BLD VENIPUNCTURE: CPT | Performed by: HOSPITALIST

## 2022-10-07 PROCEDURE — 84300 ASSAY OF URINE SODIUM: CPT

## 2022-10-07 PROCEDURE — 80053 COMPREHEN METABOLIC PANEL: CPT | Performed by: HOSPITALIST

## 2022-10-07 PROCEDURE — 99231 PR SUBSEQUENT HOSPITAL CARE,LEVL I: ICD-10-PCS | Mod: ,,, | Performed by: INTERNAL MEDICINE

## 2022-10-07 PROCEDURE — 83735 ASSAY OF MAGNESIUM: CPT | Performed by: HOSPITALIST

## 2022-10-07 PROCEDURE — 90834 PR PSYCHOTHERAPY W/PATIENT, 45 MIN: ICD-10-PCS | Mod: ,,, | Performed by: PSYCHOLOGIST

## 2022-10-07 PROCEDURE — 20600001 HC STEP DOWN PRIVATE ROOM

## 2022-10-07 PROCEDURE — 25000003 PHARM REV CODE 250

## 2022-10-07 PROCEDURE — 99232 SBSQ HOSP IP/OBS MODERATE 35: CPT | Mod: ,,, | Performed by: INTERNAL MEDICINE

## 2022-10-07 RX ORDER — MUPIROCIN 20 MG/G
OINTMENT TOPICAL DAILY
Status: DISCONTINUED | OUTPATIENT
Start: 2022-10-07 | End: 2022-10-15 | Stop reason: HOSPADM

## 2022-10-07 RX ORDER — PSEUDOEPHEDRINE/ACETAMINOPHEN 30MG-500MG
100 TABLET ORAL
Status: COMPLETED | OUTPATIENT
Start: 2022-10-07 | End: 2022-10-07

## 2022-10-07 RX ORDER — SODIUM CHLORIDE 9 MG/ML
INJECTION, SOLUTION INTRAVENOUS CONTINUOUS
Status: DISCONTINUED | OUTPATIENT
Start: 2022-10-07 | End: 2022-10-07

## 2022-10-07 RX ORDER — SODIUM CHLORIDE 9 MG/ML
INJECTION, SOLUTION INTRAVENOUS ONCE
Status: COMPLETED | OUTPATIENT
Start: 2022-10-07 | End: 2022-10-07

## 2022-10-07 RX ORDER — LACTULOSE 10 G/15ML
20 SOLUTION ORAL
Status: DISCONTINUED | OUTPATIENT
Start: 2022-10-07 | End: 2022-10-07

## 2022-10-07 RX ADMIN — MORPHINE SULFATE 4 MG: 2 INJECTION, SOLUTION INTRAMUSCULAR; INTRAVENOUS at 06:10

## 2022-10-07 RX ADMIN — Medication 100 ML: at 04:10

## 2022-10-07 RX ADMIN — OXYCODONE 5 MG: 5 TABLET ORAL at 02:10

## 2022-10-07 RX ADMIN — PANTOPRAZOLE SODIUM 40 MG: 40 TABLET, DELAYED RELEASE ORAL at 09:10

## 2022-10-07 RX ADMIN — GABAPENTIN 300 MG: 300 CAPSULE ORAL at 08:10

## 2022-10-07 RX ADMIN — POLYETHYLENE GLYCOL 3350 17 G: 17 POWDER, FOR SOLUTION ORAL at 09:10

## 2022-10-07 RX ADMIN — OXYCODONE 5 MG: 5 TABLET ORAL at 10:10

## 2022-10-07 RX ADMIN — APIXABAN 10 MG: 5 TABLET, FILM COATED ORAL at 09:10

## 2022-10-07 RX ADMIN — APIXABAN 10 MG: 5 TABLET, FILM COATED ORAL at 08:10

## 2022-10-07 RX ADMIN — GABAPENTIN 300 MG: 300 CAPSULE ORAL at 09:10

## 2022-10-07 RX ADMIN — MORPHINE SULFATE 4 MG: 2 INJECTION, SOLUTION INTRAMUSCULAR; INTRAVENOUS at 12:10

## 2022-10-07 RX ADMIN — MUPIROCIN: 20 OINTMENT TOPICAL at 04:10

## 2022-10-07 RX ADMIN — SODIUM CHLORIDE: 0.9 INJECTION, SOLUTION INTRAVENOUS at 05:10

## 2022-10-07 RX ADMIN — SODIUM CHLORIDE 500 ML: 0.9 INJECTION, SOLUTION INTRAVENOUS at 04:10

## 2022-10-07 NOTE — PROGRESS NOTES
[facility]  Nephrology  Progress Note    Patient Name: Puja Quigley  MRN: [id number]  Admission Date: 9/30/2022  Hospital Length of Stay: 6 days  Attending Provider: Nya Galvez MD   Primary Care Physician: Primary Doctor No  Principal Problem:Malignant ascites    Subjective:     HPI: 42 year-old F w/ hx of metastatic gastric cancer c/b peritoneal carcinomatosis and malignant ascites. Patient recently on third line docetaxel. She was initially admitted with severe abdominal pain and MARIA. She was found to have massive ascites that required large volume paracentesis on 10/1 with removal of 5.4L of fluid, and received albumin infusion. Patient reportedly gets two paracentesis per week. Last paracentesis on 9/23 removed 6.7L of fluid from the abdomen. Patient with baseline serum creatinine of 0.7 to 0.8 and presented with initial serum creatinine of 2.6-->3.2-->2.4.                     Interval History: Patient with dry mouth this AM, still having copious regurgitation (half styrofoam cup this morning). Legs are less swollen.    Review of patient's allergies indicates:  No Known Allergies  Current Facility-Administered Medications   Medication Frequency    acetaminophen tablet 650 mg Q8H PRN    aluminum-magnesium hydroxide-simethicone 200-200-20 mg/5 mL suspension 30 mL Q6H PRN    apixaban tablet 10 mg BID    baclofen 5 mg/mL oral liquid (PEDS) 2.5 mg PRN    dextrose 10% bolus 125 mL PRN    dextrose 10% bolus 250 mL PRN    docusate sodium capsule 50 mg Daily    gabapentin capsule 300 mg TID    glucagon (human recombinant) injection 1 mg PRN    glucose chewable tablet 16 g PRN    glucose chewable tablet 24 g PRN    magnesium hydroxide 400 mg/5 ml suspension 2,400 mg Daily PRN    melatonin tablet 6 mg Nightly PRN    morphine injection 4 mg Q4H PRN    naloxone 0.4 mg/mL injection 0.02 mg PRN    ondansetron disintegrating tablet 4 mg Q6H PRN    oxyCODONE immediate release  tablet 5 mg Q4H PRN    pantoprazole EC tablet 40 mg Daily    polyethylene glycol packet 17 g TID    promethazine (PHENERGAN) 6.25 mg in dextrose 5 % 50 mL IVPB Q6H PRN    simethicone chewable tablet 80 mg TID PRN    sodium chloride 0.9% flush 10 mL Q12H PRN       Objective:     Vital Signs (Most Recent):  Temp: 97.9 °F (36.6 °C) (10/07/22 0807)  Pulse: (!) 116 (10/07/22 1103)  Resp: 18 (10/07/22 0807)  BP: 105/75 (10/07/22 0807)  SpO2: 98 % (10/07/22 0807)  O2 Device (Oxygen Therapy): room air (10/06/22 1259)   Vital Signs (24h Range):  Temp:  [97.6 °F (36.4 °C)-98.4 °F (36.9 °C)] 97.9 °F (36.6 °C)  Pulse:  [102-120] 116  Resp:  [18] 18  SpO2:  [98 %-100 %] 98 %  BP: (101-112)/(69-77) 105/75     Weight: 57 kg (125 lb 10.6 oz) (10/07/22 0625)  Body mass index is 19.68 kg/m².  Body surface area is 1.64 meters squared.    I/O last 3 completed shifts:  In: 100 [P.O.:100]  Out: 2500 [Urine:2500]    Physical Exam  Vitals reviewed.   Constitutional:       General: She is not in acute distress.     Appearance: She is underweight. She is not diaphoretic.   HENT:      Head: Normocephalic and atraumatic.      Right Ear: External ear normal.      Left Ear: External ear normal.      Nose: No congestion.      Mouth/Throat:      Mouth: Mucous membranes are dry.      Comments: No ulcers seen  Eyes:      General:         Right eye: No discharge.         Left eye: No discharge.   Neck:      Comments: No JVD appreciated  Cardiovascular:      Rate and Rhythm: Normal rate and regular rhythm.   Pulmonary:      Effort: Pulmonary effort is normal. No respiratory distress.   Abdominal:      General: There is distension.   Musculoskeletal:      Cervical back: Normal range of motion.      Right lower leg: Edema (improving) present.      Left lower leg: Edema (improving) present.      Comments: Swelling up to thighs/hips   Skin:     General: Skin is warm and dry.   Neurological:      Mental Status: She is alert and oriented to person,  place, and time. Mental status is at baseline.   Psychiatric:         Mood and Affect: Mood normal.         Behavior: Behavior normal.       Significant Labs:  All labs within the past 24 hours have been reviewed.     Significant Imaging:  Imaging from past 24 hours reviewed    Assessment/Plan:     Hyperkalemia  Patient with potassium of 5.6, that has slowly been increasing since admission from 5.2.     Plan:  --resolved with lasix    Hyponatremia  Patient with hyponatremia thought to be 2/2 to volume overload. Physical exam with lower extremity swelling with pitting edema. Abdomen still with ascites on retroperitoneal ultrasound. Serum OSM of 283, Urine sodium less than 10.    Plan:  --will trial fluids today  --CMP daily      MARIA (acute kidney injury)  42 year-old f w hx of gastric adenocarcinoma c/b hepaticand peritoneal metastases. Patient has episodes of recurrent abdominal distension due to tense ascites requiring now bi-weekly paracentesis for relief. Patient with initially up trending creatinine level that peaked at 3.2 (serum creatinine 0.7 to 0.8) that is improving after her second large volume para that removed. Patient had CT scan on admission however did not get good look at kidneys 2/2 to massive ascites. Patient denies any obstructive type symptoms.    Patient notes that she is able to urinate normally, although she notes that when she has a lot of fluid in her abdomen she goes more frequently and is associated with some back pain. That improves after fluid is removed according to patient. Patient notes since March she has been requiring paracentesis and now they are becoming more frequent.      MARIA could have also been caused by large volume paracentesis-induced kidney injury from prior paracentesis on 10/1, however creatinine per chart check had already been elevated on arrival. Patient status post paracentesis on 10/3 with approximately 5L removed and improvement in her creatinine. Patient is  constipated and notes not having bowel movement since being in hospital 2/2 to pain medications.    DDX for MARIA: pre-renal from decreased PO intake, obstruction from large volume ascites, ATN    Plan:  --MARIA due to ATN (stable) 2/2 to compression from large volume ascites, now s/p pluerX to abdomen. Creatinine stable.  --Patient appears volume down with dry mouth today, less swelling--likely 2/2 to continued difficulty with PO hydration/eating. Continues to have regurgitation.  --urine sodium ordered to assess volume status--if low likely she is dehydrated. Has not got lasix since yesterday AM so do not feel this will affect urine sodium.  --recommend IV hydration 1L NS over 4 hours after urine sodium collected  --assess volume status daily  --CMP, phosphorus, Mg daily  --Avoid nephrotoxic agents  --avoid NSAIDs  --Stricts ins/outs  --renal ultrasound--no acute findings  --urine microscopy was consistent with muddy brown casts---acute tubular necrosis   --patient s/p abdominal pleurx for malignant ascities             Thank you for your consult. I will follow-up with patient. Please contact us if you have any additional questions.    Bienvenido Muhammad MD  Nephrology  [facility]

## 2022-10-07 NOTE — ASSESSMENT & PLAN NOTE
Hematology/Oncology Psychology consulted to discuss with pt regarding her poor disease prognosis

## 2022-10-07 NOTE — ASSESSMENT & PLAN NOTE
42 year-old f w hx of gastric adenocarcinoma c/b hepaticand peritoneal metastases. Patient has episodes of recurrent abdominal distension due to tense ascites requiring now bi-weekly paracentesis for relief. Patient with initially up trending creatinine level that peaked at 3.2 (serum creatinine 0.7 to 0.8) that is improving after her second large volume para that removed. Patient had CT scan on admission however did not get good look at kidneys 2/2 to massive ascites. Patient denies any obstructive type symptoms.    Patient notes that she is able to urinate normally, although she notes that when she has a lot of fluid in her abdomen she goes more frequently and is associated with some back pain. That improves after fluid is removed according to patient. Patient notes since March she has been requiring paracentesis and now they are becoming more frequent.      MARIA could have also been caused by large volume paracentesis-induced kidney injury from prior paracentesis on 10/1, however creatinine per chart check had already been elevated on arrival. Patient status post paracentesis on 10/3 with approximately 5L removed and improvement in her creatinine. Patient is constipated and notes not having bowel movement since being in hospital 2/2 to pain medications.    DDX for MARIA: pre-renal from decreased PO intake, obstruction from large volume ascites, ATN    Plan:  --MARIA due to ATN (stable) 2/2 to compression from large volume ascites, now s/p pluerX to abdomen. Creatinine stable.  --Patient appears volume down with dry mouth today, less swelling--likely 2/2 to continued difficulty with PO hydration/eating. Continues to have regurgitation.  --urine sodium ordered to assess volume status--if low likely she is dehydrated. Has not got lasix since yesterday AM so do not feel this will affect urine sodium.  --recommend IV hydration 1L NS over 4 hours after urine sodium collected  --assess volume status daily  --CMP, phosphorus,  Mg daily  --Avoid nephrotoxic agents  --avoid NSAIDs  --Stricts ins/outs  --renal ultrasound--no acute findings  --urine microscopy was consistent with muddy brown casts---acute tubular necrosis   --patient s/p abdominal pleurx for malignant ascities

## 2022-10-07 NOTE — ASSESSMENT & PLAN NOTE
US on bilateral lower extremities showed new nonocclusive thrombus in the mid right femoral vein.    - High intensity heparin drip IV switched to Apixaban 10 mg BID

## 2022-10-07 NOTE — SUBJECTIVE & OBJECTIVE
Therapeutic Intervention: Met with patient and friend.  Outpatient - Insight oriented psychotherapy 30 min - CPT code 00756    Chief Complaint/Reason for Encounter: adjustment     Interval History and Content of Current Session: Patient with continued difficulty with adjustment to poor prognosis and current treatment status. Patient perseverated on ascites being the cause of her cancer progression (not being able to receive chemo). Team as had conversation with patient about prognosis several times. Patient looking forward to [facility] to be able to present to [facility] next week for assessment.     Risk Parameters:  Patient reports no suicidal ideation  Patient reports no homicidal ideation  Patient reports no self-injurious behavior  Patient reports no violent behavior    Verbal Deficits: None    Patient's response to intervention:  The patient's response to intervention is  passive .    Progress toward goals and other mental status changes:  The patient's progress toward goals is limited.    Strengths and Liabilities: Strength: Patient is expressive/articulate., Strength: Patient is intelligent., Liability: Patient has poor health.    Current Evaluation:     Mental Status Exam:  General Appearance:  Ill-appearing   Speech: normal tone, normal rate, normal pitch, normal volume      Level of Cooperation: cooperative      Thought Processes: normal and logical   Mood: anxious      Thought Content: normal, no suicidality, no homicidality, delusions, or paranoia   Affect: congruent and appropriate   Orientation: Oriented x3   Memory: recent >  intact, remote >  intact   Attention Span & Concentration: intact   Fund of General Knowledge: intact and appropriate to age and level of education   Abstract Reasoning: Not assessed   Judgment & Insight: fair     Language  intact

## 2022-10-07 NOTE — SUBJECTIVE & OBJECTIVE
Interval History: Patient with dry mouth this AM, still having copious regurgitation (half styrofoam cup this morning). Legs are less swollen.    Review of patient's allergies indicates:  No Known Allergies  Current Facility-Administered Medications   Medication Frequency    acetaminophen tablet 650 mg Q8H PRN    aluminum-magnesium hydroxide-simethicone 200-200-20 mg/5 mL suspension 30 mL Q6H PRN    apixaban tablet 10 mg BID    baclofen 5 mg/mL oral liquid (PEDS) 2.5 mg PRN    dextrose 10% bolus 125 mL PRN    dextrose 10% bolus 250 mL PRN    docusate sodium capsule 50 mg Daily    gabapentin capsule 300 mg TID    glucagon (human recombinant) injection 1 mg PRN    glucose chewable tablet 16 g PRN    glucose chewable tablet 24 g PRN    magnesium hydroxide 400 mg/5 ml suspension 2,400 mg Daily PRN    melatonin tablet 6 mg Nightly PRN    morphine injection 4 mg Q4H PRN    naloxone 0.4 mg/mL injection 0.02 mg PRN    ondansetron disintegrating tablet 4 mg Q6H PRN    oxyCODONE immediate release tablet 5 mg Q4H PRN    pantoprazole EC tablet 40 mg Daily    polyethylene glycol packet 17 g TID    promethazine (PHENERGAN) 6.25 mg in dextrose 5 % 50 mL IVPB Q6H PRN    simethicone chewable tablet 80 mg TID PRN    sodium chloride 0.9% flush 10 mL Q12H PRN       Objective:     Vital Signs (Most Recent):  Temp: 97.9 °F (36.6 °C) (10/07/22 0807)  Pulse: (!) 116 (10/07/22 1103)  Resp: 18 (10/07/22 0807)  BP: 105/75 (10/07/22 0807)  SpO2: 98 % (10/07/22 0807)  O2 Device (Oxygen Therapy): room air (10/06/22 1259)   Vital Signs (24h Range):  Temp:  [97.6 °F (36.4 °C)-98.4 °F (36.9 °C)] 97.9 °F (36.6 °C)  Pulse:  [102-120] 116  Resp:  [18] 18  SpO2:  [98 %-100 %] 98 %  BP: (101-112)/(69-77) 105/75     Weight: 57 kg (125 lb 10.6 oz) (10/07/22 0625)  Body mass index is 19.68 kg/m².  Body surface area is 1.64 meters squared.    I/O last 3 completed shifts:  In: 100 [P.O.:100]  Out: 2500 [Urine:2500]    Physical Exam  Vitals reviewed.    Constitutional:       General: She is not in acute distress.     Appearance: She is underweight. She is not diaphoretic.   HENT:      Head: Normocephalic and atraumatic.      Right Ear: External ear normal.      Left Ear: External ear normal.      Nose: No congestion.      Mouth/Throat:      Mouth: Mucous membranes are dry.      Comments: No ulcers seen  Eyes:      General:         Right eye: No discharge.         Left eye: No discharge.   Neck:      Comments: No JVD appreciated  Cardiovascular:      Rate and Rhythm: Normal rate and regular rhythm.   Pulmonary:      Effort: Pulmonary effort is normal. No respiratory distress.   Abdominal:      General: There is distension.   Musculoskeletal:      Cervical back: Normal range of motion.      Right lower leg: Edema (improving) present.      Left lower leg: Edema (improving) present.      Comments: Swelling up to thighs/hips   Skin:     General: Skin is warm and dry.   Neurological:      Mental Status: She is alert and oriented to person, place, and time. Mental status is at baseline.   Psychiatric:         Mood and Affect: Mood normal.         Behavior: Behavior normal.       Significant Labs:  All labs within the past 24 hours have been reviewed.     Significant Imaging:  Imaging from past 24 hours reviewed

## 2022-10-07 NOTE — ASSESSMENT & PLAN NOTE
Patient with hyponatremia thought to be 2/2 to volume overload. Physical exam with lower extremity swelling with pitting edema. Abdomen still with ascites on retroperitoneal ultrasound. Serum OSM of 283, Urine sodium less than 10.     Plan:  --Likely 2/2 to volume overload  --CMP daily  - Nephro will give IV fluids for dehydration

## 2022-10-07 NOTE — ASSESSMENT & PLAN NOTE
Patient with potassium of 5.6, that has slowly been increasing since admission from 5.2.     Plan:  --resolved with lasix

## 2022-10-07 NOTE — PT/OT/SLP PROGRESS
"Physical Therapy  Consult    Patient Name:  Puja Quigley   MRN:  [id number]  Admitting Diagnosis:  Malignant ascites   Recent Surgery: * No surgery found *    Admit Date: 9/30/2022  Length of Stay: 6 days    Physical Therapy orders received and acknowledged. Patient not seen today due to pt reporting extreme fatigue on this date and "not feeling up" to any mobility.  PT to attempt when Puja Quigley is medically appropriate for progressive mobility.    Mari Banks PT, DPT  10/7/2022  Pager: [phone]    "

## 2022-10-07 NOTE — PLAN OF CARE
Patient is AAOx4.  Afebrile  RA  Telemetry monitoring is in place. -120s.  PRN IV morphine administered for c/o pain with moderate relief obtained.   Enema ordered.   Friend at bedside and participating in care.   Sacral protection pad applied.   Bed is in lowest position with wheels locked.   Instructed to call for assistance.   Nonskid socks when oob.   [facility]

## 2022-10-07 NOTE — ASSESSMENT & PLAN NOTE
Baseline Cr 0.8, Cr 3.1 on admission  Hx of CKD: no  Associated with  fatigue, n/v  DDx: hypotension, hypovolemia, obstructive uropathy 2/2 to malignancy   No indications for HD at this time    MARIA has still not resolved after 2 paracentesis. Will consult Nephrology and f/u with recs     Plan:   - Trend Cr  - Strict I&Os  - Avoid nephrotoxic agents (NSAIDs, gadolinium, IV radiocontrast)  - Avoid hypotension  - Renally adjust medications   - Urine Na and then give IV fluids for dehydration

## 2022-10-07 NOTE — PROGRESS NOTES
[facility]  Hematology/Oncology  Progress Note    Patient Name: Puja Quigley  Admission Date: 9/30/2022  Hospital Length of Stay: 6 days  Code Status: Full Code     Subjective:     HPI:  Mrs. Quigley is a 42 F with PMHx of gastric adenocarcinoma with osseous, hepatic and peritoneal metastases complicated by recurrent abdominal distension and pain 2/2 recurrent painful, tense ascites requiring frequent decompression for symptom relief. presenting to the ED with the chief complaint of abdominal pain. Reports worsening abdominal swelling and BLE swelling over the past week. Reports undergoing paracentesis 1-2 times per week, but missed her sessions this week as she went out of town for her birthday. Reports her abdominal swelling has not been this significant in the past. Additionally reports clear productive cough. Feels shortness of breath is due to her abdominal distention. Denies fever, chest pain, vomiting, urinary or bowel movement changes.           Patient information was obtained from patient, past medical records, and ER records.      Oncology History:          Malignant neoplasm of stomach   6/10/2021 Initial Diagnosis     Gastric adenocarcinoma      7/26/2021 - 4/6/2022 Chemotherapy     Treatment Summary   Plan Name: OP FOLFOX 6 Q2W  Treatment Goal: Palliative  Status: Inactive  Start Date: 7/26/2021  End Date: 4/6/2022  Provider: Fer Ashraf MD  Chemotherapy: fluorouraciL 2,400 mg/m2 = 3,770 mg in sodium chloride 0.9% 100 mL chemo infusion, 2,400 mg/m2 = 3,770 mg, Intravenous, Over 46 hours, 19 of 20 cycles  Administration: 3,770 mg (7/26/2021), 3,770 mg (8/9/2021), 3,770 mg (8/23/2021), 3,770 mg (9/7/2021), 3,770 mg (9/21/2021), 3,770 mg (10/5/2021), 3,770 mg (10/19/2021), 3,770 mg (11/2/2021), 3,770 mg (11/15/2021), 3,770 mg (11/30/2021), 3,770 mg (12/13/2021), 4,000 mg (12/27/2021), 4,030 mg (1/10/2022), 4,030 mg (1/24/2022), 4,000 mg (2/7/2022), 4,000 mg (2/22/2022),  4,000 mg (3/7/2022), 4,000 mg (3/22/2022), 4,000 mg (4/4/2022)  oxaliplatin (ELOXATIN) 85 mg/m2 = 133 mg in dextrose 5 % 500 mL chemo infusion, 85 mg/m2 = 133 mg, Intravenous, Clinic/HOD 1 time, 9 of 9 cycles  Administration: 133 mg (7/26/2021), 133 mg (8/9/2021), 133 mg (8/23/2021), 133 mg (9/7/2021), 133 mg (9/21/2021), 133 mg (10/5/2021), 133 mg (10/19/2021), 133 mg (11/2/2021), 133 mg (11/15/2021)         5/26/2022 - 7/27/2022 Chemotherapy     Treatment Summary   Plan Name: OP FOLFIRI Q2WK  Treatment Goal: Palliative  Status: Inactive  Start Date: 5/26/2022  End Date: 7/27/2022  Provider: Fer Ashraf MD  Chemotherapy: dexAMETHasone (DECADRON) 4 MG Tab, 8 mg, Oral, Daily, 1 of 1 cycle, Start date: --, End date: --  irinotecan (CAMPTOSAR) 120 mg/m2 = 200 mg in sodium chloride 0.9% 500 mL chemo infusion, 120 mg/m2 = 200 mg (100 % of original dose 120 mg/m2), Intravenous, Clinic/HOD 1 time, 5 of 24 cycles  Dose modification: 125 mg/m2 (original dose 120 mg/m2, Cycle 1), 120 mg/m2 (original dose 120 mg/m2, Cycle 1), 75 mg/m2 (original dose 120 mg/m2, Cycle 2, Reason: Dose not tolerated)  Administration: 200 mg (5/26/2022), 126 mg (6/13/2022), 126 mg (6/27/2022), 134 mg (7/12/2022), 134 mg (7/25/2022)  ramucirumab (CYRAMZA) 471 mg in sodium chloride 0.9% 250 mL chemo infusion, 8 mg/kg = 471 mg, Intravenous, Clinic/HOD 1 time, 1 of 1 cycle  Administration: 471 mg (5/26/2022)         8/16/2022 -  Chemotherapy     Treatment Summary   Plan Name: OP DOCETAXEL (75 MG/M2) Q3W  Treatment Goal: Palliative  Status: Active  Start Date: 8/16/2022  End Date: 11/8/2022 (Planned)  Provider: Fer Ashraf MD  Chemotherapy: DOCEtaxeL (TAXOTERE) 35 mg/m2 = 65 mg in sodium chloride 0.9% 253.25 mL chemo infusion, 35 mg/m2 = 65 mg (46.7 % of original dose 75 mg/m2), Intravenous, Clinic/HOD 1 time, 2 of 7 cycles  Dose modification: 35 mg/m2 (original dose 75 mg/m2, Cycle 1, Reason: MD Discretion, Comment: per [facility]  recommendations)  Administration: 65 mg (8/16/2022), 65 mg (8/30/2022)       Interval History: NAEO. Pt states she is not able to keep anything down food or liquid wise. She is still having abdominal pain 2/2 ascites. 350 cc drained from catheter. Will order Ultrasound of abdomen to evaluate for possible fluid loculations not being reached by catheter. Will try to help pt's constipation with enemas. Nephrology will collect urine Na and then give pt IV fluids for dehydration. Will keep pt on apixaban.     Oncology Treatment Plan:   OP DOCETAXEL (75 MG/M2) Q3W    Medications:  Continuous Infusions:  Scheduled Meds:   apixaban  10 mg Oral BID    gabapentin  300 mg Oral TID    glycerin 99.5%  100 mL Rectal ED 1 Time    And    sodium chloride 0.9%  500 mL Rectal ED 1 Time    pantoprazole  40 mg Oral Daily     PRN Meds:acetaminophen, aluminum-magnesium hydroxide-simethicone, baclofen, dextrose 10%, dextrose 10%, glucagon (human recombinant), glucose, glucose, magnesium hydroxide 400 mg/5 ml, melatonin, morphine, naloxone, ondansetron, oxyCODONE, promethazine (PHENERGAN) IVPB, simethicone     Review of Systems   Constitutional:  Positive for appetite change. Negative for chills, diaphoresis, fatigue and fever.   HENT:  Positive for trouble swallowing. Negative for postnasal drip and sore throat.    Eyes:  Negative for visual disturbance.   Respiratory:  Negative for cough and shortness of breath.    Cardiovascular:  Positive for leg swelling. Negative for chest pain and palpitations.   Gastrointestinal:  Positive for abdominal distention, abdominal pain and constipation. Negative for nausea and vomiting.   Genitourinary:  Negative for dysuria.   Musculoskeletal:  Negative for back pain and myalgias.   Skin:  Negative for rash.   Neurological:  Negative for dizziness, syncope, weakness and headaches.   Hematological:  Does not bruise/bleed easily.   Psychiatric/Behavioral:  Negative for confusion and decreased  concentration.    Objective:     Vital Signs (Most Recent):  Temp: 97.6 °F (36.4 °C) (10/07/22 1113)  Pulse: (!) 115 (10/07/22 1113)  Resp: 18 (10/07/22 1210)  BP: 106/76 (10/07/22 1113)  SpO2: 100 % (10/07/22 1113)   Vital Signs (24h Range):  Temp:  [97.6 °F (36.4 °C)-98.4 °F (36.9 °C)] 97.6 °F (36.4 °C)  Pulse:  [102-120] 115  Resp:  [18] 18  SpO2:  [98 %-100 %] 100 %  BP: (101-107)/(69-76) 106/76     Weight: 57 kg (125 lb 10.6 oz)  Body mass index is 19.68 kg/m².  Body surface area is 1.64 meters squared.      Intake/Output Summary (Last 24 hours) at 10/7/2022 1304  Last data filed at 10/7/2022 1144  Gross per 24 hour   Intake 220 ml   Output 1750 ml   Net -1530 ml       Physical Exam  Vitals and nursing note reviewed.   Constitutional:       General: She is not in acute distress.     Appearance: Normal appearance.   HENT:      Head: Normocephalic and atraumatic.      Mouth/Throat:      Mouth: Mucous membranes are moist.   Eyes:      General: No scleral icterus.     Extraocular Movements: Extraocular movements intact.      Conjunctiva/sclera: Conjunctivae normal.   Cardiovascular:      Rate and Rhythm: Regular rhythm. Tachycardia present.      Pulses: Normal pulses.      Heart sounds: Normal heart sounds. No murmur heard.    No friction rub. No gallop.   Pulmonary:      Effort: Pulmonary effort is normal. No respiratory distress.      Breath sounds: Normal breath sounds.   Abdominal:      General: Bowel sounds are normal. There is distension.      Tenderness: There is abdominal tenderness.      Comments: Pleur-X catheter in place with no erythema or purulence   Musculoskeletal:      Right lower leg: Edema present.      Left lower leg: Edema present.   Neurological:      General: No focal deficit present.      Mental Status: She is alert and oriented to person, place, and time.   Psychiatric:         Mood and Affect: Mood normal.         Behavior: Behavior normal.       Significant Labs:   All pertinent labs from  the last 24 hours have been reviewed.    Diagnostic Results:  I have reviewed all pertinent imaging results/findings within the past 24 hours.    Assessment/Plan:     * Malignant ascites  Pt with known h/o ascites   Initially had peritoneal catheter after diagnosis, output slowed so was removed in October 2021 by IR.   bi-weekly paracentesis for now.  Last paracentesis 9/23 removed 6.7L of fluid from the abdomen.     Paracentesis performed on 10/2/22. 5 L drained, pt tolerated procedure well. 2nd paracentesis performed by IR on 10/3/22, 5.4L drained. Pleur-X catheter was not placed. Plan for pt to have catheter placed at [facility] on 10/6/22. Pt states this was originally planned before having to come to [facility]. Pleur-X catheter placed by IR, 10/5/22, in place, pt tolerated procedure well.     Pleur-X cathter only drained 750cc and 350cc over 2 days.     Plan:   - Pain control with oxycodone 5 mg PRN q 4 hours and IV morphine 4 mg PRN q 4 hours  - Antiemetics with Phenergan   - Nephro will collect urine Na and give IV fluids for dehydration  - monitor volume status, vitals, and daily labs  - Abdominal US to evaluate for fluid loculations possibly causing low output from Pleur-X catheter      Intractable abdominal pain  Abdominal pain likely 2/2 to compression from ascites   CT A/P (10/1/22) revealed resolution of bowel distension now with large and small intestines normal caliber and compressed by massive ascites. No evidence of bowel obstruction.    Paracentesis performed on 10/2/22. 5 L drained, pt tolerated procedure well. 2nd paracentesis performed by IR on 10/3/22, 5.4L drained. Pleur-X catheter was not placed. Plan for pt to have catheter placed at [facility] on 10/6/22. Pt states this was originally planned before having to come to [facility].     Plan:   - Pain control with oxycodone 5 mg PRN q 4 hours and IV morphine 4 mg PRN q 4 hours  - Antiemetics with Phenergan   - possible 3rd paracentesis on 10/5/22  if needed      MARIA (acute kidney injury)  Baseline Cr 0.8, Cr 3.1 on admission  Hx of CKD: no  Associated with  fatigue, n/v  DDx: hypotension, hypovolemia, obstructive uropathy 2/2 to malignancy   No indications for HD at this time    MARIA has still not resolved after 2 paracentesis. Will consult Nephrology and f/u with recs     Plan:   - Trend Cr  - Strict I&Os  - Avoid nephrotoxic agents (NSAIDs, gadolinium, IV radiocontrast)  - Avoid hypotension  - Renally adjust medications   - Urine Na and then give IV fluids for dehydration      Anxiety  Hematology/Oncology Psychology consulted to discuss with pt regarding her poor disease prognosis      Psychological factors affecting medical condition  Hematology/Oncology Psychology consulted to discuss with pt regarding her poor disease prognosis     Thrombus of R femoral vein  US on bilateral lower extremities showed new nonocclusive thrombus in the mid right femoral vein.    - High intensity heparin drip IV switched to Apixaban 10 mg BID    Hyperkalemia  Patient with potassium of 5.6, that has slowly been increasing since admission from 5.2.   Improving at the moment, 4.6 on 10/7/22     Plan:  - continue to monitor with daily CMPs     Hyponatremia  Patient with hyponatremia thought to be 2/2 to volume overload. Physical exam with lower extremity swelling with pitting edema. Abdomen still with ascites on retroperitoneal ultrasound. Serum OSM of 283, Urine sodium less than 10.     Plan:  --Likely 2/2 to volume overload  --CMP daily  - Nephro will give IV fluids for dehydration    Constipation  Pt has requested to stop any oral medications for constipation. She believes they are not working.    - will switch to fleet enema    Malignant neoplasm of stomach  HER-2 negative by FISH.  PD-L1 < 1%.  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  We previously explained the implications of a stage IV diagnosis  to her and potential treatment options.  She is now s/p port placement. She sought a second opinion at [facility] for zolbetuximab trial targeting CLDN protein but she did not test positive for this biomarker. We recommended proceeding with SOC FOLFOX, with which the [facility] team agreed. Because of the negative PD-L1 I do not think the benefit of adding nivolumab outweighs the potential toxicities.        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  She opted not to get genetic testing done at her appt with Ph[facility]. We recommended that she reconsider that decision in light of a very likely germline mutation in SAMUEL which has clinical consequences.       CT CAP after 6 cycles showed improvement in ascites, probable hepatic, thyroid and bone metastases. We dropped oxaliplatin starting with cycle 10 due to grade 1 neuropathy and desire to keep it from worsening.     CT CAP after cycle 10 showed stable disease.  CT scan after cycle 15 continued to display overall stability of disease within the gastric wall, peritoneum, spine and liver. Although increase ascites on imaging and clinically can represent progression of disease, her previous CT suggested overall stability within the liver and gastric wall, so we recommended to continue with treatment time. However, she continued to have increasing abdominal ascites that has been concerning for progression of disease. Hence, she had repeat imaging. On CT performed on 4/14/2022, she appeared to have worsening disease suggestive of progression. She was seen by Dr. Reid at [facility] for f/u and to discuss possible treatment options.  She was also evaluated by Dr. Andree Mueller and Dr. See of Surgical Oncology at [facility].  Ultimately she was not felt to be a good surgical debulking candidate due to her ascites.  If her ascites improves during the course of chemotherapy and  her performance status remains stable or improves, they would re-consider her again for palliative oophorectomy.      Was given ramucirumab with cycle 1 but this later was held given [facility] recs for just FOLFIRI. In addition, she didn't tolerate cycle 1 of cyramza well with significant N/V. She was started on second-line FOLFIRI. She received 5 cycles with FOLFIRI of irinotecan to 75 mg/m2 - has UGT1A1 homozygous mutation indicating poor metabolism.  Continue 5-FU infusion at 2200 mg/m2.     Repeat imaging with CT CAP after [facility] on 8/3/22 after cycle 5 of FOLFIRI demonstrated disease progression.  Dr. Reid recommended third line docetaxel biweekly at 35 mg/m2.      -s/p cycle 3 today of Docetaxel.               Jaspal Gay DO  Hematology/Oncology  [facility]

## 2022-10-07 NOTE — ASSESSMENT & PLAN NOTE
Patient with potassium of 5.6, that has slowly been increasing since admission from 5.2.   Improving at the moment, 4.6 on 10/7/22     Plan:  - continue to monitor with daily CMPs

## 2022-10-07 NOTE — CONSULTS
[facility]  Psychology  Progress Note  Individual Psychotherapy (PhD/LCSW)    Patient Name: Puja Quigley  MRN: [id number]    Patient Class: IP- Inpatient  Admission Date: 9/30/2022  Hospital Length of Stay: 6 days  Attending Physician: Nya Galvez MD  Primary Care Provider: Primary Doctor No    Therapeutic Intervention: Met with patient and friend.  Outpatient - Insight oriented psychotherapy 30 min - CPT code 80546    Chief Complaint/Reason for Encounter: adjustment     Interval History and Content of Current Session: Patient with continued difficulty with adjustment to poor prognosis and current treatment status. Patient perseverated on ascites being the cause of her cancer progression (not being able to receive chemo). Team as had conversation with patient about prognosis several times. Patient looking forward to discharge to be able to present to [facility] next week for assessment.     Risk Parameters:  Patient reports no suicidal ideation  Patient reports no homicidal ideation  Patient reports no self-injurious behavior  Patient reports no violent behavior    Verbal Deficits: None    Patient's response to intervention:  The patient's response to intervention is  passive .    Progress toward goals and other mental status changes:  The patient's progress toward goals is limited.    Strengths and Liabilities: Strength: Patient is expressive/articulate., Strength: Patient is intelligent., Liability: Patient has poor health.    Current Evaluation:     Mental Status Exam:  General Appearance:  Ill-appearing   Speech: normal tone, normal rate, normal pitch, normal volume      Level of Cooperation: cooperative      Thought Processes: normal and logical   Mood: anxious      Thought Content: normal, no suicidality, no homicidality, delusions, or paranoia   Affect: congruent and appropriate   Orientation: Oriented x3   Memory: recent >  intact, remote >  intact   Attention Span & Concentration:  intact   Fund of General Knowledge: intact and appropriate to age and level of education   Abstract Reasoning: Not assessed   Judgment & Insight: fair     Language  intact        Diagnostic Impression - Plan:       ICD-10-CM ICD-9-CM   1. MARIA (acute kidney injury)  N17.9 584.9   2. SOB (shortness of breath)  R06.02 786.05   3. Malignant neoplasm of stomach, unspecified location  C16.9 151.9   4. Malignant ascites  R18.0 789.51   5. Chest pain  R07.9 786.50   6. Intractable abdominal pain  R10.9 789.00   7. Tachycardia  R00.0 785.0   8. Palliative care encounter  Z51.5 V66.7   9. Psychological factors affecting medical condition  F54 316   10. Anxiety  F41.9 300.00          Treatment Plan: Patient needs continued direct communication regarding her treatment status and prognosis, but this is unlikely to change her outlook and continued need to pursue treatment.    Target symptoms: anxiety  Why chosen therapy is appropriate versus another modality: relevant to diagnosis, patient responds to this modality, evidence based practice  Outcome monitoring methods: self-report, observation, checklist/rating scale  Therapeutic intervention type: insight oriented psychotherapy        Length of Service (minutes): 45    Giselle Berumen, PhD  Psychology  [facility]

## 2022-10-07 NOTE — ASSESSMENT & PLAN NOTE
Pt has requested to stop any oral medications for constipation. She believes they are not working.    - will switch to fleet enema

## 2022-10-07 NOTE — SUBJECTIVE & OBJECTIVE
Interval History: NAEO. Pt states she is not able to keep anything down food or liquid wise. She is still having abdominal pain 2/2 ascites. 350 cc drained from catheter. Will order Ultrasound of abdomen to evaluate for possible fluid loculations not being reached by catheter. Will try to help pt's constipation with enemas. Nephrology will collect urine Na and then give pt IV fluids for dehydration. Will keep pt on apixaban.     Oncology Treatment Plan:   OP DOCETAXEL (75 MG/M2) Q3W    Medications:  Continuous Infusions:  Scheduled Meds:   apixaban  10 mg Oral BID    gabapentin  300 mg Oral TID    glycerin 99.5%  100 mL Rectal ED 1 Time    And    sodium chloride 0.9%  500 mL Rectal ED 1 Time    pantoprazole  40 mg Oral Daily     PRN Meds:acetaminophen, aluminum-magnesium hydroxide-simethicone, baclofen, dextrose 10%, dextrose 10%, glucagon (human recombinant), glucose, glucose, magnesium hydroxide 400 mg/5 ml, melatonin, morphine, naloxone, ondansetron, oxyCODONE, promethazine (PHENERGAN) IVPB, simethicone     Review of Systems   Constitutional:  Positive for appetite change. Negative for chills, diaphoresis, fatigue and fever.   HENT:  Positive for trouble swallowing. Negative for postnasal drip and sore throat.    Eyes:  Negative for visual disturbance.   Respiratory:  Negative for cough and shortness of breath.    Cardiovascular:  Positive for leg swelling. Negative for chest pain and palpitations.   Gastrointestinal:  Positive for abdominal distention, abdominal pain and constipation. Negative for nausea and vomiting.   Genitourinary:  Negative for dysuria.   Musculoskeletal:  Negative for back pain and myalgias.   Skin:  Negative for rash.   Neurological:  Negative for dizziness, syncope, weakness and headaches.   Hematological:  Does not bruise/bleed easily.   Psychiatric/Behavioral:  Negative for confusion and decreased concentration.    Objective:     Vital Signs (Most Recent):  Temp: 97.6 °F (36.4 °C)  (10/07/22 1113)  Pulse: (!) 115 (10/07/22 1113)  Resp: 18 (10/07/22 1210)  BP: 106/76 (10/07/22 1113)  SpO2: 100 % (10/07/22 1113)   Vital Signs (24h Range):  Temp:  [97.6 °F (36.4 °C)-98.4 °F (36.9 °C)] 97.6 °F (36.4 °C)  Pulse:  [102-120] 115  Resp:  [18] 18  SpO2:  [98 %-100 %] 100 %  BP: (101-107)/(69-76) 106/76     Weight: 57 kg (125 lb 10.6 oz)  Body mass index is 19.68 kg/m².  Body surface area is 1.64 meters squared.      Intake/Output Summary (Last 24 hours) at 10/7/2022 1304  Last data filed at 10/7/2022 1144  Gross per 24 hour   Intake 220 ml   Output 1750 ml   Net -1530 ml       Physical Exam  Vitals and nursing note reviewed.   Constitutional:       General: She is not in acute distress.     Appearance: Normal appearance.   HENT:      Head: Normocephalic and atraumatic.      Mouth/Throat:      Mouth: Mucous membranes are moist.   Eyes:      General: No scleral icterus.     Extraocular Movements: Extraocular movements intact.      Conjunctiva/sclera: Conjunctivae normal.   Cardiovascular:      Rate and Rhythm: Regular rhythm. Tachycardia present.      Pulses: Normal pulses.      Heart sounds: Normal heart sounds. No murmur heard.    No friction rub. No gallop.   Pulmonary:      Effort: Pulmonary effort is normal. No respiratory distress.      Breath sounds: Normal breath sounds.   Abdominal:      General: Bowel sounds are normal. There is distension.      Tenderness: There is abdominal tenderness.      Comments: Pleur-X catheter in place with no erythema or purulence   Musculoskeletal:      Right lower leg: Edema present.      Left lower leg: Edema present.   Neurological:      General: No focal deficit present.      Mental Status: She is alert and oriented to person, place, and time.   Psychiatric:         Mood and Affect: Mood normal.         Behavior: Behavior normal.       Significant Labs:   All pertinent labs from the last 24 hours have been reviewed.    Diagnostic Results:  I have reviewed all  pertinent imaging results/findings within the past 24 hours.

## 2022-10-07 NOTE — ASSESSMENT & PLAN NOTE
Patient with hyponatremia thought to be 2/2 to volume overload. Physical exam with lower extremity swelling with pitting edema. Abdomen still with ascites on retroperitoneal ultrasound. Serum OSM of 283, Urine sodium less than 10.    Plan:  --will trial fluids today  --CMP daily

## 2022-10-07 NOTE — PROGRESS NOTES
Patient is AAOx4.  Afebrile  RA  Telemetry monitoring is in place. -120s.  PRN IV morphine administered for c/o pain with moderate relief obtained.   Enema ordered.   Friend at bedside and participating in care.   Sacral protection pad applied.   Bed is in lowest position with wheels locked.   Instructed to call for assistance.   Nonskid socks when oob.   Health system

## 2022-10-07 NOTE — ASSESSMENT & PLAN NOTE
Pt with known h/o ascites   Initially had peritoneal catheter after diagnosis, output slowed so was removed in October 2021 by IR.   bi-weekly paracentesis for now.  Last paracentesis 9/23 removed 6.7L of fluid from the abdomen.     Paracentesis performed on 10/2/22. 5 L drained, pt tolerated procedure well. 2nd paracentesis performed by IR on 10/3/22, 5.4L drained. Pleur-X catheter was not placed. Plan for pt to have catheter placed at [facility] on 10/6/22. Pt states this was originally planned before having to come to [facility]. Pleur-X catheter placed by IR, 10/5/22, in place, pt tolerated procedure well.     Pleur-X cathter only drained 750cc and 350cc over 2 days.     Plan:   - Pain control with oxycodone 5 mg PRN q 4 hours and IV morphine 4 mg PRN q 4 hours  - Antiemetics with Phenergan   - Nephro will collect urine Na and give IV fluids for dehydration  - monitor volume status, vitals, and daily labs  - Abdominal US to evaluate for fluid loculations possibly causing low output from Pleur-X catheter

## 2022-10-08 PROBLEM — K13.79 MOUTH SORES: Status: ACTIVE | Noted: 2022-10-08

## 2022-10-08 LAB
ALBUMIN SERPL BCP-MCNC: 1.3 G/DL (ref 3.5–5.2)
ALP SERPL-CCNC: 108 U/L (ref 55–135)
ALT SERPL W/O P-5'-P-CCNC: 11 U/L (ref 10–44)
ANION GAP SERPL CALC-SCNC: 14 MMOL/L (ref 8–16)
AST SERPL-CCNC: 17 U/L (ref 10–40)
BASOPHILS # BLD AUTO: 0.01 K/UL (ref 0–0.2)
BASOPHILS NFR BLD: 0.1 % (ref 0–1.9)
BILIRUB SERPL-MCNC: 0.3 MG/DL (ref 0.1–1)
BUN SERPL-MCNC: 66 MG/DL (ref 6–20)
CALCIUM SERPL-MCNC: 8.3 MG/DL (ref 8.7–10.5)
CHLORIDE SERPL-SCNC: 92 MMOL/L (ref 95–110)
CO2 SERPL-SCNC: 19 MMOL/L (ref 23–29)
CREAT SERPL-MCNC: 2.1 MG/DL (ref 0.5–1.4)
DIFFERENTIAL METHOD: ABNORMAL
EOSINOPHIL # BLD AUTO: 0 K/UL (ref 0–0.5)
EOSINOPHIL NFR BLD: 0.1 % (ref 0–8)
ERYTHROCYTE [DISTWIDTH] IN BLOOD BY AUTOMATED COUNT: 20.8 % (ref 11.5–14.5)
EST. GFR  (NO RACE VARIABLE): 30 ML/MIN/1.73 M^2
GLUCOSE SERPL-MCNC: 80 MG/DL (ref 70–110)
HCT VFR BLD AUTO: 31.1 % (ref 37–48.5)
HGB BLD-MCNC: 9.8 G/DL (ref 12–16)
IMM GRANULOCYTES # BLD AUTO: 0.04 K/UL (ref 0–0.04)
IMM GRANULOCYTES NFR BLD AUTO: 0.5 % (ref 0–0.5)
LYMPHOCYTES # BLD AUTO: 0.6 K/UL (ref 1–4.8)
LYMPHOCYTES NFR BLD: 6.9 % (ref 18–48)
MAGNESIUM SERPL-MCNC: 2.8 MG/DL (ref 1.6–2.6)
MCH RBC QN AUTO: 23.1 PG (ref 27–31)
MCHC RBC AUTO-ENTMCNC: 31.5 G/DL (ref 32–36)
MCV RBC AUTO: 73 FL (ref 82–98)
MONOCYTES # BLD AUTO: 0.7 K/UL (ref 0.3–1)
MONOCYTES NFR BLD: 8.4 % (ref 4–15)
NEUTROPHILS # BLD AUTO: 6.7 K/UL (ref 1.8–7.7)
NEUTROPHILS NFR BLD: 84 % (ref 38–73)
NRBC BLD-RTO: 0 /100 WBC
PHOSPHATE SERPL-MCNC: 5.6 MG/DL (ref 2.7–4.5)
PLATELET # BLD AUTO: 259 K/UL (ref 150–450)
PMV BLD AUTO: 9.3 FL (ref 9.2–12.9)
POTASSIUM SERPL-SCNC: 4.3 MMOL/L (ref 3.5–5.1)
PROT SERPL-MCNC: 4.6 G/DL (ref 6–8.4)
RBC # BLD AUTO: 4.24 M/UL (ref 4–5.4)
SODIUM SERPL-SCNC: 125 MMOL/L (ref 136–145)
WBC # BLD AUTO: 7.93 K/UL (ref 3.9–12.7)

## 2022-10-08 PROCEDURE — 63600175 PHARM REV CODE 636 W HCPCS: Performed by: HOSPITALIST

## 2022-10-08 PROCEDURE — 99233 SBSQ HOSP IP/OBS HIGH 50: CPT | Mod: ,,, | Performed by: INTERNAL MEDICINE

## 2022-10-08 PROCEDURE — 99233 PR SUBSEQUENT HOSPITAL CARE,LEVL III: ICD-10-PCS | Mod: ,,, | Performed by: INTERNAL MEDICINE

## 2022-10-08 PROCEDURE — 36415 COLL VENOUS BLD VENIPUNCTURE: CPT | Performed by: HOSPITALIST

## 2022-10-08 PROCEDURE — 85025 COMPLETE CBC W/AUTO DIFF WBC: CPT

## 2022-10-08 PROCEDURE — 20600001 HC STEP DOWN PRIVATE ROOM

## 2022-10-08 PROCEDURE — 83735 ASSAY OF MAGNESIUM: CPT | Performed by: HOSPITALIST

## 2022-10-08 PROCEDURE — 84100 ASSAY OF PHOSPHORUS: CPT | Performed by: HOSPITALIST

## 2022-10-08 PROCEDURE — 25000003 PHARM REV CODE 250

## 2022-10-08 PROCEDURE — 80053 COMPREHEN METABOLIC PANEL: CPT | Performed by: HOSPITALIST

## 2022-10-08 PROCEDURE — 25000003 PHARM REV CODE 250: Performed by: STUDENT IN AN ORGANIZED HEALTH CARE EDUCATION/TRAINING PROGRAM

## 2022-10-08 RX ORDER — SODIUM CHLORIDE 9 MG/ML
INJECTION, SOLUTION INTRAVENOUS CONTINUOUS
Status: DISCONTINUED | OUTPATIENT
Start: 2022-10-08 | End: 2022-10-09

## 2022-10-08 RX ADMIN — DIPHENHYDRAMINE HYDROCHLORIDE 10 ML: 25 SOLUTION ORAL at 11:10

## 2022-10-08 RX ADMIN — GABAPENTIN 300 MG: 300 CAPSULE ORAL at 08:10

## 2022-10-08 RX ADMIN — APIXABAN 10 MG: 5 TABLET, FILM COATED ORAL at 08:10

## 2022-10-08 RX ADMIN — GABAPENTIN 300 MG: 300 CAPSULE ORAL at 02:10

## 2022-10-08 RX ADMIN — SODIUM CHLORIDE: 0.9 INJECTION, SOLUTION INTRAVENOUS at 05:10

## 2022-10-08 RX ADMIN — MORPHINE SULFATE 4 MG: 2 INJECTION, SOLUTION INTRAMUSCULAR; INTRAVENOUS at 02:10

## 2022-10-08 RX ADMIN — OXYCODONE 5 MG: 5 TABLET ORAL at 08:10

## 2022-10-08 RX ADMIN — DIPHENHYDRAMINE HYDROCHLORIDE 10 ML: 25 SOLUTION ORAL at 03:10

## 2022-10-08 RX ADMIN — MORPHINE SULFATE 4 MG: 2 INJECTION, SOLUTION INTRAMUSCULAR; INTRAVENOUS at 11:10

## 2022-10-08 RX ADMIN — PANTOPRAZOLE SODIUM 40 MG: 40 TABLET, DELAYED RELEASE ORAL at 08:10

## 2022-10-08 RX ADMIN — MUPIROCIN: 20 OINTMENT TOPICAL at 08:10

## 2022-10-08 RX ADMIN — MORPHINE SULFATE 4 MG: 2 INJECTION, SOLUTION INTRAMUSCULAR; INTRAVENOUS at 12:10

## 2022-10-08 NOTE — ASSESSMENT & PLAN NOTE
Pt with known h/o ascites   Initially had peritoneal catheter after diagnosis, output slowed so was removed in October 2021 by IR.   bi-weekly paracentesis for now.  Last paracentesis 9/23 removed 6.7L of fluid from the abdomen.     Paracentesis performed on 10/2/22. 5 L drained, pt tolerated procedure well. 2nd paracentesis performed by IR on 10/3/22, 5.4L drained. Pleur-X catheter was not placed. Plan for pt to have catheter placed at [facility] on 10/6/22. Pt states this was originally planned before having to come to [facility]. Pleur-X catheter placed by IR, 10/5/22, in place, pt tolerated procedure well.     Pleur-X cathter only drained 750cc and 350cc over 2 days.     Plan:   - Pain control with oxycodone 5 mg PRN q 4 hours and IV morphine 4 mg PRN q 4 hours  - Antiemetics with Phenergan   - Nephro continuing to follow  - monitor volume status, vitals, and daily labs

## 2022-10-08 NOTE — CARE UPDATE
"RAPID RESPONSE NURSE CHART REVIEW        Chart Reviewed: 10/08/2022, 11:48 AM    MRN: [id number]  Bed: 69229/56552 A    Dx: Malignant ascites    Puja Quigley has a past medical history of Anxiety, Dehydration, Gastric cancer, Gastric ulcer, psychiatric care, Pregnancy, and Umbilical hernia.    Last VS: /64 (BP Location: Right arm)   Pulse (!) 111   Temp 98.2 °F (36.8 °C) (Oral)   Resp 18   Ht 5' 7" (1.702 m)   Wt 57 kg (125 lb 10.6 oz)   LMP  (LMP Unknown)   SpO2 99%   Breastfeeding No   BMI 19.68 kg/m²     24H Vital Sign Range:  Temp:  [97.5 °F (36.4 °C)-98.2 °F (36.8 °C)]   Pulse:  [103-114]   Resp:  [18]   BP: (100-141)/(63-78)   SpO2:  [98 %-100 %]     Level of Consciousness (AVPU): alert    Recent Labs     10/06/22  0838 10/07/22  0622 10/08/22  0611   WBC 11.37  11.37 9.11 7.93   HGB 10.0*  10.0* 10.2* 9.8*   HCT 31.9*  31.9* 33.7* 31.1*     261 282 259       Recent Labs     10/06/22  0838 10/07/22  0543 10/08/22  0611   * 124* 125*   K 4.6 4.6 4.3   CL 93* 90* 92*   CO2 21* 21* 19*   CREATININE 2.4* 2.4* 2.1*   GLU 82 82 80   PHOS 5.1* 5.9* 5.6*   MG 2.4 2.3 2.8*          MEWS score: 2    Bedside Irene LEBLANC  contacted for tachycardia, . No additional concerns verbalized at this time. Instructed to call [phone] for further concerns or assistance.    Aura Thomas RN        "

## 2022-10-08 NOTE — SUBJECTIVE & OBJECTIVE
Interval History: NAEO. Pt states her abdominal pain is manageable today, 10/8/22. She states the enema allowed her to have a bowel movement yesterday, 10/7/22. Pt is also c/o tongue pain and soreness. Will give pt magic mouthwash 4x daily. Cr slowly improving, 2.1 today. Will continue to monitor.    Oncology Treatment Plan:   OP DOCETAXEL (75 MG/M2) Q3W    Medications:  Continuous Infusions:  Scheduled Meds:   (Magic mouthwash) 1:1:1 diphenhydramine(Benadryl) 12.5mg/5ml liq, aluminum & magnesium hydroxide-simethicone (Maalox), LIDOcaine viscous 2%  10 mL Swish & Spit QID    apixaban  10 mg Oral BID    gabapentin  300 mg Oral TID    mupirocin   Topical (Top) Daily    pantoprazole  40 mg Oral Daily     PRN Meds:acetaminophen, aluminum-magnesium hydroxide-simethicone, baclofen, dextrose 10%, dextrose 10%, glucagon (human recombinant), glucose, glucose, magnesium hydroxide 400 mg/5 ml, melatonin, morphine, naloxone, ondansetron, oxyCODONE, promethazine (PHENERGAN) IVPB, simethicone     Review of Systems   Constitutional:  Positive for appetite change. Negative for chills, diaphoresis, fatigue and fever.   HENT:  Positive for trouble swallowing. Negative for postnasal drip and sore throat.    Eyes:  Negative for visual disturbance.   Respiratory:  Negative for cough and shortness of breath.    Cardiovascular:  Positive for leg swelling. Negative for chest pain and palpitations.   Gastrointestinal:  Positive for abdominal distention, abdominal pain and constipation. Negative for nausea and vomiting.   Genitourinary:  Negative for dysuria.   Musculoskeletal:  Negative for back pain and myalgias.   Skin:  Negative for rash.   Neurological:  Negative for dizziness, syncope, weakness and headaches.   Hematological:  Does not bruise/bleed easily.   Psychiatric/Behavioral:  Negative for confusion and decreased concentration.    Objective:     Vital Signs (Most Recent):  Temp: 98.2 °F (36.8 °C) (10/08/22 0815)  Pulse: (!) 111  (10/08/22 0815)  Resp: 16 (10/08/22 1400)  BP: 100/64 (10/08/22 0815)  SpO2: 99 % (10/08/22 0815)   Vital Signs (24h Range):  Temp:  [97.5 °F (36.4 °C)-98.2 °F (36.8 °C)] 98.2 °F (36.8 °C)  Pulse:  [103-114] 111  Resp:  [16-18] 16  SpO2:  [98 %-100 %] 99 %  BP: (100-141)/(63-78) 100/64     Weight: 57 kg (125 lb 10.6 oz)  Body mass index is 19.68 kg/m².  Body surface area is 1.64 meters squared.      Intake/Output Summary (Last 24 hours) at 10/8/2022 1519  Last data filed at 10/8/2022 0827  Gross per 24 hour   Intake 120 ml   Output 650 ml   Net -530 ml       Physical Exam  Vitals and nursing note reviewed.   Constitutional:       General: She is not in acute distress.     Appearance: Normal appearance.   HENT:      Head: Normocephalic and atraumatic.      Mouth/Throat:      Mouth: Mucous membranes are moist.   Eyes:      General: No scleral icterus.     Extraocular Movements: Extraocular movements intact.      Conjunctiva/sclera: Conjunctivae normal.   Cardiovascular:      Rate and Rhythm: Regular rhythm. Tachycardia present.      Pulses: Normal pulses.      Heart sounds: Normal heart sounds. No murmur heard.    No friction rub. No gallop.   Pulmonary:      Effort: Pulmonary effort is normal. No respiratory distress.      Breath sounds: Normal breath sounds.   Abdominal:      General: Bowel sounds are normal. There is distension.      Tenderness: There is abdominal tenderness.      Comments: Pleur-X catheter in place with no erythema or purulence   Musculoskeletal:      Right lower leg: Edema present.      Left lower leg: Edema present.   Neurological:      General: No focal deficit present.      Mental Status: She is alert and oriented to person, place, and time.   Psychiatric:         Mood and Affect: Mood normal.         Behavior: Behavior normal.       Significant Labs:   All pertinent labs from the last 24 hours have been reviewed.    Diagnostic Results:  I have reviewed all pertinent imaging results/findings  within the past 24 hours.

## 2022-10-08 NOTE — PLAN OF CARE
Pt. AAOx4, VSS, spo2> 94% on room air. NRS on telemetry monitor. purewick in place. Prn pain medication administered. Bed in low locked position, call light within reach, pt instructed to call for assistance needed, pt verbalized understanding, remains free from falls this shift. WCTM.

## 2022-10-08 NOTE — PT/OT/SLP PROGRESS
Physical Therapy      Patient Name:  Puja Quigley   MRN:  [id number]    Patient not seen today secondary to Nursing care. Attempted eval at 15:10, PT unable to return for second attempt. Will follow-up as appropriate.

## 2022-10-08 NOTE — PLAN OF CARE
Patient is AAOx4.  Afebrile  RA  Telemetry monitoring is in place. -120s.  PRN IV morphine administered for c/o pain with moderate relief obtained.   Friend at bedside and participating in care.   Sacral protection pad applied.   Waffle mattress put on bed.   300cc drained from Pleurx drain.   Patient went outside today with friends.   Bed is in lowest position with wheels locked.   Instructed to call for assistance.   Nonskid socks when oob.   [facility]

## 2022-10-08 NOTE — ASSESSMENT & PLAN NOTE
Baseline Cr 0.8, Cr 3.1 on admission  Hx of CKD: no  Associated with  fatigue, n/v  DDx: hypotension, hypovolemia, obstructive uropathy 2/2 to malignancy   No indications for HD at this time    MARIA has still not resolved after 2 paracentesis. Will consult Nephrology and f/u with recs     Plan:   - Trend Cr  - Strict I&Os  - Avoid nephrotoxic agents (NSAIDs, gadolinium, IV radiocontrast)  - Avoid hypotension  - Renally adjust medications   - Nephro following

## 2022-10-08 NOTE — PROGRESS NOTES
[facility] - Transplant Stepdown  Hematology/Oncology  Progress Note    Patient Name: Puja Quigley  Admission Date: 9/30/2022  Hospital Length of Stay: 7 days  Code Status: Full Code     Subjective:     HPI:  Mrs. Quigley is a 42 F with PMHx of gastric adenocarcinoma with osseous, hepatic and peritoneal metastases complicated by recurrent abdominal distension and pain 2/2 recurrent painful, tense ascites requiring frequent decompression for symptom relief. presenting to the ED with the chief complaint of abdominal pain. Reports worsening abdominal swelling and BLE swelling over the past week. Reports undergoing paracentesis 1-2 times per week, but missed her sessions this week as she went out of town for her birthday. Reports her abdominal swelling has not been this significant in the past. Additionally reports clear productive cough. Feels shortness of breath is due to her abdominal distention. Denies fever, chest pain, vomiting, urinary or bowel movement changes.           Patient information was obtained from patient, past medical records, and ER records.      Oncology History:          Malignant neoplasm of stomach   6/10/2021 Initial Diagnosis     Gastric adenocarcinoma      7/26/2021 - 4/6/2022 Chemotherapy     Treatment Summary   Plan Name: OP FOLFOX 6 Q2W  Treatment Goal: Palliative  Status: Inactive  Start Date: 7/26/2021  End Date: 4/6/2022  Provider: Fer Ashraf MD  Chemotherapy: fluorouraciL 2,400 mg/m2 = 3,770 mg in sodium chloride 0.9% 100 mL chemo infusion, 2,400 mg/m2 = 3,770 mg, Intravenous, Over 46 hours, 19 of 20 cycles  Administration: 3,770 mg (7/26/2021), 3,770 mg (8/9/2021), 3,770 mg (8/23/2021), 3,770 mg (9/7/2021), 3,770 mg (9/21/2021), 3,770 mg (10/5/2021), 3,770 mg (10/19/2021), 3,770 mg (11/2/2021), 3,770 mg (11/15/2021), 3,770 mg (11/30/2021), 3,770 mg (12/13/2021), 4,000 mg (12/27/2021), 4,030 mg (1/10/2022), 4,030 mg (1/24/2022), 4,000 mg (2/7/2022), 4,000 mg (2/22/2022),  4,000 mg (3/7/2022), 4,000 mg (3/22/2022), 4,000 mg (4/4/2022)  oxaliplatin (ELOXATIN) 85 mg/m2 = 133 mg in dextrose 5 % 500 mL chemo infusion, 85 mg/m2 = 133 mg, Intravenous, Clinic/HOD 1 time, 9 of 9 cycles  Administration: 133 mg (7/26/2021), 133 mg (8/9/2021), 133 mg (8/23/2021), 133 mg (9/7/2021), 133 mg (9/21/2021), 133 mg (10/5/2021), 133 mg (10/19/2021), 133 mg (11/2/2021), 133 mg (11/15/2021)         5/26/2022 - 7/27/2022 Chemotherapy     Treatment Summary   Plan Name: OP FOLFIRI Q2WK  Treatment Goal: Palliative  Status: Inactive  Start Date: 5/26/2022  End Date: 7/27/2022  Provider: Fer Ashraf MD  Chemotherapy: dexAMETHasone (DECADRON) 4 MG Tab, 8 mg, Oral, Daily, 1 of 1 cycle, Start date: --, End date: --  irinotecan (CAMPTOSAR) 120 mg/m2 = 200 mg in sodium chloride 0.9% 500 mL chemo infusion, 120 mg/m2 = 200 mg (100 % of original dose 120 mg/m2), Intravenous, Clinic/HOD 1 time, 5 of 24 cycles  Dose modification: 125 mg/m2 (original dose 120 mg/m2, Cycle 1), 120 mg/m2 (original dose 120 mg/m2, Cycle 1), 75 mg/m2 (original dose 120 mg/m2, Cycle 2, Reason: Dose not tolerated)  Administration: 200 mg (5/26/2022), 126 mg (6/13/2022), 126 mg (6/27/2022), 134 mg (7/12/2022), 134 mg (7/25/2022)  ramucirumab (CYRAMZA) 471 mg in sodium chloride 0.9% 250 mL chemo infusion, 8 mg/kg = 471 mg, Intravenous, Clinic/HOD 1 time, 1 of 1 cycle  Administration: 471 mg (5/26/2022)         8/16/2022 -  Chemotherapy     Treatment Summary   Plan Name: OP DOCETAXEL (75 MG/M2) Q3W  Treatment Goal: Palliative  Status: Active  Start Date: 8/16/2022  End Date: 11/8/2022 (Planned)  Provider: Fer Ashraf MD  Chemotherapy: DOCEtaxeL (TAXOTERE) 35 mg/m2 = 65 mg in sodium chloride 0.9% 253.25 mL chemo infusion, 35 mg/m2 = 65 mg (46.7 % of original dose 75 mg/m2), Intravenous, Clinic/HOD 1 time, 2 of 7 cycles  Dose modification: 35 mg/m2 (original dose 75 mg/m2, Cycle 1, Reason: MD Discretion, Comment: per MD Pennington  recommendations)  Administration: 65 mg (8/16/2022), 65 mg (8/30/2022)       Interval History: NAEO. Pt states her abdominal pain is manageable today, 10/8/22. She states the enema allowed her to have a bowel movement yesterday, 10/7/22. Pt is also c/o tongue pain and soreness. Will give pt magic mouthwash 4x daily. Cr slowly improving, 2.1 today. Will continue to monitor.    Oncology Treatment Plan:   OP DOCETAXEL (75 MG/M2) Q3W    Medications:  Continuous Infusions:  Scheduled Meds:   (Magic mouthwash) 1:1:1 diphenhydramine(Benadryl) 12.5mg/5ml liq, aluminum & magnesium hydroxide-simethicone (Maalox), LIDOcaine viscous 2%  10 mL Swish & Spit QID    apixaban  10 mg Oral BID    gabapentin  300 mg Oral TID    mupirocin   Topical (Top) Daily    pantoprazole  40 mg Oral Daily     PRN Meds:acetaminophen, aluminum-magnesium hydroxide-simethicone, baclofen, dextrose 10%, dextrose 10%, glucagon (human recombinant), glucose, glucose, magnesium hydroxide 400 mg/5 ml, melatonin, morphine, naloxone, ondansetron, oxyCODONE, promethazine (PHENERGAN) IVPB, simethicone     Review of Systems   Constitutional:  Positive for appetite change. Negative for chills, diaphoresis, fatigue and fever.   HENT:  Positive for trouble swallowing. Negative for postnasal drip and sore throat.    Eyes:  Negative for visual disturbance.   Respiratory:  Negative for cough and shortness of breath.    Cardiovascular:  Positive for leg swelling. Negative for chest pain and palpitations.   Gastrointestinal:  Positive for abdominal distention, abdominal pain and constipation. Negative for nausea and vomiting.   Genitourinary:  Negative for dysuria.   Musculoskeletal:  Negative for back pain and myalgias.   Skin:  Negative for rash.   Neurological:  Negative for dizziness, syncope, weakness and headaches.   Hematological:  Does not bruise/bleed easily.   Psychiatric/Behavioral:  Negative for confusion and decreased concentration.    Objective:      Vital Signs (Most Recent):  Temp: 98.2 °F (36.8 °C) (10/08/22 0815)  Pulse: (!) 111 (10/08/22 0815)  Resp: 16 (10/08/22 1400)  BP: 100/64 (10/08/22 0815)  SpO2: 99 % (10/08/22 0815)   Vital Signs (24h Range):  Temp:  [97.5 °F (36.4 °C)-98.2 °F (36.8 °C)] 98.2 °F (36.8 °C)  Pulse:  [103-114] 111  Resp:  [16-18] 16  SpO2:  [98 %-100 %] 99 %  BP: (100-141)/(63-78) 100/64     Weight: 57 kg (125 lb 10.6 oz)  Body mass index is 19.68 kg/m².  Body surface area is 1.64 meters squared.      Intake/Output Summary (Last 24 hours) at 10/8/2022 1519  Last data filed at 10/8/2022 0827  Gross per 24 hour   Intake 120 ml   Output 650 ml   Net -530 ml       Physical Exam  Vitals and nursing note reviewed.   Constitutional:       General: She is not in acute distress.     Appearance: Normal appearance.   HENT:      Head: Normocephalic and atraumatic.      Mouth/Throat:      Mouth: Mucous membranes are moist.   Eyes:      General: No scleral icterus.     Extraocular Movements: Extraocular movements intact.      Conjunctiva/sclera: Conjunctivae normal.   Cardiovascular:      Rate and Rhythm: Regular rhythm. Tachycardia present.      Pulses: Normal pulses.      Heart sounds: Normal heart sounds. No murmur heard.    No friction rub. No gallop.   Pulmonary:      Effort: Pulmonary effort is normal. No respiratory distress.      Breath sounds: Normal breath sounds.   Abdominal:      General: Bowel sounds are normal. There is distension.      Tenderness: There is abdominal tenderness.      Comments: Pleur-X catheter in place with no erythema or purulence   Musculoskeletal:      Right lower leg: Edema present.      Left lower leg: Edema present.   Neurological:      General: No focal deficit present.      Mental Status: She is alert and oriented to person, place, and time.   Psychiatric:         Mood and Affect: Mood normal.         Behavior: Behavior normal.       Significant Labs:   All pertinent labs from the last 24 hours have been  reviewed.    Diagnostic Results:  I have reviewed all pertinent imaging results/findings within the past 24 hours.    Assessment/Plan:     * Malignant ascites  Pt with known h/o ascites   Initially had peritoneal catheter after diagnosis, output slowed so was removed in October 2021 by IR.   bi-weekly paracentesis for now.  Last paracentesis 9/23 removed 6.7L of fluid from the abdomen.     Paracentesis performed on 10/2/22. 5 L drained, pt tolerated procedure well. 2nd paracentesis performed by IR on 10/3/22, 5.4L drained. Pleur-X catheter was not placed. Plan for pt to have catheter placed at [facility] on 10/6/22. Pt states this was originally planned before having to come to [facility]. Pleur-X catheter placed by IR, 10/5/22, in place, pt tolerated procedure well.     Pleur-X cathter only drained 750cc and 350cc over 2 days.     Plan:   - Pain control with oxycodone 5 mg PRN q 4 hours and IV morphine 4 mg PRN q 4 hours  - Antiemetics with Phenergan   - Nephro continuing to follow  - monitor volume status, vitals, and daily labs        Intractable abdominal pain  Abdominal pain likely 2/2 to compression from ascites   CT A/P (10/1/22) revealed resolution of bowel distension now with large and small intestines normal caliber and compressed by massive ascites. No evidence of bowel obstruction.    Paracentesis performed on 10/2/22. 5 L drained, pt tolerated procedure well. 2nd paracentesis performed by IR on 10/3/22, 5.4L drained. Pleur-X catheter was not placed. Plan for pt to have catheter placed at [facility] on 10/6/22. Pt states this was originally planned before having to come to [facility].     Plan:   - Pain control with oxycodone 5 mg PRN q 4 hours and IV morphine 4 mg PRN q 4 hours  - Antiemetics with Phenergan   - possible 3rd paracentesis on 10/5/22 if needed      MARIA (acute kidney injury)  Baseline Cr 0.8, Cr 3.1 on admission  Hx of CKD: no  Associated with  fatigue, n/v  DDx: hypotension, hypovolemia,  obstructive uropathy 2/2 to malignancy   No indications for HD at this time    MARIA has still not resolved after 2 paracentesis. Will consult Nephrology and f/u with recs     Plan:   - Trend Cr  - Strict I&Os  - Avoid nephrotoxic agents (NSAIDs, gadolinium, IV radiocontrast)  - Avoid hypotension  - Renally adjust medications   - Nephro following      Mouth sores  Pt c/o mouth pain during hospital stay    - magic mouthwash 4x daily, swish and spit    Anxiety  Hematology/Oncology Psychology consulted to discuss with pt regarding her poor disease prognosis      Psychological factors affecting medical condition  Hematology/Oncology Psychology consulted to discuss with pt regarding her poor disease prognosis     Thrombus of R femoral vein  US on bilateral lower extremities showed new nonocclusive thrombus in the mid right femoral vein.    - High intensity heparin drip IV switched to Apixaban 10 mg BID    Hyperkalemia  Patient with potassium of 5.6, that has slowly been increasing since admission from 5.2.   Improving at the moment, 4.6 on 10/7/22     Plan:  - continue to monitor with daily CMPs     Hyponatremia  Patient with hyponatremia thought to be 2/2 to volume overload. Physical exam with lower extremity swelling with pitting edema. Abdomen still with ascites on retroperitoneal ultrasound. Serum OSM of 283, Urine sodium less than 10.     Plan:  --Likely 2/2 to volume overload  --CMP daily  - Nephro will give IV fluids for dehydration    Constipation  Pt has requested to stop any oral medications for constipation. She believes they are not working.    - fleet enema helped with constipation     Malignant neoplasm of stomach  HER-2 negative by FISH.  PD-L1 < 1%.  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  We previously explained the implications of a stage IV diagnosis to her and potential treatment options.  She is now s/p port placement. She  sought a second opinion at [facility] for zolbetuximab trial targeting CLDN protein but she did not test positive for this biomarker. We recommended proceeding with SOC FOLFOX, with which the [facility] team agreed. Because of the negative PD-L1 I do not think the benefit of adding nivolumab outweighs the potential toxicities.        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  She opted not to get genetic testing done at her appt with Phi. We recommended that she reconsider that decision in light of a very likely germline mutation in SAMUEL which has clinical consequences.       CT CAP after 6 cycles showed improvement in ascites, probable hepatic, thyroid and bone metastases. We dropped oxaliplatin starting with cycle 10 due to grade 1 neuropathy and desire to keep it from worsening.     CT CAP after cycle 10 showed stable disease.  CT scan after cycle 15 continued to display overall stability of disease within the gastric wall, peritoneum, spine and liver. Although increase ascites on imaging and clinically can represent progression of disease, her previous CT suggested overall stability within the liver and gastric wall, so we recommended to continue with treatment time. However, she continued to have increasing abdominal ascites that has been concerning for progression of disease. Hence, she had repeat imaging. On CT performed on 4/14/2022, she appeared to have worsening disease suggestive of progression. She was seen by Dr. Reid at [facility] for f/u and to discuss possible treatment options.  She was also evaluated by Dr. Andree Mueller and Dr. See of Surgical Oncology at [facility].  Ultimately she was not felt to be a good surgical debulking candidate due to her ascites.  If her ascites improves during the course of chemotherapy and her performance status remains stable or improves, they would re-consider her  again for palliative oophorectomy.      Was given ramucirumab with cycle 1 but this later was held given [facility] recs for just FOLFIRI. In addition, she didn't tolerate cycle 1 of cyramza well with significant N/V. She was started on second-line FOLFIRI. She received 5 cycles with FOLFIRI of irinotecan to 75 mg/m2 - has UGT1A1 homozygous mutation indicating poor metabolism.  Continue 5-FU infusion at 2200 mg/m2.     Repeat imaging with CT CAP after [facility] on 8/3/22 after cycle 5 of FOLFIRI demonstrated disease progression.  Dr. Reid recommended third line docetaxel biweekly at 35 mg/m2.      -s/p cycle 3 today of Docetaxel.               Jaspal Gay DO  Hematology/Oncology  [facility] - Transplant Stepdown

## 2022-10-08 NOTE — ASSESSMENT & PLAN NOTE
Pt has requested to stop any oral medications for constipation. She believes they are not working.    - fleet enema helped with constipation

## 2022-10-09 PROBLEM — Z51.5 ENCOUNTER FOR PALLIATIVE CARE: Status: ACTIVE | Noted: 2022-10-09

## 2022-10-09 LAB
ALBUMIN SERPL BCP-MCNC: 1.3 G/DL (ref 3.5–5.2)
ALP SERPL-CCNC: 112 U/L (ref 55–135)
ALT SERPL W/O P-5'-P-CCNC: 12 U/L (ref 10–44)
ANION GAP SERPL CALC-SCNC: 12 MMOL/L (ref 8–16)
ANION GAP SERPL CALC-SCNC: 14 MMOL/L (ref 8–16)
ANISOCYTOSIS BLD QL SMEAR: SLIGHT
AST SERPL-CCNC: 18 U/L (ref 10–40)
BASOPHILS # BLD AUTO: 0.01 K/UL (ref 0–0.2)
BASOPHILS NFR BLD: 0.1 % (ref 0–1.9)
BILIRUB SERPL-MCNC: 0.4 MG/DL (ref 0.1–1)
BUN SERPL-MCNC: 66 MG/DL (ref 6–20)
BUN SERPL-MCNC: 69 MG/DL (ref 6–20)
CALCIUM SERPL-MCNC: 8.3 MG/DL (ref 8.7–10.5)
CALCIUM SERPL-MCNC: 8.4 MG/DL (ref 8.7–10.5)
CHLORIDE SERPL-SCNC: 91 MMOL/L (ref 95–110)
CHLORIDE SERPL-SCNC: 92 MMOL/L (ref 95–110)
CO2 SERPL-SCNC: 17 MMOL/L (ref 23–29)
CO2 SERPL-SCNC: 18 MMOL/L (ref 23–29)
CREAT SERPL-MCNC: 1.9 MG/DL (ref 0.5–1.4)
CREAT SERPL-MCNC: 2.1 MG/DL (ref 0.5–1.4)
DIFFERENTIAL METHOD: ABNORMAL
EOSINOPHIL # BLD AUTO: 0 K/UL (ref 0–0.5)
EOSINOPHIL NFR BLD: 0.1 % (ref 0–8)
ERYTHROCYTE [DISTWIDTH] IN BLOOD BY AUTOMATED COUNT: 20.6 % (ref 11.5–14.5)
EST. GFR  (NO RACE VARIABLE): 30 ML/MIN/1.73 M^2
EST. GFR  (NO RACE VARIABLE): 33.8 ML/MIN/1.73 M^2
GLUCOSE SERPL-MCNC: 74 MG/DL (ref 70–110)
GLUCOSE SERPL-MCNC: 84 MG/DL (ref 70–110)
HCT VFR BLD AUTO: 30.1 % (ref 37–48.5)
HGB BLD-MCNC: 9 G/DL (ref 12–16)
HYPOCHROMIA BLD QL SMEAR: ABNORMAL
IMM GRANULOCYTES # BLD AUTO: 0.03 K/UL (ref 0–0.04)
IMM GRANULOCYTES NFR BLD AUTO: 0.4 % (ref 0–0.5)
LYMPHOCYTES # BLD AUTO: 0.7 K/UL (ref 1–4.8)
LYMPHOCYTES NFR BLD: 7.8 % (ref 18–48)
MAGNESIUM SERPL-MCNC: 2.7 MG/DL (ref 1.6–2.6)
MCH RBC QN AUTO: 23 PG (ref 27–31)
MCHC RBC AUTO-ENTMCNC: 29.9 G/DL (ref 32–36)
MCV RBC AUTO: 77 FL (ref 82–98)
MONOCYTES # BLD AUTO: 0.6 K/UL (ref 0.3–1)
MONOCYTES NFR BLD: 7.4 % (ref 4–15)
NEUTROPHILS # BLD AUTO: 7.2 K/UL (ref 1.8–7.7)
NEUTROPHILS NFR BLD: 84.2 % (ref 38–73)
NRBC BLD-RTO: 0 /100 WBC
OVALOCYTES BLD QL SMEAR: ABNORMAL
PHOSPHATE SERPL-MCNC: 4.9 MG/DL (ref 2.7–4.5)
PLATELET # BLD AUTO: 233 K/UL (ref 150–450)
PMV BLD AUTO: 9.2 FL (ref 9.2–12.9)
POIKILOCYTOSIS BLD QL SMEAR: SLIGHT
POLYCHROMASIA BLD QL SMEAR: ABNORMAL
POTASSIUM SERPL-SCNC: 4.6 MMOL/L (ref 3.5–5.1)
POTASSIUM SERPL-SCNC: 4.7 MMOL/L (ref 3.5–5.1)
PROT SERPL-MCNC: 4.6 G/DL (ref 6–8.4)
RBC # BLD AUTO: 3.91 M/UL (ref 4–5.4)
SODIUM SERPL-SCNC: 122 MMOL/L (ref 136–145)
SODIUM SERPL-SCNC: 122 MMOL/L (ref 136–145)
TARGETS BLD QL SMEAR: ABNORMAL
WBC # BLD AUTO: 8.55 K/UL (ref 3.9–12.7)

## 2022-10-09 PROCEDURE — 63600175 PHARM REV CODE 636 W HCPCS

## 2022-10-09 PROCEDURE — 80053 COMPREHEN METABOLIC PANEL: CPT | Performed by: HOSPITALIST

## 2022-10-09 PROCEDURE — 25000003 PHARM REV CODE 250: Performed by: STUDENT IN AN ORGANIZED HEALTH CARE EDUCATION/TRAINING PROGRAM

## 2022-10-09 PROCEDURE — 83735 ASSAY OF MAGNESIUM: CPT | Performed by: HOSPITALIST

## 2022-10-09 PROCEDURE — 99233 PR SUBSEQUENT HOSPITAL CARE,LEVL III: ICD-10-PCS | Mod: ,,, | Performed by: INTERNAL MEDICINE

## 2022-10-09 PROCEDURE — 99223 1ST HOSP IP/OBS HIGH 75: CPT | Mod: ,,, | Performed by: STUDENT IN AN ORGANIZED HEALTH CARE EDUCATION/TRAINING PROGRAM

## 2022-10-09 PROCEDURE — 25000003 PHARM REV CODE 250

## 2022-10-09 PROCEDURE — 99232 PR SUBSEQUENT HOSPITAL CARE,LEVL II: ICD-10-PCS | Mod: ,,, | Performed by: INTERNAL MEDICINE

## 2022-10-09 PROCEDURE — 85025 COMPLETE CBC W/AUTO DIFF WBC: CPT

## 2022-10-09 PROCEDURE — 99232 SBSQ HOSP IP/OBS MODERATE 35: CPT | Mod: ,,, | Performed by: INTERNAL MEDICINE

## 2022-10-09 PROCEDURE — 80048 BASIC METABOLIC PNL TOTAL CA: CPT | Mod: XB

## 2022-10-09 PROCEDURE — 99223 PR INITIAL HOSPITAL CARE,LEVL III: ICD-10-PCS | Mod: ,,, | Performed by: STUDENT IN AN ORGANIZED HEALTH CARE EDUCATION/TRAINING PROGRAM

## 2022-10-09 PROCEDURE — 20600001 HC STEP DOWN PRIVATE ROOM

## 2022-10-09 PROCEDURE — 84100 ASSAY OF PHOSPHORUS: CPT | Performed by: HOSPITALIST

## 2022-10-09 PROCEDURE — 99233 SBSQ HOSP IP/OBS HIGH 50: CPT | Mod: ,,, | Performed by: INTERNAL MEDICINE

## 2022-10-09 PROCEDURE — 36415 COLL VENOUS BLD VENIPUNCTURE: CPT | Performed by: HOSPITALIST

## 2022-10-09 PROCEDURE — 36415 COLL VENOUS BLD VENIPUNCTURE: CPT

## 2022-10-09 PROCEDURE — 63600175 PHARM REV CODE 636 W HCPCS: Performed by: HOSPITALIST

## 2022-10-09 RX ORDER — SODIUM CHLORIDE 9 MG/ML
INJECTION, SOLUTION INTRAVENOUS ONCE
Status: DISCONTINUED | OUTPATIENT
Start: 2022-10-09 | End: 2022-10-09

## 2022-10-09 RX ORDER — SODIUM CHLORIDE 9 MG/ML
INJECTION, SOLUTION INTRAVENOUS ONCE
Status: COMPLETED | OUTPATIENT
Start: 2022-10-09 | End: 2022-10-09

## 2022-10-09 RX ORDER — SODIUM CHLORIDE, SODIUM LACTATE, POTASSIUM CHLORIDE, CALCIUM CHLORIDE 600; 310; 30; 20 MG/100ML; MG/100ML; MG/100ML; MG/100ML
INJECTION, SOLUTION INTRAVENOUS ONCE
Status: COMPLETED | OUTPATIENT
Start: 2022-10-09 | End: 2022-10-09

## 2022-10-09 RX ADMIN — SODIUM CHLORIDE: 0.9 INJECTION, SOLUTION INTRAVENOUS at 06:10

## 2022-10-09 RX ADMIN — ALUMINUM HYDROXIDE, MAGNESIUM HYDROXIDE, AND DIMETHICONE 10 ML: 400; 400; 40 SUSPENSION ORAL at 02:10

## 2022-10-09 RX ADMIN — MAGNESIUM HYDROXIDE 2400 MG: 400 SUSPENSION ORAL at 10:10

## 2022-10-09 RX ADMIN — GABAPENTIN 300 MG: 300 CAPSULE ORAL at 08:10

## 2022-10-09 RX ADMIN — ALUMINUM HYDROXIDE, MAGNESIUM HYDROXIDE, AND DIMETHICONE 10 ML: 400; 400; 40 SUSPENSION ORAL at 05:10

## 2022-10-09 RX ADMIN — MORPHINE SULFATE 4 MG: 2 INJECTION, SOLUTION INTRAMUSCULAR; INTRAVENOUS at 10:10

## 2022-10-09 RX ADMIN — MUPIROCIN: 20 OINTMENT TOPICAL at 09:10

## 2022-10-09 RX ADMIN — OXYCODONE 5 MG: 5 TABLET ORAL at 03:10

## 2022-10-09 RX ADMIN — APIXABAN 10 MG: 5 TABLET, FILM COATED ORAL at 08:10

## 2022-10-09 RX ADMIN — SODIUM CHLORIDE, SODIUM LACTATE, POTASSIUM CHLORIDE, AND CALCIUM CHLORIDE: .6; .31; .03; .02 INJECTION, SOLUTION INTRAVENOUS at 11:10

## 2022-10-09 RX ADMIN — PANTOPRAZOLE SODIUM 40 MG: 40 TABLET, DELAYED RELEASE ORAL at 08:10

## 2022-10-09 RX ADMIN — ALUMINUM HYDROXIDE, MAGNESIUM HYDROXIDE, AND DIMETHICONE 10 ML: 400; 400; 40 SUSPENSION ORAL at 08:10

## 2022-10-09 NOTE — ASSESSMENT & PLAN NOTE
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 Hospital course is notable for development of MARIA and significant deconditioning, now unable to independently transfer/ambulate. Palliative and Supportive Care was consulted for symptom management recommendations.    Goals of Care:  - Not willing to engage in discussions about comfort or hospice; has wondered to herself during this admission, "Am I dying?" Will continue to explore her fears, concerns, and emotional/social burdens and provide support while in the hospital  - Code status: full code  - Wants to seek second opinion at [facility] for cancer-directed treatments  - Ms. Quigley expresses wishes to see Dr. Brink; I will touch base with Dr. Brink tomorrow 10/10    Pain in Mouth  Xerostomia  - Discussed treatments to stimulate saliva production such as lemon drops, but she says that these interventions are too painful for her to tolerate  - Continue Magic Mouthwash - she states this is the most helpful intervention so far  - Agree with treatment of thrush  - Advised spacing out oral care and avoid aggressive wiping with towel  - Continue opioid therapy    Chronic Neoplasm Pain  - Located in abdomen related to gastric cancer and peritoneal/pelvic metastasis  - S/p paracentesis with significant relief  - Continue home oxycodone 5 mg PO q4h PRN pain  - Continue morphine 4 mg IV q4h PRN breakthrough pain  - Discussed restarting home methadone 2.5 mg PO BID; she has concerns that methadone causes dry mouth and may potentially worsen oral issues; will re-discuss on 10/10    At Risk for Opioid Induced Constipation  Slow Transit Constipation  - Consider starting senna 8.6 mg PO qHS to maintain bowel regimen  "

## 2022-10-09 NOTE — PROGRESS NOTES
[facility] - Transplant Stepdown  Hematology/Oncology  Progress Note    Patient Name: Puja Quigley  Admission Date: 9/30/2022  Hospital Length of Stay: 8 days  Code Status: Full Code     Subjective:     HPI:  Mrs. Quigley is a 42 F with PMHx of gastric adenocarcinoma with osseous, hepatic and peritoneal metastases complicated by recurrent abdominal distension and pain 2/2 recurrent painful, tense ascites requiring frequent decompression for symptom relief. presenting to the ED with the chief complaint of abdominal pain. Reports worsening abdominal swelling and BLE swelling over the past week. Reports undergoing paracentesis 1-2 times per week, but missed her sessions this week as she went out of town for her birthday. Reports her abdominal swelling has not been this significant in the past. Additionally reports clear productive cough. Feels shortness of breath is due to her abdominal distention. Denies fever, chest pain, vomiting, urinary or bowel movement changes.           Patient information was obtained from patient, past medical records, and ER records.      Oncology History:          Malignant neoplasm of stomach   6/10/2021 Initial Diagnosis     Gastric adenocarcinoma      7/26/2021 - 4/6/2022 Chemotherapy     Treatment Summary   Plan Name: OP FOLFOX 6 Q2W  Treatment Goal: Palliative  Status: Inactive  Start Date: 7/26/2021  End Date: 4/6/2022  Provider: Fer Ashraf MD  Chemotherapy: fluorouraciL 2,400 mg/m2 = 3,770 mg in sodium chloride 0.9% 100 mL chemo infusion, 2,400 mg/m2 = 3,770 mg, Intravenous, Over 46 hours, 19 of 20 cycles  Administration: 3,770 mg (7/26/2021), 3,770 mg (8/9/2021), 3,770 mg (8/23/2021), 3,770 mg (9/7/2021), 3,770 mg (9/21/2021), 3,770 mg (10/5/2021), 3,770 mg (10/19/2021), 3,770 mg (11/2/2021), 3,770 mg (11/15/2021), 3,770 mg (11/30/2021), 3,770 mg (12/13/2021), 4,000 mg (12/27/2021), 4,030 mg (1/10/2022), 4,030 mg (1/24/2022), 4,000 mg (2/7/2022), 4,000 mg (2/22/2022),  4,000 mg (3/7/2022), 4,000 mg (3/22/2022), 4,000 mg (4/4/2022)  oxaliplatin (ELOXATIN) 85 mg/m2 = 133 mg in dextrose 5 % 500 mL chemo infusion, 85 mg/m2 = 133 mg, Intravenous, Clinic/HOD 1 time, 9 of 9 cycles  Administration: 133 mg (7/26/2021), 133 mg (8/9/2021), 133 mg (8/23/2021), 133 mg (9/7/2021), 133 mg (9/21/2021), 133 mg (10/5/2021), 133 mg (10/19/2021), 133 mg (11/2/2021), 133 mg (11/15/2021)         5/26/2022 - 7/27/2022 Chemotherapy     Treatment Summary   Plan Name: OP FOLFIRI Q2WK  Treatment Goal: Palliative  Status: Inactive  Start Date: 5/26/2022  End Date: 7/27/2022  Provider: Fer Ashraf MD  Chemotherapy: dexAMETHasone (DECADRON) 4 MG Tab, 8 mg, Oral, Daily, 1 of 1 cycle, Start date: --, End date: --  irinotecan (CAMPTOSAR) 120 mg/m2 = 200 mg in sodium chloride 0.9% 500 mL chemo infusion, 120 mg/m2 = 200 mg (100 % of original dose 120 mg/m2), Intravenous, Clinic/HOD 1 time, 5 of 24 cycles  Dose modification: 125 mg/m2 (original dose 120 mg/m2, Cycle 1), 120 mg/m2 (original dose 120 mg/m2, Cycle 1), 75 mg/m2 (original dose 120 mg/m2, Cycle 2, Reason: Dose not tolerated)  Administration: 200 mg (5/26/2022), 126 mg (6/13/2022), 126 mg (6/27/2022), 134 mg (7/12/2022), 134 mg (7/25/2022)  ramucirumab (CYRAMZA) 471 mg in sodium chloride 0.9% 250 mL chemo infusion, 8 mg/kg = 471 mg, Intravenous, Clinic/HOD 1 time, 1 of 1 cycle  Administration: 471 mg (5/26/2022)         8/16/2022 -  Chemotherapy     Treatment Summary   Plan Name: OP DOCETAXEL (75 MG/M2) Q3W  Treatment Goal: Palliative  Status: Active  Start Date: 8/16/2022  End Date: 11/8/2022 (Planned)  Provider: Fer Ashraf MD  Chemotherapy: DOCEtaxeL (TAXOTERE) 35 mg/m2 = 65 mg in sodium chloride 0.9% 253.25 mL chemo infusion, 35 mg/m2 = 65 mg (46.7 % of original dose 75 mg/m2), Intravenous, Clinic/HOD 1 time, 2 of 7 cycles  Dose modification: 35 mg/m2 (original dose 75 mg/m2, Cycle 1, Reason: MD Discretion, Comment: per [facility]  recommendations)  Administration: 65 mg (8/16/2022), 65 mg (8/30/2022)       Interval History: Pt states she had a rough night sleeping due to tailbone pain. She states magic mouthwash is helping her initially but only last about 15-20 minutes. Will use Duke's solution for additional Nystatin coverage. Will consult palliative care to help with symptom management. Pt also c/o worsening weakness and swelling in her legs. Team believes this is directly related to her cancer and worsening prognosis. Hopeful Dr. Brink, palliative medicine, will be able to meet with pt tomorrow, 10/10/22, and discuss GOC. Pt still determined to get to her appointment at [facility] on 10/12/22, to explore possible chemotherapy options. Nephro giving a bolus of LR and monitoring sodium levels.    Oncology Treatment Plan:   OP DOCETAXEL (75 MG/M2) Q3W    Medications:  Continuous Infusions:   sodium chloride 0.9% 250 mL/hr at 10/08/22 5734     Scheduled Meds:   apixaban  10 mg Oral BID    duke's soln (benadryl 30 mL, mylanta 30 mL, LIDOcaine 30 mL, nystatin 30 mL) 120 mL  10 mL Oral QID    gabapentin  300 mg Oral TID    mupirocin   Topical (Top) Daily    pantoprazole  40 mg Oral Daily     PRN Meds:acetaminophen, aluminum-magnesium hydroxide-simethicone, baclofen, dextrose 10%, dextrose 10%, glucagon (human recombinant), glucose, glucose, magnesium hydroxide 400 mg/5 ml, melatonin, morphine, naloxone, ondansetron, oxyCODONE, promethazine (PHENERGAN) IVPB, simethicone     Review of Systems   Constitutional:  Positive for appetite change. Negative for chills, diaphoresis, fatigue and fever.   HENT:  Positive for trouble swallowing. Negative for postnasal drip and sore throat.    Eyes:  Negative for visual disturbance.   Respiratory:  Negative for cough and shortness of breath.    Cardiovascular:  Positive for leg swelling. Negative for chest pain and palpitations.   Gastrointestinal:  Positive for abdominal distention, abdominal pain  and constipation. Negative for nausea and vomiting.   Genitourinary:  Negative for dysuria.   Musculoskeletal:  Negative for back pain and myalgias.   Skin:  Negative for rash.   Neurological:  Negative for dizziness, syncope, weakness and headaches.   Hematological:  Does not bruise/bleed easily.   Psychiatric/Behavioral:  Negative for confusion and decreased concentration.    Objective:     Vital Signs (Most Recent):  Temp: 98.4 °F (36.9 °C) (10/09/22 0800)  Pulse: 101 (10/09/22 0800)  Resp: 18 (10/09/22 0800)  BP: 99/65 (10/09/22 0800)  SpO2: 99 % (10/09/22 0800) Vital Signs (24h Range):  Temp:  [97.6 °F (36.4 °C)-98.4 °F (36.9 °C)] 98.4 °F (36.9 °C)  Pulse:  [] 101  Resp:  [16-18] 18  SpO2:  [98 %-100 %] 99 %  BP: ()/(58-70) 99/65     Weight: 60.8 kg (134 lb 0.6 oz)  Body mass index is 20.99 kg/m².  Body surface area is 1.7 meters squared.      Intake/Output Summary (Last 24 hours) at 10/9/2022 1350  Last data filed at 10/9/2022 0830  Gross per 24 hour   Intake 1480.22 ml   Output 1200 ml   Net 280.22 ml       Physical Exam  Vitals and nursing note reviewed.   Constitutional:       General: She is not in acute distress.     Appearance: Normal appearance.   HENT:      Head: Normocephalic and atraumatic.      Mouth/Throat:      Mouth: Mucous membranes are moist.   Eyes:      General: No scleral icterus.     Extraocular Movements: Extraocular movements intact.      Conjunctiva/sclera: Conjunctivae normal.   Cardiovascular:      Rate and Rhythm: Regular rhythm. Tachycardia present.      Pulses: Normal pulses.      Heart sounds: Normal heart sounds. No murmur heard.    No friction rub. No gallop.   Pulmonary:      Effort: Pulmonary effort is normal. No respiratory distress.      Breath sounds: Normal breath sounds.   Abdominal:      General: Bowel sounds are normal. There is distension.      Tenderness: There is abdominal tenderness.      Comments: Pleur-X catheter in place with no erythema or purulence    Musculoskeletal:      Right lower leg: Edema present.      Left lower leg: Edema present.   Neurological:      General: No focal deficit present.      Mental Status: She is alert and oriented to person, place, and time.   Psychiatric:         Mood and Affect: Mood normal.         Behavior: Behavior normal.       Significant Labs:   All pertinent labs from the last 24 hours have been reviewed.    Diagnostic Results:  I have reviewed all pertinent imaging results/findings within the past 24 hours.    Assessment/Plan:     * Malignant ascites  Pt with known h/o ascites   Initially had peritoneal catheter after diagnosis, output slowed so was removed in October 2021 by IR.   bi-weekly paracentesis for now.  Last paracentesis 9/23 removed 6.7L of fluid from the abdomen.     Paracentesis performed on 10/2/22. 5 L drained, pt tolerated procedure well. 2nd paracentesis performed by IR on 10/3/22, 5.4L drained. Pleur-X catheter was not placed. Plan for pt to have catheter placed at [facility] on 10/6/22. Pt states this was originally planned before having to come to [facility]. Pleur-X catheter placed by IR, 10/5/22, in place, pt tolerated procedure well.     Daily drains with Pleur-X cathter as much as pt can tolerate.    Plan:   - Pain control with oxycodone 5 mg PRN q 4 hours and IV morphine 4 mg PRN q 4 hours  - Antiemetics with Phenergan   - Nephro continuing to follow  - monitor volume status, vitals, and daily labs        Intractable abdominal pain  Abdominal pain likely 2/2 to compression from ascites   CT A/P (10/1/22) revealed resolution of bowel distension now with large and small intestines normal caliber and compressed by massive ascites. No evidence of bowel obstruction.    Paracentesis performed on 10/2/22. 5 L drained, pt tolerated procedure well. 2nd paracentesis performed by IR on 10/3/22, 5.4L drained. Pleur-X catheter was not placed. Plan for pt to have catheter placed at [facility] on 10/6/22. Pt states  this was originally planned before having to come to [facility].     Plan:   - Pain control with oxycodone 5 mg PRN q 4 hours and IV morphine 4 mg PRN q 4 hours  - Antiemetics with Phenergan   - possible 3rd paracentesis on 10/5/22 if needed      MARIA (acute kidney injury)  Baseline Cr 0.8, Cr 3.1 on admission  Hx of CKD: no  Associated with  fatigue, n/v  DDx: hypotension, hypovolemia, obstructive uropathy 2/2 to malignancy   No indications for HD at this time    MARIA has still not resolved after 2 paracentesis. Will consult Nephrology and f/u with recs     Plan:   - Trend Cr  - Strict I&Os  - Avoid nephrotoxic agents (NSAIDs, gadolinium, IV radiocontrast)  - Avoid hypotension  - Renally adjust medications   - Nephro following  - bolus of LR and f/u sodium levels with afternoon BMP      Mouth sores  Pt c/o mouth pain during hospital stay    - Rodriguez's solution, swish and spit 3-4x daily    Anxiety  Hematology/Oncology Psychology consulted to discuss with pt regarding her poor disease prognosis      Psychological factors affecting medical condition  Hematology/Oncology Psychology consulted to discuss with pt regarding her poor disease prognosis     Thrombus of R femoral vein  US on bilateral lower extremities showed new nonocclusive thrombus in the mid right femoral vein.    - High intensity heparin drip IV switched to Apixaban 10 mg BID    Hyperkalemia  Patient with potassium of 5.6, that has slowly been increasing since admission from 5.2.   Improving at the moment, 4.6 on 10/7/22     Plan:  - continue to monitor with daily CMPs     Hyponatremia  Patient with hyponatremia thought to be 2/2 to volume overload. Physical exam with lower extremity swelling with pitting edema. Abdomen still with ascites on retroperitoneal ultrasound. Serum OSM of 283, Urine sodium less than 10.     Plan:  --Likely 2/2 to volume overload  --CMP daily  - Nephro will give IV fluids for dehydration    Constipation  Pt has requested to stop any  oral medications for constipation. She believes they are not working.    - fleet enema helped with constipation     Malignant neoplasm of stomach  HER-2 negative by FISH.  PD-L1 < 1%.  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  We previously explained the implications of a stage IV diagnosis to her and potential treatment options.  She is now s/p port placement. She sought a second opinion at [facility] for zolbetuximab trial targeting CLDN protein but she did not test positive for this biomarker. We recommended proceeding with SOC FOLFOX, with which the [facility] team agreed. Because of the negative PD-L1 I do not think the benefit of adding nivolumab outweighs the potential toxicities.        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  She opted not to get genetic testing done at her appt with [facility]. We recommended that she reconsider that decision in light of a very likely germline mutation in SAMUEL which has clinical consequences.       CT CAP after 6 cycles showed improvement in ascites, probable hepatic, thyroid and bone metastases. We dropped oxaliplatin starting with cycle 10 due to grade 1 neuropathy and desire to keep it from worsening.     CT CAP after cycle 10 showed stable disease.  CT scan after cycle 15 continued to display overall stability of disease within the gastric wall, peritoneum, spine and liver. Although increase ascites on imaging and clinically can represent progression of disease, her previous CT suggested overall stability within the liver and gastric wall, so we recommended to continue with treatment time. However, she continued to have increasing abdominal ascites that has been concerning for progression of disease. Hence, she had repeat imaging. On CT performed on 4/14/2022, she appeared to have worsening  disease suggestive of progression. She was seen by Dr. Reid at [facility] for f/u and to discuss possible treatment options.  She was also evaluated by Dr. Andree Mueller and Dr. See of Surgical Oncology at [facility].  Ultimately she was not felt to be a good surgical debulking candidate due to her ascites.  If her ascites improves during the course of chemotherapy and her performance status remains stable or improves, they would re-consider her again for palliative oophorectomy.      Was given ramucirumab with cycle 1 but this later was held given [facility] recs for just FOLFIRI. In addition, she didn't tolerate cycle 1 of cyramza well with significant N/V. She was started on second-line FOLFIRI. She received 5 cycles with FOLFIRI of irinotecan to 75 mg/m2 - has UGT1A1 homozygous mutation indicating poor metabolism.  Continue 5-FU infusion at 2200 mg/m2.     Repeat imaging with CT CAP after [facility] on 8/3/22 after cycle 5 of FOLFIRI demonstrated disease progression.  Dr. Reid recommended third line docetaxel biweekly at 35 mg/m2.      -s/p cycle 3 today of Docetaxel.               Jaspal Gay DO  Hematology/Oncology  [facility] - Transplant Stepdown

## 2022-10-09 NOTE — PT/OT/SLP PROGRESS
Speech Language Pathology      Puja Quigley  MRN: [id number]    Attempted to see patient this afternoon to assess her swallow function; however, pt was outside with her family. Spoke with family member in room who called the pt to let her know ST was here to evaluate her swallowing. Pt requested that ST come back tomorrow. RN aware & updated. ST services will follow up at a later date/time to address swallowing needs.

## 2022-10-09 NOTE — PLAN OF CARE
Patient is AAOx4.  Afebrile  RA  Telemetry monitoring d/c'd.  Pleur-x catheter is in place. Site is [facility].   Patient up in recliner for a few hours today. Patient went outside with family for a little while this afternoon.   LR ordered.  Duke's solution ordered.   Bed is in lowest position with wheels locked.   Instructed to call for assistance.   Call light is within reach.   Nonskid socks when oob.   WCTM

## 2022-10-09 NOTE — SUBJECTIVE & OBJECTIVE
Interval History: Pt states she had a rough night sleeping due to tailbone pain. She states magic mouthwash is helping her initially but only last about 15-20 minutes. Will use Duke's solution for additional Nystatin coverage. Will consult palliative care to help with symptom management. Pt also c/o worsening weakness and swelling in her legs. Team believes this is directly related to her cancer and worsening prognosis. Hopeful Dr. Brink, palliative medicine, will be able to meet with pt tomorrow, 10/10/22, and discuss GOC. Pt still determined to get to her appointment at [facility] on 10/12/22, to explore possible chemotherapy options. Nephro giving a bolus of LR and monitoring sodium levels.    Oncology Treatment Plan:   OP DOCETAXEL (75 MG/M2) Q3W    Medications:  Continuous Infusions:   sodium chloride 0.9% 250 mL/hr at 10/08/22 8664     Scheduled Meds:   apixaban  10 mg Oral BID    duke's soln (benadryl 30 mL, mylanta 30 mL, LIDOcaine 30 mL, nystatin 30 mL) 120 mL  10 mL Oral QID    gabapentin  300 mg Oral TID    mupirocin   Topical (Top) Daily    pantoprazole  40 mg Oral Daily     PRN Meds:acetaminophen, aluminum-magnesium hydroxide-simethicone, baclofen, dextrose 10%, dextrose 10%, glucagon (human recombinant), glucose, glucose, magnesium hydroxide 400 mg/5 ml, melatonin, morphine, naloxone, ondansetron, oxyCODONE, promethazine (PHENERGAN) IVPB, simethicone     Review of Systems   Constitutional:  Positive for appetite change. Negative for chills, diaphoresis, fatigue and fever.   HENT:  Positive for trouble swallowing. Negative for postnasal drip and sore throat.    Eyes:  Negative for visual disturbance.   Respiratory:  Negative for cough and shortness of breath.    Cardiovascular:  Positive for leg swelling. Negative for chest pain and palpitations.   Gastrointestinal:  Positive for abdominal distention, abdominal pain and constipation. Negative for nausea and vomiting.   Genitourinary:  Negative for  dysuria.   Musculoskeletal:  Negative for back pain and myalgias.   Skin:  Negative for rash.   Neurological:  Negative for dizziness, syncope, weakness and headaches.   Hematological:  Does not bruise/bleed easily.   Psychiatric/Behavioral:  Negative for confusion and decreased concentration.    Objective:     Vital Signs (Most Recent):  Temp: 98.4 °F (36.9 °C) (10/09/22 0800)  Pulse: 101 (10/09/22 0800)  Resp: 18 (10/09/22 0800)  BP: 99/65 (10/09/22 0800)  SpO2: 99 % (10/09/22 0800) Vital Signs (24h Range):  Temp:  [97.6 °F (36.4 °C)-98.4 °F (36.9 °C)] 98.4 °F (36.9 °C)  Pulse:  [] 101  Resp:  [16-18] 18  SpO2:  [98 %-100 %] 99 %  BP: ()/(58-70) 99/65     Weight: 60.8 kg (134 lb 0.6 oz)  Body mass index is 20.99 kg/m².  Body surface area is 1.7 meters squared.      Intake/Output Summary (Last 24 hours) at 10/9/2022 1350  Last data filed at 10/9/2022 0830  Gross per 24 hour   Intake 1480.22 ml   Output 1200 ml   Net 280.22 ml       Physical Exam  Vitals and nursing note reviewed.   Constitutional:       General: She is not in acute distress.     Appearance: Normal appearance.   HENT:      Head: Normocephalic and atraumatic.      Mouth/Throat:      Mouth: Mucous membranes are moist.   Eyes:      General: No scleral icterus.     Extraocular Movements: Extraocular movements intact.      Conjunctiva/sclera: Conjunctivae normal.   Cardiovascular:      Rate and Rhythm: Regular rhythm. Tachycardia present.      Pulses: Normal pulses.      Heart sounds: Normal heart sounds. No murmur heard.    No friction rub. No gallop.   Pulmonary:      Effort: Pulmonary effort is normal. No respiratory distress.      Breath sounds: Normal breath sounds.   Abdominal:      General: Bowel sounds are normal. There is distension.      Tenderness: There is abdominal tenderness.      Comments: Pleur-X catheter in place with no erythema or purulence   Musculoskeletal:      Right lower leg: Edema present.      Left lower leg: Edema  present.   Neurological:      General: No focal deficit present.      Mental Status: She is alert and oriented to person, place, and time.   Psychiatric:         Mood and Affect: Mood normal.         Behavior: Behavior normal.       Significant Labs:   All pertinent labs from the last 24 hours have been reviewed.    Diagnostic Results:  I have reviewed all pertinent imaging results/findings within the past 24 hours.

## 2022-10-09 NOTE — ASSESSMENT & PLAN NOTE
42 year-old f w hx of gastric adenocarcinoma c/b hepaticand peritoneal metastases. Patient has episodes of recurrent abdominal distension due to tense ascites requiring now bi-weekly paracentesis for relief. Patient with initially up trending creatinine level that peaked at 3.2 (serum creatinine 0.7 to 0.8) that is improving after her second large volume para that removed. Patient had CT scan on admission however did not get good look at kidneys 2/2 to massive ascites. Patient denies any obstructive type symptoms.    Patient notes that she is able to urinate normally, although she notes that when she has a lot of fluid in her abdomen she goes more frequently and is associated with some back pain. That improves after fluid is removed according to patient. Patient notes since March she has been requiring paracentesis and now they are becoming more frequent.      MARIA could have also been caused by large volume paracentesis-induced kidney injury from prior paracentesis on 10/1, however creatinine per chart check had already been elevated on arrival. Patient status post paracentesis on 10/3 with approximately 5L removed and improvement in her creatinine. Patient is constipated and notes not having bowel movement since being in hospital 2/2 to pain medications.    -- MARIA/ATN, from prolonged hypoperfusion state    Plan:  --Patient appears volume down with dry mouth today, less swelling--likely 2/2 to continued difficulty with PO hydration/eating. Continues to have regurgitation.  --recommend IV hydration 1L LR over 8 hours   --assess volume status daily  --CMP, phosphorus, Mg daily  --Avoid nephrotoxic agents  --avoid NSAIDs  --Stricts ins/outs  --renal ultrasound--no acute findings  --urine microscopy was consistent with muddy brown casts---acute tubular necrosis   --patient s/p abdominal pleurx for malignant ascities

## 2022-10-09 NOTE — ASSESSMENT & PLAN NOTE
From intravascular hypovolemia    Plan:  --will trial fluids today  --Follow Na 3 x a day, target ~ 128  --CMP daily

## 2022-10-09 NOTE — PROGRESS NOTES
Notified YOSEF Gay MD of patient's systolic bp in the 80s. 1L NS bolus ordered over 2 hours. WCTM

## 2022-10-09 NOTE — SUBJECTIVE & OBJECTIVE
Interval History: feeling ok. No events over night    Review of patient's allergies indicates:  No Known Allergies  Current Facility-Administered Medications   Medication Frequency    0.9%  NaCl infusion Continuous    acetaminophen tablet 650 mg Q8H PRN    aluminum-magnesium hydroxide-simethicone 200-200-20 mg/5 mL suspension 30 mL Q6H PRN    apixaban tablet 10 mg BID    baclofen 5 mg/mL oral liquid (PEDS) 2.5 mg PRN    dextrose 10% bolus 125 mL PRN    dextrose 10% bolus 250 mL PRN    duke's soln (benadryl 30 mL, mylanta 30 mL, LIDOcaine 30 mL, nystatin 30 mL) 120 mL QID    gabapentin capsule 300 mg TID    glucagon (human recombinant) injection 1 mg PRN    glucose chewable tablet 16 g PRN    glucose chewable tablet 24 g PRN    magnesium hydroxide 400 mg/5 ml suspension 2,400 mg Daily PRN    melatonin tablet 6 mg Nightly PRN    morphine injection 4 mg Q4H PRN    mupirocin 2 % ointment Daily    naloxone 0.4 mg/mL injection 0.02 mg PRN    ondansetron disintegrating tablet 4 mg Q6H PRN    oxyCODONE immediate release tablet 5 mg Q4H PRN    pantoprazole EC tablet 40 mg Daily    promethazine (PHENERGAN) 6.25 mg in dextrose 5 % 50 mL IVPB Q6H PRN    simethicone chewable tablet 80 mg TID PRN       Objective:     Vital Signs (Most Recent):  Temp: 98.4 °F (36.9 °C) (10/09/22 0800)  Pulse: 101 (10/09/22 0800)  Resp: 18 (10/09/22 0800)  BP: 99/65 (10/09/22 0800)  SpO2: 99 % (10/09/22 0800)  O2 Device (Oxygen Therapy): room air (10/09/22 0800) Vital Signs (24h Range):  Temp:  [97.6 °F (36.4 °C)-98.4 °F (36.9 °C)] 98.4 °F (36.9 °C)  Pulse:  [] 101  Resp:  [16-18] 18  SpO2:  [98 %-100 %] 99 %  BP: ()/(58-70) 99/65     Weight: 60.8 kg (134 lb 0.6 oz) (10/09/22 0500)  Body mass index is 20.99 kg/m².  Body surface area is 1.7 meters squared.    I/O last 3 completed shifts:  In: 1480.2 [P.O.:480; I.V.:1000.2]  Out: 1500 [Urine:1200; Other:300]    Physical Exam  Vitals reviewed.   Constitutional:       General: She is not in  acute distress.     Appearance: She is underweight. She is not diaphoretic.   HENT:      Head: Normocephalic and atraumatic.      Right Ear: External ear normal.      Left Ear: External ear normal.      Nose: No congestion.      Mouth/Throat:      Mouth: Mucous membranes are dry.      Comments: No ulcers seen  Eyes:      General:         Right eye: No discharge.         Left eye: No discharge.   Neck:      Comments: No JVD appreciated  Cardiovascular:      Rate and Rhythm: Normal rate and regular rhythm.   Pulmonary:      Effort: Pulmonary effort is normal. No respiratory distress.   Abdominal:      General: There is distension.   Musculoskeletal:      Cervical back: Normal range of motion.      Right lower leg: Edema (improving) present.      Left lower leg: Edema (improving) present.      Comments: Swelling up to thighs/hips   Skin:     General: Skin is warm and dry.   Neurological:      Mental Status: She is alert and oriented to person, place, and time. Mental status is at baseline.   Psychiatric:         Mood and Affect: Mood normal.         Behavior: Behavior normal.       Significant Labs:  All labs within the past 24 hours have been reviewed.     Significant Imaging:  .

## 2022-10-09 NOTE — PROGRESS NOTES
[facility] - Trans[facility] Stepdown  Nephrology  Progress Note    Patient Name: Puja Quigley  MRN: [id number]  Admission Date: 9/30/2022  Hospital Length of Stay: 8 days  Attending Provider: Nya Galvez MD   Primary Care Physician: Primary Doctor No  Principal Problem:Malignant ascites    Subjective:     HPI: 42 year-old F w/ hx of metastatic gastric cancer c/b peritoneal carcinomatosis and malignant ascites. Patient recently on third line docetaxel. She was initially admitted with severe abdominal pain and MARIA. She was found to have massive ascites that required large volume paracentesis on 10/1 with removal of 5.4L of fluid, and received albumin infusion. Patient reportedly gets two paracentesis per week. Last paracentesis on 9/23 removed 6.7L of fluid from the abdomen. Patient with baseline serum creatinine of 0.7 to 0.8 and presented with initial serum creatinine of 2.6-->3.2-->2.4.                     Interval History: feeling ok. No events over night    Review of patient's allergies indicates:  No Known Allergies  Current Facility-Administered Medications   Medication Frequency    0.9%  NaCl infusion Continuous    acetaminophen tablet 650 mg Q8H PRN    aluminum-magnesium hydroxide-simethicone 200-200-20 mg/5 mL suspension 30 mL Q6H PRN    apixaban tablet 10 mg BID    baclofen 5 mg/mL oral liquid (PEDS) 2.5 mg PRN    dextrose 10% bolus 125 mL PRN    dextrose 10% bolus 250 mL PRN    duke's soln (benadryl 30 mL, mylanta 30 mL, LIDOcaine 30 mL, nystatin 30 mL) 120 mL QID    gabapentin capsule 300 mg TID    glucagon (human recombinant) injection 1 mg PRN    glucose chewable tablet 16 g PRN    glucose chewable tablet 24 g PRN    magnesium hydroxide 400 mg/5 ml suspension 2,400 mg Daily PRN    melatonin tablet 6 mg Nightly PRN    morphine injection 4 mg Q4H PRN    mupirocin 2 % ointment Daily    naloxone 0.4 mg/mL injection 0.02 mg PRN    ondansetron disintegrating tablet 4 mg Q6H PRN     oxyCODONE immediate release tablet 5 mg Q4H PRN    pantoprazole EC tablet 40 mg Daily    promethazine (PHENERGAN) 6.25 mg in dextrose 5 % 50 mL IVPB Q6H PRN    simethicone chewable tablet 80 mg TID PRN       Objective:     Vital Signs (Most Recent):  Temp: 98.4 °F (36.9 °C) (10/09/22 0800)  Pulse: 101 (10/09/22 0800)  Resp: 18 (10/09/22 0800)  BP: 99/65 (10/09/22 0800)  SpO2: 99 % (10/09/22 0800)  O2 Device (Oxygen Therapy): room air (10/09/22 0800) Vital Signs (24h Range):  Temp:  [97.6 °F (36.4 °C)-98.4 °F (36.9 °C)] 98.4 °F (36.9 °C)  Pulse:  [] 101  Resp:  [16-18] 18  SpO2:  [98 %-100 %] 99 %  BP: ()/(58-70) 99/65     Weight: 60.8 kg (134 lb 0.6 oz) (10/09/22 0500)  Body mass index is 20.99 kg/m².  Body surface area is 1.7 meters squared.    I/O last 3 completed shifts:  In: 1480.2 [P.O.:480; I.V.:1000.2]  Out: 1500 [Urine:1200; Other:300]    Physical Exam  Vitals reviewed.   Constitutional:       General: She is not in acute distress.     Appearance: She is underweight. She is not diaphoretic.   HENT:      Head: Normocephalic and atraumatic.      Right Ear: External ear normal.      Left Ear: External ear normal.      Nose: No congestion.      Mouth/Throat:      Mouth: Mucous membranes are dry.      Comments: No ulcers seen  Eyes:      General:         Right eye: No discharge.         Left eye: No discharge.   Neck:      Comments: No JVD appreciated  Cardiovascular:      Rate and Rhythm: Normal rate and regular rhythm.   Pulmonary:      Effort: Pulmonary effort is normal. No respiratory distress.   Abdominal:      General: There is distension.   Musculoskeletal:      Cervical back: Normal range of motion.      Right lower leg: Edema (improving) present.      Left lower leg: Edema (improving) present.      Comments: Swelling up to thighs/hips   Skin:     General: Skin is warm and dry.   Neurological:      Mental Status: She is alert and oriented to person, place, and time. Mental status is at  baseline.   Psychiatric:         Mood and Affect: Mood normal.         Behavior: Behavior normal.       Significant Labs:  All labs within the past 24 hours have been reviewed.     Significant Imaging:  .    Assessment/Plan:     Hyponatremia  From intravascular hypovolemia    Plan:  --will trial fluids today  --Follow Na 3 x a day, target ~ 128  --CMP daily      MARIA (acute kidney injury)  42 year-old f w hx of gastric adenocarcinoma c/b hepaticand peritoneal metastases. Patient has episodes of recurrent abdominal distension due to tense ascites requiring now bi-weekly paracentesis for relief. Patient with initially up trending creatinine level that peaked at 3.2 (serum creatinine 0.7 to 0.8) that is improving after her second large volume para that removed. Patient had CT scan on admission however did not get good look at kidneys 2/2 to massive ascites. Patient denies any obstructive type symptoms.    Patient notes that she is able to urinate normally, although she notes that when she has a lot of fluid in her abdomen she goes more frequently and is associated with some back pain. That improves after fluid is removed according to patient. Patient notes since March she has been requiring paracentesis and now they are becoming more frequent.      MARIA could have also been caused by large volume paracentesis-induced kidney injury from prior paracentesis on 10/1, however creatinine per chart check had already been elevated on arrival. Patient status post paracentesis on 10/3 with approximately 5L removed and improvement in her creatinine. Patient is constipated and notes not having bowel movement since being in hospital 2/2 to pain medications.    -- MARIA/ATN, from prolonged hypoperfusion state    Plan:  --Patient appears volume down with dry mouth today, less swelling--likely 2/2 to continued difficulty with PO hydration/eating. Continues to have regurgitation.  --recommend IV hydration 1L LR over 8 hours   --assess  volume status daily  --CMP, phosphorus, Mg daily  --Avoid nephrotoxic agents  --avoid NSAIDs  --Stricts ins/outs  --renal ultrasound--no acute findings  --urine microscopy was consistent with muddy brown casts---acute tubular necrosis   --patient s/p abdominal pleurx for malignant ascities             Thank you for your consult. I will follow-up with patient. Please contact us if you have any additional questions.    Nathaly Cortez MD  Nephrology  [facility] - Transplant Stepdown

## 2022-10-09 NOTE — ASSESSMENT & PLAN NOTE
Pt with known h/o ascites   Initially had peritoneal catheter after diagnosis, output slowed so was removed in October 2021 by IR.   bi-weekly paracentesis for now.  Last paracentesis 9/23 removed 6.7L of fluid from the abdomen.     Paracentesis performed on 10/2/22. 5 L drained, pt tolerated procedure well. 2nd paracentesis performed by IR on 10/3/22, 5.4L drained. Pleur-X catheter was not placed. Plan for pt to have catheter placed at [facility] on 10/6/22. Pt states this was originally planned before having to come to [facility]. Pleur-X catheter placed by IR, 10/5/22, in place, pt tolerated procedure well.     Daily drains with Pleur-X cathter as much as pt can tolerate.    Plan:   - Pain control with oxycodone 5 mg PRN q 4 hours and IV morphine 4 mg PRN q 4 hours  - Antiemetics with Phenergan   - Nephro continuing to follow  - monitor volume status, vitals, and daily labs

## 2022-10-09 NOTE — ASSESSMENT & PLAN NOTE
Baseline Cr 0.8, Cr 3.1 on admission  Hx of CKD: no  Associated with  fatigue, n/v  DDx: hypotension, hypovolemia, obstructive uropathy 2/2 to malignancy   No indications for HD at this time    MARIA has still not resolved after 2 paracentesis. Will consult Nephrology and f/u with recs     Plan:   - Trend Cr  - Strict I&Os  - Avoid nephrotoxic agents (NSAIDs, gadolinium, IV radiocontrast)  - Avoid hypotension  - Renally adjust medications   - Nephro following  - bolus of LR and f/u sodium levels with afternoon BMP

## 2022-10-09 NOTE — CONSULTS
"[facility]  Palliative Medicine  Consult Note    Patient Name: Puja Quigley  MRN: [id number]  Admission Date: 9/30/2022  Hospital Length of Stay: 8 days  Code Status: Full Code   Attending Provider: Nya Galvez MD  Consulting Provider: Hannah Macias MD  Primary Care Physician: Primary Doctor No  Principal Problem:Malignant ascites    Patient information was obtained from patient and primary team.      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 Hospital course is notable for development of MARIA and significant deconditioning, now unable to independently transfer/ambulate. Palliative and Supportive Care was consulted for symptom management recommendations.    Goals of Care:  - Not willing to engage in discussions about comfort or hospice; has wondered to herself during this admission, "Am I dying?" Will continue to explore her fears, concerns, and emotional/social burdens and provide support while in the hospital  - Code status: full code  - Wants to seek second opinion at [facility] for cancer-directed treatments  - Ms. Quigley expresses wishes to see Dr. Brink; I will touch base with Dr. Brink tomorrow 10/10    Pain in Mouth  Xerostomia  - Discussed treatments to stimulate saliva production such as lemon drops, but she says that these interventions are too painful for her to tolerate  - Continue Magic Mouthwash - she states this is the most helpful intervention so far  - Agree with treatment of thrush  - Advised spacing out oral " care and avoid aggressive wiping with towel  - Continue opioid therapy    Chronic Neoplasm Pain  - Located in abdomen related to gastric cancer and peritoneal/pelvic metastasis  - S/p paracentesis with significant relief  - Continue home oxycodone 5 mg PO q4h PRN pain  - Continue morphine 4 mg IV q4h PRN breakthrough pain  - Discussed restarting home methadone 2.5 mg PO BID; she has concerns that methadone causes dry mouth and may potentially worsen oral issues; will re-discuss on 10/10    At Risk for Opioid Induced Constipation  Slow Transit Constipation  - Consider starting senna 8.6 mg PO qHS to maintain bowel regimen        Thank you for your consult. I will follow-up with patient. Please contact us if you have any additional questions.    Subjective:     HPI:   Puja Quigley is a 42-year-old woman with a history of stage IV gastric adenocarcinoma with metastasis to the peritoneum, liver and large pelvic mass, recurrent malignant ascites, severe protein calorie malnutrition who was admitted for abdominal pain and recurrent malignant ascites. Hospital course is notable for development of MARIA and significant deconditioning, now unable to independently transfer/ambulate. Palliative and Supportive Care was consulted for symptom management recommendations.    Met Ms. Quigley with her friend Ilda and niece at her bedside. Ms. Quigley shares that her most bothersome symptom is her mouth pain/irritation. She had developed a white film across her tongue and she uses a towel to scrape off the film and to keep her mouth clean. There is associated burning pain that worsens with eating acidic food like pineapple, johann, etc. She has associated dry mouth, which she associates with her home methadone. Of note, she has been off of her home methadone since a week prior to her birthday trip to [place] due to xerostomia. She feels that the oral pain is now making its way down to her throat, and it has been painful to eat. She has  "modified her diet to rely on smoothies/purees, but most of these smoothies have caused a burning sensation across her tongue and further exacerbates her pain. She also has chronic pain across her lower abdomen, but feels that it is better after the paracentesis. She shares that she is a fighter and will be seeking care at [facility] for cancer-directed treatments. She has an appointment this Wednesday, 10/12/22. She expresses concern that she has become so weak that she requires assistance to transfer/ambulate. When she was first admitted, she was able to independently walk, but now is so weak that she is unable to do so.    She shares that when the doctors told her that palliative was going to come to assist with symptom management, she thought to herself, "Am I dying?" I asked her to tell me more about this, and she tells me that she wonders if she is dying because she has rapidly weakened. She then reiterates that she is a fighter and that she is not willing to discuss hospice with me. She also shares that [facility] set up a palliative care clinic visit in their facility, but she likes Dr. Brink (her current outpatient palliative provider) and would rather maintain her care and symptom management with Dr. Brink instead.       Past Medical History:   Diagnosis Date    Anxiety     Dehydration 5/30/2022    Gastric cancer     Gastric ulcer     Hx of psychiatric care     Pregnancy 08/12/2020    delivered on 8/12/2020    Umbilical hernia      Past Surgical History:   Procedure Laterality Date    CLOSURE OF PERFORATED ULCER OF DUODENUM USING OMENTAL PATCH      ESOPHAGOGASTRODUODENOSCOPY N/A 10/13/2020    Procedure: EGD (ESOPHAGOGASTRODUODENOSCOPY);  Surgeon: Rosio Marion MD;  Location: [facility] ([address] [place] [address]);  Service: Endoscopy;  Laterality: N/A;    ESOPHAGOGASTRODUODENOSCOPY N/A 12/11/2020    Procedure: EGD (ESOPHAGOGASTRODUODENOSCOPY);  Surgeon: Rosio Marion MD;  Location: [facility] (2ND " "FLR);  Service: Endoscopy;  Laterality: N/A;  Covid-19 test 20 [facility] - pg    ESOPHAGOGASTRODUODENOSCOPY N/A 3/12/2021    Procedure: EGD (ESOPHAGOGASTRODUODENOSCOPY);  Surgeon: Nav Omer MD;  Location: [facility] ENDO (2ND FLR);  Service: Endoscopy;  Laterality: N/A;  COVID at [facility] 3/9 ttr    ESOPHAGOGASTRODUODENOSCOPY N/A 2021    Procedure: EGD (ESOPHAGOGASTRODUODENOSCOPY);  Surgeon: Rosio Marion MD;  Location: [facility] ENDO (2ND FLR);  Service: Endoscopy;  Laterality: N/A;  5/15-covid pcw-inst portal-tb    ESOPHAGOGASTRODUODENOSCOPY N/A 2021    Procedure: EGD (ESOPHAGOGASTRODUODENOSCOPY);  Surgeon: Socrates Terrazas MD;  Location: [facility] ENDO (2ND FLR);  Service: Endoscopy;  Laterality: N/A;     Family History   Problem Relation Age of Onset    Cancer Mother 67        lymphoma (type? "not active," not on tx; "related to her blood")    Schizophrenia Mother     Lung cancer Father 48        type? h/o smoking    No Known Problems Son     Breast cancer Other 73        unilat; stage I, but aggressive    Prostate cancer Paternal Uncle         (dx 50s/60? no chemo?)    Breast cancer Paternal Cousin         (dx age?) ductal    Colon cancer Neg Hx     Esophageal cancer Neg Hx      Social History     Tobacco Use    Smoking status: Former     Types: Cigarettes     Quit date: 2019     Years since quittin.8    Smokeless tobacco: Never   Substance Use Topics    Alcohol use: Yes    Drug use: Not Currently     Review of patient's allergies indicates:  No Known Allergies    Medications:  Continuous Infusions:    sodium chloride 0.9% 250 mL/hr at 10/08/22 6634     Scheduled:    apixaban  10 mg Oral BID    duke's soln (benadryl 30 mL, mylanta 30 mL, LIDOcaine 30 mL, nystatin 30 mL) 120 mL  10 mL Oral QID    gabapentin  300 mg Oral TID    mupirocin   Topical (Top) Daily    pantoprazole  40 mg Oral Daily     PRN Meds: acetaminophen, aluminum-magnesium hydroxide-simethicone, " baclofen, dextrose 10%, dextrose 10%, glucagon (human recombinant), glucose, glucose, magnesium hydroxide 400 mg/5 ml, melatonin, morphine, naloxone, ondansetron, oxyCODONE, promethazine (PHENERGAN) IVPB, simethicone    24h Oral Morphine Equivalents (OME): 39 MME  Morphine 4 mg IV x2  Oxycodone 5 mg x2    Bowel Management Plan (BMP): Yes (  )  NO  (X  )    OBJECTIVE:   ROS:  Review of Systems   Constitutional: Positive for activity change, appetite change and fatigue.   HENT: Positive for mouth sores.    Eyes: Negative for visual disturbance.   Respiratory: Negative for chest tightness.    Cardiovascular: Negative for chest pain.   Gastrointestinal: Positive for abdominal distention and abdominal pain.   Endocrine: Negative for polyuria.   Genitourinary: Negative for dysuria.   Musculoskeletal: Positive for arthralgias.   Skin: Negative for color change.   Neurological: Positive for weakness.   Hematological: Negative for adenopathy.   Psychiatric/Behavioral: Positive for sleep disturbance.       Review of Symptoms    Symptom Assessment (ESAS 0-10 Scale)  Pain:  0  Dyspnea:  0  Anxiety:  0  Nausea:  0  Depression:  0  Anorexia:  0  Fatigue:  0  Insomnia:  0  Restlessness:  0  Agitation:  0               Physical Exam:  Vitals: Temp: 98.4 °F (36.9 °C) (10/09/22 0800)  Pulse: 101 (10/09/22 0800)  Resp: 18 (10/09/22 0800)  BP: 99/65 (10/09/22 0800)  SpO2: 99 % (10/09/22 0800)  Physical Exam  Constitutional:       General: She is not in acute distress.     Appearance: She is ill-appearing.   HENT:      Head: Normocephalic and atraumatic.      Right Ear: External ear normal.      Left Ear: External ear normal.      Nose: Nose normal.      Mouth/Throat:      Mouth: Mucous membranes are dry.      Pharynx: Posterior oropharyngeal erythema present.   Eyes:      General: No scleral icterus.     Extraocular Movements: Extraocular movements intact.   Cardiovascular:      Rate and Rhythm: Normal rate.   Pulmonary:      Effort:  Pulmonary effort is normal.   Abdominal:      General: Bowel sounds are normal. There is distension.   Musculoskeletal:      Cervical back: Normal range of motion.      Right lower leg: Edema present.      Left lower leg: Edema present.   Skin:     General: Skin is warm and dry.   Neurological:      Mental Status: She is alert and oriented to person, place, and time.      Motor: Weakness present.   Psychiatric:         Mood and Affect: Mood normal.         Behavior: Behavior normal.         Labs:  CBC:   WBC   Date Value Ref Range Status   10/09/2022 8.55 3.90 - 12.70 K/uL Final     Hemoglobin   Date Value Ref Range Status   10/09/2022 9.0 (L) 12.0 - 16.0 g/dL Final     POC Hematocrit   Date Value Ref Range Status   09/30/2022 44 36 - 54 %PCV Final     Hematocrit   Date Value Ref Range Status   10/09/2022 30.1 (L) 37.0 - 48.5 % Final     MCV   Date Value Ref Range Status   10/09/2022 77 (L) 82 - 98 fL Final     Platelets   Date Value Ref Range Status   10/09/2022 233 150 - 450 K/uL Final       LFT:   Lab Results   Component Value Date    AST 18 10/09/2022    ALKPHOS 112 10/09/2022    BILITOT 0.4 10/09/2022       Albumin:   Albumin   Date Value Ref Range Status   10/09/2022 1.3 (L) 3.5 - 5.2 g/dL Final     Protein:   Total Protein   Date Value Ref Range Status   10/09/2022 4.6 (L) 6.0 - 8.4 g/dL Final       Radiology:I have reviewed all pertinent imaging results/findings within the past 24 hours.      > 50% of 70 min visit spent in chart review, face to face discussion of goals of care,  symptom assessment, coordination of care and emotional support.    Hannah Macias MD  Palliative Medicine  [facility] - Transplant [facility]

## 2022-10-09 NOTE — HPI
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 Hospital course is notable for development of MARIA and significant deconditioning, now unable to independently transfer/ambulate. Palliative and Supportive Care was consulted for symptom management recommendations.    Met Ms. Quigley with her friend Ilda and niece at her bedside. Ms. Quigley shares that her most bothersome symptom is her mouth pain/irritation. She had developed a white film across her tongue and she uses a towel to scrape off the film and to keep her mouth clean. There is associated burning pain that worsens with eating acidic food like pineapple, johann, etc. She has associated dry mouth, which she associates with her home methadone. Of note, she has been off of her home methadone since a week prior to her birthday trip to [place] due to xerostomia. She feels that the oral pain is now making its way down to her throat, and it has been painful to eat. She has modified her diet to rely on smoothies/purees, but most of these smoothies have caused a burning sensation across her tongue and further exacerbates her pain. She also has chronic pain across her lower abdomen, but feels that it is better after the paracentesis. She shares that she is a fighter and will be seeking care at [facility] for cancer-directed treatments. She has an appointment this Wednesday, 10/12/22. She expresses concern that she has become so weak that she requires assistance to transfer/ambulate. When she was first admitted, she was able to independently walk, but now is so weak that she is unable to do so.    She shares that when the doctors told her that palliative was going to come to assist with symptom management, she thought to herself, "Am I " "dying?" I asked her to tell me more about this, and she tells me that she wonders if she is dying because she has rapidly weakened. She then reiterates that she is a fighter and that she is not willing to discuss hospice with me. She also shares that [facility] set up a palliative care clinic visit in their facility, but she likes Dr. Brink (her current outpatient palliative provider) and would rather maintain her care and symptom management with Dr. Brink instead.   "

## 2022-10-10 LAB
ALBUMIN SERPL BCP-MCNC: 1.3 G/DL (ref 3.5–5.2)
ALP SERPL-CCNC: 128 U/L (ref 55–135)
ALT SERPL W/O P-5'-P-CCNC: 12 U/L (ref 10–44)
ANION GAP SERPL CALC-SCNC: 12 MMOL/L (ref 8–16)
AST SERPL-CCNC: 17 U/L (ref 10–40)
BASOPHILS # BLD AUTO: 0.01 K/UL (ref 0–0.2)
BASOPHILS NFR BLD: 0.1 % (ref 0–1.9)
BILIRUB SERPL-MCNC: 0.4 MG/DL (ref 0.1–1)
BUN SERPL-MCNC: 75 MG/DL (ref 6–20)
CALCIUM SERPL-MCNC: 8.2 MG/DL (ref 8.7–10.5)
CHLORIDE SERPL-SCNC: 90 MMOL/L (ref 95–110)
CO2 SERPL-SCNC: 21 MMOL/L (ref 23–29)
CREAT SERPL-MCNC: 1.8 MG/DL (ref 0.5–1.4)
DIFFERENTIAL METHOD: ABNORMAL
EOSINOPHIL # BLD AUTO: 0 K/UL (ref 0–0.5)
EOSINOPHIL NFR BLD: 0.5 % (ref 0–8)
ERYTHROCYTE [DISTWIDTH] IN BLOOD BY AUTOMATED COUNT: 20.5 % (ref 11.5–14.5)
EST. GFR  (NO RACE VARIABLE): 36.1 ML/MIN/1.73 M^2
GLUCOSE SERPL-MCNC: 74 MG/DL (ref 70–110)
HCT VFR BLD AUTO: 30 % (ref 37–48.5)
HGB BLD-MCNC: 9.1 G/DL (ref 12–16)
IMM GRANULOCYTES # BLD AUTO: 0.04 K/UL (ref 0–0.04)
IMM GRANULOCYTES NFR BLD AUTO: 0.5 % (ref 0–0.5)
LYMPHOCYTES # BLD AUTO: 0.7 K/UL (ref 1–4.8)
LYMPHOCYTES NFR BLD: 9.3 % (ref 18–48)
MAGNESIUM SERPL-MCNC: 2.8 MG/DL (ref 1.6–2.6)
MCH RBC QN AUTO: 23 PG (ref 27–31)
MCHC RBC AUTO-ENTMCNC: 30.3 G/DL (ref 32–36)
MCV RBC AUTO: 76 FL (ref 82–98)
MONOCYTES # BLD AUTO: 0.6 K/UL (ref 0.3–1)
MONOCYTES NFR BLD: 8.5 % (ref 4–15)
NEUTROPHILS # BLD AUTO: 5.9 K/UL (ref 1.8–7.7)
NEUTROPHILS NFR BLD: 81.1 % (ref 38–73)
NRBC BLD-RTO: 0 /100 WBC
PHOSPHATE SERPL-MCNC: 4.5 MG/DL (ref 2.7–4.5)
PLATELET # BLD AUTO: 203 K/UL (ref 150–450)
PMV BLD AUTO: 8.9 FL (ref 9.2–12.9)
POTASSIUM SERPL-SCNC: 4.5 MMOL/L (ref 3.5–5.1)
PROT SERPL-MCNC: 4.6 G/DL (ref 6–8.4)
RBC # BLD AUTO: 3.95 M/UL (ref 4–5.4)
SODIUM SERPL-SCNC: 123 MMOL/L (ref 136–145)
WBC # BLD AUTO: 7.33 K/UL (ref 3.9–12.7)

## 2022-10-10 PROCEDURE — 25000003 PHARM REV CODE 250: Performed by: INTERNAL MEDICINE

## 2022-10-10 PROCEDURE — 99233 SBSQ HOSP IP/OBS HIGH 50: CPT | Mod: ,,, | Performed by: STUDENT IN AN ORGANIZED HEALTH CARE EDUCATION/TRAINING PROGRAM

## 2022-10-10 PROCEDURE — 84100 ASSAY OF PHOSPHORUS: CPT | Performed by: HOSPITALIST

## 2022-10-10 PROCEDURE — 63600175 PHARM REV CODE 636 W HCPCS: Performed by: HOSPITALIST

## 2022-10-10 PROCEDURE — 80053 COMPREHEN METABOLIC PANEL: CPT | Performed by: HOSPITALIST

## 2022-10-10 PROCEDURE — 25000003 PHARM REV CODE 250: Performed by: STUDENT IN AN ORGANIZED HEALTH CARE EDUCATION/TRAINING PROGRAM

## 2022-10-10 PROCEDURE — 36415 COLL VENOUS BLD VENIPUNCTURE: CPT | Performed by: HOSPITALIST

## 2022-10-10 PROCEDURE — 94761 N-INVAS EAR/PLS OXIMETRY MLT: CPT

## 2022-10-10 PROCEDURE — 20600001 HC STEP DOWN PRIVATE ROOM

## 2022-10-10 PROCEDURE — 92610 EVALUATE SWALLOWING FUNCTION: CPT

## 2022-10-10 PROCEDURE — 97116 GAIT TRAINING THERAPY: CPT

## 2022-10-10 PROCEDURE — 99233 PR SUBSEQUENT HOSPITAL CARE,LEVL III: ICD-10-PCS | Mod: ,,, | Performed by: STUDENT IN AN ORGANIZED HEALTH CARE EDUCATION/TRAINING PROGRAM

## 2022-10-10 PROCEDURE — 85025 COMPLETE CBC W/AUTO DIFF WBC: CPT

## 2022-10-10 PROCEDURE — 63600175 PHARM REV CODE 636 W HCPCS: Performed by: INTERNAL MEDICINE

## 2022-10-10 PROCEDURE — 97530 THERAPEUTIC ACTIVITIES: CPT

## 2022-10-10 PROCEDURE — 83735 ASSAY OF MAGNESIUM: CPT | Performed by: HOSPITALIST

## 2022-10-10 PROCEDURE — 25000003 PHARM REV CODE 250

## 2022-10-10 PROCEDURE — 97162 PT EVAL MOD COMPLEX 30 MIN: CPT

## 2022-10-10 PROCEDURE — 97535 SELF CARE MNGMENT TRAINING: CPT

## 2022-10-10 RX ORDER — DICYCLOMINE HYDROCHLORIDE 10 MG/1
10 CAPSULE ORAL 4 TIMES DAILY
Status: DISCONTINUED | OUTPATIENT
Start: 2022-10-10 | End: 2022-10-15 | Stop reason: HOSPADM

## 2022-10-10 RX ORDER — AMOXICILLIN 250 MG
1 CAPSULE ORAL NIGHTLY
Status: DISCONTINUED | OUTPATIENT
Start: 2022-10-10 | End: 2022-10-15 | Stop reason: HOSPADM

## 2022-10-10 RX ORDER — CHLORPROMAZINE HYDROCHLORIDE 10 MG/1
10 TABLET, FILM COATED ORAL 3 TIMES DAILY
Status: DISCONTINUED | OUTPATIENT
Start: 2022-10-10 | End: 2022-10-12

## 2022-10-10 RX ORDER — POLYETHYLENE GLYCOL 3350 17 G/17G
17 POWDER, FOR SOLUTION ORAL DAILY
Status: CANCELLED | OUTPATIENT
Start: 2022-10-10

## 2022-10-10 RX ADMIN — GABAPENTIN 300 MG: 300 CAPSULE ORAL at 08:10

## 2022-10-10 RX ADMIN — SENNOSIDES AND DOCUSATE SODIUM 1 TABLET: 50; 8.6 TABLET ORAL at 08:10

## 2022-10-10 RX ADMIN — MORPHINE SULFATE 4 MG: 2 INJECTION, SOLUTION INTRAMUSCULAR; INTRAVENOUS at 08:10

## 2022-10-10 RX ADMIN — GABAPENTIN 300 MG: 300 CAPSULE ORAL at 03:10

## 2022-10-10 RX ADMIN — CHLORPROMAZINE HYDROCHLORIDE 10 MG: 10 TABLET, FILM COATED ORAL at 05:10

## 2022-10-10 RX ADMIN — ALUMINUM HYDROXIDE, MAGNESIUM HYDROXIDE, AND DIMETHICONE 10 ML: 400; 400; 40 SUSPENSION ORAL at 07:10

## 2022-10-10 RX ADMIN — Medication 2.5 MG: at 05:10

## 2022-10-10 RX ADMIN — CHLORPROMAZINE HYDROCHLORIDE 10 MG: 10 TABLET, FILM COATED ORAL at 08:10

## 2022-10-10 RX ADMIN — DICYCLOMINE HYDROCHLORIDE 10 MG: 10 CAPSULE ORAL at 08:10

## 2022-10-10 RX ADMIN — APIXABAN 10 MG: 5 TABLET, FILM COATED ORAL at 03:10

## 2022-10-10 RX ADMIN — MORPHINE SULFATE 4 MG: 2 INJECTION, SOLUTION INTRAMUSCULAR; INTRAVENOUS at 02:10

## 2022-10-10 RX ADMIN — SODIUM CHLORIDE, SODIUM LACTATE, POTASSIUM CHLORIDE, AND CALCIUM CHLORIDE 1000 ML: .6; .31; .03; .02 INJECTION, SOLUTION INTRAVENOUS at 05:10

## 2022-10-10 RX ADMIN — DICYCLOMINE HYDROCHLORIDE 10 MG: 10 CAPSULE ORAL at 06:10

## 2022-10-10 RX ADMIN — PANTOPRAZOLE SODIUM 40 MG: 40 TABLET, DELAYED RELEASE ORAL at 03:10

## 2022-10-10 RX ADMIN — APIXABAN 10 MG: 5 TABLET, FILM COATED ORAL at 08:10

## 2022-10-10 RX ADMIN — ALUMINUM HYDROXIDE, MAGNESIUM HYDROXIDE, AND DIMETHICONE 10 ML: 400; 400; 40 SUSPENSION ORAL at 04:10

## 2022-10-10 RX ADMIN — ALUMINUM HYDROXIDE, MAGNESIUM HYDROXIDE, AND DIMETHICONE 10 ML: 400; 400; 40 SUSPENSION ORAL at 08:10

## 2022-10-10 NOTE — PROGRESS NOTES
Dr Tomlinson notified of pt's bP sitting in chair 85/58- . Pulse ox 98% RA.  1L LR ronny ordered. Pt assisted back to bed- new purewick placed and bolus infusing.

## 2022-10-10 NOTE — PROGRESS NOTES
"[facility] - [facility] Stepdown  Palliative Medicine  Progress Note    Patient Name: Puja Quigley  MRN: [id number]  Admission Date: 9/30/2022  Hospital Length of Stay: 9 days  Code Status: Full Code   Attending Provider: Nya Galvez MD  Consulting Provider: Hannah Macias MD  Primary Care Physician: Primary Doctor No  Principal Problem:Malignant ascites    Patient information was obtained from patient, caregiver / friend and primary team.      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 Hospital course is notable for development of MARIA and significant deconditioning, now unable to independently transfer/ambulate. Palliative and Supportive Care was consulted for symptom management recommendations.    Goals of Care:  - Code status: full code  - Expresses understanding that her body is too weak to handle the toxic effects of cancer-directed therapy. Remains hopeful for re-evaluation for cancer treatment and asks, "In the future can we see if I can get chemotherapy if I get stronger?" At this time has expectations that chemotherapy may be a possibility in the future and therefore goals of care remain unchanged.  - Friends and brother are asking if supplemental nutrition through "feeding tube" or "IV" is needed and feel strongly that the reason why she is weak is because she is not getting enough nutrition or IV hydration. Validated concern. Ms. Quigley shares that she does not want a feeding tube and because she can swallow just fine, she wants to continue nutrition by mouth. Discussed limitations and risks associated with IV hydration. Friends continue to push Ms. Quigley to reconsider it.  - Will continue to provide support " while in the hospital    Pain in Mouth  Xerostomia  - Discussed treatments to stimulate saliva production such as lemon drops, but she says that these interventions are too painful for her to tolerate  - Continue Rodriguez's solution  - Agree with treatment of thrush  - Consider addition of Biotene in between Duke's solution administration  - Advised spacing out oral care and avoid aggressive wiping with towel  - Continue opioid therapy    Chronic Neoplasm Pain  - Located in abdomen related to gastric cancer and peritoneal/pelvic metastasis  - S/p paracentesis with significant relief  - Continue home oxycodone 5 mg PO q4h PRN pain  - Discontinue morphine 4 mg IV q4h PRN breakthrough pain; thankfully kidney function is recovering  - Replace with hydromorphone 0.5 mg IV q3h PRN breakthrough pain  - Rediscussed if resuming home methadone will be helpful especially since it causes dry mouth. Discussed with Dr. Brink and talked to Ms. Quigley about alternative long-acting opioid (Oxycontin). She is agreeable with whatever recommendations, but again feels that Rodriguez's solution is the most helpful at this time and IV morphine.  - In last 24 hours, utilized 24 MME. Not sure that she would need long-acting at this time especially as she feels fatigued and drowsy during our encounter (albeit after a session with physical therapy and long discussion with Oncology team). Will continue to reassess pain and see when it would be an appropriate time to reintroduce long-acting medicine for pain    At Risk for Opioid Induced Constipation  Slow Transit Constipation  - Continue senna 8.6 mg PO qHS to maintain bowel regimen; titrate to effect      I will follow-up with patient. Please contact us if you have any additional questions.    Subjective:     Chief Complaint:   Chief Complaint   Patient presents with    Ascites     Endorses fluid in abd and lower extremities for the past few days, reports she needs a paracentesis, states she gets  "them almost weekly. +SOB. 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 Hospital course is notable for development of MARIA and significant deconditioning, now unable to independently transfer/ambulate. Palliative and Supportive Care was consulted for symptom management recommendations.    Met Ms. Quigley with her friend Ilda and niece at her bedside. Ms. Quigley shares that her most bothersome symptom is her mouth pain/irritation. She had developed a white film across her tongue and she uses a towel to scrape off the film and to keep her mouth clean. There is associated burning pain that worsens with eating acidic food like pineapple, johann, etc. She has associated dry mouth, which she associates with her home methadone. Of note, she has been off of her home methadone since a week prior to her birthday trip to [place] due to xerostomia. She feels that the oral pain is now making its way down to her throat, and it has been painful to eat. She has modified her diet to rely on smoothies/purees, but most of these smoothies have caused a burning sensation across her tongue and further exacerbates her pain. She also has chronic pain across her lower abdomen, but feels that it is better after the paracentesis. She shares that she is a fighter and will be seeking care at [facility] for cancer-directed treatments. She has an appointment this Wednesday, 10/12/22. She expresses concern that she has become so weak that she requires assistance to transfer/ambulate. When she was first admitted, she was able to independently walk, but now is so weak that she is unable to " "do so.    She shares that when the doctors told her that palliative was going to come to assist with symptom management, she thought to herself, "Am I dying?" I asked her to tell me more about this, and she tells me that she wonders if she is dying because she has rapidly weakened. She then reiterates that she is a fighter and that she is not willing to discuss hospice with me. She also shares that [facility] set up a palliative care clinic visit in their facility, but she likes Dr. Brink (her current outpatient palliative provider) and would rather maintain her care and symptom management with Dr. Brink instead.       Hospital Course:  No notes on file    No new subjective & objective note has been filed under this hospital service since the last note was generated.  I spent 35 minutes in the care of this patient, >50% in chart review, face to face discussion of goals of care, symptom assessment, counseling, coordination of care and emotional support.      Hannah Macias MD  Palliative Medicine  [facility]              "

## 2022-10-10 NOTE — ASSESSMENT & PLAN NOTE
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 Hospital course is notable for development of MARIA and significant deconditioning, now unable to independently transfer/ambulate. Palliative and Supportive Care was consulted for symptom management recommendations.    Goals of Care:  - Code status: full code  - Expresses understanding that her body is too weak to handle the toxic effects of cancer-directed therapy. Remains hopeful for re-evaluation for cancer treatment and asks, "In the future can we see if I can get chemotherapy if I get stronger?" At this time has expectations that chemotherapy may be a possibility in the future and therefore goals of care remain unchanged.  - Friends and brother are asking if supplemental nutrition through "feeding tube" or "IV" is needed and feel strongly that the reason why she is weak is because she is not getting enough nutrition or IV hydration. Validated concern. Ms. Quigley shares that she does not want a feeding tube and because she can swallow just fine, she wants to continue nutrition by mouth. Discussed limitations and risks associated with IV hydration. Friends continue to push Ms. Quigley to reconsider it.  - Will continue to provide support while in the hospital    Pain in Mouth  Xerostomia  - Discussed treatments to stimulate saliva production such as lemon drops, but she says that these interventions are too painful for her to tolerate  - Continue Rodriguez's solution  - Agree with treatment of thrush  - Consider addition of Biotene in between Duke's solution administration  - Advised spacing out oral care and avoid aggressive wiping with towel  - Continue opioid therapy    Chronic Neoplasm Pain  - Located in abdomen related to gastric cancer " and peritoneal/pelvic metastasis  - S/p paracentesis with significant relief  - Continue home oxycodone 5 mg PO q4h PRN pain  - Continue morphine 4 mg IV q4h PRN breakthrough pain  - Rediscussed if resuming home methadone will be helpful especially since it causes dry mouth. Discussed with Dr. Brink and talked to Ms. Quigley about alternative long-acting opioid (Oxycontin). She is agreeable with whatever recommendations, but again feels that Rodriguez's solution is the most helpful at this time and IV morphine.  - In last 24 hours, utilized 24 MME. Not sure that she would need long-acting at this time especially as she feels fatigued and drowsy during our encounter (albeit after a session with physical therapy and long discussion with Oncology team). Will continue to reassess pain and see when it would be an appropriate time to reintroduce long-acting medicine for pain    At Risk for Opioid Induced Constipation  Slow Transit Constipation  - Continue senna 8.6 mg PO qHS to maintain bowel regimen; titrate to effect

## 2022-10-10 NOTE — PROGRESS NOTES
Pt  up to chair with min assist from PT but fatigued quickly. Voided 300cc with use of purewick. Waffle mattress removed per pt request. Wheelchair cushion ordered.  Diet advanced to soft diet. Linen changed. Oncology and palliative at bedside for meeting today.

## 2022-10-10 NOTE — PROGRESS NOTES
[facility]  Hematology/Oncology  Progress Note    Patient Name: Puja Quigley  Admission Date: 9/30/2022  Hospital Length of Stay: 9 days  Code Status: Full Code     Subjective:     HPI:  Mrs. Quigley is a 42 F with PMHx of gastric adenocarcinoma with osseous, hepatic and peritoneal metastases complicated by recurrent abdominal distension and pain 2/2 recurrent painful, tense ascites requiring frequent decompression for symptom relief. presenting to the ED with the chief complaint of abdominal pain. Reports worsening abdominal swelling and BLE swelling over the past week. Reports undergoing paracentesis 1-2 times per week, but missed her sessions this week as she went out of town for her birthday. Reports her abdominal swelling has not been this significant in the past. Additionally reports clear productive cough. Feels shortness of breath is due to her abdominal distention. Denies fever, chest pain, vomiting, urinary or bowel movement changes.           Patient information was obtained from patient, past medical records, and ER records.      Oncology History:          Malignant neoplasm of stomach   6/10/2021 Initial Diagnosis     Gastric adenocarcinoma      7/26/2021 - 4/6/2022 Chemotherapy     Treatment Summary   Plan Name: OP FOLFOX 6 Q2W  Treatment Goal: Palliative  Status: Inactive  Start Date: 7/26/2021  End Date: 4/6/2022  Provider: Fer Ashraf MD  Chemotherapy: fluorouraciL 2,400 mg/m2 = 3,770 mg in sodium chloride 0.9% 100 mL chemo infusion, 2,400 mg/m2 = 3,770 mg, Intravenous, Over 46 hours, 19 of 20 cycles  Administration: 3,770 mg (7/26/2021), 3,770 mg (8/9/2021), 3,770 mg (8/23/2021), 3,770 mg (9/7/2021), 3,770 mg (9/21/2021), 3,770 mg (10/5/2021), 3,770 mg (10/19/2021), 3,770 mg (11/2/2021), 3,770 mg (11/15/2021), 3,770 mg (11/30/2021), 3,770 mg (12/13/2021), 4,000 mg (12/27/2021), 4,030 mg (1/10/2022), 4,030 mg (1/24/2022), 4,000 mg (2/7/2022), 4,000 mg (2/22/2022),  4,000 mg (3/7/2022), 4,000 mg (3/22/2022), 4,000 mg (4/4/2022)  oxaliplatin (ELOXATIN) 85 mg/m2 = 133 mg in dextrose 5 % 500 mL chemo infusion, 85 mg/m2 = 133 mg, Intravenous, Clinic/HOD 1 time, 9 of 9 cycles  Administration: 133 mg (7/26/2021), 133 mg (8/9/2021), 133 mg (8/23/2021), 133 mg (9/7/2021), 133 mg (9/21/2021), 133 mg (10/5/2021), 133 mg (10/19/2021), 133 mg (11/2/2021), 133 mg (11/15/2021)         5/26/2022 - 7/27/2022 Chemotherapy     Treatment Summary   Plan Name: OP FOLFIRI Q2WK  Treatment Goal: Palliative  Status: Inactive  Start Date: 5/26/2022  End Date: 7/27/2022  Provider: Fer Ashraf MD  Chemotherapy: dexAMETHasone (DECADRON) 4 MG Tab, 8 mg, Oral, Daily, 1 of 1 cycle, Start date: --, End date: --  irinotecan (CAMPTOSAR) 120 mg/m2 = 200 mg in sodium chloride 0.9% 500 mL chemo infusion, 120 mg/m2 = 200 mg (100 % of original dose 120 mg/m2), Intravenous, Clinic/HOD 1 time, 5 of 24 cycles  Dose modification: 125 mg/m2 (original dose 120 mg/m2, Cycle 1), 120 mg/m2 (original dose 120 mg/m2, Cycle 1), 75 mg/m2 (original dose 120 mg/m2, Cycle 2, Reason: Dose not tolerated)  Administration: 200 mg (5/26/2022), 126 mg (6/13/2022), 126 mg (6/27/2022), 134 mg (7/12/2022), 134 mg (7/25/2022)  ramucirumab (CYRAMZA) 471 mg in sodium chloride 0.9% 250 mL chemo infusion, 8 mg/kg = 471 mg, Intravenous, Clinic/HOD 1 time, 1 of 1 cycle  Administration: 471 mg (5/26/2022)         8/16/2022 -  Chemotherapy     Treatment Summary   Plan Name: OP DOCETAXEL (75 MG/M2) Q3W  Treatment Goal: Palliative  Status: Active  Start Date: 8/16/2022  End Date: 11/8/2022 (Planned)  Provider: Fer Ashraf MD  Chemotherapy: DOCEtaxeL (TAXOTERE) 35 mg/m2 = 65 mg in sodium chloride 0.9% 253.25 mL chemo infusion, 35 mg/m2 = 65 mg (46.7 % of original dose 75 mg/m2), Intravenous, Clinic/HOD 1 time, 2 of 7 cycles  Dose modification: 35 mg/m2 (original dose 75 mg/m2, Cycle 1, Reason: MD Discretion, Comment: per [facility]  recommendations)  Administration: 65 mg (8/16/2022), 65 mg (8/30/2022)       Interval History:   No acute issues overnight. Patient's BP was low to 99/58 and she was given 1L bolus. This morning, patient reports slight improvement in discomfort of her mouth and swallowing. She reported regurgitation of fluids than usual. She continues to hiccup with makes it difficult to hold food down. She denied abd tenderness but abd was slightly distended and tense. Patient is still adamant of following through with her [facility] appointment. Labs: improving creat to 1.8, persistent hyponatremia of 123    Oncology Treatment Plan:   OP DOCETAXEL (75 MG/M2) Q3W    Medications:  Continuous Infusions:      Scheduled Meds:   apixaban  10 mg Oral BID    duke's soln (benadryl 30 mL, mylanta 30 mL, LIDOcaine 30 mL, nystatin 30 mL) 120 mL  10 mL Oral QID    gabapentin  300 mg Oral TID    mupirocin   Topical (Top) Daily    pantoprazole  40 mg Oral Daily    senna-docusate 8.6-50 mg  1 tablet Oral QHS     PRN Meds:acetaminophen, aluminum-magnesium hydroxide-simethicone, baclofen, dextrose 10%, dextrose 10%, glucagon (human recombinant), glucose, glucose, magnesium hydroxide 400 mg/5 ml, melatonin, morphine, naloxone, ondansetron, oxyCODONE, promethazine (PHENERGAN) IVPB, simethicone     Review of Systems   Constitutional:  Positive for appetite change. Negative for chills, diaphoresis, fatigue and fever.   HENT:  Positive for trouble swallowing. Negative for postnasal drip and sore throat.    Eyes:  Negative for visual disturbance.   Respiratory:  Negative for cough and shortness of breath.    Cardiovascular:  Positive for leg swelling. Negative for chest pain and palpitations.   Gastrointestinal:  Positive for abdominal distention, abdominal pain and constipation. Negative for nausea and vomiting.   Genitourinary:  Negative for dysuria.   Musculoskeletal:  Negative for back pain and myalgias.   Skin:  Negative for rash.    Neurological:  Negative for dizziness, syncope, weakness and headaches.   Hematological:  Does not bruise/bleed easily.   Psychiatric/Behavioral:  Negative for confusion and decreased concentration.    Objective:     Vital Signs (Most Recent):  Temp: 98.2 °F (36.8 °C) (10/09/22 1930)  Pulse: (!) 114 (10/10/22 0521)  Resp: 16 (10/10/22 0521)  BP: 108/68 (10/10/22 0521)  SpO2: 98 % (10/10/22 0521) Vital Signs (24h Range):  Temp:  [96.4 °F (35.8 °C)-98.2 °F (36.8 °C)] 98.2 °F (36.8 °C)  Pulse:  [102-114] 114  Resp:  [15-18] 16  SpO2:  [98 %-100 %] 98 %  BP: ()/(54-68) 108/68     Weight: 60.4 kg (133 lb 2.5 oz)  Body mass index is 20.86 kg/m².  Body surface area is 1.69 meters squared.      Intake/Output Summary (Last 24 hours) at 10/10/2022 0847  Last data filed at 10/10/2022 0539  Gross per 24 hour   Intake 300 ml   Output 1150 ml   Net -850 ml         Physical Exam  Vitals and nursing note reviewed.   Constitutional:       General: She is not in acute distress.     Appearance: Normal appearance.   HENT:      Head: Normocephalic and atraumatic.      Mouth/Throat:      Mouth: Mucous membranes are moist.   Eyes:      General: No scleral icterus.     Extraocular Movements: Extraocular movements intact.      Conjunctiva/sclera: Conjunctivae normal.   Cardiovascular:      Rate and Rhythm: Regular rhythm. Tachycardia present.      Pulses: Normal pulses.      Heart sounds: Normal heart sounds. No murmur heard.    No friction rub. No gallop.   Pulmonary:      Effort: Pulmonary effort is normal. No respiratory distress.      Breath sounds: Normal breath sounds.   Abdominal:      General: Bowel sounds are normal. There is distension.      Tenderness: There is abdominal tenderness.      Comments: Pleur-X catheter in place with no erythema or purulence   Musculoskeletal:      Right lower leg: Edema present.      Left lower leg: Edema present.   Neurological:      General: No focal deficit present.      Mental Status: She  is alert and oriented to person, place, and time.   Psychiatric:         Mood and Affect: Mood normal.         Behavior: Behavior normal.       Significant Labs:   All pertinent labs from the last 24 hours have been reviewed.    Diagnostic Results:  I have reviewed all pertinent imaging results/findings within the past 24 hours.    Assessment/Plan:     * Malignant ascites  Pt with known h/o ascites   Initially had peritoneal catheter after diagnosis, output slowed so was removed in October 2021 by IR.   bi-weekly paracentesis for now.  Last paracentesis 9/23 removed 6.7L of fluid from the abdomen.     Paracentesis performed on 10/2/22. 5 L drained, pt tolerated procedure well. 2nd paracentesis performed by IR on 10/3/22, 5.4L drained. Pleur-X catheter was not placed. Plan for pt to have catheter placed at [facility] on 10/6/22. Pt states this was originally planned before having to come to [facility]. Pleur-X catheter placed by IR, 10/5/22, in place, pt tolerated procedure well.     Daily drains with Pleur-X cathter as much as pt can tolerate.    Plan:   - Pain control with oxycodone 5 mg PRN q 4 hours and IV morphine 4 mg PRN q 4 hours  - Antiemetics with Phenergan   - Nephro continuing to follow  - monitor volume status, vitals, and daily labs    Mouth sores  Pt c/o mouth pain during hospital stay    - Rodriguez's solution, swish and swallow 3-4x daily    Anxiety  Hematology/Oncology Psychology consulted to discuss with pt regarding her poor disease prognosis      Psychological factors affecting medical condition  Hematology/Oncology Psychology consulted to discuss with pt regarding her poor disease prognosis     Thrombus of R femoral vein  US on bilateral lower extremities showed new nonocclusive thrombus in the mid right femoral vein.    - High intensity heparin drip IV switched to Apixaban 10 mg BID. Transition to 5mg on 10/13 9pm    Hyperkalemia  Patient with potassium of 5.6, that has slowly been increasing since  admission from 5.2.   Improving at the moment, 4.5 on 10/10/22     Plan:  - continue to monitor with daily CMPs     Hyponatremia  Patient with hyponatremia thought to be 2/2 to volume overload. Physical exam with lower extremity swelling with pitting edema. Abdomen still with ascites on retroperitoneal ultrasound. Serum OSM of 283, Urine sodium less than 10.     Plan:  --Likely 2/2 to volume overload  --CMP daily  - Nephro will give IV fluids for dehydration    Constipation  Pt has requested to stop any oral medications for constipation. She believes they are not working.    - sennakoarmani QHS    MARIA (acute kidney injury)  Baseline Cr 0.8, Cr 3.1 on admission  Hx of CKD: no  Associated with  fatigue, n/v  DDx: hypotension, hypovolemia, obstructive uropathy 2/2 to malignancy   No indications for HD at this time    MARIA has improving after 2 paracentesis and fluids. consulted Nephrology      Plan:   - Trend Cr  - Strict I&Os  - Avoid nephrotoxic agents (NSAIDs, gadolinium, IV radiocontrast)  - Avoid hypotension  - Renally adjust medications   - Nephro following    Malignant neoplasm of stomach  HER-2 negative by FISH.  PD-L1 < 1%.  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  We previously explained the implications of a stage IV diagnosis to her and potential treatment options.  She is now s/p port placement. She sought a second opinion at [facility] for zolbetuximab trial targeting CLDN protein but she did not test positive for this biomarker. We recommended proceeding with SOC FOLFOX, with which the [facility] team agreed. Because of the negative PD-L1 I do not think the benefit of adding nivolumab outweighs the potential toxicities.        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  She opted not to get genetic testing done at her appt with  [facility]. We recommended that she reconsider that decision in light of a very likely germline mutation in SAMUEL which has clinical consequences.       CT CAP after 6 cycles showed improvement in ascites, probable hepatic, thyroid and bone metastases. We dropped oxaliplatin starting with cycle 10 due to grade 1 neuropathy and desire to keep it from worsening.     CT CAP after cycle 10 showed stable disease.  CT scan after cycle 15 continued to display overall stability of disease within the gastric wall, peritoneum, spine and liver. Although increase ascites on imaging and clinically can represent progression of disease, her previous CT suggested overall stability within the liver and gastric wall, so we recommended to continue with treatment time. However, she continued to have increasing abdominal ascites that has been concerning for progression of disease. Hence, she had repeat imaging. On CT performed on 4/14/2022, she appeared to have worsening disease suggestive of progression. She was seen by Dr. Reid at [facility] for f/u and to discuss possible treatment options.  She was also evaluated by Dr. Andree Mueller and Dr. See of Surgical Oncology at [facility].  Ultimately she was not felt to be a good surgical debulking candidate due to her ascites.  If her ascites improves during the course of chemotherapy and her performance status remains stable or improves, they would re-consider her again for palliative oophorectomy.      Was given ramucirumab with cycle 1 but this later was held given [facility] recs for just FOLFIRI. In addition, she didn't tolerate cycle 1 of cyramza well with significant N/V. She was started on second-line FOLFIRI. She received 5 cycles with FOLFIRI of irinotecan to 75 mg/m2 - has UGT1A1 homozygous mutation indicating poor metabolism.  Continue 5-FU infusion at 2200 mg/m2.     Repeat imaging with CT CAP after [facility] on 8/3/22 after cycle 5 of FOLFIRI demonstrated disease progression.  Dr. Reid  recommended third line docetaxel biweekly at 35 mg/m2.      -s/p cycle 3 of Docetaxel.      Intractable abdominal pain  Abdominal pain likely 2/2 to compression from ascites   CT A/P (10/1/22) revealed resolution of bowel distension now with large and small intestines normal caliber and compressed by massive ascites. No evidence of bowel obstruction.    Paracentesis performed on 10/2/22. 5 L drained, pt tolerated procedure well. 2nd paracentesis performed by IR on 10/3/22, 5.4L drained. Pleur-X catheter was not placed. Plan for pt to have catheter placed at [facility] on 10/6/22. Pt states this was originally planned before having to come to [facility].     Plan:   - Pain control with oxycodone 5 mg PRN q 4 hours and IV morphine 4 mg PRN q 4 hours  - Antiemetics with Phenergan   - baclofen switched with thorazine for hiccups    Disposition: Reaching out to Dr. Reid and Dr. Ashraf to arrange possible alternative to inpatient visit as we believe patient is not medically safe for the trip to [place] MD  Hematology/Oncology  [facility] - Transplant Stepdown

## 2022-10-10 NOTE — CARE UPDATE
"RAPID RESPONSE NURSE CHART REVIEW        Chart Reviewed: 10/09/2022, 11:58 PM    MRN: [id number]  Bed: 10253/07654 A    Dx: Malignant ascites    Puja Quigley has a past medical history of Anxiety, Dehydration, Gastric cancer, Gastric ulcer, psychiatric care, Pregnancy, and Umbilical hernia.    Last VS: BP (!) 99/58   Pulse 102   Temp 98.2 °F (36.8 °C) (Oral)   Resp 16   Ht 5' 7" (1.702 m)   Wt 60.8 kg (134 lb 0.6 oz)   LMP  (LMP Unknown)   SpO2 100%   Breastfeeding No   BMI 20.99 kg/m²     24H Vital Sign Range:  Temp:  [96.4 °F (35.8 °C)-98.4 °F (36.9 °C)]   Pulse:  [101-113]   Resp:  [16-18]   BP: ()/(54-70)   SpO2:  [99 %-100 %]     Level of Consciousness (AVPU): alert    Recent Labs     10/07/22  0622 10/08/22  0611 10/09/22  0608   WBC 9.11 7.93 8.55   HGB 10.2* 9.8* 9.0*   HCT 33.7* 31.1* 30.1*    259 233       Recent Labs     10/07/22  0543 10/08/22  0611 10/09/22  0608 10/09/22  1417   * 125* 122* 122*   K 4.6 4.3 4.6 4.7   CL 90* 92* 92* 91*   CO2 21* 19* 18* 17*   CREATININE 2.4* 2.1* 2.1* 1.9*   GLU 82 80 74 84   PHOS 5.9* 5.6* 4.9*  --    MG 2.3 2.8* 2.7*  --         No results for input(s): PH, PCO2, PO2, HCO3, POCSATURATED, BE in the last 72 hours.     OXYGEN:  Flow (L/min): 2     O2 Device (Oxygen Therapy): room air    MEWS score: 3    Charge Alix LEBLANC  contacted for Hypotension. Pt received NS bolus, no additional concerns verbalized at this time. Instructed to call [phone] for further concerns or assistance.    Harley Shine RN        "

## 2022-10-10 NOTE — PLAN OF CARE
Patient seen for a bedside swallow eval. SLP recommending a Dysphagia Soft Diet (Level 6)/Thin Liquids at this time.     Bienvenido Salmeron CCC-SLP  Speech-Language Pathology  Pager: [phone]

## 2022-10-10 NOTE — PT/OT/SLP EVAL
Speech Language Pathology Evaluation & Discharge Summary  Bedside Swallow    Patient Name:  Puja Quigley   MRN:  [id number]  Admitting Diagnosis: Malignant ascites    Recommendations:                 General Recommendations:   No further acute ST warranted  Diet recommendations:  Dental Soft, Thin   Aspiration Precautions:  GERD precautions, 1 bite/sip at a time, HOB to 90 degrees, Small bites/sips, and Standard aspiration precautions   General Precautions: Standard, fall  Communication strategies:  none    History:     Past Medical History:   Diagnosis Date    Anxiety     Dehydration 5/30/2022    Gastric cancer     Gastric ulcer     Hx of psychiatric care     Pregnancy 08/12/2020    delivered on 8/12/2020    Umbilical hernia        Past Surgical History:   Procedure Laterality Date    CLOSURE OF PERFORATED ULCER OF DUODENUM USING OMENTAL PATCH      ESOPHAGOGASTRODUODENOSCOPY N/A 10/13/2020    Procedure: EGD (ESOPHAGOGASTRODUODENOSCOPY);  Surgeon: Rosio Marion MD;  Location: [facility] (2ND FLR);  Service: Endoscopy;  Laterality: N/A;    ESOPHAGOGASTRODUODENOSCOPY N/A 12/11/2020    Procedure: EGD (ESOPHAGOGASTRODUODENOSCOPY);  Surgeon: Rosio Marion MD;  Location: [facility] (2ND FLR);  Service: Endoscopy;  Laterality: N/A;  Covid-19 test 12/8/20 at [facility]    ESOPHAGOGASTRODUODENOSCOPY N/A 3/12/2021    Procedure: EGD (ESOPHAGOGASTRODUODENOSCOPY);  Surgeon: Nav Omer MD;  Location: [facility] (2ND FLR);  Service: Endoscopy;  Laterality: N/A;  COVID at [facility] 3/9 ttr    ESOPHAGOGASTRODUODENOSCOPY N/A 5/18/2021    Procedure: EGD (ESOPHAGOGASTRODUODENOSCOPY);  Surgeon: Rosio Marion MD;  Location: [facility] (2ND FLR);  Service: Endoscopy;  Laterality: N/A;  5/15-covid pcw-inst portal-tb    ESOPHAGOGASTRODUODENOSCOPY N/A 5/26/2021    Procedure: EGD (ESOPHAGOGASTRODUODENOSCOPY);  Surgeon: Socrates Terrazas MD;  Location: [facility] END[facility] (2ND FLR);  Service: Endoscopy;  Laterality: N/A;      Prior Intubation HX:  None this admission    Modified Barium Swallow: None this admission    Chest X-Rays 9/30/22:  AP portable chest radiograph demonstrates a cardiac silhouette within normal limits.  There has been interval moderate asymmetric elevation of the right hemidiaphragm, when compared to the prior exam.  The left lung appears to be grossly clear.    Prior diet: reg/thin    Subjective     Patient awake and alert  Communicated with nurse prior to session     Pain/Comfort:  Pain Rating 1: 0/10  Pain Rating Post-Intervention 1: 0/10    Respiratory Status: Room air    Objective:     Oral Musculature Evaluation  Oral Musculature: WFL  Dentition: present and adequate  Secretion Management: adequate  Mucosal Quality: good  Mandibular Strength and Mobility: WFL  Oral Labial Strength and Mobility: WFL  Lingual Strength and Mobility: WFL  Volitional Cough: adequate  Volitional Swallow: timely; good laryngeal elevation  Voice Prior to PO Intake: WFL    Bedside Swallow Eval:   Consistencies Assessed:  Thin liquids x7   Soft solids x4      Oral Phase:   WFL    Pharyngeal Phase:   no overt clinical signs/symptoms of aspiration  no overt clinical signs/symptoms of pharyngeal dysphagia    Compensatory Strategies  None    Treatment: Patient sitting up in bed with family present. She reported improved odynophagia since use of magic mouthwash. Additionally, she exhibited symptoms of reflux during trials for which she is taking daily Protonix. SLP educated patient and family at length regarding recs and POC. Patient left in bed with call light within reach. Recs communicated to team.    Assessment:     Puja Quigley is a 42 y.o. female who presents with a safe oropharyngeal swallow at this time.    Goals:   Multidisciplinary Problems       SLP Goals       Not on file              Multidisciplinary Problems (Resolved)          Problem: SLP    Goal Priority Disciplines Outcome   SLP Goal   (Resolved)     SLP Met                        Plan:     Plan of Care reviewed with:  patient, caregiver   SLP Follow-Up:  No       Discharge recommendations:   (no further ST recommended)   Barriers to Discharge:  None    Time Tracking:     SLP Treatment Date:   10/10/22  Speech Start Time:  0920  Speech Stop Time:  0945     Speech Total Time (min):  25 min    Billable Minutes: Eval Swallow and Oral Function 10 and Self Care/Home Management Training 15    Bienvenido Salmeron CCC-SLP  Speech-Language Pathology  Pager: [phone]   10/10/2022

## 2022-10-10 NOTE — PLAN OF CARE
Problem: Physical Therapy  Goal: Physical Therapy Goal  Description: Goals to met by 10/24/2022    1. Supine to sit with Modified [place]  2. Sit to supine with Modified [place]  3. Rolling to Left and Right with Modified [place].  4. Sit to stand transfer with Stand-by Assistance  5. Bed to chair transfer with Contact Guard Assistance using LRAD  6. Gait  x 50 feet with Contact Guard Assistance using LRAD   7. Stand for 7 minutes with Contact Guard Assistance using LRAD  8. Lower extremity exercise program x15 reps per Instruction, with assistance as needed in order to facilitate improved strength, improved postural control, and improvement in functional independence    PT Eval: 10/10/2022

## 2022-10-10 NOTE — SUBJECTIVE & OBJECTIVE
Interval History:   No acute issues overnight. Patient's BP was low to 99/58 and she was given 1L bolus. This morning, patient reports slight improvement in discomfort of her mouth and swallowing. She reported regurgitation of fluids than usual. She continues to hiccup with makes it difficult to hold food down. She denied abd tenderness but abd was slightly distended and tense. Patient is still adamant of following through with her [facility] appointment. Labs: improving creat to 1.8, persistent hyponatremia of 123    Oncology Treatment Plan:   OP DOCETAXEL (75 MG/M2) Q3W    Medications:  Continuous Infusions:      Scheduled Meds:   apixaban  10 mg Oral BID    duke's soln (benadryl 30 mL, mylanta 30 mL, LIDOcaine 30 mL, nystatin 30 mL) 120 mL  10 mL Oral QID    gabapentin  300 mg Oral TID    mupirocin   Topical (Top) Daily    pantoprazole  40 mg Oral Daily    senna-docusate 8.6-50 mg  1 tablet Oral QHS     PRN Meds:acetaminophen, aluminum-magnesium hydroxide-simethicone, baclofen, dextrose 10%, dextrose 10%, glucagon (human recombinant), glucose, glucose, magnesium hydroxide 400 mg/5 ml, melatonin, morphine, naloxone, ondansetron, oxyCODONE, promethazine (PHENERGAN) IVPB, simethicone     Review of Systems   Constitutional:  Positive for appetite change. Negative for chills, diaphoresis, fatigue and fever.   HENT:  Positive for trouble swallowing. Negative for postnasal drip and sore throat.    Eyes:  Negative for visual disturbance.   Respiratory:  Negative for cough and shortness of breath.    Cardiovascular:  Positive for leg swelling. Negative for chest pain and palpitations.   Gastrointestinal:  Positive for abdominal distention, abdominal pain and constipation. Negative for nausea and vomiting.   Genitourinary:  Negative for dysuria.   Musculoskeletal:  Negative for back pain and myalgias.   Skin:  Negative for rash.   Neurological:  Negative for dizziness, syncope, weakness and headaches.   Hematological:   Does not bruise/bleed easily.   Psychiatric/Behavioral:  Negative for confusion and decreased concentration.    Objective:     Vital Signs (Most Recent):  Temp: 98.2 °F (36.8 °C) (10/09/22 1930)  Pulse: (!) 114 (10/10/22 0521)  Resp: 16 (10/10/22 0521)  BP: 108/68 (10/10/22 0521)  SpO2: 98 % (10/10/22 0521) Vital Signs (24h Range):  Temp:  [96.4 °F (35.8 °C)-98.2 °F (36.8 °C)] 98.2 °F (36.8 °C)  Pulse:  [102-114] 114  Resp:  [15-18] 16  SpO2:  [98 %-100 %] 98 %  BP: ()/(54-68) 108/68     Weight: 60.4 kg (133 lb 2.5 oz)  Body mass index is 20.86 kg/m².  Body surface area is 1.69 meters squared.      Intake/Output Summary (Last 24 hours) at 10/10/2022 0847  Last data filed at 10/10/2022 0539  Gross per 24 hour   Intake 300 ml   Output 1150 ml   Net -850 ml         Physical Exam  Vitals and nursing note reviewed.   Constitutional:       General: She is not in acute distress.     Appearance: Normal appearance.   HENT:      Head: Normocephalic and atraumatic.      Mouth/Throat:      Mouth: Mucous membranes are moist.   Eyes:      General: No scleral icterus.     Extraocular Movements: Extraocular movements intact.      Conjunctiva/sclera: Conjunctivae normal.   Cardiovascular:      Rate and Rhythm: Regular rhythm. Tachycardia present.      Pulses: Normal pulses.      Heart sounds: Normal heart sounds. No murmur heard.    No friction rub. No gallop.   Pulmonary:      Effort: Pulmonary effort is normal. No respiratory distress.      Breath sounds: Normal breath sounds.   Abdominal:      General: Bowel sounds are normal. There is distension.      Tenderness: There is abdominal tenderness.      Comments: Pleur-X catheter in place with no erythema or purulence   Musculoskeletal:      Right lower leg: Edema present.      Left lower leg: Edema present.   Neurological:      General: No focal deficit present.      Mental Status: She is alert and oriented to person, place, and time.   Psychiatric:         Mood and Affect:  Mood normal.         Behavior: Behavior normal.       Significant Labs:   All pertinent labs from the last 24 hours have been reviewed.    Diagnostic Results:  I have reviewed all pertinent imaging results/findings within the past 24 hours.

## 2022-10-10 NOTE — PLAN OF CARE
PT is awake and oriented but slept throughout the day- nodding off at times. PMHx of gastric adenocarcinoma with osseous, hepatic and peritoneal metastases complicated by recurrent abdominal distension and pain 2/2 recurrent painful, tense ascites  Multiple family members present throughout the day.  Pleurex cathter in place, drained 250cc renuka ascites and dressing changed. Foam dressing intact to sacrum.  Pt worked with speech - advanced diet although pt does not have much apetite and c/o pain in the mouth- Dukes's solution given as scheduled.  Eats ice and soft fruit such as peaches and pears through out the day Purewick in place. Voided once today.  Senna started as last BM 10/7.  Pt has morphine and oxycodone ordered but has refrained.  Instead asking for bentyl for her stomach- ordered.  Long meeting today with family, Oncology team and palliative team- pt deemed too weak to make it to [facility]. Would like to see her stronger.  Working with PT/OT and facility transfer orders in.  Goals of care discussed- pt continue as full code and continues to have poor insight into disease process. Repositioned many times throughout the shift. Legs are edematous and uncomfortable- on pillows now. Requested waffle mattress be taken off bed but wheelchair cushion added to chair. Med changed for hiccups as baclofen not working.   Pt wished to go outside this evening BP 85/58 with MAP of 66 - see note.   Plan of care reviewed with pt when awake and family members at bedside.

## 2022-10-10 NOTE — PLAN OF CARE
[facility]    FACILITY TRANSFER ORDERS      Patient Name: Puja Quigley  YOB: 1980    PCP: Primary Doctor No   PCP Address: None  PCP Phone Number: None  PCP Fax: None    Encounter Date: 10/10/2022    Admit to: skilled nursing facility      Vital Signs:  Routine    Diagnoses:   Active Hospital Problems    Diagnosis  POA    *Malignant ascites [R18.0]  Yes    Encounter for palliative care [Z51.5]  Not Applicable    Mouth sores [K13.79]  No    Psychological factors affecting medical condition [F54]  Yes    Anxiety [F41.9]  Yes    Hyperkalemia [E87.5]  Unknown    Thrombus of R femoral vein [I82.90]  No    Hyponatremia [E87.1]  Unknown    Constipation [K59.00]  Unknown    MARIA (acute kidney injury) [N17.9]  Unknown    Malignant neoplasm of stomach [C16.9]  Yes    Intractable abdominal pain [R10.9]  Yes      Resolved Hospital Problems   No resolved problems to display.       Allergies:Review of patient's allergies indicates:  No Known Allergies    Diet: soft diet and pureed diet thin    Activities: Bed rest with bathroom privileges    Goals of Care Treatment Preferences:  Code Status: Full Code      Nursing: per protocol      Labs: CBC and CMP  per protocol      CONSULTS:    Physical Therapy to evaluate and treat. , Occupational Therapy to evaluate and treat., Speech Therapy to evaluate and treat for Swallowing., and  to evaluate for community resources/long-range planning.    MISCELLANEOUS CARE:  Routine Skin for Bedridden Patients: Apply moisture barrier cream to all skin folds and wet areas in perineal area daily and after baths and all bowel movements.    WOUND CARE ORDERS  None    Medications: Review discharge medications with patient and family and provide education.      Current Discharge Medication List        START taking these medications    Details   furosemide (LASIX) 20 MG tablet TAKE 1 TABLET(20 MG) BY MOUTH EVERY DAY  Qty: 30 tablet, Refills: 2    Associated  Diagnoses: Edema of both lower extremities           CONTINUE these medications which have NOT CHANGED    Details   baclofen (LIORESAL) 5 mg Tab tablet Take 1 tablet (5 mg total) by mouth 3 (three) times daily.  Qty: 30 tablet, Refills: 2    Associated Diagnoses: Palliative care encounter      dexAMETHasone (DECADRON) 4 MG Tab 8 mg (two tabs) twice daily on the day before chemo and the day after chemo  Qty: 32 tablet, Refills: 2    Associated Diagnoses: Malignant neoplasm of stomach, unspecified location      dicyclomine (BENTYL) 10 MG capsule TAKE ONE CAPSULE BY MOUTH FOUR TIMES DAILY  Qty: 120 capsule, Refills: 0    Associated Diagnoses: Abdominal spasms      famotidine (PEPCID) 20 MG tablet Take 1 tablet (20 mg total) by mouth nightly as needed for Heartburn.  Qty: 60 tablet, Refills: 1    Associated Diagnoses: Chronic gastric ulcer with perforation      gabapentin (NEURONTIN) 300 MG capsule Take 1 capsule (300 mg total) by mouth 3 (three) times daily.  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  Qty: 3.5 g, Refills: 0      LIDOcaine HCL 2% (XYLOCAINE) 2 % jelly Apply topically as needed.  Qty: 100 mL, Refills: 0    Associated Diagnoses: Malignant neoplasm of stomach, unspecified location; Cancer related pain      LIDOcaine-prilocaine (EMLA) cream Apply to Port-A-Cath area 30 to 45 minutes prior to port access as directed (topical anesthetic use to the anterior chest only).  Qty: 30 g, Refills: 2    Associated Diagnoses: Malignant neoplasm of stomach, unspecified location      methadone (DOLOPHINE) 5 mg/5 mL solution Take 2.5 mLs (2.5 mg total) by mouth every morning AND 5 mLs (5 mg total) every evening.  Qty: 225 mL, Refills: 0    Comments: Quantity prescribed more than 7 day supply? Yes, quantity medically necessary. Please provide patient with a syringe as well. Thank you  Associated  Diagnoses: Malignant neoplasm of stomach, unspecified location; Cancer related pain      metoclopramide HCl (REGLAN) 5 MG tablet 5 mg.      multivitamin with folic acid 400 mcg Tab Take 1 tablet by mouth once daily.      naloxone (NARCAN) 4 mg/actuation Spry 4mg by nasal route as needed for opioid overdose; may repeat every 2-3 minutes in alternating nostrils until medical help arrives. Call 911  Qty: 1 each, Refills: 2    Associated Diagnoses: Malignant neoplasm of stomach, unspecified location      nortriptyline (PAMELOR) 25 MG capsule Take 1 capsule (25 mg total) by mouth every evening.  Qty: 30 capsule, Refills: 11    Associated Diagnoses: Palliative care encounter      omeprazole (PRILOSEC) 40 MG capsule Take 1 capsule (40 mg total) by mouth 2 (two) times daily before meals.  Qty: 60 capsule, Refills: 11    Associated Diagnoses: Chronic gastric ulcer with perforation      ondansetron (ZOFRAN) 8 MG tablet TAKE 1 TABLET(8 MG) BY MOUTH EVERY 8 HOURS AS NEEDED FOR NAUSEA  Qty: 60 tablet, Refills: 2    Associated Diagnoses: Gastric adenocarcinoma      oxyCODONE (ROXICODONE) 5 MG immediate release tablet Take 1 tablet (5 mg total) by mouth every 4 (four) hours as needed for Pain.  Qty: 90 tablet, Refills: 0    Comments: Quantity prescribed more than 7 day supply? Yes, quantity medically necessary  Associated Diagnoses: Palliative care encounter      polyethylene glycol (GLYCOLAX) 17 gram/dose powder Mix 1 capful (17 g) with liquid and take by mouth once daily for 20 days  Qty: 510 g, Refills: 0    Associated Diagnoses: Palliative care encounter      prochlorperazine (COMPAZINE) 10 MG tablet TAKE 1 TABLET(10 MG) BY MOUTH EVERY 6 HOURS AS NEEDED FOR NAUSEA  Qty: 90 tablet, Refills: 2    Associated Diagnoses: Signet ring cell adenocarcinoma      promethazine (PHENERGAN) 25 MG tablet Take 1 tablet (25 mg total) by mouth every 6 (six) hours as needed for Nausea.  Qty: 60 tablet, Refills: 2    Associated Diagnoses:  Malignant neoplasm of stomach, unspecified location      senna-docusate 8.6-50 mg (SENNA WITH DOCUSATE SODIUM) 8.6-50 mg per tablet Take 1 tablet by mouth 2 (two) times daily as needed for Constipation.      !! sucralfate (CARAFATE) 1 gram tablet TAKE 1 TABLET(1 GRAM) BY MOUTH FOUR TIMES DAILY FOR 14 DAYS  Qty: 56 tablet, Refills: 0      !! sucralfate (CARAFATE) 1 gram tablet       traMADoL (ULTRAM) 50 mg tablet Take 50 mg by mouth.       !! - Potential duplicate medications found. Please discuss with provider.             Immunizations Administered as of 10/10/2022       No immunizations on file.            Unknown    Some patients may experience side effects after vaccination.  These may include fever, headache, muscle or joint aches.  Most symptoms resolve with 24-48 hours and do not require urgent medical evaluation unless they persist for more than 72 hours or symptoms are concerning for an unrelated medical condition.          _________________________________  Gardenia Tomlinson MD  10/10/2022

## 2022-10-10 NOTE — PT/OT/SLP EVAL
Physical Therapy Evaluation and Treatment    Patient Name:  Puja Quigley   MRN:  [id number]  Admit Date: 9/30/2022  Admitting Diagnosis:  Malignant ascites   Length of Stay: 9 days  Recent Surgery: * No surgery found *      Recommendations:     Discharge Recommendations:  nursing facility, skilled   Discharge Equipment Recommendations:  (TBD at next level (RW recommended if she does not go to SNF))   Barriers to discharge: Evolving Clinical Presentation    Assessment:     Puja Quigley is a 42 y.o. female admitted with a medical diagnosis of Malignant ascites.  She presents with the following impairments/functional limitations: weakness, impaired functional mobility, impaired endurance, gait instability, impaired balance, impaired self care skills, impaired sensation, decreased lower extremity function, decreased ROM, edema, pain, decreased safety awareness.     Pt agreeable to therapy, eager to change position due to increased pain in buttocks sitting down. Significant time taken discussing best practices with patient and family, explaining need for therapy and OOB activity to facilitate recovery. Pt sits EOB from supine with mod A, stands from EOB with mod A to RW, ambulates to bedside chair with min A and RW, mod A to sit back into recliner as patient became fatigued and sat very quickly. Treatment tolerated fair, continue to progress mobility and increase endurance as able.    Rehab Prognosis: Fair; patient would benefit from acute skilled PT services to address these deficits and reach maximum level of function.      Highest level of mobility achieved this visit: few steps to chair with min A and RW    Activity with RN/PCT: transfer to/from bedside chair    Plan:     During this hospitalization, patient to be seen 4 x/week to address the identified rehab impairments via gait training, therapeutic exercises, therapeutic activities, neuromuscular re-education and progress towards the established  "goals.    Plan of Care Expires:  11/09/22    Subjective     RN Sharon notified prior to session. Pt's friend and brother present upon PT entrance into room.    Chief Complaint: pain in buttocks  Patient/Family Comments/goals: "I want to be able to get into the car to get to [facility]."  Pain/Comfort:  Pain Rating 1: 5/10  Location 1: coccyx  Pain Addressed 1: Reposition, Distraction    Social History:  Residence: lives with their family 1-story house with 0 WANDER  Support available: family  Equipment Used: none  Equipment Owned (not using): None  Prior level of function: independent  Work: Disability.   Drive: yes.       Objective:     Additional staff present: none    Patient found HOB elevated with: telemetry, [place]    General Precautions: Standard, Cardiac fall   Orthopedic Precautions:N/A   Braces: N/A   Body mass index is 20.86 kg/m².  Oxygen Device: Room Air      Vitals: BP 99/65 (BP Location: Right arm, Patient Position: Lying)   Pulse 110   Temp 98.4 °F (36.9 °C) (Oral)   Resp 16   Ht 5' 7" (1.702 m)   Wt 60.4 kg (133 lb 2.5 oz)   LMP  (LMP Unknown)   SpO2 96%   Breastfeeding No   BMI 20.86 kg/m²     Exams:  Cognition:   Mildly lethargic and Cooperative  Command following: Follows one-step verbal commands  Fluency: clear/fluent  Hearing: Intact  Vision:  Intact  Skin Integrity: Visible skin intact    Physical Exam:   Edema - Pitting 2+ VILMA LEs  ROM - Stiff but full range of LEs  Strength - R hip 2/5, L hip 3/5, knees and ankles both 4+/5   Sensation - Intact to light touch except altered sensation to R lateral thigh that is sometimes painful  Coordination - No deficits noted    Outcome Measures:    AM-PAC 6 CLICK MOBILITY  Turning over in bed (including adjusting bedclothes, sheets and blankets)?: 2  Sitting down on and standing up from a chair with arms (e.g., wheelchair, bedside commode, etc.): 2  Moving from lying on back to sitting on the side of the bed?: 2  Moving to and from a bed to a " chair (including a wheelchair)?: 2  Need to walk in hospital room?: 3  Climbing 3-5 steps with a railing?: 1  Basic Mobility Total Score: 12     Functional Mobility:    Bed Mobility:   Rolling/Turning to Right: minimum assistance  Supine to Sit: moderate assistance; from L side of bed  Scooting anteriorly to EOB to have both feet planted on floor: minimum assistance  Pt left in bedside chair    Sitting Balance at Edge of Bed:  Assistance Level Required: Supervision  Time: 10 min  Postural deviations noted: rounded shoulders    Transfers:   Sit <> Stand Transfer: moderate assistance with rolling walker  Stand <> Sit Transfer: moderate assistance with rolling walker  [id number] from EOB  Bed <> Chair Transfer: Step Transfer technique with minimum assistance with rolling walker  Chair on patient's L    Standing Balance:  Assistance Level Required: Minimal Assistance  Patient used: rolling walker  Time: ~15 sec x 2  Postural deviations noted: no deviations noted  Encouraged: hip extension, upright stance  Comments: very poor endurance, sits very quickly once she becomes tired      Gait:   Patient ambulated: 5ft   Patient required: minimal assist  Patient used:  rolling walker  Gait Pattern observed: swing-to gait  Gait Deviation(s): occasional unsteady gait, decreased step length, decreased weight shift, and decreased zachariah  Impairments due to: impaired balance, decreased strength, decreased endurance, and impaired postural control  Gait belt utilized    Education:  Time provided for education, counseling and discussion of health disposition in regards to patient's current status  All questions answered within PT scope of practice and to patient's satisfaction  PT role in POC to address current functional deficits  Pt educated on proper body mechanics, safety techniques, and energy conservation with PT facilitation and cueing throughout session    Patient left up in chair with call button in reach and RN Sharon  notified.    GOALS:   Multidisciplinary Problems       Physical Therapy Goals          Problem: Physical Therapy    Goal Priority Disciplines Outcome Goal Variances Interventions   Physical Therapy Goal     PT, PT/OT Ongoing, Progressing     Description: Goals to met by 10/24/2022    1. Supine to sit with Modified [place]  2. Sit to supine with Modified [place]  3. Rolling to Left and Right with Modified [place].  4. Sit to stand transfer with Stand-by Assistance  5. Bed to chair transfer with Contact Guard Assistance using LRAD  6. Gait  x 50 feet with Contact Guard Assistance using LRAD   7. Stand for 7 minutes with Contact Guard Assistance using LRAD  8. Lower extremity exercise program x15 reps per Instruction, with assistance as needed in order to facilitate improved strength, improved postural control, and improvement in functional independence                         History:     Past Medical History:   Diagnosis Date    Anxiety     Dehydration 5/30/2022    Gastric cancer     Gastric ulcer     Hx of psychiatric care     Pregnancy 08/12/2020    delivered on 8/12/2020    Umbilical hernia        Past Surgical History:   Procedure Laterality Date    CLOSURE OF PERFORATED ULCER OF DUODENUM USING OMENTAL PATCH      ESOPHAGOGASTRODUODENOSCOPY N/A 10/13/2020    Procedure: EGD (ESOPHAGOGASTRODUODENOSCOPY);  Surgeon: Rosio Marion MD;  Location: [address]);  Service: Endoscopy;  Laterality: N/A;    ESOPHAGOGASTRODUODENOSCOPY N/A 12/11/2020    Procedure: EGD (ESOPHAGOGASTRODUODENOSCOPY);  Surgeon: Rosio Marion MD;  Location: [address]);  Service: Endoscopy;  Laterality: N/A;  Covid-19 test 12/8/20 at [facility]    ESOPHAGOGASTRODUODENOSCOPY N/A 3/12/2021    Procedure: EGD (ESOPHAGOGASTRODUODENOSCOPY);  Surgeon: Nav Omer MD;  Location: [address]);  Service: Endoscopy;  Laterality: N/A;  COVID at [facility] 3/9 [facility]    ESOPHAGOGASTRODUODENOSCOPY N/A  5/18/2021    Procedure: EGD (ESOPHAGOGASTRODUODENOSCOPY);  Surgeon: Rosio Marion MD;  [facility]  Service: Endoscopy;  Laterality: N/A;  5/15-covid pcw-inst portal-tb    ESOPHAGOGASTRODUODENOSCOPY N/A 5/26/2021    Procedure: EGD (ESOPHAGOGASTRODUODENOSCOPY);  Surgeon: Socrates Terrazas MD;  [facility]  Service: Endoscopy;  Laterality: N/A;       Time Tracking:     PT Received On: 10/10/22  PT Start Time: 0945     PT Stop Time: 1028  PT Total Time (min): 43 min   Billable Minutes: Evaluation 1 procedure, Gait Training 10 min, and Therapeutic Activity 15 min    Kenan Steve, PT, DPT  10/10/2022

## 2022-10-10 NOTE — PLAN OF CARE
Advance Care Planning   [address] - Transplant Stepdown  Palliative Care       Patient Name: Puja Quigley  MRN: [id number]  Admission Date: 9/30/2022  Hospital Length of Stay: 9 days  Code Status: Full Code   Attending Provider: Nya Galvez MD  Palliative Care Provider: Dr. Macias  Primary Care Physician: Primary Doctor No  Principal Problem:Malignant ascites     Spoke with [facility] Care Provider Dr. Macias about patient. Agreed to make visit to room to continue to provide support.    Pt working with therapy when SW arrived. Able to speak with PT who stated that pt is stand by minimal assist but fatigues easily. Per Physical Therapy pt would been from SNF/Rehab type for continued strength building.    Present during Oncology team rounds with [facility] Care Provider Dr. Macias. Pt's brother Kelvin, friend Ashley and later friend Mansi present. Oncology MD explained to pt concern that she is not strong enough to travel to [facility] at this time. Pt clearly able to acknowledge that [facility] and pt wanting to see if [facility] will be able to offer her any additional treatments. MD explained that due to pt's weakness she does not believe pt would be a candidate for any treatment even at [facility]. Pt expressed being interested in communication with [facility] to determine this, even if she is unable to transport to [place]. Dr. Galvez agreed to ask Dr. Reyez to make contact with [facility] for follow up.    After Oncology rounded, allowed pt to express feelings of meeting. Encouraged pt to continue advocating for her wishes and having her questions answered. Pt appears to find comfort in knowing and understanding as much as she can. Utilized active listening techniques to help pt feel heard.     During visit, pt appeared fatigued from both PT and conversations with team. Pt's family asked questions about supplemental nutrition to help pt gain strength and energy.  Pt stated that she does not want a feeding tube of any sort but is open with family asking questions.     Support provided to pt and family.  Family may benefit from resources for pt's 2 year old son Korey when ready.    Will follow up.    Michelle Salinas, LCSW, ACHP-SW

## 2022-10-10 NOTE — SUBJECTIVE & OBJECTIVE
Interval History: 24 hour chart reviewed. Met with Ms. Quigley along with her brother Kelvin, friend Kelby and later on Mansi with palliative . Most bothersome symptom continues to be oral discomfort which improves with Rodriguez's solution. Long supportive discussion held at bedside. Please see assessment/recommendations for details.    Medications:  Continuous Infusions:  Scheduled Meds:   apixaban  10 mg Oral BID    [START ON 10/13/2022] apixaban  5 mg Oral BID    chlorproMAZINE  10 mg Oral TID    duke's soln (benadryl 30 mL, mylanta 30 mL, LIDOcaine 30 mL, nystatin 30 mL) 120 mL  10 mL Oral QID    gabapentin  300 mg Oral TID    mupirocin   Topical (Top) Daily    pantoprazole  40 mg Oral Daily    senna-docusate 8.6-50 mg  1 tablet Oral QHS     PRN Meds:acetaminophen, aluminum-magnesium hydroxide-simethicone, baclofen, dextrose 10%, dextrose 10%, glucagon (human recombinant), glucose, glucose, magnesium hydroxide 400 mg/5 ml, melatonin, morphine, naloxone, ondansetron, oxyCODONE, promethazine (PHENERGAN) IVPB, simethicone    Objective:     Vital Signs (Most Recent):  Temp: 98.4 °F (36.9 °C) (10/10/22 0845)  Pulse: 110 (10/10/22 0845)  Resp: 16 (10/10/22 0521)  BP: 99/65 (10/10/22 0845)  SpO2: 96 % (10/10/22 0845) Vital Signs (24h Range):  Temp:  [96.4 °F (35.8 °C)-98.4 °F (36.9 °C)] 98.4 °F (36.9 °C)  Pulse:  [102-114] 110  Resp:  [15-18] 16  SpO2:  [96 %-100 %] 96 %  BP: ()/(54-68) 99/65     Weight: 60.4 kg (133 lb 2.5 oz)  Body mass index is 20.86 kg/m².    Physical Exam  Constitutional:       Appearance: She is ill-appearing.   HENT:      Head: Normocephalic.      Comments: Bilateral temporal wasting     Right Ear: External ear normal.      Left Ear: External ear normal.      Nose: Nose normal.      Mouth/Throat:      Mouth: Mucous membranes are dry.   Eyes:      General: No scleral icterus.     Extraocular Movements: Extraocular movements intact.   Cardiovascular:      Rate and Rhythm: Normal  rate.   Pulmonary:      Effort: Pulmonary effort is normal.   Abdominal:      General: Bowel sounds are normal. There is distension.   Musculoskeletal:      Cervical back: Normal range of motion.      Right lower leg: Edema present.      Left lower leg: Edema present.   Skin:     General: Skin is warm and dry.   Neurological:      Mental Status: She is alert and oriented to person, place, and time.      Motor: Weakness present.   Psychiatric:         Mood and Affect: Mood normal.         Behavior: Behavior normal.       Review of Symptoms      Symptom Assessment (ESAS 0-10 Scale)  Pain:  0  Dyspnea:  0  Anxiety:  0  Nausea:  0  Depression:  0  Anorexia:  0  Fatigue:  0  Insomnia:  0  Restlessness:  0  Agitation:  0         Significant Labs: CBC:   Recent Labs   Lab 10/09/22  0608 10/10/22  0601   WBC 8.55 7.33   HGB 9.0* 9.1*   HCT 30.1* 30.0*    203     CMP:   Recent Labs   Lab 10/09/22  0608 10/09/22  1417 10/10/22  0601   * 122* 123*   K 4.6 4.7 4.5   CL 92* 91* 90*   CO2 18* 17* 21*   GLU 74 84 74   BUN 66* 69* 75*   CREATININE 2.1* 1.9* 1.8*   CALCIUM 8.4* 8.3* 8.2*   PROT 4.6*  --  4.6*   ALBUMIN 1.3*  --  1.3*   BILITOT 0.4  --  0.4   ALKPHOS 112  --  128   AST 18  --  17   ALT 12  --  12   ANIONGAP 12 14 12     CBC:   Recent Labs   Lab 10/10/22  0601   WBC 7.33   HGB 9.1*   HCT 30.0*   MCV 76*        BMP:  Recent Labs   Lab 10/10/22  0601   GLU 74   *   K 4.5   CL 90*   CO2 21*   BUN 75*   CREATININE 1.8*   CALCIUM 8.2*   MG 2.8*     LFT:  Lab Results   Component Value Date    AST 17 10/10/2022    ALKPHOS 128 10/10/2022    BILITOT 0.4 10/10/2022     Albumin:   Albumin   Date Value Ref Range Status   10/10/2022 1.3 (L) 3.5 - 5.2 g/dL Final     Protein:   Total Protein   Date Value Ref Range Status   10/10/2022 4.6 (L) 6.0 - 8.4 g/dL Final     Lactic acid:   Lab Results   Component Value Date    LACTATE 2.0 07/11/2022    LACTATE 1.3 10/08/2020       Significant Imaging: I have  reviewed all pertinent imaging results/findings within the past 24 hours.

## 2022-10-10 NOTE — PLAN OF CARE
AAO x 4. VSS, afebrile, SpO2>95% on RA. RLQ pleur-x cath. IV morphine for pain control. Fall precautions maintained, repositioned Q2H. See flowsheet for assessment findings.

## 2022-10-11 LAB
ALBUMIN SERPL BCP-MCNC: 1.2 G/DL (ref 3.5–5.2)
ALP SERPL-CCNC: 174 U/L (ref 55–135)
ALT SERPL W/O P-5'-P-CCNC: 11 U/L (ref 10–44)
ANION GAP SERPL CALC-SCNC: 11 MMOL/L (ref 8–16)
AST SERPL-CCNC: 17 U/L (ref 10–40)
BASOPHILS # BLD AUTO: 0.01 K/UL (ref 0–0.2)
BASOPHILS NFR BLD: 0.2 % (ref 0–1.9)
BILIRUB SERPL-MCNC: 0.5 MG/DL (ref 0.1–1)
BUN SERPL-MCNC: 76 MG/DL (ref 6–20)
CALCIUM SERPL-MCNC: 8.3 MG/DL (ref 8.7–10.5)
CHLORIDE SERPL-SCNC: 92 MMOL/L (ref 95–110)
CO2 SERPL-SCNC: 20 MMOL/L (ref 23–29)
CREAT SERPL-MCNC: 1.5 MG/DL (ref 0.5–1.4)
DIFFERENTIAL METHOD: ABNORMAL
EOSINOPHIL # BLD AUTO: 0 K/UL (ref 0–0.5)
EOSINOPHIL NFR BLD: 0.3 % (ref 0–8)
ERYTHROCYTE [DISTWIDTH] IN BLOOD BY AUTOMATED COUNT: 19.9 % (ref 11.5–14.5)
EST. GFR  (NO RACE VARIABLE): 44.9 ML/MIN/1.73 M^2
GLUCOSE SERPL-MCNC: 75 MG/DL (ref 70–110)
HCT VFR BLD AUTO: 25.4 % (ref 37–48.5)
HGB BLD-MCNC: 7.9 G/DL (ref 12–16)
IMM GRANULOCYTES # BLD AUTO: 0.04 K/UL (ref 0–0.04)
IMM GRANULOCYTES NFR BLD AUTO: 0.6 % (ref 0–0.5)
LYMPHOCYTES # BLD AUTO: 0.6 K/UL (ref 1–4.8)
LYMPHOCYTES NFR BLD: 8.6 % (ref 18–48)
MAGNESIUM SERPL-MCNC: 2.7 MG/DL (ref 1.6–2.6)
MCH RBC QN AUTO: 22.8 PG (ref 27–31)
MCHC RBC AUTO-ENTMCNC: 31.1 G/DL (ref 32–36)
MCV RBC AUTO: 73 FL (ref 82–98)
MONOCYTES # BLD AUTO: 0.6 K/UL (ref 0.3–1)
MONOCYTES NFR BLD: 8.6 % (ref 4–15)
NEUTROPHILS # BLD AUTO: 5.4 K/UL (ref 1.8–7.7)
NEUTROPHILS NFR BLD: 81.7 % (ref 38–73)
NRBC BLD-RTO: 0 /100 WBC
PHOSPHATE SERPL-MCNC: 4 MG/DL (ref 2.7–4.5)
PLATELET # BLD AUTO: 199 K/UL (ref 150–450)
PMV BLD AUTO: 9.4 FL (ref 9.2–12.9)
POTASSIUM SERPL-SCNC: 4.4 MMOL/L (ref 3.5–5.1)
PROT SERPL-MCNC: 4.5 G/DL (ref 6–8.4)
RBC # BLD AUTO: 3.47 M/UL (ref 4–5.4)
SODIUM SERPL-SCNC: 123 MMOL/L (ref 136–145)
WBC # BLD AUTO: 6.65 K/UL (ref 3.9–12.7)

## 2022-10-11 PROCEDURE — 25000003 PHARM REV CODE 250: Performed by: STUDENT IN AN ORGANIZED HEALTH CARE EDUCATION/TRAINING PROGRAM

## 2022-10-11 PROCEDURE — 84100 ASSAY OF PHOSPHORUS: CPT | Performed by: HOSPITALIST

## 2022-10-11 PROCEDURE — 99233 PR SUBSEQUENT HOSPITAL CARE,LEVL III: ICD-10-PCS | Mod: ,,, | Performed by: STUDENT IN AN ORGANIZED HEALTH CARE EDUCATION/TRAINING PROGRAM

## 2022-10-11 PROCEDURE — 63600175 PHARM REV CODE 636 W HCPCS: Performed by: INTERNAL MEDICINE

## 2022-10-11 PROCEDURE — S0030 INJECTION, METRONIDAZOLE: HCPCS | Performed by: STUDENT IN AN ORGANIZED HEALTH CARE EDUCATION/TRAINING PROGRAM

## 2022-10-11 PROCEDURE — 80053 COMPREHEN METABOLIC PANEL: CPT | Performed by: HOSPITALIST

## 2022-10-11 PROCEDURE — 97530 THERAPEUTIC ACTIVITIES: CPT

## 2022-10-11 PROCEDURE — 25000003 PHARM REV CODE 250: Performed by: INTERNAL MEDICINE

## 2022-10-11 PROCEDURE — 63600175 PHARM REV CODE 636 W HCPCS: Performed by: STUDENT IN AN ORGANIZED HEALTH CARE EDUCATION/TRAINING PROGRAM

## 2022-10-11 PROCEDURE — 63600175 PHARM REV CODE 636 W HCPCS: Performed by: HOSPITALIST

## 2022-10-11 PROCEDURE — 36415 COLL VENOUS BLD VENIPUNCTURE: CPT | Performed by: STUDENT IN AN ORGANIZED HEALTH CARE EDUCATION/TRAINING PROGRAM

## 2022-10-11 PROCEDURE — 83735 ASSAY OF MAGNESIUM: CPT | Performed by: HOSPITALIST

## 2022-10-11 PROCEDURE — 99233 SBSQ HOSP IP/OBS HIGH 50: CPT | Mod: ,,, | Performed by: STUDENT IN AN ORGANIZED HEALTH CARE EDUCATION/TRAINING PROGRAM

## 2022-10-11 PROCEDURE — 20600001 HC STEP DOWN PRIVATE ROOM

## 2022-10-11 PROCEDURE — 36415 COLL VENOUS BLD VENIPUNCTURE: CPT | Performed by: HOSPITALIST

## 2022-10-11 PROCEDURE — 85025 COMPLETE CBC W/AUTO DIFF WBC: CPT

## 2022-10-11 PROCEDURE — 94761 N-INVAS EAR/PLS OXIMETRY MLT: CPT

## 2022-10-11 PROCEDURE — 25000003 PHARM REV CODE 250

## 2022-10-11 PROCEDURE — 87040 BLOOD CULTURE FOR BACTERIA: CPT | Performed by: STUDENT IN AN ORGANIZED HEALTH CARE EDUCATION/TRAINING PROGRAM

## 2022-10-11 RX ORDER — MIDODRINE HYDROCHLORIDE 2.5 MG/1
2.5 TABLET ORAL 3 TIMES DAILY
Status: DISCONTINUED | OUTPATIENT
Start: 2022-10-11 | End: 2022-10-11

## 2022-10-11 RX ORDER — METRONIDAZOLE 500 MG/100ML
500 INJECTION, SOLUTION INTRAVENOUS
Status: DISCONTINUED | OUTPATIENT
Start: 2022-10-11 | End: 2022-10-13

## 2022-10-11 RX ORDER — GLYCERIN 1 G/1
1 SUPPOSITORY RECTAL ONCE
Status: COMPLETED | OUTPATIENT
Start: 2022-10-11 | End: 2022-10-11

## 2022-10-11 RX ORDER — GABAPENTIN 100 MG/1
100 CAPSULE ORAL 3 TIMES DAILY
Status: DISCONTINUED | OUTPATIENT
Start: 2022-10-11 | End: 2022-10-12

## 2022-10-11 RX ORDER — CEFEPIME HYDROCHLORIDE 1 G/50ML
2 INJECTION, SOLUTION INTRAVENOUS
Status: DISCONTINUED | OUTPATIENT
Start: 2022-10-11 | End: 2022-10-13

## 2022-10-11 RX ORDER — MIDODRINE HYDROCHLORIDE 5 MG/1
10 TABLET ORAL 3 TIMES DAILY
Status: DISCONTINUED | OUTPATIENT
Start: 2022-10-11 | End: 2022-10-15 | Stop reason: HOSPADM

## 2022-10-11 RX ORDER — MIDODRINE HYDROCHLORIDE 5 MG/1
5 TABLET ORAL 3 TIMES DAILY
Status: DISCONTINUED | OUTPATIENT
Start: 2022-10-11 | End: 2022-10-11

## 2022-10-11 RX ADMIN — ALUMINUM HYDROXIDE, MAGNESIUM HYDROXIDE, AND DIMETHICONE 10 ML: 400; 400; 40 SUSPENSION ORAL at 08:10

## 2022-10-11 RX ADMIN — CEFEPIME HYDROCHLORIDE 2 G: 2 INJECTION, SOLUTION INTRAVENOUS at 07:10

## 2022-10-11 RX ADMIN — SODIUM CHLORIDE, SODIUM LACTATE, POTASSIUM CHLORIDE, AND CALCIUM CHLORIDE 1000 ML: .6; .31; .03; .02 INJECTION, SOLUTION INTRAVENOUS at 08:10

## 2022-10-11 RX ADMIN — SODIUM CHLORIDE, SODIUM LACTATE, POTASSIUM CHLORIDE, AND CALCIUM CHLORIDE 1000 ML: .6; .31; .03; .02 INJECTION, SOLUTION INTRAVENOUS at 06:10

## 2022-10-11 RX ADMIN — DICYCLOMINE HYDROCHLORIDE 10 MG: 10 CAPSULE ORAL at 08:10

## 2022-10-11 RX ADMIN — MUPIROCIN: 20 OINTMENT TOPICAL at 08:10

## 2022-10-11 RX ADMIN — DICYCLOMINE HYDROCHLORIDE 10 MG: 10 CAPSULE ORAL at 05:10

## 2022-10-11 RX ADMIN — ALUMINUM HYDROXIDE, MAGNESIUM HYDROXIDE, AND DIMETHICONE 10 ML: 400; 400; 40 SUSPENSION ORAL at 12:10

## 2022-10-11 RX ADMIN — GABAPENTIN 100 MG: 100 CAPSULE ORAL at 08:10

## 2022-10-11 RX ADMIN — METRONIDAZOLE 500 MG: 500 INJECTION, SOLUTION INTRAVENOUS at 08:10

## 2022-10-11 RX ADMIN — ALUMINUM HYDROXIDE, MAGNESIUM HYDROXIDE, AND DIMETHICONE 10 ML: 400; 400; 40 SUSPENSION ORAL at 05:10

## 2022-10-11 RX ADMIN — APIXABAN 10 MG: 5 TABLET, FILM COATED ORAL at 08:10

## 2022-10-11 RX ADMIN — GABAPENTIN 300 MG: 300 CAPSULE ORAL at 08:10

## 2022-10-11 RX ADMIN — MIDODRINE HYDROCHLORIDE 5 MG: 5 TABLET ORAL at 08:10

## 2022-10-11 RX ADMIN — CHLORPROMAZINE HYDROCHLORIDE 10 MG: 10 TABLET, FILM COATED ORAL at 02:10

## 2022-10-11 RX ADMIN — VANCOMYCIN HYDROCHLORIDE 1250 MG: 1.25 INJECTION, POWDER, LYOPHILIZED, FOR SOLUTION INTRAVENOUS at 10:10

## 2022-10-11 RX ADMIN — GABAPENTIN 100 MG: 100 CAPSULE ORAL at 02:10

## 2022-10-11 RX ADMIN — DICYCLOMINE HYDROCHLORIDE 10 MG: 10 CAPSULE ORAL at 12:10

## 2022-10-11 RX ADMIN — GLYCERIN 1 SUPPOSITORY: 2 SUPPOSITORY RECTAL at 12:10

## 2022-10-11 RX ADMIN — CHLORPROMAZINE HYDROCHLORIDE 10 MG: 10 TABLET, FILM COATED ORAL at 08:10

## 2022-10-11 RX ADMIN — PANTOPRAZOLE SODIUM 40 MG: 40 TABLET, DELAYED RELEASE ORAL at 08:10

## 2022-10-11 RX ADMIN — OXYCODONE 5 MG: 5 TABLET ORAL at 07:10

## 2022-10-11 RX ADMIN — MIDODRINE HYDROCHLORIDE 10 MG: 5 TABLET ORAL at 06:10

## 2022-10-11 RX ADMIN — MIDODRINE HYDROCHLORIDE 5 MG: 5 TABLET ORAL at 02:10

## 2022-10-11 RX ADMIN — MORPHINE SULFATE 4 MG: 2 INJECTION, SOLUTION INTRAMUSCULAR; INTRAVENOUS at 12:10

## 2022-10-11 NOTE — PLAN OF CARE
Pt admitted 9/30 with ascites/abdominal pain. Significant history of gastric adenocarcinoma with malignant ascites. Pt was receiving tutu x2 weekly before admission. Pleur-X catheter placed to RLQ on 10/5; draining daily as pt can tolerate. Pt is AAOx4, does take some time to become fully alert when coming out of sleeping state. VSS on bedside monitor. BP noted to be soft this am, 80s/50s; midodrine started and 1L LR bolus given with improvement to 100s/60s. Pt on RA. HR tachy 90-110s. Pt anorexic-appearing; abdomen distended. Mild edema in BLE. Pt receiving bentyl for abdominal cramps. Duke's solution for sore throat/mouth pain. Chloraseptic spray started today. Glycerin suppository given, pt had decent-sized BM shortly afterwards. Pt is 1-person max assist with standing as she is very weak, remains free from falls/injuries so far this shift. Nonskid socks on, call bell within reach, bed locked and in lowest position. Pt verbalized understanding to call for any needs/assistance. Several family members visited throughout the shift; they were able to take her outside in a wheelchair for a few minutes today.

## 2022-10-11 NOTE — ASSESSMENT & PLAN NOTE
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 Hospital course is notable for development of MARIA and significant deconditioning, now unable to independently transfer/ambulate. Stepped up to the MICU on 10/11 for acute encephalopathy for concern of opioid overdose, although she only received one dose of oxycodone 5 mg prior to step up (total 24 hour MME was 7.5). Palliative and Supportive Care was consulted for symptom management recommendations.    Goals of Care:  - Code status: full code  - Initiated discussion of assigning a health care power of  and she is choosing between Jean Claude (sister) and her significant other Neno. Her hesitation is that she does not want them to carry this burden by themselves. Encouraged her to discuss with them, and to let them know that [facility], whoever is listed first can engage with any other loved ones to collectively make medical decisions on her behalf.  - Visited by medium on 10/12 who encouraged her to "let go" and encouraged loved ones that when she passes, she will be reborn again into their family. She provided encouragement and permission for family to take care of themselves in order to help Ms. Quigley feel at peace with letting go and go to the spiritual world.    Pain in Mouth  Xerostomia  - Continue Duke's solution  - Agree with treatment of thrush  - Consider addition of Biotene in between Duke's solution administration  - Advised spacing out oral care and avoid aggressive wiping with towel    Chronic Neoplasm Pain  - Located in abdomen related to gastric cancer and peritoneal/pelvic metastasis  - S/p paracentesis with significant relief  - PleurX catheter in place and functional  - " Opioids discontinued for acute encephalopathy; on Narcan infusion in the MICU    At Risk for Opioid Induced Constipation  Slow Transit Constipation  - Continue senna 8.6 mg PO qHS to maintain bowel regimen; titrate to effect

## 2022-10-11 NOTE — CARE UPDATE
RAPID RESPONSE NURSE ROUND       Rounding completed with charge RN, Hannah for MEWS score. CN reports no concerns at this time. Instructed to call [phone] for further concerns or assistance.

## 2022-10-11 NOTE — PT/OT/SLP PROGRESS
"Occupational Therapy   Treatment    Name: Puja Quigley  MRN: [id number]  Admitting Diagnosis:  Malignant ascites       Recommendations:     Discharge Recommendations: SNF    Assessment:     Puja Quigley is a 42 y.o. female with a medical diagnosis of Malignant ascites.  Performance deficits affecting function are weakness, impaired endurance, impaired self care skills, impaired functional mobility, gait instability, impaired balance, decreased upper extremity function, decreased lower extremity function.   Pt tolerated session fairly well. Pt with impaired endurance for all functional activity.   OT updated d/c recs to include t/f to SNF to allow for increased strengthen/endurance   Rehab Prognosis:  Fair; patient would benefit from acute skilled OT services to address these deficits and reach maximum level of function.       Plan:     Patient to be seen 3 x/week to address the above listed problems via self-care/home management, therapeutic activities, therapeutic exercises  Plan of Care Expires: 11/05/22  Plan of Care Reviewed with: patient    Subjective   Pt agreeable to therapy.   "I don't have a lot of energy." Pt states.     Pain/Comfort:    Pt reports pain in feet (especially toes) due to swelling. Pt unable to rate pain. Distraction and repositioning provided.     Objective:     Communicated with: nsg prior to session.  Patient found seated EOB with friends present.   Pt with Purewick and heplock in place.     General Precautions: Standard, fall     Occupational Performance:       Functional Mobility/Transfers:  First standing trial was not successful  Second standing trial with MAX A. Pt achieved full stand, but then leaning head on her friend's shoulder. Pt then stating she needed to sit due to fatigue. Pt tolerated full upright stand approx 15 second and partial stand additional 15 seconds.     Activities of Daily Living:  Feeding; est-up  G/H: pt engaging in oral care upon therapy arrival " with set-up  LE dressing: TOTAL A     [facility] 6 Click ADL: 13    Treatment & Education:  Pt found seated EOB with friend present. Pt demo Fair to Fair+ sitting balance. Pt very talkative but able to respond appropriately to redirection to task at hand. Pt also closing eyes often stating this was due to low energy level.   2 standing attempts completed. Education provided re: UE/LE AROM/AAROM exs to perform EOB and supine.   Education provided re: importance of often changing positions. Pt with increased fluid noted in LE's with LE's in dependent position EOB. Education provided re: elevated LE's in chair, returning supine and/or performing LE AROM to assist with edema bee't.   Pt declined t/f to chair stating assist arriving shortly for bed bath.   Pt's goals to t/f to w/c later this date with nsg to allow for family to take her outside.     Education provided re: OT POC and safety with functional mobility/ADl skills.       Patient left sitting edge of bed with all lines intact, call button in reach, and nsg notified    GOALS:   Multidisciplinary Problems       Occupational Therapy Goals          Problem: Occupational Therapy    Goal Priority Disciplines Outcome Interventions   Occupational Therapy Goal     OT, PT/OT Ongoing, Progressing    Description: Goals to be met by: 10/13/22    Patient will increase functional independence with ADLs by performing:    LE Dressing with Set-up Assistance.  Grooming while standing at sink with Modified [place].  Toileting from toilet with Set-up Assistance for hygiene and clothing management.   Supine to sit with Modified [place].  Toilet transfer to toilet with Modified [place].                         Time Tracking:     OT Date of Treatment: 10/11/22  OT Start Time: 1001  OT Stop Time: 1027  OT Total Time (min): 26 min    Billable Minutes:Therapeutic Activity 26    OT/JAREK: OT          10/11/2022

## 2022-10-11 NOTE — SUBJECTIVE & OBJECTIVE
Interval History:   No acute issues overnight. Patient's BP was low again to 86/58 and she was given 1L bolus and started on midodrine. This morning, patient appeared drowsy but had just woken up. Upon reassessment, she was alert and responsive. Continues to have discomfort in her mouth and swallowing, but improved with Dukes. Hiccups improving with thorazine. No BM since 10/7. Labs: improving creat to 1.5, persistent hyponatremia of 123, Hb 7.9 from 9.     Oncology Treatment Plan:   OP DOCETAXEL (75 MG/M2) Q3W    Medications:  Continuous Infusions:      Scheduled Meds:   apixaban  10 mg Oral BID    [START ON 10/13/2022] apixaban  5 mg Oral BID    chlorproMAZINE  10 mg Oral TID    dicyclomine  10 mg Oral QID    duke's soln (benadryl 30 mL, mylanta 30 mL, LIDOcaine 30 mL, nystatin 30 mL) 120 mL  10 mL Oral QID    gabapentin  100 mg Oral TID    midodrine  5 mg Oral TID    mupirocin   Topical (Top) Daily    pantoprazole  40 mg Oral Daily    senna-docusate 8.6-50 mg  1 tablet Oral QHS     PRN Meds:acetaminophen, aluminum-magnesium hydroxide-simethicone, dextrose 10%, dextrose 10%, glucagon (human recombinant), glucose, glucose, magnesium hydroxide 400 mg/5 ml, melatonin, morphine, naloxone, ondansetron, oxyCODONE, promethazine (PHENERGAN) IVPB, simethicone     Review of Systems   Constitutional:  Positive for appetite change. Negative for chills, diaphoresis, fatigue and fever.   HENT:  Positive for trouble swallowing. Negative for postnasal drip and sore throat.    Eyes:  Negative for visual disturbance.   Respiratory:  Negative for cough and shortness of breath.    Cardiovascular:  Positive for leg swelling. Negative for chest pain and palpitations.   Gastrointestinal:  Positive for abdominal distention, abdominal pain and constipation. Negative for nausea and vomiting.   Genitourinary:  Negative for dysuria.   Musculoskeletal:  Negative for back pain and myalgias.   Skin:  Negative for rash.   Neurological:  Negative  for dizziness, syncope, weakness and headaches.   Hematological:  Does not bruise/bleed easily.   Psychiatric/Behavioral:  Negative for confusion and decreased concentration.    Objective:     Vital Signs (Most Recent):  Temp: 97.4 °F (36.3 °C) (10/11/22 1156)  Pulse: 97 (10/11/22 1156)  Resp: 17 (10/11/22 0826)  BP: 106/68 (10/11/22 1156)  SpO2: 100 % (10/11/22 1156) Vital Signs (24h Range):  Temp:  [97.4 °F (36.3 °C)-98.3 °F (36.8 °C)] 97.4 °F (36.3 °C)  Pulse:  [] 97  Resp:  [17-18] 17  SpO2:  [93 %-100 %] 100 %  BP: ()/(52-68) 106/68     Weight: 60.4 kg (133 lb 2.5 oz)  Body mass index is 20.86 kg/m².  Body surface area is 1.69 meters squared.      Intake/Output Summary (Last 24 hours) at 10/11/2022 1325  Last data filed at 10/11/2022 0511  Gross per 24 hour   Intake 1499 ml   Output 600 ml   Net 899 ml         Physical Exam  Vitals and nursing note reviewed.   Constitutional:       General: She is not in acute distress.     Appearance: Normal appearance.   HENT:      Head: Normocephalic and atraumatic.      Mouth/Throat:      Mouth: Mucous membranes are moist.   Eyes:      General: No scleral icterus.     Extraocular Movements: Extraocular movements intact.      Conjunctiva/sclera: Conjunctivae normal.   Cardiovascular:      Rate and Rhythm: Regular rhythm. Tachycardia present.      Pulses: Normal pulses.      Heart sounds: Normal heart sounds. No murmur heard.    No friction rub. No gallop.   Pulmonary:      Effort: Pulmonary effort is normal. No respiratory distress.      Breath sounds: Normal breath sounds.   Abdominal:      General: Bowel sounds are normal. There is distension.      Tenderness: There is abdominal tenderness.      Comments: Pleur-X catheter in place with no erythema or purulence   Musculoskeletal:      Right lower leg: Edema present.      Left lower leg: Edema present.   Neurological:      General: No focal deficit present.      Mental Status: She is alert and oriented to person,  place, and time.   Psychiatric:         Mood and Affect: Mood normal.         Behavior: Behavior normal.       Significant Labs:   All pertinent labs from the last 24 hours have been reviewed.    Diagnostic Results:  I have reviewed all pertinent imaging results/findings within the past 24 hours.

## 2022-10-11 NOTE — SUBJECTIVE & OBJECTIVE
Interval History: Visited Ms. Quigley this morning with Dr. Brink, her outpatient palliative physician. Sleeping this morning on initial encounter, wakes up gradually and asks to be turned because she feels like her left side of her body is asleep and for ice chips for dry mouth. Resupplied ice, water, ice chips and spongettes. Niece and friend at bedside providing company and support. Said that she slept through the night. Supportive listening provided at bedside. She was agreeable with revisit later when she is more awake.    Medications:  Continuous Infusions:  Scheduled Meds:   apixaban  10 mg Oral BID    [START ON 10/13/2022] apixaban  5 mg Oral BID    chlorproMAZINE  10 mg Oral TID    dicyclomine  10 mg Oral QID    duke's soln (benadryl 30 mL, mylanta 30 mL, LIDOcaine 30 mL, nystatin 30 mL) 120 mL  10 mL Oral QID    gabapentin  300 mg Oral TID    mupirocin   Topical (Top) Daily    pantoprazole  40 mg Oral Daily    senna-docusate 8.6-50 mg  1 tablet Oral QHS     PRN Meds:acetaminophen, aluminum-magnesium hydroxide-simethicone, baclofen, dextrose 10%, dextrose 10%, glucagon (human recombinant), glucose, glucose, magnesium hydroxide 400 mg/5 ml, melatonin, morphine, naloxone, ondansetron, oxyCODONE, promethazine (PHENERGAN) IVPB, simethicone    Objective:     Vital Signs (Most Recent):  Temp: 97.8 °F (36.6 °C) (10/11/22 0447)  Pulse: (!) 113 (10/11/22 0447)  Resp: 18 (10/11/22 0447)  BP: 96/62 (10/11/22 0447)  SpO2: 95 % (10/11/22 0447)   Vital Signs (24h Range):  Temp:  [97.8 °F (36.6 °C)-98.4 °F (36.9 °C)] 97.8 °F (36.6 °C)  Pulse:  [104-117] 113  Resp:  [18] 18  SpO2:  [95 %-100 %] 95 %  BP: (92-99)/(52-65) 96/62     Weight: 60.4 kg (133 lb 2.5 oz)  Body mass index is 20.86 kg/m².    Physical Exam  Constitutional:       Appearance: She is ill-appearing.      Comments: drowsy   HENT:      Head: Normocephalic.      Comments: Bilateral temporal wasting     Right Ear: External ear normal.      Left Ear: External  ear normal.      Nose: Nose normal.      Mouth/Throat:      Mouth: Mucous membranes are dry.   Eyes:      General: No scleral icterus.     Extraocular Movements: Extraocular movements intact.   Cardiovascular:      Rate and Rhythm: Normal rate.   Pulmonary:      Effort: Pulmonary effort is normal.   Abdominal:      General: Bowel sounds are normal. There is distension.   Musculoskeletal:      Cervical back: Normal range of motion.      Right lower leg: Edema present.      Left lower leg: Edema present.   Skin:     General: Skin is dry.   Neurological:      Mental Status: She is oriented to person, place, and time.      Motor: Weakness present.   Psychiatric:         Behavior: Behavior normal.       Review of Symptoms      Symptom Assessment (ESAS 0-10 Scale)  Pain:  0  Dyspnea:  0  Anxiety:  0  Nausea:  0  Depression:  0  Anorexia:  0  Fatigue:  0  Insomnia:  0  Restlessness:  0  Agitation:  0         Pain Assessment:  OME in 24 hours:  24  Location(s):            Significant Labs: CBC:   Recent Labs   Lab 10/10/22  0601 10/11/22  0639   WBC 7.33 6.65   HGB 9.1* 7.9*   HCT 30.0* 25.4*    199     CMP:   Recent Labs   Lab 10/09/22  1417 10/10/22  0601 10/11/22  0639   * 123* 123*   K 4.7 4.5 4.4   CL 91* 90* 92*   CO2 17* 21* 20*   GLU 84 74 75   BUN 69* 75* 76*   CREATININE 1.9* 1.8* 1.5*   CALCIUM 8.3* 8.2* 8.3*   PROT  --  4.6* 4.5*   ALBUMIN  --  1.3* 1.2*   BILITOT  --  0.4 0.5   ALKPHOS  --  128 174*   AST  --  17 17   ALT  --  12 11   ANIONGAP 14 12 11     CBC:   Recent Labs   Lab 10/11/22  0639   WBC 6.65   HGB 7.9*   HCT 25.4*   MCV 73*        BMP:  Recent Labs   Lab 10/11/22  0639   GLU 75   *   K 4.4   CL 92*   CO2 20*   BUN 76*   CREATININE 1.5*   CALCIUM 8.3*   MG 2.7*     LFT:  Lab Results   Component Value Date    AST 17 10/11/2022    ALKPHOS 174 (H) 10/11/2022    BILITOT 0.5 10/11/2022     Albumin:   Albumin   Date Value Ref Range Status   10/11/2022 1.2 (L) 3.5 - 5.2 g/dL  Final     Protein:   Total Protein   Date Value Ref Range Status   10/11/2022 4.5 (L) 6.0 - 8.4 g/dL Final     Lactic acid:   Lab Results   Component Value Date    LACTATE 2.0 07/11/2022    LACTATE 1.3 10/08/2020       Significant Imaging: I have reviewed all pertinent imaging results/findings within the past 24 hours.

## 2022-10-11 NOTE — NURSING
Team notified of pt's BP 86/58 this will Boucher DO acknowledged. Orders received for midodrine and LR bolus. Pt asymptomatic.     0950: Team notified of post-bolus BP of 92/66. Pt currently sitting up on side of bed, family member at bedside.

## 2022-10-11 NOTE — NURSING
1805: Pt's BP noted to be soft, 90/56. Pt lethargic. Team paged.     1826: BP 85/62. STAT paged team. [facility] notified of pt's status. They stated they would come see her after shift change. Will give 1L LR bolus and increase midodrine. JOSE J Shirley MD to come to bedside and assess pt.     1839: JOSE J Shirley MD at bedside. 8pm dose of midodrine given early. LR bolus infusing. R subclavian port accessed. BP currently 83/61.

## 2022-10-11 NOTE — PROGRESS NOTES
SALENA obtained order and sent admissions packet to Xenia Corley at [facility] via [facility].     SW will continue to follow up.    SW provided patient list of in network SNF's. SW also explained the difference between rehab and SNF.    Patient feels she can do three hours of therapy right now, SW explained that PT/OT will continually reassess and make their recommendations.    Med/Onc team rounding upon SW arrival.

## 2022-10-11 NOTE — PROGRESS NOTES
"[facility] - [facility] Step[facility]  Palliative Medicine  Progress Note    Patient Name: Puja Quigley  MRN: [id number]  Admission Date: 9/30/2022  Hospital Length of Stay: 10 days  Code Status: Full Code   Attending Provider: Nya Galvez MD  Consulting Provider: Hannah Macias MD  Primary Care Physician: Primary Doctor No  Principal Problem:Malignant ascites    Patient information was obtained from patient and caregiver / friend.      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 Hospital course is notable for development of MARIA and significant deconditioning, now unable to independently transfer/ambulate. Palliative and Supportive Care was consulted for symptom management recommendations.    Goals of Care:  - Code status: full code  - Expresses understanding that her body is too weak to handle the toxic effects of cancer-directed therapy. Remains hopeful for re-evaluation for cancer treatment and asks, "In the future can we see if I can get chemotherapy if I get stronger?" At this time has expectations that chemotherapy may be a possibility in the future and therefore goals of care remain unchanged.  - Friends and brother are asking if supplemental nutrition through "feeding tube" or "IV" is needed and feel strongly that the reason why she is weak is because she is not getting enough nutrition or IV hydration. Validated concern. Ms. Quigley shares that she does not want a feeding tube and because she can swallow just fine, she wants to continue nutrition by mouth. Discussed limitations and risks associated with IV hydration. Friends continue to push Ms. Quigley to reconsider it.  - Will continue to provide support while in the " hospital  - Dr. Brink (outpatient palliative physician) visited with Ms. Quigley this morning, provided emotional support with validation of frustration and disappointments    Pain in Mouth  Xerostomia  - Discussed treatments to stimulate saliva production such as lemon drops, but she says that these interventions are too painful for her to tolerate  - Continue Rodriguez's solution  - Agree with treatment of thrush  - Consider addition of Biotene in between Duke's solution administration  - Advised spacing out oral care and avoid aggressive wiping with towel  - Continue opioid therapy    Chronic Neoplasm Pain  - Located in abdomen related to gastric cancer and peritoneal/pelvic metastasis  - S/p paracentesis with significant relief  - Continue home oxycodone 5 mg PO q4h PRN pain  - Discontinue morphine 4 mg IV q4h PRN breakthrough pain; thankfully kidney function is recovering (Cr 1.5 this morning), but decreased morphine metabolite clearance may be contributing to mental status  - Replace with hydromorphone 0.5 mg IV q3h PRN breakthrough pain  - Dose adjust home gabapentin for renal function  - Rediscussed if resuming home methadone will be helpful especially since it causes dry mouth. Discussed with Dr. Brink and talked to Ms. Quigley about alternative long-acting opioid (Oxycontin). She is agreeable with whatever recommendations, but again feels that Rodriguez's solution is the most helpful at this time and IV morphine.  - In last 24 hours, utilized 24 MME. Not sure that she would need long-acting at this time especially as she feels fatigued and drowsy during our encounter (albeit after a session with physical therapy and long discussion with Oncology team). Will continue to reassess pain and see when it would be an appropriate time to reintroduce long-acting medicine for pain    At Risk for Opioid Induced Constipation  Slow Transit Constipation  - Continue senna 8.6 mg PO qHS to maintain bowel regimen; titrate to  "effect      I will follow-up with patient. Please contact us if you have any additional questions.    ADDENDUM:  Met with Ms. Quigley along with Dr. Brink and SALENA Salinas again this afternoon. Met her significant other Neno and her sister Jean Claude at her bedside. Friends excused themselves from the room during this encounter. Ms. Quigley fell asleep during this encounter. Dr. Brink shared with loved ones that in addition to the inpatient Oncology team working with her outpatient Oncologist, Dr. Brink has also inquired if it is possible for [facility] to offer a virtual visit as it is not possible to get Ms. Quigley to [employer]. Sister reiterated Ms. Quigley's wishes that she wants to explore every treatment option available and they are supportive of this. They are very disturbed by her sudden decline that happened during this hospitalization. Jean Claude states that their hope and plan A is to make it to [facility] eventually to be assessed for disease-modifying treatments. Prepared the sister and signifiacnt other than in addition to plan A, we should prepare in our hearts a possible plan B (which is not making it to [facility], continuing local management) or plan C, transitioning to comfort. Jean Claude says they will only think about plan A, but expresses gratefulness for preparing them for "plan B and plan C." Shared with family that the inpatient Oncology team has been working very hard to support Ms. Quigley in her goal, including re-engaging PT/OT to reassess her weakness and build strength, working on symptom management, etc. Juan Cletitia asks that if the Oncology team has serious news or updates to share, to please share with Jean Claude, brother Kelvin or significant other Neno. During this stay, other family members/friends have been listening during rounds but are sharing these details (and perhaps not accurately) to other loved ones, which has caused more confusion about what is actually happening. Provided " "emotional support; SW gave family supportive activity for her two-year-old son as they cope with her serious illness. Will continue to follow along to provide emotional support for Ms. Quigley and her loved ones.    Subjective:     Chief Complaint:   Chief Complaint   Patient presents with    Ascites     Endorses fluid in abd and lower extremities for the past few days, reports she needs a paracentesis, states she gets them almost weekly. +SOB. 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 Hospital course is notable for development of MARIA and significant deconditioning, now unable to independently transfer/ambulate. Palliative and Supportive Care was consulted for symptom management recommendations.    Met Ms. Quigley with her friend Ilda and niece at her bedside. Ms. Quigley shares that her most bothersome symptom is her mouth pain/irritation. She had developed a white film across her tongue and she uses a towel to scrape off the film and to keep her mouth clean. There is associated burning pain that worsens with eating acidic food like pineapple, johann, etc. She has associated dry mouth, which she associates with her home methadone. Of note, she has been off of her home methadone since a week prior to her birthday trip to [place] due to xerostomia. She feels that the oral pain is now making its way down to her throat, and it has been painful to eat. She has modified her diet to rely on smoothies/purees, but most of these smoothies have caused a burning sensation across her tongue and further exacerbates her pain. She also has chronic pain across her lower " "abdomen, but feels that it is better after the paracentesis. She shares that she is a fighter and will be seeking care at [facility] for cancer-directed treatments. She has an appointment this Wednesday, 10/12/22. She expresses concern that she has become so weak that she requires assistance to transfer/ambulate. When she was first admitted, she was able to independently walk, but now is so weak that she is unable to do so.    She shares that when the doctors told her that palliative was going to come to assist with symptom management, she thought to herself, "Am I dying?" I asked her to tell me more about this, and she tells me that she wonders if she is dying because she has rapidly weakened. She then reiterates that she is a fighter and that she is not willing to discuss hospice with me. She also shares that [facility] set up a palliative care clinic visit in their facility, but she likes Dr. Brink (her current outpatient palliative provider) and would rather maintain her care and symptom management with Dr. Brink instead.       Hospital Course:  No notes on file    No new subjective & objective note has been filed under this hospital service since the last note was generated.  I spent 35 minutes in the care of this patient, >50% in chart review, face to face discussion of goals of care, symptom assessment, counseling, coordination of care and emotional support.      Hannah Macias MD  Palliative Medicine  [facility]              "

## 2022-10-11 NOTE — PROGRESS NOTES
[facility] Stepdown  Hematology/Oncology  Progress Note    Patient Name: Puja Quigley  Admission Date: 9/30/2022  Hospital Length of Stay: 10 days  Code Status: Full Code     Subjective:     HPI:  Mrs. Quigley is a 42 F with PMHx of gastric adenocarcinoma with osseous, hepatic and peritoneal metastases complicated by recurrent abdominal distension and pain 2/2 recurrent painful, tense ascites requiring frequent decompression for symptom relief. presenting to the ED with the chief complaint of abdominal pain. Reports worsening abdominal swelling and BLE swelling over the past week. Reports undergoing paracentesis 1-2 times per week, but missed her sessions this week as she went out of town for her birthday. Reports her abdominal swelling has not been this significant in the past. Additionally reports clear productive cough. Feels shortness of breath is due to her abdominal distention. Denies fever, chest pain, vomiting, urinary or bowel movement changes.           Patient information was obtained from patient, past medical records, and ER records.      Oncology History:          Malignant neoplasm of stomach   6/10/2021 Initial Diagnosis     Gastric adenocarcinoma      7/26/2021 - 4/6/2022 Chemotherapy     Treatment Summary   Plan Name: OP FOLFOX 6 Q2W  Treatment Goal: Palliative  Status: Inactive  Start Date: 7/26/2021  End Date: 4/6/2022  Provider: Fer Ashraf MD  Chemotherapy: fluorouraciL 2,400 mg/m2 = 3,770 mg in sodium chloride 0.9% 100 mL chemo infusion, 2,400 mg/m2 = 3,770 mg, Intravenous, Over 46 hours, 19 of 20 cycles  Administration: 3,770 mg (7/26/2021), 3,770 mg (8/9/2021), 3,770 mg (8/23/2021), 3,770 mg (9/7/2021), 3,770 mg (9/21/2021), 3,770 mg (10/5/2021), 3,770 mg (10/19/2021), 3,770 mg (11/2/2021), 3,770 mg (11/15/2021), 3,770 mg (11/30/2021), 3,770 mg (12/13/2021), 4,000 mg (12/27/2021), 4,030 mg (1/10/2022), 4,030 mg (1/24/2022), 4,000 mg (2/7/2022), 4,000 mg (2/22/2022),  4,000 mg (3/7/2022), 4,000 mg (3/22/2022), 4,000 mg (4/4/2022)  oxaliplatin (ELOXATIN) 85 mg/m2 = 133 mg in dextrose 5 % 500 mL chemo infusion, 85 mg/m2 = 133 mg, Intravenous, Clinic/HOD 1 time, 9 of 9 cycles  Administration: 133 mg (7/26/2021), 133 mg (8/9/2021), 133 mg (8/23/2021), 133 mg (9/7/2021), 133 mg (9/21/2021), 133 mg (10/5/2021), 133 mg (10/19/2021), 133 mg (11/2/2021), 133 mg (11/15/2021)         5/26/2022 - 7/27/2022 Chemotherapy     Treatment Summary   Plan Name: OP FOLFIRI Q2WK  Treatment Goal: Palliative  Status: Inactive  Start Date: 5/26/2022  End Date: 7/27/2022  Provider: Fer Ashraf MD  Chemotherapy: dexAMETHasone (DECADRON) 4 MG Tab, 8 mg, Oral, Daily, 1 of 1 cycle, Start date: --, End date: --  irinotecan (CAMPTOSAR) 120 mg/m2 = 200 mg in sodium chloride 0.9% 500 mL chemo infusion, 120 mg/m2 = 200 mg (100 % of original dose 120 mg/m2), Intravenous, Clinic/HOD 1 time, 5 of 24 cycles  Dose modification: 125 mg/m2 (original dose 120 mg/m2, Cycle 1), 120 mg/m2 (original dose 120 mg/m2, Cycle 1), 75 mg/m2 (original dose 120 mg/m2, Cycle 2, Reason: Dose not tolerated)  Administration: 200 mg (5/26/2022), 126 mg (6/13/2022), 126 mg (6/27/2022), 134 mg (7/12/2022), 134 mg (7/25/2022)  ramucirumab (CYRAMZA) 471 mg in sodium chloride 0.9% 250 mL chemo infusion, 8 mg/kg = 471 mg, Intravenous, Clinic/HOD 1 time, 1 of 1 cycle  Administration: 471 mg (5/26/2022)         8/16/2022 -  Chemotherapy     Treatment Summary   Plan Name: OP DOCETAXEL (75 MG/M2) Q3W  Treatment Goal: Palliative  Status: Active  Start Date: 8/16/2022  End Date: 11/8/2022 (Planned)  Provider: Fer Ashraf MD  Chemotherapy: DOCEtaxeL (TAXOTERE) 35 mg/m2 = 65 mg in sodium chloride 0.9% 253.25 mL chemo infusion, 35 mg/m2 = 65 mg (46.7 % of original dose 75 mg/m2), Intravenous, Clinic/HOD 1 time, 2 of 7 cycles  Dose modification: 35 mg/m2 (original dose 75 mg/m2, Cycle 1, Reason: MD Discretion, Comment: per [facility]  recommendations)  Administration: 65 mg (8/16/2022), 65 mg (8/30/2022)       Interval History:   No acute issues overnight. Patient's BP was low again to 86/58 and she was given 1L bolus and started on midodrine. This morning, patient appeared drowsy but had just woken up. Upon reassessment, she was alert and responsive. Continues to have discomfort in her mouth and swallowing, but improved with Dukes. Hiccups improving with thorazine. No BM since 10/7. Labs: improving creat to 1.5, persistent hyponatremia of 123, Hb 7.9 from 9.     Oncology Treatment Plan:   OP DOCETAXEL (75 MG/M2) Q3W    Medications:  Continuous Infusions:      Scheduled Meds:   apixaban  10 mg Oral BID    [START ON 10/13/2022] apixaban  5 mg Oral BID    chlorproMAZINE  10 mg Oral TID    dicyclomine  10 mg Oral QID    duke's soln (benadryl 30 mL, mylanta 30 mL, LIDOcaine 30 mL, nystatin 30 mL) 120 mL  10 mL Oral QID    gabapentin  100 mg Oral TID    midodrine  5 mg Oral TID    mupirocin   Topical (Top) Daily    pantoprazole  40 mg Oral Daily    senna-docusate 8.6-50 mg  1 tablet Oral QHS     PRN Meds:acetaminophen, aluminum-magnesium hydroxide-simethicone, dextrose 10%, dextrose 10%, glucagon (human recombinant), glucose, glucose, magnesium hydroxide 400 mg/5 ml, melatonin, morphine, naloxone, ondansetron, oxyCODONE, promethazine (PHENERGAN) IVPB, simethicone     Review of Systems   Constitutional:  Positive for appetite change. Negative for chills, diaphoresis, fatigue and fever.   HENT:  Positive for trouble swallowing. Negative for postnasal drip and sore throat.    Eyes:  Negative for visual disturbance.   Respiratory:  Negative for cough and shortness of breath.    Cardiovascular:  Positive for leg swelling. Negative for chest pain and palpitations.   Gastrointestinal:  Positive for abdominal distention, abdominal pain and constipation. Negative for nausea and vomiting.   Genitourinary:  Negative for dysuria.   Musculoskeletal:   Negative for back pain and myalgias.   Skin:  Negative for rash.   Neurological:  Negative for dizziness, syncope, weakness and headaches.   Hematological:  Does not bruise/bleed easily.   Psychiatric/Behavioral:  Negative for confusion and decreased concentration.    Objective:     Vital Signs (Most Recent):  Temp: 97.4 °F (36.3 °C) (10/11/22 1156)  Pulse: 97 (10/11/22 1156)  Resp: 17 (10/11/22 0826)  BP: 106/68 (10/11/22 1156)  SpO2: 100 % (10/11/22 1156) Vital Signs (24h Range):  Temp:  [97.4 °F (36.3 °C)-98.3 °F (36.8 °C)] 97.4 °F (36.3 °C)  Pulse:  [] 97  Resp:  [17-18] 17  SpO2:  [93 %-100 %] 100 %  BP: ()/(52-68) 106/68     Weight: 60.4 kg (133 lb 2.5 oz)  Body mass index is 20.86 kg/m².  Body surface area is 1.69 meters squared.      Intake/Output Summary (Last 24 hours) at 10/11/2022 1325  Last data filed at 10/11/2022 0511  Gross per 24 hour   Intake 1499 ml   Output 600 ml   Net 899 ml         Physical Exam  Vitals and nursing note reviewed.   Constitutional:       General: She is not in acute distress.     Appearance: Normal appearance.   HENT:      Head: Normocephalic and atraumatic.      Mouth/Throat:      Mouth: Mucous membranes are moist.   Eyes:      General: No scleral icterus.     Extraocular Movements: Extraocular movements intact.      Conjunctiva/sclera: Conjunctivae normal.   Cardiovascular:      Rate and Rhythm: Regular rhythm. Tachycardia present.      Pulses: Normal pulses.      Heart sounds: Normal heart sounds. No murmur heard.    No friction rub. No gallop.   Pulmonary:      Effort: Pulmonary effort is normal. No respiratory distress.      Breath sounds: Normal breath sounds.   Abdominal:      General: Bowel sounds are normal. There is distension.      Tenderness: There is abdominal tenderness.      Comments: Pleur-X catheter in place with no erythema or purulence   Musculoskeletal:      Right lower leg: Edema present.      Left lower leg: Edema present.   Neurological:       General: No focal deficit present.      Mental Status: She is alert and oriented to person, place, and time.   Psychiatric:         Mood and Affect: Mood normal.         Behavior: Behavior normal.       Significant Labs:   All pertinent labs from the last 24 hours have been reviewed.    Diagnostic Results:  I have reviewed all pertinent imaging results/findings within the past 24 hours.    Assessment/Plan:     * Malignant ascites  Pt with known h/o ascites   Initially had peritoneal catheter after diagnosis, output slowed so was removed in October 2021 by IR.   bi-weekly paracentesis for now.  Last paracentesis 9/23 removed 6.7L of fluid from the abdomen.      Paracentesis performed on 10/2/22. 5 L drained, pt tolerated procedure well. 2nd paracentesis performed by IR on 10/3/22, 5.4L drained. Pleur-X catheter was not placed. Plan for pt to have catheter placed at [facility] on 10/6/22. Pt states this was originally planned before having to come to [facility]. Pleur-X catheter placed by IR, 10/5/22, in place, pt tolerated procedure well.     Daily drains with Pleur-X cathter as much as pt can tolerate.     Plan:   - Pain control with oxycodone 5 mg PRN q 4 hours and IV morphine 4 mg PRN q 4 hours  - Antiemetics with Phenergan   - Nephro continuing to follow  - monitor volume status, vitals, and daily labs     Mouth sores  Pt c/o mouth pain during hospital stay     - Rodriguez's solution, swish and swallow 3-4x daily    Microcytic Hypochromic Anemia   Has history of iron deficiency anemia, previously on iron supplements. Hb 7.9 from 9     - follow up on iron panel and daily CBC     Anxiety  Hematology/Oncology Psychology consulted to discuss with pt regarding her poor disease prognosis       Psychological factors affecting medical condition  Hematology/Oncology Psychology consulted to discuss with pt regarding her poor disease prognosis      Thrombus of R femoral vein  US on bilateral lower extremities showed new  nonocclusive thrombus in the mid right femoral vein.     - High intensity heparin drip IV switched to Apixaban 10 mg BID. Transition to 5mg on 10/13 9pm     Hyperkalemia  Patient with potassium of 5.6, that has slowly been increasing since admission from 5.2.   Improving at the moment, 4.5 on 10/10/22     Plan:  - continue to monitor with daily CMPs      Hyponatremia  Patient with hyponatremia thought to be 2/2 to volume overload. Physical exam with lower extremity swelling with pitting edema. Abdomen still with ascites on retroperitoneal ultrasound. Serum OSM of 283, Urine sodium less than 10.  Likely 2/2 to volume overload     Plan:  --CMP daily  - Nephro will give IV fluids for dehydration     Constipation  Pt has requested to stop any oral medications for constipation. She believes they are not working. Last BM on 10/7     - sennakot QHS and one time suppository     MARIA (acute kidney injury)  Baseline Cr 0.8, Cr 3.1 on admission  Hx of CKD: no  Associated with  fatigue, n/v  DDx: hypotension, hypovolemia, obstructive uropathy 2/2 to malignancy   No indications for HD at this time     MARIA has improving after 2 paracentesis and fluids. consulted Nephrology      Plan:   - Trend Cr  - Strict I&Os  - Avoid nephrotoxic agents (NSAIDs, gadolinium, IV radiocontrast)  - Avoid hypotension  - Renally adjust medications   - Nephro following     Malignant neoplasm of stomach  HER-2 negative by FISH.  PD-L1 < 1%.  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  We previously explained the implications of a stage IV diagnosis to her and potential treatment options.  She is now s/p port placement. She sought a second opinion at [facility] for zolbetuximab trial targeting CLDN protein but she did not test positive for this biomarker. We recommended proceeding with SOC FOLFOX, with which the [facility] team agreed. Because of the negative PD-L1 I do not think  the benefit of adding nivolumab outweighs the potential toxicities.        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  She opted not to get genetic testing done at her appt with Ph[facility]. We recommended that she reconsider that decision in light of a very likely germline mutation in SAMUEL which has clinical consequences.       CT CAP after 6 cycles showed improvement in ascites, probable hepatic, thyroid and bone metastases. We dropped oxaliplatin starting with cycle 10 due to grade 1 neuropathy and desire to keep it from worsening.     CT CAP after cycle 10 showed stable disease.  CT scan after cycle 15 continued to display overall stability of disease within the gastric wall, peritoneum, spine and liver. Although increase ascites on imaging and clinically can represent progression of disease, her previous CT suggested overall stability within the liver and gastric wall, so we recommended to continue with treatment time. However, she continued to have increasing abdominal ascites that has been concerning for progression of disease. Hence, she had repeat imaging. On CT performed on 4/14/2022, she appeared to have worsening disease suggestive of progression. She was seen by Dr. Reid at [facility] for f/u and to discuss possible treatment options.  She was also evaluated by Dr. Andree Mueller and Dr. See of Surgical Oncology at [facility].  Ultimately she was not felt to be a good surgical debulking candidate due to her ascites.  If her ascites improves during the course of chemotherapy and her performance status remains stable or improves, they would re-consider her again for palliative oophorectomy.      Was given ramucirumab with cycle 1 but this later was held given [facility] recs for just FOLFIRI. In addition, she didn't tolerate cycle 1 of cyramza well with significant N/V. She was started on second-line FOLFIRI. She  received 5 cycles with FOLFIRI of irinotecan to 75 mg/m2 - has UGT1A1 homozygous mutation indicating poor metabolism.  Continue 5-FU infusion at 2200 mg/m2.     Repeat imaging with CT CAP after [facility] on 8/3/22 after cycle 5 of FOLFIRI demonstrated disease progression.  Dr. Reid recommended third line docetaxel biweekly at 35 mg/m2.      s/p cycle 3 of Docetaxel.       Intractable abdominal pain  Abdominal pain likely 2/2 to compression from ascites   CT A/P (10/1/22) revealed resolution of bowel distension now with large and small intestines normal caliber and compressed by massive ascites. No evidence of bowel obstruction.     Paracentesis performed on 10/2/22. 5 L drained, pt tolerated procedure well. 2nd paracentesis performed by IR on 10/3/22, 5.4L drained. Pleur-X catheter was not placed. Plan for pt to have catheter placed at [facility] on 10/6/22. Pt states this was originally planned before having to come to [facility].     Plan:   - Pain control with oxycodone 5 mg PRN q 4 hours and IV morphine 4 mg PRN q 4 hours  - Antiemetics with Phenergan   - baclofen switched with thorazine for hiccups  - continue home Bentyl     Hypotension   Likely 2/2 poor oral intake and autonomic dysfunction. BP lowest of 86/58     - 1L LR bolus and midodrine. Should be judicious with boluses due to patient's ascites      Neuropathy   Likely 2/2 chemotherapy. Improved on gabapentin     - dose reduced to 100mg TID, due to potential drowsiness effect on ptient     Disposition: Reaching out to Dr. Reid and Dr. Ashraf to arrange possible alternative to inpatient visit as we believe patient is not medically safe for the trip to [place]      Gardenia Tomlinson MD  Hematology/Oncology  [facility] - Transplant Stepdown

## 2022-10-11 NOTE — PLAN OF CARE
Advance Care Planning   [facility] Transplant Stepdown  Palliative Care       Patient Name: Puja Quigley  MRN: [id number]  Admission Date: 9/30/2022  Hospital Length of Stay: 10 days  Code Status: Full Code   Attending Provider: Nya Galvez MD  Palliative Care Provider: Dr. Macias  Outpatient Palliative Care Provider: Dr. Brink  Primary Care Physician: Primary Doctor No  Principal Problem:Malignant ascites     Visit to bedside with Pal Care MDs Dr. Macias and Dr. Brink. Pt able to speak with some difficulty but then fell asleep. Conversation held with pt's Significant other Alvino and sister Jean Claude.    MDs informed family that communication with [facility] is being attempted by Outpatient oncology team to determine what, if any, treatment options would be offered. Sister had questions on what options look like if [facility] doesn't offer additional treatment. Dr. Brink explained that we would want to plan for all options and that Palliative Care could assist with these as they are further explored.     Spoke with SO regarding pt's 2 year old son Korey. SO stated that son is doing well surrounded by family. Provided [employer] art activity for son and explained that we would provide resources to help speak with pt's son if needed. Family open to receiving this information to be kept for later if needed.    Support provided.    Michelle Salinas, ABADW, ACHP-SW

## 2022-10-11 NOTE — ASSESSMENT & PLAN NOTE
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 Hospital course is notable for development of MARIA and significant deconditioning, now unable to independently transfer/ambulate. Palliative and Supportive Care was consulted for symptom management recommendations.    Goals of Care:  - Code status: full code  - Expresses understanding that her body is too weak to handle the toxic effects of cancer-directed therapy. Remains hopeful for re-evaluation for cancer treatment and asks, "In the future can we see if I can get chemotherapy if I get stronger?" At this time has expectations that chemotherapy may be a possibility in the future and therefore goals of care remain unchanged.  - Friends and brother are asking if supplemental nutrition through "feeding tube" or "IV" is needed and feel strongly that the reason why she is weak is because she is not getting enough nutrition or IV hydration. Validated concern. Ms. Quigley shares that she does not want a feeding tube and because she can swallow just fine, she wants to continue nutrition by mouth. Discussed limitations and risks associated with IV hydration. Friends continue to push Ms. Quigley to reconsider it.  - Will continue to provide support while in the hospital    Pain in Mouth  Xerostomia  - Discussed treatments to stimulate saliva production such as lemon drops, but she says that these interventions are too painful for her to tolerate  - Continue Rodriguez's solution  - Agree with treatment of thrush  - Consider addition of Biotene in between Duke's solution administration  - Advised spacing out oral care and avoid aggressive wiping with towel  - Continue opioid therapy    Chronic Neoplasm Pain  - Located in abdomen related to gastric cancer " and peritoneal/pelvic metastasis  - S/p paracentesis with significant relief  - Continue home oxycodone 5 mg PO q4h PRN pain  - Discontinue morphine 4 mg IV q4h PRN breakthrough pain; thankfully kidney function is recovering (Cr 1.5 this morning), but decreased morphine metabolite clearance may be contributing to mental status  - Replace with hydromorphone 0.5 mg IV q3h PRN breakthrough pain  - Dose adjust home gabapentin for renal function  - Rediscussed if resuming home methadone will be helpful especially since it causes dry mouth. Discussed with Dr. Brink and talked to Ms. Quigley about alternative long-acting opioid (Oxycontin). She is agreeable with whatever recommendations, but again feels that Rodriguez's solution is the most helpful at this time and IV morphine.  - In last 24 hours, utilized 24 MME. Not sure that she would need long-acting at this time especially as she feels fatigued and drowsy during our encounter (albeit after a session with physical therapy and long discussion with Oncology team). Will continue to reassess pain and see when it would be an appropriate time to reintroduce long-acting medicine for pain    At Risk for Opioid Induced Constipation  Slow Transit Constipation  - Continue senna 8.6 mg PO qHS to maintain bowel regimen; titrate to effect

## 2022-10-12 PROBLEM — R57.9 SHOCK, UNSPECIFIED: Status: ACTIVE | Noted: 2022-10-12

## 2022-10-12 PROBLEM — R41.82 AMS (ALTERED MENTAL STATUS): Status: ACTIVE | Noted: 2022-10-12

## 2022-10-12 PROBLEM — G62.9 NEUROPATHY: Status: ACTIVE | Noted: 2022-02-07

## 2022-10-12 LAB
ALBUMIN FLD-MCNC: 1.1 G/DL
ALBUMIN SERPL BCP-MCNC: 1.2 G/DL (ref 3.5–5.2)
ALLENS TEST: ABNORMAL
ALLENS TEST: NORMAL
ALP SERPL-CCNC: 202 U/L (ref 55–135)
ALT SERPL W/O P-5'-P-CCNC: 14 U/L (ref 10–44)
ANION GAP SERPL CALC-SCNC: 8 MMOL/L (ref 8–16)
APPEARANCE FLD: CLEAR
AST SERPL-CCNC: 37 U/L (ref 10–40)
BASOPHILS # BLD AUTO: 0.01 K/UL (ref 0–0.2)
BASOPHILS NFR BLD: 0.1 % (ref 0–1.9)
BILIRUB SERPL-MCNC: 0.4 MG/DL (ref 0.1–1)
BODY FLD TYPE: NORMAL
BODY FLUID SOURCE, LDH: NORMAL
BUN SERPL-MCNC: 79 MG/DL (ref 6–20)
CALCIUM SERPL-MCNC: 7.7 MG/DL (ref 8.7–10.5)
CHLORIDE SERPL-SCNC: 92 MMOL/L (ref 95–110)
CO2 SERPL-SCNC: 21 MMOL/L (ref 23–29)
COLOR FLD: YELLOW
CREAT SERPL-MCNC: 1.4 MG/DL (ref 0.5–1.4)
DELSYS: ABNORMAL
DELSYS: NORMAL
DIFFERENTIAL METHOD: ABNORMAL
EOSINOPHIL # BLD AUTO: 0 K/UL (ref 0–0.5)
EOSINOPHIL NFR BLD: 0.2 % (ref 0–8)
ERYTHROCYTE [DISTWIDTH] IN BLOOD BY AUTOMATED COUNT: 20.3 % (ref 11.5–14.5)
ERYTHROCYTE [SEDIMENTATION RATE] IN BLOOD BY WESTERGREN METHOD: 40 MM/H
ERYTHROCYTE [SEDIMENTATION RATE] IN BLOOD BY WESTERGREN METHOD: 41 MM/H
EST. GFR  (NO RACE VARIABLE): 48.8 ML/MIN/1.73 M^2
FERRITIN SERPL-MCNC: 243 NG/ML (ref 20–300)
FIO2: 21
FIO2: 21
GLUCOSE SERPL-MCNC: 91 MG/DL (ref 70–110)
HCO3 UR-SCNC: 25.4 MMOL/L (ref 24–28)
HCT VFR BLD AUTO: 24.3 % (ref 37–48.5)
HGB BLD-MCNC: 7.6 G/DL (ref 12–16)
IMM GRANULOCYTES # BLD AUTO: 0.04 K/UL (ref 0–0.04)
IMM GRANULOCYTES NFR BLD AUTO: 0.4 % (ref 0–0.5)
IRON SERPL-MCNC: 24 UG/DL (ref 30–160)
LACTATE SERPL-SCNC: 1.3 MMOL/L (ref 0.5–2.2)
LDH FLD L TO P-CCNC: 234 U/L
LDH SERPL L TO P-CCNC: 1.07 MMOL/L (ref 0.5–2.2)
LYMPHOCYTES # BLD AUTO: 0.6 K/UL (ref 1–4.8)
LYMPHOCYTES NFR BLD: 6.8 % (ref 18–48)
LYMPHOCYTES NFR FLD MANUAL: 14 %
MAGNESIUM SERPL-MCNC: 2.6 MG/DL (ref 1.6–2.6)
MCH RBC QN AUTO: 23.1 PG (ref 27–31)
MCHC RBC AUTO-ENTMCNC: 31.3 G/DL (ref 32–36)
MCV RBC AUTO: 74 FL (ref 82–98)
MESOTHL CELL NFR FLD MANUAL: 4 %
MODE: ABNORMAL
MODE: NORMAL
MONOCYTES # BLD AUTO: 0.5 K/UL (ref 0.3–1)
MONOCYTES NFR BLD: 4.8 % (ref 4–15)
MONOS+MACROS NFR FLD MANUAL: 25 %
NEUTROPHILS # BLD AUTO: 8.3 K/UL (ref 1.8–7.7)
NEUTROPHILS NFR BLD: 87.7 % (ref 38–73)
NEUTROPHILS NFR FLD MANUAL: 57 %
NRBC BLD-RTO: 0 /100 WBC
PCO2 BLDA: 31.6 MMHG (ref 35–45)
PH SMN: 7.51 [PH] (ref 7.35–7.45)
PHOSPHATE SERPL-MCNC: 3.8 MG/DL (ref 2.7–4.5)
PLATELET # BLD AUTO: 199 K/UL (ref 150–450)
PMV BLD AUTO: 9.9 FL (ref 9.2–12.9)
PO2 BLDA: 81 MMHG (ref 40–60)
POC BE: 2 MMOL/L
POC SATURATED O2: 97 % (ref 95–100)
POC TCO2: 26 MMOL/L (ref 24–29)
POTASSIUM SERPL-SCNC: 5.5 MMOL/L (ref 3.5–5.1)
PROT FLD-MCNC: 2.4 G/DL
PROT SERPL-MCNC: 4.8 G/DL (ref 6–8.4)
RBC # BLD AUTO: 3.29 M/UL (ref 4–5.4)
SAMPLE: ABNORMAL
SAMPLE: NORMAL
SATURATED IRON: 28 % (ref 20–50)
SITE: ABNORMAL
SITE: NORMAL
SODIUM SERPL-SCNC: 121 MMOL/L (ref 136–145)
SP02: 100
SP02: 100
SPECIMEN SOURCE: NORMAL
SPECIMEN SOURCE: NORMAL
TOTAL IRON BINDING CAPACITY: 86 UG/DL (ref 250–450)
TRANSFERRIN SERPL-MCNC: 58 MG/DL (ref 200–375)
VANCOMYCIN SERPL-MCNC: 18.7 UG/ML
WBC # BLD AUTO: 9.43 K/UL (ref 3.9–12.7)
WBC # FLD: 11 /CU MM

## 2022-10-12 PROCEDURE — 63600175 PHARM REV CODE 636 W HCPCS: Performed by: STUDENT IN AN ORGANIZED HEALTH CARE EDUCATION/TRAINING PROGRAM

## 2022-10-12 PROCEDURE — 84157 ASSAY OF PROTEIN OTHER: CPT | Performed by: NURSE PRACTITIONER

## 2022-10-12 PROCEDURE — 94761 N-INVAS EAR/PLS OXIMETRY MLT: CPT

## 2022-10-12 PROCEDURE — 63600175 PHARM REV CODE 636 W HCPCS

## 2022-10-12 PROCEDURE — 25000003 PHARM REV CODE 250: Performed by: STUDENT IN AN ORGANIZED HEALTH CARE EDUCATION/TRAINING PROGRAM

## 2022-10-12 PROCEDURE — 25000003 PHARM REV CODE 250

## 2022-10-12 PROCEDURE — 82042 OTHER SOURCE ALBUMIN QUAN EA: CPT | Performed by: NURSE PRACTITIONER

## 2022-10-12 PROCEDURE — 83605 ASSAY OF LACTIC ACID: CPT

## 2022-10-12 PROCEDURE — 84100 ASSAY OF PHOSPHORUS: CPT | Performed by: HOSPITALIST

## 2022-10-12 PROCEDURE — 99233 SBSQ HOSP IP/OBS HIGH 50: CPT | Mod: ,,, | Performed by: STUDENT IN AN ORGANIZED HEALTH CARE EDUCATION/TRAINING PROGRAM

## 2022-10-12 PROCEDURE — 89051 BODY FLUID CELL COUNT: CPT | Performed by: NURSE PRACTITIONER

## 2022-10-12 PROCEDURE — 85025 COMPLETE CBC W/AUTO DIFF WBC: CPT

## 2022-10-12 PROCEDURE — 99900035 HC TECH TIME PER 15 MIN (STAT)

## 2022-10-12 PROCEDURE — 82803 BLOOD GASES ANY COMBINATION: CPT

## 2022-10-12 PROCEDURE — 51702 INSERT TEMP BLADDER CATH: CPT

## 2022-10-12 PROCEDURE — 80053 COMPREHEN METABOLIC PANEL: CPT | Performed by: HOSPITALIST

## 2022-10-12 PROCEDURE — 83605 ASSAY OF LACTIC ACID: CPT | Performed by: STUDENT IN AN ORGANIZED HEALTH CARE EDUCATION/TRAINING PROGRAM

## 2022-10-12 PROCEDURE — 99291 PR CRITICAL CARE, E/M 30-74 MINUTES: ICD-10-PCS | Mod: ,,, | Performed by: INTERNAL MEDICINE

## 2022-10-12 PROCEDURE — 25000003 PHARM REV CODE 250: Performed by: INTERNAL MEDICINE

## 2022-10-12 PROCEDURE — 99497 ADVNCD CARE PLAN 30 MIN: CPT | Mod: ,,, | Performed by: STUDENT IN AN ORGANIZED HEALTH CARE EDUCATION/TRAINING PROGRAM

## 2022-10-12 PROCEDURE — 82728 ASSAY OF FERRITIN: CPT | Performed by: INTERNAL MEDICINE

## 2022-10-12 PROCEDURE — 99499 NO LOS: ICD-10-PCS | Mod: ,,, | Performed by: NURSE PRACTITIONER

## 2022-10-12 PROCEDURE — 80202 ASSAY OF VANCOMYCIN: CPT | Performed by: INTERNAL MEDICINE

## 2022-10-12 PROCEDURE — 83735 ASSAY OF MAGNESIUM: CPT | Performed by: HOSPITALIST

## 2022-10-12 PROCEDURE — 83615 LACTATE (LD) (LDH) ENZYME: CPT | Performed by: NURSE PRACTITIONER

## 2022-10-12 PROCEDURE — S0030 INJECTION, METRONIDAZOLE: HCPCS | Performed by: STUDENT IN AN ORGANIZED HEALTH CARE EDUCATION/TRAINING PROGRAM

## 2022-10-12 PROCEDURE — 99291 CRITICAL CARE FIRST HOUR: CPT | Mod: ,,, | Performed by: INTERNAL MEDICINE

## 2022-10-12 PROCEDURE — 99233 PR SUBSEQUENT HOSPITAL CARE,LEVL III: ICD-10-PCS | Mod: ,,, | Performed by: STUDENT IN AN ORGANIZED HEALTH CARE EDUCATION/TRAINING PROGRAM

## 2022-10-12 PROCEDURE — 20000000 HC ICU ROOM

## 2022-10-12 PROCEDURE — 25000003 PHARM REV CODE 250: Performed by: CLINICAL NURSE SPECIALIST

## 2022-10-12 PROCEDURE — 63600175 PHARM REV CODE 636 W HCPCS: Performed by: CLINICAL NURSE SPECIALIST

## 2022-10-12 PROCEDURE — 63600175 PHARM REV CODE 636 W HCPCS: Performed by: INTERNAL MEDICINE

## 2022-10-12 PROCEDURE — 27000221 HC OXYGEN, UP TO 24 HOURS

## 2022-10-12 PROCEDURE — 99497 PR ADVNCD CARE PLAN 30 MIN: ICD-10-PCS | Mod: ,,, | Performed by: STUDENT IN AN ORGANIZED HEALTH CARE EDUCATION/TRAINING PROGRAM

## 2022-10-12 PROCEDURE — 63600175 PHARM REV CODE 636 W HCPCS: Performed by: NURSE PRACTITIONER

## 2022-10-12 PROCEDURE — 99499 UNLISTED E&M SERVICE: CPT | Mod: ,,, | Performed by: NURSE PRACTITIONER

## 2022-10-12 PROCEDURE — 84466 ASSAY OF TRANSFERRIN: CPT | Performed by: INTERNAL MEDICINE

## 2022-10-12 RX ORDER — NOREPINEPHRINE BITARTRATE/D5W 4MG/250ML
0-.2 PLASTIC BAG, INJECTION (ML) INTRAVENOUS CONTINUOUS
Status: DISCONTINUED | OUTPATIENT
Start: 2022-10-12 | End: 2022-10-12

## 2022-10-12 RX ORDER — NALOXONE HCL 0.4 MG/ML
VIAL (ML) INJECTION
Status: COMPLETED
Start: 2022-10-12 | End: 2022-10-12

## 2022-10-12 RX ORDER — SODIUM CHLORIDE 0.9 % (FLUSH) 0.9 %
10 SYRINGE (ML) INJECTION
Status: DISCONTINUED | OUTPATIENT
Start: 2022-10-12 | End: 2022-10-15 | Stop reason: HOSPADM

## 2022-10-12 RX ORDER — NOREPINEPHRINE BITARTRATE/D5W 4MG/250ML
PLASTIC BAG, INJECTION (ML) INTRAVENOUS
Status: DISPENSED
Start: 2022-10-12 | End: 2022-10-12

## 2022-10-12 RX ORDER — PANTOPRAZOLE SODIUM 40 MG/10ML
40 INJECTION, POWDER, LYOPHILIZED, FOR SOLUTION INTRAVENOUS DAILY
Status: DISCONTINUED | OUTPATIENT
Start: 2022-10-13 | End: 2022-10-15 | Stop reason: HOSPADM

## 2022-10-12 RX ORDER — FUROSEMIDE 10 MG/ML
40 INJECTION INTRAMUSCULAR; INTRAVENOUS ONCE
Status: COMPLETED | OUTPATIENT
Start: 2022-10-12 | End: 2022-10-12

## 2022-10-12 RX ADMIN — APIXABAN 10 MG: 5 TABLET, FILM COATED ORAL at 09:10

## 2022-10-12 RX ADMIN — APIXABAN 10 MG: 5 TABLET, FILM COATED ORAL at 08:10

## 2022-10-12 RX ADMIN — ALUMINUM HYDROXIDE, MAGNESIUM HYDROXIDE, AND DIMETHICONE 10 ML: 400; 400; 40 SUSPENSION ORAL at 02:10

## 2022-10-12 RX ADMIN — FUROSEMIDE 40 MG: 10 INJECTION, SOLUTION INTRAMUSCULAR; INTRAVENOUS at 11:10

## 2022-10-12 RX ADMIN — MIDODRINE HYDROCHLORIDE 10 MG: 5 TABLET ORAL at 02:10

## 2022-10-12 RX ADMIN — VANCOMYCIN HYDROCHLORIDE 500 MG: 500 INJECTION, POWDER, LYOPHILIZED, FOR SOLUTION INTRAVENOUS at 03:10

## 2022-10-12 RX ADMIN — DICYCLOMINE HYDROCHLORIDE 10 MG: 10 CAPSULE ORAL at 09:10

## 2022-10-12 RX ADMIN — MIDODRINE HYDROCHLORIDE 10 MG: 5 TABLET ORAL at 09:10

## 2022-10-12 RX ADMIN — NALXONE HYDROCHLORIDE 0.02 MG: 0.4 INJECTION INTRAMUSCULAR; INTRAVENOUS; SUBCUTANEOUS at 06:10

## 2022-10-12 RX ADMIN — NALXONE HYDROCHLORIDE 0.02 MG: 0.4 INJECTION INTRAMUSCULAR; INTRAVENOUS; SUBCUTANEOUS at 01:10

## 2022-10-12 RX ADMIN — ALUMINUM HYDROXIDE, MAGNESIUM HYDROXIDE, AND DIMETHICONE 10 ML: 400; 400; 40 SUSPENSION ORAL at 08:10

## 2022-10-12 RX ADMIN — MIDODRINE HYDROCHLORIDE 10 MG: 5 TABLET ORAL at 10:10

## 2022-10-12 RX ADMIN — METRONIDAZOLE 500 MG: 500 INJECTION, SOLUTION INTRAVENOUS at 11:10

## 2022-10-12 RX ADMIN — SODIUM CHLORIDE, SODIUM LACTATE, POTASSIUM CHLORIDE, AND CALCIUM CHLORIDE 1000 ML: .6; .31; .03; .02 INJECTION, SOLUTION INTRAVENOUS at 01:10

## 2022-10-12 RX ADMIN — PANTOPRAZOLE SODIUM 40 MG: 40 TABLET, DELAYED RELEASE ORAL at 09:10

## 2022-10-12 RX ADMIN — METRONIDAZOLE 500 MG: 500 INJECTION, SOLUTION INTRAVENOUS at 04:10

## 2022-10-12 RX ADMIN — NALXONE HYDROCHLORIDE 0.02 MG: 0.4 INJECTION INTRAMUSCULAR; INTRAVENOUS; SUBCUTANEOUS at 04:10

## 2022-10-12 RX ADMIN — CEFEPIME HYDROCHLORIDE 2 G: 2 INJECTION, SOLUTION INTRAVENOUS at 10:10

## 2022-10-12 RX ADMIN — SENNOSIDES AND DOCUSATE SODIUM 1 TABLET: 50; 8.6 TABLET ORAL at 08:10

## 2022-10-12 RX ADMIN — NALOXONE HYDROCHLORIDE 0.4 MG/HR: 1 INJECTION PARENTERAL at 05:10

## 2022-10-12 RX ADMIN — METRONIDAZOLE 500 MG: 500 INJECTION, SOLUTION INTRAVENOUS at 08:10

## 2022-10-12 RX ADMIN — Medication 1 DOSE: at 03:10

## 2022-10-12 RX ADMIN — Medication 1 DOSE: at 08:10

## 2022-10-12 RX ADMIN — CEFEPIME HYDROCHLORIDE 2 G: 2 INJECTION, SOLUTION INTRAVENOUS at 08:10

## 2022-10-12 RX ADMIN — DICYCLOMINE HYDROCHLORIDE 10 MG: 10 CAPSULE ORAL at 08:10

## 2022-10-12 RX ADMIN — NALOXONE HYDROCHLORIDE 0.2 MG/HR: 0.4 INJECTION, SOLUTION INTRAMUSCULAR; INTRAVENOUS; SUBCUTANEOUS at 07:10

## 2022-10-12 RX ADMIN — DICYCLOMINE HYDROCHLORIDE 10 MG: 10 CAPSULE ORAL at 02:10

## 2022-10-12 NOTE — PROGRESS NOTES
Pharmacokinetic Initial Assessment: IV Vancomycin    Assessment/Plan:    Initiate intravenous vancomycin with loading dose of 1250 mg once with subsequent doses when random concentrations are less than 20 mcg/mL  Desired empiric serum trough concentration is 15 to 20 mcg/mL  Draw vancomycin random level on 10/12/2022 at 1100.    Pharmacy will continue to follow and monitor vancomycin.      Please contact pharmacy at extension [phone] with any questions regarding this assessment.     Thank you for the consult,   Rhonda Gage       Patient brief summary:  Puja Quigley is a 42 y.o. female initiated on antimicrobial therapy with IV Vancomycin for treatment of suspected bacteremia    Drug Allergies:   Review of patient's allergies indicates:  No Known Allergies    Actual Body Weight:   60.4 kg    Renal Function:   Estimated Creatinine Clearance: 47.5 mL/min (A) (based on SCr of 1.5 mg/dL (H)).,     Dialysis Method (if applicable):  N/A    CBC (last 72 hours):  Recent Labs   Lab Result Units 10/09/22  0608 10/10/22  0601 10/11/22  0639   WBC K/uL 8.55 7.33 6.65   Hemoglobin g/dL 9.0* 9.1* 7.9*   Hematocrit % 30.1* 30.0* 25.4*   Platelets K/uL 233 203 199   Gran % % 84.2* 81.1* 81.7*   Lymph % % 7.8* 9.3* 8.6*   Mono % % 7.4 8.5 8.6   Eosinophil % % 0.1 0.5 0.3   Basophil % % 0.1 0.1 0.2   Differential Method  Automated Automated Automated       Metabolic Panel (last 72 hours):  Recent Labs   Lab Result Units 10/09/22  0608 10/09/22  1417 10/10/22  0601 10/11/22  0639   Sodium mmol/L 122* 122* 123* 123*   Potassium mmol/L 4.6 4.7 4.5 4.4   Chloride mmol/L 92* 91* 90* 92*   CO2 mmol/L 18* 17* 21* 20*   Glucose mg/dL 74 84 74 75   BUN mg/dL 66* 69* 75* 76*   Creatinine mg/dL 2.1* 1.9* 1.8* 1.5*   Albumin g/dL 1.3*  --  1.3* 1.2*   Total Bilirubin mg/dL 0.4  --  0.4 0.5   Alkaline Phosphatase U/L 112  --  128 174*   AST U/L 18  --  17 17   ALT U/L 12  --  12 11   Magnesium mg/dL 2.7*  --  2.8* 2.7*   Phosphorus  mg/dL 4.9*  --  4.5 4.0       Drug levels (last 3 results):  No results for input(s): VANCOMYCINRA, VANCORANDOM, VANCOMYCINPE, VANCOPEAK, VANCOMYCINTR, VANCOTROUGH in the last 72 hours.    Microbiologic Results:  Microbiology Results (last 7 days)       Procedure Component Value Units Date/Time    Blood culture [[id number]]     Order Status: Sent Specimen: Blood     Blood culture [[id number]]     Order Status: Sent Specimen: Blood

## 2022-10-12 NOTE — ASSESSMENT & PLAN NOTE
- AMS likely 2/2 opioid overdose and gabapentin use in the setting of decreased renal clearance  - continue narcan gtt  - avoid opioids and other sedating medications  - neuro checks Q4H   - aspiration precautions

## 2022-10-12 NOTE — CODE/ RAPID DOCUMENTATION
"RAPID RESPONSE NURSE AI ALERT       AI alert received.    Chart Reviewed: 10/12/2022, 5:03 AM    MRN: [id number]  Bed: 6088/6080 A    Dx: Shock, unspecified    Puja Quigley has a past medical history of Anxiety, Dehydration, Gastric cancer, Gastric ulcer, psychiatric care, Pregnancy, and Umbilical hernia.    Last VS: BP 96/60   Pulse (!) 121   Temp 97.6 °F (36.4 °C) (Axillary)   Resp (!) 38   Ht 5' 7" (1.702 m)   Wt 63.7 kg (140 lb 6.9 oz)   LMP  (LMP Unknown)   SpO2 98%   Breastfeeding No   BMI 21.99 kg/m²     24H Vital Sign Range:  Temp:  [97.2 °F (36.2 °C)-100.6 °F (38.1 °C)]   Pulse:  []   Resp:  [15-40]   BP: ()/(52-68)   SpO2:  [88 %-100 %]     Level of Consciousness (AVPU): responds to voice    Recent Labs     10/10/22  0601 10/11/22  0639 10/12/22  0134   WBC 7.33 6.65 9.43   HGB 9.1* 7.9* 7.6*   HCT 30.0* 25.4* 24.3*    199 199       Recent Labs     10/10/22  0601 10/11/22  0639 10/12/22  0134   * 123* 121*   K 4.5 4.4 5.5*   CL 90* 92* 92*   CO2 21* 20* 21*   CREATININE 1.8* 1.5* 1.4   GLU 74 75 91   PHOS 4.5 4.0 3.8   MG 2.8* 2.7* 2.6        Recent Labs     10/12/22  0144   PH 7.512*   PCO2 31.6*   PO2 81*   HCO3 25.4   POCSATURATED 97   BE 2        OXYGEN:  Flow (L/min): 2     O2 Device (Oxygen Therapy): nasal cannula    MEWS score: 6    Bedside RN, Tiffanie  contacted. Pt BP dropped 82/51 MAP 61 at 0430 - MD called to bedside, Narcan given x2 again, less responsive following. Critical care called and assessed pt.  Pt Tx to 6089. Instructed to call [phone] for further concerns or assistance.    Harley Shine RN        "

## 2022-10-12 NOTE — CARE UPDATE
6:30 PM: Notified by nursing of pt's hypotension. BP 90/56 and pt is more lethargic. Evaluated the pt at bedside. She exerts nausea, vomiting, sore throat and abdominal pain. Family also at bedside and agreed that she is more lethargic than this AM.   - To give 1 L of LR bolus and increase midodrine to 10 mg TID  - Though concern that this is likely a consequence of her metastatic cancer rather than sepsis given pt has been afebrile and without leukocytosis. However, given persistent hypotension, underlying immunocompromised status and a prolonged hospital stay, will re-culture and initiate broad spectrum abx with Vancomycin, cefepime, and flagyl.   - If MAP continues to drop below <65 mmHg, will notify MICU.   - Family and nursing updated at bedside of plan.     1 AM: notified by nursing of MAP in the 50s. Tempt is also at 100.6, though after recheck pt was afebrile. Arrived with MICU team, pupils were pinpoint and mentation was slow to respond. Pt was given Narcan x2 and another liter of LR bolus. Mentation and BP improved, MAP in the 70s. Will d/c all narcotics and gabapentin at this time. Pt is more conversant. Will obtain KUB and CXR to expand infectious workups.     4:30 AM: Notified by nursing of MAP of 61. Mentation is sluggish again. S/p Narcan x2 without much improvement. MAP in the 50s. MICU notified and pt is to be transferred there.       Mandie Shirley D.O  Internal Medicine PGY-3  [facility]

## 2022-10-12 NOTE — ASSESSMENT & PLAN NOTE
- Cr 3.1 on admission (baseline 0.8); improved w/ IVFs and 2 x paracentesis   - nephrology consulted; appreciate recs  - continue daily CMPs   - strict I&Os  - avoid nephrotoxic medications & renally dose medications  - nephrology following

## 2022-10-12 NOTE — PLAN OF CARE
Pt remains lethargic and more drowsy, with VSS, afebrile, sats upper 90s on RA, HR 110s, BP 90/60s throughout shift  BP dropped 70/50s in shift - see previous note, team and critical care at bedside, narcan x2 - more responsive following, VBG and labs drawn, 1 LR bolus given, CXR and abdominal Xray ordered. Blood cultures sent and pending, Cefepime, Vanc and Flagyl ordered and given   Chief complaint of pain - PRN Oxy given at start of shift - order d/c'd and Gabapentin order d/c'd with decline in mentation and responsiveness  PleurX to R abdomen - drained daily as tolerated  Abdomen remains distended and legs edematous  Nephrology consulted for MARIA - Cr 1.5 - dark concentrated UOP on [facility]  Palliative care following   R FA 22g and R hand 20g  - CDI, saline locked  R subclavian port not accessed  Bedside monitoring in place  Pt up max assist, 1 BM so far this shift  Family remains at bedside and attentive to patient needs  Mechanical soft diet   Pt remains free from falls and injuries, call light in reach, bed in lowest position, nonskid socks on when OOB, side rails up x2  Will continue to monitor

## 2022-10-12 NOTE — PT/OT/SLP DISCHARGE
"Physical Therapy Discharge Summary    Name: Puja Quigley  MRN: [id number]   Principal Problem: Shock, unspecified     Patient Discharged from acute Physical Therapy on 10/12/2022.  Please refer to prior PT noted date on 10/10/2022 for functional status.     Assessment:     Patient transferred to MICU secondary to "Notified by nursing of MAP of 61. Mentation is sluggish again. S/p Narcan x2 without much improvement. MAP in the 50s. MICU notified and pt is to be transferred there."    Objective:     GOALS:   Multidisciplinary Problems       Physical Therapy Goals          Problem: Physical Therapy    Goal Priority Disciplines Outcome Goal Variances Interventions   Physical Therapy Goal     PT, PT/OT Ongoing, Progressing     Description: Goals to met by 10/24/2022    1. Supine to sit with Modified Cades  2. Sit to supine with Modified Cades  3. Rolling to Left and Right with Modified Cades.  4. Sit to stand transfer with Stand-by Assistance  5. Bed to chair transfer with Contact Guard Assistance using LRAD  6. Gait  x 50 feet with Contact Guard Assistance using LRAD   7. Stand for 7 minutes with Contact Guard Assistance using LRAD  8. Lower extremity exercise program x15 reps per Instruction, with assistance as needed in order to facilitate improved strength, improved postural control, and improvement in functional independence                         Reasons for Discontinuation of Therapy Services  Transfer to alternate level of care.      Plan:     Patient Discharged to:  MICU .      10/12/2022    "

## 2022-10-12 NOTE — ASSESSMENT & PLAN NOTE
43 yo F w/ gastric adenocarcinoma w/ metastasis to bone, liver and peritoneum c/b ascites w/ Pleur-X placement 10/6 admitted for intractable abdominal pain in the setting of malignant ascites. Critical care consulted 10/12 for worsening AMS and hypotension. Initially, clinically improved w/ administration of Narcan but again declined a few hours later. Repeat labs notable for WBC 9.43, Hgb 7.6, Na 121, K 5.5 (hemolyzed), Cr 1.4, Alb 1.2, lactate 1.3. vBG: pH 7.5/pCO2 31.6/HCO3 25.4. Patient transferred to MICU for unspecified shock; differentials include opioid overdose vs septic shock vs distributive shock.      Plan:  - Levophed gtt to maintain MAPs >65   - BCx, UA pending  - CXR and KUB pending  - Continue cefepime, vancomycin and metronidazole. Will de-escalate pending culture results.   - Daily CBC, CMP w/ electrolytes   - Narcan gtt

## 2022-10-12 NOTE — PLAN OF CARE
Advance Care Planning   [facility] Cardiac Medical ICU  Palliative Care       Patient Name: Puja Quigley  MRN: [id number]  Admission Date: 9/30/2022  Hospital Length of Stay: 11 days  Code Status: Full Code   Attending Provider: Kinza No MD  Palliative Care Provider: Dr. Macias  Primary Care Physician: Primary Doctor No  Principal Problem:Shock, unspecified     Visit to bedside with [facility] MD Dr. Macias. Pt's sister Jean Claude, niece, and two cousins present. Pt in bed but was not interactive with [facility] team. Appeared comfortable. Sister asking if any update from team regarding MD [place]. Explained not to our knowledge and expressed concern that pt had to be stepped up to ICU. Per family, pt has been alert and interactive with them today. Emotional support provided to family.    Michelle Salinas, ABADW, ACHP-SW

## 2022-10-12 NOTE — PROCEDURES
"Puja Quigley is a 42 y.o. female patient.    Temp: (!) 100.6 °F (38.1 °C) (10/12/22 0111)  Pulse: (!) 123 (10/12/22 0145)  Resp: (!) 37 (10/12/22 0145)  BP: (!) 91/55 (10/12/22 0145)  SpO2: 100 % (10/12/22 0145)  Weight: 60.4 kg (133 lb 2.5 oz) (10/10/22 0400)  Height: 5' 7" (170.2 cm) (10/01/22 0440)       Insert peripheral IV    Date/Time: 10/12/2022 1:51 AM  Performed by: Yesy Mkcenzie NP  Authorized by: Yesy Mckenzie NP   Consent: The procedure was performed in an emergent situation.  Relevant documents: relevant documents present and verified  Test results: test results available and properly labeled  Site marked: the operative site was marked  Imaging studies: imaging studies available  Required items: required blood products, implants, devices, and special equipment available  Patient identity confirmed: hospital-assigned identification number, provided demographic data and arm band  Local anesthesia used: no    Anesthesia:  Local anesthesia used: no    Sedation:  Patient sedated: no    Patient tolerance: patient tolerated the procedure well with no immediate complications  Comments: Nursing staff unable to obtain adequate PIV access.   20g PIV placed in right hand on intial attempt. + blood return, flushes easily.         Yesy Mckenzie NP  Critical Care Medicine    10/12/2022    "

## 2022-10-12 NOTE — ASSESSMENT & PLAN NOTE
- U/S 10/3 w/ new nonocclusive thrombus in the mid femoral vein  - high intensity heparin gtt transitioned to apixaban 10mg BID w/ plans to transition to 5mg on 10/13

## 2022-10-12 NOTE — ASSESSMENT & PLAN NOTE
- Michael's solution QID   Bilateral Helical Rim Advancement Flap Text: The defect edges were debeveled with a #15 blade scalpel.  Given the location of the defect and the proximity to free margins (helical rim) a bilateral helical rim advancement flap was deemed most appropriate.  Using a sterile surgical marker, the appropriate advancement flaps were drawn incorporating the defect and placing the expected incisions between the helical rim and antihelix where possible.  The area thus outlined was incised through and through with a #15 scalpel blade.  With a skin hook and iris scissors, the flaps were gently and sharply undermined and freed up.

## 2022-10-12 NOTE — CONSULTS
Consult received, chart reviewed, and patient evaluated at bedside. Patient to be admitted to MICU. Full H&P to follow.    Janneth Cabrera MD  Medical Resident  [facility]

## 2022-10-12 NOTE — ASSESSMENT & PLAN NOTE
- s/p Pleur-X catheter placement 10/5 by IR  - continue daily drains   - hold any opioids for pain in the setting of AMS

## 2022-10-12 NOTE — SUBJECTIVE & OBJECTIVE
Past Medical History:   Diagnosis Date    Anxiety     Dehydration 2022    Gastric cancer     Gastric ulcer     Hx of psychiatric care     Pregnancy 2020    delivered on 2020    Umbilical hernia        Past Surgical History:   Procedure Laterality Date    CLOSURE OF PERFORATED ULCER OF DUODENUM USING OMENTAL PATCH      ESOPHAGOGASTRODUODENOSCOPY N/A 10/13/2020    Procedure: EGD (ESOPHAGOGASTRODUODENOSCOPY);  Surgeon: Rosio Marion MD;  Location: [facility] (2ND FLR);  Service: Endoscopy;  Laterality: N/A;    ESOPHAGOGASTRODUODENOSCOPY N/A 2020    Procedure: EGD (ESOPHAGOGASTRODUODENOSCOPY);  Surgeon: Rosio Marion MD;  Location: [facility] (2ND FLR);  Service: Endoscopy;  Laterality: N/A;  Covid-19 test 20 at [facility] - pg    ESOPHAGOGASTRODUODENOSCOPY N/A 3/12/2021    Procedure: EGD (ESOPHAGOGASTRODUODENOSCOPY);  Surgeon: Nav Omer MD;  Location: [facility] (2ND FLR);  Service: Endoscopy;  Laterality: N/A;  COVID at [facility] 3/9 ttr    ESOPHAGOGASTRODUODENOSCOPY N/A 2021    Procedure: EGD (ESOPHAGOGASTRODUODENOSCOPY);  Surgeon: Rosio Marion MD;  Location: [facility] ENDO (2ND FLR);  Service: Endoscopy;  Laterality: N/A;  5/15-covid pcw-inst portal-tb    ESOPHAGOGASTRODUODENOSCOPY N/A 2021    Procedure: EGD (ESOPHAGOGASTRODUODENOSCOPY);  Surgeon: Socrates Terrazas MD;  Location: [facility] ENDO (2ND FLR);  Service: Endoscopy;  Laterality: N/A;       Review of patient's allergies indicates:  No Known Allergies    Family History       Problem Relation (Age of Onset)    Breast cancer Other (73), Paternal Cousin    Cancer Mother (67)    Lung cancer Father (48)    No Known Problems Son    Prostate cancer Paternal Uncle    Schizophrenia Mother          Tobacco Use    Smoking status: Former     Types: Cigarettes     Quit date: 2019     Years since quittin.8    Smokeless tobacco: Never   Substance and Sexual Activity    Alcohol use: Yes    Drug use: Not Currently     Sexual activity: Yes     Partners: Male      Review of Systems   Unable to perform ROS: Mental status change   Objective:     Vital Signs (Most Recent):  Temp: 97.8 °F (36.6 °C) (10/12/22 0400)  Pulse: (!) 120 (10/12/22 0450)  Resp: (!) 29 (10/12/22 0450)  BP: (!) 89/57 (10/12/22 0450)  SpO2: 100 % (10/12/22 0450)   Vital Signs (24h Range):  Temp:  [97.2 °F (36.2 °C)-100.6 °F (38.1 °C)] 97.8 °F (36.6 °C)  Pulse:  [] 120  Resp:  [15-40] 29  SpO2:  [88 %-100 %] 100 %  BP: ()/(52-68) 89/57   Weight: 63.7 kg (140 lb 6.9 oz)  Body mass index is 21.99 kg/m².      Intake/Output Summary (Last 24 hours) at 10/12/2022 0531  Last data filed at 10/12/2022 0417  Gross per 24 hour   Intake 4017.08 ml   Output 440 ml   Net 3577.08 ml       Physical Exam  Constitutional:       Appearance: She is ill-appearing.      Comments: Severely malnourished/cachectic w/ bitemporal wasting    HENT:      Head: Normocephalic and atraumatic.      Right Ear: External ear normal.      Left Ear: External ear normal.      Mouth/Throat:      Mouth: Mucous membranes are dry.      Pharynx: Oropharynx is clear.   Eyes:      General: No scleral icterus.     Extraocular Movements: Extraocular movements intact.      Conjunctiva/sclera: Conjunctivae normal.      Comments: Pinpoint pupils    Cardiovascular:      Rate and Rhythm: Regular rhythm. Tachycardia present.      Heart sounds: Normal heart sounds. No murmur heard.  Pulmonary:      Effort: Pulmonary effort is normal.      Breath sounds: Normal breath sounds. No wheezing, rhonchi or rales.   Abdominal:      General: Bowel sounds are normal. There is distension.      Tenderness: There is no abdominal tenderness. There is no guarding or rebound.      Hernia: No hernia is present.      Comments: Pleur-X catheter present on left abdomen    Musculoskeletal:         General: Normal range of motion.      Cervical back: Normal range of motion.      Right lower leg: Edema present.      Left lower leg:  Edema present.      Comments: 3-4+ pitting edema of bilateral LE up to knees    Skin:     General: Skin is warm and dry.      Capillary Refill: Capillary refill takes less than 2 seconds.      Findings: No lesion or rash.   Neurological:      Mental Status: She is lethargic and disoriented.       Vents:     Lines/Drains/Airways       Central Venous Catheter Line  Duration             Port A Cath Single Lumen right subclavian -- days              Drain  Duration                  Drain/Device  10/05/22 0826 Right lower abdomen other (see comments) 6 days    Female External Urinary Catheter 10/06/22 0452 6 days              Peripheral Intravenous Line  Duration                  Peripheral IV - Single Lumen 10/08/22 1434 22 G Anterior;Right Forearm 3 days         Peripheral IV - Single Lumen 10/12/22 0155 20 G Posterior;Right Hand <1 day                  Significant Labs:    CBC/Anemia Profile:  Recent Labs   Lab 10/10/22  0601 10/11/22  0639 10/12/22  0134   WBC 7.33 6.65 9.43   HGB 9.1* 7.9* 7.6*   HCT 30.0* 25.4* 24.3*    199 199   MCV 76* 73* 74*   RDW 20.5* 19.9* 20.3*   IRON  --   --  24*   FERRITIN  --   --  243        Chemistries:  Recent Labs   Lab 10/10/22  0601 10/11/22  0639 10/12/22  0134   * 123* 121*   K 4.5 4.4 5.5*   CL 90* 92* 92*   CO2 21* 20* 21*   BUN 75* 76* 79*   CREATININE 1.8* 1.5* 1.4   CALCIUM 8.2* 8.3* 7.7*   ALBUMIN 1.3* 1.2* 1.2*   PROT 4.6* 4.5* 4.8*   BILITOT 0.4 0.5 0.4   ALKPHOS 128 174* 202*   ALT 12 11 14   AST 17 17 37   MG 2.8* 2.7* 2.6   PHOS 4.5 4.0 3.8       All pertinent labs within the past 24 hours have been reviewed.    Significant Imaging: I have reviewed all pertinent imaging results/findings within the past 24 hours.

## 2022-10-12 NOTE — H&P
[facility] - Card[facility]  Critical Care Medicine  History & Physical    Patient Name: Puja Quigley  MRN: [id number]  Admission Date: 9/30/2022  Hospital Length of Stay: 11 days  Code Status: Full Code  Attending Physician: Kinza No MD   Primary Care Provider: Primary Doctor No   Principal Problem: Shock, unspecified    Subjective:     HPI:  Mrs. 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 At the time of admission, patient was undergoing ~1-2 therapeutic paracentesis/wk but missed sessions the week prior to presentation. Patient denies HA, vision changes, fever, nausea, vomiting, urinary or bowel changes. She was admitted for intractable abdominal pain in the setting of malignant ascites and underwent Pleur-X catheter placement on 10/5/22. Patient's hospital course has been significant for a new nonocclusive thrombus in the mid R femoral vein seen on US in addition to worsening hyponatremia. Nephrology was consulted for MARIA which improved w/ paracentesis.     Critical care was consulted on 10/12 for worsening hypotension w/ tachycardia and AMS. Labs largely stable; lactate 1.3 and vBG pH 7.5/pCO2 31.6/HCO3 25.4. Infectious work-up was initiated; patient received 1L IVFs w/ little improvement in BP. Given that PRN opioids were given for pain, Narcan was administered x 2 w/ subsequent improvement in mental status (back to baseline as per family and nurses) & BP. However, critical care was notified of worsening mental status and BP a few hours later despite the additional administration of Narcan x 2. Patient was transferred to MICU for shock w/ unknown source requiring vasopressor support.       Hospital/ICU Course:  No notes on file     Past Medical History:   Diagnosis Date    Anxiety      Dehydration 2022    Gastric cancer     Gastric ulcer     Hx of psychiatric care     Pregnancy 2020    delivered on 2020    Umbilical hernia        Past Surgical History:   Procedure Laterality Date    CLOSURE OF PERFORATED ULCER OF DUODENUM USING OMENTAL PATCH      ESOPHAGOGASTRODUODENOSCOPY N/A 10/13/2020    Procedure: EGD (ESOPHAGOGASTRODUODENOSCOPY);  Surgeon: Rosio Marion MD;  Location: [facility] ENDO (2ND FLR);  Service: Endoscopy;  Laterality: N/A;    ESOPHAGOGASTRODUODENOSCOPY N/A 2020    Procedure: EGD (ESOPHAGOGASTRODUODENOSCOPY);  Surgeon: Rosio Marion MD;  Location: [facility] ENDO (2ND FLR);  Service: Endoscopy;  Laterality: N/A;  Covid-19 test 20 at [facility]    ESOPHAGOGASTRODUODENOSCOPY N/A 3/12/2021    Procedure: EGD (ESOPHAGOGASTRODUODENOSCOPY);  Surgeon: Nav Omer MD;  Location: [facility] ENDO (2ND FLR);  Service: Endoscopy;  Laterality: N/A;  COVID at [facility] 3/9 ttr    ESOPHAGOGASTRODUODENOSCOPY N/A 2021    Procedure: EGD (ESOPHAGOGASTRODUODENOSCOPY);  Surgeon: Rosio Marion MD;  Location: [facility] ENDO (2ND FLR);  Service: Endoscopy;  Laterality: N/A;  5/15-covid pcw-inst portal-tb    ESOPHAGOGASTRODUODENOSCOPY N/A 2021    Procedure: EGD (ESOPHAGOGASTRODUODENOSCOPY);  Surgeon: Socrates Terrazas MD;  Location: [facility] ENDO (2ND FLR);  Service: Endoscopy;  Laterality: N/A;       Review of patient's allergies indicates:  No Known Allergies    Family History       Problem Relation (Age of Onset)    Breast cancer Other (73), Paternal Cousin    Cancer Mother (67)    Lung cancer Father (48)    No Known Problems Son    Prostate cancer Paternal Uncle    Schizophrenia Mother          Tobacco Use    Smoking status: Former     Types: Cigarettes     Quit date: 2019     Years since quittin.8    Smokeless tobacco: Never   Substance and Sexual Activity    Alcohol use: Yes    Drug use: Not Currently    Sexual activity: Yes     Partners: Male       Review of Systems   Unable to perform ROS: Mental status change   Objective:     Vital Signs (Most Recent):  Temp: 97.8 °F (36.6 °C) (10/12/22 0400)  Pulse: (!) 120 (10/12/22 0450)  Resp: (!) 29 (10/12/22 0450)  BP: (!) 89/57 (10/12/22 0450)  SpO2: 100 % (10/12/22 0450)   Vital Signs (24h Range):  Temp:  [97.2 °F (36.2 °C)-100.6 °F (38.1 °C)] 97.8 °F (36.6 °C)  Pulse:  [] 120  Resp:  [15-40] 29  SpO2:  [88 %-100 %] 100 %  BP: ()/(52-68) 89/57   Weight: 63.7 kg (140 lb 6.9 oz)  Body mass index is 21.99 kg/m².      Intake/Output Summary (Last 24 hours) at 10/12/2022 0531  Last data filed at 10/12/2022 0417  Gross per 24 hour   Intake 4017.08 ml   Output 440 ml   Net 3577.08 ml       Physical Exam  Constitutional:       Appearance: She is ill-appearing.      Comments: Severely malnourished/cachectic w/ bitemporal wasting    HENT:      Head: Normocephalic and atraumatic.      Right Ear: External ear normal.      Left Ear: External ear normal.      Mouth/Throat:      Mouth: Mucous membranes are dry.      Pharynx: Oropharynx is clear.   Eyes:      General: No scleral icterus.     Extraocular Movements: Extraocular movements intact.      Conjunctiva/sclera: Conjunctivae normal.      Comments: Pinpoint pupils    Cardiovascular:      Rate and Rhythm: Regular rhythm. Tachycardia present.      Heart sounds: Normal heart sounds. No murmur heard.  Pulmonary:      Effort: Pulmonary effort is normal.      Breath sounds: Normal breath sounds. No wheezing, rhonchi or rales.   Abdominal:      General: Bowel sounds are normal. There is distension.      Tenderness: There is no abdominal tenderness. There is no guarding or rebound.      Hernia: No hernia is present.      Comments: Pleur-X catheter present on left abdomen    Musculoskeletal:         General: Normal range of motion.      Cervical back: Normal range of motion.      Right lower leg: Edema present.      Left lower leg: Edema present.      Comments: 3-4+ pitting  edema of bilateral LE up to knees    Skin:     General: Skin is warm and dry.      Capillary Refill: Capillary refill takes less than 2 seconds.      Findings: No lesion or rash.   Neurological:      Mental Status: She is lethargic and disoriented.       Vents:     Lines/Drains/Airways       Central Venous Catheter Line  Duration             Port A Cath Single Lumen right subclavian -- days              Drain  Duration                  Drain/Device  10/05/22 0826 Right lower abdomen other (see comments) 6 days    Female External Urinary Catheter 10/06/22 0452 6 days              Peripheral Intravenous Line  Duration                  Peripheral IV - Single Lumen 10/08/22 1434 22 G Anterior;Right Forearm 3 days         Peripheral IV - Single Lumen 10/12/22 0155 20 G Posterior;Right Hand <1 day                  Significant Labs:    CBC/Anemia Profile:  Recent Labs   Lab 10/10/22  0601 10/11/22  0639 10/12/22  0134   WBC 7.33 6.65 9.43   HGB 9.1* 7.9* 7.6*   HCT 30.0* 25.4* 24.3*    199 199   MCV 76* 73* 74*   RDW 20.5* 19.9* 20.3*   IRON  --   --  24*   FERRITIN  --   --  243        Chemistries:  Recent Labs   Lab 10/10/22  0601 10/11/22  0639 10/12/22  0134   * 123* 121*   K 4.5 4.4 5.5*   CL 90* 92* 92*   CO2 21* 20* 21*   BUN 75* 76* 79*   CREATININE 1.8* 1.5* 1.4   CALCIUM 8.2* 8.3* 7.7*   ALBUMIN 1.3* 1.2* 1.2*   PROT 4.6* 4.5* 4.8*   BILITOT 0.4 0.5 0.4   ALKPHOS 128 174* 202*   ALT 12 11 14   AST 17 17 37   MG 2.8* 2.7* 2.6   PHOS 4.5 4.0 3.8       All pertinent labs within the past 24 hours have been reviewed.    Significant Imaging: I have reviewed all pertinent imaging results/findings within the past 24 hours.    Assessment/Plan:     Neuro  Neuropathy  - hold gabapentin in the setting of AMS    AMS (altered mental status)  - AMS likely 2/2 opioid overdose and gabapentin use in the setting of decreased renal clearance  - continue narcan gtt  - avoid opioids and other sedating medications  - neuro  checks Q4H   - aspiration precautions      Psychiatric  Anxiety  - psych consulted     Psychological factors affecting medical condition  - hematology/oncology psych consulted     ENT  Mouth sores  - [facility]'s solution QID    Cardiac/Vascular  * Shock, unspecified  41 yo F w/ gastric adenocarcinoma w/ metastasis to bone, liver and peritoneum c/b ascites w/ Pleur-X placement 10/6 admitted for intractable abdominal pain in the setting of malignant ascites. Critical care consulted 10/12 for worsening AMS and hypotension. Initially, clinically improved w/ administration of Narcan but again declined a few hours later. Repeat labs notable for WBC 9.43, Hgb 7.6, Na 121, K 5.5 (hemolyzed), Cr 1.4, Alb 1.2, lactate 1.3. vBG: pH 7.5/pCO2 31.6/HCO3 25.4. Patient transferred to MICU for unspecified shock; differentials include opioid overdose vs septic shock vs distributive shock.      Plan:  - Levophed gtt to maintain MAPs >65   - BCx, UA pending  - CXR and KUB pending  - Continue cefepime, vancomycin and metronidazole. Will de-escalate pending culture results.   - Daily CBC, CMP w/ electrolytes   - Narcan gtt     Renal/  Hyperkalemia  - improved since admission   - K 5.5 (hemolyzed) 10/12; repeat pending  - daily BMPs     Hyponatremia  - likely hypervolemic hyponatremia in the setting of malignant ascites   - daily BMP    MARIA (acute kidney injury)  - Cr 3.1 on admission (baseline 0.8); improved w/ IVFs and 2 x paracentesis   - nephrology consulted; appreciate recs  - continue daily CMPs   - strict I&Os  - avoid nephrotoxic medications & renally dose medications  - nephrology following     Hematology  Thrombus of R femoral vein  - U/S 10/3 w/ new nonocclusive thrombus in the mid femoral vein  - high intensity heparin gtt transitioned to apixaban 10mg BID w/ plans to transition to 5mg on 10/13    Oncology  Malignant neoplasm of stomach  Oncologic history obtained from medical oncology note:    'HER-2 negative by FISH.  PD-L1  < 1%.  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  We previously explained the implications of a stage IV diagnosis to her and potential treatment options.  She is now s/p port placement. She sought a second opinion at [facility] for zolbetuximab trial targeting CLDN protein but she did not test positive for this biomarker. We recommended proceeding with SOC FOLFOX, with which the [facility] team agreed. Because of the negative PD-L1 I do not think the benefit of adding nivolumab outweighs the potential toxicities.        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  She opted not to get genetic testing done at her appt with Phi. We recommended that she reconsider that decision in light of a very likely germline mutation in SAMUEL which has clinical consequences.       CT CAP after 6 cycles showed improvement in ascites, probable hepatic, thyroid and bone metastases. We dropped oxaliplatin starting with cycle 10 due to grade 1 neuropathy and desire to keep it from worsening.     CT CAP after cycle 10 showed stable disease.  CT scan after cycle 15 continued to display overall stability of disease within the gastric wall, peritoneum, spine and liver. Although increase ascites on imaging and clinically can represent progression of disease, her previous CT suggested overall stability within the liver and gastric wall, so we recommended to continue with treatment time. However, she continued to have increasing abdominal ascites that has been concerning for progression of disease. Hence, she had repeat imaging. On CT performed on 4/14/2022, she appeared to have worsening disease suggestive of progression. She was seen by Dr. Reid at [facility] for f/u and to discuss possible treatment options.  She was also evaluated by Dr. Andree Mueller and   Mayi of Surgical Oncology at [facility].  Ultimately she was not felt to be a good surgical debulking candidate due to her ascites.  If her ascites improves during the course of chemotherapy and her performance status remains stable or improves, they would re-consider her again for palliative oophorectomy.      Was given ramucirumab with cycle 1 but this later was held given [facility] recs for just FOLFIRI. In addition, she didn't tolerate cycle 1 of cyramza well with significant N/V. She was started on second-line FOLFIRI. She received 5 cycles with FOLFIRI of irinotecan to 75 mg/m2 - has UGT1A1 homozygous mutation indicating poor metabolism.  Continue 5-FU infusion at 2200 mg/m2.     Repeat imaging with CT CAP after [facility] on 8/3/22 after cycle 5 of FOLFIRI demonstrated disease progression.  Dr. Reid recommended third line docetaxel biweekly at 35 mg/m2.      s/p cycle 3 of Docetaxel.'       GI  Constipation  - senna QHS    Malignant ascites  - s/p Pleur-X catheter placement 10/5 by IR  - continue daily drains   - hold any opioids for pain in the setting of AMS        Critical Care Daily Checklist:    A: Awake: RASS Goal/Actual Goal:    Actual: [place] Agitation Sedation Scale (RASS): Alert and calm   B: Spontaneous Breathing Trial Performed?     C: SAT & SBT Coordinated?  NA                      D: Delirium: CAM-ICU     E: Early Mobility Performed? Yes   F: Feeding Goal:    Status:     Current Diet Order   Procedures    Diet NPO Except for: Sips with Medication     Order Specific Question:   Except for     Answer:   Sips with Medication      AS: Analgesia/Sedation NA   T: Thromboembolic Prophylaxis Apixaban    H: HOB > 300 Yes   U: Stress Ulcer Prophylaxis (if needed) Yes   G: Glucose Control Yes   B: Bowel Function Stool Occurrence: 1   I: Indwelling Catheter (Lines & [place]) Necessity Reviewed    D: De-escalation of Antimicrobials/Pharmacotherapies Cefepime, vanc, metronidazole     Plan for the day/ETD     Code  Status:  Family/Goals of Care: Full Code         Critical secondary to Patient has a condition that poses threat to life and bodily function: Unspecified shock      Critical care was time spent personally by me on the following activities: development of treatment plan with patient or surrogate and bedside caregivers, discussions with consultants, evaluation of patient's response to treatment, examination of patient, ordering and performing treatments and interventions, ordering and review of laboratory studies, ordering and review of radiographic studies, pulse oximetry, re-evaluation of patient's condition. This critical care time did not overlap with that of any other provider or involve time for any procedures.     Janneth Cabrera MD  Critical Care Medicine  [facility] - Cardiac Medical ICU

## 2022-10-12 NOTE — ASSESSMENT & PLAN NOTE
Oncologic history obtained from medical oncology note:    'HER-2 negative by FISH.  PD-L1 < 1%.  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  We previously explained the implications of a stage IV diagnosis to her and potential treatment options.  She is now s/p port placement. She sought a second opinion at [facility] for zolbetuximab trial targeting CLDN protein but she did not test positive for this biomarker. We recommended proceeding with SOC FOLFOX, with which the [facility] team agreed. Because of the negative PD-L1 I do not think the benefit of adding nivolumab outweighs the potential toxicities.        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  She opted not to get genetic testing done at her appt with Phi. We recommended that she reconsider that decision in light of a very likely germline mutation in SAMUEL which has clinical consequences.       CT CAP after 6 cycles showed improvement in ascites, probable hepatic, thyroid and bone metastases. We dropped oxaliplatin starting with cycle 10 due to grade 1 neuropathy and desire to keep it from worsening.     CT CAP after cycle 10 showed stable disease.  CT scan after cycle 15 continued to display overall stability of disease within the gastric wall, peritoneum, spine and liver. Although increase ascites on imaging and clinically can represent progression of disease, her previous CT suggested overall stability within the liver and gastric wall, so we recommended to continue with treatment time. However, she continued to have increasing abdominal ascites that has been concerning for progression of disease. Hence, she had repeat imaging. On CT performed on 4/14/2022, she appeared to have worsening disease suggestive of progression. She was seen by Dr. Reid at [facility] for f/u and to  discuss possible treatment options.  She was also evaluated by Dr. Andree Mueller and Dr. See of Surgical Oncology at [facility].  Ultimately she was not felt to be a good surgical debulking candidate due to her ascites.  If her ascites improves during the course of chemotherapy and her performance status remains stable or improves, they would re-consider her again for palliative oophorectomy.      Was given ramucirumab with cycle 1 but this later was held given [facility] recs for just FOLFIRI. In addition, she didn't tolerate cycle 1 of cyramza well with significant N/V. She was started on second-line FOLFIRI. She received 5 cycles with FOLFIRI of irinotecan to 75 mg/m2 - has UGT1A1 homozygous mutation indicating poor metabolism.  Continue 5-FU infusion at 2200 mg/m2.     Repeat imaging with CT CAP after [facility] on 8/3/22 after cycle 5 of FOLFIRI demonstrated disease progression.  Dr. Reid recommended third line docetaxel biweekly at 35 mg/m2.      s/p cycle 3 of Docetaxel.'

## 2022-10-12 NOTE — PROGRESS NOTES
Pharmacokinetic Assessment Follow Up: IV Vancomycin    Vancomycin Regimen Assessment/Plan:    - Vancomycin random level resulted as 18.7 mcg/mL, drawn ~ 13 hours after the loading dose.  Goal 15-20 mcg/mL.   - Patient with MARIA that is improving, continue pulse dosing for now.   - Administer vancomycin 500 mg x 1 dose.   - A random level is ordered with AM labs tomorrow to assess clearance.  Pharmacy to re-dose based on level and renal function.     Drug levels (last 3 results):  Recent Labs   Lab Result Units 10/12/22  1126   Vancomycin, Random ug/mL 18.7       Pharmacy will continue to follow and monitor vancomycin.    Please contact pharmacy at extension [phone] for questions regarding this assessment.    Thank you for the consult,   Sarika Lal, PharmD, BCCCP       Patient brief summary:  Puja Quigley is a 42 y.o. female initiated on antimicrobial therapy with IV Vancomycin for treatment of bacteremia.     The patient's current regimen is pulse dosing.    Drug Allergies:   Review of patient's allergies indicates:  No Known Allergies    Actual Body Weight:   63.7 kg    Renal Function:   Estimated Creatinine Clearance: 51.9 mL/min (based on SCr of 1.4 mg/dL).,     Dialysis Method (if applicable):  N/A    CBC (last 72 hours):  Recent Labs   Lab Result Units 10/10/22  0601 10/11/22  0639 10/12/22  0134   WBC K/uL 7.33 6.65 9.43   Hemoglobin g/dL 9.1* 7.9* 7.6*   Hematocrit % 30.0* 25.4* 24.3*   Platelets K/uL 203 199 199   Gran % % 81.1* 81.7* 87.7*   Lymph % % 9.3* 8.6* 6.8*   Mono % % 8.5 8.6 4.8   Eosinophil % % 0.5 0.3 0.2   Basophil % % 0.1 0.2 0.1   Differential Method  Automated Automated Automated       Metabolic Panel (last 72 hours):  Recent Labs   Lab Result Units 10/09/22  1417 10/10/22  0601 10/11/22  0639 10/12/22  0134   Sodium mmol/L 122* 123* 123* 121*   Potassium mmol/L 4.7 4.5 4.4 5.5*   Chloride mmol/L 91* 90* 92* 92*   CO2 mmol/L 17* 21* 20* 21*   Glucose mg/dL 84 74 75 91   BUN  mg/dL 69* 75* 76* 79*   Creatinine mg/dL 1.9* 1.8* 1.5* 1.4   Albumin g/dL  --  1.3* 1.2* 1.2*   Total Bilirubin mg/dL  --  0.4 0.5 0.4   Alkaline Phosphatase U/L  --  128 174* 202*   AST U/L  --  17 17 37   ALT U/L  --  12 11 14   Magnesium mg/dL  --  2.8* 2.7* 2.6   Phosphorus mg/dL  --  4.5 4.0 3.8       Vancomycin Administrations:  vancomycin given in the last 96 hours                     vancomycin 1.25 g in dextrose 5% 250 mL IVPB (ready to mix) (mg) 1,250 mg New Bag 10/11/22 2213                    Microbiologic Results:  Microbiology Results (last 7 days)       Procedure Component Value Units Date/Time    Blood culture [[id number]] Collected: 10/11/22 1940    Order Status: Completed Specimen: Blood from Peripheral, Left Arm Updated: 10/12/22 0315     Blood Culture, Routine No Growth to date    Blood culture [[id number]] Collected: 10/11/22 1940    Order Status: Completed Specimen: Blood Updated: 10/12/22 0315     Blood Culture, Routine No Growth to date

## 2022-10-12 NOTE — PLAN OF CARE
[facility] - Cardiac Medical ICU  Discharge Reassessment    Primary Care Provider: Primary Doctor No    Expected Discharge Date: 10/14/2022    Patient not medically ready for discharge per MD.    Reassessment (most recent)       Discharge Reassessment - 10/12/22 1445          Discharge Reassessment    Assessment Type Discharge Planning Reassessment     Did the patient's condition or plan change since previous assessment? Yes     Discharge Plan discussed with: Sibling     Name(s) and Number(s) sister(s)Mansi, [phone].     Communicated GLORIA with patient/caregiver Date not available/Unable to determine     Discharge Plan A Home with family     Discharge Plan B Skilled Nursing Facility;Other   outside Cancer facility working on by oncology.    DME Needed Upon Discharge  other (see comments)   TBD    Discharge Barriers Identified None     Why the patient remains in the hospital Requires continued medical care        Post-Acute Status    Post-Acute Authorization Placement     Post-Acute Placement Status Pending medical clearance/testing     Discharge Delays None known at this time                   Aminta Velasquez RN     [phone]

## 2022-10-12 NOTE — HPI
Mrs. 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 At the time of admission, patient was undergoing ~1-2 therapeutic paracentesis/wk but missed sessions the week prior to presentation. Patient denies HA, vision changes, fever, nausea, vomiting, urinary or bowel changes. She was admitted for intractable abdominal pain in the setting of malignant ascites and underwent Pleur-X catheter placement on 10/5/22. Patient's hospital course has been significant for a new nonocclusive thrombus in the mid R femoral vein seen on US in addition to worsening hyponatremia. Nephrology was consulted for MARIA which improved w/ paracentesis.     Critical care was consulted on 10/12 for worsening hypotension w/ tachycardia and AMS. Labs largely stable; lactate 1.3 and vBG pH 7.5/pCO2 31.6/HCO3 25.4. Infectious work-up was initiated; patient received 1L IVFs w/ little improvement in BP. Given that PRN opioids were given for pain, Narcan was administered x 2 w/ subsequent improvement in mental status (back to baseline as per family and nurses) & BP. However, critical care was notified of worsening mental status and BP a few hours later despite the additional administration of Narcan x 2. Patient was transferred to MICU for shock w/ unknown source requiring vasopressor support.

## 2022-10-12 NOTE — PROGRESS NOTES
Pt BP dropped 82/51 MAP 61 at 0430 - MD called to bedside, Narcan given x2 again, less responsive following. Critical care called.     0500- Orders in to transfer patient to MICU 6089 - report called to nurse and given at bedside.   Pt transferred with vital sign monitoring with nurse and MD escort.

## 2022-10-12 NOTE — PROGRESS NOTES
"[facility]  Palliative Medicine  Progress Note    Patient Name: Puja Quigley  MRN: [id number]  Admission Date: 9/30/2022  Hospital Length of Stay: 11 days  Code Status: Full Code   Attending Provider: Kinza No MD  Consulting Provider: Hannah Macias MD  Primary Care Physician: Primary Doctor No  Principal Problem:Shock, unspecified    Patient information was obtained from patient, caregiver / friend and primary team.      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 Hospital course is notable for development of MARIA and significant deconditioning, now unable to independently transfer/ambulate. Stepped up to the MICU on 10/11 for acute encephalopathy for concern of opioid overdose, although she only received one dose of oxycodone 5 mg prior to step up (total 24 hour MME was 7.5). Palliative and Supportive Care was consulted for symptom management recommendations.    Goals of Care:  - Code status: full code  - Initiated discussion of assigning a health care power of  and she is choosing between Jean Claude (sister) and her significant other Neno. Her hesitation is that she does not want them to carry this burden by themselves. Encouraged her to discuss with them, and to let them know that [facility], whoever is listed first can engage with any other loved ones to collectively make medical decisions on her behalf.  - Visited by medium on 10/12 who encouraged her to "let go" and encouraged loved ones that when she passes, she will be reborn again into their family. She provided encouragement and permission for family to take care of themselves in order to help " "Ms. Quigley feel at peace with letting go and go to the spiritual world.    Pain in Mouth  Xerostomia  - Continue Duke's solution  - Agree with treatment of thrush  - Consider addition of Biotene in between Duke's solution administration  - Advised spacing out oral care and avoid aggressive wiping with towel    Chronic Neoplasm Pain  - Located in abdomen related to gastric cancer and peritoneal/pelvic metastasis  - S/p paracentesis with significant relief  - PleurX catheter in place and functional  - Opioids discontinued for acute encephalopathy; on Narcan infusion in the MICU    At Risk for Opioid Induced Constipation  Slow Transit Constipation  - Continue senna 8.6 mg PO qHS to maintain bowel regimen; titrate to effect        I will follow-up with patient. Please contact us if you have any additional questions.    Subjective:     Chief Complaint:   Chief Complaint   Patient presents with    Ascites     Endorses fluid in abd and lower extremities for the past few days, reports she needs a paracentesis, states she gets them almost weekly. +SOB. 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 Hospital course is notable for development of MARIA and significant deconditioning, now unable to independently transfer/ambulate. Palliative and Supportive Care was consulted for symptom management recommendations.    Met Ms. Quigley with her friend Ilda and niece at her bedside. Ms. Quigley shares that her most bothersome symptom is her mouth pain/irritation. She had developed a white film across her tongue and she uses a towel to scrape off the film and to keep her mouth " "clean. There is associated burning pain that worsens with eating acidic food like pineapple, johann, etc. She has associated dry mouth, which she associates with her home methadone. Of note, she has been off of her home methadone since a week prior to her birthday trip to [place] due to xerostomia. She feels that the oral pain is now making its way down to her throat, and it has been painful to eat. She has modified her diet to rely on smoothies/purees, but most of these smoothies have caused a burning sensation across her tongue and further exacerbates her pain. She also has chronic pain across her lower abdomen, but feels that it is better after the paracentesis. She shares that she is a fighter and will be seeking care at [facility] for cancer-directed treatments. She has an appointment this Wednesday, 10/12/22. She expresses concern that she has become so weak that she requires assistance to transfer/ambulate. When she was first admitted, she was able to independently walk, but now is so weak that she is unable to do so.    She shares that when the doctors told her that palliative was going to come to assist with symptom management, she thought to herself, "Am I dying?" I asked her to tell me more about this, and she tells me that she wonders if she is dying because she has rapidly weakened. She then reiterates that she is a fighter and that she is not willing to discuss hospice with me. She also shares that [facility] set up a palliative care clinic visit in their facility, but she likes Dr. Brink (her current outpatient palliative provider) and would rather maintain her care and symptom management with Dr. Brink instead.       Hospital Course:  No notes on file    Interval History: 24 hour chart reviewed. Stepped up to the MICU for acute encephalopathy and received two doses of Narcan on the floor. In the MICU received continuous Narcan infusion. Ms. Quigley was visited by multiple family members and " "friends, including a medium who told her that her  father was encouraging her to "let go." Medium shared with loved ones outside of her room that she received message that Ms. Quigley will go to the spiritual world and be reborn again as a boy and to look forward to a birth of a boy within the family. Family/friends shed tears and thanked medium for messages and words of encouragement. Discussed with Ms. Quigley about assigning a health care power of . She thinks she wants to identify Jean Claude or Neno (significant other) as her HCPOA but will first ask them tonight before giving me a final answer tomorrow.    Medications:  Continuous Infusions:  Scheduled Meds:   apixaban  10 mg Oral BID    [START ON 10/13/2022] apixaban  5 mg Oral BID    ceFEPime (MAXIPIME) IVPB  2 g Intravenous Q12H    dicyclomine  10 mg Oral QID    duke's soln (benadryl 30 mL, mylanta 30 mL, LIDOcaine 30 mL, nystatin 30 mL) 120 mL  10 mL Oral QID    metronidazole  500 mg Intravenous Q8H    midodrine  10 mg Oral TID    mupirocin   Topical (Top) Daily    NORepinephrine bitartrate-D5W        [START ON 10/13/2022] pantoprazole  40 mg Intravenous Daily    senna-docusate 8.6-50 mg  1 tablet Oral QHS    sodium bicarb-sodium chloride  1 Dose Swish & Spit QID     PRN Meds:acetaminophen, aluminum-magnesium hydroxide-simethicone, dextrose 10%, dextrose 10%, glucagon (human recombinant), glucose, glucose, magnesium hydroxide 400 mg/5 ml, melatonin, naloxone, ondansetron, promethazine (PHENERGAN) IVPB, simethicone, sodium chloride 0.9%, Pharmacy to dose Vancomycin consult **AND** vancomycin - pharmacy to dose    Objective:     Vital Signs (Most Recent):  Temp: 98.1 °F (36.7 °C) (10/12/22 1100)  Pulse: 106 (10/12/22 1631)  Resp: (!) 29 (10/12/22 1631)  BP: 105/66 (10/12/22 1600)  SpO2: 100 % (10/12/22 1631)   Vital Signs (24h Range):  Temp:  [97.2 °F (36.2 °C)-100.6 °F (38.1 °C)] 98.1 °F (36.7 °C)  Pulse:  [] 106  Resp:  [15-40] " 29  SpO2:  [88 %-100 %] 100 %  BP: ()/(49-72) 105/66     Weight: 63.7 kg (140 lb 6.9 oz)  Body mass index is 21.99 kg/m².    Physical Exam  Constitutional:       Appearance: She is ill-appearing.   HENT:      Head: Normocephalic.      Right Ear: External ear normal.      Left Ear: External ear normal.      Nose: Nose normal.      Mouth/Throat:      Mouth: Mucous membranes are moist.      Pharynx: Posterior oropharyngeal erythema present.   Eyes:      General: No scleral icterus.     Extraocular Movements: Extraocular movements intact.   Cardiovascular:      Rate and Rhythm: Tachycardia present.   Pulmonary:      Effort: Pulmonary effort is normal. No respiratory distress.   Abdominal:      General: Bowel sounds are normal. There is distension.   Musculoskeletal:      Cervical back: Normal range of motion.      Right lower leg: Edema present.      Left lower leg: Edema present.   Skin:     General: Skin is warm and dry.   Neurological:      Mental Status: She is alert and oriented to person, place, and time.      Motor: Weakness present.   Psychiatric:         Mood and Affect: Mood normal.         Behavior: Behavior normal.       Review of Symptoms      Symptom Assessment (ESAS 0-10 Scale)  Pain:  0  Dyspnea:  0  Anxiety:  0  Nausea:  0  Depression:  0  Anorexia:  0  Fatigue:  0  Insomnia:  0  Restlessness:  0  Agitation:  0         Significant Labs: CBC:   Recent Labs   Lab 10/11/22  0639 10/12/22  0134   WBC 6.65 9.43   HGB 7.9* 7.6*   HCT 25.4* 24.3*    199     CMP:   Recent Labs   Lab 10/11/22  0639 10/12/22  0134   * 121*   K 4.4 5.5*   CL 92* 92*   CO2 20* 21*   GLU 75 91   BUN 76* 79*   CREATININE 1.5* 1.4   CALCIUM 8.3* 7.7*   PROT 4.5* 4.8*   ALBUMIN 1.2* 1.2*   BILITOT 0.5 0.4   ALKPHOS 174* 202*   AST 17 37   ALT 11 14   ANIONGAP 11 8     CBC:   Recent Labs   Lab 10/12/22  0134   WBC 9.43   HGB 7.6*   HCT 24.3*   MCV 74*        BMP:  Recent Labs   Lab 10/12/22  0134   GLU 91   NA  121*   K 5.5*   CL 92*   CO2 21*   BUN 79*   CREATININE 1.4   CALCIUM 7.7*   MG 2.6     LFT:  Lab Results   Component Value Date    AST 37 10/12/2022    ALKPHOS 202 (H) 10/12/2022    BILITOT 0.4 10/12/2022     Albumin:   Albumin   Date Value Ref Range Status   10/12/2022 1.2 (L) 3.5 - 5.2 g/dL Final     Protein:   Total Protein   Date Value Ref Range Status   10/12/2022 4.8 (L) 6.0 - 8.4 g/dL Final     Comment:     *Result may be interfered by visible hemolysis     Lactic acid:   Lab Results   Component Value Date    LACTATE 1.3 10/12/2022    LACTATE 2.0 07/11/2022       Significant Imaging: I have reviewed all pertinent imaging results/findings within the past 24 hours.    I spent 35 minutes in the care of this patient, >50% in chart review, face to face discussion of goals of care, symptom assessment, counseling, coordination of care and emotional support. I spent a total of 16 minutes engaging the patient in this advance care planning discussion.      Hannah Macias MD  Palliative Medicine  [facility] - Cardiac [facility]

## 2022-10-12 NOTE — PROGRESS NOTES
Pt BP dropped 70/40s, HR elevated 120s, sats remain 100% DEAN afebrile, MD notified and called to bedside with critical care consulted and at bedside. 1 L LR bolus given, VBG and labs drawn.                                                      Narcan given x2 doses. Pt more responsive following, able to state name and respond to stimuli.   BP 90/50s following   Will continue to monitor at bedside

## 2022-10-12 NOTE — SUBJECTIVE & OBJECTIVE
"Interval History: 24 hour chart reviewed. Stepped up to the MICU for acute encephalopathy and received two doses of Narcan on the floor. In the MICU received continuous Narcan infusion. Ms. Quigley was visited by multiple family members and friends, including a medium who told her that her  father was encouraging her to "let go." Medium shared with loved ones outside of her room that she received message that Ms. Quigley will go to the spiritual world and be reborn again as a boy and to look forward to a birth of a boy within the family. Family/friends shed tears and thanked medium for messages and words of encouragement. Discussed with Ms. Quigley about assigning a health care power of . She thinks she wants to identify Jean Claude or Neno (significant other) as her HCPOA but will first ask them tonight before giving me a final answer tomorrow.    Medications:  Continuous Infusions:  Scheduled Meds:   apixaban  10 mg Oral BID    [START ON 10/13/2022] apixaban  5 mg Oral BID    ceFEPime (MAXIPIME) IVPB  2 g Intravenous Q12H    dicyclomine  10 mg Oral QID    duke's soln (benadryl 30 mL, mylanta 30 mL, LIDOcaine 30 mL, nystatin 30 mL) 120 mL  10 mL Oral QID    metronidazole  500 mg Intravenous Q8H    midodrine  10 mg Oral TID    mupirocin   Topical (Top) Daily    NORepinephrine bitartrate-D5W        [START ON 10/13/2022] pantoprazole  40 mg Intravenous Daily    senna-docusate 8.6-50 mg  1 tablet Oral QHS    sodium bicarb-sodium chloride  1 Dose Swish & Spit QID     PRN Meds:acetaminophen, aluminum-magnesium hydroxide-simethicone, dextrose 10%, dextrose 10%, glucagon (human recombinant), glucose, glucose, magnesium hydroxide 400 mg/5 ml, melatonin, naloxone, ondansetron, promethazine (PHENERGAN) IVPB, simethicone, sodium chloride 0.9%, Pharmacy to dose Vancomycin consult **AND** vancomycin - pharmacy to dose    Objective:     Vital Signs (Most Recent):  Temp: 98.1 °F (36.7 °C) (10/12/22 1100)  Pulse: 106 " (10/12/22 1631)  Resp: (!) 29 (10/12/22 1631)  BP: 105/66 (10/12/22 1600)  SpO2: 100 % (10/12/22 1631)   Vital Signs (24h Range):  Temp:  [97.2 °F (36.2 °C)-100.6 °F (38.1 °C)] 98.1 °F (36.7 °C)  Pulse:  [] 106  Resp:  [15-40] 29  SpO2:  [88 %-100 %] 100 %  BP: ()/(49-72) 105/66     Weight: 63.7 kg (140 lb 6.9 oz)  Body mass index is 21.99 kg/m².    Physical Exam  Constitutional:       Appearance: She is ill-appearing.   HENT:      Head: Normocephalic.      Right Ear: External ear normal.      Left Ear: External ear normal.      Nose: Nose normal.      Mouth/Throat:      Mouth: Mucous membranes are moist.      Pharynx: Posterior oropharyngeal erythema present.   Eyes:      General: No scleral icterus.     Extraocular Movements: Extraocular movements intact.   Cardiovascular:      Rate and Rhythm: Tachycardia present.   Pulmonary:      Effort: Pulmonary effort is normal. No respiratory distress.   Abdominal:      General: Bowel sounds are normal. There is distension.   Musculoskeletal:      Cervical back: Normal range of motion.      Right lower leg: Edema present.      Left lower leg: Edema present.   Skin:     General: Skin is warm and dry.   Neurological:      Mental Status: She is alert and oriented to person, place, and time.      Motor: Weakness present.   Psychiatric:         Mood and Affect: Mood normal.         Behavior: Behavior normal.       Review of Symptoms      Symptom Assessment (ESAS 0-10 Scale)  Pain:  0  Dyspnea:  0  Anxiety:  0  Nausea:  0  Depression:  0  Anorexia:  0  Fatigue:  0  Insomnia:  0  Restlessness:  0  Agitation:  0         Significant Labs: CBC:   Recent Labs   Lab 10/11/22  0639 10/12/22  0134   WBC 6.65 9.43   HGB 7.9* 7.6*   HCT 25.4* 24.3*    199     CMP:   Recent Labs   Lab 10/11/22  0639 10/12/22  0134   * 121*   K 4.4 5.5*   CL 92* 92*   CO2 20* 21*   GLU 75 91   BUN 76* 79*   CREATININE 1.5* 1.4   CALCIUM 8.3* 7.7*   PROT 4.5* 4.8*   ALBUMIN 1.2* 1.2*    BILITOT 0.5 0.4   ALKPHOS 174* 202*   AST 17 37   ALT 11 14   ANIONGAP 11 8     CBC:   Recent Labs   Lab 10/12/22  0134   WBC 9.43   HGB 7.6*   HCT 24.3*   MCV 74*        BMP:  Recent Labs   Lab 10/12/22  0134   GLU 91   *   K 5.5*   CL 92*   CO2 21*   BUN 79*   CREATININE 1.4   CALCIUM 7.7*   MG 2.6     LFT:  Lab Results   Component Value Date    AST 37 10/12/2022    ALKPHOS 202 (H) 10/12/2022    BILITOT 0.4 10/12/2022     Albumin:   Albumin   Date Value Ref Range Status   10/12/2022 1.2 (L) 3.5 - 5.2 g/dL Final     Protein:   Total Protein   Date Value Ref Range Status   10/12/2022 4.8 (L) 6.0 - 8.4 g/dL Final     Comment:     *Result may be interfered by visible hemolysis     Lactic acid:   Lab Results   Component Value Date    LACTATE 1.3 10/12/2022    LACTATE 2.0 07/11/2022       Significant Imaging: I have reviewed all pertinent imaging results/findings within the past 24 hours.

## 2022-10-12 NOTE — NURSING
Pt arrived from floor minimally responsive. Team at bedside. Narcan given x2. After narcan, pt more responsive and oriented x4. Narcan gtt started at 0.4mg/hr. [place] placed. CXR done.  at bedside and updated on plan of care.

## 2022-10-12 NOTE — CODE/ RAPID DOCUMENTATION
RAPID RESPONSE NURSE PROACTIVE ROUNDING NOTE       Time of Visit:     Admit Date: 2022  LOS: 10  Code Status: Full Code   Date of Visit: 10/11/2022  : 1980  Age: 42 y.o.  Sex: female  Race: Black or   Bed: 97015/56499 A:   MRN: [id number]  Was the patient discharged from an ICU this admission? No   Was the patient discharged from a PACU within last 24 hours? No   Did the patient receive conscious sedation/general anesthesia in last 24 hours? No  Was the patient in the ED within the past 24 hours? No  Was the patient on NIPPV within the past 24 hours? No   Attending Physician: Nya Galvez MD  Primary Service: [facility]   Time spent at the bedside: 15 -30 min    SITUATION    Notified by charge RN during rounding.  Reason for alert: Soft BPs  Called to evaluate the patient for Circulatory    BACKGROUND     Why is the patient in the hospital?: Malignant ascites    Patient has a past medical history of Anxiety, Dehydration, Gastric cancer, Gastric ulcer, psychiatric care, Pregnancy, and Umbilical hernia.    Last Vitals:  Temp: 97.2 °F (36.2 °C) (10/11 1930)  Pulse: 99 (10/11 1930)  Resp: 16 (10/11 1952)  BP: 94/64 (10/11 2100)  SpO2: 98 % (10/11 1930)    24 Hours Vitals Range:  Temp:  [97.2 °F (36.2 °C)-97.8 °F (36.6 °C)]   Pulse:  []   Resp:  [15-18]   BP: ()/(56-68)   SpO2:  [93 %-100 %]     Labs:  Recent Labs     10/09/22  0608 10/10/22  0601 10/11/22  0639   WBC 8.55 7.33 6.65   HGB 9.0* 9.1* 7.9*   HCT 30.1* 30.0* 25.4*    203 199       Recent Labs     10/09/22  0608 10/09/22  1417 10/10/22  0601 10/11/22  0639   * 122* 123* 123*   K 4.6 4.7 4.5 4.4   CL 92* 91* 90* 92*   CO2 18* 17* 21* 20*   CREATININE 2.1* 1.9* 1.8* 1.5*   GLU 74 84 74 75   PHOS 4.9*  --  4.5 4.0   MG 2.7*  --  2.8* 2.7*        No results for input(s): PH, PCO2, PO2, HCO3, POCSATURATED, BE in the last 72 hours.     ASSESSMENT    Physical Exam  Constitutional:       General:  She is awake.      Appearance: She is underweight. She is ill-appearing.   Eyes:      Extraocular Movements: Extraocular movements intact.      Pupils: Pupils are equal, round, and reactive to light.   Cardiovascular:      Rate and Rhythm: Normal rate and regular rhythm.      Pulses:           Radial pulses are 1+ on the right side and 1+ on the left side.      Heart sounds: Normal heart sounds, S1 normal and S2 normal.   Pulmonary:      Effort: Pulmonary effort is normal. No accessory muscle usage or respiratory distress.      Breath sounds: Normal breath sounds.   Abdominal:      General: Bowel sounds are normal.   Skin:     General: Skin is warm and dry.      Capillary Refill: Capillary refill takes 2 to 3 seconds.   Neurological:      Mental Status: She is alert.      Motor: Weakness present.   Psychiatric:         Behavior: Behavior is cooperative.       INTERVENTIONS    The patient was seen for Medical problem. Staff concerns included Hypotension. The following interventions were performed: continuous pulse ox monitoring continued, continuous cardiac monitoring continued, and Midodrine dose change with IVF as ordered by primary team.    RECOMMENDATIONS    Continue POC as per primary team    PROVIDER ESCALATION    Yes/No  no    Orders received and case discussed with NA.    Disposition: Remain in room 81459.    FOLLOW-UP    Bedside Tiffanie LEBLANC  updated on plan of care. Instructed to call the Rapid Response Nurse, Harley Shine RN at [phone] for additional questions or concerns.

## 2022-10-13 PROBLEM — Z71.89 GOALS OF CARE, COUNSELING/DISCUSSION: Status: RESOLVED | Noted: 2022-10-13 | Resolved: 2022-10-13

## 2022-10-13 PROBLEM — Z71.89 GOALS OF CARE, COUNSELING/DISCUSSION: Status: ACTIVE | Noted: 2022-10-13

## 2022-10-13 LAB
ALBUMIN SERPL BCP-MCNC: 1.1 G/DL (ref 3.5–5.2)
ALP SERPL-CCNC: 206 U/L (ref 55–135)
ALT SERPL W/O P-5'-P-CCNC: 12 U/L (ref 10–44)
ANION GAP SERPL CALC-SCNC: 11 MMOL/L (ref 8–16)
AST SERPL-CCNC: 22 U/L (ref 10–40)
BASOPHILS # BLD AUTO: 0.02 K/UL (ref 0–0.2)
BASOPHILS NFR BLD: 0.2 % (ref 0–1.9)
BILIRUB SERPL-MCNC: 0.3 MG/DL (ref 0.1–1)
BUN SERPL-MCNC: 77 MG/DL (ref 6–20)
CALCIUM SERPL-MCNC: 7.6 MG/DL (ref 8.7–10.5)
CHLORIDE SERPL-SCNC: 96 MMOL/L (ref 95–110)
CO2 SERPL-SCNC: 19 MMOL/L (ref 23–29)
CREAT SERPL-MCNC: 1.4 MG/DL (ref 0.5–1.4)
DIFFERENTIAL METHOD: ABNORMAL
EOSINOPHIL # BLD AUTO: 0 K/UL (ref 0–0.5)
EOSINOPHIL NFR BLD: 0.1 % (ref 0–8)
ERYTHROCYTE [DISTWIDTH] IN BLOOD BY AUTOMATED COUNT: 19.8 % (ref 11.5–14.5)
EST. GFR  (NO RACE VARIABLE): 48.8 ML/MIN/1.73 M^2
GLUCOSE SERPL-MCNC: 82 MG/DL (ref 70–110)
HCT VFR BLD AUTO: 23.4 % (ref 37–48.5)
HGB BLD-MCNC: 7.3 G/DL (ref 12–16)
IMM GRANULOCYTES # BLD AUTO: 0.1 K/UL (ref 0–0.04)
IMM GRANULOCYTES NFR BLD AUTO: 0.8 % (ref 0–0.5)
LYMPHOCYTES # BLD AUTO: 0.6 K/UL (ref 1–4.8)
LYMPHOCYTES NFR BLD: 5 % (ref 18–48)
MAGNESIUM SERPL-MCNC: 2.3 MG/DL (ref 1.6–2.6)
MCH RBC QN AUTO: 22.7 PG (ref 27–31)
MCHC RBC AUTO-ENTMCNC: 31.2 G/DL (ref 32–36)
MCV RBC AUTO: 73 FL (ref 82–98)
MONOCYTES # BLD AUTO: 0.6 K/UL (ref 0.3–1)
MONOCYTES NFR BLD: 4.9 % (ref 4–15)
NEUTROPHILS # BLD AUTO: 11.4 K/UL (ref 1.8–7.7)
NEUTROPHILS NFR BLD: 89 % (ref 38–73)
NRBC BLD-RTO: 0 /100 WBC
PHOSPHATE SERPL-MCNC: 3.2 MG/DL (ref 2.7–4.5)
PLATELET # BLD AUTO: 194 K/UL (ref 150–450)
PMV BLD AUTO: 9.2 FL (ref 9.2–12.9)
POTASSIUM SERPL-SCNC: 3.7 MMOL/L (ref 3.5–5.1)
PROT SERPL-MCNC: 4.3 G/DL (ref 6–8.4)
RBC # BLD AUTO: 3.21 M/UL (ref 4–5.4)
SODIUM SERPL-SCNC: 126 MMOL/L (ref 136–145)
VANCOMYCIN SERPL-MCNC: 22.7 UG/ML
WBC # BLD AUTO: 12.82 K/UL (ref 3.9–12.7)

## 2022-10-13 PROCEDURE — 83735 ASSAY OF MAGNESIUM: CPT | Performed by: HOSPITALIST

## 2022-10-13 PROCEDURE — 99497 PR ADVNCD CARE PLAN 30 MIN: ICD-10-PCS | Mod: ,,, | Performed by: STUDENT IN AN ORGANIZED HEALTH CARE EDUCATION/TRAINING PROGRAM

## 2022-10-13 PROCEDURE — 85025 COMPLETE CBC W/AUTO DIFF WBC: CPT

## 2022-10-13 PROCEDURE — 99233 SBSQ HOSP IP/OBS HIGH 50: CPT | Mod: ,,, | Performed by: STUDENT IN AN ORGANIZED HEALTH CARE EDUCATION/TRAINING PROGRAM

## 2022-10-13 PROCEDURE — 80053 COMPREHEN METABOLIC PANEL: CPT | Performed by: HOSPITALIST

## 2022-10-13 PROCEDURE — 25000003 PHARM REV CODE 250: Performed by: INTERNAL MEDICINE

## 2022-10-13 PROCEDURE — 94761 N-INVAS EAR/PLS OXIMETRY MLT: CPT

## 2022-10-13 PROCEDURE — 25000003 PHARM REV CODE 250: Performed by: STUDENT IN AN ORGANIZED HEALTH CARE EDUCATION/TRAINING PROGRAM

## 2022-10-13 PROCEDURE — 87070 CULTURE OTHR SPECIMN AEROBIC: CPT | Mod: 59 | Performed by: NURSE PRACTITIONER

## 2022-10-13 PROCEDURE — 36415 COLL VENOUS BLD VENIPUNCTURE: CPT | Performed by: NURSE PRACTITIONER

## 2022-10-13 PROCEDURE — C9113 INJ PANTOPRAZOLE SODIUM, VIA: HCPCS | Performed by: INTERNAL MEDICINE

## 2022-10-13 PROCEDURE — 99233 PR SUBSEQUENT HOSPITAL CARE,LEVL III: ICD-10-PCS | Mod: ,,, | Performed by: NURSE PRACTITIONER

## 2022-10-13 PROCEDURE — 25000003 PHARM REV CODE 250

## 2022-10-13 PROCEDURE — 99233 SBSQ HOSP IP/OBS HIGH 50: CPT | Mod: ,,, | Performed by: NURSE PRACTITIONER

## 2022-10-13 PROCEDURE — 63600175 PHARM REV CODE 636 W HCPCS: Performed by: INTERNAL MEDICINE

## 2022-10-13 PROCEDURE — S0030 INJECTION, METRONIDAZOLE: HCPCS | Performed by: STUDENT IN AN ORGANIZED HEALTH CARE EDUCATION/TRAINING PROGRAM

## 2022-10-13 PROCEDURE — 20600001 HC STEP DOWN PRIVATE ROOM

## 2022-10-13 PROCEDURE — 80202 ASSAY OF VANCOMYCIN: CPT

## 2022-10-13 PROCEDURE — 99233 PR SUBSEQUENT HOSPITAL CARE,LEVL III: ICD-10-PCS | Mod: ,,, | Performed by: STUDENT IN AN ORGANIZED HEALTH CARE EDUCATION/TRAINING PROGRAM

## 2022-10-13 PROCEDURE — 84100 ASSAY OF PHOSPHORUS: CPT | Performed by: HOSPITALIST

## 2022-10-13 PROCEDURE — 99497 ADVNCD CARE PLAN 30 MIN: CPT | Mod: ,,, | Performed by: STUDENT IN AN ORGANIZED HEALTH CARE EDUCATION/TRAINING PROGRAM

## 2022-10-13 PROCEDURE — 63600175 PHARM REV CODE 636 W HCPCS: Performed by: STUDENT IN AN ORGANIZED HEALTH CARE EDUCATION/TRAINING PROGRAM

## 2022-10-13 RX ADMIN — CEFEPIME HYDROCHLORIDE 2 G: 2 INJECTION, SOLUTION INTRAVENOUS at 09:10

## 2022-10-13 RX ADMIN — MIDODRINE HYDROCHLORIDE 10 MG: 5 TABLET ORAL at 03:10

## 2022-10-13 RX ADMIN — DICYCLOMINE HYDROCHLORIDE 10 MG: 10 CAPSULE ORAL at 01:10

## 2022-10-13 RX ADMIN — DICYCLOMINE HYDROCHLORIDE 10 MG: 10 CAPSULE ORAL at 08:10

## 2022-10-13 RX ADMIN — MIDODRINE HYDROCHLORIDE 10 MG: 5 TABLET ORAL at 09:10

## 2022-10-13 RX ADMIN — Medication 1 DOSE: at 01:10

## 2022-10-13 RX ADMIN — METRONIDAZOLE 500 MG: 500 INJECTION, SOLUTION INTRAVENOUS at 04:10

## 2022-10-13 RX ADMIN — MIDODRINE HYDROCHLORIDE 10 MG: 5 TABLET ORAL at 08:10

## 2022-10-13 RX ADMIN — ALUMINUM HYDROXIDE, MAGNESIUM HYDROXIDE, AND DIMETHICONE 10 ML: 400; 400; 40 SUSPENSION ORAL at 01:10

## 2022-10-13 RX ADMIN — ALUMINUM HYDROXIDE, MAGNESIUM HYDROXIDE, AND DIMETHICONE 10 ML: 400; 400; 40 SUSPENSION ORAL at 09:10

## 2022-10-13 RX ADMIN — SENNOSIDES AND DOCUSATE SODIUM 1 TABLET: 50; 8.6 TABLET ORAL at 08:10

## 2022-10-13 RX ADMIN — APIXABAN 5 MG: 5 TABLET, FILM COATED ORAL at 08:10

## 2022-10-13 RX ADMIN — APIXABAN 10 MG: 5 TABLET, FILM COATED ORAL at 09:10

## 2022-10-13 RX ADMIN — DICYCLOMINE HYDROCHLORIDE 10 MG: 10 CAPSULE ORAL at 09:10

## 2022-10-13 RX ADMIN — ALUMINUM HYDROXIDE, MAGNESIUM HYDROXIDE, AND DIMETHICONE 10 ML: 400; 400; 40 SUSPENSION ORAL at 08:10

## 2022-10-13 RX ADMIN — Medication 1 DOSE: at 08:10

## 2022-10-13 RX ADMIN — Medication 1 DOSE: at 09:10

## 2022-10-13 RX ADMIN — PANTOPRAZOLE SODIUM 40 MG: 40 INJECTION, POWDER, FOR SOLUTION INTRAVENOUS at 09:10

## 2022-10-13 NOTE — ASSESSMENT & PLAN NOTE
42 year-old f w hx of gastric adenocarcinoma c/b hepaticand peritoneal metastases. Patient has episodes of recurrent abdominal distension due to tense ascites requiring now bi-weekly paracentesis for relief. Patient with initially up trending creatinine level that peaked at 3.2 (serum creatinine 0.7 to 0.8) that is improving after her second large volume para that removed. Patient had CT scan on admission however did not get good look at kidneys 2/2 to massive ascites. Patient denies any obstructive type symptoms.    Patient notes that she is able to urinate normally, although she notes that when she has a lot of fluid in her abdomen she goes more frequently and is associated with some back pain. That improves after fluid is removed according to patient. Patient notes since March she has been requiring paracentesis and now they are becoming more frequent.      MARIA could have also been caused by large volume paracentesis-induced kidney injury from prior paracentesis on 10/1, however creatinine per chart check had already been elevated on arrival. Patient status post paracentesis on 10/3 with approximately 5L removed and improvement in her creatinine. Patient is constipated and notes not having bowel movement since being in hospital 2/2 to pain medications.    -- MARIA/ATN, from prolonged hypoperfusion state    Plan:  --Renal function largely stable  --assess volume status daily  --CMP, phosphorus, Mg daily  --Avoid nephrotoxic agents  --avoid NSAIDs  --Stricts ins/outs  --renal ultrasound--no acute findings  --urine microscopy was consistent with muddy brown casts---acute tubular necrosis   --patient s/p abdominal pleurx for malignant ascities

## 2022-10-13 NOTE — PROGRESS NOTES
[facility]  Palliative Medicine  Progress Note    Patient Name: Puja Qiugley  MRN: [id number]  Admission Date: 9/30/2022  Hospital Length of Stay: 12 days  Code Status: Full Code   Attending Provider: Kinza No MD  Consulting Provider: Hannah Macias MD  Primary Care Physician: Primary Doctor No  Principal Problem:Shock, unspecified    Patient information was obtained from patient and primary team.      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 Hospital course is notable for development of MARIA and significant deconditioning, now unable to independently transfer/ambulate. Stepped up to the MICU on 10/11 for acute encephalopathy for concern of opioid overdose and administered Narcan drip, which was discontinued on 10/13. Anticipating step down from MICU on 10/13. Palliative and Supportive Care was consulted for symptom management recommendations.    Goals of Care:  - Code status: full code  - Will sign HCPOA paperwork this afternoon to assign Neno as her first HCPOA, then her siblings (Kelvin Jean Claude) after she discusses with Jean Claude  - Ms. Quigley shared with me (unprompted) that she knows she has stage IV cancer and that it is aggressive and not curable. She says she does not expect to see her son grow into an adult and is so grateful that she has a partner like Neno who will continue to watch over her son and keep him close to her family/friends even after she passes. She shares that she doesn't want to die in the hospital and hopes to get out of the hospital. She maintains hope to stay alive as long as possible and it is important for her and her family to know that she is doing everything she can  "in order to do so.    Pain in Mouth  Xerostomia  - Continue Duke's solution  - Consider addition of Biotene in between Duke's solution administration  - Advised spacing out oral care and avoid aggressive wiping with towel    Chronic Neoplasm Pain  - Located in abdomen related to gastric cancer and peritoneal/pelvic metastasis  - S/p paracentesis with significant relief  - PleurX catheter in place and functional  - Opioids discontinued for acute encephalopathy; s/p Narcan infusion in the MICU  - While opioids are an appropriate therapy for her malignant pain, she is agreeable with withholding at this time; maintaining that she prefers clarity currently. If she does have exacerbation in pain, would restart home oxycodone 5 mg PO q6h PRN breakthrough pain    At Risk for Opioid Induced Constipation  Slow Transit Constipation  - Continue senna 8.6 mg PO qHS to maintain bowel regimen; titrate to effect        I will follow-up with patient. Please contact us if you have any additional questions.    Subjective:     Chief Complaint:   Chief Complaint   Patient presents with    Ascites     Endorses fluid in abd and lower extremities for the past few days, reports she needs a paracentesis, states she gets them almost weekly. +SOB. 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 Hospital course is notable for development of MARIA and significant deconditioning, now unable to independently transfer/ambulate. Palliative and Supportive Care was consulted for symptom management recommendations.    Met Ms. Quigley with her friend Ilda and niece at her bedside. Ms. Quigley " "shares that her most bothersome symptom is her mouth pain/irritation. She had developed a white film across her tongue and she uses a towel to scrape off the film and to keep her mouth clean. There is associated burning pain that worsens with eating acidic food like pineapple, johann, etc. She has associated dry mouth, which she associates with her home methadone. Of note, she has been off of her home methadone since a week prior to her birthday trip to [place] due to xerostomia. She feels that the oral pain is now making its way down to her throat, and it has been painful to eat. She has modified her diet to rely on smoothies/purees, but most of these smoothies have caused a burning sensation across her tongue and further exacerbates her pain. She also has chronic pain across her lower abdomen, but feels that it is better after the paracentesis. She shares that she is a fighter and will be seeking care at [facility] for cancer-directed treatments. She has an appointment this Wednesday, 10/12/22. She expresses concern that she has become so weak that she requires assistance to transfer/ambulate. When she was first admitted, she was able to independently walk, but now is so weak that she is unable to do so.    She shares that when the doctors told her that palliative was going to come to assist with symptom management, she thought to herself, "Am I dying?" I asked her to tell me more about this, and she tells me that she wonders if she is dying because she has rapidly weakened. She then reiterates that she is a fighter and that she is not willing to discuss hospice with me. She also shares that [facility] set up a palliative care clinic visit in their facility, but she likes Dr. Brink (her current outpatient palliative provider) and would rather maintain her care and symptom management with Dr. Brink instead.       Hospital Course:  No notes on file    Interval History: Off of the Narcan drip and alert, " oriented to self, place, time and situation. Her friend Benitez is visiting her as we speak. Ms. Quigley shares that she spoke with Neno and Kelvin about assigning them as her MPOA; her sister Jean Claude is going to come today and she plans to speak to her about listing her on her MPOA form as well. She is agreeable with my revisit later today to sign HCPOA paperwork.    Medications:  Continuous Infusions:  Scheduled Meds:   apixaban  5 mg Oral BID    dicyclomine  10 mg Oral QID    duke's soln (benadryl 30 mL, mylanta 30 mL, LIDOcaine 30 mL, nystatin 30 mL) 120 mL  10 mL Oral QID    midodrine  10 mg Oral TID    mupirocin   Topical (Top) Daily    pantoprazole  40 mg Intravenous Daily    senna-docusate 8.6-50 mg  1 tablet Oral QHS    sodium bicarb-sodium chloride  1 Dose Swish & Spit QID     PRN Meds:acetaminophen, aluminum-magnesium hydroxide-simethicone, dextrose 10%, dextrose 10%, glucagon (human recombinant), glucose, glucose, magnesium hydroxide 400 mg/5 ml, melatonin, naloxone, ondansetron, promethazine (PHENERGAN) IVPB, simethicone, sodium chloride 0.9%    Objective:     Vital Signs (Most Recent):  Temp: 98.7 °F (37.1 °C) (10/13/22 0701)  Pulse: 105 (10/13/22 1015)  Resp: (!) 34 (10/13/22 1015)  BP: 96/74 (10/13/22 1000)  SpO2: 100 % (10/13/22 1015)   Vital Signs (24h Range):  Temp:  [97.8 °F (36.6 °C)-98.7 °F (37.1 °C)] 98.7 °F (37.1 °C)  Pulse:  [104-116] 105  Resp:  [15-80] 34  SpO2:  [92 %-100 %] 100 %  BP: ()/(57-74) 96/74     Weight: 63.7 kg (140 lb 6.9 oz)  Body mass index is 21.99 kg/m².    Physical Exam  Constitutional:       Appearance: She is ill-appearing.   HENT:      Head: Normocephalic and atraumatic.      Right Ear: External ear normal.      Left Ear: External ear normal.      Nose: Nose normal.      Mouth/Throat:      Mouth: Mucous membranes are moist.   Eyes:      General: No scleral icterus.     Extraocular Movements: Extraocular movements intact.   Cardiovascular:      Rate and  Rhythm: Tachycardia present.   Pulmonary:      Effort: Pulmonary effort is normal. No respiratory distress.      Breath sounds: Normal breath sounds.   Abdominal:      General: There is distension.      Palpations: Abdomen is soft.   Musculoskeletal:      Cervical back: Normal range of motion.      Right lower leg: Edema present.      Left lower leg: Edema present.   Skin:     General: Skin is warm and dry.   Neurological:      Mental Status: She is alert and oriented to person, place, and time. Mental status is at baseline.   Psychiatric:         Mood and Affect: Mood normal.         Behavior: Behavior normal.       Review of Symptoms      Symptom Assessment (ESAS 0-10 Scale)  Pain:  0  Dyspnea:  0  Anxiety:  0  Nausea:  0  Depression:  0  Anorexia:  0  Fatigue:  0  Insomnia:  0  Restlessness:  0  Agitation:  0           Significant Labs: CBC:   Recent Labs   Lab 10/12/22  0134 10/13/22  0325   WBC 9.43 12.82*   HGB 7.6* 7.3*   HCT 24.3* 23.4*    194     CMP:   Recent Labs   Lab 10/12/22  0134 10/13/22  0325   * 126*   K 5.5* 3.7   CL 92* 96   CO2 21* 19*   GLU 91 82   BUN 79* 77*   CREATININE 1.4 1.4   CALCIUM 7.7* 7.6*   PROT 4.8* 4.3*   ALBUMIN 1.2* 1.1*   BILITOT 0.4 0.3   ALKPHOS 202* 206*   AST 37 22   ALT 14 12   ANIONGAP 8 11     CBC:   Recent Labs   Lab 10/13/22  0325   WBC 12.82*   HGB 7.3*   HCT 23.4*   MCV 73*        BMP:  Recent Labs   Lab 10/13/22  0325   GLU 82   *   K 3.7   CL 96   CO2 19*   BUN 77*   CREATININE 1.4   CALCIUM 7.6*   MG 2.3     LFT:  Lab Results   Component Value Date    AST 22 10/13/2022    ALKPHOS 206 (H) 10/13/2022    BILITOT 0.3 10/13/2022     Albumin:   Albumin   Date Value Ref Range Status   10/13/2022 1.1 (L) 3.5 - 5.2 g/dL Final     Protein:   Total Protein   Date Value Ref Range Status   10/13/2022 4.3 (L) 6.0 - 8.4 g/dL Final     Lactic acid:   Lab Results   Component Value Date    LACTATE 1.3 10/12/2022    LACTATE 2.0 07/11/2022       Significant  Imaging: I have reviewed all pertinent imaging results/findings within the past 24 hours.    I spent 35 minutes in the care of this patient, >50% in chart review, face to face discussion of goals of care, symptom assessment, counseling, coordination of care and emotional support. I spent a total of 16 minutes engaging the patient in this advance care planning discussion.      Hannah Macias MD  Palliative Medicine  [facility]

## 2022-10-13 NOTE — NURSING
CMICU DAILY GOALS       A: Awake    RASS: Goal -    Actual - RASS (De Leon Agitation-Sedation Scale): 0-->alert and calm   Restraint necessity:    B: Breathe   SBT: Not intubated   C: Coordinate A & B, analgesics/sedatives   Pain: managed    SAT: Not intubated  D: Delirium   CAM-ICU: Overall CAM-ICU: Negative  E: Early(intubated/ Progressive (non-intubated) Mobility   MOVE Screen: Fail   Activity: Activity Management: Arm raise - L1, Heel slide - L1, Rolling - L1  FAS: Feeding/Nutrition   Diet order: Diet/Nutrition Received: NPO,    T: Thrombus   DVT prophylaxis: VTE Required Core Measure: Pharmacological prophylaxis initiated/maintained  H: HOB Elevation   Head of Bed (HOB) Positioning: HOB at 60 degrees  U: Ulcer Prophylaxis   GI: yes  G: Glucose control   managed    S: Skin   Bathing/Skin Care: bath, complete, dressed/undressed, incontinence care, linen changed  Device Skin Pressure Protection: absorbent pad utilized/changed, adhesive use limited, pressure points protected  Pressure Reduction Devices: pressure-redistributing mattress utilized, specialty bed utilized, foam padding utilized  Pressure Reduction Techniques: weight shift assistance provided, positioned off wounds, pressure points protected  Skin Protection: adhesive use limited, pulse oximeter probe site changed, silicone foam dressing in place, transparent dressing maintained, tubing/devices free from skin contact  B: Bowel Function   no issues   I: Indwelling Catheters   [place] necessity:      Urethral Catheter 10/12/22 0530-Reason for Continuing Urinary Catheterization: Critically ill in ICU and requiring hourly monitoring of intake/output   CVC necessity: No  D: De-escalation Antibiotics   Yes    Family/Goals of care/Code Status   Code Status: Full Code    24H Vital Sign Range  Temp:  [97.2 °F (36.2 °C)-100.6 °F (38.1 °C)]   Pulse:  []   Resp:  [15-40]   BP: ()/(52-74)   SpO2:  [88 %-100 %]      Shift Events   No acute events  throughout shift    VS and assessment per flow sheet, patient progressing towards goals as tolerated, plan of care reviewed with Mrs. Luo and family, all concerns addressed, will continue to monitor.    Nicole Maldonado

## 2022-10-13 NOTE — HOSPITAL COURSE
Patient admitted to MICU for altered mental status and hypotension; on Narcan drip. Her mentation improved and BP remained stable without vasopressor support. Paracentesis studies ordered and thus far negative for SBP; cultures pending. Antibiotics discontinued. Stable for stepdown 10/13.

## 2022-10-13 NOTE — SUBJECTIVE & OBJECTIVE
Interval History/Significant Events: Narcan drip off. Hemodynamically stable.    Review of Systems   Unable to perform ROS: Acuity of condition   Objective:     Vital Signs (Most Recent):  Temp: 98.4 °F (36.9 °C) (10/13/22 1100)  Pulse: 103 (10/13/22 1300)  Resp: (!) 24 (10/13/22 1300)  BP: 103/75 (10/13/22 1300)  SpO2: 100 % (10/13/22 1300)   Vital Signs (24h Range):  Temp:  [97.8 °F (36.6 °C)-98.7 °F (37.1 °C)] 98.4 °F (36.9 °C)  Pulse:  [] 103  Resp:  [18-80] 24  SpO2:  [92 %-100 %] 100 %  BP: ()/(59-76) 103/75   Weight: 63.7 kg (140 lb 6.9 oz)  Body mass index is 21.99 kg/m².      Intake/Output Summary (Last 24 hours) at 10/13/2022 1347  Last data filed at 10/13/2022 1301  Gross per 24 hour   Intake 886.81 ml   Output 1480 ml   Net -593.19 ml       Physical Exam  Constitutional:       Appearance: She is cachectic. She is ill-appearing.   HENT:      Head: Normocephalic and atraumatic.   Cardiovascular:      Rate and Rhythm: Tachycardia present.      Heart sounds: S1 normal and S2 normal. No murmur heard.  Pulmonary:      Effort: Pulmonary effort is normal. No tachypnea or respiratory distress.      Breath sounds: Normal breath sounds. No decreased breath sounds, wheezing, rhonchi or rales.   Abdominal:      General: There is distension.      Tenderness: There is generalized abdominal tenderness.      Comments: PleurX catheter in place   Musculoskeletal:      Cervical back: Neck supple.   Neurological:      GCS: GCS eye subscore is 4. GCS verbal subscore is 5. GCS motor subscore is 6.       Vents:     Lines/Drains/Airways       Central Venous Catheter Line  Duration             Port A Cath Single Lumen right subclavian -- days              Drain  Duration                  Drain/Device  10/05/22 0826 Right lower abdomen other (see comments) 8 days         Urethral Catheter 10/12/22 0530 1 day              Peripheral Intravenous Line  Duration                  Peripheral IV - Single Lumen 10/08/22 6714  22 G Anterior;Right Forearm 4 days         Peripheral IV - Single Lumen 10/12/22 0155 20 G Posterior;Right Hand 1 day                  Significant Labs:    CBC/Anemia Profile:  Recent Labs   Lab 10/12/22  0134 10/13/22  0325   WBC 9.43 12.82*   HGB 7.6* 7.3*   HCT 24.3* 23.4*    194   MCV 74* 73*   RDW 20.3* 19.8*   IRON 24*  --    FERRITIN 243  --         Chemistries:  Recent Labs   Lab 10/12/22  0134 10/13/22  0325   * 126*   K 5.5* 3.7   CL 92* 96   CO2 21* 19*   BUN 79* 77*   CREATININE 1.4 1.4   CALCIUM 7.7* 7.6*   ALBUMIN 1.2* 1.1*   PROT 4.8* 4.3*   BILITOT 0.4 0.3   ALKPHOS 202* 206*   ALT 14 12   AST 37 22   MG 2.6 2.3   PHOS 3.8 3.2       All pertinent labs within the past 24 hours have been reviewed.    Significant Imaging:  I have reviewed all pertinent imaging results/findings within the past 24 hours.

## 2022-10-13 NOTE — SUBJECTIVE & OBJECTIVE
Interval History: Patient seen and examined this AM. Paracentesis yesterday without SBP however stepped up to ICU and started on Narcan infusion for presumed unintentional opioid overdose. Afebrile with pulse ranging from 110-100s bpm. Systolic blood pressure ranging from 100-80s mmHg. She is saturating +94% on room air with documented UOP of ~1 liter in the last 24 hours. Renal function largely stable and sodium improved with value of 126 this morning.     Review of patient's allergies indicates:  No Known Allergies  Current Facility-Administered Medications   Medication Frequency    acetaminophen tablet 650 mg Q8H PRN    aluminum-magnesium hydroxide-simethicone 200-200-20 mg/5 mL suspension 30 mL Q6H PRN    apixaban tablet 10 mg BID    apixaban tablet 5 mg BID    cefepime in dextrose 5 % IVPB 2 g Q12H    dextrose 10% bolus 125 mL PRN    dextrose 10% bolus 250 mL PRN    dicyclomine capsule 10 mg QID    duke's soln (benadryl 30 mL, mylanta 30 mL, LIDOcaine 30 mL, nystatin 30 mL) 120 mL QID    glucagon (human recombinant) injection 1 mg PRN    glucose chewable tablet 16 g PRN    glucose chewable tablet 24 g PRN    magnesium hydroxide 400 mg/5 ml suspension 2,400 mg Daily PRN    melatonin tablet 6 mg Nightly PRN    metronidazole IVPB 500 mg Q8H    midodrine tablet 10 mg TID    mupirocin 2 % ointment Daily    naloxone (NARCAN) 4 mg in dextrose 5 % 100 mL infusion Continuous    naloxone 0.4 mg/mL injection 0.02 mg PRN    ondansetron disintegrating tablet 4 mg Q6H PRN    pantoprazole injection 40 mg Daily    promethazine (PHENERGAN) 6.25 mg in dextrose 5 % 50 mL IVPB Q6H PRN    senna-docusate 8.6-50 mg per tablet 1 tablet QHS    simethicone chewable tablet 80 mg TID PRN    sodium bicarb-sodium chloride powder 1 Dose QID    sodium chloride 0.9% flush 10 mL PRN    vancomycin - pharmacy to dose pharmacy to manage frequency       Objective:     Vital Signs (Most Recent):  Temp: 98.1 °F (36.7 °C) (10/13/22 0300)  Pulse: 109  (10/13/22 06)  Resp: 20 (10/13/22 0600)  BP: 96/65 (10/13/22 0600)  SpO2: 100 % (10/13/22 0600)  O2 Device (Oxygen Therapy): room air (10/13/22 0600) Vital Signs (24h Range):  Temp:  [97.8 °F (36.6 °C)-98.1 °F (36.7 °C)] 98.1 °F (36.7 °C)  Pulse:  [] 109  Resp:  [15-36] 20  SpO2:  [92 %-100 %] 100 %  BP: ()/(57-74) 96/65     Weight: 63.7 kg (140 lb 6.9 oz) (10/12/22 0400)  Body mass index is 21.99 kg/m².  Body surface area is 1.74 meters squared.    I/O last 3 completed shifts:  In: 3830.1 [P.O.:760; I.V.:132.4; IV Piggyback:2937.7]  Out: 1580 [Urine:1330; Other:250]    Physical Exam  Vitals and nursing note reviewed.   Constitutional:       General: She is sleeping. She is not in acute distress.     Appearance: She is ill-appearing. She is not diaphoretic.   HENT:      Head: Normocephalic and atraumatic.      Comments: Bilateral temporal wasting noted.     Right Ear: External ear normal.      Left Ear: External ear normal.      Nose: Nose normal.   Cardiovascular:      Rate and Rhythm: Tachycardia present.      Pulses: Normal pulses.      Heart sounds: No murmur heard.    No friction rub. No gallop.   Pulmonary:      Effort: Pulmonary effort is normal. No respiratory distress.      Breath sounds: No wheezing, rhonchi or rales.   Chest:      Comments: Port to right chest wall.  Abdominal:      General: Bowel sounds are normal. There is distension.      Tenderness: There is no abdominal tenderness.      Comments: Pleur-X catheter to left abdomen.   Musculoskeletal:      Right lower le+ Pitting Edema present.      Left lower le+ Pitting Edema present.      Comments: Bilateral pitting edema which extends proximally.   Skin:     General: Skin is warm and dry.      Coloration: Skin is not jaundiced.      Findings: Rash present.   Neurological:      Mental Status: She is lethargic.      Comments: Patient currently sleeping.   Psychiatric:      Comments: Patient currently sleeping.       Significant  Labs:  ABGs:   Recent Labs   Lab 10/12/22  0144   PH 7.512*   PCO2 31.6*   HCO3 25.4   POCSATURATED 97   BE 2     BMP:   Recent Labs   Lab 10/13/22  0325   GLU 82   *   K 3.7   CL 96   CO2 19*   BUN 77*   CREATININE 1.4   CALCIUM 7.6*   MG 2.3     CBC:   Recent Labs   Lab 10/13/22  0325   WBC 12.82*   RBC 3.21*   HGB 7.3*   HCT 23.4*      MCV 73*   MCH 22.7*   MCHC 31.2*     CMP:   Recent Labs   Lab 10/13/22  0325   GLU 82   CALCIUM 7.6*   ALBUMIN 1.1*   PROT 4.3*   *   K 3.7   CO2 19*   CL 96   BUN 77*   CREATININE 1.4   ALKPHOS 206*   ALT 12   AST 22   BILITOT 0.3     LFTs:   Recent Labs   Lab 10/13/22  0325   ALT 12   AST 22   ALKPHOS 206*   BILITOT 0.3   PROT 4.3*   ALBUMIN 1.1*     Microbiology Results (last 7 days)       Procedure Component Value Units Date/Time    Culture, Body Fluid - Bactec [[id number]] Collected: 10/13/22 0112    Order Status: Completed Specimen: Ascites Updated: 10/13/22 0715     Body Fluid Culture, Sterile No Growth to date    Narrative:      To rule out SBP order labs: Aerobic culture [[id number]],  Culture, Anaerobic [[id number]], Gram stain [[id number]], Albumin  [ZNG506], Protein [RZK254], LDH [[id number]], WBC \T\ Dff  [[id number]].    Culture, Body Fluid - Bactec [[id number]] Collected: 10/13/22 0113    Order Status: Completed Specimen: Ascites Updated: 10/13/22 0715     Body Fluid Culture, Sterile No Growth to date    Narrative:      To rule out SBP order labs: Aerobic culture [[id number]],  Culture, Anaerobic [[id number]], Gram stain [[id number]], Albumin  [[id number]], Protein [OIF386], LDH [ZSI693], WBC \T\ Dff  [[id number]].    Blood culture [[id number]] Collected: 10/11/22 1940    Order Status: Completed Specimen: Blood from Peripheral, Left Arm Updated: 10/12/22 2212     Blood Culture, Routine No Growth to date      No Growth to date    Blood culture [[id number]] Collected: 10/11/22 1940    Order Status: Completed Specimen: Blood Updated: 10/12/22 2212     Blood Culture, Routine No Growth to date       No Growth to date    (rule out SBP) Aerobic culture [[id number]] Collected: 10/12/22 1609    Order Status: Canceled Specimen: Ascites     (rule out SBP) Culture, Anaerobic [[id number]] Collected: 10/12/22 1609    Order Status: Canceled Specimen: Ascites     (rule out SBP) Gram stain [[id number]] Collected: 10/12/22 1609    Order Status: Canceled Specimen: Ascites           Significant Imaging:  I have reviewed all imagining in the last 24 hours.

## 2022-10-13 NOTE — PLAN OF CARE
CMICU DAILY GOALS       A: Awake    RASS: Goal -    Actual - RASS (De Leon Agitation-Sedation Scale): 0-->alert and calm   Restraint necessity:    B: Breathe   SBT: Not intubated   C: Coordinate A & B, analgesics/sedatives   Pain: managed    SAT: Not intubated  D: Delirium   CAM-ICU: Overall CAM-ICU: Negative  E: Early(intubated/ Progressive (non-intubated) Mobility   MOVE Screen: Pass   Activity: Activity Management: Rolling - L1  FAS: Feeding/Nutrition   Diet order: Diet/Nutrition Received: NPO,    T: Thrombus   DVT prophylaxis: VTE Required Core Measure: Pharmacological prophylaxis initiated/maintained  H: HOB Elevation   Head of Bed (HOB) Positioning: HOB at 30-45 degrees  U: Ulcer Prophylaxis   GI: yes  G: Glucose control   managed    S: Skin   Bathing/Skin Care: bath, complete, dressed/undressed, incontinence care, linen changed  Device Skin Pressure Protection: absorbent pad utilized/changed  Pressure Reduction Devices: specialty bed utilized  Pressure Reduction Techniques: weight shift assistance provided  Skin Protection: adhesive use limited  B: Bowel Function   no issues   I: Indwelling Catheters   [place] necessity:      Urethral Catheter 10/12/22 0530-Reason for Continuing Urinary Catheterization: Critically ill in ICU and requiring hourly monitoring of intake/output   CVC necessity: Yes  D: De-escalation Antibiotics   Yes    Family/Goals of care/Code Status   Code Status: Full Code    24H Vital Sign Range  Temp:  [97.6 °F (36.4 °C)-98.1 °F (36.7 °C)]   Pulse:  []   Resp:  [15-38]   BP: ()/(57-74)   SpO2:  [92 %-100 %]      Shift Events   No acute events throughout shift    VS and assessment per flow sheet, patient progressing towards goals as tolerated, plan of care reviewed with family, all concerns addressed, will continue to monitor.    Lavonne Siddiqui

## 2022-10-13 NOTE — PT/OT/SLP DISCHARGE
Occupational Therapy Discharge Summary    Puja Quigley  MRN: [id number]   Principal Problem: Shock, unspecified      Patient Discharged from acute Occupational Therapy on 10/12 2* tf to ICU.  Please reconsult when medically stable.    Assessment:      Patient was discharged unexpectedly.  Information required to complete an accurate discharge summary is unknown.  Refer to therapy initial evaluation and last progress note for initial and most recent functional status and goal achievement.  Recommendations made may be found in medical record.    Objective:     GOALS:   Multidisciplinary Problems       Occupational Therapy Goals          Problem: Occupational Therapy    Goal Priority Disciplines Outcome Interventions   Occupational Therapy Goal     OT, PT/OT Ongoing, Progressing    Description: Goals to be met by: 10/13/22    Patient will increase functional independence with ADLs by performing:    LE Dressing with Set-up Assistance.  Grooming while standing at sink with Modified [place].  Toileting from toilet with Set-up Assistance for hygiene and clothing management.   Supine to sit with Modified [place].  Toilet transfer to toilet with Modified [place].                         Reasons for Discontinuation of Therapy Services  Transfer to alternate level of care.      Plan:     Patient Discharged to:  ICU    10/13/2022

## 2022-10-13 NOTE — PLAN OF CARE
Recommendations     If/when able, ADAT to Regular. Add [employer] ONS to aid in caloric intake.   If unable to advance diet & TFs desired, please re-consult.  RD to monitor & follow-up.     Goals: Meet % EEN, EPN by RD f/u date  Nutrition Goal Status: new  Communication of RD Recs: reviewed with RN

## 2022-10-13 NOTE — PROGRESS NOTES
[facility] - Cardiac Medical ICU  Nephrology  Progress Note    Patient Name: Puja Quigley  MRN: [id number]  Admission Date: 9/30/2022  Hospital Length of Stay: 12 days  Attending Provider: Kinza No MD   Primary Care Physician: Primary Doctor No  Principal Problem:Shock, unspecified    Subjective:     HPI: 42 year-old F w/ hx of metastatic gastric cancer c/b peritoneal carcinomatosis and malignant ascites. Patient recently on third line docetaxel. She was initially admitted with severe abdominal pain and MARIA. She was found to have massive ascites that required large volume paracentesis on 10/1 with removal of 5.4L of fluid, and received albumin infusion. Patient reportedly gets two paracentesis per week. Last paracentesis on 9/23 removed 6.7L of fluid from the abdomen. Patient with baseline serum creatinine of 0.7 to 0.8 and presented with initial serum creatinine of 2.6-->3.2-->2.4.     Interval History: Patient seen and examined this AM. Paracentesis yesterday without SBP however stepped up to ICU and started on Narcan infusion for presumed unintentional opioid overdose. Afebrile with pulse ranging from 110-100s bpm. Systolic blood pressure ranging from 100-80s mmHg. She is saturating +94% on room air with documented UOP of ~1 liter in the last 24 hours. Renal function largely stable and sodium improved with value of 126 this morning.     Review of patient's allergies indicates:  No Known Allergies  Current Facility-Administered Medications   Medication Frequency    acetaminophen tablet 650 mg Q8H PRN    aluminum-magnesium hydroxide-simethicone 200-200-20 mg/5 mL suspension 30 mL Q6H PRN    apixaban tablet 10 mg BID    apixaban tablet 5 mg BID    cefepime in dextrose 5 % IVPB 2 g Q12H    dextrose 10% bolus 125 mL PRN    dextrose 10% bolus 250 mL PRN    dicyclomine capsule 10 mg QID    duke's soln (benadryl 30 mL, mylanta 30 mL, LIDOcaine 30 mL, nystatin 30 mL) 120 mL QID    glucagon (human  recombinant) injection 1 mg PRN    glucose chewable tablet 16 g PRN    glucose chewable tablet 24 g PRN    magnesium hydroxide 400 mg/5 ml suspension 2,400 mg Daily PRN    melatonin tablet 6 mg Nightly PRN    metronidazole IVPB 500 mg Q8H    midodrine tablet 10 mg TID    mupirocin 2 % ointment Daily    naloxone (NARCAN) 4 mg in dextrose 5 % 100 mL infusion Continuous    naloxone 0.4 mg/mL injection 0.02 mg PRN    ondansetron disintegrating tablet 4 mg Q6H PRN    pantoprazole injection 40 mg Daily    promethazine (PHENERGAN) 6.25 mg in dextrose 5 % 50 mL IVPB Q6H PRN    senna-docusate 8.6-50 mg per tablet 1 tablet QHS    simethicone chewable tablet 80 mg TID PRN    sodium bicarb-sodium chloride powder 1 Dose QID    sodium chloride 0.9% flush 10 mL PRN    vancomycin - pharmacy to dose pharmacy to manage frequency       Objective:     Vital Signs (Most Recent):  Temp: 98.1 °F (36.7 °C) (10/13/22 0300)  Pulse: 109 (10/13/22 0600)  Resp: 20 (10/13/22 0600)  BP: 96/65 (10/13/22 0600)  SpO2: 100 % (10/13/22 0600)  O2 Device (Oxygen Therapy): room air (10/13/22 0600) Vital Signs (24h Range):  Temp:  [97.8 °F (36.6 °C)-98.1 °F (36.7 °C)] 98.1 °F (36.7 °C)  Pulse:  [] 109  Resp:  [15-36] 20  SpO2:  [92 %-100 %] 100 %  BP: ()/(57-74) 96/65     Weight: 63.7 kg (140 lb 6.9 oz) (10/12/22 0400)  Body mass index is 21.99 kg/m².  Body surface area is 1.74 meters squared.    I/O last 3 completed shifts:  In: 3830.1 [P.O.:760; I.V.:132.4; IV Piggyback:2937.7]  Out: 1580 [Urine:1330; Other:250]    Physical Exam  Vitals and nursing note reviewed.   Constitutional:       General: She is sleeping. She is not in acute distress.     Appearance: She is ill-appearing. She is not diaphoretic.   HENT:      Head: Normocephalic and atraumatic.      Comments: Bilateral temporal wasting noted.     Right Ear: External ear normal.      Left Ear: External ear normal.      Nose: Nose normal.   Cardiovascular:      Rate and  Rhythm: Tachycardia present.      Pulses: Normal pulses.      Heart sounds: No murmur heard.    No friction rub. No gallop.   Pulmonary:      Effort: Pulmonary effort is normal. No respiratory distress.      Breath sounds: No wheezing, rhonchi or rales.   Chest:      Comments: Port to right chest wall.  Abdominal:      General: Bowel sounds are normal. There is distension.      Tenderness: There is no abdominal tenderness.      Comments: Pleur-X catheter to left abdomen.   Musculoskeletal:      Right lower le+ Pitting Edema present.      Left lower le+ Pitting Edema present.      Comments: Bilateral pitting edema which extends proximally.   Skin:     General: Skin is warm and dry.      Coloration: Skin is not jaundiced.      Findings: Rash present.   Neurological:      Mental Status: She is lethargic.      Comments: Patient currently sleeping.   Psychiatric:      Comments: Patient currently sleeping.       Significant Labs:  ABGs:   Recent Labs   Lab 10/12/22  014   PH 7.512*   PCO2 31.6*   HCO3 25.4   POCSATURATED 97   BE 2     BMP:   Recent Labs   Lab 10/13/22  0325   GLU 82   *   K 3.7   CL 96   CO2 19*   BUN 77*   CREATININE 1.4   CALCIUM 7.6*   MG 2.3     CBC:   Recent Labs   Lab 10/13/22  0325   WBC 12.82*   RBC 3.21*   HGB 7.3*   HCT 23.4*      MCV 73*   MCH 22.7*   MCHC 31.2*     CMP:   Recent Labs   Lab 10/13/22  0325   GLU 82   CALCIUM 7.6*   ALBUMIN 1.1*   PROT 4.3*   *   K 3.7   CO2 19*   CL 96   BUN 77*   CREATININE 1.4   ALKPHOS 206*   ALT 12   AST 22   BILITOT 0.3     LFTs:   Recent Labs   Lab 10/13/22  0325   ALT 12   AST 22   ALKPHOS 206*   BILITOT 0.3   PROT 4.3*   ALBUMIN 1.1*     Microbiology Results (last 7 days)       Procedure Component Value Units Date/Time    Culture, Body Fluid - Bactec [[id number]] Collected: 10/13/22 0112    Order Status: Completed Specimen: Ascites Updated: 10/13/22 0715     Body Fluid Culture, Sterile No Growth to date    Narrative:      To  rule out SBP order labs: Aerobic culture [[id number]],  Culture, Anaerobic [[id number]], Gram stain [[id number]], Albumin  [WOQ421], Protein [QLK714], LDH [KKO392], WBC \T\ Dff  [[id number]].    Culture, Body Fluid - Bactec [[id number]] Collected: 10/13/22 0113    Order Status: Completed Specimen: Ascites Updated: 10/13/22 0715     Body Fluid Culture, Sterile No Growth to date    Narrative:      To rule out SBP order labs: Aerobic culture [[id number]],  Culture, Anaerobic [[id number]], Gram stain [YNH163], Albumin  [EQA524], Protein [SFV824], LDH [[id number]], WBC \T\ Dff  [[id number]].    Blood culture [[id number]] Collected: 10/11/22 1940    Order Status: Completed Specimen: Blood from Peripheral, Left Arm Updated: 10/12/22 2212     Blood Culture, Routine No Growth to date      No Growth to date    Blood culture [[id number]] Collected: 10/11/22 1940    Order Status: Completed Specimen: Blood Updated: 10/12/22 2212     Blood Culture, Routine No Growth to date      No Growth to date    (rule out SBP) Aerobic culture [[id number]] Collected: 10/12/22 1609    Order Status: Canceled Specimen: Ascites     (rule out SBP) Culture, Anaerobic [[id number]] Collected: 10/12/22 1609    Order Status: Canceled Specimen: Ascites     (rule out SBP) Gram stain [[id number]] Collected: 10/12/22 1609    Order Status: Canceled Specimen: Ascites           Significant Imaging:  I have reviewed all imagining in the last 24 hours.    Assessment/Plan:     Hyponatremia  From intravascular hypovolemia    Plan:  --improving   --CMP daily     MARIA (acute kidney injury)  42 year-old f w hx of gastric adenocarcinoma c/b hepaticand peritoneal metastases. Patient has episodes of recurrent abdominal distension due to tense ascites requiring now bi-weekly paracentesis for relief. Patient with initially up trending creatinine level that peaked at 3.2 (serum creatinine 0.7 to 0.8) that is improving after her second large volume para that removed. Patient had CT scan on admission however  did not get good look at kidneys 2/2 to massive ascites. Patient denies any obstructive type symptoms.    Patient notes that she is able to urinate normally, although she notes that when she has a lot of fluid in her abdomen she goes more frequently and is associated with some back pain. That improves after fluid is removed according to patient. Patient notes since March she has been requiring paracentesis and now they are becoming more frequent.      MARIA could have also been caused by large volume paracentesis-induced kidney injury from prior paracentesis on 10/1, however creatinine per chart check had already been elevated on arrival. Patient status post paracentesis on 10/3 with approximately 5L removed and improvement in her creatinine. Patient is constipated and notes not having bowel movement since being in hospital 2/2 to pain medications.    -- MARIA/ATN, from prolonged hypoperfusion state    Plan:  --Renal function largely stable  --assess volume status daily  --CMP, phosphorus, Mg daily  --Avoid nephrotoxic agents  --avoid NSAIDs  --Stricts ins/outs  --renal ultrasound--no acute findings  --urine microscopy was consistent with muddy brown casts---acute tubular necrosis   --patient s/p abdominal pleurx for malignant ascities     Thank you for your consult. I will follow-up with patient. Please contact us if you have any additional questions.    Bienvenido Norris MD  Nephrology  [facility] - Cardiac Medical ICU

## 2022-10-13 NOTE — PLAN OF CARE
Advance Care Planning   [facility] - Cardiac Medical ICU  Palliative Care       Patient Name: Puja Quigley  MRN: [id number]  Admission Date: 9/30/2022  Hospital Length of Stay: 12 days  Code Status: Full Code   Attending Provider: Kinza No MD  Palliative Care Provider: Dr. Macias  Primary Care Physician: Primary Doctor No  Principal Problem:Shock, unspecified     Visit to bedside with [facility] Care MD Dr. Macias. Pt awake and oriented today in bed. Pt's sister Jean Claude Quigley and brother Kelvin Quigley at bedside. Pt stated that she was waiting for a bed on the Oncology unit to be stepped down, in hopes to go to SNF to get stronger.  Explained to pt that since she has been in the ICU, her discharge plan was placed on hold but her oncology social worker will restart working on this plan once she returns to the Unit.     Power of   Dr. Macias initiated the process of advance care planning yesterday evening and readdressed it today. She explained the importance of this process to the patient.  We introduced the concept of advance directives to the patient, as well. Then the patient received detailed information about the importance of designating a Health Care Power of  (HCPOA). She was also instructed to communicate with this person about their wishes for future healthcare, should she become sick and lose decision-making capacity. The patient has not previously appointed a HCPOA. After our discussion, the patient has decided to complete a HCPOA and has appointed her significant other, health care agent:  Alvino Lee  & health care agent number:  [phone]  We spent a total time of minutes discussing this issue with the patient.    Support provided to pt and family. Updated Demographic information. HPOA uploaded to Epic.    Michelle Salinas LCSW, ACHP-SW

## 2022-10-13 NOTE — SUBJECTIVE & OBJECTIVE
Interval History: Off of the Narcan drip and alert, oriented to self, place, time and situation. Her friend Benitez is visiting her as we speak. Ms. Quigley shares that she spoke with Neno and Kelvin about assigning them as her MPOA; her sister Jean Claude is going to come today and she plans to speak to her about listing her on her MPOA form as well. She is agreeable with my revisit later today to sign HCPOA paperwork.    Medications:  Continuous Infusions:  Scheduled Meds:   apixaban  5 mg Oral BID    dicyclomine  10 mg Oral QID    duke's soln (benadryl 30 mL, mylanta 30 mL, LIDOcaine 30 mL, nystatin 30 mL) 120 mL  10 mL Oral QID    midodrine  10 mg Oral TID    mupirocin   Topical (Top) Daily    pantoprazole  40 mg Intravenous Daily    senna-docusate 8.6-50 mg  1 tablet Oral QHS    sodium bicarb-sodium chloride  1 Dose Swish & Spit QID     PRN Meds:acetaminophen, aluminum-magnesium hydroxide-simethicone, dextrose 10%, dextrose 10%, glucagon (human recombinant), glucose, glucose, magnesium hydroxide 400 mg/5 ml, melatonin, naloxone, ondansetron, promethazine (PHENERGAN) IVPB, simethicone, sodium chloride 0.9%    Objective:     Vital Signs (Most Recent):  Temp: 98.7 °F (37.1 °C) (10/13/22 0701)  Pulse: 105 (10/13/22 1015)  Resp: (!) 34 (10/13/22 1015)  BP: 96/74 (10/13/22 1000)  SpO2: 100 % (10/13/22 1015)   Vital Signs (24h Range):  Temp:  [97.8 °F (36.6 °C)-98.7 °F (37.1 °C)] 98.7 °F (37.1 °C)  Pulse:  [104-116] 105  Resp:  [15-80] 34  SpO2:  [92 %-100 %] 100 %  BP: ()/(57-74) 96/74     Weight: 63.7 kg (140 lb 6.9 oz)  Body mass index is 21.99 kg/m².    Physical Exam  Constitutional:       Appearance: She is ill-appearing.   HENT:      Head: Normocephalic and atraumatic.      Right Ear: External ear normal.      Left Ear: External ear normal.      Nose: Nose normal.      Mouth/Throat:      Mouth: Mucous membranes are moist.   Eyes:      General: No scleral icterus.     Extraocular Movements: Extraocular movements  intact.   Cardiovascular:      Rate and Rhythm: Tachycardia present.   Pulmonary:      Effort: Pulmonary effort is normal. No respiratory distress.      Breath sounds: Normal breath sounds.   Abdominal:      General: There is distension.      Palpations: Abdomen is soft.   Musculoskeletal:      Cervical back: Normal range of motion.      Right lower leg: Edema present.      Left lower leg: Edema present.   Skin:     General: Skin is warm and dry.   Neurological:      Mental Status: She is alert and oriented to person, place, and time. Mental status is at baseline.   Psychiatric:         Mood and Affect: Mood normal.         Behavior: Behavior normal.       Review of Symptoms      Symptom Assessment (ESAS 0-10 Scale)  Pain:  0  Dyspnea:  0  Anxiety:  0  Nausea:  0  Depression:  0  Anorexia:  0  Fatigue:  0  Insomnia:  0  Restlessness:  0  Agitation:  0           Significant Labs: CBC:   Recent Labs   Lab 10/12/22  0134 10/13/22  0325   WBC 9.43 12.82*   HGB 7.6* 7.3*   HCT 24.3* 23.4*    194     CMP:   Recent Labs   Lab 10/12/22  0134 10/13/22  0325   * 126*   K 5.5* 3.7   CL 92* 96   CO2 21* 19*   GLU 91 82   BUN 79* 77*   CREATININE 1.4 1.4   CALCIUM 7.7* 7.6*   PROT 4.8* 4.3*   ALBUMIN 1.2* 1.1*   BILITOT 0.4 0.3   ALKPHOS 202* 206*   AST 37 22   ALT 14 12   ANIONGAP 8 11     CBC:   Recent Labs   Lab 10/13/22  0325   WBC 12.82*   HGB 7.3*   HCT 23.4*   MCV 73*        BMP:  Recent Labs   Lab 10/13/22  0325   GLU 82   *   K 3.7   CL 96   CO2 19*   BUN 77*   CREATININE 1.4   CALCIUM 7.6*   MG 2.3     LFT:  Lab Results   Component Value Date    AST 22 10/13/2022    ALKPHOS 206 (H) 10/13/2022    BILITOT 0.3 10/13/2022     Albumin:   Albumin   Date Value Ref Range Status   10/13/2022 1.1 (L) 3.5 - 5.2 g/dL Final     Protein:   Total Protein   Date Value Ref Range Status   10/13/2022 4.3 (L) 6.0 - 8.4 g/dL Final     Lactic acid:   Lab Results   Component Value Date    LACTATE 1.3 10/12/2022     LACTATE 2.0 07/11/2022       Significant Imaging: I have reviewed all pertinent imaging results/findings within the past 24 hours.

## 2022-10-13 NOTE — ASSESSMENT & PLAN NOTE
- AMS likely 2/2 opioid overdose and gabapentin use in the setting of decreased renal clearance  Resolved after Narcan drip

## 2022-10-13 NOTE — PROGRESS NOTES
"  [facility] - Cardiac Medical ICU  Adult Nutrition  Consult Note    SUMMARY     Recommendations    If/when able, ADAT to Regular. Add [employer] ONS to aid in caloric intake.   If unable to advance diet & TFs desired, please re-consult.  RD to monitor & follow-up.    Goals: Meet % EEN, EPN by RD f/u date  Nutrition Goal Status: new  Communication of RD Recs: reviewed with RN    Assessment and Plan    Nutrition Problem:  Inadequate energy intake    Related to (etiology):   Inability to consume sufficient energy     Signs and Symptoms (as evidenced by):   NPO    Interventions(treatment strategy):  Collaboration of nutrition care w/ other providers    Nutrition Diagnosis Status:   New    Reason for Assessment    Reason For Assessment: length of stay  Diagnosis: other (see comments) (Shock)  Relevant Medical History: Gastric ca  Interdisciplinary Rounds: attended    General Information Comments: Pt currently NPO, unsure of reason. Unsure of PO intake PTA; per chart review - pt's UBW: 130#. Pt was drinking [employer] ONS PTA, per outpt RD assessment last month. Unable to assess for malnutrition; will monitor energy intake & weight changes. Noted palliative care is following.   Nutrition Discharge Planning: Adequate nutrition    Nutrition/Diet History    Spiritual, Cultural Beliefs, Muslim Practices, Values that Affect Care: no  Factors Affecting Nutritional Intake: NPO    Anthropometrics    Temp: 98.7 °F (37.1 °C)  Height Method: Stated  Height: 5' 7" (170.2 cm)  Height (inches): 67 in  Weight Method: Bed Scale  Weight: 63.7 kg (140 lb 6.9 oz)  Weight (lb): 140.43 lb  Ideal Body Weight (IBW), Female: 135 lb  % Ideal Body Weight, Female (lb): 104.02 %  BMI (Calculated): 22  BMI Grade: 18.5-24.9 - normal    Lab/Procedures/Meds    Pertinent Labs Reviewed: reviewed  Pertinent Labs Comments: Na 126, BUN 77, GFR 48.8  Pertinent Medications Reviewed: reviewed  Pertinent Medications Comments: Narcan    Estimated/Assessed " Needs    Weight Used For Calorie Calculations: 63.7 kg (140 lb 6.9 oz)    Energy Calorie Requirements (kcal): 1911 kcal/d  Energy Need Method: Kcal/kg (30 kcal/kg)    Protein Requirements: 83 g/d (1.3 g/kg)  Weight Used For Protein Calculations: 63.7 kg (140 lb 6.9 oz)    Estimated Fluid Requirement Method: other (see comments) (Per MD or 1 mL/kcal)  RDA Method (mL): 1911    Nutrition Prescription Ordered    Current Diet Order: NPO    Evaluation of Received Nutrient/Fluid Intake    I/O: +1.2L since admit    Comments: LBM: 10/13    Nutrition Risk    Level of Risk/Frequency of Follow-up:  (1x/week)     Monitor and Evaluation    Food and Nutrient Intake: energy intake, food and beverage intake  Food and Nutrient Adminstration: diet order  Physical Activity and Function: nutrition-related ADLs and IADLs  Anthropometric Measurements: weight, weight change  Biochemical Data, Medical Tests and Procedures: inflammatory profile, lipid profile, glucose/endocrine profile, gastrointestinal profile, electrolyte and renal panel  Nutrition-Focused Physical Findings: overall appearance     Nutrition Follow-Up    RD Follow-up?: Yes

## 2022-10-13 NOTE — ASSESSMENT & PLAN NOTE
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 Hospital course is notable for development of MARIA and significant deconditioning, now unable to independently transfer/ambulate. Stepped up to the MICU on 10/11 for acute encephalopathy for concern of opioid overdose and administered Narcan drip, which was discontinued on 10/13. Anticipating step down from MICU on 10/13. Palliative and Supportive Care was consulted for symptom management recommendations.    Goals of Care:  - Code status: full code  - Will sign HCPOA paperwork this afternoon to assign Neno as her first HCPOA, then her siblings (Jean Claude Warren) after she discusses with Jean Claude  - Ms. Quigley shared with me (unprompted) that she knows she has stage IV cancer and that it is aggressive and not curable. She says she does not expect to see her son grow into an adult and is so grateful that she has a partner like Neno who will continue to watch over her son and keep him close to her family/friends even after she passes. She shares that she doesn't want to die in the hospital and hopes to get out of the hospital. She maintains hope to stay alive as long as possible and it is important for her and her family to know that she is doing everything she can in order to do so.    Pain in Mouth  Xerostomia  - Continue Duke's solution  - Consider addition of Biotene in between Duke's solution administration  - Advised spacing out oral care and avoid aggressive wiping with towel    Chronic Neoplasm Pain  - Located in abdomen related to gastric cancer and peritoneal/pelvic metastasis  - S/p paracentesis with significant relief  - PleurX catheter in place and functional  - Opioids discontinued for acute encephalopathy; s/p Narcan infusion  in the MICU  - While opioids are an appropriate therapy for her malignant pain, she is agreeable with withholding at this time; maintaining that she prefers clarity currently. If she does have exacerbation in pain, would restart home oxycodone 5 mg PO q6h PRN breakthrough pain    At Risk for Opioid Induced Constipation  Slow Transit Constipation  - Continue senna 8.6 mg PO qHS to maintain bowel regimen; titrate to effect

## 2022-10-13 NOTE — PROGRESS NOTES
[facility]  Critical Care Medicine  Progress Note    Patient Name: Puja Quigley  MRN: [id number]  Admission Date: 9/30/2022  Hospital Length of Stay: 12 days  Code Status: Full Code  Attending Provider: Kinza oN MD  Primary Care Provider: Primary Doctor No   Principal Problem: Shock, unspecified    Subjective:     HPI:  Mrs. 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 At the time of admission, patient was undergoing ~1-2 therapeutic paracentesis/wk but missed sessions the week prior to presentation. Patient denies HA, vision changes, fever, nausea, vomiting, urinary or bowel changes. She was admitted for intractable abdominal pain in the setting of malignant ascites and underwent Pleur-X catheter placement on 10/5/22. Patient's hospital course has been significant for a new nonocclusive thrombus in the mid R femoral vein seen on US in addition to worsening hyponatremia. Nephrology was consulted for MARIA which improved w/ paracentesis.     Critical care was consulted on 10/12 for worsening hypotension w/ tachycardia and AMS. Labs largely stable; lactate 1.3 and vBG pH 7.5/pCO2 31.6/HCO3 25.4. Infectious work-up was initiated; patient received 1L IVFs w/ little improvement in BP. Given that PRN opioids were given for pain, Narcan was administered x 2 w/ subsequent improvement in mental status (back to baseline as per family and nurses) & BP. However, critical care was notified of worsening mental status and BP a few hours later despite the additional administration of Narcan x 2. Patient was transferred to MICU for shock w/ unknown source requiring vasopressor support.       Hospital/ICU Course:  Patient admitted to MICU for altered mental status and hypotension; on Narcan drip. Her  mentation improved and BP remained stable without vasopressor support. Paracentesis studies ordered and thus far negative for SBP; cultures pending. Antibiotics discontinued. Stable for stepdown 10/13.      Interval History/Significant Events: Narcan drip off. Hemodynamically stable.    Review of Systems   Unable to perform ROS: Acuity of condition   Objective:     Vital Signs (Most Recent):  Temp: 98.4 °F (36.9 °C) (10/13/22 1100)  Pulse: 103 (10/13/22 1300)  Resp: (!) 24 (10/13/22 1300)  BP: 103/75 (10/13/22 1300)  SpO2: 100 % (10/13/22 1300)   Vital Signs (24h Range):  Temp:  [97.8 °F (36.6 °C)-98.7 °F (37.1 °C)] 98.4 °F (36.9 °C)  Pulse:  [] 103  Resp:  [18-80] 24  SpO2:  [92 %-100 %] 100 %  BP: ()/(59-76) 103/75   Weight: 63.7 kg (140 lb 6.9 oz)  Body mass index is 21.99 kg/m².      Intake/Output Summary (Last 24 hours) at 10/13/2022 1347  Last data filed at 10/13/2022 1301  Gross per 24 hour   Intake 886.81 ml   Output 1480 ml   Net -593.19 ml       Physical Exam  Constitutional:       Appearance: She is cachectic. She is ill-appearing.   HENT:      Head: Normocephalic and atraumatic.   Cardiovascular:      Rate and Rhythm: Tachycardia present.      Heart sounds: S1 normal and S2 normal. No murmur heard.  Pulmonary:      Effort: Pulmonary effort is normal. No tachypnea or respiratory distress.      Breath sounds: Normal breath sounds. No decreased breath sounds, wheezing, rhonchi or rales.   Abdominal:      General: There is distension.      Tenderness: There is generalized abdominal tenderness.      Comments: PleurX catheter in place   Musculoskeletal:      Cervical back: Neck supple.   Neurological:      GCS: GCS eye subscore is 4. GCS verbal subscore is 5. GCS motor subscore is 6.       Vents:     Lines/Drains/Airways       Central Venous Catheter Line  Duration             Port A Cath Single Lumen right subclavian -- days              Drain  Duration                  Drain/Device  10/05/22 6434  Right lower abdomen other (see comments) 8 days         Urethral Catheter 10/12/22 0530 1 day              Peripheral Intravenous Line  Duration                  Peripheral IV - Single Lumen 10/08/22 1434 22 G Anterior;Right Forearm 4 days         Peripheral IV - Single Lumen 10/12/22 0155 20 G Posterior;Right Hand 1 day                  Significant Labs:    CBC/Anemia Profile:  Recent Labs   Lab 10/12/22  0134 10/13/22  0325   WBC 9.43 12.82*   HGB 7.6* 7.3*   HCT 24.3* 23.4*    194   MCV 74* 73*   RDW 20.3* 19.8*   IRON 24*  --    FERRITIN 243  --         Chemistries:  Recent Labs   Lab 10/12/22  0134 10/13/22  0325   * 126*   K 5.5* 3.7   CL 92* 96   CO2 21* 19*   BUN 79* 77*   CREATININE 1.4 1.4   CALCIUM 7.7* 7.6*   ALBUMIN 1.2* 1.1*   PROT 4.8* 4.3*   BILITOT 0.4 0.3   ALKPHOS 202* 206*   ALT 14 12   AST 37 22   MG 2.6 2.3   PHOS 3.8 3.2       All pertinent labs within the past 24 hours have been reviewed.    Significant Imaging:  I have reviewed all pertinent imaging results/findings within the past 24 hours.      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 Critical care consulted 10/12 for worsening AMS and hypotension. Initially, clinically improved w/ administration of Narcan but again declined a few  hours later. Repeat labs notable for WBC 9.43, Hgb 7.6, Na 121, K 5.5 (hemolyzed), Cr 1.4, Alb 1.2, lactate 1.3. vBG: pH 7.5/pCO2 31.6/HCO3 25.4. Patient transferred to MICU for unspecified shock; differentials include opioid overdose vs septic shock vs distributive shock.      Plan:  - Levophed gtt to maintain MAPs >65   - BCx, UA pending  - CXR and KUB pending  - Continue cefepime, vancomycin and metronidazole. Will de-escalate pending culture results.   - Daily CBC, CMP w/ electrolytes   - Narcan gtt     Renal/  Hyperkalemia  - improved since admission   - K 5.5 (hemolyzed) 10/12; repeat pending  - daily BMPs     Hyponatremia  - likely hypervolemic hyponatremia in the setting of malignant ascites   - daily BMP    MARIA (acute kidney injury)  - Cr 3.1 on admission (baseline 0.8); improved w/ IVFs and 2 x paracentesis   - nephrology consulted; appreciate recs  - continue daily CMPs   - strict I&Os  - avoid nephrotoxic medications & renally dose medications  - nephrology following     Hematology  Thrombus of R femoral vein  - U/S 10/3 w/ new nonocclusive thrombus in the mid femoral vein  - high intensity heparin gtt transitioned to apixaban 10mg BID w/ plans to transition to 5mg on 10/13    Oncology  Malignant neoplasm of stomach  Oncologic history obtained from medical oncology note:    'HER-2 negative by FISH.  PD-L1 < 1%.  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  We previously explained the implications of a stage IV diagnosis to her and potential treatment options.  She is now s/p port placement. She sought a second opinion at [facility] for zolbetuximab trial targeting CLDN protein but she did not test positive for this biomarker. We recommended proceeding with SOC FOLFOX, with which the [facility] team agreed. Because of the negative PD-L1 I do not think the benefit of adding nivolumab outweighs the potential toxicities.        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  She opted not to get genetic testing done at her appt with Phi. We recommended that she reconsider that decision in light of a very likely germline mutation in SAMUEL which has clinical consequences.       CT CAP after 6 cycles showed improvement in ascites, probable hepatic, thyroid and bone metastases. We dropped oxaliplatin starting with cycle 10 due to grade 1 neuropathy and desire to keep it from worsening.     CT CAP after cycle 10 showed stable disease.  CT scan after cycle 15 continued to display overall stability of disease within the gastric wall, peritoneum, spine and liver. Although increase ascites on imaging and clinically can represent progression of disease, her previous CT suggested overall stability within the liver and gastric wall, so we recommended to continue with treatment time. However, she continued to have increasing abdominal ascites that has been concerning for progression of disease. Hence, she had repeat imaging. On CT performed on 4/14/2022, she appeared to have worsening disease suggestive of progression. She was seen by Dr. Reid at [facility] for f/u and to discuss possible treatment options.  She was also evaluated by Dr. Andree Mueller and Dr. See of Surgical Oncology at [facility].  Ultimately she was not felt to be a good surgical debulking candidate due to her ascites.  If her ascites improves during the course of chemotherapy and her performance status remains stable or improves, they would re-consider her again for palliative oophorectomy.      Was given ramucirumab with cycle 1 but this later was held given [facility] recs for just FOLFIRI. In addition, she didn't tolerate cycle 1 of cyramza well with significant N/V. She was started on second-line FOLFIRI. She received 5 cycles with FOLFIRI of irinotecan to 75 mg/m2 - has UGT1A1 homozygous  mutation indicating poor metabolism.  Continue 5-FU infusion at 2200 mg/m2.     Repeat imaging with CT CAP after [facility] on 8/3/22 after cycle 5 of FOLFIRI demonstrated disease progression.  Dr. Reid recommended third line docetaxel biweekly at 35 mg/m2.      s/p cycle 3 of Docetaxel.'       GI  Constipation  - senna QHS    Malignant ascites  - s/p Pleur-X catheter placement 10/5 by IR  - continue daily drains   - hold any opioids for pain in the setting of AMS      I have spent 35 min with this patient, with over 50% of this time spent coordinating care and speaking with the family.     Michelle Baez, NP  Critical Care Medicine  [facility] - [facility]

## 2022-10-13 NOTE — PLAN OF CARE
Patient was down graded to Med Onc but is still in the ICU, pending bed availability. She was seen and examined at bedside.    S: improved mouth and throat soreness. Worsening abdominal distension. Answered questions regarding disposition and prognosis.   O: vitals stable, /78, HR 68, saturating well on RA. Exam significant for worsening ascites, non tender. Significant labs: WBC 12.8 from 9.43, Hb 7.6 to 7.3 and Cr 1.4  A&P:  - will resume care when patient is transferred to medical floor   - continue current care

## 2022-10-14 VITALS
SYSTOLIC BLOOD PRESSURE: 107 MMHG | BODY MASS INDEX: 22.04 KG/M2 | OXYGEN SATURATION: 97 % | RESPIRATION RATE: 18 BRPM | DIASTOLIC BLOOD PRESSURE: 82 MMHG | HEART RATE: 106 BPM | WEIGHT: 140.44 LBS | TEMPERATURE: 98 F | HEIGHT: 67 IN

## 2022-10-14 LAB
ALBUMIN SERPL BCP-MCNC: 1.1 G/DL (ref 3.5–5.2)
ALP SERPL-CCNC: 171 U/L (ref 55–135)
ALT SERPL W/O P-5'-P-CCNC: 11 U/L (ref 10–44)
ANION GAP SERPL CALC-SCNC: 9 MMOL/L (ref 8–16)
AST SERPL-CCNC: 20 U/L (ref 10–40)
BASOPHILS # BLD AUTO: 0.01 K/UL (ref 0–0.2)
BASOPHILS NFR BLD: 0.1 % (ref 0–1.9)
BILIRUB SERPL-MCNC: 0.3 MG/DL (ref 0.1–1)
BUN SERPL-MCNC: 66 MG/DL (ref 6–20)
CALCIUM SERPL-MCNC: 7.8 MG/DL (ref 8.7–10.5)
CHLORIDE SERPL-SCNC: 98 MMOL/L (ref 95–110)
CO2 SERPL-SCNC: 20 MMOL/L (ref 23–29)
CREAT SERPL-MCNC: 1.1 MG/DL (ref 0.5–1.4)
DIFFERENTIAL METHOD: ABNORMAL
EOSINOPHIL # BLD AUTO: 0 K/UL (ref 0–0.5)
EOSINOPHIL NFR BLD: 0 % (ref 0–8)
ERYTHROCYTE [DISTWIDTH] IN BLOOD BY AUTOMATED COUNT: 20.5 % (ref 11.5–14.5)
EST. GFR  (NO RACE VARIABLE): >60 ML/MIN/1.73 M^2
GLUCOSE SERPL-MCNC: 92 MG/DL (ref 70–110)
HCT VFR BLD AUTO: 22.7 % (ref 37–48.5)
HGB BLD-MCNC: 6.9 G/DL (ref 12–16)
IMM GRANULOCYTES # BLD AUTO: 0.14 K/UL (ref 0–0.04)
IMM GRANULOCYTES NFR BLD AUTO: 1.2 % (ref 0–0.5)
LYMPHOCYTES # BLD AUTO: 0.8 K/UL (ref 1–4.8)
LYMPHOCYTES NFR BLD: 6.9 % (ref 18–48)
MAGNESIUM SERPL-MCNC: 2.2 MG/DL (ref 1.6–2.6)
MCH RBC QN AUTO: 23 PG (ref 27–31)
MCHC RBC AUTO-ENTMCNC: 30.4 G/DL (ref 32–36)
MCV RBC AUTO: 76 FL (ref 82–98)
MONOCYTES # BLD AUTO: 0.7 K/UL (ref 0.3–1)
MONOCYTES NFR BLD: 5.7 % (ref 4–15)
NEUTROPHILS # BLD AUTO: 10.5 K/UL (ref 1.8–7.7)
NEUTROPHILS NFR BLD: 86.1 % (ref 38–73)
NRBC BLD-RTO: 0 /100 WBC
PHOSPHATE SERPL-MCNC: 2.6 MG/DL (ref 2.7–4.5)
PLATELET # BLD AUTO: 215 K/UL (ref 150–450)
PMV BLD AUTO: 9.1 FL (ref 9.2–12.9)
POTASSIUM SERPL-SCNC: 3.3 MMOL/L (ref 3.5–5.1)
PROT SERPL-MCNC: 4.2 G/DL (ref 6–8.4)
RBC # BLD AUTO: 3 M/UL (ref 4–5.4)
SODIUM SERPL-SCNC: 127 MMOL/L (ref 136–145)
WBC # BLD AUTO: 12.16 K/UL (ref 3.9–12.7)

## 2022-10-14 PROCEDURE — 99238 HOSP IP/OBS DSCHRG MGMT 30/<: CPT | Mod: ,,, | Performed by: HOSPITALIST

## 2022-10-14 PROCEDURE — 25000003 PHARM REV CODE 250: Performed by: INTERNAL MEDICINE

## 2022-10-14 PROCEDURE — 83735 ASSAY OF MAGNESIUM: CPT | Performed by: HOSPITALIST

## 2022-10-14 PROCEDURE — 90832 PR PSYCHOTHERAPY W/PATIENT, 30 MIN: ICD-10-PCS | Mod: ,,, | Performed by: PSYCHOLOGIST

## 2022-10-14 PROCEDURE — 85025 COMPLETE CBC W/AUTO DIFF WBC: CPT

## 2022-10-14 PROCEDURE — 97164 PT RE-EVAL EST PLAN CARE: CPT

## 2022-10-14 PROCEDURE — 36415 COLL VENOUS BLD VENIPUNCTURE: CPT | Performed by: HOSPITALIST

## 2022-10-14 PROCEDURE — 25000003 PHARM REV CODE 250: Performed by: STUDENT IN AN ORGANIZED HEALTH CARE EDUCATION/TRAINING PROGRAM

## 2022-10-14 PROCEDURE — 97110 THERAPEUTIC EXERCISES: CPT

## 2022-10-14 PROCEDURE — 80053 COMPREHEN METABOLIC PANEL: CPT | Performed by: HOSPITALIST

## 2022-10-14 PROCEDURE — 99238 PR HOSPITAL DISCHARGE DAY,<30 MIN: ICD-10-PCS | Mod: ,,, | Performed by: HOSPITALIST

## 2022-10-14 PROCEDURE — 90832 PSYTX W PT 30 MINUTES: CPT | Mod: ,,, | Performed by: PSYCHOLOGIST

## 2022-10-14 PROCEDURE — 84100 ASSAY OF PHOSPHORUS: CPT | Performed by: HOSPITALIST

## 2022-10-14 PROCEDURE — 97535 SELF CARE MNGMENT TRAINING: CPT

## 2022-10-14 RX ORDER — ADHESIVE BANDAGE
30 BANDAGE TOPICAL DAILY PRN
Qty: 60 ML | Refills: 10 | Status: ON HOLD | OUTPATIENT
Start: 2022-10-14 | End: 2022-11-10 | Stop reason: HOSPADM

## 2022-10-14 RX ORDER — MAG HYDROX/ALUMINUM HYD/SIMETH 200-200-20
30 SUSPENSION, ORAL (FINAL DOSE FORM) ORAL EVERY 6 HOURS PRN
Qty: 5 ML | Refills: 10 | Status: SHIPPED | OUTPATIENT
Start: 2022-10-14 | End: 2023-10-14

## 2022-10-14 RX ORDER — MIDODRINE HYDROCHLORIDE 10 MG/1
10 TABLET ORAL 3 TIMES DAILY
Qty: 90 TABLET | Refills: 11 | Status: ON HOLD | OUTPATIENT
Start: 2022-10-14 | End: 2022-11-10 | Stop reason: HOSPADM

## 2022-10-14 RX ORDER — POTASSIUM CHLORIDE 20 MEQ/1
40 TABLET, EXTENDED RELEASE ORAL ONCE
Status: COMPLETED | OUTPATIENT
Start: 2022-10-14 | End: 2022-10-14

## 2022-10-14 RX ADMIN — POTASSIUM CHLORIDE 40 MEQ: 1500 TABLET, EXTENDED RELEASE ORAL at 11:10

## 2022-10-14 RX ADMIN — DICYCLOMINE HYDROCHLORIDE 10 MG: 10 CAPSULE ORAL at 02:10

## 2022-10-14 RX ADMIN — Medication 1 DOSE: at 08:10

## 2022-10-14 RX ADMIN — DICYCLOMINE HYDROCHLORIDE 10 MG: 10 CAPSULE ORAL at 08:10

## 2022-10-14 RX ADMIN — APIXABAN 5 MG: 5 TABLET, FILM COATED ORAL at 08:10

## 2022-10-14 RX ADMIN — ALUMINUM HYDROXIDE, MAGNESIUM HYDROXIDE, AND DIMETHICONE 10 ML: 400; 400; 40 SUSPENSION ORAL at 02:10

## 2022-10-14 RX ADMIN — MIDODRINE HYDROCHLORIDE 10 MG: 5 TABLET ORAL at 08:10

## 2022-10-14 RX ADMIN — ALUMINUM HYDROXIDE, MAGNESIUM HYDROXIDE, AND DIMETHICONE 10 ML: 400; 400; 40 SUSPENSION ORAL at 08:10

## 2022-10-14 RX ADMIN — MIDODRINE HYDROCHLORIDE 10 MG: 5 TABLET ORAL at 03:10

## 2022-10-14 NOTE — PROGRESS NOTES
Discharge instructions and education given to pt. Pt verbalized understanding. Reviewed medication changes, new medications, future appointments and medication compliance. Pt informed to use Care Coordinator or call 24/7 [facility] on-call nurse for any further questions or concerns.VISI and telemetry monitors removed. Personal items sent with pt. Pt waiting for ambulance services to provide transport. Medications and medical supplies will be delivered to patient's home. Vitals signs stable at time of discharge.

## 2022-10-14 NOTE — PLAN OF CARE
Problem: Occupational Therapy  Goal: Occupational Therapy Goal  Description: Goals to be met by: 10/13/22    Patient will increase functional independence with ADLs by performing:    LE Dressing with Set-up Assistance.  Grooming while standing at sink with Modified Ward.  Toileting from toilet with Set-up Assistance for hygiene and clothing management.   Supine to sit with Modified Ward.  Toilet transfer to toilet with Modified Ward.    Outcome: Ongoing, Progressing

## 2022-10-14 NOTE — NURSING TRANSFER
Nursing Transfer Note      10/13/2022     Reason patient is being transferred: step down orders     Transfer To: [address]      Transfer via inpatient bed    Transfer with cardiac monitoring    Transported by Deborah Winchester Rn and Kinsey STEWARD    Medicines sent: yes    Any special needs or follow-up needed: no    Chart send with patient: Yes    Notified: niece present and sister    Patient reassessed at: 10/13/2022 @2135      Upon arrival to floor: cardiac monitor applied, patient oriented to room, call bell in reach, and bed in lowest position

## 2022-10-14 NOTE — PLAN OF CARE
Problem: Physical Therapy  Goal: Physical Therapy Goal  Description: Goals to met by 10/27/2022    1. Supine to sit with contact guard assistance   2. Sit to supine with minimum assistance   3. Rolling to Left and Right with stand by assistance   4. Sit to stand transfer with contact guard assistance   5. Bed to chair transfer with Contact Guard Assistance using LRAD  6. Gait  x 50 feet with Contact minimum Assistance using LRAD   7. Stand for 7 minutes with Contact Guard Assistance using LRAD  8. Lower extremity exercise program x15 reps per Instruction, with assistance as needed in order to facilitate improved strength, improved postural control, and improvement in functional independence    Outcome: Ongoing, Progressing     Pt re-evaluated and appropriate goals established.

## 2022-10-14 NOTE — SUBJECTIVE & OBJECTIVE
Therapeutic Intervention: Met with patient and family member .  Outpatient - Supportive psychotherapy 30 min - CPT Code 06375    Chief Complaint/Reason for Encounter: depression, anxiety, and psych factors affecting cancer     Interval History and Content of Current Session: Patient accepted placement in hospice, feels reconciled with this. Discussed history of cancer care and provided support.     Risk Parameters:  Patient reports no suicidal ideation  Patient reports no homicidal ideation  Patient reports no self-injurious behavior  Patient reports no violent behavior    Verbal Deficits: None    Patient's response to intervention:  The patient's response to intervention is accepting.    Progress toward goals and other mental status changes:  The patient's progress toward goals is good.    Strengths and Liabilities: Strength: Patient has positive support network., Liability: Patient is unstable.    Current Evaluation:     Mental Status Exam:  General Appearance:  malnourished, thin & gaunt looking   Speech: normal tone, normal rate, normal pitch, normal volume      Level of Cooperation: cooperative      Thought Processes: distracted   Mood: dysthymic      Thought Content: normal, no suicidality, no homicidality, delusions, or paranoia   Affect: congruent and appropriate, mood-congruent   Orientation: Not assessed given recent AMS   Memory: fair   Attention Span & Concentration: distracted   Fund of General Knowledge:  appropriate to age and level of education   Abstract Reasoning: Not assessed   Judgment & Insight: fair     Language  intact

## 2022-10-14 NOTE — PT/OT/SLP PROGRESS
Occupational Therapy   Treatment    Name: Puja Quigley  MRN: [id number]  Admitting Diagnosis:  Shock, unspecified       Recommendations:     Discharge Recommendations: home health OT  Discharge Equipment Recommendations:  walker, rolling, shower chair, wheelchair, bedside commode  Barriers to discharge:  None    Assessment:     Puja Quigley is a 42 y.o. female with a medical diagnosis of Shock, unspecified.  She presents with increased fatigue and motivation to participate. Pt required Min (A) x 1 for bed mobility. Sit <> stand Mod (A) x 2 with RW. Pt ambulated to bathroom with RW and Max (A) 2 for balance / stability (Patient needed to use bathroom). Pt required Mod (A) x 1 for perirectal area cleaning. Max x 2 for sit <> stand from toilet with RW and grab bar. Increased time / assistance required to perform functional activities. Pt sat EOB without support for 10+ minutes. Pt brushed teeth with set up (A) / initiated without verbal cues. Performance deficits affecting function are weakness, impaired endurance, impaired self care skills, impaired functional mobility, gait instability, impaired balance.     Rehab Prognosis:  Good; patient would benefit from acute skilled OT services to address these deficits and reach maximum level of function.       Plan:     Patient to be seen 3 x/week to address the above listed problems via self-care/home management, neuromuscular re-education, therapeutic activities, therapeutic exercises  Plan of Care Expires: 11/05/22  Plan of Care Reviewed with: patient, friend    Subjective     Pain/Comfort:  Pain Rating 1: other (see comments) (Not specified)  Location - Side 1: Bilateral  Location - Orientation 1: generalized  Location 1: coccyx  Pain Addressed 1: Distraction, Reposition    Objective:     Communicated with: Nurse prior to session.  Patient found HOB elevated with telemetry upon OT entry to room.    General Precautions: Standard, fall   Orthopedic  Precautions:N/A   Braces: N/A  Respiratory Status: Room air     Occupational Performance:     Bed Mobility:    Patient completed Rolling/Turning to Left with  minimum assistance  Patient completed Supine to Sit with minimum assistance  Patient completed Sit to Supine with minimum assistance     Functional Mobility/Transfers:  Patient completed Sit <> Stand Transfer with maximal assistance  with  rolling walker   Functional Mobility: Pt ambulated 20 feet with Max (A) x 2 with RW. Unsteady gait / rightward lean / anterior pelvic tilt.     Activities of Daily Living:  Grooming: supervision brushed teeth at EOB with set up (A)  Upper Body Dressing: minimum assistance don gown  Lower Body Dressing: minimum assistance don b socks  Toileting: maximal assistance Friend (A) for perirectal cleaning       [facility] 6 Click ADL: 14    Treatment & Education:  Pt educated on role of OT/POC  Pt. Educated on safety precautions   Pt provided self-care/ADL re-education  Pt reviewed bed mobility/functional mobility     Patient left sitting edge of bed with all lines intact, call button in reach, nurse notified, and friend present    GOALS:   Multidisciplinary Problems       Occupational Therapy Goals          Problem: Occupational Therapy    Goal Priority Disciplines Outcome Interventions   Occupational Therapy Goal     OT, PT/OT Ongoing, Progressing    Description: Goals to be met by: 10/13/22    Patient will increase functional independence with ADLs by performing:    LE Dressing with Set-up Assistance.  Grooming while standing at sink with Modified Aroda.  Toileting from toilet with Set-up Assistance for hygiene and clothing management.   Supine to sit with Modified Aroda.  Toilet transfer to toilet with Modified Aroda.                         Time Tracking:     OT Date of Treatment: 10/14/22  OT Start Time: 0816  OT Stop Time: 0909  OT Total Time (min): 53 min    Billable Minutes:Self Care/Home Management  30  Therapeutic Exercise 23    OT/JAREK: OT     JAREK Visit Number: 0    10/14/2022

## 2022-10-14 NOTE — PLAN OF CARE
"Advance Care Planning   [facility] - Transplant Stepdown  Palliative Care       Patient Name: Puja Quigley  MRN: [id number]  Admission Date: 9/30/2022  Hospital Length of Stay: 13 days  Code Status: Full Code   Attending Provider: Corky Hawley MD  Palliative Care Provider: Dr. Macias  Primary Care Physician: Primary Doctor No  Principal Problem:Shock, unspecified     Visit to bedside. Pt sitting up on side of bed with friend April present. Pt stated that she spoke with Dr. Boucher today who recommended hospice at home but she wants to still do SNF. Pt, friend and this SW discussed recommendation of hospice in detail and allowed pt to process hospice vs SnF option. Expressed concerns that even IF SNF is an option, this may be taking time from being with her son. Pt voiced understanding.     Provided extensive education on hospice philosophy and provided open communication and support for pt to process information.      Encouraged pt to "take pictures" and do activities with her son during this time as she spoke about wanting to. Explained that hospices have social workers to help with resources to support her son and her family.    After discussion, Pt in agreement with going home with hospice.     Support provided. Informed Dr. Macias of conversation.    Michelle Salinas, [facility], [facility]-    "

## 2022-10-14 NOTE — PT/OT/SLP RE-EVAL
Physical Therapy Re-evaluation and Treatment    Patient Name:  Puja Quigley   MRN:  [id number]    Recommendations:     Discharge Recommendations:  home health PT   Discharge Equipment Recommendations: walker, rolling, wheelchair, bedside commode, shower chair   Barriers to discharge: None    Assessment:     Puja Quigley is a 42 y.o. female admitted with a medical diagnosis of Shock, unspecified.  She presents with the following impairments/functional limitations:  weakness, impaired endurance, gait instability, impaired functional mobility, impaired balance, pain, impaired self care skills. Pt tolerated activity with moderate assistance to stand, took steps along EOB with minimum assistance, primarily limited by weakness and significantly impaired endurance. Pt with significant edema in B LE contributing to impaired mobility. Pt would continue to benefit from acute skilled therapy intervention to address deficits and progress toward prior level of function.       Rehab Prognosis:  good; patient would benefit from acute skilled PT services to address these deficits and reach maximum level of function.      Recent Surgery: * No surgery found *      Plan:     During this hospitalization, patient to be seen 3 x/week to address the above listed problems via gait training, therapeutic activities, therapeutic exercises, neuromuscular re-education  Plan of Care Expires:  11/09/22  Plan of Care Reviewed with: patient    Subjective     Communicated with RN prior to session.  Patient found sitting edge of bed with telemetry upon PT entry to room, agreeable to evaluation.      Chief Complaint: pt reports fatigue, but open to work on standing   Patient comments/goals: to get better   Pain/Comfort:  Pain Rating 1:  (did not report)    Patients cultural, spiritual, Restorationist conflicts given the current situation: no      Objective:     Patient found with: telemetry     General Precautions: Standard, fall   Orthopedic  Precautions:N/A   Braces: N/A  Respiratory Status: Room air    Exams:  Cognitive Exam:  Patient is AAOx4, followed all commands, communicates clearly and fluently  Gross Motor Coordination:  WFL  RLE ROM: WFL  RLE Strength: grossly 2/5  LLE ROM: WFL  LLE Strength: grossly 2/5    Functional Mobility:  Transfers:     Sit to Stand:  moderate assistance with hand-held assist  Gait: Pt took 6 lateral steps along EOB with minimum assistance and HHA. Pt demo'd small step size, decreased foot clearance, narrow KARLO, excessive sway. Facilitation provided for lateral weight shift to promote step initiation. Cuing provided for forward gaze and upright posture.     AM-PAC 6 CLICK MOBILITY  Total Score:11       Therapeutic Activities and Exercises:   While sitting EOB, pt performed:   10x bilateral LAQ  5x bilateral seated marching with active assistance   5x bilateral anterior targeted reaching to midline.     Patient left sitting edge of bed with all lines intact, bed alarm on, and RN notified.    GOALS:   Multidisciplinary Problems       Physical Therapy Goals          Problem: Physical Therapy    Goal Priority Disciplines Outcome Goal Variances Interventions   Physical Therapy Goal     PT, PT/OT Ongoing, Progressing     Description: Goals to met by 10/27/2022    1. Supine to sit with contact guard assistance   2. Sit to supine with minimum assistance   3. Rolling to Left and Right with stand by assistance   4. Sit to stand transfer with contact guard assistance   5. Bed to chair transfer with Contact Guard Assistance using LRAD  6. Gait  x 50 feet with Contact minimum Assistance using LRAD   7. Stand for 7 minutes with Contact Guard Assistance using LRAD  8. Lower extremity exercise program x15 reps per Instruction, with assistance as needed in order to facilitate improved strength, improved postural control, and improvement in functional independence                         History:     Past Medical History:   Diagnosis Date     Anxiety     Dehydration 5/30/2022    Gastric cancer     Gastric ulcer     Hx of psychiatric care     Pregnancy 08/12/2020    delivered on 8/12/2020    Umbilical hernia        Past Surgical History:   Procedure Laterality Date    CLOSURE OF PERFORATED ULCER OF DUODENUM USING OMENTAL PATCH      ESOPHAGOGASTRODUODENOSCOPY N/A 10/13/2020    Procedure: EGD (ESOPHAGOGASTRODUODENOSCOPY);  Surgeon: Rosio Marion MD;  Location: [facility] ENDO (2ND FLR);  Service: Endoscopy;  Laterality: N/A;    ESOPHAGOGASTRODUODENOSCOPY N/A 12/11/2020    Procedure: EGD (ESOPHAGOGASTRODUODENOSCOPY);  Surgeon: Rosio Marion MD;  Location: [facility] ENDO (2ND FLR);  Service: Endoscopy;  Laterality: N/A;  Covid-19 test 12/8/20 at [facility]    ESOPHAGOGASTRODUODENOSCOPY N/A 3/12/2021    Procedure: EGD (ESOPHAGOGASTRODUODENOSCOPY);  Surgeon: Nav Omer MD;  Location: [facility] ENDO (2ND FLR);  Service: Endoscopy;  Laterality: N/A;  COVID at [facility] 3/9 ttr    ESOPHAGOGASTRODUODENOSCOPY N/A 5/18/2021    Procedure: EGD (ESOPHAGOGASTRODUODENOSCOPY);  Surgeon: Rosio Marion MD;  Location: [facility] ENDO (2ND FLR);  Service: Endoscopy;  Laterality: N/A;  5/15-covid pcw-inst portal-tb    ESOPHAGOGASTRODUODENOSCOPY N/A 5/26/2021    Procedure: EGD (ESOPHAGOGASTRODUODENOSCOPY);  Surgeon: Socrates Terrazas MD;  Location: [facility] END[facility] (2ND FLR);  Service: Endoscopy;  Laterality: N/A;       Time Tracking:     PT Received On: 10/14/22  PT Start Time: 1342     PT Stop Time: 1400  PT Total Time (min): 18 min     Billable Minutes: Evaluation 8 mins  and Therapeutic Exercise 10 mins      10/14/2022

## 2022-10-14 NOTE — CONSULTS
JOSE MARIA consulted for peripheral access.    Pt refusing PIV start at this time.  States she wants her PAC to be used instead of PIV.    RN notified.

## 2022-10-14 NOTE — PLAN OF CARE
Nephrology Chart Review    Intake/Output Summary (Last 24 hours) at 10/14/2022 0902  Last data filed at 10/14/2022 0607  Gross per 24 hour   Intake 57.19 ml   Output 550 ml   Net -492.81 ml       Vitals:    10/13/22 2331 10/14/22 0323 10/14/22 0802 10/14/22 0807   BP: 102/78 99/68  106/77   BP Location: Right arm Right arm  Right arm   Patient Position: Lying Lying  Lying   Pulse: 109 (!) 111 110 106   Resp: 17 18  20   Temp: 98 °F (36.7 °C) 98 °F (36.7 °C)  98.4 °F (36.9 °C)   TempSrc: Oral Oral  Oral   SpO2: 99% 99%  99%   Weight:       Height:           Recent Labs   Lab 10/12/22  0134 10/13/22  0325 10/14/22  0728   * 126* 127*   K 5.5* 3.7 3.3*   CL 92* 96 98   CO2 21* 19* 20*   BUN 79* 77* 66*   CREATININE 1.4 1.4 1.1   CALCIUM 7.7* 7.6* 7.8*   PHOS 3.8 3.2 2.6*     Patient being discharged with hospice today. No other related issues identified. Please call Nephrology as needed. We will continue to follow if remains inpatient.    Bienvenido Norris MD  Nephrology

## 2022-10-14 NOTE — PLAN OF CARE
Pt. Arrived to U rm [id number] via bed. VSS, spo2 >94% on room air. Skin CDI. Family at bedside. Bed in low locked position, call light within reach, pt instructed to call for assistance as needed, WCTM.

## 2022-10-14 NOTE — PLAN OF CARE
[facility]  Department of Hospital Medicine  [address] [place] [address]  [phone] after hours  [phone] fax    HOSPICE  ORDERS    10/14/2022    Admit to Hospice:  Home Service     Diagnoses:   Active Hospital Problems    Diagnosis  POA    *Shock, unspecified [R57.9]  Unknown    AMS (altered mental status) [R41.82]  Unknown    Mouth sores [K13.79]  No    Psychological factors affecting medical condition [F54]  Yes    Anxiety [F41.9]  Yes    Hyperkalemia [E87.5]  Unknown    Thrombus of R femoral vein [I82.90]  No    Hyponatremia [E87.1]  Unknown    Constipation [K59.00]  Unknown    MARIA (acute kidney injury) [N17.9]  Unknown    Neuropathy [G62.9]  Unknown    Malignant neoplasm of stomach [C16.9]  Yes    Malignant ascites [R18.0]  Yes      Resolved Hospital Problems    Diagnosis Date Resolved POA    Goals of care, counseling/discussion [Z71.89] 10/13/2022 Not Applicable       Hospice Qualifying Diagnoses: Stage IV Gastric Cancer       Patient has a life expectancy < 6 months due to:  Primary Hospice Diagnosis:  Stage IV Gastric Adenocarcinoma  Comorbid Conditions Contributing to Decline:  Progression of disease  Vital Signs: Routine per Hospice Protocol.    Code Status: Full code    Allergies: Review of patient's allergies indicates:  No Known Allergies    Diet:  Regular diet    Activities: As tolerated    Goals of Care Treatment Preferences:  Code Status: Full Code    Health care agent: Alvino Lee  [facility] agent number: [phone]                   Nursing: Per Hospice Routine.      Indwelling Pleurex catheter: Please drain up to 1 L daily    Samayoa Care: Empty Samayoa bag Q shift and PRN.  Change Samayoa every month.    Routine Skin for Bedridden Patients: Apply moisture barrier cream to all skin folds and   wet areas in perineal area daily and after baths and all bowel movements.            Oxygen:  Other Miscellaneous Care: For peritoneal drain as  belwo    For peritoneal drain, please drain daily up to 1 liter      Medications:   Must reconcile meds in EPIC first.   Keep all pain medications that patient is currently using so that hospice can correctly titrate pain medication upon arrival.  If the patient is being maintained on IV medications alone for pain in the hospital, the patient should not be transitioned to hospice on PO without conversion prior to discharge to assure proper pain regime.  If the patient needs to go on a PCA or frequent iv opioid boluses, PICC or midline will be needed, and the hospice usually requires most of the day to arrange the PCA and medications with their infusion company and pharmacy.  This should be communicated clearly to the hospice agency prior to discharge, especially since most hospices only have iv Dilaudid or Morphine on formulary.  This becomes even more important if the patient is on IV Fentanyl infusion via PCA in the hospital, as conversions should be made prior to discharge in order trial out efficacy prior to discharge.  If the patient has brain mets or frequent seizures related to the primary hospice diagnosis, be aware that most hospices only have Dilantin and Keppra on formulary.  If the patient is on Vimpat, Lamictal or other AEM's, an alternate plan should be arranged prior to discharge, i.e., conversion to common AEM's vs scheduled benzo's, along with need for Decadron.  If the patient is NPO and getting IV Keppra or Phosphenytoin, arrangements for this need to be considered if the patient is going out without a line in place and no ability to swallow medications.   If the patient requires anticoagulation as part of their primary hospice diagnosis, be aware that most hospices only have Coumadin on formulary - if NOACs, IV heparin, or therapeutic Lovenox is needed, this will likely not be possible and the family needs to be aware. (If not related to the primary diagnosis, it is covered.)  If the family is  expecting iv fluids or to finish out a course of antibiotics iv, this needs to be addressed with the hospice and family.  If needed, an adequate long-term line should be in place for this prior to discharge from the hospital, otherwise the family and patient should be made aware that this will not continue hospice.  It is difficult to start IV's in hospice.  Put STOP dates for all antibiotics as this list does not have stop dates         Medication List        ASK your doctor about these medications      baclofen 5 mg Tab tablet  Commonly known as: LIORESAL  Take 1 tablet (5 mg total) by mouth 3 (three) times daily.     CLEARLAX 17 gram/dose powder  Generic drug: polyethylene glycol  Mix 1 capful (17 g) with liquid and take by mouth once daily for 20 days     dexAMETHasone 4 MG Tab  Commonly known as: DECADRON  8 mg (two tabs) twice daily on the day before chemo and the day after chemo     dicyclomine 10 MG capsule  Commonly known as: BENTYL  TAKE ONE CAPSULE BY MOUTH FOUR TIMES DAILY     famotidine 20 MG tablet  Commonly known as: PEPCID  Take 1 tablet (20 mg total) by mouth nightly as needed for Heartburn.     furosemide 20 MG tablet  Commonly known as: LASIX  TAKE 1 TABLET(20 MG) BY MOUTH EVERY DAY  Ask about: Which instructions should I use?     gabapentin 300 MG capsule  Commonly known as: NEURONTIN  Take 1 capsule (300 mg total) by mouth 3 (three) times daily.     hydrocortisone 2.5 % cream  Apply topically 2 (two) times daily.     LIDOcaine HCL 2% 2 % jelly  Commonly known as: XYLOCAINE  Apply topically as needed.     LIDOcaine-prilocaine cream  Commonly known as: EMLA  Apply to Port-A-Cath area 30 to 45 minutes prior to port access as directed (topical anesthetic use to the anterior chest only).     methadone 5 mg/5 mL solution  Commonly known as: DOLOPHINE  Take 2.5 mLs (2.5 mg total) by mouth every morning AND 5 mLs (5 mg total) every evening.  Ask about: Should I take this medication?     metoclopramide  HCl 5 MG tablet  Commonly known as: REGLAN  5 mg.     multivitamin with folic acid 400 mcg Tab  Take 1 tablet by mouth once daily.     naloxone 4 mg/actuation Spry  Commonly known as: NARCAN  4mg by nasal route as needed for opioid overdose; may repeat every 2-3 minutes in alternating nostrils until medical help arrives. Call 911     nortriptyline 25 MG capsule  Commonly known as: PAMELOR  Take 1 capsule (25 mg total) by mouth every evening.     omeprazole 40 MG capsule  Commonly known as: PRILOSEC  Take 1 capsule (40 mg total) by mouth 2 (two) times daily before meals.     ondansetron 8 MG tablet  Commonly known as: ZOFRAN  TAKE 1 TABLET(8 MG) BY MOUTH EVERY 8 HOURS AS NEEDED FOR NAUSEA     oxyCODONE 5 MG immediate release tablet  Commonly known as: ROXICODONE  Take 1 tablet (5 mg total) by mouth every 4 (four) hours as needed for Pain.     prochlorperazine 10 MG tablet  Commonly known as: COMPAZINE  TAKE 1 TABLET(10 MG) BY MOUTH EVERY 6 HOURS AS NEEDED FOR NAUSEA     promethazine 25 MG tablet  Commonly known as: PHENERGAN  Take 1 tablet (25 mg total) by mouth every 6 (six) hours as needed for Nausea.     senna-docusate 8.6-50 mg 8.6-50 mg per tablet  Commonly known as: SENNA WITH DOCUSATE SODIUM  Take 1 tablet by mouth 2 (two) times daily as needed for Constipation.     * sucralfate 1 gram tablet  Commonly known as: CARAFATE     * sucralfate 1 gram tablet  Commonly known as: CARAFATE  TAKE 1 TABLET(1 GRAM) BY MOUTH FOUR TIMES DAILY FOR 14 DAYS     traMADoL 50 mg tablet  Commonly known as: ULTRAM  Take 50 mg by mouth.           * This list has 2 medication(s) that are the same as other medications prescribed for you. Read the directions carefully, and ask your doctor or other care provider to review them with you.                      Report CBG < 60 or > 350 to physician.         Insulin Sliding Scale         Glucose  Novolog Insulin Subcutaneous        0 - 60   Orange juice or glucose tablet      No  insulin   201-250  2 units   251-300  4 units   301-350  6 units   351-400  8 units   >400   10 units then call physician      Future Orders:  Hospice Medical Director may dictate new orders for comfortable care measures & sign death certificate.        _________________________________  Jaspal Boucher DO  10/14/2022

## 2022-10-14 NOTE — PROGRESS NOTES
This Oncology Social Worker is covering in the absence of patient's primary Oncology Social Worker Armani Mcclellan LCSW.  Patient was stepped down from ICU back to Medical Oncology Service, and consult received re: arranging home hospice services for patient's discharge today. Received report from  the team, spoke with the Resident and requested hospice orders including Pleurx catheter care and any other special care needs.    Met with patient, several cousins and a friend in patient's room with patient's permission for all to remain present. Provided support to patient. Explained hospice services in detail, and also referral process, choice of agencies, insurance coverage to be verified by agency and claims filed to her primary and secondary insurances by the agency. Patient stated no agency preference. Friend/family member had experience with [facility]. Discussed this agency with patient and she approved referral being made. Patient stated that she will not be going to her own home address; she will be going to [address] [place] [address].O., [place] (off of [facility], near [facility]). Patient called her s.o. Alvino Lee, [phone] to request address; friend in room was able to provide it. Patient's phone number is [phone]. Patient is looking forward to seeing her 2 y.o. son and wants to be able to enjoy time with him. She doesn't want to be stuck in the bed and asked about physical therapy. I informed her that I would request this through hospice if possible. Also spoke with hospital PT after I left out of her room.      Referral given and completed through [facility], phone [phone]; called agency's liason nurse Hermila Mcginnis RN, [phone], and provided all necessary information via phone. Agency's staff will be able to access patient's chart in [facility]. Asked about physical therapy and Hermila stated that they can arrange for a physical therapy eval visit. Hermila came to the hospital to  meet with patient. She called me afterwards and said that all went well, patient signed all consents, staff visits coordinated and the nurse will see her and admit this PM, comfort care medications ordered, medical equipment ordered and to be delivered by 7 PM. She said that patient asked about transport home. Informed that I can put in request order for wheelchair van transport.      Informed the Resident that the hospice referral is complete and requested that he enter the discharge order.     Entered [facility] transport request order for wheelchair van transport with a 6 PM  time. Patient is able to sit up and is not using oxygen. Later checked the Encounter and saw notation that [facility] picked up the request and the ETA was 12:15 AM. Called [facility], ext. [phone], and spoke with Yaniv about this. Asked if [facility] can be contacted and  patient sooner, or check for an alternate provider. Explained that patient will be admitted for home hospice services and the hospice nurse will be seeing her after arrival home. Yaniv stated understanding and agreed. Yaniv called me back - no other available, only [facility] and can't  sooner than 12:15 AM ETA.  He suggested I call the house supervisor and see if [facility] can be approved. I spoke with house supervisor Yonas Akhtar and he approved stretcher transport through [facility]. I called Yaniv back and informed him. Yaniv to change transport and speak with Adarsh with [facility]. ETA in 3 hours.     Communicated throughout with patient's nurse Sagar and charge nurse Sharon in person and via Secure Chat. Asked nursing staff to call [facility] when [facility] picks up patient.     No other discharge needs indicated at this time.

## 2022-10-14 NOTE — DISCHARGE SUMMARY
[facility] - Transplant Stepdown  Hematology/Oncology  Discharge Summary      Patient Name: Puja Quigley  MRN: [id number]  Admission Date: 9/30/2022  Hospital Length of Stay: 13 days  Discharge Date and Time:  10/14/2022 1:43 PM  Attending Physician: Corky Hawley MD   Discharging Provider: Jaspal Boucher DO  Primary Care Provider: Primary Doctor No    HPI:   Mrs. Quigley is a 42 F with PMHx of gastric adenocarcinoma with osseous, hepatic and peritoneal metastases complicated by recurrent abdominal distension and pain 2/2 recurrent painful, tense ascites requiring frequent decompression for symptom relief. presenting to the ED with the chief complaint of abdominal pain. Reports worsening abdominal swelling and BLE swelling over the past week. Reports undergoing paracentesis 1-2 times per week, but missed her sessions this week as she went out of town for her birthday. Reports her abdominal swelling has not been this significant in the past. Additionally reports clear productive cough. Feels shortness of breath is due to her abdominal distention. Denies fever, chest pain, vomiting, urinary or bowel movement changes.           Patient information was obtained from patient, past medical records, and ER records.      Oncology History:          Malignant neoplasm of stomach   6/10/2021 Initial Diagnosis     Gastric adenocarcinoma      7/26/2021 - 4/6/2022 Chemotherapy     Treatment Summary   Plan Name: OP FOLFOX 6 Q2W  Treatment Goal: Palliative  Status: Inactive  Start Date: 7/26/2021  End Date: 4/6/2022  Provider: Fer Ashraf MD  Chemotherapy: fluorouraciL 2,400 mg/m2 = 3,770 mg in sodium chloride 0.9% 100 mL chemo infusion, 2,400 mg/m2 = 3,770 mg, Intravenous, Over 46 hours, 19 of 20 cycles  Administration: 3,770 mg (7/26/2021), 3,770 mg (8/9/2021), 3,770 mg (8/23/2021), 3,770 mg (9/7/2021), 3,770 mg (9/21/2021), 3,770 mg (10/5/2021), 3,770 mg (10/19/2021), 3,770 mg (11/2/2021), 3,770 mg (11/15/2021),  3,770 mg (11/30/2021), 3,770 mg (12/13/2021), 4,000 mg (12/27/2021), 4,030 mg (1/10/2022), 4,030 mg (1/24/2022), 4,000 mg (2/7/2022), 4,000 mg (2/22/2022), 4,000 mg (3/7/2022), 4,000 mg (3/22/2022), 4,000 mg (4/4/2022)  oxaliplatin (ELOXATIN) 85 mg/m2 = 133 mg in dextrose 5 % 500 mL chemo infusion, 85 mg/m2 = 133 mg, Intravenous, Clinic/[facility] 1 time, 9 of 9 cycles  Administration: 133 mg (7/26/2021), 133 mg (8/9/2021), 133 mg (8/23/2021), 133 mg (9/7/2021), 133 mg (9/21/2021), 133 mg (10/5/2021), 133 mg (10/19/2021), 133 mg (11/2/2021), 133 mg (11/15/2021)         5/26/2022 - 7/27/2022 Chemotherapy     Treatment Summary   Plan Name: OP FOLFIRI Q2WK  Treatment Goal: Palliative  Status: Inactive  Start Date: 5/26/2022  End Date: 7/27/2022  Provider: Fer Ashraf MD  Chemotherapy: dexAMETHasone (DECADRON) 4 MG Tab, 8 mg, Oral, Daily, 1 of 1 cycle, Start date: --, End date: --  irinotecan (CAMPTOSAR) 120 mg/m2 = 200 mg in sodium chloride 0.9% 500 mL chemo infusion, 120 mg/m2 = 200 mg (100 % of original dose 120 mg/m2), Intravenous, Clinic/[facility] 1 time, 5 of 24 cycles  Dose modification: 125 mg/m2 (original dose 120 mg/m2, Cycle 1), 120 mg/m2 (original dose 120 mg/m2, Cycle 1), 75 mg/m2 (original dose 120 mg/m2, Cycle 2, Reason: Dose not tolerated)  Administration: 200 mg (5/26/2022), 126 mg (6/13/2022), 126 mg (6/27/2022), 134 mg (7/12/2022), 134 mg (7/25/2022)  ramucirumab (CYRAMZA) 471 mg in sodium chloride 0.9% 250 mL chemo infusion, 8 mg/kg = 471 mg, Intravenous, Clinic/[facility] 1 time, 1 of 1 cycle  Administration: 471 mg (5/26/2022)         8/16/2022 -  Chemotherapy     Treatment Summary   Plan Name: OP DOCETAXEL (75 MG/M2) Q3W  Treatment Goal: Palliative  Status: Active  Start Date: 8/16/2022  End Date: 11/8/2022 (Planned)  Provider: Fer Ashraf MD  Chemotherapy: DOCEtaxeL (TAXOTERE) 35 mg/m2 = 65 mg in sodium chloride 0.9% 253.25 mL chemo infusion, 35 mg/m2 = 65 mg (46.7 % of original dose 75 mg/m2),  Intravenous, Clinic/[facility] 1 time, 2 of 7 cycles  Dose modification: 35 mg/m2 (original dose 75 mg/m2, Cycle 1, Reason: MD Discretion, Comment: per MD Pennington recommendations)  Administration: 65 mg (8/16/2022), 65 mg (8/30/2022)       * No surgery found *     Hospital Course: Pt admitted to [facility] for severe abdominal pain 2/2 ascites. Paracentesis was performed at bedside on 10/1/22. Pt tolerated procedure well and stated it provided her relief. 5L were drained. Albumin was given. Pt's pain improved but she was still in discomfort. IR performed another paracentesis, 10/3/22, pt tolerated procedure well and 5.4L were drained. MARIA was still present, nephrology consulted. Nephrology started IV Lasix for volume overload. IR placed a Pleur-X catheter on 10/5/22. Pt tolerated procedure well. Limited amounts of fluid were drawn from Pleur-X catheter, 750 cc and 350 cc, over 2 day period. US abdomen showed minimal loculations of fluid. Creatinine did not improve, therefore diuresis was stopped and fluids given, which then improved her creatinine. Patient continued to get weaker and more hypotensive despite fluid resuscitation, and was brought to the ICU and started on pressors. Narcan gtt was started as patient was unarousable. On day 3 of ICU, patient was down graded. She was seen by palliative team. On 10/14/22 patient was requesting to be discharged to hospice as she understands we have exhausted all options for treatment with [facility] agreeing this assessment as well. SW notified of this plan of care and will discharge home to hospice care.           Goals of Care Treatment Preferences:  Code Status: Full Code    Health care agent: Alvino Lee  Health care agent number: [phone]               Vitals and nursing note reviewed.   Constitutional:       General: She is not in acute distress.     Appearance: Normal appearance.   HENT:      Head: Normocephalic and atraumatic.      Mouth/Throat:      Mouth: Mucous membranes are  moist.   Eyes:      General: No scleral icterus.     Extraocular Movements: Extraocular movements intact.      Conjunctiva/sclera: Conjunctivae normal.   Cardiovascular:      Rate and Rhythm: Regular rhythm. Tachycardia present.      Pulses: Normal pulses.      Heart sounds: Normal heart sounds. No murmur heard.    No friction rub. No gallop.   Pulmonary:      Effort: Pulmonary effort is normal. No respiratory distress.      Breath sounds: Normal breath sounds.   Abdominal:      General: Bowel sounds are normal. There is distension.      Tenderness: There is abdominal tenderness.      Comments: Pleur-X catheter in place with no erythema or purulence   Musculoskeletal:      Right lower leg: Edema present.      Left lower leg: Edema present.   Neurological:      General: No focal deficit present.      Mental Status: She is alert and oriented to person, place, and time.   Psychiatric:         Mood and Affect: Mood normal.         Behavior: Behavior normal.    Consults:   Consults (From admission, onward)        Status Ordering Provider     Inpatient consult to Midline team  Once        Provider:  (Not yet assigned)    Completed URBANO POWERS     Inpatient consult to Critical Care Medicine  Once        Provider:  (Not yet assigned)    Completed NATALEE JIMÉNEZ     Inpatient consult to Palliative Care  Once        Provider:  (Not yet assigned)    Completed HANY NUNO     Inpatient consult to Hematology/Oncology Psychology  Once        Provider:  (Not yet assigned)    Completed HANY NUNO     Inpatient consult to Interventional Radiology  Once        Provider:  (Not yet assigned)    Completed HANY TOLEDO     Inpatient consult to Nephrology  Once        Provider:  (Not yet assigned)    Completed AHNY NUNO     Inpatient consult to Hematology/Oncology  Once        Provider:  (Not yet assigned)    Completed ETELVINA ROOT          Significant Diagnostic Studies: Labs: All labs within the past 24 hours have been  reviewed    Pending Diagnostic Studies:     None        Final Active Diagnoses:    Diagnosis Date Noted POA    PRINCIPAL PROBLEM:  Shock, unspecified [R57.9] 10/12/2022 Unknown    AMS (altered mental status) [R41.82] 10/12/2022 Unknown    Mouth sores [K13.79] 10/08/2022 No    Psychological factors affecting medical condition [F54] 10/07/2022 Yes    Anxiety [F41.9] 10/07/2022 Yes    Hyperkalemia [E87.5] 10/05/2022 Unknown    Thrombus of R femoral vein [I82.90] 10/05/2022 No    Hyponatremia [E87.1] 10/04/2022 Unknown    Constipation [K59.00] 10/03/2022 Unknown    MARIA (acute kidney injury) [N17.9] 10/01/2022 Unknown    Neuropathy [G62.9] 02/07/2022 Unknown    Malignant neoplasm of stomach [C16.9] 06/10/2021 Yes    Malignant ascites [R18.0] 05/22/2021 Yes      Problems Resolved During this Admission:    Diagnosis Date Noted Date Resolved POA    Goals of care, counseling/discussion [Z71.89] 10/13/2022 10/13/2022 Not Applicable      Discharged Condition: poor    Disposition: Hospice/Home    Follow Up:    Patient Instructions:   No discharge procedures on file.  Medications:  Reconciled Home Medications:      Medication List      START taking these medications    aluminum-magnesium hydroxide-simethicone 200-200-20 mg/5 mL Susp  Commonly known as: MAALOX  Take 30 mLs by mouth every 6 (six) hours as needed.     apixaban 5 mg Tab  Commonly known as: ELIQUIS  Take 1 tablet (5 mg total) by mouth 2 (two) times daily.     DUKE'S SOLUTION (BENADRYL 30 ML, MYLANTA 30 ML, LIDOCAINE 30 ML, NYSTATIN 30 ML)  Take 10 mLs by mouth 4 (four) times daily.     magnesium hydroxide 400 mg/5 ml 400 mg/5 mL Susp  Commonly known as: MILK OF MAGNESIA  Take 30 mLs (2,400 mg total) by mouth daily as needed.     midodrine 10 MG tablet  Commonly known as: PROAMATINE  Take 1 tablet (10 mg total) by mouth 3 (three) times daily.     sodium bicarb-sodium chloride powder  Swish and spit 1 Dose 4 (four) times daily.        CONTINUE taking  these medications    baclofen 5 mg Tab tablet  Commonly known as: LIORESAL  Take 1 tablet (5 mg total) by mouth 3 (three) times daily.     CLEARLAX 17 gram/dose powder  Generic drug: polyethylene glycol  Mix 1 capful (17 g) with liquid and take by mouth once daily for 20 days     famotidine 20 MG tablet  Commonly known as: PEPCID  Take 1 tablet (20 mg total) by mouth nightly as needed for Heartburn.     furosemide 20 MG tablet  Commonly known as: LASIX  TAKE 1 TABLET(20 MG) BY MOUTH EVERY DAY     gabapentin 300 MG capsule  Commonly known as: NEURONTIN  Take 1 capsule (300 mg total) by mouth 3 (three) times daily.     hydrocortisone 2.5 % cream  Apply topically 2 (two) times daily.     naloxone 4 mg/actuation Spry  Commonly known as: NARCAN  4mg by nasal route as needed for opioid overdose; may repeat every 2-3 minutes in alternating nostrils until medical help arrives. Call 911     nortriptyline 25 MG capsule  Commonly known as: PAMELOR  Take 1 capsule (25 mg total) by mouth every evening.     ondansetron 8 MG tablet  Commonly known as: ZOFRAN  TAKE 1 TABLET(8 MG) BY MOUTH EVERY 8 HOURS AS NEEDED FOR NAUSEA     oxyCODONE 5 MG immediate release tablet  Commonly known as: ROXICODONE  Take 1 tablet (5 mg total) by mouth every 4 (four) hours as needed for Pain.     prochlorperazine 10 MG tablet  Commonly known as: COMPAZINE  TAKE 1 TABLET(10 MG) BY MOUTH EVERY 6 HOURS AS NEEDED FOR NAUSEA     senna-docusate 8.6-50 mg 8.6-50 mg per tablet  Commonly known as: SENNA WITH DOCUSATE SODIUM  Take 1 tablet by mouth 2 (two) times daily as needed for Constipation.        STOP taking these medications    dexAMETHasone 4 MG Tab  Commonly known as: DECADRON     dicyclomine 10 MG capsule  Commonly known as: BENTYL     LIDOcaine HCL 2% 2 % jelly  Commonly known as: XYLOCAINE     LIDOcaine-prilocaine cream  Commonly known as: EMLA     methadone 5 mg/5 mL solution  Commonly known as: DOLOPHINE     metoclopramide HCl 5 MG  tablet  Commonly known as: REGLAN     multivitamin with folic acid 400 mcg Tab     omeprazole 40 MG capsule  Commonly known as: PRILOSEC     promethazine 25 MG tablet  Commonly known as: PHENERGAN     sucralfate 1 gram tablet  Commonly known as: CARAFATE     traMADoL 50 mg tablet  Commonly known as: MOODY Boucher DO  Hematology/Oncology  [facility] - Transplant Stepdown

## 2022-10-14 NOTE — PROGRESS NOTES
[facility] - Transplant Stepdown  Psychology  Progress Note  Individual Psychotherapy (PhD/LCSW)    Patient Name: Puja Quigley  MRN: [id number]    Patient Class: IP- Inpatient  Admission Date: 9/30/2022  Hospital Length of Stay: 13 days  Attending Physician: Corky Hawley MD  Primary Care Provider: Primary Doctor No    Therapeutic Intervention: Met with patient and family member .  Outpatient - Supportive psychotherapy 30 min - CPT Code 38347    Chief Complaint/Reason for Encounter: depression, anxiety, and psych factors affecting cancer     Interval History and Content of Current Session: Patient accepted placement in hospice, feels reconciled with this. Discussed history of cancer care and provided support.     Risk Parameters:  Patient reports no suicidal ideation  Patient reports no homicidal ideation  Patient reports no self-injurious behavior  Patient reports no violent behavior    Verbal Deficits: None    Patient's response to intervention:  The patient's response to intervention is accepting.    Progress toward goals and other mental status changes:  The patient's progress toward goals is good.    Strengths and Liabilities: Strength: Patient has positive support network., Liability: Patient is unstable.    Current Evaluation:     Mental Status Exam:  General Appearance:  malnourished, thin & gaunt looking   Speech: normal tone, normal rate, normal pitch, normal volume      Level of Cooperation: cooperative      Thought Processes: distracted   Mood: dysthymic      Thought Content: normal, no suicidality, no homicidality, delusions, or paranoia   Affect: congruent and appropriate, mood-congruent   Orientation: Not assessed given recent AMS   Memory: fair   Attention Span & Concentration: distracted   Fund of General Knowledge:  appropriate to age and level of education   Abstract Reasoning: Not assessed   Judgment & Insight: fair     Language  intact          Diagnostic Impression - Plan:        ICD-10-CM ICD-9-CM   1. Goals of care, counseling/discussion  Z71.89 V65.49   2. SOB (shortness of breath)  R06.02 786.05   3. Malignant neoplasm of stomach, unspecified location  C16.9 151.9   4. Malignant ascites  R18.0 789.51   5. MARIA (acute kidney injury)  N17.9 584.9   6. Chest pain  R07.9 786.50   7. Intractable abdominal pain  R10.9 789.00   8. Tachycardia  R00.0 785.0   9. Palliative care encounter  Z51.5 V66.7   10. Psychological factors affecting medical condition  F54 316   11. Anxiety  F41.9 300.00   12. Cancer related pain  G89.3 338.3       Treatment Plan: Hospice/ counseling/therpay to be provided by Hospice  · Target symptoms: adjustment, goals of care  · Why chosen therapy is appropriate versus another modality: relevant to diagnosis  · Outcome monitoring methods: self-report, observation, feedback from family, checklist/rating scale  · Therapeutic intervention type: supportive psychotherapy      Length of Service (minutes): 30    Giselle Berumen, PhD  Psychology  [facility] - Transplant Stepdown

## 2022-10-14 NOTE — PLAN OF CARE
42 year-old female admitted 9/30/22 for SOB/MARIA/pain. Pt has hx of metastatic neoplasm of stomach, recurrent ascites  -AAOx4, ambulates with standby assistance  -PleurX RLQ in place  -Rt chest wall port not accessed  -250 cc removed at bedside  -Telemetry sinus tach  -K 3.3  -Potassium 40 mEq PO  -Midodrine TID  -Reg Diet  -BLE edema +3  -PT/OT today  -pt ambulated to bathroom with assisted/walker  -pt sitting up in bed, bed in lowest position, wheels locked, non-skid socks in place, call light within reach  -pending discharge home with hospice

## 2022-10-14 NOTE — PROGRESS NOTES
:  [facility], [phone], to provide home hospice services beginning with the nurse admission assessment visit after discharge from the hospital on 10/14/22. Hospice staff ordering comfort care medications, medical equipment, and additional Pleurx catheter supplies as needed.  +++Call agency upon arrival home so that the nurse is notified.+++     RU Solano, LCSW  Oncology Social Worker  [phone]  (Covering for patient's primary Oncology Social Worker Armani Mcclellan LCSW)

## 2022-10-15 NOTE — PLAN OF CARE
[facility] picked pt up at 2050 to go to [facility]- Nurse at [facility] called by this RN and is aware of pt being on her way.  Pt friend brought all of her belongings to car after pt left room.   Discharge paperwork done- pt refused to have discharge vitals done this shift- pt also refused some medications before discharge- see MAR.

## 2022-10-16 LAB
BACTERIA BLD CULT: NORMAL
BACTERIA BLD CULT: NORMAL

## 2022-10-17 ENCOUNTER — PATIENT MESSAGE (OUTPATIENT)
Dept: HEMATOLOGY/ONCOLOGY | Facility: CLINIC | Age: 42
End: 2022-10-17
Payer: COMMERCIAL

## 2022-10-18 LAB
BACTERIA FLD CULT: NORMAL
BACTERIA FLD CULT: NORMAL

## 2022-10-21 ENCOUNTER — PATIENT MESSAGE (OUTPATIENT)
Dept: HEMATOLOGY/ONCOLOGY | Facility: CLINIC | Age: 42
End: 2022-10-21
Payer: COMMERCIAL

## 2022-10-24 ENCOUNTER — HOSPITAL ENCOUNTER (OUTPATIENT)
Facility: HOSPITAL | Age: 42
Discharge: HOSPICE | End: 2022-10-26
Attending: EMERGENCY MEDICINE | Admitting: HOSPITALIST
Payer: COMMERCIAL

## 2022-10-24 DIAGNOSIS — Z96.0 INDWELLING CATHETER PRESENT ON ADMISSION: ICD-10-CM

## 2022-10-24 DIAGNOSIS — R10.9 ABDOMINAL PAIN, UNSPECIFIED ABDOMINAL LOCATION: Primary | ICD-10-CM

## 2022-10-24 PROBLEM — Z51.5 COMFORT MEASURES ONLY STATUS: Status: ACTIVE | Noted: 2022-10-24

## 2022-10-24 LAB
ALBUMIN SERPL BCP-MCNC: 1.3 G/DL (ref 3.5–5.2)
ALP SERPL-CCNC: 121 U/L (ref 55–135)
ALT SERPL W/O P-5'-P-CCNC: 20 U/L (ref 10–44)
ANION GAP SERPL CALC-SCNC: 12 MMOL/L (ref 8–16)
AST SERPL-CCNC: 28 U/L (ref 10–40)
B-HCG UR QL: NEGATIVE
BACTERIA #/AREA URNS AUTO: ABNORMAL /HPF
BASOPHILS # BLD AUTO: 0.02 K/UL (ref 0–0.2)
BASOPHILS NFR BLD: 0.2 % (ref 0–1.9)
BILIRUB SERPL-MCNC: 0.3 MG/DL (ref 0.1–1)
BILIRUB UR QL STRIP: NEGATIVE
BUN SERPL-MCNC: 23 MG/DL (ref 6–20)
CALCIUM SERPL-MCNC: 7.7 MG/DL (ref 8.7–10.5)
CHLORIDE SERPL-SCNC: 103 MMOL/L (ref 95–110)
CLARITY UR REFRACT.AUTO: ABNORMAL
CO2 SERPL-SCNC: 19 MMOL/L (ref 23–29)
COLOR UR AUTO: YELLOW
CREAT SERPL-MCNC: 0.6 MG/DL (ref 0.5–1.4)
CTP QC/QA: YES
DIFFERENTIAL METHOD: ABNORMAL
EOSINOPHIL # BLD AUTO: 0 K/UL (ref 0–0.5)
EOSINOPHIL NFR BLD: 0.1 % (ref 0–8)
ERYTHROCYTE [DISTWIDTH] IN BLOOD BY AUTOMATED COUNT: 20.6 % (ref 11.5–14.5)
EST. GFR  (NO RACE VARIABLE): >60 ML/MIN/1.73 M^2
GLUCOSE SERPL-MCNC: 78 MG/DL (ref 70–110)
GLUCOSE UR QL STRIP: NEGATIVE
HCT VFR BLD AUTO: 24.8 % (ref 37–48.5)
HGB BLD-MCNC: 6.9 G/DL (ref 12–16)
HGB UR QL STRIP: NEGATIVE
HYALINE CASTS UR QL AUTO: 3 /LPF
IMM GRANULOCYTES # BLD AUTO: 0.04 K/UL (ref 0–0.04)
IMM GRANULOCYTES NFR BLD AUTO: 0.5 % (ref 0–0.5)
KETONES UR QL STRIP: NEGATIVE
LEUKOCYTE ESTERASE UR QL STRIP: ABNORMAL
LYMPHOCYTES # BLD AUTO: 0.9 K/UL (ref 1–4.8)
LYMPHOCYTES NFR BLD: 9.9 % (ref 18–48)
MCH RBC QN AUTO: 22 PG (ref 27–31)
MCHC RBC AUTO-ENTMCNC: 27.8 G/DL (ref 32–36)
MCV RBC AUTO: 79 FL (ref 82–98)
MICROSCOPIC COMMENT: ABNORMAL
MONOCYTES # BLD AUTO: 0.4 K/UL (ref 0.3–1)
MONOCYTES NFR BLD: 5.1 % (ref 4–15)
NEUTROPHILS # BLD AUTO: 7.3 K/UL (ref 1.8–7.7)
NEUTROPHILS NFR BLD: 84.2 % (ref 38–73)
NITRITE UR QL STRIP: NEGATIVE
NRBC BLD-RTO: 0 /100 WBC
PH UR STRIP: 5 [PH] (ref 5–8)
PLATELET # BLD AUTO: 336 K/UL (ref 150–450)
PMV BLD AUTO: 10 FL (ref 9.2–12.9)
POTASSIUM SERPL-SCNC: 4.3 MMOL/L (ref 3.5–5.1)
PROT SERPL-MCNC: 5.1 G/DL (ref 6–8.4)
PROT UR QL STRIP: ABNORMAL
RBC # BLD AUTO: 3.13 M/UL (ref 4–5.4)
RBC #/AREA URNS AUTO: 2 /HPF (ref 0–4)
SARS-COV-2 RDRP RESP QL NAA+PROBE: NEGATIVE
SODIUM SERPL-SCNC: 134 MMOL/L (ref 136–145)
SP GR UR STRIP: 1.02 (ref 1–1.03)
SQUAMOUS #/AREA URNS AUTO: 10 /HPF
URN SPEC COLLECT METH UR: ABNORMAL
WBC # BLD AUTO: 8.65 K/UL (ref 3.9–12.7)
WBC #/AREA URNS AUTO: 12 /HPF (ref 0–5)

## 2022-10-24 PROCEDURE — G0378 HOSPITAL OBSERVATION PER HR: HCPCS

## 2022-10-24 PROCEDURE — 94761 N-INVAS EAR/PLS OXIMETRY MLT: CPT

## 2022-10-24 PROCEDURE — 99220 PR INITIAL OBSERVATION CARE,LEVL III: ICD-10-PCS | Mod: ,,, | Performed by: HOSPITALIST

## 2022-10-24 PROCEDURE — 87086 URINE CULTURE/COLONY COUNT: CPT | Performed by: EMERGENCY MEDICINE

## 2022-10-24 PROCEDURE — U0002 COVID-19 LAB TEST NON-CDC: HCPCS | Performed by: STUDENT IN AN ORGANIZED HEALTH CARE EDUCATION/TRAINING PROGRAM

## 2022-10-24 PROCEDURE — 99282 EMERGENCY DEPT VISIT SF MDM: CPT | Mod: CS,,, | Performed by: EMERGENCY MEDICINE

## 2022-10-24 PROCEDURE — 80053 COMPREHEN METABOLIC PANEL: CPT | Performed by: EMERGENCY MEDICINE

## 2022-10-24 PROCEDURE — 99285 EMERGENCY DEPT VISIT HI MDM: CPT | Mod: 25

## 2022-10-24 PROCEDURE — 25000003 PHARM REV CODE 250

## 2022-10-24 PROCEDURE — 25000003 PHARM REV CODE 250: Performed by: HOSPITALIST

## 2022-10-24 PROCEDURE — 99220 PR INITIAL OBSERVATION CARE,LEVL III: CPT | Mod: ,,, | Performed by: HOSPITALIST

## 2022-10-24 PROCEDURE — 99282 PR EMERGENCY DEPT VISIT,LEVEL II: ICD-10-PCS | Mod: CS,,, | Performed by: EMERGENCY MEDICINE

## 2022-10-24 PROCEDURE — 81001 URINALYSIS AUTO W/SCOPE: CPT | Performed by: EMERGENCY MEDICINE

## 2022-10-24 PROCEDURE — 81025 URINE PREGNANCY TEST: CPT

## 2022-10-24 PROCEDURE — 85025 COMPLETE CBC W/AUTO DIFF WBC: CPT | Performed by: EMERGENCY MEDICINE

## 2022-10-24 RX ORDER — GLYCERIN 1 G/1
1 SUPPOSITORY RECTAL ONCE
Status: DISCONTINUED | OUTPATIENT
Start: 2022-10-24 | End: 2022-10-26 | Stop reason: HOSPADM

## 2022-10-24 RX ORDER — ACETAMINOPHEN 325 MG/1
650 TABLET ORAL EVERY 4 HOURS PRN
Status: DISCONTINUED | OUTPATIENT
Start: 2022-10-24 | End: 2022-10-26 | Stop reason: HOSPADM

## 2022-10-24 RX ORDER — SODIUM CHLORIDE 0.9 % (FLUSH) 0.9 %
10 SYRINGE (ML) INJECTION
Status: DISCONTINUED | OUTPATIENT
Start: 2022-10-24 | End: 2022-10-26 | Stop reason: HOSPADM

## 2022-10-24 RX ORDER — IBUPROFEN 200 MG
24 TABLET ORAL
Status: DISCONTINUED | OUTPATIENT
Start: 2022-10-24 | End: 2022-10-26 | Stop reason: HOSPADM

## 2022-10-24 RX ORDER — NALOXONE HCL 0.4 MG/ML
0.02 VIAL (ML) INJECTION
Status: DISCONTINUED | OUTPATIENT
Start: 2022-10-24 | End: 2022-10-26 | Stop reason: HOSPADM

## 2022-10-24 RX ORDER — IPRATROPIUM BROMIDE AND ALBUTEROL SULFATE 2.5; .5 MG/3ML; MG/3ML
3 SOLUTION RESPIRATORY (INHALATION) EVERY 6 HOURS PRN
Status: DISCONTINUED | OUTPATIENT
Start: 2022-10-24 | End: 2022-10-26 | Stop reason: HOSPADM

## 2022-10-24 RX ORDER — TALC
6 POWDER (GRAM) TOPICAL NIGHTLY PRN
Status: DISCONTINUED | OUTPATIENT
Start: 2022-10-24 | End: 2022-10-26 | Stop reason: HOSPADM

## 2022-10-24 RX ORDER — BACLOFEN 5 MG/1
5 TABLET ORAL 3 TIMES DAILY PRN
Status: DISCONTINUED | OUTPATIENT
Start: 2022-10-24 | End: 2022-10-26 | Stop reason: HOSPADM

## 2022-10-24 RX ORDER — AMOXICILLIN 250 MG
1 CAPSULE ORAL 2 TIMES DAILY
Status: DISCONTINUED | OUTPATIENT
Start: 2022-10-24 | End: 2022-10-26 | Stop reason: HOSPADM

## 2022-10-24 RX ORDER — ONDANSETRON 2 MG/ML
4 INJECTION INTRAMUSCULAR; INTRAVENOUS EVERY 8 HOURS PRN
Status: DISCONTINUED | OUTPATIENT
Start: 2022-10-24 | End: 2022-10-26 | Stop reason: HOSPADM

## 2022-10-24 RX ORDER — AMOXICILLIN 250 MG
1 CAPSULE ORAL 2 TIMES DAILY PRN
Status: DISCONTINUED | OUTPATIENT
Start: 2022-10-24 | End: 2022-10-24

## 2022-10-24 RX ORDER — PROCHLORPERAZINE EDISYLATE 5 MG/ML
5 INJECTION INTRAMUSCULAR; INTRAVENOUS EVERY 6 HOURS PRN
Status: DISCONTINUED | OUTPATIENT
Start: 2022-10-24 | End: 2022-10-26 | Stop reason: HOSPADM

## 2022-10-24 RX ORDER — IBUPROFEN 200 MG
16 TABLET ORAL
Status: DISCONTINUED | OUTPATIENT
Start: 2022-10-24 | End: 2022-10-26 | Stop reason: HOSPADM

## 2022-10-24 RX ORDER — OXYCODONE HYDROCHLORIDE 5 MG/1
5 TABLET ORAL EVERY 4 HOURS PRN
Status: DISCONTINUED | OUTPATIENT
Start: 2022-10-24 | End: 2022-10-26 | Stop reason: HOSPADM

## 2022-10-24 RX ORDER — MORPHINE SULFATE 2 MG/ML
2 INJECTION, SOLUTION INTRAMUSCULAR; INTRAVENOUS EVERY 4 HOURS PRN
Status: DISCONTINUED | OUTPATIENT
Start: 2022-10-24 | End: 2022-10-26 | Stop reason: HOSPADM

## 2022-10-24 RX ORDER — SIMETHICONE 80 MG
1 TABLET,CHEWABLE ORAL 3 TIMES DAILY PRN
Status: DISCONTINUED | OUTPATIENT
Start: 2022-10-24 | End: 2022-10-26 | Stop reason: HOSPADM

## 2022-10-24 RX ORDER — GABAPENTIN 300 MG/1
300 CAPSULE ORAL 3 TIMES DAILY
Status: DISCONTINUED | OUTPATIENT
Start: 2022-10-24 | End: 2022-10-26 | Stop reason: HOSPADM

## 2022-10-24 RX ADMIN — MINERAL OIL, PETROLATUM, PHENYLEPHRINE HCL 1 APPLICATOR: 14; 74.9; .25 OINTMENT RECTAL at 08:10

## 2022-10-24 RX ADMIN — SIMETHICONE 80 MG: 80 TABLET, CHEWABLE ORAL at 06:10

## 2022-10-24 RX ADMIN — OXYCODONE 5 MG: 5 TABLET ORAL at 09:10

## 2022-10-24 RX ADMIN — GABAPENTIN 300 MG: 300 CAPSULE ORAL at 09:10

## 2022-10-24 NOTE — ED PROVIDER NOTES
Encounter Date: 10/24/2022       History     Chief Complaint   Patient presents with    Abdominal Pain     Abx surgery 3 wks ago. Pain at drain site.      Pt is a 42 year old F w/ a history of gastric adenocarcinoma w/ metastasis to bone, liver and peritoneum c/b ascites requiring frequent paracentesis s/p Pleur-X catheter placement on 10/5/22, recently placed on hospice who presents with a CC of abdominal pain secondary to the Pleur-X  drain causing her discomfort. She states that she would like to have the drain removed and then go back to hospice. Her pain is well controlled apart from the discomfort caused by the Pleur-X.      She denies CP, SOB, n/v, diarrhea. She endorses constipation however no blood in her urine or stool. BLE edema at baseline per patient.         The history is provided by the patient and medical records.   Review of patient's allergies indicates:  No Known Allergies  Past Medical History:   Diagnosis Date    Anxiety     Dehydration 5/30/2022    Gastric cancer     Gastric ulcer     Hx of psychiatric care     Pregnancy 08/12/2020    delivered on 8/12/2020    Umbilical hernia      Past Surgical History:   Procedure Laterality Date    CLOSURE OF PERFORATED ULCER OF DUODENUM USING OMENTAL PATCH      ESOPHAGOGASTRODUODENOSCOPY N/A 10/13/2020    Procedure: EGD (ESOPHAGOGASTRODUODENOSCOPY);  Surgeon: Rosio Marion MD;  Location: [facility] ([address] [place] [address]);  Service: Endoscopy;  Laterality: N/A;    ESOPHAGOGASTRODUODENOSCOPY N/A 12/11/2020    Procedure: EGD (ESOPHAGOGASTRODUODENOSCOPY);  Surgeon: Rosio Marion MD;  Location: [facility] ([address] [place] [address]);  Service: Endoscopy;  Laterality: N/A;  Covid-19 test 12/8/20 at [facility]    ESOPHAGOGASTRODUODENOSCOPY N/A 3/12/2021    Procedure: EGD (ESOPHAGOGASTRODUODENOSCOPY);  Surgeon: Nav Omer MD;  Location: [facility] ([address] [place] [address]);  Service: Endoscopy;  Laterality: N/A;  COVID at [facility] 3/9 ttr    ESOPHAGOGASTRODUODENOSCOPY N/A 5/18/2021     "Procedure: EGD (ESOPHAGOGASTRODUODENOSCOPY);  Surgeon: Rosio Marion MD;  Location: [facility] ENDO (2ND FLR);  Service: Endoscopy;  Laterality: N/A;  5/15-covid pcw-inst portal-tb    ESOPHAGOGASTRODUODENOSCOPY N/A 2021    Procedure: EGD (ESOPHAGOGASTRODUODENOSCOPY);  Surgeon: Socrates Terrazas MD;  Location: [facility] ENDO (2ND FLR);  Service: Endoscopy;  Laterality: N/A;     Family History   Problem Relation Age of Onset    Cancer Mother 67        lymphoma (type? "not active," not on tx; "related to her blood")    Schizophrenia Mother     Lung cancer Father 48        type? h/o smoking    No Known Problems Son     Breast cancer Other 73        unilat; stage I, but aggressive    Prostate cancer Paternal Uncle         (dx 50s/60? no chemo?)    Breast cancer Paternal Cousin         (dx age?) ductal    Colon cancer Neg Hx     Esophageal cancer Neg Hx      Social History     Tobacco Use    Smoking status: Former     Types: Cigarettes     Quit date: 2019     Years since quittin.9    Smokeless tobacco: Never   Substance Use Topics    Alcohol use: Yes    Drug use: Not Currently     Review of Systems   Constitutional:  Negative for chills and fever.   HENT:  Negative for facial swelling, rhinorrhea and voice change.    Eyes:  Negative for discharge, redness and visual disturbance.   Respiratory:  Negative for cough, shortness of breath and wheezing.    Cardiovascular:  Positive for leg swelling. Negative for chest pain.   Gastrointestinal:  Positive for abdominal distention, abdominal pain and constipation. Negative for blood in stool, diarrhea, nausea and vomiting.   Genitourinary:  Negative for difficulty urinating, dysuria and hematuria.   Musculoskeletal:  Negative for back pain and joint swelling.   Skin:  Negative for rash and wound.   Neurological:  Negative for dizziness, light-headedness and headaches.   Psychiatric/Behavioral:  Negative for agitation, behavioral problems and confusion.      Physical Exam "     Initial Vitals [10/24/22 1524]   BP Pulse Resp Temp SpO2   103/74 (!) 116 (!) 30 99 °F (37.2 °C) (!) 90 %      MAP       --         Physical Exam    Constitutional: She appears cachectic. She is cooperative. She has a sickly appearance. She appears ill. No distress.   HENT:   Head: Atraumatic.   Right Ear: External ear normal.   Left Ear: External ear normal.   Eyes: Conjunctivae and EOM are normal.   Neck:   Normal range of motion.  Cardiovascular:  Regular rhythm.   Tachycardia present.         Pulmonary/Chest: Breath sounds normal. No respiratory distress.   Abdominal: Abdomen is soft. Bowel sounds are normal. She exhibits distension. There is abdominal tenderness.   Drain in place,    Musculoskeletal:         General: Edema present.      Cervical back: Normal range of motion.     Neurological: She is alert and oriented to person, place, and time.   Skin: Skin is warm and dry.   Psychiatric: She has a normal mood and affect. Thought content normal.       ED Course   Procedures  Labs Reviewed   CBC W/ AUTO DIFFERENTIAL   COMPREHENSIVE METABOLIC PANEL   LIPASE   URINALYSIS, REFLEX TO URINE CULTURE   LACTIC ACID, PLASMA   POCT URINE PREGNANCY   ISTAT CHEM8          Imaging Results    None          Medications - No data to display  Medical Decision Making:   History:   I obtained history from: another health care provider.  Old Medical Records: I decided to obtain old medical records.  Old Records Summarized: records from previous admission(s) and records from clinic visits.  Initial Assessment:   Pt is a 42 year old F w/ a history of gastric adenocarcinoma w/ metastasis to bone, liver and peritoneum c/b ascites requiring frequent paracentesis s/p Pleur-X catheter placement on 10/5/22, recently placed on hospice who presents with a CC of abdominal pain secondary to the Pleur-X  drain causing her discomfort. She states that she would like to have the drain removed and then go back to hospice. Her pain is well  controlled apart from the discomfort caused by the Pleur-X. The patient follows with Dr. Ashraf who told the patient to come to the ED and have the drain evaluated and removed. The patient is on hospice and would like minimal imaging and labs. Pt will need admitted to hospital medicine with IR consult to have the drain removed and can then go back to home with home hospice.   Differential Diagnosis:   Gastric Adenocarcinoma c/b ascites with recent pleur-x placement presenting for removal of drain  Clinical Tests:   Lab Tests: Ordered                                  Lety Sam MD  Resident  10/24/22 2010

## 2022-10-24 NOTE — Clinical Note
Diagnosis: Abdominal pain, unspecified abdominal location [[id number]]   Future Attending Provider: WALESKA ZAVALA [[id number]]   Admitting Provider:: PATRIZIA TAYLOR [[id number]]

## 2022-10-24 NOTE — ED TRIAGE NOTES
Puja Quigley, a 42 y.o. female presents to the ED w/ complaint of ABD pain to recent surgery site.     Triage note:  Chief Complaint   Patient presents with    Abdominal Pain     Abx surgery 3 wks ago. Pain at drain site.      Review of patient's allergies indicates:  No Known Allergies  Past Medical History:   Diagnosis Date    Anxiety     Dehydration 5/30/2022    Gastric cancer     Gastric ulcer     Hx of psychiatric care     Pregnancy 08/12/2020    delivered on 8/12/2020    Umbilical hernia

## 2022-10-25 ENCOUNTER — TELEPHONE (OUTPATIENT)
Dept: HEMATOLOGY/ONCOLOGY | Facility: CLINIC | Age: 42
End: 2022-10-25
Payer: COMMERCIAL

## 2022-10-25 PROBLEM — D63.8 ANEMIA OF CHRONIC DISEASE: Status: ACTIVE | Noted: 2022-10-25

## 2022-10-25 LAB
ABO + RH BLD: NORMAL
ALBUMIN SERPL BCP-MCNC: 1.2 G/DL (ref 3.5–5.2)
ALP SERPL-CCNC: 107 U/L (ref 55–135)
ALT SERPL W/O P-5'-P-CCNC: 17 U/L (ref 10–44)
ANION GAP SERPL CALC-SCNC: 5 MMOL/L (ref 8–16)
AST SERPL-CCNC: 19 U/L (ref 10–40)
BASOPHILS # BLD AUTO: 0.02 K/UL (ref 0–0.2)
BASOPHILS NFR BLD: 0.2 % (ref 0–1.9)
BILIRUB SERPL-MCNC: 0.2 MG/DL (ref 0.1–1)
BLD GP AB SCN CELLS X3 SERPL QL: NORMAL
BLD PROD TYP BPU: NORMAL
BLOOD UNIT EXPIRATION DATE: NORMAL
BLOOD UNIT TYPE CODE: 5100
BLOOD UNIT TYPE: NORMAL
BUN SERPL-MCNC: 23 MG/DL (ref 6–20)
CALCIUM SERPL-MCNC: 7.6 MG/DL (ref 8.7–10.5)
CHLORIDE SERPL-SCNC: 107 MMOL/L (ref 95–110)
CO2 SERPL-SCNC: 23 MMOL/L (ref 23–29)
CODING SYSTEM: NORMAL
CREAT SERPL-MCNC: 0.6 MG/DL (ref 0.5–1.4)
DIFFERENTIAL METHOD: ABNORMAL
DISPENSE STATUS: NORMAL
EOSINOPHIL # BLD AUTO: 0 K/UL (ref 0–0.5)
EOSINOPHIL NFR BLD: 0.1 % (ref 0–8)
ERYTHROCYTE [DISTWIDTH] IN BLOOD BY AUTOMATED COUNT: 20.7 % (ref 11.5–14.5)
EST. GFR  (NO RACE VARIABLE): >60 ML/MIN/1.73 M^2
GLUCOSE SERPL-MCNC: 81 MG/DL (ref 70–110)
HCT VFR BLD AUTO: 22.5 % (ref 37–48.5)
HGB BLD-MCNC: 6.7 G/DL (ref 12–16)
IMM GRANULOCYTES # BLD AUTO: 0.04 K/UL (ref 0–0.04)
IMM GRANULOCYTES NFR BLD AUTO: 0.5 % (ref 0–0.5)
LYMPHOCYTES # BLD AUTO: 0.8 K/UL (ref 1–4.8)
LYMPHOCYTES NFR BLD: 9.6 % (ref 18–48)
MAGNESIUM SERPL-MCNC: 1.3 MG/DL (ref 1.6–2.6)
MCH RBC QN AUTO: 23.1 PG (ref 27–31)
MCHC RBC AUTO-ENTMCNC: 29.8 G/DL (ref 32–36)
MCV RBC AUTO: 78 FL (ref 82–98)
MONOCYTES # BLD AUTO: 0.5 K/UL (ref 0.3–1)
MONOCYTES NFR BLD: 6.2 % (ref 4–15)
NEUTROPHILS # BLD AUTO: 7 K/UL (ref 1.8–7.7)
NEUTROPHILS NFR BLD: 83.4 % (ref 38–73)
NRBC BLD-RTO: 0 /100 WBC
PHOSPHATE SERPL-MCNC: 2.4 MG/DL (ref 2.7–4.5)
PLATELET # BLD AUTO: 336 K/UL (ref 150–450)
PMV BLD AUTO: 9.4 FL (ref 9.2–12.9)
POTASSIUM SERPL-SCNC: 4.2 MMOL/L (ref 3.5–5.1)
PROT SERPL-MCNC: 4.5 G/DL (ref 6–8.4)
RBC # BLD AUTO: 2.9 M/UL (ref 4–5.4)
SODIUM SERPL-SCNC: 135 MMOL/L (ref 136–145)
TRANS ERYTHROCYTES VOL PATIENT: NORMAL ML
WBC # BLD AUTO: 8.35 K/UL (ref 3.9–12.7)

## 2022-10-25 PROCEDURE — P9021 RED BLOOD CELLS UNIT: HCPCS | Mod: BL | Performed by: STUDENT IN AN ORGANIZED HEALTH CARE EDUCATION/TRAINING PROGRAM

## 2022-10-25 PROCEDURE — 36430 TRANSFUSION BLD/BLD COMPNT: CPT | Mod: 59

## 2022-10-25 PROCEDURE — 86901 BLOOD TYPING SEROLOGIC RH(D): CPT | Performed by: STUDENT IN AN ORGANIZED HEALTH CARE EDUCATION/TRAINING PROGRAM

## 2022-10-25 PROCEDURE — 83735 ASSAY OF MAGNESIUM: CPT | Performed by: STUDENT IN AN ORGANIZED HEALTH CARE EDUCATION/TRAINING PROGRAM

## 2022-10-25 PROCEDURE — 96366 THER/PROPH/DIAG IV INF ADDON: CPT

## 2022-10-25 PROCEDURE — 85025 COMPLETE CBC W/AUTO DIFF WBC: CPT | Performed by: STUDENT IN AN ORGANIZED HEALTH CARE EDUCATION/TRAINING PROGRAM

## 2022-10-25 PROCEDURE — G0378 HOSPITAL OBSERVATION PER HR: HCPCS

## 2022-10-25 PROCEDURE — 96375 TX/PRO/DX INJ NEW DRUG ADDON: CPT

## 2022-10-25 PROCEDURE — 86920 COMPATIBILITY TEST SPIN: CPT | Performed by: STUDENT IN AN ORGANIZED HEALTH CARE EDUCATION/TRAINING PROGRAM

## 2022-10-25 PROCEDURE — 25000003 PHARM REV CODE 250: Performed by: STUDENT IN AN ORGANIZED HEALTH CARE EDUCATION/TRAINING PROGRAM

## 2022-10-25 PROCEDURE — 80053 COMPREHEN METABOLIC PANEL: CPT | Performed by: STUDENT IN AN ORGANIZED HEALTH CARE EDUCATION/TRAINING PROGRAM

## 2022-10-25 PROCEDURE — 63600175 PHARM REV CODE 636 W HCPCS: Performed by: STUDENT IN AN ORGANIZED HEALTH CARE EDUCATION/TRAINING PROGRAM

## 2022-10-25 PROCEDURE — 49422 REMOVE TUNNELED IP CATH: CPT

## 2022-10-25 PROCEDURE — 36415 COLL VENOUS BLD VENIPUNCTURE: CPT | Performed by: STUDENT IN AN ORGANIZED HEALTH CARE EDUCATION/TRAINING PROGRAM

## 2022-10-25 PROCEDURE — 99226 PR SUBSEQUENT OBSERVATION CARE,LEVEL III: ICD-10-PCS | Mod: ,,, | Performed by: STUDENT IN AN ORGANIZED HEALTH CARE EDUCATION/TRAINING PROGRAM

## 2022-10-25 PROCEDURE — 96374 THER/PROPH/DIAG INJ IV PUSH: CPT | Mod: 59

## 2022-10-25 PROCEDURE — 84100 ASSAY OF PHOSPHORUS: CPT | Performed by: STUDENT IN AN ORGANIZED HEALTH CARE EDUCATION/TRAINING PROGRAM

## 2022-10-25 PROCEDURE — 96361 HYDRATE IV INFUSION ADD-ON: CPT | Mod: 59

## 2022-10-25 PROCEDURE — 25000003 PHARM REV CODE 250: Performed by: HOSPITALIST

## 2022-10-25 PROCEDURE — 96365 THER/PROPH/DIAG IV INF INIT: CPT

## 2022-10-25 PROCEDURE — 99226 PR SUBSEQUENT OBSERVATION CARE,LEVEL III: CPT | Mod: ,,, | Performed by: STUDENT IN AN ORGANIZED HEALTH CARE EDUCATION/TRAINING PROGRAM

## 2022-10-25 RX ORDER — HYDROMORPHONE HYDROCHLORIDE 1 MG/ML
0.5 INJECTION, SOLUTION INTRAMUSCULAR; INTRAVENOUS; SUBCUTANEOUS EVERY 6 HOURS PRN
Status: DISCONTINUED | OUTPATIENT
Start: 2022-10-25 | End: 2022-10-26 | Stop reason: HOSPADM

## 2022-10-25 RX ORDER — HYDROCODONE BITARTRATE AND ACETAMINOPHEN 500; 5 MG/1; MG/1
TABLET ORAL
Status: DISCONTINUED | OUTPATIENT
Start: 2022-10-25 | End: 2022-10-26 | Stop reason: HOSPADM

## 2022-10-25 RX ORDER — SODIUM,POTASSIUM PHOSPHATES 280-250MG
2 POWDER IN PACKET (EA) ORAL ONCE
Status: COMPLETED | OUTPATIENT
Start: 2022-10-25 | End: 2022-10-25

## 2022-10-25 RX ORDER — MAGNESIUM SULFATE HEPTAHYDRATE 40 MG/ML
4 INJECTION, SOLUTION INTRAVENOUS ONCE
Status: COMPLETED | OUTPATIENT
Start: 2022-10-25 | End: 2022-10-25

## 2022-10-25 RX ADMIN — GABAPENTIN 300 MG: 300 CAPSULE ORAL at 09:10

## 2022-10-25 RX ADMIN — GABAPENTIN 300 MG: 300 CAPSULE ORAL at 10:10

## 2022-10-25 RX ADMIN — OXYCODONE 5 MG: 5 TABLET ORAL at 01:10

## 2022-10-25 RX ADMIN — ALUMINUM HYDROXIDE, MAGNESIUM HYDROXIDE, AND DIMETHICONE 10 ML: 400; 400; 40 SUSPENSION ORAL at 10:10

## 2022-10-25 RX ADMIN — ALUMINUM HYDROXIDE, MAGNESIUM HYDROXIDE, AND DIMETHICONE 10 ML: 400; 400; 40 SUSPENSION ORAL at 01:10

## 2022-10-25 RX ADMIN — SENNOSIDES AND DOCUSATE SODIUM 1 TABLET: 50; 8.6 TABLET ORAL at 09:10

## 2022-10-25 RX ADMIN — MAGNESIUM SULFATE 4 G: 2 INJECTION INTRAVENOUS at 11:10

## 2022-10-25 RX ADMIN — GABAPENTIN 300 MG: 300 CAPSULE ORAL at 06:10

## 2022-10-25 RX ADMIN — POTASSIUM & SODIUM PHOSPHATES POWDER PACK 280-160-250 MG 2 PACKET: 280-160-250 PACK at 11:10

## 2022-10-25 RX ADMIN — Medication 1 DOSE: at 10:10

## 2022-10-25 RX ADMIN — HYDROMORPHONE HYDROCHLORIDE 0.5 MG: 1 INJECTION, SOLUTION INTRAMUSCULAR; INTRAVENOUS; SUBCUTANEOUS at 10:10

## 2022-10-25 RX ADMIN — Medication 1 DOSE: at 06:10

## 2022-10-25 RX ADMIN — ALUMINUM HYDROXIDE, MAGNESIUM HYDROXIDE, AND DIMETHICONE 10 ML: 400; 400; 40 SUSPENSION ORAL at 06:10

## 2022-10-25 RX ADMIN — Medication 1 DOSE: at 01:10

## 2022-10-25 RX ADMIN — Medication 1 DOSE: at 11:10

## 2022-10-25 RX ADMIN — ALUMINUM HYDROXIDE, MAGNESIUM HYDROXIDE, AND DIMETHICONE 10 ML: 400; 400; 40 SUSPENSION ORAL at 11:10

## 2022-10-25 NOTE — HPI
43 yo F with PMHx metastatic gastric adenocarcinoma on home hospice who presents to the ED complaining of abdominal pain around Pleur-catheter site. Pt. Was recently admitted here 9/30-10/14 for abdominal pain with ascites and MARIA. Pt. Stay complicated by worsening renal failure and hypotension after paracentesis which lead to an ICU stay. Goals of care discussion lead to patient being placed on home hospice with Pleur-X catheter (placed 10/5) as oncology felt her treatment options had been exhausted. Pt. Now presents back to the hospital complaining of severe abdominal pain around her Pleur-X catheter site and requests that the site be removed. Case discussed with IR who plan to remove catheter in am. Pt. Denies any fevers or chills. No reported erythema or purulent drainage around Pleur-X site.

## 2022-10-25 NOTE — ASSESSMENT & PLAN NOTE
Puja Quigley is a 42 y.o. female with a metastatic gastric adenocarcinoma with malignant ascites requiring large volume paracentesis. She had a peritX catheter placed on 10/5 by IR but due to discomfort would prefer to have this removed and go back to intermittent paracentesis.     - Patient seen and examined. Labs and imaging reviewed  - Last took Eliquis on 10/24 in the AM   - Discussed the risks, benefits, and alternatives to peritx catheter removal including but not limited to bleeding, infection, damage to surrounding structures, and persistent drainage of ascites form the site. All questions and concerns were addressed to the patient's satisfaction and she wanted to proceed.   - Catheter removed at bedside with cuff and catheter intact. See procedure note below.   - Discussed with the patient that should there be ongoing drainage from the site she may use a ostomy bag to collect the fluid. Also discussed that the nylon suture needs to stay for at least a week. If after that time the skin has healed, then it may be removed. However would not remove it earlier than that.       Procedure Note  After obtaining verbal consent, the site around the catheter was prepped with betadine and then 1% lidocaine was injected into the soft tissue. The drain suture was cut and the catheter was able to be removed with ease. The cuff was quite close to the skin and the catheter has not been there long so no dissection was required. About 100 mL of clear yellow ascites drained from the site while a 2-0 nylon u-stitch was placed. Once this was tied down, there was no more drainage of ascites. Site was dressed with gauze and tape. Patient tolerated the procedure well.

## 2022-10-25 NOTE — ED TRIAGE NOTES
42 year old F w/ a history of gastric adenocarcinoma w/ metastasis to bone, liver and peritoneum c/b ascites requiring frequent paracentesis s/p Pleur-X catheter placement on 10/5/22, recently placed on hospice who presents with a CC of abdominal pain secondary to the Pleur-X  drain causing her discomfort. She states that she would like to have the drain removed and then go back to hospice. Her pain is well controlled apart from the discomfort caused by the Pleur-X.    She denies CP, SOB, n/v, diarrhea. She endorses constipation however no blood in her urine or stool. BLE edema at baseline per patient.

## 2022-10-25 NOTE — ED NOTES
Took report from DEANA Palm. Assumed care of pt at this time. Pt resting in bed in lowest and locked position. Chest rise and fall noted and E/UL. Pt asking for suppository that still needs to be sent from pharmacy.

## 2022-10-25 NOTE — H&P
[facility] - Emergency Dept  [facility] Medicine  History & Physical    Patient Name: Puja Quigley  MRN: [id number]  Patient Class: OP- Observation  Admission Date: 10/24/2022  Attending Physician: Tayo Martinez MD   Primary Care Provider: Primary Doctor No         Patient information was obtained from patient, past medical records and ER records.     Subjective:     Principal Problem:Indwelling catheter present on admission    Chief Complaint:   Chief Complaint   Patient presents with    Abdominal Pain     Abx surgery 3 wks ago. Pain at drain site.         HPI: 43 yo F with PMHx metastatic gastric adenocarcinoma on home hospice who presents to the ED complaining of abdominal pain around Pleur-catheter site. Pt. Was recently admitted here 9/30-10/14 for abdominal pain with ascites and MARIA. Pt. Stay complicated by worsening renal failure and hypotension after paracentesis which lead to an ICU stay. Goals of care discussion lead to patient being placed on home hospice with Pleur-X catheter (placed 10/5) as oncology felt her treatment options had been exhausted. Pt. Now presents back to the hospital complaining of severe abdominal pain around her Pleur-X catheter site and requests that the site be removed. Case discussed with IR who plan to remove catheter in am. Pt. Denies any fevers or chills. No reported erythema or purulent drainage around Pleur-X site.      Past Medical History:   Diagnosis Date    Anxiety     Dehydration 5/30/2022    Gastric cancer     Gastric ulcer     Hx of psychiatric care     Pregnancy 08/12/2020    delivered on 8/12/2020    Umbilical hernia        Past Surgical History:   Procedure Laterality Date    CLOSURE OF PERFORATED ULCER OF DUODENUM USING OMENTAL PATCH      ESOPHAGOGASTRODUODENOSCOPY N/A 10/13/2020    Procedure: EGD (ESOPHAGOGASTRODUODENOSCOPY);  Surgeon: Rosio Marion MD;  Location: [address]);  Service: Endoscopy;  Laterality: N/A;     ESOPHAGOGASTRODUODENOSCOPY N/A 12/11/2020    Procedure: EGD (ESOPHAGOGASTRODUODENOSCOPY);  Surgeon: Rosio Marion MD;  Location: [facility] ENDO (2ND FLR);  Service: Endoscopy;  Laterality: N/A;  Covid-19 test 12/8/20 at [facility] - pg    ESOPHAGOGASTRODUODENOSCOPY N/A 3/12/2021    Procedure: EGD (ESOPHAGOGASTRODUODENOSCOPY);  Surgeon: Nav Omer MD;  Location: [facility] ENDO (2ND FLR);  Service: Endoscopy;  Laterality: N/A;  COVID at [facility] 3/9 ttr    ESOPHAGOGASTRODUODENOSCOPY N/A 5/18/2021    Procedure: EGD (ESOPHAGOGASTRODUODENOSCOPY);  Surgeon: Rosio Marion MD;  Location: [facility] ENDO (2ND FLR);  Service: Endoscopy;  Laterality: N/A;  5/15-covid pcw-inst portal-tb    ESOPHAGOGASTRODUODENOSCOPY N/A 5/26/2021    Procedure: EGD (ESOPHAGOGASTRODUODENOSCOPY);  Surgeon: Socrates Terrazas MD;  Location: [facility] ENDO (2ND FLR);  Service: Endoscopy;  Laterality: N/A;       Review of patient's allergies indicates:  No Known Allergies    No current facility-administered medications on file prior to encounter.     Current Outpatient Medications on File Prior to Encounter   Medication Sig    aluminum-magnesium hydroxide-simethicone (MAALOX) 200-200-20 mg/5 mL Susp Take 30 mLs by mouth every 6 (six) hours as needed.    apixaban (ELIQUIS) 5 mg Tab Take 1 tablet (5 mg total) by mouth 2 (two) times daily.    baclofen (LIORESAL) 5 mg Tab tablet Take 1 tablet (5 mg total) by mouth 3 (three) times daily.    duke's soln (benadryl 30 mL, mylanta 30 mL, LIDOcaine 30 mL, nystatin 30 mL) 120mL Take 10 mLs by mouth 4 (four) times daily.    famotidine (PEPCID) 20 MG tablet Take 1 tablet (20 mg total) by mouth nightly as needed for Heartburn.    furosemide (LASIX) 20 MG tablet TAKE 1 TABLET(20 MG) BY MOUTH EVERY DAY    gabapentin (NEURONTIN) 300 MG capsule Take 1 capsule (300 mg total) by mouth 3 (three) times daily.    hydrocortisone 2.5 % cream Apply topically 2 (two) times daily.    magnesium hydroxide 400 mg/5 ml (MILK OF  MAGNESIA) 400 mg/5 mL Susp Take 30 mLs (2,400 mg total) by mouth daily as needed.    midodrine (PROAMATINE) 10 MG tablet Take 1 tablet (10 mg total) by mouth 3 (three) times daily.    naloxone (NARCAN) 4 mg/actuation Spry 4mg by nasal route as needed for opioid overdose; may repeat every 2-3 minutes in alternating nostrils until medical help arrives. Call 911    nortriptyline (PAMELOR) 25 MG capsule Take 1 capsule (25 mg total) by mouth every evening.    ondansetron (ZOFRAN) 8 MG tablet TAKE 1 TABLET(8 MG) BY MOUTH EVERY 8 HOURS AS NEEDED FOR NAUSEA    oxyCODONE (ROXICODONE) 5 MG immediate release tablet Take 1 tablet (5 mg total) by mouth every 4 (four) hours as needed for Pain.    polyethylene glycol (GLYCOLAX) 17 gram/dose powder Mix 1 capful (17 g) with liquid and take by mouth once daily for 20 days    prochlorperazine (COMPAZINE) 10 MG tablet TAKE 1 TABLET(10 MG) BY MOUTH EVERY 6 HOURS AS NEEDED FOR NAUSEA    senna-docusate 8.6-50 mg (SENNA WITH DOCUSATE SODIUM) 8.6-50 mg per tablet Take 1 tablet by mouth 2 (two) times daily as needed for Constipation.    sodium bicarb-sodium chloride powder Swish and spit 1 Dose 4 (four) times daily.    [DISCONTINUED] amitriptyline (ELAVIL) 10 MG tablet Take 1 tablet (10 mg total) by mouth every evening.    [DISCONTINUED] pantoprazole (PROTONIX) 40 MG tablet Take 1 tablet (40 mg total) by mouth 2 (two) times daily.     Family History       Problem Relation (Age of Onset)    Breast cancer Other (73), Paternal Cousin    Cancer Mother (67)    Lung cancer Father (48)    No Known Problems Son    Prostate cancer Paternal Uncle    Schizophrenia Mother          Tobacco Use    Smoking status: Former     Types: Cigarettes     Quit date: 2019     Years since quittin.9    Smokeless tobacco: Never   Substance and Sexual Activity    Alcohol use: Yes    Drug use: Not Currently    Sexual activity: Yes     Partners: Male     Review of Systems   Constitutional:   Positive for appetite change and fatigue. Negative for activity change, chills, fever and unexpected weight change.   HENT:  Negative for congestion and sore throat.    Respiratory:  Negative for cough and shortness of breath.    Cardiovascular:  Positive for leg swelling. Negative for chest pain and palpitations.   Gastrointestinal:  Positive for abdominal distention, abdominal pain and constipation. Negative for blood in stool, diarrhea, nausea and vomiting.   Genitourinary:  Negative for difficulty urinating, dysuria and hematuria.   Musculoskeletal:  Positive for arthralgias. Negative for myalgias.   Skin:  Negative for color change and rash.   Neurological:  Negative for dizziness, tremors and seizures.   Objective:     Vital Signs (Most Recent):  Temp: 99 °F (37.2 °C) (10/24/22 1524)  Pulse: (!) 115 (10/24/22 1915)  Resp: 20 (10/24/22 1847)  BP: 111/83 (10/24/22 1903)  SpO2: 100 % (10/24/22 1915)   Vital Signs (24h Range):  Temp:  [99 °F (37.2 °C)] 99 °F (37.2 °C)  Pulse:  [114-116] 115  Resp:  [20-30] 20  SpO2:  [90 %-100 %] 100 %  BP: (103-111)/(74-83) 111/83     Weight: 63.7 kg (140 lb 6.9 oz)  Body mass index is 21.99 kg/m².    Physical Exam  Vitals reviewed.   Constitutional:       General: She is not in acute distress.     Appearance: She is cachectic. She is ill-appearing.   HENT:      Head: Normocephalic and atraumatic.   Eyes:      Extraocular Movements: Extraocular movements intact.      Pupils: Pupils are equal, round, and reactive to light.   Neck:      Vascular: No JVD.      Trachea: No tracheal deviation.   Cardiovascular:      Rate and Rhythm: Regular rhythm. Tachycardia present.      Heart sounds: No murmur heard.    No friction rub. No gallop.   Pulmonary:      Effort: No respiratory distress.      Breath sounds: Normal breath sounds. No wheezing or rales.   Abdominal:      General: Bowel sounds are normal. There is distension.      Palpations: Abdomen is soft. There is no mass.       Tenderness: There is abdominal tenderness.      Comments: Moderate asciters with mild diffuse tenderness, drain in place with some tenderness around drain site. No surrounding erythema, warmth, or purulent drainage   Musculoskeletal:         General: No deformity.      Cervical back: Neck supple.   Lymphadenopathy:      Cervical: No cervical adenopathy.   Skin:     General: Skin is warm and dry.      Findings: No rash.   Neurological:      Mental Status: She is alert and oriented to person, place, and time.         CRANIAL NERVES     CN III, IV, VI   Pupils are equal, round, and reactive to light.     Significant Labs: All pertinent labs within the past 24 hours have been reviewed.    Significant Imaging: I have reviewed all pertinent imaging results/findings within the past 24 hours.    Assessment/Plan:     * Indwelling catheter present on admission  -Pt. With pain around indwelling pleural catheter site. Plan for removal  -IR consulted, pt. NPO @[facility]. Will hold eliquis until after removal      Comfort measures only status  -Pt. On hospice, admitted for Pleur-X removal. Pt. Does not wish to have lab draws, vitals only Qshift  -Home PRN oxycodone ordered with IV medications only for intractable pain. Can discuss with palliative care if needed for additional comfort recommendations  -SW consult for assistance with discharge back to home hospice      Malignant neoplasm of stomach  -Pt. Now on hospice, see comfort measures only status        VTE Risk Mitigation (From admission, onward)         Ordered     IP VTE HIGH RISK PATIENT  Once         10/24/22 1953     Place sequential compression device  Until discontinued         10/24/22 1953                   Alvino Padilla MD  Department of Hospital Medicine   [facility] Dept

## 2022-10-25 NOTE — TELEPHONE ENCOUNTER
----- Message from Jonny Villanueva sent at 10/25/2022  9:25 AM CDT -----  Contact: pt.  .Type:  Needs Medical Advice    Who Called: pt  Would the patient rather a call back or a response via [facility]? Call back  Best Call Back Number: [phone]   Additional Information: Pt. Is calling in regarding to the removal of her cather.  Pt is in the hospital and would like someone to call her back

## 2022-10-25 NOTE — ASSESSMENT & PLAN NOTE
- Pt with pain around indwelling pleural catheter site. Plan for removal  - IR deferred removal  - Sug/onc consulted for assistance

## 2022-10-25 NOTE — ASSESSMENT & PLAN NOTE
-Pt. On hospice, admitted for Pleur-X removal. Pt. Does not wish to have lab draws, vitals only Qshift  -Home PRN oxycodone ordered with IV medications only for intractable pain. Can discuss with palliative care if needed for additional comfort recommendations  - consult for assistance with discharge back to home hospice

## 2022-10-25 NOTE — SUBJECTIVE & OBJECTIVE
Past Medical History:   Diagnosis Date    Anxiety     Dehydration 5/30/2022    Gastric cancer     Gastric ulcer     Hx of psychiatric care     Pregnancy 08/12/2020    delivered on 8/12/2020    Umbilical hernia        Past Surgical History:   Procedure Laterality Date    CLOSURE OF PERFORATED ULCER OF DUODENUM USING OMENTAL PATCH      ESOPHAGOGASTRODUODENOSCOPY N/A 10/13/2020    Procedure: EGD (ESOPHAGOGASTRODUODENOSCOPY);  Surgeon: Rosio Marion MD;  Location: [facility] (2ND FLR);  Service: Endoscopy;  Laterality: N/A;    ESOPHAGOGASTRODUODENOSCOPY N/A 12/11/2020    Procedure: EGD (ESOPHAGOGASTRODUODENOSCOPY);  Surgeon: Rosio Marion MD;  Location: [facility] (2ND FLR);  Service: Endoscopy;  Laterality: N/A;  Covid-19 test 12/8/20 at [facility] -     ESOPHAGOGASTRODUODENOSCOPY N/A 3/12/2021    Procedure: EGD (ESOPHAGOGASTRODUODENOSCOPY);  Surgeon: Nav Omer MD;  Location: [facility] (2ND FLR);  Service: Endoscopy;  Laterality: N/A;  COVID at [facility] 3/9 ttr    ESOPHAGOGASTRODUODENOSCOPY N/A 5/18/2021    Procedure: EGD (ESOPHAGOGASTRODUODENOSCOPY);  Surgeon: Rosio Marion MD;  Location: [facility] (2ND FLR);  Service: Endoscopy;  Laterality: N/A;  5/15-covid pcw-inst portal-tb    ESOPHAGOGASTRODUODENOSCOPY N/A 5/26/2021    Procedure: EGD (ESOPHAGOGASTRODUODENOSCOPY);  Surgeon: Socrates Terrazas MD;  Location: [facility] (2ND FLR);  Service: Endoscopy;  Laterality: N/A;       Review of patient's allergies indicates:  No Known Allergies    No current facility-administered medications on file prior to encounter.     Current Outpatient Medications on File Prior to Encounter   Medication Sig    aluminum-magnesium hydroxide-simethicone (MAALOX) 200-200-20 mg/5 mL Susp Take 30 mLs by mouth every 6 (six) hours as needed.    apixaban (ELIQUIS) 5 mg Tab Take 1 tablet (5 mg total) by mouth 2 (two) times daily.    baclofen (LIORESAL) 5 mg Tab tablet Take 1 tablet (5 mg total) by mouth 3 (three) times daily.     duke's soln (benadryl 30 mL, mylanta 30 mL, LIDOcaine 30 mL, nystatin 30 mL) 120mL Take 10 mLs by mouth 4 (four) times daily.    famotidine (PEPCID) 20 MG tablet Take 1 tablet (20 mg total) by mouth nightly as needed for Heartburn.    furosemide (LASIX) 20 MG tablet TAKE 1 TABLET(20 MG) BY MOUTH EVERY DAY    gabapentin (NEURONTIN) 300 MG capsule Take 1 capsule (300 mg total) by mouth 3 (three) times daily.    hydrocortisone 2.5 % cream Apply topically 2 (two) times daily.    magnesium hydroxide 400 mg/5 ml (MILK OF MAGNESIA) 400 mg/5 mL Susp Take 30 mLs (2,400 mg total) by mouth daily as needed.    midodrine (PROAMATINE) 10 MG tablet Take 1 tablet (10 mg total) by mouth 3 (three) times daily.    naloxone (NARCAN) 4 mg/actuation Spry 4mg by nasal route as needed for opioid overdose; may repeat every 2-3 minutes in alternating nostrils until medical help arrives. Call 911    nortriptyline (PAMELOR) 25 MG capsule Take 1 capsule (25 mg total) by mouth every evening.    ondansetron (ZOFRAN) 8 MG tablet TAKE 1 TABLET(8 MG) BY MOUTH EVERY 8 HOURS AS NEEDED FOR NAUSEA    oxyCODONE (ROXICODONE) 5 MG immediate release tablet Take 1 tablet (5 mg total) by mouth every 4 (four) hours as needed for Pain.    polyethylene glycol (GLYCOLAX) 17 gram/dose powder Mix 1 capful (17 g) with liquid and take by mouth once daily for 20 days    prochlorperazine (COMPAZINE) 10 MG tablet TAKE 1 TABLET(10 MG) BY MOUTH EVERY 6 HOURS AS NEEDED FOR NAUSEA    senna-docusate 8.6-50 mg (SENNA WITH DOCUSATE SODIUM) 8.6-50 mg per tablet Take 1 tablet by mouth 2 (two) times daily as needed for Constipation.    sodium bicarb-sodium chloride powder Swish and spit 1 Dose 4 (four) times daily.    [DISCONTINUED] amitriptyline (ELAVIL) 10 MG tablet Take 1 tablet (10 mg total) by mouth every evening.    [DISCONTINUED] pantoprazole (PROTONIX) 40 MG tablet Take 1 tablet (40 mg total) by mouth 2 (two) times daily.     Family History       Problem Relation (Age of  Onset)    Breast cancer Other (73), Paternal Cousin    Cancer Mother (67)    Lung cancer Father (48)    No Known Problems Son    Prostate cancer Paternal Uncle    Schizophrenia Mother          Tobacco Use    Smoking status: Former     Types: Cigarettes     Quit date: 2019     Years since quittin.9    Smokeless tobacco: Never   Substance and Sexual Activity    Alcohol use: Yes    Drug use: Not Currently    Sexual activity: Yes     Partners: Male     Review of Systems   Constitutional:  Positive for appetite change and fatigue. Negative for activity change, chills, fever and unexpected weight change.   HENT:  Negative for congestion and sore throat.    Respiratory:  Negative for cough and shortness of breath.    Cardiovascular:  Positive for leg swelling. Negative for chest pain and palpitations.   Gastrointestinal:  Positive for abdominal distention, abdominal pain and constipation. Negative for blood in stool, diarrhea, nausea and vomiting.   Genitourinary:  Negative for difficulty urinating, dysuria and hematuria.   Musculoskeletal:  Positive for arthralgias. Negative for myalgias.   Skin:  Negative for color change and rash.   Neurological:  Negative for dizziness, tremors and seizures.   Objective:     Vital Signs (Most Recent):  Temp: 99 °F (37.2 °C) (10/24/22 1524)  Pulse: (!) 115 (10/24/22 191)  Resp: 20 (10/24/22 1847)  BP: 111/83 (10/24/22 1903)  SpO2: 100 % (10/24/22 1915)   Vital Signs (24h Range):  Temp:  [99 °F (37.2 °C)] 99 °F (37.2 °C)  Pulse:  [114-116] 115  Resp:  [20-30] 20  SpO2:  [90 %-100 %] 100 %  BP: (103-111)/(74-83) 111/83     Weight: 63.7 kg (140 lb 6.9 oz)  Body mass index is 21.99 kg/m².    Physical Exam  Vitals reviewed.   Constitutional:       General: She is not in acute distress.     Appearance: She is cachectic. She is ill-appearing.   HENT:      Head: Normocephalic and atraumatic.   Eyes:      Extraocular Movements: Extraocular movements intact.      Pupils: Pupils are  equal, round, and reactive to light.   Neck:      Vascular: No JVD.      Trachea: No tracheal deviation.   Cardiovascular:      Rate and Rhythm: Regular rhythm. Tachycardia present.      Heart sounds: No murmur heard.    No friction rub. No gallop.   Pulmonary:      Effort: No respiratory distress.      Breath sounds: Normal breath sounds. No wheezing or rales.   Abdominal:      General: Bowel sounds are normal. There is distension.      Palpations: Abdomen is soft. There is no mass.      Tenderness: There is abdominal tenderness.      Comments: Moderate asciters with mild diffuse tenderness, drain in place with some tenderness around drain site. No surrounding erythema, warmth, or purulent drainage   Musculoskeletal:         General: No deformity.      Cervical back: Neck supple.   Lymphadenopathy:      Cervical: No cervical adenopathy.   Skin:     General: Skin is warm and dry.      Findings: No rash.   Neurological:      Mental Status: She is alert and oriented to person, place, and time.         CRANIAL NERVES     CN III, IV, VI   Pupils are equal, round, and reactive to light.     Significant Labs: All pertinent labs within the past 24 hours have been reviewed.    Significant Imaging: I have reviewed all pertinent imaging results/findings within the past 24 hours.

## 2022-10-25 NOTE — CONSULTS
Inpatient Radiology Note    History of Present Illness:  Puja Quigley is a 42 y.o. female who presents for abdominal pain in the setting of metastatic gastric adenocarcinoma. Patient on home hospice. IR consulted for abdominal pleurx catheter removal.     Admission H&P reviewed.  Past Medical History:   Diagnosis Date    Anxiety     Dehydration 5/30/2022    Gastric cancer     Gastric ulcer     Hx of psychiatric care     Pregnancy 08/12/2020    delivered on 8/12/2020    Umbilical hernia      Past Surgical History:   Procedure Laterality Date    CLOSURE OF PERFORATED ULCER OF DUODENUM USING OMENTAL PATCH      ESOPHAGOGASTRODUODENOSCOPY N/A 10/13/2020    Procedure: EGD (ESOPHAGOGASTRODUODENOSCOPY);  Surgeon: Rosio Marion MD;  Location: [facility] (2ND FLR);  Service: Endoscopy;  Laterality: N/A;    ESOPHAGOGASTRODUODENOSCOPY N/A 12/11/2020    Procedure: EGD (ESOPHAGOGASTRODUODENOSCOPY);  Surgeon: Rosio Marion MD;  Location: [facility] (2ND FLR);  Service: Endoscopy;  Laterality: N/A;  Covid-19 test 12/8/20 at [facility]    ESOPHAGOGASTRODUODENOSCOPY N/A 3/12/2021    Procedure: EGD (ESOPHAGOGASTRODUODENOSCOPY);  Surgeon: Nav Omer MD;  Location: [facility] (2ND FLR);  Service: Endoscopy;  Laterality: N/A;  COVID at [facility] 3/9 ttr    ESOPHAGOGASTRODUODENOSCOPY N/A 5/18/2021    Procedure: EGD (ESOPHAGOGASTRODUODENOSCOPY);  Surgeon: Rosio Marion MD;  Location: [facility] (2ND FLR);  Service: Endoscopy;  Laterality: N/A;  5/15-covid pcw-inst portal-tb    ESOPHAGOGASTRODUODENOSCOPY N/A 5/26/2021    Procedure: EGD (ESOPHAGOGASTRODUODENOSCOPY);  Surgeon: Socrates Terrazas MD;  Location: [facility] ENDO (2ND FLR);  Service: Endoscopy;  Laterality: N/A;       Review of Systems:   As documented in primary team H&P    Home Meds:   Prior to Admission medications    Medication Sig Start Date End Date Taking? Authorizing Provider   aluminum-magnesium hydroxide-simethicone (MAALOX) 200-200-20 mg/5 mL Susp  Take 30 mLs by mouth every 6 (six) hours as needed. 10/14/22 10/14/23  Jaspal Boucher DO   apixaban (ELIQUIS) 5 mg Tab Take 1 tablet (5 mg total) by mouth 2 (two) times daily. 10/14/22   Jaspal Boucher DO   baclofen (LIORESAL) 5 mg Tab tablet Take 1 tablet (5 mg total) by mouth 3 (three) times daily. 9/23/22   Sharon Brink MD   willis's soln (benadryl 30 mL, mylanta 30 mL, LIDOcaine 30 mL, nystatin 30 mL) 120mL Take 10 mLs by mouth 4 (four) times daily. 10/14/22   Jaspal Boucher DO   famotidine (PEPCID) 20 MG tablet Take 1 tablet (20 mg total) by mouth nightly as needed for Heartburn. 2/23/22 2/23/23  Kamille Hooks PA-C   furosemide (LASIX) 20 MG tablet TAKE 1 TABLET(20 MG) BY MOUTH EVERY DAY 10/3/22   Fer Ashraf MD   gabapentin (NEURONTIN) 300 MG capsule Take 1 capsule (300 mg total) by mouth 3 (three) times daily. 8/22/22 8/22/23  Kamille Hooks PA-C   hydrocortisone 2.5 % cream Apply topically 2 (two) times daily. 7/11/22   Bethel Shetty MD   magnesium hydroxide 400 mg/5 ml (MILK OF MAGNESIA) 400 mg/5 mL Susp Take 30 mLs (2,400 mg total) by mouth daily as needed. 10/14/22   Jaspal Boucher DO   midodrine (PROAMATINE) 10 MG tablet Take 1 tablet (10 mg total) by mouth 3 (three) times daily. 10/14/22 10/14/23  Jaspal Boucher DO   naloxone (NARCAN) 4 mg/actuation Spry 4mg by nasal route as needed for opioid overdose; may repeat every 2-3 minutes in alternating nostrils until medical help arrives. Call 911 8/17/22   Sharon Brink MD   nortriptyline (PAMELOR) 25 MG capsule Take 1 capsule (25 mg total) by mouth every evening. 6/20/22 6/20/23  Bren Allen MD   ondansetron (ZOFRAN) 8 MG tablet TAKE 1 TABLET(8 MG) BY MOUTH EVERY 8 HOURS AS NEEDED FOR NAUSEA 8/29/22   Fer Ashraf MD   oxyCODONE (ROXICODONE) 5 MG immediate release tablet Take 1 tablet (5 mg total) by mouth every 4 (four) hours as needed for Pain. 9/23/22   Fer Ashraf MD   polyethylene glycol (GLYCOLAX) 17 gram/dose powder  Mix 1 capful (17 g) with liquid and take by mouth once daily for 20 days 6/21/22   Bren Allen MD   prochlorperazine (COMPAZINE) 10 MG tablet TAKE 1 TABLET(10 MG) BY MOUTH EVERY 6 HOURS AS NEEDED FOR NAUSEA 8/7/21   Fer Ashraf MD   senna-docusate 8.6-50 mg (SENNA WITH DOCUSATE SODIUM) 8.6-50 mg per tablet Take 1 tablet by mouth 2 (two) times daily as needed for Constipation. 5/26/21   Reshma Wilson MD   sodium bicarb-sodium chloride powder Swish and spit 1 Dose 4 (four) times daily. 10/14/22 10/14/23  Jaspal Boucher DO   amitriptyline (ELAVIL) 10 MG tablet Take 1 tablet (10 mg total) by mouth every evening. 5/13/21 6/20/22  Socrates Terrazas MD   pantoprazole (PROTONIX) 40 MG tablet Take 1 tablet (40 mg total) by mouth 2 (two) times daily. 5/25/22 6/20/22  Kamille Hooks PA-C     Scheduled Meds:    duke's soln (benadryl 30 mL, mylanta 30 mL, LIDOcaine 30 mL, nystatin 30 mL) 120 mL  10 mL Oral QID    gabapentin  300 mg Oral TID    glycerin adult  1 suppository Rectal Once    magnesium sulfate IVPB  4 g Intravenous Once    potassium, sodium phosphates  2 packet Oral Once    senna-docusate 8.6-50 mg  1 tablet Oral BID    sodium bicarb-sodium chloride  1 Dose Swish & Spit QID     Continuous Infusions:   PRN Meds:sodium chloride, acetaminophen, albuterol-ipratropium, baclofen, glucose, glucose, melatonin, morphine, naloxone, ondansetron, oxyCODONE, phenyleph-min oil-petrolatum, prochlorperazine, simethicone, sodium chloride 0.9%, sodium chloride 0.9%  Anticoagulants/Antiplatelets:  eliquis    Allergies: Review of patient's allergies indicates:  No Known Allergies  Sedation Hx: have not been any systemic reactions    Labs:  No results for input(s): INR in the last 168 hours.    Invalid input(s):  PT,  PTT    Recent Labs   Lab 10/25/22  0555   WBC 8.35   HGB 6.7*   HCT 22.5*   MCV 78*         Recent Labs   Lab 10/25/22  0555   GLU 81   *   K 4.2      CO2 23   BUN 23*   CREATININE 0.6   CALCIUM 7.6*    MG 1.3*   ALT 17   AST 19   ALBUMIN 1.2*   BILITOT 0.2         Vitals:  Temp: 98.4 °F (36.9 °C) (10/25/22 0733)  Pulse: (!) 119 (10/25/22 0733)  Resp: 18 (10/25/22 0733)  BP: 117/75 (10/25/22 0733)  SpO2: 98 % (10/25/22 0733)     Physical Exam:  ASA: 3  Mallampati: 2    General: cachectic without acute distress  Mental Status: alert and oriented to person, place and time  HEENT: normocephalic, atraumatic  Chest: unlabored breathing  Heart: regular heart rate  Abdomen: abdominal pleural catheter in place without erythema or purulence  Extremity: moves all extremities    Plan: Case discussed with primary team. Abdominal pleural catheters are not typically removed by interventional radiology due to high risk of severe complication, including but not limited to catheter fracture, persistent drainage from the tract, and increased risk of peritoneal infection. Risks were discussed with the patient.    Thank you for the consult. Please contact with any questions.    Rajan Narvaez MD PGY3  Department of Radiology  [facility]

## 2022-10-25 NOTE — SUBJECTIVE & OBJECTIVE
Interval History: Pt seen and examined this morning on enedelia JAUREGUI. Still with abdominal pain. Discussed that IR deferred procedure, and that we will have to await surg/onc eval. Care plan reviewed. Otherwise, doing well and with no further complaints at this time.        Objective:     Vital Signs (Most Recent):  Temp: 98.4 °F (36.9 °C) (10/25/22 0733)  Pulse: (!) 119 (10/25/22 0733)  Resp: 18 (10/25/22 0733)  BP: 117/75 (10/25/22 0733)  SpO2: 98 % (10/25/22 0733)   Vital Signs (24h Range):  Temp:  [98.3 °F (36.8 °C)-99 °F (37.2 °C)] 98.4 °F (36.9 °C)  Pulse:  [114-119] 119  Resp:  [16-30] 18  SpO2:  [90 %-100 %] 98 %  BP: (102-117)/(67-83) 117/75     Weight: 63.7 kg (140 lb 6.9 oz)  Body mass index is 21.99 kg/m².    Intake/Output Summary (Last 24 hours) at 10/25/2022 1130  Last data filed at 10/25/2022 0548  Gross per 24 hour   Intake --   Output 2 ml   Net -2 ml          Physical Exam  Gen: in NAD, appears stated age; cachectic  Neuro: AAOx4, CN2-12 grossly intact BL; motor, sensory, and strength grossly intact BL  HEENT: NTNC, EOMI, PERRLA, MMM; no thyromegaly or lymphadenopathy; no JVD appreciated  CVS: tachy 110, regular rhythm, no m/r/g; S1/S2 auscultated with no S3 or S4; capillary refill < 2 sec  Resp: lungs CTAB, no w/r/r; no belabored breathing or accessory muscle use appreciated   Abd: BS+ in all 4 quadrants; Moderate asciters with mild diffuse tenderness, drain in place with some tenderness around drain site. No surrounding erythema, warmth, or purulent drainage   Extrem: pulses full, equal, and regular over all 4 extremities; no UE or LE edema BL      Significant Labs: All pertinent labs within the past 24 hours have been reviewed.    Significant Imaging: I have reviewed all pertinent imaging results/findings within the past 24 hours.

## 2022-10-25 NOTE — HPI
Puja Quigley is a 42 y.o. female with metastatic gastric adenocarcinoma on home hospice who presented to the ED also night with pain around her peritX catheter site. This was placed by IR on 10/5 for malignant ascites requiring large volume paracentesis. However she says that since placement of the catheter, she has been having pain at the site. Additionally says she is not really producing as much ascites as before and is having less than 500 mL drained per week. She has a nurse who visits her 3 times weekly and she says she has never had more than 300 mL come out at one time but she does have . She was previously getting paracentesis 2-3x weekly and per chart review seems she was usually having 6-7L drained time. In discussion with the patient, she says she would prefer to have the paracentesis than the PeritX catheter. Currently denies fevers, chills, nausea. Says her abdominal distension is fairly stable and that she doesn't feel any more distended than usual.     Of note, she is on Eliquis at home for DVT. Has not taken this since yesterday.

## 2022-10-25 NOTE — HOSPITAL COURSE
Pt admitted to [facility]. Hb 6.7, transfused 1u pRBCs. IR consulted for abdominal catheter removal, though stated they cannot perform this procedure. Surg/onc consulted for assistance. Removed catheter 10/26 with significant symptomatic relief by patient. Discharged home to resume home hospice with strict return precautions.

## 2022-10-25 NOTE — ASSESSMENT & PLAN NOTE
-Pt. With pain around indwelling pleural catheter site. Plan for removal  -IR consulted, pt. NPO @[place]. Will hold eliquis until after removal

## 2022-10-25 NOTE — PLAN OF CARE
[facility] - Observation [facility]  Initial Discharge Assessment       Primary Care Provider: Primary Doctor No    Admission Diagnosis: Abdominal pain, unspecified abdominal location [R10.9]    Admission Date: 10/24/2022  Expected Discharge Date: 10/26/2022    Discharge Barriers Identified: None    Payor: BLUE CROSS BLUE SHIELD / Plan: BCBS ALL OUT OF STATE / Product Type: PPO /     Extended Emergency Contact Information  Primary Emergency Contact: Alvino Lee   [place]  Mobile Phone: [phone]  Relation: Healthcare Power of   Secondary Emergency Contact: Jean Claude Quigley  Mobile Phone: [phone]  Relation: Sister    Discharge Plan A: Hospice/home  Discharge Plan B: Hospice/home      [id number] - [place] - [address] AT [facility] & [address]  [address]  [place] [address]  Phone: [phone] Fax: [phone]    [facility]  [address]  [facility] [address]  Phone: [phone] Fax: [phone]      Initial Assessment (most recent)       Adult Discharge Assessment - 10/25/22 1601          Discharge Assessment    Assessment Type Discharge Planning Assessment     Confirmed/corrected address, phone number and insurance Yes     Source of Information patient     Does patient/caregiver understand observation status No     Was observation education provided? No   Reached out to team to deliver    Communicated GLORIA with patient/caregiver Yes     Reason For Admission see admitting DX     Lives With significant other     Facility Arrived From: home with Hospice     Do you expect to return to your current living situation? Yes     Do you have help at home or someone to help you manage your care at home? Yes     Who are your caregiver(s) and their phone number(s)? sig other     Prior to hospitilization cognitive status: Alert/Oriented     Current cognitive status: Alert/Oriented     Walking or Climbing Stairs Difficulty ambulation  difficulty, dependent     Mobility Management equipment and sig. other     Dressing/Bathing Difficulty bathing difficulty, dependent     Dressing/Bathing Management sig other and PCA (private pay)     Home Accessibility wheelchair accessible     Home Layout Able to live on 1st floor     Equipment Currently Used at Home hospital bed;bedside commode;walker, rolling;wheelchair;shower chair     Readmission within 30 days? Yes     Patient currently being followed by outpatient case management? No     Do you currently have service(s) that help you manage your care at home? Yes     Name and Contact number of agency [facility]     Is the pt/caregiver preference to resume services with current agency Yes     Do you take prescription medications? Yes     Do you have prescription coverage? Yes     Do you have any problems affording any of your prescribed medications? No     Is the patient taking medications as prescribed? yes     Who is going to help you get home at discharge? will need stretcher transport     How do you get to doctors appointments? health plan transportation;family or friend will provide     Are you on dialysis? No     Do you take coumadin? No     Discharge Plan A Hospice/home     Discharge Plan B Hospice/home     DME Needed Upon Discharge  none     Discharge Plan discussed with: Patient     Discharge Barriers Identified None        Relationship/Environment    Name(s) of Who Lives With Patient sig other                                  SW completed Discharge Planning Assessment with patient via bedisde. Discharge planning booklet given to patient/family and whiteboard updated with GLORIA and phone #. All questions answered.    Patient reported that she will require transportation upon discharge.     Functional status was dependent prior and patient utilized  assistive equipment.     Patient lives in a home with 0 steps to enter with sig. other. Current with [facility] and wishes to resumes.     Phone  Number for LT-PCS through medicaid provided to patient.    Brandee Sinha, MSW, LCSW  Manager - Case Management

## 2022-10-25 NOTE — CONSULTS
[facility]  General Surgery  Consult Note    Patient Name: Puja Quigley  MRN: [id number]  Code Status: DNR  Admission Date: 10/24/2022  Hospital Length of Stay: 0 days  Attending Physician: Tayo Martinez MD  Primary Care Provider: Primary Doctor No    Patient information was obtained from patient, past medical records and ER records.     Inpatient consult to Surgical Oncology  Consult performed by: Hannah Weber MD  Consult ordered by: Tayo Martinez MD        Subjective:     Principal Problem: Indwelling catheter present on admission    History of Present Illness: Puja Quigley is a 42 y.o. female with metastatic gastric adenocarcinoma on home hospice who presented to the ED also night with pain around her peritX catheter site. This was placed by IR on 10/5 for malignant ascites requiring large volume paracentesis. However she says that since placement of the catheter, she has been having pain at the site. Additionally says she is not really producing as much ascites as before and is having less than 500 mL drained per week. She has a nurse who visits her 3 times weekly and she says she has never had more than 300 mL come out at one time but she does have . She was previously getting paracentesis 2-3x weekly and per chart review seems she was usually having 6-7L drained time. In discussion with the patient, she says she would prefer to have the paracentesis than the PeritX catheter. Currently denies fevers, chills, nausea. Says her abdominal distension is fairly stable and that she doesn't feel any more distended than usual.     Of note, she is on Eliquis at home for DVT. Has not taken this since yesterday.       No current facility-administered medications on file prior to encounter.     Current Outpatient Medications on File Prior to Encounter   Medication Sig    aluminum-magnesium hydroxide-simethicone (MAALOX) 200-200-20 mg/5 mL Susp Take 30 mLs by mouth every 6 (six) hours  as needed.    apixaban (ELIQUIS) 5 mg Tab Take 1 tablet (5 mg total) by mouth 2 (two) times daily.    baclofen (LIORESAL) 5 mg Tab tablet Take 1 tablet (5 mg total) by mouth 3 (three) times daily.    duke's soln (benadryl 30 mL, mylanta 30 mL, LIDOcaine 30 mL, nystatin 30 mL) 120mL Take 10 mLs by mouth 4 (four) times daily.    famotidine (PEPCID) 20 MG tablet Take 1 tablet (20 mg total) by mouth nightly as needed for Heartburn.    furosemide (LASIX) 20 MG tablet TAKE 1 TABLET(20 MG) BY MOUTH EVERY DAY    gabapentin (NEURONTIN) 300 MG capsule Take 1 capsule (300 mg total) by mouth 3 (three) times daily.    hydrocortisone 2.5 % cream Apply topically 2 (two) times daily.    magnesium hydroxide 400 mg/5 ml (MILK OF MAGNESIA) 400 mg/5 mL Susp Take 30 mLs (2,400 mg total) by mouth daily as needed.    midodrine (PROAMATINE) 10 MG tablet Take 1 tablet (10 mg total) by mouth 3 (three) times daily.    naloxone (NARCAN) 4 mg/actuation Spry 4mg by nasal route as needed for opioid overdose; may repeat every 2-3 minutes in alternating nostrils until medical help arrives. Call 911    nortriptyline (PAMELOR) 25 MG capsule Take 1 capsule (25 mg total) by mouth every evening.    ondansetron (ZOFRAN) 8 MG tablet TAKE 1 TABLET(8 MG) BY MOUTH EVERY 8 HOURS AS NEEDED FOR NAUSEA    oxyCODONE (ROXICODONE) 5 MG immediate release tablet Take 1 tablet (5 mg total) by mouth every 4 (four) hours as needed for Pain.    polyethylene glycol (GLYCOLAX) 17 gram/dose powder Mix 1 capful (17 g) with liquid and take by mouth once daily for 20 days    prochlorperazine (COMPAZINE) 10 MG tablet TAKE 1 TABLET(10 MG) BY MOUTH EVERY 6 HOURS AS NEEDED FOR NAUSEA    senna-docusate 8.6-50 mg (SENNA WITH DOCUSATE SODIUM) 8.6-50 mg per tablet Take 1 tablet by mouth 2 (two) times daily as needed for Constipation.    sodium bicarb-sodium chloride powder Swish and spit 1 Dose 4 (four) times daily.    [DISCONTINUED] amitriptyline (ELAVIL) 10 MG  tablet Take 1 tablet (10 mg total) by mouth every evening.    [DISCONTINUED] pantoprazole (PROTONIX) 40 MG tablet Take 1 tablet (40 mg total) by mouth 2 (two) times daily.       Review of patient's allergies indicates:  No Known Allergies    Past Medical History:   Diagnosis Date    Anxiety     Dehydration 5/30/2022    Gastric cancer     Gastric ulcer     Hx of psychiatric care     Pregnancy 08/12/2020    delivered on 8/12/2020    Umbilical hernia      Past Surgical History:   Procedure Laterality Date    CLOSURE OF PERFORATED ULCER OF DUODENUM USING OMENTAL PATCH      ESOPHAGOGASTRODUODENOSCOPY N/A 10/13/2020    Procedure: EGD (ESOPHAGOGASTRODUODENOSCOPY);  Surgeon: Rosio Marion MD;  Location: [facility] (2ND FLR);  Service: Endoscopy;  Laterality: N/A;    ESOPHAGOGASTRODUODENOSCOPY N/A 12/11/2020    Procedure: EGD (ESOPHAGOGASTRODUODENOSCOPY);  Surgeon: Rosio Marion MD;  Location: [facility] (2ND FLR);  Service: Endoscopy;  Laterality: N/A;  Covid-19 test 12/8/20 at [facility]    ESOPHAGOGASTRODUODENOSCOPY N/A 3/12/2021    Procedure: EGD (ESOPHAGOGASTRODUODENOSCOPY);  Surgeon: Nav Omer MD;  Location: [facility] (2ND FLR);  Service: Endoscopy;  Laterality: N/A;  COVID at [facility] 3/9 ttr    ESOPHAGOGASTRODUODENOSCOPY N/A 5/18/2021    Procedure: EGD (ESOPHAGOGASTRODUODENOSCOPY);  Surgeon: Rosio Marion MD;  Location: [facility] ENDO (2ND FLR);  Service: Endoscopy;  Laterality: N/A;  5/15-covid pcw-inst portal-tb    ESOPHAGOGASTRODUODENOSCOPY N/A 5/26/2021    Procedure: EGD (ESOPHAGOGASTRODUODENOSCOPY);  Surgeon: Socrates Terrazas MD;  Location: [facility] ENDO (2ND FLR);  Service: Endoscopy;  Laterality: N/A;     Family History       Problem Relation (Age of Onset)    Breast cancer Other (73), Paternal Cousin    Cancer Mother (67)    Lung cancer Father (48)    No Known Problems Son    Prostate cancer Paternal Uncle    Schizophrenia Mother          Tobacco Use    Smoking status: Former      Types: Cigarettes     Quit date: 2019     Years since quittin.9    Smokeless tobacco: Never   Substance and Sexual Activity    Alcohol use: Yes    Drug use: Not Currently    Sexual activity: Yes     Partners: Male     Review of Systems   Constitutional:  Positive for fatigue. Negative for chills, diaphoresis and fever.   HENT:  Negative for sinus pressure and sore throat.    Eyes:  Negative for photophobia and visual disturbance.   Respiratory:  Negative for cough and shortness of breath.    Cardiovascular:  Positive for leg swelling. Negative for chest pain and palpitations.   Gastrointestinal:  Positive for abdominal distention and abdominal pain (at catheter site). Negative for blood in stool, diarrhea, nausea and vomiting.   Musculoskeletal:  Positive for arthralgias. Negative for myalgias.   Skin:  Negative for rash and wound.   Neurological:  Negative for syncope and headaches.   Psychiatric/Behavioral:  Negative for confusion and hallucinations.    Objective:     Vital Signs (Most Recent):  Temp: 98.4 °F (36.9 °C) (10/25/22 0733)  Pulse: (!) 119 (10/25/22 0733)  Resp: 18 (10/25/22 0733)  BP: 117/75 (10/25/22 0733)  SpO2: 98 % (10/25/22 0733)   Vital Signs (24h Range):  Temp:  [98.3 °F (36.8 °C)-99 °F (37.2 °C)] 98.4 °F (36.9 °C)  Pulse:  [114-119] 119  Resp:  [16-30] 18  SpO2:  [90 %-100 %] 98 %  BP: (102-117)/(67-83) 117/75     Weight: 63.7 kg (140 lb 6.9 oz)  Body mass index is 21.99 kg/m².    Physical Exam  Vitals and nursing note reviewed.   Constitutional:       General: She is not in acute distress.     Appearance: She is well-developed. She is cachectic. She is ill-appearing. She is not diaphoretic.   HENT:      Mouth/Throat:      Pharynx: Oropharynx is clear. No oropharyngeal exudate.   Eyes:      General: No scleral icterus.     Extraocular Movements: Extraocular movements intact.   Cardiovascular:      Rate and Rhythm: Normal rate and regular rhythm.   Pulmonary:      Effort: Pulmonary  effort is normal. No respiratory distress.   Abdominal:      General: There is distension.      Palpations: Abdomen is soft.      Tenderness: There is abdominal tenderness (minor).      Comments: PeritX catheter in RLQ   Musculoskeletal:         General: No deformity. Normal range of motion.      Right lower leg: Edema present.      Left lower leg: Edema present.   Skin:     General: Skin is warm and dry.      Coloration: Skin is not jaundiced.   Neurological:      General: No focal deficit present.      Mental Status: She is alert.      Cranial Nerves: No cranial nerve deficit.   Psychiatric:         Mood and Affect: Mood normal.         Behavior: Behavior normal.       Significant Labs:  I have reviewed all pertinent lab results within the past 24 hours.  CBC:   Recent Labs   Lab 10/25/22  0555   WBC 8.35   RBC 2.90*   HGB 6.7*   HCT 22.5*      MCV 78*   MCH 23.1*   MCHC 29.8*     CMP:   Recent Labs   Lab 10/25/22  0555   GLU 81   CALCIUM 7.6*   ALBUMIN 1.2*   PROT 4.5*   *   K 4.2   CO2 23      BUN 23*   CREATININE 0.6   ALKPHOS 107   ALT 17   AST 19   BILITOT 0.2       Significant Diagnostics:  I have reviewed all pertinent imaging results/findings within the past 24 hours.      Assessment/Plan:     * Indwelling catheter present on admission  Puja Quigley is a 42 y.o. female with a metastatic gastric adenocarcinoma with malignant ascites requiring large volume paracentesis. She had a peritX catheter placed on 10/5 by IR but due to discomfort would prefer to have this removed and go back to intermittent paracentesis.     - Patient seen and examined. Labs and imaging reviewed  - Last took Eliquis on 10/24 in the AM   - Discussed the risks, benefits, and alternatives to peritx catheter removal including but not limited to bleeding, infection, damage to surrounding structures, and persistent drainage of ascites form the site. All questions and concerns were addressed to the patient's  satisfaction and she wanted to proceed.   - Catheter removed at bedside with cuff and catheter intact. See procedure note below.   - Discussed with the patient that should there be ongoing drainage from the site she may use a ostomy bag to collect the fluid. Also discussed that the nylon suture needs to stay for at least a week. If after that time the skin has healed, then it may be removed. However would not remove it earlier than that.       Procedure Note  After obtaining verbal consent, the site around the catheter was prepped with betadine and then 1% lidocaine was injected into the soft tissue. The drain suture was cut and the catheter was able to be removed with ease. The cuff was quite close to the skin and the catheter has not been there long so no dissection was required. About 100 mL of clear yellow ascites drained from the site while a 2-0 nylon u-stitch was placed. Once this was tied down, there was no more drainage of ascites. Site was dressed with gauze and tape. Patient tolerated the procedure well.       VTE Risk Mitigation (From admission, onward)         Ordered     IP VTE HIGH RISK PATIENT  Once         10/24/22 1953     Place sequential compression device  Until discontinued         10/24/22 1953                Thank you for your consult. I will sign off. Please contact us if you have any additional questions.    Hannah Weber MD  General Surgery  [facility]

## 2022-10-25 NOTE — SUBJECTIVE & OBJECTIVE
Ibuprofen 600 mg 3 x a day with food x 5-7 days.    Switch to Allegra D for 1 week.    Stop metoprolol.      LOW CARB DIET      1.    AVOID everything that is sweet (just small portions for desert). This includes cookies, cakes, ice cream, soda, diet soda, juices, cereals with sugar, fruit yogurts and vanilla yogurts, peanut butter with sugar, granola bars, health bars, etc.     2.   CUT DOWN portions of bread, pasta, potatoes, rice, beans, corn. If you are trying to lose weight, you need to cut this portion down to a ¼ of what you are currently eating. If you are trying to maintain your weight, then you need to cut this portion in ½.      3.   Everything else EXCEPT DEEP FRIED FOODS AND FATTY MEATS is okay. This includes: eggs, dairy products (it does not have to be low fat), lean meat, fish, fruits, and vegetables.        NO SNACKING except fresh fruits and veggies.        No current facility-administered medications on file prior to encounter.     Current Outpatient Medications on File Prior to Encounter   Medication Sig    aluminum-magnesium hydroxide-simethicone (MAALOX) 200-200-20 mg/5 mL Susp Take 30 mLs by mouth every 6 (six) hours as needed.    apixaban (ELIQUIS) 5 mg Tab Take 1 tablet (5 mg total) by mouth 2 (two) times daily.    baclofen (LIORESAL) 5 mg Tab tablet Take 1 tablet (5 mg total) by mouth 3 (three) times daily.    duke's soln (benadryl 30 mL, mylanta 30 mL, LIDOcaine 30 mL, nystatin 30 mL) 120mL Take 10 mLs by mouth 4 (four) times daily.    famotidine (PEPCID) 20 MG tablet Take 1 tablet (20 mg total) by mouth nightly as needed for Heartburn.    furosemide (LASIX) 20 MG tablet TAKE 1 TABLET(20 MG) BY MOUTH EVERY DAY    gabapentin (NEURONTIN) 300 MG capsule Take 1 capsule (300 mg total) by mouth 3 (three) times daily.    hydrocortisone 2.5 % cream Apply topically 2 (two) times daily.    magnesium hydroxide 400 mg/5 ml (MILK OF MAGNESIA) 400 mg/5 mL Susp Take 30 mLs (2,400 mg total) by mouth daily as needed.    midodrine (PROAMATINE) 10 MG tablet Take 1 tablet (10 mg total) by mouth 3 (three) times daily.    naloxone (NARCAN) 4 mg/actuation Spry 4mg by nasal route as needed for opioid overdose; may repeat every 2-3 minutes in alternating nostrils until medical help arrives. Call 911    nortriptyline (PAMELOR) 25 MG capsule Take 1 capsule (25 mg total) by mouth every evening.    ondansetron (ZOFRAN) 8 MG tablet TAKE 1 TABLET(8 MG) BY MOUTH EVERY 8 HOURS AS NEEDED FOR NAUSEA    oxyCODONE (ROXICODONE) 5 MG immediate release tablet Take 1 tablet (5 mg total) by mouth every 4 (four) hours as needed for Pain.    polyethylene glycol (GLYCOLAX) 17 gram/dose powder Mix 1 capful (17 g) with liquid and take by mouth once daily for 20 days    prochlorperazine (COMPAZINE) 10 MG tablet TAKE 1 TABLET(10 MG) BY MOUTH EVERY 6 HOURS AS NEEDED FOR NAUSEA    senna-docusate 8.6-50 mg  (SENNA WITH DOCUSATE SODIUM) 8.6-50 mg per tablet Take 1 tablet by mouth 2 (two) times daily as needed for Constipation.    sodium bicarb-sodium chloride powder Swish and spit 1 Dose 4 (four) times daily.    [DISCONTINUED] amitriptyline (ELAVIL) 10 MG tablet Take 1 tablet (10 mg total) by mouth every evening.    [DISCONTINUED] pantoprazole (PROTONIX) 40 MG tablet Take 1 tablet (40 mg total) by mouth 2 (two) times daily.       Review of patient's allergies indicates:  No Known Allergies    Past Medical History:   Diagnosis Date    Anxiety     Dehydration 5/30/2022    Gastric cancer     Gastric ulcer     Hx of psychiatric care     Pregnancy 08/12/2020    delivered on 8/12/2020    Umbilical hernia      Past Surgical History:   Procedure Laterality Date    CLOSURE OF PERFORATED ULCER OF DUODENUM USING OMENTAL PATCH      ESOPHAGOGASTRODUODENOSCOPY N/A 10/13/2020    Procedure: EGD (ESOPHAGOGASTRODUODENOSCOPY);  Surgeon: Rosio Marion MD;  Location: [facility] (2ND FLR);  Service: Endoscopy;  Laterality: N/A;    ESOPHAGOGASTRODUODENOSCOPY N/A 12/11/2020    Procedure: EGD (ESOPHAGOGASTRODUODENOSCOPY);  Surgeon: Rosio Marion MD;  Location: [facility] (2ND FLR);  Service: Endoscopy;  Laterality: N/A;  Covid-19 test 12/8/20 at [facility]    ESOPHAGOGASTRODUODENOSCOPY N/A 3/12/2021    Procedure: EGD (ESOPHAGOGASTRODUODENOSCOPY);  Surgeon: Nav Omer MD;  Location: [facility] (2ND FLR);  Service: Endoscopy;  Laterality: N/A;  COVID at [facility] 3/9 ttr    ESOPHAGOGASTRODUODENOSCOPY N/A 5/18/2021    Procedure: EGD (ESOPHAGOGASTRODUODENOSCOPY);  Surgeon: Rosio Marion MD;  Location: [facility] (2ND FLR);  Service: Endoscopy;  Laterality: N/A;  5/15-covid pcw-inst portal-tb    ESOPHAGOGASTRODUODENOSCOPY N/A 5/26/2021    Procedure: EGD (ESOPHAGOGASTRODUODENOSCOPY);  Surgeon: Socrates Terrazas MD;  Location: [facility] (2ND FLR);  Service: Endoscopy;  Laterality: N/A;     Family History       Problem Relation (Age  of Onset)    Breast cancer Other (73), Paternal Cousin    Cancer Mother (67)    Lung cancer Father (48)    No Known Problems Son    Prostate cancer Paternal Uncle    Schizophrenia Mother          Tobacco Use    Smoking status: Former     Types: Cigarettes     Quit date: 2019     Years since quittin.9    Smokeless tobacco: Never   Substance and Sexual Activity    Alcohol use: Yes    Drug use: Not Currently    Sexual activity: Yes     Partners: Male     Review of Systems   Constitutional:  Positive for fatigue. Negative for chills, diaphoresis and fever.   HENT:  Negative for sinus pressure and sore throat.    Eyes:  Negative for photophobia and visual disturbance.   Respiratory:  Negative for cough and shortness of breath.    Cardiovascular:  Positive for leg swelling. Negative for chest pain and palpitations.   Gastrointestinal:  Positive for abdominal distention and abdominal pain (at catheter site). Negative for blood in stool, diarrhea, nausea and vomiting.   Musculoskeletal:  Positive for arthralgias. Negative for myalgias.   Skin:  Negative for rash and wound.   Neurological:  Negative for syncope and headaches.   Psychiatric/Behavioral:  Negative for confusion and hallucinations.    Objective:     Vital Signs (Most Recent):  Temp: 98.4 °F (36.9 °C) (10/25/22 0733)  Pulse: (!) 119 (10/25/22 0733)  Resp: 18 (10/25/22 0733)  BP: 117/75 (10/25/22 0733)  SpO2: 98 % (10/25/22 0733)   Vital Signs (24h Range):  Temp:  [98.3 °F (36.8 °C)-99 °F (37.2 °C)] 98.4 °F (36.9 °C)  Pulse:  [114-119] 119  Resp:  [16-30] 18  SpO2:  [90 %-100 %] 98 %  BP: (102-117)/(67-83) 117/75     Weight: 63.7 kg (140 lb 6.9 oz)  Body mass index is 21.99 kg/m².    Physical Exam  Vitals and nursing note reviewed.   Constitutional:       General: She is not in acute distress.     Appearance: She is well-developed. She is cachectic. She is ill-appearing. She is not diaphoretic.   HENT:      Mouth/Throat:      Pharynx: Oropharynx is  clear. No oropharyngeal exudate.   Eyes:      General: No scleral icterus.     Extraocular Movements: Extraocular movements intact.   Cardiovascular:      Rate and Rhythm: Normal rate and regular rhythm.   Pulmonary:      Effort: Pulmonary effort is normal. No respiratory distress.   Abdominal:      General: There is distension.      Palpations: Abdomen is soft.      Tenderness: There is abdominal tenderness (minor).      Comments: PeritX catheter in RLQ   Musculoskeletal:         General: No deformity. Normal range of motion.      Right lower leg: Edema present.      Left lower leg: Edema present.   Skin:     General: Skin is warm and dry.      Coloration: Skin is not jaundiced.   Neurological:      General: No focal deficit present.      Mental Status: She is alert.      Cranial Nerves: No cranial nerve deficit.   Psychiatric:         Mood and Affect: Mood normal.         Behavior: Behavior normal.       Significant Labs:  I have reviewed all pertinent lab results within the past 24 hours.  CBC:   Recent Labs   Lab 10/25/22  0555   WBC 8.35   RBC 2.90*   HGB 6.7*   HCT 22.5*      MCV 78*   MCH 23.1*   MCHC 29.8*     CMP:   Recent Labs   Lab 10/25/22  0555   GLU 81   CALCIUM 7.6*   ALBUMIN 1.2*   PROT 4.5*   *   K 4.2   CO2 23      BUN 23*   CREATININE 0.6   ALKPHOS 107   ALT 17   AST 19   BILITOT 0.2       Significant Diagnostics:  I have reviewed all pertinent imaging results/findings within the past 24 hours.

## 2022-10-25 NOTE — PROGRESS NOTES
[facility] - Observation [address] [place] [address] Medicine  Progress Note    Patient Name: Puja Quigley  MRN: [id number]  Patient Class: OP- Observation   Admission Date: 10/24/2022  Length of Stay: 0 days  Attending Physician: Tayo Martinez MD  Primary Care Provider: Primary Doctor No        Subjective:     Principal Problem:Indwelling catheter present on admission        HPI:  41 yo F with PMHx metastatic gastric adenocarcinoma on home hospice who presents to the ED complaining of abdominal pain around Pleur-catheter site. Pt. Was recently admitted here 9/30-10/14 for abdominal pain with ascites and MARIA. Pt. Stay complicated by worsening renal failure and hypotension after paracentesis which lead to an ICU stay. Goals of care discussion lead to patient being placed on home hospice with Pleur-X catheter (placed 10/5) as oncology felt her treatment options had been exhausted. Pt. Now presents back to the hospital complaining of severe abdominal pain around her Pleur-X catheter site and requests that the site be removed. Case discussed with IR who plan to remove catheter in am. Pt. Denies any fevers or chills. No reported erythema or purulent drainage around Pleur-X site.      Overview/Hospital Course:  [place] [facility] [place], transfused 1u pRBCs. IR consulted for abdominal catheter removal, though stated they cannot perform this procedure. Surg/onc consulted for assistance.      Interval History: Pt seen and examined this morning on rounds. JUVENTINO. Still with abdominal pain. Discussed that IR deferred procedure, and that we will have to await surg/onc eval. Care plan reviewed. Otherwise, doing well and with no further complaints at this time.        Objective:     Vital Signs (Most Recent):  Temp: 98.4 °F (36.9 °C) (10/25/22 0733)  Pulse: (!) 119 (10/25/22 0733)  Resp: 18 (10/25/22 0733)  BP: 117/75 (10/25/22 0733)  SpO2: 98 % (10/25/22 0733)   Vital Signs (24h Range):  Temp:  [98.3 °F (36.8 °C)-99 °F (37.2 °C)] 98.4  °F (36.9 °C)  Pulse:  [114-119] 119  Resp:  [16-30] 18  SpO2:  [90 %-100 %] 98 %  BP: (102-117)/(67-83) 117/75     Weight: 63.7 kg (140 lb 6.9 oz)  Body mass index is 21.99 kg/m².    Intake/Output Summary (Last 24 hours) at 10/25/2022 1130  Last data filed at 10/25/2022 0548  Gross per 24 hour   Intake --   Output 2 ml   Net -2 ml          Physical Exam  Gen: in NAD, appears stated age; cachectic  Neuro: AAOx4, CN2-12 grossly intact BL; motor, sensory, and strength grossly intact BL  HEENT: NTNC, EOMI, PERRLA, MMM; no thyromegaly or lymphadenopathy; no JVD appreciated  CVS: tachy 110, regular rhythm, no m/r/g; S1/S2 auscultated with no S3 or S4; capillary refill < 2 sec  Resp: lungs CTAB, no w/r/r; no belabored breathing or accessory muscle use appreciated   Abd: BS+ in all 4 quadrants; Moderate asciters with mild diffuse tenderness, drain in place with some tenderness around drain site. No surrounding erythema, warmth, or purulent drainage   Extrem: pulses full, equal, and regular over all 4 extremities; no UE or LE edema BL      Significant Labs: All pertinent labs within the past 24 hours have been reviewed.    Significant Imaging: I have reviewed all pertinent imaging results/findings within the past 24 hours.      Assessment/Plan:      * Indwelling catheter present on admission  - Pt with pain around indwelling pleural catheter site. Plan for removal  - IR deferred removal  - Sug/onc consulted for assistance    Comfort measures only status  -Pt. On hospice, admitted for Pleur-X removal. Pt. Does not wish to have lab draws, vitals only Qshift  -Home PRN oxycodone ordered with IV medications only for intractable pain. Can discuss with palliative care if needed for additional comfort recommendations  -SW consult for assistance with discharge back to home hospice    Anemia of chronic disease  - Hb 6.7, transfused 1u pRBCs  - Monitoring H/H on daily labs    Malignant neoplasm of stomach  -Pt on hospice, see comfort  measures only status      VTE Risk Mitigation (From admission, onward)         Ordered     IP VTE HIGH RISK PATIENT  Once         10/24/22 1953     Place sequential compression device  Until discontinued         10/24/22 1953                Discharge Planning   GLORIA: 10/26/2022     Code Status: DNR   Is the patient medically ready for discharge?: No    Reason for patient still in hospital (select all that apply): Patient trending condition             Tayo Martinez MD  Attending Physician  Department of Hospital Medicine  Epic secure chat preferred, or ext. [phone]  10/25/2022

## 2022-10-26 VITALS
HEART RATE: 92 BPM | SYSTOLIC BLOOD PRESSURE: 129 MMHG | RESPIRATION RATE: 17 BRPM | BODY MASS INDEX: 22.04 KG/M2 | HEIGHT: 67 IN | DIASTOLIC BLOOD PRESSURE: 85 MMHG | OXYGEN SATURATION: 98 % | TEMPERATURE: 99 F | WEIGHT: 140.44 LBS

## 2022-10-26 LAB
ALBUMIN SERPL BCP-MCNC: 1.3 G/DL (ref 3.5–5.2)
ALP SERPL-CCNC: 130 U/L (ref 55–135)
ALT SERPL W/O P-5'-P-CCNC: 17 U/L (ref 10–44)
ANION GAP SERPL CALC-SCNC: 8 MMOL/L (ref 8–16)
AST SERPL-CCNC: 21 U/L (ref 10–40)
BACTERIA UR CULT: NORMAL
BACTERIA UR CULT: NORMAL
BASOPHILS # BLD AUTO: 0.03 K/UL (ref 0–0.2)
BASOPHILS NFR BLD: 0.4 % (ref 0–1.9)
BILIRUB SERPL-MCNC: 0.3 MG/DL (ref 0.1–1)
BUN SERPL-MCNC: 25 MG/DL (ref 6–20)
CALCIUM SERPL-MCNC: 7.5 MG/DL (ref 8.7–10.5)
CHLORIDE SERPL-SCNC: 108 MMOL/L (ref 95–110)
CO2 SERPL-SCNC: 22 MMOL/L (ref 23–29)
CREAT SERPL-MCNC: 0.7 MG/DL (ref 0.5–1.4)
DIFFERENTIAL METHOD: ABNORMAL
EOSINOPHIL # BLD AUTO: 0 K/UL (ref 0–0.5)
EOSINOPHIL NFR BLD: 0.2 % (ref 0–8)
ERYTHROCYTE [DISTWIDTH] IN BLOOD BY AUTOMATED COUNT: 20.2 % (ref 11.5–14.5)
EST. GFR  (NO RACE VARIABLE): >60 ML/MIN/1.73 M^2
GLUCOSE SERPL-MCNC: 122 MG/DL (ref 70–110)
HCT VFR BLD AUTO: 27 % (ref 37–48.5)
HGB BLD-MCNC: 8 G/DL (ref 12–16)
IMM GRANULOCYTES # BLD AUTO: 0.03 K/UL (ref 0–0.04)
IMM GRANULOCYTES NFR BLD AUTO: 0.4 % (ref 0–0.5)
LYMPHOCYTES # BLD AUTO: 0.7 K/UL (ref 1–4.8)
LYMPHOCYTES NFR BLD: 8.6 % (ref 18–48)
MAGNESIUM SERPL-MCNC: 1.5 MG/DL (ref 1.6–2.6)
MCH RBC QN AUTO: 23.6 PG (ref 27–31)
MCHC RBC AUTO-ENTMCNC: 29.6 G/DL (ref 32–36)
MCV RBC AUTO: 80 FL (ref 82–98)
MONOCYTES # BLD AUTO: 0.6 K/UL (ref 0.3–1)
MONOCYTES NFR BLD: 6.6 % (ref 4–15)
NEUTROPHILS # BLD AUTO: 7.1 K/UL (ref 1.8–7.7)
NEUTROPHILS NFR BLD: 83.8 % (ref 38–73)
NRBC BLD-RTO: 0 /100 WBC
PHOSPHATE SERPL-MCNC: 2.3 MG/DL (ref 2.7–4.5)
PLATELET # BLD AUTO: 330 K/UL (ref 150–450)
PMV BLD AUTO: 9.3 FL (ref 9.2–12.9)
POTASSIUM SERPL-SCNC: 3.5 MMOL/L (ref 3.5–5.1)
PROT SERPL-MCNC: 4.7 G/DL (ref 6–8.4)
RBC # BLD AUTO: 3.39 M/UL (ref 4–5.4)
SODIUM SERPL-SCNC: 138 MMOL/L (ref 136–145)
WBC # BLD AUTO: 8.47 K/UL (ref 3.9–12.7)

## 2022-10-26 PROCEDURE — 84100 ASSAY OF PHOSPHORUS: CPT | Performed by: STUDENT IN AN ORGANIZED HEALTH CARE EDUCATION/TRAINING PROGRAM

## 2022-10-26 PROCEDURE — 36415 COLL VENOUS BLD VENIPUNCTURE: CPT | Performed by: STUDENT IN AN ORGANIZED HEALTH CARE EDUCATION/TRAINING PROGRAM

## 2022-10-26 PROCEDURE — 25000003 PHARM REV CODE 250: Performed by: HOSPITALIST

## 2022-10-26 PROCEDURE — G0378 HOSPITAL OBSERVATION PER HR: HCPCS

## 2022-10-26 PROCEDURE — 83735 ASSAY OF MAGNESIUM: CPT | Performed by: STUDENT IN AN ORGANIZED HEALTH CARE EDUCATION/TRAINING PROGRAM

## 2022-10-26 PROCEDURE — 25000003 PHARM REV CODE 250: Performed by: PHYSICIAN ASSISTANT

## 2022-10-26 PROCEDURE — 85025 COMPLETE CBC W/AUTO DIFF WBC: CPT | Performed by: STUDENT IN AN ORGANIZED HEALTH CARE EDUCATION/TRAINING PROGRAM

## 2022-10-26 PROCEDURE — 80053 COMPREHEN METABOLIC PANEL: CPT | Performed by: STUDENT IN AN ORGANIZED HEALTH CARE EDUCATION/TRAINING PROGRAM

## 2022-10-26 PROCEDURE — 99217 PR OBSERVATION CARE DISCHARGE: CPT | Mod: ,,, | Performed by: STUDENT IN AN ORGANIZED HEALTH CARE EDUCATION/TRAINING PROGRAM

## 2022-10-26 PROCEDURE — 99217 PR OBSERVATION CARE DISCHARGE: ICD-10-PCS | Mod: ,,, | Performed by: STUDENT IN AN ORGANIZED HEALTH CARE EDUCATION/TRAINING PROGRAM

## 2022-10-26 PROCEDURE — 63600175 PHARM REV CODE 636 W HCPCS: Performed by: STUDENT IN AN ORGANIZED HEALTH CARE EDUCATION/TRAINING PROGRAM

## 2022-10-26 RX ORDER — LORAZEPAM 0.5 MG/1
0.5 TABLET ORAL EVERY 12 HOURS PRN
Status: DISCONTINUED | OUTPATIENT
Start: 2022-10-26 | End: 2022-10-26 | Stop reason: HOSPADM

## 2022-10-26 RX ORDER — LORAZEPAM 0.5 MG/1
TABLET ORAL
COMMUNITY
Start: 2022-10-24

## 2022-10-26 RX ADMIN — Medication 1 DOSE: at 10:10

## 2022-10-26 RX ADMIN — GABAPENTIN 300 MG: 300 CAPSULE ORAL at 10:10

## 2022-10-26 RX ADMIN — ALUMINUM HYDROXIDE, MAGNESIUM HYDROXIDE, AND DIMETHICONE 10 ML: 400; 400; 40 SUSPENSION ORAL at 10:10

## 2022-10-26 RX ADMIN — OXYCODONE 5 MG: 5 TABLET ORAL at 10:10

## 2022-10-26 RX ADMIN — LORAZEPAM 0.5 MG: 0.5 TABLET ORAL at 12:10

## 2022-10-26 NOTE — DISCHARGE SUMMARY
[facility] - Observation [address] [place] [address] Medicine  Discharge Summary      Patient Name: Puja Quigley  MRN: [id number]  Patient Class: OP- Observation  Admission Date: 10/24/2022  Hospital Length of Stay: 0 days  Discharge Date and Time:  10/26/2022 3:00 PM  Attending Physician: Chacha att. providers found   Discharging Provider: Araceli Waldron MD  Primary Care Provider: Primary Doctor Chacha  [facility] Medicine Team: [facility] Araceli Waldron MD    HPI:   43 yo F with PMHx metastatic gastric adenocarcinoma on home hospice who presents to the ED complaining of abdominal pain around Pleur-catheter site. Pt. Was recently admitted here 9/30-10/14 for abdominal pain with ascites and MARIA. Pt. Stay complicated by worsening renal failure and hypotension after paracentesis which lead to an ICU stay. Goals of care discussion lead to patient being placed on home hospice with Pleur-X catheter (placed 10/5) as oncology felt her treatment options had been exhausted. Pt. Now presents back to the hospital complaining of severe abdominal pain around her Pleur-X catheter site and requests that the site be removed. Case discussed with IR who plan to remove catheter in am. Pt. Denies any fevers or chills. No reported erythema or purulent drainage around Pleur-X site.      * No surgery found *      Hospital [place]:   [place] [facility] [place], transfused 1u pRBCs. IR consulted for abdominal catheter removal, though stated they cannot perform this procedure. Surg/onc consulted for assistance. Removed catheter 10/26 with significant symptomatic relief by patient. Discharged home to resume home hospice with strict return precautions.        Goals of Care Treatment Preferences:  Code Status: DNR    Health care agent: Alvino Lee  [facility] agent number: [phone]                   Consults:   Consults (From admission, onward)        Status Ordering Provider     Inpatient consult to Surgical Oncology  Once        Provider:  (Not  yet assigned)    Completed WALESKA ZAVALA     Inpatient consult to Social Work/Case Management  Once        Provider:  (Not yet assigned)    Acknowledged PATRIZIA TAYLOR     Inpatient consult to Interventional Radiology  Once        Provider:  (Not yet assigned)    Completed TIM ZAMBRANO          No new Assessment & Plan notes have been filed under this hospital service since the last note was generated.  Service: Hospital Medicine    Final Active Diagnoses:    Diagnosis Date Noted POA    PRINCIPAL PROBLEM:  Indwelling catheter present on admission [Z96.0] 10/24/2022 Not Applicable    Anemia of chronic disease [D63.8] 10/25/2022 Yes    Comfort measures only status [Z51.5] 10/24/2022 Not Applicable    Malignant neoplasm of stomach [C16.9] 06/10/2021 Yes      Problems Resolved During this Admission:       Discharged Condition: good    Disposition: Hospice/Home    Follow Up:    Patient Instructions:      Diet Adult Regular     Notify your health care provider if you experience any of the following:  temperature >100.4     Notify your health care provider if you experience any of the following:  persistent nausea and vomiting or diarrhea     Notify your health care provider if you experience any of the following:  severe uncontrolled pain     Notify your health care provider if you experience any of the following:  redness, tenderness, or signs of infection (pain, swelling, redness, odor or green/yellow discharge around incision site)     Notify your health care provider if you experience any of the following:  difficulty breathing or increased cough     Notify your health care provider if you experience any of the following:  severe persistent headache     Notify your health care provider if you experience any of the following:  worsening rash     Notify your health care provider if you experience any of the following:  persistent dizziness, light-headedness, or visual disturbances     Notify your health  care provider if you experience any of the following:  increased confusion or weakness     Activity as tolerated       Significant Diagnostic Studies: Labs: All labs within the past 24 hours have been reviewed    Pending Diagnostic Studies:     None         Medications:  Reconciled Home Medications:      Medication List      CONTINUE taking these medications    aluminum-magnesium hydroxide-simethicone 200-200-20 mg/5 mL Susp  Commonly known as: MAALOX  Take 30 mLs by mouth every 6 (six) hours as needed.     apixaban 5 mg Tab  Commonly known as: ELIQUIS  Take 1 tablet (5 mg total) by mouth 2 (two) times daily.     baclofen 5 mg Tab tablet  Commonly known as: LIORESAL  Take 1 tablet (5 mg total) by mouth 3 (three) times daily.     CLEARLAX 17 gram/dose powder  Generic drug: polyethylene glycol  Mix 1 capful (17 g) with liquid and take by mouth once daily for 20 days     DUKE'S SOLUTION (BENADRYL 30 ML, MYLANTA 30 ML, LIDOCAINE 30 ML, NYSTATIN 30 ML)  Take 10 mLs by mouth 4 (four) times daily.     famotidine 20 MG tablet  Commonly known as: PEPCID  Take 1 tablet (20 mg total) by mouth nightly as needed for Heartburn.     furosemide 20 MG tablet  Commonly known as: LASIX  TAKE 1 TABLET(20 MG) BY MOUTH EVERY DAY     gabapentin 300 MG capsule  Commonly known as: NEURONTIN  Take 1 capsule (300 mg total) by mouth 3 (three) times daily.     hydrocortisone 2.5 % cream  Apply topically 2 (two) times daily.     LORazepam 0.5 MG tablet  Commonly known as: ATIVAN  Take by mouth.     magnesium hydroxide 400 mg/5 ml 400 mg/5 mL Susp  Commonly known as: MILK OF MAGNESIA  Take 30 mLs (2,400 mg total) by mouth daily as needed.     midodrine 10 MG tablet  Commonly known as: PROAMATINE  Take 1 tablet (10 mg total) by mouth 3 (three) times daily.     naloxone 4 mg/actuation Spry  Commonly known as: NARCAN  4mg by nasal route as needed for opioid overdose; may repeat every 2-3 minutes in alternating nostrils until medical help arrives.  Call 911     nortriptyline 25 MG capsule  Commonly known as: PAMELOR  Take 1 capsule (25 mg total) by mouth every evening.     ondansetron 8 MG tablet  Commonly known as: ZOFRAN  TAKE 1 TABLET(8 MG) BY MOUTH EVERY 8 HOURS AS NEEDED FOR NAUSEA     oxyCODONE 5 MG immediate release tablet  Commonly known as: ROXICODONE  Take 1 tablet (5 mg total) by mouth every 4 (four) hours as needed for Pain.     prochlorperazine 10 MG tablet  Commonly known as: COMPAZINE  TAKE 1 TABLET(10 MG) BY MOUTH EVERY 6 HOURS AS NEEDED FOR NAUSEA     senna-docusate 8.6-50 mg 8.6-50 mg per tablet  Commonly known as: SENNA WITH DOCUSATE SODIUM  Take 1 tablet by mouth 2 (two) times daily as needed for Constipation.     sodium bicarb-sodium chloride powder  Swish and spit 1 Dose 4 (four) times daily.            Indwelling Lines/Drains at time of discharge:   Lines/Drains/Airways     Central Venous Catheter Line  Duration           Port A Cath Single Lumen right subclavian -- days          Drain  Duration                Drain/Device  10/05/22 0826 Right lower abdomen other (see comments) 21 days                Time spent on the discharge of patient: 25 minutes         May KADEN Waldron MD  Department of Hospital Medicine  [facility] - Observation 11H

## 2022-10-26 NOTE — PLAN OF CARE
No acute events during shift. Patient requested not to have VS taken while sleeping. Fall and safety precautions maintained. POC discussed with patient and family at bedside. Will continue to monitor.     Problem: Adult Inpatient Plan of Care  Goal: Plan of Care Review  10/26/2022 0616 by Michelle Salinas LPN  Outcome: Ongoing, Progressing  10/26/2022 0616 by Michelle Salinas LPN  Outcome: Ongoing, Progressing  Goal: Patient-Specific Goal (Individualized)  10/26/2022 0616 by Michelle Salinas LPN  Outcome: Ongoing, Progressing  10/26/2022 0616 by Michelle Salinas LPN  Outcome: Ongoing, Progressing  Goal: Absence of Hospital-Acquired Illness or Injury  10/26/2022 0616 by Michelle Salinas LPN  Outcome: Ongoing, Progressing  10/26/2022 0616 by Michelle Salinas LPN  Outcome: Ongoing, Progressing  Goal: Optimal Comfort and Wellbeing  10/26/2022 0616 by Michelle Salinas LPN  Outcome: Ongoing, Progressing  10/26/2022 0616 by Michelle Salinas LPN  Outcome: Ongoing, Progressing  Goal: Readiness for Transition of Care  10/26/2022 0616 by Michelle Salinas LPN  Outcome: Ongoing, Progressing  10/26/2022 0616 by Michelle Salinas LPN  Outcome: Ongoing, Progressing     Problem: Fluid and Electrolyte Imbalance (Acute Kidney Injury/Impairment)  Goal: Fluid and Electrolyte Balance  10/26/2022 0616 by Michelle Salinas LPN  Outcome: Ongoing, Progressing  10/26/2022 0616 by Michelle Salinas LPN  Outcome: Ongoing, Progressing     Problem: Oral Intake Inadequate (Acute Kidney Injury/Impairment)  Goal: Optimal Nutrition Intake  10/26/2022 0616 by Michelle Salinas LPN  Outcome: Ongoing, Progressing  10/26/2022 0616 by Michelle Salinas LPN  Outcome: Ongoing, Progressing     Problem: Renal Function Impairment (Acute Kidney Injury/Impairment)  Goal: Effective Renal Function  10/26/2022 0616 by Michelle Salinas LPN  Outcome: Ongoing, Progressing  10/26/2022 0616 by Michelle Salinas LPN  Outcome: Ongoing, Progressing     Problem: Infection  Goal: Absence of Infection Signs and  Symptoms  10/26/2022 0616 by Michelle Salinas LPN  Outcome: Ongoing, Progressing  10/26/2022 0616 by Michelle Salinas LPN  Outcome: Ongoing, Progressing     Problem: Impaired Wound Healing  Goal: Optimal Wound Healing  10/26/2022 0616 by Michelle Salinas LPN  Outcome: Ongoing, Progressing  10/26/2022 0616 by Michelle Salinas LPN  Outcome: Ongoing, Progressing     Problem: Fall Injury Risk  Goal: Absence of Fall and Fall-Related Injury  10/26/2022 0616 by Michelle Salinas LPN  Outcome: Ongoing, Progressing  10/26/2022 0616 by Michelle Salinas LPN  Outcome: Ongoing, Progressing     Problem: Skin Injury Risk Increased  Goal: Skin Health and Integrity  10/26/2022 0616 by Michelle Salinas LPN  Outcome: Ongoing, Progressing  10/26/2022 0616 by Michelle Salinas LPN  Outcome: Ongoing, Progressing

## 2022-10-26 NOTE — PLAN OF CARE
[facility] - Observation 11H  Discharge Final Note    Primary Care Provider: Primary Doctor No    Expected Discharge Date: 10/26/2022    Pt discharged home with continuation of Home Hospice. Orders faxed to [facility] @ [phone]. cm arranged Ambulance transport via [facility]. Requested  time is  1230.  Requested  time does not guarantee arrival time.  If transport does not arrive by 1330 please contact assigned SW or on-call for assistance.    Nav Calderon RN,BSN          Final Discharge Note (most recent)       Final Note - 10/26/22 1135          Final Note    Assessment Type Final Discharge Note     Anticipated Discharge Disposition Hospice/Home     Hospital Resources/Appts/Education Provided Provided patient/caregiver with written discharge plan information;Appointments scheduled and added to AVS        Post-Acute Status    Post-Acute Authorization Hospice     Hospice Status Set-up Complete/Auth obtained     Discharge Delays None known at this time                     Important Message from Medicare

## 2022-10-26 NOTE — PLAN OF CARE
[facility]  Department of Hospital Medicine  [address] [place] [address]  [phone] after hours  [phone] fax    HOSPICE  ORDERS    10/26/2022    Admit to Hospice:  Home Service    Diagnoses:   Active Hospital Problems    Diagnosis  POA    *Indwelling catheter present on admission [Z96.0]  Not Applicable    Anemia of chronic disease [D63.8]  Yes    Comfort measures only status [Z51.5]  Not Applicable    Malignant neoplasm of stomach [C16.9]  Yes      Resolved Hospital Problems   No resolved problems to display.       Hospice Qualifying Diagnoses: Gastric Adenocarcinoma       Patient has a life expectancy < 6 months due to:  Primary Hospice Diagnosis:  gastric adenocarcinoma  Comorbid Conditions Contributing to Decline: ascites    Vital Signs: Routine per Hospice Protocol.    Code Status: DNR    Allergies: Review of patient's allergies indicates:  No Known Allergies    Diet: Regular    Activities: As tolerated    Goals of Care Treatment Preferences:  Code Status: DNR    Health care agent: Alvino Jesus  Health care agent number: [phone]                   Nursing: Per Hospice Routine    Other Miscellaneous Care:   Medications:          Medication List        CONTINUE taking these medications      aluminum-magnesium hydroxide-simethicone 200-200-20 mg/5 mL Susp  Commonly known as: MAALOX  Take 30 mLs by mouth every 6 (six) hours as needed.     apixaban 5 mg Tab  Commonly known as: ELIQUIS  Take 1 tablet (5 mg total) by mouth 2 (two) times daily.     baclofen 5 mg Tab tablet  Commonly known as: LIORESAL  Take 1 tablet (5 mg total) by mouth 3 (three) times daily.     CLEARLAX 17 gram/dose powder  Generic drug: polyethylene glycol  Mix 1 capful (17 g) with liquid and take by mouth once daily for 20 days     DUKE'S SOLUTION (BENADRYL 30 ML, MYLANTA 30 ML, LIDOCAINE 30 ML, NYSTATIN 30 ML)  Take 10 mLs by mouth 4 (four) times daily.     famotidine 20 MG  tablet  Commonly known as: PEPCID  Take 1 tablet (20 mg total) by mouth nightly as needed for Heartburn.     furosemide 20 MG tablet  Commonly known as: LASIX  TAKE 1 TABLET(20 MG) BY MOUTH EVERY DAY     gabapentin 300 MG capsule  Commonly known as: NEURONTIN  Take 1 capsule (300 mg total) by mouth 3 (three) times daily.     hydrocortisone 2.5 % cream  Apply topically 2 (two) times daily.     LORazepam 0.5 MG tablet  Commonly known as: ATIVAN  Take by mouth.     magnesium hydroxide 400 mg/5 ml 400 mg/5 mL Susp  Commonly known as: MILK OF MAGNESIA  Take 30 mLs (2,400 mg total) by mouth daily as needed.     midodrine 10 MG tablet  Commonly known as: PROAMATINE  Take 1 tablet (10 mg total) by mouth 3 (three) times daily.     naloxone 4 mg/actuation Spry  Commonly known as: NARCAN  4mg by nasal route as needed for opioid overdose; may repeat every 2-3 minutes in alternating nostrils until medical help arrives. Call 911     nortriptyline 25 MG capsule  Commonly known as: PAMELOR  Take 1 capsule (25 mg total) by mouth every evening.     ondansetron 8 MG tablet  Commonly known as: ZOFRAN  TAKE 1 TABLET(8 MG) BY MOUTH EVERY 8 HOURS AS NEEDED FOR NAUSEA     oxyCODONE 5 MG immediate release tablet  Commonly known as: ROXICODONE  Take 1 tablet (5 mg total) by mouth every 4 (four) hours as needed for Pain.     prochlorperazine 10 MG tablet  Commonly known as: COMPAZINE  TAKE 1 TABLET(10 MG) BY MOUTH EVERY 6 HOURS AS NEEDED FOR NAUSEA     senna-docusate 8.6-50 mg 8.6-50 mg per tablet  Commonly known as: SENNA WITH DOCUSATE SODIUM  Take 1 tablet by mouth 2 (two) times daily as needed for Constipation.     sodium bicarb-sodium chloride powder  Swish and spit 1 Dose 4 (four) times daily.                Future Orders:  Hospice Medical Director may dictate new orders for comfortable care measures & sign death certificate.        _________________________________  May KADEN Waldron MD  10/26/2022

## 2022-10-31 ENCOUNTER — HOSPITAL ENCOUNTER (OUTPATIENT)
Dept: INTERVENTIONAL RADIOLOGY/VASCULAR | Facility: HOSPITAL | Age: 42
Discharge: HOME | End: 2022-10-31
Attending: INTERNAL MEDICINE
Payer: COMMERCIAL

## 2022-10-31 VITALS — SYSTOLIC BLOOD PRESSURE: 115 MMHG | DIASTOLIC BLOOD PRESSURE: 87 MMHG

## 2022-10-31 DIAGNOSIS — R18.8 OTHER ASCITES: ICD-10-CM

## 2022-10-31 PROCEDURE — 49083 ABD PARACENTESIS W/IMAGING: CPT | Performed by: RADIOLOGY

## 2022-10-31 PROCEDURE — C1729 CATH, DRAINAGE: HCPCS

## 2022-10-31 PROCEDURE — 25000003 PHARM REV CODE 250: Performed by: RADIOLOGY

## 2022-10-31 PROCEDURE — A7048 VACUUM DRAIN BOTTLE/TUBE KIT: HCPCS

## 2022-10-31 PROCEDURE — 49083 IR PARACENTESIS WITH IMAGING: ICD-10-PCS | Mod: ,,, | Performed by: RADIOLOGY

## 2022-10-31 RX ORDER — LIDOCAINE HYDROCHLORIDE 10 MG/ML
INJECTION INFILTRATION; PERINEURAL
Status: COMPLETED | OUTPATIENT
Start: 2022-10-31 | End: 2022-10-31

## 2022-10-31 RX ADMIN — LIDOCAINE HYDROCHLORIDE 10 ML: 10 INJECTION, SOLUTION INFILTRATION; PERINEURAL at 11:10

## 2022-10-31 NOTE — BRIEF OP NOTE
Radiology Post-Procedure Note     Pre Op Diagnosis: Recurrent painful, tense ascites  Post Op Diagnosis: Same     Procedure: 1. US-guided percutaneous RUQ-approach therapeutic LVP     Procedure performed by: Castro Bermudez MD     Written Informed Consent Obtained: Yes  Specimen Removed: YES, 2,500-cc of thin, renuka-colored ascitic fluid  Estimated Blood Loss: none     Findings:   Successful US-guided percutaneous RUQ-approach therapeutic LVP with local anesthetic only and albumin infusion post PRN as indicated per institutional protocol. Patient tolerated the procedure well. No immediate post-procedural complications noted.      No post-op activity, diet or medication restrictions.     Thank you for considering IR for the care of your patient.      Castro Bermudez MD  Interventional Radiology

## 2022-10-31 NOTE — DISCHARGE SUMMARY
Radiology Discharge Summary      Hospital Course: No complications    Admit Date: 10/31/2022  Discharge Date: 10/31/2022     Instructions Given to Patient: Yes  Diet: Resume prior diet  Activity: activity as tolerated    Description of Condition on Discharge: Stable  Vital Signs (Most Recent): BP: 115/87 (10/31/22 1137)    Discharge Disposition: Home    Discharge Diagnosis:  42 y.o. female with recurrent painful, tense ascites s/p successful US-guided percutaneous RUQ-approach therapeutic LVP with local anesthetic only and albumin infusion post PRN as indicated per institutional protocol. Patient tolerated the procedure well. No immediate post-procedural complications noted.      No post-op activity, diet or medication restrictions.     Thank you for considering IR for the care of your patient.      Castro Bermudez MD  Interventional Radiology

## 2022-10-31 NOTE — H&P
Outpatient Radiology Pre-procedure Note    Chief Complaint: Recurrent painful, tense ascites     History of Present Illness:  Puja Quigley is a 41 y.o. female with pertinent PMHx of gastric adenocarcinoma with osseous, hepatic and peritoneal metastases complicated by recurrent abdominal distension and pain 2/2 recurrent painful, tense ascites requiring frequent decompression for symptom relief.     A new outpatient IR consult received for US-guided percutaneous RUQ-approach therapeutic large-volume paracentesis.    Admission H&P reviewed.  Past Medical History:   Diagnosis Date    Anxiety     Dehydration 5/30/2022    Gastric cancer     Gastric ulcer     Hx of psychiatric care     Pregnancy 08/12/2020    delivered on 8/12/2020    Umbilical hernia      Past Surgical History:   Procedure Laterality Date    CLOSURE OF PERFORATED ULCER OF DUODENUM USING OMENTAL PATCH      ESOPHAGOGASTRODUODENOSCOPY N/A 10/13/2020    Procedure: EGD (ESOPHAGOGASTRODUODENOSCOPY);  Surgeon: Rosio Marion MD;  Location: [facility] (2ND FLR);  Service: Endoscopy;  Laterality: N/A;    ESOPHAGOGASTRODUODENOSCOPY N/A 12/11/2020    Procedure: EGD (ESOPHAGOGASTRODUODENOSCOPY);  Surgeon: Rosio Marion MD;  Location: [facility] (2ND FLR);  Service: Endoscopy;  Laterality: N/A;  Covid-19 test 12/8/20 at [facility]    ESOPHAGOGASTRODUODENOSCOPY N/A 3/12/2021    Procedure: EGD (ESOPHAGOGASTRODUODENOSCOPY);  Surgeon: Nav Omer MD;  Location: [facility] ENDO (2ND FLR);  Service: Endoscopy;  Laterality: N/A;  COVID at [facility] 3/9 ttr    ESOPHAGOGASTRODUODENOSCOPY N/A 5/18/2021    Procedure: EGD (ESOPHAGOGASTRODUODENOSCOPY);  Surgeon: Rosio Marion MD;  Location: [facility] ENDO (2ND FLR);  Service: Endoscopy;  Laterality: N/A;  5/15-covid pcw-inst portal-tb    ESOPHAGOGASTRODUODENOSCOPY N/A 5/26/2021    Procedure: EGD (ESOPHAGOGASTRODUODENOSCOPY);  Surgeon: Socrates Terrazas MD;  Location: [facility] ENDO (2ND FLR);  Service: Endoscopy;   Laterality: N/A;     Review of Systems:   As documented in primary team H&P    Home Meds:   Prior to Admission medications    Medication Sig Start Date End Date Taking? Authorizing Provider   aluminum-magnesium hydroxide-simethicone (MAALOX) 200-200-20 mg/5 mL Susp Take 30 mLs by mouth every 6 (six) hours as needed. 10/14/22 10/14/23  Jaspal Boucher DO   apixaban (ELIQUIS) 5 mg Tab Take 1 tablet (5 mg total) by mouth 2 (two) times daily. 10/14/22   Jaspal Boucher DO   baclofen (LIORESAL) 5 mg Tab tablet Take 1 tablet (5 mg total) by mouth 3 (three) times daily. 9/23/22   Sharon Brink MD   willis's soln (benadryl 30 mL, mylanta 30 mL, LIDOcaine 30 mL, nystatin 30 mL) 120mL Take 10 mLs by mouth 4 (four) times daily. 10/14/22   Jaspal Boucher DO   famotidine (PEPCID) 20 MG tablet Take 1 tablet (20 mg total) by mouth nightly as needed for Heartburn. 2/23/22 2/23/23  Kamille Hooks PA-C   furosemide (LASIX) 20 MG tablet TAKE 1 TABLET(20 MG) BY MOUTH EVERY DAY 10/3/22   Fer Ashraf MD   gabapentin (NEURONTIN) 300 MG capsule Take 1 capsule (300 mg total) by mouth 3 (three) times daily. 8/22/22 8/22/23  Kamille Hooks PA-C   hydrocortisone 2.5 % cream Apply topically 2 (two) times daily. 7/11/22   Bethel Shetty MD   LORazepam (ATIVAN) 0.5 MG tablet Take by mouth. 10/24/22   Historical Provider   magnesium hydroxide 400 mg/5 ml (MILK OF MAGNESIA) 400 mg/5 mL Susp Take 30 mLs (2,400 mg total) by mouth daily as needed. 10/14/22   Jaspal Boucher DO   midodrine (PROAMATINE) 10 MG tablet Take 1 tablet (10 mg total) by mouth 3 (three) times daily. 10/14/22 10/14/23  Jaspal Boucher DO   naloxone (NARCAN) 4 mg/actuation Spry 4mg by nasal route as needed for opioid overdose; may repeat every 2-3 minutes in alternating nostrils until medical help arrives. Call 911 8/17/22   Sharon Brink MD   nortriptyline (PAMELOR) 25 MG capsule Take 1 capsule (25 mg total) by mouth every evening. 6/20/22 6/20/23  Bren Allen MD    ondansetron (ZOFRAN) 8 MG tablet TAKE 1 TABLET(8 MG) BY MOUTH EVERY 8 HOURS AS NEEDED FOR NAUSEA 8/29/22   Fer Ashraf MD   oxyCODONE (ROXICODONE) 5 MG immediate release tablet Take 1 tablet (5 mg total) by mouth every 4 (four) hours as needed for Pain. 9/23/22   Fer Ashraf MD   polyethylene glycol (GLYCOLAX) 17 gram/dose powder Mix 1 capful (17 g) with liquid and take by mouth once daily for 20 days 6/21/22   Bren Allen MD   prochlorperazine (COMPAZINE) 10 MG tablet TAKE 1 TABLET(10 MG) BY MOUTH EVERY 6 HOURS AS NEEDED FOR NAUSEA 8/7/21   Fer Ashraf MD   senna-docusate 8.6-50 mg (SENNA WITH DOCUSATE SODIUM) 8.6-50 mg per tablet Take 1 tablet by mouth 2 (two) times daily as needed for Constipation. 5/26/21   Reshma Wilson MD   sodium bicarb-sodium chloride powder Swish and spit 1 Dose 4 (four) times daily. 10/14/22 10/14/23  Jaspal Boucher DO   amitriptyline (ELAVIL) 10 MG tablet Take 1 tablet (10 mg total) by mouth every evening. 5/13/21 6/20/22  Socrates Terrazas MD   pantoprazole (PROTONIX) 40 MG tablet Take 1 tablet (40 mg total) by mouth 2 (two) times daily. 5/25/22 6/20/22  Kamille Hooks PA-C     Scheduled Meds:   Continuous Infusions:   PRN Meds:  Anticoagulants/Antiplatelets: no anticoagulation     Allergies: Review of patient's allergies indicates:  No Known Allergies      Sedation History:  no adverse reactions     Review of Systems:   Hematological: no known coagulopathies  Respiratory: no cough, shortness of breath, or wheezing  Cardiovascular: no chest pain or dyspnea on exertion  Gastrointestinal: positive for - abdominal pain and distension  Genito-Urinary: no dysuria, trouble voiding, or hematuria  Musculoskeletal: negative  Neurological: no TIA or stroke symptoms    Labs:  No results for input(s): INR in the last 168 hours.    Invalid input(s):  PT,  PTT    Recent Labs   Lab 10/26/22  0335   WBC 8.47   HGB 8.0*   HCT 27.0*   MCV 80*         Recent Labs   Lab  10/26/22  0335   *      K 3.5      CO2 22*   BUN 25*   CREATININE 0.7   CALCIUM 7.5*   MG 1.5*   ALT 17   AST 21   ALBUMIN 1.3*   BILITOT 0.3     Vitals:  BP: 115/87 (10/31/22 1137)     Physical Exam:  General: no acute distress  Mental Status: alert and oriented to person, place and time  HEENT: normocephalic, atraumatic  Chest: unlabored breathing  Heart: regular heart rate  Abdomen: +distended with +fluid wave. No TTP/r/g.  Extremity: moves all extremities    A/P:   42 y.o. female with pertinent PMHx of gastric adenocarcinoma with osseous, hepatic and peritoneal metastases complicated by recurrent abdominal distension and pain 2/2 recurrent painful, tense ascites requiring frequent decompression for symptom relief.     1. Recurrent painful, tense ascites - Will attempt US-guided percutaneous RUQ-approach therapeutic LVP with local anesthetic only and albumin infusion post PRN as indicated per institutional protocol.     Risks (including, but not limited to, pain, bleeding, infection, damage to nearby structures, failure to obtain sufficient material for a diagnosis, the need for additional procedures, and death), benefits, and alternatives were discussed with the patient. All questions were answered to the best of my abilities. The patient wishes to proceed with the procedure. Written informed consent was obtained.     Thank you for considering IR for the care of your patient.      Castro Bermudez MD  Interventional Radiology
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
## 2022-11-08 PROBLEM — J96.01 ACUTE HYPOXEMIC RESPIRATORY FAILURE: Status: ACTIVE | Noted: 2022-11-08

## 2022-11-08 PROBLEM — J18.9 HAP (HOSPITAL-ACQUIRED PNEUMONIA): Status: ACTIVE | Noted: 2022-11-08

## 2022-11-09 ENCOUNTER — HOSPITAL ENCOUNTER (INPATIENT)
Facility: HOSPITAL | Age: 42
LOS: 1 days | Discharge: HOSPICE | DRG: 871 | End: 2022-11-10
Attending: INTERNAL MEDICINE | Admitting: INTERNAL MEDICINE
Payer: COMMERCIAL

## 2022-11-09 DIAGNOSIS — J18.9 PNEUMONIA: ICD-10-CM

## 2022-11-09 DIAGNOSIS — R07.9 CHEST PAIN: ICD-10-CM

## 2022-11-09 PROBLEM — Z71.89 ADVANCE CARE PLANNING: Status: ACTIVE | Noted: 2022-10-09

## 2022-11-09 LAB
ALBUMIN SERPL BCP-MCNC: 1 G/DL (ref 3.5–5.2)
ALP SERPL-CCNC: 284 U/L (ref 55–135)
ALT SERPL W/O P-5'-P-CCNC: 29 U/L (ref 10–44)
ANION GAP SERPL CALC-SCNC: 11 MMOL/L (ref 8–16)
APPEARANCE FLD: NORMAL
AST SERPL-CCNC: 27 U/L (ref 10–40)
BASOPHILS # BLD AUTO: 0.03 K/UL (ref 0–0.2)
BASOPHILS NFR BLD: 0.1 % (ref 0–1.9)
BILIRUB SERPL-MCNC: 0.3 MG/DL (ref 0.1–1)
BODY FLD TYPE: NORMAL
BUN SERPL-MCNC: 58 MG/DL (ref 6–20)
CALCIUM SERPL-MCNC: 7.7 MG/DL (ref 8.7–10.5)
CHLORIDE SERPL-SCNC: 109 MMOL/L (ref 95–110)
CO2 SERPL-SCNC: 16 MMOL/L (ref 23–29)
COLOR FLD: YELLOW
CREAT SERPL-MCNC: 0.9 MG/DL (ref 0.5–1.4)
DIFFERENTIAL METHOD: ABNORMAL
EOSINOPHIL # BLD AUTO: 0 K/UL (ref 0–0.5)
EOSINOPHIL NFR BLD: 0 % (ref 0–8)
ERYTHROCYTE [DISTWIDTH] IN BLOOD BY AUTOMATED COUNT: 19.6 % (ref 11.5–14.5)
EST. GFR  (NO RACE VARIABLE): >60 ML/MIN/1.73 M^2
GLUCOSE SERPL-MCNC: 75 MG/DL (ref 70–110)
HCT VFR BLD AUTO: 30.1 % (ref 37–48.5)
HGB BLD-MCNC: 9.2 G/DL (ref 12–16)
IMM GRANULOCYTES # BLD AUTO: 0.29 K/UL (ref 0–0.04)
IMM GRANULOCYTES NFR BLD AUTO: 1.2 % (ref 0–0.5)
LYMPHOCYTES # BLD AUTO: 0.8 K/UL (ref 1–4.8)
LYMPHOCYTES NFR BLD: 3.3 % (ref 18–48)
LYMPHOCYTES NFR FLD MANUAL: 1 %
MAGNESIUM SERPL-MCNC: 1.6 MG/DL (ref 1.6–2.6)
MCH RBC QN AUTO: 24.1 PG (ref 27–31)
MCHC RBC AUTO-ENTMCNC: 30.6 G/DL (ref 32–36)
MCV RBC AUTO: 79 FL (ref 82–98)
MONOCYTES # BLD AUTO: 0.4 K/UL (ref 0.3–1)
MONOCYTES NFR BLD: 1.4 % (ref 4–15)
NEUTROPHILS # BLD AUTO: 23.1 K/UL (ref 1.8–7.7)
NEUTROPHILS NFR BLD: 94 % (ref 38–73)
NEUTROPHILS NFR FLD MANUAL: 99 %
NRBC BLD-RTO: 0 /100 WBC
PHOSPHATE SERPL-MCNC: 4.9 MG/DL (ref 2.7–4.5)
PLATELET # BLD AUTO: 62 K/UL (ref 150–450)
PMV BLD AUTO: ABNORMAL FL (ref 9.2–12.9)
POCT GLUCOSE: 105 MG/DL (ref 70–110)
POCT GLUCOSE: 138 MG/DL (ref 70–110)
POCT GLUCOSE: 150 MG/DL (ref 70–110)
POCT GLUCOSE: 35 MG/DL (ref 70–110)
POCT GLUCOSE: 43 MG/DL (ref 70–110)
POCT GLUCOSE: 55 MG/DL (ref 70–110)
POCT GLUCOSE: 73 MG/DL (ref 70–110)
POCT GLUCOSE: 74 MG/DL (ref 70–110)
POTASSIUM SERPL-SCNC: 5 MMOL/L (ref 3.5–5.1)
PROT SERPL-MCNC: 4.6 G/DL (ref 6–8.4)
RBC # BLD AUTO: 3.82 M/UL (ref 4–5.4)
SODIUM SERPL-SCNC: 136 MMOL/L (ref 136–145)
VANCOMYCIN SERPL-MCNC: 22.6 UG/ML
WBC # BLD AUTO: 24.58 K/UL (ref 3.9–12.7)
WBC # FLD: 6426 /CU MM

## 2022-11-09 PROCEDURE — 85025 COMPLETE CBC W/AUTO DIFF WBC: CPT | Performed by: STUDENT IN AN ORGANIZED HEALTH CARE EDUCATION/TRAINING PROGRAM

## 2022-11-09 PROCEDURE — 25000003 PHARM REV CODE 250: Performed by: NURSE PRACTITIONER

## 2022-11-09 PROCEDURE — 87205 SMEAR GRAM STAIN: CPT | Performed by: STUDENT IN AN ORGANIZED HEALTH CARE EDUCATION/TRAINING PROGRAM

## 2022-11-09 PROCEDURE — 84100 ASSAY OF PHOSPHORUS: CPT | Performed by: STUDENT IN AN ORGANIZED HEALTH CARE EDUCATION/TRAINING PROGRAM

## 2022-11-09 PROCEDURE — 36415 COLL VENOUS BLD VENIPUNCTURE: CPT | Performed by: STUDENT IN AN ORGANIZED HEALTH CARE EDUCATION/TRAINING PROGRAM

## 2022-11-09 PROCEDURE — 36415 COLL VENOUS BLD VENIPUNCTURE: CPT | Performed by: INTERNAL MEDICINE

## 2022-11-09 PROCEDURE — 25000003 PHARM REV CODE 250: Performed by: STUDENT IN AN ORGANIZED HEALTH CARE EDUCATION/TRAINING PROGRAM

## 2022-11-09 PROCEDURE — 11000001 HC ACUTE MED/SURG PRIVATE ROOM

## 2022-11-09 PROCEDURE — 87075 CULTR BACTERIA EXCEPT BLOOD: CPT | Performed by: STUDENT IN AN ORGANIZED HEALTH CARE EDUCATION/TRAINING PROGRAM

## 2022-11-09 PROCEDURE — 89051 BODY FLUID CELL COUNT: CPT | Performed by: STUDENT IN AN ORGANIZED HEALTH CARE EDUCATION/TRAINING PROGRAM

## 2022-11-09 PROCEDURE — 83735 ASSAY OF MAGNESIUM: CPT | Performed by: STUDENT IN AN ORGANIZED HEALTH CARE EDUCATION/TRAINING PROGRAM

## 2022-11-09 PROCEDURE — S0030 INJECTION, METRONIDAZOLE: HCPCS | Performed by: STUDENT IN AN ORGANIZED HEALTH CARE EDUCATION/TRAINING PROGRAM

## 2022-11-09 PROCEDURE — 87070 CULTURE OTHR SPECIMN AEROBIC: CPT | Performed by: STUDENT IN AN ORGANIZED HEALTH CARE EDUCATION/TRAINING PROGRAM

## 2022-11-09 PROCEDURE — 80202 ASSAY OF VANCOMYCIN: CPT | Performed by: INTERNAL MEDICINE

## 2022-11-09 PROCEDURE — 63600175 PHARM REV CODE 636 W HCPCS: Performed by: STUDENT IN AN ORGANIZED HEALTH CARE EDUCATION/TRAINING PROGRAM

## 2022-11-09 PROCEDURE — 80053 COMPREHEN METABOLIC PANEL: CPT | Performed by: STUDENT IN AN ORGANIZED HEALTH CARE EDUCATION/TRAINING PROGRAM

## 2022-11-09 RX ORDER — PROCHLORPERAZINE EDISYLATE 5 MG/ML
5 INJECTION INTRAMUSCULAR; INTRAVENOUS EVERY 6 HOURS PRN
Status: DISCONTINUED | OUTPATIENT
Start: 2022-11-09 | End: 2022-11-10 | Stop reason: HOSPADM

## 2022-11-09 RX ORDER — HYDROCODONE BITARTRATE AND ACETAMINOPHEN 5; 325 MG/1; MG/1
1 TABLET ORAL EVERY 6 HOURS PRN
Status: DISCONTINUED | OUTPATIENT
Start: 2022-11-09 | End: 2022-11-09

## 2022-11-09 RX ORDER — POLYETHYLENE GLYCOL 3350 17 G/17G
17 POWDER, FOR SOLUTION ORAL DAILY
Status: DISCONTINUED | OUTPATIENT
Start: 2022-11-09 | End: 2022-11-10 | Stop reason: HOSPADM

## 2022-11-09 RX ORDER — IBUPROFEN 200 MG
24 TABLET ORAL
Status: DISCONTINUED | OUTPATIENT
Start: 2022-11-09 | End: 2022-11-10 | Stop reason: HOSPADM

## 2022-11-09 RX ORDER — LANOLIN ALCOHOL/MO/W.PET/CERES
800 CREAM (GRAM) TOPICAL
Status: DISCONTINUED | OUTPATIENT
Start: 2022-11-09 | End: 2022-11-10 | Stop reason: HOSPADM

## 2022-11-09 RX ORDER — FAMOTIDINE 20 MG/1
20 TABLET, FILM COATED ORAL 2 TIMES DAILY
Status: DISCONTINUED | OUTPATIENT
Start: 2022-11-09 | End: 2022-11-10 | Stop reason: HOSPADM

## 2022-11-09 RX ORDER — IBUPROFEN 200 MG
16 TABLET ORAL
Status: DISCONTINUED | OUTPATIENT
Start: 2022-11-09 | End: 2022-11-10 | Stop reason: HOSPADM

## 2022-11-09 RX ORDER — SIMETHICONE 80 MG
1 TABLET,CHEWABLE ORAL 4 TIMES DAILY PRN
Status: DISCONTINUED | OUTPATIENT
Start: 2022-11-09 | End: 2022-11-10 | Stop reason: HOSPADM

## 2022-11-09 RX ORDER — MAG HYDROX/ALUMINUM HYD/SIMETH 200-200-20
30 SUSPENSION, ORAL (FINAL DOSE FORM) ORAL 4 TIMES DAILY PRN
Status: DISCONTINUED | OUTPATIENT
Start: 2022-11-09 | End: 2022-11-10 | Stop reason: HOSPADM

## 2022-11-09 RX ORDER — ONDANSETRON 2 MG/ML
4 INJECTION INTRAMUSCULAR; INTRAVENOUS EVERY 8 HOURS PRN
Status: DISCONTINUED | OUTPATIENT
Start: 2022-11-09 | End: 2022-11-10 | Stop reason: HOSPADM

## 2022-11-09 RX ORDER — METRONIDAZOLE 500 MG/100ML
500 INJECTION, SOLUTION INTRAVENOUS
Status: DISCONTINUED | OUTPATIENT
Start: 2022-11-09 | End: 2022-11-10 | Stop reason: HOSPADM

## 2022-11-09 RX ORDER — MUPIROCIN 20 MG/G
OINTMENT TOPICAL 2 TIMES DAILY
Status: DISCONTINUED | OUTPATIENT
Start: 2022-11-09 | End: 2022-11-10 | Stop reason: HOSPADM

## 2022-11-09 RX ORDER — GLUCAGON 1 MG
1 KIT INJECTION
Status: DISCONTINUED | OUTPATIENT
Start: 2022-11-09 | End: 2022-11-10 | Stop reason: HOSPADM

## 2022-11-09 RX ORDER — IPRATROPIUM BROMIDE AND ALBUTEROL SULFATE 2.5; .5 MG/3ML; MG/3ML
3 SOLUTION RESPIRATORY (INHALATION) EVERY 4 HOURS PRN
Status: DISCONTINUED | OUTPATIENT
Start: 2022-11-09 | End: 2022-11-10 | Stop reason: HOSPADM

## 2022-11-09 RX ORDER — MORPHINE SULFATE 4 MG/ML
3 INJECTION, SOLUTION INTRAMUSCULAR; INTRAVENOUS EVERY 6 HOURS PRN
Status: DISCONTINUED | OUTPATIENT
Start: 2022-11-09 | End: 2022-11-09

## 2022-11-09 RX ORDER — TALC
6 POWDER (GRAM) TOPICAL NIGHTLY
Status: DISCONTINUED | OUTPATIENT
Start: 2022-11-09 | End: 2022-11-10 | Stop reason: HOSPADM

## 2022-11-09 RX ORDER — ACETAMINOPHEN 325 MG/1
650 TABLET ORAL EVERY 8 HOURS PRN
Status: DISCONTINUED | OUTPATIENT
Start: 2022-11-09 | End: 2022-11-10 | Stop reason: HOSPADM

## 2022-11-09 RX ORDER — MORPHINE SULFATE 4 MG/ML
4 INJECTION, SOLUTION INTRAMUSCULAR; INTRAVENOUS EVERY 4 HOURS PRN
Status: DISCONTINUED | OUTPATIENT
Start: 2022-11-09 | End: 2022-11-10 | Stop reason: HOSPADM

## 2022-11-09 RX ORDER — ACETAMINOPHEN 325 MG/1
650 TABLET ORAL EVERY 4 HOURS PRN
Status: DISCONTINUED | OUTPATIENT
Start: 2022-11-09 | End: 2022-11-10 | Stop reason: HOSPADM

## 2022-11-09 RX ORDER — NALOXONE HCL 0.4 MG/ML
0.02 VIAL (ML) INJECTION
Status: DISCONTINUED | OUTPATIENT
Start: 2022-11-09 | End: 2022-11-10 | Stop reason: HOSPADM

## 2022-11-09 RX ORDER — BACLOFEN 5 MG/1
5 TABLET ORAL 3 TIMES DAILY
Status: DISCONTINUED | OUTPATIENT
Start: 2022-11-09 | End: 2022-11-10 | Stop reason: HOSPADM

## 2022-11-09 RX ORDER — SODIUM,POTASSIUM PHOSPHATES 280-250MG
2 POWDER IN PACKET (EA) ORAL
Status: DISCONTINUED | OUTPATIENT
Start: 2022-11-09 | End: 2022-11-10 | Stop reason: HOSPADM

## 2022-11-09 RX ORDER — HYDROCODONE BITARTRATE AND ACETAMINOPHEN 5; 325 MG/1; MG/1
1 TABLET ORAL EVERY 4 HOURS PRN
Status: DISCONTINUED | OUTPATIENT
Start: 2022-11-09 | End: 2022-11-10 | Stop reason: HOSPADM

## 2022-11-09 RX ORDER — BISACODYL 10 MG
10 SUPPOSITORY, RECTAL RECTAL DAILY PRN
Status: DISCONTINUED | OUTPATIENT
Start: 2022-11-09 | End: 2022-11-10 | Stop reason: HOSPADM

## 2022-11-09 RX ORDER — DEXTROSE MONOHYDRATE 100 MG/ML
INJECTION, SOLUTION INTRAVENOUS CONTINUOUS
Status: ACTIVE | OUTPATIENT
Start: 2022-11-09 | End: 2022-11-09

## 2022-11-09 RX ORDER — DEXTROSE MONOHYDRATE 100 MG/ML
INJECTION, SOLUTION INTRAVENOUS CONTINUOUS
Status: ACTIVE | OUTPATIENT
Start: 2022-11-09 | End: 2022-11-10

## 2022-11-09 RX ORDER — SODIUM CHLORIDE 0.9 % (FLUSH) 0.9 %
10 SYRINGE (ML) INJECTION EVERY 12 HOURS PRN
Status: DISCONTINUED | OUTPATIENT
Start: 2022-11-09 | End: 2022-11-10 | Stop reason: HOSPADM

## 2022-11-09 RX ORDER — AMOXICILLIN 250 MG
1 CAPSULE ORAL 2 TIMES DAILY PRN
Status: DISCONTINUED | OUTPATIENT
Start: 2022-11-09 | End: 2022-11-10 | Stop reason: HOSPADM

## 2022-11-09 RX ORDER — TALC
6 POWDER (GRAM) TOPICAL NIGHTLY PRN
Status: DISCONTINUED | OUTPATIENT
Start: 2022-11-09 | End: 2022-11-09

## 2022-11-09 RX ORDER — GABAPENTIN 300 MG/1
300 CAPSULE ORAL 3 TIMES DAILY
Status: DISCONTINUED | OUTPATIENT
Start: 2022-11-09 | End: 2022-11-10 | Stop reason: HOSPADM

## 2022-11-09 RX ORDER — LORAZEPAM 0.5 MG/1
0.5 TABLET ORAL EVERY 8 HOURS PRN
Status: DISCONTINUED | OUTPATIENT
Start: 2022-11-09 | End: 2022-11-10 | Stop reason: HOSPADM

## 2022-11-09 RX ORDER — CEFEPIME HYDROCHLORIDE 1 G/50ML
2 INJECTION, SOLUTION INTRAVENOUS
Status: DISCONTINUED | OUTPATIENT
Start: 2022-11-09 | End: 2022-11-10

## 2022-11-09 RX ADMIN — Medication 6 MG: at 09:11

## 2022-11-09 RX ADMIN — DEXTROSE 250 ML: 10 SOLUTION INTRAVENOUS at 07:11

## 2022-11-09 RX ADMIN — MORPHINE SULFATE 3 MG: 4 INJECTION INTRAVENOUS at 03:11

## 2022-11-09 RX ADMIN — METRONIDAZOLE 500 MG: 500 INJECTION, SOLUTION INTRAVENOUS at 04:11

## 2022-11-09 RX ADMIN — MORPHINE SULFATE 3 MG: 4 INJECTION INTRAVENOUS at 10:11

## 2022-11-09 RX ADMIN — MUPIROCIN: 20 OINTMENT TOPICAL at 09:11

## 2022-11-09 RX ADMIN — BACLOFEN 5 MG: 5 TABLET ORAL at 09:11

## 2022-11-09 RX ADMIN — FAMOTIDINE 20 MG: 20 TABLET ORAL at 09:11

## 2022-11-09 RX ADMIN — GABAPENTIN 300 MG: 300 CAPSULE ORAL at 09:11

## 2022-11-09 RX ADMIN — APIXABAN 5 MG: 5 TABLET, FILM COATED ORAL at 09:11

## 2022-11-09 RX ADMIN — DEXTROSE: 10 SOLUTION INTRAVENOUS at 05:11

## 2022-11-09 RX ADMIN — DEXTROSE: 10 SOLUTION INTRAVENOUS at 08:11

## 2022-11-09 RX ADMIN — CEFEPIME HYDROCHLORIDE 2 G: 2 INJECTION, SOLUTION INTRAVENOUS at 04:11

## 2022-11-09 RX ADMIN — POLYETHYLENE GLYCOL 3350 17 G: 17 POWDER, FOR SOLUTION ORAL at 08:11

## 2022-11-09 RX ADMIN — CEFEPIME HYDROCHLORIDE 2 G: 2 INJECTION, SOLUTION INTRAVENOUS at 08:11

## 2022-11-09 RX ADMIN — DEXTROSE 250 ML: 10 SOLUTION INTRAVENOUS at 05:11

## 2022-11-09 RX ADMIN — METRONIDAZOLE 500 MG: 500 INJECTION, SOLUTION INTRAVENOUS at 10:11

## 2022-11-09 NOTE — ASSESSMENT & PLAN NOTE
Presented with SOB, initially requiring BiPAP   Had leukocytosis on initial presentation   CXR showed small bilateral pleural effusions, bibasilar atelectasis or consolidations and diffuse interstitial prominence  Received vancomycin, cefepime  Improved, weaned down to room air     Plan:   - Continue antibiotics   - Wean O2 as tolerated

## 2022-11-09 NOTE — HPI
This is a 42 year old female with a PMHx of metastatic gastric adenocarcinoma (on home hospice) c/b ascites requiring paracenteses who presents with SOB.     The patient presented to [place] on 11/6 with SOB and chest pain. Upon arrival, she was hypoxic and tachypnic, requiring BiPAP. Labs were remarkable for leukocytosis (19.2), microcytic anemia (7.0 > 6.6), and thrombocytopenia (117 > 79), elevated INR (1.5), elevated ALP (351), negative BNP, troponin and lactic acid. CXR showed small bilateral pleural effusions, bibasilar atelectasis or consolidations and diffuse interstitial prominence. She was given vancomycin, cefepime, fluids, morphine, diazepam and ativan. Initially, there was a plan to transfer to [facility], however, given capacity limitation, she was transfered to [facility]. Ultimately, she was weaned down to room air prior to transfer.

## 2022-11-09 NOTE — PROGRESS NOTES
I have seen this patient, reviewed my colleague's history and physical, assessment and plan. I have personally interviewed and examined the patient at bedside and agree with the findings with the following comments/updates:    [facility] on Hospice presents with Sepsis.   Paracentesis done, results pending   Patient has had Anaerobic Prevotella in past and WBC# still increasing on V+Cef, will add flagyl.       Jean Marie Altamirano MD  Internal Medicine Staff  [phone] pager

## 2022-11-09 NOTE — HOSPITAL COURSE
[facility] on Hospice presents with Sepsis. Paracentesis done. Patient has had Anaerobic Prevotella in past and WBC# still increasing on V+Cef, will add flagyl.

## 2022-11-09 NOTE — H&P
[facility] Medicine  History & Physical    Patient Name: Puja Quigley  MRN: [id number]  Patient Class: IP- Inpatient  Admission Date: 11/9/2022  Attending Physician: Tayo Naik MD   Primary Care Provider: Primary Doctor No         Patient information was obtained from patient and ER records.     Subjective:     Principal Problem:Sepsis    Chief Complaint: No chief complaint on file.       HPI: This is a 42 year old female with a PMHx of metastatic gastric adenocarcinoma (on home hospice) c/b ascites requiring paracenteses who presents with SOB.     The patient presented to [place] on 11/6 with SOB and chest pain. Upon arrival, she was hypoxic and tachypnic, requiring BiPAP. Labs were remarkable for leukocytosis (19.2), microcytic anemia (7.0 > 6.6), and thrombocytopenia (117 > 79), elevated INR (1.5), elevated ALP (351), negative BNP, troponin and lactic acid. CXR showed small bilateral pleural effusions, bibasilar atelectasis or consolidations and diffuse interstitial prominence. She was given vancomycin, cefepime, fluids, morphine, diazepam and ativan. Initially, there was a plan to transfer to [facility], however, given capacity limitation, she was transfered to [facility]. Ultimately, she was weaned down to room air prior to transfer.       Past Medical History:   Diagnosis Date    Anxiety     Dehydration 5/30/2022    Gastric cancer     Gastric ulcer     Hx of psychiatric care     Pregnancy 08/12/2020    delivered on 8/12/2020    Umbilical hernia        Past Surgical History:   Procedure Laterality Date    CLOSURE OF PERFORATED ULCER OF DUODENUM USING OMENTAL PATCH      ESOPHAGOGASTRODUODENOSCOPY N/A 10/13/2020    Procedure: EGD (ESOPHAGOGASTRODUODENOSCOPY);  Surgeon: Rosio Marion MD;  Location: [address];  Service: Endoscopy;  Laterality: N/A;    ESOPHAGOGASTRODUODENOSCOPY N/A 12/11/2020    Procedure: EGD (ESOPHAGOGASTRODUODENOSCOPY);  Surgeon: Rosio VILLARREAL  Tim Marion MD;  Location: [facility] ENDO (2ND FLR);  Service: Endoscopy;  Laterality: N/A;  Covid-19 test 12/8/20 at [facility]    ESOPHAGOGASTRODUODENOSCOPY N/A 3/12/2021    Procedure: EGD (ESOPHAGOGASTRODUODENOSCOPY);  Surgeon: Nav Omer MD;  Location: [facility] ENDO (2ND FLR);  Service: Endoscopy;  Laterality: N/A;  COVID at [facility] 3/9 ttr    ESOPHAGOGASTRODUODENOSCOPY N/A 5/18/2021    Procedure: EGD (ESOPHAGOGASTRODUODENOSCOPY);  Surgeon: Rosio Marion MD;  Location: [facility] ENDO (2ND FLR);  Service: Endoscopy;  Laterality: N/A;  5/15-covid pcw-inst portal-tb    ESOPHAGOGASTRODUODENOSCOPY N/A 5/26/2021    Procedure: EGD (ESOPHAGOGASTRODUODENOSCOPY);  Surgeon: Socrates Terrazas MD;  Location: [facility] ENDO (2ND FLR);  Service: Endoscopy;  Laterality: N/A;       Review of patient's allergies indicates:  No Known Allergies    Current Facility-Administered Medications on File Prior to Encounter   Medication    [COMPLETED] dextrose 10% bolus 250 mL    [COMPLETED] vancomycin 1 g in 0.9% sodium chloride 250 mL IVPB (ready to mix system)    [DISCONTINUED] 0.9%  NaCl infusion (for blood administration)    [DISCONTINUED] cefepime in dextrose 5 % 1 gram/50 mL IVPB 1 g    [DISCONTINUED] dextrose 10% bolus 125 mL    [DISCONTINUED] dextrose 10% bolus 250 mL    [DISCONTINUED] dextrose 50% injection 25 g    [DISCONTINUED] morphine injection 4 mg    [DISCONTINUED] ondansetron injection 4 mg    [DISCONTINUED] vancomycin - pharmacy to dose    [DISCONTINUED] vancomycin 1 g in 0.9% sodium chloride 250 mL IVPB (ready to mix system)     Current Outpatient Medications on File Prior to Encounter   Medication Sig    aluminum-magnesium hydroxide-simethicone (MAALOX) 200-200-20 mg/5 mL Susp Take 30 mLs by mouth every 6 (six) hours as needed.    apixaban (ELIQUIS) 5 mg Tab Take 1 tablet (5 mg total) by mouth 2 (two) times daily.    baclofen (LIORESAL) 5 mg Tab tablet Take 1 tablet (5 mg total) by mouth 3 (three) times daily.     duke's soln (benadryl 30 mL, mylanta 30 mL, LIDOcaine 30 mL, nystatin 30 mL) 120mL Take 10 mLs by mouth 4 (four) times daily.    famotidine (PEPCID) 20 MG tablet Take 1 tablet (20 mg total) by mouth nightly as needed for Heartburn.    furosemide (LASIX) 20 MG tablet TAKE 1 TABLET(20 MG) BY MOUTH EVERY DAY    gabapentin (NEURONTIN) 300 MG capsule Take 1 capsule (300 mg total) by mouth 3 (three) times daily.    hydrocortisone 2.5 % cream Apply topically 2 (two) times daily.    LORazepam (ATIVAN) 0.5 MG tablet Take by mouth.    magnesium hydroxide 400 mg/5 ml (MILK OF MAGNESIA) 400 mg/5 mL Susp Take 30 mLs (2,400 mg total) by mouth daily as needed.    midodrine (PROAMATINE) 10 MG tablet Take 1 tablet (10 mg total) by mouth 3 (three) times daily.    naloxone (NARCAN) 4 mg/actuation Spry 4mg by nasal route as needed for opioid overdose; may repeat every 2-3 minutes in alternating nostrils until medical help arrives. Call 911    nortriptyline (PAMELOR) 25 MG capsule Take 1 capsule (25 mg total) by mouth every evening.    ondansetron (ZOFRAN) 8 MG tablet TAKE 1 TABLET(8 MG) BY MOUTH EVERY 8 HOURS AS NEEDED FOR NAUSEA    oxyCODONE (ROXICODONE) 5 MG immediate release tablet Take 1 tablet (5 mg total) by mouth every 4 (four) hours as needed for Pain.    polyethylene glycol (GLYCOLAX) 17 gram/dose powder Mix 1 capful (17 g) with liquid and take by mouth once daily for 20 days    prochlorperazine (COMPAZINE) 10 MG tablet TAKE 1 TABLET(10 MG) BY MOUTH EVERY 6 HOURS AS NEEDED FOR NAUSEA    senna-docusate 8.6-50 mg (SENNA WITH DOCUSATE SODIUM) 8.6-50 mg per tablet Take 1 tablet by mouth 2 (two) times daily as needed for Constipation.    sodium bicarb-sodium chloride powder Swish and spit 1 Dose 4 (four) times daily.    [DISCONTINUED] amitriptyline (ELAVIL) 10 MG tablet Take 1 tablet (10 mg total) by mouth every evening.    [DISCONTINUED] pantoprazole (PROTONIX) 40 MG tablet Take 1 tablet (40 mg total) by mouth  2 (two) times daily.     Family History       Problem Relation (Age of Onset)    Breast cancer Other (73), Paternal Cousin    Cancer Mother (67)    Lung cancer Father (48)    No Known Problems Son    Prostate cancer Paternal Uncle    Schizophrenia Mother          Tobacco Use    Smoking status: Former     Types: Cigarettes     Quit date: 2019     Years since quittin.9    Smokeless tobacco: Never   Substance and Sexual Activity    Alcohol use: Yes    Drug use: Not Currently    Sexual activity: Yes     Partners: Male     Review of Systems   Unable to perform ROS: Mental status change   Objective:     Vital Signs (Most Recent):  Temp: (!) 95.7 °F (35.4 °C) (22)  Pulse: 94 (22)  Resp: 14 (22)  BP: (!) 118/90 (22)  SpO2: 97 % (22)   Vital Signs (24h Range):  Temp:  [95.7 °F (35.4 °C)-96.9 °F (36.1 °C)] 95.7 °F (35.4 °C)  Pulse:  [] 94  Resp:  [14-20] 14  SpO2:  [95 %-100 %] 97 %  BP: (104-124)/(75-96) 118/90        There is no height or weight on file to calculate BMI.    Physical Exam  Constitutional:       Comments: Cachectic appearing    HENT:      Head: Normocephalic and atraumatic.      Nose: Nose normal.      Mouth/Throat:      Mouth: Mucous membranes are dry.   Cardiovascular:      Rate and Rhythm: Normal rate.      Pulses: Normal pulses.      Heart sounds: No murmur heard.  Pulmonary:      Effort: Pulmonary effort is normal. No respiratory distress.   Abdominal:      General: There is distension.      Tenderness: There is abdominal tenderness.      Comments: Tense abdomen   Musculoskeletal:      Right lower leg: Edema present.      Left lower leg: Edema present.   Skin:     General: Skin is warm.   Neurological:      Mental Status: She is disoriented.           Significant Labs: All pertinent labs within the past 24 hours have been reviewed.    Significant Imaging: I have reviewed all pertinent imaging results/findings within the past 24  hours.    Assessment/Plan:     * Sepsis  This patient does have evidence of infective focus  My overall impression is sepsis. Vital signs were reviewed and noted in progress note.  Antibiotics given-   Antibiotics (From admission, onward)    Start     Stop Route Frequency Ordered    11/09/22 0315  cefepime in dextrose 5 % IVPB 2 g         -- IV Every 8 hours (non-standard times) 11/09/22 0202    11/09/22 0301  vancomycin - pharmacy to dose  (vancomycin IVPB)        See Jerzy for full Linked Orders Report.    -- IV pharmacy to manage frequency 11/09/22 0202        Cultures were taken-   Microbiology Results (last 7 days)     Procedure Component Value Units Date/Time    Culture, Body Fluid (Aerobic) w/ Gram Stain [[id number]]     Order Status: No result Specimen: Body Fluid from Peritoneal Fluid     Culture, Anaerobic [[id number]]     Order Status: No result Specimen: Body Fluid from Ascites         Latest lactate reviewed, they are-  Recent Labs   Lab 11/06/22 2020 11/06/22  2346   LACTATE 0.9 0.7       Organ dysfunction indicated by Acute respiratory failure  Source- Respiratory, or skin/soft tissue     Source control Achieved by- antibiotics       HAP (hospital-acquired pneumonia)  Presented with SOB, initially requiring BiPAP   Had leukocytosis on initial presentation   CXR showed small bilateral pleural effusions, bibasilar atelectasis or consolidations and diffuse interstitial prominence  Received vancomycin, cefepime  Improved, weaned down to room air     Plan:   - Continue antibiotics   - Wean O2 as tolerated        Acute hypoxemic respiratory failure  Patient with Hypoxic Respiratory failure which is Acute.  she is not on home oxygen. Supplemental oxygen was provided and noted-  .   Signs/symptoms of respiratory failure include- tachypnea, increased work of breathing and respiratory distress. Contributing diagnoses includes - HAP, pulmonary edema  Labs and images were reviewed. Patient Has recent ABG,  which has been reviewed. Will treat underlying causes and adjust management of respiratory failure as follows- See plan for HAP    Advance care planning  Advance Care Planning     Date: 11/09/2022    Code Status  In light of the patients advanced and life limiting illness,I engaged the the family in a conversation about the patient's preferences for care  at the very end of life. The patient wishes to have a natural, peaceful death.  Along those lines, the patient does not wish to have CPR or other invasive treatments performed when her heart and/or breathing stops. I communicated to the family that a DNR form was completed and will be scanned into EPIC.  I spent a total of 15 minutes engaging the patient in this advance care planning discussion.             Malignant neoplasm of stomach  History of metastatic gastric adenocarcinoma (on home hospice)  Discharge to home with hospice once improved      Microcytic anemia  Likely related to underlying malignancy   Transfuse hemoglobin > 7       Malignant ascites  Paracentesis by IR        VTE Risk Mitigation (From admission, onward)         Ordered     IP VTE HIGH RISK PATIENT  Once         11/09/22 0201     Place sequential compression device  Until discontinued         11/09/22 0201                   Preet Young MD  Department of Hospital Medicine   [facility]

## 2022-11-09 NOTE — ASSESSMENT & PLAN NOTE
History of metastatic gastric adenocarcinoma (on home hospice)  Discharge to home with hospice once improved

## 2022-11-09 NOTE — NURSING
[facility]  Nurses Note -- 4 Eyes      11/9/2022       Skin assessed on: Admit      [] No Pressure Injuries Present    []Prevention Measures Documented    [x] Yes LDA  for Pressure Injury Previously documented     [] Yes New Pressure Injury Discovered   [x] LDA for New Pressure Injury Added      Attending RN:  Sharmin Rosenthal LPN     Second RN:  Keturah Frank RN

## 2022-11-09 NOTE — SUBJECTIVE & OBJECTIVE
Past Medical History:   Diagnosis Date    Anxiety     Dehydration 5/30/2022    Gastric cancer     Gastric ulcer     Hx of psychiatric care     Pregnancy 08/12/2020    delivered on 8/12/2020    Umbilical hernia        Past Surgical History:   Procedure Laterality Date    CLOSURE OF PERFORATED ULCER OF DUODENUM USING OMENTAL PATCH      ESOPHAGOGASTRODUODENOSCOPY N/A 10/13/2020    Procedure: EGD (ESOPHAGOGASTRODUODENOSCOPY);  Surgeon: Rosio Marion MD;  Location: [facility] (2ND FLR);  Service: Endoscopy;  Laterality: N/A;    ESOPHAGOGASTRODUODENOSCOPY N/A 12/11/2020    Procedure: EGD (ESOPHAGOGASTRODUODENOSCOPY);  Surgeon: Rosio Marion MD;  Location: [facility] (2ND FLR);  Service: Endoscopy;  Laterality: N/A;  Covid-19 test 12/8/20 at [facility] -     ESOPHAGOGASTRODUODENOSCOPY N/A 3/12/2021    Procedure: EGD (ESOPHAGOGASTRODUODENOSCOPY);  Surgeon: Nav Omer MD;  Location: [facility] (2ND FLR);  Service: Endoscopy;  Laterality: N/A;  COVID at [facility] 3/9 ttr    ESOPHAGOGASTRODUODENOSCOPY N/A 5/18/2021    Procedure: EGD (ESOPHAGOGASTRODUODENOSCOPY);  Surgeon: Rosio Marion MD;  Location: [facility] (2ND FLR);  Service: Endoscopy;  Laterality: N/A;  5/15-covid pcw-inst portal-tb    ESOPHAGOGASTRODUODENOSCOPY N/A 5/26/2021    Procedure: EGD (ESOPHAGOGASTRODUODENOSCOPY);  Surgeon: Socrates Terrazas MD;  Location: [facility] (2ND FLR);  Service: Endoscopy;  Laterality: N/A;       Review of patient's allergies indicates:  No Known Allergies    Current Facility-Administered Medications on File Prior to Encounter   Medication    [COMPLETED] dextrose 10% bolus 250 mL    [COMPLETED] vancomycin 1 g in 0.9% sodium chloride 250 mL IVPB (ready to mix system)    [DISCONTINUED] 0.9%  NaCl infusion (for blood administration)    [DISCONTINUED] cefepime in dextrose 5 % 1 gram/50 mL IVPB 1 g    [DISCONTINUED] dextrose 10% bolus 125 mL    [DISCONTINUED] dextrose 10% bolus 250 mL    [DISCONTINUED] dextrose 50% injection  25 g    [DISCONTINUED] morphine injection 4 mg    [DISCONTINUED] ondansetron injection 4 mg    [DISCONTINUED] vancomycin - pharmacy to dose    [DISCONTINUED] vancomycin 1 g in 0.9% sodium chloride 250 mL IVPB (ready to mix system)     Current Outpatient Medications on File Prior to Encounter   Medication Sig    aluminum-magnesium hydroxide-simethicone (MAALOX) 200-200-20 mg/5 mL Susp Take 30 mLs by mouth every 6 (six) hours as needed.    apixaban (ELIQUIS) 5 mg Tab Take 1 tablet (5 mg total) by mouth 2 (two) times daily.    baclofen (LIORESAL) 5 mg Tab tablet Take 1 tablet (5 mg total) by mouth 3 (three) times daily.    duke's soln (benadryl 30 mL, mylanta 30 mL, LIDOcaine 30 mL, nystatin 30 mL) 120mL Take 10 mLs by mouth 4 (four) times daily.    famotidine (PEPCID) 20 MG tablet Take 1 tablet (20 mg total) by mouth nightly as needed for Heartburn.    furosemide (LASIX) 20 MG tablet TAKE 1 TABLET(20 MG) BY MOUTH EVERY DAY    gabapentin (NEURONTIN) 300 MG capsule Take 1 capsule (300 mg total) by mouth 3 (three) times daily.    hydrocortisone 2.5 % cream Apply topically 2 (two) times daily.    LORazepam (ATIVAN) 0.5 MG tablet Take by mouth.    magnesium hydroxide 400 mg/5 ml (MILK OF MAGNESIA) 400 mg/5 mL Susp Take 30 mLs (2,400 mg total) by mouth daily as needed.    midodrine (PROAMATINE) 10 MG tablet Take 1 tablet (10 mg total) by mouth 3 (three) times daily.    naloxone (NARCAN) 4 mg/actuation Spry 4mg by nasal route as needed for opioid overdose; may repeat every 2-3 minutes in alternating nostrils until medical help arrives. Call 911    nortriptyline (PAMELOR) 25 MG capsule Take 1 capsule (25 mg total) by mouth every evening.    ondansetron (ZOFRAN) 8 MG tablet TAKE 1 TABLET(8 MG) BY MOUTH EVERY 8 HOURS AS NEEDED FOR NAUSEA    oxyCODONE (ROXICODONE) 5 MG immediate release tablet Take 1 tablet (5 mg total) by mouth every 4 (four) hours as needed for Pain.    polyethylene glycol (GLYCOLAX) 17 gram/dose powder Mix 1  capful (17 g) with liquid and take by mouth once daily for 20 days    prochlorperazine (COMPAZINE) 10 MG tablet TAKE 1 TABLET(10 MG) BY MOUTH EVERY 6 HOURS AS NEEDED FOR NAUSEA    senna-docusate 8.6-50 mg (SENNA WITH DOCUSATE SODIUM) 8.6-50 mg per tablet Take 1 tablet by mouth 2 (two) times daily as needed for Constipation.    sodium bicarb-sodium chloride powder Swish and spit 1 Dose 4 (four) times daily.    [DISCONTINUED] amitriptyline (ELAVIL) 10 MG tablet Take 1 tablet (10 mg total) by mouth every evening.    [DISCONTINUED] pantoprazole (PROTONIX) 40 MG tablet Take 1 tablet (40 mg total) by mouth 2 (two) times daily.     Family History       Problem Relation (Age of Onset)    Breast cancer Other (73), Paternal Cousin    Cancer Mother (67)    Lung cancer Father (48)    No Known Problems Son    Prostate cancer Paternal Uncle    Schizophrenia Mother          Tobacco Use    Smoking status: Former     Types: Cigarettes     Quit date: 2019     Years since quittin.9    Smokeless tobacco: Never   Substance and Sexual Activity    Alcohol use: Yes    Drug use: Not Currently    Sexual activity: Yes     Partners: Male     Review of Systems   Unable to perform ROS: Mental status change   Objective:     Vital Signs (Most Recent):  Temp: (!) 95.7 °F (35.4 °C) (22)  Pulse: 94 (22)  Resp: 14 (22)  BP: (!) 118/90 (22)  SpO2: 97 % (22)   Vital Signs (24h Range):  Temp:  [95.7 °F (35.4 °C)-96.9 °F (36.1 °C)] 95.7 °F (35.4 °C)  Pulse:  [] 94  Resp:  [14-20] 14  SpO2:  [95 %-100 %] 97 %  BP: (104-124)/(75-96) 118/90        There is no height or weight on file to calculate BMI.    Physical Exam  Constitutional:       Comments: Cachectic appearing    HENT:      Head: Normocephalic and atraumatic.      Nose: Nose normal.      Mouth/Throat:      Mouth: Mucous membranes are dry.   Cardiovascular:      Rate and Rhythm: Normal rate.      Pulses: Normal pulses.      Heart  sounds: No murmur heard.  Pulmonary:      Effort: Pulmonary effort is normal. No respiratory distress.   Abdominal:      General: There is distension.      Tenderness: There is abdominal tenderness.      Comments: Tense abdomen   Musculoskeletal:      Right lower leg: Edema present.      Left lower leg: Edema present.   Skin:     General: Skin is warm.   Neurological:      Mental Status: She is disoriented.           Significant Labs: All pertinent labs within the past 24 hours have been reviewed.    Significant Imaging: I have reviewed all pertinent imaging results/findings within the past 24 hours.

## 2022-11-09 NOTE — PROGRESS NOTES
Pharmacokinetic Assessment Follow Up: IV Vancomycin    Vancomycin serum concentration assessment(s):    The random level was drawn correctly and can be used to guide therapy at this time. The measurement is above the desired definitive target range of 10 to 20 mcg/mL.    Vancomycin Regimen Plan:    Re-dose when the random level is less than 20 mcg/mL, next level to be drawn at 0400 on 11/10/22    Drug levels (last 3 results):  Recent Labs   Lab Result Units 11/07/22 2026 11/08/22  0904 11/09/22  1422   Vancomycin, Random ug/mL  --  22.7 22.6   Vancomycin-Trough ug/mL 28.8*  --   --        Pharmacy will continue to follow and monitor vancomycin.    Please contact pharmacy at extension [phone] for questions regarding this assessment.    Thank you for the consult,   Kaylah Abel       Patient brief summary:  Puja Quigley is a 42 y.o. female initiated on antimicrobial therapy with IV Vancomycin for treatment of bacteremia    Drug Allergies:   Review of patient's allergies indicates:  No Known Allergies    Actual Body Weight:   66 kg    Renal Function:   Estimated Creatinine Clearance: 74.8 mL/min (based on SCr of 0.9 mg/dL).,     Dialysis Method (if applicable):  N/A    CBC (last 72 hours):  Recent Labs   Lab Result Units 11/06/22 2020 11/07/22  0435 11/08/22  0437 11/08/22  2252 11/09/22  0833   WBC K/uL 19.21* 20.00* 21.59* 25.51* 24.58*   Hemoglobin g/dL 7.0* 6.6* 6.6* 9.1* 9.2*   Hematocrit % 23.1* 22.0* 22.1* 29.1* 30.1*   Platelets K/uL 117* 99* 79* 71* 62*   Gran % % 92.6* 92.7* 93.9* 93.7* 94.0*   Lymph % % 3.5* 3.8* 3.2* 3.3* 3.3*   Mono % % 2.9* 2.5* 1.9* 1.7* 1.4*   Eosinophil % % 0.1 0.1 0.0 0.0 0.0   Basophil % % 0.1 0.1 0.1 0.2 0.1   Differential Method  Automated Automated Automated Automated Automated       Metabolic Panel (last 72 hours):  Recent Labs   Lab Result Units 11/06/22  2020 11/07/22  0230 11/07/22  0435 11/08/22  0437 11/08/22  2252 11/09/22  0833   Sodium mmol/L 139  --  140 137 137  136   Potassium mmol/L 4.5  --  4.3 4.4 4.7 5.0   Chloride mmol/L 108  --  110 107 108 109   CO2 mmol/L 22*  --  23 21* 21* 16*   Glucose mg/dL 60*  --  47* 114* 52* 75   Glucose, UA   --  Negative  --   --   --   --    BUN mg/dL 49*  --  46* 53* 56* 58*   Creatinine mg/dL 0.9  --  0.8 0.9 1.0 0.9   Albumin g/dL 1.1*  --  1.0* 1.0*  --  1.0*   Total Bilirubin mg/dL 0.5  --  0.5 0.3  --  0.3   Alkaline Phosphatase U/L 351*  --  258* 213*  --  284*   AST U/L 22  --  21 21  --  27   ALT U/L 23  --  20 24  --  29   Magnesium mg/dL 1.6  --   --   --   --  1.6   Phosphorus mg/dL 4.3  --   --   --   --  4.9*       Vancomycin Administrations:  vancomycin given in the last 96 hours                     vancomycin 1 g in 0.9% sodium chloride 250 mL IVPB (ready to mix system) (mg) 1,000 mg New Bag 11/08/22 1530    vancomycin 1 g in 0.9% sodium chloride 250 mL IVPB (ready to mix system) (mg) 1,000 mg New Bag 11/07/22 0936     1,000 mg New Bag 11/06/22 2111                    Microbiologic Results:  Microbiology Results (last 7 days)       Procedure Component Value Units Date/Time    Culture, Body Fluid (Aerobic) w/ Gram Stain [[id number]]     Order Status: No result Specimen: Body Fluid from Peritoneal Fluid     Culture, Anaerobic [[id number]]     Order Status: No result Specimen: Body Fluid from Ascites

## 2022-11-09 NOTE — PLAN OF CARE
[place] - Med Surg  Initial Discharge Assessment       Primary Care Provider: (Currently on hospice:  [facility])  Needs low air loss mattress:  Hospice to be notified and will provide.  Needs Ambulance transportation to home:  [address] [place], [place] [id number]  Wound Care Orders to be faxed to hospice provider with orders.  Patient is bedbound.    Admission Diagnosis: Pneumonia [J18.9]    Admission Date: 11/9/2022  Expected Discharge Date:     Discharge Barriers Identified: None    Payor: BLUE CROSS [facility] / Plan: BCBS ALL OUT OF STATE / Product Type: PPO /     Extended Emergency Contact Information  Primary Emergency Contact: Alvino Lee   [place]  Mobile Phone: [phone]  Relation: Healthcare Power of   Secondary Emergency Contact: Jean Claude Quigley  Mobile Phone: [phone]  Relation: Sister    Discharge Plan A: Hospice/home (Resume hospice with [facility].)         "[id number] - [place], [place] - [address] AT [facility]  [address]  [facility] [address]  Phone: [phone] Fax: [phone]    [facility] [address] [address]  Phone: [phone] Fax: [phone]      Initial Assessment (most recent)       Adult Discharge Assessment - 11/09/22 1723          Discharge Assessment    Assessment Type Discharge Planning Assessment     Confirmed/corrected address, phone number and insurance --   Discharge address is [address] [place] [id number]    Source of Information health record;family     If unable to respond/provide information was family/caregiver contacted? Yes     Contact Name/Number Alvino STOLL; [phone]     When was your last doctors appointment? --   Patient currently on hospice.    Reason For Admission Pneumonia     Lives With other relative(s)     Facility Arrived From: home     Do you expect to return to your current living situation? Yes     Do you  have help at home or someone to help you manage your care at home? Yes     Who are your caregiver(s) and their phone number(s)? POA:  Alvino Lee     Prior to hospitilization cognitive status: Unable to Assess     Current cognitive status: Unable to Assess     Walking or Climbing Stairs Difficulty ambulation difficulty, dependent;transferring difficulty, dependent;stair climbing difficulty, dependent     Mobility Management wheelchair     Dressing/Bathing Difficulty bathing difficulty, dependent     Dressing/Bathing Management Dependent     Equipment Currently Used at Home wheelchair;hospital bed;oxygen     Readmission within 30 days? No     Patient currently being followed by outpatient case management? No     Do you currently have service(s) that help you manage your care at home? Yes     Name and Contact number of agency [facility]     Is the pt/caregiver preference to resume services with current agency Yes     Do you take prescription medications? Yes     Do you have prescription coverage? Yes     Coverage [employer]     Do you have any problems affording any of your prescribed medications? No     Who is going to help you get home at discharge? will need ambulance transportation to home.     How do you get to doctors appointments? family or friend will provide     Are you on dialysis? No     Do you take coumadin? No     Discharge Plan A Hospice/home   Resume hospice with [facility].    DME Needed Upon Discharge  none   Hospice provider to be notified of patient's need for a low airloss mattress.    Discharge Plan discussed with: POA     Name(s) and Number(s) Alvino Lee: [phone]     Discharge Barriers Identified None        Relationship/Environment    Name(s) of Who Lives With Patient Alvino Lee                 Discharge planning assessment completed with assistance from Alvino STOLL, at the bedside.  See above comments.

## 2022-11-09 NOTE — PROGRESS NOTES
Pharmacokinetic Initial Assessment: IV Vancomycin    Assessment/Plan:    Patient on vancomycin therapy at outside facility [facility] transferring to [facility] (last dose 1000 mg given 11/8/22 at 15:00)    Initiate intravenous vancomycin with loading dose of 0 mg once.  Desired empiric serum trough concentration is 10-20 mcg/mL    Continue with pharmacy dosing recommendations (see Pharmacokinetic Assessment Follow Up: IV Vancomycin note from 11/8/22) and draw vancomycin random level on 11/9/22 at 14:00.  Pharmacy will continue to follow and monitor vancomycin.      Please contact pharmacy at extension [phone] with any questions regarding this assessment.     Thank you for the consult,   Katharine Hernández       Patient brief summary:  Puja Quigley is a 42 y.o. female initiated on antimicrobial therapy with IV Vancomycin for treatment of suspected bacteremia    Drug Allergies:   Review of patient's allergies indicates:  No Known Allergies    Actual Body Weight:   66 kg    Renal Function:   Estimated Creatinine Clearance: 67.3 mL/min (based on SCr of 1 mg/dL).,     Dialysis Method (if applicable):  N/A    CBC (last 72 hours):  Recent Labs   Lab Result Units 11/06/22 2020 11/07/22 0435 11/08/22 0437 11/08/22  2252   WBC K/uL 19.21* 20.00* 21.59* 25.51*   Hemoglobin g/dL 7.0* 6.6* 6.6* 9.1*   Hematocrit % 23.1* 22.0* 22.1* 29.1*   Platelets K/uL 117* 99* 79* 71*   Gran % % 92.6* 92.7* 93.9* 93.7*   Lymph % % 3.5* 3.8* 3.2* 3.3*   Mono % % 2.9* 2.5* 1.9* 1.7*   Eosinophil % % 0.1 0.1 0.0 0.0   Basophil % % 0.1 0.1 0.1 0.2   Differential Method  Automated Automated Automated Automated       Metabolic Panel (last 72 hours):  Recent Labs   Lab Result Units 11/06/22 2020 11/07/22 0230 11/07/22 0435 11/08/22 0437 11/08/22  2252   Sodium mmol/L 139  --  140 137 137   Potassium mmol/L 4.5  --  4.3 4.4 4.7   Chloride mmol/L 108  --  110 107 108   CO2 mmol/L 22*  --  23 21* 21*   Glucose mg/dL 60*  --  47* 114* 52*   Glucose, UA   --   Negative  --   --   --    BUN mg/dL 49*  --  46* 53* 56*   Creatinine mg/dL 0.9  --  0.8 0.9 1.0   Albumin g/dL 1.1*  --  1.0* 1.0*  --    Total Bilirubin mg/dL 0.5  --  0.5 0.3  --    Alkaline Phosphatase U/L 351*  --  258* 213*  --    AST U/L 22  --  21 21  --    ALT U/L 23  --  20 24  --    Magnesium mg/dL 1.6  --   --   --   --    Phosphorus mg/dL 4.3  --   --   --   --        Drug levels (last 3 results):  Recent Labs   Lab Result Units 11/07/22 2026 11/08/22  0904   Vancomycin, Random ug/mL  --  22.7   Vancomycin-Trough ug/mL 28.8*  --        Microbiologic Results:  Microbiology Results (last 7 days)       Procedure Component Value Units Date/Time    Culture, Body Fluid (Aerobic) w/ Gram Stain [[id number]]     Order Status: No result Specimen: Body Fluid from Peritoneal Fluid     Culture, Anaerobic [[id number]]     Order Status: No result Specimen: Body Fluid from Ascites

## 2022-11-09 NOTE — CONSULTS
[facility] S[facility]  Wound Care    Patient Name:  Puja Quigley   MRN:  [id number]  Date: 2022  Diagnosis: Sepsis    History:     Past Medical History:   Diagnosis Date    Anxiety     Dehydration 2022    Gastric cancer     Gastric ulcer     Hx of psychiatric care     Pregnancy 2020    delivered on 2020    Umbilical hernia        Social History     Socioeconomic History    Marital status: Single    Number of children: 1   Tobacco Use    Smoking status: Former     Types: Cigarettes     Quit date: 2019     Years since quittin.9    Smokeless tobacco: Never   Substance and Sexual Activity    Alcohol use: Yes    Drug use: Not Currently    Sexual activity: Yes     Partners: Male     Social Determinants of Health     Financial Resource Strain: Medium Risk    Difficulty of Paying Living Expenses: Somewhat hard   Food Insecurity: Food Insecurity Present    Worried About Running Out of Food in the Last Year: Never true    Ran Out of Food in the Last Year: Sometimes true   Transportation Needs: No Transportation Needs    Lack of Transportation (Medical): No    Lack of Transportation (Non-Medical): No   Physical Activity: Sufficiently Active    Days of Exercise per Week: 4 days    Minutes of Exercise per Session: 40 min   Stress: Stress Concern Present    Feeling of Stress : To some extent   Social Connections: Unknown    Frequency of Communication with Friends and Family: More than three times a week    Frequency of Social Gatherings with Friends and Family: Three times a week    Active Member of Clubs or Organizations: Yes    Attends Club or Organization Meetings: Never    Marital Status: Living with partner   Housing Stability: Low Risk     Unable to Pay for Housing in the Last Year: No    Number of Places Lived in the Last Year: 1    Unstable Housing in the Last Year: No       Precautions:     Allergies as of 2022    (No Known Allergies)       [facility] Assessment Details/Treatment    Nursing consult for wounds sacrum  A 42 year old female admitted today from home/[facility] with sepsis; hospital acquired pneumonia; acute hypoxemic respiratory failure; advance care planning; malignant neoplasm of stomach; microcytic anemia; malignant ascites  11/9 WBC 24.58 Hgb 9.2 Hct 30.1 Alb 1.0 Weight 145 lb  On Isoflex mattress; Aaron score 10; dependent bed mobility; incontinent; EHOB boots  According to record, 10/24/22 documented Stage 3 sacral pressure injury  Assessment:  Photodocumentation (in Media)  Sacrum- Stage 4 pressure injury with opening 3 cm(L) 3 cm(w) with undermining 1 cm from 7-10 o'clock. Small amount necrosis adherent to upper edge of opening. Bone felt in base of wound. Draining a moderate amount serosanguineous drainage. DTI upper edge of wound  Legs edematous with pitting edema  Treatment:  Cleansed wound with Vashe. Filled undermining with wick of Aquacel Ag. Covered with Mepilex sacral foam dressing.   Recommend continued wound care when discharged home with Hospice. If not in use, apply low air loss mattress or equivalent on hospital bed at home.   Discussed discharge plan with SW and .   Orders placed.     11/09/2022

## 2022-11-09 NOTE — NURSING
Patient arrived to [facility]-surg by stretcher via [facility] x2 assist from [facility].  Patient transferred to bed via x 2 person assist.  [id number]. Bed low, adequate lighting provided, side rails x2 up, call bell within reach. Denies sob and pain. Patient denied having any acute distress at this time.  None observed.  Will continue to monitor and follow treatment plan.  Family at bedside.

## 2022-11-09 NOTE — ASSESSMENT & PLAN NOTE
Patient with Hypoxic Respiratory failure which is Acute.  she is not on home oxygen. Supplemental oxygen was provided and noted-  .   Signs/symptoms of respiratory failure include- tachypnea, increased work of breathing and respiratory distress. Contributing diagnoses includes - HAP, pulmonary edema  Labs and images were reviewed. Patient Has recent ABG, which has been reviewed. Will treat underlying causes and adjust management of respiratory failure as follows- See plan for HAP

## 2022-11-09 NOTE — CONSULTS
Inpatient Radiology Pre-procedure Note    History of Present Illness:  Puja Quigley is a 42 y.o. female with pertinent PMHx of gastric adenocarcinoma with osseous, hepatic and peritoneal metastases complicated by recurrent abdominal distension and pain 2/2 recurrent painful, tense ascites requiring frequent decompression for symptom relief. There is concerns for SBP requiring decompression for source control and fluid sampling for diagnosis and treatment planning.     A new inpatient IR consult received for US-guided percutaneous RLQ-approach diagnostic and therapeutic large-volume paracentesis.    Admission H&P reviewed.  Past Medical History:   Diagnosis Date    Anxiety     Dehydration 5/30/2022    Gastric cancer     Gastric ulcer     Hx of psychiatric care     Pregnancy 08/12/2020    delivered on 8/12/2020    Umbilical hernia      Past Surgical History:   Procedure Laterality Date    CLOSURE OF PERFORATED ULCER OF DUODENUM USING OMENTAL PATCH      ESOPHAGOGASTRODUODENOSCOPY N/A 10/13/2020    Procedure: EGD (ESOPHAGOGASTRODUODENOSCOPY);  Surgeon: Rosio Marion MD;  Location: [facility] ENDO (2ND FLR);  Service: Endoscopy;  Laterality: N/A;    ESOPHAGOGASTRODUODENOSCOPY N/A 12/11/2020    Procedure: EGD (ESOPHAGOGASTRODUODENOSCOPY);  Surgeon: Rosio Marion MD;  Location: [facility] ENDO (2ND FLR);  Service: Endoscopy;  Laterality: N/A;  Covid-19 test 12/8/20 at [facility]    ESOPHAGOGASTRODUODENOSCOPY N/A 3/12/2021    Procedure: EGD (ESOPHAGOGASTRODUODENOSCOPY);  Surgeon: Nav Omer MD;  Location: [facility] ENDO (2ND FLR);  Service: Endoscopy;  Laterality: N/A;  COVID at [facility] 3/9 ttr    ESOPHAGOGASTRODUODENOSCOPY N/A 5/18/2021    Procedure: EGD (ESOPHAGOGASTRODUODENOSCOPY);  Surgeon: Rosio Marion MD;  Location: [facility] ENDO (2ND FLR);  Service: Endoscopy;  Laterality: N/A;  5/15-covid pcw-inst portal-tb    ESOPHAGOGASTRODUODENOSCOPY N/A 5/26/2021    Procedure: EGD  (ESOPHAGOGASTRODUODENOSCOPY);  Surgeon: Socrates Terrazas MD;  Location: [facility] ([address] [place] [address]);  Service: Endoscopy;  Laterality: N/A;     Review of Systems:   As documented in primary team H&P    Home Meds:   Prior to Admission medications    Medication Sig Start Date End Date Taking? Authorizing Provider   aluminum-magnesium hydroxide-simethicone (MAALOX) 200-200-20 mg/5 mL Susp Take 30 mLs by mouth every 6 (six) hours as needed. 10/14/22 10/14/23  Jaspal Boucher DO   apixaban (ELIQUIS) 5 mg Tab Take 1 tablet (5 mg total) by mouth 2 (two) times daily. 10/14/22   Japsal Boucher DO   baclofen (LIORESAL) 5 mg Tab tablet Take 1 tablet (5 mg total) by mouth 3 (three) times daily. 9/23/22   Sharon Brink MD   willis's soln (benadryl 30 mL, mylanta 30 mL, LIDOcaine 30 mL, nystatin 30 mL) 120mL Take 10 mLs by mouth 4 (four) times daily. 10/14/22   Jaspal Boucher DO   famotidine (PEPCID) 20 MG tablet Take 1 tablet (20 mg total) by mouth nightly as needed for Heartburn. 2/23/22 2/23/23  Kamille Hooks PA-C   furosemide (LASIX) 20 MG tablet TAKE 1 TABLET(20 MG) BY MOUTH EVERY DAY 10/3/22   Fer Ashraf MD   gabapentin (NEURONTIN) 300 MG capsule Take 1 capsule (300 mg total) by mouth 3 (three) times daily. 8/22/22 8/22/23  Kamille Hooks PA-C   hydrocortisone 2.5 % cream Apply topically 2 (two) times daily. 7/11/22   Bethel Shetty MD   LORazepam (ATIVAN) 0.5 MG tablet Take by mouth. 10/24/22   Historical Provider   magnesium hydroxide 400 mg/5 ml (MILK OF MAGNESIA) 400 mg/5 mL Susp Take 30 mLs (2,400 mg total) by mouth daily as needed. 10/14/22   Jaspal Boucher DO   midodrine (PROAMATINE) 10 MG tablet Take 1 tablet (10 mg total) by mouth 3 (three) times daily. 10/14/22 10/14/23  Jaspal Boucher,    naloxone (NARCAN) 4 mg/actuation Spry 4mg by nasal route as needed for opioid overdose; may repeat every 2-3 minutes in alternating nostrils until medical help arrives. Call 911 8/17/22   Sharon Brink MD   nortriptyline  (PAMELOR) 25 MG capsule Take 1 capsule (25 mg total) by mouth every evening. 6/20/22 6/20/23  Bren Allen MD   ondansetron (ZOFRAN) 8 MG tablet TAKE 1 TABLET(8 MG) BY MOUTH EVERY 8 HOURS AS NEEDED FOR NAUSEA 8/29/22   Fer Ashraf MD   oxyCODONE (ROXICODONE) 5 MG immediate release tablet Take 1 tablet (5 mg total) by mouth every 4 (four) hours as needed for Pain. 9/23/22   Fer Ashraf MD   polyethylene glycol (GLYCOLAX) 17 gram/dose powder Mix 1 capful (17 g) with liquid and take by mouth once daily for 20 days 6/21/22   Bren Allen MD   prochlorperazine (COMPAZINE) 10 MG tablet TAKE 1 TABLET(10 MG) BY MOUTH EVERY 6 HOURS AS NEEDED FOR NAUSEA 8/7/21   Fer Ashraf MD   senna-docusate 8.6-50 mg (SENNA WITH DOCUSATE SODIUM) 8.6-50 mg per tablet Take 1 tablet by mouth 2 (two) times daily as needed for Constipation. 5/26/21   Reshma Wilson MD   sodium bicarb-sodium chloride powder Swish and spit 1 Dose 4 (four) times daily. 10/14/22 10/14/23  Jaspal Boucher DO   amitriptyline (ELAVIL) 10 MG tablet Take 1 tablet (10 mg total) by mouth every evening. 5/13/21 6/20/22  Socrates Terrazas MD   pantoprazole (PROTONIX) 40 MG tablet Take 1 tablet (40 mg total) by mouth 2 (two) times daily. 5/25/22 6/20/22  Kamille Hooks PA-C     Scheduled Meds:   Continuous Infusions:   PRN Meds:  Anticoagulants/Antiplatelets: Apixaban    Allergies: Review of patient's allergies indicates:  No Known Allergies    Sedation Hx: have not been any systemic reactions    Labs:  Recent Labs   Lab 11/06/22 2020   INR 1.5*       Recent Labs   Lab 11/09/22 0833   WBC 24.58*   HGB 9.2*   HCT 30.1*   MCV 79*   PLT 62*      Recent Labs   Lab 11/09/22 0833   GLU 75      K 5.0      CO2 16*   BUN 58*   CREATININE 0.9   CALCIUM 7.7*   MG 1.6   ALT 29   AST 27   ALBUMIN 1.0*   BILITOT 0.3     Vitals:    Physical Exam:  General: no acute distress  Mental Status: alert and oriented to person, place and time  HEENT: normocephalic,  atraumatic  Chest: unlabored breathing  Heart: regular heart rate  Abdomen: +distended with +fluid wave. No TTP/r/g.  Extremity: moves all extremities    A/P:  42 y.o. female with pertinent PMHx of gastric adenocarcinoma with osseous, hepatic and peritoneal metastases complicated by recurrent abdominal distension and pain 2/2 recurrent painful, tense ascites requiring frequent decompression for symptom relief. There are concerns for SBP requiring fluid sampling for diagnosis and treatment planning.     1. Recurrent painful, tense ascites with concerns for SBP - Will attempt US-guided percutaneous RLQ-approach diagnostic and therapeutic LVP with local anesthetic only and albumin infusion post PRN as indicated per institutional protocol.     Risks (including, but not limited to, pain, bleeding, infection, damage to nearby structures, failure to obtain sufficient material for a diagnosis, the need for additional procedures, and death), benefits, and alternatives were discussed with the patient. All questions were answered to the best of my abilities. The patient wishes to proceed with the procedure. Written informed consent was obtained.     Thank you for considering IR for the care of your patient.      Castro Bermudez MD  Interventional Radiology

## 2022-11-09 NOTE — BRIEF OP NOTE
Radiology Post-Procedure Note     Pre Op Diagnosis: Recurrent painful, tense ascites with concerns for SBP  Post Op Diagnosis: Same     Procedure: 1. US-guided percutaneous RLQ-approach diagnostic and therapeutic LVP     Procedure performed by: Castro Bermudez MD     Written Informed Consent Obtained: Yes  Specimen Removed: YES, 1,300-cc of thin, straw-colored ascitic fluid  Estimated Blood Loss: none     Findings:   Successful US-guided percutaneous RLQ-approach diagnostic and therapeutic LVP with local anesthetic only and albumin infusion post PRN as indicated per institutional protocol. Patient tolerated the procedure well. No immediate post-procedural complications noted.      No post-op activity, diet or medication restrictions.     Thank you for considering IR for the care of your patient.      Castro Bermudez MD  Interventional Radiology

## 2022-11-09 NOTE — ASSESSMENT & PLAN NOTE
This patient does have evidence of infective focus  My overall impression is sepsis. Vital signs were reviewed and noted in progress note.  Antibiotics given-   Antibiotics (From admission, onward)    Start     Stop Route Frequency Ordered    11/09/22 0315  cefepime in dextrose 5 % IVPB 2 g         -- IV Every 8 hours (non-standard times) 11/09/22 0202    11/09/22 0301  vancomycin - pharmacy to dose  (vancomycin IVPB)        See Ranjit[facility] for full Linked Orders Report.    -- IV pharmacy to manage frequency 11/09/22 0202        Cultures were taken-   Microbiology Results (last 7 days)     Procedure Component Value Units Date/Time    Culture, Body Fluid (Aerobic) w/ Gram Stain [[id number]]     Order Status: No result Specimen: Body Fluid from Peritoneal Fluid     Culture, Anaerobic [[id number]]     Order Status: No result Specimen: Body Fluid from Ascites         Latest lactate reviewed, they are-  Recent Labs   Lab 11/06/22 2020 11/06/22  2346   LACTATE 0.9 0.7       Organ dysfunction indicated by Acute respiratory failure  Source- Respiratory, or skin/soft tissue     Source control Achieved by- antibiotics

## 2022-11-09 NOTE — PLAN OF CARE
Problem: Adult Inpatient Plan of Care  Goal: Plan of Care Review  Outcome: Ongoing, Progressing  Goal: Optimal Comfort and Wellbeing  Outcome: Ongoing, Progressing     Problem: Infection Progression (Sepsis/Septic Shock)  Goal: Absence of Infection Signs and Symptoms  Outcome: Ongoing, Progressing     Problem: Infection  Goal: Absence of Infection Signs and Symptoms  Outcome: Ongoing, Progressing

## 2022-11-09 NOTE — ASSESSMENT & PLAN NOTE
Advance Care Planning     Date: 11/09/2022    Code Status  In light of the patients advanced and life limiting illness,I engaged the the family in a conversation about the patient's preferences for care  at the very end of life. The patient wishes to have a natural, peaceful death.  Along those lines, the patient does not wish to have CPR or other invasive treatments performed when her heart and/or breathing stops. I communicated to the family that a DNR form was completed and will be scanned into EPIC.  I spent a total of 15 minutes engaging the patient in this advance care planning discussion.

## 2022-11-10 VITALS
SYSTOLIC BLOOD PRESSURE: 103 MMHG | HEART RATE: 83 BPM | OXYGEN SATURATION: 100 % | BODY MASS INDEX: 24.24 KG/M2 | TEMPERATURE: 97 F | WEIGHT: 145.5 LBS | HEIGHT: 65 IN | DIASTOLIC BLOOD PRESSURE: 84 MMHG | RESPIRATION RATE: 18 BRPM

## 2022-11-10 LAB
ALBUMIN SERPL BCP-MCNC: 0.9 G/DL (ref 3.5–5.2)
ALP SERPL-CCNC: 342 U/L (ref 55–135)
ALT SERPL W/O P-5'-P-CCNC: 31 U/L (ref 10–44)
ANION GAP SERPL CALC-SCNC: 12 MMOL/L (ref 8–16)
AST SERPL-CCNC: 35 U/L (ref 10–40)
BASOPHILS # BLD AUTO: 0.04 K/UL (ref 0–0.2)
BASOPHILS NFR BLD: 0.2 % (ref 0–1.9)
BILIRUB SERPL-MCNC: 0.4 MG/DL (ref 0.1–1)
BUN SERPL-MCNC: 64 MG/DL (ref 6–20)
CALCIUM SERPL-MCNC: 7.2 MG/DL (ref 8.7–10.5)
CHLORIDE SERPL-SCNC: 108 MMOL/L (ref 95–110)
CO2 SERPL-SCNC: 15 MMOL/L (ref 23–29)
CREAT SERPL-MCNC: 0.9 MG/DL (ref 0.5–1.4)
DIFFERENTIAL METHOD: ABNORMAL
EOSINOPHIL # BLD AUTO: 0 K/UL (ref 0–0.5)
EOSINOPHIL NFR BLD: 0.1 % (ref 0–8)
ERYTHROCYTE [DISTWIDTH] IN BLOOD BY AUTOMATED COUNT: 19.5 % (ref 11.5–14.5)
EST. GFR  (NO RACE VARIABLE): >60 ML/MIN/1.73 M^2
GLUCOSE SERPL-MCNC: 67 MG/DL (ref 70–110)
HCT VFR BLD AUTO: 29.8 % (ref 37–48.5)
HGB BLD-MCNC: 9 G/DL (ref 12–16)
IMM GRANULOCYTES # BLD AUTO: 0.2 K/UL (ref 0–0.04)
IMM GRANULOCYTES NFR BLD AUTO: 0.9 % (ref 0–0.5)
LYMPHOCYTES # BLD AUTO: 0.8 K/UL (ref 1–4.8)
LYMPHOCYTES NFR BLD: 3.6 % (ref 18–48)
MAGNESIUM SERPL-MCNC: 1.5 MG/DL (ref 1.6–2.6)
MCH RBC QN AUTO: 24.2 PG (ref 27–31)
MCHC RBC AUTO-ENTMCNC: 30.2 G/DL (ref 32–36)
MCV RBC AUTO: 80 FL (ref 82–98)
MONOCYTES # BLD AUTO: 0.3 K/UL (ref 0.3–1)
MONOCYTES NFR BLD: 1.5 % (ref 4–15)
NEUTROPHILS # BLD AUTO: 21.7 K/UL (ref 1.8–7.7)
NEUTROPHILS NFR BLD: 93.7 % (ref 38–73)
NRBC BLD-RTO: 0 /100 WBC
PLATELET # BLD AUTO: 43 K/UL (ref 150–450)
PMV BLD AUTO: ABNORMAL FL (ref 9.2–12.9)
POCT GLUCOSE: 79 MG/DL (ref 70–110)
POCT GLUCOSE: 91 MG/DL (ref 70–110)
POTASSIUM SERPL-SCNC: 5.1 MMOL/L (ref 3.5–5.1)
PROT SERPL-MCNC: 4.5 G/DL (ref 6–8.4)
RBC # BLD AUTO: 3.72 M/UL (ref 4–5.4)
SODIUM SERPL-SCNC: 135 MMOL/L (ref 136–145)
VANCOMYCIN SERPL-MCNC: 19 UG/ML
WBC # BLD AUTO: 23.12 K/UL (ref 3.9–12.7)

## 2022-11-10 PROCEDURE — 80053 COMPREHEN METABOLIC PANEL: CPT | Performed by: STUDENT IN AN ORGANIZED HEALTH CARE EDUCATION/TRAINING PROGRAM

## 2022-11-10 PROCEDURE — 85025 COMPLETE CBC W/AUTO DIFF WBC: CPT | Performed by: STUDENT IN AN ORGANIZED HEALTH CARE EDUCATION/TRAINING PROGRAM

## 2022-11-10 PROCEDURE — 63600175 PHARM REV CODE 636 W HCPCS: Performed by: STUDENT IN AN ORGANIZED HEALTH CARE EDUCATION/TRAINING PROGRAM

## 2022-11-10 PROCEDURE — 83735 ASSAY OF MAGNESIUM: CPT | Performed by: STUDENT IN AN ORGANIZED HEALTH CARE EDUCATION/TRAINING PROGRAM

## 2022-11-10 PROCEDURE — S0030 INJECTION, METRONIDAZOLE: HCPCS | Performed by: STUDENT IN AN ORGANIZED HEALTH CARE EDUCATION/TRAINING PROGRAM

## 2022-11-10 PROCEDURE — 36415 COLL VENOUS BLD VENIPUNCTURE: CPT | Performed by: STUDENT IN AN ORGANIZED HEALTH CARE EDUCATION/TRAINING PROGRAM

## 2022-11-10 PROCEDURE — 99900035 HC TECH TIME PER 15 MIN (STAT)

## 2022-11-10 PROCEDURE — 25000003 PHARM REV CODE 250: Performed by: STUDENT IN AN ORGANIZED HEALTH CARE EDUCATION/TRAINING PROGRAM

## 2022-11-10 PROCEDURE — 80202 ASSAY OF VANCOMYCIN: CPT | Performed by: STUDENT IN AN ORGANIZED HEALTH CARE EDUCATION/TRAINING PROGRAM

## 2022-11-10 RX ORDER — VANCOMYCIN HCL IN 5 % DEXTROSE 1G/250ML
15 PLASTIC BAG, INJECTION (ML) INTRAVENOUS ONCE
Status: COMPLETED | OUTPATIENT
Start: 2022-11-10 | End: 2022-11-10

## 2022-11-10 RX ORDER — HEPARIN SODIUM 5000 [USP'U]/ML
5000 INJECTION, SOLUTION INTRAVENOUS; SUBCUTANEOUS ONCE
Status: DISCONTINUED | OUTPATIENT
Start: 2022-11-10 | End: 2022-11-10

## 2022-11-10 RX ORDER — DOXYCYCLINE 100 MG/1
100 CAPSULE ORAL 2 TIMES DAILY
Qty: 20 CAPSULE | Refills: 0 | Status: SHIPPED | OUTPATIENT
Start: 2022-11-11 | End: 2022-11-21

## 2022-11-10 RX ORDER — HEPARIN 100 UNIT/ML
5 SYRINGE INTRAVENOUS ONCE
Status: COMPLETED | OUTPATIENT
Start: 2022-11-10 | End: 2022-11-10

## 2022-11-10 RX ORDER — CEFDINIR 250 MG/5ML
300 POWDER, FOR SUSPENSION ORAL 2 TIMES DAILY
Qty: 120 ML | Refills: 0 | Status: SHIPPED | OUTPATIENT
Start: 2022-11-11 | End: 2022-11-21

## 2022-11-10 RX ORDER — MAGNESIUM SULFATE HEPTAHYDRATE 40 MG/ML
2 INJECTION, SOLUTION INTRAVENOUS ONCE
Status: COMPLETED | OUTPATIENT
Start: 2022-11-10 | End: 2022-11-10

## 2022-11-10 RX ADMIN — METRONIDAZOLE 500 MG: 500 INJECTION, SOLUTION INTRAVENOUS at 02:11

## 2022-11-10 RX ADMIN — MUPIROCIN: 20 OINTMENT TOPICAL at 09:11

## 2022-11-10 RX ADMIN — BACLOFEN 5 MG: 5 TABLET ORAL at 09:11

## 2022-11-10 RX ADMIN — CEFEPIME HYDROCHLORIDE 2 G: 2 INJECTION, SOLUTION INTRAVENOUS at 01:11

## 2022-11-10 RX ADMIN — HEPARIN 500 UNITS: 100 SYRINGE at 01:11

## 2022-11-10 RX ADMIN — CEFTRIAXONE 2 G: 2 INJECTION, SOLUTION INTRAVENOUS at 09:11

## 2022-11-10 RX ADMIN — APIXABAN 5 MG: 5 TABLET, FILM COATED ORAL at 09:11

## 2022-11-10 RX ADMIN — VANCOMYCIN HYDROCHLORIDE 1000 MG: 1 INJECTION, POWDER, LYOPHILIZED, FOR SOLUTION INTRAVENOUS at 09:11

## 2022-11-10 RX ADMIN — MAGNESIUM SULFATE HEPTAHYDRATE 2 G: 40 INJECTION, SOLUTION INTRAVENOUS at 09:11

## 2022-11-10 RX ADMIN — GABAPENTIN 300 MG: 300 CAPSULE ORAL at 09:11

## 2022-11-10 RX ADMIN — METRONIDAZOLE 500 MG: 500 INJECTION, SOLUTION INTRAVENOUS at 09:11

## 2022-11-10 RX ADMIN — FAMOTIDINE 20 MG: 20 TABLET ORAL at 09:11

## 2022-11-10 NOTE — DISCHARGE SUMMARY
[facility]  Discharge Summary      Patient Name: Puja Quigley  MRN: [id number]  ESPERANZA: [id number]  Patient Class: IP- Inpatient  Admission Date: 11/9/2022  Hospital Length of Stay: 1 days  Discharge Date and Time:  11/10/2022 8:00 AM  Attending Physician: Jean Marie Altamirano MD   Discharging Provider: Jean Marie Altamirano MD  Primary Care Provider: Primary Doctor Chacha    Primary Care Team: Networked reference to record PCT     HPI:   This is a 42 year old female with a PMHx of metastatic gastric adenocarcinoma (on home hospice) c/b ascites requiring paracenteses who presents with SOB.     The patient presented to [facility] on 11/6 with SOB and chest pain. Upon arrival, she was hypoxic and tachypnic, requiring BiPAP. Labs were remarkable for leukocytosis (19.2), microcytic anemia (7.0 > 6.6), and thrombocytopenia (117 > 79), elevated INR (1.5), elevated ALP (351), negative BNP, troponin and lactic acid. CXR showed small bilateral pleural effusions, bibasilar atelectasis or consolidations and diffuse interstitial prominence. She was given vancomycin, cefepime, fluids, morphine, diazepam and ativan. Initially, there was a plan to transfer to [facility], however, given capacity limitation, she was transfered to [facility]. Ultimately, she was weaned down to room air prior to transfer.       * No surgery found *      Hospital Course:   Met Cancer on Hospice presents with Sepsis. Paracentesis done. Patient has had Anaerobic Prevotella in past and WBC# still increasing on V+Cef, will add flagyl.      Take doxycycline and cefdinir both twice daily for 10 more days  Will get [employer] company to assist    Jean Marie Altamirano MD  Internal Medicine Staff          Review of Systems   Unable to perform ROS: Mental status change   Denies CP or SOB    Objective:      Vital Signs (Most Recent):  Temp: (!) 95.7 °F (35.4 °C) (11/09/22 0126)  Pulse: 94 (11/09/22 0126)  Resp: 14 (11/09/22 0126)  BP: (!) 118/90 (11/09/22 0126)  SpO2:  97 % (11/09/22 0126)    Vital Signs (24h Range):  Temp:  [95.7 °F (35.4 °C)-96.9 °F (36.1 °C)] 95.7 °F (35.4 °C)  Pulse:  [] 94  Resp:  [14-20] 14  SpO2:  [95 %-100 %] 97 %  BP: (104-124)/(75-96) 118/90         There is no height or weight on file to calculate BMI.     Physical Exam  Constitutional:       Comments: Cachectic appearing    HENT:      Head: Normocephalic and atraumatic.      Nose: Nose normal.      Mouth/Throat:      Mouth: Mucous membranes are dry.   Cardiovascular:      Rate and Rhythm: Normal rate.      Pulses: Normal pulses.      Heart sounds: No murmur heard.  Pulmonary:      Effort: Pulmonary effort is normal. No respiratory distress.   Abdominal:      General: There is distension improved s/p para     Tenderness: There is abdominal tenderness, improved  Musculoskeletal:      Right lower leg: Edema present.      Left lower leg: Edema present.   Skin:     General: Skin is warm.   Neurological:      Mental Status: She is disoriented.            Significant Labs: All pertinent labs within the past 24 hours have been reviewed.     Significant Imaging: I have reviewed all pertinent imaging results/findings within the past 24 hours.     Assessment/Plan:            Goals of Care Treatment Preferences:  Code Status: DNR    Health care agent: Alvino Jesus  Health care agent number: [phone]                   Consults:   Consults (From admission, onward)          Status Ordering Provider     Pharmacy to dose Vancomycin consult  Once        Provider:  (Not yet assigned)   [facility] for [facility] Report.    Acknowledged ETELVINA TAMAYO            No new Assessment & Plan notes have been filed under this hospital service since the last note was generated.  Service: Hospital Medicine    Final Active Diagnoses:    Diagnosis Date Noted POA    PRINCIPAL PROBLEM:  Sepsis [A41.9] 10/08/2020 Yes    Acute hypoxemic respiratory failure [J96.01] 11/08/2022 Yes    HAP (hospital-acquired pneumonia)  [J18.9, Y95] 11/08/2022 Yes    Advance care planning [Z71.89] 10/09/2022 Not Applicable    Malignant neoplasm of stomach [C16.9] 06/10/2021 Yes    Microcytic anemia [D50.9]  Yes    Malignant ascites [R18.0] 05/22/2021 Yes      Problems Resolved During this Admission:       Discharged Condition: poor    Disposition:     Follow Up:   Follow-up Information       [facility] [place] Follow up.    Specialty: Hospice Services  Contact information:  [address]  [address]  [place] [address]  [phone]               [facility] Follow up.    Contact information:  [phone]  (For future use).                         Patient Instructions:   No discharge procedures on file.    Significant Diagnostic Studies:     Recent Results (from the past 100 hour(s))   POCT COVID-19 Rapid Screening    Collection Time: 11/06/22  7:51 PM   Result Value Ref Range    POC Rapid COVID Negative Negative     Acceptable Yes    POCT Influenza A/B Molecular    Collection Time: 11/06/22  7:54 PM   Result Value Ref Range    POC Molecular Influenza A Ag Negative Negative, Not Reported    POC Molecular Influenza B Ag Negative Negative, Not Reported     Acceptable Yes    CBC auto differential    Collection Time: 11/06/22  8:20 PM   Result Value Ref Range    WBC 19.21 (H) 3.90 - 12.70 K/uL    RBC 2.96 (L) 4.00 - 5.40 M/uL    Hemoglobin 7.0 (L) 12.0 - 16.0 g/dL    Hematocrit 23.1 (L) 37.0 - 48.5 %    MCV 78 (L) 82 - 98 fL    MCH 23.6 (L) 27.0 - 31.0 pg    MCHC 30.3 (L) 32.0 - 36.0 g/dL    RDW 20.3 (H) 11.5 - 14.5 %    Platelets 117 (L) 150 - 450 K/uL    MPV 10.9 9.2 - 12.9 fL    Immature Granulocytes 0.8 (H) 0.0 - 0.5 %    Gran # (ANC) 17.8 (H) 1.8 - 7.7 K/uL    Immature Grans (Abs) 0.15 (H) 0.00 - 0.04 K/uL    Lymph # 0.7 (L) 1.0 - 4.8 K/uL    Mono # 0.6 0.3 - 1.0 K/uL    Eos # 0.0 0.0 - 0.5 K/uL    Baso # 0.02 0.00 - 0.20 K/uL    nRBC 0 0 /100 WBC    Gran % 92.6 (H) 38.0 - 73.0 %    Lymph % 3.5  (L) 18.0 - 48.0 %    Mono % 2.9 (L) 4.0 - 15.0 %    Eosinophil % 0.1 0.0 - 8.0 %    Basophil % 0.1 0.0 - 1.9 %    Differential Method Automated    Comprehensive metabolic panel    Collection Time: 11/06/22  8:20 PM   Result Value Ref Range    Sodium 139 136 - 145 mmol/L    Potassium 4.5 3.5 - 5.1 mmol/L    Chloride 108 95 - 110 mmol/L    CO2 22 (L) 23 - 29 mmol/L    Glucose 60 (L) 70 - 110 mg/dL    BUN 49 (H) 6 - 20 mg/dL    Creatinine 0.9 0.5 - 1.4 mg/dL    Calcium 7.7 (L) 8.7 - 10.5 mg/dL    Total Protein 4.6 (L) 6.0 - 8.4 g/dL    Albumin 1.1 (L) 3.5 - 5.2 g/dL    Total Bilirubin 0.5 0.1 - 1.0 mg/dL    Alkaline Phosphatase 351 (H) 55 - 135 U/L    AST 22 10 - 40 U/L    ALT 23 10 - 44 U/L    Anion Gap 9 8 - 16 mmol/L    eGFR >60.0 >60 mL/min/1.73 m^2   Lactic acid, plasma #1    Collection Time: 11/06/22  8:20 PM   Result Value Ref Range    Lactate (Lactic Acid) 0.9 0.5 - 2.2 mmol/L   Magnesium    Collection Time: 11/06/22  8:20 PM   Result Value Ref Range    Magnesium 1.6 1.6 - 2.6 mg/dL   Phosphorus    Collection Time: 11/06/22  8:20 PM   Result Value Ref Range    Phosphorus 4.3 2.7 - 4.5 mg/dL   APTT    Collection Time: 11/06/22  8:20 PM   Result Value Ref Range    aPTT 46.8 (H) 21.0 - 32.0 sec   Protime-INR    Collection Time: 11/06/22  8:20 PM   Result Value Ref Range    Prothrombin Time 15.6 (H) 9.0 - 12.5 sec    INR 1.5 (H) 0.8 - 1.2   Lipase    Collection Time: 11/06/22  8:20 PM   Result Value Ref Range    Lipase 97 (H) 4 - 60 U/L   Blood culture x two cultures. Draw prior to antibiotics.    Collection Time: 11/06/22  8:21 PM    Specimen: Line, Port A Cath; Blood   Result Value Ref Range    Blood Culture, Routine No Growth to date     Blood Culture, Routine No Growth to date     Blood Culture, Routine No Growth to date    Blood culture x two cultures. Draw prior to antibiotics.    Collection Time: 11/06/22  8:21 PM    Specimen: Line, Port A Cath; Blood   Result Value Ref Range    Blood Culture, Routine No  Growth to date     Blood Culture, Routine No Growth to date     Blood Culture, Routine No Growth to date    Brain natriuretic peptide    Collection Time: 11/06/22  8:22 PM   Result Value Ref Range    BNP 84 0 - 99 pg/mL   Troponin I    Collection Time: 11/06/22  8:22 PM   Result Value Ref Range    Troponin I <0.006 0.000 - 0.026 ng/mL   ISTAT PROCEDURE    Collection Time: 11/06/22  8:47 PM   Result Value Ref Range    POC PH 7.376 7.35 - 7.45    POC PCO2 41.4 35 - 45 mmHg    POC PO2 36 (L) 40 - 60 mmHg    POC HCO3 24.3 24 - 28 mmol/L    POC BE -1 -2 to 2 mmol/L    POC SATURATED O2 68 (L) 95 - 100 %    POC TCO2 26 24 - 29 mmol/L    POC Hematocrit 21 (L) 36 - 54 %PCV    POC HEMOGLOBIN 7 g/dL    Sample VENOUS     Site Other     Allens Test N/A     DelSys CPAP/BiPAP     Mode BiPAP     IP 12     EP 6    Lactic acid, plasma #2    Collection Time: 11/06/22 11:46 PM   Result Value Ref Range    Lactate (Lactic Acid) 0.7 0.5 - 2.2 mmol/L   Urinalysis, Reflex to Urine Culture Urine, Catheterized    Collection Time: 11/07/22  2:30 AM    Specimen: Urine   Result Value Ref Range    Specimen UA Urine, Catheterized     Color, UA Yellow Yellow, Straw, Mansi    Appearance, UA Clear Clear    pH, UA 6.0 5.0 - 8.0    Specific Gravity, UA 1.020 1.005 - 1.030    Protein, UA Negative Negative    Glucose, UA Negative Negative    Ketones, UA Negative Negative    Bilirubin (UA) Negative Negative    Occult Blood UA Negative Negative    Nitrite, UA Negative Negative    Urobilinogen, UA Negative Negative EU/dL    Leukocytes, UA Negative Negative   Complete Blood Count (CBC)    Collection Time: 11/07/22  4:35 AM   Result Value Ref Range    WBC 20.00 (H) 3.90 - 12.70 K/uL    RBC 2.81 (L) 4.00 - 5.40 M/uL    Hemoglobin 6.6 (L) 12.0 - 16.0 g/dL    Hematocrit 22.0 (L) 37.0 - 48.5 %    MCV 78 (L) 82 - 98 fL    MCH 23.5 (L) 27.0 - 31.0 pg    MCHC 30.0 (L) 32.0 - 36.0 g/dL    RDW 20.4 (H) 11.5 - 14.5 %    Platelets 99 (L) 150 - 450 K/uL    MPV 10.8 9.2 -  12.9 fL    Immature Granulocytes 0.8 (H) 0.0 - 0.5 %    Gran # (ANC) 18.6 (H) 1.8 - 7.7 K/uL    Immature Grans (Abs) 0.16 (H) 0.00 - 0.04 K/uL    Lymph # 0.8 (L) 1.0 - 4.8 K/uL    Mono # 0.5 0.3 - 1.0 K/uL    Eos # 0.0 0.0 - 0.5 K/uL    Baso # 0.02 0.00 - 0.20 K/uL    nRBC 0 0 /100 WBC    Gran % 92.7 (H) 38.0 - 73.0 %    Lymph % 3.8 (L) 18.0 - 48.0 %    Mono % 2.5 (L) 4.0 - 15.0 %    Eosinophil % 0.1 0.0 - 8.0 %    Basophil % 0.1 0.0 - 1.9 %    Differential Method Automated    Comprehensive Metabolic Panel (CMP)    Collection Time: 11/07/22  4:35 AM   Result Value Ref Range    Sodium 140 136 - 145 mmol/L    Potassium 4.3 3.5 - 5.1 mmol/L    Chloride 110 95 - 110 mmol/L    CO2 23 23 - 29 mmol/L    Glucose 47 (LL) 70 - 110 mg/dL    BUN 46 (H) 6 - 20 mg/dL    Creatinine 0.8 0.5 - 1.4 mg/dL    Calcium 7.7 (L) 8.7 - 10.5 mg/dL    Total Protein 4.3 (L) 6.0 - 8.4 g/dL    Albumin 1.0 (L) 3.5 - 5.2 g/dL    Total Bilirubin 0.5 0.1 - 1.0 mg/dL    Alkaline Phosphatase 258 (H) 55 - 135 U/L    AST 21 10 - 40 U/L    ALT 20 10 - 44 U/L    Anion Gap 7 (L) 8 - 16 mmol/L    eGFR >60.0 >60 mL/min/1.73 m^2   POCT glucose    Collection Time: 11/07/22  5:13 AM   Result Value Ref Range    POCT Glucose 55 (L) 70 - 110 mg/dL   POCT glucose    Collection Time: 11/07/22  6:13 AM   Result Value Ref Range    POCT Glucose 123 (H) 70 - 110 mg/dL   POCT glucose    Collection Time: 11/07/22  9:29 AM   Result Value Ref Range    POCT Glucose 72 70 - 110 mg/dL   POCT glucose    Collection Time: 11/07/22 12:38 PM   Result Value Ref Range    POCT Glucose 110 70 - 110 mg/dL   POCT glucose    Collection Time: 11/07/22  6:35 PM   Result Value Ref Range    POCT Glucose 42 (LL) 70 - 110 mg/dL   POCT glucose    Collection Time: 11/07/22  7:05 PM   Result Value Ref Range    POCT Glucose 108 70 - 110 mg/dL   VANCOMYCIN, TROUGH    Collection Time: 11/07/22  8:26 PM   Result Value Ref Range    Vancomycin-Trough 28.8 (H) 10.0 - 22.0 ug/mL   POCT glucose     Collection Time: 11/07/22  9:36 PM   Result Value Ref Range    POCT Glucose 87 70 - 110 mg/dL   POCT glucose    Collection Time: 11/08/22 12:23 AM   Result Value Ref Range    POCT Glucose 53 (L) 70 - 110 mg/dL   Complete Blood Count (CBC)    Collection Time: 11/08/22  4:37 AM   Result Value Ref Range    WBC 21.59 (H) 3.90 - 12.70 K/uL    RBC 2.82 (L) 4.00 - 5.40 M/uL    Hemoglobin 6.6 (L) 12.0 - 16.0 g/dL    Hematocrit 22.1 (L) 37.0 - 48.5 %    MCV 78 (L) 82 - 98 fL    MCH 23.4 (L) 27.0 - 31.0 pg    MCHC 29.9 (L) 32.0 - 36.0 g/dL    RDW 20.4 (H) 11.5 - 14.5 %    Platelets 79 (L) 150 - 450 K/uL    MPV 10.9 9.2 - 12.9 fL    Immature Granulocytes 0.9 (H) 0.0 - 0.5 %    Gran # (ANC) 20.3 (H) 1.8 - 7.7 K/uL    Immature Grans (Abs) 0.19 (H) 0.00 - 0.04 K/uL    Lymph # 0.7 (L) 1.0 - 4.8 K/uL    Mono # 0.4 0.3 - 1.0 K/uL    Eos # 0.0 0.0 - 0.5 K/uL    Baso # 0.03 0.00 - 0.20 K/uL    nRBC 0 0 /100 WBC    Gran % 93.9 (H) 38.0 - 73.0 %    Lymph % 3.2 (L) 18.0 - 48.0 %    Mono % 1.9 (L) 4.0 - 15.0 %    Eosinophil % 0.0 0.0 - 8.0 %    Basophil % 0.1 0.0 - 1.9 %    Platelet Estimate Decreased (A)     Aniso Slight     Poly Occasional     Hypo Occasional     Differential Method Automated    Comprehensive Metabolic Panel (CMP)    Collection Time: 11/08/22  4:37 AM   Result Value Ref Range    Sodium 137 136 - 145 mmol/L    Potassium 4.4 3.5 - 5.1 mmol/L    Chloride 107 95 - 110 mmol/L    CO2 21 (L) 23 - 29 mmol/L    Glucose 114 (H) 70 - 110 mg/dL    BUN 53 (H) 6 - 20 mg/dL    Creatinine 0.9 0.5 - 1.4 mg/dL    Calcium 7.5 (L) 8.7 - 10.5 mg/dL    Total Protein 4.3 (L) 6.0 - 8.4 g/dL    Albumin 1.0 (L) 3.5 - 5.2 g/dL    Total Bilirubin 0.3 0.1 - 1.0 mg/dL    Alkaline Phosphatase 213 (H) 55 - 135 U/L    AST 21 10 - 40 U/L    ALT 24 10 - 44 U/L    Anion Gap 9 8 - 16 mmol/L    eGFR >60.0 >60 mL/min/1.73 m^2   POCT glucose    Collection Time: 11/08/22  7:46 AM   Result Value Ref Range    POCT Glucose 83 70 - 110 mg/dL   POCT glucose     Collection Time: 11/08/22  9:02 AM   Result Value Ref Range    POCT Glucose 118 (H) 70 - 110 mg/dL   Vancomycin, random    Collection Time: 11/08/22  9:04 AM   Result Value Ref Range    Vancomycin, Random 22.7 Not established ug/mL   POCT glucose    Collection Time: 11/08/22 10:00 AM   Result Value Ref Range    POCT Glucose 68 (L) 70 - 110 mg/dL   Type & Screen    Collection Time: 11/08/22 11:12 AM   Result Value Ref Range    Group & Rh O POS     Indirect Jacinta NEG    Prepare RBC 1 Unit    Collection Time: 11/08/22 11:12 AM   Result Value Ref Range    UNIT NUMBER [id number]     Product Code [id number]     DISPENSE STATUS TRANSFUSED     CODING SYSTEM [id number]     Unit Blood Type Code 5100     Unit Blood Type O POS     Unit Expiration [id number]    POCT glucose    Collection Time: 11/08/22 11:16 AM   Result Value Ref Range    POCT Glucose 70 70 - 110 mg/dL   POCT glucose    Collection Time: 11/08/22  1:33 PM   Result Value Ref Range    POCT Glucose 47 (LL) 70 - 110 mg/dL   POCT glucose    Collection Time: 11/08/22  2:03 PM   Result Value Ref Range    POCT Glucose 166 (H) 70 - 110 mg/dL   POCT glucose    Collection Time: 11/08/22  5:09 PM   Result Value Ref Range    POCT Glucose 75 70 - 110 mg/dL   POCT glucose    Collection Time: 11/08/22  7:02 PM   Result Value Ref Range    POCT Glucose 66 (L) 70 - 110 mg/dL   POCT glucose    Collection Time: 11/08/22  9:02 PM   Result Value Ref Range    POCT Glucose 71 70 - 110 mg/dL   CBC auto differential    Collection Time: 11/08/22 10:52 PM   Result Value Ref Range    WBC 25.51 (H) 3.90 - 12.70 K/uL    RBC 3.71 (L) 4.00 - 5.40 M/uL    Hemoglobin 9.1 (L) 12.0 - 16.0 g/dL    Hematocrit 29.1 (L) 37.0 - 48.5 %    MCV 78 (L) 82 - 98 fL    MCH 24.5 (L) 27.0 - 31.0 pg    MCHC 31.3 (L) 32.0 - 36.0 g/dL    RDW 18.9 (H) 11.5 - 14.5 %    Platelets 71 (L) 150 - 450 K/uL    MPV SEE COMMENT 9.2 - 12.9 fL    Immature Granulocytes 1.1 (H) 0.0 - 0.5 %    Gran # (ANC) 23.9 (H) 1.8 - 7.7 K/uL     Immature Grans (Abs) 0.28 (H) 0.00 - 0.04 K/uL    Lymph # 0.9 (L) 1.0 - 4.8 K/uL    Mono # 0.4 0.3 - 1.0 K/uL    Eos # 0.0 0.0 - 0.5 K/uL    Baso # 0.04 0.00 - 0.20 K/uL    nRBC 0 0 /100 WBC    Gran % 93.7 (H) 38.0 - 73.0 %    Lymph % 3.3 (L) 18.0 - 48.0 %    Mono % 1.7 (L) 4.0 - 15.0 %    Eosinophil % 0.0 0.0 - 8.0 %    Basophil % 0.2 0.0 - 1.9 %    Differential Method Automated    Basic Metabolic Panel    Collection Time: 11/08/22 10:52 PM   Result Value Ref Range    Sodium 137 136 - 145 mmol/L    Potassium 4.7 3.5 - 5.1 mmol/L    Chloride 108 95 - 110 mmol/L    CO2 21 (L) 23 - 29 mmol/L    Glucose 52 (L) 70 - 110 mg/dL    BUN 56 (H) 6 - 20 mg/dL    Creatinine 1.0 0.5 - 1.4 mg/dL    Calcium 7.3 (L) 8.7 - 10.5 mg/dL    Anion Gap 8 8 - 16 mmol/L    eGFR >60.0 >60 mL/min/1.73 m^2   POCT glucose    Collection Time: 11/09/22 12:17 AM   Result Value Ref Range    POCT Glucose 107 70 - 110 mg/dL   POCT glucose    Collection Time: 11/09/22  1:18 AM   Result Value Ref Range    POCT Glucose 74 70 - 110 mg/dL   POCT glucose    Collection Time: 11/09/22  4:52 AM   Result Value Ref Range    POCT Glucose 35 (LL) 70 - 110 mg/dL   POCT glucose    Collection Time: 11/09/22  5:43 AM   Result Value Ref Range    POCT Glucose 150 (H) 70 - 110 mg/dL   POCT glucose    Collection Time: 11/09/22  7:22 AM   Result Value Ref Range    POCT Glucose 105 70 - 110 mg/dL   CBC Auto Differential    Collection Time: 11/09/22  8:33 AM   Result Value Ref Range    WBC 24.58 (H) 3.90 - 12.70 K/uL    RBC 3.82 (L) 4.00 - 5.40 M/uL    Hemoglobin 9.2 (L) 12.0 - 16.0 g/dL    Hematocrit 30.1 (L) 37.0 - 48.5 %    MCV 79 (L) 82 - 98 fL    MCH 24.1 (L) 27.0 - 31.0 pg    MCHC 30.6 (L) 32.0 - 36.0 g/dL    RDW 19.6 (H) 11.5 - 14.5 %    Platelets 62 (L) 150 - 450 K/uL    MPV SEE COMMENT 9.2 - 12.9 fL    Immature Granulocytes 1.2 (H) 0.0 - 0.5 %    Gran # (ANC) 23.1 (H) 1.8 - 7.7 K/uL    Immature Grans (Abs) 0.29 (H) 0.00 - 0.04 K/uL    Lymph # 0.8 (L) 1.0 - 4.8  K/uL    Mono # 0.4 0.3 - 1.0 K/uL    Eos # 0.0 0.0 - 0.5 K/uL    Baso # 0.03 0.00 - 0.20 K/uL    nRBC 0 0 /100 WBC    Gran % 94.0 (H) 38.0 - 73.0 %    Lymph % 3.3 (L) 18.0 - 48.0 %    Mono % 1.4 (L) 4.0 - 15.0 %    Eosinophil % 0.0 0.0 - 8.0 %    Basophil % 0.1 0.0 - 1.9 %    Differential Method Automated    Comprehensive Metabolic Panel    Collection Time: 11/09/22  8:33 AM   Result Value Ref Range    Sodium 136 136 - 145 mmol/L    Potassium 5.0 3.5 - 5.1 mmol/L    Chloride 109 95 - 110 mmol/L    CO2 16 (L) 23 - 29 mmol/L    Glucose 75 70 - 110 mg/dL    BUN 58 (H) 6 - 20 mg/dL    Creatinine 0.9 0.5 - 1.4 mg/dL    Calcium 7.7 (L) 8.7 - 10.5 mg/dL    Total Protein 4.6 (L) 6.0 - 8.4 g/dL    Albumin 1.0 (L) 3.5 - 5.2 g/dL    Total Bilirubin 0.3 0.1 - 1.0 mg/dL    Alkaline Phosphatase 284 (H) 55 - 135 U/L    AST 27 10 - 40 U/L    ALT 29 10 - 44 U/L    Anion Gap 11 8 - 16 mmol/L    eGFR >60 >60 mL/min/1.73 m^2   Magnesium    Collection Time: 11/09/22  8:33 AM   Result Value Ref Range    Magnesium 1.6 1.6 - 2.6 mg/dL   Phosphorus    Collection Time: 11/09/22  8:33 AM   Result Value Ref Range    Phosphorus 4.9 (H) 2.7 - 4.5 mg/dL   POCT glucose    Collection Time: 11/09/22 11:15 AM   Result Value Ref Range    POCT Glucose 73 70 - 110 mg/dL   Vancomycin, random    Collection Time: 11/09/22  2:22 PM   Result Value Ref Range    Vancomycin, Random 22.6 Not established ug/mL   WBC & Diff,Body Fluid Peritoneal Fluid    Collection Time: 11/09/22  3:27 PM   Result Value Ref Range    Body Fluid Type Peritoneal Fluid     Fluid Appearance Cloudy     Fluid Color Yellow     WBC, Body Fluid 6426 /cu mm    Segs, Fluid 99 %    Lymphs, Fluid 1 %   POCT glucose    Collection Time: 11/09/22  4:23 PM   Result Value Ref Range    POCT Glucose 55 (L) 70 - 110 mg/dL   POCT glucose    Collection Time: 11/09/22  7:35 PM   Result Value Ref Range    POCT Glucose 43 (LL) 70 - 110 mg/dL   POCT glucose    Collection Time: 11/09/22  8:21 PM   Result  Value Ref Range    POCT Glucose 138 (H) 70 - 110 mg/dL   CBC Auto Differential    Collection Time: 11/10/22  4:17 AM   Result Value Ref Range    WBC 23.12 (H) 3.90 - 12.70 K/uL    RBC 3.72 (L) 4.00 - 5.40 M/uL    Hemoglobin 9.0 (L) 12.0 - 16.0 g/dL    Hematocrit 29.8 (L) 37.0 - 48.5 %    MCV 80 (L) 82 - 98 fL    MCH 24.2 (L) 27.0 - 31.0 pg    MCHC 30.2 (L) 32.0 - 36.0 g/dL    RDW 19.5 (H) 11.5 - 14.5 %    Platelets 43 (L) 150 - 450 K/uL    MPV SEE COMMENT 9.2 - 12.9 fL    Immature Granulocytes 0.9 (H) 0.0 - 0.5 %    Gran # (ANC) 21.7 (H) 1.8 - 7.7 K/uL    Immature Grans (Abs) 0.20 (H) 0.00 - 0.04 K/uL    Lymph # 0.8 (L) 1.0 - 4.8 K/uL    Mono # 0.3 0.3 - 1.0 K/uL    Eos # 0.0 0.0 - 0.5 K/uL    Baso # 0.04 0.00 - 0.20 K/uL    nRBC 0 0 /100 WBC    Gran % 93.7 (H) 38.0 - 73.0 %    Lymph % 3.6 (L) 18.0 - 48.0 %    Mono % 1.5 (L) 4.0 - 15.0 %    Eosinophil % 0.1 0.0 - 8.0 %    Basophil % 0.2 0.0 - 1.9 %    Differential Method Automated    Comprehensive Metabolic Panel    Collection Time: 11/10/22  4:17 AM   Result Value Ref Range    Sodium 135 (L) 136 - 145 mmol/L    Potassium 5.1 3.5 - 5.1 mmol/L    Chloride 108 95 - 110 mmol/L    CO2 15 (L) 23 - 29 mmol/L    Glucose 67 (L) 70 - 110 mg/dL    BUN 64 (H) 6 - 20 mg/dL    Creatinine 0.9 0.5 - 1.4 mg/dL    Calcium 7.2 (L) 8.7 - 10.5 mg/dL    Total Protein 4.5 (L) 6.0 - 8.4 g/dL    Albumin 0.9 (L) 3.5 - 5.2 g/dL    Total Bilirubin 0.4 0.1 - 1.0 mg/dL    Alkaline Phosphatase 342 (H) 55 - 135 U/L    AST 35 10 - 40 U/L    ALT 31 10 - 44 U/L    Anion Gap 12 8 - 16 mmol/L    eGFR >60 >60 mL/min/1.73 m^2   Magnesium    Collection Time: 11/10/22  4:17 AM   Result Value Ref Range    Magnesium 1.5 (L) 1.6 - 2.6 mg/dL   Vancomycin, random    Collection Time: 11/10/22  4:17 AM   Result Value Ref Range    Vancomycin, Random 19.0 Not established ug/mL   POCT glucose    Collection Time: 11/10/22  7:28 AM   Result Value Ref Range    POCT Glucose 91 70 - 110 mg/dL       Microbiology Results  (last 7 days)       Procedure Component Value Units Date/Time    Culture, Anaerobic [[id number]] Collected: 11/09/22 1527    Order Status: Sent Specimen: Peritoneal Fluid Updated: 11/09/22 1545    Culture, Body Fluid (Aerobic) w/ Gram Stain [[id number]] Collected: 11/09/22 1527    Order Status: Sent Specimen: Peritoneal Fluid Updated: 11/09/22 1544                    Pending Diagnostic Studies:       Procedure Component Value Units Date/Time    IR Paracentesis with Imaging [[id number]] Resulted: 11/09/22 1513    Order Status: Sent Lab Status: In process Updated: 11/09/22 1538           Medications:  Reconciled Home Medications:      Medication List        START taking these medications      cefdinir 300 MG capsule  Commonly known as: OMNICEF  Take 1 capsule (300 mg total) by mouth 2 (two) times daily. for 10 days  Start taking on: November 11, 2022     doxycycline 100 MG Cap  Commonly known as: VIBRAMYCIN  Take 1 capsule (100 mg total) by mouth 2 (two) times daily. for 10 days  Start taking on: November 11, 2022            CONTINUE taking these medications      aluminum-magnesium hydroxide-simethicone 200-200-20 mg/5 mL Susp  Commonly known as: MAALOX  Take 30 mLs by mouth every 6 (six) hours as needed.     apixaban 5 mg Tab  Commonly known as: ELIQUIS  Take 1 tablet (5 mg total) by mouth 2 (two) times daily.     CLEARLAX 17 gram/dose powder  Generic drug: polyethylene glycol  Mix 1 capful (17 g) with liquid and take by mouth once daily for 20 days     DUKE'S SOLUTION (BENADRYL 30 ML, MYLANTA 30 ML, LIDOCAINE 30 ML, NYSTATIN 30 ML)  Take 10 mLs by mouth 4 (four) times daily.     famotidine 20 MG tablet  Commonly known as: PEPCID  Take 1 tablet (20 mg total) by mouth nightly as needed for Heartburn.     gabapentin 300 MG capsule  Commonly known as: NEURONTIN  Take 1 capsule (300 mg total) by mouth 3 (three) times daily.     LORazepam 0.5 MG tablet  Commonly known as: ATIVAN  Take by mouth.     naloxone 4 mg/actuation  Spry  Commonly known as: NARCAN  4mg by nasal route as needed for opioid overdose; may repeat every 2-3 minutes in alternating nostrils until medical help arrives. Call 911     ondansetron 8 MG tablet  Commonly known as: ZOFRAN  TAKE 1 TABLET(8 MG) BY MOUTH EVERY 8 HOURS AS NEEDED FOR NAUSEA     oxyCODONE 5 MG immediate release tablet  Commonly known as: ROXICODONE  Take 1 tablet (5 mg total) by mouth every 4 (four) hours as needed for Pain.     prochlorperazine 10 MG tablet  Commonly known as: COMPAZINE  TAKE 1 TABLET(10 MG) BY MOUTH EVERY 6 HOURS AS NEEDED FOR NAUSEA     senna-docusate 8.6-50 mg 8.6-50 mg per tablet  Commonly known as: SENNA WITH DOCUSATE SODIUM  Take 1 tablet by mouth 2 (two) times daily as needed for Constipation.     sodium bicarb-sodium chloride powder  Swish and spit 1 Dose 4 (four) times daily.            STOP taking these medications      baclofen 5 mg Tab tablet  Commonly known as: LIORESAL     furosemide 20 MG tablet  Commonly known as: LASIX     hydrocortisone 2.5 % cream     magnesium hydroxide 400 mg/5 ml 400 mg/5 mL Susp  Commonly known as: MILK OF MAGNESIA     midodrine 10 MG tablet  Commonly known as: PROAMATINE     nortriptyline 25 MG capsule  Commonly known as: PAMELOR              Indwelling Lines/Drains at time of discharge:   Lines/Drains/Airways       Central Venous Catheter Line  Duration             Port A Cath Single Lumen right subclavian -- days              Drain  Duration                  Drain/Device  10/05/22 0826 Right lower abdomen other (see comments) 36 days    Female External Urinary Catheter 11/09/22 1800 <1 day                    Time spent on the discharge of patient: 35 minutes         Jean Marie Altamirano MD  Department of Hospital Medicine  [facility]

## 2022-11-10 NOTE — PLAN OF CARE
Problem: Adult Inpatient Plan of Care  Goal: Plan of Care Review  Outcome: Ongoing, Progressing  Goal: Patient-Specific Goal (Individualized)  Outcome: Ongoing, Progressing  Goal: Absence of Hospital-Acquired Illness or Injury  Outcome: Ongoing, Progressing  Goal: Optimal Comfort and Wellbeing  Outcome: Ongoing, Progressing  Goal: Readiness for Transition of Care  Outcome: Ongoing, Progressing     Problem: Fluid and Electrolyte Imbalance (Acute Kidney Injury/Impairment)  Goal: Fluid and Electrolyte Balance  Outcome: Ongoing, Progressing     Problem: Oral Intake Inadequate (Acute Kidney Injury/Impairment)  Goal: Optimal Nutrition Intake  Outcome: Ongoing, Progressing     Problem: Renal Function Impairment (Acute Kidney Injury/Impairment)  Goal: Effective Renal Function  Outcome: Ongoing, Progressing     Problem: Adjustment to Illness (Sepsis/Septic Shock)  Goal: Optimal Coping  Outcome: Ongoing, Progressing     Problem: Bleeding (Sepsis/Septic Shock)  Goal: Absence of Bleeding  Outcome: Ongoing, Progressing     Problem: Glycemic Control Impaired (Sepsis/Septic Shock)  Goal: Blood Glucose Level Within Desired Range  Outcome: Ongoing, Progressing     Problem: Infection Progression (Sepsis/Septic Shock)  Goal: Absence of Infection Signs and Symptoms  Outcome: Ongoing, Progressing     Problem: Nutrition Impaired (Sepsis/Septic Shock)  Goal: Optimal Nutrition Intake  Outcome: Ongoing, Progressing     Problem: Fluid Imbalance (Pneumonia)  Goal: Fluid Balance  Outcome: Ongoing, Progressing     Problem: Infection (Pneumonia)  Goal: Resolution of Infection Signs and Symptoms  Outcome: Ongoing, Progressing     Problem: Respiratory Compromise (Pneumonia)  Goal: Effective Oxygenation and Ventilation  Outcome: Ongoing, Progressing     Problem: Infection  Goal: Absence of Infection Signs and Symptoms  Outcome: Ongoing, Progressing     Problem: Impaired Wound Healing  Goal: Optimal Wound Healing  Outcome: Ongoing, Progressing      Problem: Skin Injury Risk Increased  Goal: Skin Health and Integrity  Outcome: Ongoing, Progressing

## 2022-11-10 NOTE — PLAN OF CARE
ADT 30 order placed for Stretcher Transportation.  Requested  time:  If transportation does not arrive at ETA time nurse will be instructed to follow protocol for transportation below: 1:00PM [address] [address]  Alvino Lee (Healthcare Power of ) [phone] (Mobile) Home with [facility].  How can I get in touch directly with dispatch, if needed?                 Non-emergent (stretcher): [phone]      ++NURSING:  If Stretcher does not arrive at requested time please call the above Non Emergent Dispatcher.  If issue not resolved please escalate to your charge nurse for further instructions.

## 2022-11-10 NOTE — DISCHARGE INSTRUCTIONS
Take doxycycline and cefdinir both twice daily for 10 more days  Will get [facility] to assist          Jean Marie Altamirano MD  Internal Medicine Staff

## 2022-11-10 NOTE — PLAN OF CARE
[facility] - Med Surg  Discharge Final NoteE    TN sent a secure chat to med surg nurse Bita, this patient is going home via ambulance at 1 PM.  To resume [facility].  TN confirmed with [facility] rep Reshma [phone].    Primary Care Provider: Primary Doctor No    Expected Discharge Date: 11/10/2022    Final Discharge Note (most recent)       Final Note - 11/10/22 1159          Final Note    Assessment Type Final Discharge Note     Anticipated Discharge Disposition Hospice/Home     What phone number can be called within the next 1-3 days to see how you are doing after discharge? --   see chart    Hospital Resources/Appts/Education Provided Appointments scheduled and added to AVS;Appointments scheduled by Navigator/Coordinator;Provided patient/caregiver with written discharge plan information        Post-Acute Status    Post-Acute Authorization Hospice     Hospice Status Set-up Complete/Auth obtained     Coverage [facility] ALL OUT OF STATE     Discharge Delays None known at this time                     Important Message from Medicare  hospice             Contact Info       [facility] [place]   Specialty: Hospice Services   Relationship: Post Acute Care Provider    [facility] [address]  [address]  [place] [address]   Phone: [phone]       Next Steps: Follow up    [facility]    [phone]  (For future use).       Next Steps: Follow up

## 2022-11-10 NOTE — PLAN OF CARE
[facility]  Department of Hospital Medicine  [address] [place] [address]  [phone] after hours  [phone] fax    HOSPICE  ORDERS    11/10/2022    Admit to Hospice:  Home Service         Diagnoses:   Active Hospital Problems    Diagnosis  POA    *Sepsis [A41.9]  Yes    Acute hypoxemic respiratory failure [J96.01]  Yes    HAP (hospital-acquired pneumonia) [J18.9, Y95]  Yes    Advance care planning [Z71.89]  Not Applicable    Malignant neoplasm of stomach [C16.9]  Yes    Microcytic anemia [D50.9]  Yes    Malignant ascites [R18.0]  Yes      Resolved Hospital Problems   No resolved problems to display.       Hospice Qualifying Diagnoses:        Patient has a life expectancy < 6 months due to:  Primary Hospice Diagnosis: Met Gastric Adenocarcinoma  Comorbid Conditions Contributing to Decline: Cancer and FTT        Vital Signs: Routine per Hospice Protocol.    Code Status: DNR    Allergies: Review of patient's allergies indicates:  No Known Allergies    Diet: Dental soft, Boost plus or equivalent with meals if available    Activities: As tolerated    Goals of Care Treatment Preferences:  Code Status: DNR    Health care agent: Alvino Jesus  Health care agent number: [phone]                   Nursing:   Per Hospice Routine.      Routine Skin for Bedridden Patients: Apply moisture barrier cream to all skin folds and wet areas in perineal area daily and after baths and all bowel movements.    Oxygen: no    Other Miscellaneous Care:     Wound Care:  Nursing consult for wounds sacrum  A 42 year old female admitted today from home/[facility] with sepsis; hospital acquired pneumonia; acute hypoxemic respiratory failure; advance care planning; malignant neoplasm of stomach; microcytic anemia; malignant ascites  11/9 WBC 24.58 Hgb 9.2 Hct 30.1 Alb 1.0 Weight 145 lb  On Isoflex mattress; Aaron score 10; dependent bed mobility; incontinent; EHOB boots  According to record,  10/24/22 documented Stage 3 sacral pressure injury    Assessment:  Photodocumentation (in Media)  Sacrum- Stage 4 pressure injury with opening 3 cm(L) 3 cm(w) with undermining 1 cm from 7-10 o'clock. Small amount necrosis adherent to upper edge of opening. Bone felt in base of wound. Draining a moderate amount serosanguineous drainage. DTI upper edge of wound  Legs edematous with pitting edema    Treatment:  Cleansed wound with Vashe. Filled undermining with wick of Aquacel Ag. Covered with Mepilex sacral foam dressing.     Recommend continued wound care when discharged home with Hospice. If not in use, apply low air loss mattress or equivalent on hospital bed at home.       Medications:          Medication List        START taking these medications      cefdinir 300 MG capsule  Commonly known as: OMNICEF  Take 1 capsule (300 mg total) by mouth 2 (two) times daily. for 10 days  Start taking on: November 11, 2022     doxycycline 100 MG Cap  Commonly known as: VIBRAMYCIN  Take 1 capsule (100 mg total) by mouth 2 (two) times daily. for 10 days  Start taking on: November 11, 2022            CONTINUE taking these medications      aluminum-magnesium hydroxide-simethicone 200-200-20 mg/5 mL Susp  Commonly known as: MAALOX  Take 30 mLs by mouth every 6 (six) hours as needed.     apixaban 5 mg Tab  Commonly known as: ELIQUIS  Take 1 tablet (5 mg total) by mouth 2 (two) times daily.     CLEARLAX 17 gram/dose powder  Generic drug: polyethylene glycol  Mix 1 capful (17 g) with liquid and take by mouth once daily for 20 days     DUKE'S SOLUTION (BENADRYL 30 ML, MYLANTA 30 ML, LIDOCAINE 30 ML, NYSTATIN 30 ML)  Take 10 mLs by mouth 4 (four) times daily.     famotidine 20 MG tablet  Commonly known as: PEPCID  Take 1 tablet (20 mg total) by mouth nightly as needed for Heartburn.     gabapentin 300 MG capsule  Commonly known as: NEURONTIN  Take 1 capsule (300 mg total) by mouth 3 (three) times daily.     LORazepam 0.5 MG  tablet  Commonly known as: ATIVAN  Take by mouth.     naloxone 4 mg/actuation Spry  Commonly known as: NARCAN  4mg by nasal route as needed for opioid overdose; may repeat every 2-3 minutes in alternating nostrils until medical help arrives. Call 911     ondansetron 8 MG tablet  Commonly known as: ZOFRAN  TAKE 1 TABLET(8 MG) BY MOUTH EVERY 8 HOURS AS NEEDED FOR NAUSEA     oxyCODONE 5 MG immediate release tablet  Commonly known as: ROXICODONE  Take 1 tablet (5 mg total) by mouth every 4 (four) hours as needed for Pain.     prochlorperazine 10 MG tablet  Commonly known as: COMPAZINE  TAKE 1 TABLET(10 MG) BY MOUTH EVERY 6 HOURS AS NEEDED FOR NAUSEA     senna-docusate 8.6-50 mg 8.6-50 mg per tablet  Commonly known as: SENNA WITH DOCUSATE SODIUM  Take 1 tablet by mouth 2 (two) times daily as needed for Constipation.     sodium bicarb-sodium chloride powder  Swish and spit 1 Dose 4 (four) times daily.            STOP taking these medications      baclofen 5 mg Tab tablet  Commonly known as: LIORESAL     furosemide 20 MG tablet  Commonly known as: LASIX     hydrocortisone 2.5 % cream     magnesium hydroxide 400 mg/5 ml 400 mg/5 mL Susp  Commonly known as: MILK OF MAGNESIA     midodrine 10 MG tablet  Commonly known as: PROAMATINE     nortriptyline 25 MG capsule  Commonly known as: PAMELOR                DIABETES CARE: No    Future Orders:  Hospice Medical Director may dictate new orders for comfortable care measures & sign death certificate.        _________________________________  Jean Marie Altamirano MD  11/10/2022

## 2022-11-10 NOTE — NURSING
Discharge instructions, follow up, and home prescriptions given and explained to patient and patient family. Port a cath de accessed and heparin locked. Patient awaiting ambulance at this time.

## 2022-11-13 LAB
BACTERIA FLD AEROBE CULT: NO GROWTH
BACTERIA SPEC ANAEROBE CULT: NORMAL
GRAM STN SPEC: NORMAL
GRAM STN SPEC: NORMAL

## 2023-01-16 PROBLEM — N17.9 AKI (ACUTE KIDNEY INJURY): Status: RESOLVED | Noted: 2022-10-01 | Resolved: 2023-01-16
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
## 2023-02-13 PROBLEM — A41.9 SEPSIS: Status: RESOLVED | Noted: 2020-10-08 | Resolved: 2023-02-13

## 2023-02-13 PROBLEM — J18.9 HAP (HOSPITAL-ACQUIRED PNEUMONIA): Status: RESOLVED | Noted: 2022-11-08 | Resolved: 2023-02-13

## 2023-02-13 PROBLEM — J96.01 ACUTE HYPOXEMIC RESPIRATORY FAILURE: Status: RESOLVED | Noted: 2022-11-08 | Resolved: 2023-02-13

## 2023-04-04 ENCOUNTER — PATIENT MESSAGE (OUTPATIENT)
Dept: RESEARCH | Facility: HOSPITAL | Age: 43
End: 2023-04-04
Payer: COMMERCIAL

## 2024-06-10 NOTE — TELEPHONE ENCOUNTER
Spoke with pt advised her that  want her to come to the ER as soon as possible to have labs and a ct scan done. Pt verbalized understanding.       ----- Message from Angela Funes sent at 10/8/2020 10:49 AM CDT -----  Type: Patient Call Back       What is the request in detail: pt returning call       Can the clinic reply by [facility]? No       Would the patient rather a call back or a response via [facility]? Call back       Best call back number: [phone]        Thank you.      
[Time Spent: ___ minutes] : I have spent [unfilled] minutes of time on the encounter.

## 2024-09-26 NOTE — BRIEF OP NOTE
Radiology Post-Procedure Note     Pre Op Diagnosis: Recurrent painful, tense ascites  Post Op Diagnosis: Same     Procedure: 1. US-guided percutaneous RUQ-approach therapeutic LVP     Procedure performed by: Castro Bermudez MD     Written Informed Consent Obtained: Yes  Specimen Removed: YES, 6,500-cc of thin, serosanguinous ascitic fluid  Estimated Blood Loss: none     Findings:   Successful US-guided percutaneous RUQ-approach therapeutic LVP with local anesthetic only and albumin infusion post PRN as indicated per institutional protocol. Patient tolerated the procedure well. No immediate post-procedural complications noted.      Thank you for considering IR for the care of your patient.      Castro Bermudez MD  Interventional Radiology   Include Z78.9 (Other Specified Conditions Influencing Health Status) As An Associated Diagnosis?: No Detail Level: Detailed Show Topical Anesthesia Variable?: Yes Spray Paint Text: The liquid nitrogen was applied to the skin utilizing a spray paint frosting technique. Medical Necessity Clause: This procedure was medically necessary because the lesions that were treated were: Post-Care Instructions: I reviewed with the patient in detail post-care instructions. Patient is to wear sunprotection, and avoid picking at any of the treated lesions. Pt may apply Vaseline to crusted or scabbing areas. Medical Necessity Information: It is in your best interest to select a reason for this procedure from the list below. All of these items fulfill various CMS LCD requirements except the new and changing color options. Consent: The patient's consent was obtained including but not limited to risks of crusting, scabbing, blistering, scarring, darker or lighter pigmentary change, recurrence, incomplete removal and infection. Patent

## 2024-11-24 NOTE — PLAN OF CARE
Pt AAOx4. VSS, afebrile, on room air. Sinus tach on tele monitoring. Pain controlled w/ PRN Dilaudid & Oxycodone. PRN Thorazine administered d/t hiccups. Pt advanced to regular diet. Pt denies N/V. 0 BM/shift, bowel regimen started. Pt up to bedside commode w/ stand-by assistance. Bed in lowest position, wheels locked, side rails 2x, call light within pt reach. Pt updated on plan of care.   Yes

## 2025-02-18 NOTE — PROCEDURES
L foot pain Radiology Post-Procedure Note    Pre Op Diagnosis: Ascites    Post Op Diagnosis: Same    Procedure: US guided paracentesis.    Procedure performed by: Omar Augustine MD    Written Informed Consent Obtained: Yes  Specimen Removed: YES cloudy yellow fluid  Estimated Blood Loss: Minimal    Findings:   Successful RLQ paracentesis.  Albumin administered per protocol. No complications.    Patient tolerated procedure well.    Omar Augustine M.D.  Diagnostic and Interventional Radiologist  Department of Radiology  Pager: [phone]         Concern for In-stent thrombosis

## (undated) DEVICE — SEE MEDLINE ITEM 152622

## (undated) DEVICE — STAPLER SKIN ROTATING HEAD

## (undated) DEVICE — BLANKET UPPER BODY 78.7X29.9IN

## (undated) DEVICE — APPLICATOR CHLORAPREP ORN 26ML

## (undated) DEVICE — COVER OVERHEAD SURG LT BLUE

## (undated) DEVICE — PACK ENDOSCOPY GENERAL

## (undated) DEVICE — SEE MEDLINE ITEM 146417

## (undated) DEVICE — Device

## (undated) DEVICE — SUT MCRYL PLUS 4-0 PS2 27IN

## (undated) DEVICE — TOWEL OR NONABSORB ADH 17X26

## (undated) DEVICE — CANISTER SUCTION 2 LTR

## (undated) DEVICE — SUT ETHILON 2-0 FSLX 30 BLK

## (undated) DEVICE — SUT SILK 3-0 SH 18IN BLACK

## (undated) DEVICE — DRESSING ADH ISLAND 2.5 X 3

## (undated) DEVICE — DRAIN FLAT JP 10X4MM P

## (undated) DEVICE — ELECTRODE REM PLYHSV RETURN 9

## (undated) DEVICE — SUT 1 36IN PDS II

## (undated) DEVICE — POSITIONER HEAD DONUT 9IN FOAM

## (undated) DEVICE — ADHESIVE DERMABOND ADVANCED

## (undated) DEVICE — SEE MEDLINE ITEM 157110

## (undated) DEVICE — GLOVE SURG BIOGEL LATEX SZ 7.5

## (undated) DEVICE — GAUZE SPONGE 4X4 12PLY

## (undated) DEVICE — TRAY FOLEY 16FR INFECTION CONT

## (undated) DEVICE — SPONGE LAP 18X18 PREWASHED

## (undated) DEVICE — DRESSING ABSRBNT ISLAND 3.6X8

## (undated) DEVICE — SUT VICRYL 3-0 27 SH

## (undated) DEVICE — SUT 2-0 12-18IN SILK

## (undated) DEVICE — SOL 9P NACL IRR PIC IL

## (undated) DEVICE — SUT VICRYL PLUS 3-0 SH 18IN

## (undated) DEVICE — EVACUATOR WOUND BULB 100CC